# Patient Record
Sex: MALE | Race: WHITE | NOT HISPANIC OR LATINO | Employment: OTHER | URBAN - METROPOLITAN AREA
[De-identification: names, ages, dates, MRNs, and addresses within clinical notes are randomized per-mention and may not be internally consistent; named-entity substitution may affect disease eponyms.]

---

## 2017-01-02 ENCOUNTER — HOME CARE VISIT (OUTPATIENT)
Dept: HOME HEALTH SERVICES | Facility: HOME HEALTHCARE | Age: 67
End: 2017-01-02
Payer: MEDICARE

## 2017-01-03 ENCOUNTER — HOME CARE VISIT (OUTPATIENT)
Dept: HOME HEALTH SERVICES | Facility: HOME HEALTHCARE | Age: 67
End: 2017-01-03
Payer: MEDICARE

## 2017-01-03 ENCOUNTER — ANTICOAGULATION MONITORING (OUTPATIENT)
Dept: VASCULAR LAB | Facility: MEDICAL CENTER | Age: 67
End: 2017-01-03

## 2017-01-03 VITALS — SYSTOLIC BLOOD PRESSURE: 130 MMHG | HEART RATE: 67 BPM | DIASTOLIC BLOOD PRESSURE: 80 MMHG | RESPIRATION RATE: 20 BRPM

## 2017-01-03 DIAGNOSIS — I63.9 CEREBROVASCULAR ACCIDENT (CVA), UNSPECIFIED MECHANISM (HCC): ICD-10-CM

## 2017-01-03 DIAGNOSIS — Z86.73 HISTORY OF CVA (CEREBROVASCULAR ACCIDENT): ICD-10-CM

## 2017-01-03 DIAGNOSIS — Z79.01 ANTICOAGULATED ON WARFARIN: ICD-10-CM

## 2017-01-03 DIAGNOSIS — I48.91 ATRIAL FIBRILLATION, UNSPECIFIED TYPE (HCC): ICD-10-CM

## 2017-01-03 LAB — INR PPP: 1.6 (ref 2–3.5)

## 2017-01-03 PROCEDURE — G0151 HHCP-SERV OF PT,EA 15 MIN: HCPCS

## 2017-01-03 PROCEDURE — G0300 HHS/HOSPICE OF LPN EA 15 MIN: HCPCS

## 2017-01-03 NOTE — PROGRESS NOTES
Anticoagulation Summary as of 1/3/2017     INR goal 2.0-3.0   Selected INR 1.6! (1/3/2017)   Maintenance plan 10 mg (5 mg x 2) every day   Weekly total 70 mg   Plan last modified Jefferson Brian PHARMD (1/3/2017)   Next INR check 1/9/2017   Target end date Indefinite    Indications   CVA (cerebral vascular accident) (HCC) [I63.9]  Atrial fibrillation [427.31] [I48.91]  History of CVA (cerebrovascular accident) [Z86.73]  Anticoagulated on warfarin [Z79.01]         Anticoagulation Episode Summary     [facility]    Preferred lab     Send INR reminders to     Comments       Anticoagulation Care Providers     Provider Role Specialty Phone number    Catrachito Sterling D.O. Referring Internal Medicine [phone]    [facility]ticoagulation [facility] Responsible  [phone]    Liseth Doe A.P.N. Responsible Cardiology [phone]    Gi Cadena A.P.N. Responsible Cardiology [phone]        Anticoagulation Patient Findings    Spoke with patient today regarding subtherapeutic INR of 1.6.  Patient denies any signs/symptoms of bruising or bleeding or any changes in diet and medications.  Instructed patient to call clinic with any questions or concerns.  Patient ran out of current tablet strength and went back to the previous strength he had on hand.  Updated dosing calendar to reflect.  Instructed patient to bolus with 15mg X 1, then resume current warfarin regimen.  Follow up in 6 days.    Jefferson Brian, BIANCA

## 2017-01-04 ENCOUNTER — HOME CARE VISIT (OUTPATIENT)
Dept: HOME HEALTH SERVICES | Facility: HOME HEALTHCARE | Age: 67
End: 2017-01-04
Payer: MEDICARE

## 2017-01-04 VITALS — RESPIRATION RATE: 20 BRPM | TEMPERATURE: 100.1 F | DIASTOLIC BLOOD PRESSURE: 79 MMHG | SYSTOLIC BLOOD PRESSURE: 128 MMHG

## 2017-01-04 VITALS
DIASTOLIC BLOOD PRESSURE: 78 MMHG | TEMPERATURE: 99.5 F | WEIGHT: 267.25 LBS | HEART RATE: 70 BPM | SYSTOLIC BLOOD PRESSURE: 156 MMHG | BODY MASS INDEX: 32.54 KG/M2

## 2017-01-04 VITALS
TEMPERATURE: 97.9 F | RESPIRATION RATE: 20 BRPM | BODY MASS INDEX: 32.7 KG/M2 | DIASTOLIC BLOOD PRESSURE: 80 MMHG | HEART RATE: 65 BPM | SYSTOLIC BLOOD PRESSURE: 130 MMHG | WEIGHT: 268.5 LBS

## 2017-01-04 PROCEDURE — G0156 HHCP-SVS OF AIDE,EA 15 MIN: HCPCS

## 2017-01-04 PROCEDURE — G0160 HHC OCCUP THERAPY EA 15: HCPCS

## 2017-01-04 ASSESSMENT — ENCOUNTER SYMPTOMS: DEBILITATING PAIN: 1

## 2017-01-05 ENCOUNTER — HOME CARE VISIT (OUTPATIENT)
Dept: HOME HEALTH SERVICES | Facility: HOME HEALTHCARE | Age: 67
End: 2017-01-05
Payer: MEDICARE

## 2017-01-05 VITALS
DIASTOLIC BLOOD PRESSURE: 78 MMHG | BODY MASS INDEX: 32.57 KG/M2 | RESPIRATION RATE: 20 BRPM | SYSTOLIC BLOOD PRESSURE: 148 MMHG | TEMPERATURE: 98.8 F | WEIGHT: 267.5 LBS

## 2017-01-05 VITALS
HEART RATE: 86 BPM | SYSTOLIC BLOOD PRESSURE: 174 MMHG | RESPIRATION RATE: 20 BRPM | TEMPERATURE: 98.9 F | DIASTOLIC BLOOD PRESSURE: 104 MMHG

## 2017-01-05 PROCEDURE — G0300 HHS/HOSPICE OF LPN EA 15 MIN: HCPCS

## 2017-01-05 PROCEDURE — G0151 HHCP-SERV OF PT,EA 15 MIN: HCPCS

## 2017-01-05 RX ORDER — HYDRALAZINE HYDROCHLORIDE 100 MG/1
100 TABLET, FILM COATED ORAL EVERY 8 HOURS
Qty: 90 TAB | Refills: 0 | Status: SHIPPED | OUTPATIENT
Start: 2017-01-05 | End: 2017-01-25 | Stop reason: SDUPTHER

## 2017-01-05 RX ORDER — AMLODIPINE BESYLATE 10 MG/1
10 TABLET ORAL DAILY
Qty: 30 TAB | Refills: 0 | Status: SHIPPED | OUTPATIENT
Start: 2017-01-05 | End: 2017-01-19 | Stop reason: SDUPTHER

## 2017-01-05 RX ORDER — WARFARIN SODIUM 6 MG/1
TABLET ORAL
Qty: 30 TAB | Refills: 0 | Status: SHIPPED | OUTPATIENT
Start: 2017-01-05 | End: 2017-01-25

## 2017-01-05 RX ORDER — FLUTICASONE PROPIONATE 50 MCG
2 SPRAY, SUSPENSION (ML) NASAL PRN
Qty: 1 BOTTLE | Refills: 5 | Status: SHIPPED | OUTPATIENT
Start: 2017-01-05 | End: 2018-05-29 | Stop reason: SDUPTHER

## 2017-01-05 RX ORDER — LISINOPRIL 40 MG/1
40 TABLET ORAL DAILY
Qty: 30 TAB | Refills: 0 | Status: SHIPPED | OUTPATIENT
Start: 2017-01-05 | End: 2017-01-25 | Stop reason: SDUPTHER

## 2017-01-05 RX ORDER — GABAPENTIN 300 MG/1
300 CAPSULE ORAL 2 TIMES DAILY
Qty: 60 CAP | Refills: 0 | Status: SHIPPED | OUTPATIENT
Start: 2017-01-05 | End: 2017-01-25 | Stop reason: SDUPTHER

## 2017-01-05 RX ORDER — CLINDAMYCIN HYDROCHLORIDE 300 MG/1
300 CAPSULE ORAL 2 TIMES DAILY
Qty: 40 CAP | Refills: 0 | Status: SHIPPED | OUTPATIENT
Start: 2017-01-05 | End: 2017-01-25 | Stop reason: SDUPTHER

## 2017-01-05 RX ORDER — POLYETHYLENE GLYCOL 3350 17 G/17G
17 POWDER, FOR SOLUTION ORAL
Qty: 30 EACH | Refills: 0 | Status: ON HOLD | OUTPATIENT
Start: 2017-01-05 | End: 2018-06-13

## 2017-01-05 RX ORDER — CLINDAMYCIN HYDROCHLORIDE 300 MG/1
300 CAPSULE ORAL 2 TIMES DAILY
Qty: 40 CAP | Refills: 0 | Status: SHIPPED | OUTPATIENT
Start: 2017-01-05 | End: 2017-01-05 | Stop reason: SDUPTHER

## 2017-01-05 RX ORDER — INSULIN GLARGINE 100 [IU]/ML
30 INJECTION, SOLUTION SUBCUTANEOUS EVERY EVENING
Qty: 10 ML | Refills: 5 | Status: SHIPPED | OUTPATIENT
Start: 2017-01-05 | End: 2017-11-13

## 2017-01-05 ASSESSMENT — ENCOUNTER SYMPTOMS: POOR JUDGMENT: 1

## 2017-01-05 NOTE — TELEPHONE ENCOUNTER
Was the patient seen in the last year in this department? Yes     Does patient have an active prescription for medications requested? No     Received Request Via: patient

## 2017-01-06 ASSESSMENT — ACTIVITIES OF DAILY LIVING (ADL)
ORAL_CARE_ASSISTANCE: 0
GROOMING_ASSISTANCE: 0
DRESSING_LB_ASSISTANCE: 0
TOILETING_ASSISTANCE: 0
BATHING_ASSISTANCE: 5
DRESSING_UB_ASSISTANCE: 0
EATING_ASSISTANCE: 0

## 2017-01-09 ENCOUNTER — HOME CARE VISIT (OUTPATIENT)
Dept: HOME HEALTH SERVICES | Facility: HOME HEALTHCARE | Age: 67
End: 2017-01-09
Payer: MEDICARE

## 2017-01-09 ENCOUNTER — ANTICOAGULATION MONITORING (OUTPATIENT)
Dept: VASCULAR LAB | Facility: MEDICAL CENTER | Age: 67
End: 2017-01-09

## 2017-01-09 VITALS
DIASTOLIC BLOOD PRESSURE: 68 MMHG | HEART RATE: 80 BPM | TEMPERATURE: 99 F | WEIGHT: 263.25 LBS | SYSTOLIC BLOOD PRESSURE: 136 MMHG | BODY MASS INDEX: 32.06 KG/M2

## 2017-01-09 VITALS
TEMPERATURE: 99.4 F | DIASTOLIC BLOOD PRESSURE: 68 MMHG | RESPIRATION RATE: 18 BRPM | HEART RATE: 80 BPM | SYSTOLIC BLOOD PRESSURE: 136 MMHG

## 2017-01-09 DIAGNOSIS — Z86.73 HISTORY OF CVA (CEREBROVASCULAR ACCIDENT): ICD-10-CM

## 2017-01-09 DIAGNOSIS — I48.91 ATRIAL FIBRILLATION, UNSPECIFIED TYPE (HCC): ICD-10-CM

## 2017-01-09 DIAGNOSIS — Z79.01 ANTICOAGULATED ON WARFARIN: ICD-10-CM

## 2017-01-09 DIAGNOSIS — M54.2 NECK PAIN: ICD-10-CM

## 2017-01-09 DIAGNOSIS — I63.9 CEREBROVASCULAR ACCIDENT (CVA), UNSPECIFIED MECHANISM (HCC): ICD-10-CM

## 2017-01-09 LAB — INR PPP: 2.5 (ref 2–3.5)

## 2017-01-09 PROCEDURE — G0152 HHCP-SERV OF OT,EA 15 MIN: HCPCS

## 2017-01-09 PROCEDURE — G0299 HHS/HOSPICE OF RN EA 15 MIN: HCPCS

## 2017-01-09 RX ORDER — OXYCODONE AND ACETAMINOPHEN 10; 325 MG/1; MG/1
1-2 TABLET ORAL EVERY 6 HOURS PRN
Qty: 120 TAB | Refills: 0 | Status: SHIPPED | OUTPATIENT
Start: 2017-01-09 | End: 2017-01-19 | Stop reason: SDUPTHER

## 2017-01-09 ASSESSMENT — ENCOUNTER SYMPTOMS
NAUSEA: DENIES
VOMITING: DENIES

## 2017-01-10 ENCOUNTER — HOME CARE VISIT (OUTPATIENT)
Dept: HOME HEALTH SERVICES | Facility: HOME HEALTHCARE | Age: 67
End: 2017-01-10
Payer: MEDICARE

## 2017-01-10 VITALS — RESPIRATION RATE: 18 BRPM | DIASTOLIC BLOOD PRESSURE: 82 MMHG | TEMPERATURE: 98.9 F | SYSTOLIC BLOOD PRESSURE: 143 MMHG

## 2017-01-10 PROCEDURE — G0156 HHCP-SVS OF AIDE,EA 15 MIN: HCPCS

## 2017-01-10 PROCEDURE — G0151 HHCP-SERV OF PT,EA 15 MIN: HCPCS

## 2017-01-10 ASSESSMENT — ACTIVITIES OF DAILY LIVING (ADL): MEAL_PREP_ASSISTANCE: 5

## 2017-01-10 NOTE — PROGRESS NOTES
OP Anticoagulation Telephone Note    Date: 1/9/2017       Plan:  Spoke with pt on the phone. Pt is therapeutic today. Denies any changes in medications or diet. Denies any s/sx of bleeding or clotting. Will continue warfarin dosing as outlined below and follow-up with pt in 1wk.      Anticoagulation Summary as of 1/9/2017     INR goal 2.0-3.0   Selected INR 2.5 (1/9/2017)   Maintenance plan 10 mg (5 mg x 2) every day   Weekly total 70 mg   Plan last modified Jefferson Brian, PHARMD (1/3/2017)   Next INR check 1/16/2017   Target end date Indefinite    Indications   CVA (cerebral vascular accident) (HCC) [I63.9]  Atrial fibrillation [427.31] [I48.91]  History of CVA (cerebrovascular accident) [Z86.73]  Anticoagulated on warfarin [Z79.01]         Anticoagulation Episode Summary     [facility]    Preferred lab     Send INR reminders to     Comments       Anticoagulation Care Providers     Provider Role Specialty Phone number    Catrachito Sterling D.O. Referring Internal Medicine [phone]    [facility] Anticoagulation [facility] Responsible  [phone]    Liseth Doe A.P.NElian Responsible Cardiology [phone]    Gi Cadena, AElianP.NElian Responsible Cardiology [phone]            GLEN Andrews  [facility]

## 2017-01-11 ENCOUNTER — HOME CARE VISIT (OUTPATIENT)
Dept: HOME HEALTH SERVICES | Facility: HOME HEALTHCARE | Age: 67
End: 2017-01-11
Payer: MEDICARE

## 2017-01-11 VITALS — DIASTOLIC BLOOD PRESSURE: 88 MMHG | HEART RATE: 70 BPM | SYSTOLIC BLOOD PRESSURE: 172 MMHG | TEMPERATURE: 210 F

## 2017-01-11 PROCEDURE — G0152 HHCP-SERV OF OT,EA 15 MIN: HCPCS

## 2017-01-12 VITALS
BODY MASS INDEX: 32.51 KG/M2 | SYSTOLIC BLOOD PRESSURE: 140 MMHG | DIASTOLIC BLOOD PRESSURE: 76 MMHG | TEMPERATURE: 99 F | WEIGHT: 267 LBS | HEART RATE: 69 BPM

## 2017-01-12 ASSESSMENT — ACTIVITIES OF DAILY LIVING (ADL): MEAL_PREP_ASSISTANCE: 5

## 2017-01-13 ENCOUNTER — HOME CARE VISIT (OUTPATIENT)
Dept: HOME HEALTH SERVICES | Facility: HOME HEALTHCARE | Age: 67
End: 2017-01-13
Payer: MEDICARE

## 2017-01-13 PROCEDURE — G0151 HHCP-SERV OF PT,EA 15 MIN: HCPCS

## 2017-01-15 VITALS — SYSTOLIC BLOOD PRESSURE: 196 MMHG | HEART RATE: 66 BPM | DIASTOLIC BLOOD PRESSURE: 116 MMHG | TEMPERATURE: 212 F

## 2017-01-16 ENCOUNTER — HOME CARE VISIT (OUTPATIENT)
Dept: HOME HEALTH SERVICES | Facility: HOME HEALTHCARE | Age: 67
End: 2017-01-16
Payer: MEDICARE

## 2017-01-16 VITALS
DIASTOLIC BLOOD PRESSURE: 94 MMHG | HEIGHT: 76 IN | WEIGHT: 264.8 LBS | TEMPERATURE: 98.2 F | HEART RATE: 68 BPM | SYSTOLIC BLOOD PRESSURE: 169 MMHG | RESPIRATION RATE: 16 BRPM | BODY MASS INDEX: 32.25 KG/M2

## 2017-01-16 LAB — INR PPP: 2.3 (ref 2–3.5)

## 2017-01-16 PROCEDURE — G0152 HHCP-SERV OF OT,EA 15 MIN: HCPCS

## 2017-01-16 PROCEDURE — G0299 HHS/HOSPICE OF RN EA 15 MIN: HCPCS

## 2017-01-16 PROCEDURE — 6650331 HCR  COAGCHECK STRIPS

## 2017-01-16 ASSESSMENT — ENCOUNTER SYMPTOMS
NAUSEA: DENIES
VOMITING: DENIES

## 2017-01-17 ENCOUNTER — TELEPHONE (OUTPATIENT)
Dept: MEDICAL GROUP | Facility: CLINIC | Age: 67
End: 2017-01-17

## 2017-01-17 ENCOUNTER — ANTICOAGULATION MONITORING (OUTPATIENT)
Dept: VASCULAR LAB | Facility: MEDICAL CENTER | Age: 67
End: 2017-01-17

## 2017-01-17 ENCOUNTER — HOME CARE VISIT (OUTPATIENT)
Dept: HOME HEALTH SERVICES | Facility: HOME HEALTHCARE | Age: 67
End: 2017-01-17
Payer: MEDICARE

## 2017-01-17 VITALS
TEMPERATURE: 98.2 F | SYSTOLIC BLOOD PRESSURE: 138 MMHG | WEIGHT: 263.5 LBS | HEART RATE: 67 BPM | BODY MASS INDEX: 32.09 KG/M2 | DIASTOLIC BLOOD PRESSURE: 60 MMHG

## 2017-01-17 DIAGNOSIS — M54.2 NECK PAIN: ICD-10-CM

## 2017-01-17 DIAGNOSIS — I63.9 CEREBROVASCULAR ACCIDENT (CVA), UNSPECIFIED MECHANISM (HCC): ICD-10-CM

## 2017-01-17 DIAGNOSIS — I48.91 ATRIAL FIBRILLATION, UNSPECIFIED TYPE (HCC): ICD-10-CM

## 2017-01-17 DIAGNOSIS — Z79.01 ANTICOAGULATED ON WARFARIN: ICD-10-CM

## 2017-01-17 DIAGNOSIS — Z47.89 OTHER ORTHOPEDIC AFTERCARE: ICD-10-CM

## 2017-01-17 DIAGNOSIS — Z86.73 HISTORY OF CVA (CEREBROVASCULAR ACCIDENT): ICD-10-CM

## 2017-01-17 PROCEDURE — G0151 HHCP-SERV OF PT,EA 15 MIN: HCPCS

## 2017-01-17 ASSESSMENT — ACTIVITIES OF DAILY LIVING (ADL)
ORAL_CARE_ASSISTANCE: 1
GROOMING_ASSISTANCE: 1

## 2017-01-18 ENCOUNTER — HOME CARE VISIT (OUTPATIENT)
Dept: HOME HEALTH SERVICES | Facility: HOME HEALTHCARE | Age: 67
End: 2017-01-18
Payer: MEDICARE

## 2017-01-18 VITALS — RESPIRATION RATE: 20 BRPM | SYSTOLIC BLOOD PRESSURE: 143 MMHG | TEMPERATURE: 99.2 F | DIASTOLIC BLOOD PRESSURE: 82 MMHG

## 2017-01-18 VITALS — TEMPERATURE: 210.4 F | SYSTOLIC BLOOD PRESSURE: 170 MMHG | DIASTOLIC BLOOD PRESSURE: 84 MMHG | HEART RATE: 64 BPM

## 2017-01-18 PROCEDURE — G0156 HHCP-SVS OF AIDE,EA 15 MIN: HCPCS

## 2017-01-18 PROCEDURE — G0152 HHCP-SERV OF OT,EA 15 MIN: HCPCS

## 2017-01-18 NOTE — TELEPHONE ENCOUNTER
1. Caller Name: Joel Ma                                         Call Back Number: [phone] (home)         Patient approves a detailed voicemail message: yes    Montserrat, patient's home health nurse left message asking to please sign order for patient to have more visits, that would include showers.  Montserrat asked to please have did done by tonight she would really appreciate it .

## 2017-01-18 NOTE — PROGRESS NOTES
Anticoagulation Summary as of 1/17/2017     INR goal 2.0-3.0   Selected INR 2.3 (1/16/2017)   Maintenance plan 10 mg (5 mg x 2) every day   Weekly total 70 mg   No change documented Gama Farnsworth Ass't   Plan last modified Jefferson Brian, PHARMD (1/3/2017)   Next INR check 1/23/2017   Target end date Indefinite    Indications   CVA (cerebral vascular accident) (CMS-HCC) [I63.9]  Atrial fibrillation [427.31] [I48.91]  History of CVA (cerebrovascular accident) [Z86.73]  Anticoagulated on warfarin [Z79.01]         Anticoagulation Episode Summary     [facility]    Preferred lab     Send INR reminders to     Comments       Anticoagulation Care Providers     Provider Role Specialty Phone number    Catrachito Sterling D.O. Referring Internal Medicine [phone]    [facility]tico[facility] Responsible  [phone]    Liseth Doe A.P.N. Responsible Cardiology [phone]    BRIT QuirozP.NElian Responsible Cardiology [phone]        Anticoagulation Patient Findings   Negatives Missed Doses, Extra Doses, Medication Changes, Antibiotic Use, Diet Changes, Dental/Other Procedures, Hospitalization, Bleeding Gums, Nose Bleeds, Blood in Urine, Blood in Stool, Any Bruising, Other Complaints        Spoke with patient to report a therapeutic INR.  Pt instructed to continue with current warfarin dosing regimen. Pt denies any s/s of bleeding, bruising, clotting or any changes to diet or medication.  Will follow up in 1 week.    Gama Farnsworth Ass't    Agree with plan of care as stated above.    Liseth CARROLL

## 2017-01-19 ENCOUNTER — HOME CARE VISIT (OUTPATIENT)
Dept: HOME HEALTH SERVICES | Facility: HOME HEALTHCARE | Age: 67
End: 2017-01-19
Payer: MEDICARE

## 2017-01-19 ENCOUNTER — OFFICE VISIT (OUTPATIENT)
Dept: MEDICAL GROUP | Facility: CLINIC | Age: 67
End: 2017-01-19
Payer: MEDICARE

## 2017-01-19 VITALS
SYSTOLIC BLOOD PRESSURE: 174 MMHG | DIASTOLIC BLOOD PRESSURE: 86 MMHG | HEART RATE: 86 BPM | TEMPERATURE: 209.5 F | RESPIRATION RATE: 18 BRPM

## 2017-01-19 VITALS
HEIGHT: 76 IN | SYSTOLIC BLOOD PRESSURE: 160 MMHG | HEART RATE: 66 BPM | RESPIRATION RATE: 18 BRPM | TEMPERATURE: 96.9 F | OXYGEN SATURATION: 95 % | WEIGHT: 270 LBS | BODY MASS INDEX: 32.88 KG/M2 | DIASTOLIC BLOOD PRESSURE: 90 MMHG

## 2017-01-19 VITALS
BODY MASS INDEX: 32.57 KG/M2 | WEIGHT: 267.5 LBS | DIASTOLIC BLOOD PRESSURE: 82 MMHG | HEART RATE: 62 BPM | TEMPERATURE: 99.2 F | SYSTOLIC BLOOD PRESSURE: 143 MMHG

## 2017-01-19 DIAGNOSIS — M54.2 NECK PAIN: ICD-10-CM

## 2017-01-19 DIAGNOSIS — Z23 NEED FOR VACCINATION WITH 13-POLYVALENT PNEUMOCOCCAL CONJUGATE VACCINE: ICD-10-CM

## 2017-01-19 DIAGNOSIS — I10 ESSENTIAL HYPERTENSION: ICD-10-CM

## 2017-01-19 PROCEDURE — G0151 HHCP-SERV OF PT,EA 15 MIN: HCPCS

## 2017-01-19 PROCEDURE — 90670 PCV13 VACCINE IM: CPT | Performed by: INTERNAL MEDICINE

## 2017-01-19 PROCEDURE — 99214 OFFICE O/P EST MOD 30 MIN: CPT | Mod: 25 | Performed by: INTERNAL MEDICINE

## 2017-01-19 PROCEDURE — G0009 ADMIN PNEUMOCOCCAL VACCINE: HCPCS | Performed by: INTERNAL MEDICINE

## 2017-01-19 RX ORDER — OXYCODONE AND ACETAMINOPHEN 10; 325 MG/1; MG/1
1-2 TABLET ORAL EVERY 6 HOURS PRN
Qty: 120 TAB | Refills: 0 | Status: SHIPPED | OUTPATIENT
Start: 2017-01-19 | End: 2017-01-19 | Stop reason: SDUPTHER

## 2017-01-19 RX ORDER — OXYCODONE AND ACETAMINOPHEN 10; 325 MG/1; MG/1
1-2 TABLET ORAL EVERY 6 HOURS PRN
Qty: 120 TAB | Refills: 0 | Status: SHIPPED | OUTPATIENT
Start: 2017-01-19 | End: 2017-03-17 | Stop reason: SDUPTHER

## 2017-01-19 RX ORDER — AMLODIPINE BESYLATE 10 MG/1
10 TABLET ORAL DAILY
Qty: 30 TAB | Refills: 0 | Status: SHIPPED | OUTPATIENT
Start: 2017-01-19 | End: 2017-01-25 | Stop reason: SDUPTHER

## 2017-01-19 ASSESSMENT — ACTIVITIES OF DAILY LIVING (ADL)
GROOMING_ASSISTANCE: 0
ORAL_CARE_ASSISTANCE: 0
MEAL_PREP_ASSISTANCE: 0

## 2017-01-19 NOTE — PROGRESS NOTES
CC: Joel Ma is a 66 y.o. male is suffering from   Chief Complaint   Patient presents with   • Follow-Up         SUBJECTIVE:  1. Neck pain  Joel is here for follow-up is now approximately 2 months out from his neck surgery continues to regain strength will continue to need physical therapy assistance with activities of daily living including bathing.     2. Need for vaccination with 13-polyvalent pneumococcal conjugate vaccine  Patient is in need of pneumococcal vaccination which was given today.     3. Essential hypertension  Patient with a history of essential hypertension, restart amlodipine        Past social, family, history: Single  Social History   Substance Use Topics   • Smoking status: Former Smoker -- 4.00 packs/day for .5 years     Types: Cigarettes     Quit date: 01/01/1970   • Smokeless tobacco: Never Used   • Alcohol Use: No         MEDICATIONS:    Current outpatient prescriptions:   •  oxycodone-acetaminophen (PERCOCET-10)  MG Tab, Take 1-2 Tabs by mouth every 6 hours as needed for Severe Pain., Disp: 120 Tab, Rfl: 0  •  amlodipine (NORVASC) 10 MG Tab, Take 1 Tab by mouth every day., Disp: 30 Tab, Rfl: 0  •  warfarin (COUMADIN) 5 MG Tab, Take 10 mg by mouth every day at 6 PM., Disp: , Rfl:   •  clindamycin (CLEOCIN) 300 MG Cap, Take 1 Cap by mouth 2 Times a Day., Disp: 40 Cap, Rfl: 0  •  fluticasone (FLONASE) 50 MCG/ACT nasal spray, Spray 2 Sprays in nose as needed., Disp: 1 Bottle, Rfl: 5  •  gabapentin (NEURONTIN) 300 MG Cap, Take 1 Cap by mouth 2 Times a Day., Disp: 60 Cap, Rfl: 0  •  lisinopril (PRINIVIL, ZESTRIL) 40 MG tablet, Take 1 Tab by mouth every day., Disp: 30 Tab, Rfl: 0  •  metformin (GLUCOPHAGE) 500 MG Tab, Take 1 Tab by mouth 2 times a day, with meals., Disp: 60 Tab, Rfl: 0  •  nystatin (MYCOSTATIN) Powder, Apply 1 Application to affected area(s) 3 times a day. Apply to reddened area under abdominal fold., Disp: 1 Bottle, Rfl: 0  •  clonidine (CATAPRES) 0.3 MG/24HR  PATCH WEEKLY, Apply 1 Patch to skin as directed every Wednesday., Disp: 4 Patch, Rfl: 0  •  hydrALAZINE (APRESOLINE) 100 MG tablet, Take 1 Tab by mouth every 8 hours., Disp: 90 Tab, Rfl: 0  •  insulin glargine (LANTUS) 100 UNIT/ML Solution, Inject 30 Units as instructed every evening., Disp: 10 mL, Rfl: 5  •  magnesium chloride SR (SLO-MAG) 535 (64 MG) MG Tab CR, Take 1 Tab by mouth 2 Times a Day. (Patient taking differently: Take 1 Tab by mouth 2 times a day as needed.), Disp: 60 Tab, Rfl: 0  •  gabapentin (NEURONTIN) 300 MG Cap, Take 2 Caps by mouth every evening., Disp: 30 Cap, Rfl: 0  •  trazodone (DESYREL) 150 MG Tab, Take 0.5 Tabs by mouth every bedtime., Disp: 30 Tab, Rfl: 0  •  polyethylene glycol/lytes (MIRALAX) Pack, Take 1 Packet by mouth 1 time daily as needed., Disp: 30 Each, Rfl: 0  •  warfarin (COUMADIN) 6 MG Tab, Dosing per coumadin clinic, Disp: 30 Tab, Rfl: 0    PROBLEMS:  Patient Active Problem List    Diagnosis Date Noted   • Left knee pain 08/28/2016     Priority: High   • Diabetic polyneuropathy (CMS-HCC) 05/06/2015     Priority: High   • Toe amputation status (CMS-HCC) 05/06/2015     Priority: High   • HTN (hypertension) 08/16/2010     Priority: Medium   • CVA (cerebral vascular accident) (CMS-HCC) 06/04/2009     Priority: Medium   • Diabetes mellitus with neurological manifestations, controlled (CMS-HCC) 03/05/2012     Priority: Low   • Other orthopedic aftercare 11/07/2016   • Cervical stenosis of spine 09/06/2016   • Dysphagia 09/06/2016   • Chronic atrial fibrillation (CMS-HCC) 09/06/2016   • Hallucinations 09/06/2016   • JANNIE treated with BiPAP 04/18/2016   • History of CVA (cerebrovascular accident) 11/11/2015   • Atrial fibrillation [427.31] 06/24/2015   • Risk for falls 10/27/2014   • BMI 38.0-38.9,adult 10/27/2014   • Myalgia 05/29/2014   • BPH (benign prostatic hyperplasia) 04/28/2014   • Anticoagulated on warfarin 04/28/2014   • Chronic antibiotic suppression 04/28/2014   • MEDICAL  "HOME    • Ventricular ectopy 06/17/2011   • Trigger finger 06/17/2011   • Thyroid mass 07/30/2009   • Dyslipidemia 06/17/2009       REVIEW OF SYSTEMS:  Gen.:  No Nausea, Vomiting, fever, Chills.  Heart: No chest pain.  Lungs:  No shortness of Breath.  Psychological: Eleazar unusual Anxiety depression     PHYSICAL EXAM   Constitutional: Alert, cooperative, not in acute distress.  Cardiovascular:  Rate Rhythm is regular without murmurs rubs clicks.     Thorax & Lungs: Clear to auscultation, no wheezing, rhonchi, or rales  HENT: Normocephalic, Atraumatic.  Eyes: PERRLA, EOMI, Conjunctiva normal.   Neck: Trachia is midline no swelling of the thyroid.   Lymphatic: No lymphadenopathy noted.   Musculoskeletal: Patient with improved ability to stand, is doing this with some difficulty. Loss of lower extremity muscle strength.   Neurologic: Alert & oriented x 3, cranial nerves II through XII are intact, Normal motor function, Normal sensory function, No focal deficits noted.   Psychiatric: Affect normal, Judgment normal, Mood normal.     VITAL SIGNS:/90 mmHg  Pulse 66  Temp(Src) 36.1 °C (96.9 °F)  Resp 18  Ht 1.93 m (6' 3.99\")  Wt 122.471 kg (270 lb)  BMI 32.88 kg/m2  SpO2 95%    Labs: Reviewed    Assessment:                                                     Plan:    1. Neck pain  Continue Percocet  - oxycodone-acetaminophen (PERCOCET-10)  MG Tab; Take 1-2 Tabs by mouth every 6 hours as needed for Severe Pain.  Dispense: 120 Tab; Refill: 0    2. Need for vaccination with 13-polyvalent pneumococcal conjugate vaccine  Vaccination given  - Prevnar 13 PCV-13    3. Essential hypertension  Continue amlodipine  - amlodipine (NORVASC) 10 MG Tab; Take 1 Tab by mouth every day.  Dispense: 30 Tab; Refill: 0          "

## 2017-01-19 NOTE — MR AVS SNAPSHOT
"        Joel Ma   2017 9:20 AM   Office Visit   MRN: [id number]    Department:  [facility]   Dept Phone:  [phone]    Description:  Male : 1950   Provider:  Catrachito Sterling D.O.           Reason for Visit     Follow-Up           Allergies as of 2017     Allergen Noted Reactions    Demerol 2008       Makes me stop breathing.  Doesn't like because it makes him high    Statins [Hmg-Coa-R Inhibitors] 2016   Unspecified    Per pt report. Unknown reaction.      You were diagnosed with     Neck pain   [2013]       Need for vaccination with 13-polyvalent pneumococcal conjugate vaccine   [9281931]       Essential hypertension   [0421157]         Vital Signs     Blood Pressure Pulse Temperature Respirations Height Weight    160/90 mmHg 66 36.1 °C (96.9 °F) 18 1.93 m (6' 3.99\") 122.471 kg (270 lb)    Body Mass Index Oxygen Saturation Smoking Status             32.88 kg/m2 95% Former Smoker         Basic Information     Date Of Birth Sex Race Ethnicity Preferred Language    1950 Male White Non- English      Your appointments     2017 To Be Determined   SN-HH-HOME VIS+LPN SUP+HHA SUP with Anita Reed R.N.   [facility] (--)    [address]  [place] [address]   [phone]            2017 12:00 PM   OT-HH-HOME VISIT with Sasha Keene O.T.   [facility] (--)    [address]  [place] [address]   [phone]            2017 11:00 AM   PT-HH-HOME VISIT with Joan K Ormonde, P.T.   [facility] (--)    [address]  [place] [address]   [phone]            2017 To Be Determined   AIDE-HH-HOME VISIT with Ray Campbell C.N.A.   [facility] (--)    [address]  [place] [address]   [phone]            2017 12:00 PM   OT-HH-HOME VISIT with BILL [facility] (--)    [address]  [facility] [address]   [phone]            2017 " 11:00 AM   PT-HH-HOME VISIT with Joan K Ormonde, P.T.   [facility] (--)    [address]  [place] [address]   [phone]            Jan 30, 2017 To Be Determined   SN-HH-HOME VIS+LPN SUP+HHA SUP with Anita Reed R.N.   [facility] (--)    [address]  [place] [address]   [phone]            Jan 30, 2017 To Be Determined   OT-HH-HOME VISIT with Sasha Keene O.TElian   [facility] (--)    [address]  [place] [address]   [phone]            Jan 31, 2017  8:00 AM   PT-HH-HOME VISIT with Joan K Ormonde, P.T.   [facility] (--)    [address]  [place] [address]   [phone]            Jan 31, 2017 To Be Determined   AIDE-HH-HOME VISIT with Ray Campbell C.N.A.   [facility] (--)    [address]  [place] [address]   [phone]            Feb 01, 2017 To Be Determined   OT-HH-HOME VISIT with Sasha Keene O.TElian   [facility] (--)    [address]  [place] [address]   [phone]            Feb 02, 2017 To Be Determined   KH-TS-NVBWUKLYSD DISCHARGE with Joan K Ormonde, P.T.   [facility] (--)    [address]  [place] [address]   [phone]            Feb 06, 2017 To Be Determined   SN-HH-OASIS RECERT+LPN/HHA SUP with Anita Reed R.N.   [facility] (--)    [address]  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73   11/11/2015 - Present    JANNIE treated with BiPAP G47.33   4/18/2016 - Present    Left knee pain M25.562 High  8/28/2016 - Present    Cervical stenosis of spine M48.02   9/6/2016 - Present    Dysphagia R13.10   9/6/2016 - Present    Chronic atrial fibrillation (CMS-HCC) I48.2   9/6/2016 - Present    Hallucinations R44.3   9/6/2016 - Present    Other orthopedic aftercare Z47.89   11/7/2016 - Present      Health Maintenance        Date Due Completion Dates    IMM ZOSTER VACCINE 8/17/2009 ---    IMM PNEUMOCOCCAL 65+ (ADULT) LOW/MEDIUM RISK SERIES (1 of 2 - PCV13) 8/17/2014 ---    URINE ACR / MICROALBUMIN 5/11/2016 5/11/2015, 2/10/2015, 11/12/2014, 2/5/2014, 10/28/2013, 5/31/2013, 9/6/2012, 4/9/2012, 11/11/2011, 11/15/2010    IMM INFLUENZA (1) 9/1/2016 ---    RETINAL SCREENING 10/28/2016 10/28/2015, 9/16/2015, 8/12/2015, 3/10/2014, 9/24/2012    DIABETES MONOFILAMENT / LE EXAM 11/16/2016 11/16/2015 (Done), 2/11/2014 (Done), 5/31/2013 (Done), 5/18/2011 (N/S)    Override on 11/16/2015: Done (Catrachito Sterling DO)    Override on 2/11/2014: Done    Override on 5/31/2013: Done    Override on 5/18/2011: (N/S) (patient with mild neuropathy distal toes. )    A1C SCREENING 6/16/2017 12/16/2016, 12/7/2016, 8/29/2016, 8/10/2016, 7/7/2016, 4/29/2016, 9/21/2015, 8/17/2015, 5/11/2015, 5/11/2015, 3/30/2015, 2/10/2015, 11/12/2014, 8/13/2014, 5/13/2014, 2/5/2014, 2/5/2014, 2/5/2014, 10/28/2013, 10/28/2013, 8/26/2013, 5/31/2013,  1/10/2013, 10/29/2012, 9/6/2012, 9/6/2012, 4/9/2012, 3/19/2012, 11/11/2011, 2/17/2011, 11/15/2010, 8/13/2010, 7/27/2010, 5/5/2010, 3/24/2010, 2/18/2010, 12/23/2009, 12/7/2009, 7/13/2009, 4/22/2008, 2/11/2008, 8/12/2006, 8/11/2006, 5/4/2006    COLONOSCOPY 6/30/2017 6/30/2014    FASTING LIPID PROFILE 8/10/2017 8/10/2016, 7/7/2016, 4/29/2016, 9/21/2015, 8/17/2015, 5/11/2015, 3/30/2015, 8/13/2014, 2/5/2014, 2/5/2014, 2/5/2014, 10/28/2013, 8/19/2013, 7/16/2013, 5/31/2013, 4/10/2013, 10/30/2012, 10/3/2012, 4/27/2012, 6/15/2011, 8/13/2010, 5/5/2010, 12/23/2009, 12/7/2009, 4/21/2008, 2/12/2008, 5/8/2007, 8/11/2006    SERUM CREATININE 12/16/2017 12/16/2016, 12/7/2016, 12/6/2016, 11/28/2016, 11/25/2016, 11/11/2016, 11/8/2016, 9/9/2016, 9/7/2016, 9/6/2016, 9/1/2016, 8/31/2016, 8/30/2016, 8/29/2016, 8/28/2016, 8/10/2016, 7/22/2016, 7/7/2016, 4/29/2016, 11/18/2015, 9/21/2015, 9/15/2015, 8/17/2015, 6/19/2015, 5/11/2015, 3/30/2015, 2/10/2015, 11/12/2014, 10/13/2014, 8/13/2014, 5/13/2014, 2/5/2014, 2/5/2014, 2/5/2014, 10/28/2013, 6/27/2013, 5/31/2013, 1/10/2013, 1/7/2013, 12/17/2012, 12/16/2012, 12/15/2012, 12/14/2012, 12/13/2012, 12/12/2012, 12/11/2012, 12/11/2012, 12/10/2012, 12/10/2012, 12/9/2012, 12/9/2012, 12/8/2012, 12/8/2012, 12/7/2012, 12/6/2012, 11/16/2012, 11/15/2012, 11/14/2012, 11/13/2012, 11/9/2012, 11/7/2012, 11/6/2012, 11/4/2012, 11/2/2012, 11/1/2012, 10/31/2012, 10/30/2012, 10/29/2012, 10/29/2012, 10/28/2012, 9/6/2012, 4/9/2012, 3/19/2012, 2/28/2012, 1/4/2012, 11/11/2011, 7/21/2011, 6/15/2011, 3/21/2011, 3/21/2011, 3/20/2011, 2/17/2011, 1/10/2009, 7/30/2008, 4/22/2008, 4/21/2008, 4/20/2008, 2/19/2008, 2/18/2008, 2/17/2008, 2/16/2008, 2/15/2008, 2/12/2008, 2/11/2008, 2/11/2008, 5/10/2007, 5/9/2007, 5/8/2007, 5/7/2007, 10/16/2006, 8/11/2006, 8/11/2006, 5/4/2006, 5/3/2006, 6/28/2005    IMM DTaP/Tdap/Td Vaccine (2 - Td) 7/16/2022 7/16/2012, 8/15/2009            Current Immunizations     13-VALENT PCV PREVNAR  Incomplete     Influenza Vaccine 1/1/1980    Pneumococcal Vaccine (UF)Historical Data 1/1/1980    TD Vaccine 8/15/2009  5:00 PM    Tdap Vaccine 7/16/2012      Below and/or attached are the medications your provider expects you to take. Review all of your home medications and newly ordered medications with your provider and/or pharmacist. Follow medication instructions as directed by your provider and/or pharmacist. Please keep your medication list with you and share with your provider. Update the information when medications are discontinued, doses are changed, or new medications (including over-the-counter products) are added; and carry medication information at all times in the event of emergency situations     Allergies:  DEMEROL - (reactions not documented)     STATINS - Unspecified               Medications  Valid as of: January 19, 2017 - 10:18 AM    Generic Name Brand Name Tablet Size Instructions for use    AmLODIPine Besylate (Tab) NORVASC 10 MG Take 1 Tab by mouth every day.        Clindamycin HCl (Cap) CLEOCIN 300 MG Take 1 Cap by mouth 2 Times a Day.        CloNIDine HCl (PATCH WEEKLY) CATAPRES 0.3 MG/24HR Apply 1 Patch to skin as directed every Wednesday.        Fluticasone Propionate (Suspension) FLONASE 50 MCG/ACT Spray 2 Sprays in nose as needed.        Gabapentin (Cap) NEURONTIN 300 MG Take 2 Caps by mouth every evening.        Gabapentin (Cap) NEURONTIN 300 MG Take 1 Cap by mouth 2 Times a Day.        HydrALAZINE HCl (Tab) APRESOLINE 100 MG Take 1 Tab by mouth every 8 hours.        Insulin Glargine (Solution) LANTUS 100 UNIT/ML Inject 30 Units as instructed every evening.        Lisinopril (Tab) PRINIVIL, ZESTRIL 40 MG Take 1 Tab by mouth every day.        Magnesium Chloride (Tab CR) SLO- (64 MG) MG Take 1 Tab by mouth 2 Times a Day.        MetFORMIN HCl (Tab) GLUCOPHAGE 500 MG Take 1 Tab by mouth 2 times a day, with meals.        Nystatin (Powder) MYCOSTATIN  Apply 1 Application to affected area(s) 3  times a day. Apply to reddened area under abdominal fold.        Oxycodone-Acetaminophen (Tab) PERCOCET-10  MG Take 1-2 Tabs by mouth every 6 hours as needed for Severe Pain.        Polyethylene Glycol 3350 (Pack) MIRALAX  Take 1 Packet by mouth 1 time daily as needed.        TraZODone HCl (Tab) DESYREL 150 MG Take 0.5 Tabs by mouth every bedtime.        Warfarin Sodium (Tab) COUMADIN 6 MG Dosing per coumadin clinic        Warfarin Sodium (Tab) COUMADIN 5 MG Take 10 mg by mouth every day at 6 PM.        .                 Medicines prescribed today were sent to:     [id number] - [place], [place] - [address]    [address] [place] [address]    Phone: [phone] Fax: [phone]    Open 24 Hours?: No      Medication refill instructions:       If your prescription bottle indicates you have medication refills left, it is not necessary to call your provider’s office. Please contact your pharmacy and they will refill your medication.    If your prescription bottle indicates you do not have any refills left, you may request refills at any time through one of the following ways: The online [employer] system (except Urgent Care), by calling your provider’s office, or by asking your pharmacy to contact your provider’s office with a refill request. Medication refills are processed only during regular business hours and may not be available until the next business day. Your provider may request additional information or to have a follow-up visit with you prior to refilling your medication.   *Please Note: Medication refills are assigned a new Rx number when refilled electronically. Your pharmacy may indicate that no refills were authorized even though a new prescription for the same medication is available at the pharmacy. Please request the medicine by name with the pharmacy before contacting your provider for a refill.        Other Notes About Your Plan     Patient is enrolled in [facility] with  Dr. Sterling           [employer] Access Code: [email or web address]  Expires: 1/25/2017  8:46 AM    [employer]  A secure, online tool to manage your health information     [employer]’s [employer]® is a secure, online tool that connects you to your personalized health information from the privacy of your home -- day or night - making it very easy for you to manage your healthcare. Once the activation process is completed, you can even access your medical information using the [employer] leonie, which is available for free in the Apple Leonie store or Google Play store.     [employer] provides the following levels of access (as shown below):   My Chart Features   [facility] Primary Care Doctor [facility]  Specialists [facility]  Urgent  Care Non-[facility]  Primary Care  Doctor   Email your healthcare team securely and privately 24/7 X X X    Manage appointments: schedule your next appointment; view details of past/upcoming appointments X      Request prescription refills. X      View recent personal medical records, including lab and immunizations X X X X   View health record, including health history, allergies, medications X X X X   Read reports about your outpatient visits, procedures, consult and ER notes X X X X   See your discharge summary, which is a recap of your hospital and/or ER visit that includes your diagnosis, lab results, and care plan. X X       How to register for [employer]:  1. Go to  [email or web address].  2. Click on the Sign Up Now box, which takes you to the New Member Sign Up page. You will need to provide the following information:  a. Enter your [employer] Access Code exactly as it appears at the top of this page. (You will not need to use this code after you’ve completed the sign-up process. If you do not sign up before the expiration date, you must request a new code.)   b. Enter your date of birth.   c. Enter your home email address.   d. Click Submit, and follow the next screen’s instructions.  3. Create a [employer] ID.  This will be your [employer] login ID and cannot be changed, so think of one that is secure and easy to remember.  4. Create a [employer] password. You can change your password at any time.  5. Enter your Password Reset Question and Answer. This can be used at a later time if you forget your password.   6. Enter your e-mail address. This allows you to receive e-mail notifications when new information is available in [employer].  7. Click Sign Up. You can now view your health information.    For assistance activating your [employer] account, call [phone]

## 2017-01-23 ENCOUNTER — HOME CARE VISIT (OUTPATIENT)
Dept: HOME HEALTH SERVICES | Facility: HOME HEALTHCARE | Age: 67
End: 2017-01-23
Payer: MEDICARE

## 2017-01-23 ENCOUNTER — ANTICOAGULATION MONITORING (OUTPATIENT)
Dept: VASCULAR LAB | Facility: MEDICAL CENTER | Age: 67
End: 2017-01-23

## 2017-01-23 VITALS
DIASTOLIC BLOOD PRESSURE: 80 MMHG | SYSTOLIC BLOOD PRESSURE: 162 MMHG | BODY MASS INDEX: 32.51 KG/M2 | RESPIRATION RATE: 20 BRPM | TEMPERATURE: 99 F | WEIGHT: 267 LBS | HEART RATE: 78 BPM

## 2017-01-23 VITALS
BODY MASS INDEX: 32.44 KG/M2 | DIASTOLIC BLOOD PRESSURE: 80 MMHG | TEMPERATURE: 99 F | WEIGHT: 266.38 LBS | SYSTOLIC BLOOD PRESSURE: 160 MMHG | HEART RATE: 83 BPM

## 2017-01-23 DIAGNOSIS — I63.9 CEREBROVASCULAR ACCIDENT (CVA), UNSPECIFIED MECHANISM (HCC): ICD-10-CM

## 2017-01-23 DIAGNOSIS — I48.91 ATRIAL FIBRILLATION, UNSPECIFIED TYPE (HCC): ICD-10-CM

## 2017-01-23 DIAGNOSIS — Z86.73 HISTORY OF CVA (CEREBROVASCULAR ACCIDENT): ICD-10-CM

## 2017-01-23 DIAGNOSIS — Z79.01 ANTICOAGULATED ON WARFARIN: ICD-10-CM

## 2017-01-23 LAB
INR PPP: 1.8 (ref 2–3.5)
INR PPP: 1.8 (ref 2–3.5)

## 2017-01-23 PROCEDURE — G0495 RN CARE TRAIN/EDU IN HH: HCPCS

## 2017-01-23 PROCEDURE — G0152 HHCP-SERV OF OT,EA 15 MIN: HCPCS

## 2017-01-23 SDOH — ECONOMIC STABILITY: HOUSING INSECURITY: UNSAFE APPLIANCES: 0

## 2017-01-23 SDOH — ECONOMIC STABILITY: HOUSING INSECURITY: UNSAFE COOKING RANGE AREA: 0

## 2017-01-23 ASSESSMENT — ENCOUNTER SYMPTOMS
NAUSEA: DENIES
DEBILITATING PAIN: 1
VOMITING: DENIES

## 2017-01-23 ASSESSMENT — ACTIVITIES OF DAILY LIVING (ADL): TOILETING_ASSISTANCE: 0

## 2017-01-24 ENCOUNTER — HOME CARE VISIT (OUTPATIENT)
Dept: HOME HEALTH SERVICES | Facility: HOME HEALTHCARE | Age: 67
End: 2017-01-24
Payer: MEDICARE

## 2017-01-24 ENCOUNTER — ANTICOAGULATION MONITORING (OUTPATIENT)
Dept: VASCULAR LAB | Facility: MEDICAL CENTER | Age: 67
End: 2017-01-24

## 2017-01-24 VITALS — RESPIRATION RATE: 20 BRPM | TEMPERATURE: 99.2 F | SYSTOLIC BLOOD PRESSURE: 123 MMHG | DIASTOLIC BLOOD PRESSURE: 65 MMHG

## 2017-01-24 DIAGNOSIS — Z79.01 ANTICOAGULATED ON WARFARIN: ICD-10-CM

## 2017-01-24 DIAGNOSIS — Z86.73 HISTORY OF CVA (CEREBROVASCULAR ACCIDENT): ICD-10-CM

## 2017-01-24 DIAGNOSIS — I63.9 CEREBROVASCULAR ACCIDENT (CVA), UNSPECIFIED MECHANISM (HCC): ICD-10-CM

## 2017-01-24 DIAGNOSIS — I48.91 ATRIAL FIBRILLATION, UNSPECIFIED TYPE (HCC): ICD-10-CM

## 2017-01-24 PROCEDURE — G0151 HHCP-SERV OF PT,EA 15 MIN: HCPCS

## 2017-01-24 PROCEDURE — G0156 HHCP-SVS OF AIDE,EA 15 MIN: HCPCS

## 2017-01-25 ENCOUNTER — HOME CARE VISIT (OUTPATIENT)
Dept: HOME HEALTH SERVICES | Facility: HOME HEALTHCARE | Age: 67
End: 2017-01-25
Payer: MEDICARE

## 2017-01-25 ENCOUNTER — TELEPHONE (OUTPATIENT)
Dept: MEDICAL GROUP | Facility: CLINIC | Age: 67
End: 2017-01-25

## 2017-01-25 VITALS — SYSTOLIC BLOOD PRESSURE: 166 MMHG | TEMPERATURE: 97.8 F | HEART RATE: 64 BPM | DIASTOLIC BLOOD PRESSURE: 103 MMHG

## 2017-01-25 VITALS
DIASTOLIC BLOOD PRESSURE: 78 MMHG | HEART RATE: 67 BPM | WEIGHT: 271.38 LBS | TEMPERATURE: 98 F | BODY MASS INDEX: 33.05 KG/M2 | SYSTOLIC BLOOD PRESSURE: 156 MMHG

## 2017-01-25 DIAGNOSIS — L08.9 RIGHT KNEE SKIN INFECTION: ICD-10-CM

## 2017-01-25 DIAGNOSIS — I10 ESSENTIAL HYPERTENSION: ICD-10-CM

## 2017-01-25 PROCEDURE — G0152 HHCP-SERV OF OT,EA 15 MIN: HCPCS

## 2017-01-25 RX ORDER — LISINOPRIL 40 MG/1
40 TABLET ORAL DAILY
Qty: 90 TAB | Refills: 0 | Status: SHIPPED | OUTPATIENT
Start: 2017-01-25 | End: 2017-08-04 | Stop reason: SDUPTHER

## 2017-01-25 RX ORDER — WARFARIN SODIUM 5 MG/1
10 TABLET ORAL DAILY
Qty: 180 TAB | Refills: 3 | Status: SHIPPED | OUTPATIENT
Start: 2017-01-25 | End: 2017-08-07

## 2017-01-25 RX ORDER — TRAZODONE HYDROCHLORIDE 150 MG/1
75 TABLET ORAL
Qty: 45 TAB | Refills: 3 | Status: ON HOLD | OUTPATIENT
Start: 2017-01-25 | End: 2018-06-13

## 2017-01-25 RX ORDER — GABAPENTIN 100 MG/1
CAPSULE ORAL
Qty: 450 CAP | Refills: 3 | Status: SHIPPED | OUTPATIENT
Start: 2017-01-25 | End: 2017-03-22

## 2017-01-25 RX ORDER — CLINDAMYCIN HYDROCHLORIDE 300 MG/1
300 CAPSULE ORAL 2 TIMES DAILY
Qty: 120 CAP | Refills: 0 | OUTPATIENT
Start: 2017-01-25

## 2017-01-25 RX ORDER — CLINDAMYCIN HYDROCHLORIDE 300 MG/1
300 CAPSULE ORAL 2 TIMES DAILY
Qty: 180 CAP | Refills: 1 | Status: SHIPPED | OUTPATIENT
Start: 2017-01-25 | End: 2017-02-17 | Stop reason: SDUPTHER

## 2017-01-25 RX ORDER — AMLODIPINE BESYLATE 10 MG/1
10 TABLET ORAL DAILY
Qty: 90 TAB | Refills: 3 | Status: SHIPPED | OUTPATIENT
Start: 2017-01-25 | End: 2018-03-21 | Stop reason: SDUPTHER

## 2017-01-25 RX ORDER — HYDRALAZINE HYDROCHLORIDE 100 MG/1
100 TABLET, FILM COATED ORAL EVERY 8 HOURS
Qty: 270 TAB | Refills: 3 | Status: SHIPPED | OUTPATIENT
Start: 2017-01-25 | End: 2018-01-31 | Stop reason: SDUPTHER

## 2017-01-25 ASSESSMENT — ACTIVITIES OF DAILY LIVING (ADL): MEAL_PREP_ASSISTANCE: 0

## 2017-01-25 NOTE — TELEPHONE ENCOUNTER
1. Caller Name: Joel Ma                      Call Back Number: [phone]    2. Message: pt called stating he starting with a sore throat and cough with some hoarseness, doesn't feel well. Feels like he is getting sick what can he do? Please advise not fever body aches but because of accident he thinks!    3. Patient approves office to leave a detailed voicemail/MyChart message: yes

## 2017-01-25 NOTE — PROGRESS NOTES
Anticoagulation Summary as of 1/24/2017     INR goal 2.0-3.0   Selected INR    Maintenance plan 10 mg (5 mg x 2) every day   Weekly total 70 mg   Plan last modified Jefferson Brian PHARMD (1/3/2017)   Next INR check 1/30/2017   Target end date Indefinite    Indications   CVA (cerebral vascular accident) (CMS-HCC) [I63.9]  Atrial fibrillation [427.31] [I48.91]  History of CVA (cerebrovascular accident) [Z86.73]  Anticoagulated on warfarin [Z79.01]         Anticoagulation Episode Summary     [facility]    Preferred lab     Send INR reminders to     Comments       Anticoagulation Care Providers     Provider Role Specialty Phone number    Catrachito Sterling D.O. Referring Internal Medicine [phone]    [facility] Anticoagulation Services Responsible  [phone]    Liseth Doe A.P.N. Responsible Cardiology [phone]    Gi Cadena A.P.N. Responsible Cardiology [phone]        Anticoagulation Patient Findings   Negatives Missed Doses, Extra Doses, Medication Changes, Antibiotic Use, Diet Changes, Dental/Other Procedures, Hospitalization, Bleeding Gums, Nose Bleeds, Blood in Urine, Blood in Stool, Any Bruising, Other Complaints        Spoke with patient today regarding subtherapeutic INR of 1.8.  Patient denies any signs/symptoms of bruising or bleeding or any changes in diet and medications.  Instructed patient to call clinic with any questions or concerns.  Instructed patient to bolus with 15mg X 1, then resume current warfarin regimen.  Follow up in 1 weeks.    Jefferson Brian, BIANCA

## 2017-01-26 ENCOUNTER — HOME CARE VISIT (OUTPATIENT)
Dept: HOME HEALTH SERVICES | Facility: HOME HEALTHCARE | Age: 67
End: 2017-01-26
Payer: MEDICARE

## 2017-01-26 VITALS
RESPIRATION RATE: 18 BRPM | DIASTOLIC BLOOD PRESSURE: 86 MMHG | SYSTOLIC BLOOD PRESSURE: 168 MMHG | HEART RATE: 68 BPM | TEMPERATURE: 98.8 F

## 2017-01-26 PROCEDURE — G0151 HHCP-SERV OF PT,EA 15 MIN: HCPCS

## 2017-01-26 NOTE — TELEPHONE ENCOUNTER
Telephone call return to the patient, recommended watchful waiting regarding his current symptoms. Patient is partially treated with clindamycin because long-standing infection associated his right leg this prescription was rewritten. Very hesitant to add additional antibiotics unless obvious symptoms of a bacterial infection.

## 2017-01-30 ENCOUNTER — HOME CARE VISIT (OUTPATIENT)
Dept: HOME HEALTH SERVICES | Facility: HOME HEALTHCARE | Age: 67
End: 2017-01-30
Payer: MEDICARE

## 2017-01-30 ENCOUNTER — ANTICOAGULATION MONITORING (OUTPATIENT)
Dept: VASCULAR LAB | Facility: MEDICAL CENTER | Age: 67
End: 2017-01-30

## 2017-01-30 VITALS
HEART RATE: 67 BPM | BODY MASS INDEX: 32.88 KG/M2 | SYSTOLIC BLOOD PRESSURE: 144 MMHG | WEIGHT: 270 LBS | RESPIRATION RATE: 16 BRPM | DIASTOLIC BLOOD PRESSURE: 90 MMHG | TEMPERATURE: 97.1 F

## 2017-01-30 DIAGNOSIS — I63.9 CEREBROVASCULAR ACCIDENT (CVA), UNSPECIFIED MECHANISM (HCC): ICD-10-CM

## 2017-01-30 DIAGNOSIS — Z86.73 HISTORY OF CVA (CEREBROVASCULAR ACCIDENT): ICD-10-CM

## 2017-01-30 DIAGNOSIS — Z79.01 ANTICOAGULATED ON WARFARIN: ICD-10-CM

## 2017-01-30 DIAGNOSIS — I48.91 ATRIAL FIBRILLATION, UNSPECIFIED TYPE (HCC): ICD-10-CM

## 2017-01-30 LAB — INR PPP: 2.4 (ref 2–3.5)

## 2017-01-30 PROCEDURE — G0152 HHCP-SERV OF OT,EA 15 MIN: HCPCS

## 2017-01-30 PROCEDURE — G0299 HHS/HOSPICE OF RN EA 15 MIN: HCPCS

## 2017-01-30 NOTE — PROGRESS NOTES
OP Anticoagulation Telephone Note    Date: 1/30/2017       Plan:  Spoke with pt on the phone. Pt is therapeutic today. Denies any changes in medications or diet. Denies any s/sx of bleeding or clotting. Will continue warfarin dosing as outlined below and follow-up with pt in 1wk.    Anticoagulation Summary as of 1/30/2017     INR goal 2.0-3.0   Selected INR 2.4 (1/30/2017)   Maintenance plan 10 mg (5 mg x 2) every day   Weekly total 70 mg   Plan last modified Jefferson Brian, PHARMD (1/3/2017)   Next INR check 2/6/2017   Target end date Indefinite    Indications   CVA (cerebral vascular accident) (CMS-HCC) [I63.9]  Atrial fibrillation [427.31] [I48.91]  History of CVA (cerebrovascular accident) [Z86.73]  Anticoagulated on warfarin [Z79.01]         Anticoagulation Episode Summary     [facility]    Preferred lab     Send INR reminders to     Comments       Anticoagulation Care Providers     Provider Role Specialty Phone number    Catrachito Sterling D.O. Referring Internal Medicine [phone]    [facility] Anticoagulation Services Responsible  [phone]    Liseth Doe A.P.NElian Responsible Cardiology [phone]    Gi Cadena AElianP.NElian Responsible Cardiology [phone]            GLEN Andrews  [facility]

## 2017-01-31 ENCOUNTER — HOME CARE VISIT (OUTPATIENT)
Dept: HOME HEALTH SERVICES | Facility: HOME HEALTHCARE | Age: 67
End: 2017-01-31
Payer: MEDICARE

## 2017-01-31 VITALS
RESPIRATION RATE: 20 BRPM | HEART RATE: 63 BPM | SYSTOLIC BLOOD PRESSURE: 125 MMHG | DIASTOLIC BLOOD PRESSURE: 70 MMHG | TEMPERATURE: 99.1 F

## 2017-01-31 VITALS — DIASTOLIC BLOOD PRESSURE: 70 MMHG | RESPIRATION RATE: 20 BRPM | SYSTOLIC BLOOD PRESSURE: 125 MMHG | TEMPERATURE: 99.1 F

## 2017-01-31 VITALS
RESPIRATION RATE: 16 BRPM | HEART RATE: 67 BPM | DIASTOLIC BLOOD PRESSURE: 80 MMHG | BODY MASS INDEX: 32.88 KG/M2 | TEMPERATURE: 206.8 F | WEIGHT: 270 LBS | SYSTOLIC BLOOD PRESSURE: 144 MMHG

## 2017-01-31 PROCEDURE — G0151 HHCP-SERV OF PT,EA 15 MIN: HCPCS

## 2017-01-31 PROCEDURE — G0156 HHCP-SVS OF AIDE,EA 15 MIN: HCPCS

## 2017-01-31 SDOH — ECONOMIC STABILITY: HOUSING INSECURITY: UNSAFE COOKING RANGE AREA: 0

## 2017-01-31 SDOH — ECONOMIC STABILITY: HOUSING INSECURITY: UNSAFE APPLIANCES: 0

## 2017-01-31 ASSESSMENT — ACTIVITIES OF DAILY LIVING (ADL)
MEAL_PREP_ASSISTANCE: 0
ADLS_COMMENTS: EPICE-->

## 2017-01-31 ASSESSMENT — ENCOUNTER SYMPTOMS
NAUSEA: DENIES
VOMITING: DENIES

## 2017-02-01 ENCOUNTER — HOME CARE VISIT (OUTPATIENT)
Dept: HOME HEALTH SERVICES | Facility: HOME HEALTHCARE | Age: 67
End: 2017-02-01
Payer: MEDICARE

## 2017-02-01 VITALS
HEART RATE: 65 BPM | BODY MASS INDEX: 32.77 KG/M2 | DIASTOLIC BLOOD PRESSURE: 72 MMHG | SYSTOLIC BLOOD PRESSURE: 160 MMHG | WEIGHT: 269.13 LBS | TEMPERATURE: 98.7 F

## 2017-02-01 PROCEDURE — G0160 HHC OCCUP THERAPY EA 15: HCPCS

## 2017-02-02 ENCOUNTER — HOME CARE VISIT (OUTPATIENT)
Dept: HOME HEALTH SERVICES | Facility: HOME HEALTHCARE | Age: 67
End: 2017-02-02
Payer: MEDICARE

## 2017-02-02 PROCEDURE — G0151 HHCP-SERV OF PT,EA 15 MIN: HCPCS

## 2017-02-03 VITALS — DIASTOLIC BLOOD PRESSURE: 88 MMHG | TEMPERATURE: 209.5 F | HEART RATE: 64 BPM | SYSTOLIC BLOOD PRESSURE: 158 MMHG

## 2017-02-06 ENCOUNTER — HOME CARE VISIT (OUTPATIENT)
Dept: HOME HEALTH SERVICES | Facility: HOME HEALTHCARE | Age: 67
End: 2017-02-06
Payer: MEDICARE

## 2017-02-06 ENCOUNTER — ANTICOAGULATION MONITORING (OUTPATIENT)
Dept: VASCULAR LAB | Facility: MEDICAL CENTER | Age: 67
End: 2017-02-06

## 2017-02-06 ENCOUNTER — HOME CARE VISIT (OUTPATIENT)
Dept: HOME HEALTH SERVICES | Facility: HOME HEALTHCARE | Age: 67
End: 2017-02-06

## 2017-02-06 VITALS
DIASTOLIC BLOOD PRESSURE: 70 MMHG | SYSTOLIC BLOOD PRESSURE: 158 MMHG | HEART RATE: 68 BPM | RESPIRATION RATE: 20 BRPM | BODY MASS INDEX: 32.47 KG/M2 | WEIGHT: 266.6 LBS | HEIGHT: 76 IN | TEMPERATURE: 98.6 F

## 2017-02-06 DIAGNOSIS — Z79.01 ANTICOAGULATED ON WARFARIN: ICD-10-CM

## 2017-02-06 DIAGNOSIS — Z86.73 HISTORY OF CVA (CEREBROVASCULAR ACCIDENT): ICD-10-CM

## 2017-02-06 DIAGNOSIS — I63.9 CEREBROVASCULAR ACCIDENT (CVA), UNSPECIFIED MECHANISM (HCC): ICD-10-CM

## 2017-02-06 DIAGNOSIS — I48.91 ATRIAL FIBRILLATION, UNSPECIFIED TYPE (HCC): ICD-10-CM

## 2017-02-06 LAB
INR PPP: 3.4 (ref 2–3.5)

## 2017-02-06 SDOH — ECONOMIC STABILITY: HOUSING INSECURITY: UNSAFE APPLIANCES: 0

## 2017-02-06 SDOH — ECONOMIC STABILITY: HOUSING INSECURITY: UNSAFE COOKING RANGE AREA: 0

## 2017-02-06 ASSESSMENT — ENCOUNTER SYMPTOMS
DEPRESSED MOOD: 1
NAUSEA: DENIES
DEBILITATING PAIN: 1
VOMITING: DENIES

## 2017-02-06 ASSESSMENT — ACTIVITIES OF DAILY LIVING (ADL)
OASIS_M1830: 03
TOILETING_ASSISTANCE: 0
DRESSING_LB_ASSISTANCE: 0

## 2017-02-07 ENCOUNTER — ANTICOAGULATION MONITORING (OUTPATIENT)
Dept: VASCULAR LAB | Facility: MEDICAL CENTER | Age: 67
End: 2017-02-07

## 2017-02-07 ENCOUNTER — HOME CARE VISIT (OUTPATIENT)
Dept: HOME HEALTH SERVICES | Facility: HOME HEALTHCARE | Age: 67
End: 2017-02-07
Payer: MEDICARE

## 2017-02-07 VITALS — DIASTOLIC BLOOD PRESSURE: 97 MMHG | SYSTOLIC BLOOD PRESSURE: 136 MMHG | TEMPERATURE: 98.6 F | RESPIRATION RATE: 20 BRPM

## 2017-02-07 DIAGNOSIS — Z86.73 HISTORY OF CVA (CEREBROVASCULAR ACCIDENT): ICD-10-CM

## 2017-02-07 DIAGNOSIS — I63.9 CEREBROVASCULAR ACCIDENT (CVA), UNSPECIFIED MECHANISM (HCC): ICD-10-CM

## 2017-02-07 DIAGNOSIS — Z79.01 ANTICOAGULATED ON WARFARIN: ICD-10-CM

## 2017-02-07 DIAGNOSIS — I48.91 ATRIAL FIBRILLATION, UNSPECIFIED TYPE (HCC): ICD-10-CM

## 2017-02-07 PROCEDURE — 665003 FOLLOW UP-HOME HEALTH

## 2017-02-07 PROCEDURE — G0156 HHCP-SVS OF AIDE,EA 15 MIN: HCPCS

## 2017-02-07 PROCEDURE — G0151 HHCP-SERV OF PT,EA 15 MIN: HCPCS

## 2017-02-07 NOTE — PROGRESS NOTES
Unable to report on supratherapeutic INR of 3.4 as neither of patients phone numbers have VM set up.  Will attempt to reach at later date.  Patient may have been discharged from [facility] as well.  Jefferson Brian, ANTONINAD

## 2017-02-08 ENCOUNTER — ANTICOAGULATION MONITORING (OUTPATIENT)
Dept: VASCULAR LAB | Facility: MEDICAL CENTER | Age: 67
End: 2017-02-08

## 2017-02-08 ENCOUNTER — HOME CARE VISIT (OUTPATIENT)
Dept: HOME HEALTH SERVICES | Facility: HOME HEALTHCARE | Age: 67
End: 2017-02-08
Payer: MEDICARE

## 2017-02-08 VITALS — TEMPERATURE: 98.6 F | SYSTOLIC BLOOD PRESSURE: 156 MMHG | DIASTOLIC BLOOD PRESSURE: 96 MMHG | HEART RATE: 61 BPM

## 2017-02-08 DIAGNOSIS — Z86.73 HISTORY OF CVA (CEREBROVASCULAR ACCIDENT): ICD-10-CM

## 2017-02-08 DIAGNOSIS — I63.9 CEREBROVASCULAR ACCIDENT (CVA), UNSPECIFIED MECHANISM (HCC): ICD-10-CM

## 2017-02-08 DIAGNOSIS — Z79.01 ANTICOAGULATED ON WARFARIN: ICD-10-CM

## 2017-02-08 PROCEDURE — G0152 HHCP-SERV OF OT,EA 15 MIN: HCPCS

## 2017-02-08 NOTE — PROGRESS NOTES
OP Anticoagulation Service Note    Date: 2/8/2017    Anticoagulation Summary as of 2/8/2017     INR goal 2.0-3.0   Selected INR 3.4! (2/6/2017)   Maintenance plan 10 mg (5 mg x 2) every day   Weekly total 70 mg   Plan last modified Jefferson Brian, PHARMD (1/3/2017)   Next INR check 2/13/2017   Target end date Indefinite    Indications   CVA (cerebral vascular accident) (CMS-HCC) [I63.9]  Atrial fibrillation [427.31] [I48.91]  History of CVA (cerebrovascular accident) [Z86.73]  Anticoagulated on warfarin [Z79.01]         Anticoagulation Episode Summary     [facility]    Preferred lab     Send INR reminders to     Comments call pt multiple times, he has a hard time getting to his phone in time and has no VM set up      Anticoagulation Care Providers     Provider Role Specialty Phone number    Catrachito Sterling D.O. Referring Internal Medicine [phone]    [facility]ticoagulation [facility] Responsible  [phone]    BARBRA Araujo.P.N. Responsible Cardiology [phone]    BRIT QuirozPJILLIAN Responsible Cardiology [phone]        Anticoagulation Patient Findings      Plan:  INR is high today. Confirmed dosing regimen. No missed or extra doses taken. Patient denies sign/symptoms of bleeding/clotting. No recent medication changes and patient has been eating a consistent diet. Instructed pt to call clinic with any concerns of bleeding or thrombosis. Instructed pt to take 5 mg tonight ONLY then resume usual regimen. Follow up in 1 week      Korina Diggs, Pharm D

## 2017-02-09 ENCOUNTER — HOME CARE VISIT (OUTPATIENT)
Dept: HOME HEALTH SERVICES | Facility: HOME HEALTHCARE | Age: 67
End: 2017-02-09
Payer: MEDICARE

## 2017-02-09 VITALS — SYSTOLIC BLOOD PRESSURE: 134 MMHG | RESPIRATION RATE: 18 BRPM | DIASTOLIC BLOOD PRESSURE: 80 MMHG | TEMPERATURE: 98.5 F

## 2017-02-09 DIAGNOSIS — I48.91 ATRIAL FIBRILLATION, UNSPECIFIED TYPE (HCC): ICD-10-CM

## 2017-02-09 PROCEDURE — G0495 RN CARE TRAIN/EDU IN HH: HCPCS

## 2017-02-09 PROCEDURE — G0151 HHCP-SERV OF PT,EA 15 MIN: HCPCS

## 2017-02-09 SDOH — ECONOMIC STABILITY: HOUSING INSECURITY: UNSAFE APPLIANCES: 0

## 2017-02-09 SDOH — ECONOMIC STABILITY: HOUSING INSECURITY: UNSAFE COOKING RANGE AREA: 0

## 2017-02-09 ASSESSMENT — ENCOUNTER SYMPTOMS
NAUSEA: DENIED
VOMITING: DENIED

## 2017-02-10 VITALS
DIASTOLIC BLOOD PRESSURE: 113 MMHG | RESPIRATION RATE: 18 BRPM | HEART RATE: 76 BPM | TEMPERATURE: 99.4 F | SYSTOLIC BLOOD PRESSURE: 183 MMHG

## 2017-02-13 ENCOUNTER — HOME CARE VISIT (OUTPATIENT)
Dept: HOME HEALTH SERVICES | Facility: HOME HEALTHCARE | Age: 67
End: 2017-02-13
Payer: MEDICARE

## 2017-02-13 VITALS
HEART RATE: 72 BPM | WEIGHT: 268.13 LBS | BODY MASS INDEX: 31.66 KG/M2 | DIASTOLIC BLOOD PRESSURE: 80 MMHG | SYSTOLIC BLOOD PRESSURE: 148 MMHG | TEMPERATURE: 98.6 F | HEIGHT: 77 IN

## 2017-02-13 VITALS
HEART RATE: 87 BPM | DIASTOLIC BLOOD PRESSURE: 80 MMHG | RESPIRATION RATE: 17 BRPM | SYSTOLIC BLOOD PRESSURE: 148 MMHG | TEMPERATURE: 98.3 F | WEIGHT: 266.38 LBS | BODY MASS INDEX: 32.44 KG/M2

## 2017-02-13 PROCEDURE — G0152 HHCP-SERV OF OT,EA 15 MIN: HCPCS

## 2017-02-13 ASSESSMENT — ACTIVITIES OF DAILY LIVING (ADL)
TOILETING_ASSISTANCE: 0
DRESSING_LB_ASSISTANCE: 0
DRESSING_LB_ASSISTANCE: 0

## 2017-02-14 ENCOUNTER — HOME CARE VISIT (OUTPATIENT)
Dept: HOME HEALTH SERVICES | Facility: HOME HEALTHCARE | Age: 67
End: 2017-02-14
Payer: MEDICARE

## 2017-02-14 ENCOUNTER — ANTICOAGULATION MONITORING (OUTPATIENT)
Dept: VASCULAR LAB | Facility: MEDICAL CENTER | Age: 67
End: 2017-02-14

## 2017-02-14 VITALS — TEMPERATURE: 98.2 F | RESPIRATION RATE: 18 BRPM | SYSTOLIC BLOOD PRESSURE: 140 MMHG | DIASTOLIC BLOOD PRESSURE: 76 MMHG

## 2017-02-14 DIAGNOSIS — Z86.73 HISTORY OF CVA (CEREBROVASCULAR ACCIDENT): ICD-10-CM

## 2017-02-14 DIAGNOSIS — Z79.01 ANTICOAGULATED ON WARFARIN: ICD-10-CM

## 2017-02-14 DIAGNOSIS — I48.91 ATRIAL FIBRILLATION, UNSPECIFIED TYPE (HCC): ICD-10-CM

## 2017-02-14 DIAGNOSIS — I63.9 CEREBROVASCULAR ACCIDENT (CVA), UNSPECIFIED MECHANISM (HCC): ICD-10-CM

## 2017-02-14 PROBLEM — Z79.891 CHRONIC USE OF OPIATE DRUGS THERAPEUTIC PURPOSES: Status: ACTIVE | Noted: 2017-02-14

## 2017-02-14 LAB — INR PPP: 2.7 (ref 2–3.5)

## 2017-02-14 PROCEDURE — G0151 HHCP-SERV OF PT,EA 15 MIN: HCPCS

## 2017-02-14 PROCEDURE — G0299 HHS/HOSPICE OF RN EA 15 MIN: HCPCS

## 2017-02-14 SDOH — ECONOMIC STABILITY: HOUSING INSECURITY: UNSAFE COOKING RANGE AREA: 0

## 2017-02-14 SDOH — ECONOMIC STABILITY: HOUSING INSECURITY: UNSAFE APPLIANCES: 0

## 2017-02-14 ASSESSMENT — ENCOUNTER SYMPTOMS
VOMITING: DENIED
NAUSEA: DENIED

## 2017-02-14 ASSESSMENT — ACTIVITIES OF DAILY LIVING (ADL): TRANSPORTATION COMMENTS: NEEDS ONE ASSIST TO LEAVE HOME SAFELY

## 2017-02-15 ENCOUNTER — HOME CARE VISIT (OUTPATIENT)
Dept: HOME HEALTH SERVICES | Facility: HOME HEALTHCARE | Age: 67
End: 2017-02-15
Payer: MEDICARE

## 2017-02-15 VITALS
SYSTOLIC BLOOD PRESSURE: 140 MMHG | TEMPERATURE: 98.1 F | DIASTOLIC BLOOD PRESSURE: 76 MMHG | HEART RATE: 70 BPM | RESPIRATION RATE: 18 BRPM

## 2017-02-15 VITALS — DIASTOLIC BLOOD PRESSURE: 73 MMHG | RESPIRATION RATE: 20 BRPM | TEMPERATURE: 98.7 F | SYSTOLIC BLOOD PRESSURE: 146 MMHG

## 2017-02-15 PROCEDURE — G0156 HHCP-SVS OF AIDE,EA 15 MIN: HCPCS

## 2017-02-15 PROCEDURE — G0152 HHCP-SERV OF OT,EA 15 MIN: HCPCS

## 2017-02-15 NOTE — PROGRESS NOTES
Anticoagulation Summary as of 2/14/2017     INR goal 2.0-3.0   Selected INR 2.7 (2/14/2017)   Maintenance plan 10 mg (5 mg x 2) every day   Weekly total 70 mg   Plan last modified Jefferson Brian, PHARMD (1/3/2017)   Next INR check 2/21/2017   Target end date Indefinite    Indications   CVA (cerebral vascular accident) (CMS-HCC) [I63.9]  Atrial fibrillation [427.31] [I48.91]  History of CVA (cerebrovascular accident) [Z86.73]  Anticoagulated on warfarin [Z79.01]         Anticoagulation Episode Summary     [facility]    Preferred lab     Send INR reminders to     Comments call pt multiple times, he has a hard time getting to his phone in time and has no VM set up      Anticoagulation Care Providers     Provider Role Specialty Phone number    Catrachito Sterling D.O. Referring Internal Medicine [phone]    [facility] Anticoagulation Services Responsible  [phone]    Liseth Doe A.P.N. Responsible Cardiology [phone]    BRIT QuirozPElianNElian Responsible Cardiology [phone]        Anticoagulation Patient Findings     Spoke with pt on the phone. Pt is therapeutic today. Denies any changes in medications or diet. Patient denies any s/sx of bleeding or clotting. Will continue dosing as outlined in calendar. Will follow-up with pt in 1 week.    iLseth CARROLL

## 2017-02-16 ENCOUNTER — HOME CARE VISIT (OUTPATIENT)
Dept: HOME HEALTH SERVICES | Facility: HOME HEALTHCARE | Age: 67
End: 2017-02-16
Payer: MEDICARE

## 2017-02-16 VITALS
DIASTOLIC BLOOD PRESSURE: 73 MMHG | BODY MASS INDEX: 31.48 KG/M2 | WEIGHT: 265.5 LBS | HEART RATE: 82 BPM | SYSTOLIC BLOOD PRESSURE: 146 MMHG | TEMPERATURE: 98.7 F

## 2017-02-16 PROCEDURE — G0151 HHCP-SERV OF PT,EA 15 MIN: HCPCS

## 2017-02-16 PROCEDURE — G0179 MD RECERTIFICATION HHA PT: HCPCS | Performed by: INTERNAL MEDICINE

## 2017-02-16 ASSESSMENT — ACTIVITIES OF DAILY LIVING (ADL): DRESSING_LB_ASSISTANCE: 0

## 2017-02-17 ENCOUNTER — OFFICE VISIT (OUTPATIENT)
Dept: MEDICAL GROUP | Facility: CLINIC | Age: 67
End: 2017-02-17
Payer: MEDICARE

## 2017-02-17 VITALS
BODY MASS INDEX: 32.52 KG/M2 | WEIGHT: 267.1 LBS | HEART RATE: 82 BPM | TEMPERATURE: 99.1 F | RESPIRATION RATE: 16 BRPM | OXYGEN SATURATION: 94 % | SYSTOLIC BLOOD PRESSURE: 160 MMHG | DIASTOLIC BLOOD PRESSURE: 95 MMHG | HEIGHT: 76 IN

## 2017-02-17 VITALS — SYSTOLIC BLOOD PRESSURE: 150 MMHG | TEMPERATURE: 98.5 F | DIASTOLIC BLOOD PRESSURE: 84 MMHG

## 2017-02-17 DIAGNOSIS — M54.2 NECK PAIN: ICD-10-CM

## 2017-02-17 DIAGNOSIS — L08.9 RIGHT KNEE SKIN INFECTION: ICD-10-CM

## 2017-02-17 DIAGNOSIS — Z79.891 CHRONIC USE OF OPIATE FOR THERAPEUTIC PURPOSE: ICD-10-CM

## 2017-02-17 DIAGNOSIS — I10 ESSENTIAL HYPERTENSION: ICD-10-CM

## 2017-02-17 PROCEDURE — 1036F TOBACCO NON-USER: CPT | Performed by: INTERNAL MEDICINE

## 2017-02-17 PROCEDURE — G8484 FLU IMMUNIZE NO ADMIN: HCPCS | Performed by: INTERNAL MEDICINE

## 2017-02-17 PROCEDURE — 3017F COLORECTAL CA SCREEN DOC REV: CPT | Performed by: INTERNAL MEDICINE

## 2017-02-17 PROCEDURE — G8419 CALC BMI OUT NRM PARAM NOF/U: HCPCS | Performed by: INTERNAL MEDICINE

## 2017-02-17 PROCEDURE — G8598 ASA/ANTIPLAT THER USED: HCPCS | Performed by: INTERNAL MEDICINE

## 2017-02-17 PROCEDURE — 4040F PNEUMOC VAC/ADMIN/RCVD: CPT | Performed by: INTERNAL MEDICINE

## 2017-02-17 PROCEDURE — G8432 DEP SCR NOT DOC, RNG: HCPCS | Performed by: INTERNAL MEDICINE

## 2017-02-17 PROCEDURE — 1101F PT FALLS ASSESS-DOCD LE1/YR: CPT | Performed by: INTERNAL MEDICINE

## 2017-02-17 PROCEDURE — 99214 OFFICE O/P EST MOD 30 MIN: CPT | Performed by: INTERNAL MEDICINE

## 2017-02-17 RX ORDER — CLINDAMYCIN HYDROCHLORIDE 300 MG/1
300 CAPSULE ORAL 2 TIMES DAILY
Qty: 180 CAP | Refills: 1 | Status: SHIPPED | OUTPATIENT
Start: 2017-02-17 | End: 2018-02-28

## 2017-02-17 ASSESSMENT — LIFESTYLE VARIABLES: HISTORY_ALCOHOL_USE: 0

## 2017-02-17 ASSESSMENT — PATIENT HEALTH QUESTIONNAIRE - PHQ9: CLINICAL INTERPRETATION OF PHQ2 SCORE: 0

## 2017-02-17 ASSESSMENT — ENCOUNTER SYMPTOMS: DEPRESSION: 0

## 2017-02-17 NOTE — MR AVS SNAPSHOT
"        Joel Ma   2017 9:20 AM   Office Visit   MRN: [id number]    Department:  [facility]   Dept Phone:  [phone]    Description:  Male : 1950   Provider:  Catrachito Sterling D.O.           Reason for Visit     Follow-Up 1 month      Allergies as of 2017     Allergen Noted Reactions    Demerol 2008       Makes me stop breathing.  Doesn't like because it makes him high    Statins [Hmg-Coa-R Inhibitors] 2016   Unspecified    Per pt report. Unknown reaction.      You were diagnosed with     Right knee skin infection   [523209]       Essential hypertension   [5018115]       Neck pain   [825147]       Chronic use of opiate for therapeutic purpose   [2777746]         Vital Signs     Blood Pressure Pulse Temperature Respirations Height Weight    160/95 mmHg 82 37.3 °C (99.1 °F) 16 1.93 m (6' 3.98\") 121.156 kg (267 lb 1.6 oz)    Body Mass Index Oxygen Saturation Smoking Status             32.53 kg/m2 94% Former Smoker         Basic Information     Date Of Birth Sex Race Ethnicity Preferred Language    1950 Male White Non- English      Your appointments     2017 To Be Determined   OT[facility]HH[facility]HOME [facility]IT [facility] (--)    [address]  [place] [address]   [phone]            2017 To Be Determined   SN-HH-HOME VISIT with Lily Lin R.N.   [facility] (--)    [address]  [place] [address]   [phone]            2017 11:00 AM   PT-HH-HOME VISIT with Joan K Ormonde, P.T.   [facility] (--)    [address]  [place] [address]   [phone]            2017 To Be Determined   OT-HH-HOME VISIT with [facility] (--)    [address]  [place] [address]   [phone]            2017 11:00 AM   CT-AO-FNDZDWHIGY DISCHARGE with Joan K Ormonde, P.T.   [facility] (--)    [address]  [facility] [id number] [phone]  "            Feb 23, 2017 To Be Determined   AIDE-HH-HOME VISIT with Ray Campbell C.N.A.   [facility] (--)    [address]  [place] [address]   [phone]            Feb 28, 2017 To Be Determined   SN-HH-HOME VISIT with Lily Lin R.N.   [facility] (--)    [address]  [place] [address]   [phone]            Mar 02, 2017 To Be Determined   AIDE-HH-HOME VISIT with Ray Campbell C.N.A.   [facility] (--)    [address]  [place] [address]   [phone]            Mar 07, 2017 To Be Determined   SN-HH-HOME VISIT with Lily Lin R.N.   [facility] (--)    [address]  [place] [address]   [phone]            Mar 09, 2017 To Be Determined   AIDE-HH-HOME VISIT with Ray Campbell C.N.A.   [facility] (--)    [address]  [place] [address]   [phone]            Mar 14, 2017 To Be Determined   SN-HH-HOME VISIT with Lily Lin R.N.   [facility] (--)    [address]  [place] [address]   [phone]            Mar 16, 2017 To Be Determined   AIDE-HH-HOME VISIT with Ray Campbell C.N.A.   [facility] (--)    [address]  [place] [address]   [phone]            Mar 21, 2017 To Be Determined   SN-HH-HOME VISIT with Lily Lin R.N.   [facility] (--)    [address]  [place] [address]   [phone]            Mar 23, 2017 To Be Determined   AIDE-HH-HOME VISIT with Ray Campbell C.N.A.   [facility] (--)    [address] [facility].  [facility] [address]   [phone]            Mar 28, 2017 To Be Determined   SN-HH-HOME VISIT with Lily Lin R.N.   [facility] (--)    [address] [place], [place] [address]iva [facility].  [facility] [address]   [phone]            Mar 30, 2017 To Be Determined   AIDE-HH-HOME VISIT with Ray Campbell C.N.A.   [facility] (--)    [address] [place] [address].  [facility] [address]   [phone]            Apr 04, 2017 To Be Determined   SN-HH-HOME VISIT with Lily Lin,  SONJA   [facility] (--)    [address]  [place] [address]   [phone]            Apr 06, 2017 To Be Determined   AIDE-HH-HOME VISIT with Ray Campbell C.N.A.   [facility] (--)    [address]  [place] [address]   [phone]              Problem List              ICD-10-CM Priority Class Noted - Resolved    CVA (cerebral vascular accident) (CMS-HCC) I63.9 Medium  6/4/2009 - Present    Dyslipidemia E78.5   6/17/2009 - Present    Thyroid mass E07.9   7/30/2009 - Present    HTN (hypertension) I10 Medium  8/16/2010 - Present    Ventricular ectopy I49.3   6/17/2011 - Present    Trigger finger M65.30   6/17/2011 - Present    MEDICAL HOME    Unknown - Present    Diabetes mellitus with neurological manifestations, 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 Low  3/5/2012 - Present    BPH (benign prostatic hyperplasia) N40.0   4/28/2014 - Present    Anticoagulated on warfarin Z79.01   4/28/2014 - Present    Chronic antibiotic suppression Z79.2   4/28/2014 - Present    Myalgia M79.1   5/29/2014 - Present    Risk for falls Z91.81   10/27/2014 - Present    BMI 38.0-38.9,adult Z68.38   10/27/2014 - Present    Diabetic polyneuropathy (CMS-HCC) E11.42 High  5/6/2015 - Present    Toe amputation status (CMS-HCC) Z89.429 High  5/6/2015 - Present    Atrial fibrillation [427.31] I48.91   6/24/2015 - Present    History of CVA (cerebrovascular accident) Z86.73   11/11/2015 - Present    JANNIE treated with BiPAP G47.33   4/18/2016 - Present    Left knee pain M25.562 High  8/28/2016 - Present    Cervical stenosis of spine M48.02   9/6/2016 - Present    Dysphagia R13.10   9/6/2016 - Present    Chronic atrial fibrillation (CMS-HCC) I48.2   9/6/2016 - Present    Hallucinations R44.3   9/6/2016 - Present    Other orthopedic aftercare Z47.89   11/7/2016 - Present    Chronic use of opiate drugs therapeutic purposes Z79.899   2/14/2017 - Present      Health Maintenance        Date Due Completion Dates    IMM ZOSTER VACCINE 8/17/2009  ---    URINE ACR / MICROALBUMIN 5/11/2016 5/11/2015, 2/10/2015, 11/12/2014, 2/5/2014, 10/28/2013, 5/31/2013, 9/6/2012, 4/9/2012, 11/11/2011, 11/15/2010    IMM INFLUENZA (1) 9/1/2016 ---    RETINAL SCREENING 10/28/2016 10/28/2015, 9/16/2015, 8/12/2015, 3/10/2014, 9/24/2012    DIABETES MONOFILAMENT / LE EXAM 11/16/2016 11/16/2015 (Done), 2/11/2014 (Done), 5/31/2013 (Done), 5/18/2011 (N/S)    Override on 11/16/2015: Done (Catrachito Sterling DO)    Override on 2/11/2014: Done    Override on 5/31/2013: Done    Override on 5/18/2011: (N/S) (patient with mild neuropathy distal toes. )    A1C SCREENING 6/16/2017 12/16/2016, 12/7/2016, 8/29/2016, 8/10/2016, 7/7/2016, 4/29/2016, 9/21/2015, 8/17/2015, 5/11/2015, 5/11/2015, 3/30/2015, 2/10/2015, 11/12/2014, 8/13/2014, 5/13/2014, 2/5/2014, 2/5/2014, 2/5/2014, 10/28/2013, 10/28/2013, 8/26/2013, 5/31/2013, 1/10/2013, 10/29/2012, 9/6/2012, 9/6/2012, 4/9/2012, 3/19/2012, 11/11/2011, 2/17/2011, 11/15/2010, 8/13/2010, 7/27/2010, 5/5/2010, 3/24/2010, 2/18/2010, 12/23/2009, 12/7/2009, 7/13/2009, 4/22/2008, 2/11/2008, 8/12/2006, 8/11/2006, 5/4/2006    COLONOSCOPY 6/30/2017 6/30/2014    FASTING LIPID PROFILE 8/10/2017 8/10/2016, 7/7/2016, 4/29/2016, 9/21/2015, 8/17/2015, 5/11/2015, 3/30/2015, 8/13/2014, 2/5/2014, 2/5/2014, 2/5/2014, 10/28/2013, 8/19/2013, 7/16/2013, 5/31/2013, 4/10/2013, 10/30/2012, 10/3/2012, 4/27/2012, 6/15/2011, 8/13/2010, 5/5/2010, 12/23/2009, 12/7/2009, 4/21/2008, 2/12/2008, 5/8/2007, 8/11/2006    SERUM CREATININE 12/16/2017 12/16/2016, 12/7/2016, 12/6/2016, 11/28/2016, 11/25/2016, 11/11/2016, 11/8/2016, 9/9/2016, 9/7/2016, 9/6/2016, 9/1/2016, 8/31/2016, 8/30/2016, 8/29/2016, 8/28/2016, 8/10/2016, 7/22/2016, 7/7/2016, 4/29/2016, 11/18/2015, 9/21/2015, 9/15/2015, 8/17/2015, 6/19/2015, 5/11/2015, 3/30/2015, 2/10/2015, 11/12/2014, 10/13/2014, 8/13/2014, 5/13/2014, 2/5/2014, 2/5/2014, 2/5/2014, 10/28/2013, 6/27/2013, 5/31/2013, 1/10/2013, 1/7/2013, 12/17/2012, 12/16/2012,  12/15/2012, 12/14/2012, 12/13/2012, 12/12/2012, 12/11/2012, 12/11/2012, 12/10/2012, 12/10/2012, 12/9/2012, 12/9/2012, 12/8/2012, 12/8/2012, 12/7/2012, 12/6/2012, 11/16/2012, 11/15/2012, 11/14/2012, 11/13/2012, 11/9/2012, 11/7/2012, 11/6/2012, 11/4/2012, 11/2/2012, 11/1/2012, 10/31/2012, 10/30/2012, 10/29/2012, 10/29/2012, 10/28/2012, 9/6/2012, 4/9/2012, 3/19/2012, 2/28/2012, 1/4/2012, 11/11/2011, 7/21/2011, 6/15/2011, 3/21/2011, 3/21/2011, 3/20/2011, 2/17/2011, 1/10/2009, 7/30/2008, 4/22/2008, 4/21/2008, 4/20/2008, 2/19/2008, 2/18/2008, 2/17/2008, 2/16/2008, 2/15/2008, 2/12/2008, 2/11/2008, 2/11/2008, 5/10/2007, 5/9/2007, 5/8/2007, 5/7/2007, 10/16/2006, 8/11/2006, 8/11/2006, 5/4/2006, 5/3/2006, 6/28/2005    IMM PNEUMOCOCCAL 65+ (ADULT) LOW/MEDIUM RISK SERIES (2 of 2 - PPSV23) 1/19/2018 1/19/2017    IMM DTaP/Tdap/Td Vaccine (2 - Td) 7/16/2022 7/16/2012, 8/15/2009            Current Immunizations     13-VALENT PCV PREVNAR 1/19/2017 10:37 AM    Influenza Vaccine 1/1/1980    Pneumococcal Vaccine (UF)Historical Data 1/1/1980    TD Vaccine 8/15/2009  5:00 PM    Tdap Vaccine 7/16/2012      Below and/or attached are the medications your provider expects you to take. Review all of your home medications and newly ordered medications with your provider and/or pharmacist. Follow medication instructions as directed by your provider and/or pharmacist. Please keep your medication list with you and share with your provider. Update the information when medications are discontinued, doses are changed, or new medications (including over-the-counter products) are added; and carry medication information at all times in the event of emergency situations     Allergies:  DEMEROL - (reactions not documented)     STATINS - Unspecified               Medications  Valid as of: February 17, 2017 - 10:08 AM    Generic Name Brand Name Tablet Size Instructions for use    AmLODIPine Besylate (Tab) NORVASC 10 MG Take 1 Tab by mouth every day.         Clindamycin HCl (Cap) CLEOCIN 300 MG Take 1 Cap by mouth 2 Times a Day.        CloNIDine HCl (PATCH WEEKLY) CATAPRES 0.3 MG/24HR Apply 1 Patch to skin as directed every Wednesday.        Fluticasone Propionate (Suspension) FLONASE 50 MCG/ACT Spray 2 Sprays in nose as needed.        Gabapentin (Cap) NEURONTIN 100 MG Take 100 mg in AM/Take 300 mg pm and 200 mg at bed time        HydrALAZINE HCl (Tab) APRESOLINE 100 MG Take 1 Tab by mouth every 8 hours.        Insulin Glargine (Solution) LANTUS 100 UNIT/ML Inject 30 Units as instructed every evening.        Lisinopril (Tab) PRINIVIL, ZESTRIL 40 MG Take 1 Tab by mouth every day.        Magnesium Chloride (Tab CR) SLO- (64 MG) MG Take 1 Tab by mouth 2 Times a Day.        MetFORMIN HCl (Tab) GLUCOPHAGE 500 MG Take 1 Tab by mouth 2 times a day, with meals.        Nystatin (Powder) MYCOSTATIN  Apply 1 Application to affected area(s) 3 times a day. Apply to reddened area under abdominal fold.        Oxycodone-Acetaminophen (Tab) PERCOCET-10  MG Take 1-2 Tabs by mouth every 6 hours as needed for Severe Pain.        Polyethylene Glycol 3350 (Pack) MIRALAX  Take 1 Packet by mouth 1 time daily as needed.        TraZODone HCl (Tab) DESYREL 150 MG Take 0.5 Tabs by mouth every bedtime.        Warfarin Sodium (Tab) COUMADIN 5 MG Take 2 Tabs by mouth every day at 6 PM.        .                 Medicines prescribed today were sent to:     [id number] [place] - [address] [place] [id number]    Phone: [phone] Fax: [phone]    Open 24 Hours?: No      Medication refill instructions:       If your prescription bottle indicates you have medication refills left, it is not necessary to call your provider’s office. Please contact your pharmacy and they will refill your medication.    If your prescription bottle indicates you do not have any refills left, you may request refills at any time through one of the following ways: The  online [employer] system (except Urgent Care), by calling your provider’s office, or by asking your pharmacy to contact your provider’s office with a refill request. Medication refills are processed only during regular business hours and may not be available until the next business day. Your provider may request additional information or to have a follow-up visit with you prior to refilling your medication.   *Please Note: Medication refills are assigned a new Rx number when refilled electronically. Your pharmacy may indicate that no refills were authorized even though a new prescription for the same medication is available at the pharmacy. Please request the medicine by name with the pharmacy before contacting your provider for a refill.        Other Notes About Your Plan     Patient is enrolled in [facility] with Dr. Asher Estevez Access Code: [email or web address]  Expires: 2/25/2017 11:48 AM    [employer]  A secure, online tool to manage your health information     [employer]® is a secure, online tool that connects you to your personalized health information from the privacy of your home -- day or night - making it very easy for you to manage your healthcare. Once the activation process is completed, you can even access your medical information using the [employer] leonie, which is available for free in the Apple Leonie store or Google Play store.     [employer] provides the following levels of access (as shown below):   My Chart Features   [facility] Doctor [facility]  Urgent  Care [facility]  Primary Care  Doctor   Email your healthcare team securely and privately 24/7 X X X    Manage appointments: schedule your next appointment; view details of past/upcoming appointments X      Request prescription refills. X      View recent personal medical records, including lab and immunizations X X X X   View health record, including health history, allergies, medications X X X X    Read reports about your outpatient visits, procedures, consult and ER notes X X X X   See your discharge summary, which is a recap of your hospital and/or ER visit that includes your diagnosis, lab results, and care plan. X X       How to register for [employer]:  1. Go to  [email or web address].  2. Click on the Sign Up Now box, which takes you to the New Member Sign Up page. You will need to provide the following information:  a. Enter your [employer] Access Code exactly as it appears at the top of this page. (You will not need to use this code after you’ve completed the sign-up process. If you do not sign up before the expiration date, you must request a new code.)   b. Enter your date of birth.   c. Enter your home email address.   d. Click Submit, and follow the next screen’s instructions.  3. Create a [employer] ID. This will be your [employer] login ID and cannot be changed, so think of one that is secure and easy to remember.  4. Create a [employer] password. You can change your password at any time.  5. Enter your Password Reset Question and Answer. This can be used at a later time if you forget your password.   6. Enter your e-mail address. This allows you to receive e-mail notifications when new information is available in [employer].  7. Click Sign Up. You can now view your health information.    For assistance activating your [employer] account, call [phone]

## 2017-02-17 NOTE — PROGRESS NOTES
CC: Joel Ma is a 66 y.o. male is suffering from   Chief Complaint   Patient presents with   • Follow-Up     1 month         SUBJECTIVE:  There are no diagnoses linked to this encounter.  Chronic pain recheck:   Last dose of controlled substance: yesterday.   Chronic pain treated with percocet taken 3 times a day    He  reports that he does not drink alcohol.  He  reports that he does not use illicit drugs.    Consequences of Chronic Opiate therapy:  (5 A's)  Analgesia: Compared to no treatment or prior treatment, pain is currently improved  Activity: improved  Adverse Events: He denies constipation, dry mouth, itchy skin, nausea, sedation and none  Aberrant Behaviors: He reports he is taking medication as prescribed and is not veering from agreed treatment regimen. There have been no inappropriate refills or lost/stolen meds reported.   Affect/Mood: Pain is not impacting patient's mood.  Patient denies depression/anxiety.    Nonnarcotic treatments that are being used: Gabapentin.   Treatment goals discussed.    No order of CONTROLLED SUBSTANCE TREATMENT AGREEMENT is found.     UDS Summary                URINE DRUG SCREEN Next Due 7/3/2017      Done 7/8/2016 PAIN MANAGEMENT PANEL, SCRN W/ RFLX TO QNT        Most recent UDS reviewed today and is consistent with prescribed medications.   Opioid Risk Score: No Value exists for the : [id number]    Interpretation of Opioid Risk Score   Score 0-3 = Low risk of abuse. Do UDS at least once per year.  Score 4-7 = Moderate risk of abuse. Do UDS 1-4 times per year.  Score 8+ = High risk of abuse. Refer to specialist.      I have reviewed the medical records, the Prescription Monitoring Program and I have determined that controlled substance treatment is medically indicated.    1. Right knee skin infection  Patient and I have discussed his need refill his clindamycin which was done today    2. Essential hypertension  Patient has a history of essential hypertension  which is still not ideally controlled patient did not replace his clonidine patch    3. Neck pain  Patient with a history of ongoing neck pain status post surgery    4. Chronic use of opiate for therapeutic purpose  Patient is on narcotic analgesics secondary to his neck surgery      Past social, family, history:   Social History   Substance Use Topics   • Smoking status: Former Smoker -- 4.00 packs/day for .5 years     Types: Cigarettes     Quit date: 01/01/1970   • Smokeless tobacco: Never Used   • Alcohol Use: No         MEDICATIONS:    Current outpatient prescriptions:   •  amlodipine (NORVASC) 10 MG Tab, Take 1 Tab by mouth every day., Disp: 90 Tab, Rfl: 3  •  clonidine (CATAPRES) 0.3 MG/24HR PATCH WEEKLY, Apply 1 Patch to skin as directed every Wednesday., Disp: 12 Patch, Rfl: 3  •  gabapentin (NEURONTIN) 100 MG Cap, Take 100 mg in AM/Take 300 mg pm and 200 mg at bed time, Disp: 450 Cap, Rfl: 3  •  hydrALAZINE (APRESOLINE) 100 MG tablet, Take 1 Tab by mouth every 8 hours., Disp: 270 Tab, Rfl: 3  •  lisinopril (PRINIVIL, ZESTRIL) 40 MG tablet, Take 1 Tab by mouth every day., Disp: 90 Tab, Rfl: 0  •  metformin (GLUCOPHAGE) 500 MG Tab, Take 1 Tab by mouth 2 times a day, with meals., Disp: 180 Tab, Rfl: 3  •  warfarin (COUMADIN) 5 MG Tab, Take 2 Tabs by mouth every day at 6 PM., Disp: 180 Tab, Rfl: 3  •  oxycodone-acetaminophen (PERCOCET-10)  MG Tab, Take 1-2 Tabs by mouth every 6 hours as needed for Severe Pain., Disp: 120 Tab, Rfl: 0  •  fluticasone (FLONASE) 50 MCG/ACT nasal spray, Spray 2 Sprays in nose as needed., Disp: 1 Bottle, Rfl: 5  •  nystatin (MYCOSTATIN) Powder, Apply 1 Application to affected area(s) 3 times a day. Apply to reddened area under abdominal fold., Disp: 1 Bottle, Rfl: 0  •  polyethylene glycol/lytes (MIRALAX) Pack, Take 1 Packet by mouth 1 time daily as needed., Disp: 30 Each, Rfl: 0  •  insulin glargine (LANTUS) 100 UNIT/ML Solution, Inject 30 Units as instructed every  evening., Disp: 10 mL, Rfl: 5  •  magnesium chloride SR (SLO-MAG) 535 (64 MG) MG Tab CR, Take 1 Tab by mouth 2 Times a Day. (Patient taking differently: Take 1 Tab by mouth 2 times a day as needed.), Disp: 60 Tab, Rfl: 0  •  trazodone (DESYREL) 150 MG Tab, Take 0.5 Tabs by mouth every bedtime., Disp: 45 Tab, Rfl: 3  •  clindamycin (CLEOCIN) 300 MG Cap, Take 1 Cap by mouth 2 Times a Day., Disp: 180 Cap, Rfl: 1    PROBLEMS:  Patient Active Problem List    Diagnosis Date Noted   • Left knee pain 08/28/2016     Priority: High   • Diabetic polyneuropathy (CMS-HCC) 05/06/2015     Priority: High   • Toe amputation status (CMS-HCC) 05/06/2015     Priority: High   • HTN (hypertension) 08/16/2010     Priority: Medium   • CVA (cerebral vascular accident) (CMS-HCC) 06/04/2009     Priority: Medium   • Diabetes mellitus with neurological manifestations, controlled (CMS-HCC) 03/05/2012     Priority: Low   • Chronic use of opiate drugs therapeutic purposes 02/14/2017   • Other orthopedic aftercare 11/07/2016   • Cervical stenosis of spine 09/06/2016   • Dysphagia 09/06/2016   • Chronic atrial fibrillation (CMS-HCC) 09/06/2016   • Hallucinations 09/06/2016   • JANNIE treated with BiPAP 04/18/2016   • History of CVA (cerebrovascular accident) 11/11/2015   • Atrial fibrillation [427.31] 06/24/2015   • Risk for falls 10/27/2014   • BMI 38.0-38.9,adult 10/27/2014   • Myalgia 05/29/2014   • BPH (benign prostatic hyperplasia) 04/28/2014   • Anticoagulated on warfarin 04/28/2014   • Chronic antibiotic suppression 04/28/2014   • MEDICAL HOME    • Ventricular ectopy 06/17/2011   • Trigger finger 06/17/2011   • Thyroid mass 07/30/2009   • Dyslipidemia 06/17/2009       REVIEW OF SYSTEMS:  Gen.:  No Nausea, Vomiting, fever, Chills.  Heart: No chest pain.  Lungs:  No shortness of Breath.  Psychological: Eleazar unusual Anxiety depression     PHYSICAL EXAM   Constitutional: Alert, cooperative, not in acute distress.  Cardiovascular:  Rate Rhythm is  "regular without murmurs rubs clicks.     Thorax & Lungs: Clear to auscultation, no wheezing, rhonchi, or rales  HENT: Normocephalic, Atraumatic.  Eyes: PERRLA, EOMI, Conjunctiva normal.   Neck: Trachia is midline no swelling of the thyroid.   Lymphatic: No lymphadenopathy noted.   Abdomin: Soft non-tender, no rebound, no guarding.   Skin: Warm, Dry, No erythema, No rash.   Extremities: Atraumatic with symmetric distal pulses, No edema, No tenderness, No cyanosis, No clubbing.   Musculoskeletal: Patient with ongoing loss of range of motion associated with both left and right upper extremities status post cervical spine surgery with good results. Patient has an ongoing infection associated with his right knee which is being treated with chronic clindamycin therapy  Neurologic: Alert & oriented x 3, cranial nerves II through XII are intact, Normal motor function, Normal sensory function, No focal deficits noted.   Psychiatric: Affect normal, Judgment normal, Mood normal.     VITAL SIGNS:/98 mmHg  Pulse 82  Temp(Src) 37.3 °C (99.1 °F)  Resp 16  Ht 1.93 m (6' 3.98\")  Wt 121.156 kg (267 lb 1.6 oz)  BMI 32.53 kg/m2  SpO2 94%    Labs: Reviewed    Assessment:                                                     Plan:  1. Right knee skin infection  Continue clindamycin  - clindamycin (CLEOCIN) 300 MG Cap; Take 1 Cap by mouth 2 Times a Day.  Dispense: 180 Cap; Refill: 1    2. Essential hypertension  Warned patient to continue on his clonidine patch  - clonidine (CATAPRES) 0.3 MG/24HR PATCH WEEKLY; Apply 1 Patch to skin as directed every Wednesday.  Dispense: 12 Patch; Refill: 11    3. Neck pain  Ongoing neck pain    4. Chronic use of opiate for therapeutic purpose  Controlled substance agreement in place  - Controlled Substance Treatment Agreement    "

## 2017-02-20 ENCOUNTER — HOME CARE VISIT (OUTPATIENT)
Dept: HOME HEALTH SERVICES | Facility: HOME HEALTHCARE | Age: 67
End: 2017-02-20
Payer: MEDICARE

## 2017-02-20 VITALS
WEIGHT: 265 LBS | DIASTOLIC BLOOD PRESSURE: 80 MMHG | TEMPERATURE: 98.5 F | BODY MASS INDEX: 32.27 KG/M2 | SYSTOLIC BLOOD PRESSURE: 146 MMHG | RESPIRATION RATE: 19 BRPM | HEART RATE: 68 BPM

## 2017-02-20 PROCEDURE — G0152 HHCP-SERV OF OT,EA 15 MIN: HCPCS

## 2017-02-20 ASSESSMENT — ACTIVITIES OF DAILY LIVING (ADL)
DRESSING_LB_ASSISTANCE: 0
IADLS_COMMENTS: E-->

## 2017-02-21 ENCOUNTER — HOME CARE VISIT (OUTPATIENT)
Dept: HOME HEALTH SERVICES | Facility: HOME HEALTHCARE | Age: 67
End: 2017-02-21
Payer: MEDICARE

## 2017-02-21 ENCOUNTER — ANTICOAGULATION MONITORING (OUTPATIENT)
Dept: VASCULAR LAB | Facility: MEDICAL CENTER | Age: 67
End: 2017-02-21

## 2017-02-21 VITALS
DIASTOLIC BLOOD PRESSURE: 68 MMHG | HEART RATE: 97 BPM | SYSTOLIC BLOOD PRESSURE: 118 MMHG | TEMPERATURE: 207.7 F | RESPIRATION RATE: 18 BRPM

## 2017-02-21 VITALS — RESPIRATION RATE: 18 BRPM | DIASTOLIC BLOOD PRESSURE: 68 MMHG | SYSTOLIC BLOOD PRESSURE: 118 MMHG | TEMPERATURE: 97.6 F

## 2017-02-21 DIAGNOSIS — Z86.73 HISTORY OF CVA (CEREBROVASCULAR ACCIDENT): ICD-10-CM

## 2017-02-21 DIAGNOSIS — Z79.01 ANTICOAGULATED ON WARFARIN: ICD-10-CM

## 2017-02-21 LAB
INR PPP: 2.3 (ref 2–3.5)
INR PPP: 2.3 (ref 2–3.5)

## 2017-02-21 PROCEDURE — G0151 HHCP-SERV OF PT,EA 15 MIN: HCPCS

## 2017-02-21 PROCEDURE — G0495 RN CARE TRAIN/EDU IN HH: HCPCS

## 2017-02-21 SDOH — ECONOMIC STABILITY: HOUSING INSECURITY: UNSAFE COOKING RANGE AREA: 0

## 2017-02-21 SDOH — ECONOMIC STABILITY: HOUSING INSECURITY: UNSAFE APPLIANCES: 0

## 2017-02-22 ENCOUNTER — HOME CARE VISIT (OUTPATIENT)
Dept: HOME HEALTH SERVICES | Facility: HOME HEALTHCARE | Age: 67
End: 2017-02-22
Payer: MEDICARE

## 2017-02-22 ENCOUNTER — ANTICOAGULATION MONITORING (OUTPATIENT)
Dept: VASCULAR LAB | Facility: MEDICAL CENTER | Age: 67
End: 2017-02-22

## 2017-02-22 DIAGNOSIS — I48.91 ATRIAL FIBRILLATION, UNSPECIFIED TYPE (HCC): ICD-10-CM

## 2017-02-22 DIAGNOSIS — Z79.01 ANTICOAGULATED ON WARFARIN: ICD-10-CM

## 2017-02-22 DIAGNOSIS — Z86.73 HISTORY OF CVA (CEREBROVASCULAR ACCIDENT): ICD-10-CM

## 2017-02-22 PROCEDURE — G0152 HHCP-SERV OF OT,EA 15 MIN: HCPCS

## 2017-02-22 NOTE — PROGRESS NOTES
Anticoagulation Summary as of 2/22/2017     INR goal 2.0-3.0   Selected INR    Maintenance plan 10 mg (5 mg x 2) every day   Weekly total 70 mg   Plan last modified Jefferson Brian, BIANCA (1/3/2017)   Next INR check 3/7/2017   Target end date Indefinite    Indications   CVA (cerebral vascular accident) (CMS-HCC) [I63.9]  Atrial fibrillation [427.31] [I48.91]  History of CVA (cerebrovascular accident) [Z86.73]  Anticoagulated on warfarin [Z79.01]         Anticoagulation Episode Summary     [facility]    Preferred lab     Send INR reminders to     Comments call pt multiple times, he has a hard time getting to his phone in time and has no VM set up      Anticoagulation Care Providers     Provider Role Specialty Phone number    Catrachito Sterling D.O. Referring Internal Medicine [phone]    [facility] Anticoagulation Services Responsible  [phone]    Liseth Doe A.P.N. Responsible Cardiology [phone]    Gi Cadena A.P.N. Responsible Cardiology [phone]        Anticoagulation Patient Findings   Negatives Missed Doses, Extra Doses, Medication Changes, Antibiotic Use, Diet Changes, Dental/Other Procedures, Hospitalization, Bleeding Gums, Nose Bleeds, Blood in Urine, Blood in Stool, Any Bruising, Other Complaints        Spoke with patient today regarding therapeutic INR of 2.3.  Patient denies any signs/symptoms of bruising or bleeding or any changes in diet and medications.  Instructed patient to call clinic with any questions or concerns.  Pt is to continue with current warfarin dosing regimen.  Follow up in 2 weeks in clinic as he was discharged from [facility].    Jefferson Brian, PHARMD

## 2017-02-22 NOTE — PROGRESS NOTES
Anticoagulation Summary as of 2/21/2017     INR goal 2.0-3.0   Selected INR 2.3 (2/21/2017)   Maintenance plan 10 mg (5 mg x 2) every day   Weekly total 70 mg   Plan last modified Jefferson Brian, PHARMD (1/3/2017)   Next INR check 3/7/2017   Target end date Indefinite    Indications   CVA (cerebral vascular accident) (CMS-HCC) [I63.9]  Atrial fibrillation [427.31] [I48.91]  History of CVA (cerebrovascular accident) [Z86.73]  Anticoagulated on warfarin [Z79.01]         Anticoagulation Episode Summary     [facility]    Preferred lab     Send INR reminders to     Comments call pt multiple times, he has a hard time getting to his phone in time and has no VM set up      Anticoagulation Care Providers     Provider Role Specialty Phone number    BEL Lee.SIMONE. Referring Internal Medicine [phone]    [facility] Anticoagulation Services Responsible  [phone]    Liseth Doe A.P.N. Responsible Cardiology [phone]    Gi Cadena A.P.N. Responsible Cardiology [phone]        Anticoagulation Patient Findings    VM not set up, will try contacting at a later time.    Brett Coates, PHARMD

## 2017-02-23 ENCOUNTER — HOME CARE VISIT (OUTPATIENT)
Dept: HOME HEALTH SERVICES | Facility: HOME HEALTHCARE | Age: 67
End: 2017-02-23
Payer: MEDICARE

## 2017-02-23 VITALS
RESPIRATION RATE: 18 BRPM | HEART RATE: 62 BPM | WEIGHT: 270.13 LBS | DIASTOLIC BLOOD PRESSURE: 70 MMHG | SYSTOLIC BLOOD PRESSURE: 138 MMHG | BODY MASS INDEX: 32.89 KG/M2 | TEMPERATURE: 209.3 F

## 2017-02-23 PROCEDURE — G0151 HHCP-SERV OF PT,EA 15 MIN: HCPCS

## 2017-02-24 VITALS
RESPIRATION RATE: 18 BRPM | HEART RATE: 6 BPM | DIASTOLIC BLOOD PRESSURE: 68 MMHG | SYSTOLIC BLOOD PRESSURE: 145 MMHG | TEMPERATURE: 98.6 F

## 2017-02-24 SDOH — ECONOMIC STABILITY: HOUSING INSECURITY: HOME SAFETY: 4 LARGE DOGS IN HOME.

## 2017-02-24 ASSESSMENT — ACTIVITIES OF DAILY LIVING (ADL)
OASIS_M1830: 01
HOME_HEALTH_OASIS: 01
HOME_HEALTH_OASIS: 00

## 2017-02-25 ASSESSMENT — ACTIVITIES OF DAILY LIVING (ADL)
MEAL_PREP_ASSISTANCE: 0
DRESSING_LB_ASSISTANCE: 0

## 2017-02-28 ENCOUNTER — TELEPHONE (OUTPATIENT)
Dept: MEDICAL GROUP | Facility: CLINIC | Age: 67
End: 2017-02-28

## 2017-02-28 DIAGNOSIS — Z98.890 H/O NECK SURGERY: ICD-10-CM

## 2017-02-28 NOTE — TELEPHONE ENCOUNTER
1. Caller Name: Joel Ma                                         Call Back Number: [phone] (home)         Patient approves a detailed voicemail message: yes    2. SPECIFIC Action To Be Taken: Orders pending, please sign.    3. Diagnosis/Clinical Reason for Request:    M54.2 (ICD-10-CM) - Neck pain     Z47.89 (ICD-10-CM) - Other orthopedic aftercare          4. Specialty & Provider Name/Lab/Imaging Location: [facility].  Patient was discharge from  and PT.     5. Is appointment scheduled for requested order/referral: no    Patient informed they will receive a return phone call from the office ONLY if there are any questions before processing their request. Advised to call back if they haven't received a call from the referral department in 5 days.

## 2017-03-06 ENCOUNTER — TELEPHONE (OUTPATIENT)
Dept: MEDICAL GROUP | Facility: CLINIC | Age: 67
End: 2017-03-06

## 2017-03-06 DIAGNOSIS — Z74.1 ASSISTANCE NEEDED FOR BATHING: ICD-10-CM

## 2017-03-06 NOTE — TELEPHONE ENCOUNTER
1. Caller Name: Patient                                         Call Back Number: [phone]       Patient approves a detailed voicemail message: yes    Patient has not had someone come out to help him shower in 2 weeks, patient is supposed to be signed up with home care. He states his arms are no good and he needs help bathing. Please advise.

## 2017-03-07 ENCOUNTER — HOME HEALTH ADMISSION (OUTPATIENT)
Dept: HOME HEALTH SERVICES | Facility: HOME HEALTHCARE | Age: 67
End: 2017-03-07
Payer: MEDICARE

## 2017-03-07 ENCOUNTER — ANTICOAGULATION VISIT (OUTPATIENT)
Dept: VASCULAR LAB | Facility: MEDICAL CENTER | Age: 67
End: 2017-03-07
Attending: INTERNAL MEDICINE
Payer: MEDICARE

## 2017-03-07 DIAGNOSIS — Z86.73 HISTORY OF CVA (CEREBROVASCULAR ACCIDENT): ICD-10-CM

## 2017-03-07 DIAGNOSIS — I48.91 ATRIAL FIBRILLATION, UNSPECIFIED TYPE (HCC): ICD-10-CM

## 2017-03-07 DIAGNOSIS — I63.9 CEREBROVASCULAR ACCIDENT (CVA), UNSPECIFIED MECHANISM (HCC): ICD-10-CM

## 2017-03-07 DIAGNOSIS — Z79.01 ANTICOAGULATED ON WARFARIN: ICD-10-CM

## 2017-03-07 LAB
INR BLD: 2.5 (ref 0.9–1.2)
INR PPP: 2.5 (ref 2–3.5)

## 2017-03-07 PROCEDURE — 99211 OFF/OP EST MAY X REQ PHY/QHP: CPT | Performed by: NURSE PRACTITIONER

## 2017-03-07 PROCEDURE — 85610 PROTHROMBIN TIME: CPT

## 2017-03-07 NOTE — TELEPHONE ENCOUNTER
Lanette with home health called pt meet goals with home health and currently schedule for outpatient PT. unfortunately hygiene activities are not part of home health, they cannot tale him in just because he can't shower. Sorry.

## 2017-03-07 NOTE — PROGRESS NOTES
OP Anticoagulation Service Note    Date: 3/7/2017       Plan:  Pt is therapeutic.  Denies any s/s of bleeding or clotting.  Denies any changes in medications or diet. Will continue warfarin dosing as follows.  Follow up appointment in 3 week(s).       Anticoagulation Summary as of 3/7/2017     INR goal 2.0-3.0   Selected INR 2.5 (3/7/2017)   Maintenance plan 10 mg (5 mg x 2) every day   Weekly total 70 mg   Plan last modified Jefferson Brian, PHARMD (1/3/2017)   Next INR check 3/28/2017   Target end date Indefinite    Indications   CVA (cerebral vascular accident) (CMS-HCC) [I63.9]  Atrial fibrillation [427.31] [I48.91]  History of CVA (cerebrovascular accident) [Z86.73]  Anticoagulated on warfarin [Z79.01]         Anticoagulation Episode Summary     [facility]    Preferred lab     Send INR reminders to     Comments call pt multiple times, he has a hard time getting to his phone in time and has no VM set up      Anticoagulation Care Providers     Provider Role Specialty Phone number    Catrachito Sterling D.O. Referring Internal Medicine [phone]    [facility] Anticoagulation Services Responsible  [phone]    BRIT AraujoPElianN. Responsible Cardiology [phone]    BRIT QuirozP.N. Responsible Cardiology [phone]            LGEN Andrews  [facility]

## 2017-03-07 NOTE — TELEPHONE ENCOUNTER
Telephone call returned no answer, please call patient will write out a prescription for assistance with bathing because of previous neck surgery.

## 2017-03-07 NOTE — MR AVS SNAPSHOT
Joel Ma   3/7/2017 10:00 AM   Anticoagulation Visit   MRN: [id number]    Department:  Vascular Medicine   Dept Phone:  [phone]    Description:  Male : 1950   Provider:  [facility] EXAM 4           Allergies as of 3/7/2017     Allergen Noted Reactions    Demerol 2008       Makes me stop breathing.  Doesn't like because it makes him high    Statins [Hmg-Coa-R Inhibitors] 2016   Unspecified    Per pt report. Unknown reaction.      You were diagnosed with     Cerebrovascular accident (CVA), unspecified mechanism (CMS-HCC)   [9061029]       History of CVA (cerebrovascular accident)   [449148]       Atrial fibrillation, unspecified type (CMS-HCC)   [2976918]       Anticoagulated on warfarin   [381663]         Vital Signs     Smoking Status                   Former Smoker           Basic Information     Date Of Birth Sex Race Ethnicity Preferred Language    1950 Male White Non- English      Your appointments     Mar 07, 2017 10:00 AM   Established Patient with [facility] EXAM 4   [facility]  (--)    [address] [address]   [phone]            Mar 09, 2017  8:00 AM   PT New Evaluation 60 Minutes with Zaria Olmstead P.T.   [facility] ([address])    [address] [place] [address]   [phone]           Please bring Photo ID, Insurance Cards, list of all Medication and copies of any legal documents (such as Living Will, Power of ) If speaking a language besides English please bring an adult . Please arrive 30 minutes prior for check in and registration.            Mar 14, 2017 11:30 AM   PT Follow Up 30 Minutes with Zaria Olmstead P.T.   [facility] ([address] [place] [address])    [address] [place] [address] [phone]            Mar 17, 2017  9:20 AM   Established Patient with Catrachito IBARRA  GIGI Sterling.   [facility] ([facility])    [address] [facility] [address]  [place] [address]   [phone]           You will be receiving a confirmation call a few days before your appointment from our automated call confirmation system.            Mar 17, 2017 11:30 AM   PT Follow Up 30 Minutes with Zaria Olmstead P.T.   [facility] [address] [place])    [address]  [address]  [place] [address]   [phone]            Mar 22, 2017  2:00 PM   PT Follow Up 30 Minutes with Zaria Olmstead P.T.   [facility] [address] [place])    [address]  [address]  [place] [address]   [phone]            Mar 24, 2017 12:00 PM   PT Follow Up 30 Minutes with Zaria Olmstead P.T.   [facility] [address] ([address] [place])    [address]  [address]  [place] [address]   [phone]            Mar 28, 2017 10:00 AM   Established Patient with [facility] EXAM 5   [facility]  (--)    [address] [facility]  [place] [address]   [phone]            Mar 29, 2017 12:00 PM   PT Follow Up 30 Minutes with Zaria Olmstead P.T.   [facility] [address]    [address]  [address]  [place] [address]   [phone]            Mar 31, 2017 11:30 AM   PT Follow Up 30 Minutes with Zaria Olmstead P.T.   [facility] [address]    [address]  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 Low  3/5/2012 - Present    BPH (benign prostatic hyperplasia) N40.0   4/28/2014 - Present    Anticoagulated on warfarin Z79.01   4/28/2014 - Present    Chronic antibiotic suppression Z79.2   4/28/2014 - Present    Myalgia M79.1   5/29/2014 - Present    Risk for falls Z91.81   10/27/2014 - Present    BMI 38.0-38.9,adult Z68.38   10/27/2014 - Present    Diabetic polyneuropathy (CMS-HCC) E11.42 High  5/6/2015 - Present    Toe amputation status (CMS-HCC) Z89.429 High  5/6/2015 - Present    Atrial fibrillation [427.31] I48.91   6/24/2015 - Present    History of CVA (cerebrovascular accident) Z86.73   11/11/2015 - Present    JANNIE treated with BiPAP G47.33   4/18/2016 - Present    Left knee pain M25.562 High  8/28/2016 - Present    Cervical stenosis of spine M48.02   9/6/2016 - Present    Dysphagia R13.10   9/6/2016 - Present    Chronic atrial fibrillation (CMS-HCC) I48.2   9/6/2016 - Present    Hallucinations R44.3   9/6/2016 - Present    Other orthopedic aftercare Z47.89   11/7/2016 - Present    Chronic use of opiate drugs therapeutic purposes Z79.899   2/14/2017 - Present      Health Maintenance        Date Due Completion Dates    IMM ZOSTER VACCINE 8/17/2009 ---    URINE ACR / MICROALBUMIN 5/11/2016 5/11/2015, 2/10/2015, 11/12/2014, 2/5/2014, 10/28/2013, 5/31/2013, 9/6/2012, 4/9/2012, 11/11/2011, 11/15/2010    IMM INFLUENZA (1) 9/1/2016 ---    RETINAL SCREENING 10/28/2016 10/28/2015, 9/16/2015, 8/12/2015, 3/10/2014, 9/24/2012    DIABETES MONOFILAMENT / LE EXAM 11/16/2016 11/16/2015 (Done), 2/11/2014 (Done), 5/31/2013 (Done), 5/18/2011 (N/S)    Override on 11/16/2015: Done (Catrachito Sterling DO)    Override on 2/11/2014: Done    Override on 5/31/2013: Done    Override on 5/18/2011: (N/S) (patient with mild neuropathy distal toes. )    A1C SCREENING 6/16/2017 12/16/2016, 12/7/2016, 8/29/2016, 8/10/2016, 7/7/2016, 4/29/2016, 9/21/2015,  8/17/2015, 5/11/2015, 5/11/2015, 3/30/2015, 2/10/2015, 11/12/2014, 8/13/2014, 5/13/2014, 2/5/2014, 2/5/2014, 2/5/2014, 10/28/2013, 10/28/2013, 8/26/2013, 5/31/2013, 1/10/2013, 10/29/2012, 9/6/2012, 9/6/2012, 4/9/2012, 3/19/2012, 11/11/2011, 2/17/2011, 11/15/2010, 8/13/2010, 7/27/2010, 5/5/2010, 3/24/2010, 2/18/2010, 12/23/2009, 12/7/2009, 7/13/2009, 4/22/2008, 2/11/2008, 8/12/2006, 8/11/2006, 5/4/2006    COLONOSCOPY 6/30/2017 6/30/2014    FASTING LIPID PROFILE 8/10/2017 8/10/2016, 7/7/2016, 4/29/2016, 9/21/2015, 8/17/2015, 5/11/2015, 3/30/2015, 8/13/2014, 2/5/2014, 2/5/2014, 2/5/2014, 10/28/2013, 8/19/2013, 7/16/2013, 5/31/2013, 4/10/2013, 10/30/2012, 10/3/2012, 4/27/2012, 6/15/2011, 8/13/2010, 5/5/2010, 12/23/2009, 12/7/2009, 4/21/2008, 2/12/2008, 5/8/2007, 8/11/2006    SERUM CREATININE 12/16/2017 12/16/2016, 12/7/2016, 12/6/2016, 11/28/2016, 11/25/2016, 11/11/2016, 11/8/2016, 9/9/2016, 9/7/2016, 9/6/2016, 9/1/2016, 8/31/2016, 8/30/2016, 8/29/2016, 8/28/2016, 8/10/2016, 7/22/2016, 7/7/2016, 4/29/2016, 11/18/2015, 9/21/2015, 9/15/2015, 8/17/2015, 6/19/2015, 5/11/2015, 3/30/2015, 2/10/2015, 11/12/2014, 10/13/2014, 8/13/2014, 5/13/2014, 2/5/2014, 2/5/2014, 2/5/2014, 10/28/2013, 6/27/2013, 5/31/2013, 1/10/2013, 1/7/2013, 12/17/2012, 12/16/2012, 12/15/2012, 12/14/2012, 12/13/2012, 12/12/2012, 12/11/2012, 12/11/2012, 12/10/2012, 12/10/2012, 12/9/2012, 12/9/2012, 12/8/2012, 12/8/2012, 12/7/2012, 12/6/2012, 11/16/2012, 11/15/2012, 11/14/2012, 11/13/2012, 11/9/2012, 11/7/2012, 11/6/2012, 11/4/2012, 11/2/2012, 11/1/2012, 10/31/2012, 10/30/2012, 10/29/2012, 10/29/2012, 10/28/2012, 9/6/2012, 4/9/2012, 3/19/2012, 2/28/2012, 1/4/2012, 11/11/2011, 7/21/2011, 6/15/2011, 3/21/2011, 3/21/2011, 3/20/2011, 2/17/2011, 1/10/2009, 7/30/2008, 4/22/2008, 4/21/2008, 4/20/2008, 2/19/2008, 2/18/2008, 2/17/2008, 2/16/2008, 2/15/2008, 2/12/2008, 2/11/2008, 2/11/2008, 5/10/2007, 5/9/2007, 5/8/2007, 5/7/2007, 10/16/2006, 8/11/2006, 8/11/2006,  5/4/2006, 5/3/2006, 6/28/2005    IMM PNEUMOCOCCAL 65+ (ADULT) LOW/MEDIUM RISK SERIES (2 of 2 - PPSV23) 1/19/2018 1/19/2017    IMM DTaP/Tdap/Td Vaccine (2 - Td) 7/16/2022 7/16/2012, 8/15/2009            Results     POCT Protime      Component    INR    2.5                        Current Immunizations     13-VALENT PCV PREVNAR 1/19/2017 10:37 AM    Influenza Vaccine 1/1/1980    Pneumococcal Vaccine (UF)Historical Data 1/1/1980    TD Vaccine 8/15/2009  5:00 PM    Tdap Vaccine 7/16/2012      Below and/or attached are the medications your provider expects you to take. Review all of your home medications and newly ordered medications with your provider and/or pharmacist. Follow medication instructions as directed by your provider and/or pharmacist. Please keep your medication list with you and share with your provider. Update the information when medications are discontinued, doses are changed, or new medications (including over-the-counter products) are added; and carry medication information at all times in the event of emergency situations     Allergies:  DEMEROL - (reactions not documented)     STATINS - Unspecified               Medications  Valid as of: March 07, 2017 -  9:40 AM    Generic Name Brand Name Tablet Size Instructions for use    AmLODIPine Besylate (Tab) NORVASC 10 MG Take 1 Tab by mouth every day.        Clindamycin HCl (Cap) CLEOCIN 300 MG Take 1 Cap by mouth 2 Times a Day.        CloNIDine HCl (PATCH WEEKLY) CATAPRES 0.3 MG/24HR Apply 1 Patch to skin as directed every Wednesday.        Fluticasone Propionate (Suspension) FLONASE 50 MCG/ACT Spray 2 Sprays in nose as needed.        Gabapentin (Cap) NEURONTIN 100 MG Take 100 mg in AM/Take 300 mg pm and 200 mg at bed time        HydrALAZINE HCl (Tab) APRESOLINE 100 MG Take 1 Tab by mouth every 8 hours.        Insulin Glargine (Solution) LANTUS 100 UNIT/ML Inject 30 Units as instructed every evening.        Lisinopril (Tab) PRINIVIL, ZESTRIL 40 MG Take 1  Tab by mouth every day.        Magnesium Chloride (Tab CR) SLO- (64 MG) MG Take 1 Tab by mouth 2 Times a Day.        MetFORMIN HCl (Tab) GLUCOPHAGE 500 MG Take 1 Tab by mouth 2 times a day, with meals.        Nystatin (Powder) MYCOSTATIN  Apply 1 Application to affected area(s) 3 times a day. Apply to reddened area under abdominal fold.        Oxycodone-Acetaminophen (Tab) PERCOCET-10  MG Take 1-2 Tabs by mouth every 6 hours as needed for Severe Pain.        Polyethylene Glycol 3350 (Pack) MIRALAX  Take 1 Packet by mouth 1 time daily as needed.        TraZODone HCl (Tab) DESYREL 150 MG Take 0.5 Tabs by mouth every bedtime.        Warfarin Sodium (Tab) COUMADIN 5 MG Take 2 Tabs by mouth every day at 6 PM.        .                 Medicines prescribed today were sent to:     [id number] - [place], [place] - [address] [address]    Phone: [phone] Fax: [phone]    Open 24 Hours?: No      Medication refill instructions:       If your prescription bottle indicates you have medication refills left, it is not necessary to call your provider’s office. Please contact your pharmacy and they will refill your medication.    If your prescription bottle indicates you do not have any refills left, you may request refills at any time through one of the following ways: The online [employer] system (except Urgent Care), by calling your provider’s office, or by asking your pharmacy to contact your provider’s office with a refill request. Medication refills are processed only during regular business hours and may not be available until the next business day. Your provider may request additional information or to have a follow-up visit with you prior to refilling your medication.   *Please Note: Medication refills are assigned a new Rx number when refilled electronically. Your pharmacy may indicate that no refills were authorized even though a new prescription for the same  medication is available at the pharmacy. Please request the medicine by name with the pharmacy before contacting your provider for a refill.        Other Notes About Your Plan     Patient is enrolled in [facility] with Dr. Sterling        Warfarin Dosing Calendar   March 2017 Details    Sun Mon Tue Wed Thu Fri Sat        1               2               3               4                 5               6               7   2.5   10 mg   See details      8      10 mg         9      10 mg         10      10 mg         11      10 mg           12      10 mg         13      10 mg         14      10 mg         15      10 mg         16      10 mg         17      10 mg         18      10 mg           19      10 mg         20      10 mg         21      10 mg         22      10 mg         23      10 mg         24      10 mg         25      10 mg           26      10 mg         27      10 mg         28      10 mg         29               30               31                 Date Details   03/07 This INR check   INR: 2.5       Date of next INR:  3/28/2017         How to take your warfarin dose     To take:  10 mg Take 2 of the 5 mg tablets.              Queralt Access Code: [email or web address]  Expires: 3/28/2017 10:30 AM    Queralt  A secure, online tool to manage your health information     [email or web address]’s Queralt® is a secure, online tool that connects you to your personalized health information from the privacy of your home -- day or night - making it very easy for you to manage your healthcare. Once the activation process is completed, you can even access your medical information using the Queralt leonie, which is available for free in the Apple Leonie store or Google Play store.     Queralt provides the following levels of access (as shown below):   My Chart Features   [facility] Doctor [facility]  Specialists [facility]  Urgent  Care Non-[facility]  Primary Care  Doctor   Email your healthcare team securely and privately 24/7 X  X X    Manage appointments: schedule your next appointment; view details of past/upcoming appointments X      Request prescription refills. X      View recent personal medical records, including lab and immunizations X X X X   View health record, including health history, allergies, medications X X X X   Read reports about your outpatient visits, procedures, consult and ER notes X X X X   See your discharge summary, which is a recap of your hospital and/or ER visit that includes your diagnosis, lab results, and care plan. X X       How to register for [employer]:  1. Go to  [email or web address].  2. Click on the Sign Up Now box, which takes you to the New Member Sign Up page. You will need to provide the following information:  a. Enter your Automsoft Access Code exactly as it appears at the top of this page. (You will not need to use this code after you’ve completed the sign-up process. If you do not sign up before the expiration date, you must request a new code.)   b. Enter your date of birth.   c. Enter your home email address.   d. Click Submit, and follow the next screen’s instructions.  3. Create a Automsoft ID. This will be your Automsoft login ID and cannot be changed, so think of one that is secure and easy to remember.  4. Create a Automsoft password. You can change your password at any time.  5. Enter your Password Reset Question and Answer. This can be used at a later time if you forget your password.   6. Enter your e-mail address. This allows you to receive e-mail notifications when new information is available in Automsoft.  7. Click Sign Up. You can now view your health information.    For assistance activating your Automsoft account, call [phone]

## 2017-03-08 ENCOUNTER — TELEPHONE (OUTPATIENT)
Dept: MEDICAL GROUP | Facility: CLINIC | Age: 67
End: 2017-03-08

## 2017-03-08 NOTE — TELEPHONE ENCOUNTER
Lanette from [facility] called to say that they are not able to send a CNA to help this pt because he has been discharged from [facility]. ROB

## 2017-03-09 ENCOUNTER — PATIENT OUTREACH (OUTPATIENT)
Dept: HEALTH INFORMATION MANAGEMENT | Facility: OTHER | Age: 67
End: 2017-03-09

## 2017-03-09 ENCOUNTER — HOSPITAL ENCOUNTER (OUTPATIENT)
Dept: PHYSICAL THERAPY | Facility: REHABILITATION | Age: 67
End: 2017-03-09
Attending: INTERNAL MEDICINE
Payer: MEDICARE

## 2017-03-09 PROCEDURE — 97163 PT EVAL HIGH COMPLEX 45 MIN: CPT

## 2017-03-09 NOTE — Clinical Note
March 9, 2017        Joel Ma  [address]  [place] [address]        Dear Joel:    Your primary care provider, Dr. Sterling, placed a referral to  indicating you may need additional assistance.     There may be some community resources available to you if you qualify. I have attached some information with this letter. If you have any questions or would like assistance in applying for potential programs, please don't hesitate to call, [phone]        Sincerely,        EMEKA Spear, MSW    Electronically Signed

## 2017-03-09 NOTE — PROGRESS NOTES
Pt referred to  by PCP (Asher) with report that pt is having difficulties with showering. Outreach call to pt to introduce self and discuss pt's identified needs. LSW contacted both numbers on file, pt did not answer. Unable to leave message on either line as both indicated VM had not been set up.     Plan:  LSW will attempt to reach pt at later time/date.  Will mail pt letter and resources at address on-file.

## 2017-03-10 ENCOUNTER — PATIENT OUTREACH (OUTPATIENT)
Dept: HEALTH INFORMATION MANAGEMENT | Facility: OTHER | Age: 67
End: 2017-03-10

## 2017-03-10 NOTE — PROGRESS NOTES
Pt referred to  by PCP (Asher) with report that pt is having difficulties with showering. Outreach call to pt to introduce self and discuss pt's identified needs. Pt agreeable to work with . During conversation pt reported he had to go to the bathroom and needed to let LSW go. Informed pt that LSW would call back in 15-30 mins to complete assessment. Pt agreeable. LSW attempted to call pt 30 mins later but pt did not answer. VM was not set up and unable to leave . Below is information gathered prior to having to disconnect with pt.     He reported he is having a hard time taking a shower. He explained he has a shower bench and can get in and out but can't lift his arms up past his chest and is having a hard time with washing himself. He also explained some difficulties with dressing and toilet explaining he can complete independently but it is a struggle.     Discussed with pt different programs/resources that could potential assist with ADL needs if pt qualifies via level of care and income criteria. Pt reported he gets about $1500 in SSA benefits and does not have over $2000 in assets.     Plan:  · LSW will attempt to reach pt again at later time/date to determine if he would like to move forward or would like potential referral to community programs.

## 2017-03-11 NOTE — PROGRESS NOTES
Received return call from pt to further discuss potential program and resources. Discussed with pt the HCBW program through [employer]. Pt expressed interest in program; however, explained to pt program is for those with declining ability to care for self and will likely need services long-term. Explained it was not for individuals that need temporary assistance and that time to fully get on program can take 2-5 months. Pt explained he plans on being better an able to care for himself discussing he is improving and going to therapy.     Further discussed with pt he would likely need to pay privately for temporary care services. Explained there are numerous [employer] in town and LSW could mail him a resources list. Pt agreeable. Offered to still place referral to HCBW in the event pt does need service long-term. Pt declined. He indicated he would look into private pay options.     Pt denies any other social needs at this time or further questions. Encouraged pt to call LSW back if he has any further needs. Pt agreeable.     Plan:  · Mailed pt information on HCBW in the event he does need in the future.  · Mailed resource list of local personal care agencies. Pt is to follow up directly with agencies on his care needs and establishing services.   · Mailed pt AD workshop flyer and encouraged pt to attend.  · LSW will not actively follow at this time.

## 2017-03-13 ENCOUNTER — TELEPHONE (OUTPATIENT)
Dept: MEDICAL GROUP | Facility: CLINIC | Age: 67
End: 2017-03-13

## 2017-03-13 NOTE — TELEPHONE ENCOUNTER
1. Caller Name: Patient                                         Call Back Number: [phone]      Patient approves a detailed voicemail message: yes    Patient has his application for medical marijuana and he would like to bring it to next apt to be filled out. It does need to be notarized. Please advise.

## 2017-03-14 ENCOUNTER — HOSPITAL ENCOUNTER (OUTPATIENT)
Dept: PHYSICAL THERAPY | Facility: REHABILITATION | Age: 67
End: 2017-03-14
Attending: INTERNAL MEDICINE
Payer: MEDICARE

## 2017-03-14 PROCEDURE — 97116 GAIT TRAINING THERAPY: CPT | Mod: XU

## 2017-03-14 PROCEDURE — 97530 THERAPEUTIC ACTIVITIES: CPT

## 2017-03-14 PROCEDURE — 97110 THERAPEUTIC EXERCISES: CPT

## 2017-03-14 NOTE — TELEPHONE ENCOUNTER
"Okay. No he can not bring in form to appointment? Or just \"No\" he does not need to have it notarized. Thank you!  "

## 2017-03-16 ENCOUNTER — HOSPITAL ENCOUNTER (OUTPATIENT)
Dept: PHYSICAL THERAPY | Facility: REHABILITATION | Age: 67
End: 2017-03-16
Attending: INTERNAL MEDICINE
Payer: MEDICARE

## 2017-03-16 PROCEDURE — 97116 GAIT TRAINING THERAPY: CPT | Mod: XU

## 2017-03-16 PROCEDURE — 97530 THERAPEUTIC ACTIVITIES: CPT

## 2017-03-16 PROCEDURE — 97110 THERAPEUTIC EXERCISES: CPT

## 2017-03-17 ENCOUNTER — APPOINTMENT (OUTPATIENT)
Dept: PHYSICAL THERAPY | Facility: REHABILITATION | Age: 67
End: 2017-03-17
Attending: INTERNAL MEDICINE
Payer: MEDICARE

## 2017-03-17 ENCOUNTER — OFFICE VISIT (OUTPATIENT)
Dept: MEDICAL GROUP | Facility: CLINIC | Age: 67
End: 2017-03-17
Payer: MEDICARE

## 2017-03-17 VITALS
HEART RATE: 60 BPM | TEMPERATURE: 97.7 F | HEIGHT: 76 IN | BODY MASS INDEX: 33.12 KG/M2 | SYSTOLIC BLOOD PRESSURE: 140 MMHG | WEIGHT: 272 LBS | DIASTOLIC BLOOD PRESSURE: 90 MMHG | OXYGEN SATURATION: 96 %

## 2017-03-17 DIAGNOSIS — E11.40 TYPE 2 DIABETES MELLITUS WITH DIABETIC NEUROPATHY, WITHOUT LONG-TERM CURRENT USE OF INSULIN (HCC): ICD-10-CM

## 2017-03-17 DIAGNOSIS — M54.2 NECK PAIN: ICD-10-CM

## 2017-03-17 DIAGNOSIS — G57.93 NEUROPATHY OF BOTH FEET: ICD-10-CM

## 2017-03-17 PROCEDURE — 1036F TOBACCO NON-USER: CPT | Performed by: INTERNAL MEDICINE

## 2017-03-17 PROCEDURE — G8419 CALC BMI OUT NRM PARAM NOF/U: HCPCS | Performed by: INTERNAL MEDICINE

## 2017-03-17 PROCEDURE — G8432 DEP SCR NOT DOC, RNG: HCPCS | Performed by: INTERNAL MEDICINE

## 2017-03-17 PROCEDURE — 3017F COLORECTAL CA SCREEN DOC REV: CPT | Performed by: INTERNAL MEDICINE

## 2017-03-17 PROCEDURE — 92250 FUNDUS PHOTOGRAPHY W/I&R: CPT | Mod: TC | Performed by: INTERNAL MEDICINE

## 2017-03-17 PROCEDURE — 3045F PR MOST RECENT HEMOGLOBIN A1C LEVEL 7.0-9.0%: CPT | Performed by: INTERNAL MEDICINE

## 2017-03-17 PROCEDURE — 4040F PNEUMOC VAC/ADMIN/RCVD: CPT | Performed by: INTERNAL MEDICINE

## 2017-03-17 PROCEDURE — G8484 FLU IMMUNIZE NO ADMIN: HCPCS | Performed by: INTERNAL MEDICINE

## 2017-03-17 PROCEDURE — G8598 ASA/ANTIPLAT THER USED: HCPCS | Performed by: INTERNAL MEDICINE

## 2017-03-17 PROCEDURE — 1101F PT FALLS ASSESS-DOCD LE1/YR: CPT | Performed by: INTERNAL MEDICINE

## 2017-03-17 PROCEDURE — 99214 OFFICE O/P EST MOD 30 MIN: CPT | Performed by: INTERNAL MEDICINE

## 2017-03-17 RX ORDER — OXYCODONE AND ACETAMINOPHEN 10; 325 MG/1; MG/1
1-2 TABLET ORAL EVERY 6 HOURS PRN
Qty: 120 TAB | Refills: 0 | Status: SHIPPED | OUTPATIENT
Start: 2017-03-17 | End: 2017-03-17 | Stop reason: SDUPTHER

## 2017-03-17 RX ORDER — OXYCODONE AND ACETAMINOPHEN 10; 325 MG/1; MG/1
1-2 TABLET ORAL EVERY 6 HOURS PRN
Qty: 120 TAB | Refills: 0 | Status: SHIPPED | OUTPATIENT
Start: 2017-03-17 | End: 2017-06-02 | Stop reason: SDUPTHER

## 2017-03-17 NOTE — PROGRESS NOTES
CC: Joel Ma is a 66 y.o. male is suffering from   Chief Complaint   Patient presents with   • Follow-Up     FV          SUBJECTIVE:  1. Neuropathy of both feet  Ulcer follow-up: Continues to suffer with neuropathy associated both feet. Patient has neuropathy extending upwards to the distal one third of the tibias anteriorly bilaterally.     2. Neck pain  Patient with ongoing neck pain discomfort status post surgery continues to struggle with left and right upper arm weakness.     3. Type 2 diabetes mellitus with diabetic neuropathy, without long-term current use of insulin (CMS-[facility])  Patient with a history of type 2 diabetes clinically stable reasonably controlled        Past social, family, history: Single   Social History   Substance Use Topics   • Smoking status: Former Smoker -- 4.00 packs/day for .5 years     Types: Cigarettes     Quit date: 01/01/1970   • Smokeless tobacco: Never Used   • Alcohol Use: No         MEDICATIONS:    Current outpatient prescriptions:   •  oxycodone-acetaminophen (PERCOCET-10)  MG Tab, Take 1-2 Tabs by mouth every 6 hours as needed for Severe Pain., Disp: 120 Tab, Rfl: 0  •  amlodipine (NORVASC) 10 MG Tab, Take 1 Tab by mouth every day., Disp: 90 Tab, Rfl: 3  •  gabapentin (NEURONTIN) 100 MG Cap, Take 100 mg in AM/Take 300 mg pm and 200 mg at bed time, Disp: 450 Cap, Rfl: 3  •  hydrALAZINE (APRESOLINE) 100 MG tablet, Take 1 Tab by mouth every 8 hours., Disp: 270 Tab, Rfl: 3  •  lisinopril (PRINIVIL, ZESTRIL) 40 MG tablet, Take 1 Tab by mouth every day., Disp: 90 Tab, Rfl: 0  •  metformin (GLUCOPHAGE) 500 MG Tab, Take 1 Tab by mouth 2 times a day, with meals., Disp: 180 Tab, Rfl: 3  •  trazodone (DESYREL) 150 MG Tab, Take 0.5 Tabs by mouth every bedtime., Disp: 45 Tab, Rfl: 3  •  warfarin (COUMADIN) 5 MG Tab, Take 2 Tabs by mouth every day at 6 PM., Disp: 180 Tab, Rfl: 3  •  fluticasone (FLONASE) 50 MCG/ACT nasal spray, Spray 2 Sprays in nose as needed., Disp: 1  Bottle, Rfl: 5  •  insulin glargine (LANTUS) 100 UNIT/ML Solution, Inject 30 Units as instructed every evening., Disp: 10 mL, Rfl: 5  •  clindamycin (CLEOCIN) 300 MG Cap, Take 1 Cap by mouth 2 Times a Day., Disp: 180 Cap, Rfl: 1  •  clonidine (CATAPRES) 0.3 MG/24HR PATCH WEEKLY, Apply 1 Patch to skin as directed every Wednesday., Disp: 12 Patch, Rfl: 11  •  nystatin (MYCOSTATIN) Powder, Apply 1 Application to affected area(s) 3 times a day. Apply to reddened area under abdominal fold., Disp: 1 Bottle, Rfl: 0  •  polyethylene glycol/lytes (MIRALAX) Pack, Take 1 Packet by mouth 1 time daily as needed., Disp: 30 Each, Rfl: 0  •  magnesium chloride SR (SLO-MAG) 535 (64 MG) MG Tab CR, Take 1 Tab by mouth 2 Times a Day. (Patient taking differently: Take 1 Tab by mouth 2 times a day as needed.), Disp: 60 Tab, Rfl: 0    PROBLEMS:  Patient Active Problem List    Diagnosis Date Noted   • Left knee pain 08/28/2016     Priority: High   • Diabetic polyneuropathy (CMS-HCC) 05/06/2015     Priority: High   • Toe amputation status (CMS-HCC) 05/06/2015     Priority: High   • HTN (hypertension) 08/16/2010     Priority: Medium   • CVA (cerebral vascular accident) (CMS-HCC) 06/04/2009     Priority: Medium   • Diabetes mellitus with neurological manifestations, controlled (CMS-HCC) 03/05/2012     Priority: Low   • Chronic use of opiate drugs therapeutic purposes 02/14/2017   • Other orthopedic aftercare 11/07/2016   • Cervical stenosis of spine 09/06/2016   • Dysphagia 09/06/2016   • Chronic atrial fibrillation (CMS-[facility]) 09/06/2016   • Hallucinations 09/06/2016   • JANNIE treated with BiPAP 04/18/2016   • History of CVA (cerebrovascular accident) 11/11/2015   • Atrial fibrillation [427.31] 06/24/2015   • Risk for falls 10/27/2014   • BMI 38.0-38.9,adult 10/27/2014   • Myalgia 05/29/2014   • BPH (benign prostatic hyperplasia) 04/28/2014   • Anticoagulated on warfarin 04/28/2014   • Chronic antibiotic suppression 04/28/2014   • MEDICAL HOME   "  • Ventricular ectopy 06/17/2011   • Trigger finger 06/17/2011   • Thyroid mass 07/30/2009   • Dyslipidemia 06/17/2009       REVIEW OF SYSTEMS:  Gen.:  No Nausea, Vomiting, fever, Chills.  Heart: No chest pain.  Lungs:  No shortness of Breath.  Psychological: Eleazar unusual Anxiety depression     PHYSICAL EXAM   Constitutional: Alert, cooperative, not in acute distress.  Cardiovascular:  Rate Rhythm is regular without murmurs rubs clicks.     Thorax & Lungs: Clear to auscultation, no wheezing, rhonchi, or rales  HENT: Normocephalic, Atraumatic.  Eyes: PERRLA, EOMI, Conjunctiva normal.   Neck: Trachia is midline no swelling of the thyroid.   Lymphatic: No lymphadenopathy noted.   Musculoskeletal: Patient with neuropathy to monofilament wire testing to the distal one third of the tibias bilaterally no evidence of diabetic ulcerations patient on previous amputations right toes associated with previous industrial accident.   Neurologic: Alert & oriented x 3, cranial nerves II through XII are intact, Normal motor function, Normal sensory function, No focal deficits noted.   Psychiatric: Affect normal, Judgment normal, Mood normal.     VITAL SIGNS:/90 mmHg  Pulse 60  Temp(Src) 36.5 °C (97.7 °F)  Ht 1.93 m (6' 3.98\")  Wt 123.378 kg (272 lb)  BMI 33.12 kg/m2  SpO2 96%    Labs: Reviewed    Assessment:                                                     Plan:    1. Neuropathy of both feet  Continue current medical therapy, recheck hemoglobin A1c 3 months labs ordered  - Diabetic Monofilament Lower Extremity Exam  - MICROALBUMIN CREAT RATIO URINE; Future  - POCT Retinal Eye Exam  - COMP METABOLIC PANEL; Future  - CBC WITH DIFFERENTIAL; Future    2. Neck pain  Continue Percocet previous drug agreement signed  - oxycodone-acetaminophen (PERCOCET-10)  MG Tab; Take 1-2 Tabs by mouth every 6 hours as needed for Severe Pain.  Dispense: 120 Tab; Refill: 0    3. Type 2 diabetes mellitus with diabetic neuropathy, " without long-term current use of insulin (CMS-[facility])  Lipid profile pending  - LIPID PROFILE; Future  - HEMOGLOBIN A1C; Future

## 2017-03-17 NOTE — MR AVS SNAPSHOT
"        Joel Wolftaco   3/17/2017 9:20 AM   Office Visit   MRN: [id number]    Department:  [facility]   Dept Phone:  [phone]    Description:  Male : 1950   Provider:  Catrachito Sterling D.O.           Reason for Visit     Follow-Up FV       Allergies as of 3/17/2017     Allergen Noted Reactions    Demerol 2008       Makes me stop breathing.  Doesn't like because it makes him high    Statins [Hmg-Coa-R Inhibitors] 2016   Unspecified    Per pt report. Unknown reaction.      You were diagnosed with     Neuropathy of both feet   [178625]       Neck pain   [507297]       Type 2 diabetes mellitus with diabetic neuropathy, without long-term current use of insulin (CMS-[facility])   [0565946]         Vital Signs     Blood Pressure Pulse Temperature Height Weight Body Mass Index    140/90 mmHg 60 36.5 °C (97.7 °F) 1.93 m (6' 3.98\") 123.378 kg (272 lb) 33.12 kg/m2    Oxygen Saturation Smoking Status                96% Former Smoker          Basic Information     Date Of Birth Sex Race Ethnicity Preferred Language    1950 Male White Non- English      Your appointments     Mar 21, 2017 10:30 AM   PT Follow Up 60 Minutes with [facility] ([address])    [address] [place] [address]   [phone]            Mar 23, 2017  8:00 AM   OT New Evaluation 60 Minutes with Ricco Bruno MS,OTR/L   [facility] ([address])    [address] [place] [address]   [phone]           Please bring Photo ID, Insurance Cards, list of all Medication and copies of any legal documents (such as Living Will, Power of ) If speaking a language besides English please bring an adult . Please arrive 30 minutes prior for check in and registration.            Mar 23, 2017 10:30 AM   PT Follow Up 60 Minutes with [facility] " [address]    [address]  [address]  [place] [address]   [phone]            Mar 28, 2017 10:00 AM   Established Patient with [facility] EXAM 5   [facility]  (--)    [address]  [place] [address]   [phone]            Mar 28, 2017 11:00 AM   PT Follow Up 60 Minutes with Zaria Olmstead P.T.   [facility] [place] [facility] ([address] [place] [address])    [address]  [address]  [place] [address]   [phone]            Mar 30, 2017  9:30 AM   PT Follow Up 60 Minutes with Zaria Olmstead P.T.   [facility] ([address] [place] [address])    [address]  [address]  [place] [address]   [phone]            Apr 04, 2017 10:30 AM   PT Follow Up 60 Minutes with Zaria Olmstead P.T.   [facility] ([address] [place] [address])    [address]  [address]  [place] [address]   [phone]              Problem List              ICD-10-CM Priority Class Noted - Resolved    CVA (cerebral vascular accident) (CMS-[facility]) I63.9 Medium  6/4/2009 - Present    Dyslipidemia E78.5   6/17/2009 - Present    Thyroid mass E07.9   7/30/2009 - Present    HTN (hypertension) I10 Medium  8/16/2010 - Present    Ventricular ectopy I49.3   6/17/2011 - Present    Trigger finger M65.30   6/17/2011 - Present    MEDICAL HOME    Unknown - Present    Diabetes mellitus with neurological manifestations, controlled (CMS-[facility]) E11.49 Low  3/5/2012 - Present    BPH (benign prostatic hyperplasia) N40.0   4/28/2014 - Present    Anticoagulated on warfarin Z79.01   4/28/2014 - Present    Chronic antibiotic suppression Z79.2   4/28/2014 - Present    Myalgia M79.1   5/29/2014 - Present    Risk for falls Z91.81   10/27/2014 - Present    BMI 38.0-38.9,adult Z68.38   10/27/2014 - Present    Diabetic polyneuropathy (CMS-HCC) E11.42 High  5/6/2015 - Present    Toe amputation status (CMS-HCC) Z89.429 High  5/6/2015 - Present    Atrial  fibrillation [427.31] I48.91   6/24/2015 - Present    History of CVA (cerebrovascular accident) Z86.73   11/11/2015 - Present    JANNIE treated with BiPAP G47.33   4/18/2016 - Present    Left knee pain M25.562 High  8/28/2016 - Present    Cervical stenosis of spine M48.02   9/6/2016 - Present    Dysphagia R13.10   9/6/2016 - Present    Chronic atrial fibrillation (CMS-HCC) I48.2   9/6/2016 - Present    Hallucinations R44.3   9/6/2016 - Present    Other orthopedic aftercare Z47.89   11/7/2016 - Present    Chronic use of opiate drugs therapeutic purposes Z79.899   2/14/2017 - Present      Health Maintenance        Date Due Completion Dates    IMM ZOSTER VACCINE 8/17/2009 ---    URINE ACR / MICROALBUMIN 5/11/2016 5/11/2015, 2/10/2015, 11/12/2014, 2/5/2014, 10/28/2013, 5/31/2013, 9/6/2012, 4/9/2012, 11/11/2011, 11/15/2010    IMM INFLUENZA (1) 9/1/2016 ---    RETINAL SCREENING 10/28/2016 10/28/2015, 9/16/2015, 8/12/2015, 3/10/2014, 9/24/2012    DIABETES MONOFILAMENT / LE EXAM 11/16/2016 11/16/2015 (Done), 2/11/2014 (Done), 5/31/2013 (Done), 5/18/2011 (N/S)    Override on 11/16/2015: Done (Catrachito Sterling DO)    Override on 2/11/2014: Done    Override on 5/31/2013: Done    Override on 5/18/2011: (N/S) (patient with mild neuropathy distal toes. )    A1C SCREENING 6/16/2017 12/16/2016, 12/7/2016, 8/29/2016, 8/10/2016, 7/7/2016, 4/29/2016, 9/21/2015, 8/17/2015, 5/11/2015, 5/11/2015, 3/30/2015, 2/10/2015, 11/12/2014, 8/13/2014, 5/13/2014, 2/5/2014, 2/5/2014, 2/5/2014, 10/28/2013, 10/28/2013, 8/26/2013, 5/31/2013, 1/10/2013, 10/29/2012, 9/6/2012, 9/6/2012, 4/9/2012, 3/19/2012, 11/11/2011, 2/17/2011, 11/15/2010, 8/13/2010, 7/27/2010, 5/5/2010, 3/24/2010, 2/18/2010, 12/23/2009, 12/7/2009, 7/13/2009, 4/22/2008, 2/11/2008, 8/12/2006, 8/11/2006, 5/4/2006    COLONOSCOPY 6/30/2017 6/30/2014    FASTING LIPID PROFILE 8/10/2017 8/10/2016, 7/7/2016, 4/29/2016, 9/21/2015, 8/17/2015, 5/11/2015, 3/30/2015, 8/13/2014, 2/5/2014, 2/5/2014,  2/5/2014, 10/28/2013, 8/19/2013, 7/16/2013, 5/31/2013, 4/10/2013, 10/30/2012, 10/3/2012, 4/27/2012, 6/15/2011, 8/13/2010, 5/5/2010, 12/23/2009, 12/7/2009, 4/21/2008, 2/12/2008, 5/8/2007, 8/11/2006    SERUM CREATININE 12/16/2017 12/16/2016, 12/7/2016, 12/6/2016, 11/28/2016, 11/25/2016, 11/11/2016, 11/8/2016, 9/9/2016, 9/7/2016, 9/6/2016, 9/1/2016, 8/31/2016, 8/30/2016, 8/29/2016, 8/28/2016, 8/10/2016, 7/22/2016, 7/7/2016, 4/29/2016, 11/18/2015, 9/21/2015, 9/15/2015, 8/17/2015, 6/19/2015, 5/11/2015, 3/30/2015, 2/10/2015, 11/12/2014, 10/13/2014, 8/13/2014, 5/13/2014, 2/5/2014, 2/5/2014, 2/5/2014, 10/28/2013, 6/27/2013, 5/31/2013, 1/10/2013, 1/7/2013, 12/17/2012, 12/16/2012, 12/15/2012, 12/14/2012, 12/13/2012, 12/12/2012, 12/11/2012, 12/11/2012, 12/10/2012, 12/10/2012, 12/9/2012, 12/9/2012, 12/8/2012, 12/8/2012, 12/7/2012, 12/6/2012, 11/16/2012, 11/15/2012, 11/14/2012, 11/13/2012, 11/9/2012, 11/7/2012, 11/6/2012, 11/4/2012, 11/2/2012, 11/1/2012, 10/31/2012, 10/30/2012, 10/29/2012, 10/29/2012, 10/28/2012, 9/6/2012, 4/9/2012, 3/19/2012, 2/28/2012, 1/4/2012, 11/11/2011, 7/21/2011, 6/15/2011, 3/21/2011, 3/21/2011, 3/20/2011, 2/17/2011, 1/10/2009, 7/30/2008, 4/22/2008, 4/21/2008, 4/20/2008, 2/19/2008, 2/18/2008, 2/17/2008, 2/16/2008, 2/15/2008, 2/12/2008, 2/11/2008, 2/11/2008, 5/10/2007, 5/9/2007, 5/8/2007, 5/7/2007, 10/16/2006, 8/11/2006, 8/11/2006, 5/4/2006, 5/3/2006, 6/28/2005    IMM PNEUMOCOCCAL 65+ (ADULT) LOW/MEDIUM RISK SERIES (2 of 2 - PPSV23) 1/19/2018 1/19/2017    IMM DTaP/Tdap/Td Vaccine (2 - Td) 7/16/2022 7/16/2012, 8/15/2009            Current Immunizations     13-VALENT PCV PREVNAR 1/19/2017 10:37 AM    Influenza Vaccine 1/1/1980    Pneumococcal Vaccine (UF)Historical Data 1/1/1980    TD Vaccine 8/15/2009  5:00 PM    Tdap Vaccine 7/16/2012      Below and/or attached are the medications your provider expects you to take. Review all of your home medications and newly ordered medications with your provider and/or  pharmacist. Follow medication instructions as directed by your provider and/or pharmacist. Please keep your medication list with you and share with your provider. Update the information when medications are discontinued, doses are changed, or new medications (including over-the-counter products) are added; and carry medication information at all times in the event of emergency situations     Allergies:  DEMEROL - (reactions not documented)     STATINS - Unspecified               Medications  Valid as of: March 17, 2017 - 11:18 AM    Generic Name Brand Name Tablet Size Instructions for use    AmLODIPine Besylate (Tab) NORVASC 10 MG Take 1 Tab by mouth every day.        Clindamycin HCl (Cap) CLEOCIN 300 MG Take 1 Cap by mouth 2 Times a Day.        CloNIDine HCl (PATCH WEEKLY) CATAPRES 0.3 MG/24HR Apply 1 Patch to skin as directed every Wednesday.        Fluticasone Propionate (Suspension) FLONASE 50 MCG/ACT Spray 2 Sprays in nose as needed.        Gabapentin (Cap) NEURONTIN 100 MG Take 100 mg in AM/Take 300 mg pm and 200 mg at bed time        HydrALAZINE HCl (Tab) APRESOLINE 100 MG Take 1 Tab by mouth every 8 hours.        Insulin Glargine (Solution) LANTUS 100 UNIT/ML Inject 30 Units as instructed every evening.        Lisinopril (Tab) PRINIVIL, ZESTRIL 40 MG Take 1 Tab by mouth every day.        Magnesium Chloride (Tab CR) SLO- (64 MG) MG Take 1 Tab by mouth 2 Times a Day.        MetFORMIN HCl (Tab) GLUCOPHAGE 500 MG Take 1 Tab by mouth 2 times a day, with meals.        Nystatin (Powder) MYCOSTATIN  Apply 1 Application to affected area(s) 3 times a day. Apply to reddened area under abdominal fold.        Oxycodone-Acetaminophen (Tab) PERCOCET-10  MG Take 1-2 Tabs by mouth every 6 hours as needed for Severe Pain.        Polyethylene Glycol 3350 (Pack) MIRALAX  Take 1 Packet by mouth 1 time daily as needed.        TraZODone HCl (Tab) DESYREL 150 MG Take 0.5 Tabs by mouth every bedtime.        Warfarin  Sodium (Tab) COUMADIN 5 MG Take 2 Tabs by mouth every day at 6 PM.        .                 Medicines prescribed today were sent to:     [id number] - [place], [place] - [address] [place] [address]    Phone: [phone] Fax: [phone]    Open 24 Hours?: No      Medication refill instructions:       If your prescription bottle indicates you have medication refills left, it is not necessary to call your provider’s office. Please contact your pharmacy and they will refill your medication.    If your prescription bottle indicates you do not have any refills left, you may request refills at any time through one of the following ways: The online [employer] system (except Urgent Care), by calling your provider’s office, or by asking your pharmacy to contact your provider’s office with a refill request. Medication refills are processed only during regular business hours and may not be available until the next business day. Your provider may request additional information or to have a follow-up visit with you prior to refilling your medication.   *Please Note: Medication refills are assigned a new Rx number when refilled electronically. Your pharmacy may indicate that no refills were authorized even though a new prescription for the same medication is available at the pharmacy. Please request the medicine by name with the pharmacy before contacting your provider for a refill.        Your To Do List     Future Labs/Procedures Complete By Expires    CBC WITH DIFFERENTIAL  As directed 3/18/2018    COMP METABOLIC PANEL  As directed 3/18/2018    HEMOGLOBIN A1C  As directed 3/17/2018    LIPID PROFILE  As directed 3/18/2018    MICROALBUMIN CREAT RATIO URINE  As directed 3/17/2018      Other Notes About Your Plan     Patient is enrolled in [facility] with Dr. Asher Estevez Access Code: [email or web address]  Expires: 3/28/2017 11:30 AM    Herb  A secure, online tool to manage your  health information     [employer]’s SkyPhrase® is a secure, online tool that connects you to your personalized health information from the privacy of your home -- day or night - making it very easy for you to manage your healthcare. Once the activation process is completed, you can even access your medical information using the SkyPhrase leonie, which is available for free in the Apple Leonie store or Google Play store.     SkyPhrase provides the following levels of access (as shown below):   My Chart Features   [facility] Doctor [facility]  Specialists [facility]  Urgent  Care Non-[facility]  Primary Care  Doctor   Email your healthcare team securely and privately 24/7 X X X    Manage appointments: schedule your next appointment; view details of past/upcoming appointments X      Request prescription refills. X      View recent personal medical records, including lab and immunizations X X X X   View health record, including health history, allergies, medications X X X X   Read reports about your outpatient visits, procedures, consult and ER notes X X X X   See your discharge summary, which is a recap of your hospital and/or ER visit that includes your diagnosis, lab results, and care plan. X X       How to register for SkyPhrase:  1. Go to  [email or web address].  2. Click on the Sign Up Now box, which takes you to the New Member Sign Up page. You will need to provide the following information:  a. Enter your SkyPhrase Access Code exactly as it appears at the top of this page. (You will not need to use this code after you’ve completed the sign-up process. If you do not sign up before the expiration date, you must request a new code.)   b. Enter your date of birth.   c. Enter your home email address.   d. Click Submit, and follow the next screen’s instructions.  3. Create a SkyPhrase ID. This will be your SkyPhrase login ID and cannot be changed, so think of one that is secure and easy to remember.  4. Create a SkyPhrase password.  You can change your password at any time.  5. Enter your Password Reset Question and Answer. This can be used at a later time if you forget your password.   6. Enter your e-mail address. This allows you to receive e-mail notifications when new information is available in "[employer]  7. Click Sign Up. You can now view your health information.    For assistance activating your "[employer]" account, call [phone]

## 2017-03-21 ENCOUNTER — HOSPITAL ENCOUNTER (OUTPATIENT)
Dept: PHYSICAL THERAPY | Facility: REHABILITATION | Age: 67
End: 2017-03-21
Attending: INTERNAL MEDICINE
Payer: MEDICARE

## 2017-03-21 PROCEDURE — 97110 THERAPEUTIC EXERCISES: CPT

## 2017-03-21 PROCEDURE — 97140 MANUAL THERAPY 1/> REGIONS: CPT

## 2017-03-22 ENCOUNTER — APPOINTMENT (OUTPATIENT)
Dept: PHYSICAL THERAPY | Facility: REHABILITATION | Age: 67
End: 2017-03-22
Attending: INTERNAL MEDICINE
Payer: MEDICARE

## 2017-03-22 DIAGNOSIS — M54.6 CHRONIC THORACIC BACK PAIN, UNSPECIFIED BACK PAIN LATERALITY: ICD-10-CM

## 2017-03-22 DIAGNOSIS — G89.29 CHRONIC THORACIC BACK PAIN, UNSPECIFIED BACK PAIN LATERALITY: ICD-10-CM

## 2017-03-22 RX ORDER — GABAPENTIN 300 MG/1
300-600 CAPSULE ORAL 3 TIMES DAILY
Qty: 120 CAP | Refills: 11 | Status: SHIPPED | OUTPATIENT
Start: 2017-03-22 | End: 2018-03-23 | Stop reason: SDUPTHER

## 2017-03-23 ENCOUNTER — HOSPITAL ENCOUNTER (OUTPATIENT)
Dept: OCCUPATIONAL THERAPY | Facility: REHABILITATION | Age: 67
End: 2017-03-23
Attending: INTERNAL MEDICINE
Payer: MEDICARE

## 2017-03-23 ENCOUNTER — HOSPITAL ENCOUNTER (OUTPATIENT)
Dept: PHYSICAL THERAPY | Facility: REHABILITATION | Age: 67
End: 2017-03-23
Attending: INTERNAL MEDICINE
Payer: MEDICARE

## 2017-03-23 PROCEDURE — 97110 THERAPEUTIC EXERCISES: CPT

## 2017-03-23 PROCEDURE — 97116 GAIT TRAINING THERAPY: CPT | Mod: XE

## 2017-03-24 ENCOUNTER — APPOINTMENT (OUTPATIENT)
Dept: PHYSICAL THERAPY | Facility: REHABILITATION | Age: 67
End: 2017-03-24
Attending: INTERNAL MEDICINE
Payer: MEDICARE

## 2017-03-28 ENCOUNTER — HOSPITAL ENCOUNTER (OUTPATIENT)
Dept: PHYSICAL THERAPY | Facility: REHABILITATION | Age: 67
End: 2017-03-28
Attending: INTERNAL MEDICINE
Payer: MEDICARE

## 2017-03-28 PROCEDURE — 97014 ELECTRIC STIMULATION THERAPY: CPT

## 2017-03-28 PROCEDURE — 97116 GAIT TRAINING THERAPY: CPT

## 2017-03-28 PROCEDURE — 97112 NEUROMUSCULAR REEDUCATION: CPT

## 2017-03-28 PROCEDURE — 97110 THERAPEUTIC EXERCISES: CPT

## 2017-03-29 ENCOUNTER — APPOINTMENT (OUTPATIENT)
Dept: PHYSICAL THERAPY | Facility: REHABILITATION | Age: 67
End: 2017-03-29
Attending: INTERNAL MEDICINE
Payer: MEDICARE

## 2017-03-30 ENCOUNTER — HOSPITAL ENCOUNTER (OUTPATIENT)
Dept: PHYSICAL THERAPY | Facility: REHABILITATION | Age: 67
End: 2017-03-30
Attending: INTERNAL MEDICINE
Payer: MEDICARE

## 2017-03-30 ENCOUNTER — HOSPITAL ENCOUNTER (OUTPATIENT)
Dept: OCCUPATIONAL THERAPY | Facility: REHABILITATION | Age: 67
End: 2017-03-30
Attending: INTERNAL MEDICINE
Payer: MEDICARE

## 2017-03-30 PROCEDURE — 97112 NEUROMUSCULAR REEDUCATION: CPT

## 2017-03-31 ENCOUNTER — APPOINTMENT (OUTPATIENT)
Dept: PHYSICAL THERAPY | Facility: REHABILITATION | Age: 67
End: 2017-03-31
Attending: INTERNAL MEDICINE
Payer: MEDICARE

## 2017-04-04 ENCOUNTER — HOSPITAL ENCOUNTER (OUTPATIENT)
Dept: PHYSICAL THERAPY | Facility: REHABILITATION | Age: 67
End: 2017-04-04
Attending: INTERNAL MEDICINE
Payer: MEDICARE

## 2017-04-04 PROCEDURE — 97530 THERAPEUTIC ACTIVITIES: CPT

## 2017-04-04 PROCEDURE — 97110 THERAPEUTIC EXERCISES: CPT

## 2017-04-06 ENCOUNTER — HOSPITAL ENCOUNTER (OUTPATIENT)
Dept: PHYSICAL THERAPY | Facility: REHABILITATION | Age: 67
End: 2017-04-06
Attending: INTERNAL MEDICINE
Payer: MEDICARE

## 2017-04-06 ENCOUNTER — HOSPITAL ENCOUNTER (OUTPATIENT)
Dept: OCCUPATIONAL THERAPY | Facility: REHABILITATION | Age: 67
End: 2017-04-06
Attending: INTERNAL MEDICINE
Payer: MEDICARE

## 2017-04-06 PROCEDURE — 97110 THERAPEUTIC EXERCISES: CPT

## 2017-04-06 PROCEDURE — 97116 GAIT TRAINING THERAPY: CPT | Mod: XE

## 2017-04-11 ENCOUNTER — TELEPHONE (OUTPATIENT)
Dept: VASCULAR LAB | Facility: MEDICAL CENTER | Age: 67
End: 2017-04-11

## 2017-04-11 ENCOUNTER — HOSPITAL ENCOUNTER (OUTPATIENT)
Dept: PHYSICAL THERAPY | Facility: REHABILITATION | Age: 67
End: 2017-04-11
Attending: INTERNAL MEDICINE
Payer: MEDICARE

## 2017-04-11 PROCEDURE — 97110 THERAPEUTIC EXERCISES: CPT

## 2017-04-11 PROCEDURE — 97116 GAIT TRAINING THERAPY: CPT

## 2017-04-13 ENCOUNTER — HOSPITAL ENCOUNTER (OUTPATIENT)
Dept: PHYSICAL THERAPY | Facility: REHABILITATION | Age: 67
End: 2017-04-13
Attending: INTERNAL MEDICINE
Payer: MEDICARE

## 2017-04-13 PROCEDURE — 97110 THERAPEUTIC EXERCISES: CPT

## 2017-04-13 PROCEDURE — 97116 GAIT TRAINING THERAPY: CPT

## 2017-04-14 ENCOUNTER — TELEPHONE (OUTPATIENT)
Dept: MEDICAL GROUP | Facility: CLINIC | Age: 67
End: 2017-04-14

## 2017-04-15 NOTE — TELEPHONE ENCOUNTER
Telephone call return to the patient discussed his left neck injury, apparently a letter from neurology has been dictated stating that should be covered under state Workmen's Compensation. Patient's to follow-up on April 21.

## 2017-04-17 ENCOUNTER — HOSPITAL ENCOUNTER (OUTPATIENT)
Dept: PHYSICAL THERAPY | Facility: REHABILITATION | Age: 67
End: 2017-04-17
Attending: INTERNAL MEDICINE
Payer: MEDICARE

## 2017-04-17 ENCOUNTER — HOSPITAL ENCOUNTER (OUTPATIENT)
Dept: OCCUPATIONAL THERAPY | Facility: REHABILITATION | Age: 67
End: 2017-04-17
Attending: INTERNAL MEDICINE
Payer: MEDICARE

## 2017-04-17 PROCEDURE — 97116 GAIT TRAINING THERAPY: CPT | Mod: XU

## 2017-04-17 PROCEDURE — 97112 NEUROMUSCULAR REEDUCATION: CPT

## 2017-04-17 PROCEDURE — 97530 THERAPEUTIC ACTIVITIES: CPT

## 2017-04-20 ENCOUNTER — HOSPITAL ENCOUNTER (OUTPATIENT)
Dept: OCCUPATIONAL THERAPY | Facility: REHABILITATION | Age: 67
End: 2017-04-20
Attending: INTERNAL MEDICINE
Payer: MEDICARE

## 2017-04-20 ENCOUNTER — HOSPITAL ENCOUNTER (OUTPATIENT)
Dept: PHYSICAL THERAPY | Facility: REHABILITATION | Age: 67
End: 2017-04-20
Attending: INTERNAL MEDICINE
Payer: MEDICARE

## 2017-04-20 PROCEDURE — 97140 MANUAL THERAPY 1/> REGIONS: CPT

## 2017-04-20 PROCEDURE — 97110 THERAPEUTIC EXERCISES: CPT

## 2017-04-20 PROCEDURE — 97116 GAIT TRAINING THERAPY: CPT

## 2017-04-21 ENCOUNTER — OFFICE VISIT (OUTPATIENT)
Dept: MEDICAL GROUP | Facility: CLINIC | Age: 67
End: 2017-04-21
Payer: MEDICARE

## 2017-04-21 VITALS
TEMPERATURE: 99 F | BODY MASS INDEX: 33.16 KG/M2 | HEART RATE: 76 BPM | SYSTOLIC BLOOD PRESSURE: 120 MMHG | WEIGHT: 266.7 LBS | DIASTOLIC BLOOD PRESSURE: 80 MMHG | HEIGHT: 75 IN | OXYGEN SATURATION: 96 %

## 2017-04-21 DIAGNOSIS — R29.898 BILATERAL ARM WEAKNESS: ICD-10-CM

## 2017-04-21 DIAGNOSIS — M54.2 NECK PAIN: ICD-10-CM

## 2017-04-21 PROCEDURE — G8598 ASA/ANTIPLAT THER USED: HCPCS | Performed by: INTERNAL MEDICINE

## 2017-04-21 PROCEDURE — 99213 OFFICE O/P EST LOW 20 MIN: CPT | Performed by: INTERNAL MEDICINE

## 2017-04-21 PROCEDURE — 4040F PNEUMOC VAC/ADMIN/RCVD: CPT | Performed by: INTERNAL MEDICINE

## 2017-04-21 PROCEDURE — G8432 DEP SCR NOT DOC, RNG: HCPCS | Performed by: INTERNAL MEDICINE

## 2017-04-21 PROCEDURE — 1036F TOBACCO NON-USER: CPT | Performed by: INTERNAL MEDICINE

## 2017-04-21 PROCEDURE — 1101F PT FALLS ASSESS-DOCD LE1/YR: CPT | Performed by: INTERNAL MEDICINE

## 2017-04-21 PROCEDURE — G8417 CALC BMI ABV UP PARAM F/U: HCPCS | Performed by: INTERNAL MEDICINE

## 2017-04-21 NOTE — MR AVS SNAPSHOT
"        Joel aM   2017 9:20 AM   Office Visit   MRN: [id number]    Department:  [facility]   Dept Phone:  [phone]    Description:  Male : 1950   Provider:  Catrachito Sterling D.O.           Reason for Visit     Follow-Up labs       Allergies as of 2017     Allergen Noted Reactions    Demerol 2008       Makes me stop breathing.  Doesn't like because it makes him high    Statins [Hmg-Coa-R Inhibitors] 2016   Unspecified    Per pt report. Unknown reaction.      Vital Signs     Blood Pressure Pulse Temperature Height Weight Body Mass Index    120/80 mmHg 76 37.2 °C (99 °F) 1.905 m (6' 3\") 120.974 kg (266 lb 11.2 oz) 33.34 kg/m2    Oxygen Saturation Smoking Status                96% Former Smoker          Basic Information     Date Of Birth Sex Race Ethnicity Preferred Language    1950 Male White Non- English      Your appointments     2017 11:00 AM   OT Follow Up 30 Minutes with Ricco Bruno MS,OTR/L   [facility] ([address])    [address] [place] [address]   [phone]            2017 11:30 AM   PT Follow Up 60 Minutes with [facility] ([address])    [address] [place] [address]   [phone]            2017 10:30 AM   OT Follow Up 60 Minutes with Ricco Bruno MS,OTR/L   [facility] ([address])    [address] [place] [address]   [phone]            2017 11:30 AM   PT Follow Up 60 Minutes with [facility] ([address])    [address] [place] [address]   [phone]            May 03, 2017  8:30 AM   PT Follow Up 60 Minutes with JOSHUA [facility] ([address])    [address] [place] [address]  [facility] " [phone]   [phone]            May 05, 2017 10:00 AM   PT Follow Up 60 Minutes with Ember Golden P.T.   [facility] ([address])    [address] [place] [address]   [phone]            May 09, 2017 10:30 AM   OT Follow Up 60 Minutes with Ricco Bruno MS,OTR/L   [facility] ([address])    [address] [place], [place] [id number]   [phone]            May 09, 2017 11:30 AM   PT Follow Up 60 Minutes with Zaria Olmstead P.T.   [facility] ([address])    [address] [place] [address]   [phone]            May 11, 2017  9:30 AM   OT Follow Up 60 Minutes with Ricco Bruno MS,OTR/L   [facility] ([address])    [address] [place] [address]   [phone]            May 11, 2017 11:00 AM   PT Follow Up 60 Minutes with Zaria Olmstead P.T.   [facility] ([address])    [address] [place] [address]   [phone]            May 16, 2017 10:00 AM   OT Follow Up 60 Minutes with Ricco Bruno MS,OTR/L   [facility] ([address])    [address] [place] [address]   [phone]            May 16, 2017 11:00 AM   PT Follow Up 60 Minutes with Zaria Olmstead P.T.   [facility] ([address])    [address] [place], [place] [id number]   [phone]            May 19, 2017 10:00 AM   OT Follow Up 60 Minutes with Ricco Bruno MS,OTR/L   [facility] ([address])    [address] [place], [place] [id number]   [phone]            May 19, 2017 11:00 AM   PT Follow Up 60 Minutes with Zaria Olmstead P.T.   [facility] ([address] [place] [address])    [address] [place] [address]      [phone]            Jun 02, 2017  9:20 AM   Established Patient with Catrachito Sterling D.O.   [facility] ([facility])    [address] [facility] [address]  [place] [id number] [phone]           You will be receiving a confirmation call a few days before your appointment from our automated call confirmation system.              Problem List              ICD-10-CM Priority Class Noted - Resolved    CVA (cerebral vascular accident) (CMS-HCC) I63.9 Medium  6/4/2009 - Present    Dyslipidemia E78.5   6/17/2009 - Present    Thyroid mass E07.9   7/30/2009 - Present    HTN (hypertension) I10 Medium  8/16/2010 - Present    Ventricular ectopy I49.3   6/17/2011 - Present    Trigger finger M65.30   6/17/2011 - Present    MEDICAL HOME    Unknown - Present    Diabetes mellitus with neurological manifestations, 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 Low  3/5/2012 - Present    BPH (benign prostatic hyperplasia) N40.0   4/28/2014 - Present    Anticoagulated on warfarin Z79.01   4/28/2014 - Present    Chronic antibiotic suppression Z79.2   4/28/2014 - Present    Myalgia M79.1   5/29/2014 - Present    Risk for falls Z91.81   10/27/2014 - Present    BMI 38.0-38.9,adult Z68.38   10/27/2014 - Present    Diabetic polyneuropathy (CMS-HCC) E11.42 High  5/6/2015 - Present    Toe amputation status (CMS-HCC) Z89.429 High  5/6/2015 - Present    Atrial fibrillation [427.31] I48.91   6/24/2015 - Present    History of CVA (cerebrovascular accident) Z86.73   11/11/2015 - Present    JANNIE treated with BiPAP G47.33   4/18/2016 - Present    Left knee pain M25.562 High  8/28/2016 - Present    Cervical stenosis of spine M48.02   9/6/2016 - Present    Dysphagia R13.10   9/6/2016 - Present    Chronic atrial fibrillation (CMS-HCC) I48.2   9/6/2016 - Present    Hallucinations R44.3   9/6/2016 - Present    Other orthopedic aftercare Z47.89   11/7/2016 - Present    Chronic use of opiate drugs therapeutic purposes Z79.899   2/14/2017 - Present      Health  Maintenance        Date Due Completion Dates    IMM ZOSTER VACCINE 8/17/2009 ---    URINE ACR / MICROALBUMIN 5/11/2016 5/11/2015, 2/10/2015, 11/12/2014, 2/5/2014, 10/28/2013, 5/31/2013, 9/6/2012, 4/9/2012, 11/11/2011, 11/15/2010    A1C SCREENING 6/16/2017 12/16/2016, 12/7/2016, 8/29/2016, 8/10/2016, 7/7/2016, 4/29/2016, 9/21/2015, 8/17/2015, 5/11/2015, 5/11/2015, 3/30/2015, 2/10/2015, 11/12/2014, 8/13/2014, 5/13/2014, 2/5/2014, 2/5/2014, 2/5/2014, 10/28/2013, 10/28/2013, 8/26/2013, 5/31/2013, 1/10/2013, 10/29/2012, 9/6/2012, 9/6/2012, 4/9/2012, 3/19/2012, 11/11/2011, 2/17/2011, 11/15/2010, 8/13/2010, 7/27/2010, 5/5/2010, 3/24/2010, 2/18/2010, 12/23/2009, 12/7/2009, 7/13/2009, 4/22/2008, 2/11/2008, 8/12/2006, 8/11/2006, 5/4/2006    COLONOSCOPY 6/30/2017 6/30/2014    FASTING LIPID PROFILE 8/10/2017 8/10/2016, 7/7/2016, 4/29/2016, 9/21/2015, 8/17/2015, 5/11/2015, 3/30/2015, 8/13/2014, 2/5/2014, 2/5/2014, 2/5/2014, 10/28/2013, 8/19/2013, 7/16/2013, 5/31/2013, 4/10/2013, 10/30/2012, 10/3/2012, 4/27/2012, 6/15/2011, 8/13/2010, 5/5/2010, 12/23/2009, 12/7/2009, 4/21/2008, 2/12/2008, 5/8/2007, 8/11/2006    SERUM CREATININE 12/16/2017 12/16/2016, 12/7/2016, 12/6/2016, 11/28/2016, 11/25/2016, 11/11/2016, 11/8/2016, 9/9/2016, 9/7/2016, 9/6/2016, 9/1/2016, 8/31/2016, 8/30/2016, 8/29/2016, 8/28/2016, 8/10/2016, 7/22/2016, 7/7/2016, 4/29/2016, 11/18/2015, 9/21/2015, 9/15/2015, 8/17/2015, 6/19/2015, 5/11/2015, 3/30/2015, 2/10/2015, 11/12/2014, 10/13/2014, 8/13/2014, 5/13/2014, 2/5/2014, 2/5/2014, 2/5/2014, 10/28/2013, 6/27/2013, 5/31/2013, 1/10/2013, 1/7/2013, 12/17/2012, 12/16/2012, 12/15/2012, 12/14/2012, 12/13/2012, 12/12/2012, 12/11/2012, 12/11/2012, 12/10/2012, 12/10/2012, 12/9/2012, 12/9/2012, 12/8/2012, 12/8/2012, 12/7/2012, 12/6/2012, 11/16/2012, 11/15/2012, 11/14/2012, 11/13/2012, 11/9/2012, 11/7/2012, 11/6/2012, 11/4/2012, 11/2/2012, 11/1/2012, 10/31/2012, 10/30/2012, 10/29/2012, 10/29/2012, 10/28/2012, 9/6/2012,  4/9/2012, 3/19/2012, 2/28/2012, 1/4/2012, 11/11/2011, 7/21/2011, 6/15/2011, 3/21/2011, 3/21/2011, 3/20/2011, 2/17/2011, 1/10/2009, 7/30/2008, 4/22/2008, 4/21/2008, 4/20/2008, 2/19/2008, 2/18/2008, 2/17/2008, 2/16/2008, 2/15/2008, 2/12/2008, 2/11/2008, 2/11/2008, 5/10/2007, 5/9/2007, 5/8/2007, 5/7/2007, 10/16/2006, 8/11/2006, 8/11/2006, 5/4/2006, 5/3/2006, 6/28/2005    IMM PNEUMOCOCCAL 65+ (ADULT) LOW/MEDIUM RISK SERIES (2 of 2 - PPSV23) 1/19/2018 1/19/2017    RETINAL SCREENING 3/17/2018 3/17/2017, 10/28/2015, 9/16/2015, 8/12/2015, 3/10/2014, 9/24/2012    DIABETES MONOFILAMENT / LE EXAM 3/17/2018 3/17/2017, 11/16/2015 (Done), 2/11/2014 (Done), 5/31/2013 (Done), 5/18/2011 (N/S)    Override on 11/16/2015: Done (Catrachito Sterling DO)    Override on 2/11/2014: Done    Override on 5/31/2013: Done    Override on 5/18/2011: (N/S) (patient with mild neuropathy distal toes. )    IMM DTaP/Tdap/Td Vaccine (2 - Td) 7/16/2022 7/16/2012, 8/15/2009            Current Immunizations     13-VALENT PCV PREVNAR 1/19/2017 10:37 AM    Influenza Vaccine 1/1/1980    Pneumococcal Vaccine (UF)Historical Data 1/1/1980    TD Vaccine 8/15/2009  5:00 PM    Tdap Vaccine 7/16/2012      Below and/or attached are the medications your provider expects you to take. Review all of your home medications and newly ordered medications with your provider and/or pharmacist. Follow medication instructions as directed by your provider and/or pharmacist. Please keep your medication list with you and share with your provider. Update the information when medications are discontinued, doses are changed, or new medications (including over-the-counter products) are added; and carry medication information at all times in the event of emergency situations     Allergies:  DEMEROL - (reactions not documented)     STATINS - Unspecified               Medications  Valid as of: April 21, 2017 - 12:27 PM    Generic Name Brand Name Tablet Size Instructions for use     AmLODIPine Besylate (Tab) NORVASC 10 MG Take 1 Tab by mouth every day.        Clindamycin HCl (Cap) CLEOCIN 300 MG Take 1 Cap by mouth 2 Times a Day.        CloNIDine HCl (PATCH WEEKLY) CATAPRES 0.3 MG/24HR Apply 1 Patch to skin as directed every Wednesday.        Fluticasone Propionate (Suspension) FLONASE 50 MCG/ACT Spray 2 Sprays in nose as needed.        Gabapentin (Cap) NEURONTIN 300 MG Take 1-2 Caps by mouth 3 times a day.        HydrALAZINE HCl (Tab) APRESOLINE 100 MG Take 1 Tab by mouth every 8 hours.        Insulin Glargine (Solution) LANTUS 100 UNIT/ML Inject 30 Units as instructed every evening.        Lisinopril (Tab) PRINIVIL, ZESTRIL 40 MG Take 1 Tab by mouth every day.        Magnesium Chloride (Tab CR) SLO- (64 MG) MG Take 1 Tab by mouth 2 Times a Day.        MetFORMIN HCl (Tab) GLUCOPHAGE 500 MG Take 1 Tab by mouth 2 times a day, with meals.        Nystatin (Powder) MYCOSTATIN  Apply 1 Application to affected area(s) 3 times a day. Apply to reddened area under abdominal fold.        Oxycodone-Acetaminophen (Tab) PERCOCET-10  MG Take 1-2 Tabs by mouth every 6 hours as needed for Severe Pain.        Polyethylene Glycol 3350 (Pack) MIRALAX  Take 1 Packet by mouth 1 time daily as needed.        TraZODone HCl (Tab) DESYREL 150 MG Take 0.5 Tabs by mouth every bedtime.        Warfarin Sodium (Tab) COUMADIN 5 MG Take 2 Tabs by mouth every day at 6 PM.        .                 Medicines prescribed today were sent to:     [id number] - [place], [place] - [address] [address]    Phone: [phone] Fax: [phone]    Open 24 Hours?: No      Medication refill instructions:       If your prescription bottle indicates you have medication refills left, it is not necessary to call your provider’s office. Please contact your pharmacy and they will refill your medication.    If your prescription bottle indicates you do not have any refills left, you may request  refills at any time through one of the following ways: The online [employer] system (except Urgent Care), by calling your provider’s office, or by asking your pharmacy to contact your provider’s office with a refill request. Medication refills are processed only during regular business hours and may not be available until the next business day. Your provider may request additional information or to have a follow-up visit with you prior to refilling your medication.   *Please Note: Medication refills are assigned a new Rx number when refilled electronically. Your pharmacy may indicate that no refills were authorized even though a new prescription for the same medication is available at the pharmacy. Please request the medicine by name with the pharmacy before contacting your provider for a refill.        Other Notes About Your Plan     Patient is enrolled in [facility] with Dr. Asher Estevez Status: Patient Declined

## 2017-04-21 NOTE — PROGRESS NOTES
CC: Joel aM is a 66 y.o. male is suffering from   Chief Complaint   Patient presents with   • Follow-Up     labs          SUBJECTIVE:  1. Neck pain  Puls here for follow-up, continues to struggle neck pain discomfort states that he's having a lot of popping and crepitus. But overall is improving.     2. Bilateral arm weakness  Patient and I have discussed that he continues to improve in his bilateral arm weakness after undergoing neurosurgical decompression. A letter has been dictated that this likely relates to his injury of 2005 which was Workmen's Compensation. We will try to identify the letter from Dr. Melgar where she recommended that surgery be considered due to a workman compensation-related accident.        Past social, family, history: Single  Social History   Substance Use Topics   • Smoking status: Former Smoker -- 4.00 packs/day for .5 years     Types: Cigarettes     Quit date: 01/01/1970   • Smokeless tobacco: Never Used   • Alcohol Use: No         MEDICATIONS:    Current outpatient prescriptions:   •  gabapentin (NEURONTIN) 300 MG Cap, Take 1-2 Caps by mouth 3 times a day., Disp: 120 Cap, Rfl: 11  •  oxycodone-acetaminophen (PERCOCET-10)  MG Tab, Take 1-2 Tabs by mouth every 6 hours as needed for Severe Pain., Disp: 120 Tab, Rfl: 0  •  clindamycin (CLEOCIN) 300 MG Cap, Take 1 Cap by mouth 2 Times a Day., Disp: 180 Cap, Rfl: 1  •  clonidine (CATAPRES) 0.3 MG/24HR PATCH WEEKLY, Apply 1 Patch to skin as directed every Wednesday., Disp: 12 Patch, Rfl: 11  •  amlodipine (NORVASC) 10 MG Tab, Take 1 Tab by mouth every day., Disp: 90 Tab, Rfl: 3  •  hydrALAZINE (APRESOLINE) 100 MG tablet, Take 1 Tab by mouth every 8 hours., Disp: 270 Tab, Rfl: 3  •  lisinopril (PRINIVIL, ZESTRIL) 40 MG tablet, Take 1 Tab by mouth every day., Disp: 90 Tab, Rfl: 0  •  metformin (GLUCOPHAGE) 500 MG Tab, Take 1 Tab by mouth 2 times a day, with meals., Disp: 180 Tab, Rfl: 3  •  trazodone (DESYREL) 150 MG Tab,  Take 0.5 Tabs by mouth every bedtime., Disp: 45 Tab, Rfl: 3  •  warfarin (COUMADIN) 5 MG Tab, Take 2 Tabs by mouth every day at 6 PM., Disp: 180 Tab, Rfl: 3  •  fluticasone (FLONASE) 50 MCG/ACT nasal spray, Spray 2 Sprays in nose as needed., Disp: 1 Bottle, Rfl: 5  •  nystatin (MYCOSTATIN) Powder, Apply 1 Application to affected area(s) 3 times a day. Apply to reddened area under abdominal fold., Disp: 1 Bottle, Rfl: 0  •  polyethylene glycol/lytes (MIRALAX) Pack, Take 1 Packet by mouth 1 time daily as needed., Disp: 30 Each, Rfl: 0  •  insulin glargine (LANTUS) 100 UNIT/ML Solution, Inject 30 Units as instructed every evening., Disp: 10 mL, Rfl: 5  •  magnesium chloride SR (SLO-MAG) 535 (64 MG) MG Tab CR, Take 1 Tab by mouth 2 Times a Day. (Patient taking differently: Take 1 Tab by mouth 2 times a day as needed.), Disp: 60 Tab, Rfl: 0    PROBLEMS:  Patient Active Problem List    Diagnosis Date Noted   • Left knee pain 08/28/2016     Priority: High   • Diabetic polyneuropathy (CMS-HCC) 05/06/2015     Priority: High   • Toe amputation status (CMS-HCC) 05/06/2015     Priority: High   • HTN (hypertension) 08/16/2010     Priority: Medium   • CVA (cerebral vascular accident) (CMS-HCC) 06/04/2009     Priority: Medium   • Diabetes mellitus with neurological manifestations, controlled (CMS-HCC) 03/05/2012     Priority: Low   • Chronic use of opiate drugs therapeutic purposes 02/14/2017   • Other orthopedic aftercare 11/07/2016   • Cervical stenosis of spine 09/06/2016   • Dysphagia 09/06/2016   • Chronic atrial fibrillation (CMS-HCC) 09/06/2016   • Hallucinations 09/06/2016   • JANNIE treated with BiPAP 04/18/2016   • History of CVA (cerebrovascular accident) 11/11/2015   • Atrial fibrillation [427.31] 06/24/2015   • Risk for falls 10/27/2014   • BMI 38.0-38.9,adult 10/27/2014   • Myalgia 05/29/2014   • BPH (benign prostatic hyperplasia) 04/28/2014   • Anticoagulated on warfarin 04/28/2014   • Chronic antibiotic suppression  "04/28/2014   • MEDICAL HOME    • Ventricular ectopy 06/17/2011   • Trigger finger 06/17/2011   • Thyroid mass 07/30/2009   • Dyslipidemia 06/17/2009       REVIEW OF SYSTEMS:  Gen.:  No Nausea, Vomiting, fever, Chills.  Heart: No chest pain.  Lungs:  No shortness of Breath.  Psychological: Eleazar unusual Anxiety depression     PHYSICAL EXAM   Constitutional: Alert, cooperative, not in acute distress.  Cardiovascular:  Rate Rhythm is regular without murmurs rubs clicks.     Thorax & Lungs: Clear to auscultation, no wheezing, rhonchi, or rales  HENT: Normocephalic, Atraumatic.  Eyes: PERRLA, EOMI, Conjunctiva normal.   Neck: Trachia is midline no swelling of the thyroid.   Lymphatic: No lymphadenopathy noted.   Musculoskeletal: Continued loss of range of motion left right upper extremities  Neurologic: Alert & oriented x 3, cranial nerves II through XII are intact, .   Psychiatric: Affect normal, Judgment normal, Mood normal.     VITAL SIGNS:/80 mmHg  Pulse 76  Temp(Src) 37.2 °C (99 °F)  Ht 1.905 m (6' 3\")  Wt 120.974 kg (266 lb 11.2 oz)  BMI 33.34 kg/m2  SpO2 96%    Labs: Reviewed    Assessment:                                                     Plan:    1. Neck pain  Neck pain ongoing    2. Bilateral arm weakness  Bilateral arm weakness, both issues relate likely to patient's industrial accident of 2000 and letter dictated            "

## 2017-04-25 ENCOUNTER — HOSPITAL ENCOUNTER (OUTPATIENT)
Dept: OCCUPATIONAL THERAPY | Facility: REHABILITATION | Age: 67
End: 2017-04-25
Attending: INTERNAL MEDICINE
Payer: MEDICARE

## 2017-04-25 ENCOUNTER — HOSPITAL ENCOUNTER (OUTPATIENT)
Dept: PHYSICAL THERAPY | Facility: REHABILITATION | Age: 67
End: 2017-04-25
Attending: INTERNAL MEDICINE
Payer: MEDICARE

## 2017-04-25 PROCEDURE — 97116 GAIT TRAINING THERAPY: CPT

## 2017-04-25 PROCEDURE — 97140 MANUAL THERAPY 1/> REGIONS: CPT

## 2017-04-25 PROCEDURE — 97110 THERAPEUTIC EXERCISES: CPT

## 2017-04-27 ENCOUNTER — HOSPITAL ENCOUNTER (OUTPATIENT)
Dept: PHYSICAL THERAPY | Facility: REHABILITATION | Age: 67
End: 2017-04-27
Attending: INTERNAL MEDICINE
Payer: MEDICARE

## 2017-04-27 ENCOUNTER — HOSPITAL ENCOUNTER (OUTPATIENT)
Dept: OCCUPATIONAL THERAPY | Facility: REHABILITATION | Age: 67
End: 2017-04-27
Attending: INTERNAL MEDICINE
Payer: MEDICARE

## 2017-04-27 PROCEDURE — 97116 GAIT TRAINING THERAPY: CPT

## 2017-04-27 PROCEDURE — 97110 THERAPEUTIC EXERCISES: CPT

## 2017-04-27 PROCEDURE — 97140 MANUAL THERAPY 1/> REGIONS: CPT

## 2017-04-28 ENCOUNTER — HOSPITAL ENCOUNTER (OUTPATIENT)
Dept: LAB | Facility: MEDICAL CENTER | Age: 67
End: 2017-04-28
Attending: INTERNAL MEDICINE
Payer: MEDICARE

## 2017-04-28 DIAGNOSIS — G57.93 NEUROPATHY OF BOTH FEET: ICD-10-CM

## 2017-04-28 DIAGNOSIS — E11.40 TYPE 2 DIABETES MELLITUS WITH DIABETIC NEUROPATHY, WITHOUT LONG-TERM CURRENT USE OF INSULIN (HCC): ICD-10-CM

## 2017-04-28 LAB
ALBUMIN SERPL BCP-MCNC: 3.9 G/DL (ref 3.2–4.9)
ALBUMIN/GLOB SERPL: 1.3 G/DL
ALP SERPL-CCNC: 69 U/L (ref 30–99)
ALT SERPL-CCNC: 12 U/L (ref 2–50)
ANION GAP SERPL CALC-SCNC: 7 MMOL/L (ref 0–11.9)
AST SERPL-CCNC: 14 U/L (ref 12–45)
BASOPHILS # BLD AUTO: 0.7 % (ref 0–1.8)
BASOPHILS # BLD: 0.04 K/UL (ref 0–0.12)
BILIRUB SERPL-MCNC: 0.6 MG/DL (ref 0.1–1.5)
BUN SERPL-MCNC: 23 MG/DL (ref 8–22)
CALCIUM SERPL-MCNC: 8.9 MG/DL (ref 8.5–10.5)
CHLORIDE SERPL-SCNC: 105 MMOL/L (ref 96–112)
CHOLEST SERPL-MCNC: 203 MG/DL (ref 100–199)
CO2 SERPL-SCNC: 26 MMOL/L (ref 20–33)
CREAT SERPL-MCNC: 0.87 MG/DL (ref 0.5–1.4)
EOSINOPHIL # BLD AUTO: 0.09 K/UL (ref 0–0.51)
EOSINOPHIL NFR BLD: 1.6 % (ref 0–6.9)
ERYTHROCYTE [DISTWIDTH] IN BLOOD BY AUTOMATED COUNT: 44.4 FL (ref 35.9–50)
EST. AVERAGE GLUCOSE BLD GHB EST-MCNC: 192 MG/DL
GFR SERPL CREATININE-BSD FRML MDRD: >60 ML/MIN/1.73 M 2
GLOBULIN SER CALC-MCNC: 2.9 G/DL (ref 1.9–3.5)
GLUCOSE SERPL-MCNC: 168 MG/DL (ref 65–99)
HBA1C MFR BLD: 8.3 % (ref 0–5.6)
HCT VFR BLD AUTO: 47.3 % (ref 42–52)
HDLC SERPL-MCNC: 35 MG/DL
HGB BLD-MCNC: 15.7 G/DL (ref 14–18)
IMM GRANULOCYTES # BLD AUTO: 0.04 K/UL (ref 0–0.11)
IMM GRANULOCYTES NFR BLD AUTO: 0.7 % (ref 0–0.9)
LDLC SERPL CALC-MCNC: 137 MG/DL
LYMPHOCYTES # BLD AUTO: 1.21 K/UL (ref 1–4.8)
LYMPHOCYTES NFR BLD: 20.9 % (ref 22–41)
MCH RBC QN AUTO: 27.3 PG (ref 27–33)
MCHC RBC AUTO-ENTMCNC: 33.2 G/DL (ref 33.7–35.3)
MCV RBC AUTO: 82.1 FL (ref 81.4–97.8)
MONOCYTES # BLD AUTO: 0.47 K/UL (ref 0–0.85)
MONOCYTES NFR BLD AUTO: 8.1 % (ref 0–13.4)
NEUTROPHILS # BLD AUTO: 3.95 K/UL (ref 1.82–7.42)
NEUTROPHILS NFR BLD: 68 % (ref 44–72)
NRBC # BLD AUTO: 0 K/UL
NRBC BLD AUTO-RTO: 0 /100 WBC
PLATELET # BLD AUTO: 180 K/UL (ref 164–446)
PMV BLD AUTO: 11.3 FL (ref 9–12.9)
POTASSIUM SERPL-SCNC: 3.8 MMOL/L (ref 3.6–5.5)
PROT SERPL-MCNC: 6.8 G/DL (ref 6–8.2)
RBC # BLD AUTO: 5.76 M/UL (ref 4.7–6.1)
SODIUM SERPL-SCNC: 138 MMOL/L (ref 135–145)
TRIGL SERPL-MCNC: 154 MG/DL (ref 0–149)
WBC # BLD AUTO: 5.8 K/UL (ref 4.8–10.8)

## 2017-04-28 PROCEDURE — 80061 LIPID PANEL: CPT

## 2017-04-28 PROCEDURE — 36415 COLL VENOUS BLD VENIPUNCTURE: CPT

## 2017-04-28 PROCEDURE — 80053 COMPREHEN METABOLIC PANEL: CPT

## 2017-04-28 PROCEDURE — 85025 COMPLETE CBC W/AUTO DIFF WBC: CPT

## 2017-04-28 PROCEDURE — 83036 HEMOGLOBIN GLYCOSYLATED A1C: CPT

## 2017-05-01 ENCOUNTER — HOSPITAL ENCOUNTER (OUTPATIENT)
Dept: LAB | Facility: MEDICAL CENTER | Age: 67
End: 2017-05-01
Attending: INTERNAL MEDICINE
Payer: MEDICARE

## 2017-05-01 LAB
CREAT UR-MCNC: 35.7 MG/DL
MICROALBUMIN UR-MCNC: 51.7 MG/DL
MICROALBUMIN/CREAT UR: 1448 MG/G (ref 0–30)

## 2017-05-01 PROCEDURE — 82570 ASSAY OF URINE CREATININE: CPT

## 2017-05-01 PROCEDURE — 82043 UR ALBUMIN QUANTITATIVE: CPT

## 2017-05-03 ENCOUNTER — HOSPITAL ENCOUNTER (OUTPATIENT)
Dept: PHYSICAL THERAPY | Facility: REHABILITATION | Age: 67
End: 2017-05-03
Attending: INTERNAL MEDICINE
Payer: MEDICARE

## 2017-05-03 PROCEDURE — 97112 NEUROMUSCULAR REEDUCATION: CPT

## 2017-05-03 PROCEDURE — 97110 THERAPEUTIC EXERCISES: CPT

## 2017-05-03 PROCEDURE — 97116 GAIT TRAINING THERAPY: CPT

## 2017-05-05 ENCOUNTER — HOSPITAL ENCOUNTER (OUTPATIENT)
Dept: PHYSICAL THERAPY | Facility: REHABILITATION | Age: 67
End: 2017-05-05
Attending: INTERNAL MEDICINE
Payer: MEDICARE

## 2017-05-05 ENCOUNTER — TELEPHONE (OUTPATIENT)
Dept: MEDICAL GROUP | Facility: CLINIC | Age: 67
End: 2017-05-05

## 2017-05-05 DIAGNOSIS — R80.9 PROTEINURIA, UNSPECIFIED TYPE: ICD-10-CM

## 2017-05-05 PROCEDURE — 97116 GAIT TRAINING THERAPY: CPT | Mod: XU

## 2017-05-05 PROCEDURE — 97530 THERAPEUTIC ACTIVITIES: CPT

## 2017-05-05 PROCEDURE — 97112 NEUROMUSCULAR REEDUCATION: CPT

## 2017-05-05 NOTE — TELEPHONE ENCOUNTER
1. Caller Name: Joel Ma                                         Call Back Number: [phone] (home)         Patient approves a detailed voicemail message: no voicemail available    Patient really concern with most recent labs, specifically his    Micro Alb Creat Ratio 1448 (H)     Patient asked to please call him back, patient stated he does not have a Voicemail set up but will try to answer, If there is no answer patient asked to try at least 3 times.  Please advise.

## 2017-05-09 ENCOUNTER — HOSPITAL ENCOUNTER (OUTPATIENT)
Dept: OCCUPATIONAL THERAPY | Facility: REHABILITATION | Age: 67
End: 2017-05-09
Attending: INTERNAL MEDICINE
Payer: MEDICARE

## 2017-05-09 ENCOUNTER — HOSPITAL ENCOUNTER (OUTPATIENT)
Dept: PHYSICAL THERAPY | Facility: REHABILITATION | Age: 67
End: 2017-05-09
Attending: INTERNAL MEDICINE
Payer: MEDICARE

## 2017-05-09 PROCEDURE — 97110 THERAPEUTIC EXERCISES: CPT

## 2017-05-09 PROCEDURE — 97530 THERAPEUTIC ACTIVITIES: CPT

## 2017-05-09 PROCEDURE — 97116 GAIT TRAINING THERAPY: CPT | Mod: XU

## 2017-05-11 ENCOUNTER — HOSPITAL ENCOUNTER (OUTPATIENT)
Dept: PHYSICAL THERAPY | Facility: REHABILITATION | Age: 67
End: 2017-05-11
Attending: INTERNAL MEDICINE
Payer: MEDICARE

## 2017-05-11 ENCOUNTER — HOSPITAL ENCOUNTER (OUTPATIENT)
Dept: OCCUPATIONAL THERAPY | Facility: REHABILITATION | Age: 67
End: 2017-05-11
Attending: INTERNAL MEDICINE
Payer: MEDICARE

## 2017-05-11 PROCEDURE — 97110 THERAPEUTIC EXERCISES: CPT

## 2017-05-11 PROCEDURE — 97112 NEUROMUSCULAR REEDUCATION: CPT

## 2017-05-11 PROCEDURE — 97116 GAIT TRAINING THERAPY: CPT | Mod: XE

## 2017-05-12 ENCOUNTER — OFFICE VISIT (OUTPATIENT)
Dept: NEPHROLOGY | Facility: MEDICAL CENTER | Age: 67
End: 2017-05-12
Payer: MEDICARE

## 2017-05-12 VITALS
RESPIRATION RATE: 14 BRPM | TEMPERATURE: 98 F | WEIGHT: 267 LBS | HEIGHT: 75 IN | SYSTOLIC BLOOD PRESSURE: 142 MMHG | HEART RATE: 74 BPM | BODY MASS INDEX: 33.2 KG/M2 | DIASTOLIC BLOOD PRESSURE: 72 MMHG

## 2017-05-12 DIAGNOSIS — E11.21 DIABETIC NEPHROPATHY ASSOCIATED WITH TYPE 2 DIABETES MELLITUS (HCC): ICD-10-CM

## 2017-05-12 DIAGNOSIS — I10 ESSENTIAL HYPERTENSION: ICD-10-CM

## 2017-05-12 DIAGNOSIS — N18.1 CKD (CHRONIC KIDNEY DISEASE), STAGE I: ICD-10-CM

## 2017-05-12 PROCEDURE — 1036F TOBACCO NON-USER: CPT | Performed by: INTERNAL MEDICINE

## 2017-05-12 PROCEDURE — 99204 OFFICE O/P NEW MOD 45 MIN: CPT | Performed by: INTERNAL MEDICINE

## 2017-05-12 PROCEDURE — 1101F PT FALLS ASSESS-DOCD LE1/YR: CPT | Performed by: INTERNAL MEDICINE

## 2017-05-12 PROCEDURE — 4040F PNEUMOC VAC/ADMIN/RCVD: CPT | Performed by: INTERNAL MEDICINE

## 2017-05-12 PROCEDURE — G8432 DEP SCR NOT DOC, RNG: HCPCS | Performed by: INTERNAL MEDICINE

## 2017-05-12 PROCEDURE — G8598 ASA/ANTIPLAT THER USED: HCPCS | Performed by: INTERNAL MEDICINE

## 2017-05-12 PROCEDURE — G8417 CALC BMI ABV UP PARAM F/U: HCPCS | Performed by: INTERNAL MEDICINE

## 2017-05-12 PROCEDURE — 3045F PR MOST RECENT HEMOGLOBIN A1C LEVEL 7.0-9.0%: CPT | Performed by: INTERNAL MEDICINE

## 2017-05-12 RX ORDER — HYDROCHLOROTHIAZIDE 12.5 MG/1
12.5 TABLET ORAL DAILY
Qty: 90 TAB | Refills: 3 | Status: SHIPPED | OUTPATIENT
Start: 2017-05-12 | End: 2017-07-14 | Stop reason: SDUPTHER

## 2017-05-12 ASSESSMENT — ENCOUNTER SYMPTOMS
CHILLS: 0
FEVER: 0
PALPITATIONS: 0

## 2017-05-12 NOTE — MR AVS SNAPSHOT
"        Joel Ma   2017 10:00 AM   Office Visit   MRN: [id number]    Department:  [facility]   Dept Phone:  [phone]    Description:  Male : 1950   Provider:  Johnathon Bonner M.D.           Reason for Visit     New Patient           Allergies as of 2017     Allergen Noted Reactions    Demerol 2008       Makes me stop breathing.  Doesn't like because it makes him high    Statins [Hmg-Coa-R Inhibitors] 2016   Unspecified    Per pt report. Unknown reaction.      You were diagnosed with     CKD (chronic kidney disease), stage I   [117442]       Diabetic nephropathy associated with type 2 diabetes mellitus (CMS-[facility])   [2071298]       Essential hypertension   [0275692]         Vital Signs     Blood Pressure Pulse Temperature Respirations Height Weight    142/72 mmHg 74 36.7 °C (98 °F) (Temporal) 14 1.905 m (6' 3\") 121.11 kg (267 lb)    Body Mass Index Smoking Status                33.37 kg/m2 Former Smoker          Basic Information     Date Of Birth Sex Race Ethnicity Preferred Language    1950 Male White Non- English      Your appointments     May 16, 2017 10:00 AM   OT Follow Up 60 Minutes with Ricco Bruno MS,OTR/L   [facility] ([address])    [address] [place] [address] [address]  [place] [address]   [phone]            May 16, 2017 11:00 AM   PT Follow Up 60 Minutes with Zaria Olmstead P.T.   [facility] ([address])    [address] [place] [address] [address]  [place] [address]   [phone]            May 19, 2017 10:00 AM   OT Follow Up 60 Minutes with Ricco Bruno MS,OTR/L   [facility] ([address])    [facility] [address]  [place] [address]   [phone]            2017  9:20 AM   Established Patient with Catrachito Sterling D.O.   [facility] [address]  [place] [address] [phone]      "     You will be receiving a confirmation call a few days before your appointment from our automated call confirmation system.            Jul 14, 2017 10:00 AM   Follow Up Visit with Johnathon Bonner M.D.   [facility] ([facility])    [address] [place] [address], [id number]  [place] [address] [phone]           You will be receiving a confirmation call a few days before your appointment from our automated call confirmation system.              Problem List              ICD-10-CM Priority Class Noted - Resolved    CVA (cerebral vascular accident) (CMS-HCC) I63.9 Medium  6/4/2009 - Present    Dyslipidemia E78.5   6/17/2009 - Present    Thyroid mass E07.9   7/30/2009 - Present    HTN (hypertension) I10 Medium  8/16/2010 - Present    Ventricular ectopy I49.3   6/17/2011 - Present    Trigger finger M65.30   6/17/2011 - Present    MEDICAL HOME    Unknown - Present    Diabetes mellitus with neurological manifestations, 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 Low  3/5/2012 - Present    BPH (benign prostatic hyperplasia) N40.0   4/28/2014 - Present    Anticoagulated on warfarin Z79.01   4/28/2014 - Present    Chronic antibiotic suppression Z79.2   4/28/2014 - Present    Myalgia M79.1   5/29/2014 - Present    Risk for falls Z91.81   10/27/2014 - Present    BMI 38.0-38.9,adult Z68.38   10/27/2014 - Present    Diabetic polyneuropathy (CMS-HCC) E11.42 High  5/6/2015 - Present    Toe amputation status (CMS-HCC) Z89.429 High  5/6/2015 - Present    Atrial fibrillation [427.31] I48.91   6/24/2015 - Present    History of CVA (cerebrovascular accident) Z86.73   11/11/2015 - Present    JANNIE treated with BiPAP G47.33   4/18/2016 - Present    Left knee pain M25.562 High  8/28/2016 - Present    Cervical stenosis of spine M48.02   9/6/2016 - Present    Dysphagia R13.10   9/6/2016 - Present    Chronic atrial fibrillation (CMS-HCC) I48.2   9/6/2016 - Present    Hallucinations R44.3   9/6/2016 - Present    Other  orthopedic aftercare Z47.89   11/7/2016 - Present    Chronic use of opiate drugs therapeutic purposes Z79.891   2/14/2017 - Present    CKD (chronic kidney disease), stage I N18.1   5/12/2017 - Present    Diabetic nephropathy associated with type 2 diabetes mellitus (CMS-[facility]) E11.21   5/12/2017 - Present    Essential hypertension I10   5/12/2017 - Present      Health Maintenance        Date Due Completion Dates    IMM ZOSTER VACCINE 8/17/2009 ---    COLONOSCOPY 6/30/2017 6/30/2014    A1C SCREENING 10/28/2017 4/28/2017, 12/16/2016, 12/7/2016, 8/29/2016, 8/10/2016, 7/7/2016, 4/29/2016, 9/21/2015, 8/17/2015, 5/11/2015, 5/11/2015, 3/30/2015, 2/10/2015, 11/12/2014, 8/13/2014, 5/13/2014, 2/5/2014, 2/5/2014, 2/5/2014, 10/28/2013, 10/28/2013, 8/26/2013, 5/31/2013, 1/10/2013, 10/29/2012, 9/6/2012, 9/6/2012, 4/9/2012, 3/19/2012, 11/11/2011, 2/17/2011, 11/15/2010, 8/13/2010, 7/27/2010, 5/5/2010, 3/24/2010, 2/18/2010, 12/23/2009, 12/7/2009, 7/13/2009, 4/22/2008, 2/11/2008, 8/12/2006, 8/11/2006, 5/4/2006    IMM PNEUMOCOCCAL 65+ (ADULT) LOW/MEDIUM RISK SERIES (2 of 2 - PPSV23) 1/19/2018 1/19/2017    RETINAL SCREENING 3/17/2018 3/17/2017, 10/28/2015, 9/16/2015, 8/12/2015, 3/10/2014, 9/24/2012    DIABETES MONOFILAMENT / LE EXAM 3/17/2018 3/17/2017, 11/16/2015 (Done), 2/11/2014 (Done), 5/31/2013 (Done), 5/18/2011 (N/S)    Override on 11/16/2015: Done (Catrachito Sterling DO)    Override on 2/11/2014: Done    Override on 5/31/2013: Done    Override on 5/18/2011: (N/S) (patient with mild neuropathy distal toes. )    FASTING LIPID PROFILE 4/28/2018 4/28/2017, 8/10/2016, 7/7/2016, 4/29/2016, 9/21/2015, 8/17/2015, 5/11/2015, 3/30/2015, 8/13/2014, 2/5/2014, 2/5/2014, 2/5/2014, 10/28/2013, 8/19/2013, 7/16/2013, 5/31/2013, 4/10/2013, 10/30/2012, 10/3/2012, 4/27/2012, 6/15/2011, 8/13/2010, 5/5/2010, 12/23/2009, 12/7/2009, 4/21/2008, 2/12/2008, 5/8/2007, 8/11/2006    SERUM CREATININE 4/28/2018 4/28/2017, 12/16/2016, 12/7/2016, 12/6/2016,  11/28/2016, 11/25/2016, 11/11/2016, 11/8/2016, 9/9/2016, 9/7/2016, 9/6/2016, 9/1/2016, 8/31/2016, 8/30/2016, 8/29/2016, 8/28/2016, 8/10/2016, 7/22/2016, 7/7/2016, 4/29/2016, 11/18/2015, 9/21/2015, 9/15/2015, 8/17/2015, 6/19/2015, 5/11/2015, 3/30/2015, 2/10/2015, 11/12/2014, 10/13/2014, 8/13/2014, 5/13/2014, 2/5/2014, 2/5/2014, 2/5/2014, 10/28/2013, 6/27/2013, 5/31/2013, 1/10/2013, 1/7/2013, 12/17/2012, 12/16/2012, 12/15/2012, 12/14/2012, 12/13/2012, 12/12/2012, 12/11/2012, 12/11/2012, 12/10/2012, 12/10/2012, 12/9/2012, 12/9/2012, 12/8/2012, 12/8/2012, 12/7/2012, 12/6/2012, 11/16/2012, 11/15/2012, 11/14/2012, 11/13/2012, 11/9/2012, 11/7/2012, 11/6/2012, 11/4/2012, 11/2/2012, 11/1/2012, 10/31/2012, 10/30/2012, 10/29/2012, 10/29/2012, 10/28/2012, 9/6/2012, 4/9/2012, 3/19/2012, 2/28/2012, 1/4/2012, 11/11/2011, 7/21/2011, 6/15/2011, 3/21/2011, 3/21/2011, 3/20/2011, 2/17/2011, 1/10/2009, 7/30/2008, 4/22/2008, 4/21/2008, 4/20/2008, 2/19/2008, 2/18/2008, 2/17/2008, 2/16/2008, 2/15/2008, 2/12/2008, 2/11/2008, 2/11/2008, 5/10/2007, 5/9/2007, 5/8/2007, 5/7/2007, 10/16/2006, 8/11/2006, 8/11/2006, 5/4/2006, 5/3/2006, 6/28/2005    URINE ACR / MICROALBUMIN 5/1/2018 5/1/2017, 5/11/2015, 2/10/2015, 11/12/2014, 2/5/2014, 10/28/2013, 5/31/2013, 9/6/2012, 4/9/2012, 11/11/2011, 11/15/2010    IMM DTaP/Tdap/Td Vaccine (2 - Td) 7/16/2022 7/16/2012, 8/15/2009            Current Immunizations     13-VALENT PCV PREVNAR 1/19/2017 10:37 AM    Influenza Vaccine 1/1/1980    Pneumococcal Vaccine (UF)Historical Data 1/1/1980    TD Vaccine 8/15/2009  5:00 PM    Tdap Vaccine 7/16/2012      Below and/or attached are the medications your provider expects you to take. Review all of your home medications and newly ordered medications with your provider and/or pharmacist. Follow medication instructions as directed by your provider and/or pharmacist. Please keep your medication list with you and share with your provider. Update the information when  medications are discontinued, doses are changed, or new medications (including over-the-counter products) are added; and carry medication information at all times in the event of emergency situations     Allergies:  DEMEROL - (reactions not documented)     STATINS - Unspecified               Medications  Valid as of: May 12, 2017 - 10:18 AM    Generic Name Brand Name Tablet Size Instructions for use    AmLODIPine Besylate (Tab) NORVASC 10 MG Take 1 Tab by mouth every day.        Clindamycin HCl (Cap) CLEOCIN 300 MG Take 1 Cap by mouth 2 Times a Day.        CloNIDine HCl (PATCH WEEKLY) CATAPRES 0.3 MG/24HR Apply 1 Patch to skin as directed every Wednesday.        Fluticasone Propionate (Suspension) FLONASE 50 MCG/ACT Spray 2 Sprays in nose as needed.        Gabapentin (Cap) NEURONTIN 300 MG Take 1-2 Caps by mouth 3 times a day.        HydrALAZINE HCl (Tab) APRESOLINE 100 MG Take 1 Tab by mouth every 8 hours.        HydroCHLOROthiazide (Tab) HYDRODIURIL 12.5 MG Take 1 Tab by mouth every day.        Insulin Glargine (Solution) LANTUS 100 UNIT/ML Inject 30 Units as instructed every evening.        Lisinopril (Tab) PRINIVIL, ZESTRIL 40 MG Take 1 Tab by mouth every day.        Magnesium Chloride (Tab CR) SLO- (64 MG) MG Take 1 Tab by mouth 2 Times a Day.        MetFORMIN HCl (Tab) GLUCOPHAGE 500 MG Take 1 Tab by mouth 2 times a day, with meals.        Nystatin (Powder) MYCOSTATIN  Apply 1 Application to affected area(s) 3 times a day. Apply to reddened area under abdominal fold.        Oxycodone-Acetaminophen (Tab) PERCOCET-10  MG Take 1-2 Tabs by mouth every 6 hours as needed for Severe Pain.        Polyethylene Glycol 3350 (Pack) MIRALAX  Take 1 Packet by mouth 1 time daily as needed.        TraZODone HCl (Tab) DESYREL 150 MG Take 0.5 Tabs by mouth every bedtime.        Warfarin Sodium (Tab) COUMADIN 5 MG Take 2 Tabs by mouth every day at 6 PM.        .                 Medicines prescribed today were sent  to:     [id number] - [place], [place] - [address] [address]    Phone: [phone] Fax: [phone]    Open 24 Hours?: No      Medication refill instructions:       If your prescription bottle indicates you have medication refills left, it is not necessary to call your provider’s office. Please contact your pharmacy and they will refill your medication.    If your prescription bottle indicates you do not have any refills left, you may request refills at any time through one of the following ways: The online [employer] system (except Urgent Care), by calling your provider’s office, or by asking your pharmacy to contact your provider’s office with a refill request. Medication refills are processed only during regular business hours and may not be available until the next business day. Your provider may request additional information or to have a follow-up visit with you prior to refilling your medication.   *Please Note: Medication refills are assigned a new Rx number when refilled electronically. Your pharmacy may indicate that no refills were authorized even though a new prescription for the same medication is available at the pharmacy. Please request the medicine by name with the pharmacy before contacting your provider for a refill.        Your To Do List     Future Labs/Procedures Complete By Expires    BASIC METABOLIC PANEL  As directed 11/9/2017    MICROALBUMIN CREAT RATIO URINE  As directed 11/11/2017    URINE CREATININE RANDOM  As directed 11/9/2017    VITAMIN D,25 HYDROXY  As directed 11/12/2017      Other Notes About Your Plan     Patient is enrolled in [facility] with Dr. Asher Estevez Status: Patient Declined

## 2017-05-12 NOTE — PROGRESS NOTES
"Subjective:      Joel Ma is a 66 y.o. male who presents with New Patient            HPI     76 year old with DM2 for more than 10 years, and referred for proteinuria, likely diabetic nephropathy. He states that his blood sugar control has been up and down, currently down to only metformin 500mg BID. Has had hypertension for about 10 years. His health history is significant for CVA when he was 58 around when his medical conditions were identified. Latest A1c 8.3. Does not take NSAIDs. No family history of kidney problems.     Review of Systems   Constitutional: Negative for fever and chills.   Cardiovascular: Negative for chest pain and palpitations.          Objective:     /72 mmHg  Pulse 74  Temp(Src) 36.7 °C (98 °F) (Temporal)  Resp 14  Ht 1.905 m (6' 3\")  Wt 121.11 kg (267 lb)  BMI 33.37 kg/m2     Physical Exam   Constitutional: He is oriented to person, place, and time. He appears well-developed and well-nourished.   Cardiovascular: Normal rate and regular rhythm.    Pulmonary/Chest: Effort normal and breath sounds normal.   Neurological: He is alert and oriented to person, place, and time.   Skin: Skin is warm and dry.   Psychiatric: He has a normal mood and affect. His behavior is normal.               Assessment/Plan:     1. CKD (chronic kidney disease), stage I  Discussed with patient, aim for intensive blood sugar control; Continue ACEi, goal for SBP < 140.    2. Diabetic nephropathy associated with type 2 diabetes mellitus (CMS-HCC)  As above, will monitor closely, at risk for developing progressive CKD.    3. Essential hypertension  Add low dose diuretic to improve BP control. Goal to wean off of hydralazine if possible.    -Return in 1 month    "

## 2017-05-16 ENCOUNTER — HOSPITAL ENCOUNTER (OUTPATIENT)
Dept: PHYSICAL THERAPY | Facility: REHABILITATION | Age: 67
End: 2017-05-16
Attending: INTERNAL MEDICINE
Payer: MEDICARE

## 2017-05-16 ENCOUNTER — HOSPITAL ENCOUNTER (OUTPATIENT)
Dept: OCCUPATIONAL THERAPY | Facility: REHABILITATION | Age: 67
End: 2017-05-16
Attending: INTERNAL MEDICINE
Payer: MEDICARE

## 2017-05-16 PROCEDURE — 97116 GAIT TRAINING THERAPY: CPT | Mod: XU

## 2017-05-16 PROCEDURE — 97530 THERAPEUTIC ACTIVITIES: CPT

## 2017-05-16 PROCEDURE — 97112 NEUROMUSCULAR REEDUCATION: CPT

## 2017-05-16 PROCEDURE — 97110 THERAPEUTIC EXERCISES: CPT

## 2017-05-18 ENCOUNTER — TELEPHONE (OUTPATIENT)
Dept: MEDICAL GROUP | Facility: CLINIC | Age: 67
End: 2017-05-18

## 2017-05-18 DIAGNOSIS — Z98.890 HISTORY OF NECK SURGERY: ICD-10-CM

## 2017-05-18 NOTE — TELEPHONE ENCOUNTER
VOICEMAIL  1. Caller Name: Joel Ma                      Call Back Number: [phone]      2. Message: Patient is requesting additional visits for Physical Therapy. He has improved but is not fully back to normal.     3. Patient approves office to leave a detailed voicemail/MyChart message: N\A

## 2017-05-19 ENCOUNTER — HOSPITAL ENCOUNTER (OUTPATIENT)
Dept: OCCUPATIONAL THERAPY | Facility: REHABILITATION | Age: 67
End: 2017-05-19
Attending: INTERNAL MEDICINE
Payer: MEDICARE

## 2017-05-19 ENCOUNTER — APPOINTMENT (OUTPATIENT)
Dept: PHYSICAL THERAPY | Facility: REHABILITATION | Age: 67
End: 2017-05-19
Attending: INTERNAL MEDICINE
Payer: MEDICARE

## 2017-05-23 ENCOUNTER — TELEPHONE (OUTPATIENT)
Dept: VASCULAR LAB | Facility: MEDICAL CENTER | Age: 67
End: 2017-05-23

## 2017-06-02 ENCOUNTER — OFFICE VISIT (OUTPATIENT)
Dept: MEDICAL GROUP | Facility: CLINIC | Age: 67
End: 2017-06-02
Payer: MEDICARE

## 2017-06-02 VITALS
HEIGHT: 77 IN | OXYGEN SATURATION: 99 % | RESPIRATION RATE: 18 BRPM | DIASTOLIC BLOOD PRESSURE: 90 MMHG | BODY MASS INDEX: 31.33 KG/M2 | HEART RATE: 72 BPM | SYSTOLIC BLOOD PRESSURE: 156 MMHG | WEIGHT: 265.3 LBS | TEMPERATURE: 98.4 F

## 2017-06-02 DIAGNOSIS — M54.2 NECK PAIN: ICD-10-CM

## 2017-06-02 DIAGNOSIS — E11.42 DIABETIC POLYNEUROPATHY ASSOCIATED WITH TYPE 2 DIABETES MELLITUS (HCC): ICD-10-CM

## 2017-06-02 DIAGNOSIS — N18.1 CKD (CHRONIC KIDNEY DISEASE), STAGE I: ICD-10-CM

## 2017-06-02 PROCEDURE — 4040F PNEUMOC VAC/ADMIN/RCVD: CPT | Performed by: INTERNAL MEDICINE

## 2017-06-02 PROCEDURE — 1101F PT FALLS ASSESS-DOCD LE1/YR: CPT | Performed by: INTERNAL MEDICINE

## 2017-06-02 PROCEDURE — 3045F PR MOST RECENT HEMOGLOBIN A1C LEVEL 7.0-9.0%: CPT | Performed by: INTERNAL MEDICINE

## 2017-06-02 PROCEDURE — 99214 OFFICE O/P EST MOD 30 MIN: CPT | Performed by: INTERNAL MEDICINE

## 2017-06-02 PROCEDURE — G8417 CALC BMI ABV UP PARAM F/U: HCPCS | Performed by: INTERNAL MEDICINE

## 2017-06-02 PROCEDURE — G8432 DEP SCR NOT DOC, RNG: HCPCS | Performed by: INTERNAL MEDICINE

## 2017-06-02 PROCEDURE — 1036F TOBACCO NON-USER: CPT | Performed by: INTERNAL MEDICINE

## 2017-06-02 PROCEDURE — G8598 ASA/ANTIPLAT THER USED: HCPCS | Performed by: INTERNAL MEDICINE

## 2017-06-02 RX ORDER — OXYCODONE AND ACETAMINOPHEN 10; 325 MG/1; MG/1
1-2 TABLET ORAL EVERY 6 HOURS PRN
Qty: 120 TAB | Refills: 0 | Status: SHIPPED | OUTPATIENT
Start: 2017-06-02 | End: 2017-06-02 | Stop reason: SDUPTHER

## 2017-06-02 RX ORDER — OXYCODONE AND ACETAMINOPHEN 10; 325 MG/1; MG/1
1-2 TABLET ORAL EVERY 6 HOURS PRN
Qty: 120 TAB | Refills: 0 | Status: SHIPPED | OUTPATIENT
Start: 2017-06-02 | End: 2017-07-18 | Stop reason: SDUPTHER

## 2017-06-02 NOTE — PROGRESS NOTES
CC: Joel Ma is a 66 y.o. male is suffering from   Chief Complaint   Patient presents with   • Follow-Up         SUBJECTIVE:  1. Neck pain  Puls here for follow-up, continues to struggle with neck pain and discomfort, still has problems with weakness in left upper extremity. I'm concerned about possible rotator cuff tear.     2. Diabetic polyneuropathy associated with type 2 diabetes mellitus (CMS-[facility])  Patient additionally has diabetic polyneuropathy secured to poorly controlled type 2 diabetes is to be followed by endocrinology regarding this issue.     3. CKD (chronic kidney disease), stage I  Patient history of chronic kidney disease stage I, evaluated by nephrology        Past social, family, history: Single  Social History   Substance Use Topics   • Smoking status: Former Smoker -- 4.00 packs/day for .5 years     Types: Cigarettes     Quit date: 01/01/1970   • Smokeless tobacco: Never Used   • Alcohol Use: No         MEDICATIONS:    Current outpatient prescriptions:   •  oxycodone-acetaminophen (PERCOCET-10)  MG Tab, Take 1-2 Tabs by mouth every 6 hours as needed for Severe Pain., Disp: 120 Tab, Rfl: 0  •  hydrochlorothiazide (HYDRODIURIL) 12.5 MG tablet, Take 1 Tab by mouth every day., Disp: 90 Tab, Rfl: 3  •  gabapentin (NEURONTIN) 300 MG Cap, Take 1-2 Caps by mouth 3 times a day., Disp: 120 Cap, Rfl: 11  •  clindamycin (CLEOCIN) 300 MG Cap, Take 1 Cap by mouth 2 Times a Day., Disp: 180 Cap, Rfl: 1  •  clonidine (CATAPRES) 0.3 MG/24HR PATCH WEEKLY, Apply 1 Patch to skin as directed every Wednesday., Disp: 12 Patch, Rfl: 11  •  amlodipine (NORVASC) 10 MG Tab, Take 1 Tab by mouth every day., Disp: 90 Tab, Rfl: 3  •  hydrALAZINE (APRESOLINE) 100 MG tablet, Take 1 Tab by mouth every 8 hours., Disp: 270 Tab, Rfl: 3  •  lisinopril (PRINIVIL, ZESTRIL) 40 MG tablet, Take 1 Tab by mouth every day., Disp: 90 Tab, Rfl: 0  •  metformin (GLUCOPHAGE) 500 MG Tab, Take 1 Tab by mouth 2 times a day, with  meals., Disp: 180 Tab, Rfl: 3  •  trazodone (DESYREL) 150 MG Tab, Take 0.5 Tabs by mouth every bedtime., Disp: 45 Tab, Rfl: 3  •  warfarin (COUMADIN) 5 MG Tab, Take 2 Tabs by mouth every day at 6 PM., Disp: 180 Tab, Rfl: 3  •  fluticasone (FLONASE) 50 MCG/ACT nasal spray, Spray 2 Sprays in nose as needed., Disp: 1 Bottle, Rfl: 5  •  insulin glargine (LANTUS) 100 UNIT/ML Solution, Inject 30 Units as instructed every evening., Disp: 10 mL, Rfl: 5  •  magnesium chloride SR (SLO-MAG) 535 (64 MG) MG Tab CR, Take 1 Tab by mouth 2 Times a Day. (Patient taking differently: Take 1 Tab by mouth 2 times a day as needed.), Disp: 60 Tab, Rfl: 0  •  nystatin (MYCOSTATIN) Powder, Apply 1 Application to affected area(s) 3 times a day. Apply to reddened area under abdominal fold., Disp: 1 Bottle, Rfl: 0  •  polyethylene glycol/lytes (MIRALAX) Pack, Take 1 Packet by mouth 1 time daily as needed., Disp: 30 Each, Rfl: 0    PROBLEMS:  Patient Active Problem List    Diagnosis Date Noted   • Left knee pain 08/28/2016     Priority: High   • Diabetic polyneuropathy (CMS-HCC) 05/06/2015     Priority: High   • Toe amputation status (CMS-HCC) 05/06/2015     Priority: High   • HTN (hypertension) 08/16/2010     Priority: Medium   • CVA (cerebral vascular accident) (CMS-HCC) 06/04/2009     Priority: Medium   • Diabetes mellitus with neurological manifestations, controlled (CMS-HCC) 03/05/2012     Priority: Low   • CKD (chronic kidney disease), stage I 05/12/2017   • Diabetic nephropathy associated with type 2 diabetes mellitus (CMS-HCC) 05/12/2017   • Essential hypertension 05/12/2017   • Chronic use of opiate drugs therapeutic purposes 02/14/2017   • Other orthopedic aftercare 11/07/2016   • Cervical stenosis of spine 09/06/2016   • Dysphagia 09/06/2016   • Chronic atrial fibrillation (CMS-HCC) 09/06/2016   • Hallucinations 09/06/2016   • JANNIE treated with BiPAP 04/18/2016   • History of CVA (cerebrovascular accident) 11/11/2015   • Atrial  "fibrillation [427.31] 06/24/2015   • Risk for falls 10/27/2014   • BMI 38.0-38.9,adult 10/27/2014   • Myalgia 05/29/2014   • BPH (benign prostatic hyperplasia) 04/28/2014   • Anticoagulated on warfarin 04/28/2014   • Chronic antibiotic suppression 04/28/2014   • MEDICAL HOME    • Ventricular ectopy 06/17/2011   • Trigger finger 06/17/2011   • Thyroid mass 07/30/2009   • Dyslipidemia 06/17/2009       REVIEW OF SYSTEMS:  Gen.:  No Nausea, Vomiting, fever, Chills.  Heart: No chest pain.  Lungs:  No shortness of Breath.  Psychological: Eleazar unusual Anxiety depression     PHYSICAL EXAM   Constitutional: Alert, cooperative, not in acute distress.  Cardiovascular:  Rate Rhythm is regular without murmurs rubs clicks.     Thorax & Lungs: Clear to auscultation, no wheezing, rhonchi, or rales  HENT: Normocephalic, Atraumatic.  Eyes: PERRLA, EOMI, Conjunctiva normal.   Neck: Trachia is midline no swelling of the thyroid.   Neurologic: Alert & oriented x 3, cranial nerves II through XII are intact, Normal motor function, Normal sensory function, No focal deficits noted.   Psychiatric: Affect normal, Judgment normal, Mood normal.     VITAL SIGNS:/90 mmHg  Pulse 72  Temp(Src) 36.9 °C (98.4 °F)  Resp 18  Ht 1.943 m (6' 4.5\")  Wt 120.339 kg (265 lb 4.8 oz)  BMI 31.88 kg/m2  SpO2 99%    Labs: Reviewed    Assessment:                                                     Plan:    1. Neck pain  Drug screen completed today rewrote pain medication, patient really referred to physical therapy  - [facility] SCREEN; Future  - oxycodone-acetaminophen (PERCOCET-10)  MG Tab; Take 1-2 Tabs by mouth every 6 hours as needed for Severe Pain.  Dispense: 120 Tab; Refill: 0  - REFERRAL TO PHYSICAL THERAPY Reason for Therapy: Eval/Treat/Report    2. Diabetic polyneuropathy associated with type 2 diabetes mellitus (CMS-[facility])  Recheck hemoglobin A1c in 3 months  - HEMOGLOBIN A1C; Future    3. CKD (chronic kidney disease), " stage I  Clinically stable

## 2017-06-02 NOTE — MR AVS SNAPSHOT
"        Joel Ma   2017 9:20 AM   Office Visit   MRN: [id number]    Department:  [facility]   Dept Phone:  [phone]    Description:  Male : 1950   Provider:  Catrachito [place] D.O.           Reason for Visit     Follow-Up           Allergies as of 2017     Allergen Noted Reactions    Demerol 2008       Makes me stop breathing.  Doesn't like because it makes him high    Statins [Hmg-Coa-R Inhibitors] 2016   Unspecified    Per pt report. Unknown reaction.      You were diagnosed with     Neck pain   [131773]       Diabetic polyneuropathy associated with type 2 diabetes mellitus (CMS-HCC)   [8653705]       CKD (chronic kidney disease), stage I   [903683]         Vital Signs     Blood Pressure Pulse Temperature Respirations Height Weight    156/90 mmHg 72 36.9 °C (98.4 °F) 18 1.943 m (6' 4.5\") 120.339 kg (265 lb 4.8 oz)    Body Mass Index Oxygen Saturation Smoking Status             31.88 kg/m2 99% Former Smoker         Basic Information     Date Of Birth Sex Race Ethnicity Preferred Language    1950 Male White Non- English      Your appointments     Gary 15, 2017  8:00 AM   OT Follow Up 60 Minutes with Ricco Bruno MS,OTR/L   [facility] ([address])    [address] [place] [address]   [phone]            2017  9:00 AM   OT Follow Up 60 Minutes with Ricco Bruno MS,OTR/L   [facility] ([address])    [address] [place] [address]   [phone]            2017  8:30 AM   OT Follow Up 60 Minutes with Ricco Bruno MS,OTR/L   [facility] ([address])    [address] [place] [address]   [phone]            2017  1:30 PM   OT Follow Up 60 Minutes with Ricco Bruno MS,OTR/L   [facility] ([address])    [address] [place] [address]" [address]  [place] [address]   [phone]            Jun 28, 2017  1:00 PM   OT Follow Up 60 Minutes with Ricco Bruno, MS,OTR/L   [facility] [facility])    [address]  [address]  [place] [address]   [phone]            Jul 14, 2017 10:00 AM   Follow Up Visit with Johnathon Bonner M.D.   [facility] ([address] [place] [address])    [address], [address]  [place] [address]   [phone]           You will be receiving a confirmation call a few days before your appointment from our automated call confirmation system.            Aug 18, 2017 11:20 AM   Follow Up Visit with Haris Alvarez M.D.   [facility] (--)    [address], [address]  [place] [address]   [phone]           You will be receiving a confirmation call a few days before your appointment from our automated call confirmation system.              Problem List              ICD-10-CM Priority Class Noted - Resolved    CVA (cerebral vascular accident) (CMS-HCC) I63.9 Medium  6/4/2009 - Present    Dyslipidemia E78.5   6/17/2009 - Present    Thyroid mass E07.9   7/30/2009 - Present    HTN (hypertension) I10 Medium  8/16/2010 - Present    Ventricular ectopy I49.3   6/17/2011 - Present    Trigger finger M65.30   6/17/2011 - Present    MEDICAL HOME    Unknown - Present    Diabetes mellitus with neurological manifestations, 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 Low  3/5/2012 - Present    BPH (benign prostatic hyperplasia) N40.0   4/28/2014 - Present    Anticoagulated on warfarin Z79.01   4/28/2014 - Present    Chronic antibiotic suppression Z79.2   4/28/2014 - Present    Myalgia M79.1   5/29/2014 - Present    Risk for falls Z91.81   10/27/2014 - Present    BMI 38.0-38.9,adult Z68.38   10/27/2014 - Present    Diabetic polyneuropathy (CMS-HCC) E11.42 High  5/6/2015 - Present    Toe amputation status (CMS-HCC) Z89.429 High  5/6/2015 - Present    Atrial fibrillation  [427.31] I48.91   6/24/2015 - Present    History of CVA (cerebrovascular accident) Z86.73   11/11/2015 - Present    JANNIE treated with BiPAP G47.33   4/18/2016 - Present    Left knee pain M25.562 High  8/28/2016 - Present    Cervical stenosis of spine M48.02   9/6/2016 - Present    Dysphagia R13.10   9/6/2016 - Present    Chronic atrial fibrillation (CMS-HCC) I48.2   9/6/2016 - Present    Hallucinations R44.3   9/6/2016 - Present    Other orthopedic aftercare Z47.89   11/7/2016 - Present    Chronic use of opiate drugs therapeutic purposes Z79.891   2/14/2017 - Present    CKD (chronic kidney disease), stage I N18.1   5/12/2017 - Present    Diabetic nephropathy associated with type 2 diabetes mellitus (CMS-HCC) E11.21   5/12/2017 - Present    Essential hypertension I10   5/12/2017 - Present      Health Maintenance        Date Due Completion Dates    IMM ZOSTER VACCINE 8/17/2009 ---    COLONOSCOPY 6/30/2017 6/30/2014    A1C SCREENING 10/28/2017 4/28/2017, 12/16/2016, 12/7/2016, 8/29/2016, 8/10/2016, 7/7/2016, 4/29/2016, 9/21/2015, 8/17/2015, 5/11/2015, 5/11/2015, 3/30/2015, 2/10/2015, 11/12/2014, 8/13/2014, 5/13/2014, 2/5/2014, 2/5/2014, 2/5/2014, 10/28/2013, 10/28/2013, 8/26/2013, 5/31/2013, 1/10/2013, 10/29/2012, 9/6/2012, 9/6/2012, 4/9/2012, 3/19/2012, 11/11/2011, 2/17/2011, 11/15/2010, 8/13/2010, 7/27/2010, 5/5/2010, 3/24/2010, 2/18/2010, 12/23/2009, 12/7/2009, 7/13/2009, 4/22/2008, 2/11/2008, 8/12/2006, 8/11/2006, 5/4/2006    IMM PNEUMOCOCCAL 65+ (ADULT) LOW/MEDIUM RISK SERIES (2 of 2 - PPSV23) 1/19/2018 1/19/2017    DIABETES MONOFILAMENT / LE EXAM 3/17/2018 3/17/2017, 11/16/2015 (Done), 2/11/2014 (Done), 5/31/2013 (Done), 5/18/2011 (N/S)    Override on 11/16/2015: Done (Catrachito [place] DO)    Override on 2/11/2014: Done    Override on 5/31/2013: Done    Override on 5/18/2011: (N/S) (patient with mild neuropathy distal toes. )    FASTING LIPID PROFILE 4/28/2018 4/28/2017, 8/10/2016, 7/7/2016, 4/29/2016, 9/21/2015,  8/17/2015, 5/11/2015, 3/30/2015, 8/13/2014, 2/5/2014, 2/5/2014, 2/5/2014, 10/28/2013, 8/19/2013, 7/16/2013, 5/31/2013, 4/10/2013, 10/30/2012, 10/3/2012, 4/27/2012, 6/15/2011, 8/13/2010, 5/5/2010, 12/23/2009, 12/7/2009, 4/21/2008, 2/12/2008, 5/8/2007, 8/11/2006    SERUM CREATININE 4/28/2018 4/28/2017, 12/16/2016, 12/7/2016, 12/6/2016, 11/28/2016, 11/25/2016, 11/11/2016, 11/8/2016, 9/9/2016, 9/7/2016, 9/6/2016, 9/1/2016, 8/31/2016, 8/30/2016, 8/29/2016, 8/28/2016, 8/10/2016, 7/22/2016, 7/7/2016, 4/29/2016, 11/18/2015, 9/21/2015, 9/15/2015, 8/17/2015, 6/19/2015, 5/11/2015, 3/30/2015, 2/10/2015, 11/12/2014, 10/13/2014, 8/13/2014, 5/13/2014, 2/5/2014, 2/5/2014, 2/5/2014, 10/28/2013, 6/27/2013, 5/31/2013, 1/10/2013, 1/7/2013, 12/17/2012, 12/16/2012, 12/15/2012, 12/14/2012, 12/13/2012, 12/12/2012, 12/11/2012, 12/11/2012, 12/10/2012, 12/10/2012, 12/9/2012, 12/9/2012, 12/8/2012, 12/8/2012, 12/7/2012, 12/6/2012, 11/16/2012, 11/15/2012, 11/14/2012, 11/13/2012, 11/9/2012, 11/7/2012, 11/6/2012, 11/4/2012, 11/2/2012, 11/1/2012, 10/31/2012, 10/30/2012, 10/29/2012, 10/29/2012, 10/28/2012, 9/6/2012, 4/9/2012, 3/19/2012, 2/28/2012, 1/4/2012, 11/11/2011, 7/21/2011, 6/15/2011, 3/21/2011, 3/21/2011, 3/20/2011, 2/17/2011, 1/10/2009, 7/30/2008, 4/22/2008, 4/21/2008, 4/20/2008, 2/19/2008, 2/18/2008, 2/17/2008, 2/16/2008, 2/15/2008, 2/12/2008, 2/11/2008, 2/11/2008, 5/10/2007, 5/9/2007, 5/8/2007, 5/7/2007, 10/16/2006, 8/11/2006, 8/11/2006, 5/4/2006, 5/3/2006, 6/28/2005    URINE ACR / MICROALBUMIN 5/1/2018 5/1/2017, 5/11/2015, 2/10/2015, 11/12/2014, 2/5/2014, 10/28/2013, 5/31/2013, 9/6/2012, 4/9/2012, 11/11/2011, 11/15/2010    RETINAL SCREENING 5/17/2018 5/17/2017, 3/17/2017, 10/28/2015, 9/16/2015, 8/12/2015, 3/10/2014, 9/24/2012    IMM DTaP/Tdap/Td Vaccine (2 - Td) 7/16/2022 7/16/2012, 8/15/2009            Current Immunizations     13-VALENT PCV PREVNAR 1/19/2017 10:37 AM    Influenza Vaccine 1/1/1980    Pneumococcal Vaccine (UF)Historical  Data 1/1/1980    TD Vaccine 8/15/2009  5:00 PM    Tdap Vaccine 7/16/2012      Below and/or attached are the medications your provider expects you to take. Review all of your home medications and newly ordered medications with your provider and/or pharmacist. Follow medication instructions as directed by your provider and/or pharmacist. Please keep your medication list with you and share with your provider. Update the information when medications are discontinued, doses are changed, or new medications (including over-the-counter products) are added; and carry medication information at all times in the event of emergency situations     Allergies:  DEMEROL - (reactions not documented)     STATINS - Unspecified               Medications  Valid as of: June 02, 2017 - 12:17 PM    Generic Name Brand Name Tablet Size Instructions for use    AmLODIPine Besylate (Tab) NORVASC 10 MG Take 1 Tab by mouth every day.        Clindamycin HCl (Cap) CLEOCIN 300 MG Take 1 Cap by mouth 2 Times a Day.        CloNIDine HCl (PATCH WEEKLY) CATAPRES 0.3 MG/24HR Apply 1 Patch to skin as directed every Wednesday.        Fluticasone Propionate (Suspension) FLONASE 50 MCG/ACT Spray 2 Sprays in nose as needed.        Gabapentin (Cap) NEURONTIN 300 MG Take 1-2 Caps by mouth 3 times a day.        HydrALAZINE HCl (Tab) APRESOLINE 100 MG Take 1 Tab by mouth every 8 hours.        HydroCHLOROthiazide (Tab) HYDRODIURIL 12.5 MG Take 1 Tab by mouth every day.        Insulin Glargine (Solution) LANTUS 100 UNIT/ML Inject 30 Units as instructed every evening.        Lisinopril (Tab) PRINIVIL, ZESTRIL 40 MG Take 1 Tab by mouth every day.        Magnesium Chloride (Tab CR) SLO- (64 MG) MG Take 1 Tab by mouth 2 Times a Day.        MetFORMIN HCl (Tab) GLUCOPHAGE 500 MG Take 1 Tab by mouth 2 times a day, with meals.        Nystatin (Powder) MYCOSTATIN  Apply 1 Application to affected area(s) 3 times a day. Apply to reddened area under abdominal fold.           Oxycodone-Acetaminophen (Tab) PERCOCET-10  MG Take 1-2 Tabs by mouth every 6 hours as needed for Severe Pain.        Polyethylene Glycol 3350 (Pack) MIRALAX  Take 1 Packet by mouth 1 time daily as needed.        TraZODone HCl (Tab) DESYREL 150 MG Take 0.5 Tabs by mouth every bedtime.        Warfarin Sodium (Tab) COUMADIN 5 MG Take 2 Tabs by mouth every day at 6 PM.        .                 Medicines prescribed today were sent to:     [id number] - [place], [place] - [address] [place] [address]    Phone: [phone] Fax: [phone]    Open 24 Hours?: No      Medication refill instructions:       If your prescription bottle indicates you have medication refills left, it is not necessary to call your provider’s office. Please contact your pharmacy and they will refill your medication.    If your prescription bottle indicates you do not have any refills left, you may request refills at any time through one of the following ways: The online motify system (except Urgent Care), by calling your provider’s office, or by asking your pharmacy to contact your provider’s office with a refill request. Medication refills are processed only during regular business hours and may not be available until the next business day. Your provider may request additional information or to have a follow-up visit with you prior to refilling your medication.   *Please Note: Medication refills are assigned a new Rx number when refilled electronically. Your pharmacy may indicate that no refills were authorized even though a new prescription for the same medication is available at the pharmacy. Please request the medicine by name with the pharmacy before contacting your provider for a refill.        Your To Do List     Future Labs/Procedures Complete By Expires    HEMOGLOBIN A1C  As directed 6/3/2018    [facility] PAIN MANAGEMENT SCREEN  As directed 6/2/2018    Comments:    Current Meds (name, sig, last  dose):   Current outpatient prescriptions:   •  hydrochlorothiazide, 12.5 mg, Oral, DAILY  •  gabapentin, 300-600 mg, Oral, TID  •  oxycodone-acetaminophen, 1-2 Tab, Oral, Q6HRS PRN  •  clindamycin, 300 mg, Oral, BID  •  clonidine, 1 Patch, Transdermal, Q WEDNESDAY  •  amlodipine, 10 mg, Oral, DAILY  •  hydrALAZINE, 100 mg, Oral, Q8HRS  •  lisinopril, 40 mg, Oral, DAILY  •  metformin, 500 mg, Oral, BID WITH MEALS  •  trazodone, 75 mg, Oral, QHS  •  warfarin, 10 mg, Oral, DAILY AT 1800  •  fluticasone, 2 Spray, Nasal, PRN  •  insulin glargine, 30 Units, Subcutaneous, Q EVENING  •  magnesium chloride SR, 64 mg, Oral, BID (Patient taking differently: 64 mg, Oral, 2 TIMES DAILY PRN)  •  nystatin, 1 Application, Topical, TID, 5/12/2017  •  polyethylene glycol/lytes, 17 g, Oral, QDAY PRN, 5/12/2017            Referral     A referral request has been sent to our patient care coordination department. Please allow 3-5 business days for us to process this request and contact you either by phone or mail. If you do not hear from us by the 5th business day, please call us at [phone].        Other Notes About Your Plan     Patient is enrolled in [facility] with Dr. Asher Estevez Status: Patient Declined

## 2017-06-15 ENCOUNTER — HOSPITAL ENCOUNTER (OUTPATIENT)
Dept: PHYSICAL THERAPY | Facility: REHABILITATION | Age: 67
End: 2017-06-15
Attending: INTERNAL MEDICINE
Payer: MEDICARE

## 2017-06-15 ENCOUNTER — HOSPITAL ENCOUNTER (OUTPATIENT)
Dept: OCCUPATIONAL THERAPY | Facility: REHABILITATION | Age: 67
End: 2017-06-15
Attending: INTERNAL MEDICINE
Payer: MEDICARE

## 2017-06-15 PROCEDURE — 97116 GAIT TRAINING THERAPY: CPT | Mod: XE

## 2017-06-15 PROCEDURE — 97110 THERAPEUTIC EXERCISES: CPT

## 2017-06-15 PROCEDURE — 97530 THERAPEUTIC ACTIVITIES: CPT

## 2017-06-15 PROCEDURE — 97116 GAIT TRAINING THERAPY: CPT

## 2017-06-20 ENCOUNTER — HOSPITAL ENCOUNTER (OUTPATIENT)
Dept: OCCUPATIONAL THERAPY | Facility: REHABILITATION | Age: 67
End: 2017-06-20
Attending: INTERNAL MEDICINE
Payer: MEDICARE

## 2017-06-20 PROCEDURE — 97530 THERAPEUTIC ACTIVITIES: CPT

## 2017-06-20 PROCEDURE — 97110 THERAPEUTIC EXERCISES: CPT

## 2017-06-26 ENCOUNTER — HOSPITAL ENCOUNTER (OUTPATIENT)
Dept: OCCUPATIONAL THERAPY | Facility: REHABILITATION | Age: 67
End: 2017-06-26
Attending: INTERNAL MEDICINE
Payer: MEDICARE

## 2017-06-26 PROCEDURE — 97112 NEUROMUSCULAR REEDUCATION: CPT

## 2017-06-26 PROCEDURE — 97110 THERAPEUTIC EXERCISES: CPT

## 2017-06-27 ENCOUNTER — HOSPITAL ENCOUNTER (OUTPATIENT)
Dept: PHYSICAL THERAPY | Facility: REHABILITATION | Age: 67
End: 2017-06-27
Attending: INTERNAL MEDICINE
Payer: MEDICARE

## 2017-06-27 PROCEDURE — 97116 GAIT TRAINING THERAPY: CPT

## 2017-06-27 PROCEDURE — 97112 NEUROMUSCULAR REEDUCATION: CPT

## 2017-06-27 PROCEDURE — 97110 THERAPEUTIC EXERCISES: CPT

## 2017-06-28 ENCOUNTER — HOSPITAL ENCOUNTER (OUTPATIENT)
Dept: OCCUPATIONAL THERAPY | Facility: REHABILITATION | Age: 67
End: 2017-06-28
Attending: INTERNAL MEDICINE
Payer: MEDICARE

## 2017-06-28 PROCEDURE — 97112 NEUROMUSCULAR REEDUCATION: CPT

## 2017-06-28 PROCEDURE — 97110 THERAPEUTIC EXERCISES: CPT

## 2017-06-29 ENCOUNTER — HOSPITAL ENCOUNTER (OUTPATIENT)
Dept: PHYSICAL THERAPY | Facility: REHABILITATION | Age: 67
End: 2017-06-29
Attending: INTERNAL MEDICINE
Payer: MEDICARE

## 2017-06-29 PROCEDURE — 97140 MANUAL THERAPY 1/> REGIONS: CPT

## 2017-06-29 PROCEDURE — 97112 NEUROMUSCULAR REEDUCATION: CPT

## 2017-06-29 PROCEDURE — 97110 THERAPEUTIC EXERCISES: CPT

## 2017-06-30 ENCOUNTER — TELEPHONE (OUTPATIENT)
Dept: MEDICAL GROUP | Facility: CLINIC | Age: 67
End: 2017-06-30

## 2017-06-30 NOTE — TELEPHONE ENCOUNTER
VOICEMAIL  1. Caller Name: Joel Ma                      Call Back Number: [phone] (home)     2. Message: pt called needs CPAP supplies order to be sent to [facility], please called him when [facility] to [facility]. Thank you (Dylon Flower was the old provider)    3. Patient approves office to leave a detailed voicemail/MyChart message: yes

## 2017-07-03 ENCOUNTER — HOSPITAL ENCOUNTER (OUTPATIENT)
Dept: PHYSICAL THERAPY | Facility: REHABILITATION | Age: 67
End: 2017-07-03
Attending: INTERNAL MEDICINE
Payer: MEDICARE

## 2017-07-03 PROCEDURE — 97112 NEUROMUSCULAR REEDUCATION: CPT

## 2017-07-03 PROCEDURE — 97110 THERAPEUTIC EXERCISES: CPT

## 2017-07-03 PROCEDURE — 97530 THERAPEUTIC ACTIVITIES: CPT

## 2017-07-06 ENCOUNTER — HOSPITAL ENCOUNTER (OUTPATIENT)
Dept: PHYSICAL THERAPY | Facility: REHABILITATION | Age: 67
End: 2017-07-06
Attending: INTERNAL MEDICINE
Payer: MEDICARE

## 2017-07-06 PROCEDURE — 97110 THERAPEUTIC EXERCISES: CPT

## 2017-07-06 PROCEDURE — 97112 NEUROMUSCULAR REEDUCATION: CPT

## 2017-07-06 PROCEDURE — 97116 GAIT TRAINING THERAPY: CPT

## 2017-07-08 NOTE — TELEPHONE ENCOUNTER
Telephone call returned, answering machine only, explained that I will have office staff call on Monday.

## 2017-07-10 DIAGNOSIS — G47.33 OSA (OBSTRUCTIVE SLEEP APNEA): ICD-10-CM

## 2017-07-10 NOTE — TELEPHONE ENCOUNTER
Patient needs to make an appointment for re-evaluation for CPAP supplies. Left message for patient to call the office to schedule

## 2017-07-12 ENCOUNTER — HOSPITAL ENCOUNTER (OUTPATIENT)
Dept: LAB | Facility: MEDICAL CENTER | Age: 67
End: 2017-07-12
Attending: INTERNAL MEDICINE
Payer: MEDICARE

## 2017-07-12 ENCOUNTER — SLEEP CENTER VISIT (OUTPATIENT)
Dept: SLEEP MEDICINE | Facility: MEDICAL CENTER | Age: 67
End: 2017-07-12
Payer: MEDICARE

## 2017-07-12 VITALS
OXYGEN SATURATION: 95 % | HEIGHT: 77 IN | DIASTOLIC BLOOD PRESSURE: 83 MMHG | WEIGHT: 269 LBS | BODY MASS INDEX: 31.76 KG/M2 | HEART RATE: 83 BPM | SYSTOLIC BLOOD PRESSURE: 142 MMHG

## 2017-07-12 DIAGNOSIS — N18.1 CKD (CHRONIC KIDNEY DISEASE), STAGE I: ICD-10-CM

## 2017-07-12 DIAGNOSIS — G47.33 OSA TREATED WITH BIPAP: ICD-10-CM

## 2017-07-12 DIAGNOSIS — E11.42 DIABETIC POLYNEUROPATHY ASSOCIATED WITH TYPE 2 DIABETES MELLITUS (HCC): ICD-10-CM

## 2017-07-12 DIAGNOSIS — I10 ESSENTIAL HYPERTENSION: ICD-10-CM

## 2017-07-12 DIAGNOSIS — I48.91 ATRIAL FIBRILLATION, UNSPECIFIED TYPE (HCC): ICD-10-CM

## 2017-07-12 DIAGNOSIS — I63.9 CEREBROVASCULAR ACCIDENT (CVA), UNSPECIFIED MECHANISM (HCC): ICD-10-CM

## 2017-07-12 LAB
25(OH)D3 SERPL-MCNC: 32 NG/ML (ref 30–100)
ANION GAP SERPL CALC-SCNC: 8 MMOL/L (ref 0–11.9)
BUN SERPL-MCNC: 23 MG/DL (ref 8–22)
CALCIUM SERPL-MCNC: 8.9 MG/DL (ref 8.5–10.5)
CHLORIDE SERPL-SCNC: 100 MMOL/L (ref 96–112)
CO2 SERPL-SCNC: 27 MMOL/L (ref 20–33)
CREAT SERPL-MCNC: 0.89 MG/DL (ref 0.5–1.4)
CREAT UR-MCNC: 37.2 MG/DL
CREAT UR-MCNC: 37.3 MG/DL
EST. AVERAGE GLUCOSE BLD GHB EST-MCNC: 194 MG/DL
GFR SERPL CREATININE-BSD FRML MDRD: >60 ML/MIN/1.73 M 2
GLUCOSE SERPL-MCNC: 200 MG/DL (ref 65–99)
HBA1C MFR BLD: 8.4 % (ref 0–5.6)
MICROALBUMIN UR-MCNC: 78.3 MG/DL
MICROALBUMIN/CREAT UR: 2105 MG/G (ref 0–30)
POTASSIUM SERPL-SCNC: 3.6 MMOL/L (ref 3.6–5.5)
SODIUM SERPL-SCNC: 135 MMOL/L (ref 135–145)

## 2017-07-12 PROCEDURE — 99213 OFFICE O/P EST LOW 20 MIN: CPT | Performed by: NURSE PRACTITIONER

## 2017-07-12 PROCEDURE — 82043 UR ALBUMIN QUANTITATIVE: CPT

## 2017-07-12 PROCEDURE — 80048 BASIC METABOLIC PNL TOTAL CA: CPT

## 2017-07-12 PROCEDURE — 82306 VITAMIN D 25 HYDROXY: CPT

## 2017-07-12 PROCEDURE — 82570 ASSAY OF URINE CREATININE: CPT

## 2017-07-12 NOTE — PROGRESS NOTES
Chief Complaint   Patient presents with   • Follow-Up       HPI:  Joel Ma is a 66 y.o. year old male here today for follow-up on JANNIE. His last office visit was 10/22/2015. PSG indicated moderate obstructive sleep apnea with an AHI 20.7 and minimum oxygen saturation 76%. He was previously on CPAP re-titrated and currently using BiPAP 17/13 cm H2O. Compliance card 4/13/2017 through July 11, 2017 indicates 98.9% complaints, average daily use of 4-1/2 hours per night, significant mask leaking, and AHI of 3.1. He notes significant mask issues. He did have trouble tolerating pressure initially after his last surgery. We discussed the need for longer nightly usage. He has a history of A. Fib and CVA. He remains on Coumadin daily. He also has chronic back/neck issues with ACDF performed last fall. He continues to have range of motion issues. He completed OT/PT. He continues ot have pain but not using opioids. He may attempt marijuana use to improve his quality of life. He notes this to be his biggest complaint. He denies any cardiac or respiratory issues.          ROS: As per HPI and otherwise negative if not stated.    Past Medical History   Diagnosis Date   • Hypertension    • Thyroid mass 7/30/2009     benign lump   • Ventricular ectopy 6/17/2011   • arthritis 6/17/2011     thumb, fingers   • Anesthesia      low O2 sat   • Stroke (CMS-[facility])      2/1/2007, 4/1/2007, right sided weakness; balance disturbance   • Pain 05-03-13     shoulders, hip, right knee, 7/10   • Pneumonia 2002   • Sleep apnea      CPAP   • High cholesterol    • JANNIE treated with BiPAP 4/18/2016   • Dental disorder      full dentures   • Diabetes      insulin, Dr Oconnor, his APN   • MRSA (methicillin resistant Staphylococcus aureus)      history of, nothing current 2016, on clindamycin for rest of life per patient       Past Surgical History   Procedure Laterality Date   • Pr knee scope,single menisectomy       right knee   • Toe amputation   7/22/2011     Performed by EVELYN JANE at [facility]   • Lumbar fusion posterior  1979   • Elbow arthrotomy  2008     right   • Foot surgery       partial amputation great toe   • Foot surgery       toe surgery left foot   • Knee arthroplasty total  1/11/2012     Right; Performed by KAMALJIT SHI at [facility]   • Knee revision total  3/13/2012     Performed by KAMALJIT SHI at [facility]   • Irrigation & debridement ortho  3/13/2012     Performed by KAMALJIT SHI at [facility]   • Tendon repair  3/13/2012     Performed by KAMALJIT SHI at [facility]   • Irrigation & debridement ortho  11/2/2012     Performed by Gordon Cota M.D. at SURGERY [facility]   • Knee revision total  11/13/2012     Performed by Gordon Cota M.D. at [facility]   • Irrigation & debridement ortho  11/13/2012     Performed by Gordon Cota M.D. at [facility]   • Cataract phaco with iol  5/8/2013     Performed by Mame Rehman M.D. at SURGERY [facility] DAY [facility]   • Incision and drainage orthopedic Right 10/29/2012     Procedure: Right Total Knee I and D and Liner Exchange, with drain;  Surgeon: Gordon Cota M.D.;  Location: [facility];  Service:    • Irrigation & debridement ortho Left 10/29/2012     Procedure: left shoulder, with drain;  Surgeon: Gordon Cota M.D.;  Location: [facility];  Service:    • Cervical disk and fusion anterior  8/31/2016     Procedure: CERVICAL DISK AND FUSION ANTERIOR C3-7 ;  Surgeon: Alhaji Jaffe M.D.;  Location: [facility];  Service:        Family History   Problem Relation Age of Onset   • Diabetes     • Hypertension     • Cancer     • Diabetes Mother    • Cancer Father      lung cancer   • Heart Disease Father      triple bypass       Social History     Social History   • Marital Status: Single     Spouse Name: N/A   • Number of Children: N/A   •  "Years of Education: N/A     Occupational History   • Not on file.     Social History Main Topics   • Smoking status: Former Smoker -- 4.00 packs/day for .5 years     Types: Cigarettes     Quit date: 01/01/1970   • Smokeless tobacco: Never Used   • Alcohol Use: No   • Drug Use: No   • Sexual Activity: Yes     Other Topics Concern   • Not on file     Social History Narrative    ** Merged History Encounter **            Allergies as of 07/12/2017 - Jarret as Reviewed 07/12/2017   Allergen Reaction Noted   • Demerol  02/14/2008   • Statins [hmg-coa-r inhibitors] Unspecified 11/14/2016        @Vital signs for this encounter:  Filed Vitals:    07/12/17 1038   Height: 1.943 m (6' 4.5\")   Weight: 122.018 kg (269 lb)   Weight % change since last entry.: 0 %   BP: 142/83   Pulse: 83   BMI (Calculated): 32.32   O2 sat % room air: 95 %       Current medications as of today   Current Outpatient Prescriptions   Medication Sig Dispense Refill   • oxycodone-acetaminophen (PERCOCET-10)  MG Tab Take 1-2 Tabs by mouth every 6 hours as needed for Severe Pain. 120 Tab 0   • hydrochlorothiazide (HYDRODIURIL) 12.5 MG tablet Take 1 Tab by mouth every day. 90 Tab 3   • gabapentin (NEURONTIN) 300 MG Cap Take 1-2 Caps by mouth 3 times a day. 120 Cap 11   • clindamycin (CLEOCIN) 300 MG Cap Take 1 Cap by mouth 2 Times a Day. 180 Cap 1   • clonidine (CATAPRES) 0.3 MG/24HR PATCH WEEKLY Apply 1 Patch to skin as directed every Wednesday. 12 Patch 11   • hydrALAZINE (APRESOLINE) 100 MG tablet Take 1 Tab by mouth every 8 hours. 270 Tab 3   • lisinopril (PRINIVIL, ZESTRIL) 40 MG tablet Take 1 Tab by mouth every day. 90 Tab 0   • metformin (GLUCOPHAGE) 500 MG Tab Take 1 Tab by mouth 2 times a day, with meals. (Patient taking differently: Take 1,000 mg by mouth 2 times a day, with meals.) 180 Tab 3   • warfarin (COUMADIN) 5 MG Tab Take 2 Tabs by mouth every day at 6 PM. 180 Tab 3   • fluticasone (FLONASE) 50 MCG/ACT nasal spray Spray 2 Sprays in " nose as needed. 1 Bottle 5   • nystatin (MYCOSTATIN) Powder Apply 1 Application to affected area(s) 3 times a day. Apply to reddened area under abdominal fold. 1 Bottle 0   • polyethylene glycol/lytes (MIRALAX) Pack Take 1 Packet by mouth 1 time daily as needed. 30 Each 0   • insulin glargine (LANTUS) 100 UNIT/ML Solution Inject 30 Units as instructed every evening. 10 mL 5   • magnesium chloride SR (SLO-MAG) 535 (64 MG) MG Tab CR Take 1 Tab by mouth 2 Times a Day. (Patient taking differently: Take 1 Tab by mouth 2 times a day as needed.) 60 Tab 0   • amlodipine (NORVASC) 10 MG Tab Take 1 Tab by mouth every day. (Patient not taking: Reported on 7/12/2017) 90 Tab 3   • trazodone (DESYREL) 150 MG Tab Take 0.5 Tabs by mouth every bedtime. 45 Tab 3     No current facility-administered medications for this visit.         Physical Exam:   Gen:           Alert and oriented, No apparent distress. Mood and affect appropriate, normal interaction with examiner.  Eyes:          PERRL, EOM intact, sclere white, conjunctive moist.  Ears:          Not examined.  Hearing:     Grossly intact.  Nose:          Normal, no lesions or deformities.  Dentition:    Good dentition.  Oropharynx:   Tongue normal, posterior pharynx without erythema or exudate.  Mallampati Classification: 3  Neck:        Supple, trachea midline, no masses.  Respiratory Effort: No intercostal retractions or use of accessory muscles.   Lung Auscultation:      Clear to auscultation bilaterally; no rales, rhonchi or wheezing.  CV:            Regular rate and rhythm. No murmurs, rubs or gallops.  Abd:           Not examined. Overweight.  Lymphadenopathy: Not examined.  Gait and Station: Uses walker for stability.  Digits and Nails: No clubbing, cyanosis, petechiae, or nodes.   Cranial Nerves: II-XII grossly intact.  Skin:        No rashes, lesions or ulcers noted.               Ext:           BLE 2+ pitting edema with discoloration.      Assessment:  1. JANNIE treated  with BiPAP     2. Cerebrovascular accident (CVA), unspecified mechanism (CMS-HCC)     3. BMI 32.0-32.9,adult     4. Essential hypertension     5. Atrial fibrillation, unspecified type (CMS-HCC)         Immunizations:    Flu:not given  Pneumovax 23:not given  Prevnar 13:2017    Plan:  1. Continue BIPAP 17/13cm H20 nightly.  2. Mask fit in office today. DME order mask/supplies with M F20.  3. Discussed sleep hygiene.  4. DME CNOX on pap prior to next OV.  5. Encouraged weight loss and monitoring pedal edema.  6. Follow up in 3 months with compliance card/CNOX, sooner if needed.

## 2017-07-12 NOTE — MR AVS SNAPSHOT
"        Joel Fernández Anil   2017 10:40 AM   Sleep Center Visit   MRN: [id number]    Department:  [facility]   Dept Phone:  [phone]    Description:  Male : 1950   Provider:  NOE Martinez           Reason for Visit     Follow-Up           Allergies as of 2017     Allergen Noted Reactions    Demerol 2008       Makes me stop breathing.  Doesn't like because it makes him high    Statins [Hmg-Coa-R Inhibitors] 2016   Unspecified    Per pt report. Unknown reaction.      You were diagnosed with     JANNIE treated with BiPAP   [9671048]       Cerebrovascular accident (CVA), unspecified mechanism (CMS-[facility])   [3491983]       BMI 32.0-32.9,adult   [342166]       Essential hypertension   [0346072]       Atrial fibrillation, unspecified type (CMS-HCC)   [3824603]         Vital Signs     Blood Pressure Pulse Height Weight Body Mass Index Oxygen Saturation    142/83 mmHg 83 1.943 m (6' 4.5\") 122.018 kg (269 lb) 32.32 kg/m2 95%    Smoking Status                   Former Smoker           Basic Information     Date Of Birth Sex Race Ethnicity Preferred Language    1950 Male White Non- English      Your appointments     2017 10:00 AM   Follow Up Visit with Johnathon Bonner M.D.   [facility] ([address] [place] [address])    [facility] [address]  [place] [address] [phone]           You will be receiving a confirmation call a few days before your appointment from our automated call confirmation system.            2017 11:20 AM   Established Patient with [facility])    [address] [place] [address] [address]  [place] [address] [phone]           You will be receiving a confirmation call a few days before your appointment from our automated call confirmation system.            2017  1:30 PM   PT Follow Up 60 Minutes with [facility] " [facility] ([address] [place] [address])    [address] [facility]  [address]  [place] [address]   [phone]            Jul 21, 2017 10:00 AM   [facility] ([address] [place] [address])    [facility] [address] [facility]  [address]  [place] [address]   [phone]            Jul 24, 2017 10:00 AM   [facility] ([address] [place] [address])    [facility]. [facility]  [address]  [place] [address]   [phone]            Jul 26, 2017  9:00 AM   PT [facility] ([address] [place] [address])    [address] [place] [address]  [address]  [place] [address]   [phone]            Jul 31, 2017 10:30 AM   PT Follow Up 60 Minutes with Ember Golden P.T.   [facility] ([address] [place] [address])    [address] [place] [address]  [address]  [place] [address]   [phone]            Aug 02, 2017 10:00 AM   PT Follow Up 60 Minutes with Ember Golden P.T.   [facility] ([address] [place] [address])    [address] [place] [address]  [address]  [place] [address]   [phone]            Aug 18, 2017 11:20 AM   Follow Up Visit with Haris Alvarez M.D.   [facility] (--)    [address] [facility] [address]  [place] [address]   [phone]           You will be receiving a confirmation call a few days before your appointment from our automated call confirmation system.            Nov 07, 2017 10:20 AM   Follow UP with DEIDRA Ibarra   [facility] (--)    [address] [facility] [address]  [place] [address]   [phone]              Problem List              ICD-10-CM Priority Class Noted - Resolved    CVA (cerebral vascular accident) (CMS-HCC) I63.9 Medium  6/4/2009 - Present    Dyslipidemia E78.5   6/17/2009 - Present    Thyroid mass E07.9   7/30/2009 - Present    HTN (hypertension) I10 Medium  8/16/2010 -  Present    Ventricular ectopy I49.3   6/17/2011 - Present    Trigger finger M65.30   6/17/2011 - Present    MEDICAL HOME    Unknown - Present    Diabetes mellitus with neurological manifestations, 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 Low  3/5/2012 - Present    BPH (benign prostatic hyperplasia) N40.0   4/28/2014 - Present    Anticoagulated on warfarin Z79.01   4/28/2014 - Present    Chronic antibiotic suppression Z79.2   4/28/2014 - Present    Myalgia M79.1   5/29/2014 - Present    Risk for falls Z91.81   10/27/2014 - Present    BMI 38.0-38.9,adult Z68.38   10/27/2014 - Present    Diabetic polyneuropathy (CMS-HCC) E11.42 High  5/6/2015 - Present    Toe amputation status (CMS-HCC) Z89.429 High  5/6/2015 - Present    Atrial fibrillation [427.31] I48.91   6/24/2015 - Present    History of CVA (cerebrovascular accident) Z86.73   11/11/2015 - Present    JANNIE treated with BiPAP G47.33   4/18/2016 - Present    Left knee pain M25.562 High  8/28/2016 - Present    Cervical stenosis of spine M48.02   9/6/2016 - Present    Dysphagia R13.10   9/6/2016 - Present    Chronic atrial fibrillation (CMS-HCC) I48.2   9/6/2016 - Present    Hallucinations R44.3   9/6/2016 - Present    Other orthopedic aftercare Z47.89   11/7/2016 - Present    Chronic use of opiate drugs therapeutic purposes Z79.891   2/14/2017 - Present    CKD (chronic kidney disease), stage I N18.1   5/12/2017 - Present    Diabetic nephropathy associated with type 2 diabetes mellitus (CMS-HCC) E11.21   5/12/2017 - Present    Essential hypertension I10   5/12/2017 - Present      Health Maintenance        Date Due Completion Dates    IMM ZOSTER VACCINE 8/17/2009 ---    COLONOSCOPY 6/30/2017 6/30/2014    IMM INFLUENZA (1) 9/1/2017 ---    A1C SCREENING 10/28/2017 4/28/2017, 12/16/2016, 12/7/2016, 8/29/2016, 8/10/2016, 7/7/2016, 4/29/2016, 9/21/2015, 8/17/2015, 5/11/2015, 5/11/2015, 3/30/2015, 2/10/2015, 11/12/2014, 8/13/2014, 5/13/2014, 2/5/2014, 2/5/2014, 2/5/2014, 10/28/2013,  10/28/2013, 8/26/2013, 5/31/2013, 1/10/2013, 10/29/2012, 9/6/2012, 9/6/2012, 4/9/2012, 3/19/2012, 11/11/2011, 2/17/2011, 11/15/2010, 8/13/2010, 7/27/2010, 5/5/2010, 3/24/2010, 2/18/2010, 12/23/2009, 12/7/2009, 7/13/2009, 4/22/2008, 2/11/2008, 8/12/2006, 8/11/2006, 5/4/2006    IMM PNEUMOCOCCAL 65+ (ADULT) LOW/MEDIUM RISK SERIES (2 of 2 - PPSV23) 1/19/2018 1/19/2017    DIABETES MONOFILAMENT / LE EXAM 3/17/2018 3/17/2017, 11/16/2015 (Done), 2/11/2014 (Done), 5/31/2013 (Done), 5/18/2011 (N/S)    Override on 11/16/2015: Done (Catrachito Sterling DO)    Override on 2/11/2014: Done    Override on 5/31/2013: Done    Override on 5/18/2011: (N/S) (patient with mild neuropathy distal toes. )    FASTING LIPID PROFILE 4/28/2018 4/28/2017, 8/10/2016, 7/7/2016, 4/29/2016, 9/21/2015, 8/17/2015, 5/11/2015, 3/30/2015, 8/13/2014, 2/5/2014, 2/5/2014, 2/5/2014, 10/28/2013, 8/19/2013, 7/16/2013, 5/31/2013, 4/10/2013, 10/30/2012, 10/3/2012, 4/27/2012, 6/15/2011, 8/13/2010, 5/5/2010, 12/23/2009, 12/7/2009, 4/21/2008, 2/12/2008, 5/8/2007, 8/11/2006    SERUM CREATININE 4/28/2018 4/28/2017, 12/16/2016, 12/7/2016, 12/6/2016, 11/28/2016, 11/25/2016, 11/11/2016, 11/8/2016, 9/9/2016, 9/7/2016, 9/6/2016, 9/1/2016, 8/31/2016, 8/30/2016, 8/29/2016, 8/28/2016, 8/10/2016, 7/22/2016, 7/7/2016, 4/29/2016, 11/18/2015, 9/21/2015, 9/15/2015, 8/17/2015, 6/19/2015, 5/11/2015, 3/30/2015, 2/10/2015, 11/12/2014, 10/13/2014, 8/13/2014, 5/13/2014, 2/5/2014, 2/5/2014, 2/5/2014, 10/28/2013, 6/27/2013, 5/31/2013, 1/10/2013, 1/7/2013, 12/17/2012, 12/16/2012, 12/15/2012, 12/14/2012, 12/13/2012, 12/12/2012, 12/11/2012, 12/11/2012, 12/10/2012, 12/10/2012, 12/9/2012, 12/9/2012, 12/8/2012, 12/8/2012, 12/7/2012, 12/6/2012, 11/16/2012, 11/15/2012, 11/14/2012, 11/13/2012, 11/9/2012, 11/7/2012, 11/6/2012, 11/4/2012, 11/2/2012, 11/1/2012, 10/31/2012, 10/30/2012, 10/29/2012, 10/29/2012, 10/28/2012, 9/6/2012, 4/9/2012, 3/19/2012, 2/28/2012, 1/4/2012, 11/11/2011, 7/21/2011,  6/15/2011, 3/21/2011, 3/21/2011, 3/20/2011, 2/17/2011, 1/10/2009, 7/30/2008, 4/22/2008, 4/21/2008, 4/20/2008, 2/19/2008, 2/18/2008, 2/17/2008, 2/16/2008, 2/15/2008, 2/12/2008, 2/11/2008, 2/11/2008, 5/10/2007, 5/9/2007, 5/8/2007, 5/7/2007, 10/16/2006, 8/11/2006, 8/11/2006, 5/4/2006, 5/3/2006, 6/28/2005    URINE ACR / MICROALBUMIN 5/1/2018 5/1/2017, 5/11/2015, 2/10/2015, 11/12/2014, 2/5/2014, 10/28/2013, 5/31/2013, 9/6/2012, 4/9/2012, 11/11/2011, 11/15/2010    RETINAL SCREENING 5/17/2018 5/17/2017, 3/17/2017, 10/28/2015, 9/16/2015, 8/12/2015, 3/10/2014, 9/24/2012    IMM DTaP/Tdap/Td Vaccine (2 - Td) 7/16/2022 7/16/2012, 8/15/2009            Current Immunizations     13-VALENT PCV PREVNAR 1/19/2017 10:37 AM    Influenza Vaccine 1/1/1980    Pneumococcal Vaccine (UF)Historical Data 1/1/1980    TD Vaccine 8/15/2009  5:00 PM    Tdap Vaccine 7/16/2012      Below and/or attached are the medications your provider expects you to take. Review all of your home medications and newly ordered medications with your provider and/or pharmacist. Follow medication instructions as directed by your provider and/or pharmacist. Please keep your medication list with you and share with your provider. Update the information when medications are discontinued, doses are changed, or new medications (including over-the-counter products) are added; and carry medication information at all times in the event of emergency situations     Allergies:  DEMEROL - (reactions not documented)     STATINS - Unspecified               Medications  Valid as of: July 12, 2017 - 11:41 AM    Generic Name Brand Name Tablet Size Instructions for use    AmLODIPine Besylate (Tab) NORVASC 10 MG Take 1 Tab by mouth every day.        Clindamycin HCl (Cap) CLEOCIN 300 MG Take 1 Cap by mouth 2 Times a Day.        CloNIDine HCl (PATCH WEEKLY) CATAPRES 0.3 MG/24HR Apply 1 Patch to skin as directed every Wednesday.        Fluticasone Propionate (Suspension) FLONASE 50 MCG/ACT  Spray 2 Sprays in nose as needed.        Gabapentin (Cap) NEURONTIN 300 MG Take 1-2 Caps by mouth 3 times a day.        HydrALAZINE HCl (Tab) APRESOLINE 100 MG Take 1 Tab by mouth every 8 hours.        HydroCHLOROthiazide (Tab) HYDRODIURIL 12.5 MG Take 1 Tab by mouth every day.        Insulin Glargine (Solution) LANTUS 100 UNIT/ML Inject 30 Units as instructed every evening.        Lisinopril (Tab) PRINIVIL, ZESTRIL 40 MG Take 1 Tab by mouth every day.        Magnesium Chloride (Tab CR) SLO- (64 MG) MG Take 1 Tab by mouth 2 Times a Day.        MetFORMIN HCl (Tab) GLUCOPHAGE 500 MG Take 1 Tab by mouth 2 times a day, with meals.        Nystatin (Powder) MYCOSTATIN  Apply 1 Application to affected area(s) 3 times a day. Apply to reddened area under abdominal fold.        Oxycodone-Acetaminophen (Tab) PERCOCET-10  MG Take 1-2 Tabs by mouth every 6 hours as needed for Severe Pain.        Polyethylene Glycol 3350 (Pack) MIRALAX  Take 1 Packet by mouth 1 time daily as needed.        TraZODone HCl (Tab) DESYREL 150 MG Take 0.5 Tabs by mouth every bedtime.        Warfarin Sodium (Tab) COUMADIN 5 MG Take 2 Tabs by mouth every day at 6 PM.        .                 Medicines prescribed today were sent to:     [id number] - [place], [place] - [address] [address]    Phone: [phone] Fax: [phone]    Open 24 Hours?: No      Medication refill instructions:       If your prescription bottle indicates you have medication refills left, it is not necessary to call your provider’s office. Please contact your pharmacy and they will refill your medication.    If your prescription bottle indicates you do not have any refills left, you may request refills at any time through one of the following ways: The online [employer] system (except Urgent Care), by calling your provider’s office, or by asking your pharmacy to contact your provider’s office with a refill request. Medication  refills are processed only during regular business hours and may not be available until the next business day. Your provider may request additional information or to have a follow-up visit with you prior to refilling your medication.   *Please Note: Medication refills are assigned a new Rx number when refilled electronically. Your pharmacy may indicate that no refills were authorized even though a new prescription for the same medication is available at the pharmacy. Please request the medicine by name with the pharmacy before contacting your provider for a refill.        Your To Do List     Future Labs/Procedures Complete By Expires    MASK FITTING  As directed 7/12/2018      Other Notes About Your Plan     Patient is enrolled in [facility] with Dr. Asher Estevez Status: Patient Declined

## 2017-07-12 NOTE — TELEPHONE ENCOUNTER
Patient notified, scheduled with Dr Sterling on 07/18/17 and directed to scheduling with pulmonology for sleep study.

## 2017-07-14 ENCOUNTER — OFFICE VISIT (OUTPATIENT)
Dept: NEPHROLOGY | Facility: MEDICAL CENTER | Age: 67
End: 2017-07-14
Payer: MEDICARE

## 2017-07-14 VITALS
WEIGHT: 267 LBS | BODY MASS INDEX: 31.53 KG/M2 | RESPIRATION RATE: 16 BRPM | DIASTOLIC BLOOD PRESSURE: 80 MMHG | SYSTOLIC BLOOD PRESSURE: 150 MMHG | HEART RATE: 80 BPM | TEMPERATURE: 98.1 F | HEIGHT: 77 IN

## 2017-07-14 DIAGNOSIS — I10 ESSENTIAL HYPERTENSION: ICD-10-CM

## 2017-07-14 DIAGNOSIS — N18.1 CKD (CHRONIC KIDNEY DISEASE), STAGE I: ICD-10-CM

## 2017-07-14 DIAGNOSIS — E11.21 DIABETIC NEPHROPATHY ASSOCIATED WITH TYPE 2 DIABETES MELLITUS (HCC): ICD-10-CM

## 2017-07-14 PROCEDURE — 99214 OFFICE O/P EST MOD 30 MIN: CPT | Performed by: INTERNAL MEDICINE

## 2017-07-14 RX ORDER — HYDROCHLOROTHIAZIDE 25 MG/1
12.5 TABLET ORAL DAILY
Qty: 90 TAB | Refills: 3 | Status: SHIPPED | OUTPATIENT
Start: 2017-07-14 | End: 2017-10-10 | Stop reason: SDUPTHER

## 2017-07-14 ASSESSMENT — ENCOUNTER SYMPTOMS
FEVER: 0
CHILLS: 0
PALPITATIONS: 0

## 2017-07-14 NOTE — MR AVS SNAPSHOT
"        Joel Wolftaco   2017 10:00 AM   Office Visit   MRN: [id number]    Department:  [facility]   Dept Phone:  [phone]    Description:  Male : 1950   Provider:  Johnathon Bonner M.D.           Reason for Visit     Follow-Up           Allergies as of 2017     Allergen Noted Reactions    Demerol 2008       Makes me stop breathing.  Doesn't like because it makes him high    Statins [Hmg-Coa-R Inhibitors] 2016   Unspecified    Per pt report. Unknown reaction.      You were diagnosed with     CKD (chronic kidney disease), stage I   [719015]       Diabetic nephropathy associated with type 2 diabetes mellitus (CMS-[facility])   [4176985]       Essential hypertension   [8769953]         Vital Signs     Blood Pressure Pulse Temperature Respirations Height Weight    150/80 mmHg 80 36.7 °C (98.1 °F) (Temporal) 16 1.943 m (6' 4.5\") 121.11 kg (267 lb)    Body Mass Index Smoking Status                32.08 kg/m2 Former Smoker          Basic Information     Date Of Birth Sex Race Ethnicity Preferred Language    1950 Male White Non- English      Your appointments     2017 10:00 AM   Follow Up Visit with Johnathon Bonner M.D.   [facility] ([facility])    [address], [address]  [place] [id number] [phone]           You will be receiving a confirmation call a few days before your appointment from our automated call confirmation system.            2017 11:20 AM   Established Patient with Catrachito Sterling D.O.   [facility] ([facility])    [address] [place] [address] [address]  [place] [id number] [phone]           You will be receiving a confirmation call a few days before your appointment from our automated call confirmation system.            2017  1:30 PM   PT Follow Up 60 Minutes with Ember Golden P.T.   [facility] ([address] [facility])    [address]  [facility] [address]   [phone]            Jul 21, 2017 10:00 AM   [facility] ([address])    [address] [place] [address]  [address]  [place] [address]   [phone]            Jul 24, 2017 10:00 AM   PT Follow Up 60 Minutes with JOSHUA Haynes Physical Therapy [facility] ([address])    [address] [place] [address]  [address]  [facility] [address]   [phone]            Jul 26, 2017  9:00 AM   PT Follow Up 60 Minutes with JOSHUA Haynes Physical Therapy [facility] ([address])    [address] [place] [address]  [address]  [facility] [address]   [phone]            Jul 31, 2017 10:30 AM   PT Follow Up 60 Minutes with JOSHUA Haynes Physical Therapy [facility] ([address])    [address] [place] [address] [address]  [facility] [address]   [phone]            Aug 02, 2017 10:00 AM   PT Follow Up 60 [facility] ([address])    [address] [place] [address] [address]  [facility] [address]   [phone]            Aug 18, 2017 11:20 AM   Follow Up Visit with Haris Alvarez M.D.   [facility] (--)    [address]  [facility] [address]   [phone]           You will be receiving a confirmation call a few days before your appointment from our automated call confirmation system.            Aug 25, 2017 10:00 AM   Follow Up Visit with Johnathon Bonner M.D.   [facility] ([address] [place] [address])    [facility] [address]  [facility] [address]   [phone]           You will be receiving a confirmation call a few days before your appointment from our automated call confirmation system.            Nov 07, 2017 10:20 AM   [facility] (--)    [address]  [place] [address] [phone]                 Problem List              ICD-10-CM Priority Class Noted - Resolved    CVA (cerebral vascular accident) (CMS-HCC) I63.9 Medium  6/4/2009 - Present    Dyslipidemia E78.5   6/17/2009 - Present    Thyroid mass E07.9   7/30/2009 - Present    HTN (hypertension) I10 Medium  8/16/2010 - Present    Ventricular ectopy I49.3   6/17/2011 - Present    Trigger finger M65.30   6/17/2011 - Present    MEDICAL HOME    Unknown - Present    Diabetes mellitus with neurological manifestations, 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 Low  3/5/2012 - Present    BPH (benign prostatic hyperplasia) N40.0   4/28/2014 - Present    Anticoagulated on warfarin Z79.01   4/28/2014 - Present    Chronic antibiotic suppression Z79.2   4/28/2014 - Present    Myalgia M79.1   5/29/2014 - Present    Risk for falls Z91.81   10/27/2014 - Present    BMI 38.0-38.9,adult Z68.38   10/27/2014 - Present    Diabetic polyneuropathy (CMS-HCC) E11.42 High  5/6/2015 - Present    Toe amputation status (CMS-HCC) Z89.429 High  5/6/2015 - Present    Atrial fibrillation [427.31] I48.91   6/24/2015 - Present    History of CVA (cerebrovascular accident) Z86.73   11/11/2015 - Present    JANNIE treated with BiPAP G47.33   4/18/2016 - Present    Left knee pain M25.562 High  8/28/2016 - Present    Cervical stenosis of spine M48.02   9/6/2016 - Present    Dysphagia R13.10   9/6/2016 - Present    Chronic atrial fibrillation (CMS-HCC) I48.2   9/6/2016 - Present    Hallucinations R44.3   9/6/2016 - Present    Other orthopedic aftercare Z47.89   11/7/2016 - Present    Chronic use of opiate drugs therapeutic purposes Z79.891   2/14/2017 - Present    CKD (chronic kidney disease), stage I N18.1   5/12/2017 - Present    Diabetic nephropathy associated with type 2 diabetes mellitus (CMS-HCC) E11.21   5/12/2017 - Present    Essential hypertension I10   5/12/2017 - Present      Health Maintenance        Date Due Completion Dates    IMM ZOSTER VACCINE 8/17/2009 ---    COLONOSCOPY 6/30/2017 6/30/2014     IMM INFLUENZA (1) 9/1/2017 ---    A1C SCREENING 1/12/2018 7/12/2017, 4/28/2017, 12/16/2016, 12/7/2016, 8/29/2016, 8/10/2016, 7/7/2016, 4/29/2016, 9/21/2015, 8/17/2015, 5/11/2015, 5/11/2015, 3/30/2015, 2/10/2015, 11/12/2014, 8/13/2014, 5/13/2014, 2/5/2014, 2/5/2014, 2/5/2014, 10/28/2013, 10/28/2013, 8/26/2013, 5/31/2013, 1/10/2013, 10/29/2012, 9/6/2012, 9/6/2012, 4/9/2012, 3/19/2012, 11/11/2011, 2/17/2011, 11/15/2010, 8/13/2010, 7/27/2010, 5/5/2010, 3/24/2010, 2/18/2010, 12/23/2009, 12/7/2009, 7/13/2009, 4/22/2008, 2/11/2008, 8/12/2006, 8/11/2006, 5/4/2006    IMM PNEUMOCOCCAL 65+ (ADULT) LOW/MEDIUM RISK SERIES (2 of 2 - PPSV23) 1/19/2018 1/19/2017    DIABETES MONOFILAMENT / LE EXAM 3/17/2018 3/17/2017, 11/16/2015 (Done), 2/11/2014 (Done), 5/31/2013 (Done), 5/18/2011 (N/S)    Override on 11/16/2015: Done (Catrachito Sterling DO)    Override on 2/11/2014: Done    Override on 5/31/2013: Done    Override on 5/18/2011: (N/S) (patient with mild neuropathy distal toes. )    FASTING LIPID PROFILE 4/28/2018 4/28/2017, 8/10/2016, 7/7/2016, 4/29/2016, 9/21/2015, 8/17/2015, 5/11/2015, 3/30/2015, 8/13/2014, 2/5/2014, 2/5/2014, 2/5/2014, 10/28/2013, 8/19/2013, 7/16/2013, 5/31/2013, 4/10/2013, 10/30/2012, 10/3/2012, 4/27/2012, 6/15/2011, 8/13/2010, 5/5/2010, 12/23/2009, 12/7/2009, 4/21/2008, 2/12/2008, 5/8/2007, 8/11/2006    RETINAL SCREENING 5/17/2018 5/17/2017, 3/17/2017, 10/28/2015, 9/16/2015, 8/12/2015, 3/10/2014, 9/24/2012    URINE ACR / MICROALBUMIN 7/12/2018 7/12/2017, 5/1/2017, 5/11/2015, 2/10/2015, 11/12/2014, 2/5/2014, 10/28/2013, 5/31/2013, 9/6/2012, 4/9/2012, 11/11/2011, 11/15/2010    SERUM CREATININE 7/12/2018 7/12/2017, 4/28/2017, 12/16/2016, 12/7/2016, 12/6/2016, 11/28/2016, 11/25/2016, 11/11/2016, 11/8/2016, 9/9/2016, 9/7/2016, 9/6/2016, 9/1/2016, 8/31/2016, 8/30/2016, 8/29/2016, 8/28/2016, 8/10/2016, 7/22/2016, 7/7/2016, 4/29/2016, 11/18/2015, 9/21/2015, 9/15/2015, 8/17/2015, 6/19/2015, 5/11/2015, 3/30/2015,  2/10/2015, 11/12/2014, 10/13/2014, 8/13/2014, 5/13/2014, 2/5/2014, 2/5/2014, 2/5/2014, 10/28/2013, 6/27/2013, 5/31/2013, 1/10/2013, 1/7/2013, 12/17/2012, 12/16/2012, 12/15/2012, 12/14/2012, 12/13/2012, 12/12/2012, 12/11/2012, 12/11/2012, 12/10/2012, 12/10/2012, 12/9/2012, 12/9/2012, 12/8/2012, 12/8/2012, 12/7/2012, 12/6/2012, 11/16/2012, 11/15/2012, 11/14/2012, 11/13/2012, 11/9/2012, 11/7/2012, 11/6/2012, 11/4/2012, 11/2/2012, 11/1/2012, 10/31/2012, 10/30/2012, 10/29/2012, 10/29/2012, 10/28/2012, 9/6/2012, 4/9/2012, 3/19/2012, 2/28/2012, 1/4/2012, 11/11/2011, 7/21/2011, 6/15/2011, 3/21/2011, 3/21/2011, 3/20/2011, 2/17/2011, 1/10/2009, 7/30/2008, 4/22/2008, 4/21/2008, 4/20/2008, 2/19/2008, 2/18/2008, 2/17/2008, 2/16/2008, 2/15/2008, 2/12/2008, 2/11/2008, 2/11/2008, 5/10/2007, 5/9/2007, 5/8/2007, 5/7/2007, 10/16/2006, 8/11/2006, 8/11/2006, 5/4/2006, 5/3/2006, 6/28/2005    IMM DTaP/Tdap/Td Vaccine (2 - Td) 7/16/2022 7/16/2012, 8/15/2009            Current Immunizations     13-VALENT PCV PREVNAR 1/19/2017 10:37 AM    Influenza Vaccine 1/1/1980    Pneumococcal Vaccine (UF)Historical Data 1/1/1980    TD Vaccine 8/15/2009  5:00 PM    Tdap Vaccine 7/16/2012      Below and/or attached are the medications your provider expects you to take. Review all of your home medications and newly ordered medications with your provider and/or pharmacist. Follow medication instructions as directed by your provider and/or pharmacist. Please keep your medication list with you and share with your provider. Update the information when medications are discontinued, doses are changed, or new medications (including over-the-counter products) are added; and carry medication information at all times in the event of emergency situations     Allergies:  DEMEROL - (reactions not documented)     STATINS - Unspecified               Medications  Valid as of: July 14, 2017 -  9:42 AM    Generic Name Brand Name Tablet Size Instructions for use     AmLODIPine Besylate (Tab) NORVASC 10 MG Take 1 Tab by mouth every day.        Clindamycin HCl (Cap) CLEOCIN 300 MG Take 1 Cap by mouth 2 Times a Day.        CloNIDine HCl (PATCH WEEKLY) CATAPRES 0.3 MG/24HR Apply 1 Patch to skin as directed every Wednesday.        Fluticasone Propionate (Suspension) FLONASE 50 MCG/ACT Spray 2 Sprays in nose as needed.        Gabapentin (Cap) NEURONTIN 300 MG Take 1-2 Caps by mouth 3 times a day.        HydrALAZINE HCl (Tab) APRESOLINE 100 MG Take 1 Tab by mouth every 8 hours.        HydroCHLOROthiazide (Tab) HYDRODIURIL 25 MG Take 0.5 Tabs by mouth every day.        Insulin Glargine (Solution) LANTUS 100 UNIT/ML Inject 30 Units as instructed every evening.        Lisinopril (Tab) PRINIVIL, ZESTRIL 40 MG Take 1 Tab by mouth every day.        Magnesium Chloride (Tab CR) SLO- (64 MG) MG Take 1 Tab by mouth 2 Times a Day.        MetFORMIN HCl (Tab) GLUCOPHAGE 500 MG Take 1 Tab by mouth 2 times a day, with meals.        Nystatin (Powder) MYCOSTATIN  Apply 1 Application to affected area(s) 3 times a day. Apply to reddened area under abdominal fold.        Oxycodone-Acetaminophen (Tab) PERCOCET-10  MG Take 1-2 Tabs by mouth every 6 hours as needed for Severe Pain.        Polyethylene Glycol 3350 (Pack) MIRALAX  Take 1 Packet by mouth 1 time daily as needed.        TraZODone HCl (Tab) DESYREL 150 MG Take 0.5 Tabs by mouth every bedtime.        Warfarin Sodium (Tab) COUMADIN 5 MG Take 2 Tabs by mouth every day at 6 PM.        .                 Medicines prescribed today were sent to:     [id number] - [place], [place] - [address] [place] [address]    Phone: [phone] Fax: [phone]    Open 24 Hours?: No      Medication refill instructions:       If your prescription bottle indicates you have medication refills left, it is not necessary to call your provider’s office. Please contact your pharmacy and they will refill your medication.    If  your prescription bottle indicates you do not have any refills left, you may request refills at any time through one of the following ways: The online [email or web address] system (except Urgent Care), by calling your provider’s office, or by asking your pharmacy to contact your provider’s office with a refill request. Medication refills are processed only during regular business hours and may not be available until the next business day. Your provider may request additional information or to have a follow-up visit with you prior to refilling your medication.   *Please Note: Medication refills are assigned a new Rx number when refilled electronically. Your pharmacy may indicate that no refills were authorized even though a new prescription for the same medication is available at the pharmacy. Please request the medicine by name with the pharmacy before contacting your provider for a refill.        Other Notes About Your Plan     Patient is enrolled in [facility] with Dr. Asher Estevez Status: Patient Declined

## 2017-07-14 NOTE — PROGRESS NOTES
"Subjective:      Joel Ma is a 66 y.o. male who presents with CKD I, proteinuria, DN Follow-Up            HPI  66 year old with DM2 for more than 15 years, and referred for proteinuria, from diabetic nephropathy.    1. CKD I - Has DN, Cr 0.89, proteinuria is out of proportion to the Cr. microalbumin to Cr ratio is 2gm. On lisinopril 40mg daily. BP still elevated.  2. DN - 2gm estimated. Has leg swelling.   3. HTN - last visit, started HCTZ. Urination appears to be appropriate.    Review of Systems   Constitutional: Negative for fever and chills.   Cardiovascular: Negative for chest pain and palpitations.   All other systems reviewed and are negative.         Objective:     /80 mmHg  Pulse 80  Temp(Src) 36.7 °C (98.1 °F) (Temporal)  Resp 16  Ht 1.943 m (6' 4.5\")  Wt 121.11 kg (267 lb)  BMI 32.08 kg/m2     Physical Exam   Constitutional: He is oriented to person, place, and time. He appears well-developed and well-nourished.   Cardiovascular: Normal rate and regular rhythm.    Pulmonary/Chest: Effort normal and breath sounds normal.   Neurological: He is alert and oriented to person, place, and time.   Skin: Skin is warm and dry.   Psychiatric: He has a normal mood and affect. His behavior is normal.               Assessment/Plan:     1. CKD (chronic kidney disease), stage I  Cr stable, still with proteinuria.    2. Diabetic nephropathy associated with type 2 diabetes mellitus (CMS-HCC)  2gm proteinuria.    3. Essential hypertension  BP above goal.      -Will increase HCTZ to 25mg daily, monitor results closely  -Monitor BP at home  -Next step is to increase lisinopril to 40mg BID  -We will start weaning Hydralazine once BP is slightly better control, as little added benefit in addition to norvasc  -Will likely need aldactone added shortly  -Follow up 6-8 weeks          "

## 2017-07-17 ENCOUNTER — TELEPHONE (OUTPATIENT)
Dept: MEDICAL GROUP | Facility: CLINIC | Age: 67
End: 2017-07-17

## 2017-07-17 NOTE — TELEPHONE ENCOUNTER
ESTABLISHED PATIENT PRE-VISIT PLANNING     Note: Patient will not be contacted if there is no indication to call.     1.  Reviewed notes from the last few office visits within the medical group: Yes    2.  If any orders were placed at last visit or intended to be done for this visit (i.e. 6 mos follow-up), do we have Results/Consult Notes?        •  Labs - Labs ordered, completed and results are in chart.       •  Imaging - Imaging was not ordered at last office visit.       •  Referrals - Referral ordered, patient was seen and consult notes are in chart. Care Teams updated  YES.    3. Is this appointment scheduled as a Hospital Follow-Up? No    4.  Immunizations were updated in Epic using WebIZ?: Epic matches WebIZ       •  Web [employer] Recommendations: Hep A, adult Zoster (Shingles) Influenza w/preserv.      5.  Patient is due for the following Health Maintenance Topics:   Health Maintenance Due   Topic Date Due   • IMM ZOSTER VACCINE  08/17/2009   • Annual Wellness Visit  10/28/2015   • COLONOSCOPY  06/30/2017           6.  Patient was NOT informed to arrive 15 min prior to their scheduled appointment and bring in their medication bottles.

## 2017-07-18 ENCOUNTER — OFFICE VISIT (OUTPATIENT)
Dept: MEDICAL GROUP | Facility: CLINIC | Age: 67
End: 2017-07-18
Payer: MEDICARE

## 2017-07-18 ENCOUNTER — HOSPITAL ENCOUNTER (OUTPATIENT)
Dept: PHYSICAL THERAPY | Facility: REHABILITATION | Age: 67
End: 2017-07-18
Attending: INTERNAL MEDICINE
Payer: MEDICARE

## 2017-07-18 VITALS
BODY MASS INDEX: 33.3 KG/M2 | OXYGEN SATURATION: 95 % | HEIGHT: 75 IN | WEIGHT: 267.8 LBS | DIASTOLIC BLOOD PRESSURE: 82 MMHG | RESPIRATION RATE: 18 BRPM | SYSTOLIC BLOOD PRESSURE: 114 MMHG | TEMPERATURE: 98.6 F | HEART RATE: 84 BPM

## 2017-07-18 DIAGNOSIS — G47.33 OSA TREATED WITH BIPAP: ICD-10-CM

## 2017-07-18 DIAGNOSIS — M54.2 NECK PAIN: ICD-10-CM

## 2017-07-18 DIAGNOSIS — S76.311A HAMSTRING MUSCLE STRAIN, RIGHT, INITIAL ENCOUNTER: ICD-10-CM

## 2017-07-18 DIAGNOSIS — R29.898 ARM WEAKNESS: ICD-10-CM

## 2017-07-18 PROCEDURE — 97110 THERAPEUTIC EXERCISES: CPT

## 2017-07-18 PROCEDURE — 99214 OFFICE O/P EST MOD 30 MIN: CPT | Performed by: INTERNAL MEDICINE

## 2017-07-18 PROCEDURE — 97014 ELECTRIC STIMULATION THERAPY: CPT

## 2017-07-18 RX ORDER — OXYCODONE AND ACETAMINOPHEN 10; 325 MG/1; MG/1
1 TABLET ORAL EVERY 6 HOURS PRN
Qty: 120 TAB | Refills: 0 | Status: SHIPPED | OUTPATIENT
Start: 2017-07-18 | End: 2017-07-18 | Stop reason: SDUPTHER

## 2017-07-18 RX ORDER — OXYCODONE AND ACETAMINOPHEN 10; 325 MG/1; MG/1
1 TABLET ORAL EVERY 6 HOURS PRN
Qty: 120 TAB | Refills: 0 | Status: SHIPPED | OUTPATIENT
Start: 2017-07-18 | End: 2017-10-19 | Stop reason: SDUPTHER

## 2017-07-18 NOTE — MR AVS SNAPSHOT
"        Joel Wolftaco   2017 11:20 AM   Office Visit   MRN: [id number]    Department:  [facility]   Dept Phone:  [phone]    Description:  Male : 1950   Provider:  Catrachito Sterling D.O.           Reason for Visit     Follow-Up CPAP evaluation       Allergies as of 2017     Allergen Noted Reactions    Demerol 2008       Makes me stop breathing.  Doesn't like because it makes him high    Statins [Hmg-Coa-R Inhibitors] 2016   Unspecified    Per pt report. Unknown reaction.      You were diagnosed with     Neck pain   [2013]         Vital Signs     Blood Pressure Pulse Temperature Respirations Height Weight    114/82 mmHg 84 37 °C (98.6 °F) 18 1.905 m (6' 3\") 121.473 kg (267 lb 12.8 oz)    Body Mass Index Oxygen Saturation Smoking Status             33.47 kg/m2 95% Former Smoker         Basic Information     Date Of Birth Sex Race Ethnicity Preferred Language    1950 Male White Non- English      Your appointments     2017  1:30 PM   PT Follow Up 60 Minutes with JOSHUA Haynes Physical Therapy [facility] ([address])    [address] [place] [address]   [phone]            2017 10:00 AM   PT Follow Up 60 Minutes with JOSHUA Haynes Physical Therapy [facility] ([address])    [address] [place] [id number]   [phone]            2017 10:00 AM   PT Follow Up 60 Minutes with JOSHUA Haynes Physical Therapy [facility] ([address])    [address] [place], [place] [address] [place] [address]   [phone]            2017  9:00 AM   PT Follow Up 60 Minutes with JOSHUA Haynes Physical Therapy [facility] ([address])    [facility] [address] [place] [address]   [phone]            2017 10:30 AM   PT Follow Up 60 Minutes with JOSHUA Haynes Physical Therapy [facility]" [address] ([address] [place] [address])    [address] [facility]  [address]  [place] [address]   [phone]            Aug 02, 2017 10:00 AM   PT Follow Up 60 Minutes with Ember Golden P.T.   [facility] Physical [facility] ([address] [place] [address])    [address] [facility]  [address]  [place] [address]   [phone]            Aug 18, 2017 11:20 AM   Follow Up Visit with Haris Alvarez M.D.   [facility] (--)    [address] [facility] [address]  [place] [address]   [phone]           You will be receiving a confirmation call a few days before your appointment from our automated call confirmation system.            Aug 25, 2017 10:00 AM   Follow Up Visit with Johnathon Bonner M.D.   [facility] ([address] [place] [address])    [address]  [place] [address]   [phone]           You will be receiving a confirmation call a few days before your appointment from our automated call confirmation system.            Oct 19, 2017 10:00 AM   Established Patient with Catrachito Sterling D.O.   [facility] ([facility])    [address] [facility] [address]  [place] [address]   [phone]           You will be receiving a confirmation call a few days before your appointment from our automated call confirmation system.            Nov 07, 2017 10:20 AM   Follow UP with DEIDRA Ibarra   [facility] (--)    [address] [facility] [address]  [place] [address]   [phone]              Problem List              ICD-10-CM Priority Class Noted - Resolved    CVA (cerebral vascular accident) (CMS-HCC) I63.9 Medium  6/4/2009 - Present    Dyslipidemia E78.5   6/17/2009 - Present    Thyroid mass E07.9   7/30/2009 - Present    HTN (hypertension) I10 Medium  8/16/2010 - Present    Ventricular ectopy I49.3   6/17/2011 - Present    Trigger finger M65.30   6/17/2011 - Present    MEDICAL HOME    Unknown - Present    Diabetes mellitus with neurological manifestations,  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 Low  3/5/2012 - Present    BPH (benign prostatic hyperplasia) N40.0   4/28/2014 - Present    Anticoagulated on warfarin Z79.01   4/28/2014 - Present    Chronic antibiotic suppression Z79.2   4/28/2014 - Present    Myalgia M79.1   5/29/2014 - Present    Risk for falls Z91.81   10/27/2014 - Present    BMI 38.0-38.9,adult Z68.38   10/27/2014 - Present    Diabetic polyneuropathy (CMS-HCC) E11.42 High  5/6/2015 - Present    Toe amputation status (CMS-HCC) Z89.429 High  5/6/2015 - Present    Atrial fibrillation [427.31] I48.91   6/24/2015 - Present    History of CVA (cerebrovascular accident) Z86.73   11/11/2015 - Present    JANNIE treated with BiPAP G47.33   4/18/2016 - Present    Left knee pain M25.562 High  8/28/2016 - Present    Cervical stenosis of spine M48.02   9/6/2016 - Present    Dysphagia R13.10   9/6/2016 - Present    Chronic atrial fibrillation (CMS-HCC) I48.2   9/6/2016 - Present    Hallucinations R44.3   9/6/2016 - Present    Other orthopedic aftercare Z47.89   11/7/2016 - Present    Chronic use of opiate drugs therapeutic purposes Z79.891   2/14/2017 - Present    CKD (chronic kidney disease), stage I N18.1   5/12/2017 - Present    Diabetic nephropathy associated with type 2 diabetes mellitus (CMS-HCC) E11.21   5/12/2017 - Present    Essential hypertension I10   5/12/2017 - Present      Health Maintenance        Date Due Completion Dates    IMM ZOSTER VACCINE 8/17/2009 ---    COLONOSCOPY 6/30/2017 6/30/2014    IMM INFLUENZA (1) 9/1/2017 ---    A1C SCREENING 1/12/2018 7/12/2017, 4/28/2017, 12/16/2016, 12/7/2016, 8/29/2016, 8/10/2016, 7/7/2016, 4/29/2016, 9/21/2015, 8/17/2015, 5/11/2015, 5/11/2015, 3/30/2015, 2/10/2015, 11/12/2014, 8/13/2014, 5/13/2014, 2/5/2014, 2/5/2014, 2/5/2014, 10/28/2013, 10/28/2013, 8/26/2013, 5/31/2013, 1/10/2013, 10/29/2012, 9/6/2012, 9/6/2012, 4/9/2012, 3/19/2012, 11/11/2011, 2/17/2011, 11/15/2010, 8/13/2010, 7/27/2010, 5/5/2010, 3/24/2010, 2/18/2010,  12/23/2009, 12/7/2009, 7/13/2009, 4/22/2008, 2/11/2008, 8/12/2006, 8/11/2006, 5/4/2006    IMM PNEUMOCOCCAL 65+ (ADULT) LOW/MEDIUM RISK SERIES (2 of 2 - PPSV23) 1/19/2018 1/19/2017    DIABETES MONOFILAMENT / LE EXAM 3/17/2018 3/17/2017, 11/16/2015 (Done), 2/11/2014 (Done), 5/31/2013 (Done), 5/18/2011 (N/S)    Override on 11/16/2015: Done (Catrachito Sterling, )    Override on 2/11/2014: Done    Override on 5/31/2013: Done    Override on 5/18/2011: (N/S) (patient with mild neuropathy distal toes. )    FASTING LIPID PROFILE 4/28/2018 4/28/2017, 8/10/2016, 7/7/2016, 4/29/2016, 9/21/2015, 8/17/2015, 5/11/2015, 3/30/2015, 8/13/2014, 2/5/2014, 2/5/2014, 2/5/2014, 10/28/2013, 8/19/2013, 7/16/2013, 5/31/2013, 4/10/2013, 10/30/2012, 10/3/2012, 4/27/2012, 6/15/2011, 8/13/2010, 5/5/2010, 12/23/2009, 12/7/2009, 4/21/2008, 2/12/2008, 5/8/2007, 8/11/2006    RETINAL SCREENING 5/17/2018 5/17/2017, 3/17/2017, 10/28/2015, 9/16/2015, 8/12/2015, 3/10/2014, 9/24/2012    URINE ACR / MICROALBUMIN 7/12/2018 7/12/2017, 5/1/2017, 5/11/2015, 2/10/2015, 11/12/2014, 2/5/2014, 10/28/2013, 5/31/2013, 9/6/2012, 4/9/2012, 11/11/2011, 11/15/2010    SERUM CREATININE 7/12/2018 7/12/2017, 4/28/2017, 12/16/2016, 12/7/2016, 12/6/2016, 11/28/2016, 11/25/2016, 11/11/2016, 11/8/2016, 9/9/2016, 9/7/2016, 9/6/2016, 9/1/2016, 8/31/2016, 8/30/2016, 8/29/2016, 8/28/2016, 8/10/2016, 7/22/2016, 7/7/2016, 4/29/2016, 11/18/2015, 9/21/2015, 9/15/2015, 8/17/2015, 6/19/2015, 5/11/2015, 3/30/2015, 2/10/2015, 11/12/2014, 10/13/2014, 8/13/2014, 5/13/2014, 2/5/2014, 2/5/2014, 2/5/2014, 10/28/2013, 6/27/2013, 5/31/2013, 1/10/2013, 1/7/2013, 12/17/2012, 12/16/2012, 12/15/2012, 12/14/2012, 12/13/2012, 12/12/2012, 12/11/2012, 12/11/2012, 12/10/2012, 12/10/2012, 12/9/2012, 12/9/2012, 12/8/2012, 12/8/2012, 12/7/2012, 12/6/2012, 11/16/2012, 11/15/2012, 11/14/2012, 11/13/2012, 11/9/2012, 11/7/2012, 11/6/2012, 11/4/2012, 11/2/2012, 11/1/2012, 10/31/2012, 10/30/2012, 10/29/2012,  10/29/2012, 10/28/2012, 9/6/2012, 4/9/2012, 3/19/2012, 2/28/2012, 1/4/2012, 11/11/2011, 7/21/2011, 6/15/2011, 3/21/2011, 3/21/2011, 3/20/2011, 2/17/2011, 1/10/2009, 7/30/2008, 4/22/2008, 4/21/2008, 4/20/2008, 2/19/2008, 2/18/2008, 2/17/2008, 2/16/2008, 2/15/2008, 2/12/2008, 2/11/2008, 2/11/2008, 5/10/2007, 5/9/2007, 5/8/2007, 5/7/2007, 10/16/2006, 8/11/2006, 8/11/2006, 5/4/2006, 5/3/2006, 6/28/2005    IMM DTaP/Tdap/Td Vaccine (2 - Td) 7/16/2022 7/16/2012, 8/15/2009            Current Immunizations     13-VALENT PCV PREVNAR 1/19/2017 10:37 AM    Influenza Vaccine 1/1/1980    Pneumococcal Vaccine (UF)Historical Data 1/1/1980    TD Vaccine 8/15/2009  5:00 PM    Tdap Vaccine 7/16/2012      Below and/or attached are the medications your provider expects you to take. Review all of your home medications and newly ordered medications with your provider and/or pharmacist. Follow medication instructions as directed by your provider and/or pharmacist. Please keep your medication list with you and share with your provider. Update the information when medications are discontinued, doses are changed, or new medications (including over-the-counter products) are added; and carry medication information at all times in the event of emergency situations     Allergies:  DEMEROL - (reactions not documented)     STATINS - Unspecified               Medications  Valid as of: July 18, 2017 - 12:23 PM    Generic Name Brand Name Tablet Size Instructions for use    AmLODIPine Besylate (Tab) NORVASC 10 MG Take 1 Tab by mouth every day.        Clindamycin HCl (Cap) CLEOCIN 300 MG Take 1 Cap by mouth 2 Times a Day.        CloNIDine HCl (PATCH WEEKLY) CATAPRES 0.3 MG/24HR Apply 1 Patch to skin as directed every Wednesday.        Fluticasone Propionate (Suspension) FLONASE 50 MCG/ACT Spray 2 Sprays in nose as needed.        Gabapentin (Cap) NEURONTIN 300 MG Take 1-2 Caps by mouth 3 times a day.        HydrALAZINE HCl (Tab) APRESOLINE 100 MG Take 1  Tab by mouth every 8 hours.        HydroCHLOROthiazide (Tab) HYDRODIURIL 25 MG Take 0.5 Tabs by mouth every day.        Insulin Glargine (Solution) LANTUS 100 UNIT/ML Inject 30 Units as instructed every evening.        Lisinopril (Tab) PRINIVIL, ZESTRIL 40 MG Take 1 Tab by mouth every day.        Magnesium Chloride (Tab CR) SLO- (64 MG) MG Take 1 Tab by mouth 2 Times a Day.        MetFORMIN HCl (Tab) GLUCOPHAGE 500 MG Take 1 Tab by mouth 2 times a day, with meals.        Nystatin (Powder) MYCOSTATIN  Apply 1 Application to affected area(s) 3 times a day. Apply to reddened area under abdominal fold.        Oxycodone-Acetaminophen (Tab) PERCOCET-10  MG Take 1 Tab by mouth every 6 hours as needed for Severe Pain.        Polyethylene Glycol 3350 (Pack) MIRALAX  Take 1 Packet by mouth 1 time daily as needed.        TraZODone HCl (Tab) DESYREL 150 MG Take 0.5 Tabs by mouth every bedtime.        Warfarin Sodium (Tab) COUMADIN 5 MG Take 2 Tabs by mouth every day at 6 PM.        .                 Medicines prescribed today were sent to:     [id number] - [place], [place] - [address] [place] [id number]    Phone: [phone] Fax: [phone]    Open 24 Hours?: No      Medication refill instructions:       If your prescription bottle indicates you have medication refills left, it is not necessary to call your provider’s office. Please contact your pharmacy and they will refill your medication.    If your prescription bottle indicates you do not have any refills left, you may request refills at any time through one of the following ways: The online [employer] system (except Urgent Care), by calling your provider’s office, or by asking your pharmacy to contact your provider’s office with a refill request. Medication refills are processed only during regular business hours and may not be available until the next business day. Your provider may request additional information or to have a  follow-up visit with you prior to refilling your medication.   *Please Note: Medication refills are assigned a new Rx number when refilled electronically. Your pharmacy may indicate that no refills were authorized even though a new prescription for the same medication is available at the pharmacy. Please request the medicine by name with the pharmacy before contacting your provider for a refill.        Other Notes About Your Plan     Patient is enrolled in [facility] with Dr. Asher Estevez Status: Patient Declined

## 2017-07-19 NOTE — PROGRESS NOTES
CC: Joel Ma is a 66 y.o. male is suffering from   Chief Complaint   Patient presents with   • Follow-Up     CPAP evaluation          SUBJECTIVE:  1. JANNIE treated with BiPAP  Puls here for follow-up, continues on BiPAP for obstructive sleep apnea, old records were reviewed from 2015, paperwork was completed.     2. Neck pain  Patient continues on Percocet because of neck pain discomfort.     3. Arm weakness  Patient has residual arm weakness after undergoing neck surgery. Apparently, patient's physical therapy is been placed on hold, and encourage patient to continue to exercise        Past social, family, history: Single  Social History   Substance Use Topics   • Smoking status: Former Smoker -- 4.00 packs/day for .5 years     Types: Cigarettes     Quit date: 01/01/1970   • Smokeless tobacco: Never Used   • Alcohol Use: No         MEDICATIONS:    Current outpatient prescriptions:   •  oxycodone-acetaminophen (PERCOCET-10)  MG Tab, Take 1 Tab by mouth every 6 hours as needed for Severe Pain., Disp: 120 Tab, Rfl: 0  •  hydrochlorothiazide (HYDRODIURIL) 25 MG Tab, Take 0.5 Tabs by mouth every day., Disp: 90 Tab, Rfl: 3  •  gabapentin (NEURONTIN) 300 MG Cap, Take 1-2 Caps by mouth 3 times a day., Disp: 120 Cap, Rfl: 11  •  clindamycin (CLEOCIN) 300 MG Cap, Take 1 Cap by mouth 2 Times a Day., Disp: 180 Cap, Rfl: 1  •  clonidine (CATAPRES) 0.3 MG/24HR PATCH WEEKLY, Apply 1 Patch to skin as directed every Wednesday., Disp: 12 Patch, Rfl: 11  •  hydrALAZINE (APRESOLINE) 100 MG tablet, Take 1 Tab by mouth every 8 hours., Disp: 270 Tab, Rfl: 3  •  lisinopril (PRINIVIL, ZESTRIL) 40 MG tablet, Take 1 Tab by mouth every day., Disp: 90 Tab, Rfl: 0  •  metformin (GLUCOPHAGE) 500 MG Tab, Take 1 Tab by mouth 2 times a day, with meals. (Patient taking differently: Take 1,000 mg by mouth 2 times a day, with meals.), Disp: 180 Tab, Rfl: 3  •  trazodone (DESYREL) 150 MG Tab, Take 0.5 Tabs by mouth every bedtime., Disp:  45 Tab, Rfl: 3  •  warfarin (COUMADIN) 5 MG Tab, Take 2 Tabs by mouth every day at 6 PM., Disp: 180 Tab, Rfl: 3  •  fluticasone (FLONASE) 50 MCG/ACT nasal spray, Spray 2 Sprays in nose as needed., Disp: 1 Bottle, Rfl: 5  •  nystatin (MYCOSTATIN) Powder, Apply 1 Application to affected area(s) 3 times a day. Apply to reddened area under abdominal fold., Disp: 1 Bottle, Rfl: 0  •  polyethylene glycol/lytes (MIRALAX) Pack, Take 1 Packet by mouth 1 time daily as needed., Disp: 30 Each, Rfl: 0  •  insulin glargine (LANTUS) 100 UNIT/ML Solution, Inject 30 Units as instructed every evening., Disp: 10 mL, Rfl: 5  •  magnesium chloride SR (SLO-MAG) 535 (64 MG) MG Tab CR, Take 1 Tab by mouth 2 Times a Day. (Patient taking differently: Take 1 Tab by mouth 2 times a day as needed.), Disp: 60 Tab, Rfl: 0  •  amlodipine (NORVASC) 10 MG Tab, Take 1 Tab by mouth every day. (Patient not taking: Reported on 7/12/2017), Disp: 90 Tab, Rfl: 3    PROBLEMS:  Patient Active Problem List    Diagnosis Date Noted   • Left knee pain 08/28/2016     Priority: High   • Diabetic polyneuropathy (CMS-HCC) 05/06/2015     Priority: High   • Toe amputation status (CMS-HCC) 05/06/2015     Priority: High   • HTN (hypertension) 08/16/2010     Priority: Medium   • CVA (cerebral vascular accident) (CMS-HCC) 06/04/2009     Priority: Medium   • Diabetes mellitus with neurological manifestations, controlled (CMS-HCC) 03/05/2012     Priority: Low   • CKD (chronic kidney disease), stage I 05/12/2017   • Diabetic nephropathy associated with type 2 diabetes mellitus (CMS-HCC) 05/12/2017   • Essential hypertension 05/12/2017   • Chronic use of opiate drugs therapeutic purposes 02/14/2017   • Other orthopedic aftercare 11/07/2016   • Cervical stenosis of spine 09/06/2016   • Dysphagia 09/06/2016   • Chronic atrial fibrillation (CMS-HCC) 09/06/2016   • Hallucinations 09/06/2016   • JANNIE treated with BiPAP 04/18/2016   • History of CVA (cerebrovascular accident)  "11/11/2015   • Atrial fibrillation [427.31] 06/24/2015   • Risk for falls 10/27/2014   • BMI 38.0-38.9,adult 10/27/2014   • Myalgia 05/29/2014   • BPH (benign prostatic hyperplasia) 04/28/2014   • Anticoagulated on warfarin 04/28/2014   • Chronic antibiotic suppression 04/28/2014   • MEDICAL HOME    • Ventricular ectopy 06/17/2011   • Trigger finger 06/17/2011   • Thyroid mass 07/30/2009   • Dyslipidemia 06/17/2009       REVIEW OF SYSTEMS:  Gen.:  No Nausea, Vomiting, fever, Chills.  Heart: No chest pain.  Lungs:  No shortness of Breath.  Psychological: Eleazar unusual Anxiety depression     PHYSICAL EXAM   Constitutional: Alert, cooperative, not in acute distress.  Cardiovascular:  Rate Rhythm is regular without murmurs rubs clicks.     Thorax & Lungs: Clear to auscultation, no wheezing, rhonchi, or rales  HENT: Normocephalic, Atraumatic.  Eyes: PERRLA, EOMI, Conjunctiva normal.   Neck: Trachia is midline no swelling of the thyroid.   Lymphatic: No lymphadenopathy noted.   Musculoskeletal: Significant bilateral arm weakness going in flexion. Complaints of pain right hamstring  Neurologic: Alert & oriented x 3, cranial nerves II through XII are intact, Normal motor function, Normal sensory function, No focal deficits noted.   Psychiatric: Affect normal, Judgment normal, Mood normal.     VITAL SIGNS:/82 mmHg  Pulse 84  Temp(Src) 37 °C (98.6 °F)  Resp 18  Ht 1.905 m (6' 3\")  Wt 121.473 kg (267 lb 12.8 oz)  BMI 33.47 kg/m2  SpO2 95%    Labs: Reviewed    Assessment:                                                     Plan:    1. JANNIE treated with BiPAP  Obstructive sleep apnea paperwork completed consultation copied and faxed.     2. Neck pain  Neck pain continue Percocet.   - oxycodone-acetaminophen (PERCOCET-10)  MG Tab; Take 1 Tab by mouth every 6 hours as needed for Severe Pain.  Dispense: 120 Tab; Refill: 0    3. Arm weakness  Arm weakness patient's to continue with physical therapy.     4. " Hamstring muscle strain, right, initial encounter  Patient in right hamstring pain, is to undergo physical therapy for this also. Orders already written.

## 2017-07-20 ENCOUNTER — ANTICOAGULATION VISIT (OUTPATIENT)
Dept: VASCULAR LAB | Facility: MEDICAL CENTER | Age: 67
End: 2017-07-20
Attending: INTERNAL MEDICINE
Payer: MEDICARE

## 2017-07-20 ENCOUNTER — TELEPHONE (OUTPATIENT)
Dept: VASCULAR LAB | Facility: MEDICAL CENTER | Age: 67
End: 2017-07-20

## 2017-07-20 VITALS — DIASTOLIC BLOOD PRESSURE: 77 MMHG | SYSTOLIC BLOOD PRESSURE: 146 MMHG | HEART RATE: 78 BPM

## 2017-07-20 DIAGNOSIS — I63.9 CEREBROVASCULAR ACCIDENT (CVA), UNSPECIFIED MECHANISM (HCC): ICD-10-CM

## 2017-07-20 DIAGNOSIS — Z79.01 ANTICOAGULATED ON WARFARIN: ICD-10-CM

## 2017-07-20 DIAGNOSIS — I48.91 ATRIAL FIBRILLATION, UNSPECIFIED TYPE (HCC): ICD-10-CM

## 2017-07-20 DIAGNOSIS — Z86.73 HISTORY OF CVA (CEREBROVASCULAR ACCIDENT): ICD-10-CM

## 2017-07-20 LAB — INR PPP: 1.6 (ref 2–3.5)

## 2017-07-20 PROCEDURE — 85610 PROTHROMBIN TIME: CPT

## 2017-07-20 PROCEDURE — 99212 OFFICE O/P EST SF 10 MIN: CPT

## 2017-07-20 NOTE — TELEPHONE ENCOUNTER
Pt called to inquire about home monitor.  Offered to make an appoinment today, as we have several openings, however, he prefers to call just before he will comes in.  Beth Angulo, ANTONINAD

## 2017-07-20 NOTE — PROGRESS NOTES
Anticoagulation Summary as of 7/20/2017     INR goal 2.0-3.0   Selected INR 1.6! (7/20/2017)   Maintenance plan 10 mg (5 mg x 2) every day   Weekly total 70 mg   Plan last modified Jefferson Biran PHARMD (1/3/2017)   Next INR check 7/24/2017   Target end date Indefinite    Indications   CVA (cerebral vascular accident) (CMS-HCC) [I63.9]  Atrial fibrillation [427.31] [I48.91]  History of CVA (cerebrovascular accident) [Z86.73]  Anticoagulated on warfarin [Z79.01]         Anticoagulation Episode Summary     INR check location [facility]    Preferred lab     Send INR reminders to     Comments call pt multiple times, he has a hard time getting to his phone in time and has no VM set up      Anticoagulation Care Providers     Provider Role Specialty Phone number    BEL Lee.SIMONE. Referring Internal Medicine [phone]    [facility] Responsible  [phone]    Liseth Doe, A.P.N. Responsible Cardiology [phone]    Gi Cadena A.P.N. Responsible Cardiology [phone]        Anticoagulation Patient Findings   Negatives Missed Doses, Extra Doses, Medication Changes, Antibiotic Use, Diet Changes, Dental/Other Procedures, Hospitalization, Bleeding Gums, Nose Bleeds, Blood in Urine, Blood in Stool, Any Bruising, Other Complaints        Pt has not had INR check in 4 months.  Pt is sub therapeutic today.  Will bolus dose with 15mg po qhs x2 then resume current dosing regimen. Pt denies any unusual s/s of bleeding, bruising, clotting or any changes to diet or medications.  Follow up in 4 days    Beht Angulo, ANTONINAD

## 2017-07-20 NOTE — MR AVS SNAPSHOT
Joel Wolftaco   2017 2:00 PM   Anticoagulation Visit   MRN: [id number]    Department:  Vascular Medicine   Dept Phone:  [phone]    Description:  Male : 1950   Provider:  [facility] EXAM 5           Allergies as of 2017     Allergen Noted Reactions    Demerol 2008       Makes me stop breathing.  Doesn't like because it makes him high    Statins [Hmg-Coa-R Inhibitors] 2016   Unspecified    Per pt report. Unknown reaction.      You were diagnosed with     Cerebrovascular accident (CVA), unspecified mechanism (CMS-HCC)   [2328243]       History of CVA (cerebrovascular accident)   [180973]       Atrial fibrillation, unspecified type (CMS-HCC)   [5301552]       Anticoagulated on warfarin   [962655]         Vital Signs     Blood Pressure Pulse Smoking Status             146/77 mmHg 78 Former Smoker         Basic Information     Date Of Birth Sex Race Ethnicity Preferred Language    1950 Male White Non- English      Your appointments     2017  2:00 PM   Established Patient with [facility] EXAM 5   [facility]  (--)    [address] [facility] [address]   [phone]            2017 10:00 AM   PT Follow Up 60 Minutes with JOSHUA [facility]nes [facility] ([address])    [address] [place] [address]   [phone]            2017 10:00 AM   PT Follow Up 60 Minutes with JOSHUA Haynes Physical Therapy [facility] ([address])    [address] [place] [address]   [phone]            2017 11:45 AM   Established Patient with [facility] EXAM 5   [facility]  (--)    [address] [facility] [address]   [phone]            2017  9:00 AM   PT Follow Up 60 Minutes with JOSHUA [facility] [address]  [place])    [address] [facility]  [address]  [place] [address]   [phone]            Jul 31, 2017 10:30 AM   PT Follow Up 60 Minutes with Ember Golden P.T.   [facility] ([address] [place] [address])    [address] [facility]  [address]  [place] [address]   [phone]            Aug 02, 2017 10:00 AM   PT Follow Up 60 Minutes with Ember Golden P.T.   [facility] ([address])    [address] [facility]  [address]  [place] [address]   [phone]            Aug 18, 2017 11:20 AM   Follow Up Visit with Haris Alvarez M.D.   [facility] (--)    [address] [facility] [address]  [place] [address]   [phone]           You will be receiving a confirmation call a few days before your appointment from our automated call confirmation system.            Aug 25, 2017 10:00 AM   Follow Up Visit with Johnathon Bonner M.D.   [facility] ([address] [place] [address])    [address]  [place] [address]   [phone]           You will be receiving a confirmation call a few days before your appointment from our automated call confirmation system.            Oct 19, 2017 10:00 AM   Established Patient with Catrachito Sterling D.O.   [facility] ([facility])    [address] [facility] [address]  [place] [address] [phone]           You will be receiving a confirmation call a few days before your appointment from our automated call confirmation system.            Nov 07, 2017 10:20 AM   Follow UP with DEIDRA Ibarra   [facility] (--)    [address] [facility] [address]  [place] [address] [phone]              Problem List              ICD-10-CM Priority Class Noted - Resolved    CVA (cerebral vascular accident) (CMS-HCC) I63.9 Medium  6/4/2009 - Present    Dyslipidemia E78.5   6/17/2009 - Present    Thyroid mass E07.9   7/30/2009 - Present    HTN (hypertension) I10 Medium  8/16/2010 -  Present    Ventricular ectopy I49.3   6/17/2011 - Present    Trigger finger M65.30   6/17/2011 - Present    MEDICAL HOME    Unknown - Present    Diabetes mellitus with neurological manifestations, 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 Low  3/5/2012 - Present    BPH (benign prostatic hyperplasia) N40.0   4/28/2014 - Present    Anticoagulated on warfarin Z79.01   4/28/2014 - Present    Chronic antibiotic suppression Z79.2   4/28/2014 - Present    Myalgia M79.1   5/29/2014 - Present    Risk for falls Z91.81   10/27/2014 - Present    BMI 38.0-38.9,adult Z68.38   10/27/2014 - Present    Diabetic polyneuropathy (CMS-HCC) E11.42 High  5/6/2015 - Present    Toe amputation status (CMS-HCC) Z89.429 High  5/6/2015 - Present    Atrial fibrillation [427.31] I48.91   6/24/2015 - Present    History of CVA (cerebrovascular accident) Z86.73   11/11/2015 - Present    JANNIE treated with BiPAP G47.33   4/18/2016 - Present    Left knee pain M25.562 High  8/28/2016 - Present    Cervical stenosis of spine M48.02   9/6/2016 - Present    Dysphagia R13.10   9/6/2016 - Present    Chronic atrial fibrillation (CMS-HCC) I48.2   9/6/2016 - Present    Hallucinations R44.3   9/6/2016 - Present    Other orthopedic aftercare Z47.89   11/7/2016 - Present    Chronic use of opiate drugs therapeutic purposes Z79.891   2/14/2017 - Present    CKD (chronic kidney disease), stage I N18.1   5/12/2017 - Present    Diabetic nephropathy associated with type 2 diabetes mellitus (CMS-HCC) E11.21   5/12/2017 - Present    Essential hypertension I10   5/12/2017 - Present      Health Maintenance        Date Due Completion Dates    IMM ZOSTER VACCINE 8/17/2009 ---    COLONOSCOPY 6/30/2017 6/30/2014    IMM INFLUENZA (1) 9/1/2017 ---    A1C SCREENING 1/12/2018 7/12/2017, 4/28/2017, 12/16/2016, 12/7/2016, 8/29/2016, 8/10/2016, 7/7/2016, 4/29/2016, 9/21/2015, 8/17/2015, 5/11/2015, 5/11/2015, 3/30/2015, 2/10/2015, 11/12/2014, 8/13/2014, 5/13/2014, 2/5/2014, 2/5/2014, 2/5/2014,  10/28/2013, 10/28/2013, 8/26/2013, 5/31/2013, 1/10/2013, 10/29/2012, 9/6/2012, 9/6/2012, 4/9/2012, 3/19/2012, 11/11/2011, 2/17/2011, 11/15/2010, 8/13/2010, 7/27/2010, 5/5/2010, 3/24/2010, 2/18/2010, 12/23/2009, 12/7/2009, 7/13/2009, 4/22/2008, 2/11/2008, 8/12/2006, 8/11/2006, 5/4/2006    IMM PNEUMOCOCCAL 65+ (ADULT) LOW/MEDIUM RISK SERIES (2 of 2 - PPSV23) 1/19/2018 1/19/2017    DIABETES MONOFILAMENT / LE EXAM 3/17/2018 3/17/2017, 11/16/2015 (Done), 2/11/2014 (Done), 5/31/2013 (Done), 5/18/2011 (N/S)    Override on 11/16/2015: Done (Catrachito Sterling DO)    Override on 2/11/2014: Done    Override on 5/31/2013: Done    Override on 5/18/2011: (N/S) (patient with mild neuropathy distal toes. )    FASTING LIPID PROFILE 4/28/2018 4/28/2017, 8/10/2016, 7/7/2016, 4/29/2016, 9/21/2015, 8/17/2015, 5/11/2015, 3/30/2015, 8/13/2014, 2/5/2014, 2/5/2014, 2/5/2014, 10/28/2013, 8/19/2013, 7/16/2013, 5/31/2013, 4/10/2013, 10/30/2012, 10/3/2012, 4/27/2012, 6/15/2011, 8/13/2010, 5/5/2010, 12/23/2009, 12/7/2009, 4/21/2008, 2/12/2008, 5/8/2007, 8/11/2006    URINE ACR / MICROALBUMIN 7/12/2018 7/12/2017, 5/1/2017, 5/11/2015, 2/10/2015, 11/12/2014, 2/5/2014, 10/28/2013, 5/31/2013, 9/6/2012, 4/9/2012, 11/11/2011, 11/15/2010    SERUM CREATININE 7/12/2018 7/12/2017, 4/28/2017, 12/16/2016, 12/7/2016, 12/6/2016, 11/28/2016, 11/25/2016, 11/11/2016, 11/8/2016, 9/9/2016, 9/7/2016, 9/6/2016, 9/1/2016, 8/31/2016, 8/30/2016, 8/29/2016, 8/28/2016, 8/10/2016, 7/22/2016, 7/7/2016, 4/29/2016, 11/18/2015, 9/21/2015, 9/15/2015, 8/17/2015, 6/19/2015, 5/11/2015, 3/30/2015, 2/10/2015, 11/12/2014, 10/13/2014, 8/13/2014, 5/13/2014, 2/5/2014, 2/5/2014, 2/5/2014, 10/28/2013, 6/27/2013, 5/31/2013, 1/10/2013, 1/7/2013, 12/17/2012, 12/16/2012, 12/15/2012, 12/14/2012, 12/13/2012, 12/12/2012, 12/11/2012, 12/11/2012, 12/10/2012, 12/10/2012, 12/9/2012, 12/9/2012, 12/8/2012, 12/8/2012, 12/7/2012, 12/6/2012, 11/16/2012, 11/15/2012, 11/14/2012, 11/13/2012, 11/9/2012,  11/7/2012, 11/6/2012, 11/4/2012, 11/2/2012, 11/1/2012, 10/31/2012, 10/30/2012, 10/29/2012, 10/29/2012, 10/28/2012, 9/6/2012, 4/9/2012, 3/19/2012, 2/28/2012, 1/4/2012, 11/11/2011, 7/21/2011, 6/15/2011, 3/21/2011, 3/21/2011, 3/20/2011, 2/17/2011, 1/10/2009, 7/30/2008, 4/22/2008, 4/21/2008, 4/20/2008, 2/19/2008, 2/18/2008, 2/17/2008, 2/16/2008, 2/15/2008, 2/12/2008, 2/11/2008, 2/11/2008, 5/10/2007, 5/9/2007, 5/8/2007, 5/7/2007, 10/16/2006, 8/11/2006, 8/11/2006, 5/4/2006, 5/3/2006, 6/28/2005    RETINAL SCREENING 7/19/2018 7/19/2017, 5/17/2017, 3/17/2017, 10/28/2015, 9/16/2015, 8/12/2015, 3/10/2014, 9/24/2012    IMM DTaP/Tdap/Td Vaccine (2 - Td) 7/16/2022 7/16/2012, 8/15/2009            Results     POCT Protime      Component    INR    1.6                        Current Immunizations     13-VALENT PCV PREVNAR 1/19/2017 10:37 AM    Influenza Vaccine 1/1/1980    Pneumococcal Vaccine (UF)Historical Data 1/1/1980    TD Vaccine 8/15/2009  5:00 PM    Tdap Vaccine 7/16/2012      Below and/or attached are the medications your provider expects you to take. Review all of your home medications and newly ordered medications with your provider and/or pharmacist. Follow medication instructions as directed by your provider and/or pharmacist. Please keep your medication list with you and share with your provider. Update the information when medications are discontinued, doses are changed, or new medications (including over-the-counter products) are added; and carry medication information at all times in the event of emergency situations     Allergies:  DEMEROL - (reactions not documented)     STATINS - Unspecified               Medications  Valid as of: July 20, 2017 -  1:47 PM    Generic Name Brand Name Tablet Size Instructions for use    AmLODIPine Besylate (Tab) NORVASC 10 MG Take 1 Tab by mouth every day.        Clindamycin HCl (Cap) CLEOCIN 300 MG Take 1 Cap by mouth 2 Times a Day.        CloNIDine HCl (PATCH WEEKLY) CATAPRES 0.3  MG/24HR Apply 1 Patch to skin as directed every Wednesday.        Fluticasone Propionate (Suspension) FLONASE 50 MCG/ACT Spray 2 Sprays in nose as needed.        Gabapentin (Cap) NEURONTIN 300 MG Take 1-2 Caps by mouth 3 times a day.        HydrALAZINE HCl (Tab) APRESOLINE 100 MG Take 1 Tab by mouth every 8 hours.        HydroCHLOROthiazide (Tab) HYDRODIURIL 25 MG Take 0.5 Tabs by mouth every day.        Insulin Glargine (Solution) LANTUS 100 UNIT/ML Inject 30 Units as instructed every evening.        Lisinopril (Tab) PRINIVIL, ZESTRIL 40 MG Take 1 Tab by mouth every day.        Magnesium Chloride (Tab CR) SLO- (64 MG) MG Take 1 Tab by mouth 2 Times a Day.        MetFORMIN HCl (Tab) GLUCOPHAGE 500 MG Take 1 Tab by mouth 2 times a day, with meals.        Nystatin (Powder) MYCOSTATIN  Apply 1 Application to affected area(s) 3 times a day. Apply to reddened area under abdominal fold.        Oxycodone-Acetaminophen (Tab) PERCOCET-10  MG Take 1 Tab by mouth every 6 hours as needed for Severe Pain.        Polyethylene Glycol 3350 (Pack) MIRALAX  Take 1 Packet by mouth 1 time daily as needed.        TraZODone HCl (Tab) DESYREL 150 MG Take 0.5 Tabs by mouth every bedtime.        Warfarin Sodium (Tab) COUMADIN 5 MG Take 2 Tabs by mouth every day at 6 PM.        .                 Medicines prescribed today were sent to:     [id number] - [place], [place] - [address] [place] [address]    Phone: [phone] Fax: [phone]    Open 24 Hours?: No      Medication refill instructions:       If your prescription bottle indicates you have medication refills left, it is not necessary to call your provider’s office. Please contact your pharmacy and they will refill your medication.    If your prescription bottle indicates you do not have any refills left, you may request refills at any time through one of the following ways: The online [employer] system (except Urgent Care), by calling your  provider’s office, or by asking your pharmacy to contact your provider’s office with a refill request. Medication refills are processed only during regular business hours and may not be available until the next business day. Your provider may request additional information or to have a follow-up visit with you prior to refilling your medication.   *Please Note: Medication refills are assigned a new Rx number when refilled electronically. Your pharmacy may indicate that no refills were authorized even though a new prescription for the same medication is available at the pharmacy. Please request the medicine by name with the pharmacy before contacting your provider for a refill.        Other Notes About Your Plan     [facility]        Warfarin Dosing Calendar   July 2017 Details    Sun Mon Tue Wed Thu Fri Sat           1                 2               3               4               5               6               7               8                 9               10               11               12               13               14               15                 16               17               18               19               20   1.6   15 mg   See details      21      15 mg         22      10 mg           23      10 mg         24      10 mg         25               26               27               28               29                 30               31                     Date Details   07/20 This INR check   INR: 1.6       Date of next INR:  7/24/2017         How to take your warfarin dose     To take:  10 mg Take 2 of the 5 mg tablets.    To take:  15 mg Take 3 of the 5 mg tablets.              MyChart Status: Patient Declined

## 2017-07-21 ENCOUNTER — HOSPITAL ENCOUNTER (OUTPATIENT)
Dept: PHYSICAL THERAPY | Facility: REHABILITATION | Age: 67
End: 2017-07-21
Attending: INTERNAL MEDICINE
Payer: MEDICARE

## 2017-07-21 PROCEDURE — 97110 THERAPEUTIC EXERCISES: CPT

## 2017-07-21 PROCEDURE — 97014 ELECTRIC STIMULATION THERAPY: CPT

## 2017-07-24 ENCOUNTER — HOSPITAL ENCOUNTER (OUTPATIENT)
Dept: PHYSICAL THERAPY | Facility: REHABILITATION | Age: 67
End: 2017-07-24
Attending: INTERNAL MEDICINE
Payer: MEDICARE

## 2017-07-24 PROCEDURE — 97110 THERAPEUTIC EXERCISES: CPT

## 2017-07-26 ENCOUNTER — HOSPITAL ENCOUNTER (OUTPATIENT)
Dept: PHYSICAL THERAPY | Facility: REHABILITATION | Age: 67
End: 2017-07-26
Attending: INTERNAL MEDICINE
Payer: MEDICARE

## 2017-07-26 LAB — INR BLD: 1.6 (ref 0.9–1.2)

## 2017-07-26 PROCEDURE — 97110 THERAPEUTIC EXERCISES: CPT

## 2017-07-27 ENCOUNTER — TELEPHONE (OUTPATIENT)
Dept: VASCULAR LAB | Facility: MEDICAL CENTER | Age: 67
End: 2017-07-27

## 2017-07-31 ENCOUNTER — HOSPITAL ENCOUNTER (OUTPATIENT)
Dept: PHYSICAL THERAPY | Facility: REHABILITATION | Age: 67
End: 2017-07-31
Attending: INTERNAL MEDICINE
Payer: MEDICARE

## 2017-07-31 PROCEDURE — 97110 THERAPEUTIC EXERCISES: CPT

## 2017-08-01 ENCOUNTER — PATIENT OUTREACH (OUTPATIENT)
Dept: HEALTH INFORMATION MANAGEMENT | Facility: OTHER | Age: 67
End: 2017-08-01

## 2017-08-02 ENCOUNTER — HOSPITAL ENCOUNTER (OUTPATIENT)
Dept: PHYSICAL THERAPY | Facility: REHABILITATION | Age: 67
End: 2017-08-02
Attending: INTERNAL MEDICINE
Payer: MEDICARE

## 2017-08-02 PROCEDURE — 97112 NEUROMUSCULAR REEDUCATION: CPT

## 2017-08-02 PROCEDURE — 97110 THERAPEUTIC EXERCISES: CPT

## 2017-08-04 RX ORDER — LISINOPRIL 40 MG/1
TABLET ORAL
Qty: 90 TAB | Refills: 3 | Status: SHIPPED | OUTPATIENT
Start: 2017-08-04 | End: 2017-08-25 | Stop reason: SDUPTHER

## 2017-08-07 ENCOUNTER — TELEPHONE (OUTPATIENT)
Dept: NEPHROLOGY | Facility: MEDICAL CENTER | Age: 67
End: 2017-08-07

## 2017-08-07 DIAGNOSIS — I63.9 CEREBROVASCULAR ACCIDENT (CVA), UNSPECIFIED MECHANISM (HCC): ICD-10-CM

## 2017-08-07 RX ORDER — WARFARIN SODIUM 5 MG/1
TABLET ORAL
Qty: 90 TAB | Refills: 0 | Status: SHIPPED | OUTPATIENT
Start: 2017-08-07 | End: 2017-09-19

## 2017-08-07 RX ORDER — WARFARIN SODIUM 5 MG/1
10 TABLET ORAL DAILY
Qty: 180 TAB | Refills: 3 | OUTPATIENT
Start: 2017-08-07

## 2017-08-07 NOTE — TELEPHONE ENCOUNTER
Pt called this morning to state that his current HCTZ Rx has a sig to take 0.5 of his 25mg tab, but that Dr. Bonner had instructed him to take 25mg daily.  Last note by Dr. Bonner from July 14th states to take 25ng of HCTZ daily.    Please advise as to correct dose.  Thank you.

## 2017-08-09 ENCOUNTER — TELEPHONE (OUTPATIENT)
Dept: VASCULAR LAB | Facility: MEDICAL CENTER | Age: 67
End: 2017-08-09

## 2017-08-09 NOTE — TELEPHONE ENCOUNTER
Spoke to patient today to remind him to check INR as soon as possible. Indigo will come into clinic tomorrow 08/10 @ 11am since he has another appointment close to [facility].    Matt Ponce, Sera.D

## 2017-08-10 ENCOUNTER — HOSPITAL ENCOUNTER (OUTPATIENT)
Dept: PHYSICAL THERAPY | Facility: REHABILITATION | Age: 67
End: 2017-08-10
Attending: ORTHOPAEDIC SURGERY
Payer: MEDICARE

## 2017-08-10 ENCOUNTER — ANTICOAGULATION VISIT (OUTPATIENT)
Dept: VASCULAR LAB | Facility: MEDICAL CENTER | Age: 67
End: 2017-08-10
Attending: INTERNAL MEDICINE
Payer: MEDICARE

## 2017-08-10 VITALS — HEART RATE: 82 BPM | SYSTOLIC BLOOD PRESSURE: 126 MMHG | DIASTOLIC BLOOD PRESSURE: 77 MMHG

## 2017-08-10 DIAGNOSIS — Z79.01 ANTICOAGULATED ON WARFARIN: ICD-10-CM

## 2017-08-10 DIAGNOSIS — Z86.73 HISTORY OF CVA (CEREBROVASCULAR ACCIDENT): ICD-10-CM

## 2017-08-10 LAB
INR BLD: 2.9 (ref 0.9–1.2)
INR PPP: 2.9 (ref 2–3.5)

## 2017-08-10 PROCEDURE — 85610 PROTHROMBIN TIME: CPT

## 2017-08-10 PROCEDURE — 99211 OFF/OP EST MAY X REQ PHY/QHP: CPT

## 2017-08-10 PROCEDURE — 97110 THERAPEUTIC EXERCISES: CPT

## 2017-08-10 PROCEDURE — 97163 PT EVAL HIGH COMPLEX 45 MIN: CPT

## 2017-08-10 NOTE — PROGRESS NOTES
Anticoagulation Summary as of 8/10/2017     INR goal 2.0-3.0   Selected INR 2.9 (8/10/2017)   Maintenance plan 10 mg (5 mg x 2) every day   Weekly total 70 mg   Plan last modified Jefferson Brian, PHARMD (1/3/2017)   Next INR check 8/24/2017   Target end date Indefinite    Indications   CVA (cerebral vascular accident) (CMS-HCC) [I63.9]  Atrial fibrillation [427.31] [I48.91]  History of CVA (cerebrovascular accident) [Z86.73]  Anticoagulated on warfarin [Z79.01]         Anticoagulation Episode Summary     INR check location [facility]    Preferred lab     Send INR reminders to     Comments call pt multiple times, he has a hard time getting to his phone in time and has no VM set up      Anticoagulation Care Providers     Provider Role Specialty Phone number    Catrachito Sterling D.O. Referring Internal Medicine [phone]    [facility] Responsible  [phone]    Liseth Doe A.P.N. Responsible Cardiology [phone]    Gi Cadena A.P.N. Responsible Cardiology [phone]        Anticoagulation Patient Findings      Joel Ma seen in clinic today  INR therapeutic.    Denies signs/symptoms of bleeding and/or thrombosis.    Denies changes to diet or medications.   Follow up appointment in 2 week(s).  Confirmed dosing regimen.    Pt is to continue with current warfarin dosing regimen.      Brett Coates, PHARMD

## 2017-08-10 NOTE — MR AVS SNAPSHOT
Joel Wolftaco   8/10/2017 11:15 AM   Anticoagulation Visit   MRN: [id number]    Department:  Vascular Medicine   Dept Phone:  [phone]    Description:  Male : 1950   Provider:  [facility] EXAM 4           Allergies as of 8/10/2017     Allergen Noted Reactions    Demerol 2008       Makes me stop breathing.  Doesn't like because it makes him high    Statins [Hmg-Coa-R Inhibitors] 2016   Unspecified    Per pt report. Unknown reaction.      You were diagnosed with     History of CVA (cerebrovascular accident)   [480567]       Anticoagulated on warfarin   [151134]         Vital Signs     Smoking Status                   Former Smoker           Basic Information     Date Of Birth Sex Race Ethnicity Preferred Language    1950 Male White Non- English      Your appointments     Aug 10, 2017 11:15 AM   Established Patient with [facility] EXAM 4   [facility]  (--)    [address]  [place] [address]   [phone]            Aug 18, 2017 11:20 AM   Follow Up Visit with Haris Alvarez M.D.   [facility] (--)    [address], [address]  [place] [id number] [phone]           You will be receiving a confirmation call a few days before your appointment from our automated call confirmation system.            Aug 24, 2017 10:15 AM   Established Patient with [facility] EXAM 5   [facility]  (--)    [address]  [place] [address]   [phone]            Aug 25, 2017 10:00 AM   Follow Up Visit with Johnathon Bonner M.D.   [facility] ([address])    [address] [place] [address] [address]  [place] [address]   [phone]           You will be receiving a confirmation call a few days before your appointment from our automated call confirmation system.            Oct 19, 2017 10:00 AM   Established Patient with Catrachito IBARRA  GIGI Sterling.   [facility] ([facility])    [address]  [place] [address] [phone]           You will be receiving a confirmation call a few days before your appointment from our automated call confirmation system.            Nov 07, 2017 10:20 AM   Follow UP with JOHNNY Ibarra.   [facility] (--)    [address]  [place] [address] [phone]              Problem List              ICD-10-CM Priority Class Noted - Resolved    CVA (cerebral vascular accident) (CMS-HCC) I63.9 Medium  6/4/2009 - Present    Dyslipidemia E78.5   6/17/2009 - Present    Thyroid mass E07.9   7/30/2009 - Present    HTN (hypertension) I10 Medium  8/16/2010 - Present    Ventricular ectopy I49.3   6/17/2011 - Present    Trigger finger M65.30   6/17/2011 - Present    MEDICAL HOME    Unknown - Present    Diabetes mellitus with neurological manifestations, 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 Low  3/5/2012 - Present    BPH (benign prostatic hyperplasia) N40.0   4/28/2014 - Present    Anticoagulated on warfarin Z79.01   4/28/2014 - Present    Chronic antibiotic suppression Z79.2   4/28/2014 - Present    Myalgia M79.1   5/29/2014 - Present    Risk for falls Z91.81   10/27/2014 - Present    BMI 38.0-38.9,adult Z68.38   10/27/2014 - Present    Diabetic polyneuropathy (CMS-HCC) E11.42 High  5/6/2015 - Present    Toe amputation status (CMS-HCC) Z89.429 High  5/6/2015 - Present    Atrial fibrillation [427.31] I48.91   6/24/2015 - Present    History of CVA (cerebrovascular accident) Z86.73   11/11/2015 - Present    JANNIE treated with BiPAP G47.33   4/18/2016 - Present    Left knee pain M25.562 High  8/28/2016 - Present    Cervical stenosis of spine M48.02   9/6/2016 - Present    Dysphagia R13.10   9/6/2016 - Present    Chronic atrial fibrillation (CMS-HCC) I48.2   9/6/2016 - Present    Hallucinations R44.3   9/6/2016 - Present    Other orthopedic aftercare Z47.89   11/7/2016 - Present     Chronic use of opiate drugs therapeutic purposes Z79.891   2/14/2017 - Present    CKD (chronic kidney disease), stage I N18.1   5/12/2017 - Present    Diabetic nephropathy associated with type 2 diabetes mellitus (CMS-[facility], [facility]) E11.21   5/12/2017 - Present    Essential hypertension I10   5/12/2017 - Present      Health Maintenance        Date Due Completion Dates    IMM ZOSTER VACCINE 8/17/2009 ---    COLONOSCOPY 6/30/2017 6/30/2014    IMM INFLUENZA (1) 9/1/2017 ---    A1C SCREENING 1/12/2018 7/12/2017, 4/28/2017, 12/16/2016, 12/7/2016, 8/29/2016, 8/10/2016, 7/7/2016, 4/29/2016, 9/21/2015, 8/17/2015, 5/11/2015, 5/11/2015, 3/30/2015, 2/10/2015, 11/12/2014, 8/13/2014, 5/13/2014, 2/5/2014, 2/5/2014, 2/5/2014, 10/28/2013, 10/28/2013, 8/26/2013, 5/31/2013, 1/10/2013, 10/29/2012, 9/6/2012, 9/6/2012, 4/9/2012, 3/19/2012, 11/11/2011, 2/17/2011, 11/15/2010, 8/13/2010, 7/27/2010, 5/5/2010, 3/24/2010, 2/18/2010, 12/23/2009, 12/7/2009, 7/13/2009, 4/22/2008, 2/11/2008, 8/12/2006, 8/11/2006, 5/4/2006    IMM PNEUMOCOCCAL 65+ (ADULT) LOW/MEDIUM RISK SERIES (2 of 2 - PPSV23) 1/19/2018 1/19/2017    DIABETES MONOFILAMENT / LE EXAM 3/17/2018 3/17/2017, 11/16/2015 (Done), 2/11/2014 (Done), 5/31/2013 (Done), 5/18/2011 (N/S)    Override on 11/16/2015: Done (Catrachito Sterling DO)    Override on 2/11/2014: Done    Override on 5/31/2013: Done    Override on 5/18/2011: (N/S) (patient with mild neuropathy distal toes. )    FASTING LIPID PROFILE 4/28/2018 4/28/2017, 8/10/2016, 7/7/2016, 4/29/2016, 9/21/2015, 8/17/2015, 5/11/2015, 3/30/2015, 8/13/2014, 2/5/2014, 2/5/2014, 2/5/2014, 10/28/2013, 8/19/2013, 7/16/2013, 5/31/2013, 4/10/2013, 10/30/2012, 10/3/2012, 4/27/2012, 6/15/2011, 8/13/2010, 5/5/2010, 12/23/2009, 12/7/2009, 4/21/2008, 2/12/2008, 5/8/2007, 8/11/2006    URINE ACR / MICROALBUMIN 7/12/2018 7/12/2017, 5/1/2017, 5/11/2015, 2/10/2015, 11/12/2014, 2/5/2014, 10/28/2013, 5/31/2013, 9/6/2012, 4/9/2012, 11/11/2011, 11/15/2010    SERUM  [facility] 7/12/2018 7/12/2017, 4/28/2017, 12/16/2016, 12/7/2016, 12/6/2016, 11/28/2016, 11/25/2016, 11/11/2016, 11/8/2016, 9/9/2016, 9/7/2016, 9/6/2016, 9/1/2016, 8/31/2016, 8/30/2016, 8/29/2016, 8/28/2016, 8/10/2016, 7/22/2016, 7/7/2016, 4/29/2016, 11/18/2015, 9/21/2015, 9/15/2015, 8/17/2015, 6/19/2015, 5/11/2015, 3/30/2015, 2/10/2015, 11/12/2014, 10/13/2014, 8/13/2014, 5/13/2014, 2/5/2014, 2/5/2014, 2/5/2014, 10/28/2013, 6/27/2013, 5/31/2013, 1/10/2013, 1/7/2013, 12/17/2012, 12/16/2012, 12/15/2012, 12/14/2012, 12/13/2012, 12/12/2012, 12/11/2012, 12/11/2012, 12/10/2012, 12/10/2012, 12/9/2012, 12/9/2012, 12/8/2012, 12/8/2012, 12/7/2012, 12/6/2012, 11/16/2012, 11/15/2012, 11/14/2012, 11/13/2012, 11/9/2012, 11/7/2012, 11/6/2012, 11/4/2012, 11/2/2012, 11/1/2012, 10/31/2012, 10/30/2012, 10/29/2012, 10/29/2012, 10/28/2012, 9/6/2012, 4/9/2012, 3/19/2012, 2/28/2012, 1/4/2012, 11/11/2011, 7/21/2011, 6/15/2011, 3/21/2011, 3/21/2011, 3/20/2011, 2/17/2011, 1/10/2009, 7/30/2008, 4/22/2008, 4/21/2008, 4/20/2008, 2/19/2008, 2/18/2008, 2/17/2008, 2/16/2008, 2/15/2008, 2/12/2008, 2/11/2008, 2/11/2008, 5/10/2007, 5/9/2007, 5/8/2007, 5/7/2007, 10/16/2006, 8/11/2006, 8/11/2006, 5/4/2006, 5/3/2006, 6/28/2005    RETINAL SCREENING 7/19/2018 7/19/2017, 5/17/2017, 3/17/2017, 10/28/2015, 9/16/2015, 8/12/2015, 3/10/2014, 9/24/2012    IMM DTaP/Tdap/Td Vaccine (2 - Td) 7/16/2022 7/16/2012, 8/15/2009            Results     POCT Protime      Component    INR    2.9                        Current Immunizations     13-VALENT PCV PREVNAR 1/19/2017 10:37 AM    Influenza Vaccine 1/1/1980    Pneumococcal Vaccine (UF)Historical Data 1/1/1980    TD Vaccine 8/15/2009  5:00 PM    Tdap Vaccine 7/16/2012      Below and/or attached are the medications your provider expects you to take. Review all of your home medications and newly ordered medications with your provider and/or pharmacist. Follow medication instructions as directed by your provider and/or  pharmacist. Please keep your medication list with you and share with your provider. Update the information when medications are discontinued, doses are changed, or new medications (including over-the-counter products) are added; and carry medication information at all times in the event of emergency situations     Allergies:  DEMEROL - (reactions not documented)     STATINS - Unspecified               Medications  Valid as of: August 10, 2017 -  9:57 AM    Generic Name Brand Name Tablet Size Instructions for use    AmLODIPine Besylate (Tab) NORVASC 10 MG Take 1 Tab by mouth every day.        Clindamycin HCl (Cap) CLEOCIN 300 MG Take 1 Cap by mouth 2 Times a Day.        CloNIDine HCl (PATCH WEEKLY) CATAPRES 0.3 MG/24HR Apply 1 Patch to skin as directed every Wednesday.        Fluticasone Propionate (Suspension) FLONASE 50 MCG/ACT Spray 2 Sprays in nose as needed.        Gabapentin (Cap) NEURONTIN 300 MG Take 1-2 Caps by mouth 3 times a day.        HydrALAZINE HCl (Tab) APRESOLINE 100 MG Take 1 Tab by mouth every 8 hours.        HydroCHLOROthiazide (Tab) HYDRODIURIL 25 MG Take 0.5 Tabs by mouth every day.        Insulin Glargine (Solution) LANTUS 100 UNIT/ML Inject 30 Units as instructed every evening.        Lisinopril (Tab) PRINIVIL, ZESTRIL 40 MG TAKE ONE TABLET BY MOUTH ONE TIME DAILY        Magnesium Chloride (Tab CR) SLO- (64 MG) MG Take 1 Tab by mouth 2 Times a Day.        MetFORMIN HCl (Tab) GLUCOPHAGE 500 MG Take 1 Tab by mouth 2 times a day, with meals.        Nystatin (Powder) MYCOSTATIN  Apply 1 Application to affected area(s) 3 times a day. Apply to reddened area under abdominal fold.        Oxycodone-Acetaminophen (Tab) PERCOCET-10  MG Take 1 Tab by mouth every 6 hours as needed for Severe Pain.        Polyethylene Glycol 3350 (Pack) MIRALAX  Take 1 Packet by mouth 1 time daily as needed.        TraZODone HCl (Tab) DESYREL 150 MG Take 0.5 Tabs by mouth every bedtime.        Warfarin Sodium  (Tab) COUMADIN 5 MG Take two tablets by mouth one time daily or as directed by coumadin clinic        .                 Medicines prescribed today were sent to:     [id number] - [place], [place] - [address] [place] [address]    Phone: [phone] Fax: [phone]    Open 24 Hours?: No      Medication refill instructions:       If your prescription bottle indicates you have medication refills left, it is not necessary to call your provider’s office. Please contact your pharmacy and they will refill your medication.    If your prescription bottle indicates you do not have any refills left, you may request refills at any time through one of the following ways: The online [employer] system (except Urgent Care), by calling your provider’s office, or by asking your pharmacy to contact your provider’s office with a refill request. Medication refills are processed only during regular business hours and may not be available until the next business day. Your provider may request additional information or to have a follow-up visit with you prior to refilling your medication.   *Please Note: Medication refills are assigned a new Rx number when refilled electronically. Your pharmacy may indicate that no refills were authorized even though a new prescription for the same medication is available at the pharmacy. Please request the medicine by name with the pharmacy before contacting your provider for a refill.        Other Notes About Your Plan     Patient is enrolled in [facility] with Dr. Sterling        Warfarin Dosing Calendar   August 2017 Details    Sun Mon Tue Wed Thu Fri Sat       1               2               3               4               5                 6               7               8               9               10   2.9   10 mg   See details      11      10 mg         12      10 mg           13      10 mg         14      10 mg         15      10 mg         16      10 mg           17      10 mg         18      10 mg         19      10 mg           20      10 mg         21      10 mg         22      10 mg         23      10 mg         24      10 mg         25               26                 27               28               29               30               31                  Date Details   08/10 This INR check   INR: 2.9       Date of next INR:  8/24/2017         How to take your warfarin dose     To take:  10 mg Take 2 of the 5 mg tablets.              MyChart Status: Patient Declined

## 2017-08-18 ENCOUNTER — OFFICE VISIT (OUTPATIENT)
Dept: NEUROLOGY | Facility: MEDICAL CENTER | Age: 67
End: 2017-08-18
Payer: MEDICARE

## 2017-08-18 VITALS
HEART RATE: 77 BPM | HEIGHT: 76 IN | RESPIRATION RATE: 18 BRPM | DIASTOLIC BLOOD PRESSURE: 70 MMHG | BODY MASS INDEX: 32.63 KG/M2 | OXYGEN SATURATION: 95 % | SYSTOLIC BLOOD PRESSURE: 168 MMHG | WEIGHT: 268 LBS | TEMPERATURE: 98.1 F

## 2017-08-18 DIAGNOSIS — E11.42 DIABETIC POLYNEUROPATHY ASSOCIATED WITH TYPE 2 DIABETES MELLITUS (HCC): ICD-10-CM

## 2017-08-18 DIAGNOSIS — Z86.73 HISTORY OF CVA (CEREBROVASCULAR ACCIDENT): ICD-10-CM

## 2017-08-18 DIAGNOSIS — M48.02 CERVICAL STENOSIS OF SPINE: Primary | ICD-10-CM

## 2017-08-18 PROCEDURE — 99214 OFFICE O/P EST MOD 30 MIN: CPT | Performed by: PSYCHIATRY & NEUROLOGY

## 2017-08-18 RX ORDER — FLURBIPROFEN SODIUM 0.3 MG/ML
SOLUTION/ DROPS OPHTHALMIC
COMMUNITY
Start: 2017-05-25 | End: 2018-06-27

## 2017-08-18 RX ORDER — HYDROCHLOROTHIAZIDE 12.5 MG/1
CAPSULE, GELATIN COATED ORAL
COMMUNITY
Start: 2017-05-25 | End: 2017-10-10

## 2017-08-18 RX ORDER — INSULIN GLARGINE 100 [IU]/ML
INJECTION, SOLUTION SUBCUTANEOUS
COMMUNITY
Start: 2017-08-04 | End: 2017-11-11 | Stop reason: SDUPTHER

## 2017-08-18 ASSESSMENT — ENCOUNTER SYMPTOMS
TINGLING: 1
TREMORS: 0
FOCAL WEAKNESS: 1
SENSORY CHANGE: 1

## 2017-08-18 ASSESSMENT — PAIN SCALES - GENERAL: PAINLEVEL: 5=MODERATE PAIN

## 2017-08-18 NOTE — MR AVS SNAPSHOT
"        Joel Fernández Anil   2017 11:20 AM   Office Visit   MRN: [id number]    Department:  [facility]   Dept Phone:  [phone]    Description:  Male : 1950   Provider:  Haris Alvarez M.D.           Reason for Visit     Follow-Up 1yr [facility], paraparesis of both lower limbs      Allergies as of 2017     Allergen Noted Reactions    Demerol 2008       Makes me stop breathing.  Doesn't like because it makes him high    Statins [Hmg-Coa-R Inhibitors] 2016   Unspecified    Per pt report. Unknown reaction.      Vital Signs     Blood Pressure Pulse Temperature Respirations Height Weight    168/70 mmHg 77 36.7 °C (98.1 °F) 18 1.93 m (6' 4\") 121.564 kg (268 lb)    Body Mass Index Oxygen Saturation Smoking Status             32.64 kg/m2 95% Former Smoker         Basic Information     Date Of Birth Sex Race Ethnicity Preferred Language    1950 Male White Non- English      Your appointments     Aug 24, 2017 10:15 AM   Established Patient with [facility] EXAM 5   [facility]  (--)    [address]  [place] [address]   [phone]            Aug 25, 2017 10:00 AM   Follow Up Visit with Johnathon Bonner M.D.   [facility] ([address] [place] [address])    [address] [place] [address] [address]  [place] [address] [phone]           You will be receiving a confirmation call a few days before your appointment from our automated call confirmation system.            Oct 19, 2017 10:00 AM   Established Patient with Catrachito Sterling D.O.   [facility])    [address] [facility] [address]  [place] [address] [phone]           You will be receiving a confirmation call a few days before your appointment from our automated call confirmation system.            2017 10:20 AM   Follow UP with DEIDRA Ibarra   [facility] (--)    [address] [place] [address] " [place] [address]   [phone]              Problem List              ICD-10-CM Priority Class Noted - Resolved    CVA (cerebral vascular accident) (CMS-HCC) I63.9 Medium  6/4/2009 - Present    Dyslipidemia E78.5   6/17/2009 - Present    Thyroid mass E07.9   7/30/2009 - Present    HTN (hypertension) I10 Medium  8/16/2010 - Present    Ventricular ectopy I49.3   6/17/2011 - Present    Trigger finger M65.30   6/17/2011 - Present    MEDICAL HOME    Unknown - Present    Diabetes mellitus with neurological manifestations, 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 Low  3/5/2012 - Present    BPH (benign prostatic hyperplasia) N40.0   4/28/2014 - Present    Anticoagulated on warfarin Z79.01   4/28/2014 - Present    Chronic antibiotic suppression Z79.2   4/28/2014 - Present    Myalgia M79.1   5/29/2014 - Present    Risk for falls Z91.81   10/27/2014 - Present    BMI 38.0-38.9,adult Z68.38   10/27/2014 - Present    Diabetic polyneuropathy (CMS-HCC) E11.42 High  5/6/2015 - Present    Toe amputation status (CMS-HCC) Z89.429 High  5/6/2015 - Present    Atrial fibrillation [427.31] I48.91   6/24/2015 - Present    History of CVA (cerebrovascular accident) Z86.73   11/11/2015 - Present    JANNIE treated with BiPAP G47.33   4/18/2016 - Present    Left knee pain M25.562 High  8/28/2016 - Present    Cervical stenosis of spine M48.02   9/6/2016 - Present    Dysphagia R13.10   9/6/2016 - Present    Chronic atrial fibrillation (CMS-HCC) I48.2   9/6/2016 - Present    Hallucinations R44.3   9/6/2016 - Present    Other orthopedic aftercare Z47.89   11/7/2016 - Present    Chronic use of opiate drugs therapeutic purposes Z79.891   2/14/2017 - Present    CKD (chronic kidney disease), stage I N18.1   5/12/2017 - Present    Diabetic nephropathy associated with type 2 diabetes mellitus (CMS-HCC) E11.21   5/12/2017 - Present    Essential hypertension I10   5/12/2017 - Present      Health Maintenance        Date Due Completion Dates    IMM ZOSTER VACCINE  8/17/2009 ---    COLONOSCOPY 6/30/2017 6/30/2014    IMM INFLUENZA (1) 9/1/2017 ---    A1C SCREENING 1/12/2018 7/12/2017, 4/28/2017, 12/16/2016, 12/7/2016, 8/29/2016, 8/10/2016, 7/7/2016, 4/29/2016, 9/21/2015, 8/17/2015, 5/11/2015, 5/11/2015, 3/30/2015, 2/10/2015, 11/12/2014, 8/13/2014, 5/13/2014, 2/5/2014, 2/5/2014, 2/5/2014, 10/28/2013, 10/28/2013, 8/26/2013, 5/31/2013, 1/10/2013, 10/29/2012, 9/6/2012, 9/6/2012, 4/9/2012, 3/19/2012, 11/11/2011, 2/17/2011, 11/15/2010, 8/13/2010, 7/27/2010, 5/5/2010, 3/24/2010, 2/18/2010, 12/23/2009, 12/7/2009, 7/13/2009, 4/22/2008, 2/11/2008, 8/12/2006, 8/11/2006, 5/4/2006    IMM PNEUMOCOCCAL 65+ (ADULT) LOW/MEDIUM RISK SERIES (2 of 2 - PPSV23) 1/19/2018 1/19/2017    DIABETES MONOFILAMENT / LE EXAM 3/17/2018 3/17/2017, 11/16/2015 (Done), 2/11/2014 (Done), 5/31/2013 (Done), 5/18/2011 (N/S)    Override on 11/16/2015: Done (Catrachito Sterling DO)    Override on 2/11/2014: Done    Override on 5/31/2013: Done    Override on 5/18/2011: (N/S) (patient with mild neuropathy distal toes. )    FASTING LIPID PROFILE 4/28/2018 4/28/2017, 8/10/2016, 7/7/2016, 4/29/2016, 9/21/2015, 8/17/2015, 5/11/2015, 3/30/2015, 8/13/2014, 2/5/2014, 2/5/2014, 2/5/2014, 10/28/2013, 8/19/2013, 7/16/2013, 5/31/2013, 4/10/2013, 10/30/2012, 10/3/2012, 4/27/2012, 6/15/2011, 8/13/2010, 5/5/2010, 12/23/2009, 12/7/2009, 4/21/2008, 2/12/2008, 5/8/2007, 8/11/2006    URINE ACR / MICROALBUMIN 7/12/2018 7/12/2017, 5/1/2017, 5/11/2015, 2/10/2015, 11/12/2014, 2/5/2014, 10/28/2013, 5/31/2013, 9/6/2012, 4/9/2012, 11/11/2011, 11/15/2010    SERUM CREATININE 7/12/2018 7/12/2017, 4/28/2017, 12/16/2016, 12/7/2016, 12/6/2016, 11/28/2016, 11/25/2016, 11/11/2016, 11/8/2016, 9/9/2016, 9/7/2016, 9/6/2016, 9/1/2016, 8/31/2016, 8/30/2016, 8/29/2016, 8/28/2016, 8/10/2016, 7/22/2016, 7/7/2016, 4/29/2016, 11/18/2015, 9/21/2015, 9/15/2015, 8/17/2015, 6/19/2015, 5/11/2015, 3/30/2015, 2/10/2015, 11/12/2014, 10/13/2014, 8/13/2014, 5/13/2014,  2/5/2014, 2/5/2014, 2/5/2014, 10/28/2013, 6/27/2013, 5/31/2013, 1/10/2013, 1/7/2013, 12/17/2012, 12/16/2012, 12/15/2012, 12/14/2012, 12/13/2012, 12/12/2012, 12/11/2012, 12/11/2012, 12/10/2012, 12/10/2012, 12/9/2012, 12/9/2012, 12/8/2012, 12/8/2012, 12/7/2012, 12/6/2012, 11/16/2012, 11/15/2012, 11/14/2012, 11/13/2012, 11/9/2012, 11/7/2012, 11/6/2012, 11/4/2012, 11/2/2012, 11/1/2012, 10/31/2012, 10/30/2012, 10/29/2012, 10/29/2012, 10/28/2012, 9/6/2012, 4/9/2012, 3/19/2012, 2/28/2012, 1/4/2012, 11/11/2011, 7/21/2011, 6/15/2011, 3/21/2011, 3/21/2011, 3/20/2011, 2/17/2011, 1/10/2009, 7/30/2008, 4/22/2008, 4/21/2008, 4/20/2008, 2/19/2008, 2/18/2008, 2/17/2008, 2/16/2008, 2/15/2008, 2/12/2008, 2/11/2008, 2/11/2008, 5/10/2007, 5/9/2007, 5/8/2007, 5/7/2007, 10/16/2006, 8/11/2006, 8/11/2006, 5/4/2006, 5/3/2006, 6/28/2005    RETINAL SCREENING 7/19/2018 7/19/2017, 5/17/2017, 3/17/2017, 10/28/2015, 9/16/2015, 8/12/2015, 3/10/2014, 9/24/2012    IMM DTaP/Tdap/Td Vaccine (2 - Td) 7/16/2022 7/16/2012, 8/15/2009            Current Immunizations     13-VALENT PCV PREVNAR 1/19/2017 10:37 AM    Influenza Vaccine 1/1/1980    Pneumococcal Vaccine (UF)Historical Data 1/1/1980    TD Vaccine 8/15/2009  5:00 PM    Tdap Vaccine 7/16/2012      Below and/or attached are the medications your provider expects you to take. Review all of your home medications and newly ordered medications with your provider and/or pharmacist. Follow medication instructions as directed by your provider and/or pharmacist. Please keep your medication list with you and share with your provider. Update the information when medications are discontinued, doses are changed, or new medications (including over-the-counter products) are added; and carry medication information at all times in the event of emergency situations     Allergies:  DEMEROL - (reactions not documented)     STATINS - Unspecified               Medications  Valid as of: August 18, 2017 - 11:40 AM     Generic Name Brand Name Tablet Size Instructions for use    AmLODIPine Besylate (Tab) NORVASC 10 MG Take 1 Tab by mouth every day.        Clindamycin HCl (Cap) CLEOCIN 300 MG Take 1 Cap by mouth 2 Times a Day.        CloNIDine HCl (PATCH WEEKLY) CATAPRES 0.3 MG/24HR Apply 1 Patch to skin as directed every Wednesday.        Fluticasone Propionate (Suspension) FLONASE 50 MCG/ACT Spray 2 Sprays in nose as needed.        Gabapentin (Cap) NEURONTIN 300 MG Take 1-2 Caps by mouth 3 times a day.        HydrALAZINE HCl (Tab) APRESOLINE 100 MG Take 1 Tab by mouth every 8 hours.        HydroCHLOROthiazide (Tab) HYDRODIURIL 25 MG Take 0.5 Tabs by mouth every day.        HydroCHLOROthiazide (Cap) MICROZIDE 12.5 MG         Insulin Glargine (Solution) LANTUS 100 UNIT/ML Inject 30 Units as instructed every evening.        Insulin Glargine (Solution Pen-injector) LANTUS SOLOSTAR 100 UNIT/ML         Insulin Pen Needle (Misc) B-D UF III MINI PEN NEEDLES 31G X 5 MM         Lisinopril (Tab) PRINIVIL, ZESTRIL 40 MG TAKE ONE TABLET BY MOUTH ONE TIME DAILY        Magnesium Chloride (Tab CR) SLO- (64 Mg) MG Take 1 Tab by mouth 2 Times a Day.        MetFORMIN HCl (Tab) GLUCOPHAGE 500 MG Take 1 Tab by mouth 2 times a day, with meals.        Nystatin (Powder) MYCOSTATIN  Apply 1 Application to affected area(s) 3 times a day. Apply to reddened area under abdominal fold.        Oxycodone-Acetaminophen (Tab) PERCOCET-10  MG Take 1 Tab by mouth every 6 hours as needed for Severe Pain.        Polyethylene Glycol 3350 (Pack) MIRALAX  Take 1 Packet by mouth 1 time daily as needed.        TraZODone HCl (Tab) DESYREL 150 MG Take 0.5 Tabs by mouth every bedtime.        Warfarin Sodium (Tab) COUMADIN 5 MG Take two tablets by mouth one time daily or as directed by coumadin clinic        .                 Medicines prescribed today were sent to:     [id number] - [place], [place] - [address] [facility]    [address] [place] [address]       Phone: [phone] Fax: [phone]    Open 24 Hours?: No      Medication refill instructions:       If your prescription bottle indicates you have medication refills left, it is not necessary to call your provider’s office. Please contact your pharmacy and they will refill your medication.    If your prescription bottle indicates you do not have any refills left, you may request refills at any time through one of the following ways: The online [employer] system (except Urgent Care), by calling your provider’s office, or by asking your pharmacy to contact your provider’s office with a refill request. Medication refills are processed only during regular business hours and may not be available until the next business day. Your provider may request additional information or to have a follow-up visit with you prior to refilling your medication.   *Please Note: Medication refills are assigned a new Rx number when refilled electronically. Your pharmacy may indicate that no refills were authorized even though a new prescription for the same medication is available at the pharmacy. Please request the medicine by name with the pharmacy before contacting your provider for a refill.        Other Notes About Your Plan     Patient is enrolled in [facility] with Dr. Asher Estevez Status: Patient Declined

## 2017-08-18 NOTE — PROGRESS NOTES
Subjective:      Joel Ma is a 67 y.o. male who presents for follow-up of a history of a cervical myelopathy with quadriparesis, diabetes-related polyneuropathy, symptomatic RLS, and history of cerebrovascular disease.    HPI    Last seen one year ago, he had been having a progressive paraparesis developed, MRI imaging of the cervical spine revealed significant multilevel stenosis of the cervical spine, he developed an acute paraparesis in August of last year, requiring multilevel decompression and fusion. Since that time, he has been left with a worsened paraparesis and also involvement of the right upper extremity. There has been a slow and steady improvement though he is still far from where he had been. Weakness seems to be more profound with the right side, he has problems using the right hand with distal weakness. Both upper extremities are involved proximally. He now has some paresthesias into the right hand involving the first 3 fingers, nothing like this in the left. He still has some tingling and numbness in the lower extremities, this is not ascended.    He also describes restlessness in the legs which has been present for some time, though this was not something reviewed when he was last seen in this office. Symptoms are present throughout the day, always more apparent when he is seated and otherwise immobile. Moving the legs, standing or walking, always helps temporarily. Placed on gabapentin 300 mg twice a day and 600 mg in the evening, this has provided consistent benefit though it is still imperfect.    Medical, surgical and family histories are reviewed, there are no new drug allergies. He is on a long list of medications, my standpoint including Coumadin, Neurontin as above, but also a long list of antihypertensives and hypoglycemic agents. He is still active with physical therapy though dealing with insurance issues has limited his ability to obtain further treatment.    Review of  "Systems   Constitutional: Positive for malaise/fatigue.   Neurological: Positive for tingling, sensory change and focal weakness. Negative for tremors.   All other systems reviewed and are negative.       Objective:     /70 mmHg  Pulse 77  Temp(Src) 36.7 °C (98.1 °F)  Resp 18  Ht 1.93 m (6' 4\")  Wt 121.564 kg (268 lb)  BMI 32.64 kg/m2  SpO2 95%     Physical Exam    He appears in no acute distress. Vital signs reveal a slight elevation of blood pressure, but he is in sinus rhythm and ventricular rate is normal. There was no malar rash. The neck is supple, range of motion constrained because of his fusion. Cardiac evaluation reveals an irregular rhythm. There is bilateral lower extremity edema.    He is fully oriented, there is no aphasia. PERRLA/EOMI, visual fields are grossly full, there is no facial asymmetry or bulbar dysfunction. Sensory exam is intact across the midline of the face to temperature. Shoulder shrug is intact. Quadriparesis demonstrates proximal upper extremity weakness, right greater than left, but in the right hand there is clear atrophy of the FDI and interosseous muscles as well as both thenar and hyperthenar eminences. There is no atrophy or distal weakness with the left hand. In the legs, weakness is mostly distal, right greater than left, there is some weakness with hip stabilizers bilaterally. Reflexes are absent in the lower extremities, slightly asymmetric in the upper extremities. There are no pathologic reflexes. He stands with great difficulty, using a walker though he can elevate the leg slowly. There is incoordination with the lower extremities. Less so with the upper extremities, mostly as a loss of fine motor control with the right hand when compared to the left. There is a stocking loss of vibration to the knee on the right, to the shin on the left, a complete loss of temperature in both distal lower extremities. Vibration and temperature are intact in upper " extremities.     Assessment/Plan:     1. Cervical stenosis of spine  The bigger issue, he had chronic issues with advanced cerebrovascular disease and diabetes-related polyneuropathy, now with superimposed spinal cord insult, this gentleman will have permanent problems with weakness now, especially with both upper extremities. He was told this. Physical therapy is really the only legitimate treatment option left for him. There are no medications are going to help spinal cord injury.    2. Diabetic polyneuropathy associated with type 2 diabetes mellitus (CMS-HCC)  Persistent, though not profoundly worsened, he does not require symptomatic relief at this time. This is contributing to the weakness that he has.    3. History of CVA (cerebrovascular accident)  Also contribute factor to his balance difficulties, it is less of an issue from a strength standpoint but always playing a role. He is already on Coumadin for atrial fibrillation and an ACE inhibitor for blood pressure control. A statin agent can be added, I would recommend checking a lipid panel and starting medication even empirically at a low dose Lipitor 40 mg daily, but dosage can be adjusted depending on lipid profiles.    4. RLS  More commonly seen in patients with neuropathy, such as the case with this gentleman, gabapentin is certainly being used appropriately, the dose can be increased up to 600 mg, 3 times a day. Dopamine agonists also can provide some benefit. The nature of this condition was reviewed in full with the patient.    At this time there is no need for additional neurologic follow-up.  Time: Evaluation of 25 minutes for exam review, discussion, and education  Discussion: As mentioned in the assessment, over 60% of the time spent face-to-face counseling and coordinating care

## 2017-08-21 NOTE — TELEPHONE ENCOUNTER
Pt called again today, 8/21/17 and needs orders for lab work for his appointment this Friday.  Please advise.

## 2017-08-22 ENCOUNTER — TELEPHONE (OUTPATIENT)
Dept: NEPHROLOGY | Facility: MEDICAL CENTER | Age: 67
End: 2017-08-22

## 2017-08-22 ENCOUNTER — APPOINTMENT (OUTPATIENT)
Dept: LAB | Facility: MEDICAL CENTER | Age: 67
End: 2017-08-22
Payer: MEDICARE

## 2017-08-22 DIAGNOSIS — N18.3 CKD (CHRONIC KIDNEY DISEASE), STAGE 3 (MODERATE): ICD-10-CM

## 2017-08-23 ENCOUNTER — HOSPITAL ENCOUNTER (OUTPATIENT)
Dept: LAB | Facility: MEDICAL CENTER | Age: 67
End: 2017-08-23
Attending: INTERNAL MEDICINE
Payer: MEDICARE

## 2017-08-23 DIAGNOSIS — N18.3 CKD (CHRONIC KIDNEY DISEASE), STAGE 3 (MODERATE): ICD-10-CM

## 2017-08-23 LAB
25(OH)D3 SERPL-MCNC: 29 NG/ML (ref 30–100)
ANION GAP SERPL CALC-SCNC: 9 MMOL/L (ref 0–11.9)
BUN SERPL-MCNC: 24 MG/DL (ref 8–22)
CALCIUM SERPL-MCNC: 9.3 MG/DL (ref 8.5–10.5)
CHLORIDE SERPL-SCNC: 101 MMOL/L (ref 96–112)
CO2 SERPL-SCNC: 28 MMOL/L (ref 20–33)
CREAT SERPL-MCNC: 0.88 MG/DL (ref 0.5–1.4)
CREAT UR-MCNC: 28.3 MG/DL
ERYTHROCYTE [DISTWIDTH] IN BLOOD BY AUTOMATED COUNT: 43.3 FL (ref 35.9–50)
GFR SERPL CREATININE-BSD FRML MDRD: >60 ML/MIN/1.73 M 2
GLUCOSE SERPL-MCNC: 171 MG/DL (ref 65–99)
HCT VFR BLD AUTO: 47.2 % (ref 42–52)
HGB BLD-MCNC: 15.6 G/DL (ref 14–18)
MCH RBC QN AUTO: 27.7 PG (ref 27–33)
MCHC RBC AUTO-ENTMCNC: 33.1 G/DL (ref 33.7–35.3)
MCV RBC AUTO: 83.8 FL (ref 81.4–97.8)
MICROALBUMIN UR-MCNC: 69.9 MG/DL
MICROALBUMIN/CREAT UR: 2470 MG/G (ref 0–30)
PLATELET # BLD AUTO: 167 K/UL (ref 164–446)
PMV BLD AUTO: 11.4 FL (ref 9–12.9)
POTASSIUM SERPL-SCNC: 3.9 MMOL/L (ref 3.6–5.5)
PTH-INTACT SERPL-MCNC: 67.9 PG/ML (ref 14–72)
RBC # BLD AUTO: 5.63 M/UL (ref 4.7–6.1)
SODIUM SERPL-SCNC: 138 MMOL/L (ref 135–145)
WBC # BLD AUTO: 5 K/UL (ref 4.8–10.8)

## 2017-08-23 PROCEDURE — 82570 ASSAY OF URINE CREATININE: CPT

## 2017-08-23 PROCEDURE — 80048 BASIC METABOLIC PNL TOTAL CA: CPT

## 2017-08-23 PROCEDURE — 36415 COLL VENOUS BLD VENIPUNCTURE: CPT

## 2017-08-23 PROCEDURE — 85027 COMPLETE CBC AUTOMATED: CPT

## 2017-08-23 PROCEDURE — 83970 ASSAY OF PARATHORMONE: CPT

## 2017-08-23 PROCEDURE — 82306 VITAMIN D 25 HYDROXY: CPT

## 2017-08-23 PROCEDURE — 82043 UR ALBUMIN QUANTITATIVE: CPT

## 2017-08-24 ENCOUNTER — ANTICOAGULATION VISIT (OUTPATIENT)
Dept: VASCULAR LAB | Facility: MEDICAL CENTER | Age: 67
End: 2017-08-24
Attending: INTERNAL MEDICINE
Payer: MEDICARE

## 2017-08-24 DIAGNOSIS — Z79.01 ANTICOAGULATED ON WARFARIN: ICD-10-CM

## 2017-08-24 DIAGNOSIS — Z86.73 HISTORY OF CVA (CEREBROVASCULAR ACCIDENT): ICD-10-CM

## 2017-08-24 LAB
INR BLD: 2.7 (ref 0.9–1.2)
INR PPP: 2.7 (ref 2–3.5)

## 2017-08-24 PROCEDURE — 99211 OFF/OP EST MAY X REQ PHY/QHP: CPT

## 2017-08-24 PROCEDURE — 85610 PROTHROMBIN TIME: CPT

## 2017-08-24 NOTE — MR AVS SNAPSHOT
Joel Fernández Anil   2017 10:15 AM   Anticoagulation Visit   MRN: [id number]    Department:  Vascular Medicine   Dept Phone:  [phone]    Description:  Male : 1950   Provider:  [facility] EXAM 5           Allergies as of 2017     Allergen Noted Reactions    Demerol 2008       Makes me stop breathing.  Doesn't like because it makes him high    Statins [Hmg-Coa-R Inhibitors] 2016   Unspecified    Per pt report. Unknown reaction.      You were diagnosed with     History of CVA (cerebrovascular accident)   [397512]       Anticoagulated on warfarin   [722560]         Vital Signs     Smoking Status                   Former Smoker           Basic Information     Date Of Birth Sex Race Ethnicity Preferred Language    1950 Male White Non- English      Your appointments     Aug 24, 2017 10:15 AM   Established Patient with [facility] EXAM 5   [facility]  (--)    [address]  [place] [address]   [phone]            Aug 25, 2017 10:00 AM   Follow Up Visit with Johnathon Bonner M.D.   [facility] ([facility] [address])    [address]  [place] [address] [phone]           You will be receiving a confirmation call a few days before your appointment from our automated call confirmation system.            Oct 05, 2017 10:00 AM   Established Patient with [facility] EXAM 5   [facility]  (--)    [address]  [place] [address]   [phone]            Oct 19, 2017 10:00 AM   Established Patient with Catrachito Sterling D.O.   [facility] ([facility])    [address] [place] [address] [address] [facility] [address] [facility] [address]  [place] [address] [phone]           You will be receiving a confirmation call a few days before your appointment from our automated call confirmation system.            2017 10:20 AM   Follow UP with JOHNNY Ibarra.      [facility] (--)    [address]  [place] [id number] [phone]              Problem List              ICD-10-CM Priority Class Noted - Resolved    CVA (cerebral vascular accident) (CMS-HCC) I63.9 Medium  6/4/2009 - Present    Dyslipidemia E78.5   6/17/2009 - Present    Thyroid mass E07.9   7/30/2009 - Present    HTN (hypertension) I10 Medium  8/16/2010 - Present    Ventricular ectopy I49.3   6/17/2011 - Present    Trigger finger M65.30   6/17/2011 - Present    MEDICAL HOME    Unknown - Present    Diabetes mellitus with neurological manifestations, 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 Low  3/5/2012 - Present    BPH (benign prostatic hyperplasia) N40.0   4/28/2014 - Present    Anticoagulated on warfarin Z79.01   4/28/2014 - Present    Chronic antibiotic suppression Z79.2   4/28/2014 - Present    Myalgia M79.1   5/29/2014 - Present    Risk for falls Z91.81   10/27/2014 - Present    BMI 38.0-38.9,adult Z68.38   10/27/2014 - Present    Diabetic polyneuropathy (CMS-HCC) E11.42 High  5/6/2015 - Present    Toe amputation status (CMS-HCC) Z89.429 High  5/6/2015 - Present    Atrial fibrillation [427.31] I48.91   6/24/2015 - Present    History of CVA (cerebrovascular accident) Z86.73   11/11/2015 - Present    JANNIE treated with BiPAP G47.33   4/18/2016 - Present    Left knee pain M25.562 High  8/28/2016 - Present    Cervical stenosis of spine M48.02   9/6/2016 - Present    Dysphagia R13.10   9/6/2016 - Present    Chronic atrial fibrillation (CMS-HCC) I48.2   9/6/2016 - Present    Hallucinations R44.3   9/6/2016 - Present    Other orthopedic aftercare Z47.89   11/7/2016 - Present    Chronic use of opiate drugs therapeutic purposes Z79.891   2/14/2017 - Present    CKD (chronic kidney disease), stage I N18.1   5/12/2017 - Present    Diabetic nephropathy associated with type 2 diabetes mellitus (CMS-HCC) E11.21   5/12/2017 - Present    Essential hypertension I10   5/12/2017 - Present       Health Maintenance        Date Due Completion Dates    IMM ZOSTER VACCINE 8/17/2009 ---    COLONOSCOPY 6/30/2017 6/30/2014    IMM INFLUENZA (1) 9/1/2017 ---    A1C SCREENING 1/12/2018 7/12/2017, 4/28/2017, 12/16/2016, 12/7/2016, 8/29/2016, 8/10/2016, 7/7/2016, 4/29/2016, 9/21/2015, 8/17/2015, 5/11/2015, 5/11/2015, 3/30/2015, 2/10/2015, 11/12/2014, 8/13/2014, 5/13/2014, 2/5/2014, 2/5/2014, 2/5/2014, 10/28/2013, 10/28/2013, 8/26/2013, 5/31/2013, 1/10/2013, 10/29/2012, 9/6/2012, 9/6/2012, 4/9/2012, 3/19/2012, 11/11/2011, 2/17/2011, 11/15/2010, 8/13/2010, 7/27/2010, 5/5/2010, 3/24/2010, 2/18/2010, 12/23/2009, 12/7/2009, 7/13/2009, 4/22/2008, 2/11/2008, 8/12/2006, 8/11/2006, 5/4/2006    IMM PNEUMOCOCCAL 65+ (ADULT) LOW/MEDIUM RISK SERIES (2 of 2 - PPSV23) 1/19/2018 1/19/2017    DIABETES MONOFILAMENT / LE EXAM 3/17/2018 3/17/2017, 11/16/2015 (Done), 2/11/2014 (Done), 5/31/2013 (Done), 5/18/2011 (N/S)    Override on 11/16/2015: Done (Catrachito Sterling DO)    Override on 2/11/2014: Done    Override on 5/31/2013: Done    Override on 5/18/2011: (N/S) (patient with mild neuropathy distal toes. )    FASTING LIPID PROFILE 4/28/2018 4/28/2017, 8/10/2016, 7/7/2016, 4/29/2016, 9/21/2015, 8/17/2015, 5/11/2015, 3/30/2015, 8/13/2014, 2/5/2014, 2/5/2014, 2/5/2014, 10/28/2013, 8/19/2013, 7/16/2013, 5/31/2013, 4/10/2013, 10/30/2012, 10/3/2012, 4/27/2012, 6/15/2011, 8/13/2010, 5/5/2010, 12/23/2009, 12/7/2009, 4/21/2008, 2/12/2008, 5/8/2007, 8/11/2006    RETINAL SCREENING 7/19/2018 7/19/2017, 5/17/2017, 3/17/2017, 10/28/2015, 9/16/2015, 8/12/2015, 3/10/2014, 9/24/2012    URINE ACR / MICROALBUMIN 8/23/2018 8/23/2017, 7/12/2017, 5/1/2017, 5/11/2015, 2/10/2015, 11/12/2014, 2/5/2014, 10/28/2013, 5/31/2013, 9/6/2012, 4/9/2012, 11/11/2011, 11/15/2010    SERUM CREATININE 8/23/2018 8/23/2017, 7/12/2017, 4/28/2017, 12/16/2016, 12/7/2016, 12/6/2016, 11/28/2016, 11/25/2016, 11/11/2016, 11/8/2016, 9/9/2016, 9/7/2016, 9/6/2016, 9/1/2016, 8/31/2016,  8/30/2016, 8/29/2016, 8/28/2016, 8/10/2016, 7/22/2016, 7/7/2016, 4/29/2016, 11/18/2015, 9/21/2015, 9/15/2015, 8/17/2015, 6/19/2015, 5/11/2015, 3/30/2015, 2/10/2015, 11/12/2014, 10/13/2014, 8/13/2014, 5/13/2014, 2/5/2014, 2/5/2014, 2/5/2014, 10/28/2013, 6/27/2013, 5/31/2013, 1/10/2013, 1/7/2013, 12/17/2012, 12/16/2012, 12/15/2012, 12/14/2012, 12/13/2012, 12/12/2012, 12/11/2012, 12/11/2012, 12/10/2012, 12/10/2012, 12/9/2012, 12/9/2012, 12/8/2012, 12/8/2012, 12/7/2012, 12/6/2012, 11/16/2012, 11/15/2012, 11/14/2012, 11/13/2012, 11/9/2012, 11/7/2012, 11/6/2012, 11/4/2012, 11/2/2012, 11/1/2012, 10/31/2012, 10/30/2012, 10/29/2012, 10/29/2012, 10/28/2012, 9/6/2012, 4/9/2012, 3/19/2012, 2/28/2012, 1/4/2012, 11/11/2011, 7/21/2011, 6/15/2011, 3/21/2011, 3/21/2011, 3/20/2011, 2/17/2011, 1/10/2009, 7/30/2008, 4/22/2008, 4/21/2008, 4/20/2008, 2/19/2008, 2/18/2008, 2/17/2008, 2/16/2008, 2/15/2008, 2/12/2008, 2/11/2008, 2/11/2008, 5/10/2007, 5/9/2007, 5/8/2007, 5/7/2007, 10/16/2006, 8/11/2006, 8/11/2006, 5/4/2006, 5/3/2006, 6/28/2005    IMM DTaP/Tdap/Td Vaccine (2 - Td) 7/16/2022 7/16/2012, 8/15/2009            Results     POCT Protime      Component    INR    2.7                        Current Immunizations     13-VALENT PCV PREVNAR 1/19/2017 10:37 AM    Influenza Vaccine 1/1/1980    Pneumococcal Vaccine (UF)Historical Data 1/1/1980    TD Vaccine 8/15/2009  5:00 PM    Tdap Vaccine 7/16/2012      Below and/or attached are the medications your provider expects you to take. Review all of your home medications and newly ordered medications with your provider and/or pharmacist. Follow medication instructions as directed by your provider and/or pharmacist. Please keep your medication list with you and share with your provider. Update the information when medications are discontinued, doses are changed, or new medications (including over-the-counter products) are added; and carry medication information at all times in the event of  emergency situations     Allergies:  DEMEROL - (reactions not documented)     STATINS - Unspecified               Medications  Valid as of: August 24, 2017 -  9:51 AM    Generic Name Brand Name Tablet Size Instructions for use    AmLODIPine Besylate (Tab) NORVASC 10 MG Take 1 Tab by mouth every day.        Clindamycin HCl (Cap) CLEOCIN 300 MG Take 1 Cap by mouth 2 Times a Day.        CloNIDine HCl (PATCH WEEKLY) CATAPRES 0.3 MG/24HR Apply 1 Patch to skin as directed every Wednesday.        Fluticasone Propionate (Suspension) FLONASE 50 MCG/ACT Spray 2 Sprays in nose as needed.        Gabapentin (Cap) NEURONTIN 300 MG Take 1-2 Caps by mouth 3 times a day.        HydrALAZINE HCl (Tab) APRESOLINE 100 MG Take 1 Tab by mouth every 8 hours.        HydroCHLOROthiazide (Tab) HYDRODIURIL 25 MG Take 0.5 Tabs by mouth every day.        HydroCHLOROthiazide (Cap) MICROZIDE 12.5 MG         Insulin Glargine (Solution) LANTUS 100 UNIT/ML Inject 30 Units as instructed every evening.        Insulin Glargine (Solution Pen-injector) LANTUS SOLOSTAR 100 UNIT/ML         Insulin Pen Needle (Misc) B-D UF III MINI PEN NEEDLES 31G X 5 MM         Lisinopril (Tab) PRINIVIL, ZESTRIL 40 MG TAKE ONE TABLET BY MOUTH ONE TIME DAILY        Magnesium Chloride (Tab CR) SLO- (64 Mg) MG Take 1 Tab by mouth 2 Times a Day.        MetFORMIN HCl (Tab) GLUCOPHAGE 500 MG Take 1 Tab by mouth 2 times a day, with meals.        Nystatin (Powder) MYCOSTATIN  Apply 1 Application to affected area(s) 3 times a day. Apply to reddened area under abdominal fold.        Oxycodone-Acetaminophen (Tab) PERCOCET-10  MG Take 1 Tab by mouth every 6 hours as needed for Severe Pain.        Polyethylene Glycol 3350 (Pack) MIRALAX  Take 1 Packet by mouth 1 time daily as needed.        TraZODone HCl (Tab) DESYREL 150 MG Take 0.5 Tabs by mouth every bedtime.        Warfarin Sodium (Tab) COUMADIN 5 MG Take two tablets by mouth one time daily or as directed by coumadin  clinic        .                 Medicines prescribed today were sent to:     [id number] - [place], [place] - [address]    [address] [place] [address]    Phone: [phone] Fax: [phone]    Open 24 Hours?: No      Medication refill instructions:       If your prescription bottle indicates you have medication refills left, it is not necessary to call your provider’s office. Please contact your pharmacy and they will refill your medication.    If your prescription bottle indicates you do not have any refills left, you may request refills at any time through one of the following ways: The online [facility] system (except Urgent Care), by calling your provider’s office, or by asking your pharmacy to contact your provider’s office with a refill request. Medication refills are processed only during regular business hours and may not be available until the next business day. Your provider may request additional information or to have a follow-up visit with you prior to refilling your medication.   *Please Note: Medication refills are assigned a new Rx number when refilled electronically. Your pharmacy may indicate that no refills were authorized even though a new prescription for the same medication is available at the pharmacy. Please request the medicine by name with the pharmacy before contacting your provider for a refill.        Other Notes About Your Plan     Patient is enrolled in [facility] with Dr. Sterling        Warfarin Dosing Calendar   August 2017 Details    Sun Mon Tue Wed Thu Fri Sat       1               2               3               4               5                 6               7               8               9               10               11               12                 13               14               15               16               17               18               19                 20               21               22               23               24   2.7    10 mg   See details      25      10 mg         26      10 mg           27      10 mg         28      10 mg         29      10 mg         30      10 mg         31      10 mg            Date Details   08/24 This INR check   INR: 2.7               How to take your warfarin dose     To take:  10 mg Take 2 of the 5 mg tablets.           Warfarin Dosing Calendar   September 2017 Details    Sun Mon Tue Wed Thu Fri Sat          1      10 mg         2      10 mg           3      10 mg         4      10 mg         5      10 mg         6      10 mg         7      10 mg         8      10 mg         9      10 mg           10      10 mg         11      10 mg         12      10 mg         13      10 mg         14      10 mg         15      10 mg         16      10 mg           17      10 mg         18      10 mg         19      10 mg         20      10 mg         21      10 mg         22      10 mg         23      10 mg           24      10 mg         25      10 mg         26      10 mg         27      10 mg         28      10 mg         29      10 mg         30      10 mg          Date Details   No additional details            How to take your warfarin dose     To take:  10 mg Take 2 of the 5 mg tablets.           Warfarin Dosing Calendar   October 2017 Details    Sun Mon Tue Wed Thu Fri Sat     1      10 mg         2      10 mg         3      10 mg         4      10 mg         5      10 mg         6               7                 8               9               10               11               12               13               14                 15               16               17               18               19               20               21                 22               23               24               25               26               27               28                 29               30               31                    Date Details   No additional details    Date of next INR:  10/5/2017         How to  take your warfarin dose     To take:  10 mg Take 2 of the 5 mg tablets.              MyChart Status: Patient Declined

## 2017-08-24 NOTE — PROGRESS NOTES
Anticoagulation Summary as of 8/24/2017     INR goal 2.0-3.0   Selected INR 2.7 (8/24/2017)   Maintenance plan 10 mg (5 mg x 2) every day   Weekly total 70 mg   Plan last modified Jefferson Brian, PHARMD (1/3/2017)   Next INR check 10/5/2017   Target end date Indefinite    Indications   CVA (cerebral vascular accident) (CMS-HCC) [I63.9]  Atrial fibrillation [427.31] [I48.91]  History of CVA (cerebrovascular accident) [Z86.73]  Anticoagulated on warfarin [Z79.01]         Anticoagulation Episode Summary     INR check location [facility]    Preferred lab     Send INR reminders to     Comments call pt multiple times, he has a hard time getting to his phone in time and has no VM set up      Anticoagulation Care Providers     Provider Role Specialty Phone number    BEL Lee.SIMONE. Referring Internal Medicine [phone]    [facility] Responsible  [phone]    Liseth Doe, A.P.N. Responsible Cardiology [phone]    Gi Cadena A.P.N. Responsible Cardiology [phone]        Anticoagulation Patient Findings      Current Outpatient Prescriptions on File Prior to Visit   Medication Sig Dispense Refill   • hydrochlorothiazide (MICROZIDE) 12.5 MG capsule      • LANTUS SOLOSTAR 100 UNIT/ML Solution Pen-injector injection      • B-D UF III MINI PEN NEEDLES 31G X 5 MM Misc      • warfarin (COUMADIN) 5 MG Tab Take two tablets by mouth one time daily or as directed by coumadin clinic 90 Tab 0   • lisinopril (PRINIVIL, ZESTRIL) 40 MG tablet TAKE ONE TABLET BY MOUTH ONE TIME DAILY 90 Tab 3   • oxycodone-acetaminophen (PERCOCET-10)  MG Tab Take 1 Tab by mouth every 6 hours as needed for Severe Pain. 120 Tab 0   • hydrochlorothiazide (HYDRODIURIL) 25 MG Tab Take 0.5 Tabs by mouth every day. 90 Tab 3   • gabapentin (NEURONTIN) 300 MG Cap Take 1-2 Caps by mouth 3 times a day. 120 Cap 11   • clindamycin (CLEOCIN) 300 MG Cap Take 1 Cap by mouth 2 Times a Day. 180 Cap 1   •  clonidine (CATAPRES) 0.3 MG/24HR PATCH WEEKLY Apply 1 Patch to skin as directed every Wednesday. 12 Patch 11   • amlodipine (NORVASC) 10 MG Tab Take 1 Tab by mouth every day. 90 Tab 3   • hydrALAZINE (APRESOLINE) 100 MG tablet Take 1 Tab by mouth every 8 hours. 270 Tab 3   • metformin (GLUCOPHAGE) 500 MG Tab Take 1 Tab by mouth 2 times a day, with meals. (Patient taking differently: Take 1,000 mg by mouth 2 times a day, with meals.) 180 Tab 3   • trazodone (DESYREL) 150 MG Tab Take 0.5 Tabs by mouth every bedtime. 45 Tab 3   • fluticasone (FLONASE) 50 MCG/ACT nasal spray Spray 2 Sprays in nose as needed. 1 Bottle 5   • nystatin (MYCOSTATIN) Powder Apply 1 Application to affected area(s) 3 times a day. Apply to reddened area under abdominal fold. 1 Bottle 0   • polyethylene glycol/lytes (MIRALAX) Pack Take 1 Packet by mouth 1 time daily as needed. 30 Each 0   • insulin glargine (LANTUS) 100 UNIT/ML Solution Inject 30 Units as instructed every evening. 10 mL 5   • magnesium chloride SR (SLO-MAG) 535 (64 MG) MG Tab CR Take 1 Tab by mouth 2 Times a Day. (Patient taking differently: Take 1 Tab by mouth 2 times a day as needed.) 60 Tab 0     No current facility-administered medications on file prior to visit.       Lab Results   Component Value Date/Time    SODIUM 138 08/23/2017 08:28 AM    POTASSIUM 3.9 08/23/2017 08:28 AM    CHLORIDE 101 08/23/2017 08:28 AM    CO2 28 08/23/2017 08:28 AM    GLUCOSE 171* 08/23/2017 08:28 AM    BUN 24* 08/23/2017 08:28 AM    CREATININE 0.88 08/23/2017 08:28 AM    BUN-CREATININE RATIO 16 02/17/2011 07:28 AM    GLOM FILT RATE, EST >59 02/17/2011 07:28 AM          Joel Ma seen in clinic today  INR  therapeutic.    Denies signs/symptoms of bleeding and/or thrombosis.    Denies changes to diet or medications.   Follow up appointment in 6 week(s).    Continue weekly warfarin dose as noted     Torres Sena, PHARMD

## 2017-08-25 ENCOUNTER — OFFICE VISIT (OUTPATIENT)
Dept: NEPHROLOGY | Facility: MEDICAL CENTER | Age: 67
End: 2017-08-25
Payer: MEDICARE

## 2017-08-25 VITALS
SYSTOLIC BLOOD PRESSURE: 150 MMHG | RESPIRATION RATE: 16 BRPM | WEIGHT: 268 LBS | HEART RATE: 69 BPM | HEIGHT: 76 IN | OXYGEN SATURATION: 96 % | TEMPERATURE: 97.9 F | BODY MASS INDEX: 32.63 KG/M2 | DIASTOLIC BLOOD PRESSURE: 82 MMHG

## 2017-08-25 DIAGNOSIS — N18.1 CKD (CHRONIC KIDNEY DISEASE), STAGE I: ICD-10-CM

## 2017-08-25 DIAGNOSIS — I10 ESSENTIAL HYPERTENSION: ICD-10-CM

## 2017-08-25 DIAGNOSIS — E11.21 DIABETIC NEPHROPATHY ASSOCIATED WITH TYPE 2 DIABETES MELLITUS (HCC): ICD-10-CM

## 2017-08-25 PROCEDURE — 99214 OFFICE O/P EST MOD 30 MIN: CPT | Performed by: INTERNAL MEDICINE

## 2017-08-25 RX ORDER — LISINOPRIL 40 MG/1
40 TABLET ORAL 2 TIMES DAILY
Qty: 180 TAB | Refills: 3 | Status: ON HOLD | OUTPATIENT
Start: 2017-08-25 | End: 2018-08-13

## 2017-08-25 ASSESSMENT — ENCOUNTER SYMPTOMS
FEVER: 0
CHILLS: 0
PALPITATIONS: 0

## 2017-08-25 NOTE — PROGRESS NOTES
"Subjective:      Joel Ma is a 67 y.o. male who presents with Chronic Kidney Disease            Chronic Kidney Disease  Pertinent negatives include no chest pain, chills or fever.     67 year old with DM2 for more than 15 years, and referred for proteinuria, from diabetic nephropathy.    1. CKD I - Has DN, Cr 0.88,Proteinuria appears to be increased at 2.4 g. He did increase the hydrochlorothiazide to 25 mg daily. Blood pressure appears to be improved.  2. DN -continued greater than 2 g of proteinuria. Continues to have leg swelling.  3. HTN -blood pressure is better than last visit. Now on hydrocodone is a 25 mg. No side effects at the current time.    Review of Systems   Constitutional: Negative for fever and chills.   Cardiovascular: Negative for chest pain and palpitations.          Objective:     /82 mmHg  Pulse 69  Temp(Src) 36.6 °C (97.9 °F)  Resp 16  Ht 1.93 m (6' 3.98\")  Wt 121.564 kg (268 lb)  BMI 32.64 kg/m2  SpO2 96%     Physical Exam   Constitutional: He is oriented to person, place, and time. He appears well-developed and well-nourished.   HENT:   Head: Normocephalic and atraumatic.   Cardiovascular: Normal rate and regular rhythm.    Pulmonary/Chest: Effort normal and breath sounds normal.   Musculoskeletal: He exhibits edema. He exhibits no tenderness.   Neurological: He is alert and oriented to person, place, and time.   Skin: Skin is warm and dry.               Assessment/Plan:     1. CKD (chronic kidney disease), stage I  Patient was CK D due to diabetic nephropathy. At this point he is chronic kidney disease stage I, he is on an ACE inhibitor which will be titrated. We will monitor him closely. No evidence of progression at the current time. However given his proteinuria I expect relatively rapid progression if we can't get control of his proteinuria.    - BASIC METABOLIC PANEL; Future  - CBC WITHOUT DIFFERENTIAL; Future  - PTH INTACT (PTH ONLY); Future  - VITAMIN D,25 " HYDROXY; Future  - MICROALBUMIN CREAT RATIO URINE; Future    2. Diabetic nephropathy associated with type 2 diabetes mellitus (CMS-[facility])  Plan is to increase lisinopril to 40 mg twice a day. We will follow him up and ensure that his blood pressure is controlled as well.    3. Essential hypertension  Blood pressure appears to be better than last time. Continue to monitor both at home and in office.

## 2017-08-31 ENCOUNTER — HOSPITAL ENCOUNTER (OUTPATIENT)
Dept: PHYSICAL THERAPY | Facility: REHABILITATION | Age: 67
End: 2017-08-31
Attending: INTERNAL MEDICINE
Payer: MEDICARE

## 2017-08-31 PROCEDURE — 97110 THERAPEUTIC EXERCISES: CPT

## 2017-08-31 PROCEDURE — 97116 GAIT TRAINING THERAPY: CPT

## 2017-09-06 ENCOUNTER — APPOINTMENT (OUTPATIENT)
Dept: PHYSICAL THERAPY | Facility: REHABILITATION | Age: 67
End: 2017-09-06
Attending: INTERNAL MEDICINE
Payer: MEDICARE

## 2017-09-08 ENCOUNTER — APPOINTMENT (OUTPATIENT)
Dept: PHYSICAL THERAPY | Facility: REHABILITATION | Age: 67
End: 2017-09-08
Attending: INTERNAL MEDICINE
Payer: MEDICARE

## 2017-09-08 PROCEDURE — 97110 THERAPEUTIC EXERCISES: CPT

## 2017-09-11 ENCOUNTER — HOSPITAL ENCOUNTER (OUTPATIENT)
Dept: PHYSICAL THERAPY | Facility: REHABILITATION | Age: 67
End: 2017-09-11
Attending: INTERNAL MEDICINE
Payer: MEDICARE

## 2017-09-11 PROCEDURE — 97014 ELECTRIC STIMULATION THERAPY: CPT

## 2017-09-11 PROCEDURE — 97110 THERAPEUTIC EXERCISES: CPT

## 2017-09-11 PROCEDURE — 97116 GAIT TRAINING THERAPY: CPT

## 2017-09-13 ENCOUNTER — HOSPITAL ENCOUNTER (OUTPATIENT)
Dept: PHYSICAL THERAPY | Facility: REHABILITATION | Age: 67
End: 2017-09-13
Attending: INTERNAL MEDICINE
Payer: MEDICARE

## 2017-09-13 PROCEDURE — 97110 THERAPEUTIC EXERCISES: CPT

## 2017-09-13 PROCEDURE — 97014 ELECTRIC STIMULATION THERAPY: CPT

## 2017-09-18 ENCOUNTER — HOSPITAL ENCOUNTER (OUTPATIENT)
Dept: PHYSICAL THERAPY | Facility: REHABILITATION | Age: 67
End: 2017-09-18
Attending: INTERNAL MEDICINE
Payer: MEDICARE

## 2017-09-18 PROCEDURE — 97110 THERAPEUTIC EXERCISES: CPT

## 2017-09-18 PROCEDURE — 97140 MANUAL THERAPY 1/> REGIONS: CPT

## 2017-09-18 PROCEDURE — 97014 ELECTRIC STIMULATION THERAPY: CPT

## 2017-09-19 RX ORDER — WARFARIN SODIUM 5 MG/1
TABLET ORAL
Qty: 180 TAB | Refills: 1 | Status: SHIPPED | OUTPATIENT
Start: 2017-09-19 | End: 2017-09-20 | Stop reason: SDUPTHER

## 2017-09-20 ENCOUNTER — TELEPHONE (OUTPATIENT)
Dept: MEDICAL GROUP | Facility: CLINIC | Age: 67
End: 2017-09-20

## 2017-09-20 DIAGNOSIS — Z79.01 CHRONIC ANTICOAGULATION: ICD-10-CM

## 2017-09-20 NOTE — TELEPHONE ENCOUNTER
Future Appointments       Provider Department Center    9/22/2017 10:20 AM Catrachito Sterling D.O.; [facility]    10/5/2017 10:00 AM IHV EXAM 5 [facility] [place] [facility]      10/13/2017 10:30 AM Johnathon Bonner M.D. [facility] [address]    10/19/2017 10:00 AM Catrachito Sterling D.O. [facility]    11/7/2017 10:20 AM DEIDRA Ibarra [facility] Medicine           ANNUAL WELLNESS VISIT PRE-VISIT PLANNING     1.  Reviewed note from last office visit with PCP: YES Last office visit: 7/18/17    2.  If any orders were placed at last visit, do we have Results/Consult Notes?        •  Labs - Labs were not ordered at last office visit.        •  Imaging - Imaging was not ordered at last office visit.        •  Referrals - No referrals were ordered at last office visit.     3.  Immunizations were updated in Epic using WebIZ?: Epic matches WebIZ       •  WebIZ Recommendations:  Zoster (Shingles)   Influenza w/preserv.            •  Is patient due for Tdap? NO       •  Is patient due for Shingles? YES. Patient was notified of copay.     4.  Patient is due for the following Health Maintenance Topics:   Health Maintenance Due   Topic Date Due   • IMM ZOSTER VACCINE  08/17/2009   • Annual Wellness Visit  10/28/2015   • COLONOSCOPY  06/30/2017   • IMM INFLUENZA (1) 09/01/2017           5.  Reviewed/Updated the following with patient:       •   Preferred Pharmacy? YES       •   Preferred Lab? YES       •   Medications? YES. Was Abstract Encounter opened and chart updated? YES       •   Social History? YES. Was Abstract Encounter opened and chart updated? YES       •   Family History? YES. Was Abstract Encounter opened and chart updated? YES    6.  Care Team Updated:       •   DME Company (gait device, O2, CPAP, etc.): YES       •   Other Specialists (eye doctor, derm, GYN, cardiology,  "endo, etc): YES       •   Last eye exam: 9/11/17    7. DPA/Advanced Directive:  Patient does not have an Advanced Directive.  A packet and workshop information was given on Advanced Directives.    8.  Patient has the following Care Path diagnoses on Problem List:  DIABETES    Has patient ever had diabetes education? Yes, and is NOT interested in more at this time.        9.  Specialty Comments was updated with diagnosis information provided by SCP: YES \"Per prescription history patient has been on chronic anticoagulants. In your medical opinion, could this be c/w hemorrhagic disorder d/t extrinsic circulating anticoagulants? If you are in agreement suggested code is D68.32. Per prescription history patient has been on long-term opioids. Therefore, in your medical opinion could this be considered Opioid dependence, uncomplicated, secondary to chronic pain? If you are in agreement, suggested code is F11.20. Toe amputation code has been recaptured    10.  Patient was advised: “This is a free wellness visit. The provider will screen for medical conditions to help you stay healthy. If you have other concerns to address you may be asked to discuss these at a separate visit or there may be an additional fee.”     11.  Patient was informed to arrive 15 min prior to their scheduled appointment and bring in their medication bottles?  YES      "

## 2017-09-21 RX ORDER — WARFARIN SODIUM 5 MG/1
TABLET ORAL
Qty: 180 TAB | Refills: 1 | Status: SHIPPED | OUTPATIENT
Start: 2017-09-21 | End: 2018-03-21 | Stop reason: SDUPTHER

## 2017-09-22 ENCOUNTER — OFFICE VISIT (OUTPATIENT)
Dept: MEDICAL GROUP | Facility: CLINIC | Age: 67
End: 2017-09-22
Payer: MEDICARE

## 2017-09-22 VITALS
BODY MASS INDEX: 35.52 KG/M2 | SYSTOLIC BLOOD PRESSURE: 130 MMHG | OXYGEN SATURATION: 94 % | WEIGHT: 268 LBS | HEIGHT: 73 IN | TEMPERATURE: 98.7 F | RESPIRATION RATE: 16 BRPM | DIASTOLIC BLOOD PRESSURE: 82 MMHG | HEART RATE: 59 BPM

## 2017-09-22 DIAGNOSIS — Z79.4 CONTROLLED TYPE 2 DIABETES MELLITUS WITH DIABETIC POLYNEUROPATHY, WITH LONG-TERM CURRENT USE OF INSULIN (HCC): ICD-10-CM

## 2017-09-22 DIAGNOSIS — E11.42 CONTROLLED TYPE 2 DIABETES MELLITUS WITH DIABETIC POLYNEUROPATHY, WITH LONG-TERM CURRENT USE OF INSULIN (HCC): ICD-10-CM

## 2017-09-22 DIAGNOSIS — E11.42 DIABETIC POLYNEUROPATHY ASSOCIATED WITH TYPE 2 DIABETES MELLITUS (HCC): ICD-10-CM

## 2017-09-22 DIAGNOSIS — I10 ESSENTIAL HYPERTENSION: ICD-10-CM

## 2017-09-22 DIAGNOSIS — N18.1 CKD (CHRONIC KIDNEY DISEASE), STAGE I: ICD-10-CM

## 2017-09-22 DIAGNOSIS — I63.89 CEREBROVASCULAR ACCIDENT (CVA) DUE TO OTHER MECHANISM (HCC): ICD-10-CM

## 2017-09-22 PROCEDURE — G0439 PPPS, SUBSEQ VISIT: HCPCS | Performed by: INTERNAL MEDICINE

## 2017-09-22 ASSESSMENT — PAIN SCALES - GENERAL: PAINLEVEL: 7=MODERATE-SEVERE PAIN

## 2017-09-22 ASSESSMENT — PATIENT HEALTH QUESTIONNAIRE - PHQ9: CLINICAL INTERPRETATION OF PHQ2 SCORE: 0

## 2017-09-22 NOTE — PROGRESS NOTES
Chief Complaint   Patient presents with   • Annual Wellness Visit         HPI:  Joel is a 67 y.o. here for Medicare Annual Wellness Visit         Patient Active Problem List    Diagnosis Date Noted   • Left knee pain 08/28/2016     Priority: High   • Diabetic polyneuropathy (CMS-HCC) 05/06/2015     Priority: High   • Toe amputation status (CMS-HCC) 05/06/2015     Priority: High   • HTN (hypertension) 08/16/2010     Priority: Medium   • CVA (cerebral vascular accident) (CMS-HCC) 06/04/2009     Priority: Medium   • Diabetes mellitus with neurological manifestations, controlled (CMS-HCC) 03/05/2012     Priority: Low   • CKD (chronic kidney disease), stage I 05/12/2017   • Diabetic nephropathy associated with type 2 diabetes mellitus (CMS-HCC) 05/12/2017   • Essential hypertension 05/12/2017   • Chronic use of opiate drugs therapeutic purposes 02/14/2017   • Other orthopedic aftercare 11/07/2016   • Cervical stenosis of spine 09/06/2016   • Dysphagia 09/06/2016   • Chronic atrial fibrillation (CMS-HCC) 09/06/2016   • Hallucinations 09/06/2016   • JANNIE treated with BiPAP 04/18/2016   • History of CVA (cerebrovascular accident) 11/11/2015   • Atrial fibrillation [427.31] 06/24/2015   • Risk for falls 10/27/2014   • BMI 38.0-38.9,adult 10/27/2014   • Myalgia 05/29/2014   • BPH (benign prostatic hyperplasia) 04/28/2014   • Anticoagulated on warfarin 04/28/2014   • Chronic antibiotic suppression 04/28/2014   • MEDICAL HOME    • Ventricular ectopy 06/17/2011   • Trigger finger 06/17/2011   • Thyroid mass 07/30/2009   • Dyslipidemia 06/17/2009       Current Outpatient Prescriptions   Medication Sig Dispense Refill   • warfarin (COUMADIN) 5 MG Tab Take two tablets daily as directed by [facility] 180 Tab 1   • lisinopril (PRINIVIL, ZESTRIL) 40 MG tablet Take 1 Tab by mouth 2 times a day. 180 Tab 3   • hydrochlorothiazide (MICROZIDE) 12.5 MG capsule      • LANTUS SOLOSTAR 100 UNIT/ML Solution Pen-injector  injection      • hydrochlorothiazide (HYDRODIURIL) 25 MG Tab Take 0.5 Tabs by mouth every day. 90 Tab 3   • gabapentin (NEURONTIN) 300 MG Cap Take 1-2 Caps by mouth 3 times a day. 120 Cap 11   • clindamycin (CLEOCIN) 300 MG Cap Take 1 Cap by mouth 2 Times a Day. 180 Cap 1   • clonidine (CATAPRES) 0.3 MG/24HR PATCH WEEKLY Apply 1 Patch to skin as directed every Wednesday. 12 Patch 11   • amlodipine (NORVASC) 10 MG Tab Take 1 Tab by mouth every day. 90 Tab 3   • hydrALAZINE (APRESOLINE) 100 MG tablet Take 1 Tab by mouth every 8 hours. 270 Tab 3   • metformin (GLUCOPHAGE) 500 MG Tab Take 1 Tab by mouth 2 times a day, with meals. (Patient taking differently: Take 1,000 mg by mouth 2 times a day, with meals.) 180 Tab 3   • insulin glargine (LANTUS) 100 UNIT/ML Solution Inject 30 Units as instructed every evening. 10 mL 5   • B-D UF III MINI PEN NEEDLES 31G X 5 MM Misc      • oxycodone-acetaminophen (PERCOCET-10)  MG Tab Take 1 Tab by mouth every 6 hours as needed for Severe Pain. 120 Tab 0   • trazodone (DESYREL) 150 MG Tab Take 0.5 Tabs by mouth every bedtime. 45 Tab 3   • fluticasone (FLONASE) 50 MCG/ACT nasal spray Spray 2 Sprays in nose as needed. 1 Bottle 5   • nystatin (MYCOSTATIN) Powder Apply 1 Application to affected area(s) 3 times a day. Apply to reddened area under abdominal fold. 1 Bottle 0   • polyethylene glycol/lytes (MIRALAX) Pack Take 1 Packet by mouth 1 time daily as needed. 30 Each 0   • magnesium chloride SR (SLO-MAG) 535 (64 MG) MG Tab CR Take 1 Tab by mouth 2 Times a Day. (Patient taking differently: Take 1 Tab by mouth 2 times a day as needed.) 60 Tab 0     No current facility-administered medications for this visit.         Patient is taking medications as noted in medication list.  Current supplements as per medication list.     Allergies: Demerol and Statins [hmg-coa-r inhibitors]    Current social contact/activities: Joel interacts with family, dines out with a daughter in law      Is  patient current with immunizations? No, due for FLU and ZOSTAVAX (Shingles). Patient is interested in receiving NONE today.    He  reports that he quit smoking about 47 years ago. His smoking use included Cigarettes. He has a 2.00 pack-year smoking history. He has never used smokeless tobacco. He reports that he does not drink alcohol or use drugs.  Counseling given: Not Answered        DPA/Advanced directive: Patient does not have an Advanced Directive.  A packet and workshop information was given on Advanced Directives.    ROS:    Gait: Uses a walker    Ostomy: no    Other tubes: no    Amputations: yes    Chronic oxygen use no    Last eye exam 9/8/17    Wears hearing aids: no    : Denies incontinence.         Screening:    DIABETES    Has patient ever had diabetes education? Yes, and patient is interested in more. Referral pended.            Depression Screening    Little interest or pleasure in doing things?  0 - not at all  Feeling down, depressed, or hopeless? 0 - not at all  Patient Health Questionnaire Score: 0    If depressive symptoms identified deferred to follow up visit unless specifically addressed in assessment and plan.    Interpretation of PHQ-9 Total Score   Score Severity   1-4 No Depression   5-9 Mild Depression   10-14 Moderate Depression   15-19 Moderately Severe Depression   20-27 Severe Depression    Screening for Cognitive Impairment    Three Minute Recall (apple, watch, giacomo)  3/3    Draw clock face with all 12 numbers set to the hand to show 10 minutes past 11 o'clock  1 5/5  If cognitive concerns identified, deferred for follow up unless specifically addressed in assessment and plan.    Fall Risk Assessment    Has the patient had two or more falls in the last year or any fall with injury in the last year?  No  If fall risk identified, deferred for follow up unless specifically addressed in assessment and plan.    Safety Assessment    Throw rugs on floor.  No  Handrails on all stairs.   Yes  Good lighting in all hallways.  Yes  Difficulty hearing.  No  Patient counseled about all safety risks that were identified.    Functional Assessment ADLs    Are there any barriers preventing you from cooking for yourself or meeting nutritional needs?  No.    Are there any barriers preventing you from driving safely or obtaining transportation?  No.    Are there any barriers preventing you from using a telephone or calling for help?  No.    Are there any barriers preventing you from shopping?  No.    Are there any barriers preventing you from taking care of your own finances?  No.    Are there any barriers preventing you from managing your medications?  No.    Are you currently engaging any exercise or physical activity?  Yes.  Joel walks to improve his mobility, stretching exercises      Health Maintenance Summary                IMM ZOSTER VACCINE Overdue 8/17/2009     Annual Wellness Visit Overdue 10/28/2015      Done 10/27/2014 Visit Dx: Medicare annual wellness visit, subsequent    COLONOSCOPY Overdue 6/30/2017      Done 6/30/2014 AMB REFERRAL TO GI FOR COLONOSCOPY    IMM INFLUENZA Overdue 9/1/2017     A1C SCREENING Next Due 1/12/2018      Done 7/12/2017 HEMOGLOBIN A1C (A)     Patient has more history with this topic...    IMM PNEUMOCOCCAL 65+ (ADULT) LOW/MEDIUM RISK SERIES Next Due 1/19/2018      Done 1/19/2017 Imm Admin: Pneumococcal Conjugate Vaccine (Prevnar/PCV-13)    DIABETES MONOFILAMENT / LE EXAM Next Due 3/17/2018      Done 3/17/2017 AMB DIABETIC MONOFILAMENT LOWER EXTREMITY EXAM     Patient has more history with this topic...    FASTING LIPID PROFILE Next Due 4/28/2018      Done 4/28/2017 LIPID PROFILE (A)     Patient has more history with this topic...    URINE DRUG SCREEN Next Due 5/28/2018      Done 6/2/2017 [facility] SCREEN     Patient has more history with this topic...    URINE ACR / MICROALBUMIN Next Due 8/23/2018      Done 8/23/2017 MICROALBUMIN CREAT RATIO URINE (A)      Patient has more history with this topic...    SERUM CREATININE Next Due 8/23/2018      Done 8/23/2017 BASIC METABOLIC PANEL (A)     Patient has more history with this topic...    RETINAL SCREENING Next Due 9/8/2018      Done 9/8/2017 REFERRAL FOR RETINAL SCREENING EXAM     Patient has more history with this topic...    IMM DTaP/Tdap/Td Vaccine Next Due 7/16/2022      Done 7/16/2012 Imm Admin: Tdap Vaccine     Patient has more history with this topic...          Patient Care Team:  Catrachito Sterling D.O. as PCP - General  Catrachito Fonseca M.D. as Consulting Physician (Ophthalmology)  Pradeep Gannon O.D. as Consulting Physician (Optometry)  Fernando Slater D.O. as Consulting Physician (Neurology)  Moshe Salazar M.D. as Consulting Physician (Cardiology)  Miguel Angel Morgan M.D. as Consulting Physician (Orthopaedics)  [facility]  [place] as Respiratory  Ember Narvaez MSPT (Physical Therapy)  Johnathon Bonner M.D. as Consulting Physician (Nephrology)    Social History   Substance Use Topics   • Smoking status: Former Smoker     Packs/day: 4.00     Years: 0.50     Types: Cigarettes     Quit date: 1/1/1970   • Smokeless tobacco: Never Used   • Alcohol use No     Family History   Problem Relation Age of Onset   • Diabetes Mother    • Cancer Father      lung cancer   • Heart Disease Father      triple bypass   • Diabetes     • Hypertension     • Cancer       He  has a past medical history of Anesthesia; arthritis (6/17/2011); Dental disorder; Diabetes; High cholesterol; Hypertension; MRSA (methicillin resistant Staphylococcus aureus); JANNIE treated with BiPAP (4/18/2016); Pain (05-03-13); Pneumonia (2002); Sleep apnea; Stroke (CMS-HCC); Thyroid mass (7/30/2009); and Ventricular ectopy (6/17/2011). He also has no past medical history of Backpain; COPD; or Heart murmur.   Past Surgical History:   Procedure Laterality Date   • CERVICAL DISK AND FUSION ANTERIOR  8/31/2016    Procedure: CERVICAL DISK  "AND FUSION ANTERIOR C3-7 ;  Surgeon: Alhaji Jaffe M.D.;  Location: SURGERY [facility];  Service:    • CATARACT PHACO WITH IOL  5/8/2013    Performed by Mame Rehman M.D. at SURGERY SAME DAY [facility]   • KNEE REVISION TOTAL  11/13/2012    Performed by Gordon Cota M.D. at SURGERY [facility]   • IRRIGATION & DEBRIDEMENT ORTHO  11/13/2012    Performed by Gordon Cota M.D. at SURGERY [facility]   • IRRIGATION & DEBRIDEMENT ORTHO  11/2/2012    Performed by Gordon Cota M.D. at SURGERY [facility]   • INCISION AND DRAINAGE ORTHOPEDIC Right 10/29/2012    Procedure: Right Total Knee I and D and Liner Exchange, with drain;  Surgeon: Gordon Cota M.D.;  Location: [facility];  Service:    • IRRIGATION & DEBRIDEMENT ORTHO Left 10/29/2012    Procedure: left shoulder, with drain;  Surgeon: Gordon Cota M.D.;  Location: SURGERY [facility];  Service:    • KNEE REVISION TOTAL  3/13/2012    Performed by KAMALJIT SHI at [facility]   • IRRIGATION & DEBRIDEMENT ORTHO  3/13/2012    Performed by KAMALJIT SHI at [facility]   • TENDON REPAIR  3/13/2012    Performed by KAMALJIT SHI at [facility]   • KNEE ARTHROPLASTY TOTAL  1/11/2012    Right; Performed by KAMALJIT SHI at [facility]   • TOE AMPUTATION  7/22/2011    Performed by EVELYN JANE at [facility]   • ELBOW ARTHROTOMY  2008    right   • LUMBAR FUSION POSTERIOR  1979   • FOOT SURGERY      partial amputation great toe   • FOOT SURGERY      toe surgery left foot   • PB KNEE SCOPE,SINGLE MENISECTOMY      right knee           Exam:     Blood pressure 130/82, pulse (!) 59, temperature 37.1 °C (98.7 °F), resp. rate 16, height 1.854 m (6' 1\"), weight 121.6 kg (268 lb), SpO2 94 %. Body mass index is 35.36 kg/m².    Hearing fair.    Dentition upper and lower dentures  Alert, oriented in no acute distress.  Eye contact is good, speech goal directed, affect " calm       Assessment and Plan. The following treatment and monitoring plan is recommended:     1. Diabetic polyneuropathy associated with type 2 diabetes mellitus (CMS-[facility])  Patient is clinically stable no change in medical therapy    2. 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 Essential hypertension  Controlled    4. Controlled type 2 diabetes mellitus with diabetic polyneuropathy, with long-term current use of insulin (CMS-[facility])  Labs ordered  - COMP METABOLIC PANEL; Future  - CBC WITH DIFFERENTIAL; Future  - LIPID PROFILE; Future  - HEMOGLOBIN A1C; Future    5. CKD (chronic kidney disease), stage I  Recheck comprehensive metabolic panel    6. BMI 35.0-35.9,adult  Discussed weight loss    7. Cerebrovascular accident (CVA) due to other mechanism  Clinically stable      Services suggested: No services needed at this time  Health Care Screening recommendations as per orders if indicated.  Referrals offered: PT/OT/Nutrition counseling/Behavioral Health/Smoking cessation as per orders if indicated.    Discussion today about general wellness and lifestyle habits:    · Prevent falls and reduce trip hazards; Cautioned about securing or removing rugs.  · Have a working fire alarm and carbon monoxide detector;   · Engage in regular physical activity and social activities       Follow-up: No Follow-up on file.

## 2017-09-25 ENCOUNTER — TELEPHONE (OUTPATIENT)
Dept: NEPHROLOGY | Facility: MEDICAL CENTER | Age: 67
End: 2017-09-25

## 2017-09-25 NOTE — TELEPHONE ENCOUNTER
Was the patient seen in the last year in this department? Yes     Does patient have an active prescription for medications requested? Yes     Received Request Via: Patient       Pt called that he got his medication Microzide 25mg but the instruction say he needs to take half a pill how it was 12.5mg he just want to know if that right or does it need to be change   And he needs a refill as well if it different   Thank you

## 2017-09-28 ENCOUNTER — TELEPHONE (OUTPATIENT)
Dept: NEPHROLOGY | Facility: MEDICAL CENTER | Age: 67
End: 2017-09-28

## 2017-09-28 DIAGNOSIS — I10 ESSENTIAL HYPERTENSION: ICD-10-CM

## 2017-09-28 NOTE — TELEPHONE ENCOUNTER
Was the patient seen in the last year in this department? Yes     Does patient have an active prescription for medications requested? Yes     Received Request Via: Patient     Pt taking Lisinopril 40mg 2 times daily but pharmacy gave him to take one daily

## 2017-10-05 ENCOUNTER — ANTICOAGULATION VISIT (OUTPATIENT)
Dept: VASCULAR LAB | Facility: MEDICAL CENTER | Age: 67
End: 2017-10-05
Attending: INTERNAL MEDICINE
Payer: MEDICARE

## 2017-10-05 VITALS — DIASTOLIC BLOOD PRESSURE: 80 MMHG | HEART RATE: 66 BPM | SYSTOLIC BLOOD PRESSURE: 141 MMHG

## 2017-10-05 DIAGNOSIS — I48.91 ATRIAL FIBRILLATION, UNSPECIFIED TYPE (HCC): ICD-10-CM

## 2017-10-05 DIAGNOSIS — I48.0 PAROXYSMAL ATRIAL FIBRILLATION (HCC): ICD-10-CM

## 2017-10-05 DIAGNOSIS — Z86.73 HISTORY OF CVA (CEREBROVASCULAR ACCIDENT): ICD-10-CM

## 2017-10-05 DIAGNOSIS — Z79.01 ANTICOAGULATED ON WARFARIN: ICD-10-CM

## 2017-10-05 DIAGNOSIS — I63.00 CEREBROVASCULAR ACCIDENT (CVA) DUE TO THROMBOSIS OF PRECEREBRAL ARTERY (HCC): ICD-10-CM

## 2017-10-05 LAB
INR BLD: 2.7 (ref 0.9–1.2)
INR PPP: 2.7 (ref 2–3.5)

## 2017-10-05 PROCEDURE — 99211 OFF/OP EST MAY X REQ PHY/QHP: CPT

## 2017-10-05 PROCEDURE — 85610 PROTHROMBIN TIME: CPT

## 2017-10-05 NOTE — PROGRESS NOTES
Anticoagulation Summary  As of 10/5/2017    INR goal:   2.0-3.0   TTR:   56.3 % (2 y)   Today's INR:   2.7   Maintenance plan:   10 mg (5 mg x 2) every day   Weekly total:   70 mg   Plan last modified:   Jefferson Brian, PharmD (1/3/2017)   Next INR check:   11/30/2017   Target end date:   Indefinite    Indications    CVA (cerebral vascular accident) (CMS-HCC) [I63.9]  Atrial fibrillation [427.31] [I48.91]  History of CVA (cerebrovascular accident) [Z86.73]  Anticoagulated on warfarin [Z79.01]             Anticoagulation Episode Summary     INR check location:   [facility]    Preferred lab:       Send INR reminders to:       Comments:   call pt multiple times, he has a hard time getting to his phone in time and has no VM set up      Anticoagulation Care Providers     Provider Role Specialty Phone number    Catrachito Sterling D.O. Referring Internal Medicine [phone]    [facility] Responsible  [phone]    Liseth Doe A.P.N. Responsible Cardiology [phone]    Gi Cadena A.P.NElian Responsible Cardiology [phone]        Anticoagulation Patient Findings  Patient Findings     Positives:   Missed doses    Negatives:   Signs/symptoms of thrombosis, Signs/symptoms of bleeding, Laboratory test error suspected, Change in health, Change in alcohol use, Change in activity, Upcoming invasive procedure, Emergency department visit, Upcoming dental procedure, Extra doses, Change in medications, Change in diet/appetite, Hospital admission, Bruising, Other complaints        History of Present Illness: follow up appointment for chronic anticoagulation for atrial fibrillation/CVA        Pt remains therapeutic today.  Pt is to continue with current warfarin dosing regimen.  Pt denies any unusual s/s of bleeding, bruising, clotting or any changes to diet or medications.  Follow up in 8 weeks.    Beth Angulo, PharmD    Pt will be staying in [place] for a few months with his son,  standing order given

## 2017-10-09 ENCOUNTER — TELEPHONE (OUTPATIENT)
Dept: NEPHROLOGY | Facility: MEDICAL CENTER | Age: 67
End: 2017-10-09

## 2017-10-09 DIAGNOSIS — Z79.01 CHRONIC ANTICOAGULATION: ICD-10-CM

## 2017-10-09 NOTE — TELEPHONE ENCOUNTER
Pt called that his medication needs to be fix pharmacy give him Hydrodiuril 25mg but to take 0.5mg once a day when he was told to take 25mg everyday

## 2017-10-10 RX ORDER — HYDROCHLOROTHIAZIDE 25 MG/1
25 TABLET ORAL DAILY
Qty: 90 TAB | Refills: 3 | Status: ON HOLD | OUTPATIENT
Start: 2017-10-10 | End: 2018-08-13

## 2017-10-12 ENCOUNTER — HOSPITAL ENCOUNTER (OUTPATIENT)
Dept: LAB | Facility: MEDICAL CENTER | Age: 67
End: 2017-10-12
Attending: INTERNAL MEDICINE
Payer: MEDICARE

## 2017-10-12 DIAGNOSIS — N18.1 CKD (CHRONIC KIDNEY DISEASE), STAGE I: ICD-10-CM

## 2017-10-12 LAB
25(OH)D3 SERPL-MCNC: 27 NG/ML (ref 30–100)
ANION GAP SERPL CALC-SCNC: 7 MMOL/L (ref 0–11.9)
BUN SERPL-MCNC: 28 MG/DL (ref 8–22)
CALCIUM SERPL-MCNC: 9.2 MG/DL (ref 8.5–10.5)
CHLORIDE SERPL-SCNC: 102 MMOL/L (ref 96–112)
CO2 SERPL-SCNC: 29 MMOL/L (ref 20–33)
CREAT SERPL-MCNC: 0.99 MG/DL (ref 0.5–1.4)
CREAT UR-MCNC: 69.2 MG/DL
ERYTHROCYTE [DISTWIDTH] IN BLOOD BY AUTOMATED COUNT: 44.1 FL (ref 35.9–50)
GFR SERPL CREATININE-BSD FRML MDRD: >60 ML/MIN/1.73 M 2
GLUCOSE SERPL-MCNC: 148 MG/DL (ref 65–99)
HCT VFR BLD AUTO: 44.5 % (ref 42–52)
HGB BLD-MCNC: 14.7 G/DL (ref 14–18)
MCH RBC QN AUTO: 28.3 PG (ref 27–33)
MCHC RBC AUTO-ENTMCNC: 33 G/DL (ref 33.7–35.3)
MCV RBC AUTO: 85.7 FL (ref 81.4–97.8)
MICROALBUMIN UR-MCNC: 66.9 MG/DL
MICROALBUMIN/CREAT UR: 967 MG/G (ref 0–30)
PLATELET # BLD AUTO: 152 K/UL (ref 164–446)
PMV BLD AUTO: 11.9 FL (ref 9–12.9)
POTASSIUM SERPL-SCNC: 4.4 MMOL/L (ref 3.6–5.5)
PTH-INTACT SERPL-MCNC: 78.7 PG/ML (ref 14–72)
RBC # BLD AUTO: 5.19 M/UL (ref 4.7–6.1)
SODIUM SERPL-SCNC: 138 MMOL/L (ref 135–145)
WBC # BLD AUTO: 4.8 K/UL (ref 4.8–10.8)

## 2017-10-12 PROCEDURE — 85027 COMPLETE CBC AUTOMATED: CPT

## 2017-10-12 PROCEDURE — 82570 ASSAY OF URINE CREATININE: CPT

## 2017-10-12 PROCEDURE — 82306 VITAMIN D 25 HYDROXY: CPT

## 2017-10-12 PROCEDURE — 83970 ASSAY OF PARATHORMONE: CPT

## 2017-10-12 PROCEDURE — 82043 UR ALBUMIN QUANTITATIVE: CPT

## 2017-10-12 PROCEDURE — 80048 BASIC METABOLIC PNL TOTAL CA: CPT

## 2017-10-12 PROCEDURE — 36415 COLL VENOUS BLD VENIPUNCTURE: CPT

## 2017-10-13 ENCOUNTER — OFFICE VISIT (OUTPATIENT)
Dept: NEPHROLOGY | Facility: MEDICAL CENTER | Age: 67
End: 2017-10-13
Payer: MEDICARE

## 2017-10-13 VITALS
HEART RATE: 56 BPM | RESPIRATION RATE: 14 BRPM | BODY MASS INDEX: 33.2 KG/M2 | TEMPERATURE: 97.5 F | SYSTOLIC BLOOD PRESSURE: 152 MMHG | WEIGHT: 267 LBS | DIASTOLIC BLOOD PRESSURE: 76 MMHG | HEIGHT: 75 IN

## 2017-10-13 DIAGNOSIS — N18.1 CKD (CHRONIC KIDNEY DISEASE), STAGE I: ICD-10-CM

## 2017-10-13 DIAGNOSIS — E66.09 CLASS 1 OBESITY DUE TO EXCESS CALORIES WITH SERIOUS COMORBIDITY AND BODY MASS INDEX (BMI) OF 33.0 TO 33.9 IN ADULT: ICD-10-CM

## 2017-10-13 DIAGNOSIS — R80.1 PERSISTENT PROTEINURIA: ICD-10-CM

## 2017-10-13 PROCEDURE — 99214 OFFICE O/P EST MOD 30 MIN: CPT | Performed by: INTERNAL MEDICINE

## 2017-10-13 ASSESSMENT — ENCOUNTER SYMPTOMS
PALPITATIONS: 0
CHILLS: 0
FEVER: 0

## 2017-10-13 NOTE — PROGRESS NOTES
"Subjective:      Joel Ma is a 67 y.o. male who presents with Follow-Up            HPI  67 year old with DM2 for more than 15 years, and referred for proteinuria, from diabetic nephropathy.    1. CKD I - DN, proteinuria improved with increased dose of ACEi  2. DN - better, continue ACE, lifestyle changes.  3. HTN - BP still above goal, does not check BP at home    Review of Systems   Constitutional: Negative for chills and fever.   Cardiovascular: Negative for chest pain and palpitations.          Objective:     /76   Pulse (!) 56   Temp 36.4 °C (97.5 °F) (Temporal)   Resp 14   Ht 1.905 m (6' 3\")   Wt 121.1 kg (267 lb)   BMI 33.37 kg/m²      Physical Exam   Constitutional: He is oriented to person, place, and time. He appears well-developed and well-nourished.   Cardiovascular: Normal rate and regular rhythm.    Pulmonary/Chest: Effort normal and breath sounds normal.   Neurological: He is alert and oriented to person, place, and time.   Skin: Skin is warm and dry.   Psychiatric: He has a normal mood and affect. His behavior is normal.               Assessment/Plan:     1. CKD (chronic kidney disease), stage I  Improving, due to DN, continue ACEi    - CBC WITHOUT DIFFERENTIAL; Future  - BASIC METABOLIC PANEL; Future  - VITAMIN D,25 HYDROXY; Future  - MICROALBUMIN CREAT RATIO URINE; Future    2. Persistent proteinuria  Improving, next step is weight loss to help    3. Class 1 obesity due to excess calories with serious comorbidity and body mass index (BMI) of 33.0 to 33.9 in adult  Refer to BMI    - REFERRAL TO [facility] HEALTH IMPROVEMENT PROGRAMS (HIP) Services Requested: Medical Weight Management Program; Reason for Referral? BMI>25 and 1 or more co-morbidities, including DM2, HTN, steatosis/steatohepatitis/fatty liver, obstructive sleep apne...      "

## 2017-10-19 ENCOUNTER — HOSPITAL ENCOUNTER (OUTPATIENT)
Dept: LAB | Facility: MEDICAL CENTER | Age: 67
End: 2017-10-19
Attending: INTERNAL MEDICINE
Payer: MEDICARE

## 2017-10-19 ENCOUNTER — OFFICE VISIT (OUTPATIENT)
Dept: MEDICAL GROUP | Facility: CLINIC | Age: 67
End: 2017-10-19
Payer: MEDICARE

## 2017-10-19 ENCOUNTER — HOSPITAL ENCOUNTER (OUTPATIENT)
Dept: LAB | Facility: MEDICAL CENTER | Age: 67
End: 2017-10-19
Attending: NURSE PRACTITIONER
Payer: MEDICARE

## 2017-10-19 VITALS
DIASTOLIC BLOOD PRESSURE: 70 MMHG | RESPIRATION RATE: 18 BRPM | OXYGEN SATURATION: 96 % | HEIGHT: 75 IN | WEIGHT: 261.2 LBS | SYSTOLIC BLOOD PRESSURE: 135 MMHG | HEART RATE: 60 BPM | BODY MASS INDEX: 32.48 KG/M2 | TEMPERATURE: 97.6 F

## 2017-10-19 DIAGNOSIS — M54.2 NECK PAIN: ICD-10-CM

## 2017-10-19 DIAGNOSIS — Z79.4 CONTROLLED TYPE 2 DIABETES MELLITUS WITH DIABETIC POLYNEUROPATHY, WITH LONG-TERM CURRENT USE OF INSULIN (HCC): ICD-10-CM

## 2017-10-19 DIAGNOSIS — E11.42 CONTROLLED TYPE 2 DIABETES MELLITUS WITH DIABETIC POLYNEUROPATHY, WITH LONG-TERM CURRENT USE OF INSULIN (HCC): ICD-10-CM

## 2017-10-19 DIAGNOSIS — M48.02 CERVICAL STENOSIS OF SPINE: ICD-10-CM

## 2017-10-19 LAB
ALBUMIN SERPL BCP-MCNC: 3.9 G/DL (ref 3.2–4.9)
ALBUMIN SERPL BCP-MCNC: 4 G/DL (ref 3.2–4.9)
ALBUMIN/GLOB SERPL: 1.5 G/DL
ALBUMIN/GLOB SERPL: 1.5 G/DL
ALP SERPL-CCNC: 55 U/L (ref 30–99)
ALP SERPL-CCNC: 55 U/L (ref 30–99)
ALT SERPL-CCNC: 13 U/L (ref 2–50)
ALT SERPL-CCNC: 13 U/L (ref 2–50)
ANION GAP SERPL CALC-SCNC: 11 MMOL/L (ref 0–11.9)
ANION GAP SERPL CALC-SCNC: 8 MMOL/L (ref 0–11.9)
AST SERPL-CCNC: 14 U/L (ref 12–45)
AST SERPL-CCNC: 14 U/L (ref 12–45)
BASOPHILS # BLD AUTO: 0.6 % (ref 0–1.8)
BASOPHILS # BLD: 0.03 K/UL (ref 0–0.12)
BILIRUB SERPL-MCNC: 0.6 MG/DL (ref 0.1–1.5)
BILIRUB SERPL-MCNC: 0.7 MG/DL (ref 0.1–1.5)
BUN SERPL-MCNC: 28 MG/DL (ref 8–22)
BUN SERPL-MCNC: 28 MG/DL (ref 8–22)
CALCIUM SERPL-MCNC: 9.2 MG/DL (ref 8.5–10.5)
CALCIUM SERPL-MCNC: 9.4 MG/DL (ref 8.5–10.5)
CHLORIDE SERPL-SCNC: 101 MMOL/L (ref 96–112)
CHLORIDE SERPL-SCNC: 99 MMOL/L (ref 96–112)
CHOLEST SERPL-MCNC: 179 MG/DL (ref 100–199)
CHOLEST SERPL-MCNC: 180 MG/DL (ref 100–199)
CO2 SERPL-SCNC: 27 MMOL/L (ref 20–33)
CO2 SERPL-SCNC: 27 MMOL/L (ref 20–33)
CREAT SERPL-MCNC: 1.01 MG/DL (ref 0.5–1.4)
CREAT SERPL-MCNC: 1.02 MG/DL (ref 0.5–1.4)
CREAT UR-MCNC: 90.3 MG/DL
EOSINOPHIL # BLD AUTO: 0.12 K/UL (ref 0–0.51)
EOSINOPHIL NFR BLD: 2.6 % (ref 0–6.9)
ERYTHROCYTE [DISTWIDTH] IN BLOOD BY AUTOMATED COUNT: 42.5 FL (ref 35.9–50)
EST. AVERAGE GLUCOSE BLD GHB EST-MCNC: 235 MG/DL
EST. AVERAGE GLUCOSE BLD GHB EST-MCNC: 237 MG/DL
GFR SERPL CREATININE-BSD FRML MDRD: >60 ML/MIN/1.73 M 2
GFR SERPL CREATININE-BSD FRML MDRD: >60 ML/MIN/1.73 M 2
GLOBULIN SER CALC-MCNC: 2.6 G/DL (ref 1.9–3.5)
GLOBULIN SER CALC-MCNC: 2.6 G/DL (ref 1.9–3.5)
GLUCOSE SERPL-MCNC: 220 MG/DL (ref 65–99)
GLUCOSE SERPL-MCNC: 231 MG/DL (ref 65–99)
HBA1C MFR BLD: 9.8 % (ref 0–5.6)
HBA1C MFR BLD: 9.9 % (ref 0–5.6)
HCT VFR BLD AUTO: 46.6 % (ref 42–52)
HDLC SERPL-MCNC: 36 MG/DL
HDLC SERPL-MCNC: 36 MG/DL
HGB BLD-MCNC: 15.8 G/DL (ref 14–18)
IMM GRANULOCYTES # BLD AUTO: 0.02 K/UL (ref 0–0.11)
IMM GRANULOCYTES NFR BLD AUTO: 0.4 % (ref 0–0.9)
LDLC SERPL CALC-MCNC: 113 MG/DL
LDLC SERPL CALC-MCNC: 114 MG/DL
LYMPHOCYTES # BLD AUTO: 1.11 K/UL (ref 1–4.8)
LYMPHOCYTES NFR BLD: 23.8 % (ref 22–41)
MCH RBC QN AUTO: 28.5 PG (ref 27–33)
MCHC RBC AUTO-ENTMCNC: 33.9 G/DL (ref 33.7–35.3)
MCV RBC AUTO: 84 FL (ref 81.4–97.8)
MICROALBUMIN UR-MCNC: 82.2 MG/DL
MICROALBUMIN/CREAT UR: 910 MG/G (ref 0–30)
MONOCYTES # BLD AUTO: 0.46 K/UL (ref 0–0.85)
MONOCYTES NFR BLD AUTO: 9.9 % (ref 0–13.4)
NEUTROPHILS # BLD AUTO: 2.92 K/UL (ref 1.82–7.42)
NEUTROPHILS NFR BLD: 62.7 % (ref 44–72)
NRBC # BLD AUTO: 0 K/UL
NRBC BLD AUTO-RTO: 0 /100 WBC
PLATELET # BLD AUTO: 164 K/UL (ref 164–446)
PMV BLD AUTO: 11.5 FL (ref 9–12.9)
POTASSIUM SERPL-SCNC: 4 MMOL/L (ref 3.6–5.5)
POTASSIUM SERPL-SCNC: 4 MMOL/L (ref 3.6–5.5)
PROT SERPL-MCNC: 6.5 G/DL (ref 6–8.2)
PROT SERPL-MCNC: 6.6 G/DL (ref 6–8.2)
RBC # BLD AUTO: 5.55 M/UL (ref 4.7–6.1)
SODIUM SERPL-SCNC: 136 MMOL/L (ref 135–145)
SODIUM SERPL-SCNC: 137 MMOL/L (ref 135–145)
T4 FREE SERPL-MCNC: 1.04 NG/DL (ref 0.53–1.43)
TRIGL SERPL-MCNC: 148 MG/DL (ref 0–149)
TRIGL SERPL-MCNC: 148 MG/DL (ref 0–149)
TSH SERPL DL<=0.005 MIU/L-ACNC: 0.74 UIU/ML (ref 0.3–3.7)
WBC # BLD AUTO: 4.7 K/UL (ref 4.8–10.8)

## 2017-10-19 PROCEDURE — 84439 ASSAY OF FREE THYROXINE: CPT

## 2017-10-19 PROCEDURE — 80061 LIPID PANEL: CPT | Mod: 91

## 2017-10-19 PROCEDURE — 83036 HEMOGLOBIN GLYCOSYLATED A1C: CPT | Mod: 91

## 2017-10-19 PROCEDURE — 99213 OFFICE O/P EST LOW 20 MIN: CPT | Performed by: INTERNAL MEDICINE

## 2017-10-19 PROCEDURE — 80053 COMPREHEN METABOLIC PANEL: CPT | Mod: 91

## 2017-10-19 PROCEDURE — 80061 LIPID PANEL: CPT

## 2017-10-19 PROCEDURE — 82043 UR ALBUMIN QUANTITATIVE: CPT

## 2017-10-19 PROCEDURE — 85025 COMPLETE CBC W/AUTO DIFF WBC: CPT

## 2017-10-19 PROCEDURE — 84443 ASSAY THYROID STIM HORMONE: CPT

## 2017-10-19 PROCEDURE — 82570 ASSAY OF URINE CREATININE: CPT

## 2017-10-19 PROCEDURE — 36415 COLL VENOUS BLD VENIPUNCTURE: CPT

## 2017-10-19 PROCEDURE — 80053 COMPREHEN METABOLIC PANEL: CPT

## 2017-10-19 PROCEDURE — 83036 HEMOGLOBIN GLYCOSYLATED A1C: CPT

## 2017-10-19 RX ORDER — OXYCODONE AND ACETAMINOPHEN 10; 325 MG/1; MG/1
1 TABLET ORAL EVERY 6 HOURS PRN
Qty: 120 TAB | Refills: 0 | Status: CANCELLED | OUTPATIENT
Start: 2017-10-19

## 2017-10-19 RX ORDER — OXYCODONE AND ACETAMINOPHEN 10; 325 MG/1; MG/1
1 TABLET ORAL EVERY 6 HOURS PRN
Qty: 120 TAB | Refills: 0 | Status: SHIPPED | OUTPATIENT
Start: 2017-10-19 | End: 2018-03-29 | Stop reason: SDUPTHER

## 2017-10-19 NOTE — PROGRESS NOTES
CC: Joel Ma is a 67 y.o. male is suffering from   Chief Complaint   Patient presents with   • Follow-Up         SUBJECTIVE:  1. Cervical stenosis of spine  Joel is here for follow-up, continues to suffer with the aftereffects of surgery on his cervical spine.    2. Neck pain  Patient with spinal pain, appears to be improving, lower extremity strength appears to be returning    Chronic pain recheck:   Last dose of controlled substance: Today  Chronic pain treated with Percocet taken 3-4 times a day    He  reports that he does not drink alcohol.  He  reports that he does not use drugs.    Consequences of Chronic Opiate therapy:  (5 A's)  Analgesia: Compared to no treatment or prior treatment, pain is currently improved  Activity: improved  Adverse Events: He denies constipation, dry mouth, itchy skin, nausea, sedation and none  Aberrant Behaviors: He reports he is taking medication as prescribed and is not veering from agreed treatment regimen. There have been no inappropriate refills or lost/stolen meds reported.   Affect/Mood: Pain is not impacting patient's mood.  Patient denies depression/anxiety.    Nonnarcotic treatments that are being used: Neurontin.   Treatment goals discussed.    Last order of CONTROLLED SUBSTANCE TREATMENT AGREEMENT was found on 2/17/2017 from Office Visit on 2/17/2017     UDS Summary                URINE DRUG SCREEN Next Due 5/28/2018      [facility]     Patient has more history with this topic...        Most recent UDS reviewed today and is consistent with prescribed medications.   Opioid Risk Score: 0    Interpretation of Opioid Risk Score   Score 0-3 = Low risk of abuse. Do UDS at least once per year.  Score 4-7 = Moderate risk of abuse. Do UDS 1-4 times per year.  Score 8+ = High risk of abuse. Refer to specialist.      I have reviewed the medical records, the Prescription Monitoring Program and I have determined that controlled  substance treatment is medically indicated.      Past social, family, history:   Social History   Substance Use Topics   • Smoking status: Former Smoker     Packs/day: 4.00     Years: 0.50     Types: Cigarettes     Quit date: 1/1/1970   • Smokeless tobacco: Never Used   • Alcohol use No         MEDICATIONS:    Current Outpatient Prescriptions:   •  oxycodone-acetaminophen (PERCOCET-10)  MG Tab, Take 1 Tab by mouth every 6 hours as needed for Severe Pain., Disp: 120 Tab, Rfl: 0  •  hydrochlorothiazide (HYDRODIURIL) 25 MG Tab, Take 1 Tab by mouth every day., Disp: 90 Tab, Rfl: 3  •  warfarin (COUMADIN) 5 MG Tab, Take two tablets daily as directed by [facility], Disp: 180 Tab, Rfl: 1  •  lisinopril (PRINIVIL, ZESTRIL) 40 MG tablet, Take 1 Tab by mouth 2 times a day., Disp: 180 Tab, Rfl: 3  •  gabapentin (NEURONTIN) 300 MG Cap, Take 1-2 Caps by mouth 3 times a day., Disp: 120 Cap, Rfl: 11  •  clindamycin (CLEOCIN) 300 MG Cap, Take 1 Cap by mouth 2 Times a Day., Disp: 180 Cap, Rfl: 1  •  clonidine (CATAPRES) 0.3 MG/24HR PATCH WEEKLY, Apply 1 Patch to skin as directed every Wednesday., Disp: 12 Patch, Rfl: 11  •  amlodipine (NORVASC) 10 MG Tab, Take 1 Tab by mouth every day., Disp: 90 Tab, Rfl: 3  •  hydrALAZINE (APRESOLINE) 100 MG tablet, Take 1 Tab by mouth every 8 hours., Disp: 270 Tab, Rfl: 3  •  metformin (GLUCOPHAGE) 500 MG Tab, Take 1 Tab by mouth 2 times a day, with meals. (Patient taking differently: Take 1,000 mg by mouth 2 times a day, with meals.), Disp: 180 Tab, Rfl: 3  •  insulin glargine (LANTUS) 100 UNIT/ML Solution, Inject 30 Units as instructed every evening., Disp: 10 mL, Rfl: 5  •  LANTUS SOLOSTAR 100 UNIT/ML Solution Pen-injector injection, , Disp: , Rfl:   •  B-D UF III MINI PEN NEEDLES 31G X 5 MM Misc, , Disp: , Rfl:   •  trazodone (DESYREL) 150 MG Tab, Take 0.5 Tabs by mouth every bedtime., Disp: 45 Tab, Rfl: 3  •  fluticasone (FLONASE) 50 MCG/ACT nasal spray, Spray 2  Sprays in nose as needed., Disp: 1 Bottle, Rfl: 5  •  nystatin (MYCOSTATIN) Powder, Apply 1 Application to affected area(s) 3 times a day. Apply to reddened area under abdominal fold., Disp: 1 Bottle, Rfl: 0  •  polyethylene glycol/lytes (MIRALAX) Pack, Take 1 Packet by mouth 1 time daily as needed., Disp: 30 Each, Rfl: 0  •  magnesium chloride SR (SLO-MAG) 535 (64 MG) MG Tab CR, Take 1 Tab by mouth 2 Times a Day. (Patient taking differently: Take 1 Tab by mouth 2 times a day as needed.), Disp: 60 Tab, Rfl: 0    PROBLEMS:  Patient Active Problem List    Diagnosis Date Noted   • Left knee pain 08/28/2016     Priority: High   • Diabetic polyneuropathy (CMS-HCC) 05/06/2015     Priority: High   • Toe amputation status (CMS-HCC) 05/06/2015     Priority: High   • HTN (hypertension) 08/16/2010     Priority: Medium   • CVA (cerebral vascular accident) (CMS-HCC) 06/04/2009     Priority: Medium   • Diabetes mellitus with neurological manifestations, controlled (CMS-HCC) 03/05/2012     Priority: Low   • Persistent proteinuria 10/13/2017   • BMI 35.0-35.9,adult 09/22/2017   • CKD (chronic kidney disease), stage I 05/12/2017   • Diabetic nephropathy associated with type 2 diabetes mellitus (CMS-HCC) 05/12/2017   • Chronic use of opiate drugs therapeutic purposes 02/14/2017   • Other orthopedic aftercare 11/07/2016   • Cervical stenosis of spine 09/06/2016   • Dysphagia 09/06/2016   • Chronic atrial fibrillation (CMS-HCC) 09/06/2016   • Hallucinations 09/06/2016   • JANNIE treated with BiPAP 04/18/2016   • History of CVA (cerebrovascular accident) 11/11/2015   • Atrial fibrillation [427.31] 06/24/2015   • Risk for falls 10/27/2014   • Myalgia 05/29/2014   • BPH (benign prostatic hyperplasia) 04/28/2014   • Anticoagulated on warfarin 04/28/2014   • Chronic antibiotic suppression 04/28/2014   • MEDICAL HOME    • Class 1 obesity in adult 06/17/2011   • Ventricular ectopy 06/17/2011   • Trigger finger 06/17/2011   • Thyroid mass  "07/30/2009   • Dyslipidemia 06/17/2009       REVIEW OF SYSTEMS:  Gen.:  No Nausea, Vomiting, fever, Chills.  Heart: No chest pain.  Lungs:  No shortness of Breath.  Psychological: Eleazar unusual Anxiety depression     PHYSICAL EXAM   Constitutional: Alert, cooperative, not in acute distress.  Cardiovascular:  Rate Rhythm is regular without murmurs rubs clicks.     Thorax & Lungs: Clear to auscultation, no wheezing, rhonchi, or rales  HENT: Normocephalic, Atraumatic.  Eyes: PERRLA, EOMI, Conjunctiva normal.   Neck: Trachia is midline no swelling of the thyroid.   Musculoskeletal: Continued lower extremity weakness and difficulty with ambulation continues to use a walker  Neurologic: Alert & oriented x 3, cranial nerves II through XII are intact, Normal motor function, Normal sensory function, No focal deficits noted.   Psychiatric: Affect normal, Judgment normal, Mood normal.     VITAL SIGNS:/70   Pulse 60   Temp 36.4 °C (97.6 °F)   Resp 18   Ht 1.905 m (6' 3\")   Wt 118.5 kg (261 lb 3.2 oz)   SpO2 96%   BMI 32.65 kg/m²     Labs: Reviewed    Assessment:                                                     Plan:    1. Cervical stenosis of spine  Patient's be referred to physical therapy  - REFERRAL TO PHYSICAL THERAPY Reason for Therapy: Eval/Treat/Report    2. Neck pain  Continue with current pain medication  - oxycodone-acetaminophen (PERCOCET-10)  MG Tab; Take 1 Tab by mouth every 6 hours as needed for Severe Pain.  Dispense: 120 Tab; Refill: 0  - oxycodone-acetaminophen (PERCOCET-10)  MG Tab; Take 1 Tab by mouth every 6 hours as needed for Severe Pain.  Dispense: 120 Tab; Refill: 0        "

## 2017-10-23 RX ORDER — HYDROCHLOROTHIAZIDE 12.5 MG/1
12.5 CAPSULE, GELATIN COATED ORAL DAILY
Qty: 30 CAP | Refills: 6 | Status: ON HOLD | OUTPATIENT
Start: 2017-10-23 | End: 2018-06-13

## 2017-10-25 ENCOUNTER — HOSPITAL ENCOUNTER (OUTPATIENT)
Dept: RADIOLOGY | Facility: MEDICAL CENTER | Age: 67
End: 2017-10-25
Attending: CLINICAL NURSE SPECIALIST
Payer: MEDICARE

## 2017-10-25 DIAGNOSIS — M47.12 CERVICAL SPONDYLOSIS WITH MYELOPATHY: ICD-10-CM

## 2017-10-25 PROCEDURE — 72040 X-RAY EXAM NECK SPINE 2-3 VW: CPT

## 2017-10-26 ENCOUNTER — ANTICOAGULATION VISIT (OUTPATIENT)
Dept: VASCULAR LAB | Facility: MEDICAL CENTER | Age: 67
End: 2017-10-26
Attending: INTERNAL MEDICINE
Payer: MEDICARE

## 2017-10-26 VITALS — HEART RATE: 64 BPM | DIASTOLIC BLOOD PRESSURE: 87 MMHG | SYSTOLIC BLOOD PRESSURE: 154 MMHG

## 2017-10-26 DIAGNOSIS — Z79.01 ANTICOAGULATED ON WARFARIN: ICD-10-CM

## 2017-10-26 DIAGNOSIS — Z86.73 HISTORY OF CVA (CEREBROVASCULAR ACCIDENT): ICD-10-CM

## 2017-10-26 LAB — INR PPP: 2.6 (ref 2–3.5)

## 2017-10-26 PROCEDURE — 85610 PROTHROMBIN TIME: CPT

## 2017-10-26 PROCEDURE — 99212 OFFICE O/P EST SF 10 MIN: CPT

## 2017-10-26 NOTE — PROGRESS NOTES
Anticoagulation Summary  As of 10/26/2017    INR goal:   2.0-3.0   TTR:   57.5 % (2.1 y)   Today's INR:   2.6   Maintenance plan:   10 mg (5 mg x 2) every day   Weekly total:   70 mg   Plan last modified:   Jefferson Brian, PharmD (1/3/2017)   Next INR check:   12/21/2017   Target end date:   Indefinite    Indications    CVA (cerebral vascular accident) (CMS-HCC) [I63.9]  Atrial fibrillation [427.31] [I48.91]  History of CVA (cerebrovascular accident) [Z86.73]  Anticoagulated on warfarin [Z79.01]             Anticoagulation Episode Summary     INR check location:   [facility]    Preferred lab:       Send INR reminders to:       Comments:   call pt multiple times, he has a hard time getting to his phone in time and has no VM set up      Anticoagulation Care Providers     Provider Role Specialty Phone number    BEL [email or web address]. Referring Internal Medicine [phone]    [facility] Responsible  [phone]    Liseth Doe A.P.N. Responsible Cardiology [phone]    BRIT QuirozP.NElian Responsible Cardiology [phone]        Anticoagulation Patient Findings      HPI:  Joel Ma seen in clinic today, on anticoagulation therapy with warfarin for afib.   Changes to current medical/health status since last appt: None  Denies signs/symptoms of bleeding and/or thrombosis since the last appt.    Denies any interval changes to diet  Denies any interval changes to medications since last appt.   Denies any complications or cost restrictions with current therapy.   BP recorded in vitals.  Pt is leaving for 3 months minimum to [place].  SO provided to test in 8 weeks.      A/P   INR  therapeutic.   Pt is to continue with current warfarin dosing regimen.     Follow up appointment in 8 week(s).    Brett Coates, PharmD

## 2017-10-30 LAB — INR BLD: 2.6 (ref 0.9–1.2)

## 2017-11-02 ENCOUNTER — PHYSICAL THERAPY (OUTPATIENT)
Dept: PHYSICAL THERAPY | Facility: REHABILITATION | Age: 67
End: 2017-11-02
Attending: INTERNAL MEDICINE
Payer: MEDICARE

## 2017-11-02 DIAGNOSIS — M48.02 CERVICAL SPINAL STENOSIS: ICD-10-CM

## 2017-11-02 PROCEDURE — 97110 THERAPEUTIC EXERCISES: CPT

## 2017-11-02 PROCEDURE — 97162 PT EVAL MOD COMPLEX 30 MIN: CPT

## 2017-11-02 ASSESSMENT — ENCOUNTER SYMPTOMS
PAIN SCALE AT HIGHEST: 3
PAIN SCALE AT LOWEST: 1
QUALITY: DISCOMFORT
PAIN SCALE: 2
PAIN TIMING: UNABLE TO SPECIFY

## 2017-11-02 NOTE — OP THERAPY EVALUATION
Outpatient Physical Therapy  INITIAL EVALUATION    [facility] [address]  [address]  [place] [address]  Phone:  [phone]  Fax:  [phone]    Date of Evaluation: 2017    Patient: Joel Ma  YOB: 1950  MRN: [id number]     Referring Provider: Catrachito Sterling D.O.  [address]  [address]  [place] [id number]   Referring Diagnosis Cervical stenosis of spine [M48.02]     Time Calculation  Start time: 0900  Stop time: 1005 Time Calculation (min): 65 minutes     Physical Therapy Occurrence Codes    Date physical therapy care plan established or reviewed:  17   Date physical therapy treatment started:  17             Chief Complaint: Neck Pain (primarily concerned with balance )    Visit Diagnoses     ICD-10-CM   1. Cervical spinal stenosis M48.02         Subjective:   History of Present Illness:     Mechanism of injury:  Patient is a 67 year old male underwent C3- C7 anterior cervical diskectomy and fusion on 17 secondary to cervical spondylitic myelopathy and stenosis. Patient has had a long recovery with stay in SNF, Rehab,  and mutliple episodes of PT secondary to hip pain, neck pain, and impaired balance. Patient is returning to PT reporting issues with balance and completing ADL's in standing due to poor balance and currently using a FWW. Patient also has history of CVA with R sided weakness.  Headache comments: every day mild pinpoint HA   Sleep disturbance:  Not disrupted (sleeping on R side )  Pain:     Current pain ratin (neck )    At best pain ratin (neck pain in the last week)    At worst pain rating:  3 (in the last week, neck )    Quality:  Discomfort    Pain timing:  Unable to specify  Social Support:     Lives in:  One-story house (4 steps to enter )    Lives with:  Alone (with 2 dogs)  Treatments:     Previous treatment:  Physical therapy  Activities of Daily Living:     Patient reported ADL status: All ADL's  independent, able to clear 2 inch into shower, reports stil cautious with movement.    Patient does not carry laundry basket, instead kicks it forward.   Patient Goals:     Other patient goals:  LTG to walk without AD, to be able to use least restrictive device within home.       Past Medical History:   Diagnosis Date   • Anesthesia     low O2 sat   • arthritis 6/17/2011    thumb, fingers   • Dental disorder     full dentures   • Diabetes     insulin, Dr Oconnor, his APN   • High cholesterol    • Hypertension    • MRSA (methicillin resistant Staphylococcus aureus)     history of, nothing current 2016, on clindamycin for rest of life per patient   • JANNIE treated with BiPAP 4/18/2016   • Pain 05-03-13    shoulders, hip, right knee, 7/10   • Pneumonia 2002   • Sleep apnea     CPAP   • Stroke (CMS-HCC)     2/1/2007, 4/1/2007, right sided weakness; balance disturbance   • Thyroid mass 7/30/2009    benign lump   • Ventricular ectopy 6/17/2011     Past Surgical History:   Procedure Laterality Date   • CERVICAL DISK AND FUSION ANTERIOR  8/31/2016    Procedure: CERVICAL DISK AND FUSION ANTERIOR C3-7 ;  Surgeon: Alhaji Jaffe M.D.;  Location: [facility];  Service:    • CATARACT PHACO WITH IOL  5/8/2013    Performed by Mame Rehman M.D. at SURGERY SAME DAY [facility]   • KNEE REVISION TOTAL  11/13/2012    Performed by Gordon Cota M.D. at [facility]   • IRRIGATION & DEBRIDEMENT ORTHO  11/13/2012    Performed by Gordon Cota M.D. at [facility]   • IRRIGATION & DEBRIDEMENT ORTHO  11/2/2012    Performed by Gordon Cota M.D. at [facility]   • INCISION AND DRAINAGE ORTHOPEDIC Right 10/29/2012    Procedure: Right Total Knee I and D and Liner Exchange, with drain;  Surgeon: Gordon Cota M.D.;  Location: [facility];  Service:    • IRRIGATION & DEBRIDEMENT ORTHO Left 10/29/2012    Procedure: left shoulder, with drain;  Surgeon: Gordon Cota M.D.;   Location: SURGERY [facility];  Service:    • KNEE REVISION TOTAL  3/13/2012    Performed by KAMALJIT SHI at SURGERY [facility]   • IRRIGATION & DEBRIDEMENT ORTHO  3/13/2012    Performed by KAMALJIT SHI at [facility]   • TENDON REPAIR  3/13/2012    Performed by KAMALJIT SHI at [facility]   • KNEE ARTHROPLASTY TOTAL  1/11/2012    Right; Performed by KAMALJIT SHI at [facility]   • TOE AMPUTATION  7/22/2011    Performed by EVELYN JANE at SURGERY [facility]   • ELBOW ARTHROTOMY  2008    right   • LUMBAR FUSION POSTERIOR  1979   • FOOT SURGERY      partial amputation great toe   • FOOT SURGERY      toe surgery left foot   • PB KNEE SCOPE,SINGLE MENISECTOMY      right knee     Social History   Substance Use Topics   • Smoking status: Former Smoker     Packs/day: 4.00     Years: 0.50     Types: Cigarettes     Quit date: 1/1/1970   • Smokeless tobacco: Never Used   • Alcohol use No     Family and Occupational History     Social History   • Marital status: Single     Spouse name: N/A   • Number of children: N/A   • Years of education: N/A       Objective     Static Posture     Comments  Balance wide RADHA 30 seconds eyes open  Balance feet together multiple trials of LOB with AD to get into position 20 seconds with posterior LOB     SLS, unable to lift without LOB          Neurological Testing     Reflexes   Left   Biceps (C5/C6): trace (1+)    Right   Biceps (C5/C6): trace (1+)    Myotome testing   Cervical (left)   C4 (shoulder shrug): 4+  C5 (deltoid): 3+  C6 (biceps): 4-  C7 (triceps): 4+  T1 (intrinsics): 4-    Cervical (right)   C4 (shoulder shrug): 4+  C5 (deltoid): 3+  C6 (biceps): 4-  C7 (triceps): 4+  T1 (intrinsics): 4+    Additional Neurological Details  Observed thenar eminence wasting R hand greater than L     Active Range of Motion     Cervical Spine   Flexion: within functional limits  Extension: decreased (75% with pain at patients end range  )  Left rotation: decreased (54 degrees )  Right rotation: decreased (64 degrees )  Left Shoulder   Flexion: 80 degrees   Abduction: 70 degrees     Right Shoulder   Flexion: 70 degrees   Abduction: 90 degrees     Additional Active Range of Motion Details  HBB R: L4            L: L3     Passive Range of Motion   Left Shoulder   Flexion: WFL  Abduction: 140 degrees     Right Shoulder   Flexion: WFL  Abduction: WFL    Tests     Additional testing details: NDI = 20/50  Ambulation     Ambulation: Level Surfaces   Ambulation with assistive device: Vinod with FWW     Additional Level Surfaces Ambulation Details  Initially forward flexed posture 150 ft with FWW   Trialed with quad cane 20 ft modA with patient reaching for walls increase R hip pain with movement     General Comments     Spine Comments   With palpation, no tenderness noted on cervical spinous process or R/L lateral.         Therapeutic Exercises:     1. Scapular Retraction with depression in sitting  , x 10 with 5 second hold , HEP     2. Standing at wall with alternating shoulder flexion , x 10 , HEP         Assessment, Response and Plan:   Assessment details:  Patient is a 67 year old male who present with decreased ROM, decreased strength, and impaired balance limiting ability to safely perform ADL's with upper extremities in standing positions. Patient will benefit from skilled PT to improve strenghth, ROM, and ability at home to safely stand and perform ADL's with decreased RADHA.   Goals:   Short Term Goals:   1) Patient able to demonstrate standing UE exercise with no LOB with no UE balance support   2) Patient able to reach into cabinet without UE support and LOB.   Short term goal time span:  4-6 weeks      Long Term Goals:    1) NDI < 15/20   2) Patient able to stand and wash dishes without upper extremity support without LOB   3) Patient able to move laundry from washer to dryer with least restrictive device and no LOB   Long term goal time span:  6-8  weeks    Plan:   Therapy options:  Physical therapy treatment to continue  Planned therapy interventions:  Neuromuscular re-education, patient education, manual therapy, gait training, safety awareness, modalities, home exercise program, therapeutic activities and therapeutic exercise  Frequency: 1-2 x a week.  Duration in visits:  13  Duration in weeks:  12  Discussed with:  Patient  Plan details:  Anticipated sessions to vary 1-2x a week with increasing time between sessions to transition to independent HEP toward the end of approved 13 sessions. Recommend completing CAMPOS Balance next session for assessment of overall balance.         Referring provider co-signature:  I have reviewed this plan of care and my co-signature certifies the need for services.  Certification Dates:   From 11/2/2017       To 02/02/17    Physician Signature: ________________________________ Date: ______________

## 2017-11-07 ENCOUNTER — SLEEP CENTER VISIT (OUTPATIENT)
Dept: SLEEP MEDICINE | Facility: MEDICAL CENTER | Age: 67
End: 2017-11-07
Payer: MEDICARE

## 2017-11-07 VITALS
RESPIRATION RATE: 16 BRPM | OXYGEN SATURATION: 93 % | TEMPERATURE: 98.6 F | BODY MASS INDEX: 32.45 KG/M2 | HEART RATE: 89 BPM | WEIGHT: 261 LBS | DIASTOLIC BLOOD PRESSURE: 80 MMHG | SYSTOLIC BLOOD PRESSURE: 150 MMHG | HEIGHT: 75 IN

## 2017-11-07 DIAGNOSIS — G47.33 OSA TREATED WITH BIPAP: ICD-10-CM

## 2017-11-07 PROCEDURE — 99213 OFFICE O/P EST LOW 20 MIN: CPT | Performed by: NURSE PRACTITIONER

## 2017-11-07 NOTE — PROGRESS NOTES
Chief Complaint   Patient presents with   • Apnea         HPI:  This is a 67 y.o. male with a history of obstructive sleep apnea.  Polysomnogram indicates AHI20.7 and minimum saturation 76%. The patient is compliant with BiPAP 17/13 cm. Compliance dated 10/8-11/6/17 indicates 100% compliance for 5 hours 45 minutes. The patient's AHI has been reduced. He has some leakage according to compliance download. Patient wakes up feeling rested. He is sleeping through the night. He feels his mask is fitting better. He denies early morning headaches. He has significant lower extremity edema. I have recommended follow-up with cardiology/nephrology.    Past Medical History:   Diagnosis Date   • Anesthesia     low O2 sat   • arthritis 6/17/2011    thumb, fingers   • Dental disorder     full dentures   • Diabetes     insulin, Dr Oconnor, his APN   • High cholesterol    • Hypertension    • MRSA (methicillin resistant Staphylococcus aureus)     history of, nothing current 2016, on clindamycin for rest of life per patient   • JANNIE treated with BiPAP 4/18/2016   • Pain 05-03-13    shoulders, hip, right knee, 7/10   • Pneumonia 2002   • Sleep apnea     CPAP   • Stroke (CMS-[facility])     2/1/2007, 4/1/2007, right sided weakness; balance disturbance   • Thyroid mass 7/30/2009    benign lump   • Ventricular ectopy 6/17/2011       Past Surgical History:   Procedure Laterality Date   • CERVICAL DISK AND FUSION ANTERIOR  8/31/2016    Procedure: CERVICAL DISK AND FUSION ANTERIOR C3-7 ;  Surgeon: Alhaji Jaffe M.D.;  Location: SURGERY [facility];  Service:    • CATARACT PHACO WITH IOL  5/8/2013    Performed by Mame Rehman M.D. at SURGERY SAME DAY [facility]   • KNEE REVISION TOTAL  11/13/2012    Performed by Gordon Cota M.D. at SURGERY [facility]   • IRRIGATION & DEBRIDEMENT ORTHO  11/13/2012    Performed by Gordon Cota M.D. at SURGERY [facility]   • IRRIGATION & DEBRIDEMENT ORTHO  11/2/2012    Performed by Gordon REYES  "PRAMOD Cota at [facility]   • INCISION AND DRAINAGE ORTHOPEDIC Right 10/29/2012    Procedure: Right Total Knee I and D and Liner Exchange, with drain;  Surgeon: Gordon Cota M.D.;  Location: SURGERY [facility];  Service:    • IRRIGATION & DEBRIDEMENT ORTHO Left 10/29/2012    Procedure: left shoulder, with drain;  Surgeon: Gordon Cota M.D.;  Location: SURGERY [facility];  Service:    • KNEE REVISION TOTAL  3/13/2012    Performed by KAMALJIT SHI at [facility]   • IRRIGATION & DEBRIDEMENT ORTHO  3/13/2012    Performed by KAMALJIT SHI at [facility]   • TENDON REPAIR  3/13/2012    Performed by KAMALJIT SHI at [facility]   • KNEE ARTHROPLASTY TOTAL  1/11/2012    Right; Performed by KAMALJIT SHI at [facility]   • TOE AMPUTATION  7/22/2011    Performed by EVELYN JANE at [facility]   • ELBOW ARTHROTOMY  2008    right   • LUMBAR FUSION POSTERIOR  1979   • FOOT SURGERY      partial amputation great toe   • FOOT SURGERY      toe surgery left foot   • PB KNEE SCOPE,SINGLE MENISECTOMY      right knee       Social History   Substance Use Topics   • Smoking status: Former Smoker     Packs/day: 4.00     Years: 0.50     Types: Cigarettes     Quit date: 1/1/1970   • Smokeless tobacco: Never Used   • Alcohol use No       ROS:   Constitutional: Denies fevers, chills, sweats, fatigue, and weight loss.  Eyes: Denies glasses.  Ears/nose/mouth/throat: Denies injury.  Cardiovascular: Denies chest pain, tightness.  Respiratory: Denies shortness of breath, cough, sputum, wheezing, hemoptysis.  GI: Denies heartburn, difficulty swallowing, nausea, and vomiting.  Neurological: Denies frequent headaches, dizziness, weakness.  Sleep: See history of present illness.    Vitals:  Vitals:    11/07/17 1005   Height: 1.905 m (6' 3\")   Weight: 118.4 kg (261 lb)   Weight % change since last entry.: 0 %   BP: 150/80   Pulse: 89   BMI (Calculated): " 32.62   Resp: 16   Temp: 37 °C (98.6 °F)   O2 sat % room air: 93 %     Allergies:  Demerol and Statins [hmg-coa-r inhibitors]    Medications.  Current Outpatient Prescriptions   Medication Sig Dispense Refill   • hydrochlorothiazide (MICROZIDE) 12.5 MG capsule Take 1 Cap by mouth every day. 30 Cap 6   • oxycodone-acetaminophen (PERCOCET-10)  MG Tab Take 1 Tab by mouth every 6 hours as needed for Severe Pain. 120 Tab 0   • hydrochlorothiazide (HYDRODIURIL) 25 MG Tab Take 1 Tab by mouth every day. 90 Tab 3   • warfarin (COUMADIN) 5 MG Tab Take two tablets daily as directed by [facility] 180 Tab 1   • lisinopril (PRINIVIL, ZESTRIL) 40 MG tablet Take 1 Tab by mouth 2 times a day. 180 Tab 3   • gabapentin (NEURONTIN) 300 MG Cap Take 1-2 Caps by mouth 3 times a day. 120 Cap 11   • clindamycin (CLEOCIN) 300 MG Cap Take 1 Cap by mouth 2 Times a Day. 180 Cap 1   • clonidine (CATAPRES) 0.3 MG/24HR PATCH WEEKLY Apply 1 Patch to skin as directed every Wednesday. 12 Patch 11   • amlodipine (NORVASC) 10 MG Tab Take 1 Tab by mouth every day. 90 Tab 3   • hydrALAZINE (APRESOLINE) 100 MG tablet Take 1 Tab by mouth every 8 hours. 270 Tab 3   • metformin (GLUCOPHAGE) 500 MG Tab Take 1 Tab by mouth 2 times a day, with meals. (Patient taking differently: Take 1,000 mg by mouth 2 times a day, with meals.) 180 Tab 3   • fluticasone (FLONASE) 50 MCG/ACT nasal spray Spray 2 Sprays in nose as needed. 1 Bottle 5   • insulin glargine (LANTUS) 100 UNIT/ML Solution Inject 30 Units as instructed every evening. 10 mL 5   • LANTUS SOLOSTAR 100 UNIT/ML Solution Pen-injector injection      • B-D UF III MINI PEN NEEDLES 31G X 5 MM Misc      • trazodone (DESYREL) 150 MG Tab Take 0.5 Tabs by mouth every bedtime. 45 Tab 3   • nystatin (MYCOSTATIN) Powder Apply 1 Application to affected area(s) 3 times a day. Apply to reddened area under abdominal fold. 1 Bottle 0   • polyethylene glycol/lytes (MIRALAX) Pack Take 1 Packet by  mouth 1 time daily as needed. 30 Each 0   • magnesium chloride SR (SLO-MAG) 535 (64 MG) MG Tab CR Take 1 Tab by mouth 2 Times a Day. (Patient taking differently: Take 1 Tab by mouth 2 times a day as needed.) 60 Tab 0     No current facility-administered medications for this visit.        PHYSICAL EXAM:  Appearance: Well-developed, well-nourished, no acute distress.  Eyes. PERRL.  Hearing: Grossly intact.  Oropharynx: Tongue normal, posterior pharynx without erythema or exudate.  Respiratory effort: No intercostal retractions or use of accessory muscles.  Lung auscultation: No crackles, wheezing.  Heart auscultation: No murmur, gallop, or rub. Irregular rhythm.  Extremities: No cyanosis. 3+ BLE.  Gait and Station: Normal  Orientation: Oriented to time, place, and person.    Assessment:  1. JANNIE treated with BiPAP           Plan:  1. Continue BiPAP 17/13 cm.  2. Clean equipment weekly and replace supplies as allowed by insurance.  3. Follow-up with Dr. Salzaar regarding lower extremity edema.    Return in about 6 months (around 5/7/2018) for With GLEN Avila.

## 2017-11-07 NOTE — PATIENT INSTRUCTIONS
1. Continue BiPAP 17/13 cm.  2. Clean equipment weekly and replace supplies as allowed by insurance.  3. Follow-up with Dr. Salazar regarding lower extremity edema.

## 2017-11-09 ENCOUNTER — TELEPHONE (OUTPATIENT)
Dept: PHYSICAL THERAPY | Facility: REHABILITATION | Age: 67
End: 2017-11-09

## 2017-11-09 ENCOUNTER — APPOINTMENT (OUTPATIENT)
Dept: PHYSICAL THERAPY | Facility: REHABILITATION | Age: 67
End: 2017-11-09
Attending: INTERNAL MEDICINE
Payer: MEDICARE

## 2017-11-09 NOTE — OP THERAPY DISCHARGE SUMMARY
Outpatient Physical Therapy  DISCHARGE SUMMARY NOTE      [facility] Physical Therapy [address]  [address]  [place] [address]  Phone:  [phone]  Fax:  [phone]        Patient Name:  Joel Ma  :  1950  MR#:  [id number]    HICN:       Visits: 1          Cancel/No-Show: 1     Diagnosis/ICD-10: M48.02     Referring Provider: Dr Asher D.O.      SOC Date:17     Onset Date: chronic       Your patient is being discharged from Physical therapy with the following comments:  Patient is moving for winter, unable to participate with PT       Comments: Patient was seen for evaluation, but is cancelling all appts secondary to moving out of state.         Limitations/Remaining: continued impaired balance, ROM, and functional mobility in standing.         Recommendations: D/C from PT at this time.         Ember Golden, PT, DPT

## 2017-11-13 DIAGNOSIS — E11.42 DIABETIC POLYNEUROPATHY ASSOCIATED WITH TYPE 2 DIABETES MELLITUS (HCC): ICD-10-CM

## 2017-11-13 RX ORDER — INSULIN LISPRO 100 [IU]/ML
INJECTION, SOLUTION INTRAVENOUS; SUBCUTANEOUS
Qty: 15 ML | Refills: 11 | Status: SHIPPED | OUTPATIENT
Start: 2017-11-13 | End: 2017-11-13 | Stop reason: SDUPTHER

## 2017-11-13 RX ORDER — INSULIN GLARGINE 100 [IU]/ML
INJECTION, SOLUTION SUBCUTANEOUS
Qty: 15 ML | Refills: 11 | Status: SHIPPED | OUTPATIENT
Start: 2017-11-13 | End: 2017-11-13 | Stop reason: SDUPTHER

## 2017-11-13 NOTE — TELEPHONE ENCOUNTER
1. Caller Name: Joel Ma                                           Call Back Number: [phone] (home)         Patient approves a detailed voicemail message: N\A    Patient called and stated that he is out of town in [place] and needs a refill sent to [facility] pharmacy. Suggested for patient to have the prescriptions sent to a pharmacy in [place] but patient did not have pharmacy information. Patient insisted to have the prescription sent to his local pharmacy in [place]. Patient stated that he has not had insulin for 2 days.

## 2017-11-14 NOTE — TELEPHONE ENCOUNTER
1. Caller Name: Prabha Patient's Son                                         Call Back Number: [phone]      Patient approves a detailed voicemail message: N\A    Patient's son called to follow up on prescription and stated that the refill was sent to [facility] in [place] instead of [facility] in [place]. Reviewed patient chart and medication was sent to [facility].

## 2017-11-14 NOTE — TELEPHONE ENCOUNTER
Called [facility] in [facility], spoke to Kamar and provided verbal prescriptions for Humalog and Lantus.

## 2017-11-15 ENCOUNTER — APPOINTMENT (OUTPATIENT)
Dept: PHYSICAL THERAPY | Facility: REHABILITATION | Age: 67
End: 2017-11-15
Attending: INTERNAL MEDICINE
Payer: MEDICARE

## 2017-11-20 ENCOUNTER — APPOINTMENT (OUTPATIENT)
Dept: PHYSICAL THERAPY | Facility: REHABILITATION | Age: 67
End: 2017-11-20
Attending: INTERNAL MEDICINE
Payer: MEDICARE

## 2017-11-22 ENCOUNTER — APPOINTMENT (OUTPATIENT)
Dept: PHYSICAL THERAPY | Facility: REHABILITATION | Age: 67
End: 2017-11-22
Attending: INTERNAL MEDICINE
Payer: MEDICARE

## 2017-12-07 ENCOUNTER — TELEPHONE (OUTPATIENT)
Dept: NEPHROLOGY | Facility: MEDICAL CENTER | Age: 67
End: 2017-12-07

## 2017-12-07 NOTE — TELEPHONE ENCOUNTER
1. Caller Name: self                                         Call Back Number: [phone] (home)       Patient approves a detailed voicemail message: N\A    PT Called Stating he still have swelling on his feet, And wants to know if he could increase his diuretic. PT want to get a phone call from the Doctor

## 2017-12-08 NOTE — TELEPHONE ENCOUNTER
Telephone call returned, answering machine only, left message I received his message. Unsure patient's on hydrochlorothiazide 12.5 mg or 25 mg we'll ask office staff to clarify this tomorrow

## 2017-12-28 ENCOUNTER — TELEPHONE (OUTPATIENT)
Dept: VASCULAR LAB | Facility: MEDICAL CENTER | Age: 67
End: 2017-12-28

## 2018-01-02 ENCOUNTER — ANTICOAGULATION MONITORING (OUTPATIENT)
Dept: VASCULAR LAB | Facility: MEDICAL CENTER | Age: 68
End: 2018-01-02

## 2018-01-02 ENCOUNTER — TELEPHONE (OUTPATIENT)
Dept: MEDICAL GROUP | Facility: CLINIC | Age: 68
End: 2018-01-02

## 2018-01-02 DIAGNOSIS — Z86.73 HISTORY OF CVA (CEREBROVASCULAR ACCIDENT): ICD-10-CM

## 2018-01-02 DIAGNOSIS — Z79.01 ANTICOAGULATED ON WARFARIN: ICD-10-CM

## 2018-01-02 DIAGNOSIS — I48.91 ATRIAL FIBRILLATION, UNSPECIFIED TYPE (HCC): ICD-10-CM

## 2018-01-02 DIAGNOSIS — I63.9 CEREBROVASCULAR ACCIDENT (CVA), UNSPECIFIED MECHANISM (HCC): ICD-10-CM

## 2018-01-02 LAB — INR PPP: 1.8 (ref 2–3.5)

## 2018-01-02 NOTE — PROGRESS NOTES
Anticoagulation Summary  As of 1/2/2018    INR goal:   2.0-3.0   TTR:   58.9 % (2.3 y)   Today's INR:   1.8!   Maintenance plan:   10 mg (5 mg x 2) every day   Weekly total:   70 mg   Plan last modified:   Jefferson Brian, PharmD (1/3/2017)   Next INR check:   1/9/2018   Target end date:   Indefinite    Indications    CVA (cerebral vascular accident) (CMS-HCC) [I63.9]  Atrial fibrillation [427.31] [I48.91]  History of CVA (cerebrovascular accident) [Z86.73]  Anticoagulated on warfarin [Z79.01]             Anticoagulation Episode Summary     [facility]   [facility]    Preferred lab:       Send INR reminders to:       Comments:   call pt multiple times, he has a hard time getting to his phone in time and has no  set up      Anticoagulation Care Providers     Provider Role Specialty Phone number    Catrachito Sterling D.O. Referring Internal Medicine [phone]    [facility] Responsible  [phone]    BRIT AraujoP.NElian Responsible Cardiology [phone]    BRIT QuirozPJILLIAN Responsible Cardiology [phone]        Anticoagulation Patient Findings        LM on VM by phone. Patient is sub-therapeutic. Requested call back for any medication or diet changes, missed doses, or symptoms of bleeding or thrombosis.  Follow up in 1 weeks.        Take 12.5 mg tonight then back to 10 mg daily.   The patient is on a high risk medication and is sub- therapeutic. This could lead to clot formation or risk of stroke. Therefore this patient requires close monitoring and follow up.     .        JOHNNY Craig.

## 2018-01-03 NOTE — TELEPHONE ENCOUNTER
T/c Chris:  Reviewed dads Maggy most recent labs also 2015 echo cardiogram.  Informed I was out of the state.

## 2018-01-03 NOTE — TELEPHONE ENCOUNTER
VOICEMAIL  1. Caller Name: Chris Ma                        Call Back Number: [phone]    2. Message: Patient is in [place] and just had a check up done and his liver is now in Stage 3. Chris, patient's son is requesting a call back from Dr Sterling     3. Patient approves office to leave a detailed voicemail/[employer] message: N\A

## 2018-01-03 NOTE — TELEPHONE ENCOUNTER
Returned phone call to Chris and he stated that his father is getting progressively worse every day. He is in a lot of pain with limited mobility and he is scheduling with doctors in [place] but is concerned about the progression of the disease. Chris clarified that his father is now in stage 3 kidney failure not liver.    Let Chris know that Dr Sterling is out of the office but I would do what I could to get his attention to this matter.

## 2018-01-18 ENCOUNTER — TELEPHONE (OUTPATIENT)
Dept: VASCULAR LAB | Facility: MEDICAL CENTER | Age: 68
End: 2018-01-18

## 2018-01-25 ENCOUNTER — TELEPHONE (OUTPATIENT)
Dept: MEDICAL GROUP | Facility: CLINIC | Age: 68
End: 2018-01-25

## 2018-01-25 DIAGNOSIS — J06.9 UPPER RESPIRATORY TRACT INFECTION, UNSPECIFIED TYPE: ICD-10-CM

## 2018-01-25 RX ORDER — AZITHROMYCIN 250 MG/1
TABLET, FILM COATED ORAL
Qty: 6 TAB | Refills: 0 | Status: SHIPPED | OUTPATIENT
Start: 2018-01-25 | End: 2018-02-28

## 2018-01-25 NOTE — TELEPHONE ENCOUNTER
1. Caller Name: chris son                      Call Back Number: [phone]    2. Message: Chris called his dad is visiting and having cough, spitting  yellow phlegm. Chris was diagnosed with bronchitis and pneumonia so he want to ensure his dad is good. Please advise. Thank you  Pharmacy: [facility] [address] [place] [address]    3. Patient approves office to leave a detailed voicemail/MyChart message: yes

## 2018-01-25 NOTE — TELEPHONE ENCOUNTER
Telephone call return, we'll send a prescription for azithromycin to patient's [facility] pharmacy in [facility] message left answering machine only.

## 2018-02-07 DIAGNOSIS — M54.2 NECK PAIN: ICD-10-CM

## 2018-02-07 DIAGNOSIS — Z79.01 CHRONIC ANTICOAGULATION: ICD-10-CM

## 2018-02-07 RX ORDER — OXYCODONE AND ACETAMINOPHEN 10; 325 MG/1; MG/1
1 TABLET ORAL EVERY 6 HOURS PRN
Qty: 120 TAB | Refills: 0 | OUTPATIENT
Start: 2018-02-07

## 2018-02-07 NOTE — TELEPHONE ENCOUNTER
Was the patient seen in the last year in this department? Yes     Does patient have an active prescription for medications requested? No     Received Request Via: Patient: Patient stated he is out of town and he is not coming back to [place] until the weather warms up, because his heating broke at his house.      Patient informed he needs an appointment to refill controlled substances.  Patient requested message to be sent to provider and declined scheduling an appointment.

## 2018-02-08 ENCOUNTER — TELEPHONE (OUTPATIENT)
Dept: VASCULAR LAB | Facility: MEDICAL CENTER | Age: 68
End: 2018-02-08

## 2018-02-09 ENCOUNTER — TELEPHONE (OUTPATIENT)
Dept: MEDICAL GROUP | Facility: CLINIC | Age: 68
End: 2018-02-09

## 2018-02-09 NOTE — TELEPHONE ENCOUNTER
Pt called back just complaining how come his call is not important that his call was not answer. Call d/c by pt. No message.

## 2018-02-09 NOTE — TELEPHONE ENCOUNTER
Called pt back, per pt he solved the medication with pharmacist and he is all good. Pt thank me for my help.

## 2018-02-09 NOTE — TELEPHONE ENCOUNTER
1. Caller Name: Joel Ma                      Call Back Number: [phone] (home)     2. Message: pt called his bottle for hydrALAZINE is stating on his bottle 25 mg take every 8 hrs. And this should be this water pill hydrochlorothiazide because this is what the strenght of the water pill. The blood pressure should be hydralazine 100 mg take every 8 hrs.     3. Patient approves office to leave a detailed voicemail/MyChart message: yes

## 2018-02-09 NOTE — TELEPHONE ENCOUNTER
Called the pharmacy spoke to Yolanda case Pharmacist, Yolanda Case explained that his water pill is due to fill tomorrow. She asked pt to throw away whatever he has on hand and  his new hydrochlorothiazide script tomorrow. Per Yolanda Case pt combine both medications and so now he is confused that is why she asked pt to throw away his med's and  tomorrow.

## 2018-02-09 NOTE — TELEPHONE ENCOUNTER
explain to patient that he is right the hydrochlorothiazide is 25 mg and the blood pressure hydralazine is 100 mg, since we did not dispensed the medication and I can't see the bottles. He needs to take those bottles to pharmacy they will have a copy of what they dispense to him. There is not much I can do for him from the phone. Pt verbalized understanding he will call the pharmacy.

## 2018-02-12 ENCOUNTER — TELEPHONE (OUTPATIENT)
Dept: NEPHROLOGY | Facility: MEDICAL CENTER | Age: 68
End: 2018-02-12

## 2018-02-14 ENCOUNTER — TELEPHONE (OUTPATIENT)
Dept: NEPHROLOGY | Facility: MEDICAL CENTER | Age: 68
End: 2018-02-14

## 2018-02-14 NOTE — TELEPHONE ENCOUNTER
Pt called this morning wondering if you were in the office. He has a couple questions for you. If you can please call him on his cell phone at [phone]    Thank you!

## 2018-02-15 ENCOUNTER — TELEPHONE (OUTPATIENT)
Dept: VASCULAR LAB | Facility: MEDICAL CENTER | Age: 68
End: 2018-02-15

## 2018-02-28 DIAGNOSIS — L03.119 CELLULITIS OF LOWER EXTREMITY, UNSPECIFIED LATERALITY: ICD-10-CM

## 2018-02-28 RX ORDER — CLINDAMYCIN HYDROCHLORIDE 300 MG/1
300 CAPSULE ORAL 2 TIMES DAILY
Qty: 40 CAP | Refills: 0 | Status: SHIPPED | OUTPATIENT
Start: 2018-02-28 | End: 2018-03-05 | Stop reason: SDUPTHER

## 2018-03-01 ENCOUNTER — TELEPHONE (OUTPATIENT)
Dept: VASCULAR LAB | Facility: MEDICAL CENTER | Age: 68
End: 2018-03-01

## 2018-03-05 DIAGNOSIS — L03.119 CELLULITIS OF LOWER EXTREMITY, UNSPECIFIED LATERALITY: ICD-10-CM

## 2018-03-05 RX ORDER — CLINDAMYCIN HYDROCHLORIDE 300 MG/1
300 CAPSULE ORAL 2 TIMES DAILY
Qty: 60 CAP | Refills: 1 | Status: SHIPPED | OUTPATIENT
Start: 2018-03-05 | End: 2018-04-04

## 2018-03-05 NOTE — TELEPHONE ENCOUNTER
1. Caller Name: Joel aM                      Call Back Number: [phone] (home)     2. Message: pt called stating he didn't received enough of the clindamycin will like to get a full 30 day supply with 1 refills since he is out of state. This will facilitate him with his stay at his son's house. Thank you     3. Patient approves office to leave a detailed voicemail/MyChart message: yes

## 2018-03-15 ENCOUNTER — TELEPHONE (OUTPATIENT)
Dept: VASCULAR LAB | Facility: MEDICAL CENTER | Age: 68
End: 2018-03-15

## 2018-03-21 ENCOUNTER — TELEPHONE (OUTPATIENT)
Dept: MEDICAL GROUP | Facility: CLINIC | Age: 68
End: 2018-03-21

## 2018-03-21 DIAGNOSIS — I10 ESSENTIAL HYPERTENSION: ICD-10-CM

## 2018-03-21 DIAGNOSIS — Z79.01 CHRONIC ANTICOAGULATION: ICD-10-CM

## 2018-03-21 DIAGNOSIS — R60.9 EDEMA, UNSPECIFIED TYPE: ICD-10-CM

## 2018-03-21 RX ORDER — WARFARIN SODIUM 5 MG/1
TABLET ORAL
Qty: 180 TAB | Refills: 2 | Status: SHIPPED | OUTPATIENT
Start: 2018-03-21 | End: 2018-03-26 | Stop reason: SDUPTHER

## 2018-03-21 RX ORDER — AMLODIPINE BESYLATE 10 MG/1
10 TABLET ORAL DAILY
Qty: 90 TAB | Refills: 3 | Status: ON HOLD | OUTPATIENT
Start: 2018-03-21 | End: 2018-06-13

## 2018-03-21 NOTE — TELEPHONE ENCOUNTER
VOICEMAIL  1. Caller Name: Joel Ma                        Call Back Number: [phone] (home)       2. Message: patient called to request compression hose to be sent to the location across the street. Please send order for compression hose, thank you.    3. Patient approves office to leave a detailed voicemail/MyChart message: N\A

## 2018-03-22 ENCOUNTER — APPOINTMENT (OUTPATIENT)
Dept: VASCULAR LAB | Facility: MEDICAL CENTER | Age: 68
End: 2018-03-22
Payer: MEDICARE

## 2018-03-23 DIAGNOSIS — E11.42 DIABETIC POLYNEUROPATHY ASSOCIATED WITH TYPE 2 DIABETES MELLITUS (HCC): ICD-10-CM

## 2018-03-23 DIAGNOSIS — M54.6 CHRONIC THORACIC BACK PAIN, UNSPECIFIED BACK PAIN LATERALITY: ICD-10-CM

## 2018-03-23 DIAGNOSIS — G89.29 CHRONIC THORACIC BACK PAIN, UNSPECIFIED BACK PAIN LATERALITY: ICD-10-CM

## 2018-03-23 RX ORDER — GABAPENTIN 300 MG/1
300-600 CAPSULE ORAL 3 TIMES DAILY
Qty: 120 CAP | Refills: 11 | Status: ON HOLD | OUTPATIENT
Start: 2018-03-23 | End: 2018-08-13

## 2018-03-23 NOTE — TELEPHONE ENCOUNTER
1. Caller Name: [employer]berto [employer]                       Call Back Number: [phone]       2. Message: norberto with insurance called to please change Humalog to novolog the Humalog is no longer cover under insurance new script is require to be sent to [facility]. Thank you    3. Patient approves office to leave a detailed voicemail/MyChart message: no

## 2018-03-26 DIAGNOSIS — Z79.01 CHRONIC ANTICOAGULATION: ICD-10-CM

## 2018-03-26 DIAGNOSIS — I48.91 ATRIAL FIBRILLATION, UNSPECIFIED TYPE (HCC): ICD-10-CM

## 2018-03-26 DIAGNOSIS — E11.42 DIABETIC POLYNEUROPATHY ASSOCIATED WITH TYPE 2 DIABETES MELLITUS (HCC): ICD-10-CM

## 2018-03-26 RX ORDER — WARFARIN SODIUM 5 MG/1
TABLET ORAL
Qty: 180 TAB | Refills: 2 | Status: SHIPPED | OUTPATIENT
Start: 2018-03-26 | End: 2018-07-03 | Stop reason: SDUPTHER

## 2018-03-29 ENCOUNTER — ANTICOAGULATION VISIT (OUTPATIENT)
Dept: VASCULAR LAB | Facility: MEDICAL CENTER | Age: 68
End: 2018-03-29
Attending: INTERNAL MEDICINE
Payer: MEDICARE

## 2018-03-29 ENCOUNTER — OFFICE VISIT (OUTPATIENT)
Dept: MEDICAL GROUP | Facility: CLINIC | Age: 68
End: 2018-03-29
Payer: MEDICARE

## 2018-03-29 VITALS
HEART RATE: 90 BPM | OXYGEN SATURATION: 96 % | RESPIRATION RATE: 18 BRPM | TEMPERATURE: 97.3 F | SYSTOLIC BLOOD PRESSURE: 128 MMHG | BODY MASS INDEX: 33.05 KG/M2 | HEIGHT: 75 IN | DIASTOLIC BLOOD PRESSURE: 78 MMHG | WEIGHT: 265.8 LBS

## 2018-03-29 DIAGNOSIS — M54.2 NECK PAIN: ICD-10-CM

## 2018-03-29 DIAGNOSIS — Z79.01 ANTICOAGULATED ON WARFARIN: ICD-10-CM

## 2018-03-29 DIAGNOSIS — E11.21 TYPE 2 DIABETES MELLITUS WITH DIABETIC NEPHROPATHY, WITH LONG-TERM CURRENT USE OF INSULIN (HCC): ICD-10-CM

## 2018-03-29 DIAGNOSIS — R29.898 BILATERAL ARM WEAKNESS: ICD-10-CM

## 2018-03-29 DIAGNOSIS — R29.898 BILATERAL LEG WEAKNESS: ICD-10-CM

## 2018-03-29 DIAGNOSIS — Z86.73 HISTORY OF CVA (CEREBROVASCULAR ACCIDENT): ICD-10-CM

## 2018-03-29 DIAGNOSIS — Z79.4 TYPE 2 DIABETES MELLITUS WITH DIABETIC NEPHROPATHY, WITH LONG-TERM CURRENT USE OF INSULIN (HCC): ICD-10-CM

## 2018-03-29 DIAGNOSIS — M25.551 RIGHT HIP PAIN: ICD-10-CM

## 2018-03-29 LAB
INR BLD: 2.4 (ref 0.9–1.2)
INR PPP: 2.4 (ref 2–3.5)

## 2018-03-29 PROCEDURE — 85610 PROTHROMBIN TIME: CPT

## 2018-03-29 PROCEDURE — 99212 OFFICE O/P EST SF 10 MIN: CPT | Performed by: PHARMACIST

## 2018-03-29 PROCEDURE — 99214 OFFICE O/P EST MOD 30 MIN: CPT | Performed by: INTERNAL MEDICINE

## 2018-03-29 RX ORDER — OXYCODONE AND ACETAMINOPHEN 10; 325 MG/1; MG/1
1 TABLET ORAL EVERY 6 HOURS PRN
Qty: 120 TAB | Refills: 0 | Status: SHIPPED | OUTPATIENT
Start: 2018-03-29 | End: 2018-03-29 | Stop reason: SDUPTHER

## 2018-03-29 RX ORDER — OXYCODONE AND ACETAMINOPHEN 10; 325 MG/1; MG/1
1 TABLET ORAL EVERY 6 HOURS PRN
Qty: 120 TAB | Refills: 0 | Status: SHIPPED | OUTPATIENT
Start: 2018-04-29 | End: 2018-05-23 | Stop reason: SDUPTHER

## 2018-03-29 NOTE — PROGRESS NOTES
Anticoagulation Summary  As of 3/29/2018    INR goal:   2.0-3.0   TTR:   59.7 % (2.5 y)   Today's INR:   2.4   Maintenance plan:   10 mg (5 mg x 2) every day   Weekly total:   70 mg   Plan last modified:   Jefferson Brian, PharmD (1/3/2017)   Next INR check:   4/26/2018   Target end date:   Indefinite    Indications    CVA (cerebral vascular accident) (CMS-HCC) [I63.9]  Atrial fibrillation [427.31] [I48.91]  History of CVA (cerebrovascular accident) [Z86.73]  Anticoagulated on warfarin [Z79.01]             Anticoagulation Episode Summary     [facility]   [facility]    Preferred lab:       Send INR reminders to:       Comments:   call pt multiple times, he has a hard time getting to his phone in time and has no VM set up      Anticoagulation Care Providers     Provider Role Specialty Phone number    Catrachito Sterling D.O. Referring Internal Medicine [phone]    [facility]ticoagulation [facility] Responsible  [phone]    Liseth Doe A.P.N. Responsible Cardiology [phone]    Gi Cadena A.P.NElian Responsible Cardiology [phone]        Anticoagulation Patient Findings      HPI:  Joel Pradeep Anil seen in clinic today, on anticoagulation therapy with warfarin for CVA  Changes to current medical/health status since last appt: Pt missed his dose last night. Pt poor historian though, as he changed the day that he missed the dose a few times during visit.   Denies signs/symptoms of bleeding and/or thrombosis since the last appt.    Denies any interval changes to diet  Denies any interval changes to medications since last appt.   Denies any complications or cost restrictions with current therapy.   BP declined.      A/P   INR  is-therapeutic.   However as he missed his dose last night, will have pt take a boost dose of 15mg x1 today.     Follow up appointment in 4 week(s) per pt preference.    Gely Briseno, PharmD

## 2018-03-29 NOTE — PROGRESS NOTES
CC: Joel Ma is a 67 y.o. male is suffering from   Chief Complaint   Patient presents with   • Medication Management     7 month follow up    • Hip Pain     right hip pain          SUBJECTIVE:  1. Neck pain  Puls here for follow-up, continues to have problems with neck pain, status post surgery, continues to have problems lower extremity weakness.     2. Right hip pain  Patient and I have discussed he is also having problems with right hip pain and discomfort. I have recommended he undergo x-rays    3. Bilateral leg weakness  Patient continues to have weakness associated with his lower extremities. States that while he was in [place] did undergo physical therapy for 5 months.    4. Bilateral arm weakness  Patient continues also to have loss of range of motion associated with bilateral arms    5. Type 2 diabetes mellitus with diabetic nephropathy, with long-term current use of insulin (CMS-HCC)  Patient is a type II diabetic orders written        Past social, family, history: Single  Social History   Substance Use Topics   • Smoking status: Former Smoker     Packs/day: 4.00     Years: 0.50     Types: Cigarettes     Quit date: 1/1/1970   • Smokeless tobacco: Never Used   • Alcohol use No         MEDICATIONS:    Current Outpatient Prescriptions:   •  [START ON 4/29/2018] oxyCODONE-acetaminophen (PERCOCET-10)  MG Tab, Take 1 Tab by mouth every 6 hours as needed for Severe Pain for up to 30 days., Disp: 120 Tab, Rfl: 0  •  warfarin (COUMADIN) 5 MG Tab, Take two tablets daily as directed by [facility], Disp: 180 Tab, Rfl: 2  •  NOVOLOG, insulin aspart, (NOVOLOG FLEXPEN) 100 UNIT/ML Solution Pen-injector injection, Inject 12 Units as instructed 4 Times a Day,Before Meals and at Bedtime., Disp: 15 mL, Rfl: 3  •  gabapentin (NEURONTIN) 300 MG Cap, Take 1-2 Caps by mouth 3 times a day., Disp: 120 Cap, Rfl: 11  •  Misc. Devices Misc, Compression CHELI hose. 20-30 mmhg. knee-high,  Disp: 1 Each, Rfl: 0  •  amLODIPine (NORVASC) 10 MG Tab, Take 1 Tab by mouth every day., Disp: 90 Tab, Rfl: 3  •  clindamycin (CLEOCIN) 300 MG Cap, Take 1 Cap by mouth 2 Times a Day for 30 days., Disp: 60 Cap, Rfl: 1  •  cloNIDine (CATAPRES) 0.3 MG/24HR PATCH WEEKLY, APPLY 1 PATCH TO SKIN AS DIRECTED EVERY WEDNESDAY., Disp: 4 Patch, Rfl: 2  •  hydrALAZINE (APRESOLINE) 100 MG tablet, TAKE ONE TABLET BY MOUTH EVERY EIGHT HOURS, Disp: 90 Tab, Rfl: 2  •  metFORMIN (GLUCOPHAGE) 500 MG Tab, Take 2 Tabs by mouth 2 times a day, with meals., Disp: 360 Tab, Rfl: 3  •  insulin glargine (LANTUS SOLOSTAR) 100 UNIT/ML Solution Pen-injector injection, INJECT 30 UNITS SUBCUTANEOUSLY AS INSTRUCTED EVERY EVENING, Disp: 15 mL, Rfl: 0  •  Insulin Pen Needle (ADVOCATE INSULIN PEN NEEDLES) 31G X 5 MM Misc, Use tid and prn., Disp: 100 Each, Rfl: 3  •  hydrochlorothiazide (MICROZIDE) 12.5 MG capsule, Take 1 Cap by mouth every day., Disp: 30 Cap, Rfl: 6  •  hydrochlorothiazide (HYDRODIURIL) 25 MG Tab, Take 1 Tab by mouth every day., Disp: 90 Tab, Rfl: 3  •  lisinopril (PRINIVIL, ZESTRIL) 40 MG tablet, Take 1 Tab by mouth 2 times a day., Disp: 180 Tab, Rfl: 3  •  B-D UF III MINI PEN NEEDLES 31G X 5 MM Misc, , Disp: , Rfl:   •  trazodone (DESYREL) 150 MG Tab, Take 0.5 Tabs by mouth every bedtime., Disp: 45 Tab, Rfl: 3  •  fluticasone (FLONASE) 50 MCG/ACT nasal spray, Spray 2 Sprays in nose as needed., Disp: 1 Bottle, Rfl: 5  •  nystatin (MYCOSTATIN) Powder, Apply 1 Application to affected area(s) 3 times a day. Apply to reddened area under abdominal fold., Disp: 1 Bottle, Rfl: 0  •  polyethylene glycol/lytes (MIRALAX) Pack, Take 1 Packet by mouth 1 time daily as needed., Disp: 30 Each, Rfl: 0  •  magnesium chloride SR (SLO-MAG) 535 (64 MG) MG Tab CR, Take 1 Tab by mouth 2 Times a Day. (Patient taking differently: Take 1 Tab by mouth 2 times a day as needed.), Disp: 60 Tab, Rfl: 0    PROBLEMS:  Patient Active Problem List    Diagnosis Date  Noted   • Left knee pain 08/28/2016     Priority: High   • Diabetic polyneuropathy (CMS-HCC) 05/06/2015     Priority: High   • Toe amputation status (CMS-HCC) 05/06/2015     Priority: High   • HTN (hypertension) 08/16/2010     Priority: Medium   • CVA (cerebral vascular accident) (CMS-HCC) 06/04/2009     Priority: Medium   • Diabetes mellitus with neurological manifestations, controlled (CMS-HCC) 03/05/2012     Priority: Low   • Persistent proteinuria 10/13/2017   • BMI 35.0-35.9,adult 09/22/2017   • CKD (chronic kidney disease), stage I 05/12/2017   • Diabetic nephropathy associated with type 2 diabetes mellitus (CMS-HCC) 05/12/2017   • Chronic use of opiate drugs therapeutic purposes 02/14/2017   • Other orthopedic aftercare 11/07/2016   • Cervical stenosis of spine 09/06/2016   • Dysphagia 09/06/2016   • Chronic atrial fibrillation (CMS-HCC) 09/06/2016   • Hallucinations 09/06/2016   • JANNIE treated with BiPAP 04/18/2016   • History of CVA (cerebrovascular accident) 11/11/2015   • Atrial fibrillation [427.31] 06/24/2015   • Risk for falls 10/27/2014   • Myalgia 05/29/2014   • BPH (benign prostatic hyperplasia) 04/28/2014   • Anticoagulated on warfarin 04/28/2014   • Chronic antibiotic suppression 04/28/2014   • MEDICAL HOME    • Class 1 obesity in adult 06/17/2011   • Ventricular ectopy 06/17/2011   • Trigger finger 06/17/2011   • Thyroid mass 07/30/2009   • Dyslipidemia 06/17/2009       REVIEW OF SYSTEMS:  Gen.:  No Nausea, Vomiting, fever, Chills.  Heart: No chest pain.  Lungs:  No shortness of Breath.  Psychological: Elezaar unusual Anxiety depression     PHYSICAL EXAM   Constitutional: Alert, cooperative, not in acute distress.  Cardiovascular:  Rate Rhythm is regular without murmurs rubs clicks.     Thorax & Lungs: Clear to auscultation, no wheezing, rhonchi, or rales  HENT: Normocephalic, Atraumatic.  Eyes: PERRLA, EOMI, Conjunctiva normal.   Neck: Trachia is midline no swelling of the thyroid.   Lymphatic: No  "lymphadenopathy noted.   Musculoskeletal: Patient with 4 out of 5 muscle strength in the hip flexors bilaterally 5/5 the extensors and flexors bilaterally  Neurologic: Alert & oriented x 3, cranial nerves II through XII are intact, Normal motor function, Normal sensory function, No focal deficits noted.   Psychiatric: Affect normal, Judgment normal, Mood normal.     VITAL SIGNS:/78   Pulse 90   Temp 36.3 °C (97.3 °F)   Resp 18   Ht 1.905 m (6' 3\")   Wt 120.6 kg (265 lb 12.8 oz)   SpO2 96%   BMI 33.22 kg/m²     Labs: Reviewed    Assessment:                                                     Plan:    1. Neck pain  Neck pain patient's to restart Percocet  - CONSENT FOR OPIATE PRESCRIPTION  - oxyCODONE-acetaminophen (PERCOCET-10)  MG Tab; Take 1 Tab by mouth every 6 hours as needed for Severe Pain for up to 30 days.  Dispense: 120 Tab; Refill: 0    2. Right hip pain  Right hip pain x-ray ordered referral written to [facility]  - REFERRAL TO ORTHOPEDICS    3. Bilateral leg weakness  Patient is referred to physical therapy  - REFERRAL TO PHYSICAL THERAPY Reason for Therapy: Eval/Treat/Report  - DX-HIP-UNILATERAL-WITHOUT PELVIS-1 VIEW RIGHT; Future    4. Bilateral arm weakness  Physical therapy  - REFERRAL TO PHYSICAL THERAPY Reason for Therapy: Eval/Treat/Report  - DX-HIP-UNILATERAL-WITHOUT PELVIS-1 VIEW RIGHT; Future    5. Type 2 diabetes mellitus with diabetic nephropathy, with long-term current use of insulin (CMS-HCC)  Labs ordered  - HBA1C; Future  - COMP METABOLIC PANEL; Future  - CBC WITH DIFFERENTIAL; Future        "

## 2018-04-02 ENCOUNTER — PHYSICAL THERAPY (OUTPATIENT)
Dept: PHYSICAL THERAPY | Facility: REHABILITATION | Age: 68
End: 2018-04-02
Attending: INTERNAL MEDICINE
Payer: MEDICARE

## 2018-04-02 DIAGNOSIS — M48.02 CERVICAL SPINAL STENOSIS: ICD-10-CM

## 2018-04-02 DIAGNOSIS — R29.898 DEFICIENCIES OF LIMBS: ICD-10-CM

## 2018-04-02 PROCEDURE — 97163 PT EVAL HIGH COMPLEX 45 MIN: CPT

## 2018-04-02 ASSESSMENT — ENCOUNTER SYMPTOMS
PAIN SCALE AT HIGHEST: 7
PAIN SCALE AT LOWEST: 7
PAIN SCALE: 7

## 2018-04-02 ASSESSMENT — BALANCE ASSESSMENTS
BALANCE - STANDING STATIC: POOR +
BALANCE - SITTING STATIC: FAIR +
BALANCE - STANDING DYNAMIC: POOR +

## 2018-04-02 ASSESSMENT — ACTIVITIES OF DAILY LIVING (ADL): POOR_BALANCE: 1

## 2018-04-02 NOTE — OP THERAPY EVALUATION
Outpatient Physical Therapy  INITIAL EVALUATION    [facility] Physical Therapy [address]  [address]  [place] [address]  Phone:  [phone]  Fax:  [phone]    Date of Evaluation: 2018    Patient: Joel Ma  YOB: 1950  MRN: [id number]     Referring Provider: Catrachito Sterling D.O.  [address]  [address]  [place] [address]   Referring Diagnosis Bilateral leg weakness [R29.898];Bilateral arm weakness [R29.898]     Time Calculation  Start time: 1300  Stop time: 1345 Time Calculation (min): 45 minutes     Physical Therapy Occurrence Codes    Date physical therapy care plan established or reviewed:  18          Chief Complaint: Difficulty Walking and Neck Problem    Visit Diagnoses     ICD-10-CM   1. Cervical spinal stenosis M48.02   2. Deficiencies of limbs R29.898         Subjective   History of Present Illness:     History of chief complaint:  68 y/o returns to PT this date with ongoing C/O LE/UE weakness and various areas of pain including (R) hip neck lumbar    Pain:     Current pain ratin    At best pain ratin    At worst pain ratin    Activity Tolerance:     Current activity tolerance / Recreational activities:  Had PT past 5 months while in [place]    Work:  Disabled. Sister assists at home    Patient Goals:     Patient goals for therapy:  To walk as noemal as possible      Past Medical History:   Diagnosis Date   • Anesthesia     low O2 sat   • arthritis 2011    thumb, fingers   • Dental disorder     full dentures   • Diabetes     insulin, Dr Oconnor, his APN   • High cholesterol    • Hypertension    • MRSA (methicillin resistant Staphylococcus aureus)     history of, nothing current , on clindamycin for rest of life per patient   • JANNIE treated with BiPAP 2016   • Pain 13    shoulders, hip, right knee, 7/10   • Pneumonia    • Sleep apnea     CPAP   • Stroke (CMS-[facility])     2007, 2007, right sided weakness;  balance disturbance   • Thyroid mass 7/30/2009    benign lump   • Ventricular ectopy 6/17/2011     Past Surgical History:   Procedure Laterality Date   • CERVICAL DISK AND FUSION ANTERIOR  8/31/2016    Procedure: CERVICAL DISK AND FUSION ANTERIOR C3-7 ;  Surgeon: Alhaji Jaffe M.D.;  Location: [facility];  Service:    • CATARACT PHACO WITH IOL  5/8/2013    Performed by Mame Rehman M.D. at SURGERY SAME DAY [facility]   • KNEE REVISION TOTAL  11/13/2012    Performed by Gordon Cota M.D. at [facility]   • IRRIGATION & DEBRIDEMENT ORTHO  11/13/2012    Performed by Gordon Cota M.D. at [facility]   • IRRIGATION & DEBRIDEMENT ORTHO  11/2/2012    Performed by Gordon Cota M.D. at [facility]   • INCISION AND DRAINAGE ORTHOPEDIC Right 10/29/2012    Procedure: Right Total Knee I and D and Liner Exchange, with drain;  Surgeon: Gordon Cota M.D.;  Location: [facility];  Service:    • IRRIGATION & DEBRIDEMENT ORTHO Left 10/29/2012    Procedure: left shoulder, with drain;  Surgeon: Gordon Cota M.D.;  Location: [facility];  Service:    • KNEE REVISION TOTAL  3/13/2012    Performed by KAMALJIT SHI at [facility]   • IRRIGATION & DEBRIDEMENT ORTHO  3/13/2012    Performed by KAMALJIT SHI at [facility]   • TENDON REPAIR  3/13/2012    Performed by KAMALJIT SHI at [facility]   • KNEE ARTHROPLASTY TOTAL  1/11/2012    Right; Performed by KAMALJIT SHI at [facility]   • TOE AMPUTATION  7/22/2011    Performed by EVELYN JANE at [facility]   • ELBOW ARTHROTOMY  2008    right   • LUMBAR FUSION POSTERIOR  1979   • FOOT SURGERY      partial amputation great toe   • FOOT SURGERY      toe surgery left foot   • PB KNEE SCOPE,SINGLE MENISECTOMY      right knee       Precautions:       Objective   Observation and functional movement:  Used W/C to therapy due to (R) hip pain.      Range of motion and strength:    Cervical mild /mod limited ROM. (B) shoulders AROM 40%, PROM 80% LE HS limited ext. Edema (B) ankles .UE strength grossly 4-/5 within range, LE 4-/5 (R) DF2+/5    Sensation and reflexes:     Present but diminished (R) distal LE. PTR absent (L). Difficult to elicit ankle reflexes due to edema    Special tests:      Cerebellar screen (-)    Balance:     Sitting (static): Fair +    Standing (static): Poor +    Standing (dynamic): Poor +    Contact(A) transfer W/C to mat    Gait:      Ambulated 50' walker before tired. Difficulty with (R) LE clearence. Ambulates with forward flexed trunk    Cognition and visual perception:     Reports frequent dizziness            Therapeutic Exercises (CPT 23831):     1. Nu step 5', level 5      Time-based treatments/modalities:          Assessment, Response and Plan:   Impairments: abnormal ADL function, abnormal gait, abnormal or restricted ROM, activity intolerance, impaired functional mobility, impaired balance, impaired physical strength, limited ADL's, limited mobility, pain with function, swelling and weight-bearing intolerance    Assessment details:  Pt states he was doing better past few months then he is doing now. States he feels he has digressed  Prognosis: fair    Goals:   Short Term Goals:   1) Pt will tolerate nu step 10 level 7 2) Pt will ambulate 70 ' with walker before tires 3) transfer W/C to mat (S)  Short term goal time span:  2-4 weeks      Long Term Goals:    1) (I) transfer 2) (I) bed mobility 3) Pt will be able to ambulate 200 ' with walker (S) 4) improved foot clearence 5) decrese fall risk  Long term goal time span:  6-8 weeks    Plan:   Planned therapy interventions:  Functional Training, Self Care (CPT 32649), Gait Training (CPT 28699), Manual Therapy (CPT 95827), Therapeutic Exercise (CPT 93165) and Neuromuscular Re-education (CPT 87114)  Plan details:  2x5-6 weeks      Functional Limitation G-Codes and Severity  Modifiers      Current:  N/A   Goal:  N/A     Referring provider co-signature:  I have reviewed this plan of care and my co-signature certifies the need for services.      Physician Signature: ________________________________ Date: ______________

## 2018-04-05 ENCOUNTER — APPOINTMENT (OUTPATIENT)
Dept: PHYSICAL THERAPY | Facility: REHABILITATION | Age: 68
End: 2018-04-05
Attending: INTERNAL MEDICINE
Payer: MEDICARE

## 2018-04-06 ENCOUNTER — TELEPHONE (OUTPATIENT)
Dept: MEDICAL GROUP | Facility: CLINIC | Age: 68
End: 2018-04-06

## 2018-04-06 DIAGNOSIS — R53.81 DEBILITY: ICD-10-CM

## 2018-04-06 NOTE — TELEPHONE ENCOUNTER
Patient was significant debility secondary to spinal stenosis. Prescription faxed to [facility]. Telephone call to the patient discussed the appropriate type of walker for him.

## 2018-04-06 NOTE — TELEPHONE ENCOUNTER
VOICEMAIL  1. Caller Name: Joel Ma                        Call Back Number: [phone] (home)     2. Message: patient called and stated that his walker broke and he needs a new one. Please send new order to [facility]. Fax: [phone]        3. Patient approves office to leave a detailed voicemail/MyChart message: N\A

## 2018-04-12 ENCOUNTER — PHYSICAL THERAPY (OUTPATIENT)
Dept: PHYSICAL THERAPY | Facility: REHABILITATION | Age: 68
End: 2018-04-12
Attending: INTERNAL MEDICINE
Payer: MEDICARE

## 2018-04-12 DIAGNOSIS — R29.898 DEFICIENCIES OF LIMBS: ICD-10-CM

## 2018-04-12 DIAGNOSIS — M48.02 CERVICAL SPINAL STENOSIS: ICD-10-CM

## 2018-04-12 PROCEDURE — 97112 NEUROMUSCULAR REEDUCATION: CPT

## 2018-04-12 PROCEDURE — 97110 THERAPEUTIC EXERCISES: CPT

## 2018-04-12 NOTE — OP THERAPY DAILY TREATMENT
"  Outpatient Physical Therapy  DAILY TREATMENT     [facility] Physical [address]  [address]  [place] [address]  Phone:  [phone]  Fax:  [phone]    Date: 04/12/2018    Patient: Joel Ma  YOB: 1950  MRN: [id number]     Time Calculation  Start time: 1100  Stop time: 1145 Time Calculation (min): 45 minutes     Chief Complaint: Difficulty Walking and Loss Of Balance    Visit #: 2    SUBJECTIVE:  States legs just seem to \"stop working\" when walks short distance    OBJECTIVE:  Current objective measures: Occasional dizziness. Able to sit to stand close (S)          Therapeutic Exercises (CPT 80999):     1. Nu step 10'  level5    2. Standing marching, 10x2    3. Seated abd t-band 20    4. Seated marching, 10x2    5. Seated t-band rows with sport cord    6. [place] t-band    7. Walking 25' walker close (S)      Time-based treatments/modalities:  Therapeutic exercise minutes (CPT 39354): 30 minutes  Neuromusc re-ed, balance, coor, post minutes (CPT 38232): 15 minutes             ASSESSMENT:   Response to treatment: able to tolerate ther ex. States he feels he is deteriorating. To have MRI 5?9    PLAN/RECOMMENDATIONS:   Plan for treatment: therapy treatment to continue next visit.  Planned interventions for next visit: gait training (CPT 34197), neuromuscular re-education (CPT 77870) and therapeutic exercise (CPT 33020).      "

## 2018-04-16 ENCOUNTER — TELEPHONE (OUTPATIENT)
Dept: MEDICAL GROUP | Facility: CLINIC | Age: 68
End: 2018-04-16

## 2018-04-16 DIAGNOSIS — E11.42 DIABETIC POLYNEUROPATHY ASSOCIATED WITH TYPE 2 DIABETES MELLITUS (HCC): ICD-10-CM

## 2018-04-16 NOTE — TELEPHONE ENCOUNTER
1. Caller Name: Joel                                         Call Back Number: [phone]      Patient approves a detailed voicemail message: yes    Pt is calling for his humalog Rx stating that it was changed from 5 x / day to 3.  He is requesting a new Rx for the Humalog inj 5 x / day on the sliding scale.  He will also need his insulin syringes to match the directions.

## 2018-04-19 ENCOUNTER — PHYSICAL THERAPY (OUTPATIENT)
Dept: PHYSICAL THERAPY | Facility: REHABILITATION | Age: 68
End: 2018-04-19
Attending: INTERNAL MEDICINE
Payer: MEDICARE

## 2018-04-19 DIAGNOSIS — R29.898 DEFICIENCIES OF LIMBS: ICD-10-CM

## 2018-04-19 DIAGNOSIS — M48.02 CERVICAL SPINAL STENOSIS: ICD-10-CM

## 2018-04-19 PROCEDURE — 97116 GAIT TRAINING THERAPY: CPT

## 2018-04-19 PROCEDURE — 97110 THERAPEUTIC EXERCISES: CPT

## 2018-04-19 NOTE — OP THERAPY DAILY TREATMENT
"  Outpatient Physical Therapy  DAILY TREATMENT     [facility] Physical [address]  [address]  [place] [address]  Phone:  [phone]  Fax:  [phone]    Date: 04/19/2018    Patient: Joel Ma  YOB: 1950  MRN: [id number]     Time Calculation  Start time: 1300  Stop time: 1345 Time Calculation (min): 45 minutes     Chief Complaint: Difficulty Walking; Hip Problem; Back Injury; Shoulder Injury; and Loss Of Balance    Visit #: 3    SUBJECTIVE:  States feeling weaker instead of stronger. To have MRIs in a few weeks    OBJECTIVE:  Current objective measures: weakness continues          Therapeutic Exercises (CPT 44083):     1. Standing marching, 15    2. Seated rowing t band, 30    3. Shuttle, 7 cords, 3#UE wand    4. 4\" step up, couldnt do left      Time-based treatments/modalities:  Gait training minutes (CPT 28744): 15 minutes  Therapeutic exercise minutes (CPT 72609): 30 minutes         ASSESSMENT:   Response to treatment: difficultty stepping up 4# step with (L). Could do (R)    PLAN/RECOMMENDATIONS:   Plan for treatment: therapy treatment to continue next visit.  Planned interventions for next visit: gait training (CPT 36516), neuromuscular re-education (CPT 20706) and therapeutic exercise (CPT 87378).      "

## 2018-04-20 ENCOUNTER — TELEPHONE (OUTPATIENT)
Dept: VASCULAR LAB | Facility: MEDICAL CENTER | Age: 68
End: 2018-04-20

## 2018-04-20 NOTE — TELEPHONE ENCOUNTER
Pt called in stating he missed his 10mg of warfarin last night.  He was instructed to take 15mg tonight for a one time only, and plan to repeat INR in 1-2wks.  Rescheduled for 5/3/18, per pt preference.    GLEN Andrews  [facility]

## 2018-04-20 NOTE — TELEPHONE ENCOUNTER
Telephone call to the patient, states that NovoLog is not effective, has used Humalog for years and it is effective. Patient states He is much more NovoLog because it's ineffective. We will start prior auth.

## 2018-04-23 ENCOUNTER — APPOINTMENT (OUTPATIENT)
Dept: VASCULAR LAB | Facility: MEDICAL CENTER | Age: 68
End: 2018-04-23
Payer: MEDICARE

## 2018-04-24 ENCOUNTER — PATIENT OUTREACH (OUTPATIENT)
Dept: HEALTH INFORMATION MANAGEMENT | Facility: OTHER | Age: 68
End: 2018-04-24

## 2018-04-24 NOTE — PROGRESS NOTES
Attempt #: Final  [facility] Verified: yes  Verify PCP: yes    Communication Preference Obtained: no     Review Care Team: no    Annual Wellness Visit Scheduling  1. Scheduling Status:Not Scheduled. Patient states they have too many other visits// Pt stated that he will contact Dr. Sterling first to discuss this wit him.     Care Gap Scheduling (Attempt to Schedule EACH Overdue Care Gap!)// Not able to address care gaps  Health Maintenance Due   Topic Date Due   • Annual Wellness Visit  10/28/2015   • COLONOSCOPY  06/30/2017   • IMM PNEUMOCOCCAL 65+ (ADULT) LOW/MEDIUM RISK SERIES (2 of 2 - PPSV23) 01/19/2018   • DIABETES MONOFILAMENT / LE EXAM  03/17/2018   • A1C SCREENING  04/19/2018   • URINE DRUG SCREEN  05/28/2018     MyChart Activation: declined

## 2018-05-01 ENCOUNTER — PHYSICAL THERAPY (OUTPATIENT)
Dept: PHYSICAL THERAPY | Facility: REHABILITATION | Age: 68
End: 2018-05-01
Attending: INTERNAL MEDICINE
Payer: MEDICARE

## 2018-05-01 DIAGNOSIS — M48.02 CERVICAL SPINAL STENOSIS: ICD-10-CM

## 2018-05-01 DIAGNOSIS — R29.898 DEFICIENCIES OF LIMBS: ICD-10-CM

## 2018-05-01 PROCEDURE — 97112 NEUROMUSCULAR REEDUCATION: CPT

## 2018-05-01 PROCEDURE — 97110 THERAPEUTIC EXERCISES: CPT

## 2018-05-01 NOTE — OP THERAPY DAILY TREATMENT
Outpatient Physical Therapy  DAILY TREATMENT     [facility] Physical [address]  [address]  [place] [address]  Phone:  [phone]  Fax:  [phone]    Date: 05/01/2018    Patient: Joel Ma  YOB: 1950  MRN: [id number]     Time Calculation  Start time: 1000  Stop time: 1050 Time Calculation (min): 50 minutes     Chief Complaint: Difficulty Walking    Visit #: 4    SUBJECTIVE:  States (R) hip is really sore. States LBP and pain radiates down(L) LE. Perseverates on fact he has difficulty lifting (L) LE when walking    OBJECTIVE:  Current objective measures:Ambulating to clinic with walker vs.W/C          Therapeutic Exercises (CPT 59088):     1. Nu step 6, 10'    2. Supine shoulder FF 3# cane    3. Shuttle 7 cords with weighted bar at same time      Time-based treatments/modalities:  Therapeutic exercise minutes (CPT 31683): 30 minutes  Neuromusc re-ed, balance, coor, post minutes (CPT 51562): 20 minutes           ASSESSMENT:   Response to treatment:Tolerated all exercise with appropriate rest    PLAN/RECOMMENDATIONS:   Plan for treatment: therapy treatment to continue next visit.  Planned interventions for next visit: gait training (CPT 41061), neuromuscular re-education (CPT 92928) and therapeutic exercise (CPT 19672).

## 2018-05-03 ENCOUNTER — HOSPITAL ENCOUNTER (OUTPATIENT)
Dept: CARDIOLOGY | Facility: MEDICAL CENTER | Age: 68
End: 2018-05-03
Attending: NURSE PRACTITIONER
Payer: MEDICARE

## 2018-05-03 ENCOUNTER — ANTICOAGULATION VISIT (OUTPATIENT)
Dept: VASCULAR LAB | Facility: MEDICAL CENTER | Age: 68
End: 2018-05-03
Attending: INTERNAL MEDICINE
Payer: MEDICARE

## 2018-05-03 ENCOUNTER — HOSPITAL ENCOUNTER (OUTPATIENT)
Dept: LAB | Facility: MEDICAL CENTER | Age: 68
End: 2018-05-03
Attending: NURSE PRACTITIONER
Payer: MEDICARE

## 2018-05-03 DIAGNOSIS — Z79.01 ANTICOAGULATED ON WARFARIN: ICD-10-CM

## 2018-05-03 DIAGNOSIS — Z86.73 HISTORY OF CVA (CEREBROVASCULAR ACCIDENT): ICD-10-CM

## 2018-05-03 LAB
ANION GAP SERPL CALC-SCNC: 10 MMOL/L (ref 0–11.9)
BASOPHILS # BLD AUTO: 0.5 % (ref 0–1.8)
BASOPHILS # BLD: 0.03 K/UL (ref 0–0.12)
BUN SERPL-MCNC: 23 MG/DL (ref 8–22)
CALCIUM SERPL-MCNC: 8.6 MG/DL (ref 8.5–10.5)
CHLORIDE SERPL-SCNC: 100 MMOL/L (ref 96–112)
CO2 SERPL-SCNC: 26 MMOL/L (ref 20–33)
CREAT SERPL-MCNC: 0.95 MG/DL (ref 0.5–1.4)
EKG IMPRESSION: NORMAL
EOSINOPHIL # BLD AUTO: 0.1 K/UL (ref 0–0.51)
EOSINOPHIL NFR BLD: 1.7 % (ref 0–6.9)
ERYTHROCYTE [DISTWIDTH] IN BLOOD BY AUTOMATED COUNT: 44.5 FL (ref 35.9–50)
GLUCOSE SERPL-MCNC: 225 MG/DL (ref 65–99)
HCT VFR BLD AUTO: 44.8 % (ref 42–52)
HGB BLD-MCNC: 15 G/DL (ref 14–18)
IMM GRANULOCYTES # BLD AUTO: 0.02 K/UL (ref 0–0.11)
IMM GRANULOCYTES NFR BLD AUTO: 0.3 % (ref 0–0.9)
INR BLD: 3.2 (ref 0.9–1.2)
INR PPP: 3.2 (ref 2–3.5)
LYMPHOCYTES # BLD AUTO: 1.2 K/UL (ref 1–4.8)
LYMPHOCYTES NFR BLD: 19.9 % (ref 22–41)
MCH RBC QN AUTO: 28.3 PG (ref 27–33)
MCHC RBC AUTO-ENTMCNC: 33.5 G/DL (ref 33.7–35.3)
MCV RBC AUTO: 84.5 FL (ref 81.4–97.8)
MONOCYTES # BLD AUTO: 0.55 K/UL (ref 0–0.85)
MONOCYTES NFR BLD AUTO: 9.1 % (ref 0–13.4)
NEUTROPHILS # BLD AUTO: 4.13 K/UL (ref 1.82–7.42)
NEUTROPHILS NFR BLD: 68.5 % (ref 44–72)
NRBC # BLD AUTO: 0 K/UL
NRBC BLD-RTO: 0 /100 WBC
PLATELET # BLD AUTO: 161 K/UL (ref 164–446)
PMV BLD AUTO: 11.7 FL (ref 9–12.9)
POTASSIUM SERPL-SCNC: 3.7 MMOL/L (ref 3.6–5.5)
RBC # BLD AUTO: 5.3 M/UL (ref 4.7–6.1)
SODIUM SERPL-SCNC: 136 MMOL/L (ref 135–145)
WBC # BLD AUTO: 6 K/UL (ref 4.8–10.8)

## 2018-05-03 PROCEDURE — 99212 OFFICE O/P EST SF 10 MIN: CPT | Performed by: PHARMACIST

## 2018-05-03 PROCEDURE — 85025 COMPLETE CBC W/AUTO DIFF WBC: CPT

## 2018-05-03 PROCEDURE — 93010 ELECTROCARDIOGRAM REPORT: CPT | Performed by: INTERNAL MEDICINE

## 2018-05-03 PROCEDURE — 85610 PROTHROMBIN TIME: CPT

## 2018-05-03 PROCEDURE — 36415 COLL VENOUS BLD VENIPUNCTURE: CPT

## 2018-05-03 PROCEDURE — 93005 ELECTROCARDIOGRAM TRACING: CPT | Performed by: INTERNAL MEDICINE

## 2018-05-03 PROCEDURE — 80048 BASIC METABOLIC PNL TOTAL CA: CPT

## 2018-05-03 NOTE — PROGRESS NOTES
Anticoagulation Summary  As of 5/3/2018    INR goal:   2.0-3.0   TTR:   60.2 % (2.6 y)   Today's INR:   3.2!   Warfarin maintenance plan:   10 mg (5 mg x 2) every day   Weekly warfarin total:   70 mg   Plan last modified:   Jefferson Brian, PharmD (1/3/2017)   Next INR check:   5/31/2018   Target end date:   Indefinite    Indications    CVA (cerebral vascular accident) (HCC) [I63.9]  Atrial fibrillation [427.31] [I48.91]  History of CVA (cerebrovascular accident) [Z86.73]  Anticoagulated on warfarin [Z79.01]             Anticoagulation Episode Summary     INR check location:   [facility]    Preferred lab:       Send INR reminders to:       Comments:   call pt multiple times, he has a hard time getting to his phone in time and has no VM set up      Anticoagulation Care Providers     Provider Role Specialty Phone number    BEL Lee.O. Referring Internal Medicine [phone]    [facility] Responsible  [phone]    Liseth Doe A.P.N. Responsible Cardiology [phone]    Gi Cadena A.P.N. Responsible Cardiology [phone]        Anticoagulation Patient Findings      HPI:  Joel Ma seen in clinic today, on anticoagulation therapy with warfarin for Hx CVA  Changes to current medical/health status since last appt: maybe less greens  Denies signs/symptoms of bleeding and/or thrombosis since the last appt.    Denies any interval changes to diet  Denies any interval changes to medications since last appt.   Denies any complications or cost restrictions with current therapy.   BP declined      A/P   INR  is supra-therapeutic.   Will have pt take a decreased dose of 5mg x1 today and then resume his normal dosing.     Follow up appointment in 4 week(s).    Gely Briseno, PharmD

## 2018-05-04 NOTE — PROGRESS NOTES
Pt called in this morning.   He took his warfarin dose of 5mg last night, and then believes that he woke up at midnight and took 10mg bc it was still in his pill box, but is uncertain.     Denies any s/s bleeding.  Advised that he take a reduced dose of 5mg x1 tonight, and then to increase his greens for the next couple days.     Advised to continue to monitor himself for any s/s bleeding.     Gely Briseno, PharmD

## 2018-05-07 ENCOUNTER — HOSPITAL ENCOUNTER (OUTPATIENT)
Dept: RADIOLOGY | Facility: MEDICAL CENTER | Age: 68
End: 2018-05-07
Attending: NURSE PRACTITIONER
Payer: MEDICARE

## 2018-05-07 VITALS
RESPIRATION RATE: 18 BRPM | HEIGHT: 76 IN | SYSTOLIC BLOOD PRESSURE: 145 MMHG | TEMPERATURE: 97.3 F | WEIGHT: 260 LBS | DIASTOLIC BLOOD PRESSURE: 76 MMHG | HEART RATE: 70 BPM | OXYGEN SATURATION: 90 % | BODY MASS INDEX: 31.66 KG/M2

## 2018-05-07 DIAGNOSIS — M54.12 RADICULOPATHY, CERVICAL: ICD-10-CM

## 2018-05-07 DIAGNOSIS — R29.898 BILATERAL ARM WEAKNESS: ICD-10-CM

## 2018-05-07 DIAGNOSIS — M54.16 LUMBAR RADICULOPATHY: ICD-10-CM

## 2018-05-07 DIAGNOSIS — R29.898 BILATERAL LEG WEAKNESS: ICD-10-CM

## 2018-05-07 LAB — GLUCOSE BLD-MCNC: 152 MG/DL (ref 65–99)

## 2018-05-07 PROCEDURE — 72156 MRI NECK SPINE W/O & W/DYE: CPT

## 2018-05-07 PROCEDURE — 82962 GLUCOSE BLOOD TEST: CPT

## 2018-05-07 PROCEDURE — 700101 HCHG RX REV CODE 250

## 2018-05-07 PROCEDURE — 700117 HCHG RX CONTRAST REV CODE 255: Performed by: NURSE PRACTITIONER

## 2018-05-07 PROCEDURE — A9579 GAD-BASE MR CONTRAST NOS,1ML: HCPCS | Performed by: NURSE PRACTITIONER

## 2018-05-07 PROCEDURE — 72158 MRI LUMBAR SPINE W/O & W/DYE: CPT

## 2018-05-07 PROCEDURE — 700111 HCHG RX REV CODE 636 W/ 250 OVERRIDE (IP)

## 2018-05-07 RX ADMIN — GADODIAMIDE 20 ML: 287 INJECTION INTRAVENOUS at 14:00

## 2018-05-07 NOTE — DISCHARGE INSTRUCTIONS
MRI ADULT DISCHARGE INSTRUCTIONS    You have been medicated today for your scan. Please follow the instructions below to ensure your safe recovery. If you have any questions or problems, feel free to call us at [phone] or [phone].     1.   Have someone stay with you to assist you as needed.    2.   Do not drive or operate any mechanical devices.    3.   Do not perform any activity that requires concentration. Make no major decisions over the next 24 hours.     4.   Be careful changing positions from laying down to sitting or standing, as you may become dizzy.     5.   Do not drink alcohol for 48 hours.    6.   There are no restrictions for eating your normal meals. Drink fluids.    7.   You may continue your usual medications for pain, tranquilizers, muscle relaxants or sedatives when awake.     8.   Tomorrow, you may continue your normal daily activities.     9.   Pressure dressing on 10 - 15 minutes. If swelling or bleeding occurs when removed, continue placing direct pressure on injection site for another 5 minutes, or until bleeding stops.     I have been informed of and understand the above discharge instructions.

## 2018-05-07 NOTE — PROGRESS NOTES
Pt woke up agitated and restless.  Pt seems confused, but he is A & O x 4.  Pt assisted into wheelchair per his request with assistance from 2 other staff members.

## 2018-05-09 ENCOUNTER — PHYSICAL THERAPY (OUTPATIENT)
Dept: PHYSICAL THERAPY | Facility: REHABILITATION | Age: 68
End: 2018-05-09
Attending: INTERNAL MEDICINE
Payer: MEDICARE

## 2018-05-09 DIAGNOSIS — R29.898 DEFICIENCIES OF LIMBS: ICD-10-CM

## 2018-05-09 DIAGNOSIS — M48.02 CERVICAL SPINAL STENOSIS: ICD-10-CM

## 2018-05-09 PROCEDURE — 97110 THERAPEUTIC EXERCISES: CPT

## 2018-05-09 NOTE — OP THERAPY DAILY TREATMENT
Outpatient Physical Therapy  DAILY TREATMENT     [facility] Physical Therapy [address]  [address]  [place] [address]  Phone:  [phone]  Fax:  [phone]    Date: 05/09/2018    Patient: Joel Ma  YOB: 1950  MRN: [id number]     Time Calculation  Start time: 1300  Stop time: 1345 Time Calculation (min): 45 minutes     Chief Complaint: Difficulty Walking    Visit #: 5    SUBJECTIVE:  States blood sugar a little low. Ate candy bar and felt better    OBJECTIVE:  Current objective measures: Tx somewhat conservative b/c of this despite Pt wanting to do more. He states he is driving. Told him he should not for safety reasons. Tx ended early out of safety concerns to make sure he could drive home safely. States he [place] too far out of town for [employer] which I suggested.           Therapeutic Exercises (CPT 60591):     1. Nu step, 10'      Time-based treatments/modalities:  Therapeutic exercise minutes (CPT 23803): 15 minutes           ASSESSMENT:   Response to treatment: Pt could not tolerate usual tx due to low blood sugar. He was A+O x4 and felt better after candy bar. Assisted him to bathroom and supervised him for safety concerns    PLAN/RECOMMENDATIONS:   Plan for treatment: therapy treatment to continue next visit.  Planned interventions for next visit: therapeutic exercise (CPT 56328).

## 2018-05-10 ENCOUNTER — HOSPITAL ENCOUNTER (OUTPATIENT)
Dept: RADIOLOGY | Facility: MEDICAL CENTER | Age: 68
End: 2018-05-10
Attending: NURSE PRACTITIONER
Payer: MEDICARE

## 2018-05-10 PROCEDURE — 72050 X-RAY EXAM NECK SPINE 4/5VWS: CPT

## 2018-05-10 PROCEDURE — 72110 X-RAY EXAM L-2 SPINE 4/>VWS: CPT

## 2018-05-11 ENCOUNTER — TELEPHONE (OUTPATIENT)
Dept: MEDICAL GROUP | Facility: CLINIC | Age: 68
End: 2018-05-11

## 2018-05-11 NOTE — TELEPHONE ENCOUNTER
.1. Caller Name: Joel                                         Call Back Number: [phone]      Patient approves a detailed voicemail message: yes    Pt had a consult with neurosurgery today.  He is not very happy about what he was told.  He is hoping that he can talk to you about this.

## 2018-05-14 ENCOUNTER — PHYSICAL THERAPY (OUTPATIENT)
Dept: PHYSICAL THERAPY | Facility: REHABILITATION | Age: 68
End: 2018-05-14
Attending: INTERNAL MEDICINE
Payer: MEDICARE

## 2018-05-14 DIAGNOSIS — M48.02 CERVICAL SPINAL STENOSIS: ICD-10-CM

## 2018-05-14 DIAGNOSIS — R29.898 DEFICIENCIES OF LIMBS: ICD-10-CM

## 2018-05-14 PROCEDURE — 97110 THERAPEUTIC EXERCISES: CPT

## 2018-05-14 NOTE — OP THERAPY DAILY TREATMENT
Outpatient Physical Therapy  DAILY TREATMENT     [facility] Physical Therapy [address]  [address]  [place] [address]  Phone:  [phone]  Fax:  [phone]    Date: 05/14/2018    Patient: Joel Ma  YOB: 1950  MRN: [id number]     Time Calculation  Start time: 1030  Stop time: 1115 Time Calculation (min): 45 minutes     Chief Complaint: Difficulty Walking    Visit #: 6    SUBJECTIVE:  States he is to have  Surgery after gets cardiac clearance. Wants this to be last visit until then  OBJECTIVE:  Current objective measures: No change in status. LE's fatigue fairly easily. Used W/C to return from clinic to his car          Therapeutic Exercises (CPT 34970):     1. Nu step (5), 12'    2. Seated abduction with t-band    3. Shuttle, 5 cords      Time-based treatments/modalities:  Therapeutic exercise minutes (CPT 85699): 45 minutes           ASSESSMENT:   Response to treatment: tired after tx. Pt would like to hold off PT until after surgery. No significant change in status     PLAN/RECOMMENDATIONS:   Plan for treatment: D/C.

## 2018-05-14 NOTE — OP THERAPY DISCHARGE SUMMARY
Outpatient Physical Therapy  DISCHARGE SUMMARY NOTE      [facility] Physical Therapy [place] [address]  [address]  [place] [address]  Phone:  [phone]  Fax:  [phone]    Date of Visit: 05/14/2018    Patient: Joel Ma  YOB: 1950  MRN: [id number]     Referring Provider: Catrachito Sterling D.O.  [address] [facility] [address]  [address]  [place] [address]   Referring Diagnosis Other symptoms and signs involving the musculoskeletal system [R29.898]     Physical Therapy Occurrence Codes    Date physical therapy care plan established or reviewed:  5/14/18          Functional Limitation G-Codes and Severity Modifiers      Goal:  N/A   Discharge:  N/A       Your patient is being discharged from Physical Therapy with the following comments:   · Progress plateau    Comments:  See daily note     Limitations Remaining:  See note    Recommendations:  Pt would like to hold off PQT until after surgery    Ori Gonzalez, PT    Date: 5/14/2018

## 2018-05-15 ENCOUNTER — TELEPHONE (OUTPATIENT)
Dept: MEDICAL GROUP | Facility: MEDICAL CENTER | Age: 68
End: 2018-05-15

## 2018-05-15 NOTE — TELEPHONE ENCOUNTER
Spoke with pt on the phone. He reports he is going to be having a procedure on his neck. Will need to stop warfarin. No date yet. He sees cardiology in near future to get clearance. Pt has bridged with lovenox in the past. Instructed pt to contact our clinic when he has a date for the procedure so we can get him set up with instructions for his procedure.     Korina Diggs, Pharm D

## 2018-05-17 ENCOUNTER — APPOINTMENT (OUTPATIENT)
Dept: PHYSICAL THERAPY | Facility: REHABILITATION | Age: 68
End: 2018-05-17
Attending: INTERNAL MEDICINE
Payer: MEDICARE

## 2018-05-17 ENCOUNTER — TELEPHONE (OUTPATIENT)
Dept: MEDICAL GROUP | Facility: CLINIC | Age: 68
End: 2018-05-17

## 2018-05-17 NOTE — TELEPHONE ENCOUNTER
1. Caller Name: Joel and [facility]                                         Call Back Number: [phone]      Patient approves a detailed voicemail message: yes    Joel is awaiting scheduling of spinal surgery at [facility].  Does he need an appt for this??

## 2018-05-18 NOTE — TELEPHONE ENCOUNTER
Telephone call return to the patient, patient's mailboxes full. We'll have office staff call patient tomorrow

## 2018-05-18 NOTE — PROGRESS NOTES
Attempt #:1    WebIZ Checked & Epic Updated: yes  HealthConnect Verified: yes  Verify PCP: yes    Communication Preference Obtained: yes     Review Care Team: no    Annual Wellness Visit Scheduling  1. Scheduling Status:Not Scheduled. Patient states they are not interested       Care Gap Scheduling (Attempt to Schedule EACH Overdue Care Gap!)  Health Maintenance Due   Topic Date Due   • Annual Wellness Visit  10/28/2015   • COLONOSCOPY  06/30/2017   • IMM PNEUMOCOCCAL 65+ (ADULT) LOW/MEDIUM RISK SERIES (2 of 2 - PPSV23) 01/19/2018   • DIABETES MONOFILAMENT / LE EXAM  03/17/2018   • A1C SCREENING  04/19/2018   • URINE DRUG SCREEN  05/28/2018         [employer] Activation: declined  [employer] Leonie: no  Virtual Visits: no  Opt In to Text Messages: no

## 2018-05-19 NOTE — TELEPHONE ENCOUNTER
Paperwork completed, proceed with surgery. Patient will likely need to be bridged with Lovenox prior to surgery.

## 2018-05-23 ENCOUNTER — OFFICE VISIT (OUTPATIENT)
Dept: MEDICAL GROUP | Facility: CLINIC | Age: 68
End: 2018-05-23
Payer: MEDICARE

## 2018-05-23 VITALS
WEIGHT: 250 LBS | SYSTOLIC BLOOD PRESSURE: 128 MMHG | DIASTOLIC BLOOD PRESSURE: 84 MMHG | TEMPERATURE: 98.2 F | HEIGHT: 76 IN | RESPIRATION RATE: 16 BRPM | HEART RATE: 82 BPM | OXYGEN SATURATION: 97 % | BODY MASS INDEX: 30.44 KG/M2

## 2018-05-23 DIAGNOSIS — M54.2 NECK PAIN: ICD-10-CM

## 2018-05-23 DIAGNOSIS — Z79.4 CONTROLLED TYPE 2 DIABETES MELLITUS WITH DIABETIC POLYNEUROPATHY, WITH LONG-TERM CURRENT USE OF INSULIN (HCC): ICD-10-CM

## 2018-05-23 DIAGNOSIS — I63.89 CEREBROVASCULAR ACCIDENT (CVA) DUE TO OTHER MECHANISM (HCC): ICD-10-CM

## 2018-05-23 DIAGNOSIS — E11.42 CONTROLLED TYPE 2 DIABETES MELLITUS WITH DIABETIC POLYNEUROPATHY, WITH LONG-TERM CURRENT USE OF INSULIN (HCC): ICD-10-CM

## 2018-05-23 PROCEDURE — 99214 OFFICE O/P EST MOD 30 MIN: CPT | Performed by: INTERNAL MEDICINE

## 2018-05-23 RX ORDER — OXYCODONE AND ACETAMINOPHEN 10; 325 MG/1; MG/1
1 TABLET ORAL EVERY 6 HOURS PRN
Qty: 120 TAB | Refills: 0 | Status: SHIPPED | OUTPATIENT
Start: 2018-06-01 | End: 2018-05-23 | Stop reason: SDUPTHER

## 2018-05-23 RX ORDER — OXYCODONE AND ACETAMINOPHEN 10; 325 MG/1; MG/1
1 TABLET ORAL EVERY 6 HOURS PRN
Qty: 120 TAB | Refills: 0 | Status: SHIPPED | OUTPATIENT
Start: 2018-07-02 | End: 2018-05-23 | Stop reason: SDUPTHER

## 2018-05-23 RX ORDER — OXYCODONE AND ACETAMINOPHEN 10; 325 MG/1; MG/1
1 TABLET ORAL EVERY 6 HOURS PRN
Qty: 120 TAB | Refills: 0 | Status: ON HOLD | OUTPATIENT
Start: 2018-08-02 | End: 2018-08-13

## 2018-05-23 NOTE — PROGRESS NOTES
CC: Joel Ma is a 67 y.o. male is suffering from   Chief Complaint   Patient presents with   • Pre-op Exam         SUBJECTIVE:  1. Cerebrovascular accident (CVA) due to other mechanism (HCC)  Calls here for follow-up, we have discussed his operative procedure with  due to Randee's. Patient started to go both cervical and lumbar surgery because of myelopathy. Patient and I have discussed that he previously has had a stroke. We've discussed the need to be as careful possible regarding this. Patient is unable to ambulate without the assistance of wheelchair or walker.    2. Controlled type 2 diabetes mellitus with diabetic polyneuropathy, with long-term current use of insulin (HCC)  Patient additionally has a history of type 2 diabetes.    3. Neck pain  Patient on neck pain, is requesting refills of his oxycodone. Urine drug screen completed state drug task force reviewed sign drug contract.        Past social, family, history: Single  Social History   Substance Use Topics   • Smoking status: Former Smoker     Packs/day: 4.00     Years: 0.50     Types: Cigarettes     Quit date: 1/1/1970   • Smokeless tobacco: Never Used   • Alcohol use No         MEDICATIONS:    Current Outpatient Prescriptions:   •  [START ON 8/2/2018] oxyCODONE-acetaminophen (PERCOCET-10)  MG Tab, Take 1 Tab by mouth every 6 hours as needed for Severe Pain for up to 30 days., Disp: 120 Tab, Rfl: 0  •  warfarin (COUMADIN) 5 MG Tab, Take two tablets daily as directed by [facility], Disp: 180 Tab, Rfl: 2  •  NOVOLOG, insulin aspart, (NOVOLOG FLEXPEN) 100 UNIT/ML Solution Pen-injector injection, Inject 12 Units as instructed 4 Times a Day,Before Meals and at Bedtime., Disp: 15 mL, Rfl: 3  •  gabapentin (NEURONTIN) 300 MG Cap, Take 1-2 Caps by mouth 3 times a day., Disp: 120 Cap, Rfl: 11  •  amLODIPine (NORVASC) 10 MG Tab, Take 1 Tab by mouth every day., Disp: 90 Tab, Rfl: 3  •  cloNIDine (CATAPRES) 0.3 MG/24HR  PATCH WEEKLY, APPLY 1 PATCH TO SKIN AS DIRECTED EVERY WEDNESDAY., Disp: 4 Patch, Rfl: 2  •  hydrALAZINE (APRESOLINE) 100 MG tablet, TAKE ONE TABLET BY MOUTH EVERY EIGHT HOURS, Disp: 90 Tab, Rfl: 2  •  metFORMIN (GLUCOPHAGE) 500 MG Tab, Take 2 Tabs by mouth 2 times a day, with meals., Disp: 360 Tab, Rfl: 3  •  insulin glargine (LANTUS SOLOSTAR) 100 UNIT/ML Solution Pen-injector injection, INJECT 30 UNITS SUBCUTANEOUSLY AS INSTRUCTED EVERY EVENING, Disp: 15 mL, Rfl: 0  •  hydrochlorothiazide (HYDRODIURIL) 25 MG Tab, Take 1 Tab by mouth every day., Disp: 90 Tab, Rfl: 3  •  lisinopril (PRINIVIL, ZESTRIL) 40 MG tablet, Take 1 Tab by mouth 2 times a day., Disp: 180 Tab, Rfl: 3  •  fluticasone (FLONASE) 50 MCG/ACT nasal spray, Spray 2 Sprays in nose as needed., Disp: 1 Bottle, Rfl: 5  •  magnesium chloride SR (SLO-MAG) 535 (64 MG) MG Tab CR, Take 1 Tab by mouth 2 Times a Day. (Patient taking differently: Take 1 Tab by mouth 2 times a day as needed.), Disp: 60 Tab, Rfl: 0  •  Misc. Devices Misc, Foldable front wheel walker with seat.  Preferred home care. Fax: [phone], Disp: 1 Each, Rfl: 1  •  Misc. Devices Misc, Compression CHELI hose. 20-30 mmhg. knee-high, Disp: 1 Each, Rfl: 0  •  Insulin Pen Needle (ADVOCATE INSULIN PEN NEEDLES) 31G X 5 MM Misc, Use tid and prn., Disp: 100 Each, Rfl: 3  •  hydrochlorothiazide (MICROZIDE) 12.5 MG capsule, Take 1 Cap by mouth every day., Disp: 30 Cap, Rfl: 6  •  B-D UF III MINI PEN NEEDLES 31G X 5 MM Misc, , Disp: , Rfl:   •  trazodone (DESYREL) 150 MG Tab, Take 0.5 Tabs by mouth every bedtime., Disp: 45 Tab, Rfl: 3  •  nystatin (MYCOSTATIN) Powder, Apply 1 Application to affected area(s) 3 times a day. Apply to reddened area under abdominal fold., Disp: 1 Bottle, Rfl: 0  •  polyethylene glycol/lytes (MIRALAX) Pack, Take 1 Packet by mouth 1 time daily as needed., Disp: 30 Each, Rfl: 0    PROBLEMS:  Patient Active Problem List    Diagnosis Date Noted   • Left knee pain 08/28/2016      Priority: High   • Diabetic polyneuropathy ([facility]) 05/06/2015     Priority: High   • Toe amputation status ([facility]) 05/06/2015     Priority: High   • HTN (hypertension) 08/16/2010     Priority: Medium   • CVA (cerebral vascular accident) ([facility]) 06/04/2009     Priority: Medium   • Diabetes mellitus with neurological manifestations, controlled ([facility]) 03/05/2012     Priority: Low   • Persistent proteinuria 10/13/2017   • BMI 35.0-35.9,adult 09/22/2017   • CKD (chronic kidney disease), stage I 05/12/2017   • Diabetic nephropathy associated with type 2 diabetes mellitus ([facility]) 05/12/2017   • Chronic use of opiate drugs therapeutic purposes 02/14/2017   • Other orthopedic aftercare 11/07/2016   • Cervical stenosis of spine 09/06/2016   • Dysphagia 09/06/2016   • Chronic atrial fibrillation (HCC) 09/06/2016   • Hallucinations 09/06/2016   • JANNIE treated with BiPAP 04/18/2016   • History of CVA (cerebrovascular accident) 11/11/2015   • Atrial fibrillation [427.31] 06/24/2015   • Risk for falls 10/27/2014   • Myalgia 05/29/2014   • BPH (benign prostatic hyperplasia) 04/28/2014   • Anticoagulated on warfarin 04/28/2014   • Chronic antibiotic suppression 04/28/2014   • MEDICAL HOME    • Class 1 obesity in adult 06/17/2011   • Ventricular ectopy 06/17/2011   • Trigger finger 06/17/2011   • Thyroid mass 07/30/2009   • Dyslipidemia 06/17/2009       REVIEW OF SYSTEMS:  Gen.:  No Nausea, Vomiting, fever, Chills.  Heart: No chest pain.  Lungs:  No shortness of Breath.  Psychological: Eleazar unusual Anxiety depression     PHYSICAL EXAM   Constitutional: Alert, cooperative, not in acute distress.  Cardiovascular:  Rate Rhythm is regular without murmurs rubs clicks.     Thorax & Lungs: Clear to auscultation, no wheezing, rhonchi, or rales  HENT: Normocephalic, Atraumatic.  Eyes: PERRLA, EOMI, Conjunctiva normal.   Neck: Trachia is midline no swelling of the thyroid.   Lymphatic: No lymphadenopathy noted.   Neurologic: Alert & oriented x 3  "significant weakness associated both arms both legs significant difficulty with ambulation.   Psychiatric: Affect normal, Judgment normal, Mood normal.     VITAL SIGNS:/84   Pulse 82   Temp 36.8 °C (98.2 °F)   Resp 16   Ht 1.918 m (6' 3.5\")   Wt 113.4 kg (250 lb)   SpO2 97%   BMI 30.84 kg/m²     Labs: Reviewed    Assessment:                                                     Plan:    1. Cerebrovascular accident (CVA) due to other mechanism ([facility])  History of CVA also history of type 2 diabetes patient needs a cardiac stress test patient's to be assessed by cardiology. I have already signed release for surgery  - NM-CARDIAC STRESS TEST; Future    2. Controlled type 2 diabetes mellitus with diabetic polyneuropathy, with long-term current use of insulin ([facility])  Patient will need hemoglobin A1c completed before surgery orders be written  - NM-CARDIAC STRESS TEST; Future    3. Neck pain  Urine drug screen state drug task force reviewed prescriptions rewritten  [facility]  - CONTROLLED SUBSTANCE TREATMENT AGREEMENT  - oxyCODONE-acetaminophen (PERCOCET-10)  MG Tab; Take 1 Tab by mouth every 6 hours as needed for Severe Pain for up to 30 days.  Dispense: 120 Tab; Refill: 0    Patient has an elevated rest for surgery. I've explained to the patient that he does not have much of a choice where he is myelopathic he is likely to be condemned to a wheelchair we don't do surgery. Surgical release signed and sent back to Dr. Hill's.    "

## 2018-05-24 ENCOUNTER — TELEPHONE (OUTPATIENT)
Dept: NEPHROLOGY | Facility: MEDICAL CENTER | Age: 68
End: 2018-05-24

## 2018-05-24 NOTE — LETTER
September 22, 2017        Patient: Joel Ma   YOB: 1950   Date of Visit: 9/22/2017           To Whom It May Concern:    It is my medical opinion that Joel Ma is limited due to a disability to move his garbage can to the street and needs assistance with this.     If you have any questions or concerns, please don't hesitate to call.        Sincerely,          Catrachito Sterling D.O.      [address] [id number] [phone] (Phone)  [phone] (Fax)     Patient

## 2018-05-24 NOTE — TELEPHONE ENCOUNTER
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 He's urinating frequently and sometimes he can't control it and does not hold it in. He is concerned because before he had trouble urinating  And now he cannot control it. Please advise.    Thank you!

## 2018-05-25 ENCOUNTER — TELEPHONE (OUTPATIENT)
Dept: MEDICAL GROUP | Facility: CLINIC | Age: 68
End: 2018-05-25

## 2018-05-25 ENCOUNTER — HOSPITAL ENCOUNTER (OUTPATIENT)
Dept: RADIOLOGY | Facility: MEDICAL CENTER | Age: 68
End: 2018-05-25
Attending: INTERNAL MEDICINE
Payer: MEDICARE

## 2018-05-25 DIAGNOSIS — Z79.4 CONTROLLED TYPE 2 DIABETES MELLITUS WITH DIABETIC POLYNEUROPATHY, WITH LONG-TERM CURRENT USE OF INSULIN (HCC): ICD-10-CM

## 2018-05-25 DIAGNOSIS — I63.89 CEREBROVASCULAR ACCIDENT (CVA) DUE TO OTHER MECHANISM (HCC): ICD-10-CM

## 2018-05-25 DIAGNOSIS — E11.42 CONTROLLED TYPE 2 DIABETES MELLITUS WITH DIABETIC POLYNEUROPATHY, WITH LONG-TERM CURRENT USE OF INSULIN (HCC): ICD-10-CM

## 2018-05-25 PROCEDURE — A9502 TC99M TETROFOSMIN: HCPCS

## 2018-05-25 PROCEDURE — 700111 HCHG RX REV CODE 636 W/ 250 OVERRIDE (IP)

## 2018-05-25 RX ORDER — REGADENOSON 0.08 MG/ML
INJECTION, SOLUTION INTRAVENOUS
Status: COMPLETED
Start: 2018-05-25 | End: 2018-05-25

## 2018-05-25 RX ADMIN — REGADENOSON 0.4 MG: 0.08 INJECTION, SOLUTION INTRAVENOUS at 09:24

## 2018-05-25 NOTE — PROGRESS NOTES
Nursing care plan includes knowledge deficit, potential for discomfort, potential for compromised cardiac output.  POC includes teaching, comfort measures and reassurance, and access to code cart, cardiology stand by and availability of rapid response team.  Pt verbalizes good understanding of benefits and risks of pharmacological cardiac stress test.  Informed consent obtained.  Lexiscan given, pt developed the following signs/symptoms:sob, flushed.    VS stable, symptoms resolved.  To waiting room, fluids and/or snack given, awaiting second scan.  Nursing goals met.

## 2018-05-26 NOTE — TELEPHONE ENCOUNTER
Telephone call to the patient discussed his Persantine thallium study.  Dr. Salazar has ordered an angiogram.  I have encouraged the patient to pursue this.  Before he undergoes surgery.

## 2018-05-29 RX ORDER — FLUTICASONE PROPIONATE 50 MCG
2 SPRAY, SUSPENSION (ML) NASAL PRN
Qty: 1 BOTTLE | Refills: 5 | Status: SHIPPED | OUTPATIENT
Start: 2018-05-29 | End: 2018-06-11 | Stop reason: SDUPTHER

## 2018-05-31 ENCOUNTER — APPOINTMENT (OUTPATIENT)
Dept: VASCULAR LAB | Facility: MEDICAL CENTER | Age: 68
End: 2018-05-31
Attending: INTERNAL MEDICINE
Payer: MEDICARE

## 2018-05-31 ENCOUNTER — TELEPHONE (OUTPATIENT)
Dept: NEPHROLOGY | Facility: MEDICAL CENTER | Age: 68
End: 2018-05-31

## 2018-06-01 ENCOUNTER — ANTICOAGULATION VISIT (OUTPATIENT)
Dept: VASCULAR LAB | Facility: MEDICAL CENTER | Age: 68
End: 2018-06-01
Attending: INTERNAL MEDICINE
Payer: MEDICARE

## 2018-06-01 ENCOUNTER — OFFICE VISIT (OUTPATIENT)
Dept: CARDIOLOGY | Facility: MEDICAL CENTER | Age: 68
End: 2018-06-01
Payer: MEDICARE

## 2018-06-01 VITALS
BODY MASS INDEX: 33.86 KG/M2 | HEART RATE: 72 BPM | SYSTOLIC BLOOD PRESSURE: 162 MMHG | OXYGEN SATURATION: 95 % | HEIGHT: 72 IN | WEIGHT: 250 LBS | DIASTOLIC BLOOD PRESSURE: 98 MMHG

## 2018-06-01 VITALS — SYSTOLIC BLOOD PRESSURE: 138 MMHG | DIASTOLIC BLOOD PRESSURE: 84 MMHG | HEART RATE: 78 BPM

## 2018-06-01 DIAGNOSIS — I48.0 PAROXYSMAL ATRIAL FIBRILLATION (HCC): ICD-10-CM

## 2018-06-01 DIAGNOSIS — Z79.01 ANTICOAGULATED ON WARFARIN: ICD-10-CM

## 2018-06-01 DIAGNOSIS — N18.1 CKD (CHRONIC KIDNEY DISEASE), STAGE I: ICD-10-CM

## 2018-06-01 DIAGNOSIS — E78.5 DYSLIPIDEMIA: ICD-10-CM

## 2018-06-01 DIAGNOSIS — I63.89 CEREBROVASCULAR ACCIDENT (CVA) DUE TO OTHER MECHANISM (HCC): ICD-10-CM

## 2018-06-01 DIAGNOSIS — I71.21 ASCENDING AORTIC ANEURYSM (HCC): ICD-10-CM

## 2018-06-01 DIAGNOSIS — Z86.73 HISTORY OF CVA (CEREBROVASCULAR ACCIDENT): ICD-10-CM

## 2018-06-01 DIAGNOSIS — Z79.4 CONTROLLED TYPE 2 DIABETES MELLITUS WITH DIABETIC POLYNEUROPATHY, WITH LONG-TERM CURRENT USE OF INSULIN (HCC): ICD-10-CM

## 2018-06-01 DIAGNOSIS — R94.39 ABNORMAL NUCLEAR STRESS TEST: ICD-10-CM

## 2018-06-01 DIAGNOSIS — I10 ESSENTIAL HYPERTENSION: ICD-10-CM

## 2018-06-01 DIAGNOSIS — E11.42 CONTROLLED TYPE 2 DIABETES MELLITUS WITH DIABETIC POLYNEUROPATHY, WITH LONG-TERM CURRENT USE OF INSULIN (HCC): ICD-10-CM

## 2018-06-01 LAB
INR BLD: 3 (ref 0.9–1.2)
INR PPP: 3 (ref 2–3.5)

## 2018-06-01 PROCEDURE — 99204 OFFICE O/P NEW MOD 45 MIN: CPT | Performed by: INTERNAL MEDICINE

## 2018-06-01 PROCEDURE — 99211 OFF/OP EST MAY X REQ PHY/QHP: CPT

## 2018-06-01 PROCEDURE — 85610 PROTHROMBIN TIME: CPT

## 2018-06-01 RX ORDER — CLINDAMYCIN HYDROCHLORIDE 300 MG/1
CAPSULE ORAL
COMMUNITY
End: 2018-07-12

## 2018-06-01 NOTE — PROGRESS NOTES
Chief Complaint   Patient presents with   • Hypertension       Subjective:   Joel Ma is a 67 y.o. male who presents today for an assessment of the nuclear perfusion study done as a preop stress test.  The patient has a 15 year history of diabetes with multiple complications.  He has complex peripheral neuropathy, diabetic amputations, paroxysmal atrial fibrillation, chronic Coumadin anticoagulation, prior stroke, hypertension and hyperlipidemia.  Patient has cervical spine disease that has affected his ability to ambulate.  Uses a walker to get around and a wheelchair to assist with mobility.  He has no cardiac symptoms and is unaware of previous myocardial infarction.  There is no angina chest pain or pressure.  He has no symptoms of congestive heart failure including no orthopnea or PND.  He has chronic bilateral edema and lower extremity discoloration due to venous insufficiency.    His recent nuclear perfusion study reveals low normal ejection fraction estimated at 52% with both fixed and reversible defects in the inferior and apical segments.  His EKG from 2016 and 2018 are similar.  Echocardiogram in 2015 revealed an ejection fraction of 60%.  No wall motion abnormality was noted.  There was dilatation of the ascending aorta at 4.7 cm.  He has normal renal function and fairly poor diabetic control.  He is on chronic Coumadin anticoagulation.    Past Medical History:   Diagnosis Date   • Anesthesia     low O2 sat   • arthritis 6/17/2011    thumb, fingers   • Dental disorder     full dentures   • Diabetes     insulin, Dr Oconnor, his APN   • High cholesterol    • Hypertension    • MRSA (methicillin resistant Staphylococcus aureus)     history of, nothing current 2016, on clindamycin for rest of life per patient   • JANNIE treated with BiPAP 4/18/2016   • Pain 05-03-13    shoulders, hip, right knee, 7/10   • Pneumonia 2002   • Sleep apnea     CPAP   • Stroke (HCC)     2/1/2007, 4/1/2007, right sided  weakness; balance disturbance   • Thyroid mass 7/30/2009    benign lump   • Ventricular ectopy 6/17/2011     Past Surgical History:   Procedure Laterality Date   • CERVICAL DISK AND FUSION ANTERIOR  8/31/2016    Procedure: CERVICAL DISK AND FUSION ANTERIOR C3-7 ;  Surgeon: Alhaji Jaffe M.D.;  Location: [facility];  Service:    • CATARACT PHACO WITH IOL  5/8/2013    Performed by Mame Rehman M.D. at SURGERY SAME DAY [facility]   • KNEE REVISION TOTAL  11/13/2012    Performed by Gordon Cota M.D. at [facility]   • IRRIGATION & DEBRIDEMENT ORTHO  11/13/2012    Performed by Gordon Cota M.D. at [facility]   • IRRIGATION & DEBRIDEMENT ORTHO  11/2/2012    Performed by Gordon Cota M.D. at [facility]   • INCISION AND DRAINAGE ORTHOPEDIC Right 10/29/2012    Procedure: Right Total Knee I and D and Liner Exchange, with drain;  Surgeon: Gordon Cota M.D.;  Location: [facility];  Service:    • IRRIGATION & DEBRIDEMENT ORTHO Left 10/29/2012    Procedure: left shoulder, with drain;  Surgeon: Gordon Cota M.D.;  Location: [facility];  Service:    • KNEE REVISION TOTAL  3/13/2012    Performed by KAMALJIT SHI at [facility]   • IRRIGATION & DEBRIDEMENT ORTHO  3/13/2012    Performed by KAMALJIT SHI at [facility]   • TENDON REPAIR  3/13/2012    Performed by KAMALJIT SHI at [facility]   • KNEE ARTHROPLASTY TOTAL  1/11/2012    Right; Performed by KAMALJIT SHI at [facility]   • TOE AMPUTATION  7/22/2011    Performed by EVELYN JANE at [facility]   • ELBOW ARTHROTOMY  2008    right   • LUMBAR FUSION POSTERIOR  1979   • FOOT SURGERY      partial amputation great toe   • FOOT SURGERY      toe surgery left foot   • PB KNEE SCOPE,SINGLE MENISECTOMY      right knee     Family History   Problem Relation Age of Onset   • Diabetes Mother    • Cancer Father      lung cancer    • Heart Disease Father      triple bypass   • Diabetes     • Hypertension     • Cancer       Social History     Social History   • Marital status: Single     Spouse name: N/A   • Number of children: N/A   • Years of education: N/A     Occupational History   • Not on file.     Social History Main Topics   • Smoking status: Former Smoker     Packs/day: 4.00     Years: 0.50     Types: Cigarettes     Quit date: 1/1/1970   • Smokeless tobacco: Never Used   • Alcohol use No   • Drug use: No   • Sexual activity: Yes     Other Topics Concern   • Not on file     Social History Narrative    ** Merged History Encounter **          Allergies   Allergen Reactions   • Demerol      Makes me stop breathing.  Doesn't like because it makes him high   • Statins [Hmg-Coa-R Inhibitors] Unspecified     Per pt report. Unknown reaction.     Outpatient Encounter Prescriptions as of 6/1/2018   Medication Sig Dispense Refill   • clindamycin (CLEOCIN) 300 MG Cap clindamycin HCl 300 mg capsule     • LUIS ENRIQUE CONTOUR NEXT TEST strip      • insulin lispro, Human, (HUMALOG KWIKPEN) 100 UNIT/ML Solution Pen-injector injection Humalog KwikPen (U-100) Insulin 100 unit/mL subcutaneous     • Misc. Devices Misc Foldable front wheel walker with seat.   Preferred home care. Fax: [phone] 1 Each 1   • warfarin (COUMADIN) 5 MG Tab Take two tablets daily as directed by [facility] 180 Tab 2   • Misc. Devices Misc Compression CHELI hose. 20-30 mmhg. knee-high 1 Each 0   • cloNIDine (CATAPRES) 0.3 MG/24HR PATCH WEEKLY APPLY 1 PATCH TO SKIN AS DIRECTED EVERY WEDNESDAY. 4 Patch 2   • metFORMIN (GLUCOPHAGE) 500 MG Tab Take 2 Tabs by mouth 2 times a day, with meals. 360 Tab 3   • insulin glargine (LANTUS SOLOSTAR) 100 UNIT/ML Solution Pen-injector injection INJECT 30 UNITS SUBCUTANEOUSLY AS INSTRUCTED EVERY EVENING 15 mL 0   • Insulin Pen Needle (ADVOCATE INSULIN PEN NEEDLES) 31G X 5 MM Misc Use tid and prn. 100 Each 3   • hydrochlorothiazide  (HYDRODIURIL) 25 MG Tab Take 1 Tab by mouth every day. 90 Tab 3   • lisinopril (PRINIVIL, ZESTRIL) 40 MG tablet Take 1 Tab by mouth 2 times a day. 180 Tab 3   • B-D UF III MINI PEN NEEDLES 31G X 5 MM Misc      • fluticasone (FLONASE) 50 MCG/ACT nasal spray Spray 2 Sprays in nose as needed. 1 Bottle 5   • [START ON 8/2/2018] oxyCODONE-acetaminophen (PERCOCET-10)  MG Tab Take 1 Tab by mouth every 6 hours as needed for Severe Pain for up to 30 days. 120 Tab 0   • NOVOLOG, insulin aspart, (NOVOLOG FLEXPEN) 100 UNIT/ML Solution Pen-injector injection Inject 12 Units as instructed 4 Times a Day,Before Meals and at Bedtime. 15 mL 3   • gabapentin (NEURONTIN) 300 MG Cap Take 1-2 Caps by mouth 3 times a day. 120 Cap 11   • amLODIPine (NORVASC) 10 MG Tab Take 1 Tab by mouth every day. 90 Tab 3   • hydrALAZINE (APRESOLINE) 100 MG tablet TAKE ONE TABLET BY MOUTH EVERY EIGHT HOURS 90 Tab 2   • hydrochlorothiazide (MICROZIDE) 12.5 MG capsule Take 1 Cap by mouth every day. 30 Cap 6   • trazodone (DESYREL) 150 MG Tab Take 0.5 Tabs by mouth every bedtime. 45 Tab 3   • nystatin (MYCOSTATIN) Powder Apply 1 Application to affected area(s) 3 times a day. Apply to reddened area under abdominal fold. 1 Bottle 0   • polyethylene glycol/lytes (MIRALAX) Pack Take 1 Packet by mouth 1 time daily as needed. 30 Each 0   • magnesium chloride SR (SLO-MAG) 535 (64 MG) MG Tab CR Take 1 Tab by mouth 2 Times a Day. (Patient taking differently: Take 1 Tab by mouth 2 times a day as needed.) 60 Tab 0     No facility-administered encounter medications on file as of 6/1/2018.      ROS. See HPI.  The review of systems was evaluated with the patient.  He complains of easy bruisability urinary frequency, poor balance and a tendency to fall.  He lists chest pain as a positive response but it is aggravated  by breathing and pressure.  Does not appear to be anginal in nature.  All other responses are negative     Objective:   BP (!) 162/98   Pulse 72    Ht 1.829 m (6')   Wt 113.4 kg (250 lb)   SpO2 95%   BMI 33.91 kg/m²     Physical Exam General: WD, WN, male in NAD.   Neck: No JVD.  Good carotid upstroke and no bruit  Chest: Clear to A & P  Heart: Regular rhythm,  S1 = S2. No murmur, gallop, rub, click  Ext:3-4+ edema bilaterally with skin changes of chronic venous insufficiency.  I cannot palpate distal pulses  Neuro: Alert, oriented.  Some trouble with recalling names.  Psych: normal mood, affect  Blood pressure was elevated today which patient explained was due to his degree of excitement.  Usually his blood pressure is within the normal range.    Results for TREY THOMAS (MRN [id number]) as of 6/1/2018 10:11   Ref. Range 10/19/2017 10:56 10/19/2017 10:56   Cholesterol,Tot Latest Ref Range: 100 - 199 mg/dL 180 179   Triglycerides Latest Ref Range: 0 - 149 mg/dL 148 148   HDL Latest Ref Range: >=40 mg/dL 36 (A) 36 (A)   LDL Latest Ref Range: <100 mg/dL 114 (H) 113 (H)     The patient's nuclear perfusion study is abnormal with evidence of inferior as well as apical infarct and ischemia.  His ejection fraction is low normal but is probably similar to the echo of 2015.    Assessment:     1. Essential hypertension     2. Cerebrovascular accident (CVA) due to other mechanism (HCC)     3. Dyslipidemia     4. Paroxysmal atrial fibrillation (HCC)         Medical Decision Making:  Today's Assessment / Status / Plan:   This is a difficult situation for Mr. Thomas.  He has considerable debility due to cervical spine disease as well as  lumbar spine disease, both areas requiring surgery for correction.  However, the nuclear perfusion results would indicate a high cardiovascular risk for such a procedure.  I spoke with Dr. Marsh on the phone regarding this predicament and he believes that correction of his cardiovascular issues should be addressed first.  I discussed this with Mr. Thomas and explained that the possible treatment options after angiography might  include coronary bypass surgery or possibly stent placement  Either treatment will postpone his spine surgery for a few months at best. Spine surgery without an accurate assessment of his coronary anatomy carries a high risk. Will schedule cath next week if possible. We discussed the risks, benefits, and options. He agrees. Will get a CT chest to assess the ascending aorta prior to cath

## 2018-06-01 NOTE — PROGRESS NOTES
Anticoagulation Summary  As of 6/1/2018    INR goal:   2.0-3.0   TTR:   58.4 % (2.7 y)   Today's INR:   3   Warfarin maintenance plan:   10 mg (5 mg x 2) every day   Weekly warfarin total:   70 mg   Plan last modified:   Jefferson Brian, PharmD (1/3/2017)   Next INR check:   7/6/2018   Target end date:   Indefinite    Indications    CVA (cerebral vascular accident) (HCC) [I63.9]  Atrial fibrillation [427.31] [I48.91]  History of CVA (cerebrovascular accident) [Z86.73]  Anticoagulated on warfarin [Z79.01]             Anticoagulation Episode Summary     INR check location:   [facility]    Preferred lab:       Send INR reminders to:       Comments:   call pt multiple times, he has a hard time getting to his phone in time and has no VM set up      Anticoagulation Care Providers     Provider Role Specialty Phone number    Catrachito Sterling D.O. Referring Internal Medicine [phone]    [facility] Responsible  [phone]    Liseth Doe A.P.N. Responsible Cardiology [phone]    BRIT QuirozP.NElian Responsible Cardiology [phone]        Anticoagulation Patient Findings      HPI:  Joel Ma seen in clinic today, on anticoagulation therapy with warfarin for CVA.  Changes to current medical/health status since last appt: none  Denies signs/symptoms of bleeding and/or thrombosis since the last appt.    Denies any interval changes to diet  Denies any interval changes to medications since last appt.   Denies any complications or cost restrictions with current therapy.   BP recorded in vitals.  Pt may or may not have an upcoming procedure.  He is awaiting a decision from his physicians.  Requested pt to contact the clinic if he does determine to have the procedure.    A/P   INR  therapeutic.   Pt is to continue with current warfarin dosing regimen.     Follow up appointment in 5 week(s).    Brett Coates, PharmD

## 2018-06-04 ENCOUNTER — TELEPHONE (OUTPATIENT)
Dept: CARDIOLOGY | Facility: MEDICAL CENTER | Age: 68
End: 2018-06-04

## 2018-06-04 NOTE — TELEPHONE ENCOUNTER
Per my conversation with Dr. Wei on Friday. Dr Wei was gonna call patient and discuss the angio with patient on Friday, then I was to call patient today Monday to schedule angio. I just spoke with patient and he hasn't heard from Dr. Wei in regards to the procedure. I told him I would have either Dr. Wei or the nurse call him with details then I would call him back to schedule after that.

## 2018-06-04 NOTE — TELEPHONE ENCOUNTER
----- Message from Campbell Rivera M.D. sent at 2018 11:20 AM PDT -----  Regarding: Order for TREY THOMAS    Patient Name: TREY THOMAS([id number])  Sex: Male  : 1950      PCP: ANASTASIA NUNEZ    Center: [facility]     Level of Service:77419     [facility]    Types of orders made on 2018: Medications, cath lab    Order Date:2018  Ordering User:CAMPBELL RIVERA [[id number]]  Encounter Provider:Campbell Rivera M.D. [[id number]]  Authorizing Provider: Campbell Rivera M.D. [[id number]]  Department:[facility][[id number]]    Order Specific Information  Order: Angiogram: Cath Left [Custom: [id number]]  Order #: [id number]: [id number]    Priority: Routine    Associated Diagnoses      R94.39 Abnormal nuclear stress test      Disposition: Return if symptoms worsen or fail to improve.    Priority: Routine  Class: Normal      Scheduling Instructions:    Angiogram: Cath Left    Associated Diagnoses      R94.39 Abnormal nuclear stress test

## 2018-06-04 NOTE — TELEPHONE ENCOUNTER
"Pt calling back to follow up. Discussed with pt Dr. Wei's OV note. Per Dr. Wei's OV note from 6/1:  \"I spoke with Dr. Marsh on the phone regarding this predicament and he believes that correction of his cardiovascular issues should be addressed first.  I discussed this with Mr. Ma and explained that the possible treatment options after angiography might include coronary bypass surgery or possibly stent placement  Either treatment will postpone his spine surgery for a few months at best. Spine surgery without an accurate assessment of his coronary anatomy carries a high risk. Will schedule cath next week if possible. We discussed the risks, benefits, and options. He agrees. Will get a CT chest to assess the ascending aorta prior to cath\"    Pt states understanding on situation and would like to move forward in scheduling angiogram. He would like to still discuss information with Dr. Wei when he returns to the office tomorrow. Reassured pt that information will be forwarded to him and Brenda. Pt appreciative of assistance.     To Brenda, please call pt at [phone] to schedule heart cath.     To Dr. Wei  "

## 2018-06-05 ENCOUNTER — TELEPHONE (OUTPATIENT)
Dept: CARDIOLOGY | Facility: MEDICAL CENTER | Age: 68
End: 2018-06-05

## 2018-06-05 NOTE — TELEPHONE ENCOUNTER
Per patients request patient is scheduled on  6-13-18 for a [facility] w/poss with Dr. Li. Patient was told to hold coumadin for 5 days prior,cut insulin in half night before and hold am day of procedure and to hold metformin day of procedure and 48hrs after. Mark Leon was notified about coumadin. Patient was told to check in at 8:00 for a 10:00 procedure. H&P was done on 6-1-18 by Dr. Wei. Pre admit to call patient.

## 2018-06-05 NOTE — TELEPHONE ENCOUNTER
----- Message from Campbell Rivera M.D. sent at 2018 11:20 AM PDT -----  Regarding: Order for TREY THOMAS    Patient Name: TREY THOMAS([id number])  Sex: Male  : 1950      PCP: ANASTASIA NUNEZ    Center: [facility]     Level of Service:94866     [facility]    Types of orders made on 2018: Medications, cath lab    Order Date:2018  Ordering User:CAMPBELL RIVERA [[id number]]  Encounter Provider:Campbell Rivera M.D. [[id number]]  Authorizing Provider: Campbell Rivera M.D. [[id number]]  Department:[facility][[id number]]    Order Specific Information  Order: Angiogram: Cath Left [Custom: [id number]]  Order #: [id number]: [id number]    Priority: Routine    Associated Diagnoses      R94.39 Abnormal nuclear stress test      Disposition: Return if symptoms worsen or fail to improve.    Priority: Routine  Class: Normal      Scheduling Instructions:    Angiogram: Cath Left    Associated Diagnoses      R94.39 Abnormal nuclear stress test

## 2018-06-05 NOTE — TELEPHONE ENCOUNTER
Campbell Wei M.D.  Cristal Ying R.N.   Caller: Unspecified (Yesterday, 11:24 AM)             I called him and discussed risks, benefits, and options to cath. We discussed death, MI, stroke, and arterial trauma as well as the options of CABG, stents, and medical therapy. He is willing to proceed      Noted.

## 2018-06-11 ENCOUNTER — TELEPHONE (OUTPATIENT)
Dept: CARDIOLOGY | Facility: MEDICAL CENTER | Age: 68
End: 2018-06-11

## 2018-06-11 RX ORDER — FLUTICASONE PROPIONATE 50 MCG
SPRAY, SUSPENSION (ML) NASAL
Qty: 16 G | Refills: 4 | Status: ON HOLD | OUTPATIENT
Start: 2018-06-11 | End: 2018-08-13

## 2018-06-11 NOTE — TELEPHONE ENCOUNTER
"headaches   Received: Today   Message Contents   Zenaida Lynn R.N.   Phone Number: [phone]             SW     Pt calling to report having headaches.  Please call pt at [phone].        Contacted patient, he c/o having a  pressure or strain headache. Describes it starting at forehead and going to top of head for approx 2 days. 4/10.  Inquired if patient tried taking any medication for it such as Tylenol. Patient states, \"No, I don't take anything I'm sorry\". Patient does have neck and lower back issues requiring future surgery.  Patient denies any start of illness such as sinus infection.      Inquired if patient monitors BP at home.  Patient states no because his BP cuff broke.     Denies any other complaints.      Advised patient to try Tyelnol for headache, monitor BP (he had stated he can borrow his Mom's BP cuff) and call back if BP elevated or headache persists. Patient verbalizes understanding.     Also went over instructions for 6/13 [facility].  See 6/5 note.         "

## 2018-06-13 ENCOUNTER — HOSPITAL ENCOUNTER (OUTPATIENT)
Facility: MEDICAL CENTER | Age: 68
End: 2018-06-13
Attending: INTERNAL MEDICINE | Admitting: INTERNAL MEDICINE
Payer: MEDICARE

## 2018-06-13 ENCOUNTER — APPOINTMENT (OUTPATIENT)
Dept: RADIOLOGY | Facility: MEDICAL CENTER | Age: 68
End: 2018-06-13
Attending: INTERNAL MEDICINE
Payer: MEDICARE

## 2018-06-13 ENCOUNTER — TELEPHONE (OUTPATIENT)
Dept: CARDIOLOGY | Facility: MEDICAL CENTER | Age: 68
End: 2018-06-13

## 2018-06-13 VITALS
TEMPERATURE: 97 F | WEIGHT: 250 LBS | SYSTOLIC BLOOD PRESSURE: 126 MMHG | HEART RATE: 52 BPM | HEIGHT: 75 IN | BODY MASS INDEX: 31.08 KG/M2 | RESPIRATION RATE: 16 BRPM | OXYGEN SATURATION: 94 % | DIASTOLIC BLOOD PRESSURE: 81 MMHG

## 2018-06-13 LAB
ALBUMIN SERPL BCP-MCNC: 3.4 G/DL (ref 3.2–4.9)
ALBUMIN/GLOB SERPL: 1.1 G/DL
ALP SERPL-CCNC: 47 U/L (ref 30–99)
ALT SERPL-CCNC: 13 U/L (ref 2–50)
ANION GAP SERPL CALC-SCNC: 7 MMOL/L (ref 0–11.9)
APTT PPP: 31 SEC (ref 24.7–36)
AST SERPL-CCNC: 13 U/L (ref 12–45)
BASOPHILS # BLD AUTO: 0.4 % (ref 0–1.8)
BASOPHILS # BLD: 0.02 K/UL (ref 0–0.12)
BILIRUB SERPL-MCNC: 0.8 MG/DL (ref 0.1–1.5)
BUN SERPL-MCNC: 31 MG/DL (ref 8–22)
CALCIUM SERPL-MCNC: 8.3 MG/DL (ref 8.5–10.5)
CHLORIDE SERPL-SCNC: 101 MMOL/L (ref 96–112)
CO2 SERPL-SCNC: 28 MMOL/L (ref 20–33)
CREAT SERPL-MCNC: 1.01 MG/DL (ref 0.5–1.4)
EKG IMPRESSION: NORMAL
EOSINOPHIL # BLD AUTO: 0.09 K/UL (ref 0–0.51)
EOSINOPHIL NFR BLD: 1.9 % (ref 0–6.9)
ERYTHROCYTE [DISTWIDTH] IN BLOOD BY AUTOMATED COUNT: 41.9 FL (ref 35.9–50)
GLOBULIN SER CALC-MCNC: 3 G/DL (ref 1.9–3.5)
GLUCOSE SERPL-MCNC: 202 MG/DL (ref 65–99)
HCT VFR BLD AUTO: 43.6 % (ref 42–52)
HGB BLD-MCNC: 14.9 G/DL (ref 14–18)
IMM GRANULOCYTES # BLD AUTO: 0.03 K/UL (ref 0–0.11)
IMM GRANULOCYTES NFR BLD AUTO: 0.6 % (ref 0–0.9)
INR PPP: 1.51 (ref 0.87–1.13)
LYMPHOCYTES # BLD AUTO: 0.86 K/UL (ref 1–4.8)
LYMPHOCYTES NFR BLD: 18.4 % (ref 22–41)
MCH RBC QN AUTO: 28.3 PG (ref 27–33)
MCHC RBC AUTO-ENTMCNC: 34.2 G/DL (ref 33.7–35.3)
MCV RBC AUTO: 82.7 FL (ref 81.4–97.8)
MONOCYTES # BLD AUTO: 0.53 K/UL (ref 0–0.85)
MONOCYTES NFR BLD AUTO: 11.3 % (ref 0–13.4)
NEUTROPHILS # BLD AUTO: 3.14 K/UL (ref 1.82–7.42)
NEUTROPHILS NFR BLD: 67.4 % (ref 44–72)
NRBC # BLD AUTO: 0 K/UL
NRBC BLD-RTO: 0 /100 WBC
PLATELET # BLD AUTO: 135 K/UL (ref 164–446)
PMV BLD AUTO: 11.2 FL (ref 9–12.9)
POTASSIUM SERPL-SCNC: 3.2 MMOL/L (ref 3.6–5.5)
PROT SERPL-MCNC: 6.4 G/DL (ref 6–8.2)
PROTHROMBIN TIME: 17.9 SEC (ref 12–14.6)
RBC # BLD AUTO: 5.27 M/UL (ref 4.7–6.1)
SODIUM SERPL-SCNC: 136 MMOL/L (ref 135–145)
WBC # BLD AUTO: 4.7 K/UL (ref 4.8–10.8)

## 2018-06-13 PROCEDURE — 85730 THROMBOPLASTIN TIME PARTIAL: CPT

## 2018-06-13 PROCEDURE — 85025 COMPLETE CBC W/AUTO DIFF WBC: CPT

## 2018-06-13 PROCEDURE — 80053 COMPREHEN METABOLIC PANEL: CPT

## 2018-06-13 PROCEDURE — 99153 MOD SED SAME PHYS/QHP EA: CPT

## 2018-06-13 PROCEDURE — 93005 ELECTROCARDIOGRAM TRACING: CPT | Performed by: INTERNAL MEDICINE

## 2018-06-13 PROCEDURE — C1769 GUIDE WIRE: HCPCS

## 2018-06-13 PROCEDURE — 700101 HCHG RX REV CODE 250

## 2018-06-13 PROCEDURE — 360979 HCHG DIAGNOSTIC CATH

## 2018-06-13 PROCEDURE — 93458 L HRT ARTERY/VENTRICLE ANGIO: CPT

## 2018-06-13 PROCEDURE — 160002 HCHG RECOVERY MINUTES (STAT)

## 2018-06-13 PROCEDURE — 700111 HCHG RX REV CODE 636 W/ 250 OVERRIDE (IP)

## 2018-06-13 PROCEDURE — 93010 ELECTROCARDIOGRAM REPORT: CPT | Performed by: INTERNAL MEDICINE

## 2018-06-13 PROCEDURE — 71046 X-RAY EXAM CHEST 2 VIEWS: CPT

## 2018-06-13 PROCEDURE — 85610 PROTHROMBIN TIME: CPT

## 2018-06-13 PROCEDURE — C1894 INTRO/SHEATH, NON-LASER: HCPCS

## 2018-06-13 PROCEDURE — 307093 HCHG TR BAND RADIAL

## 2018-06-13 PROCEDURE — 99152 MOD SED SAME PHYS/QHP 5/>YRS: CPT

## 2018-06-13 RX ORDER — ONDANSETRON 2 MG/ML
4 INJECTION INTRAMUSCULAR; INTRAVENOUS EVERY 4 HOURS PRN
Status: DISCONTINUED | OUTPATIENT
Start: 2018-06-13 | End: 2018-06-13 | Stop reason: HOSPADM

## 2018-06-13 RX ORDER — MIDAZOLAM HYDROCHLORIDE 1 MG/ML
INJECTION INTRAMUSCULAR; INTRAVENOUS
Status: COMPLETED
Start: 2018-06-13 | End: 2018-06-13

## 2018-06-13 RX ORDER — LIDOCAINE HYDROCHLORIDE 20 MG/ML
INJECTION, SOLUTION INFILTRATION; PERINEURAL
Status: COMPLETED
Start: 2018-06-13 | End: 2018-06-13

## 2018-06-13 RX ORDER — HEPARIN SODIUM,PORCINE 1000/ML
VIAL (ML) INJECTION
Status: COMPLETED
Start: 2018-06-13 | End: 2018-06-13

## 2018-06-13 RX ORDER — SODIUM CHLORIDE 9 MG/ML
INJECTION, SOLUTION INTRAVENOUS CONTINUOUS
Status: DISCONTINUED | OUTPATIENT
Start: 2018-06-13 | End: 2018-06-13 | Stop reason: HOSPADM

## 2018-06-13 RX ORDER — VERAPAMIL HYDROCHLORIDE 2.5 MG/ML
INJECTION, SOLUTION INTRAVENOUS
Status: COMPLETED
Start: 2018-06-13 | End: 2018-06-13

## 2018-06-13 RX ADMIN — VERAPAMIL HYDROCHLORIDE 2.5 MG: 2.5 INJECTION, SOLUTION INTRAVENOUS at 10:26

## 2018-06-13 RX ADMIN — HEPARIN SODIUM: 1000 INJECTION, SOLUTION INTRAVENOUS; SUBCUTANEOUS at 10:23

## 2018-06-13 RX ADMIN — MIDAZOLAM 1 MG: 1 INJECTION INTRAMUSCULAR; INTRAVENOUS at 11:11

## 2018-06-13 RX ADMIN — MIDAZOLAM 2 MG: 1 INJECTION INTRAMUSCULAR; INTRAVENOUS at 10:33

## 2018-06-13 RX ADMIN — HEPARIN SODIUM 2000 UNITS: 200 INJECTION, SOLUTION INTRAVENOUS at 10:23

## 2018-06-13 RX ADMIN — NITROGLYCERIN 10 ML: 20 INJECTION INTRAVENOUS at 10:24

## 2018-06-13 RX ADMIN — FENTANYL CITRATE 100 MCG: 50 INJECTION, SOLUTION INTRAMUSCULAR; INTRAVENOUS at 10:33

## 2018-06-13 RX ADMIN — LIDOCAINE HYDROCHLORIDE: 20 INJECTION, SOLUTION INFILTRATION; PERINEURAL at 10:23

## 2018-06-13 ASSESSMENT — PAIN SCALES - GENERAL
PAINLEVEL_OUTOF10: 0

## 2018-06-13 NOTE — TELEPHONE ENCOUNTER
Per Dr. Li ok to send clearance to Dr. Marsh with notation that patient will need a post op visit with Cardiology.      Dr. Marsh office: Dr. Boby Marsh [phone]

## 2018-06-13 NOTE — OR NURSING
1125 Pt report given from cath lab RN, pt transferred over on a gurney, pt placed on monitor, B/P every 15 minutes per MD order. Right radial TR band in place, 13ml of air in band per RN report. Site clean, dry and soft. Family at bedside, pt was given water and a snack. Pt instructed to limit right wrist movement.     1230 2 ml of air was removed from TR band, some bleeding was noted, air was replaced in TR band no further S/S of bleeding.      1300 3 ml of air was removed from TR band no S/S of bleeding    1315 3 ml of air was removed from TR band no S/S of bleeding    1330 3 ml of air was removed from TR band no S/S of bleeding    1345 3 ml of air was removed from TR band no S/S of bleeding    1400 3 ml of air was removed from TR band no S/S of bleeding    1415 TR band was removed and dressing placed     1445 pt given D/C instructions, pt verbalized understanding. Pt was given information post angiogram care. Pt going home with family in wheelchair.

## 2018-06-13 NOTE — DISCHARGE INSTRUCTIONS
ACTIVITY: Rest and take it easy for the first 24 hours.  A responsible adult is recommended to remain with you during that time.  It is normal to feel sleepy.  We encourage you to not do anything that requires balance, judgment or coordination.    MILD FLU-LIKE SYMPTOMS ARE NORMAL. YOU MAY EXPERIENCE GENERALIZED MUSCLE ACHES, THROAT IRRITATION, HEADACHE AND/OR SOME NAUSEA.    FOR 24 HOURS DO NOT:  Drive, operate machinery or run household appliances.  Drink beer or alcoholic beverages.   Make important decisions or sign legal documents.    SPECIAL INSTRUCTIONS: keep incision dry for 24 hours    DIET: To avoid nausea, slowly advance diet as tolerated, avoiding spicy or greasy foods for the first day.  Add more substantial food to your diet according to your physician's instructions.  Babies can be fed formula or breast milk as soon as they are hungry.  INCREASE FLUIDS AND FIBER TO AVOID CONSTIPATION.    SURGICAL DRESSING/BATHING: do not shower for 24 hours, may shwoer tomorrow 6/14/2018 after 12pm    FOLLOW-UP APPOINTMENT:  A follow-up appointment should be arranged with your cardiologist at [phone]; call to schedule.    You should CALL YOUR PHYSICIAN if you develop:  Fever greater than 101 degrees F.  Pain not relieved by medication, or persistent nausea or vomiting.  Excessive bleeding (blood soaking through dressing) or unexpected drainage from the wound.  Extreme redness or swelling around the incision site, drainage of pus or foul smelling drainage.  Inability to urinate or empty your bladder within 8 hours.  Problems with breathing or chest pain.    You should call 911 if you develop problems with breathing or chest pain.  If you are unable to contact your doctor or surgical center, you should go to the nearest emergency room or urgent care center.  Physician's telephone #: [phone]    If any questions arise, call your doctor.  If your doctor is not available, please feel free to call the Surgical  Center at [phone].  The Center is open Monday through Friday from 7AM to 7PM.  You can also call the HEALTH HOTLINE open 24 hours/day, 7 days/week and speak to a nurse at [phone], or toll free at [phone].    A registered nurse may call you a few days after your surgery to see how you are doing after your procedure.    MEDICATIONS: Resume taking daily medication.  Take prescribed pain medication with food.  If no medication is prescribed, you may take non-aspirin pain medication if needed.  PAIN MEDICATION CAN BE VERY CONSTIPATING.  Take a stool softener or laxative such as senokot, pericolace, or milk of magnesia if needed.    If your physician has prescribed pain medication that includes Acetaminophen (Tylenol), do not take additional Acetaminophen (Tylenol) while taking the prescribed medication.    Depression / Suicide Risk    As you are discharged from this [facility] facility, it is important to learn how to keep safe from harming yourself.    Recognize the warning signs:  · Abrupt changes in personality, positive or negative- including increase in energy   · Giving away possessions  · Change in eating patterns- significant weight changes-  positive or negative  · Change in sleeping patterns- unable to sleep or sleeping all the time   · Unwillingness or inability to communicate  · Depression  · Unusual sadness, discouragement and loneliness  · Talk of wanting to die  · Neglect of personal appearance   · Rebelliousness- reckless behavior  · Withdrawal from people/activities they love  · Confusion- inability to concentrate     If you or a loved one observes any of these behaviors or has concerns about self-harm, here's what you can do:  · Talk about it- your feelings and reasons for harming yourself  · Remove any means that you might use to hurt yourself (examples: pills, rope, extension cords, firearm)  · Get professional help from the community (Mental Health, Substance Abuse,  psychological counseling)  · Do not be alone:Call your Safe Contact- someone whom you trust who will be there for you.  · Call your local CRISIS HOTLINE 889-8028 or 405-869-3879  · Call your local Children's Mobile Crisis Response Team [place] (190) 318-9476 or www.MusicSiren  · Call the toll free National Suicide Prevention Hotlines   · National Suicide Prevention Lifeline 304-029-MCEW (1818)  · [place] Shift Media Line Network 800-SUICIDE (504-4147)    Radial Catherization Discharge Instructions      · Do not subject hand/arm to any forceful movements for 24 hours    i.e. supporting weight when rising from the chair or bed.   · Do not drive a car for 24 hours  · You may remove the dressing tomorrow  · You may shower on the day following your procedure.  Do not take a tub bath or submerge the puncture site in water for 3 days following the procedure.  · No Lifting more than 3-5 pounds with affected wrist for 5 days  · Follow up cardiology  2-4 weeks.  · Increase fluids for 2 days post procedure.  · Continue all previous medications unless otherwise instructed.    If bleeding should occur following discharge:  · Sit down and apply firm pressure to site with your fingers for 10 minutes  · If the bleeding stops, continue to sit quietly, keeping your wrist straight for 2 hours.  Notify physician as soon as possible ( [phone])  · If bleeding does not stop after 10 minutes, or if there is a large amount of bleeding or spurting, call 911 immediately.  Do not drive yourself to the hospital.

## 2018-06-13 NOTE — LETTER
PROCEDURE/SURGERY CLEARANCE FORM      Encounter Date: 6/13/2018    Patient: Joel Ma  YOB: 1950    CARDIOLOGIST:  Ricco Li M.D.    REFERRING DOCTOR:  Boby Marsh M.D    Patient does not have a PPM or AICD    The above patient is cleared from a cardiology standpoint and is moderate risk to have the following procedure/surgery: Cervical and/or Lumbar spine surgery                                           Additional comments: Patient will need a post-op follow up Cardiology appointment with Dr. Field NERI Signature Ricco Li M.D.

## 2018-06-14 ENCOUNTER — TELEPHONE (OUTPATIENT)
Dept: VASCULAR LAB | Facility: MEDICAL CENTER | Age: 68
End: 2018-06-14

## 2018-06-14 NOTE — TELEPHONE ENCOUNTER
Left msg for pt to call.   What procedure is he planning to have done?  Does he need to be off warfarin?    Gely Briseno, PharmD

## 2018-06-14 NOTE — PROCEDURES
DATE OF SERVICE:  06/13/2018    PROCEDURE:  Cardiac catheterization.  A. Left heart catheterization.  B. Left ventriculography.  C. Selective coronary angiography.  D. Right radial artery approach.  E. Conscious sedation supervision 53 minutes.    PRE-PROCEDURE DIAGNOSES:  1.  Abnormal myocardial perfusion scan.  2.  Diabetes mellitus.  3.  History of cerebrovascular accident.  4.  Anticoagulation.    POSTPROCEDURE DIAGNOSES:  1.  Coronary artery disease, 2-vessel, involving the proximal left anterior   descending artery and distal right coronary artery.  2.  Left ventricular ejection fraction 55%.    PHYSICIAN:  Ricco Li MD    REFERRING PHYSICIAN:  Campbell Wei MD.    COMPLICATIONS:  None.    MEDICATIONS:  1.  Versed 3 mg IV.  2.  Fentanyl 100 mcg IV.  3.  Lidocaine 2% subcutaneous.  4.  Heparin 3000 units IA.  5.  Nitroglycerin 100 mcg IA.  6.  Verapamil 2.5 mg IA.    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 He was seen by Dr. Campbell Wei, cardiologist for preoperative   cardiovascular evaluation for which the patient underwent a myocardial   perfusion stress test on 05/25/2018 which demonstrated a left ventricular   ejection fraction of 52% with moderate inferior wall ischemia and mid anterior   septal and apical ischemia.  The patient is referred for preoperative cardiac   catheterization.    DESCRIPTION OF PROCEDURE:   After informed consent was obtained, the patient   was brought to the cardiac catheterization laboratory where he was prepped,   draped, and anesthetized in the usual manner.    After having established adequate collateral circulation to the right hand   with a pressure and oximetry-guided Domo's test, the volar surface of the   right wrist was prepped, draped, and anesthetized in the usual manner.    Using ultrasound guidance and a modified Seldinger technique, 6-Belizean x 10 cm   introducer sheath was inserted in the right radial artery.  Heparin,   verapamil, and nitroglycerin were given via the side port.    Next, a 4-Belizean JL3.5 left coronary catheter was inserted and this was   exchanged for a 6-Belizean JL4.0 left coronary catheter and left coronary   angiograms were obtained in various projections.    Next, a 6-Belizean JR4.0 right coronary catheter was inserted in the ostium of   the right coronary artery and right coronary angiograms were obtained in   various projections.    Next, a 4-Belizean pigtail catheter was inserted in the left ventricle under   fluoroscopic guidance.  A single plane BRADSHAW left ventricular angiogram was   performed.  Pre- and post angiogram LVEDP, LV, and aortic pressures were   obtained.    At the end of the procedure, catheters were removed.  Hemostasis was achieved   with a wrist band device.  The patient tolerated the procedure well.    FINDINGS:  HEMODYNAMICS:  LEFT HEART PRESSURES:  1.  LVEDP 20 mmHg.  2.  Left ventricular systolic pressure 134 mmHg.  3.  Central aortic pressure, systolic 132, diastolic 67.    LEFT VENTRICULOGRAPHY:  Left ventricular chamber size is normal with normal   wall motion.  Visually estimated ejection fraction 55%.  No mitral   regurgitation present.    CORONARY ARTERIOGRAPHY:  1.  Left main artery:  Left main is a very large caliber vessel,   angiographically widely patent with superficial calcification.  The left main   artery trifurcates into the left  anterior descending artery, ramus intermedius   vessel, and circumflex artery.  2.  Left anterior descending artery:  The left anterior descending artery is a   moderately large caliber vessel, gives rise to a small first diagonal branch,   a first septal branch, a second diagonal branch, and extends around the apex.    The left anterior descending artery has a proximal 70% stenosis and distal   vessel of 60-70% stenosis.  3.  Ramus intermedius:  The ramus is a short small caliber vessel and has an   ostial 60% stenosis.  4.  Circumflex artery:  The circumflex is a moderately large caliber vessel,   gives rise to a small first marginal branch, a large second marginal branch.    The circumflex artery is widely patent with diffuse ectatic atheromatous   disease.  5.  Right coronary artery:  The right coronary is a large caliber vessel,   gives rise to a posterior descending artery and a bifurcating posterolateral   branch.  The right coronary has a proximal ectatic tortuosity and a distal   eccentric focal 90% stenosis.    PLAN:    1.  The patient's case is complex and therefore it and the coronary angiogram   results were independently reviewed with my partners Dr. Mauricio Swift and   Dr Loi Pickett as to the best management approach.  2.  It was decided to proceed with the necessary cervical and lumbar surgery with   close postoperative cardiac monitoring with subsequent revascularization of the   left anterior descending artery and right coronary artery.       ____________________________________     MD VIOLETA CONTI / TARAH    DD:  06/13/2018 19:09:36  DT:  06/13/2018 19:42:37    D#:  [id number]  Job#:  [id number]

## 2018-06-22 ENCOUNTER — HOSPITAL ENCOUNTER (OUTPATIENT)
Dept: RADIOLOGY | Facility: MEDICAL CENTER | Age: 68
End: 2018-06-22
Attending: INTERNAL MEDICINE
Payer: MEDICARE

## 2018-06-22 PROCEDURE — 71275 CT ANGIOGRAPHY CHEST: CPT

## 2018-06-25 DIAGNOSIS — I10 ESSENTIAL HYPERTENSION: ICD-10-CM

## 2018-06-26 ENCOUNTER — TELEPHONE (OUTPATIENT)
Dept: CARDIOLOGY | Facility: MEDICAL CENTER | Age: 68
End: 2018-06-26

## 2018-06-26 NOTE — TELEPHONE ENCOUNTER
Campbell Wei M.D.  Ember Ness, RALEJANDRINA.             His aorta measures 4.4 cm which is similar or less than in the past. Should be OK to schedule for cath      =================================================  Called pt and notified of abdominal CT as per Dr. Wei. Pt verbalized understanding.

## 2018-06-27 DIAGNOSIS — Z01.812 PRE-OPERATIVE LABORATORY EXAMINATION: ICD-10-CM

## 2018-06-27 LAB
ABO GROUP BLD: NORMAL
APPEARANCE UR: CLEAR
BACTERIA #/AREA URNS HPF: ABNORMAL /HPF
BILIRUB UR QL STRIP.AUTO: NEGATIVE
BLD GP AB SCN SERPL QL: NORMAL
COLOR UR: YELLOW
EPI CELLS #/AREA URNS HPF: NEGATIVE /HPF
GLUCOSE UR STRIP.AUTO-MCNC: 100 MG/DL
HYALINE CASTS #/AREA URNS LPF: ABNORMAL /LPF
KETONES UR STRIP.AUTO-MCNC: NEGATIVE MG/DL
LEUKOCYTE ESTERASE UR QL STRIP.AUTO: ABNORMAL
MICRO URNS: ABNORMAL
NITRITE UR QL STRIP.AUTO: NEGATIVE
PH UR STRIP.AUTO: 7 [PH]
PROT UR QL STRIP: 100 MG/DL
RBC # URNS HPF: ABNORMAL /HPF
RBC UR QL AUTO: ABNORMAL
RH BLD: NORMAL
SP GR UR STRIP.AUTO: 1.01
UROBILINOGEN UR STRIP.AUTO-MCNC: 0.2 MG/DL
WBC #/AREA URNS HPF: ABNORMAL /HPF

## 2018-06-27 PROCEDURE — 87186 SC STD MICRODIL/AGAR DIL: CPT

## 2018-06-27 PROCEDURE — 81001 URINALYSIS AUTO W/SCOPE: CPT

## 2018-06-27 PROCEDURE — 36415 COLL VENOUS BLD VENIPUNCTURE: CPT

## 2018-06-27 PROCEDURE — 86901 BLOOD TYPING SEROLOGIC RH(D): CPT

## 2018-06-27 PROCEDURE — 86900 BLOOD TYPING SEROLOGIC ABO: CPT

## 2018-06-27 PROCEDURE — 87086 URINE CULTURE/COLONY COUNT: CPT

## 2018-06-27 PROCEDURE — 87077 CULTURE AEROBIC IDENTIFY: CPT

## 2018-06-27 PROCEDURE — 86850 RBC ANTIBODY SCREEN: CPT

## 2018-06-28 RX ORDER — AMLODIPINE BESYLATE 10 MG/1
TABLET ORAL
Qty: 30 TAB | Refills: 11 | Status: ON HOLD | OUTPATIENT
Start: 2018-06-28 | End: 2018-08-13

## 2018-06-29 LAB
BACTERIA UR CULT: ABNORMAL
BACTERIA UR CULT: ABNORMAL
SIGNIFICANT IND 70042: ABNORMAL
SITE SITE: ABNORMAL
SOURCE SOURCE: ABNORMAL

## 2018-07-02 ENCOUNTER — ANTICOAGULATION VISIT (OUTPATIENT)
Dept: VASCULAR LAB | Facility: MEDICAL CENTER | Age: 68
End: 2018-07-02
Attending: INTERNAL MEDICINE
Payer: MEDICARE

## 2018-07-02 VITALS — DIASTOLIC BLOOD PRESSURE: 73 MMHG | SYSTOLIC BLOOD PRESSURE: 126 MMHG | HEART RATE: 62 BPM

## 2018-07-02 DIAGNOSIS — Z79.01 ANTICOAGULATED ON WARFARIN: ICD-10-CM

## 2018-07-02 DIAGNOSIS — Z86.73 HISTORY OF CVA (CEREBROVASCULAR ACCIDENT): ICD-10-CM

## 2018-07-02 DIAGNOSIS — Z79.01 CHRONIC ANTICOAGULATION: ICD-10-CM

## 2018-07-02 DIAGNOSIS — I48.91 ATRIAL FIBRILLATION, UNSPECIFIED TYPE (HCC): ICD-10-CM

## 2018-07-02 LAB — INR PPP: 3.8 (ref 2–3.5)

## 2018-07-02 PROCEDURE — 99213 OFFICE O/P EST LOW 20 MIN: CPT

## 2018-07-02 PROCEDURE — 85610 PROTHROMBIN TIME: CPT

## 2018-07-02 NOTE — PROGRESS NOTES
Anticoagulation Summary  As of 7/2/2018    INR goal:   2.0-3.0   TTR:   58.3 % (2.7 y)   Today's INR:   3.8!   Warfarin maintenance plan:   10 mg (5 mg x 2) every day   Weekly warfarin total:   70 mg   Plan last modified:   Jefferson Brian, PharmD (1/3/2017)   Next INR check:   7/13/2018   Target end date:   Indefinite    Indications    CVA (cerebral vascular accident) (HCC) [I63.9]  Atrial fibrillation [427.31] [I48.91]  History of CVA (cerebrovascular accident) [Z86.73]  Anticoagulated on warfarin [Z79.01]             Anticoagulation Episode Summary     INR check location:   [facility]    Preferred lab:       Send INR reminders to:       Comments:   call pt multiple times, he has a hard time getting to his phone in time and has no VM set up      Anticoagulation Care Providers     Provider Role Specialty Phone number    BEL Lee.SIMONE. Referring Internal Medicine [phone]    [facility] Responsible  [phone]    Liseth Doe A.P.N. Responsible Cardiology [phone]    Gi Cadena A.P.NElian Responsible Cardiology [phone]        Anticoagulation Patient Findings      HPI:  Joel Ma seen in clinic today, on anticoagulation therapy with warfarin for CVA.  Changes to current medical/health status since last appt: none  Denies signs/symptoms of bleeding and/or thrombosis since the last appt.    Denies any interval changes to diet  Denies any interval changes to medications since last appt.   Denies any complications or cost restrictions with current therapy.   BP recorded in vitals.  Confirmed dosing regimen.     Pt has upcoming laminectomy.  INR is supratherapeutic today so a modified dosing schedule was made.  Please see dosing calendar for details.  Pt provided instructions from operating physician to hold PM dose of enoxaparin on the night before procedure. I agree with the operating physician.  Pt understands directions and was able to repeat them  back to me.  CrCl> 30 mL/min will use enoxaparin 120 mg Q12hr SQ.    Pt's procedure date changed, provided new instructions and had pt repeat them back to me.  Requested pt not to restart anticoagulation (warfarin or enoxaparin) until he is cleared by his physician. Provided tentative instructions as if pt could restart immediately. Likely pt will be in the hospital when decision to restart anticoagulation will be made.     A/P   INR  SUPRA-therapeutic.   Begin bridging instructions.     Follow up appointment in 2 week(s).    Brett Coates, PharmD

## 2018-07-03 LAB — INR BLD: 3.8 (ref 0.9–1.2)

## 2018-07-03 RX ORDER — WARFARIN SODIUM 5 MG/1
TABLET ORAL
Qty: 180 TAB | Refills: 1 | Status: ON HOLD | OUTPATIENT
Start: 2018-07-03 | End: 2018-07-17

## 2018-07-05 ENCOUNTER — OFFICE VISIT (OUTPATIENT)
Dept: NEPHROLOGY | Facility: MEDICAL CENTER | Age: 68
End: 2018-07-05
Payer: MEDICARE

## 2018-07-05 VITALS
HEIGHT: 75 IN | OXYGEN SATURATION: 96 % | BODY MASS INDEX: 31.95 KG/M2 | SYSTOLIC BLOOD PRESSURE: 148 MMHG | TEMPERATURE: 97.8 F | DIASTOLIC BLOOD PRESSURE: 78 MMHG | RESPIRATION RATE: 14 BRPM | HEART RATE: 75 BPM | WEIGHT: 257 LBS

## 2018-07-05 DIAGNOSIS — E11.21 DIABETIC NEPHROPATHY ASSOCIATED WITH TYPE 2 DIABETES MELLITUS (HCC): ICD-10-CM

## 2018-07-05 DIAGNOSIS — N18.1 CKD (CHRONIC KIDNEY DISEASE), STAGE I: ICD-10-CM

## 2018-07-05 DIAGNOSIS — I10 ESSENTIAL HYPERTENSION: ICD-10-CM

## 2018-07-05 PROCEDURE — 99214 OFFICE O/P EST MOD 30 MIN: CPT | Performed by: INTERNAL MEDICINE

## 2018-07-05 ASSESSMENT — ENCOUNTER SYMPTOMS
FEVER: 0
PALPITATIONS: 0
CHILLS: 0

## 2018-07-05 NOTE — PROGRESS NOTES
"Subjective:      Joel Ma is a 67 y.o. male who presents with CKD I from DN Follow-Up            HPI  67 year old with CKD I from DN as well as HTN. The patient is on multiple medications, last seen in Oct 2017. Had a BMP in June where the serum Cr was 1.01. Last microalbumin Cr ratio in 2017 was 910. States he has been taking his medications as prescribed. States that his BP rises when he walks as he has coming to the appointment.    No NSAIDs. Obstructive symptoms only occasionally. No hematuria. No smoking.  Having spinal surgery on the 13th.    Review of Systems   Constitutional: Negative for chills and fever.   Cardiovascular: Negative for chest pain and palpitations.   All other systems reviewed and are negative.         Objective:     /70   Pulse 75   Temp 36.6 °C (97.8 °F)   Resp 14   Ht 1.905 m (6' 3\")   Wt 116.6 kg (257 lb)   SpO2 96%   BMI 32.12 kg/m²      Physical Exam   Constitutional: He is oriented to person, place, and time. He appears well-developed and well-nourished.   HENT:   Head: Normocephalic and atraumatic.   Cardiovascular: Normal rate and regular rhythm.    Pulmonary/Chest: Effort normal and breath sounds normal.   Abdominal: Soft. Bowel sounds are normal.   Neurological: He is alert and oriented to person, place, and time.   Skin: Skin is warm and dry.               Assessment/Plan:     1. CKD (chronic kidney disease), stage I  Cr stable. Continue ACEi, on high dose ACEi. Has had no change in Cr.    2. Diabetic nephropathy associated with type 2 diabetes mellitus (HCC)  Blood sugars are still elevated. Sees endocrinology.    3. Essential hypertension  BP is elevated. D/w patient, repeat still elevated. Has been taking medications. On multiple medications.     PLAN  1. Check labs, given hypokalemia in recent labs, will need to recheck and treat if persistent  2. Recommend he check BP at home, states that usually BP is better at home, though no numbers  3. Cr is " unchanged at this point despite proteinuria and BP  4. Return in 6 months with labs  5. Will be making appointment with his endocrinologist

## 2018-07-12 RX ORDER — CLINDAMYCIN HYDROCHLORIDE 300 MG/1
300 CAPSULE ORAL 2 TIMES DAILY
Status: ON HOLD | COMMUNITY
End: 2018-07-17

## 2018-07-12 NOTE — PROGRESS NOTES
The Medication Reconciliation process has been completed by interviewing the patient via telephone.  Confirmed with pharmacist that patient last dose of enoxaparin was to be this am.  Pt will call back with any questions about meds to take in the morning of surgery.    Allergies have been reviewed  Antibiotic use in 30 days - long term course of clindamycin twice daily for [facility]    Home Pharmacy:  [facility] - [place]

## 2018-07-13 ENCOUNTER — APPOINTMENT (OUTPATIENT)
Dept: RADIOLOGY | Facility: MEDICAL CENTER | Age: 68
DRG: 472 | End: 2018-07-13
Attending: NEUROLOGICAL SURGERY
Payer: MEDICARE

## 2018-07-13 ENCOUNTER — HOSPITAL ENCOUNTER (INPATIENT)
Facility: MEDICAL CENTER | Age: 68
LOS: 5 days | DRG: 472 | End: 2018-07-18
Attending: NEUROLOGICAL SURGERY | Admitting: NEUROLOGICAL SURGERY
Payer: MEDICARE

## 2018-07-13 DIAGNOSIS — M48.062 LUMBAR STENOSIS WITH NEUROGENIC CLAUDICATION: ICD-10-CM

## 2018-07-13 DIAGNOSIS — M54.12 CERVICAL MYELOPATHY WITH CERVICAL RADICULOPATHY (HCC): ICD-10-CM

## 2018-07-13 DIAGNOSIS — G89.18 ACUTE POST-OPERATIVE PAIN: ICD-10-CM

## 2018-07-13 DIAGNOSIS — G95.9 CERVICAL MYELOPATHY WITH CERVICAL RADICULOPATHY (HCC): ICD-10-CM

## 2018-07-13 LAB
APTT PPP: 28.6 SEC (ref 24.7–36)
GLUCOSE BLD-MCNC: 201 MG/DL (ref 65–99)
GLUCOSE BLD-MCNC: 223 MG/DL (ref 65–99)
INR PPP: 1.01 (ref 0.87–1.13)
PROTHROMBIN TIME: 13 SEC (ref 12–14.6)

## 2018-07-13 PROCEDURE — 700102 HCHG RX REV CODE 250 W/ 637 OVERRIDE(OP): Performed by: NURSE PRACTITIONER

## 2018-07-13 PROCEDURE — 500122 HCHG BOVIE, BLADE: Performed by: NEUROLOGICAL SURGERY

## 2018-07-13 PROCEDURE — 700105 HCHG RX REV CODE 258: Performed by: NURSE PRACTITIONER

## 2018-07-13 PROCEDURE — 500367 HCHG DRAIN KIT, HEMOVAC: Performed by: NEUROLOGICAL SURGERY

## 2018-07-13 PROCEDURE — 95939 C MOTOR EVOKED UPR&LWR LIMBS: CPT | Performed by: NEUROLOGICAL SURGERY

## 2018-07-13 PROCEDURE — 700101 HCHG RX REV CODE 250

## 2018-07-13 PROCEDURE — 500885 HCHG PACK, JACKSON TABLE: Performed by: NEUROLOGICAL SURGERY

## 2018-07-13 PROCEDURE — 160035 HCHG PACU - 1ST 60 MINS PHASE I: Performed by: NEUROLOGICAL SURGERY

## 2018-07-13 PROCEDURE — C1776 JOINT DEVICE (IMPLANTABLE): HCPCS | Performed by: NEUROLOGICAL SURGERY

## 2018-07-13 PROCEDURE — C1713 ANCHOR/SCREW BN/BN,TIS/BN: HCPCS | Performed by: NEUROLOGICAL SURGERY

## 2018-07-13 PROCEDURE — 95937 NEUROMUSCULAR JUNCTION TEST: CPT | Performed by: NEUROLOGICAL SURGERY

## 2018-07-13 PROCEDURE — A4314 CATH W/DRAINAGE 2-WAY LATEX: HCPCS | Performed by: NEUROLOGICAL SURGERY

## 2018-07-13 PROCEDURE — 700111 HCHG RX REV CODE 636 W/ 250 OVERRIDE (IP)

## 2018-07-13 PROCEDURE — 160048 HCHG OR STATISTICAL LEVEL 1-5: Performed by: NEUROLOGICAL SURGERY

## 2018-07-13 PROCEDURE — 160002 HCHG RECOVERY MINUTES (STAT): Performed by: NEUROLOGICAL SURGERY

## 2018-07-13 PROCEDURE — 4A11X4G MONITORING OF PERIPHERAL NERVOUS ELECTRICAL ACTIVITY, INTRAOPERATIVE, EXTERNAL APPROACH: ICD-10-PCS | Performed by: NEUROLOGICAL SURGERY

## 2018-07-13 PROCEDURE — 85610 PROTHROMBIN TIME: CPT

## 2018-07-13 PROCEDURE — 72020 X-RAY EXAM OF SPINE 1 VIEW: CPT

## 2018-07-13 PROCEDURE — A9270 NON-COVERED ITEM OR SERVICE: HCPCS

## 2018-07-13 PROCEDURE — 110371 HCHG SHELL REV 272: Performed by: NEUROLOGICAL SURGERY

## 2018-07-13 PROCEDURE — A9270 NON-COVERED ITEM OR SERVICE: HCPCS | Performed by: NURSE PRACTITIONER

## 2018-07-13 PROCEDURE — 700102 HCHG RX REV CODE 250 W/ 637 OVERRIDE(OP)

## 2018-07-13 PROCEDURE — 82962 GLUCOSE BLOOD TEST: CPT

## 2018-07-13 PROCEDURE — 770001 HCHG ROOM/CARE - MED/SURG/GYN PRIV*

## 2018-07-13 PROCEDURE — 160036 HCHG PACU - EA ADDL 30 MINS PHASE I: Performed by: NEUROLOGICAL SURGERY

## 2018-07-13 PROCEDURE — 700111 HCHG RX REV CODE 636 W/ 250 OVERRIDE (IP): Performed by: NURSE PRACTITIONER

## 2018-07-13 PROCEDURE — 501838 HCHG SUTURE GENERAL: Performed by: NEUROLOGICAL SURGERY

## 2018-07-13 PROCEDURE — A6402 STERILE GAUZE <= 16 SQ IN: HCPCS | Performed by: NEUROLOGICAL SURGERY

## 2018-07-13 PROCEDURE — 0RG20J1 FUSION OF 2 OR MORE CERVICAL VERTEBRAL JOINTS WITH SYNTHETIC SUBSTITUTE, POSTERIOR APPROACH, POSTERIOR COLUMN, OPEN APPROACH: ICD-10-PCS | Performed by: NEUROLOGICAL SURGERY

## 2018-07-13 PROCEDURE — 95938 SOMATOSENSORY TESTING: CPT | Performed by: NEUROLOGICAL SURGERY

## 2018-07-13 PROCEDURE — 01NB0ZZ RELEASE LUMBAR NERVE, OPEN APPROACH: ICD-10-PCS | Performed by: NEUROLOGICAL SURGERY

## 2018-07-13 PROCEDURE — 500123 HCHG BOVIE, CONTROL W/BLADE: Performed by: NEUROLOGICAL SURGERY

## 2018-07-13 PROCEDURE — 160009 HCHG ANES TIME/MIN: Performed by: NEUROLOGICAL SURGERY

## 2018-07-13 PROCEDURE — 85730 THROMBOPLASTIN TIME PARTIAL: CPT

## 2018-07-13 PROCEDURE — 110454 HCHG SHELL REV 250: Performed by: NEUROLOGICAL SURGERY

## 2018-07-13 PROCEDURE — 95861 NEEDLE EMG 2 EXTREMITIES: CPT | Performed by: NEUROLOGICAL SURGERY

## 2018-07-13 PROCEDURE — 95940 IONM IN OPERATNG ROOM 15 MIN: CPT | Performed by: NEUROLOGICAL SURGERY

## 2018-07-13 PROCEDURE — 160031 HCHG SURGERY MINUTES - 1ST 30 MINS LEVEL 5: Performed by: NEUROLOGICAL SURGERY

## 2018-07-13 PROCEDURE — 160042 HCHG SURGERY MINUTES - EA ADDL 1 MIN LEVEL 5: Performed by: NEUROLOGICAL SURGERY

## 2018-07-13 PROCEDURE — 30233N0 TRANSFUSION OF AUTOLOGOUS RED BLOOD CELLS INTO PERIPHERAL VEIN, PERCUTANEOUS APPROACH: ICD-10-PCS | Performed by: NEUROLOGICAL SURGERY

## 2018-07-13 PROCEDURE — 501445 HCHG STAPLER, SKIN DISP: Performed by: NEUROLOGICAL SURGERY

## 2018-07-13 DEVICE — SCREW POLYAXIAL CANCELLOUS TITANIUM 3.5X14 MM (1TCONX12=12): Type: IMPLANTABLE DEVICE | Status: FUNCTIONAL

## 2018-07-13 DEVICE — IMPLANTABLE DEVICE: Type: IMPLANTABLE DEVICE | Status: FUNCTIONAL

## 2018-07-13 DEVICE — SCREW TITANIUM LOCKING (1TCONX24=24): Type: IMPLANTABLE DEVICE | Status: FUNCTIONAL

## 2018-07-13 DEVICE — DBM PUTTY PROGENIX 5.0CC: Type: IMPLANTABLE DEVICE | Status: FUNCTIONAL

## 2018-07-13 RX ORDER — HEPARIN SODIUM,PORCINE 1000/ML
VIAL (ML) INJECTION
Status: DISCONTINUED | OUTPATIENT
Start: 2018-07-13 | End: 2018-07-13 | Stop reason: HOSPADM

## 2018-07-13 RX ORDER — METHOCARBAMOL 750 MG/1
750 TABLET, FILM COATED ORAL EVERY 8 HOURS PRN
Status: DISCONTINUED | OUTPATIENT
Start: 2018-07-13 | End: 2018-07-18 | Stop reason: HOSPADM

## 2018-07-13 RX ORDER — CLONIDINE HYDROCHLORIDE 0.1 MG/1
0.1 TABLET ORAL EVERY 4 HOURS PRN
Status: DISCONTINUED | OUTPATIENT
Start: 2018-07-13 | End: 2018-07-18 | Stop reason: HOSPADM

## 2018-07-13 RX ORDER — LISINOPRIL 20 MG/1
40 TABLET ORAL 2 TIMES DAILY
Status: DISCONTINUED | OUTPATIENT
Start: 2018-07-13 | End: 2018-07-18 | Stop reason: HOSPADM

## 2018-07-13 RX ORDER — CYCLOBENZAPRINE HCL 10 MG
10 TABLET ORAL EVERY 8 HOURS PRN
Status: DISCONTINUED | OUTPATIENT
Start: 2018-07-13 | End: 2018-07-18 | Stop reason: HOSPADM

## 2018-07-13 RX ORDER — MORPHINE SULFATE 4 MG/ML
4 INJECTION, SOLUTION INTRAMUSCULAR; INTRAVENOUS
Status: DISCONTINUED | OUTPATIENT
Start: 2018-07-13 | End: 2018-07-14

## 2018-07-13 RX ORDER — ONDANSETRON 2 MG/ML
INJECTION INTRAMUSCULAR; INTRAVENOUS
Status: COMPLETED
Start: 2018-07-13 | End: 2018-07-13

## 2018-07-13 RX ORDER — CEFAZOLIN SODIUM 2 G/100ML
2 INJECTION, SOLUTION INTRAVENOUS EVERY 8 HOURS
Status: DISCONTINUED | OUTPATIENT
Start: 2018-07-13 | End: 2018-07-17

## 2018-07-13 RX ORDER — ONDANSETRON 4 MG/1
4 TABLET, ORALLY DISINTEGRATING ORAL EVERY 4 HOURS PRN
Status: DISCONTINUED | OUTPATIENT
Start: 2018-07-13 | End: 2018-07-18 | Stop reason: HOSPADM

## 2018-07-13 RX ORDER — LABETALOL HYDROCHLORIDE 5 MG/ML
INJECTION, SOLUTION INTRAVENOUS
Status: COMPLETED
Start: 2018-07-13 | End: 2018-07-13

## 2018-07-13 RX ORDER — BISACODYL 10 MG
10 SUPPOSITORY, RECTAL RECTAL
Status: DISCONTINUED | OUTPATIENT
Start: 2018-07-13 | End: 2018-07-18 | Stop reason: HOSPADM

## 2018-07-13 RX ORDER — CLINDAMYCIN HYDROCHLORIDE 150 MG/1
300 CAPSULE ORAL 2 TIMES DAILY
Status: DISCONTINUED | OUTPATIENT
Start: 2018-07-13 | End: 2018-07-18 | Stop reason: HOSPADM

## 2018-07-13 RX ORDER — DIAZEPAM 5 MG/1
5 TABLET ORAL EVERY 4 HOURS PRN
Status: DISCONTINUED | OUTPATIENT
Start: 2018-07-13 | End: 2018-07-18 | Stop reason: HOSPADM

## 2018-07-13 RX ORDER — LABETALOL HYDROCHLORIDE 5 MG/ML
10 INJECTION, SOLUTION INTRAVENOUS
Status: DISCONTINUED | OUTPATIENT
Start: 2018-07-13 | End: 2018-07-18 | Stop reason: HOSPADM

## 2018-07-13 RX ORDER — HYDRALAZINE HYDROCHLORIDE 25 MG/1
100 TABLET, FILM COATED ORAL 3 TIMES DAILY
Status: DISCONTINUED | OUTPATIENT
Start: 2018-07-14 | End: 2018-07-18 | Stop reason: HOSPADM

## 2018-07-13 RX ORDER — AMOXICILLIN 250 MG
1 CAPSULE ORAL NIGHTLY
Status: DISCONTINUED | OUTPATIENT
Start: 2018-07-13 | End: 2018-07-18 | Stop reason: HOSPADM

## 2018-07-13 RX ORDER — POLYETHYLENE GLYCOL 3350 17 G/17G
1 POWDER, FOR SOLUTION ORAL 2 TIMES DAILY PRN
Status: DISCONTINUED | OUTPATIENT
Start: 2018-07-13 | End: 2018-07-18 | Stop reason: HOSPADM

## 2018-07-13 RX ORDER — DIPHENHYDRAMINE HCL 25 MG
25 TABLET ORAL EVERY 6 HOURS PRN
Status: DISCONTINUED | OUTPATIENT
Start: 2018-07-13 | End: 2018-07-18 | Stop reason: HOSPADM

## 2018-07-13 RX ORDER — DIPHENHYDRAMINE HYDROCHLORIDE 50 MG/ML
25 INJECTION INTRAMUSCULAR; INTRAVENOUS EVERY 6 HOURS PRN
Status: DISCONTINUED | OUTPATIENT
Start: 2018-07-13 | End: 2018-07-18 | Stop reason: HOSPADM

## 2018-07-13 RX ORDER — AMLODIPINE BESYLATE 5 MG/1
10 TABLET ORAL
Status: DISCONTINUED | OUTPATIENT
Start: 2018-07-14 | End: 2018-07-18 | Stop reason: HOSPADM

## 2018-07-13 RX ORDER — AMOXICILLIN 250 MG
1 CAPSULE ORAL
Status: DISCONTINUED | OUTPATIENT
Start: 2018-07-13 | End: 2018-07-18 | Stop reason: HOSPADM

## 2018-07-13 RX ORDER — SODIUM CHLORIDE 9 MG/ML
1000 INJECTION, SOLUTION INTRAVENOUS
Status: COMPLETED | OUTPATIENT
Start: 2018-07-13 | End: 2018-07-13

## 2018-07-13 RX ORDER — ONDANSETRON 2 MG/ML
4 INJECTION INTRAMUSCULAR; INTRAVENOUS EVERY 4 HOURS PRN
Status: DISCONTINUED | OUTPATIENT
Start: 2018-07-13 | End: 2018-07-18 | Stop reason: HOSPADM

## 2018-07-13 RX ORDER — DOCUSATE SODIUM 100 MG/1
100 CAPSULE, LIQUID FILLED ORAL 2 TIMES DAILY
Status: DISCONTINUED | OUTPATIENT
Start: 2018-07-13 | End: 2018-07-13

## 2018-07-13 RX ORDER — DOCUSATE SODIUM 50 MG/5ML
100 LIQUID ORAL 2 TIMES DAILY
Status: DISCONTINUED | OUTPATIENT
Start: 2018-07-13 | End: 2018-07-18 | Stop reason: HOSPADM

## 2018-07-13 RX ORDER — HYDROCHLOROTHIAZIDE 25 MG/1
25 TABLET ORAL DAILY
Status: DISCONTINUED | OUTPATIENT
Start: 2018-07-14 | End: 2018-07-18 | Stop reason: HOSPADM

## 2018-07-13 RX ORDER — FLUTICASONE PROPIONATE 50 MCG
1 SPRAY, SUSPENSION (ML) NASAL DAILY
Status: DISCONTINUED | OUTPATIENT
Start: 2018-07-14 | End: 2018-07-18 | Stop reason: HOSPADM

## 2018-07-13 RX ORDER — ENEMA 19; 7 G/133ML; G/133ML
1 ENEMA RECTAL
Status: DISCONTINUED | OUTPATIENT
Start: 2018-07-13 | End: 2018-07-18 | Stop reason: HOSPADM

## 2018-07-13 RX ORDER — BUPIVACAINE HYDROCHLORIDE AND EPINEPHRINE 5; 5 MG/ML; UG/ML
INJECTION, SOLUTION EPIDURAL; INTRACAUDAL; PERINEURAL
Status: DISCONTINUED | OUTPATIENT
Start: 2018-07-13 | End: 2018-07-13 | Stop reason: HOSPADM

## 2018-07-13 RX ORDER — MIDAZOLAM HYDROCHLORIDE 1 MG/ML
INJECTION INTRAMUSCULAR; INTRAVENOUS
Status: COMPLETED
Start: 2018-07-13 | End: 2018-07-13

## 2018-07-13 RX ORDER — INSULIN GLARGINE 100 [IU]/ML
30 INJECTION, SOLUTION SUBCUTANEOUS EVERY EVENING
Status: DISCONTINUED | OUTPATIENT
Start: 2018-07-13 | End: 2018-07-16

## 2018-07-13 RX ORDER — HYDRALAZINE HYDROCHLORIDE 20 MG/ML
10 INJECTION INTRAMUSCULAR; INTRAVENOUS
Status: DISCONTINUED | OUTPATIENT
Start: 2018-07-13 | End: 2018-07-13

## 2018-07-13 RX ORDER — SODIUM CHLORIDE 9 MG/ML
INJECTION, SOLUTION INTRAVENOUS CONTINUOUS
Status: DISCONTINUED | OUTPATIENT
Start: 2018-07-13 | End: 2018-07-18 | Stop reason: HOSPADM

## 2018-07-13 RX ORDER — MORPHINE SULFATE 4 MG/ML
4 INJECTION, SOLUTION INTRAMUSCULAR; INTRAVENOUS ONCE
Status: ACTIVE | OUTPATIENT
Start: 2018-07-13 | End: 2018-07-14

## 2018-07-13 RX ORDER — GABAPENTIN 300 MG/1
300-600 CAPSULE ORAL 3 TIMES DAILY
Status: DISCONTINUED | OUTPATIENT
Start: 2018-07-14 | End: 2018-07-18 | Stop reason: HOSPADM

## 2018-07-13 RX ORDER — CEFAZOLIN SODIUM 1 G/3ML
INJECTION, POWDER, FOR SOLUTION INTRAMUSCULAR; INTRAVENOUS
Status: DISCONTINUED | OUTPATIENT
Start: 2018-07-13 | End: 2018-07-13 | Stop reason: HOSPADM

## 2018-07-13 RX ADMIN — MIDAZOLAM 1 MG: 1 INJECTION INTRAMUSCULAR; INTRAVENOUS at 19:40

## 2018-07-13 RX ADMIN — SODIUM CHLORIDE 1000 ML: 9 INJECTION, SOLUTION INTRAVENOUS at 09:17

## 2018-07-13 RX ADMIN — FENTANYL CITRATE 50 MCG: 50 INJECTION, SOLUTION INTRAMUSCULAR; INTRAVENOUS at 19:45

## 2018-07-13 RX ADMIN — MORPHINE SULFATE: 50 INJECTION, SOLUTION, CONCENTRATE INTRAVENOUS at 21:45

## 2018-07-13 RX ADMIN — CEFAZOLIN SODIUM 2 G: 2 INJECTION, SOLUTION INTRAVENOUS at 23:26

## 2018-07-13 RX ADMIN — LABETALOL HYDROCHLORIDE 5 MG: 5 INJECTION, SOLUTION INTRAVENOUS at 20:30

## 2018-07-13 RX ADMIN — MIDAZOLAM 1 MG: 1 INJECTION INTRAMUSCULAR; INTRAVENOUS at 19:30

## 2018-07-13 RX ADMIN — FENTANYL CITRATE 50 MCG: 50 INJECTION, SOLUTION INTRAMUSCULAR; INTRAVENOUS at 19:55

## 2018-07-13 RX ADMIN — HYDROMORPHONE HYDROCHLORIDE 0.5 MG: 10 INJECTION, SOLUTION INTRAMUSCULAR; INTRAVENOUS; SUBCUTANEOUS at 21:00

## 2018-07-13 RX ADMIN — LABETALOL HYDROCHLORIDE 5 MG: 5 INJECTION, SOLUTION INTRAVENOUS at 22:00

## 2018-07-13 RX ADMIN — HYDROMORPHONE HYDROCHLORIDE 0.5 MG: 10 INJECTION, SOLUTION INTRAMUSCULAR; INTRAVENOUS; SUBCUTANEOUS at 20:50

## 2018-07-13 RX ADMIN — ONDANSETRON HYDROCHLORIDE 1 MG: 2 INJECTION, SOLUTION INTRAMUSCULAR; INTRAVENOUS at 20:30

## 2018-07-13 RX ADMIN — SODIUM CHLORIDE: 9 INJECTION, SOLUTION INTRAVENOUS at 23:25

## 2018-07-13 RX ADMIN — LABETALOL HYDROCHLORIDE 5 MG: 5 INJECTION, SOLUTION INTRAVENOUS at 19:45

## 2018-07-13 RX ADMIN — LABETALOL HYDROCHLORIDE 5 MG: 5 INJECTION, SOLUTION INTRAVENOUS at 19:50

## 2018-07-13 RX ADMIN — HYDROMORPHONE HYDROCHLORIDE 0.5 MG: 10 INJECTION, SOLUTION INTRAMUSCULAR; INTRAVENOUS; SUBCUTANEOUS at 20:35

## 2018-07-13 RX ADMIN — LABETALOL HYDROCHLORIDE 5 MG: 5 INJECTION, SOLUTION INTRAVENOUS at 20:15

## 2018-07-13 RX ADMIN — LABETALOL HYDROCHLORIDE 5 MG: 5 INJECTION, SOLUTION INTRAVENOUS at 20:20

## 2018-07-13 RX ADMIN — LABETALOL HYDROCHLORIDE 5 MG: 5 INJECTION, SOLUTION INTRAVENOUS at 20:10

## 2018-07-13 RX ADMIN — HYDROMORPHONE HYDROCHLORIDE 0.5 MG: 10 INJECTION, SOLUTION INTRAMUSCULAR; INTRAVENOUS; SUBCUTANEOUS at 20:30

## 2018-07-13 ASSESSMENT — COPD QUESTIONNAIRES
DURING THE PAST 4 WEEKS HOW MUCH DID YOU FEEL SHORT OF BREATH: SOME OF THE TIME
COPD SCREENING SCORE: 3
DO YOU EVER COUGH UP ANY MUCUS OR PHLEGM?: NO/ONLY WITH OCCASIONAL COLDS OR INFECTIONS
HAVE YOU SMOKED AT LEAST 100 CIGARETTES IN YOUR ENTIRE LIFE: NO/DON'T KNOW

## 2018-07-13 ASSESSMENT — PAIN SCALES - GENERAL
PAINLEVEL_OUTOF10: 5
PAINLEVEL_OUTOF10: 4
PAINLEVEL_OUTOF10: 5
PAINLEVEL_OUTOF10: 4
PAINLEVEL_OUTOF10: 4
PAINLEVEL_OUTOF10: 0
PAINLEVEL_OUTOF10: 6
PAINLEVEL_OUTOF10: 4
PAINLEVEL_OUTOF10: 5
PAINLEVEL_OUTOF10: 5

## 2018-07-13 ASSESSMENT — LIFESTYLE VARIABLES: EVER_SMOKED: YES

## 2018-07-13 NOTE — PROGRESS NOTES
Pt updated on poc for preop, all needs met, pt belongings locked up, pt requesting to keep cell phone until OR, hourly rounding in place

## 2018-07-13 NOTE — PROGRESS NOTES
Pt calling and reports feeling anxious, and uncomfortable.  RN sat at bedside, talked with pt, provided comfort and helped pt reposition.

## 2018-07-14 LAB
ANION GAP SERPL CALC-SCNC: 14 MMOL/L (ref 0–11.9)
BUN SERPL-MCNC: 29 MG/DL (ref 8–22)
CALCIUM SERPL-MCNC: 8.3 MG/DL (ref 8.5–10.5)
CHLORIDE SERPL-SCNC: 100 MMOL/L (ref 96–112)
CO2 SERPL-SCNC: 21 MMOL/L (ref 20–33)
CREAT SERPL-MCNC: 1.03 MG/DL (ref 0.5–1.4)
ERYTHROCYTE [DISTWIDTH] IN BLOOD BY AUTOMATED COUNT: 44.7 FL (ref 35.9–50)
GLUCOSE BLD-MCNC: 254 MG/DL (ref 65–99)
GLUCOSE BLD-MCNC: 258 MG/DL (ref 65–99)
GLUCOSE BLD-MCNC: 387 MG/DL (ref 65–99)
GLUCOSE SERPL-MCNC: 410 MG/DL (ref 65–99)
HCT VFR BLD AUTO: 41.7 % (ref 42–52)
HGB BLD-MCNC: 13.7 G/DL (ref 14–18)
MCH RBC QN AUTO: 28.5 PG (ref 27–33)
MCHC RBC AUTO-ENTMCNC: 32.9 G/DL (ref 33.7–35.3)
MCV RBC AUTO: 86.9 FL (ref 81.4–97.8)
PLATELET # BLD AUTO: 135 K/UL (ref 164–446)
PMV BLD AUTO: 12.1 FL (ref 9–12.9)
POTASSIUM SERPL-SCNC: 4.8 MMOL/L (ref 3.6–5.5)
RBC # BLD AUTO: 4.8 M/UL (ref 4.7–6.1)
SODIUM SERPL-SCNC: 135 MMOL/L (ref 135–145)
WBC # BLD AUTO: 9.8 K/UL (ref 4.8–10.8)

## 2018-07-14 PROCEDURE — 700112 HCHG RX REV CODE 229: Performed by: NURSE PRACTITIONER

## 2018-07-14 PROCEDURE — 80048 BASIC METABOLIC PNL TOTAL CA: CPT

## 2018-07-14 PROCEDURE — 700111 HCHG RX REV CODE 636 W/ 250 OVERRIDE (IP): Performed by: NURSE PRACTITIONER

## 2018-07-14 PROCEDURE — 700105 HCHG RX REV CODE 258: Performed by: NEUROLOGICAL SURGERY

## 2018-07-14 PROCEDURE — 82962 GLUCOSE BLOOD TEST: CPT | Mod: 91

## 2018-07-14 PROCEDURE — 36415 COLL VENOUS BLD VENIPUNCTURE: CPT

## 2018-07-14 PROCEDURE — A9270 NON-COVERED ITEM OR SERVICE: HCPCS | Performed by: NURSE PRACTITIONER

## 2018-07-14 PROCEDURE — 770001 HCHG ROOM/CARE - MED/SURG/GYN PRIV*

## 2018-07-14 PROCEDURE — 700102 HCHG RX REV CODE 250 W/ 637 OVERRIDE(OP): Performed by: CLINICAL NURSE SPECIALIST

## 2018-07-14 PROCEDURE — 700101 HCHG RX REV CODE 250: Performed by: NURSE PRACTITIONER

## 2018-07-14 PROCEDURE — 700105 HCHG RX REV CODE 258: Performed by: NURSE PRACTITIONER

## 2018-07-14 PROCEDURE — 700102 HCHG RX REV CODE 250 W/ 637 OVERRIDE(OP): Performed by: NURSE PRACTITIONER

## 2018-07-14 PROCEDURE — 85027 COMPLETE CBC AUTOMATED: CPT

## 2018-07-14 PROCEDURE — A9270 NON-COVERED ITEM OR SERVICE: HCPCS | Performed by: CLINICAL NURSE SPECIALIST

## 2018-07-14 PROCEDURE — 700111 HCHG RX REV CODE 636 W/ 250 OVERRIDE (IP): Performed by: NEUROLOGICAL SURGERY

## 2018-07-14 RX ORDER — OXYCODONE HYDROCHLORIDE AND ACETAMINOPHEN 5; 325 MG/1; MG/1
1 TABLET ORAL EVERY 4 HOURS PRN
Status: DISCONTINUED | OUTPATIENT
Start: 2018-07-14 | End: 2018-07-18 | Stop reason: HOSPADM

## 2018-07-14 RX ORDER — HALOPERIDOL 5 MG/ML
10 INJECTION INTRAMUSCULAR EVERY 4 HOURS PRN
Status: DISCONTINUED | OUTPATIENT
Start: 2018-07-14 | End: 2018-07-18 | Stop reason: HOSPADM

## 2018-07-14 RX ORDER — HALOPERIDOL 5 MG/ML
5 INJECTION INTRAMUSCULAR EVERY 4 HOURS PRN
Status: DISCONTINUED | OUTPATIENT
Start: 2018-07-14 | End: 2018-07-14

## 2018-07-14 RX ORDER — HYDRALAZINE HYDROCHLORIDE 20 MG/ML
5-10 INJECTION INTRAMUSCULAR; INTRAVENOUS EVERY 4 HOURS PRN
Status: DISCONTINUED | OUTPATIENT
Start: 2018-07-14 | End: 2018-07-18 | Stop reason: HOSPADM

## 2018-07-14 RX ADMIN — INSULIN LISPRO 5 UNITS: 100 INJECTION, SOLUTION INTRAVENOUS; SUBCUTANEOUS at 11:27

## 2018-07-14 RX ADMIN — HYDRALAZINE HYDROCHLORIDE 100 MG: 50 TABLET ORAL at 12:20

## 2018-07-14 RX ADMIN — OXYCODONE HYDROCHLORIDE AND ACETAMINOPHEN 1 TABLET: 5; 325 TABLET ORAL at 08:59

## 2018-07-14 RX ADMIN — METFORMIN HYDROCHLORIDE 1000 MG: 500 TABLET ORAL at 09:00

## 2018-07-14 RX ADMIN — METFORMIN HYDROCHLORIDE 1000 MG: 500 TABLET ORAL at 17:10

## 2018-07-14 RX ADMIN — INSULIN LISPRO 9 UNITS: 100 INJECTION, SOLUTION INTRAVENOUS; SUBCUTANEOUS at 06:05

## 2018-07-14 RX ADMIN — OXYCODONE HYDROCHLORIDE AND ACETAMINOPHEN 1 TABLET: 5; 325 TABLET ORAL at 20:49

## 2018-07-14 RX ADMIN — SODIUM CHLORIDE: 9 INJECTION, SOLUTION INTRAVENOUS at 11:27

## 2018-07-14 RX ADMIN — METHOCARBAMOL 750 MG: 100 INJECTION INTRAMUSCULAR; INTRAVENOUS at 14:17

## 2018-07-14 RX ADMIN — METHOCARBAMOL 750 MG: 100 INJECTION INTRAMUSCULAR; INTRAVENOUS at 06:01

## 2018-07-14 RX ADMIN — LISINOPRIL 40 MG: 20 TABLET ORAL at 17:09

## 2018-07-14 RX ADMIN — METHOCARBAMOL 750 MG: 750 TABLET, FILM COATED ORAL at 20:49

## 2018-07-14 RX ADMIN — CEFAZOLIN SODIUM 2 G: 2 INJECTION, SOLUTION INTRAVENOUS at 06:30

## 2018-07-14 RX ADMIN — INSULIN GLARGINE 30 UNITS: 100 INJECTION, SOLUTION SUBCUTANEOUS at 17:17

## 2018-07-14 RX ADMIN — HYDRALAZINE HYDROCHLORIDE 100 MG: 50 TABLET ORAL at 17:09

## 2018-07-14 RX ADMIN — LABETALOL HYDROCHLORIDE 10 MG: 5 INJECTION, SOLUTION INTRAVENOUS at 03:52

## 2018-07-14 RX ADMIN — METHOCARBAMOL 750 MG: 750 TABLET, FILM COATED ORAL at 10:23

## 2018-07-14 RX ADMIN — HALOPERIDOL LACTATE 10 MG: 5 INJECTION, SOLUTION INTRAMUSCULAR at 03:46

## 2018-07-14 RX ADMIN — INSULIN LISPRO 5 UNITS: 100 INJECTION, SOLUTION INTRAVENOUS; SUBCUTANEOUS at 17:19

## 2018-07-14 RX ADMIN — METHOCARBAMOL 750 MG: 100 INJECTION INTRAMUSCULAR; INTRAVENOUS at 00:32

## 2018-07-14 RX ADMIN — OXYCODONE HYDROCHLORIDE AND ACETAMINOPHEN 1 TABLET: 5; 325 TABLET ORAL at 15:07

## 2018-07-14 RX ADMIN — CEFAZOLIN SODIUM 2 G: 2 INJECTION, SOLUTION INTRAVENOUS at 22:30

## 2018-07-14 RX ADMIN — CLINDAMYCIN HYDROCHLORIDE 300 MG: 150 CAPSULE ORAL at 17:12

## 2018-07-14 RX ADMIN — GABAPENTIN 600 MG: 300 CAPSULE ORAL at 17:09

## 2018-07-14 RX ADMIN — DOCUSATE SODIUM 100 MG: 50 LIQUID ORAL at 17:15

## 2018-07-14 RX ADMIN — HALOPERIDOL LACTATE 5 MG: 5 INJECTION, SOLUTION INTRAMUSCULAR at 00:32

## 2018-07-14 RX ADMIN — CEFAZOLIN SODIUM 2 G: 2 INJECTION, SOLUTION INTRAVENOUS at 15:07

## 2018-07-14 RX ADMIN — GABAPENTIN 600 MG: 300 CAPSULE ORAL at 12:20

## 2018-07-14 ASSESSMENT — PAIN SCALES - GENERAL
PAINLEVEL_OUTOF10: 3
PAINLEVEL_OUTOF10: 3
PAINLEVEL_OUTOF10: 6
PAINLEVEL_OUTOF10: 6
PAINLEVEL_OUTOF10: 4
PAINLEVEL_OUTOF10: 4
PAINLEVEL_OUTOF10: 7
PAINLEVEL_OUTOF10: 5
PAINLEVEL_OUTOF10: 5

## 2018-07-14 NOTE — OR SURGEON
Immediate Post OP Note    PreOp Diagnosis: cervical myelopathy/ lumbar stenosis     PostOp Diagnosis: cervical myelopathy/ lumbar stenosis     Procedure(s):  CERVICAL FUSION POSTERIOR/ C2-4 - Wound Class: Clean with Drain  CERVICAL LAMINECTOMY POSTERIOR/ C2-3, C5-6 - Wound Class: Clean with Drain  LUMBAR LAMINECTOMY DISKECTOMY/ L2-5 LAMI - Wound Class: Clean with Drain    Surgeon(s):  Boby Marsh M.D.    Anesthesiologist/Type of Anesthesia:  Anesthesiologist: Elvis Todd M.D.; Jarret Lucas M.D./General    Surgical Staff:  Assistant: DEIDRA Niño  Cell Saver : Susana Law  Circulator: Niyah Rivas R.N.  Relief Circulator: Zee Peguero R.N.  Relief Scrub: Nicole Navarro  Scrub Person: Hermelinda Ibarra  Radiology Technologist: Yessenia Nagel; Yolanda Bahena    Specimens removed if any:  * No specimens in log *    Estimated Blood Loss: 500 cc, 235 cc given in cell saver     Findings: good decompression, good hardware placement     Complications: none         7/13/2018 7:09 PM DEIDRA Niño

## 2018-07-14 NOTE — OR NURSING
Pt received from OR @1930 via bed with sr^. Report from staff. Monitors on. Oral airway in use.  Vs wnl.    OA out shortly after arrival. bp^ baseline for pt. bp meds ordered by Dr. Meuller.     Pt states he is very anxious and was medicated with rx'd meds with moderate results.     c/o pain and was medicated with fentanyl and dilaudid as rx'd. Good results noted morphine pca ordered by surgeon and started while in pacu. Pt demonstrated use.    Pt remains appropriately confused at times and is easily reoriented.     Call from pt's sister pt v/u and ok to update family. Sister updated and was able to  Speak with pt. Sister to visit tomorrow.     Pt requires cpap at night and has brought in from home.     Pt meets transfer criteria and [facility]  Pt v/u and agrees to poc. Placed on transport.

## 2018-07-14 NOTE — PROGRESS NOTES
"Pt. Disconnected both hemovacs, at least 100 ml of blood in the bed. Pt. Tried to pull out kingston, penis began \"hurting\" and bleeding so pt. Stopped pulling on it per pt. Statement. Pt. Yelling into hallway \"help\" and \"what do I do\". Pt. Took off gown. Called Dr. Dodd. Waiting for return call.    Yousif returned call, gave orders for bilateral wrist restraints and raised the dosage on the current Haldol order. Also gave iv hydralazine orders since pt. Is NPO and is not tolerating Labetalol.   "

## 2018-07-14 NOTE — PROGRESS NOTES
Pt is A&Ox3 but does not remember he had surgery yesterday. PCA has been discontinued and swallow eval has been done again by me which he passed and was given a diet and started taking his medication. Pt was able to sit on the edge of the bed for about 30 minutes but he was not able to walk to the chair beside the bed to sit there for awhile. IV and kingston intact. Bed alarm on, call light within reach and  POC reviewed.

## 2018-07-14 NOTE — OR NURSING
Pt meets transfer criteria. Report to Kaykay Preston transported via bed with transport person and rn.     Kaykay Preston updated pt oriented. To poc.

## 2018-07-14 NOTE — PROGRESS NOTES
Neurosurgery Progress Note    Subjective:  Psychotic last noc, given haldol, today he is better, failed swallow yest, denies pain, pca- not using, kingston    Exam:  BUE 5/5, Bilat pf 4/5, df 4+, IP/quad 5, incisions c/d w/ hvacs    BP  Min: 147/88  Max: 196/104  Pulse  Av.8  Min: 62  Max: 114  Resp  Av.6  Min: 12  Max: 19  Temp  Av.6 °C (97.8 °F)  Min: 36.3 °C (97.3 °F)  Max: 36.8 °C (98.2 °F)  SpO2  Av.6 %  Min: 93 %  Max: 99 %    No Data Recorded    Recent Labs      18   0202   WBC  9.8   RBC  4.80   HEMOGLOBIN  13.7*   HEMATOCRIT  41.7*   MCV  86.9   MCH  28.5   MCHC  32.9*   RDW  44.7   PLATELETCT  135*   MPV  12.1     Recent Labs      18   0202   SODIUM  135   POTASSIUM  4.8   CHLORIDE  100   CO2  21   GLUCOSE  410*   BUN  29*   CREATININE  1.03   CALCIUM  8.3*     Recent Labs      18   0910   APTT  28.6   INR  1.01           Intake/Output       18 0700 - 18 0659 18 07 - 07/15/18 0659      2977-9167 5969-1979 Total 6133-0134 0853-7958 Total       Intake    P.O.  --  15 15  --  -- --    P.O. -- 15 15 -- -- --    I.V.  --  4500 4500  21  -- 21    Crystalloid Intake -- 2600 2600 -- -- --    PCA End of Shift Total Volume (ml) -- -- -- 21 -- 21    IV Piggyback Volume -- 400 400 -- -- --    IV Volume (LR) -- 300 300 -- -- --    IV Volume -- 1200 1200 -- -- --    Blood  --  235 235  --  -- --    Cell Saver Volume (mL) -- 235 235 -- -- --    Total Intake -- 4750 4750 21 -- 21       Output    Urine  550  5209 5955  --  -- --    Urine Void (mL) (non-catheter)  -- -- --    Output (mL) (Urinary Catheter Indwelling Catheter 16f) -- 1225 1225 -- -- --    Drains  --  590 590  --  -- --    Output (ml) (Surgical Drain Group Neck Hemovac 1 (A)) -- 280 280 -- -- --    Output (ml) (Surgical Drain Group Back Hemovac 1 (A)) -- 310 310 -- -- --    Blood  --  500 500  --  -- --    Est. Blood Loss (mL) -- 500 500 -- -- --    Total Output 550 7265 3365 -- -- --        Net I/O     -550 1835 1285 21 -- 21            Intake/Output Summary (Last 24 hours) at 07/14/18 0843  Last data filed at 07/14/18 0729   Gross per 24 hour   Intake             4771 ml   Output             3465 ml   Net             1306 ml            • Methocarbamol IVPB  750 mg Q8HRS   • hydrALAZINE  5-10 mg Q4HRS PRN   • haloperidol lactate  10 mg Q4HRS PRN   • oxyCODONE-acetaminophen  1 Tab Q4HRS PRN   • HYDROmorphone  1 mg Q2HRS PRN   • amLODIPine  10 mg Q DAY   • clindamycin  300 mg BID   • [START ON 7/18/2018] cloNIDine  1 Patch Q WEDNESDAY   • fluticasone  1 Spray DAILY   • gabapentin  300-600 mg TID   • hydrALAZINE  100 mg TID   • hydroCHLOROthiazide  25 mg DAILY   • insulin glargine  30 Units Q EVENING   • lisinopril  40 mg BID   • metFORMIN  1,000 mg BID WITH MEALS   • Pharmacy Consult Request  1 Each PRN   • MD ALERT...Do not administer NSAIDS or ASPIRIN unless ORDERED By Neurosurgery  1 Each PRN   • senna-docusate  1 Tab Nightly   • senna-docusate  1 Tab Q24HRS PRN   • polyethylene glycol/lytes  1 Packet BID PRN   • magnesium hydroxide  30 mL QDAY PRN   • bisacodyl  10 mg Q24HRS PRN   • fleet  1 Each Once PRN   • NS   Continuous   • morphine injection  4 mg Once   • ceFAZolin  2 g Q8HR   • diphenhydrAMINE  25 mg Q6HRS PRN    Or   • diphenhydrAMINE  25 mg Q6HRS PRN   • ondansetron  4 mg Q4HRS PRN   • ondansetron  4 mg Q4HRS PRN   • methocarbamol  750 mg Q8HRS PRN    Or   • cyclobenzaprine  10 mg Q8HRS PRN    Or   • diazePAM  5 mg Q4HRS PRN   • labetalol  10 mg Q HOUR PRN   • cloNIDine  0.1 mg Q4HRS PRN   • insulin lispro  0-12 Units TID AC   • Respiratory Care per Protocol   Continuous RT   • docusate sodium 100mg/10mL  100 mg BID       Assessment and Plan:  Hospital day #1  POD #1 post C2-3 and C5-6 lami, C2-4 fusion, L2-S1 lami  Prophylactic anticoagulation: no         Start date/time: tbd    Psychotic last noc- better now  htn-- ok this am  hyperglycemia- refused lantus last noc, on ss  Repeat  swallow  Pt/ot  Collar when oob  Mobilize  hvac x 2  D/c kingston once oob    ATTENDING ADDENDUM:  Patient seen independently and agree with above note

## 2018-07-14 NOTE — CARE PLAN
Problem: Pain Management  Goal: Pain level will decrease to patient's comfort goal  Outcome: PROGRESSING SLOWER THAN EXPECTED      Problem: Communication  Goal: The ability to communicate needs accurately and effectively will improve  Outcome: PROGRESSING SLOWER THAN EXPECTED

## 2018-07-14 NOTE — PROGRESS NOTES
Pt. Is A/Ox4, agitated, restless, thrashing back and forth in bed and yelling. Pt. Had to be restrained in PACU. 6L oxymask. Pt. Failed swallow eval, made NPO. Sx sites intact. Drains intact. Gonzalez intact. PCA running and verified. Bed alarm and frame alarm on. Dr. Dodd called regarding updates and orders for pt. agitation and fail to swallow, Yousif gave orders for IV Haldol and IV Robaxin. Poc discussed,  call light within reach.

## 2018-07-14 NOTE — PROGRESS NOTES
"Pt. Making delusioned comments about \"finding a black controller and pressing it and the controller told him he overdosed himself\". Pt. continues to yell into hallway after the dose of 10 mg Haldol given.   "

## 2018-07-15 LAB
ANION GAP SERPL CALC-SCNC: 7 MMOL/L (ref 0–11.9)
APPEARANCE UR: CLEAR
BACTERIA #/AREA URNS HPF: NEGATIVE /HPF
BILIRUB UR QL STRIP.AUTO: NEGATIVE
BUN SERPL-MCNC: 19 MG/DL (ref 8–22)
CALCIUM SERPL-MCNC: 7.9 MG/DL (ref 8.5–10.5)
CHLORIDE SERPL-SCNC: 103 MMOL/L (ref 96–112)
CO2 SERPL-SCNC: 24 MMOL/L (ref 20–33)
COLOR UR: YELLOW
CREAT SERPL-MCNC: 0.96 MG/DL (ref 0.5–1.4)
EPI CELLS #/AREA URNS HPF: NEGATIVE /HPF
ERYTHROCYTE [DISTWIDTH] IN BLOOD BY AUTOMATED COUNT: 45.2 FL (ref 35.9–50)
GLUCOSE BLD-MCNC: 227 MG/DL (ref 65–99)
GLUCOSE BLD-MCNC: 242 MG/DL (ref 65–99)
GLUCOSE BLD-MCNC: 270 MG/DL (ref 65–99)
GLUCOSE SERPL-MCNC: 218 MG/DL (ref 65–99)
GLUCOSE UR STRIP.AUTO-MCNC: >=1000 MG/DL
HCT VFR BLD AUTO: 35.1 % (ref 42–52)
HGB BLD-MCNC: 11.6 G/DL (ref 14–18)
HYALINE CASTS #/AREA URNS LPF: ABNORMAL /LPF
KETONES UR STRIP.AUTO-MCNC: 15 MG/DL
LEUKOCYTE ESTERASE UR QL STRIP.AUTO: NEGATIVE
MCH RBC QN AUTO: 28.6 PG (ref 27–33)
MCHC RBC AUTO-ENTMCNC: 33 G/DL (ref 33.7–35.3)
MCV RBC AUTO: 86.5 FL (ref 81.4–97.8)
MICRO URNS: ABNORMAL
NITRITE UR QL STRIP.AUTO: NEGATIVE
PH UR STRIP.AUTO: 5 [PH]
PLATELET # BLD AUTO: 117 K/UL (ref 164–446)
PMV BLD AUTO: 11.8 FL (ref 9–12.9)
POTASSIUM SERPL-SCNC: 4 MMOL/L (ref 3.6–5.5)
PROT UR QL STRIP: 30 MG/DL
RBC # BLD AUTO: 4.06 M/UL (ref 4.7–6.1)
RBC # URNS HPF: ABNORMAL /HPF
RBC UR QL AUTO: NEGATIVE
SODIUM SERPL-SCNC: 134 MMOL/L (ref 135–145)
SP GR UR STRIP.AUTO: 1.02
UROBILINOGEN UR STRIP.AUTO-MCNC: 0.2 MG/DL
WBC # BLD AUTO: 8.5 K/UL (ref 4.8–10.8)
WBC #/AREA URNS HPF: ABNORMAL /HPF

## 2018-07-15 PROCEDURE — A9270 NON-COVERED ITEM OR SERVICE: HCPCS | Performed by: CLINICAL NURSE SPECIALIST

## 2018-07-15 PROCEDURE — 82962 GLUCOSE BLOOD TEST: CPT | Mod: 91

## 2018-07-15 PROCEDURE — 770001 HCHG ROOM/CARE - MED/SURG/GYN PRIV*

## 2018-07-15 PROCEDURE — 36415 COLL VENOUS BLD VENIPUNCTURE: CPT

## 2018-07-15 PROCEDURE — 97166 OT EVAL MOD COMPLEX 45 MIN: CPT

## 2018-07-15 PROCEDURE — 700102 HCHG RX REV CODE 250 W/ 637 OVERRIDE(OP): Performed by: NURSE PRACTITIONER

## 2018-07-15 PROCEDURE — A9270 NON-COVERED ITEM OR SERVICE: HCPCS | Performed by: NURSE PRACTITIONER

## 2018-07-15 PROCEDURE — 700111 HCHG RX REV CODE 636 W/ 250 OVERRIDE (IP): Performed by: NURSE PRACTITIONER

## 2018-07-15 PROCEDURE — G8978 MOBILITY CURRENT STATUS: HCPCS | Mod: CL

## 2018-07-15 PROCEDURE — 80048 BASIC METABOLIC PNL TOTAL CA: CPT

## 2018-07-15 PROCEDURE — G8988 SELF CARE GOAL STATUS: HCPCS | Mod: CJ

## 2018-07-15 PROCEDURE — 700102 HCHG RX REV CODE 250 W/ 637 OVERRIDE(OP): Performed by: CLINICAL NURSE SPECIALIST

## 2018-07-15 PROCEDURE — 85027 COMPLETE CBC AUTOMATED: CPT

## 2018-07-15 PROCEDURE — 81001 URINALYSIS AUTO W/SCOPE: CPT

## 2018-07-15 PROCEDURE — 97162 PT EVAL MOD COMPLEX 30 MIN: CPT

## 2018-07-15 PROCEDURE — G8979 MOBILITY GOAL STATUS: HCPCS | Mod: CJ

## 2018-07-15 PROCEDURE — 700112 HCHG RX REV CODE 229: Performed by: NURSE PRACTITIONER

## 2018-07-15 PROCEDURE — G8987 SELF CARE CURRENT STATUS: HCPCS | Mod: CL

## 2018-07-15 RX ADMIN — INSULIN LISPRO 3 UNITS: 100 INJECTION, SOLUTION INTRAVENOUS; SUBCUTANEOUS at 17:05

## 2018-07-15 RX ADMIN — CLINDAMYCIN HYDROCHLORIDE 300 MG: 150 CAPSULE ORAL at 17:12

## 2018-07-15 RX ADMIN — HYDRALAZINE HYDROCHLORIDE 100 MG: 50 TABLET ORAL at 17:09

## 2018-07-15 RX ADMIN — GABAPENTIN 600 MG: 300 CAPSULE ORAL at 06:16

## 2018-07-15 RX ADMIN — OXYCODONE HYDROCHLORIDE AND ACETAMINOPHEN 1 TABLET: 5; 325 TABLET ORAL at 20:57

## 2018-07-15 RX ADMIN — HYDRALAZINE HYDROCHLORIDE 100 MG: 50 TABLET ORAL at 06:17

## 2018-07-15 RX ADMIN — DOCUSATE SODIUM 100 MG: 50 LIQUID ORAL at 17:12

## 2018-07-15 RX ADMIN — OXYCODONE HYDROCHLORIDE AND ACETAMINOPHEN 1 TABLET: 5; 325 TABLET ORAL at 06:16

## 2018-07-15 RX ADMIN — CEFAZOLIN SODIUM 2 G: 2 INJECTION, SOLUTION INTRAVENOUS at 15:30

## 2018-07-15 RX ADMIN — INSULIN GLARGINE 30 UNITS: 100 INJECTION, SOLUTION SUBCUTANEOUS at 17:06

## 2018-07-15 RX ADMIN — METFORMIN HYDROCHLORIDE 1000 MG: 500 TABLET ORAL at 17:09

## 2018-07-15 RX ADMIN — GABAPENTIN 600 MG: 300 CAPSULE ORAL at 17:09

## 2018-07-15 RX ADMIN — OXYCODONE HYDROCHLORIDE AND ACETAMINOPHEN 1 TABLET: 5; 325 TABLET ORAL at 11:25

## 2018-07-15 RX ADMIN — OXYCODONE HYDROCHLORIDE AND ACETAMINOPHEN 1 TABLET: 5; 325 TABLET ORAL at 01:31

## 2018-07-15 RX ADMIN — HYDRALAZINE HYDROCHLORIDE 100 MG: 50 TABLET ORAL at 11:32

## 2018-07-15 RX ADMIN — AMLODIPINE BESYLATE 10 MG: 10 TABLET ORAL at 06:16

## 2018-07-15 RX ADMIN — GABAPENTIN 600 MG: 300 CAPSULE ORAL at 11:25

## 2018-07-15 RX ADMIN — LISINOPRIL 40 MG: 20 TABLET ORAL at 06:17

## 2018-07-15 RX ADMIN — CLINDAMYCIN HYDROCHLORIDE 300 MG: 150 CAPSULE ORAL at 06:00

## 2018-07-15 RX ADMIN — CEFAZOLIN SODIUM 2 G: 2 INJECTION, SOLUTION INTRAVENOUS at 06:17

## 2018-07-15 RX ADMIN — CYCLOBENZAPRINE 10 MG: 10 TABLET, FILM COATED ORAL at 01:31

## 2018-07-15 RX ADMIN — HYDROCHLOROTHIAZIDE 25 MG: 25 TABLET ORAL at 06:17

## 2018-07-15 RX ADMIN — INSULIN LISPRO 5 UNITS: 100 INJECTION, SOLUTION INTRAVENOUS; SUBCUTANEOUS at 11:28

## 2018-07-15 RX ADMIN — METFORMIN HYDROCHLORIDE 1000 MG: 500 TABLET ORAL at 08:03

## 2018-07-15 RX ADMIN — CEFAZOLIN SODIUM 2 G: 2 INJECTION, SOLUTION INTRAVENOUS at 20:58

## 2018-07-15 RX ADMIN — LISINOPRIL 40 MG: 20 TABLET ORAL at 17:09

## 2018-07-15 RX ADMIN — STANDARDIZED SENNA CONCENTRATE AND DOCUSATE SODIUM 1 TABLET: 8.6; 5 TABLET, FILM COATED ORAL at 20:58

## 2018-07-15 ASSESSMENT — COGNITIVE AND FUNCTIONAL STATUS - GENERAL
WALKING IN HOSPITAL ROOM: TOTAL
SUGGESTED CMS G CODE MODIFIER MOBILITY: CN
HELP NEEDED FOR BATHING: TOTAL
STANDING UP FROM CHAIR USING ARMS: TOTAL
SUGGESTED CMS G CODE MODIFIER DAILY ACTIVITY: CL
EATING MEALS: A LITTLE
CLIMB 3 TO 5 STEPS WITH RAILING: TOTAL
DRESSING REGULAR UPPER BODY CLOTHING: A LOT
PERSONAL GROOMING: A LITTLE
DAILY ACTIVITIY SCORE: 11
TOILETING: TOTAL
TURNING FROM BACK TO SIDE WHILE IN FLAT BAD: UNABLE
MOVING FROM LYING ON BACK TO SITTING ON SIDE OF FLAT BED: UNABLE
DRESSING REGULAR LOWER BODY CLOTHING: TOTAL
MOVING TO AND FROM BED TO CHAIR: UNABLE
MOBILITY SCORE: 6

## 2018-07-15 ASSESSMENT — PAIN SCALES - GENERAL
PAINLEVEL_OUTOF10: 6
PAINLEVEL_OUTOF10: 3
PAINLEVEL_OUTOF10: 2
PAINLEVEL_OUTOF10: 7
PAINLEVEL_OUTOF10: 4

## 2018-07-15 ASSESSMENT — GAIT ASSESSMENTS: GAIT LEVEL OF ASSIST: UNABLE TO PARTICIPATE

## 2018-07-15 ASSESSMENT — ACTIVITIES OF DAILY LIVING (ADL): TOILETING: INDEPENDENT

## 2018-07-15 NOTE — OP REPORT
DATE OF SERVICE:  07/13/2018    PREOPERATIVE DIAGNOSIS:  Severe lumbar stenosis with motor and sensory   radiculopathy recalcitrant to nonoperative measures.    POSTOPERATIVE DIAGNOSIS:  Severe lumbar stenosis with motor and sensory   radiculopathy recalcitrant to nonoperative measures.    PROCEDURES:  1.  Posterior lumbar 2-3, 3-4, 4-5 laminectomy.  2.  Bilateral neural foraminotomies lumbar 2, 3, 4, and 5 roots.  3.  Use of Cell Saver.    SURGEON:  Boby Marsh MD    ASSISTANT:  MYA Lomas.    ANESTHESIA:  General.    COMPLICATIONS:  None.    ESTIMATED BLOOD LOSS:  500 mL total for the cervical and lumbar procedure.    DESCRIPTION OF PROCEDURE:  Patient was already intubated and flipped into a   prone position for cervical procedure.    Midline lumbar incision was identified in the skin.  Patient was then prepped   and draped in usual sterile fashion.  Local anesthesia was infiltrated in the   subcutaneous tissue.  A 10 blade was used to incise the skin.  Dissection was   carried down in subperiosteal fashion bilaterally.  Retractors were put in   place.  Kocher was placed in what we believed to be lumbar 3.  Film was taken   confirming that we were at the correct level.  This was then marked with   Nish ronjazmín.  We then adjusted the retractors and performed a standard   laminectomy of lumbar 2-3, 3-4, and 4-5 using a combination of Leksell   rongeur, high-speed air drill, Kerrison 2, 3 and 4 punches.  Patient had very   profound stenosis at lumbar 2-3 and 3-4 and moderate to severe at lumbar 4-5.    This was relieved completely using a combination of Kerrison 2, 3, and 4   punches and upgoing curette to remove scar tissue and to confirm excellent   decompression.  We performed bilateral neural foraminotomies of lumbar 2, 3,   4, and 5 roots with a combination of Kerrison 2 and 3 punches.  We used a   double ball probe to confirm we had excellent decompression of the central   canal lateral  recesses and bilateral neural foramina.  Copious amounts of   antibiotic irrigation were used to wash out the wound.  FloSeal with gentle   tamponade was used for hemostasis.  We then left a Hemovac drain in the   epidural space, brought out through a separate stab incision.  We then closed   the incision in layers and topped with staples, Xeroform, Telfa, and Medipore   tape.  All sponge and needle counts were correct x2 at the end of the case.  I   was present and scrubbed for the entire procedure.  Patient awakened and was   transferred to recovery in stable condition.       ____________________________________     CLEMENT RANDLE MD    CPD / NTS    DD:  07/14/2018 19:20:50  DT:  07/14/2018 19:52:11    D#:  [id number]  Job#:  [id number]

## 2018-07-15 NOTE — THERAPY
"Occupational Therapy Evaluation completed.   Functional Status:  Pt is a 65 y/o male who was admitted for elective sx. Pt is s/p C2-4 posterior fx, C2-3 and C5-6 posterior laminectomy, L2-5 laminectomy and discectomy. Pt has an aspen collar donned, no LSO ordered. Pt has neutral spine precautions. Pt has been agitated prior day or two, was less upset during evaluation. Pt lives alone in a H with 4 steps into home. Pt educated about spinal precautions and demonstrated log roll with Mod A required. Pt has difficulty uprighting to sitting EOB position. Pt was Max A sit-to-stand to FWW level. Pt can only sustain position for 30 seconds or so before legs are too weak. Pt Mod A back into bed. Pt does not follow spinal precautions well. Pt will need post acute rehab in order to increase LE strength and continually reinforce spinal precautions prior to home d/c.   Plan of Care: Will benefit from Occupational Therapy 3 times per week  Discharge Recommendations:  Equipment: Will Continue to Assess for Equipment Needs. Post-acute therapy Discharge to a transitional care facility for continued skilled therapy services.    See \"Rehab Therapy-Acute\" Patient Summary Report for complete documentation.    "

## 2018-07-15 NOTE — PROGRESS NOTES
Neurosurgery Progress Note    Subjective:  Possible sundowing, nursing states some better today  States no improvement post op, however strength scoring improved.  Tolerating PO better  Not OOB much, kingston      Exam:  BUE 5/5, Bilat pf 4/5, df 3-4, IP/quad 5, incisions c/d w/ hvacs 40 out of each, bloody    BP  Min: 91/25  Max: 152/83  Pulse  Av.6  Min: 69  Max: 104  Resp  Av.8  Min: 16  Max: 20  Temp  Av.4 °C (99.3 °F)  Min: 36.8 °C (98.3 °F)  Max: 37.8 °C (100.1 °F)  SpO2  Av.4 %  Min: 90 %  Max: 97 %    No Data Recorded    Recent Labs      18   0202  07/15/18   0250   WBC  9.8  8.5   RBC  4.80  4.06*   HEMOGLOBIN  13.7*  11.6*   HEMATOCRIT  41.7*  35.1*   MCV  86.9  86.5   MCH  28.5  28.6   MCHC  32.9*  33.0*   RDW  44.7  45.2   PLATELETCT  135*  117*   MPV  12.1  11.8     Recent Labs      18   0202  07/15/18   0250   SODIUM  135  134*   POTASSIUM  4.8  4.0   CHLORIDE  100  103   CO2  21  24   GLUCOSE  410*  218*   BUN  29*  19   CREATININE  1.03  0.96   CALCIUM  8.3*  7.9*     Recent Labs      18   0910   APTT  28.6   INR  1.01           Intake/Output       18 0700 - 07/15/18 0659 07/15/18 07 - 18 0659      5438-8088 0616-8189 Total 3106-3752 0715-7189 Total       Intake    P.O.  --  175 175  --  -- --    P.O. -- 175 175 -- -- --    I.V.    --   --  -- --    PCA End of Shift Total Volume (ml) 21 -- 21 -- -- --    Total Intake 21 175 196 -- -- --       Output    Urine  1100  250 1350  1000  -- 1000    Output (mL) (Urinary Catheter Indwelling Catheter 16f) 9635 855 6726 1000 -- 1000    Drains  240  200 440  --  -- --    Output (ml) (Surgical Drain Group Neck Hemovac 1 (A)) 170 110 280 -- -- --    Output (ml) (Surgical Drain Group Back Hemovac 1 (A)) 70 90 160 -- -- --    Total Output 3683 674 0218 1000 -- 1000       Net I/O     -1319 -275 -1594 -1000 -- -1000            Intake/Output Summary (Last 24 hours) at 07/15/18 1102  Last data filed at 07/15/18 6732    Gross per 24 hour   Intake              175 ml   Output             2790 ml   Net            -2615 ml            • Methocarbamol IVPB  750 mg Q8HRS   • hydrALAZINE  5-10 mg Q4HRS PRN   • haloperidol lactate  10 mg Q4HRS PRN   • oxyCODONE-acetaminophen  1 Tab Q4HRS PRN   • HYDROmorphone  1 mg Q2HRS PRN   • amLODIPine  10 mg Q DAY   • clindamycin  300 mg BID   • [START ON 7/18/2018] cloNIDine  1 Patch Q WEDNESDAY   • fluticasone  1 Spray DAILY   • gabapentin  300-600 mg TID   • hydrALAZINE  100 mg TID   • hydroCHLOROthiazide  25 mg DAILY   • insulin glargine  30 Units Q EVENING   • lisinopril  40 mg BID   • metFORMIN  1,000 mg BID WITH MEALS   • Pharmacy Consult Request  1 Each PRN   • MD ALERT...Do not administer NSAIDS or ASPIRIN unless ORDERED By Neurosurgery  1 Each PRN   • senna-docusate  1 Tab Nightly   • senna-docusate  1 Tab Q24HRS PRN   • polyethylene glycol/lytes  1 Packet BID PRN   • magnesium hydroxide  30 mL QDAY PRN   • bisacodyl  10 mg Q24HRS PRN   • fleet  1 Each Once PRN   • NS   Continuous   • ceFAZolin  2 g Q8HR   • diphenhydrAMINE  25 mg Q6HRS PRN    Or   • diphenhydrAMINE  25 mg Q6HRS PRN   • ondansetron  4 mg Q4HRS PRN   • ondansetron  4 mg Q4HRS PRN   • methocarbamol  750 mg Q8HRS PRN    Or   • cyclobenzaprine  10 mg Q8HRS PRN    Or   • diazePAM  5 mg Q4HRS PRN   • labetalol  10 mg Q HOUR PRN   • cloNIDine  0.1 mg Q4HRS PRN   • insulin lispro  0-12 Units TID AC   • Respiratory Care per Protocol   Continuous RT   • docusate sodium 100mg/10mL  100 mg BID       Assessment and Plan:  Hospital day #2  POD #2 post C2-3 and C5-6 lami, C2-4 fusion, L2-S1 lami  Prophylactic anticoagulation: no         Start date/time: tbd    COntinue to monitor confusion  FSBS slowly improving  Pt/ot- mobilize  Collar when oob  hvac x 2- monitor outpu  D/c kingston- UA + preop- confusion- redraw UA sterile sample

## 2018-07-15 NOTE — CARE PLAN
Problem: Pain Management  Goal: Pain level will decrease to patient's comfort goal  Outcome: PROGRESSING AS EXPECTED      Problem: Safety  Goal: Will remain free from injury  Outcome: PROGRESSING AS EXPECTED

## 2018-07-15 NOTE — THERAPY
"Physical Therapy Evaluation completed.   Bed Mobility:  Supine to Sit: Maximal Assist (pt able to initiate rolling; required assist for trunk/B LE)  Transfers: Sit to Stand: Maximal Assist (x3 trials with inability to achieve upright)  Gait: Level Of Assist: Unable to Participate with No Equipment Needed       Plan of Care: Will benefit from Physical Therapy 4 times per week  Discharge Recommendations: Equipment: Will Continue to Assess for Equipment Needs. Post-acute therapy Discharge to a transitional care facility for continued skilled therapy services.    Pt presents with decreased functional mobility most attributable to generalized discomfort to back and neck, decreased B LE strength, and possibly cognition. He required mod to max A for bed mobility and max A to initiate sit to stand with FWW. Once his bottom lifted from bed, he sustained standing posture with excessive knee and hip flexion and was unable to extend despite cueing. Pt appeared somewhat delayed throughout eval and also appeared to fall asleep shortly after return to supine. He consistently followed 1-step commands throughout. While here, pt will benefit from ongoing acute PT services to progress his mobility. Anticipate he will require placement upon DC.     See \"Rehab Therapy-Acute\" Patient Summary Report for complete documentation.     "

## 2018-07-15 NOTE — PROGRESS NOTES
Pt heard screaming down hallway and banging noises coming from room. When this RN entered room Pts room, pt was slamming head into bed rail. When pt was asked what he was doing pt said taking pain away from my hip surgery site. Pt reoriented to surgery and what he is here for.

## 2018-07-15 NOTE — OP REPORT
DATE OF SERVICE:  07/13/2018    PREOPERATIVE DIAGNOSIS:  Cervical stenosis with myelopathy.    POSTOPERATIVE DIAGNOSIS:  Cervical stenosis with myelopathy.    PROCEDURES:  1.  Posterior cervical 2, 3, 4, 5, and 6 laminectomy.  2.  Bilateral neural foraminotomies, cervical 3, 4, 5, and 6 roots.  3.  Lateral mass screw instrumentation cervical 2, 3, and 4.  4.  Use of Fluoroscopic guidance for screw placement.  5.  Posterolateral arthrodesis, cervical 2, 3, and 4.  6.  Use of locally harvested morselized autograft.  7.  Use of allograft.  8.  Use of modifier 22 for extensive difficulty with positioning the patient   on the table using the Gordon table for flip.  9.  Use of intraoperative neuro monitoring.    SURGEON:  Boby Marsh MD    ASSISTANT:  MYA Niño    ANESTHESIA:  General.    COMPLICATIONS:  None.    ESTIMATED BLOOD LOSS:  400 mL.    DESCRIPTION OF PROCEDURE:  Patient was brought to the operating room,   identified in the usual fashion.  General endotracheal anesthesia was induced   by the anesthesia team.  Patient was then placed supine on the operating   table.  All pressure points were meticulously padded.  Head was placed in   Gutierrez 3-point pin fixation to approximately 60 PSI.  We got baseline neuro   monitoring, which was quite poor.  The patient was then sandwiched on the   Gordon table and flipped into a prone position on the Gordon table.  Again,   pressure points were meticulously padded and there were no changes to neuro   monitoring on the flip.  We then used surgical clippers to clip the back of   the patient's hair.  A linear incision was marked in the midline.  Patient was   then prepped and draped in the usual sterile fashion.  Local anesthesia was   infiltrated in the subcutaneous tissue.  A 10 blade was used to incise the   skin.  Dissection was carried down through the midline raphae using Bovie   electrocautery.  Retractors were put in place.  We then  performed a   subperiosteal dissection bilaterally.  Kocher was placed in what we believed   to be cervical 2.  Film was taken confirming that we were at the correct   level.  This was then marked with a Leksell rongeur.  We then performed a   standard laminectomy of cervical 2, 3, 4, 5, and 6 using combination of   Leksell rongeur, high-speed air drill, Kerrison 1, 2, and 3 punches.  Neuro   monitoring ____ actually improved during the decompression.  Once we had   adequate decompression, we then performed foraminotomy to cervical 3, 4, 5,   and 6 roots using a combination of Kerrison 1 and 2 punches.  FloSeal with   gentle tamponade was used for hemostasis.  Double ball probe confirmed we had   excellent decompression of the central canal, lateral recesses, and bilateral   neural foramina.  Once this was done, we then used high-speed air drill to   drill  holes at cervical 2, 3, and 4.  Cannulating drill was used to 14   mm.  Angela confirmed we had no breach.  We then placed 3.5x14 mm screws at   cervical 2, 3, and 4.  Film was taken confirming again that we were at the   correct level and the hardware looked to be in excellent position.  The   appropriately sized teodoro was placed bilaterally.  Nut caps were placed and   final tightened.  Copious amounts of antibiotic irrigation were used to wash   out the wound.  FloSeal with gentle tamponade was used for hemostasis.  We   decorticated the lateral masses using high-speed air drill.  We packed locally   harvested morselized autograft and allograft into the gutters bilaterally.    We left a Hemovac drain in the epidural space, brought out through a separate   stab incision.  We left vancomycin powder on top of the hardware.  We then   removed all retractors.  Hemostasis was meticulous.  We then closed the   incision in layers and topped with staples, Xeroform, Telfa, and Medipore   tape.  I was present and scrubbed in for the entire procedure.  Patient was    kept intubated for part 2.       ____________________________________     MD CARLOS RUEDA / TARAH    DD:  07/14/2018 19:17:58  DT:  07/14/2018 19:50:04    D#:  [id number]  Job#:  [id number]

## 2018-07-15 NOTE — PROGRESS NOTES
Pt had agreed to go to CT to check out his right arm and then when transport picked him up he refused to go. This msg was given to Trauma RN and she said she would let Ortho know.   Paged Speech to see if they could come and do a cognitive eval on pt today. He had refused yesterday.

## 2018-07-16 LAB
GLUCOSE BLD-MCNC: 204 MG/DL (ref 65–99)
GLUCOSE BLD-MCNC: 214 MG/DL (ref 65–99)
GLUCOSE BLD-MCNC: 262 MG/DL (ref 65–99)

## 2018-07-16 PROCEDURE — 97530 THERAPEUTIC ACTIVITIES: CPT

## 2018-07-16 PROCEDURE — A9270 NON-COVERED ITEM OR SERVICE: HCPCS | Performed by: CLINICAL NURSE SPECIALIST

## 2018-07-16 PROCEDURE — 700102 HCHG RX REV CODE 250 W/ 637 OVERRIDE(OP): Performed by: NURSE PRACTITIONER

## 2018-07-16 PROCEDURE — 700102 HCHG RX REV CODE 250 W/ 637 OVERRIDE(OP): Performed by: CLINICAL NURSE SPECIALIST

## 2018-07-16 PROCEDURE — 51798 US URINE CAPACITY MEASURE: CPT

## 2018-07-16 PROCEDURE — A9270 NON-COVERED ITEM OR SERVICE: HCPCS | Performed by: NURSE PRACTITIONER

## 2018-07-16 PROCEDURE — 700111 HCHG RX REV CODE 636 W/ 250 OVERRIDE (IP): Performed by: NURSE PRACTITIONER

## 2018-07-16 PROCEDURE — 770001 HCHG ROOM/CARE - MED/SURG/GYN PRIV*

## 2018-07-16 PROCEDURE — 700112 HCHG RX REV CODE 229: Performed by: NURSE PRACTITIONER

## 2018-07-16 PROCEDURE — 82962 GLUCOSE BLOOD TEST: CPT | Mod: 91

## 2018-07-16 RX ORDER — INSULIN GLARGINE 100 [IU]/ML
35 INJECTION, SOLUTION SUBCUTANEOUS EVERY EVENING
Status: DISCONTINUED | OUTPATIENT
Start: 2018-07-16 | End: 2018-07-18 | Stop reason: HOSPADM

## 2018-07-16 RX ADMIN — LISINOPRIL 40 MG: 20 TABLET ORAL at 06:24

## 2018-07-16 RX ADMIN — INSULIN LISPRO 3 UNITS: 100 INJECTION, SOLUTION INTRAVENOUS; SUBCUTANEOUS at 06:22

## 2018-07-16 RX ADMIN — CEFAZOLIN SODIUM 2 G: 2 INJECTION, SOLUTION INTRAVENOUS at 14:20

## 2018-07-16 RX ADMIN — CLINDAMYCIN HYDROCHLORIDE 300 MG: 150 CAPSULE ORAL at 17:08

## 2018-07-16 RX ADMIN — INSULIN LISPRO 3 UNITS: 100 INJECTION, SOLUTION INTRAVENOUS; SUBCUTANEOUS at 11:25

## 2018-07-16 RX ADMIN — METFORMIN HYDROCHLORIDE 1000 MG: 500 TABLET ORAL at 17:08

## 2018-07-16 RX ADMIN — AMLODIPINE BESYLATE 10 MG: 10 TABLET ORAL at 06:24

## 2018-07-16 RX ADMIN — CYCLOBENZAPRINE 10 MG: 10 TABLET, FILM COATED ORAL at 01:36

## 2018-07-16 RX ADMIN — HYDRALAZINE HYDROCHLORIDE 100 MG: 50 TABLET ORAL at 06:24

## 2018-07-16 RX ADMIN — STANDARDIZED SENNA CONCENTRATE AND DOCUSATE SODIUM 1 TABLET: 8.6; 5 TABLET, FILM COATED ORAL at 17:09

## 2018-07-16 RX ADMIN — GABAPENTIN 600 MG: 300 CAPSULE ORAL at 12:14

## 2018-07-16 RX ADMIN — GABAPENTIN 300 MG: 300 CAPSULE ORAL at 17:08

## 2018-07-16 RX ADMIN — OXYCODONE HYDROCHLORIDE AND ACETAMINOPHEN 1 TABLET: 5; 325 TABLET ORAL at 09:44

## 2018-07-16 RX ADMIN — CEFAZOLIN SODIUM 2 G: 2 INJECTION, SOLUTION INTRAVENOUS at 21:00

## 2018-07-16 RX ADMIN — DOCUSATE SODIUM 100 MG: 50 LIQUID ORAL at 17:08

## 2018-07-16 RX ADMIN — DOCUSATE SODIUM 100 MG: 50 LIQUID ORAL at 06:25

## 2018-07-16 RX ADMIN — INSULIN GLARGINE 35 UNITS: 100 INJECTION, SOLUTION SUBCUTANEOUS at 17:12

## 2018-07-16 RX ADMIN — METFORMIN HYDROCHLORIDE 1000 MG: 500 TABLET ORAL at 08:06

## 2018-07-16 RX ADMIN — LISINOPRIL 40 MG: 20 TABLET ORAL at 17:16

## 2018-07-16 RX ADMIN — HYDROCHLOROTHIAZIDE 25 MG: 25 TABLET ORAL at 06:24

## 2018-07-16 RX ADMIN — OXYCODONE HYDROCHLORIDE AND ACETAMINOPHEN 1 TABLET: 5; 325 TABLET ORAL at 20:36

## 2018-07-16 RX ADMIN — HYDRALAZINE HYDROCHLORIDE 100 MG: 50 TABLET ORAL at 17:16

## 2018-07-16 RX ADMIN — INSULIN LISPRO 5 UNITS: 100 INJECTION, SOLUTION INTRAVENOUS; SUBCUTANEOUS at 17:11

## 2018-07-16 RX ADMIN — GABAPENTIN 600 MG: 300 CAPSULE ORAL at 06:24

## 2018-07-16 RX ADMIN — CLINDAMYCIN HYDROCHLORIDE 300 MG: 150 CAPSULE ORAL at 06:25

## 2018-07-16 RX ADMIN — FLUTICASONE PROPIONATE 50 MCG: 50 SPRAY, METERED NASAL at 06:25

## 2018-07-16 RX ADMIN — CEFAZOLIN SODIUM 2 G: 2 INJECTION, SOLUTION INTRAVENOUS at 06:24

## 2018-07-16 ASSESSMENT — PAIN SCALES - GENERAL
PAINLEVEL_OUTOF10: 4
PAINLEVEL_OUTOF10: 3
PAINLEVEL_OUTOF10: 7
PAINLEVEL_OUTOF10: 5
PAINLEVEL_OUTOF10: 2
PAINLEVEL_OUTOF10: 7
PAINLEVEL_OUTOF10: 5
PAINLEVEL_OUTOF10: 7

## 2018-07-16 NOTE — CONSULTS
Physical Medicine and Rehabilitation Consultation    Date of Consultation: 7/16/2018  Consulting provider: MYA Lomas  Reason for consultation: assess for acute inpatient rehab appropriateness  Chief complaint: back pain    HPI: The patient is a 67 y.o. male with a past medical history of atrial fibrillation/strokes on coumadin, diabetes, infected knee hardware with MRSA, cervical myelopathy, and lumbar stenosis, admitted on 7/13/2018  8:21 AM for elective cervical and lumbar surgeries.    Per review of the medical record and confirmation with the patient, patient had progressively worsening function due to severe cervical and lumbar stenosis. Per review of documentation, patient was only able to ambulate minimally around his house prior to these scheduled surgeries. He had a SCDF 3-7 with Dr. Jaffe in 2016, with chronic dexterity issues for which and neck pain and paresthesias in right digits 1-3. He had a history of a lumbar fusion, L4-S1, way back in 1973, and most recently had complaints of severe back and leg pain, with weakness.     He also has a history of 2 strokes that initially caused him to have temporary right sided weakness and speech difficulties, for which he has been on coumadin. Review of last MRI of brain in 7/20156 showed multiple areas of chronic infarction in bilateral cortex/cerebellum. He was cleared by cardiology for these surgeries, considered moderate risk, and on 7/13 he underwent: C2-4 posterior cervical fusion, C2-6 posterior laminectomies, and L2-5 diskectomies/laminectomies with Dr. Marsh on 7/13. Post-operatively required Haldol for psychosis, and worsened left arm weakness thought due to inflammation.     The patient currently reports severe neck, low back, and pain in his arms/hands. He reports severe paresthesias in his right hand, worse since surgery, with chronic paresthesia in his feet. He has used 4 Percocet's yesterday, and is on as needed Robaxin. He denies any  radicular leg pain. He is right hand dominant. Has chronic weakness in his right hand from his first neck surgery 2 years ago. Was just incontinent of urine, he states because he was sleeping. Last BM 7/13. Denies any bowel/bladder issues prior to his surgeries. Patient states he's on chronci antibiotics for a skin issue on his back, but then confirms a history of MRSA in his right total knee replacement hardware. He states he was able to stand up today, which is an improvement, and he understands he'd have to do 3 hours of daily therapy, as he did inpatient rehabilitation back in 2016 after his first neck surgery.    ROS:  A comprehensive review of systems was completed and is negative unless otherwise noted in the above HPI.    Social Hx:  Pre-morbidly, this patient lived in a single level home with 4 steps to enter, alone and able to care for self. Single, 4 grown sons who he says will be able to assist at discharge, though they do work.  Employment: used to work in construction.  Tobacco: denies  Alcohol: denies  Drugs: denies    Prior level of function:   Independent,     Current level of function:  The patient was evaluated by acute care Physical Therapy and Occupational Therapy; currently requiring maximal assistance for mobility and maximal assistance for ADLs.     PMH:  Past Medical History:   Diagnosis Date   • Anesthesia     low O2 sat   • Arrhythmia     a-fib   • arthritis 6/17/2011    thumb, fingers   • Arthritis     hip   • Cataract     left eye surgery   • Dental disorder     full dentures   • Diabetes     insulin, Dr Oconnor, his APN   • High cholesterol    • Hypertension    • MRSA (methicillin resistant Staphylococcus aureus)     history of, nothing current 2016, on clindamycin for rest of life per patient   • JANNIE treated with BiPAP 4/18/2016   • Pain 05-03-13    shoulders, hip, right knee, 7/10   • Pneumonia 2002   • Renal disorder     stage 2   • Sleep apnea     bipap   • Stroke (HCC)      2/1/2007, 4/1/2007, right sided weakness; balance disturbance, memory problems   • Thyroid mass 7/30/2009    benign lump   • Ventricular ectopy 6/17/2011       PSH:  Past Surgical History:   Procedure Laterality Date   • CERVICAL FUSION POSTERIOR  7/13/2018    Procedure: CERVICAL FUSION POSTERIOR/ C2-4;  Surgeon: Boby Marsh M.D.;  Location: [facility];  Service: Neurosurgery   • CERVICAL LAMINECTOMY POSTERIOR  7/13/2018    Procedure: CERVICAL LAMINECTOMY POSTERIOR/ C2-3, C5-6;  Surgeon: Boby Marsh M.D.;  Location: [facility];  Service: Neurosurgery   • LUMBAR LAMINECTOMY DISKECTOMY  7/13/2018    Procedure: LUMBAR LAMINECTOMY DISKECTOMY/ L2-5 LAMI;  Surgeon: Boby Marsh M.D.;  Location: [facility];  Service: Neurosurgery   • CERVICAL DISK AND FUSION ANTERIOR  8/31/2016    Procedure: CERVICAL DISK AND FUSION ANTERIOR C3-7 ;  Surgeon: Alhaji Jaffe M.D.;  Location: [facility];  Service:    • CATARACT PHACO WITH IOL  5/8/2013    Performed by Mame Rehman M.D. at SURGERY SAME DAY [facility]   • IRRIGATION & DEBRIDEMENT ORTHO  11/13/2012    Performed by Gordon Cota M.D. at [facility]   • KNEE REVISION TOTAL  11/13/2012    Performed by Gordon Cota M.D. at [facility]   • IRRIGATION & DEBRIDEMENT ORTHO  11/2/2012    Performed by Gordon Cota M.D. at [facility]   • IRRIGATION & DEBRIDEMENT ORTHO Left 10/29/2012    Procedure: left shoulder, with drain;  Surgeon: Gordon Cota M.D.;  Location: [facility];  Service:    • INCISION AND DRAINAGE ORTHOPEDIC Right 10/29/2012    Procedure: Right Total Knee I and D and Liner Exchange, with drain;  Surgeon: Gordon Cota M.D.;  Location: [facility];  Service:    • IRRIGATION & DEBRIDEMENT ORTHO  3/13/2012    Performed by KAMALJIT SHI at [facility]   • KNEE REVISION TOTAL  3/13/2012    Performed by  KAMALJIT SHI at SURGERY [facility]   • TENDON REPAIR  3/13/2012    Performed by KAMALJIT SHI at SURGERY [facility]   • KNEE ARTHROPLASTY TOTAL  1/11/2012    Right; Performed by KAMALJIT SHI at SURGERY [facility]   • TOE AMPUTATION  7/22/2011    Performed by EVELYN JANE at SURGERY [facility]   • ELBOW ARTHROTOMY  2008    right   • LUMBAR FUSION POSTERIOR  1979   • FOOT SURGERY      partial amputation great toe   • FOOT SURGERY      toe surgery left foot   • OTHER      left eye cataract   • OTHER NEUROLOGICAL SURG      neck fusion   • PB KNEE SCOPE,SINGLE MENISECTOMY      right knee       FHX:  Family History   Problem Relation Age of Onset   • Diabetes Mother    • Cancer Father      lung cancer   • Heart Disease Father      triple bypass   • Diabetes     • Hypertension     • Cancer         Medications:  Current Facility-Administered Medications   Medication Dose   • insulin glargine (LANTUS) injection 35 Units  35 Units   • hydrALAZINE (APRESOLINE) injection 5-10 mg  5-10 mg   • haloperidol lactate (HALDOL) injection 10 mg  10 mg   • oxyCODONE-acetaminophen (PERCOCET) 5-325 MG per tablet 1 Tab  1 Tab   • HYDROmorphone (DILAUDID) injection 1 mg  1 mg   • amLODIPine (NORVASC) tablet 10 mg  10 mg   • clindamycin (CLEOCIN) capsule 300 mg  300 mg   • [START ON 7/18/2018] cloNIDine (CATAPRES) 0.3 MG/24HR 1 Patch  1 Patch   • fluticasone (FLONASE) nasal spray 50 mcg  1 Spray   • gabapentin (NEURONTIN) capsule 300-600 mg  300-600 mg   • hydrALAZINE (APRESOLINE) tablet 100 mg  100 mg   • hydroCHLOROthiazide (HYDRODIURIL) tablet 25 mg  25 mg   • lisinopril (PRINIVIL) tablet 40 mg  40 mg   • metFORMIN (GLUCOPHAGE) tablet 1,000 mg  1,000 mg   • Pharmacy Consult Request ...Pain Management Review 1 Each  1 Each   • MD ALERT...Do not administer NSAIDS or ASPIRIN unless ORDERED By Neurosurgery 1 Each  1 Each   • senna-docusate (PERICOLACE or SENOKOT S) 8.6-50 MG per tablet 1 Tab  1 Tab   •  "senna-docusate (PERICOLACE or SENOKOT S) 8.6-50 MG per tablet 1 Tab  1 Tab   • polyethylene glycol/lytes (MIRALAX) PACKET 1 Packet  1 Packet   • magnesium hydroxide (MILK OF MAGNESIA) suspension 30 mL  30 mL   • bisacodyl (DULCOLAX) suppository 10 mg  10 mg   • fleet enema 133 mL  1 Each   • NS infusion     • ceFAZolin (ANCEF) IVPB 2 g  2 g   • diphenhydrAMINE (BENADRYL) tablet/capsule 25 mg  25 mg    Or   • diphenhydrAMINE (BENADRYL) injection 25 mg  25 mg   • ondansetron (ZOFRAN) syringe/vial injection 4 mg  4 mg   • ondansetron (ZOFRAN ODT) dispertab 4 mg  4 mg   • methocarbamol (ROBAXIN) tablet 750 mg  750 mg    Or   • cyclobenzaprine (FLEXERIL) tablet 10 mg  10 mg    Or   • diazePAM (VALIUM) tablet 5 mg  5 mg   • labetalol (NORMODYNE,TRANDATE) injection 10 mg  10 mg   • cloNIDine (CATAPRES) tablet 0.1 mg  0.1 mg   • insulin lispro (HUMALOG) injection 0-12 Units  0-12 Units   • Respiratory Care per Protocol     • docusate sodium 100mg/10mL (COLACE) solution 100 mg  100 mg       Allergies:  Allergies   Allergen Reactions   • Demerol      Makes me stop breathing.  Doesn't like because it makes him high   • Statins [Hmg-Coa-R Inhibitors] Myalgia     Rxn - ongoing         Physical Exam:  Vitals: Blood pressure (!) 92/58, pulse (!) 107, temperature 37.3 °C (99.2 °F), resp. rate 16, height 1.93 m (6' 4\"), weight 120.9 kg (266 lb 8.6 oz), SpO2 93 %.  Gen: NAD  HEENT: cervical collar in place, dry mucous membranes  Cardio: tachymardic, no murmurs, 2+ peripheral edema  Pulm: CTAB, with normal respiratory effort, on room air  Abd: Soft NTND, active bowel sounds  Ext: No calf tenderness. No clubbing/cyanosis.    Neuro:  Mental status:  A&Ox4 (person, place, date, situation) answers questions appropriately follows commands    Motor:      Shoulder flexors:  Right -  4/5, Left -  3/5  Elbow flexors:  Right -  4/5, Left -  4/5  Elbow extensors:  Right -  4/5, Left -  4/5  Right  weaker than left  Notable atrophy of the " right hand intrinsics  Hip flexors:  Right -  2/5, Left -  2/5 both pain limited (back)  Knee ext:  Right -  3/5, Left -  3/5 both pain limited (back)  Dorsiflexors:  Right -  4/5, Left -  3/5  Plantar flexors:  Right -  4/5, Left -  4/5     Sensory:   intact to light touch through out  Hyper analgesia to touch on the right hand  Absent to vibration at bilateral great toes    DTRs: 2+ in RUE, 1+ in LUE, 0+ in bilateral patellar and achilles tendons  No clonus at bilateral ankles  Negative babinski b/l  Negative Long b/l     Labs:  Recent Labs      07/14/18   0202  07/15/18   0250   RBC  4.80  4.06*   HEMOGLOBIN  13.7*  11.6*   HEMATOCRIT  41.7*  35.1*   PLATELETCT  135*  117*     Recent Labs      07/14/18   0202  07/15/18   0250   SODIUM  135  134*   POTASSIUM  4.8  4.0   CHLORIDE  100  103   CO2  21  24   GLUCOSE  410*  218*   BUN  29*  19   CREATININE  1.03  0.96   CALCIUM  8.3*  7.9*     Recent Results (from the past 24 hour(s))   ACCU-CHEK GLUCOSE    Collection Time: 07/15/18  5:04 PM   Result Value Ref Range    Glucose - Accu-Ck 242 (H) 65 - 99 mg/dL   ACCU-CHEK GLUCOSE    Collection Time: 07/16/18  6:16 AM   Result Value Ref Range    Glucose - Accu-Ck 204 (H) 65 - 99 mg/dL   ACCU-CHEK GLUCOSE    Collection Time: 07/16/18 11:18 AM   Result Value Ref Range    Glucose - Accu-Ck 214 (H) 65 - 99 mg/dL       RADIOLOGY  Images personally reviewed.    5/7/2018 1:02 PM    HISTORY/REASON FOR EXAM:  Chronic neck pain with BILATERAL upper and lower extremity weakness, prior cervical fusion.      TECHNIQUE/EXAM DESCRIPTION: MRI of the cervical spine without and with contrast.    The study was performed on a [employer] 1.5 Martha MRI scanner.  T1 sagittal, T2 fast spin-echo sagittal, T1 postcontrast fat-suppressed sagittal, and gradient-echo axial images were obtained of the cervical spine before contrast. Following the   administration of intravenous contrast sagittal and optional axial T1 fat-suppressed images were  obtained.  20 mL Omniscan contrast was administered intravenously.    COMPARISON:  7/28/2016    FINDINGS:  There has been interval anterior instrumented fusion at C3-C7. The hardware creates magnetic susceptibility artifact.    There is no acute fracture or gross malalignment.  There is mild to moderate loss of intervertebral disc height at C2-C3 and C7-T1.  No abnormal cord signal is present.  There is some encephalomalacia in the BILATERAL cerebellum and LEFT occipital lobe which is not demonstrably changed.  No area of abnormal enhancement is seen.      Findings specific to each level are described below:  C2-3: Posterior disc osteophyte complex indenting the ventral cord surface. Mild to moderate LEFT facet arthropathy. Moderate central canal narrowing. Mild BILATERAL neural foraminal narrowing.  C3-4:  Anterior fusion. Posterior disc osteophyte complex eccentric to the RIGHT more than the LEFT lateral recess and foraminal zone. Moderate central canal narrowing. Severe BILATERAL neural foraminal narrowing.  C4-5:  Anterior fusion. Posterior disc osteophyte complexes and BILATERAL foraminal zones and indenting the ventral cord surface. Severe BILATERAL neural foraminal narrowing. Moderate central canal narrowing.  C5-6: Anterior fusion. Posterior disc osteophyte complex extending to the BILATERAL foraminal zones and indenting the ventral cord surface. Moderate to severe central canal narrowing. Moderate BILATERAL neural foraminal narrowing.  C6-7: Anterior fusion. Posterior disc osteophyte complex indenting the ventral cord surface and extending to the LEFT more than the RIGHT foraminal zone and lateral recess. Moderate central canal narrowing. Moderate BILATERAL neural foraminal narrowing.  C7-T1: Anterior fusion. Mild BILATERAL facet arthropathy.    There is a 26 mm T2 hyperintense and T1 low intensity enhancing LEFT thyroid mass.   Impression       1.  Multilevel multifactorial degenerative changes  2.   Interval anterior fusion at C3-C7  3.  Central canal narrowing worst at C5-C6 but also present at other levels  4.  Other areas of central canal and neural foraminal narrowing as described above  5.  BILATERAL cerebellar and LEFT occipital lobe encephalomalacia likely related to remote ischemic insult  6.  2.6 cm LEFT thyroid mass, similar to prior study. Further assessment could be performed with ultrasound-guided fine needle aspiration if clinically warranted.     5/7/2018 1:02 PM    HISTORY/REASON FOR EXAM:  Chronic back pain. Upper and lower extremity weakness.      TECHNIQUE/EXAM DESCRIPTION:  MRI of the lumbar spine without contrast.    The study was performed on a [employer] 1.5 Martha MRI scanner.  T1 sagittal, T1 postcontrast fat-suppressed sagittal, T2 sagittal, T2 sagittal fat suppressed and T2 axial images were obtained of the lumbar spine.  20 mL Omniscan contrast were   administered intravenously.    COMPARISON: None.    FINDINGS:The lowest formed intervertebral disc will be designated L5-S1 for the purposes of this report and vertebral levels numbered accordingly.      There is moderate chronic height loss in the T12 vertebral body. There are Schmorl nodes at most levels.  There is mild degenerative retrolisthesis of T12 on L1, L1-L2, L2 on L3, L3 on L4 and L4 on L5. There is 6 mm of degenerative anterolisthesis of L5 on S1.    There is loss of intervertebral disc height throughout the lumbar spine, most advanced L5-S1 where it is severe. There is slight enhancement in the posterior L2-L3 intervertebral disc following contrast administration. There are adjacent endplate   irregularities and faint edema in the inferior L2 and superior L3 intervertebral discs.  No abnormal cord signal is present.    Level specific findings as follows:  T10-T11: Incompletely imaged. Diffuse disc bulge. Moderate central canal narrowing.  T11-T12: Diffuse disc bulge. Moderate central canal narrowing.  T12-L1: Diffuse disc  bulge. Mild LEFT and moderate RIGHT facet arthropathy. Mild central canal narrowing. Mild RIGHT neural foraminal narrowing.  L1-2: Diffuse disc bulge. Mild to moderate BILATERAL facet arthropathy. Mild central canal narrowing. Mild LEFT neural foraminal narrowing.  L2-3: Diffuse disc bulge. Mild to moderate BILATERAL facet arthropathy. Moderate central canal narrowing. Moderate RIGHT and moderate to severe LEFT neural foraminal narrowing.  L3-4: Diffuse disc bulge. Moderate BILATERAL facet arthropathy. Severe central canal narrowing. Moderate RIGHT and severe LEFT neural foraminal narrowing.  L4-5: Diffuse disc bulge. Moderate BILATERAL facet arthropathy. Moderate central canal narrowing. Moderate RIGHT and severe LEFT neural foraminal narrowing.  L5-S1: Posterior disc protrusion with associated osteophytes. Moderate to severe BILATERAL facet arthropathy.    There are changes of BILATERAL sacroiliac arthropathy.    Soft tissues: No abdominal aortic aneurysm or soft tissue mass is seen.   Impression       1.  Multilevel multifactorial degenerative changes  2.  Enhancement in the posterior L2-L3 disc and adjacent endplates could be due to the advanced degenerative disc disease at this level although the enhancement of the disc raises the possibility of discitis osteomyelitis  3.  Central canal narrowing worst at L3-L4 but also present at other levels  4.  Other areas of central canal and neural foraminal narrowing as described above  5.  Incompletely imaged thoracic spondylosis with at least moderate central canal narrowing at T10-T11         ASSESSMENT:    Patient is a 67 y.o. male admitted with cervical myelopathy and severe lumbar stenosis with radiculpahthy now s/p C2-4 posterior cervical fusion, C2-6 posterior laminectomies, and L2-5 diskectomies/laminectomies with Dr. Marsh on 7/13.      Patient with multiple co-morbidities(including but not limited to atrial fibrillation, sleep apnea, diabetes, hypertension,  MRSA knee hardware infection on chronic clindamycin, anemia, thrombocytopenia, hypnatremia); with cognitive deficits and functional deficits in mobility/self-care, and Severe de-conditioning.     Pre-morbidly, this patient lived in a single level home with Four steps to enter, alone and able to care for self. The patient was evaluated by acute care Physical Therapy and Occupational Therapy; currently requiring maximal assistance for mobility and maximal assistance for ADLs.     The patient is a(n) Fair candidate for an acute inpatient rehabilitation program with a coordinated program of care at an intensity and frequency not available at a lower level of care.     He has too much medical complexity for a skilled nursing level of rehabilitation, and needs daily physician rounding.     Note: if patient continues to progress while waiting for medical clearance, and no longer requires 2 of of 3 therapy services (PT/OT/SLP), patient will no longer need acute inpatient rehabilitation.    This recommendation is substantiated by the patient's current medical condition with intervention and assessment of medical issues requiring an acute level of care for patient's safety and maximum outcome. A coordinated program of care will be provided by an interdisciplinary team including physical therapy, occupational therapy, hospitalist, physiatry, rehab nursing and rehab psychology. Rehab goals include improved mobility, self-care management, strength and conditioning/endurance, pain management, bowel and bladder management, mood and affect, and safety with independent home management including caregiver training.     Estimated length of stay is approximately 28 days. Rehab potential: Fair. Disposition: to pre-morbid independent living setting with supportive care of patient's family. We will continue to follow with you in anticipation of discharge to acute inpatient rehabilitation when medically stable to do so at the discretion of  his  attending physician. Thank you for allowing us to participate in his care. Please call with any questions regarding this recommendation.    Beronica Foster M.D.  Physical Medicine and Rehabilitation

## 2018-07-16 NOTE — CARE PLAN
Problem: Pain Management  Goal: Pain level will decrease to patient's comfort goal  Outcome: PROGRESSING AS EXPECTED  Discussed pain management POC with pt.  Assessed pain Q2hr.  Administered oxycodone PRN.    Problem: Safety  Goal: Will remain free from falls  Outcome: PROGRESSING AS EXPECTED  Assessed the patient for fall risk factors. Provided education regarding the need to call for assistance. Bed alarm in place, bed in lowest position, call light in reach.

## 2018-07-16 NOTE — CARE PLAN
Problem: Pain Management  Goal: Pain level will decrease to patient's comfort goal  Outcome: PROGRESSING AS EXPECTED    Intervention: Follow pain managment plan developed in collaboration with patient and Interdisciplinary Team  Pt is able to fall asleep and rest after he has his pain pill.      Problem: Communication  Goal: The ability to communicate needs accurately and effectively will improve  Outcome: PROGRESSING AS EXPECTED  Pt is educated on how to use the call light when he needs staff to assist him

## 2018-07-16 NOTE — DISCHARGE PLANNING
Anticipated Discharge Disposition:   IRF following    Action:   Chart review    Barriers to Discharge:   Pending acceptance by IRF  Pending medical clearance  Per Physiatry MD note: pt has too much medical complexity for SNF loc.     Plan:   Follow up with IRF.

## 2018-07-16 NOTE — PROGRESS NOTES
Pt is orientated but still has moments of confusion. Pt asked me to get his walker off of him and his walker was in the bathroom. He insisted that his walker was on his feet and pointed to the SCD's on his legs. Pt was educated on the SCD's and what they do and that they are not a walker. POC reviewed and call light within reach.

## 2018-07-16 NOTE — THERAPY
"Occupational Therapy Treatment completed with focus on ADL transfers and patient education.  Functional Status:  Pt was agreeable to tx. Pt sitting EOB upon entering. Pt reported he wanted to stand up. Pt was Total A x2 to stand upon initial sit to stand. Pt was hard to upright standing, often has a posterior pelvic position. When cued to stand up straight, pt was only able to sustain position for 15-20 seconds before needing to sit. Pt educated on need to sit up for meals, pt agreeable to transfer to chair. Pt was Max Ax2 for safety to stand-pivot transfer to chair for lunch meal, was significantly more assist last 10 seconds of transfer. Pt was same level of assist back into bed following lunch. Pt is not safe for bedside commode chair transfers at this time without assist of PT/OT, will need more LE strengthening and endurance training prior to nursing only transfers. Pt will still require post acute rehab d/t poor mobility and strength.   Plan of Care: Will benefit from Occupational Therapy 3 times per week  Discharge Recommendations:  Equipment Will Continue to Assess for Equipment Needs. Post-acute therapy Discharge to a transitional care facility for continued skilled therapy services.    See \"Rehab Therapy-Acute\" Patient Summary Report for complete documentation.   "

## 2018-07-16 NOTE — DISCHARGE PLANNING
TCC order from Kenyetta ROQUE    Other neuro, s/p CERVICAL FUSION POSTERIOR/ C2-4,CERVICAL LAMINECTOMY POSTERIOR/ C2-3, C5-6, LUMBAR LAMINECTOMY DISKECTOMY/ L2-5 BRAULIO ,  DOS 07/13/2018 secondary to Severe lumbar stenosis with motor and sensory   radiculopathy recalcitrant to nonoperative measures. Ongoing medical management as well as therapy need. Anticipate post acute services to facilitate a successful transition to community home alone with outpatient support. Dr. Foster to consult per protocol.

## 2018-07-16 NOTE — PROGRESS NOTES
Neurosurgery Progress Note    Subjective:  Pt awake, alert  c/o neck pain and hand n/t - improving  Low back pain, no rad symptoms  Gonzalez removed this morning  Concerned he can not raise left arm as well as he did preop      Exam:  BUE 5/5 except left tri delt 4/5, right TA 3/5, let 2/5, GS 4-4+ bilat IP/quad 5, incisions c/d  With staples, drains out    BP  Min: 118/66  Max: 161/108  Pulse  Av.2  Min: 87  Max: 113  Resp  Av.5  Min: 16  Max: 18  Temp  Av.6 °C (99.6 °F)  Min: 37.3 °C (99.2 °F)  Max: 37.8 °C (100 °F)  SpO2  Av.5 %  Min: 93 %  Max: 97 %    No Data Recorded    Recent Labs      18   0202  07/15/18   0250   WBC  9.8  8.5   RBC  4.80  4.06*   HEMOGLOBIN  13.7*  11.6*   HEMATOCRIT  41.7*  35.1*   MCV  86.9  86.5   MCH  28.5  28.6   MCHC  32.9*  33.0*   RDW  44.7  45.2   PLATELETCT  135*  117*   MPV  12.1  11.8     Recent Labs      18   0202  07/15/18   0250   SODIUM  135  134*   POTASSIUM  4.8  4.0   CHLORIDE  100  103   CO2  21  24   GLUCOSE  410*  218*   BUN  29*  19   CREATININE  1.03  0.96   CALCIUM  8.3*  7.9*               Intake/Output       07/15/18 0700 - 18 0659 18 - 18 0659       Total  Total       Intake    Total Intake -- -- -- -- -- --       Output    Urine  2400  850 3250  --  -- --    Output (mL) ([REMOVED] Urinary Catheter Indwelling Catheter 16f) 2400 850 3250 -- -- --    Drains  85  0 85  --  -- --    Output (ml) ([facility] Neck Hemovac 1 (A)) 35 -- 35 -- -- --    Output (ml) ([REMOVED] [facility] Back Hemovac 1 (A)) 50 0 50 -- -- --    Total Output [phone] -- -- --       Net I/O     [phone] -- -- --            Intake/Output Summary (Last 24 hours) at 18 0958  Last data filed at 18 0600   Gross per 24 hour   Intake                0 ml   Output             2335 ml   Net            -2335 ml            • hydrALAZINE  5-10 mg Q4HRS PRN   • haloperidol  lactate  10 mg Q4HRS PRN   • oxyCODONE-acetaminophen  1 Tab Q4HRS PRN   • HYDROmorphone  1 mg Q2HRS PRN   • amLODIPine  10 mg Q DAY   • clindamycin  300 mg BID   • [START ON 7/18/2018] cloNIDine  1 Patch Q WEDNESDAY   • fluticasone  1 Spray DAILY   • gabapentin  300-600 mg TID   • hydrALAZINE  100 mg TID   • hydroCHLOROthiazide  25 mg DAILY   • insulin glargine  30 Units Q EVENING   • lisinopril  40 mg BID   • metFORMIN  1,000 mg BID WITH MEALS   • Pharmacy Consult Request  1 Each PRN   • MD ALERT...Do not administer NSAIDS or ASPIRIN unless ORDERED By Neurosurgery  1 Each PRN   • senna-docusate  1 Tab Nightly   • senna-docusate  1 Tab Q24HRS PRN   • polyethylene glycol/lytes  1 Packet BID PRN   • magnesium hydroxide  30 mL QDAY PRN   • bisacodyl  10 mg Q24HRS PRN   • fleet  1 Each Once PRN   • NS   Continuous   • ceFAZolin  2 g Q8HR   • diphenhydrAMINE  25 mg Q6HRS PRN    Or   • diphenhydrAMINE  25 mg Q6HRS PRN   • ondansetron  4 mg Q4HRS PRN   • ondansetron  4 mg Q4HRS PRN   • methocarbamol  750 mg Q8HRS PRN    Or   • cyclobenzaprine  10 mg Q8HRS PRN    Or   • diazePAM  5 mg Q4HRS PRN   • labetalol  10 mg Q HOUR PRN   • cloNIDine  0.1 mg Q4HRS PRN   • insulin lispro  0-12 Units TID AC   • Respiratory Care per Protocol   Continuous RT   • docusate sodium 100mg/10mL  100 mg BID       Assessment and Plan:  POD #3 post C2-6 lami, C2-4 fusion, L2-5 lami  Prophylactic anticoagulation: no         Start date/time: tbd  Previously on coumadin - prior stroke  Pt/ot- mobilize  Collar when oob  Glucose improving, increased lantus to 35 units QHS  UA neg - confusion improved  Post op abx x1 week   Left delt weakness likely related to surgery inflammation - should improve with time, will monitor. Strength overall improved compared to preop exam  Pt lives alone   Rehab vs SNF - pt expressed he would like [facility] has been there before.

## 2018-07-17 LAB
EKG IMPRESSION: NORMAL
GLUCOSE BLD-MCNC: 179 MG/DL (ref 65–99)
GLUCOSE BLD-MCNC: 264 MG/DL (ref 65–99)

## 2018-07-17 PROCEDURE — 700102 HCHG RX REV CODE 250 W/ 637 OVERRIDE(OP): Performed by: CLINICAL NURSE SPECIALIST

## 2018-07-17 PROCEDURE — A9270 NON-COVERED ITEM OR SERVICE: HCPCS | Performed by: CLINICAL NURSE SPECIALIST

## 2018-07-17 PROCEDURE — 770001 HCHG ROOM/CARE - MED/SURG/GYN PRIV*

## 2018-07-17 PROCEDURE — 700112 HCHG RX REV CODE 229: Performed by: NURSE PRACTITIONER

## 2018-07-17 PROCEDURE — 700111 HCHG RX REV CODE 636 W/ 250 OVERRIDE (IP): Performed by: CLINICAL NURSE SPECIALIST

## 2018-07-17 PROCEDURE — 700111 HCHG RX REV CODE 636 W/ 250 OVERRIDE (IP): Performed by: NURSE PRACTITIONER

## 2018-07-17 PROCEDURE — A9270 NON-COVERED ITEM OR SERVICE: HCPCS | Performed by: NURSE PRACTITIONER

## 2018-07-17 PROCEDURE — 93005 ELECTROCARDIOGRAM TRACING: CPT | Performed by: CLINICAL NURSE SPECIALIST

## 2018-07-17 PROCEDURE — 700102 HCHG RX REV CODE 250 W/ 637 OVERRIDE(OP): Performed by: NURSE PRACTITIONER

## 2018-07-17 PROCEDURE — 97530 THERAPEUTIC ACTIVITIES: CPT

## 2018-07-17 PROCEDURE — 82962 GLUCOSE BLOOD TEST: CPT | Mod: 91

## 2018-07-17 PROCEDURE — 93010 ELECTROCARDIOGRAM REPORT: CPT | Performed by: INTERNAL MEDICINE

## 2018-07-17 RX ORDER — OXYCODONE HYDROCHLORIDE AND ACETAMINOPHEN 5; 325 MG/1; MG/1
TABLET ORAL
Status: ACTIVE
Start: 2018-07-17 | End: 2018-07-17

## 2018-07-17 RX ORDER — DOCUSATE SODIUM 50 MG/5ML
100 LIQUID ORAL 2 TIMES DAILY
Qty: 450 ML | Status: ON HOLD
Start: 2018-07-17 | End: 2018-08-13

## 2018-07-17 RX ORDER — AMLODIPINE BESYLATE 10 MG/1
10 TABLET ORAL DAILY
Qty: 30 TAB | Status: ON HOLD
Start: 2018-07-18 | End: 2018-08-13

## 2018-07-17 RX ORDER — METHOCARBAMOL 500 MG/1
TABLET, FILM COATED ORAL
Status: DISCONTINUED
Start: 2018-07-17 | End: 2018-07-17

## 2018-07-17 RX ORDER — HYDRALAZINE HYDROCHLORIDE 100 MG/1
100 TABLET, FILM COATED ORAL 3 TIMES DAILY
Qty: 90 TAB | Status: ON HOLD
Start: 2018-07-17 | End: 2018-08-13

## 2018-07-17 RX ORDER — HYDROCHLOROTHIAZIDE 25 MG/1
25 TABLET ORAL DAILY
Qty: 30 TAB | Status: ON HOLD
Start: 2018-07-18 | End: 2018-08-13

## 2018-07-17 RX ORDER — GABAPENTIN 300 MG/1
CAPSULE ORAL
Status: ACTIVE
Start: 2018-07-17 | End: 2018-07-17

## 2018-07-17 RX ORDER — CYCLOBENZAPRINE HCL 10 MG
10 TABLET ORAL EVERY 8 HOURS PRN
Qty: 30 TAB | Refills: 0 | Status: ON HOLD
Start: 2018-07-17 | End: 2018-08-13

## 2018-07-17 RX ORDER — DIAZEPAM 5 MG/1
TABLET ORAL
Status: ACTIVE
Start: 2018-07-17 | End: 2018-07-17

## 2018-07-17 RX ORDER — HYDRALAZINE HYDROCHLORIDE 25 MG/1
TABLET, FILM COATED ORAL
Status: ACTIVE
Start: 2018-07-17 | End: 2018-07-17

## 2018-07-17 RX ORDER — GABAPENTIN 300 MG/1
300-600 CAPSULE ORAL 3 TIMES DAILY
Qty: 90 CAP | Status: ON HOLD
Start: 2018-07-17 | End: 2018-08-13

## 2018-07-17 RX ORDER — INSULIN GLARGINE 100 [IU]/ML
35 INJECTION, SOLUTION SUBCUTANEOUS EVERY EVENING
Qty: 10 ML | Status: ON HOLD
Start: 2018-07-17 | End: 2018-08-13

## 2018-07-17 RX ORDER — DOCUSATE SODIUM 100 MG/1
CAPSULE, LIQUID FILLED ORAL
Status: ACTIVE
Start: 2018-07-17 | End: 2018-07-17

## 2018-07-17 RX ORDER — METHOCARBAMOL 750 MG/1
TABLET, FILM COATED ORAL
Status: ACTIVE
Start: 2018-07-17 | End: 2018-07-17

## 2018-07-17 RX ORDER — LISINOPRIL 20 MG/1
TABLET ORAL
Status: ACTIVE
Start: 2018-07-17 | End: 2018-07-17

## 2018-07-17 RX ORDER — CEPHALEXIN 500 MG/1
500 CAPSULE ORAL EVERY 6 HOURS
Status: DISCONTINUED | OUTPATIENT
Start: 2018-07-17 | End: 2018-07-18 | Stop reason: HOSPADM

## 2018-07-17 RX ORDER — ENEMA 19; 7 G/133ML; G/133ML
1 ENEMA RECTAL
Status: ON HOLD
Start: 2018-07-17 | End: 2018-08-13

## 2018-07-17 RX ORDER — BISACODYL 10 MG
10 SUPPOSITORY, RECTAL RECTAL
Refills: 0 | Status: ON HOLD
Start: 2018-07-17 | End: 2018-08-13

## 2018-07-17 RX ORDER — DIPHENHYDRAMINE HCL 25 MG
25 TABLET ORAL EVERY 6 HOURS PRN
Qty: 30 TAB | Refills: 0 | Status: ON HOLD
Start: 2018-07-17 | End: 2018-08-13

## 2018-07-17 RX ORDER — AMLODIPINE BESYLATE 5 MG/1
TABLET ORAL
Status: ACTIVE
Start: 2018-07-17 | End: 2018-07-17

## 2018-07-17 RX ORDER — CEPHALEXIN 500 MG/1
500 CAPSULE ORAL EVERY 6 HOURS
Refills: 0 | Status: ON HOLD
Start: 2018-07-17 | End: 2018-08-13

## 2018-07-17 RX ORDER — AMOXICILLIN 250 MG
1 CAPSULE ORAL DAILY
Qty: 30 TAB | Refills: 0 | Status: ON HOLD
Start: 2018-07-17 | End: 2018-08-13

## 2018-07-17 RX ORDER — HYDROCHLOROTHIAZIDE 25 MG/1
TABLET ORAL
Status: ACTIVE
Start: 2018-07-17 | End: 2018-07-17

## 2018-07-17 RX ORDER — ONDANSETRON 4 MG/1
4 TABLET, ORALLY DISINTEGRATING ORAL EVERY 4 HOURS PRN
Qty: 10 TAB | Refills: 0 | Status: ON HOLD
Start: 2018-07-17 | End: 2018-08-13

## 2018-07-17 RX ORDER — OXYCODONE HYDROCHLORIDE AND ACETAMINOPHEN 5; 325 MG/1; MG/1
1 TABLET ORAL EVERY 4 HOURS PRN
Qty: 15 TAB | Refills: 0 | Status: ON HOLD
Start: 2018-07-17 | End: 2018-08-13

## 2018-07-17 RX ORDER — AMOXICILLIN 250 MG
1 CAPSULE ORAL
Qty: 30 TAB | Refills: 0 | Status: ON HOLD
Start: 2018-07-17 | End: 2018-08-13

## 2018-07-17 RX ORDER — METHOCARBAMOL 750 MG/1
750 TABLET, FILM COATED ORAL EVERY 8 HOURS PRN
Qty: 120 TAB | Status: ON HOLD
Start: 2018-07-17 | End: 2018-08-13

## 2018-07-17 RX ORDER — LISINOPRIL 40 MG/1
40 TABLET ORAL 2 TIMES DAILY
Qty: 30 TAB | Status: ON HOLD
Start: 2018-07-17 | End: 2018-08-13

## 2018-07-17 RX ORDER — FLUTICASONE PROPIONATE 50 MCG
1 SPRAY, SUSPENSION (ML) NASAL DAILY
Qty: 16 G | Status: ON HOLD
Start: 2018-07-18 | End: 2018-08-13

## 2018-07-17 RX ORDER — CEPHALEXIN 500 MG/1
CAPSULE ORAL
Status: ACTIVE
Start: 2018-07-17 | End: 2018-07-17

## 2018-07-17 RX ADMIN — INSULIN LISPRO 3 UNITS: 100 INJECTION, SOLUTION INTRAVENOUS; SUBCUTANEOUS at 18:37

## 2018-07-17 RX ADMIN — OXYCODONE HYDROCHLORIDE AND ACETAMINOPHEN 1 TABLET: 5; 325 TABLET ORAL at 08:18

## 2018-07-17 RX ADMIN — METHOCARBAMOL 750 MG: 750 TABLET, FILM COATED ORAL at 08:26

## 2018-07-17 RX ADMIN — HYDRALAZINE HYDROCHLORIDE 100 MG: 50 TABLET ORAL at 04:46

## 2018-07-17 RX ADMIN — OXYCODONE HYDROCHLORIDE AND ACETAMINOPHEN 1 TABLET: 5; 325 TABLET ORAL at 15:26

## 2018-07-17 RX ADMIN — METHOCARBAMOL 750 MG: 750 TABLET, FILM COATED ORAL at 20:53

## 2018-07-17 RX ADMIN — HYDROCHLOROTHIAZIDE 25 MG: 25 TABLET ORAL at 04:47

## 2018-07-17 RX ADMIN — GABAPENTIN 600 MG: 300 CAPSULE ORAL at 18:00

## 2018-07-17 RX ADMIN — CEPHALEXIN 500 MG: 500 CAPSULE ORAL at 08:06

## 2018-07-17 RX ADMIN — LISINOPRIL 40 MG: 20 TABLET ORAL at 04:47

## 2018-07-17 RX ADMIN — HYDRALAZINE HYDROCHLORIDE 100 MG: 50 TABLET ORAL at 12:00

## 2018-07-17 RX ADMIN — INSULIN GLARGINE 35 UNITS: 100 INJECTION, SOLUTION SUBCUTANEOUS at 18:38

## 2018-07-17 RX ADMIN — CLINDAMYCIN HYDROCHLORIDE 300 MG: 150 CAPSULE ORAL at 04:47

## 2018-07-17 RX ADMIN — HYDROMORPHONE HYDROCHLORIDE 1 MG: 10 INJECTION, SOLUTION INTRAMUSCULAR; INTRAVENOUS; SUBCUTANEOUS at 04:47

## 2018-07-17 RX ADMIN — LISINOPRIL 40 MG: 20 TABLET ORAL at 18:00

## 2018-07-17 RX ADMIN — DOCUSATE SODIUM 100 MG: 50 LIQUID ORAL at 04:46

## 2018-07-17 RX ADMIN — CEPHALEXIN 500 MG: 500 CAPSULE ORAL at 12:00

## 2018-07-17 RX ADMIN — GABAPENTIN 600 MG: 300 CAPSULE ORAL at 04:46

## 2018-07-17 RX ADMIN — OXYCODONE HYDROCHLORIDE AND ACETAMINOPHEN 1 TABLET: 5; 325 TABLET ORAL at 20:52

## 2018-07-17 RX ADMIN — DOCUSATE SODIUM 100 MG: 50 LIQUID ORAL at 18:00

## 2018-07-17 RX ADMIN — CEFAZOLIN SODIUM 2 G: 2 INJECTION, SOLUTION INTRAVENOUS at 04:47

## 2018-07-17 RX ADMIN — ENOXAPARIN SODIUM 40 MG: 100 INJECTION SUBCUTANEOUS at 11:30

## 2018-07-17 RX ADMIN — CEPHALEXIN 500 MG: 500 CAPSULE ORAL at 23:30

## 2018-07-17 RX ADMIN — GABAPENTIN 600 MG: 300 CAPSULE ORAL at 12:00

## 2018-07-17 RX ADMIN — HYDROMORPHONE HYDROCHLORIDE 1 MG: 10 INJECTION, SOLUTION INTRAMUSCULAR; INTRAVENOUS; SUBCUTANEOUS at 00:37

## 2018-07-17 RX ADMIN — STANDARDIZED SENNA CONCENTRATE AND DOCUSATE SODIUM 1 TABLET: 8.6; 5 TABLET, FILM COATED ORAL at 20:52

## 2018-07-17 RX ADMIN — AMLODIPINE BESYLATE 10 MG: 10 TABLET ORAL at 04:47

## 2018-07-17 RX ADMIN — CEPHALEXIN 500 MG: 500 CAPSULE ORAL at 18:00

## 2018-07-17 RX ADMIN — CLINDAMYCIN HYDROCHLORIDE 300 MG: 150 CAPSULE ORAL at 18:00

## 2018-07-17 RX ADMIN — HYDRALAZINE HYDROCHLORIDE 100 MG: 50 TABLET ORAL at 18:00

## 2018-07-17 ASSESSMENT — PAIN SCALES - GENERAL
PAINLEVEL_OUTOF10: 5
PAINLEVEL_OUTOF10: 7
PAINLEVEL_OUTOF10: 4
PAINLEVEL_OUTOF10: 7
PAINLEVEL_OUTOF10: 8

## 2018-07-17 ASSESSMENT — GAIT ASSESSMENTS: GAIT LEVEL OF ASSIST: UNABLE TO PARTICIPATE

## 2018-07-17 ASSESSMENT — PATIENT HEALTH QUESTIONNAIRE - PHQ9
2. FEELING DOWN, DEPRESSED, IRRITABLE, OR HOPELESS: NOT AT ALL
1. LITTLE INTEREST OR PLEASURE IN DOING THINGS: NOT AT ALL
SUM OF ALL RESPONSES TO PHQ9 QUESTIONS 1 AND 2: 0

## 2018-07-17 ASSESSMENT — LIFESTYLE VARIABLES: ALCOHOL_USE: NO

## 2018-07-17 NOTE — DISCHARGE SUMMARY
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    HISTORY OF PRESENT ILLNESS:  Please refer to the previously dictated history   and physical for complete details.  The patient is a 67-year-old patient of   Dr. Marsh who had the above findings.  Dr. Marsh discussed surgery and the   risks and benefits and the patient wished to proceed.    COURSE OF HOSPITALIZATION:  The patient was admitted to [facility] on 07/13/2018   and on that date, he was brought to the operating room where he underwent   posterior C2-C6 laminectomy, lateral mass instrumentation C2-C4, with   posterior lateral arthrodesis C2-C4, and via separate incision posterior   lumbar L2-L5 laminectomies with Dr. Marsh.    Postoperatively, he initially had confusion in the night.  His strength was   stable to improved.  He had elevated blood sugars secondary to initially   refusing his Lantus, but this is improving.  He is starting to mobilize.  His   pain is controlled.  On 07/17/2018, he is awake and alert.  He has neck pain   and low back pain without radicular symptoms.  He is voiding.  We are giving   him bowel protocol.  He is eating.  His strength in his upper extremities is   5/5 with the exception of his left triceps and deltoid, which are 4+/5.  His   right tibialis anterior and gastroc are 3-4-/5, left is 4+.  His bilateral   iliopsoas and quads are 5/5.  His incisions are clean, dry, and flat with   staples intact.  He had some tachycardia in the low 100s.  We checked an EKG   that shows sinus tachycardia with some PACs.  The EKG rate was 86.  He was   mobilizing with therapies and will be discharged to inpatient rehab once   approved.  His hemoglobin is stable at 11.6, hematocrit 35.1.    DISCHARGE INSTRUCTIONS:  1.  Follow up with [facility] 2  weeks postoperatively.  2.  No aspirin or nonsteroidal anti-inflammatory medications.  3.  We will start prophylactic Lovenox today, do not restart therapeutic   dosing of Lovenox or Coumadin until cleared by Dr. Marsh.  4.  No lifting greater than 10 pounds.  5.  Okay to shower, pat incisions dry, leave open to air.    DISCHARGE MEDICATIONS:  1.  Percocet 5/325 one every 4 hours p.r.n. pain.  2.  Keflex 500 mg every 6 hours, last dose on Friday 07/20/2018 at 0100 hours.  3.  Clindamycin 300 mg b.i.d.  4.  Norvasc 10 mg p.o. daily.  5.  Amlodipine besylate 5 mg daily.  6.  Clonidine 0.3 mg per 24-hour patch every Wednesday.  7.  Hydralazine 100 mg p.o. t.i.d.  8.  Hydrochlorothiazide 25 mg daily.  9.  Lisinopril 40 mg b.i.d. p.o.  10.  Flexeril 10 mg every 8 hours p.r.n. spasm.  11.  Lantus 35 units every evening subcutaneous.  12.  Insulin lispro before meals and at bedtime.  For glucose less than 70,   give 1 amp D50 and call.  For glucose , 0 units insulin.  For glucose   201-250, give 3 units subcutaneous.  For glucose 251-300, give 5 units.  For   glucose 301-350, give 7 units.  For glucose 351-400, give 9 units.  For   glucose greater than 400, give 12 units and call.  13.  Metformin 1000 mg p.o. b.i.d.  14.  Dulcolax suppository daily p.r.n. no bowel movement with milk of   magnesia.  15.  Colace 100 mg p.o. b.i.d.  16.  Milk of magnesia 30 mL p.o. daily p.r.n. no bowel movement.  17.  Fleet enema VT daily p.r.n. no bowel movement with suppository.  18.  Zofran 4 mg p.o. q. 6 hours p.r.n. nausea.  19.  Senokot 2 tablets daily.  20.  Lovenox 40 mg subcutaneous daily.  21.  Gabapentin 300-600 mg t.i.d.  22.  Flonase 50 mcg 1 spray nasal daily.    IMPRESSION AND PLAN:  1.  Admitting diagnoses:  Cervical stenosis with myelopathy and severe lumbar   stenosis with motor and sensory radiculopathy recalcitrant to nonoperative   measures.  2.  Operation performed:  Posterior C2-C6 laminectomy, C2-C4 lateral  mass   fixation and posterolateral arthrodesis C2-C4 and via separate incision   posterior lumbar L2-L5 laminectomies with Dr. Marsh on 07/13/2018.  3.  Diabetes, on medications as outlined above.  4.  Postoperative confusion, resolved.  5.  Tachycardia, low 100s.  EKG, sinus rhythm, rate 83, with premature atrial   contractions.  6.  History of stroke.  The patient had been on Coumadin prior to surgery and   bridged with Lovenox.  We will put him on prophylactic dosing of Lovenox now.    Please hold therapeutic dosing of Lovenox or any Coumadin until cleared by   Dr. Marsh likely at 2 weeks postop.  7.  Cervical collar on when out of bed, okay off for eating.  8.  Bowel protocol.  9.  The patient is discharged to inpatient rehabilitation with the   instructions and medications as outlined above.       ____________________________________     MYA SPENCE / TARAH    DD:  07/17/2018 11:17:21  DT:  07/17/2018 11:45:22    D#:  [id number]  Job#:  [id number]

## 2018-07-17 NOTE — PROGRESS NOTES
Neurosurgery Progress Note    Subjective:  Pt awake, alert  c/o neck pain and hand n/t - improving  Low back pain, no rad symptoms  Voiding\  No bm  eating        Exam:  BUE 5/5 except left tri delt 4+/5, right TA 3/5,GS 4-, left 4+ TA/GS,  bilat IP/quad 5, incisions c/d  With staples    BP  Min: 92/58  Max: 161/108  Pulse  Av.8  Min: 82  Max: 109  Resp  Av.6  Min: 16  Max: 18  Temp  Av.3 °C (99.2 °F)  Min: 36.9 °C (98.5 °F)  Max: 38.2 °C (100.8 °F)  SpO2  Av.2 %  Min: 93 %  Max: 98 %    No Data Recorded    Recent Labs      07/15/18   0250   WBC  8.5   RBC  4.06*   HEMOGLOBIN  11.6*   HEMATOCRIT  35.1*   MCV  86.5   MCH  28.6   MCHC  33.0*   RDW  45.2   PLATELETCT  117*   MPV  11.8     Recent Labs      07/15/18   0250   SODIUM  134*   POTASSIUM  4.0   CHLORIDE  103   CO2  24   GLUCOSE  218*   BUN  19   CREATININE  0.96   CALCIUM  7.9*               Intake/Output       18 07 - 18 0659 18 - 18 0659      [id number] Total  Total       Intake    P.O.  --  640 640  --  -- --    P.O. -- 640 640 -- -- --    I.V.  --  200 200  --  -- --    IV Piggyback Volume -- 200 200 -- -- --    Total Intake -- 840 840 -- -- --       Output    Urine  300  200 500  --  -- --    Number of Times Voided 4 x 3 x 7 x -- -- --    Urine Void (mL) (non-catheter) 300 200 500 -- -- --    Total Output 300 200 500 -- -- --       Net I/O     -300 640 340 -- -- --            Intake/Output Summary (Last 24 hours) at 18 0734  Last data filed at 18 0400   Gross per 24 hour   Intake              840 ml   Output              500 ml   Net              340 ml       $ Bladder Scan Results (mL): 247    • insulin glargine  35 Units Q EVENING   • hydrALAZINE  5-10 mg Q4HRS PRN   • haloperidol lactate  10 mg Q4HRS PRN   • oxyCODONE-acetaminophen  1 Tab Q4HRS PRN   • HYDROmorphone  1 mg Q2HRS PRN   • amLODIPine  10 mg Q DAY   • clindamycin  300 mg BID   • [START ON 2018]  cloNIDine  1 Patch Q WEDNESDAY   • fluticasone  1 Spray DAILY   • gabapentin  300-600 mg TID   • hydrALAZINE  100 mg TID   • hydroCHLOROthiazide  25 mg DAILY   • lisinopril  40 mg BID   • metFORMIN  1,000 mg BID WITH MEALS   • Pharmacy Consult Request  1 Each PRN   • MD ALERT...Do not administer NSAIDS or ASPIRIN unless ORDERED By Neurosurgery  1 Each PRN   • senna-docusate  1 Tab Nightly   • senna-docusate  1 Tab Q24HRS PRN   • polyethylene glycol/lytes  1 Packet BID PRN   • magnesium hydroxide  30 mL QDAY PRN   • bisacodyl  10 mg Q24HRS PRN   • fleet  1 Each Once PRN   • NS   Continuous   • ceFAZolin  2 g Q8HR   • diphenhydrAMINE  25 mg Q6HRS PRN    Or   • diphenhydrAMINE  25 mg Q6HRS PRN   • ondansetron  4 mg Q4HRS PRN   • ondansetron  4 mg Q4HRS PRN   • methocarbamol  750 mg Q8HRS PRN    Or   • cyclobenzaprine  10 mg Q8HRS PRN    Or   • diazePAM  5 mg Q4HRS PRN   • labetalol  10 mg Q HOUR PRN   • cloNIDine  0.1 mg Q4HRS PRN   • insulin lispro  0-12 Units TID AC   • Respiratory Care per Protocol   Continuous RT   • docusate sodium 100mg/10mL  100 mg BID       Assessment and Plan:  POD #4 post C2-6 lami, C2-4 fusion, L2-5 lami  Prophylactic anticoagulation: no         Start date/time: tbd  Previously on coumadin - prior stroke  Tachycardia low 100's- will check stat EKG to ensure no afib, cardiologist is Dr Li  Pt/ot- mobilize  Collar when oob  Glucose improving, increased lantus to 35 units QHS yest  UA neg - confusion improved  Post op abx x1 week   Bowel protocol- give mom/supp today  Left delt weakness-improved today likely related to surgery inflammation  Pt lives alone   Rehab if EKG ok

## 2018-07-17 NOTE — DISCHARGE PLANNING
Anticipated Discharge Disposition:  [facility]F    Action:   Talked to Yanci Ferrera at IRF. She will ask Dr. Pagan if ok to accept pt today. However, because pt received IV pain medication today then their concern is whether pt's pain is under control. IRF is unable to give pt's IV pain medication.    IMM letter given. Explained to pt, pt signed, copy to pt and to chart.  Explained to pt that we are waiting for IRF confirmation for acceptance.     Noted a note from Ivan Buckner at IRF that they will follow up in am.  LVM for Helga Velazquez NP and notified CN.    Barriers to Discharge:   Make sure that pt does not receive any more IV pain medication.   IRF pending confirmation    Plan:   Follow up withIRF tomorrow.

## 2018-07-17 NOTE — CARE PLAN
Problem: Communication  Goal: The ability to communicate needs accurately and effectively will improve  Outcome: PROGRESSING AS EXPECTED  Pt able to make needs known.    Problem: Safety  Goal: Will remain free from injury  Pt call light within reach and uses when needing help with any cares.

## 2018-07-17 NOTE — DISCHARGE PLANNING
Physiatry Dr. Foster consult recommending pt appropriate for inpatient rehab. Case submitted to [facility] insurance liaison for consideration of IRF level care. Will follow for auth determination.

## 2018-07-17 NOTE — DISCHARGE PLANNING
Insurance has authorized inpatient rehab. IV Dilaudid is barrier to IRF. Will follow for pain management on oral regimen in anticipation of transition to [facility].

## 2018-07-17 NOTE — PREADMISSION SCREENING NOTE
Pre-Admission Screening Form    Patient Information:   Name: Joel Ma     MRN: [id number]       : 1950      Age: 67 y.o.   Gender: male      Race: White [7]       Marital Status: Single [1]  Family Contact: Chris Ma,Lory Craft        Relationship: Son [15]  Son [15]  Sibling [19]  Home Phone: [phone]             Cell Phone:   [phone]  Advanced Directives: None  Code Status:  FULL  Current Attending Provider: Boby Webb MD  Referring Physician: MYA Lomas      Physiatrist Consult: Dr. Beronica Foster       Referral Date: 18  Primary Payor Source:  [employer]  Secondary Payor Source:      Medical Information:   Date of Admission to Acute Care [place]2018  Room Number: S171/00  Rehabilitation Diagnosis: 08.9 Other Orthopedic  Immunization History   Administered Date(s) Administered   • Influenza Vaccine 1980   • Pneumococcal Conjugate Vaccine (Prevnar/PCV-13) 2017   • Pneumococcal Vaccine (UF)Historical Data 1980   • TD Vaccine 08/15/2009   • Tdap Vaccine 2012     Allergies   Allergen Reactions   • Demerol      Makes me stop breathing.  Doesn't like because it makes him high   • Statins [Hmg-Coa-R Inhibitors] Myalgia     Rxn - ongoing       Past Medical History:   Diagnosis Date   • Anesthesia     low O2 sat   • Arrhythmia     a-fib   • arthritis 2011    thumb, fingers   • Arthritis     hip   • Cataract     left eye surgery   • Dental disorder     full dentures   • Diabetes     insulin, Dr Oconnor, his APN   • High cholesterol    • Hypertension    • MRSA (methicillin resistant Staphylococcus aureus)     history of, nothing current , on clindamycin for rest of life per patient   • JANNIE treated with BiPAP 2016   • Pain 13    shoulders, hip, right knee, 7/10   • Pneumonia    • Renal disorder     stage 2   • Sleep apnea     bipap   • Stroke (HCC)     2007,  4/1/2007, right sided weakness; balance disturbance, memory problems   • Thyroid mass 7/30/2009    benign lump   • Ventricular ectopy 6/17/2011     Past Surgical History:   Procedure Laterality Date   • CERVICAL FUSION POSTERIOR  7/13/2018    Procedure: CERVICAL FUSION POSTERIOR/ C2-4;  Surgeon: Boby Marsh M.D.;  Location: [facility];  Service: Neurosurgery   • CERVICAL LAMINECTOMY POSTERIOR  7/13/2018    Procedure: CERVICAL LAMINECTOMY POSTERIOR/ C2-3, C5-6;  Surgeon: Boby Marsh M.D.;  Location: [facility];  Service: Neurosurgery   • LUMBAR LAMINECTOMY DISKECTOMY  7/13/2018    Procedure: LUMBAR LAMINECTOMY DISKECTOMY/ L2-5 LAMI;  Surgeon: Boby Marsh M.D.;  Location: [facility];  Service: Neurosurgery   • CERVICAL DISK AND FUSION ANTERIOR  8/31/2016    Procedure: CERVICAL DISK AND FUSION ANTERIOR C3-7 ;  Surgeon: Alhaji Jaffe M.D.;  Location: [facility];  Service:    • CATARACT PHACO WITH IOL  5/8/2013    Performed by Mame Rehman M.D. at SURGERY SAME DAY [facility]   • IRRIGATION & DEBRIDEMENT ORTHO  11/13/2012    Performed by Gordon Cota M.D. at [facility]   • KNEE REVISION TOTAL  11/13/2012    Performed by Gordon Cota M.D. at [facility]   • IRRIGATION & DEBRIDEMENT ORTHO  11/2/2012    Performed by Gordon Cota M.D. at [facility]   • IRRIGATION & DEBRIDEMENT ORTHO Left 10/29/2012    Procedure: left shoulder, with drain;  Surgeon: Gordon Cota M.D.;  Location: [facility];  Service:    • INCISION AND DRAINAGE ORTHOPEDIC Right 10/29/2012    Procedure: Right Total Knee I and D and Liner Exchange, with drain;  Surgeon: Gordon Cota M.D.;  Location: [facility];  Service:    • IRRIGATION & DEBRIDEMENT ORTHO  3/13/2012    Performed by KAMALJIT SHI at [facility]   • KNEE REVISION TOTAL  3/13/2012    Performed by KAMALJIT SHI at [facility]   • TENDON REPAIR  3/13/2012    Performed by KAMALJIT SHI at SURGERY [facility]   • KNEE ARTHROPLASTY TOTAL  1/11/2012    Right; Performed by KAMALJIT SIH at SURGERY [facility]   • TOE AMPUTATION  7/22/2011    Performed by EVELYN JANE at SURGERY [facility]   • ELBOW ARTHROTOMY  2008    right   • LUMBAR FUSION POSTERIOR  1979   • FOOT SURGERY      partial amputation great toe   • FOOT SURGERY      toe surgery left foot   • OTHER      left eye cataract   • OTHER NEUROLOGICAL SURG      neck fusion   • PB KNEE SCOPE,SINGLE MENISECTOMY      right knee       History Leading to Admission, Conditions that Caused the Need for Rehab (CMS):     Boby Marsh M.D. Physician Unsigned Transcription Surgery Neurosurgery  OP Report Date of Service: 7/14/2018  7:17 PM         []Hide copied text  []Hover for attribution information  DATE OF SERVICE:  07/13/2018     PREOPERATIVE DIAGNOSIS:  Cervical stenosis with myelopathy.     POSTOPERATIVE DIAGNOSIS:  Cervical stenosis with myelopathy.     PROCEDURES:  1.  Posterior cervical 2, 3, 4, 5, and 6 laminectomy.  2.  Bilateral neural foraminotomies, cervical 3, 4, 5, and 6 roots.  3.  Lateral mass screw instrumentation cervical 2, 3, and 4.  4.  Use of Fluoroscopic guidance for screw placement.  5.  Posterolateral arthrodesis, cervical 2, 3, and 4.  6.  Use of locally harvested morselized autograft.  7.  Use of allograft.  8.  Use of modifier 22 for extensive difficulty with positioning the patient   on the table using the Gordon table for flip.  9.  Use of intraoperative neuro monitoring.     SURGEON:  Boby Marsh MD     ASSISTANT:  MYA Niño     ANESTHESIA:  General.     COMPLICATIONS:  None.     ESTIMATED BLOOD LOSS:  400 mL.        Boby Marsh M.D. Physician Unsigned Transcription Surgery Neurosurgery  OP Report Date of Service: 7/14/2018  7:20 PM         []Hide copied text  []Hover for attribution  information  DATE OF SERVICE:  07/13/2018     PREOPERATIVE DIAGNOSIS:  Severe lumbar stenosis with motor and sensory   radiculopathy recalcitrant to nonoperative measures.     POSTOPERATIVE DIAGNOSIS:  Severe lumbar stenosis with motor and sensory   radiculopathy recalcitrant to nonoperative measures.     PROCEDURES:  1.  Posterior lumbar 2-3, 3-4, 4-5 laminectomy.  2.  Bilateral neural foraminotomies lumbar 2, 3, 4, and 5 roots.  3.  Use of Cell Saver.     SURGEON:  Boby Marsh MD     ASSISTANT:  MYA Lomas.     ANESTHESIA:  General.     COMPLICATIONS:  None.     ESTIMATED BLOOD LOSS:  500 mL total for the cervical and lumbar procedure.        Beronica Foster M.D. Physician Signed Physical Medicine & Rehab  Consults Date of Service: 7/16/2018  1:12 PM   Consult Orders:   IP CONSULT FOR PHYSIATRY [[id number]] ordered by DEIDRA Niño at 07/16/18 1153      Expand All Collapse All    []Hide copied text  Physical Medicine and Rehabilitation Consultation     Date of Consultation: 7/16/2018  Consulting provider: MYA Lomas  Reason for consultation: assess for acute inpatient rehab appropriateness  Chief complaint: back pain     HPI: The patient is a 67 y.o. male with a past medical history of atrial fibrillation/strokes on coumadin, diabetes, infected knee hardware with MRSA, cervical myelopathy, and lumbar stenosis, admitted on 7/13/2018  8:21 AM for elective cervical and lumbar surgeries.     Per review of the medical record and confirmation with the patient, patient had progressively worsening function due to severe cervical and lumbar stenosis. Per review of documentation, patient was only able to ambulate minimally around his house prior to these scheduled surgeries. He had a SCDF 3-7 with Dr. Jaffe in 2016, with chronic dexterity issues for which and neck pain and paresthesias in right digits 1-3. He had a history of a lumbar fusion, L4-S1, way back in 1973, and most  recently had complaints of severe back and leg pain, with weakness.      He also has a history of 2 strokes that initially caused him to have temporary right sided weakness and speech difficulties, for which he has been on coumadin. Review of last MRI of brain in 7/20156 showed multiple areas of chronic infarction in bilateral cortex/cerebellum. He was cleared by cardiology for these surgeries, considered moderate risk, and on 7/13 he underwent: C2-4 posterior cervical fusion, C2-6 posterior laminectomies, and L2-5 diskectomies/laminectomies with Dr. Marsh on 7/13. Post-operatively required Haldol for psychosis, and worsened left arm weakness thought due to inflammation.      The patient currently reports severe neck, low back, and pain in his arms/hands. He reports severe paresthesias in his right hand, worse since surgery, with chronic paresthesia in his feet. He has used 4 Percocet's yesterday, and is on as needed Robaxin. He denies any radicular leg pain. He is right hand dominant. Has chronic weakness in his right hand from his first neck surgery 2 years ago. Was just incontinent of urine, he states because he was sleeping. Last BM 7/13. Denies any bowel/bladder issues prior to his surgeries. Patient states he's on chronci antibiotics for a skin issue on his back, but then confirms a history of MRSA in his right total knee replacement hardware. He states he was able to stand up today, which is an improvement, and he understands he'd have to do 3 hours of daily therapy, as he did inpatient rehabilitation back in 2016 after his first neck surgery.     ROS:  A comprehensive review of systems was completed and is negative unless otherwise noted in the above HPI.     Social Hx:  Pre-morbidly, this patient lived in a single level home with 4 steps to enter, alone and able to care for self. Single, 4 grown sons who he says will be able to assist at discharge, though they do work.  Employment: used to work in  construction.  Tobacco: denies  Alcohol: denies  Drugs: denies     Prior level of function:   Independent,      Current level of function:  The patient was evaluated by acute care Physical Therapy and Occupational Therapy; currently requiring maximal assistance for mobility and maximal assistance for ADLs.            Co-morbidities: See above  Potential Risk - Complications: Contractures, Deep Vein Thrombosis, Dysphagia, Malnutrition, Pain, Perceptual Impairment, Pneumonia, Pressure Ulcer and Infection  Level of Risk: High    Ongoing Medical Management Needed (Medical/Nursing Needs):   Patient Active Problem List    Diagnosis Date Noted   • Left knee pain 08/28/2016     Priority: High   • Diabetic polyneuropathy ([facility]) 05/06/2015     Priority: High   • Toe amputation status ([facility]) 05/06/2015     Priority: High   • HTN (hypertension) 08/16/2010     Priority: Medium   • CVA (cerebral vascular accident) ([facility]) 06/04/2009     Priority: Medium   • Diabetes mellitus with neurological manifestations, controlled ([facility]) 03/05/2012     Priority: Low   • Persistent proteinuria 10/13/2017   • BMI 35.0-35.9,adult 09/22/2017   • CKD (chronic kidney disease), stage I 05/12/2017   • Diabetic nephropathy associated with type 2 diabetes mellitus ([facility]) 05/12/2017   • Chronic use of opiate drugs therapeutic purposes 02/14/2017   • Other orthopedic aftercare 11/07/2016   • Cervical stenosis of spine 09/06/2016   • Dysphagia 09/06/2016   • Chronic atrial fibrillation (HCC) 09/06/2016   • Hallucinations 09/06/2016   • JANNIE treated with BiPAP 04/18/2016   • History of CVA (cerebrovascular accident) 11/11/2015   • Atrial fibrillation [427.31] 06/24/2015   • Risk for falls 10/27/2014   • Myalgia 05/29/2014   • BPH (benign prostatic hyperplasia) 04/28/2014   • Anticoagulated on warfarin 04/28/2014   • Chronic antibiotic suppression 04/28/2014   • MEDICAL HOME    • Class 1 obesity in adult 06/17/2011   • Ventricular ectopy 06/17/2011   •  "Trigger finger 06/17/2011   • Thyroid mass 07/30/2009   • Dyslipidemia 06/17/2009     Claudette Harris R.N. Registered Nurse Signed   Progress Notes Date of Service: 7/16/2018 10:44 PM         []Hide copied text  []Rigobertover for attribution information  Assumed Patient care.   Patient resting in bed.   Plan of care updated. Will continue working on pain management. Patient verbalizes understanding.  C-collar on  Hemovac in place set to self suction.  Q2 hour turns in place.  All needs addressed.  Call light within reach, bed locked and in lowest position, treaded slipper socks on and hourly rounding in place.         Current Vital Signs:   Temperature: 36.4 °C (97.6 °F) Pulse: 85 Respiration: 16 Blood Pressure : 158/74  Weight: 120.9 kg (266 lb 8.6 oz) Height: 193 cm (6' 4\")  Pulse Oximetry: 90 % O2 (LPM): 4      Completed Laboratory Reports:  Recent Labs      07/14/18   1124  07/14/18   1717  07/15/18   0250  07/15/18   0805  07/15/18   1110  07/15/18   1704  07/16/18   0616  07/16/18   1118  07/16/18   1707  07/17/18   0510   WBC   --    --   8.5   --    --    --    --    --    --    --    HEMOGLOBIN   --    --   11.6*   --    --    --    --    --    --    --    HEMATOCRIT   --    --   35.1*   --    --    --    --    --    --    --    PLATELETCT   --    --   117*   --    --    --    --    --    --    --    SODIUM   --    --   134*   --    --    --    --    --    --    --    POTASSIUM   --    --   4.0   --    --    --    --    --    --    --    BUN   --    --   19   --    --    --    --    --    --    --    CREATININE   --    --   0.96   --    --    --    --    --    --    --    GLUCOSE   --    --   218*   --    --    --    --    --    --    --    POCGLUCOSE  254*  258*   --   227*  270*  242*  204*  214*  262*  179*     Additional Labs: Not Applicable    Prior Living Situation:   Housing / Facility: [address]  Steps Into Home: 4  Lives with - Patient's Self Care Capacity: Alone and Able to Care For " Self  Equipment Owned: Raised Toilet Seat Without Arms, Front-Wheel Walker    Prior Level of Function / Living Situation:   Physical Therapy: Prior Services: Home-Independent  Housing / Facility: [address] [facility]  Steps Into Home: 4  Equipment Owned: Raised Toilet Seat Without Arms, Front-Wheel Walker  Lives with - Patient's Self Care Capacity: Alone and Able to Care For Self  Bed Mobility: Independent  Transfer Status: Independent  Ambulation: Independent  Distance Ambulation (Feet):  (community)  Assistive Devices Used: Front-Wheel Walker  Stairs: Independent  Current Level of Function:   Level Of Assist: Unable to Participate  Supine to Sit: Moderate Assist  Sit to Supine: Moderate Assist  Scooting: Maximal Assist  Rolling: Moderate Assist to Lt.  Sit to Stand: Total Assist X 2  Bed, Chair, Wheelchair Transfer: Unable to Participate  Toilet Transfers: Unable to Participate  Tub / Shower Transfers: Unable to Participate  Sitting in Chair: 15+ mins  Sitting Edge of Bed: 10 mins  Standin-3 mins  Occupational Therapy:   Self Feeding: Independent  Grooming / Hygiene: Independent  Bathing: Independent  Dressing: Independent  Toileting: Independent  Prior Services: Home-Independent  Housing / Facility: [address] [place] [address]  Current Level of Function:   Eating: Supervision  Bathing: Total Assist  Upper Body Dressing: Maximal Assist  Lower Body Dressing: Total Assist  Toileting: Total Assist  Speech Language Pathology:      Rehabilitation Prognosis/Potential: Good  Estimated Length of Stay: 28 days    Nursing:   Orientation : Oriented x 4  Continent    Scope/Intensity of Services Recommended:  Physical Therapy: 1 hr / day  5 days / week. Therapeutic Interventions Required: Maximize Endurance, Mobility, Strength and Safety  Occupational Therapy: 1 hr / day 5 days / week. Therapeutic Interventions Required: Maximize Self Care, ADLs, IADLs and Energy Conservation  Speech & Language Pathology: 1 hr / day 5 days / week. Therapeutic  Interventions Required: Maximize Cognition, Swallowing and Safety  Rehabilitation Nursin/7. Therapeutic Interventions Required: Monitor Pain, Skin, Vital Signs, Intake and Output, Labs, Safety, Aspiration Risk, Family Training and C-spine/lumbar surgical incision/drain care; DVT prophylaxis; Bowel & Bladder regimen; ADL's.  Rehabilitation Physician: 3 - 5 days / week. Therapeutic Interventions Required: Medical Management  Respiratory Care: Daily. Therapeutic Interventions Required: Pulmonary Toileting, O2 Weaning, Aspiration Risk and Respiratory care per protocol.   Dietician: Consult.  Therapeutic Interventions Required: Nutritional evaluation with recommendations to promote optimal health/healing.     Rehabilitation Goals and Plan (Expected frequency & duration of treatment in the IRF):   Return to the Community, Modified Independent Level of Care and Outpatient Support  Anticipated Date of Rehabilitation Admission: 18  Patient/Family oriented IRF level of care/facility/plan: Yes  Patient/Family willing to participate in IRF care/facility/plan: Yes  Patient able to tolerate IRF level of care proposed: Yes  Patient has potential to benefit IRF level of care proposed: Yes  Comments: Not Applicable    Special Needs or Precautions - Medical Necessity:  Safety Concerns/Precautions:  Fall Risk / High Risk for Falls, Balance and Cognition  Complex Wound Care: C-Spine/Lumbar surgical incision care  Pain Management  Requires Oxygen  Splints/Braces/Orthotics: C-Collar  C-Spine/Lumbar spine precautions     Diet:   DIET ORDERS (Through next 24h)    Start     Ordered    18 0851  Diet Order Diabetic  ALL MEALS     Question:  Diet:  Answer:  Diabetic    18 0851          Anticipated Discharge Destination / Patient/Family Goal:  Destination: Home Alone Support System: None  Anticipated home health services: OT, PT, SLP and Nursing  Previously used HH service/ provider: Not Applicable  Anticipated DME Needs:  To be determined  Outpatient Services: To be determined  Alternative resources to address additional identified needs:   N/A  Pre-Screen Completed: 7/17/2018 11:03 AM Yanci Ferrera R.N.

## 2018-07-17 NOTE — DISCHARGE PLANNING
Anticipated Discharge Disposition:   IRF pending    Action:   Talked to Yanci Ferrera at IRF, acceptance is pending until Dr. Pagan reviews the case.  She will call me back.     Barriers to Discharge:       Plan: ***

## 2018-07-17 NOTE — PROGRESS NOTES
Assumed Patient care.   Patient resting in bed.   Plan of care updated. Will continue working on pain management. Patient verbalizes understanding.  C-collar on  Hemovac in place set to self suction.  Q2 hour turns in place.  All needs addressed.  Call light within reach, bed locked and in lowest position, treaded slipper socks on and hourly rounding in place.

## 2018-07-17 NOTE — THERAPY
"Physical Therapy Treatment completed.   Bed Mobility:  Supine to Sit: Moderate Assist  Transfers: Sit to Stand: Moderate Assist (from raised EOB->FWW.)       Plan of Care: Will benefit from Physical Therapy 4 times per week  Discharge Recommendations: Equipment: Will Continue to Assess for Equipment Needs. Post-acute therapy Discharge to a transitional care facility for continued skilled therapy service.    Pt is NOT approp for transfer chair when dc'd to rehab. Recommend use of recliner w/c for the transfer.      See \"Rehab Therapy-Acute\" Patient Summary Report for complete documentation.       "

## 2018-07-18 ENCOUNTER — HOSPITAL ENCOUNTER (INPATIENT)
Facility: REHABILITATION | Age: 68
LOS: 27 days | DRG: 560 | End: 2018-08-14
Attending: PHYSICAL MEDICINE & REHABILITATION | Admitting: PHYSICAL MEDICINE & REHABILITATION
Payer: MEDICARE

## 2018-07-18 ENCOUNTER — RESOLUTE PROFESSIONAL BILLING HOSPITAL PROF FEE (OUTPATIENT)
Dept: PHYSICAL MEDICINE AND REHAB | Facility: REHABILITATION | Age: 68
End: 2018-07-18
Payer: MEDICARE

## 2018-07-18 VITALS
HEART RATE: 84 BPM | RESPIRATION RATE: 18 BRPM | SYSTOLIC BLOOD PRESSURE: 113 MMHG | HEIGHT: 76 IN | WEIGHT: 266.54 LBS | BODY MASS INDEX: 32.46 KG/M2 | OXYGEN SATURATION: 91 % | TEMPERATURE: 98.3 F | DIASTOLIC BLOOD PRESSURE: 67 MMHG

## 2018-07-18 PROBLEM — Z78.9 IMPAIRED ACTIVITIES OF DAILY LIVING: Status: ACTIVE | Noted: 2018-07-18

## 2018-07-18 PROBLEM — R52 PAIN: Status: ACTIVE | Noted: 2018-07-18

## 2018-07-18 PROBLEM — G95.9 CERVICAL MYELOPATHY (HCC): Status: ACTIVE | Noted: 2018-07-18

## 2018-07-18 PROBLEM — M48.062 LUMBAR STENOSIS WITH NEUROGENIC CLAUDICATION: Status: ACTIVE | Noted: 2018-07-18

## 2018-07-18 LAB
GLUCOSE BLD-MCNC: 133 MG/DL (ref 65–99)
GLUCOSE BLD-MCNC: 211 MG/DL (ref 65–99)
GLUCOSE BLD-MCNC: 249 MG/DL (ref 65–99)
GLUCOSE BLD-MCNC: 273 MG/DL (ref 65–99)

## 2018-07-18 PROCEDURE — 82962 GLUCOSE BLOOD TEST: CPT | Mod: 91

## 2018-07-18 PROCEDURE — 82962 GLUCOSE BLOOD TEST: CPT

## 2018-07-18 PROCEDURE — A9270 NON-COVERED ITEM OR SERVICE: HCPCS | Performed by: NURSE PRACTITIONER

## 2018-07-18 PROCEDURE — A9270 NON-COVERED ITEM OR SERVICE: HCPCS | Performed by: PHYSICAL MEDICINE & REHABILITATION

## 2018-07-18 PROCEDURE — 700111 HCHG RX REV CODE 636 W/ 250 OVERRIDE (IP): Performed by: CLINICAL NURSE SPECIALIST

## 2018-07-18 PROCEDURE — 700102 HCHG RX REV CODE 250 W/ 637 OVERRIDE(OP): Performed by: NURSE PRACTITIONER

## 2018-07-18 PROCEDURE — 99223 1ST HOSP IP/OBS HIGH 75: CPT | Performed by: PHYSICAL MEDICINE & REHABILITATION

## 2018-07-18 PROCEDURE — 94760 N-INVAS EAR/PLS OXIMETRY 1: CPT

## 2018-07-18 PROCEDURE — 700112 HCHG RX REV CODE 229: Performed by: NURSE PRACTITIONER

## 2018-07-18 PROCEDURE — 770010 HCHG ROOM/CARE - REHAB SEMI PRIVAT*

## 2018-07-18 PROCEDURE — A9270 NON-COVERED ITEM OR SERVICE: HCPCS | Performed by: CLINICAL NURSE SPECIALIST

## 2018-07-18 PROCEDURE — 700102 HCHG RX REV CODE 250 W/ 637 OVERRIDE(OP): Performed by: PHYSICAL MEDICINE & REHABILITATION

## 2018-07-18 PROCEDURE — 700102 HCHG RX REV CODE 250 W/ 637 OVERRIDE(OP): Performed by: CLINICAL NURSE SPECIALIST

## 2018-07-18 PROCEDURE — 700112 HCHG RX REV CODE 229: Performed by: PHYSICAL MEDICINE & REHABILITATION

## 2018-07-18 RX ORDER — HYDRALAZINE HYDROCHLORIDE 25 MG/1
100 TABLET, FILM COATED ORAL 3 TIMES DAILY
Status: CANCELLED | OUTPATIENT
Start: 2018-07-18

## 2018-07-18 RX ORDER — GABAPENTIN 300 MG/1
600 CAPSULE ORAL 3 TIMES DAILY
Status: CANCELLED | OUTPATIENT
Start: 2018-07-18

## 2018-07-18 RX ORDER — ONDANSETRON 2 MG/ML
4 INJECTION INTRAMUSCULAR; INTRAVENOUS 4 TIMES DAILY PRN
Status: DISCONTINUED | OUTPATIENT
Start: 2018-07-18 | End: 2018-08-14 | Stop reason: HOSPADM

## 2018-07-18 RX ORDER — AMOXICILLIN 250 MG
1 CAPSULE ORAL NIGHTLY
Status: DISCONTINUED | OUTPATIENT
Start: 2018-07-18 | End: 2018-08-14 | Stop reason: HOSPADM

## 2018-07-18 RX ORDER — DOCUSATE SODIUM 50 MG/5ML
100 LIQUID ORAL 2 TIMES DAILY
Status: CANCELLED | OUTPATIENT
Start: 2018-07-18

## 2018-07-18 RX ORDER — HYDRALAZINE HYDROCHLORIDE 25 MG/1
25 TABLET, FILM COATED ORAL EVERY 8 HOURS PRN
Status: DISCONTINUED | OUTPATIENT
Start: 2018-07-18 | End: 2018-08-14 | Stop reason: HOSPADM

## 2018-07-18 RX ORDER — ALUMINA, MAGNESIA, AND SIMETHICONE 2400; 2400; 240 MG/30ML; MG/30ML; MG/30ML
20 SUSPENSION ORAL
Status: DISCONTINUED | OUTPATIENT
Start: 2018-07-18 | End: 2018-08-06

## 2018-07-18 RX ORDER — CEPHALEXIN 250 MG/1
500 CAPSULE ORAL EVERY 6 HOURS
Status: COMPLETED | OUTPATIENT
Start: 2018-07-18 | End: 2018-07-20

## 2018-07-18 RX ORDER — TRAZODONE HYDROCHLORIDE 50 MG/1
50 TABLET ORAL
Status: DISCONTINUED | OUTPATIENT
Start: 2018-07-18 | End: 2018-08-14 | Stop reason: HOSPADM

## 2018-07-18 RX ORDER — FLUTICASONE PROPIONATE 50 MCG
1 SPRAY, SUSPENSION (ML) NASAL DAILY
Status: CANCELLED | OUTPATIENT
Start: 2018-07-19

## 2018-07-18 RX ORDER — HYDROCHLOROTHIAZIDE 25 MG/1
25 TABLET ORAL DAILY
Status: CANCELLED | OUTPATIENT
Start: 2018-07-19

## 2018-07-18 RX ORDER — OXYCODONE HYDROCHLORIDE AND ACETAMINOPHEN 5; 325 MG/1; MG/1
1 TABLET ORAL EVERY 4 HOURS PRN
Status: DISCONTINUED | OUTPATIENT
Start: 2018-07-18 | End: 2018-07-24

## 2018-07-18 RX ORDER — HYDRALAZINE HYDROCHLORIDE 25 MG/1
100 TABLET, FILM COATED ORAL 3 TIMES DAILY
Status: DISCONTINUED | OUTPATIENT
Start: 2018-07-18 | End: 2018-08-14 | Stop reason: HOSPADM

## 2018-07-18 RX ORDER — BISACODYL 10 MG
10 SUPPOSITORY, RECTAL RECTAL
Status: DISCONTINUED | OUTPATIENT
Start: 2018-07-18 | End: 2018-08-14 | Stop reason: HOSPADM

## 2018-07-18 RX ORDER — DOCUSATE SODIUM 100 MG/1
100 CAPSULE, LIQUID FILLED ORAL 2 TIMES DAILY
Status: DISCONTINUED | OUTPATIENT
Start: 2018-07-18 | End: 2018-08-14 | Stop reason: HOSPADM

## 2018-07-18 RX ORDER — AMOXICILLIN 250 MG
1 CAPSULE ORAL NIGHTLY
Status: CANCELLED | OUTPATIENT
Start: 2018-07-18

## 2018-07-18 RX ORDER — AMLODIPINE BESYLATE 5 MG/1
10 TABLET ORAL
Status: CANCELLED | OUTPATIENT
Start: 2018-07-19

## 2018-07-18 RX ORDER — CLINDAMYCIN HYDROCHLORIDE 300 MG/1
300 CAPSULE ORAL 2 TIMES DAILY
Status: DISCONTINUED | OUTPATIENT
Start: 2018-07-18 | End: 2018-08-14 | Stop reason: HOSPADM

## 2018-07-18 RX ORDER — GABAPENTIN 300 MG/1
600 CAPSULE ORAL 3 TIMES DAILY
Status: DISCONTINUED | OUTPATIENT
Start: 2018-07-18 | End: 2018-07-21

## 2018-07-18 RX ORDER — ECHINACEA PURPUREA EXTRACT 125 MG
2 TABLET ORAL PRN
Status: DISCONTINUED | OUTPATIENT
Start: 2018-07-18 | End: 2018-08-06

## 2018-07-18 RX ORDER — DOCUSATE SODIUM 50 MG/5ML
100 LIQUID ORAL 2 TIMES DAILY
Status: DISCONTINUED | OUTPATIENT
Start: 2018-07-18 | End: 2018-07-18

## 2018-07-18 RX ORDER — FLUTICASONE PROPIONATE 50 MCG
1 SPRAY, SUSPENSION (ML) NASAL DAILY
Status: DISCONTINUED | OUTPATIENT
Start: 2018-07-19 | End: 2018-08-14 | Stop reason: HOSPADM

## 2018-07-18 RX ORDER — CEPHALEXIN 500 MG/1
500 CAPSULE ORAL EVERY 6 HOURS
Status: CANCELLED | OUTPATIENT
Start: 2018-07-18 | End: 2018-07-20

## 2018-07-18 RX ORDER — AMLODIPINE BESYLATE 5 MG/1
10 TABLET ORAL
Status: DISCONTINUED | OUTPATIENT
Start: 2018-07-19 | End: 2018-08-14 | Stop reason: HOSPADM

## 2018-07-18 RX ORDER — LISINOPRIL 20 MG/1
40 TABLET ORAL 2 TIMES DAILY
Status: CANCELLED | OUTPATIENT
Start: 2018-07-18

## 2018-07-18 RX ORDER — ONDANSETRON 4 MG/1
4 TABLET, ORALLY DISINTEGRATING ORAL 4 TIMES DAILY PRN
Status: DISCONTINUED | OUTPATIENT
Start: 2018-07-18 | End: 2018-08-14 | Stop reason: HOSPADM

## 2018-07-18 RX ORDER — CLINDAMYCIN HYDROCHLORIDE 150 MG/1
300 CAPSULE ORAL 2 TIMES DAILY
Status: CANCELLED | OUTPATIENT
Start: 2018-07-18

## 2018-07-18 RX ORDER — OXYCODONE HYDROCHLORIDE AND ACETAMINOPHEN 5; 325 MG/1; MG/1
1 TABLET ORAL EVERY 4 HOURS PRN
Status: CANCELLED | OUTPATIENT
Start: 2018-07-18

## 2018-07-18 RX ORDER — HYDROCHLOROTHIAZIDE 25 MG/1
25 TABLET ORAL DAILY
Status: DISCONTINUED | OUTPATIENT
Start: 2018-07-19 | End: 2018-07-22

## 2018-07-18 RX ORDER — INSULIN GLARGINE 100 [IU]/ML
35 INJECTION, SOLUTION SUBCUTANEOUS EVERY EVENING
Status: DISCONTINUED | OUTPATIENT
Start: 2018-07-18 | End: 2018-07-25

## 2018-07-18 RX ORDER — ACETAMINOPHEN 325 MG/1
650 TABLET ORAL EVERY 4 HOURS PRN
Status: DISCONTINUED | OUTPATIENT
Start: 2018-07-18 | End: 2018-08-14 | Stop reason: HOSPADM

## 2018-07-18 RX ORDER — INSULIN GLARGINE 100 [IU]/ML
35 INJECTION, SOLUTION SUBCUTANEOUS EVERY EVENING
Status: CANCELLED | OUTPATIENT
Start: 2018-07-18

## 2018-07-18 RX ORDER — LISINOPRIL 20 MG/1
40 TABLET ORAL 2 TIMES DAILY
Status: DISCONTINUED | OUTPATIENT
Start: 2018-07-18 | End: 2018-07-19

## 2018-07-18 RX ORDER — METHOCARBAMOL 750 MG/1
750 TABLET, FILM COATED ORAL 3 TIMES DAILY PRN
Status: DISCONTINUED | OUTPATIENT
Start: 2018-07-18 | End: 2018-07-19

## 2018-07-18 RX ORDER — POLYVINYL ALCOHOL 14 MG/ML
1 SOLUTION/ DROPS OPHTHALMIC PRN
Status: DISCONTINUED | OUTPATIENT
Start: 2018-07-18 | End: 2018-08-06

## 2018-07-18 RX ORDER — BISACODYL 10 MG
10 SUPPOSITORY, RECTAL RECTAL
Status: CANCELLED | OUTPATIENT
Start: 2018-07-18

## 2018-07-18 RX ADMIN — CLINDAMYCIN HYDROCHLORIDE 300 MG: 300 CAPSULE ORAL at 15:51

## 2018-07-18 RX ADMIN — HYDROCHLOROTHIAZIDE 25 MG: 25 TABLET ORAL at 05:01

## 2018-07-18 RX ADMIN — METHOCARBAMOL 750 MG: 750 TABLET, FILM COATED ORAL at 05:00

## 2018-07-18 RX ADMIN — METFORMIN HYDROCHLORIDE 1000 MG: 500 TABLET, FILM COATED ORAL at 18:00

## 2018-07-18 RX ADMIN — INSULIN LISPRO 5 UNITS: 100 INJECTION, SOLUTION INTRAVENOUS; SUBCUTANEOUS at 12:28

## 2018-07-18 RX ADMIN — HYDRALAZINE HYDROCHLORIDE 100 MG: 25 TABLET, FILM COATED ORAL at 20:31

## 2018-07-18 RX ADMIN — ACETAMINOPHEN 650 MG: 325 TABLET, FILM COATED ORAL at 15:51

## 2018-07-18 RX ADMIN — GABAPENTIN 600 MG: 300 CAPSULE ORAL at 20:31

## 2018-07-18 RX ADMIN — CLINDAMYCIN HYDROCHLORIDE 300 MG: 150 CAPSULE ORAL at 05:00

## 2018-07-18 RX ADMIN — GABAPENTIN 600 MG: 300 CAPSULE ORAL at 11:59

## 2018-07-18 RX ADMIN — HYDRALAZINE HYDROCHLORIDE 100 MG: 50 TABLET ORAL at 04:59

## 2018-07-18 RX ADMIN — OXYCODONE HYDROCHLORIDE AND ACETAMINOPHEN 1 TABLET: 5; 325 TABLET ORAL at 11:58

## 2018-07-18 RX ADMIN — CEPHALEXIN 500 MG: 500 CAPSULE ORAL at 05:01

## 2018-07-18 RX ADMIN — CEPHALEXIN 500 MG: 250 CAPSULE ORAL at 23:35

## 2018-07-18 RX ADMIN — INSULIN LISPRO 3 UNITS: 100 INJECTION, SOLUTION INTRAVENOUS; SUBCUTANEOUS at 17:16

## 2018-07-18 RX ADMIN — METFORMIN HYDROCHLORIDE 1000 MG: 500 TABLET ORAL at 07:30

## 2018-07-18 RX ADMIN — ENOXAPARIN SODIUM 40 MG: 100 INJECTION SUBCUTANEOUS at 05:01

## 2018-07-18 RX ADMIN — DOCUSATE SODIUM 100 MG: 100 CAPSULE, LIQUID FILLED ORAL at 20:30

## 2018-07-18 RX ADMIN — CLINDAMYCIN HYDROCHLORIDE 300 MG: 300 CAPSULE ORAL at 20:30

## 2018-07-18 RX ADMIN — MAGNESIUM HYDROXIDE 30 ML: 400 SUSPENSION ORAL at 16:19

## 2018-07-18 RX ADMIN — DOCUSATE SODIUM 100 MG: 50 LIQUID ORAL at 06:00

## 2018-07-18 RX ADMIN — GABAPENTIN 600 MG: 300 CAPSULE ORAL at 15:51

## 2018-07-18 RX ADMIN — OXYCODONE HYDROCHLORIDE AND ACETAMINOPHEN 1 TABLET: 5; 325 TABLET ORAL at 05:01

## 2018-07-18 RX ADMIN — LISINOPRIL 40 MG: 20 TABLET ORAL at 20:31

## 2018-07-18 RX ADMIN — AMLODIPINE BESYLATE 10 MG: 10 TABLET ORAL at 05:01

## 2018-07-18 RX ADMIN — OXYCODONE HYDROCHLORIDE AND ACETAMINOPHEN 1 TABLET: 5; 325 TABLET ORAL at 01:18

## 2018-07-18 RX ADMIN — CLONIDINE 1 PATCH: 0.3 PATCH TRANSDERMAL at 07:57

## 2018-07-18 RX ADMIN — CEPHALEXIN 500 MG: 250 CAPSULE ORAL at 18:00

## 2018-07-18 RX ADMIN — METHOCARBAMOL 750 MG: 750 TABLET ORAL at 15:51

## 2018-07-18 RX ADMIN — FLUTICASONE PROPIONATE 50 MCG: 50 SPRAY, METERED NASAL at 04:59

## 2018-07-18 RX ADMIN — INSULIN GLARGINE 35 UNITS: 100 INJECTION, SOLUTION SUBCUTANEOUS at 20:36

## 2018-07-18 RX ADMIN — Medication 1 TABLET: at 20:30

## 2018-07-18 RX ADMIN — LISINOPRIL 40 MG: 20 TABLET ORAL at 05:01

## 2018-07-18 RX ADMIN — OXYCODONE HYDROCHLORIDE AND ACETAMINOPHEN 1 TABLET: 5; 325 TABLET ORAL at 20:31

## 2018-07-18 RX ADMIN — METHOCARBAMOL 750 MG: 750 TABLET, FILM COATED ORAL at 11:57

## 2018-07-18 RX ADMIN — HYDRALAZINE HYDROCHLORIDE 100 MG: 50 TABLET ORAL at 11:59

## 2018-07-18 RX ADMIN — CEPHALEXIN 500 MG: 500 CAPSULE ORAL at 11:59

## 2018-07-18 RX ADMIN — GABAPENTIN 600 MG: 300 CAPSULE ORAL at 05:01

## 2018-07-18 ASSESSMENT — PAIN SCALES - GENERAL
PAINLEVEL_OUTOF10: 7

## 2018-07-18 ASSESSMENT — PATIENT HEALTH QUESTIONNAIRE - PHQ9
SUM OF ALL RESPONSES TO PHQ9 QUESTIONS 1 AND 2: 0
2. FEELING DOWN, DEPRESSED, IRRITABLE, OR HOPELESS: NOT AT ALL
1. LITTLE INTEREST OR PLEASURE IN DOING THINGS: NOT AT ALL

## 2018-07-18 ASSESSMENT — LIFESTYLE VARIABLES: EVER_SMOKED: YES

## 2018-07-18 NOTE — PROGRESS NOTES
Patient admitted to facility at 1425 via w/c; accompanied by hospital transport. Patient assisted to room and positioned in bed for comfort and safety, call light within reach. Patient assisted with stowing belongings and oriented to room and facility. Admission assessment performed and documented in computer. Admission paperwork started, signed copies placed in chart. Will continue to monitor.

## 2018-07-18 NOTE — PROGRESS NOTES
Report given to Noah MURRAY who will be receiving patfient at [facility] .

## 2018-07-18 NOTE — DISCHARGE PLANNING
Dr. Pagan has accepted Joel to inpatient rehab. Transport is scheduled at 12:30p today. Nursing to call report to [phone]. JUAN roth. Will follow to assist as needed with transition to [facility].

## 2018-07-18 NOTE — H&P
REHABILITATION HISTORY AND PHYSICAL/POST ADMISSION EVALUATION    7/18/2018  3:59 PM  Joel Ma  [id number]  Admission  7/18/2018  2:25 PM  Reason for admission: Cervical myelopathy (HCC)    HPI:  Mr. Ma is a 67 year old right hand dominant male with a history of atrial fibrillation and prior stroke on chronic Coumadin, diabetes, history of infected knee hardware with MRSA, cervical myelopathy, lumbar spinal stenosis, hypertension, and multiple concussions who was admitted to [facility] on July 13, 2018    Prior to surgery, the patient had severe lumbar spinal stenosis with neurogenic claudication limiting his ability to ambulate.  He had a previous cervical spinal fusion in 2016 with chronic coordination deficits and neck pain.  He had chronic right upper limb radicular symptoms.  His history of 2 strokes resulted in temporary right hemiparesis.  He has been on chronic Coumadin.      He reports that over the last several months, he has noticed worsening gait.  His right leg drags behind him.  Following his stroke, he had AFOs, but these were discontinued.  He has used a front-wheeled walker since his stroke.  He reports that he walks several miles with a walker.  He is very active.    He was taken to the operating room on July 13, 2018 by Dr. Marsh for C2-C6 laminectomy, C2-C4 posterior fusion and L2-L5 laminectomies.    6 click scores are 6 for mobility and 11 for activities of daily living.  He was last seen by physical therapy on July 17 and required moderate assist for bed mobility.  He was last seen by Occupational Therapy on July 16 and was max assist for stand pivot transfer.    He was admitted for rehabilitation on July 18, 2018    He denies any fevers or chills.  He reports that he has had constipation.  He was able to have a bowel movement after surgery, but he has not been able to go for the last 4 days.  He denies any coughing, choking, or difficulty swallowing.  He reports chronic difficulty  with memory, but there has been no significant change after surgery.  He reports numbness in his feet and hypersensitivity on the dorsum of his hands, right greater than left.  He reports generalized weakness.  He reports difficulty with gait, balance, and pain.  He would like to minimize the use of pain medications as much as possible.  He denies any chest pain or shortness of breath.  He reports pain at both operative sites depending on activity and position.  There is tenderness to palpation in the lumbar area.    REVIEW OF SYSTEMS:     All other systems were reviewed and are negative      PMH:  Past Medical History:   Diagnosis Date   • Anesthesia     low O2 sat   • Arrhythmia     a-fib   • arthritis 6/17/2011    thumb, fingers   • Arthritis     hip   • Cataract     left eye surgery   • Dental disorder     full dentures   • Diabetes     insulin, Dr Oconnor, his APN   • High cholesterol    • Hypertension    • MRSA (methicillin resistant Staphylococcus aureus)     history of, nothing current 2016, on clindamycin for rest of life per patient   • JANNIE treated with BiPAP 4/18/2016   • Pain 05-03-13    shoulders, hip, right knee, 7/10   • Pneumonia 2002   • Renal disorder     stage 2   • Sleep apnea     bipap   • Stroke (HCC)     2/1/2007, 4/1/2007, right sided weakness; balance disturbance, memory problems   • Thyroid mass 7/30/2009    benign lump   • Ventricular ectopy 6/17/2011       PSH:  Past Surgical History:   Procedure Laterality Date   • CERVICAL FUSION POSTERIOR  7/13/2018    Procedure: CERVICAL FUSION POSTERIOR/ C2-4;  Surgeon: Boby Marsh M.D.;  Location: SURGERY [facility];  Service: Neurosurgery   • CERVICAL LAMINECTOMY POSTERIOR  7/13/2018    Procedure: CERVICAL LAMINECTOMY POSTERIOR/ C2-3, C5-6;  Surgeon: Boby Marsh M.D.;  Location: SURGERY [facility];  Service: Neurosurgery   • LUMBAR LAMINECTOMY DISKECTOMY  7/13/2018    Procedure: LUMBAR LAMINECTOMY DISKECTOMY/ L2-5 LAMI;   Surgeon: Boby Marsh M.D.;  Location: [facility];  Service: Neurosurgery   • CERVICAL DISK AND FUSION ANTERIOR  8/31/2016    Procedure: CERVICAL DISK AND FUSION ANTERIOR C3-7 ;  Surgeon: Alhaji Jaffe M.D.;  Location: [facility];  Service:    • CATARACT PHACO WITH IOL  5/8/2013    Performed by Mame Rehman M.D. at SURGERY SAME DAY [facility]   • IRRIGATION & DEBRIDEMENT ORTHO  11/13/2012    Performed by Gordon Cota M.D. at [facility]   • KNEE REVISION TOTAL  11/13/2012    Performed by Gordon Cota M.D. at [facility]   • IRRIGATION & DEBRIDEMENT ORTHO  11/2/2012    Performed by Gordon Cota M.D. at [facility]   • IRRIGATION & DEBRIDEMENT ORTHO Left 10/29/2012    Procedure: left shoulder, with drain;  Surgeon: Gordon Cota M.D.;  Location: [facility];  Service:    • INCISION AND DRAINAGE ORTHOPEDIC Right 10/29/2012    Procedure: Right Total Knee I and D and Liner Exchange, with drain;  Surgeon: Gordon Cota M.D.;  Location: [facility];  Service:    • IRRIGATION & DEBRIDEMENT ORTHO  3/13/2012    Performed by KAMALJIT SHI at [facility]   • KNEE REVISION TOTAL  3/13/2012    Performed by KAMALJIT SHI at [facility]   • TENDON REPAIR  3/13/2012    Performed by KAMALJIT SHI at [facility]   • KNEE ARTHROPLASTY TOTAL  1/11/2012    Right; Performed by KAMALJIT SHI at [facility]   • TOE AMPUTATION  7/22/2011    Performed by EVELYN JANE at [facility]   • ELBOW ARTHROTOMY  2008    right   • LUMBAR FUSION POSTERIOR  1979   • FOOT SURGERY      partial amputation great toe   • FOOT SURGERY      toe surgery left foot   • OTHER      left eye cataract   • OTHER NEUROLOGICAL SURG      neck fusion   • PB KNEE SCOPE,SINGLE MENISECTOMY      right knee       FAMILY HISTORY:  No neurodegenerative conditions    MEDICATIONS:  Current  Facility-Administered Medications   Medication Dose   • Respiratory Care per Protocol     • Pharmacy Consult Request ...Pain Management Review 1 Each  1 Each   • acetaminophen (TYLENOL) tablet 650 mg  650 mg   • artificial tears 1.4 % ophthalmic solution 1 Drop  1 Drop   • benzocaine-menthol (CEPACOL) lozenge 1 Lozenge  1 Lozenge   • hydrALAZINE (APRESOLINE) tablet 25 mg  25 mg   • mag hydrox-al hydrox-simeth (MAALOX PLUS ES or MYLANTA DS) suspension 20 mL  20 mL   • ondansetron (ZOFRAN ODT) dispertab 4 mg  4 mg    Or   • ondansetron (ZOFRAN) syringe/vial injection 4 mg  4 mg   • traZODone (DESYREL) tablet 50 mg  50 mg   • sodium chloride (OCEAN) 0.65 % nasal spray 2 Spray  2 Spray   • methocarbamol (ROBAXIN) tablet 750 mg  750 mg   • bisacodyl (DULCOLAX) suppository 10 mg  10 mg   • magnesium hydroxide (MILK OF MAGNESIA) suspension 30 mL  30 mL   • senna-docusate (PERICOLACE or SENOKOT S) 8.6-50 MG per tablet 1 Tab  1 Tab   • [START ON 7/19/2018] amLODIPine (NORVASC) tablet 10 mg  10 mg   • cephALEXin (KEFLEX) capsule 500 mg  500 mg   • clindamycin (CLEOCIN) capsule 300 mg  300 mg   • [START ON 7/25/2018] cloNIDine (CATAPRES) 0.3 MG/24HR 1 Patch  1 Patch   • [START ON 7/19/2018] enoxaparin (LOVENOX) inj 40 mg  40 mg   • [START ON 7/19/2018] fluticasone (FLONASE) nasal spray 50 mcg  1 Spray   • gabapentin (NEURONTIN) capsule 600 mg  600 mg   • hydrALAZINE (APRESOLINE) tablet 100 mg  100 mg   • [START ON 7/19/2018] hydroCHLOROthiazide (HYDRODIURIL) tablet 25 mg  25 mg   • insulin glargine (LANTUS) injection 35 Units  35 Units   • insulin lispro (HUMALOG) injection 0-12 Units  0-12 Units   • lisinopril (PRINIVIL) tablet 40 mg  40 mg   • metFORMIN (GLUCOPHAGE) tablet 1,000 mg  1,000 mg   • oxyCODONE-acetaminophen (PERCOCET) 5-325 MG per tablet 1 Tab  1 Tab   • docusate sodium (COLACE) capsule 100 mg  100 mg       ALLERGIES:  Demerol and Statins [hmg-coa-r inhibitors]    PSYCHOSOCIAL HISTORY:  He lives alone in a single  "level home in [place] with 4 stairs to enter.  There are hand rails on the steps.  He has 4 adult sons.  His sons do travel for work, and they are not currently in town.    LEVEL OF FUNCTION PRIOR TO DISABILTY:  Fully independent with the use of a front wheeled walker    LEVEL OF FUNCTION PRIOR TO ADMISSION to [facility]:  6 click scores are 6 for mobility and 11 for activities of daily living.  He was last seen by physical therapy on July 17 and required moderate assist for bed mobility.  He was last seen by Occupational Therapy on July 16 and was max assist for stand pivot transfer.    CURRENT LEVEL OF FUNCTION:   Same as level of function prior to admission to [facility]    PHYSICAL EXAM:     VITAL SIGNS:   height is 1.905 m (6' 3\") and weight is 114.3 kg (252 lb). His temperature is 36.7 °C (98 °F). His blood pressure is 93/61 (abnormal) and his pulse is 83. His respiration is 20 and oxygen saturation is 91%.     GENERAL: No apparent distress  HEENT: Wearing Aspen cervical collar  CARDIAC: Regular rate and rhythm  LUNGS: Clear to auscultation   ABDOMINAL: Somewhat full, bowel sounds are hypoactive  EXTREMITIES: There is muscular wasting of the hand intrinsics right greater than left.  There is also some neuromuscular wasting in the bilateral lower limbs with tibialis anterior and gastroc/soleus    NEURO:    Mental status:  A&Ox4 (person, place, date, situation) answers questions appropriately follows commands.  He has a dry sense of humor but is pleasant and cooperative    Speech/language: fluent, no aphasia or dysarthria    CRANIAL NERVES:  2,3: visual acuity grossly intact, PERRL  3,4,6: EOMI bilaterally, no nystagmus or diplopia  5: intact in all branches  7: no facial asymmetry  8: hearing grossly intact  9,10: symmetric palate elevation  11: SCM/Trapezius strength 5/5 bilaterally--somewhat limited by pain  12: tongue protrudes midline    Motor:  Elbow flexors:  Bilateral " -  4/5  Wrist extensors:  Right -  4/5, Left -  5/5  Elbow extensors:  Right -  4/5, Left -  5/5  Finger flexors:  Right -  4/5, Left -  5/5  Finger abductors:  Right -  4/5, Left -  5/5  Hip flexors:  Right -  4/5, Left -  5/5  Knee ext:  Right -  4/5, Left -  5/5  Dorsiflexors:  Right -  1/5, Left -  2/5  EHL:  Right -  1/5, Left -  2/5  Plantar flexors:  Right -  2/5, Left -  3/5    Sensory: Normal through C6 bilaterally.  Hypersensitivity C7-T1 dermatomes.  Diminished sensation in a stocking distribution in the bilateral ankles and feet    Reflexes: Hyperreflexic in the upper limbs.  Hyporeflexic in the lower limbs.      Coordination: Impaired coordination with rapid alternating movements in the right upper limb.  Impaired right hand fine motor movements.    Skin:  posterior cervical incision is clean, dry, and intact.  Approximated with staples.  lumbar incision is clean, dry, and intact and approximated with staples      RADIOLOGY:  Independently reviewed the images and report of the MRI of the cervical spine from May 7, 2018 showing multilevel degenerative changes throughout the cervical spine, prior C3-C7 anterior fusion, cerebellar encephalomalacia, and incidental left thyroid mass.    I independently reviewed the images and report of the MRI of the lumbar spine from May 7, 2018 showing lumbar stenosis with significant degenerative changes throughout.  Stenosis is worst at L3-4.    PRIMARY REHAB DIAGNOSIS:    This patient is a 67 y.o. male admitted for acute inpatient rehabilitation with Cervical myelopathy (HCC).    IMPAIRMENTS:   ADLs/IADLs  Mobility    RELEVANT CHANGES SINCE PREADMISSION EVALUATION:    Status unchanged    The patient's rehabilitation potential is Good  The patient's medical prognosis is fair    PLAN:   Discussion and Recommendations:   1. The patient requires an acute inpatient rehabilitation program with a coordinated program of care at an intensity and frequency not available at a  lower level of care. This recommendation is substantiated by the patient's medical physicians who recommend that the patient's intervention and assessment of medical issues needs to be done at an acute level of care for patient's safety and maximum outcome.   2. A coordinated program of care will be supplied by an interdisciplinary team of physical therapy, occupational therapy, rehab physician, rehab nursing, and, if needed, speech therapy and rehab psychology. Rehab team presents a patient-specific rehabilitation and education program concentrating on prevention of future problems related to accessibility, mobility, skin, bowel, bladder, sexuality, and psychosocial and medical/surgical problems.   3. Need for Rehabilitation Physician: The rehab physician will be evaluating the patient on a multi-weekly basis to help coordinate the program of care. The rehab physician communicates between medical physicians, therapists, and nurses to maximize the patient's potential outcome. Specific areas in which the rehab physician will be providing daily assessment include the following:   A. Assessing the patient's heart rate and blood pressure response (vitals monitoring) to activity and making adjustments in medications or conservative measures as needed.   B. The rehab physician will be assessing the frequency at which the program can be increased to allow the patient to reach optimal functional outcome.   C. The rehab physician will also provide assessments in daily skin care, especially in light of patient's impairments in mobility.   D. The rehab physician will provide special expertise in understanding how to work with functional impairment and recommend appropriate interventions, compensatory techniques, and education that will facilitate the patient's outcome.   4. Rehab R.N.   The rehab RN will be working with patient to carry over in room mobility and activities of daily living when the patient is not in 3 hours of  skilled therapy. Rehab nursing will be working in conjunction with rehab physician to address all the medical issues above and continue to assess laboratory work and discuss abnormalities with the treating physicians, assess vitals, and response to activity, and discuss and report abnormalities with the rehab physician. Rehab RN will also continue daily skin care, supervise bladder/bowel program, instruct in medication administration, and ensure patient safety.     E. Therapies to treat at intensity and frequency of (may change after completion of evaluation by all therapeutic disciplines):       PT:  Physical therapy to address mobility, transfer, gait training and evaluation for adaptive equipment needs 1.5 hour/day at least 5 days/week for the duration of the ELOS (see below)       OT:  Occupational therapy to address ADLs, self-care, home management training, functional mobility/transfers and assistive device evaluation, and community re-integration 1.5 hour/day at least 5 days/week for the duration of the ELOS (see below).         F. Medical management / Rehabilitation Issues/ Adverse Potential as part of rehabilitation plan       Mr. Ma is a 67-year-old male admitted for rehabilitation on July 18 in the setting of cervical myelopathy and lumbar spinal stenosis    Cervical myelopathy status post C2-C6 laminectomy and C2-C4 fusion by Dr. Marsh on July 13  Lumbar spinal stenosis with neurogenic claudication status post L2-L5 laminectomies by Dr. Marsh on July 13  Initiate comprehensive rehabilitation  Follow-up with Dr. Marsh as scheduled  Aspen collar on at all times  Not cleared to resume therapeutic anticoagulation until follow-up with/clearance by Dr. Marsh  Continue Keflex until July 20    History of strokes  Atrial fibrillation   Not to resume Coumadin or therapeutic Lovenox until cleared by Dr. Marsh    Pain  Tylenol as needed   Gabapentin 600 mg 3 times a day  Robaxin 750 mg every 8 hours as  needed  Oxycodone/APAP 5/325 mg every 4 hours as needed    Hypertension  Amlodipine 10 mg daily  Clonidine patch weekly on Wednesday  Hydralazine 100 mg 3 times a day  Hydrochlorothiazide 25 mg daily  Lisinopril 40 mg twice a day    Continue to monitor closely   consulting hospitalist for assistance      Diabetes mellitus type 2 with a history of diabetic neuropathy  Lantus 35 units at bedtime  Sliding scale insulin  Metformin 1000 mg twice a day  Consulting hospitalist for assistance    Anemia  Hemoglobin 11.6 on July 15  Continue to monitor    History of left total knee replacement with chronic left knee/left shoulder staph infection  Continue clindamycin 300 mg twice daily    Constipation  Colace 100 g twice a day  Pericolace qhs  Milk of magnesia as needed  Dulcolax suppository daily as needed    Seasonal allergies  Flonase daily    Incidental left thyroid mass seen on MRI in May 2018  Will need follow-up with primary care  Check thyroid function on admission     DVT prophylaxis  Lovenox 40 mg daily    Estimated discharge: 21-28 days    Verified CODE STATUS as full on admission       Admission care coordination time today was 70 min, and entire time spent in face-to-face contact was >50% in counseling and coordination of care as detailed in A/P above.          GOALS/EXPECTED LEVEL OF FUNCTION BASED ON CURRENT MEDICAL AND FUNCTIONAL STATUS (may change based on patient's medical status and rate of impairment recovery):  Transfers:   Modified Independent  Mobility/Gait:   Modified Independent  ADL's:   Modified Independent    DISPOSITION: Discharge to pre-morbid independent living setting with the supportive care of patient's community resources.          Higinio Pagan MD  7/18/2018  3:59 PM

## 2018-07-18 NOTE — PROGRESS NOTES
Assumed pt care at 1900.  Received report from day RN.  Assessment completed.  Pt AOx4 /confused at times. Pt comlaints of pain at this time in back and neck 7/10 , continues with pain management as ordered.  No other s/s of discomfort or distress. Pt unable to ambulate due to unsafe condition. Skin intact/ slightly red, surgical incision WILFREDO.  Bed in lowest position, locked, and bed alarm on. Pt voiding appropriately with no issues to note.  Pt educated on safety and POC, states understanding.  Pt calls appropriately.  Treaded socks in place, SCDs on, call light within reach and staff numbers provided.  Pt needs met at this time.

## 2018-07-18 NOTE — DISCHARGE PLANNING
Anticipated Discharge Disposition:   IRF    Action:   Received a call from Yanci Ferrera that IRF will be accepting pt today but no time for  yet.   Talked to Helga Velazquez NP who agreed to dc pt today.     Barriers to Discharge:   Waiting for  time confirmation from IRF.    Plan:   Follow up with IRF.

## 2018-07-18 NOTE — DISCHARGE PLANNING
F/U with client at bedside to discuss IRF referral. Explained specifics of inpatient rehab, answered questions/concerns. Pt agreeable to admission. Provided [facility] contact information for any additional questions. Joel is aware there may be a copay associated with his admission to [facility]. Will follow to assist as needed with transition to [facility].

## 2018-07-18 NOTE — DISCHARGE INSTRUCTIONS
Discharge Instructions    Discharged to home by car with escort. Discharged via wheelchair, hospital escort: Yes.  Special equipment needed: C-Collar    Be sure to schedule a follow-up appointment with your primary care doctor or any specialists as instructed.     Discharge Plan:   Diet Plan: Discussed  Activity Level: Discussed  Confirmed Follow up Appointment: No (Comments) (discharged to [facility])  Confirmed Symptoms Management: Discussed  Medication Reconciliation Updated: Yes  Pneumococcal Vaccine Administered/Refused: Not given - Patient refused pneumococcal vaccine  Influenza Vaccine Indication: Patient Refuses (refused states it causes the flu)    I understand that a diet low in cholesterol, fat, and sodium is recommended for good health. Unless I have been given specific instructions below for another diet, I accept this instruction as my diet prescription.   Other diet: Regular diet no concentrated sweets    Special Instructions: None    · Is patient discharged on Warfarin / Coumadin?   No     Depression / Suicide Risk    As you are discharged from this Critical access hospital facility, it is important to learn how to keep safe from harming yourself.    Recognize the warning signs:  · Abrupt changes in personality, positive or negative- including increase in energy   · Giving away possessions  · Change in eating patterns- significant weight changes-  positive or negative  · Change in sleeping patterns- unable to sleep or sleeping all the time   · Unwillingness or inability to communicate  · Depression  · Unusual sadness, discouragement and loneliness  · Talk of wanting to die  · Neglect of personal appearance   · Rebelliousness- reckless behavior  · Withdrawal from people/activities they love  · Confusion- inability to concentrate     If you or a loved one observes any of these behaviors or has concerns about self-harm, here's what you can do:  · Talk about it- your feelings and reasons for harming  yourself  · Remove any means that you might use to hurt yourself (examples: pills, rope, extension cords, firearm)  · Get professional help from the community (Mental Health, Substance Abuse, psychological counseling)  · Do not be alone:Call your Safe Contact- someone whom you trust who will be there for you.  · Call your local CRISIS HOTLINE 137-4610 or 855-726-0506  · Call your local Children's Mobile Crisis Response Team [place] (447) 795-2541 or www.[employer]  · Call the toll free National Suicide Prevention Hotlines   · National Suicide Prevention Lifeline 478-289-WRUY (7931)  · National Hope Line Network 800-SUICIDE (352-8564)

## 2018-07-18 NOTE — PROGRESS NOTES
Neurosurgery Progress Note    Subjective:  Pt awake, alert  c/o neck pain with increased sensitivity to right hand  Low back pain, no rad symptoms  Voiding   No bm, + flatus   eating        Exam:  BUE 5/5 except left tri/delt 4+/5, right TA 3/5,GS 4-, left 4+ TA/GS,  bilat IP/quad 5, incisions c/d  With staples    BP  Min: 120/73  Max: 153/95  Pulse  Av.5  Min: 74  Max: 101  Resp  Av.5  Min: 16  Max: 18  Temp  Av.7 °C (98 °F)  Min: 36.6 °C (97.9 °F)  Max: 36.7 °C (98.1 °F)  SpO2  Av.5 %  Min: 91 %  Max: 98 %    No Data Recorded                      Intake/Output       18 - 18 0659 18 - 18 0659       Total  Total       Intake    P.O.  --  240 240  --  -- --    P.O. -- 240 240 -- -- --    Total Intake -- 240 240 -- -- --       Output    Urine  --  -- --  --  -- --    Number of Times Voided 1 x -- 1 x -- -- --    Total Output -- -- -- -- -- --       Net I/O     -- 240 240 -- -- --            Intake/Output Summary (Last 24 hours) at 18 0858  Last data filed at 18   Gross per 24 hour   Intake              240 ml   Output                0 ml   Net              240 ml            • cephALEXin  500 mg Q6HRS   • enoxaparin (LOVENOX) injection  40 mg DAILY   • insulin glargine  35 Units Q EVENING   • hydrALAZINE  5-10 mg Q4HRS PRN   • haloperidol lactate  10 mg Q4HRS PRN   • oxyCODONE-acetaminophen  1 Tab Q4HRS PRN   • amLODIPine  10 mg Q DAY   • clindamycin  300 mg BID   • cloNIDine  1 Patch Q WEDNESDAY   • fluticasone  1 Spray DAILY   • gabapentin  300-600 mg TID   • hydrALAZINE  100 mg TID   • hydroCHLOROthiazide  25 mg DAILY   • lisinopril  40 mg BID   • metFORMIN  1,000 mg BID WITH MEALS   • Pharmacy Consult Request  1 Each PRN   • MD ALERT...Do not administer NSAIDS or ASPIRIN unless ORDERED By Neurosurgery  1 Each PRN   • senna-docusate  1 Tab Nightly   • senna-docusate  1 Tab Q24HRS PRN   • polyethylene  glycol/lytes  1 Packet BID PRN   • magnesium hydroxide  30 mL QDAY PRN   • bisacodyl  10 mg Q24HRS PRN   • fleet  1 Each Once PRN   • NS   Continuous   • diphenhydrAMINE  25 mg Q6HRS PRN    Or   • diphenhydrAMINE  25 mg Q6HRS PRN   • ondansetron  4 mg Q4HRS PRN   • ondansetron  4 mg Q4HRS PRN   • methocarbamol  750 mg Q8HRS PRN    Or   • cyclobenzaprine  10 mg Q8HRS PRN    Or   • diazePAM  5 mg Q4HRS PRN   • labetalol  10 mg Q HOUR PRN   • cloNIDine  0.1 mg Q4HRS PRN   • insulin lispro  0-12 Units TID AC   • Respiratory Care per Protocol   Continuous RT   • docusate sodium 100mg/10mL  100 mg BID       Assessment and Plan:  POD #5 post C2-6 lami, C2-4 fusion, L2-5 lami  Prophylactic anticoagulation: no         Start date/time: tbd  Previously on coumadin - prior stroke  EKG - SR, bigeminy - tachycardia improved   Pt/ot- mobilize  Collar when oob  Glucose cont regimen  Post op abx x1 week   Bowel protocol- give dulcolax supp   Rehab today

## 2018-07-19 LAB
25(OH)D3 SERPL-MCNC: 26 NG/ML (ref 30–100)
ALBUMIN SERPL BCP-MCNC: 3.2 G/DL (ref 3.2–4.9)
ALBUMIN/GLOB SERPL: 1.2 G/DL
ALP SERPL-CCNC: 50 U/L (ref 30–99)
ALT SERPL-CCNC: 6 U/L (ref 2–50)
ANION GAP SERPL CALC-SCNC: 8 MMOL/L (ref 0–11.9)
AST SERPL-CCNC: 12 U/L (ref 12–45)
BASOPHILS # BLD AUTO: 0.6 % (ref 0–1.8)
BASOPHILS # BLD: 0.03 K/UL (ref 0–0.12)
BILIRUB SERPL-MCNC: 0.7 MG/DL (ref 0.1–1.5)
BUN SERPL-MCNC: 27 MG/DL (ref 8–22)
CALCIUM SERPL-MCNC: 8.7 MG/DL (ref 8.5–10.5)
CHLORIDE SERPL-SCNC: 98 MMOL/L (ref 96–112)
CHOLEST SERPL-MCNC: 152 MG/DL (ref 100–199)
CO2 SERPL-SCNC: 31 MMOL/L (ref 20–33)
CREAT SERPL-MCNC: 1.05 MG/DL (ref 0.5–1.4)
EOSINOPHIL # BLD AUTO: 0.15 K/UL (ref 0–0.51)
EOSINOPHIL NFR BLD: 3 % (ref 0–6.9)
ERYTHROCYTE [DISTWIDTH] IN BLOOD BY AUTOMATED COUNT: 42.6 FL (ref 35.9–50)
EST. AVERAGE GLUCOSE BLD GHB EST-MCNC: 237 MG/DL
GLOBULIN SER CALC-MCNC: 2.7 G/DL (ref 1.9–3.5)
GLUCOSE BLD-MCNC: 134 MG/DL (ref 65–99)
GLUCOSE BLD-MCNC: 197 MG/DL (ref 65–99)
GLUCOSE BLD-MCNC: 216 MG/DL (ref 65–99)
GLUCOSE BLD-MCNC: 225 MG/DL (ref 65–99)
GLUCOSE SERPL-MCNC: 147 MG/DL (ref 65–99)
HBA1C MFR BLD: 9.9 % (ref 0–5.6)
HCT VFR BLD AUTO: 35.9 % (ref 42–52)
HDLC SERPL-MCNC: 26 MG/DL
HGB BLD-MCNC: 11.9 G/DL (ref 14–18)
IMM GRANULOCYTES # BLD AUTO: 0.03 K/UL (ref 0–0.11)
IMM GRANULOCYTES NFR BLD AUTO: 0.6 % (ref 0–0.9)
LDLC SERPL CALC-MCNC: 96 MG/DL
LYMPHOCYTES # BLD AUTO: 1.09 K/UL (ref 1–4.8)
LYMPHOCYTES NFR BLD: 21.6 % (ref 22–41)
MAGNESIUM SERPL-MCNC: 1.6 MG/DL (ref 1.5–2.5)
MCH RBC QN AUTO: 28 PG (ref 27–33)
MCHC RBC AUTO-ENTMCNC: 33.1 G/DL (ref 33.7–35.3)
MCV RBC AUTO: 84.5 FL (ref 81.4–97.8)
MONOCYTES # BLD AUTO: 0.77 K/UL (ref 0–0.85)
MONOCYTES NFR BLD AUTO: 15.3 % (ref 0–13.4)
NEUTROPHILS # BLD AUTO: 2.97 K/UL (ref 1.82–7.42)
NEUTROPHILS NFR BLD: 58.9 % (ref 44–72)
NRBC # BLD AUTO: 0 K/UL
NRBC BLD-RTO: 0 /100 WBC
PHOSPHATE SERPL-MCNC: 3.1 MG/DL (ref 2.5–4.5)
PLATELET # BLD AUTO: 206 K/UL (ref 164–446)
PMV BLD AUTO: 11 FL (ref 9–12.9)
POTASSIUM SERPL-SCNC: 3.5 MMOL/L (ref 3.6–5.5)
PREALB SERPL-MCNC: 14 MG/DL (ref 18–38)
PROT SERPL-MCNC: 5.9 G/DL (ref 6–8.2)
RBC # BLD AUTO: 4.25 M/UL (ref 4.7–6.1)
SODIUM SERPL-SCNC: 137 MMOL/L (ref 135–145)
TRIGL SERPL-MCNC: 150 MG/DL (ref 0–149)
TSH SERPL DL<=0.005 MIU/L-ACNC: 0.56 UIU/ML (ref 0.38–5.33)
WBC # BLD AUTO: 5 K/UL (ref 4.8–10.8)

## 2018-07-19 PROCEDURE — 97530 THERAPEUTIC ACTIVITIES: CPT

## 2018-07-19 PROCEDURE — 36415 COLL VENOUS BLD VENIPUNCTURE: CPT

## 2018-07-19 PROCEDURE — A9270 NON-COVERED ITEM OR SERVICE: HCPCS | Performed by: PHYSICAL MEDICINE & REHABILITATION

## 2018-07-19 PROCEDURE — 97162 PT EVAL MOD COMPLEX 30 MIN: CPT

## 2018-07-19 PROCEDURE — 84100 ASSAY OF PHOSPHORUS: CPT

## 2018-07-19 PROCEDURE — 94760 N-INVAS EAR/PLS OXIMETRY 1: CPT

## 2018-07-19 PROCEDURE — 83735 ASSAY OF MAGNESIUM: CPT

## 2018-07-19 PROCEDURE — 700102 HCHG RX REV CODE 250 W/ 637 OVERRIDE(OP): Performed by: PHYSICAL MEDICINE & REHABILITATION

## 2018-07-19 PROCEDURE — A9270 NON-COVERED ITEM OR SERVICE: HCPCS | Performed by: HOSPITALIST

## 2018-07-19 PROCEDURE — 700111 HCHG RX REV CODE 636 W/ 250 OVERRIDE (IP): Performed by: PHYSICAL MEDICINE & REHABILITATION

## 2018-07-19 PROCEDURE — 99232 SBSQ HOSP IP/OBS MODERATE 35: CPT | Performed by: PHYSICAL MEDICINE & REHABILITATION

## 2018-07-19 PROCEDURE — 80053 COMPREHEN METABOLIC PANEL: CPT

## 2018-07-19 PROCEDURE — 97535 SELF CARE MNGMENT TRAINING: CPT

## 2018-07-19 PROCEDURE — 82306 VITAMIN D 25 HYDROXY: CPT

## 2018-07-19 PROCEDURE — 700112 HCHG RX REV CODE 229: Performed by: PHYSICAL MEDICINE & REHABILITATION

## 2018-07-19 PROCEDURE — 83036 HEMOGLOBIN GLYCOSYLATED A1C: CPT

## 2018-07-19 PROCEDURE — 770010 HCHG ROOM/CARE - REHAB SEMI PRIVAT*

## 2018-07-19 PROCEDURE — 97166 OT EVAL MOD COMPLEX 45 MIN: CPT

## 2018-07-19 PROCEDURE — 82962 GLUCOSE BLOOD TEST: CPT

## 2018-07-19 PROCEDURE — 700102 HCHG RX REV CODE 250 W/ 637 OVERRIDE(OP): Performed by: HOSPITALIST

## 2018-07-19 PROCEDURE — 84134 ASSAY OF PREALBUMIN: CPT

## 2018-07-19 PROCEDURE — 84443 ASSAY THYROID STIM HORMONE: CPT

## 2018-07-19 PROCEDURE — 99222 1ST HOSP IP/OBS MODERATE 55: CPT | Performed by: HOSPITALIST

## 2018-07-19 PROCEDURE — 85025 COMPLETE CBC W/AUTO DIFF WBC: CPT

## 2018-07-19 PROCEDURE — 80061 LIPID PANEL: CPT

## 2018-07-19 RX ORDER — LISINOPRIL 20 MG/1
40 TABLET ORAL DAILY
Status: DISCONTINUED | OUTPATIENT
Start: 2018-07-20 | End: 2018-08-12

## 2018-07-19 RX ORDER — POTASSIUM CHLORIDE 20 MEQ/1
20 TABLET, EXTENDED RELEASE ORAL ONCE
Status: COMPLETED | OUTPATIENT
Start: 2018-07-19 | End: 2018-07-19

## 2018-07-19 RX ORDER — METHOCARBAMOL 750 MG/1
750 TABLET, FILM COATED ORAL EVERY 6 HOURS PRN
Status: DISCONTINUED | OUTPATIENT
Start: 2018-07-19 | End: 2018-08-06

## 2018-07-19 RX ORDER — POTASSIUM CHLORIDE 20 MEQ/1
10 TABLET, EXTENDED RELEASE ORAL DAILY
Status: DISCONTINUED | OUTPATIENT
Start: 2018-07-20 | End: 2018-07-22

## 2018-07-19 RX ADMIN — LISINOPRIL 40 MG: 20 TABLET ORAL at 08:09

## 2018-07-19 RX ADMIN — MAGNESIUM HYDROXIDE 30 ML: 400 SUSPENSION ORAL at 11:19

## 2018-07-19 RX ADMIN — INSULIN GLARGINE 35 UNITS: 100 INJECTION, SOLUTION SUBCUTANEOUS at 20:59

## 2018-07-19 RX ADMIN — CLINDAMYCIN HYDROCHLORIDE 300 MG: 300 CAPSULE ORAL at 21:10

## 2018-07-19 RX ADMIN — GABAPENTIN 600 MG: 300 CAPSULE ORAL at 21:09

## 2018-07-19 RX ADMIN — CEPHALEXIN 500 MG: 250 CAPSULE ORAL at 23:20

## 2018-07-19 RX ADMIN — CLINDAMYCIN HYDROCHLORIDE 300 MG: 300 CAPSULE ORAL at 08:09

## 2018-07-19 RX ADMIN — GABAPENTIN 600 MG: 300 CAPSULE ORAL at 08:09

## 2018-07-19 RX ADMIN — DOCUSATE SODIUM 100 MG: 100 CAPSULE, LIQUID FILLED ORAL at 21:09

## 2018-07-19 RX ADMIN — FLUTICASONE PROPIONATE 50 MCG: 50 SPRAY, METERED NASAL at 08:10

## 2018-07-19 RX ADMIN — Medication 1 TABLET: at 21:09

## 2018-07-19 RX ADMIN — DOCUSATE SODIUM 100 MG: 100 CAPSULE, LIQUID FILLED ORAL at 08:10

## 2018-07-19 RX ADMIN — INSULIN LISPRO 3 UNITS: 100 INJECTION, SOLUTION INTRAVENOUS; SUBCUTANEOUS at 17:25

## 2018-07-19 RX ADMIN — HYDROCHLOROTHIAZIDE 25 MG: 25 TABLET ORAL at 08:09

## 2018-07-19 RX ADMIN — CEPHALEXIN 500 MG: 250 CAPSULE ORAL at 05:04

## 2018-07-19 RX ADMIN — HYDRALAZINE HYDROCHLORIDE 100 MG: 25 TABLET, FILM COATED ORAL at 08:05

## 2018-07-19 RX ADMIN — POTASSIUM CHLORIDE 20 MEQ: 1500 TABLET, EXTENDED RELEASE ORAL at 17:25

## 2018-07-19 RX ADMIN — METFORMIN HYDROCHLORIDE 1000 MG: 500 TABLET, FILM COATED ORAL at 17:24

## 2018-07-19 RX ADMIN — OXYCODONE HYDROCHLORIDE AND ACETAMINOPHEN 1 TABLET: 5; 325 TABLET ORAL at 12:54

## 2018-07-19 RX ADMIN — CEPHALEXIN 500 MG: 250 CAPSULE ORAL at 12:00

## 2018-07-19 RX ADMIN — CHOLECALCIFEROL TAB 25 MCG (1000 UNIT) 2000 UNITS: 25 TAB at 17:24

## 2018-07-19 RX ADMIN — CEPHALEXIN 500 MG: 250 CAPSULE ORAL at 17:24

## 2018-07-19 RX ADMIN — ENOXAPARIN SODIUM 40 MG: 100 INJECTION SUBCUTANEOUS at 08:05

## 2018-07-19 RX ADMIN — BISACODYL 10 MG: 10 SUPPOSITORY RECTAL at 18:54

## 2018-07-19 RX ADMIN — HYDRALAZINE HYDROCHLORIDE 100 MG: 25 TABLET, FILM COATED ORAL at 14:58

## 2018-07-19 RX ADMIN — OXYCODONE HYDROCHLORIDE AND ACETAMINOPHEN 1 TABLET: 5; 325 TABLET ORAL at 08:27

## 2018-07-19 RX ADMIN — HYDRALAZINE HYDROCHLORIDE 100 MG: 25 TABLET, FILM COATED ORAL at 21:10

## 2018-07-19 RX ADMIN — METFORMIN HYDROCHLORIDE 1000 MG: 500 TABLET, FILM COATED ORAL at 08:09

## 2018-07-19 RX ADMIN — GABAPENTIN 600 MG: 300 CAPSULE ORAL at 14:58

## 2018-07-19 RX ADMIN — AMLODIPINE BESYLATE 10 MG: 5 TABLET ORAL at 05:04

## 2018-07-19 ASSESSMENT — BRIEF INTERVIEW FOR MENTAL STATUS (BIMS)
WHAT DAY OF THE WEEK IS IT: INCORRECT
INITIAL REPETITION OF BED BLUE SOCK - FIRST ATTEMPT: 3
WHAT MONTH IS IT: ACCURATE WITHIN 5 DAYS
ASKED TO RECALL BED: NO, COULD NOT RECALL
ASKED TO RECALL SOCK: NO, COULD NOT RECALL
ASKED TO RECALL BLUE: YES, AFTER CUEING (A COLOR")"
BIMS SUMMARY SCORE: 9
WHAT YEAR IS IT: CORRECT

## 2018-07-19 ASSESSMENT — PAIN SCALES - GENERAL
PAINLEVEL_OUTOF10: 7
PAINLEVEL_OUTOF10: 5
PAINLEVEL_OUTOF10: 7
PAINLEVEL_OUTOF10: 3

## 2018-07-19 ASSESSMENT — ACTIVITIES OF DAILY LIVING (ADL): TOILETING: INDEPENDENT

## 2018-07-19 NOTE — IDT DISCHARGE PLANNING
CASE MANAGEMENT INITIAL ASSESSMENT    Admit Date:  7/18/2018     I met with patient to discuss role of case management / discharge planning / team conference.  Patient is a  67 y.o. male transferred from [facility].  He is alert and able to provide information.  His admitting physician for rehab is Dr. Pagan.      Diagnosis: 04.130 Other Non Traumatic Spinal Cord Dysfunction  Cervical myelopathy ([facility])    Co-morbidities:   Patient Active Problem List    Diagnosis Date Noted   • Left knee pain 08/28/2016     Priority: High   • Diabetic polyneuropathy ([facility]) 05/06/2015     Priority: High   • Toe amputation status ([facility]) 05/06/2015     Priority: High   • HTN (hypertension) 08/16/2010     Priority: Medium   • CVA (cerebral vascular accident) ([facility]) 06/04/2009     Priority: Medium   • Diabetes mellitus with neurological manifestations, controlled ([facility]) 03/05/2012     Priority: Low   • Impaired activities of daily living 07/18/2018   • Cervical myelopathy ([facility]) 07/18/2018   • Lumbar stenosis with neurogenic claudication 07/18/2018   • Pain 07/18/2018   • Persistent proteinuria 10/13/2017   • BMI 35.0-35.9,adult 09/22/2017   • CKD (chronic kidney disease), stage I 05/12/2017   • Diabetic nephropathy associated with type 2 diabetes mellitus ([facility]) 05/12/2017   • Chronic use of opiate drugs therapeutic purposes 02/14/2017   • Other orthopedic aftercare 11/07/2016   • Cervical stenosis of spine 09/06/2016   • Dysphagia 09/06/2016   • Chronic atrial fibrillation (HCC) 09/06/2016   • Hallucinations 09/06/2016   • JANNIE treated with BiPAP 04/18/2016   • History of CVA (cerebrovascular accident) 11/11/2015   • Atrial fibrillation [427.31] 06/24/2015   • Risk for falls 10/27/2014   • Myalgia 05/29/2014   • BPH (benign prostatic hyperplasia) 04/28/2014   • Anticoagulated on warfarin 04/28/2014   • Chronic antibiotic suppression 04/28/2014   • MEDICAL HOME    • Class 1 obesity in adult 06/17/2011   • Ventricular ectopy 06/17/2011   • Trigger  finger 06/17/2011   • Thyroid mass 07/30/2009   • Dyslipidemia 06/17/2009     Prior Living Situation:  Housing / Facility: 1 Story House  Lives with - Patient's Self Care Capacity: Alone and Able to Care For Self    Prior Level of Function:  Medication Management: Independent  Finances: Independent  Home Management: Independent  Shopping: Independent  Prior Level Of Mobility: Independent With Device in Community  Driving / Transportation: Driving Independent    Support Systems:  Primary : son is Chirs Ma at [phone]   Other support systems:  Esteban Ma at [phone]    Previous Services Utilized:   Equipment Owned: Front-Wheel Walker, Tub Transfer Bench  Prior Services: None    Other Information:  Occupation (Pre-Hospital Vocational): Retired Due To Age  Primary Payor Source: [employer]  Primary Care Practitioner : Dr. Sarah Sterling  Other MDs: Dr. Marsh for neurosurgery    Additional Case Management Questions:  Have you ever received case management services for yourself or a family member? yes    Do you feel you have an an understanding of of what services  provide? yes    Do you have any additional questions regarding case management?    No    Patient / Family Goal:  Patient / Family Goal: Patient lives alone but has supportive sons.  He is hoping to be able to do some hiking and climbing again so he can get to his favorite fishing spots.  I will follow to assists.    Plan:  1. Continue to follow patient through hospitalization and provide discharge planning in collaboration with patient, family, physicians and ancillary services.     2. Utilize community resources to ensure a safe discharge.

## 2018-07-19 NOTE — CARE PLAN
"Problem: Safety  Goal: Will remain free from injury  Outcome: PROGRESSING AS EXPECTED  Patient educated on the importance of using call light for assistance, patient verbalized understanding. Patient uses call light appropriately, does not attempt to self transfer. Call light and belongings within reach, bed in lowest and locked position, bed rails up x3 for safety, alarms in place and non skid socks on. Hourly rounding in place.    Problem: Pain Management  Goal: Pain level will decrease to patient's comfort goal  Patient complaining of 7/10 pain to back and neck. Patient given PRN percocet 5mg, pt states \"I take percocet 10 at home this isn't going to do anything for me.\" Patient offered non-pharmacological methods of pain relief, pt declined. Patient now asleep, will continue to assess and monitor pain.     Problem: Respiratory:  Goal: Respiratory status will improve  Patient refused CPAP tonight. Patient prefers to set up machine himself and reports that he will do it during the day. Patient asleep, respirations even and unlabored. Hourly rounding in place.       "

## 2018-07-19 NOTE — CARE PLAN
Problem: Communication  Goal: The ability to communicate needs accurately and effectively will improve    Intervention: [place] patient and significant other/support system to call light to alert staff of needs  Patient oriented to unit routine, call light and bed control use, location of bathroom, visiting policy and bedside rail policy. Patient oriented to patient rights and responsibilities and safety precautions that are in place. Will continue to educate as the need arises.      Problem: Skin Integrity  Goal: Risk for impaired skin integrity will decrease    Intervention: Assess and monitor skin integrity, appearance and/or temperature  2 RN skin check with Kathryn, patient has posterior neck incision with staples, lower back midline incision with staples. Patient has bruising to left flank, bruising bilateral arms, dusky lower legs, right greater toe and 2nd toe partial amputations, left 2nd toe partial amputations.

## 2018-07-19 NOTE — THERAPY
Occupational Therapy Initial Plan of Care Note    1) Assessment:  Patient is 67 y.o. male with a diagnosis of C2-6 lami, C2-4 posterior fusion and L2-5 lami.  .  Additional factors influencing patient status / progress (ie: cognitive factors, co-morbidities, social support, etc): lives alone, states he has two sons, two sisters and a mother in town, 4 steps to enter mobile home, pain, cervical and lumbar spine precautions.  Long term and short term goals have been discussed with patient and they are in agreement.  2) Plan:  Recommend Occupational Therapy  minutes per day 5-7 days per week for 3-4 weeks for the following treatments:  OT E Stim Attended, OT Group Therapy, OT Self Care/ADL, OT Cognitive Skill Dev, OT Community Reintegration, OT Manual Ther Technique, OT Neuro Re-Ed/Balance, OT Sensory Int Techniques, OT Therapeutic Activity, OT Evaluation and OT Therapeutic Exercise.  3) Goals:  Please refer to care plan for goals.

## 2018-07-19 NOTE — PROGRESS NOTES
"Rehab Progress Note     Encounter date: 2018  Today I met with the patient face to face in his room  Chief Complaint:  Cervical myelopathy (HCC) , pain    Interval Events (subjective)  Mr. Thomas is doing well today.  He reports that he is having intermittent fluctuations of his pain and requested potential increase in medications.  He does not like to take opioids, so we discussed increasing the Robaxin to every 6 hours as needed.  He was agreeable to doing this.  He denies any fevers, chills, headache, dizziness, chest pain, or shortness of breath.  He reports that he is moving slowly but he is trying hard.    Objective:  VITAL SIGNS: /68   Pulse 72   Temp 36.6 °C (97.9 °F)   Resp 18   Ht 1.905 m (6' 3\")   Wt 114.3 kg (252 lb)   SpO2 98%   BMI 31.50 kg/m²     Recent Results (from the past 72 hour(s))   ACCU-CHEK GLUCOSE    Collection Time: 18  5:07 PM   Result Value Ref Range    Glucose - Accu-Ck 262 (H) 65 - 99 mg/dL   ACCU-CHEK GLUCOSE    Collection Time: 18  5:10 AM   Result Value Ref Range    Glucose - Accu-Ck 179 (H) 65 - 99 mg/dL   EKG    Collection Time: 18  7:47 AM   Result Value Ref Range    Report       [facility]    Test Date:  2018  Pt Name:    TREY THOMAS                 Department: [facility]  MRN:        [id number]                      Room:       S1  Gender:     Male                         Technician: RENÉ  :        1950                   Requested By:EVIE VILLAVICENCIO  Order #:    [id number]                    Reading MD: Boby Pulliam MD    Measurements  Intervals                                Axis  Rate:       86                           P:          6  VA:         200                          QRS:        -40  QRSD:       108                          T:          130  QT:         392  QTc:        469    Interpretive Statements  SINUS RHYTHM  SUPRAVENTRICULAR BIGEMINY  LVH WITH SECONDARY REPOLARIZATION ABNORMALITY  ANTERIOR Q WAVES, " POSSIBLY DUE TO LVH  Compared to ECG 06/13/2018 08:24:17  Atrial premature complex(es) now present  Q waves now present  Sinus bradycardia no longer present  Left anterior fascicular block no longer present  Myocardial in farct finding no longer present    Electronically Signed On 7- 9:00:29 PDT by Boby Pulliam MD     ACCU-CHEK GLUCOSE    Collection Time: 07/17/18  5:59 PM   Result Value Ref Range    Glucose - Accu-Ck 264 (H) 65 - 99 mg/dL   ACCU-CHEK GLUCOSE    Collection Time: 07/18/18  5:08 AM   Result Value Ref Range    Glucose - Accu-Ck 133 (H) 65 - 99 mg/dL   ACCU-CHEK GLUCOSE    Collection Time: 07/18/18 12:27 PM   Result Value Ref Range    Glucose - Accu-Ck 273 (H) 65 - 99 mg/dL   ACCU-CHEK GLUCOSE    Collection Time: 07/18/18  5:10 PM   Result Value Ref Range    Glucose - Accu-Ck 249 (H) 65 - 99 mg/dL   ACCU-CHEK GLUCOSE    Collection Time: 07/18/18  8:24 PM   Result Value Ref Range    Glucose - Accu-Ck 211 (H) 65 - 99 mg/dL   CBC with Differential    Collection Time: 07/19/18  5:26 AM   Result Value Ref Range    WBC 5.0 4.8 - 10.8 K/uL    RBC 4.25 (L) 4.70 - 6.10 M/uL    Hemoglobin 11.9 (L) 14.0 - 18.0 g/dL    Hematocrit 35.9 (L) 42.0 - 52.0 %    MCV 84.5 81.4 - 97.8 fL    MCH 28.0 27.0 - 33.0 pg    MCHC 33.1 (L) 33.7 - 35.3 g/dL    RDW 42.6 35.9 - 50.0 fL    Platelet Count 206 164 - 446 K/uL    MPV 11.0 9.0 - 12.9 fL    Neutrophils-Polys 58.90 44.00 - 72.00 %    Lymphocytes 21.60 (L) 22.00 - 41.00 %    Monocytes 15.30 (H) 0.00 - 13.40 %    Eosinophils 3.00 0.00 - 6.90 %    Basophils 0.60 0.00 - 1.80 %    Immature Granulocytes 0.60 0.00 - 0.90 %    Nucleated RBC 0.00 /100 WBC    Neutrophils (Absolute) 2.97 1.82 - 7.42 K/uL    Lymphs (Absolute) 1.09 1.00 - 4.80 K/uL    Monos (Absolute) 0.77 0.00 - 0.85 K/uL    Eos (Absolute) 0.15 0.00 - 0.51 K/uL    Baso (Absolute) 0.03 0.00 - 0.12 K/uL    Immature Granulocytes (abs) 0.03 0.00 - 0.11 K/uL    NRBC (Absolute) 0.00 K/uL   Comp Metabolic Panel  (CMP)    Collection Time: 07/19/18  5:26 AM   Result Value Ref Range    Sodium 137 135 - 145 mmol/L    Potassium 3.5 (L) 3.6 - 5.5 mmol/L    Chloride 98 96 - 112 mmol/L    Co2 31 20 - 33 mmol/L    Anion Gap 8.0 0.0 - 11.9    Glucose 147 (H) 65 - 99 mg/dL    Bun 27 (H) 8 - 22 mg/dL    Creatinine 1.05 0.50 - 1.40 mg/dL    Calcium 8.7 8.5 - 10.5 mg/dL    AST(SGOT) 12 12 - 45 U/L    ALT(SGPT) 6 2 - 50 U/L    Alkaline Phosphatase 50 30 - 99 U/L    Total Bilirubin 0.7 0.1 - 1.5 mg/dL    Albumin 3.2 3.2 - 4.9 g/dL    Total Protein 5.9 (L) 6.0 - 8.2 g/dL    Globulin 2.7 1.9 - 3.5 g/dL    A-G Ratio 1.2 g/dL   HEMOGLOBIN A1C    Collection Time: 07/19/18  5:26 AM   Result Value Ref Range    Glycohemoglobin 9.9 (H) 0.0 - 5.6 %    Est Avg Glucose 237 mg/dL   Lipid Profile (Lipid Panel)    Collection Time: 07/19/18  5:26 AM   Result Value Ref Range    Cholesterol,Tot 152 100 - 199 mg/dL    Triglycerides 150 (H) 0 - 149 mg/dL    HDL 26 (A) >=40 mg/dL    LDL 96 <100 mg/dL   Magnesium    Collection Time: 07/19/18  5:26 AM   Result Value Ref Range    Magnesium 1.6 1.5 - 2.5 mg/dL   Phosphorus    Collection Time: 07/19/18  5:26 AM   Result Value Ref Range    Phosphorus 3.1 2.5 - 4.5 mg/dL   Prealbumin    Collection Time: 07/19/18  5:26 AM   Result Value Ref Range    Pre-Albumin 14.0 (L) 18.0 - 38.0 mg/dL   TSH with Reflex to FT4    Collection Time: 07/19/18  5:26 AM   Result Value Ref Range    TSH 0.560 0.380 - 5.330 uIU/mL   Vitamin D, 25-hydroxy (blood)    Collection Time: 07/19/18  5:26 AM   Result Value Ref Range    25-Hydroxy   Vitamin D 25 26 (L) 30 - 100 ng/mL   ESTIMATED GFR    Collection Time: 07/19/18  5:26 AM   Result Value Ref Range    GFR If African American >60 >60 mL/min/1.73 m 2    GFR If Non African American >60 >60 mL/min/1.73 m 2   ACCU-CHEK GLUCOSE    Collection Time: 07/19/18  7:19 AM   Result Value Ref Range    Glucose - Accu-Ck 134 (H) 65 - 99 mg/dL   ACCU-CHEK GLUCOSE    Collection Time: 07/19/18 11:17 AM    Result Value Ref Range    Glucose - Accu-Ck 197 (H) 65 - 99 mg/dL       Current Facility-Administered Medications   Medication Frequency   • methocarbamol (ROBAXIN) tablet 750 mg Q6HRS PRN   • Respiratory Care per Protocol Continuous RT   • Pharmacy Consult Request ...Pain Management Review 1 Each PRN   • acetaminophen (TYLENOL) tablet 650 mg Q4HRS PRN   • artificial tears 1.4 % ophthalmic solution 1 Drop PRN   • benzocaine-menthol (CEPACOL) lozenge 1 Lozenge Q2HRS PRN   • hydrALAZINE (APRESOLINE) tablet 25 mg Q8HRS PRN   • mag hydrox-al hydrox-simeth (MAALOX PLUS ES or MYLANTA DS) suspension 20 mL Q2HRS PRN   • ondansetron (ZOFRAN ODT) dispertab 4 mg 4X/DAY PRN    Or   • ondansetron (ZOFRAN) syringe/vial injection 4 mg 4X/DAY PRN   • traZODone (DESYREL) tablet 50 mg QHS PRN   • sodium chloride (OCEAN) 0.65 % nasal spray 2 Spray PRN   • bisacodyl (DULCOLAX) suppository 10 mg Q24HRS PRN   • magnesium hydroxide (MILK OF MAGNESIA) suspension 30 mL QDAY PRN   • senna-docusate (PERICOLACE or SENOKOT S) 8.6-50 MG per tablet 1 Tab Nightly   • amLODIPine (NORVASC) tablet 10 mg Q DAY   • cephALEXin (KEFLEX) capsule 500 mg Q6HRS   • clindamycin (CLEOCIN) capsule 300 mg BID   • [START ON 7/25/2018] cloNIDine (CATAPRES) 0.3 MG/24HR 1 Patch Q WEDNESDAY   • enoxaparin (LOVENOX) inj 40 mg DAILY   • fluticasone (FLONASE) nasal spray 50 mcg DAILY   • gabapentin (NEURONTIN) capsule 600 mg TID   • hydrALAZINE (APRESOLINE) tablet 100 mg TID   • hydroCHLOROthiazide (HYDRODIURIL) tablet 25 mg DAILY   • insulin glargine (LANTUS) injection 35 Units Q EVENING   • insulin lispro (HUMALOG) injection 0-12 Units TID AC   • lisinopril (PRINIVIL) tablet 40 mg BID   • metFORMIN (GLUCOPHAGE) tablet 1,000 mg BID WITH MEALS   • oxyCODONE-acetaminophen (PERCOCET) 5-325 MG per tablet 1 Tab Q4HRS PRN   • docusate sodium (COLACE) capsule 100 mg BID       Exam Date: 7/19/2018    General:  Awake, alert, oriented, no acute distress  HEENT:  Wearing Aspen  cervical collar  Cardiac: regular rate and rhythm  Lungs: clear to auscultation bilaterally.   Abdomen: Bowel sounds remain somewhat hypoactive  Extremities:  intrinsic wasting  Neuro:   Stable distal lower limb weakness and coordination deficits in the upper limbs.  He does not demonstrate antigravity strength in the bilateral ankle dorsiflexors    Orders Placed This Encounter   Procedures   • Diet Order Diabetic     Standing Status:   Standing     Number of Occurrences:   1     Order Specific Question:   Diet:     Answer:   Diabetic [3]       Assessment:  Active Hospital Problems    Diagnosis   • *Cervical myelopathy (HCC)   • HTN (hypertension)   • CVA (cerebral vascular accident) ([facility])   • Diabetes mellitus with neurological manifestations, controlled (HCC)   • Impaired activities of daily living   • Lumbar stenosis with neurogenic claudication   • Pain   • Cervical stenosis of spine   • Atrial fibrillation [427.31]   • Chronic antibiotic suppression       Medical Decision Making and Plan:  Mr. Ma is a 67-year-old male admitted for rehabilitation on July 18 in the setting of cervical myelopathy and lumbar spinal stenosis     Cervical myelopathy status post C2-C6 laminectomy and C2-C4 fusion by Dr. Marsh on July 13  Lumbar spinal stenosis with neurogenic claudication status post L2-L5 laminectomies by Dr. Marsh on July 13  Initiate comprehensive rehabilitation  Follow-up with Dr. Marsh as scheduled  Aspen collar on at all times  Not cleared to resume therapeutic anticoagulation until follow-up with/clearance by Dr. Marsh  Continue Keflex until July 20     History of strokes  Atrial fibrillation   Not to resume Coumadin or therapeutic Lovenox until cleared by Dr. Marsh     Pain  Tylenol as needed   Gabapentin 600 mg 3 times a day  Robaxin 750 mg increased every 6 hours as needed on July 19  Oxycodone/APAP 5/325 mg every 4 hours as needed     Hypertension  Amlodipine 10 mg daily  Clonidine  patch weekly on Wednesday  Hydralazine 100 mg 3 times a day  Hydrochlorothiazide 25 mg daily  Lisinopril 40 mg twice a day     Appreciate hospitalist assistance  Blood pressure today is 120/68 mmHg     Diabetes mellitus type 2 with a history of diabetic neuropathy--hemoglobin A1c of 9.9 on July 19  Lantus 35 units at bedtime  Sliding scale insulin  Metformin 1000 mg twice a day  Consulting hospitalist for assistance    Glucose range of 134-249 in the last 24 hours     Anemia  Hemoglobin stable at 11.9 and July 19  Continue to monitor     History of left total knee replacement with chronic left knee/left shoulder staph infection  Continue clindamycin 300 mg twice daily     Constipation  Colace 100 g twice a day  Pericolace qhs  Milk of magnesia as needed  Dulcolax suppository daily as needed     Seasonal allergies  Flonase daily     Incidental left thyroid mass seen on MRI in May 2018  Will need follow-up with primary care  TSH of 0.56 on admission    Vitamin D deficiency  Vitamin D was 26 on admission so we began supplementation with 2000 units of cholecalciferol daily      DVT prophylaxis  Lovenox 40 mg daily     Estimated discharge: 21-28 days    Total time:  28 minutes.  I spent greater than 50% of the time for patient care, counseling, and coordination on this date, including unit/floor time, and face-to-face time with the patient as per interval events and assessment and plan above. Topics discussed included admission labs, initial progress, and lower limbs strength.  We also discussed pain.  I discussed the care with Dr. Bush.      Higinio Pagan M.D.  7/19/2018

## 2018-07-19 NOTE — CARE PLAN
Problem: Safety  Goal: Will remain free from injury  Outcome: PROGRESSING AS EXPECTED  Patient uses call light consistently and appropriately this shift.  Waits for assistance when needed and does not attempt self transfer.  Able to verbalize needs.  Will continue to monitor.

## 2018-07-19 NOTE — CARE PLAN
Problem: Pain Management  Goal: Pain level will decrease to patient's comfort goal  Outcome: PROGRESSING AS EXPECTED  Patient able to perform regular activities this shift.  Pain management includes PRN pain meds as well as non-pharmacological measures such as emotional support, rest, and repositioning.  Will continue to monitor.

## 2018-07-19 NOTE — CONSULTS
DATE OF SERVICE:  07/19/2018    REQUESTING PHYSICIAN:  Higinio Pagan MD.    CHIEF COMPLAINT / REASON FOR CONSULTATION:   Hypertension  Diabetes    HISTORY OF PRESENT ILLNESS:  This is a 67 year old male with a PMH significant for hypertension, atrial fibrillation, diabetes, prior stroke on chronic Coumadin, and multiple concussions, who was has severe lumbar spinal stenosis with neurogenic claudication limiting his ability to ambulate.  He had a previous cervical spinal fusion in 2016 with chronic coordination deficits and neck pain.  He had chronic right upper limb radicular symptoms.  His history of 2 strokes resulted in temporary right hemiparesis.  He reports that over the last several months, he has noticed worsening gait.  His right leg drags behind him.  Following his stroke, he had AFOs, but these were discontinued.  He has used a front-wheeled walker since his stroke.  He reports that he walks several miles with a walker.  He is very active.  He was taken to the operating room on July 13, 2018 by Dr. Marsh for C2-C6 laminectomy, C2-C4 posterior fusion and L2-L5 laminectomies.    Because of the patient's weakness and debility, Rehab was consulted, evaluated the patient, and he was deemed a good Rehab candidate.  The patient was transferred over to the Rehab facility on 07/18/2018.      The patient denies fever, chills, nausea, vomiting, headaches, blurry vision, or chest pain.    Because of these issues, Internal Medicine was consulted to help evaluate and manage the patient.    REVIEW OF SYSTEMS: All review of systems are negative pre AMA and CMS criteria   except for that stated in the HPI.    PAST MEDICAL HISTORY:  Past Medical History:   Diagnosis Date   • Anesthesia     low O2 sat   • Arrhythmia     a-fib   • arthritis 6/17/2011    thumb, fingers   • Arthritis     hip   • Cataract     left eye surgery   • Dental disorder     full dentures   • Diabetes     insulin, Dr Oconnor, his APN   • High  cholesterol    • Hypertension    • MRSA (methicillin resistant Staphylococcus aureus)     history of, nothing current 2016, on clindamycin for rest of life per patient   • JANNIE treated with BiPAP 4/18/2016   • Pain 05-03-13    shoulders, hip, right knee, 7/10   • Pneumonia 2002   • Renal disorder     stage 2   • Sleep apnea     bipap   • Stroke (HCC)     2/1/2007, 4/1/2007, right sided weakness; balance disturbance, memory problems   • Thyroid mass 7/30/2009    benign lump   • Ventricular ectopy 6/17/2011       PAST SURGICAL HISTORY:  Past Surgical History:   Procedure Laterality Date   • CERVICAL FUSION POSTERIOR  7/13/2018    Procedure: CERVICAL FUSION POSTERIOR/ C2-4;  Surgeon: Boby Marsh M.D.;  Location: [facility];  Service: Neurosurgery   • CERVICAL LAMINECTOMY POSTERIOR  7/13/2018    Procedure: CERVICAL LAMINECTOMY POSTERIOR/ C2-3, C5-6;  Surgeon: Boby Marsh M.D.;  Location: [facility];  Service: Neurosurgery   • LUMBAR LAMINECTOMY DISKECTOMY  7/13/2018    Procedure: LUMBAR LAMINECTOMY DISKECTOMY/ L2-5 LAMI;  Surgeon: Boby Marsh M.D.;  Location: [facility];  Service: Neurosurgery   • CERVICAL DISK AND FUSION ANTERIOR  8/31/2016    Procedure: CERVICAL DISK AND FUSION ANTERIOR C3-7 ;  Surgeon: Alhaji Jaffe M.D.;  Location: [facility];  Service:    • CATARACT PHACO WITH IOL  5/8/2013    Performed by Mame Rehman M.D. at SURGERY SAME DAY [facility]   • IRRIGATION & DEBRIDEMENT ORTHO  11/13/2012    Performed by Gordon Cota M.D. at SURGERY [facility]   • KNEE REVISION TOTAL  11/13/2012    Performed by Gordon oCta M.D. at [facility]   • IRRIGATION & DEBRIDEMENT ORTHO  11/2/2012    Performed by Gordon Cota M.D. at [facility]   • IRRIGATION & DEBRIDEMENT ORTHO Left 10/29/2012    Procedure: left shoulder, with drain;  Surgeon: Gordon Cota M.D.;  Location: [facility];   Service:    • INCISION AND DRAINAGE ORTHOPEDIC Right 10/29/2012    Procedure: Right Total Knee I and D and Liner Exchange, with drain;  Surgeon: Gordon Cota M.D.;  Location: [facility];  Service:    • IRRIGATION & DEBRIDEMENT ORTHO  3/13/2012    Performed by KAMALJIT SHI at [facility]   • KNEE REVISION TOTAL  3/13/2012    Performed by KAMALJIT SHI at [facility]   • TENDON REPAIR  3/13/2012    Performed by KAMALJIT SHI at [facility]   • KNEE ARTHROPLASTY TOTAL  1/11/2012    Right; Performed by KAMALJIT SHI at [facility]   • TOE AMPUTATION  7/22/2011    Performed by EVELYN JANE at SURGERY [facility]   • ELBOW ARTHROTOMY  2008    right   • LUMBAR FUSION POSTERIOR  1979   • FOOT SURGERY      partial amputation great toe   • FOOT SURGERY      toe surgery left foot   • OTHER      left eye cataract   • OTHER NEUROLOGICAL SURG      neck fusion   • PB KNEE SCOPE,SINGLE MENISECTOMY      right knee       Allergies   Allergen Reactions   • Demerol      Makes me stop breathing.  Doesn't like because it makes him high   • Statins [Hmg-Coa-R Inhibitors] Myalgia     Rxn - ongoing         CURRENT MEDICATIONS:    Current Facility-Administered Medications:   •  methocarbamol  •  vitamin D  •  Respiratory Care per Protocol  •  Pharmacy Consult Request  •  acetaminophen  •  artificial tears  •  benzocaine-menthol  •  hydrALAZINE  •  mag hydrox-al hydrox-simeth  •  ondansetron **OR** ondansetron  •  traZODone  •  sodium chloride  •  bisacodyl  •  magnesium hydroxide  •  senna-docusate  •  amLODIPine  •  cephALEXin  •  clindamycin  •  [START ON 7/25/2018] cloNIDine  •  enoxaparin (LOVENOX) injection  •  fluticasone  •  gabapentin  •  hydrALAZINE  •  hydroCHLOROthiazide  •  insulin glargine  •  insulin lispro  •  lisinopril  •  metFORMIN  •  oxyCODONE-acetaminophen  •  docusate sodium    Social History     Social History   • Marital status: Single      Spouse name: N/A   • Number of children: N/A   • Years of education: N/A     Social History Main Topics   • Smoking status: Former Smoker     Packs/day: 4.00     Years: 0.50     Types: Cigarettes     Quit date: 1/1/1974   • Smokeless tobacco: Never Used   • Alcohol use No   • Drug use: No   • Sexual activity: Yes     Other Topics Concern   • Not on file     Social History Narrative    ** Merged History Encounter **            FAMILY HISTORY:  was reviewed and is not pertinent to this consultation.    PHYSICAL EXAMINATION:  VITAL SIGNS:  Temp is 97.9, blood pressure is 105//60 (but hit 93/61 x 1), heart rate is , respiratory rate is 18.  GENERAL:  Patient was lying in bed in no distress.  HEENT:  Pupils were equal, round and reactive to light and accomodation.  Oral mucosa was pink and moist.  NECK:  Soft.  Supple.  No JVD.  HEART:  Irregular rhythm.  Normal S1 and S2.  No murmurs were appreciated.  LUNGS:  Are clear to auscultation bilaterally.  ABDOMEN:  Soft, non tender, non distended.  Bowels sound were positive in all four quadrants.  EXTREMITIES:  No clubbing, cyanosis.  There was no lower extremity edema.  NEUROLOGIC:  Cranial nerves two through twelve were grossly intact.    LABS:  Lab Results   Component Value Date/Time    SODIUM 137 07/19/2018 05:26 AM    POTASSIUM 3.5 (L) 07/19/2018 05:26 AM    CHLORIDE 98 07/19/2018 05:26 AM    CO2 31 07/19/2018 05:26 AM    GLUCOSE 147 (H) 07/19/2018 05:26 AM    BUN 27 (H) 07/19/2018 05:26 AM    CREATININE 1.05 07/19/2018 05:26 AM    CREATININE 1.21 02/17/2011 07:28 AM    BUNCREATRAT 16 02/17/2011 07:28 AM    GLOMRATE >59 02/17/2011 07:28 AM      Lab Results   Component Value Date/Time    WBC 5.0 07/19/2018 05:26 AM    WBC 5.2 03/25/2011 10:16 AM    RBC 4.25 (L) 07/19/2018 05:26 AM    RBC 3.82 (L) 03/25/2011 10:16 AM    HEMOGLOBIN 11.9 (L) 07/19/2018 05:26 AM    HEMATOCRIT 35.9 (L) 07/19/2018 05:26 AM    MCV 84.5 07/19/2018 05:26 AM    MCV 83 03/25/2011 10:16  AM    MCH 28.0 07/19/2018 05:26 AM    MCH 28.3 03/25/2011 10:16 AM    MCHC 33.1 (L) 07/19/2018 05:26 AM    MPV 11.0 07/19/2018 05:26 AM    NEUTSPOLYS 58.90 07/19/2018 05:26 AM    LYMPHOCYTES 21.60 (L) 07/19/2018 05:26 AM    MONOCYTES 15.30 (H) 07/19/2018 05:26 AM    EOSINOPHILS 3.00 07/19/2018 05:26 AM    BASOPHILS 0.60 07/19/2018 05:26 AM    HYPOCHROMIA 1+ 02/05/2014 06:27 AM      Lab Results   Component Value Date/Time    PROTHROMBTM 13.0 07/13/2018 09:10 AM    INR 1.01 07/13/2018 09:10 AM        ASSESSMENT:  1.  Hypertension  2.  Afib with occ tachycardia  3.  Diabetes  4.  Hypo-K+  5.  Azotemia  6.  For the other assessment, please see the past medical history above.    PLAN:  For the patient's hypertension, the blood pressure has been fairly controlled but is a little labile.  His BP on admission was lower at 93/61 but since then it has been ok.  For now I will continue the current hypertensive medications of Norvasc, Clonidine, Hydralazine, Lisinopril, and HCTZ, but since Lisinopril is over the maximum dose of 40 mg daily and studies have shown that doses over this is not effective, I will change the Lisinopril to 40 mg daily.  Will continue to monitor the blood pressure while in the hospital, and adjust his medications accordingly.     For the patient's afib, his HR has been fairly controlled but did dip down to 58 x 1 and millie up to 113 x 1.  For now, I will monitor his HR for another day or so before attempting to change his medications.    For the patient's diabetes, his last a1c was 9.9 on 7/19.  This was worse than the 7.6 on 12/2016 when I previously saw the patient at the Rehab.  His BS recently have range from 133 to 273.  It does appear that recently his BS are leveling off.  He is on Lantus 35 units qhs and Metformin 1000 mg bid (both of which are higher doses from when I saw him last).  For now, I will continue to monitor the patient's blood pressure/sugars while in the hospital and adjust the  medications accordingly.    For the patient's azotemia, this is mild in nature with a BUN of 27.  I have asked the patient to increase his fluid intake and he will try to do so.  Will continue to monitor.    For the patient's hypo-K+ of 3.5, this is likely 2nd to being on HCTZ.  I will give KCL 20 meq x 1 today and then start KCL 10 meq daily.  Will continue to monitor.     This case has been discussed with the attending Physiatrist.    Thank you for the consultation.  Will follow the patient with you.

## 2018-07-19 NOTE — FLOWSHEET NOTE
07/18/18 1611   Type of Assessment   Assessment Yes   Patient History   Pulmonary Diagnosis JANNIE   Surgical Procedures cervical and lumbar   Home O2 No   Home Treatments/Frequency No   COPD Risk Screening   Do you have a history of COPD? No   Smoking History   Have you ever smoked Yes   Have you smoked in the last 12 months No   Confirm Quit Date 07/18/68   Level Of Consciousness   Level of Consciousness Alert   Respiratory WDL   Respiratory (WDL) X   Chest Exam   Respiration 20   Pulse 88   Breath Sounds   RML Breath Sounds Diminished   RLL Breath Sounds Diminished   LLL Breath Sounds Diminished   Oximetry   #Pulse Oximetry (Single Determination) Yes   Oxygen   Home O2 Use Prior To Admission? No  (CPAP)   Pulse Oximetry 92 %   O2 Daily Delivery Respiratory  Room Air with O2 Available

## 2018-07-20 LAB
GLUCOSE BLD-MCNC: 133 MG/DL (ref 65–99)
GLUCOSE BLD-MCNC: 181 MG/DL (ref 65–99)
GLUCOSE BLD-MCNC: 196 MG/DL (ref 65–99)
GLUCOSE BLD-MCNC: 201 MG/DL (ref 65–99)

## 2018-07-20 PROCEDURE — 770010 HCHG ROOM/CARE - REHAB SEMI PRIVAT*

## 2018-07-20 PROCEDURE — 97110 THERAPEUTIC EXERCISES: CPT

## 2018-07-20 PROCEDURE — 82962 GLUCOSE BLOOD TEST: CPT | Mod: 91

## 2018-07-20 PROCEDURE — 99232 SBSQ HOSP IP/OBS MODERATE 35: CPT | Performed by: HOSPITALIST

## 2018-07-20 PROCEDURE — A9270 NON-COVERED ITEM OR SERVICE: HCPCS | Performed by: HOSPITALIST

## 2018-07-20 PROCEDURE — 700102 HCHG RX REV CODE 250 W/ 637 OVERRIDE(OP): Performed by: HOSPITALIST

## 2018-07-20 PROCEDURE — A9270 NON-COVERED ITEM OR SERVICE: HCPCS | Performed by: PHYSICAL MEDICINE & REHABILITATION

## 2018-07-20 PROCEDURE — 97530 THERAPEUTIC ACTIVITIES: CPT

## 2018-07-20 PROCEDURE — 99232 SBSQ HOSP IP/OBS MODERATE 35: CPT | Performed by: PHYSICAL MEDICINE & REHABILITATION

## 2018-07-20 PROCEDURE — 700102 HCHG RX REV CODE 250 W/ 637 OVERRIDE(OP): Performed by: PHYSICAL MEDICINE & REHABILITATION

## 2018-07-20 PROCEDURE — 97535 SELF CARE MNGMENT TRAINING: CPT

## 2018-07-20 PROCEDURE — 94760 N-INVAS EAR/PLS OXIMETRY 1: CPT

## 2018-07-20 PROCEDURE — 700111 HCHG RX REV CODE 636 W/ 250 OVERRIDE (IP): Performed by: PHYSICAL MEDICINE & REHABILITATION

## 2018-07-20 PROCEDURE — 700112 HCHG RX REV CODE 229: Performed by: PHYSICAL MEDICINE & REHABILITATION

## 2018-07-20 RX ADMIN — CEPHALEXIN 500 MG: 250 CAPSULE ORAL at 05:30

## 2018-07-20 RX ADMIN — Medication 1 TABLET: at 19:50

## 2018-07-20 RX ADMIN — OXYCODONE HYDROCHLORIDE AND ACETAMINOPHEN 1 TABLET: 5; 325 TABLET ORAL at 15:07

## 2018-07-20 RX ADMIN — FLUTICASONE PROPIONATE 50 MCG: 50 SPRAY, METERED NASAL at 08:30

## 2018-07-20 RX ADMIN — GABAPENTIN 600 MG: 300 CAPSULE ORAL at 19:50

## 2018-07-20 RX ADMIN — HYDROCHLOROTHIAZIDE 25 MG: 25 TABLET ORAL at 08:28

## 2018-07-20 RX ADMIN — METHOCARBAMOL 750 MG: 750 TABLET, FILM COATED ORAL at 22:44

## 2018-07-20 RX ADMIN — ENOXAPARIN SODIUM 40 MG: 100 INJECTION SUBCUTANEOUS at 08:30

## 2018-07-20 RX ADMIN — DOCUSATE SODIUM 100 MG: 100 CAPSULE, LIQUID FILLED ORAL at 19:50

## 2018-07-20 RX ADMIN — POTASSIUM CHLORIDE 10 MEQ: 1500 TABLET, EXTENDED RELEASE ORAL at 08:29

## 2018-07-20 RX ADMIN — INSULIN GLARGINE 35 UNITS: 100 INJECTION, SOLUTION SUBCUTANEOUS at 20:00

## 2018-07-20 RX ADMIN — METFORMIN HYDROCHLORIDE 1000 MG: 500 TABLET, FILM COATED ORAL at 17:17

## 2018-07-20 RX ADMIN — GABAPENTIN 600 MG: 300 CAPSULE ORAL at 08:28

## 2018-07-20 RX ADMIN — HYDRALAZINE HYDROCHLORIDE 100 MG: 25 TABLET, FILM COATED ORAL at 08:28

## 2018-07-20 RX ADMIN — OXYCODONE HYDROCHLORIDE AND ACETAMINOPHEN 1 TABLET: 5; 325 TABLET ORAL at 03:26

## 2018-07-20 RX ADMIN — AMLODIPINE BESYLATE 10 MG: 5 TABLET ORAL at 05:30

## 2018-07-20 RX ADMIN — CLINDAMYCIN HYDROCHLORIDE 300 MG: 300 CAPSULE ORAL at 19:49

## 2018-07-20 RX ADMIN — HYDRALAZINE HYDROCHLORIDE 100 MG: 25 TABLET, FILM COATED ORAL at 15:05

## 2018-07-20 RX ADMIN — GABAPENTIN 600 MG: 300 CAPSULE ORAL at 15:05

## 2018-07-20 RX ADMIN — HYDRALAZINE HYDROCHLORIDE 100 MG: 25 TABLET, FILM COATED ORAL at 19:51

## 2018-07-20 RX ADMIN — OXYCODONE HYDROCHLORIDE AND ACETAMINOPHEN 1 TABLET: 5; 325 TABLET ORAL at 19:41

## 2018-07-20 RX ADMIN — LISINOPRIL 40 MG: 20 TABLET ORAL at 08:29

## 2018-07-20 RX ADMIN — CHOLECALCIFEROL TAB 25 MCG (1000 UNIT) 2000 UNITS: 25 TAB at 08:28

## 2018-07-20 RX ADMIN — METFORMIN HYDROCHLORIDE 1000 MG: 500 TABLET, FILM COATED ORAL at 08:28

## 2018-07-20 RX ADMIN — CLINDAMYCIN HYDROCHLORIDE 300 MG: 300 CAPSULE ORAL at 08:28

## 2018-07-20 ASSESSMENT — ENCOUNTER SYMPTOMS
SHORTNESS OF BREATH: 0
DIARRHEA: 0
FEVER: 0
NERVOUS/ANXIOUS: 0
CHILLS: 0
NAUSEA: 0
ABDOMINAL PAIN: 0
VOMITING: 0

## 2018-07-20 ASSESSMENT — PAIN SCALES - GENERAL
PAINLEVEL_OUTOF10: 7
PAINLEVEL_OUTOF10: 1
PAINLEVEL_OUTOF10: 7
PAINLEVEL_OUTOF10: 0

## 2018-07-20 NOTE — CARE PLAN
Problem: Safety  Goal: Will remain free from injury  Pt uses call light consistently and appropriately. Waits for assistance does not attempt self transfer this shift. Able to verbalize needs.    Problem: Venous Thromboembolism (VTW)/Deep Vein Thrombosis (DVT) Prevention:  Goal: Patient will participate in Venous Thrombosis (VTE)/Deep Vein Thrombosis (DVT)Prevention Measures  Pt on lovenox 40 mg daily for DVT prophylaxis

## 2018-07-20 NOTE — THERAPY
Physical Therapy Initial Plan of Care Note    1) Assessment: Patient is 67 y.o. male with a diagnosis of C2-6 lami, C2-4 posterior fusion and L2-5 lami.  .  Additional factors influencing patient status / progress (ie: cognitive factors, co-morbidities, social support, etc): lives alone, states he has two sons, two sisters and a mother in town, 4 steps to enter mobile home, pain, B foot drop, cervical and lumbar spine precautions.  Long term and short term goals have been discussed with patient and they are in agreement. Pt would benefit from skilled PT services to address the aforementioned concerns. .  Long term and short term goals have been discussed with patient and they are in agreement.  2) Plan: Recommend Physical Therapy  minutes per day 5-7 days per week for 3 weeks for the following treatments:  PT Group Therapy, PT Orthotics Training, PT Gait Training, PT Therapeutic Exercises, PT Neuro Re-Ed/Balance, PT Therapeutic Activity, PT Manual Therapy and PT Evaluation.  3) Goals:  Please refer to care plan for goals.

## 2018-07-20 NOTE — PROGRESS NOTES
Hospital Medicine Progress Note, Adult, Complex               Author: Moshe Balderasnancy Date & Time created: 7/20/2018  10:14 AM     Interval History:  No significant events or changes since last visit  Patient denies SOB, cough, or diarrhea  Patient slept ok last night    Chief Complaint:  Hypertension  Diabetes    Review of Systems:  Review of Systems   Constitutional: Negative for chills and fever.   Respiratory: Negative for shortness of breath.    Cardiovascular: Negative for chest pain.   Gastrointestinal: Negative for abdominal pain, diarrhea, nausea and vomiting.   Psychiatric/Behavioral: The patient is not nervous/anxious.        Physical Exam:  Physical Exam   Constitutional: He is oriented to person, place, and time. He appears well-nourished.   HENT:   Head: Atraumatic.   Eyes: Conjunctivae are normal. Pupils are equal, round, and reactive to light.   Neck:   Has C-collar.   Cardiovascular: Normal rate, regular rhythm, S1 normal and S2 normal.    No murmur heard.  Pulmonary/Chest: Effort normal. No stridor. He has no wheezes. He has no rhonchi. He has no rales.   Abdominal: Soft. He exhibits no distension. There is no tenderness. Hernia confirmed negative in the right inguinal area and confirmed negative in the left inguinal area.   Musculoskeletal: He exhibits no edema.   Neurological: He is alert and oriented to person, place, and time. No sensory deficit.   Skin: Skin is warm and dry. No rash noted. No cyanosis.   Psychiatric: He has a normal mood and affect. His behavior is normal.   Nursing note and vitals reviewed.      Labs:        Invalid input(s): [id number]      Recent Labs      07/19/18   0526   SODIUM  137   POTASSIUM  3.5*   CHLORIDE  98   CO2  31   BUN  27*   CREATININE  1.05   MAGNESIUM  1.6   PHOSPHORUS  3.1   CALCIUM  8.7     Recent Labs      07/19/18   0526   ALTSGPT  6   ASTSGOT  12   ALKPHOSPHAT  50   TBILIRUBIN  0.7   PREALBUMIN  14.0*   GLUCOSE  147*     Recent Labs      07/19/18    0526   RBC  4.25*   HEMOGLOBIN  11.9*   HEMATOCRIT  35.9*   PLATELETCT  206     Recent Labs      18   0526   WBC  5.0   NEUTSPOLYS  58.90   LYMPHOCYTES  21.60*   MONOCYTES  15.30*   EOSINOPHILS  3.00   BASOPHILS  0.60   ASTSGOT  12   ALTSGPT  6   ALKPHOSPHAT  50   TBILIRUBIN  0.7           Hemodynamics:  Temp (24hrs), Av.5 °C (97.7 °F), Min:36.4 °C (97.5 °F), Max:36.6 °C (97.9 °F)  Temperature: 36.4 °C (97.6 °F)  Pulse  Av  Min: 58  Max: 113   Blood Pressure : 114/71     Respiratory:    Respiration: 18, Pulse Oximetry: 90 %, O2 Daily Delivery Respiratory : Room Air with O2 Available     Work Of Breathing / Effort: Mild  RUL Breath Sounds: Clear;Diminished, RML Breath Sounds: Clear;Diminished, RLL Breath Sounds: Diminished, AI Breath Sounds: Clear;Diminished, LLL Breath Sounds: Clear;Diminished  Fluids:    Intake/Output Summary (Last 24 hours) at 18 1014  Last data filed at 18 0450   Gross per 24 hour   Intake              720 ml   Output              850 ml   Net             -130 ml        GI/Nutrition:  Orders Placed This Encounter   Procedures   • Diet Order Diabetic     Standing Status:   Standing     Number of Occurrences:   1     Order Specific Question:   Diet:     Answer:   Diabetic [3]     Medical Decision Making, by Problem:  Active Hospital Problems    Diagnosis   • *Cervical myelopathy (HCC) [G95.9]   • HTN (hypertension) [I10]   • CVA (cerebral vascular accident) (HCC) [I63.9]   • Diabetes mellitus with neurological manifestations, controlled (HCC) [E11.49]   • Impaired activities of daily living [R53.81]   • Lumbar stenosis with neurogenic claudication [M48.062]   • Pain [R52]   • Cervical stenosis of spine [M48.02]   • Atrial fibrillation [427.31] [I48.91]   • Chronic antibiotic suppression [Z79.2]     *  S/P Lumbar Surgery  *  S/P Cervical Surgery: had hx of prior cervical fusion    *  Hypertension  BP recently ok  On Norvasc: 10 mg daily  On Clonidine patch 0.3 mg  On  Hydralazine: 100 mg tid  On HCTZ: 25 mg daily  On Lisinopril: 40 mg bid (above maximum daily dose recommended) --> 40 mg daily (7/19)  Cont to monitor    *  Hx Afib  HR recently ok  On Coumadin  Monitor    *  Diabetes  Hba1c: 9.9 (7/19)  BS less labile recently and slightly improved: 133-225  On Lantus: 35 units qhs  On Metformin: 1000 mg bid  Cont to monitor    *  Hypo-K+  K+: 4.0 (7/15) --> 3.5 (7/19)  S/P KCL 20 meq x 1  On KCL: 10 meq daily  Note: on HCTZ  Monitor    *  Azotemia  Bun: 19 (7/15) --> 27 (7/19)  Encouraging fluid (water/juice) intake  Note: on HCTZ  Monitor    *  Hx CVA: x 2; on Coumadin  *  Arthritis  *  Hx Cataracts: s/p extraction  *  Hyperlipidemia  *  Vit D Insufficiency: on supplements  *  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

## 2018-07-20 NOTE — REHAB-CM IDT TEAM NOTE
Case Management    DC Planning  DC destination/dispostion:  Patient lives alone in a single level home with  4 entry stairs.    DC Needs: Patient has a fww and shower bench.  He also has his own C Pap machine and does not use oxygen with this. He is followed at [facility] for his anticoagulation management.  I will follow his progress for home health therapy versus outpatient.    Barriers to discharge:   Lives alone and his sons are working    Strengths: he is motivated to get better.    Section completed by:  Hafsa Ayoub R.N.

## 2018-07-20 NOTE — FLOWSHEET NOTE
07/20/18 1105   Events/Summary/Plan   Events/Summary/Plan Found pt sitting in wheelchair, 02 spot check   Chest Exam   Respiration 18   Pulse 71   Work Of Breathing / Effort Mild   Oximetry   #Pulse Oximetry (Single Determination) Yes   Oxygen   Pulse Oximetry 96 %   O2 (LPM) 0   O2 Daily Delivery Respiratory  Room Air with O2 Available

## 2018-07-20 NOTE — FLOWSHEET NOTE
07/19/18 1622   Events/Summary/Plan   Events/Summary/Plan 02 spot check on RA.  Pt does not directly answer question about CPAP.  Offered to set it up for him and he refused   Respiratory WDL   Respiratory (WDL) X   Chest Exam   Respiration 20   Pulse 90   Oximetry   #Pulse Oximetry (Single Determination) Yes   Oxygen   Pulse Oximetry 94 %   O2 Daily Delivery Respiratory  Room Air with O2 Available

## 2018-07-20 NOTE — PROGRESS NOTES
Received shift report and assumed care of patient. Patient currently in bathroom complaining of constipation, patient given PRN suppository. Minimal dig stim performed as patient states it is too painful. Will continue to monitor.

## 2018-07-20 NOTE — CARE PLAN
Problem: Communication  Goal: The ability to communicate needs accurately and effectively will improve  Patient alert and oriented x3-4 at times but does have episodes of confusion. Patient reports he does not know how he got to rehab and how is stuff is here, patient reoriented to rehab and his room. Patient paranoid about how staff know what meds he takes and what his blood sugar is, all questions answered and explained to patient. Patient agitated with staff easily.     Problem: Bowel/Gastric:  Goal: Normal bowel function is maintained or improved  At start of shift patient was on the toilet complaining of constipation. Patient given suppository and was able to have a large, hard bowel movement. Patient educated on constipating side effect of narcotics and importance of increasing fluid intake, patient verbalized understanding.     Problem: Respiratory:  Goal: Respiratory status will improve  Patient offered assistance with setting up CPAP. Patient refused.     Problem: GLYCEMIA IMBALANCE  Goal: Clinical indication of glycemia balance is achieved    Intervention: MONITOR BLOOD GLUCOSE LEVELS AS ORDERED  Patient's FSBS at HS= 225. Patient given 35 units of scheduled HS lantus, HS snack provided and consumed. No s/s of hyperglycemia noted at this time. Will continue to assess for s/s of hyper/hypoglycemia.

## 2018-07-20 NOTE — PROGRESS NOTES
"Rehab Progress Note     Encounter date: 7/20/2018  Today I met with the patient face to face in PT  Chief Complaint:  Cervical myelopathy (HCC) , collar frustration    Interval Events (subjective)  Mr. Ma reports that he is doing well overall.  He reports that he was able to do some sidestepping with Occupational Therapy.  He feels that his balance and strength are gradually improving.  He reports that his pain is well controlled.  He is frustrated about a cervical collar.  We had a long discussion about the rationale for cervical collar precautions.  I gave him the recommendation that he continue to wear his cervical collar at all times.  He states he is willing to do so.    Objective:  VITAL SIGNS: /71   Pulse 71   Temp 36.4 °C (97.6 °F)   Resp 18   Ht 1.905 m (6' 3\")   Wt 114.3 kg (252 lb)   SpO2 96%   BMI 31.50 kg/m²     Recent Results (from the past 72 hour(s))   ACCU-CHEK GLUCOSE    Collection Time: 07/17/18  5:59 PM   Result Value Ref Range    Glucose - Accu-Ck 264 (H) 65 - 99 mg/dL   ACCU-CHEK GLUCOSE    Collection Time: 07/18/18  5:08 AM   Result Value Ref Range    Glucose - Accu-Ck 133 (H) 65 - 99 mg/dL   ACCU-CHEK GLUCOSE    Collection Time: 07/18/18 12:27 PM   Result Value Ref Range    Glucose - Accu-Ck 273 (H) 65 - 99 mg/dL   ACCU-CHEK GLUCOSE    Collection Time: 07/18/18  5:10 PM   Result Value Ref Range    Glucose - Accu-Ck 249 (H) 65 - 99 mg/dL   ACCU-CHEK GLUCOSE    Collection Time: 07/18/18  8:24 PM   Result Value Ref Range    Glucose - Accu-Ck 211 (H) 65 - 99 mg/dL   CBC with Differential    Collection Time: 07/19/18  5:26 AM   Result Value Ref Range    WBC 5.0 4.8 - 10.8 K/uL    RBC 4.25 (L) 4.70 - 6.10 M/uL    Hemoglobin 11.9 (L) 14.0 - 18.0 g/dL    Hematocrit 35.9 (L) 42.0 - 52.0 %    MCV 84.5 81.4 - 97.8 fL    MCH 28.0 27.0 - 33.0 pg    MCHC 33.1 (L) 33.7 - 35.3 g/dL    RDW 42.6 35.9 - 50.0 fL    Platelet Count 206 164 - 446 K/uL    MPV 11.0 9.0 - 12.9 fL    Neutrophils-Polys " 58.90 44.00 - 72.00 %    Lymphocytes 21.60 (L) 22.00 - 41.00 %    Monocytes 15.30 (H) 0.00 - 13.40 %    Eosinophils 3.00 0.00 - 6.90 %    Basophils 0.60 0.00 - 1.80 %    Immature Granulocytes 0.60 0.00 - 0.90 %    Nucleated RBC 0.00 /100 WBC    Neutrophils (Absolute) 2.97 1.82 - 7.42 K/uL    Lymphs (Absolute) 1.09 1.00 - 4.80 K/uL    Monos (Absolute) 0.77 0.00 - 0.85 K/uL    Eos (Absolute) 0.15 0.00 - 0.51 K/uL    Baso (Absolute) 0.03 0.00 - 0.12 K/uL    Immature Granulocytes (abs) 0.03 0.00 - 0.11 K/uL    NRBC (Absolute) 0.00 K/uL   Comp Metabolic Panel (CMP)    Collection Time: 07/19/18  5:26 AM   Result Value Ref Range    Sodium 137 135 - 145 mmol/L    Potassium 3.5 (L) 3.6 - 5.5 mmol/L    Chloride 98 96 - 112 mmol/L    Co2 31 20 - 33 mmol/L    Anion Gap 8.0 0.0 - 11.9    Glucose 147 (H) 65 - 99 mg/dL    Bun 27 (H) 8 - 22 mg/dL    Creatinine 1.05 0.50 - 1.40 mg/dL    Calcium 8.7 8.5 - 10.5 mg/dL    AST(SGOT) 12 12 - 45 U/L    ALT(SGPT) 6 2 - 50 U/L    Alkaline Phosphatase 50 30 - 99 U/L    Total Bilirubin 0.7 0.1 - 1.5 mg/dL    Albumin 3.2 3.2 - 4.9 g/dL    Total Protein 5.9 (L) 6.0 - 8.2 g/dL    Globulin 2.7 1.9 - 3.5 g/dL    A-G Ratio 1.2 g/dL   HEMOGLOBIN A1C    Collection Time: 07/19/18  5:26 AM   Result Value Ref Range    Glycohemoglobin 9.9 (H) 0.0 - 5.6 %    Est Avg Glucose 237 mg/dL   Lipid Profile (Lipid Panel)    Collection Time: 07/19/18  5:26 AM   Result Value Ref Range    Cholesterol,Tot 152 100 - 199 mg/dL    Triglycerides 150 (H) 0 - 149 mg/dL    HDL 26 (A) >=40 mg/dL    LDL 96 <100 mg/dL   Magnesium    Collection Time: 07/19/18  5:26 AM   Result Value Ref Range    Magnesium 1.6 1.5 - 2.5 mg/dL   Phosphorus    Collection Time: 07/19/18  5:26 AM   Result Value Ref Range    Phosphorus 3.1 2.5 - 4.5 mg/dL   Prealbumin    Collection Time: 07/19/18  5:26 AM   Result Value Ref Range    Pre-Albumin 14.0 (L) 18.0 - 38.0 mg/dL   TSH with Reflex to FT4    Collection Time: 07/19/18  5:26 AM   Result Value Ref  Range    TSH 0.560 0.380 - 5.330 uIU/mL   Vitamin D, 25-hydroxy (blood)    Collection Time: 07/19/18  5:26 AM   Result Value Ref Range    25-Hydroxy   Vitamin D 25 26 (L) 30 - 100 ng/mL   ESTIMATED GFR    Collection Time: 07/19/18  5:26 AM   Result Value Ref Range    GFR If African American >60 >60 mL/min/1.73 m 2    GFR If Non African American >60 >60 mL/min/1.73 m 2   ACCU-CHEK GLUCOSE    Collection Time: 07/19/18  7:19 AM   Result Value Ref Range    Glucose - Accu-Ck 134 (H) 65 - 99 mg/dL   ACCU-CHEK GLUCOSE    Collection Time: 07/19/18 11:17 AM   Result Value Ref Range    Glucose - Accu-Ck 197 (H) 65 - 99 mg/dL   ACCU-CHEK GLUCOSE    Collection Time: 07/19/18  5:23 PM   Result Value Ref Range    Glucose - Accu-Ck 216 (H) 65 - 99 mg/dL   ACCU-CHEK GLUCOSE    Collection Time: 07/19/18  8:58 PM   Result Value Ref Range    Glucose - Accu-Ck 225 (H) 65 - 99 mg/dL   ACCU-CHEK GLUCOSE    Collection Time: 07/20/18  8:06 AM   Result Value Ref Range    Glucose - Accu-Ck 133 (H) 65 - 99 mg/dL   ACCU-CHEK GLUCOSE    Collection Time: 07/20/18 11:09 AM   Result Value Ref Range    Glucose - Accu-Ck 196 (H) 65 - 99 mg/dL       Current Facility-Administered Medications   Medication Frequency   • methocarbamol (ROBAXIN) tablet 750 mg Q6HRS PRN   • vitamin D (cholecalciferol) tablet 2,000 Units DAILY   • potassium chloride SA (Kdur) tablet 10 mEq DAILY   • lisinopril (PRINIVIL) tablet 40 mg DAILY   • Respiratory Care per Protocol Continuous RT   • Pharmacy Consult Request ...Pain Management Review 1 Each PRN   • acetaminophen (TYLENOL) tablet 650 mg Q4HRS PRN   • artificial tears 1.4 % ophthalmic solution 1 Drop PRN   • benzocaine-menthol (CEPACOL) lozenge 1 Lozenge Q2HRS PRN   • hydrALAZINE (APRESOLINE) tablet 25 mg Q8HRS PRN   • mag hydrox-al hydrox-simeth (MAALOX PLUS ES or MYLANTA DS) suspension 20 mL Q2HRS PRN   • ondansetron (ZOFRAN ODT) dispertab 4 mg 4X/DAY PRN    Or   • ondansetron (ZOFRAN) syringe/vial injection 4 mg  4X/DAY PRN   • traZODone (DESYREL) tablet 50 mg QHS PRN   • sodium chloride (OCEAN) 0.65 % nasal spray 2 Spray PRN   • bisacodyl (DULCOLAX) suppository 10 mg Q24HRS PRN   • magnesium hydroxide (MILK OF MAGNESIA) suspension 30 mL QDAY PRN   • senna-docusate (PERICOLACE or SENOKOT S) 8.6-50 MG per tablet 1 Tab Nightly   • amLODIPine (NORVASC) tablet 10 mg Q DAY   • clindamycin (CLEOCIN) capsule 300 mg BID   • [START ON 7/25/2018] cloNIDine (CATAPRES) 0.3 MG/24HR 1 Patch Q WEDNESDAY   • enoxaparin (LOVENOX) inj 40 mg DAILY   • fluticasone (FLONASE) nasal spray 50 mcg DAILY   • gabapentin (NEURONTIN) capsule 600 mg TID   • hydrALAZINE (APRESOLINE) tablet 100 mg TID   • hydroCHLOROthiazide (HYDRODIURIL) tablet 25 mg DAILY   • insulin glargine (LANTUS) injection 35 Units Q EVENING   • insulin lispro (HUMALOG) injection 0-12 Units TID AC   • metFORMIN (GLUCOPHAGE) tablet 1,000 mg BID WITH MEALS   • oxyCODONE-acetaminophen (PERCOCET) 5-325 MG per tablet 1 Tab Q4HRS PRN   • docusate sodium (COLACE) capsule 100 mg BID       Exam Date: 7/20/2018    General:  Awake, alert, oriented, no acute distress  HEENT:  Wearing Aspen cervical collar  Cardiac: regular rate and rhythm  Lungs: clear to auscultation bilaterally.   Abdomen: soft; non tender, non distended, bowel sounds present and normoactive  Extremities: Stable neuromuscular wasting in the intrinsic hand muscles bilaterally  Neuro:   Continued difficulty with coordination and fine motor movements.  Balance is gradually improving.  Ankle dorsiflexors remain 2/5.      Orders Placed This Encounter   Procedures   • Diet Order Diabetic     Standing Status:   Standing     Number of Occurrences:   1     Order Specific Question:   Diet:     Answer:   Diabetic [3]       Assessment:  Active Hospital Problems    Diagnosis   • *Cervical myelopathy (HCC)   • HTN (hypertension)   • CVA (cerebral vascular accident) (HCC)   • Diabetes mellitus with neurological manifestations, controlled  ([facility])   • Impaired activities of daily living   • Lumbar stenosis with neurogenic claudication   • Pain   • Cervical stenosis of spine   • Atrial fibrillation [427.31]   • Chronic antibiotic suppression       Medical Decision Making and Plan:  Mr. Ma is a 67-year-old male admitted for rehabilitation on July 18 in the setting of cervical myelopathy and lumbar spinal stenosis     Cervical myelopathy status post C2-C6 laminectomy and C2-C4 fusion by Dr. Marsh on July 13  Lumbar spinal stenosis with neurogenic claudication status post L2-L5 laminectomies by Dr. Marsh on July 13  Continue comprehensive rehabilitation  Follow-up with Dr. Marsh as scheduled  Aspen collar on at all times  Not cleared to resume therapeutic anticoagulation until follow-up with/clearance by Dr. Marsh  Continue Keflex until July 20     History of strokes  Atrial fibrillation   Not to resume Coumadin or therapeutic Lovenox until cleared by Dr. Marsh     Pain  Tylenol as needed   Gabapentin 600 mg 3 times a day  Robaxin 750 mg increased every 6 hours as needed on July 19  Oxycodone/APAP 5/325 mg every 4 hours as needed    He used 10 mg of oxycodone yesterday     Hypertension  Amlodipine 10 mg daily  Clonidine patch weekly on Wednesday  Hydralazine 100 mg 3 times a day  Hydrochlorothiazide 25 mg daily  Lisinopril 40 mg decreased to adkins dosing      Appreciate hospitalist assistance  Blood pressure today is 114/71 mmHg     Diabetes mellitus type 2 with a history of diabetic neuropathy--hemoglobin A1c of 9.9 on July 19  Lantus 35 units at bedtime  Sliding scale insulin  Metformin 1000 mg twice a day  Appreciate hospitalist consult    Glucose range of 133-225 in the last 24 hours     Anemia  Hemoglobin stable at 11.9 and July 19  Continue to monitor     History of left total knee replacement with chronic left knee/left shoulder staph infection  Continue clindamycin 300 mg twice daily     Constipation  Colace 100 g twice a day  Pericolace  qhs  Milk of magnesia as needed  Dulcolax suppository daily as needed     Seasonal allergies  Flonase daily     Incidental left thyroid mass seen on MRI in May 2018  Will need follow-up with primary care  TSH of 0.56 on admission    Vitamin D deficiency  Vitamin D was 26 on admission so we began supplementation with 2000 units of cholecalciferol daily      DVT prophylaxis  Lovenox 40 mg daily     Estimated discharge: 21-28 days    Total time:  27 minutes.  I spent greater than 50% of the time for patient care, counseling, and coordination on this date, including unit/floor time, and face-to-face time with the patient as per interval events and assessment and plan above. Topics discussed included cervical precautions, pain control, rationale behind cervical collar to ensure maximal fusion healing        Higinio Pagan M.D.  7/20/2018

## 2018-07-21 LAB
GLUCOSE BLD-MCNC: 131 MG/DL (ref 65–99)
GLUCOSE BLD-MCNC: 154 MG/DL (ref 65–99)
GLUCOSE BLD-MCNC: 244 MG/DL (ref 65–99)
GLUCOSE BLD-MCNC: 284 MG/DL (ref 65–99)

## 2018-07-21 PROCEDURE — 770010 HCHG ROOM/CARE - REHAB SEMI PRIVAT*

## 2018-07-21 PROCEDURE — 97110 THERAPEUTIC EXERCISES: CPT

## 2018-07-21 PROCEDURE — 700112 HCHG RX REV CODE 229: Performed by: PHYSICAL MEDICINE & REHABILITATION

## 2018-07-21 PROCEDURE — 700111 HCHG RX REV CODE 636 W/ 250 OVERRIDE (IP): Performed by: PHYSICAL MEDICINE & REHABILITATION

## 2018-07-21 PROCEDURE — A9270 NON-COVERED ITEM OR SERVICE: HCPCS | Performed by: PHYSICAL MEDICINE & REHABILITATION

## 2018-07-21 PROCEDURE — 97530 THERAPEUTIC ACTIVITIES: CPT

## 2018-07-21 PROCEDURE — 700102 HCHG RX REV CODE 250 W/ 637 OVERRIDE(OP): Performed by: PHYSICAL MEDICINE & REHABILITATION

## 2018-07-21 PROCEDURE — 700102 HCHG RX REV CODE 250 W/ 637 OVERRIDE(OP): Performed by: HOSPITALIST

## 2018-07-21 PROCEDURE — 99232 SBSQ HOSP IP/OBS MODERATE 35: CPT | Performed by: HOSPITALIST

## 2018-07-21 PROCEDURE — A9270 NON-COVERED ITEM OR SERVICE: HCPCS | Performed by: HOSPITALIST

## 2018-07-21 PROCEDURE — 82962 GLUCOSE BLOOD TEST: CPT | Mod: 91

## 2018-07-21 RX ORDER — GABAPENTIN 300 MG/1
900 CAPSULE ORAL 3 TIMES DAILY
Status: DISCONTINUED | OUTPATIENT
Start: 2018-07-21 | End: 2018-08-14 | Stop reason: HOSPADM

## 2018-07-21 RX ADMIN — HYDRALAZINE HYDROCHLORIDE 100 MG: 25 TABLET, FILM COATED ORAL at 20:06

## 2018-07-21 RX ADMIN — GABAPENTIN 900 MG: 300 CAPSULE ORAL at 20:04

## 2018-07-21 RX ADMIN — POTASSIUM CHLORIDE 10 MEQ: 1500 TABLET, EXTENDED RELEASE ORAL at 09:40

## 2018-07-21 RX ADMIN — LISINOPRIL 40 MG: 20 TABLET ORAL at 09:43

## 2018-07-21 RX ADMIN — OXYCODONE HYDROCHLORIDE AND ACETAMINOPHEN 1 TABLET: 5; 325 TABLET ORAL at 09:54

## 2018-07-21 RX ADMIN — GABAPENTIN 600 MG: 300 CAPSULE ORAL at 09:40

## 2018-07-21 RX ADMIN — ENOXAPARIN SODIUM 40 MG: 100 INJECTION SUBCUTANEOUS at 09:40

## 2018-07-21 RX ADMIN — INSULIN GLARGINE 35 UNITS: 100 INJECTION, SOLUTION SUBCUTANEOUS at 19:59

## 2018-07-21 RX ADMIN — INSULIN LISPRO 5 UNITS: 100 INJECTION, SOLUTION INTRAVENOUS; SUBCUTANEOUS at 11:00

## 2018-07-21 RX ADMIN — Medication 1 TABLET: at 20:06

## 2018-07-21 RX ADMIN — HYDRALAZINE HYDROCHLORIDE 100 MG: 25 TABLET, FILM COATED ORAL at 16:17

## 2018-07-21 RX ADMIN — GABAPENTIN 600 MG: 300 CAPSULE ORAL at 16:18

## 2018-07-21 RX ADMIN — CHOLECALCIFEROL TAB 25 MCG (1000 UNIT) 2000 UNITS: 25 TAB at 09:40

## 2018-07-21 RX ADMIN — METFORMIN HYDROCHLORIDE 1000 MG: 500 TABLET, FILM COATED ORAL at 17:37

## 2018-07-21 RX ADMIN — CLINDAMYCIN HYDROCHLORIDE 300 MG: 300 CAPSULE ORAL at 20:06

## 2018-07-21 RX ADMIN — DOCUSATE SODIUM 100 MG: 100 CAPSULE, LIQUID FILLED ORAL at 20:04

## 2018-07-21 RX ADMIN — METFORMIN HYDROCHLORIDE 1000 MG: 500 TABLET, FILM COATED ORAL at 09:41

## 2018-07-21 RX ADMIN — OXYCODONE HYDROCHLORIDE AND ACETAMINOPHEN 1 TABLET: 5; 325 TABLET ORAL at 22:20

## 2018-07-21 RX ADMIN — HYDROCHLOROTHIAZIDE 25 MG: 25 TABLET ORAL at 09:41

## 2018-07-21 RX ADMIN — HYDRALAZINE HYDROCHLORIDE 100 MG: 25 TABLET, FILM COATED ORAL at 09:41

## 2018-07-21 RX ADMIN — CLINDAMYCIN HYDROCHLORIDE 300 MG: 300 CAPSULE ORAL at 09:40

## 2018-07-21 RX ADMIN — AMLODIPINE BESYLATE 10 MG: 5 TABLET ORAL at 05:17

## 2018-07-21 ASSESSMENT — PAIN SCALES - GENERAL
PAINLEVEL_OUTOF10: 7
PAINLEVEL_OUTOF10: 0
PAINLEVEL_OUTOF10: 0
PAINLEVEL_OUTOF10: 7

## 2018-07-21 ASSESSMENT — ENCOUNTER SYMPTOMS
PALPITATIONS: 0
NAUSEA: 0
BLURRED VISION: 0
FEVER: 0
DIZZINESS: 0
HALLUCINATIONS: 0
SHORTNESS OF BREATH: 0
VOMITING: 0
HEADACHES: 0

## 2018-07-21 ASSESSMENT — PATIENT HEALTH QUESTIONNAIRE - PHQ9
SUM OF ALL RESPONSES TO PHQ9 QUESTIONS 1 AND 2: 0
1. LITTLE INTEREST OR PLEASURE IN DOING THINGS: NOT AT ALL

## 2018-07-21 NOTE — PROGRESS NOTES
Hospital Medicine Progress Note, Adult, Complex               Author: Moshe Bush Date & Time created: 7/21/2018  9:22 AM     Interval History:  No significant events or changes since last visit  Patient denies SOB, cough, or diarrhea  Patient slept ok last night    Chief Complaint:  Hypertension  Diabetes    Review of Systems:  Review of Systems   Constitutional: Negative for fever.   Eyes: Negative for blurred vision.   Respiratory: Negative for shortness of breath.    Cardiovascular: Negative for palpitations.   Gastrointestinal: Negative for nausea and vomiting.   Neurological: Negative for dizziness and headaches.   Psychiatric/Behavioral: Negative for hallucinations.       Physical Exam:  Physical Exam   Constitutional: He is oriented to person, place, and time.   HENT:   Mouth/Throat: Oropharynx is clear and moist.   Eyes: No scleral icterus.   Neck:   Has C-collar.   Cardiovascular: Normal rate, regular rhythm, S1 normal and S2 normal.    Pulmonary/Chest: Effort normal. No stridor. He has no wheezes. He has no rhonchi. He has no rales.   Abdominal: Soft. Bowel sounds are normal. Hernia confirmed negative in the right inguinal area and confirmed negative in the left inguinal area.   Neurological: He is alert and oriented to person, place, and time. No sensory deficit.   Skin: Skin is warm and dry. He is not diaphoretic. No cyanosis.   Psychiatric: He has a normal mood and affect. His behavior is normal.   Nursing note and vitals reviewed.      Labs:        Invalid input(s): [id number]      Recent Labs      07/19/18   0526   SODIUM  137   POTASSIUM  3.5*   CHLORIDE  98   CO2  31   BUN  27*   CREATININE  1.05   MAGNESIUM  1.6   PHOSPHORUS  3.1   CALCIUM  8.7     Recent Labs      07/19/18   0526   ALTSGPT  6   ASTSGOT  12   ALKPHOSPHAT  50   TBILIRUBIN  0.7   PREALBUMIN  14.0*   GLUCOSE  147*     Recent Labs      07/19/18   0526   RBC  4.25*   HEMOGLOBIN  11.9*   HEMATOCRIT  35.9*   PLATELETCT  206      Recent Labs      18   0526   WBC  5.0   NEUTSPOLYS  58.90   LYMPHOCYTES  21.60*   MONOCYTES  15.30*   EOSINOPHILS  3.00   BASOPHILS  0.60   ASTSGOT  12   ALTSGPT  6   ALKPHOSPHAT  50   TBILIRUBIN  0.7           Hemodynamics:  Temp (24hrs), Av.3 °C (97.4 °F), Min:35.9 °C (96.6 °F), Max:36.7 °C (98.1 °F)  Temperature: 36.7 °C (98.1 °F)  Pulse  Av.2  Min: 58  Max: 113   Blood Pressure : 117/73     Respiratory:    Respiration: 18, Pulse Oximetry: 94 %, O2 Daily Delivery Respiratory : Room Air with O2 Available     Work Of Breathing / Effort: Mild  RUL Breath Sounds: Clear;Diminished, RML Breath Sounds: Clear;Diminished, RLL Breath Sounds: Diminished, AI Breath Sounds: Clear;Diminished, LLL Breath Sounds: Clear;Diminished  Fluids:    Intake/Output Summary (Last 24 hours) at 18 0922  Last data filed at 18 0456   Gross per 24 hour   Intake              600 ml   Output             1800 ml   Net            -1200 ml        GI/Nutrition:  Orders Placed This Encounter   Procedures   • Diet Order Diabetic     Standing Status:   Standing     Number of Occurrences:   1     Order Specific Question:   Diet:     Answer:   Diabetic [3]     Medical Decision Making, by Problem:  Active Hospital Problems    Diagnosis   • *Cervical myelopathy (HCC) [G95.9]   • HTN (hypertension) [I10]   • CVA (cerebral vascular accident) (HCC) [I63.9]   • Diabetes mellitus with neurological manifestations, controlled (HCC) [E11.49]   • Impaired activities of daily living [R53.81]   • Lumbar stenosis with neurogenic claudication [M48.062]   • Pain [R52]   • Cervical stenosis of spine [M48.02]   • Atrial fibrillation [427.31] [I48.91]   • Chronic antibiotic suppression [Z79.2]     *  S/P Lumbar Surgery  *  S/P Cervical Surgery: had hx of prior cervical fusion    *  Hypertension  BP ok  On Norvasc: 10 mg daily  On Clonidine patch 0.3 mg  On Hydralazine: 100 mg tid  On HCTZ: 25 mg daily  On Lisinopril: 40 mg bid (above  recommended max daily dose) --> 40 mg daily (7/19)  Cont to monitor    *  Hx Afib  HR recently ok  On Coumadin  On HCTZ  Monitor    *  Diabetes  Hba1c: 9.9 (7/19)  BS less labile recently and slightly improved: 131-201  On Lantus: 35 units qhs  On Metformin: 1000 mg bid  Cont to monitor    *  Hypo-K+  K+: 4.0 (7/15) --> 3.5 (7/19)  S/P KCL 20 meq x 1  On KCL: 10 meq daily  Note: on HCTZ  Monitor    *  Azotemia  Bun: 19 (7/15) --> 27 (7/19)  Encouraging fluid (water/juice) intake  Note: on HCTZ  Monitor    *  Hx CVA: x 2; on Coumadin  *  Arthritis  *  Hx Cataracts: s/p extraction  *  Hyperlipidemia  *  Vit D Insufficiency: on supplements  *  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

## 2018-07-21 NOTE — CARE PLAN
Problem: Safety  Goal: Will remain free from injury  Call light with in reach. Redirection to use call light for assistance. Non skid socks. Upper siderails up x2 while in bed.    Problem: Pain Management  Goal: Pain level will decrease to patient's comfort goal  Educate patient of non-pharmacological comfort measures: repositioning, relaxation/breathing technique, cold compress and activities. Complains of back of neck pain, right hand pins and needles-like pain. Repositioned and as needed medication given with some relief. No respiratory distress. Refused CPAP on tonight, he said that he wants to set it on in AM by himself to put it on tomorrow at HS. O2 saturation 97% RA. Continues ABT, no adverse reaction. Encouraged increase fluids intake. No signs and symptoms of hypoglycemia or hyperglycemia. Will continue to monitor.

## 2018-07-21 NOTE — FLOWSHEET NOTE
07/21/18 0922   Events/Summary/Plan   Events/Summary/Plan Sitting in wheelchair, 02 spot check done   Chest Exam   Respiration 18   Pulse 87   Oxygen   Pulse Oximetry 96 %   O2 (LPM) 0   O2 Daily Delivery Respiratory  Room Air with O2 Available

## 2018-07-21 NOTE — CARE PLAN
Problem: Communication  Goal: The ability to communicate needs accurately and effectively will improve  Outcome: PROGRESSING SLOWER THAN EXPECTED  Has a very sarcastic demeanor. It is hard to tell if he is joking or serious. Difficult to communicate with.

## 2018-07-21 NOTE — REHAB-PHARMACY IDT TEAM NOTE
Pharmacy   Pharmacy  Antibiotics/Antifungals/Antivirals:  Medication:      Active Orders     Ordered     Ordering Provider       Wed Jul 18, 2018  2:28 PM    07/18/18 1428  clindamycin (CLEOCIN) capsule 300 mg  2 TIMES DAILY      Higinio Pagan M.D.        Route:        po  Stop Date:  none  Reason: chronic left knee/left shoulder staph infection  Antihypertensives/Cardiac:  Medication:    Orders (72h ago through future)    Start     Ordered    07/25/18 0900  cloNIDine (CATAPRES) 0.3 MG/24HR 1 Patch  EVERY WEDNESDAY 07/18/18 1428    07/20/18 0900  lisinopril (PRINIVIL) tablet 40 mg  DAILY      07/19/18 1613    07/19/18 0900  hydroCHLOROthiazide (HYDRODIURIL) tablet 25 mg  DAILY      07/18/18 1428    07/19/18 0600  amLODIPine (NORVASC) tablet 10 mg  Q DAY      07/18/18 1427    07/18/18 1500  hydrALAZINE (APRESOLINE) tablet 100 mg  3 TIMES DAILY      07/18/18 1428    07/18/18 1445  lisinopril (PRINIVIL) tablet 40 mg  2 TIMES DAILY,   Status:  Discontinued      07/18/18 1428    07/18/18 1428  hydrALAZINE (APRESOLINE) tablet 25 mg  EVERY 8 HOURS PRN      07/18/18 1428        Patient Vitals for the past 24 hrs:   BP Pulse   07/20/18 1500 104/64 72   07/20/18 1105 - 71   07/20/18 0700 114/71 76   07/19/18 1900 102/60 72       Anticoagulation:  Medication: Lovenox    Other key medications: A review of the medication list reveals no issues at this time. Patient is currently on antihypertensive(s). Recommend home blood pressure monitoring/recording if antihypertensive(s) regimen(s) continue. Patient is currently on diabetic medication(s) and/or Insulin(s). Recommend home blood glucose monitoring/recording if these regimen(s) continue.    Section completed by: Chris Kelley [facility]

## 2018-07-21 NOTE — PROGRESS NOTES
"Rehab Progress Note     Encounter date: 7/21/2018  Today I met with the patient face to face in PT  Chief Complaint: Patient is complaining of significant issues with dysesthesias at this time  Cervical myelopathy (HCC) , collar frustration    Interval Events (subjective)  I was asked to see the patient because it significant problems with with a burning sensation down both arms especially right arm he said was 7 out of 10.  By history clearly sound like a dysesthesia I discussed treatment options with the patient.  I saw him during physical therapy was able to work through the discomfort  Objective:  VITAL SIGNS: /73   Pulse 87   Temp 36.7 °C (98.1 °F)   Resp 18   Ht 1.905 m (6' 3\")   Wt 114.3 kg (252 lb)   SpO2 96%   BMI 31.50 kg/m²     Recent Results (from the past 72 hour(s))   ACCU-CHEK GLUCOSE    Collection Time: 07/18/18  5:10 PM   Result Value Ref Range    Glucose - Accu-Ck 249 (H) 65 - 99 mg/dL   ACCU-CHEK GLUCOSE    Collection Time: 07/18/18  8:24 PM   Result Value Ref Range    Glucose - Accu-Ck 211 (H) 65 - 99 mg/dL   CBC with Differential    Collection Time: 07/19/18  5:26 AM   Result Value Ref Range    WBC 5.0 4.8 - 10.8 K/uL    RBC 4.25 (L) 4.70 - 6.10 M/uL    Hemoglobin 11.9 (L) 14.0 - 18.0 g/dL    Hematocrit 35.9 (L) 42.0 - 52.0 %    MCV 84.5 81.4 - 97.8 fL    MCH 28.0 27.0 - 33.0 pg    MCHC 33.1 (L) 33.7 - 35.3 g/dL    RDW 42.6 35.9 - 50.0 fL    Platelet Count 206 164 - 446 K/uL    MPV 11.0 9.0 - 12.9 fL    Neutrophils-Polys 58.90 44.00 - 72.00 %    Lymphocytes 21.60 (L) 22.00 - 41.00 %    Monocytes 15.30 (H) 0.00 - 13.40 %    Eosinophils 3.00 0.00 - 6.90 %    Basophils 0.60 0.00 - 1.80 %    Immature Granulocytes 0.60 0.00 - 0.90 %    Nucleated RBC 0.00 /100 WBC    Neutrophils (Absolute) 2.97 1.82 - 7.42 K/uL    Lymphs (Absolute) 1.09 1.00 - 4.80 K/uL    Monos (Absolute) 0.77 0.00 - 0.85 K/uL    Eos (Absolute) 0.15 0.00 - 0.51 K/uL    Baso (Absolute) 0.03 0.00 - 0.12 K/uL    Immature " Granulocytes (abs) 0.03 0.00 - 0.11 K/uL    NRBC (Absolute) 0.00 K/uL   Comp Metabolic Panel (CMP)    Collection Time: 07/19/18  5:26 AM   Result Value Ref Range    Sodium 137 135 - 145 mmol/L    Potassium 3.5 (L) 3.6 - 5.5 mmol/L    Chloride 98 96 - 112 mmol/L    Co2 31 20 - 33 mmol/L    Anion Gap 8.0 0.0 - 11.9    Glucose 147 (H) 65 - 99 mg/dL    Bun 27 (H) 8 - 22 mg/dL    Creatinine 1.05 0.50 - 1.40 mg/dL    Calcium 8.7 8.5 - 10.5 mg/dL    AST(SGOT) 12 12 - 45 U/L    ALT(SGPT) 6 2 - 50 U/L    Alkaline Phosphatase 50 30 - 99 U/L    Total Bilirubin 0.7 0.1 - 1.5 mg/dL    Albumin 3.2 3.2 - 4.9 g/dL    Total Protein 5.9 (L) 6.0 - 8.2 g/dL    Globulin 2.7 1.9 - 3.5 g/dL    A-G Ratio 1.2 g/dL   HEMOGLOBIN A1C    Collection Time: 07/19/18  5:26 AM   Result Value Ref Range    Glycohemoglobin 9.9 (H) 0.0 - 5.6 %    Est Avg Glucose 237 mg/dL   Lipid Profile (Lipid Panel)    Collection Time: 07/19/18  5:26 AM   Result Value Ref Range    Cholesterol,Tot 152 100 - 199 mg/dL    Triglycerides 150 (H) 0 - 149 mg/dL    HDL 26 (A) >=40 mg/dL    LDL 96 <100 mg/dL   Magnesium    Collection Time: 07/19/18  5:26 AM   Result Value Ref Range    Magnesium 1.6 1.5 - 2.5 mg/dL   Phosphorus    Collection Time: 07/19/18  5:26 AM   Result Value Ref Range    Phosphorus 3.1 2.5 - 4.5 mg/dL   Prealbumin    Collection Time: 07/19/18  5:26 AM   Result Value Ref Range    Pre-Albumin 14.0 (L) 18.0 - 38.0 mg/dL   TSH with Reflex to FT4    Collection Time: 07/19/18  5:26 AM   Result Value Ref Range    TSH 0.560 0.380 - 5.330 uIU/mL   Vitamin D, 25-hydroxy (blood)    Collection Time: 07/19/18  5:26 AM   Result Value Ref Range    25-Hydroxy   Vitamin D 25 26 (L) 30 - 100 ng/mL   ESTIMATED GFR    Collection Time: 07/19/18  5:26 AM   Result Value Ref Range    GFR If African American >60 >60 mL/min/1.73 m 2    GFR If Non African American >60 >60 mL/min/1.73 m 2   ACCU-CHEK GLUCOSE    Collection Time: 07/19/18  7:19 AM   Result Value Ref Range    Glucose -  Accu-Ck 134 (H) 65 - 99 mg/dL   ACCU-CHEK GLUCOSE    Collection Time: 07/19/18 11:17 AM   Result Value Ref Range    Glucose - Accu-Ck 197 (H) 65 - 99 mg/dL   ACCU-CHEK GLUCOSE    Collection Time: 07/19/18  5:23 PM   Result Value Ref Range    Glucose - Accu-Ck 216 (H) 65 - 99 mg/dL   ACCU-CHEK GLUCOSE    Collection Time: 07/19/18  8:58 PM   Result Value Ref Range    Glucose - Accu-Ck 225 (H) 65 - 99 mg/dL   ACCU-CHEK GLUCOSE    Collection Time: 07/20/18  8:06 AM   Result Value Ref Range    Glucose - Accu-Ck 133 (H) 65 - 99 mg/dL   ACCU-CHEK GLUCOSE    Collection Time: 07/20/18 11:09 AM   Result Value Ref Range    Glucose - Accu-Ck 196 (H) 65 - 99 mg/dL   ACCU-CHEK GLUCOSE    Collection Time: 07/20/18  5:15 PM   Result Value Ref Range    Glucose - Accu-Ck 181 (H) 65 - 99 mg/dL   ACCU-CHEK GLUCOSE    Collection Time: 07/20/18  7:43 PM   Result Value Ref Range    Glucose - Accu-Ck 201 (H) 65 - 99 mg/dL   ACCU-CHEK GLUCOSE    Collection Time: 07/21/18  7:39 AM   Result Value Ref Range    Glucose - Accu-Ck 131 (H) 65 - 99 mg/dL   ACCU-CHEK GLUCOSE    Collection Time: 07/21/18 11:22 AM   Result Value Ref Range    Glucose - Accu-Ck 284 (H) 65 - 99 mg/dL       Current Facility-Administered Medications   Medication Frequency   • methocarbamol (ROBAXIN) tablet 750 mg Q6HRS PRN   • vitamin D (cholecalciferol) tablet 2,000 Units DAILY   • potassium chloride SA (Kdur) tablet 10 mEq DAILY   • lisinopril (PRINIVIL) tablet 40 mg DAILY   • Respiratory Care per Protocol Continuous RT   • Pharmacy Consult Request ...Pain Management Review 1 Each PRN   • acetaminophen (TYLENOL) tablet 650 mg Q4HRS PRN   • artificial tears 1.4 % ophthalmic solution 1 Drop PRN   • benzocaine-menthol (CEPACOL) lozenge 1 Lozenge Q2HRS PRN   • hydrALAZINE (APRESOLINE) tablet 25 mg Q8HRS PRN   • mag hydrox-al hydrox-simeth (MAALOX PLUS ES or MYLANTA DS) suspension 20 mL Q2HRS PRN   • ondansetron (ZOFRAN ODT) dispertab 4 mg 4X/DAY PRN    Or   • ondansetron  (ZOFRAN) syringe/vial injection 4 mg 4X/DAY PRN   • traZODone (DESYREL) tablet 50 mg QHS PRN   • sodium chloride (OCEAN) 0.65 % nasal spray 2 Spray PRN   • bisacodyl (DULCOLAX) suppository 10 mg Q24HRS PRN   • magnesium hydroxide (MILK OF MAGNESIA) suspension 30 mL QDAY PRN   • senna-docusate (PERICOLACE or SENOKOT S) 8.6-50 MG per tablet 1 Tab Nightly   • amLODIPine (NORVASC) tablet 10 mg Q DAY   • clindamycin (CLEOCIN) capsule 300 mg BID   • [START ON 7/25/2018] cloNIDine (CATAPRES) 0.3 MG/24HR 1 Patch Q WEDNESDAY   • enoxaparin (LOVENOX) inj 40 mg DAILY   • fluticasone (FLONASE) nasal spray 50 mcg DAILY   • gabapentin (NEURONTIN) capsule 600 mg TID   • hydrALAZINE (APRESOLINE) tablet 100 mg TID   • hydroCHLOROthiazide (HYDRODIURIL) tablet 25 mg DAILY   • insulin glargine (LANTUS) injection 35 Units Q EVENING   • insulin lispro (HUMALOG) injection 0-12 Units TID AC   • metFORMIN (GLUCOPHAGE) tablet 1,000 mg BID WITH MEALS   • oxyCODONE-acetaminophen (PERCOCET) 5-325 MG per tablet 1 Tab Q4HRS PRN   • docusate sodium (COLACE) capsule 100 mg BID       Exam Date: 7/21/2018    General:  Awake, alert, oriented, no acute distress  HEENT:  Wearing Aspen cervical collar  Cardiac: regular rate and rhythm  Lungs: clear to auscultation bilaterally.   Abdomen: soft; non tender, non distended, bowel sounds present and normoactive  Extremities: Stable neuromuscular wasting in the intrinsic hand muscles bilaterally  Neuro:   Continued difficulty with coordination and fine motor movements.  Balance is gradually improving.  Ankle dorsiflexors remain 2/5..  No changes in his upper extremity examination        My examination today is extremely similar to that of Dr. Pagan's yesterday of 7/20/2018    Orders Placed This Encounter   Procedures   • Diet Order Diabetic     Standing Status:   Standing     Number of Occurrences:   1     Order Specific Question:   Diet:     Answer:   Diabetic [3]       Assessment:  Active Hospital  Problems    Diagnosis   • *Cervical myelopathy ([facility])   • HTN (hypertension)   • CVA (cerebral vascular accident) ([facility])   • Diabetes mellitus with neurological manifestations, controlled ([facility])   • Impaired activities of daily living   • Lumbar stenosis with neurogenic claudication   • Pain   • Cervical stenosis of spine   • Atrial fibrillation [427.31]   • Chronic antibiotic suppression       Medical Decision Making and Plan:        Peripheral vascular disease    Was monitored as an inpatient will follow up with vascular and her PCP after discharge    Depression    Patient was continued on her Lexapro want inpatient discharge on the medications    Coronary artery disease     Cardiac precautions were employed employed with this patient    Anticoagulation with warfarin    Warfarin patient was given a discharge dose will follow up with her PCP    Chronic back pain/ phantom limb sensation    Patient is receiving narcotics as well as Neurontin. She is able to function despite her discomfort.  She wean down substantially prior to discharge    Hypertension    Patient is on amlodipine 5 mg daily and lisinopril 20 mg daily       Debility    Patient has had Long history of debility and chronic illness will address in therapy but she is improving slowly    Right AKA wound     Did heal slowly but was making significant progress    COPD    Will continue to monitor and treat as needed. Not an issue at this time      Nicotine dependence    Patient i was maintained on Nicorette gum as well as a patch while inpatient  She was counseled about the importance of not smoking      Chronic GI hypermobility    Lomotil when necessary is available      Affect    Patient was depressed/ concerned about disposition early in her stay but this is greatly improved at this time    She feels things are going well and plans to go home if possible    Erythema left knee    This is almost certainly from the wrapping and will hold wrapping for several  days and observe.  It has improved since yesterday

## 2018-07-22 LAB
ANION GAP SERPL CALC-SCNC: 7 MMOL/L (ref 0–11.9)
BUN SERPL-MCNC: 31 MG/DL (ref 8–22)
CALCIUM SERPL-MCNC: 8.8 MG/DL (ref 8.5–10.5)
CHLORIDE SERPL-SCNC: 101 MMOL/L (ref 96–112)
CO2 SERPL-SCNC: 29 MMOL/L (ref 20–33)
CREAT SERPL-MCNC: 1.16 MG/DL (ref 0.5–1.4)
GLUCOSE BLD-MCNC: 110 MG/DL (ref 65–99)
GLUCOSE BLD-MCNC: 151 MG/DL (ref 65–99)
GLUCOSE BLD-MCNC: 189 MG/DL (ref 65–99)
GLUCOSE BLD-MCNC: 207 MG/DL (ref 65–99)
GLUCOSE SERPL-MCNC: 89 MG/DL (ref 65–99)
MAGNESIUM SERPL-MCNC: 1.8 MG/DL (ref 1.5–2.5)
PHOSPHATE SERPL-MCNC: 3.4 MG/DL (ref 2.5–4.5)
POTASSIUM SERPL-SCNC: 3.5 MMOL/L (ref 3.6–5.5)
SODIUM SERPL-SCNC: 137 MMOL/L (ref 135–145)

## 2018-07-22 PROCEDURE — 99232 SBSQ HOSP IP/OBS MODERATE 35: CPT | Performed by: HOSPITALIST

## 2018-07-22 PROCEDURE — 80048 BASIC METABOLIC PNL TOTAL CA: CPT

## 2018-07-22 PROCEDURE — 700102 HCHG RX REV CODE 250 W/ 637 OVERRIDE(OP): Performed by: PHYSICAL MEDICINE & REHABILITATION

## 2018-07-22 PROCEDURE — A9270 NON-COVERED ITEM OR SERVICE: HCPCS | Performed by: PHYSICAL MEDICINE & REHABILITATION

## 2018-07-22 PROCEDURE — 94760 N-INVAS EAR/PLS OXIMETRY 1: CPT

## 2018-07-22 PROCEDURE — 700112 HCHG RX REV CODE 229: Performed by: PHYSICAL MEDICINE & REHABILITATION

## 2018-07-22 PROCEDURE — 84100 ASSAY OF PHOSPHORUS: CPT

## 2018-07-22 PROCEDURE — 82962 GLUCOSE BLOOD TEST: CPT | Mod: 91

## 2018-07-22 PROCEDURE — 770010 HCHG ROOM/CARE - REHAB SEMI PRIVAT*

## 2018-07-22 PROCEDURE — 700111 HCHG RX REV CODE 636 W/ 250 OVERRIDE (IP): Performed by: PHYSICAL MEDICINE & REHABILITATION

## 2018-07-22 PROCEDURE — 83735 ASSAY OF MAGNESIUM: CPT

## 2018-07-22 PROCEDURE — A9270 NON-COVERED ITEM OR SERVICE: HCPCS | Performed by: HOSPITALIST

## 2018-07-22 PROCEDURE — 36415 COLL VENOUS BLD VENIPUNCTURE: CPT

## 2018-07-22 PROCEDURE — 700102 HCHG RX REV CODE 250 W/ 637 OVERRIDE(OP): Performed by: HOSPITALIST

## 2018-07-22 RX ORDER — POTASSIUM CHLORIDE 20 MEQ/1
20 TABLET, EXTENDED RELEASE ORAL DAILY
Status: COMPLETED | OUTPATIENT
Start: 2018-07-23 | End: 2018-07-25

## 2018-07-22 RX ORDER — POTASSIUM CHLORIDE 20 MEQ/1
40 TABLET, EXTENDED RELEASE ORAL ONCE
Status: COMPLETED | OUTPATIENT
Start: 2018-07-22 | End: 2018-07-22

## 2018-07-22 RX ADMIN — GABAPENTIN 900 MG: 300 CAPSULE ORAL at 15:07

## 2018-07-22 RX ADMIN — POTASSIUM CHLORIDE 10 MEQ: 1500 TABLET, EXTENDED RELEASE ORAL at 09:41

## 2018-07-22 RX ADMIN — GABAPENTIN 900 MG: 300 CAPSULE ORAL at 20:04

## 2018-07-22 RX ADMIN — DOCUSATE SODIUM 100 MG: 100 CAPSULE, LIQUID FILLED ORAL at 09:38

## 2018-07-22 RX ADMIN — DOCUSATE SODIUM 100 MG: 100 CAPSULE, LIQUID FILLED ORAL at 20:05

## 2018-07-22 RX ADMIN — METFORMIN HYDROCHLORIDE 1000 MG: 500 TABLET, FILM COATED ORAL at 08:02

## 2018-07-22 RX ADMIN — CLINDAMYCIN HYDROCHLORIDE 300 MG: 300 CAPSULE ORAL at 20:04

## 2018-07-22 RX ADMIN — METHOCARBAMOL 750 MG: 750 TABLET, FILM COATED ORAL at 08:09

## 2018-07-22 RX ADMIN — INSULIN LISPRO 3 UNITS: 100 INJECTION, SOLUTION INTRAVENOUS; SUBCUTANEOUS at 17:17

## 2018-07-22 RX ADMIN — MAGNESIUM HYDROXIDE 30 ML: 400 SUSPENSION ORAL at 07:02

## 2018-07-22 RX ADMIN — HYDRALAZINE HYDROCHLORIDE 100 MG: 25 TABLET, FILM COATED ORAL at 20:04

## 2018-07-22 RX ADMIN — OXYCODONE HYDROCHLORIDE AND ACETAMINOPHEN 1 TABLET: 5; 325 TABLET ORAL at 20:13

## 2018-07-22 RX ADMIN — METFORMIN HYDROCHLORIDE 1000 MG: 500 TABLET, FILM COATED ORAL at 17:21

## 2018-07-22 RX ADMIN — CHOLECALCIFEROL TAB 25 MCG (1000 UNIT) 2000 UNITS: 25 TAB at 09:38

## 2018-07-22 RX ADMIN — INSULIN GLARGINE 35 UNITS: 100 INJECTION, SOLUTION SUBCUTANEOUS at 20:05

## 2018-07-22 RX ADMIN — Medication 1 TABLET: at 20:04

## 2018-07-22 RX ADMIN — HYDRALAZINE HYDROCHLORIDE 100 MG: 25 TABLET, FILM COATED ORAL at 09:39

## 2018-07-22 RX ADMIN — AMLODIPINE BESYLATE 10 MG: 5 TABLET ORAL at 05:10

## 2018-07-22 RX ADMIN — ENOXAPARIN SODIUM 40 MG: 100 INJECTION SUBCUTANEOUS at 09:40

## 2018-07-22 RX ADMIN — HYDRALAZINE HYDROCHLORIDE 100 MG: 25 TABLET, FILM COATED ORAL at 15:07

## 2018-07-22 RX ADMIN — POTASSIUM CHLORIDE 40 MEQ: 1500 TABLET, EXTENDED RELEASE ORAL at 15:07

## 2018-07-22 RX ADMIN — OXYCODONE HYDROCHLORIDE AND ACETAMINOPHEN 1 TABLET: 5; 325 TABLET ORAL at 08:09

## 2018-07-22 RX ADMIN — LISINOPRIL 40 MG: 20 TABLET ORAL at 09:39

## 2018-07-22 RX ADMIN — CLINDAMYCIN HYDROCHLORIDE 300 MG: 300 CAPSULE ORAL at 09:38

## 2018-07-22 RX ADMIN — OXYCODONE HYDROCHLORIDE AND ACETAMINOPHEN 1 TABLET: 5; 325 TABLET ORAL at 15:28

## 2018-07-22 RX ADMIN — GABAPENTIN 900 MG: 300 CAPSULE ORAL at 09:39

## 2018-07-22 ASSESSMENT — ENCOUNTER SYMPTOMS
NERVOUS/ANXIOUS: 0
DIARRHEA: 0
DIZZINESS: 0
COUGH: 0
FEVER: 0
BLURRED VISION: 0

## 2018-07-22 ASSESSMENT — PAIN SCALES - GENERAL
PAINLEVEL_OUTOF10: 3
PAINLEVEL_OUTOF10: 0
PAINLEVEL_OUTOF10: 2
PAINLEVEL_OUTOF10: 5
PAINLEVEL_OUTOF10: 6
PAINLEVEL_OUTOF10: 6
PAINLEVEL_OUTOF10: 0

## 2018-07-22 NOTE — PROGRESS NOTES
Hospital Medicine Progress Note, Adult, Complex               Author: Moshe Balderasnancy Date & Time created: 7/22/2018  9:24 AM     Interval History:  No significant events or changes since last visit  Patient denies SOB, cough, or diarrhea  Patient slept ok last night    Chief Complaint:  Hypertension  Diabetes    Review of Systems:  Review of Systems   Constitutional: Negative for fever.   Eyes: Negative for blurred vision.   Respiratory: Negative for cough.    Cardiovascular: Negative for chest pain.   Gastrointestinal: Negative for diarrhea.   Musculoskeletal: Negative for joint pain.   Neurological: Negative for dizziness.   Psychiatric/Behavioral: The patient is not nervous/anxious.        Physical Exam:  Physical Exam   Constitutional: He is oriented to person, place, and time. No distress.   HENT:   Mouth/Throat: No oropharyngeal exudate.   Eyes: EOM are normal.   Neck:   Has C-collar.   Cardiovascular: Normal rate, regular rhythm, S1 normal and S2 normal.    Pulmonary/Chest: Effort normal. No stridor. He has no wheezes. He has no rhonchi. He has no rales.   Abdominal: Soft. He exhibits no distension. There is no tenderness. Hernia confirmed negative in the right inguinal area and confirmed negative in the left inguinal area.   Neurological: He is alert and oriented to person, place, and time. No sensory deficit.   Skin: Skin is warm and dry. No cyanosis.   Psychiatric: He has a normal mood and affect. His behavior is normal.   Nursing note and vitals reviewed.      Labs:        Invalid input(s): [id number]      Recent Labs      07/22/18   0519   SODIUM  137   POTASSIUM  3.5*   CHLORIDE  101   CO2  29   BUN  31*   CREATININE  1.16   MAGNESIUM  1.8   PHOSPHORUS  3.4   CALCIUM  8.8     Recent Labs      07/22/18   0519   GLUCOSE  89     No results for input(s): RBC, HEMOGLOBIN, HEMATOCRIT, PLATELETCT, PROTHROMBTM, APTT, INR, IRON, FERRITIN, TOTIRONBC in the last 72 hours.            Hemodynamics:  Temp  (24hrs), Av.7 °C (98 °F), Min:36.6 °C (97.8 °F), Max:36.8 °C (98.2 °F)  Temperature: 36.8 °C (98.2 °F)  Pulse  Av.5  Min: 58  Max: 113   Blood Pressure : 119/67     Respiratory:    Respiration: 18, Pulse Oximetry: 92 %     Work Of Breathing / Effort: Mild  RUL Breath Sounds: Clear;Diminished, RML Breath Sounds: Clear;Diminished, RLL Breath Sounds: Diminished, AI Breath Sounds: Clear;Diminished, LLL Breath Sounds: Clear;Diminished  Fluids:    Intake/Output Summary (Last 24 hours) at 18 0924  Last data filed at 18 0513   Gross per 24 hour   Intake              120 ml   Output             1650 ml   Net            -1530 ml        GI/Nutrition:  Orders Placed This Encounter   Procedures   • Diet Order Diabetic     Standing Status:   Standing     Number of Occurrences:   1     Order Specific Question:   Diet:     Answer:   Diabetic [3]     Medical Decision Making, by Problem:  Active Hospital Problems    Diagnosis   • *Cervical myelopathy (HCC) [G95.9]   • HTN (hypertension) [I10]   • CVA (cerebral vascular accident) (HCC) [I63.9]   • Diabetes mellitus with neurological manifestations, controlled (HCC) [E11.49]   • Impaired activities of daily living [R53.81]   • Lumbar stenosis with neurogenic claudication [M48.062]   • Pain [R52]   • Cervical stenosis of spine [M48.02]   • Atrial fibrillation [427.31] [I48.91]   • Chronic antibiotic suppression [Z79.2]     *  S/P Lumbar Surgery  *  S/P Cervical Surgery: had hx of prior cervical fusion    *  Hypertension  BP ok  On Norvasc: 10 mg daily  On Clonidine patch 0.3 mg  On Hydralazine: 100 mg tid  On HCTZ: 25 mg daily --> will d/c 2nd to worsening azotemia ()  On Lisinopril: 40 mg bid (above recommended max daily dose) --> 40 mg daily ()  Cont to monitor    *  Hx Afib  HR recently ok  On Coumadin  On HCTZ --> will d/c 2nd to worsening azotemia ()  Monitor    *  Diabetes  Hba1c: 9.9 ()  BS less labile recently and slightly improved:  131-201  On Lantus: 35 units qhs  On Metformin: 1000 mg bid  Cont to monitor    *  Hypo-K+  K+: 4.0 (7/15) --> 3.5 (7/19) --> 3.5 (7/22)  S/P KCL 20 meq x 1  On KCL: 10 meq daily --> will increase to 20 meq daily x 3 days (starting 7/23)  Will give KCL 40 meq x 1 (7/22)  Note: on HCTZ --> will d/c 2nd to worsening azotemia (7/22)  Monitor    *  Azotemia  Bun: 19 (7/15) --> 27 (7/19) --> 31 (7/22)  Encouraging fluid (water/juice) intake  Note: on HCTZ  --> will d/c 2nd to worsening azotemia (7/22)  Monitor    *  Hx CVA: x 2; on Coumadin  *  Arthritis  *  Hx Cataracts: s/p extraction  *  Hyperlipidemia  *  Vit D Insufficiency: on supplements  *  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

## 2018-07-22 NOTE — CARE PLAN
Problem: Safety  Goal: Will remain free from injury  Call light with in reach. Redirection to use call light for assistance. Non skid socks. Upper siderails up x2 while in bed.    Problem: Infection  Goal: Will remain free from infection  Continues ABT, no adverse reaction. Encouraged increase fluids intake. No signs and symptoms of hypoglycemia or hyperglycemia. HS snacks given. Complains of collar bones and right and left back of head pressure pain from aspen collar. Foam applied to sites for cushion and as needed pain medication given with some relief. CPAP  was done, was on at HS. Will continue to monitor.

## 2018-07-22 NOTE — FLOWSHEET NOTE
07/22/18 0733   Events/Summary/Plan   Events/Summary/Plan 02 spot check done, pt in dining treviño waiting for breakfast   Chest Exam   Respiration 20   Pulse 91   Oximetry   #Pulse Oximetry (Single Determination) Yes   Oxygen   Pulse Oximetry 97 %   O2 (LPM) 0   O2 Daily Delivery Respiratory  Room Air with O2 Available

## 2018-07-22 NOTE — CARE PLAN
Problem: Safety  Goal: Will remain free from injury  Call light with in reach. Redirection to use call light for assistance. Non skid socks. Upper siderails up x2 while in bed.

## 2018-07-23 LAB
GLUCOSE BLD-MCNC: 108 MG/DL (ref 65–99)
GLUCOSE BLD-MCNC: 138 MG/DL (ref 65–99)
GLUCOSE BLD-MCNC: 165 MG/DL (ref 65–99)
GLUCOSE BLD-MCNC: 209 MG/DL (ref 65–99)

## 2018-07-23 PROCEDURE — A9270 NON-COVERED ITEM OR SERVICE: HCPCS | Performed by: PHYSICAL MEDICINE & REHABILITATION

## 2018-07-23 PROCEDURE — 700102 HCHG RX REV CODE 250 W/ 637 OVERRIDE(OP): Performed by: HOSPITALIST

## 2018-07-23 PROCEDURE — A9270 NON-COVERED ITEM OR SERVICE: HCPCS | Performed by: HOSPITALIST

## 2018-07-23 PROCEDURE — 700102 HCHG RX REV CODE 250 W/ 637 OVERRIDE(OP): Performed by: PHYSICAL MEDICINE & REHABILITATION

## 2018-07-23 PROCEDURE — 700111 HCHG RX REV CODE 636 W/ 250 OVERRIDE (IP): Performed by: PHYSICAL MEDICINE & REHABILITATION

## 2018-07-23 PROCEDURE — 99232 SBSQ HOSP IP/OBS MODERATE 35: CPT | Performed by: HOSPITALIST

## 2018-07-23 PROCEDURE — 82962 GLUCOSE BLOOD TEST: CPT | Mod: 91

## 2018-07-23 PROCEDURE — 700112 HCHG RX REV CODE 229: Performed by: PHYSICAL MEDICINE & REHABILITATION

## 2018-07-23 PROCEDURE — 97110 THERAPEUTIC EXERCISES: CPT

## 2018-07-23 PROCEDURE — 97530 THERAPEUTIC ACTIVITIES: CPT

## 2018-07-23 PROCEDURE — 97535 SELF CARE MNGMENT TRAINING: CPT

## 2018-07-23 PROCEDURE — 97112 NEUROMUSCULAR REEDUCATION: CPT

## 2018-07-23 PROCEDURE — 770010 HCHG ROOM/CARE - REHAB SEMI PRIVAT*

## 2018-07-23 PROCEDURE — 97116 GAIT TRAINING THERAPY: CPT

## 2018-07-23 PROCEDURE — 99233 SBSQ HOSP IP/OBS HIGH 50: CPT | Performed by: PHYSICAL MEDICINE & REHABILITATION

## 2018-07-23 RX ADMIN — CLINDAMYCIN HYDROCHLORIDE 300 MG: 300 CAPSULE ORAL at 08:49

## 2018-07-23 RX ADMIN — GABAPENTIN 900 MG: 300 CAPSULE ORAL at 15:51

## 2018-07-23 RX ADMIN — Medication 1 TABLET: at 20:27

## 2018-07-23 RX ADMIN — DOCUSATE SODIUM 100 MG: 100 CAPSULE, LIQUID FILLED ORAL at 08:49

## 2018-07-23 RX ADMIN — GABAPENTIN 900 MG: 300 CAPSULE ORAL at 20:27

## 2018-07-23 RX ADMIN — METFORMIN HYDROCHLORIDE 1000 MG: 500 TABLET, FILM COATED ORAL at 17:34

## 2018-07-23 RX ADMIN — GABAPENTIN 900 MG: 300 CAPSULE ORAL at 08:48

## 2018-07-23 RX ADMIN — OXYCODONE HYDROCHLORIDE AND ACETAMINOPHEN 1 TABLET: 5; 325 TABLET ORAL at 11:05

## 2018-07-23 RX ADMIN — AMLODIPINE BESYLATE 10 MG: 5 TABLET ORAL at 05:03

## 2018-07-23 RX ADMIN — OXYCODONE HYDROCHLORIDE AND ACETAMINOPHEN 1 TABLET: 5; 325 TABLET ORAL at 19:16

## 2018-07-23 RX ADMIN — METFORMIN HYDROCHLORIDE 1000 MG: 500 TABLET, FILM COATED ORAL at 08:49

## 2018-07-23 RX ADMIN — HYDRALAZINE HYDROCHLORIDE 100 MG: 25 TABLET, FILM COATED ORAL at 08:49

## 2018-07-23 RX ADMIN — CHOLECALCIFEROL TAB 25 MCG (1000 UNIT) 2000 UNITS: 25 TAB at 08:48

## 2018-07-23 RX ADMIN — HYDRALAZINE HYDROCHLORIDE 100 MG: 25 TABLET, FILM COATED ORAL at 15:52

## 2018-07-23 RX ADMIN — DOCUSATE SODIUM 100 MG: 100 CAPSULE, LIQUID FILLED ORAL at 20:28

## 2018-07-23 RX ADMIN — FLUTICASONE PROPIONATE 50 MCG: 50 SPRAY, METERED NASAL at 09:03

## 2018-07-23 RX ADMIN — POTASSIUM CHLORIDE 20 MEQ: 1500 TABLET, EXTENDED RELEASE ORAL at 08:49

## 2018-07-23 RX ADMIN — CLINDAMYCIN HYDROCHLORIDE 300 MG: 300 CAPSULE ORAL at 20:28

## 2018-07-23 RX ADMIN — ENOXAPARIN SODIUM 40 MG: 100 INJECTION SUBCUTANEOUS at 08:50

## 2018-07-23 RX ADMIN — HYDRALAZINE HYDROCHLORIDE 100 MG: 25 TABLET, FILM COATED ORAL at 20:27

## 2018-07-23 RX ADMIN — OXYCODONE HYDROCHLORIDE AND ACETAMINOPHEN 1 TABLET: 5; 325 TABLET ORAL at 23:00

## 2018-07-23 RX ADMIN — INSULIN GLARGINE 35 UNITS: 100 INJECTION, SOLUTION SUBCUTANEOUS at 20:23

## 2018-07-23 RX ADMIN — LISINOPRIL 40 MG: 20 TABLET ORAL at 08:49

## 2018-07-23 ASSESSMENT — PAIN SCALES - GENERAL
PAINLEVEL_OUTOF10: 7

## 2018-07-23 ASSESSMENT — ENCOUNTER SYMPTOMS
ABDOMINAL PAIN: 0
SHORTNESS OF BREATH: 0
CHILLS: 0
NAUSEA: 0
NERVOUS/ANXIOUS: 0
FEVER: 0
DIARRHEA: 0
VOMITING: 0

## 2018-07-23 ASSESSMENT — PATIENT HEALTH QUESTIONNAIRE - PHQ9
1. LITTLE INTEREST OR PLEASURE IN DOING THINGS: NOT AT ALL
1. LITTLE INTEREST OR PLEASURE IN DOING THINGS: NOT AT ALL
SUM OF ALL RESPONSES TO PHQ9 QUESTIONS 1 AND 2: 0
SUM OF ALL RESPONSES TO PHQ9 QUESTIONS 1 AND 2: 0

## 2018-07-23 NOTE — CARE PLAN
Problem: Bathing  Goal: STG-Within one week, patient will bathe  1) Individualized Goal:  Body with mod A using AE/AD/techniques as needed.  2) Interventions:  OT E Stim Attended, OT Group Therapy, OT Self Care/ADL, OT Cognitive Skill Dev, OT Community Reintegration, OT Manual Ther Technique, OT Neuro Re-Ed/Balance, OT Sensory Int Techniques, OT Therapeutic Activity, OT Evaluation and OT Therapeutic Exercise     Outcome: MET Date Met: 07/23/18  Min A    Problem: Dressing  Goal: STG-Within one week, patient will dress UB  1) Individualized Goal:  With min A using verbal cues as needed.  2) Interventions:  OT E Stim Attended, OT Group Therapy, OT Self Care/ADL, OT Cognitive Skill Dev, OT Community Reintegration, OT Manual Ther Technique, OT Neuro Re-Ed/Balance, OT Sensory Int Techniques, OT Therapeutic Activity, OT Evaluation and OT Therapeutic Exercise   Outcome: MET Date Met: 07/23/18  Min A  Goal: STG-Within one week, patient will dress LB  1) Individualized Goal:  With max A using AE/AD/techniques as needed.  2) Interventions:  OT E Stim Attended, OT Group Therapy, OT Self Care/ADL, OT Cognitive Skill Dev, OT Community Reintegration, OT Manual Ther Technique, OT Neuro Re-Ed/Balance, OT Sensory Int Techniques, OT Therapeutic Activity, OT Evaluation and OT Therapeutic Exercise   Outcome: MET Date Met: 07/23/18  Min A    Problem: Toileting  Goal: STG-Within one week, patient will complete toileting tasks  1) Individualized Goal:  With max assist using AE/AD/techniques as needed.  2) Interventions:  OT E Stim Attended, OT Group Therapy, OT Self Care/ADL, OT Cognitive Skill Dev, OT Community Reintegration, OT Manual Ther Technique, OT Neuro Re-Ed/Balance, OT Sensory Int Techniques, OT Therapeutic Activity, OT Evaluation and OT Therapeutic Exercise   Outcome: NOT MET  Total assist

## 2018-07-23 NOTE — REHAB-NURSING IDT TEAM NOTE
Nursing   Nursing  Diet/Nutrition:  Diabetic and Thin Liquids  Medication Administration:  Whole with Liquid Wash  % consumed at meals in last 24 hours:  Consumed % of meals per documentation.  Meal/Snack Consumption for the past 24 hrs:   Oral Nutrition   07/22/18 1800 Dinner;Between % Consumed   07/22/18 1200 Lunch;Between % Consumed   07/22/18 0900 Breakfast;Between % Consumed       Snack schedule:  None    Appetite:  Excellent  Fluid Intake/Output in past 24 hours: In: 1270 [P.O.:1270]  Out: 1650   Admit Weight:  Weight: 114.3 kg (252 lb)  Weight Last 7 Days   [114.3 kg (252 lb)-114.6 kg (252 lb 10.4 oz)] 114.6 kg (252 lb 10.4 oz) (07/22 1000)    Pain Issues:    Location:  Head;Neck;Back (07/22 2004)  Posterior;Right;Left;Lower (07/22 2004)         Severity:  neither Moderate nor Severe   Scheduled pain medications:  gabapentin (NEURONTIN)      PRN pain medications used in last 24 hours:  oxycodone/acetaminophen (PERCOCET)    Non Pharmacologic Interventions:  warm blanket, repositioned and rest  Effectiveness of pain management plan:  good=patient states acceptable comfort after interventions    Bowel:    Bowel Assist Initial Score:  1 - Total Assistance  Bowel Assist Current Score:  1 - Total Assistance  Bowl Accidents in last 7 days:     Last bowel movement: 07/22/18  Stool Description: Large, Soft     Usual bowel pattern:  every third day  Scheduled bowel medications:  docusate sodium (COLACE)  PRN bowel medications used in last 24 hours:  magnesium hydroxide (MILK OF MAGNESIA)   Nursing Interventions:  Increased time, PRN medication, Scheduled medication, Supervision, Verbal cueing, Set-up, Brief, Positioning on commode/toilet  Effectiveness of bowel program:   fair=sometimes needs prn bowel meds for constipation  Bladder:    Bladder Assist Initial Score:  1 - Total Assistance  Bladder Assist Current Score:  1 - Total Assistance  Bladder Accidents in last 7 days:  3  PVR range for  past 24-48 hours: -- ()  Intermittent Catheterization:  n/a  Medications affecting bladder:  None    Time void schedule/voiding pattern:  Voiding every 4-6 hours  Interventions:  Increased time, Verbal cueing, Supervision, Emptying of device, Urinal, Brief  Effectiveness of bladder training:  Good=regular, predictable, emptying of bladder, patient initiates time voiding    Gonzalez:    Type:     Patient Lines/Drains/Airways Status    Active Catheter     None              Date placed:          Justification:    Diabetes:  Blood Sugar Frequency:  Before meals and at bedtime    Range of BS for last 48 hours:   Recent Labs      07/21/18   0739  07/21/18   1122  07/21/18   1714  07/21/18   1958  07/22/18   0800  07/22/18   1126  07/22/18   1715  07/22/18 2002   POCGLUCOSE  131*  284*  154*  244*  110*  151*  207*  189*     Scheduled diabetic medications:  metformin (GLUCOPHAGE) , insulin glargine (LANTUS) injection  and insulin lispro (HUMALOG) injection   Sliding scale usage in past 24 hours:  Yes  Total Short acting insulin in the past 24 hours:  Humalog 5  Total Long acting insulin in the past 24 hours:  Lantus 35    Wound:         Patient Lines/Drains/Airways Status    Active Current Wounds     Name: Placement date: Placement time: Site: Days:    Surgical Incision  Incision Cervical Posterior 07/13/18   1555      9    Surgical Incision  Incision Back 07/13/18   1748      9                   Interventions:  Monitor every shift.  Effectiveness of intervention:  wound is unchanged     Wound Vac Location:  Not applicable  Dressing last changed:  Not applicable  Pump Mode Pressure Setting       Description of drainage:  Not applicable    Sleep/wake cycle:   Average hours slept:  Sleeps 4-6 hours without waking  Sleep medication usage:  None    Patient/Family Training/Education:  Diabetes Management, Fall Prevention, General Self Care, Pain Management, Safe Transfers, Safety, Wound Care and Other:  cervical and lumbar  precautions  Discharge Supply Recommendations:  Glucometer and Strips, Blood Pressure Monitor and Wound Care Supplies  Strengths: Alert and oriented and Supportive family   Barriers:   Generalized weakness and Hypertension            Nursing Problems           Problem: Bowel/Gastric:     Goal: Normal bowel function is maintained or improved           Goal: Will not experience complications related to bowel motility             Problem: Communication     Goal: The ability to communicate needs accurately and effectively will improve             Problem: Discharge Barriers/Planning     Goal: Patient's continuum of care needs will be met             Problem: GLYCEMIA IMBALANCE     Goal: Clinical indication of glycemia balance is achieved             Problem: Infection     Goal: Will remain free from infection             Problem: Knowledge Deficit     Goal: Knowledge of disease process/condition, treatment plan, diagnostic tests, and medications will improve           Goal: Knowledge of the prescribed therapeutic regimen will improve             Problem: Pain Management     Goal: Pain level will decrease to patient's comfort goal             Problem: Psychosocial Needs:     Goal: Level of anxiety will decrease             Problem: Respiratory:     Goal: Respiratory status will improve             Problem: Safety     Goal: Will remain free from injury           Goal: Will remain free from falls             Problem: Skin Integrity     Goal: Risk for impaired skin integrity will decrease             Problem: Venous Thromboembolism (VTW)/Deep Vein Thrombosis (DVT) Prevention:     Goal: Patient will participate in Venous Thrombosis (VTE)/Deep Vein Thrombosis (DVT)Prevention Measures                  Long Term Goals:   At discharge patient will be able to function safely at home and in the community with support.    Section completed by:  Lupe Cortés R.N.

## 2018-07-23 NOTE — PROGRESS NOTES
Hospital Medicine Progress Note, Adult, Complex               Author: Moshe SAMMY Balderasnancy Date & Time created: 7/23/2018  9:50 AM     Interval History:  No significant events or changes since last visit  Patient denies SOB, cough, or diarrhea  Patient slept ok last night    Chief Complaint:  Hypertension  Diabetes    Review of Systems:  Review of Systems   Constitutional: Negative for chills and fever.   Respiratory: Negative for shortness of breath.    Cardiovascular: Negative for chest pain.   Gastrointestinal: Negative for abdominal pain, diarrhea, nausea and vomiting.   Psychiatric/Behavioral: The patient is not nervous/anxious.        Physical Exam:  Physical Exam   Constitutional: He is oriented to person, place, and time. He appears well-nourished.   HENT:   Head: Atraumatic.   Eyes: Conjunctivae are normal. Pupils are equal, round, and reactive to light.   Neck:   Has C-collar.   Cardiovascular: Normal rate, regular rhythm, S1 normal and S2 normal.    No murmur heard.  Pulmonary/Chest: Effort normal. No stridor. He has no wheezes. He has no rhonchi. He has no rales.   Abdominal: Soft. He exhibits no distension. There is no tenderness. Hernia confirmed negative in the right inguinal area and confirmed negative in the left inguinal area.   Musculoskeletal: He exhibits no edema.   Neurological: He is alert and oriented to person, place, and time. No sensory deficit.   Skin: Skin is warm and dry. No rash noted. No cyanosis.   Psychiatric: He has a normal mood and affect. His behavior is normal.   Nursing note and vitals reviewed.      Labs:        Invalid input(s): [id number]      Recent Labs      07/22/18   0519   SODIUM  137   POTASSIUM  3.5*   CHLORIDE  101   CO2  29   BUN  31*   CREATININE  1.16   MAGNESIUM  1.8   PHOSPHORUS  3.4   CALCIUM  8.8     Recent Labs      07/22/18   0519   GLUCOSE  89     No results for input(s): RBC, HEMOGLOBIN, HEMATOCRIT, PLATELETCT, PROTHROMBTM, APTT, INR, IRON, FERRITIN,  TOTIRONBC in the last 72 hours.            Hemodynamics:  Temp (24hrs), Av.7 °C (98 °F), Min:36.6 °C (97.9 °F), Max:36.7 °C (98 °F)  Temperature: 36.7 °C (98 °F)  Pulse  Av.9  Min: 58  Max: 113   Blood Pressure : 132/72     Respiratory:    Respiration: 18, Pulse Oximetry: 91 %     Work Of Breathing / Effort: Mild  RUL Breath Sounds: Clear;Diminished, RML Breath Sounds: Clear;Diminished, RLL Breath Sounds: Diminished, AI Breath Sounds: Clear;Diminished, LLL Breath Sounds: Clear;Diminished  Fluids:    Intake/Output Summary (Last 24 hours) at 18 0950  Last data filed at 18 0621   Gross per 24 hour   Intake              820 ml   Output             1300 ml   Net             -480 ml     Weight: 114.6 kg (252 lb 10.4 oz)  GI/Nutrition:  Orders Placed This Encounter   Procedures   • Diet Order Diabetic     Standing Status:   Standing     Number of Occurrences:   1     Order Specific Question:   Diet:     Answer:   Diabetic [3]     Medical Decision Making, by Problem:  Active Hospital Problems    Diagnosis   • *Cervical myelopathy (HCC) [G95.9]   • HTN (hypertension) [I10]   • CVA (cerebral vascular accident) (HCC) [I63.9]   • Diabetes mellitus with neurological manifestations, controlled (HCC) [E11.49]   • Impaired activities of daily living [R53.81]   • Lumbar stenosis with neurogenic claudication [M48.062]   • Pain [R52]   • Cervical stenosis of spine [M48.02]   • Atrial fibrillation [427.31] [I48.91]   • Chronic antibiotic suppression [Z79.2]     *  S/P Lumbar Surgery  *  S/P Cervical Surgery: had hx of prior cervical fusion    *  Hypertension  BP ok  On Norvasc: 10 mg daily  On Clonidine patch 0.3 mg  On Hydralazine: 100 mg tid  On HCTZ: 25 mg daily --> will d/c 2nd to worsening azotemia ()  On Lisinopril: 40 mg bid (above recommended max daily dose) --> 40 mg daily ()  Cont to monitor    *  Hx Afib  HR recently ok  On Coumadin  Off HCTZ -- 2nd to worsening azotemia ()  Monitor    *   Diabetes  Hba1c: 9.9 (7/19)  BS still labile: 108-207  On Lantus: 35 units qhs  On Metformin: 1000 mg bid  Cont to monitor    *  Hypo-K+  K+: 4.0 (7/15) --> 3.5 (7/19) --> 3.5 (7/22)  S/P KCL 20 meq x 1  S/P KCL 40 meq x 1 (7/22)  On KCL: 10 meq daily --> 20 meq daily x 3 days (starting 7/23)  Note: now off HCTZ (7/22)  Monitor    *  Azotemia  Bun: 19 (7/15) --> 27 (7/19) --> 31 (7/22)  Encouraging fluid (water/juice) intake  Note: now off HCTZ (7/22)  Monitor    *  Hx CVA: x 2; on Coumadin  *  Arthritis  *  Hx Cataracts: s/p extraction  *  Hyperlipidemia  *  Vit D Insufficiency: on supplements  *  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

## 2018-07-23 NOTE — REHAB-OT IDT TEAM NOTE
Occupational Therapy   Activities of Daily Living  Eating Initial:  3 - Moderate Assistance  Eating Current:  5 - Standby Prompting/Supervision or Set-up   Eating Description:  Set-up of equipment or meal/tube feeding  Grooming Initial:  5 - Standby Prompting/Supervision or Set-up  Grooming Current:  5 - Standby Prompting/Supervision or Set-up   Grooming Description:  Verbal cueing, Set-up of equipment (setup)  Bathing Initial:  0 - Not tested,unsafe activity  Bathing Current:  4 - Minimal Assistance   Bathing Description:  Grab bar, Tub bench, Hand held shower, Supervision for safety, Verbal cueing, Set-up of equipment, Initial preparation for task (assist w/ 1/10 tasks)  Upper Body Dressing Initial:  0 - Not tested, patient refused  Upper Body Dressing Current:  4 - Minimal Assistance   Upper Body Dressing Description:   (assist w/ 1/8 tasks to don/doff pullover)  Lower Body Dressing Initial:  0 - Not tested, patient refused  Lower Body Dressing Current:  4 - Minimal Assistance   Lower Body Dressing Description:  4 - Minimal Assistance  Toileting Initial:  1 - Total Assistance  Toileting Current:  1 - Total Assistance   Toileting Description:  Grab bar, Increased time, Supervision for safety, Verbal cueing, Requires 2 people to assist  Toilet Transfer Initial:  0 - Not tested, patient refused  Toilet Transfer Current:  1 - Total Assistance   Toilet Transfer Description:  1 - Total Assistance  Tub / Shower Transfer Initial:  0 - Not tested, patient refused  Tub / Shower Transfer Current:  4 - Minimal Assistance   Tub / Shower Transfer Description:  Adaptive equipment, Supervision for safety, Verbal cueing, Set-up of equipment, Initial preparation for task (CGA lateral scoot w/c <> shower bench)  IADL:  Not yet addressed  Family Training/Education:  None  DME/DC Recommendations:  Recommend a ramp, possible wheelchair, tub bench for home (has a shower chair now), grab bars in shower and by toilets, pt states he has a  raised toilet seat without arms    Strengths:  Alert and oriented, Effective communication skills, Pleasant and cooperative and Willingly participates in therapeutic activities  Barriers:  Decreased endurance, Generalized weakness, Limited mobility, Poor activity tolerance, Poor balance, Poor carryover of learning and Other: pain complaints in neck, lower back, right hip and hands; doesn't follow spinal precautions well; impaired standing tolerance/balance, impaired sensation bilateral hands (R worse than L), lives alone with limited social support, 4 steps to enter mobile home    # of short term goals set= 3    # of short term goals met= 2          Occupational Therapy Goals           Problem: Bathing     Dates: Start: 07/19/18       Goal: STG-Within one week, patient will bathe     Dates: Start: 07/23/18       Description: 1) Individualized Goal: Body with CGA using AE/AD/techniques as needed.  2) Interventions: OT E Stim Attended, OT Group Therapy, OT Self Care/ADL, OT Cognitive Skill Dev, OT Community Reintegration, OT Manual Ther Technique, OT Neuro Re-Ed/Balance, OT Sensory Int Techniques, OT Therapeutic Activity, OT Evaluation and OT Therapeutic Exercise               Problem: Dressing     Dates: Start: 07/19/18       Goal: STG-Within one week, patient will dress LB     Dates: Start: 07/23/18       Description: 1) Individualized Goal: With CGA using AE/AD/techniques as needed.  2) Interventions: OT E Stim Attended, OT Group Therapy, OT Self Care/ADL, OT Cognitive Skill Dev, OT Community Reintegration, OT Manual Ther Technique, OT Neuro Re-Ed/Balance, OT Sensory Int Techniques, OT Therapeutic Activity, OT Evaluation and OT Therapeutic Exercise                Problem: Functional Transfers     Dates: Start: 07/23/18       Goal: STG-Within one week, patient will transfer to toilet     Dates: Start: 07/23/18       Description: 1) Individualized Goal: With max A using AE/AD/techniques as needed.  2) Interventions:  OT E Stim Attended, OT Group Therapy, OT Self Care/ADL, OT Cognitive Skill Dev, OT Community Reintegration, OT Manual Ther Technique, OT Neuro Re-Ed/Balance, OT Sensory Int Techniques, OT Therapeutic Activity, OT Evaluation and OT Therapeutic Exercise                Problem: OT Long Term Goals     Dates: Start: 07/19/18       Goal: LTG-By discharge, patient will complete basic self care tasks     Dates: Start: 07/19/18       Description: 1) Individualized Goal:  With mod I using AE/AD/techniques as needed.  2) Interventions:  OT E Stim Attended, OT Group Therapy, OT Self Care/ADL, OT Cognitive Skill Dev, OT Community Reintegration, OT Manual Ther Technique, OT Neuro Re-Ed/Balance, OT Sensory Int Techniques, OT Therapeutic Activity, OT Evaluation and OT Therapeutic Exercise           Goal: LTG-By discharge, patient will perform bathroom transfers     Dates: Start: 07/19/18       Description: 1) Individualized Goal:  With mod I using AE/AD/techniques as needed.  2) Interventions:  OT E Stim Attended, OT Group Therapy, OT Self Care/ADL, OT Cognitive Skill Dev, OT Community Reintegration, OT Manual Ther Technique, OT Neuro Re-Ed/Balance, OT Sensory Int Techniques, OT Therapeutic Activity, OT Evaluation and OT Therapeutic Exercise           Goal: LTG-By discharge, patient will complete basic home management     Dates: Start: 07/19/18       Description: 1) Individualized Goal:  With mod I using AE/AD/techniques as needed.  2) Interventions:  OT E Stim Attended, OT Group Therapy, OT Self Care/ADL, OT Cognitive Skill Dev, OT Community Reintegration, OT Manual Ther Technique, OT Neuro Re-Ed/Balance, OT Sensory Int Techniques, OT Therapeutic Activity, OT Evaluation and OT Therapeutic Exercise             Problem: Toileting     Dates: Start: 07/19/18       Goal: STG-Within one week, patient will complete toileting tasks     Dates: Start: 07/19/18       Description: 1) Individualized Goal:  With max assist using  AE/AD/techniques as needed.  2) Interventions:  OT E Stim Attended, OT Group Therapy, OT Self Care/ADL, OT Cognitive Skill Dev, OT Community Reintegration, OT Manual Ther Technique, OT Neuro Re-Ed/Balance, OT Sensory Int Techniques, OT Therapeutic Activity, OT Evaluation and OT Therapeutic Exercise     Note:     Goal Note filed on 07/23/18 1214 by Chau Ordaz MS,OTR/L    Goal: STG-Within one week, patient will complete toileting tasks  Outcome: NOT MET  Total assist                      Section completed by:  Chau Ordaz MS,OTR/L

## 2018-07-23 NOTE — REHAB-PT IDT TEAM NOTE
Physical Therapy   Mobility  Bed mobility:  Min A   Bed /Chair/Wheelchair Transfer Initial:  1 - Total Assistance  Bed /Chair/Wheelchair Transfer Current:  4 - Minimal Assistance   Bed/Chair/Wheelchair Transfer Description:   (extra time, squat pivot transfer w/c to and form mat table. MIn A for sit to supine )  Walk Initial:  0 - Not tested,unsafe activity  Walk Current:  1 - Total Assistance   Walk Description:  Two hand rails, Requires wheelchair follow (10 ft in // bars mod A of one person for safety security of RLE , w/c follow for safety of second person. Pt has 10/10 pain following)  Wheelchair Initial:  1 - Total Assistance  Wheelchair Current:  2 - Max Assistance   Wheelchair Description:  Extra time, Supervision for safety, Verbal cueing (50 ft using UEs, SBA)  Stairs Initial:  0 - Not tested,unsafe activity  Stairs Current: 0 - Not tested,unsafe activity   Stairs Description:    Patient/Family Training/Education:  To be determined   DME/DC Recommendations:  To be determined   Strengths:  Willingly participates in therapeutic activities  Barriers:   Pain poorly managed  # of short term goals set= 4  # of short term goals met= 1         Physical Therapy Problems           Problem: Mobility     Dates: Start: 07/19/18       Goal: STG-Within one week, patient will ambulate household distance     Dates: Start: 07/19/18       Description: 1) Individualized goal: Pt will AMB 10ft x 1 with LRAD and Manuel.  2) Interventions:  PT Group Therapy, PT Orthotics Training, PT Gait Training, PT Therapeutic Exercises, PT Neuro Re-Ed/Balance, PT Therapeutic Activity, PT Manual Therapy and PT Evaluation       Note:     Goal Note filed on 07/23/18 1353 by Sanam Rome DPT    Goal: STG-Within one week, patient will ambulate household distance  Outcome: PROGRESSING SLOWER THAN EXPECTED  Pt ambulates in // bars 10ft one person mod A for fall prevention and w/c   follow by second person =( total assistance), pt has 10/10 pain  with   ambulation                   Problem: Mobility Transfers     Dates: Start: 07/19/18       Goal: STG-Within one week, patient will perform bed mobility     Dates: Start: 07/19/18       Description: 1) Individualized goal: Pt will perform all bed mobility with SPV  2) Interventions:  PT Group Therapy, PT Orthotics Training, PT Gait Training, PT Therapeutic Exercises, PT Neuro Re-Ed/Balance, PT Therapeutic Activity, PT Manual Therapy and PT Evaluation       Note:     Goal Note filed on 07/23/18 1353 by Sanam Rome DPT    Goal: STG-Within one week, patient will perform bed mobility  Outcome: PROGRESSING SLOWER THAN EXPECTED  Min A for bed mobility due to pain.                Goal: STG-Within one week, patient will sit to stand     Dates: Start: 07/19/18       Description: 1) Individualized goal:  Pt with STS with LRAD and maxA x 1  2) Interventions:  PT Group Therapy, PT Orthotics Training, PT Gait Training, PT Therapeutic Exercises, PT Neuro Re-Ed/Balance, PT Therapeutic Activity, PT Manual Therapy and PT Evaluation       Note:     Goal Note filed on 07/23/18 1353 by Sanam Rome DPT    Goal: STG-Within one week, patient will sit to stand  Outcome: PROGRESSING AS EXPECTED  Sit to  // bars max a of one person. Unable to perform more than 1   rep with max A of one. Requires two person assist for additional trials of   sit to stand.                   Problem: PT-Long Term Goals     Dates: Start: 07/19/18       Goal: LTG-By discharge, patient will ambulate     Dates: Start: 07/19/18       Description: 1) Individualized goal:  Pt will AMB 150ft x1 with LRAD , mod I  2) Interventions:  PT Group Therapy, PT Orthotics Training, PT Gait Training, PT Therapeutic Exercises, PT Neuro Re-Ed/Balance, PT Therapeutic Activity, PT Manual Therapy and PT Evaluation             Goal: LTG-By discharge, patient will perform home exercise program     Dates: Start: 07/19/18       Description: 1) Individualized goal: Pt  will perform HEP for B LE strengthening to maintain the benefits obtained in PT, mod I  2) Interventions:  PT Group Therapy, PT Orthotics Training, PT Gait Training, PT Therapeutic Exercises, PT Neuro Re-Ed/Balance, PT Therapeutic Activity, PT Manual Therapy and PT Evaluation             Goal: LTG-By discharge, patient will ambulate up/down 4-6 stairs     Dates: Start: 07/19/18       Description: 1) Individualized goal: Pt will AMB up/down 4 stairs with unilateral HR to mimic home environment, mod I  2) Interventions:  PT Group Therapy, PT Orthotics Training, PT Gait Training, PT Therapeutic Exercises, PT Neuro Re-Ed/Balance, PT Therapeutic Activity, PT Manual Therapy and PT Evaluation             Goal: LTG-By discharge, patient will transfer in/out of a car     Dates: Start: 07/19/18       Description: 1) Individualized goal:  Pt will transfer in/out of a car with LRAD, mod I  2) Interventions:  PT Group Therapy, PT Orthotics Training, PT Gait Training, PT Therapeutic Exercises, PT Neuro Re-Ed/Balance, PT Therapeutic Activity, PT Manual Therapy and PT Evaluation                     Section completed by:  Sanam Rome DPT

## 2018-07-23 NOTE — REHAB-RESPIRATORY IDT TEAM NOTE
Respiratory Therapy   Respiratory Therapy    Pt is on RA 24/7 with SATS >92%  Section completed by:  Jessica Schmitz, RRT

## 2018-07-23 NOTE — CARE PLAN
Problem: Safety  Goal: Will remain free from injury  Call light with in reach. Redirection to use call light for assistance. Non skid socks. Upper siderails up x2 while in bed.    Problem: Pain Management  Goal: Pain level will decrease to patient's comfort goal  Complains of neck pain, repositioned and as needed medication given with relief. Educate patient of non-pharmacological comfort measures: repositioning, relaxation/breathing technique, cold compress and activities. No respiratory distress. Continues ABT, no adverse reaction. Will continue to monitor.

## 2018-07-23 NOTE — CARE PLAN
Problem: Mobility  Goal: STG-Within one week, patient will ambulate household distance  1) Individualized goal: Pt will AMB 10ft x 1 with LRAD and Manuel.  2) Interventions:  PT Group Therapy, PT Orthotics Training, PT Gait Training, PT Therapeutic Exercises, PT Neuro Re-Ed/Balance, PT Therapeutic Activity, PT Manual Therapy and PT Evaluation     Outcome: PROGRESSING SLOWER THAN EXPECTED  Pt ambulates in // bars 10ft one person mod A for fall prevention and w/c follow by second person =( total assistance), pt has 10/10 pain with ambulation     Problem: Mobility Transfers  Goal: STG-Within one week, patient will perform bed mobility  1) Individualized goal: Pt will perform all bed mobility with SPV  2) Interventions:  PT Group Therapy, PT Orthotics Training, PT Gait Training, PT Therapeutic Exercises, PT Neuro Re-Ed/Balance, PT Therapeutic Activity, PT Manual Therapy and PT Evaluation     Outcome: PROGRESSING SLOWER THAN EXPECTED  Min A for bed mobility due to pain.  Goal: STG-Within one week, patient will sit to stand  1) Individualized goal:  Pt with STS with LRAD and maxA x 1  2) Interventions:  PT Group Therapy, PT Orthotics Training, PT Gait Training, PT Therapeutic Exercises, PT Neuro Re-Ed/Balance, PT Therapeutic Activity, PT Manual Therapy and PT Evaluation     Outcome: PROGRESSING AS EXPECTED  Sit to  // bars max a of one person. Unable to perform more than 1 rep with max A of one. Requires two person assist for additional trials of sit to stand.   Goal: STG-Within one week, patient will transfer bed to chair  1) Individualized goal:  Pt will transfer bed to chair with Manuel x 1  2) Interventions:  PT Group Therapy, PT Orthotics Training, PT Gait Training, PT Therapeutic Exercises, PT Neuro Re-Ed/Balance, PT Therapeutic Activity, PT Manual Therapy and PT Evaluation     Outcome: MET Date Met: 07/23/18  w/c to and form mat table CGA extra time to complete

## 2018-07-23 NOTE — REHAB-COLLABORATIVE ONGOING IDT TEAM NOTE
Weekly Interdisciplinary Team Conference Note    Weekly Interdisciplinary Team Conference # 1  Date:  7/23/2018    Clinicians present and reporting at team conference include the following:   MD: Higinio Pagan MD   RN:  Jenni Frias RN   PT:   Sanam Rome PT, DPT  OT:  Chau Ordaz, MS, OTR/L   ST:  Not Applicable  CM:  Hafsa Ayoub RN, [facility]  REC:  None  RT:  None  RPh:  Chris Kelley [facility]  Other:   None  All reporting clinicians have a working knowledge of this patient's plan of care.    Targeted DC Date:  8/7/18     Medical    Patient Active Problem List    Diagnosis Date Noted   • Left knee pain 08/28/2016     Priority: High   • Diabetic polyneuropathy ([facility]) 05/06/2015     Priority: High   • Toe amputation status ([facility]) 05/06/2015     Priority: High   • HTN (hypertension) 08/16/2010     Priority: Medium   • CVA (cerebral vascular accident) ([facility]) 06/04/2009     Priority: Medium   • Diabetes mellitus with neurological manifestations, controlled ([facility]) 03/05/2012     Priority: Low   • Impaired activities of daily living 07/18/2018   • Cervical myelopathy ([facility]) 07/18/2018   • Lumbar stenosis with neurogenic claudication 07/18/2018   • Pain 07/18/2018   • Persistent proteinuria 10/13/2017   • BMI 35.0-35.9,adult 09/22/2017   • CKD (chronic kidney disease), stage I 05/12/2017   • Diabetic nephropathy associated with type 2 diabetes mellitus ([facility]) 05/12/2017   • Chronic use of opiate drugs therapeutic purposes 02/14/2017   • Other orthopedic aftercare 11/07/2016   • Cervical stenosis of spine 09/06/2016   • Dysphagia 09/06/2016   • Chronic atrial fibrillation (HCC) 09/06/2016   • Hallucinations 09/06/2016   • JANNIE treated with BiPAP 04/18/2016   • History of CVA (cerebrovascular accident) 11/11/2015   • Atrial fibrillation [427.31] 06/24/2015   • Risk for falls 10/27/2014   • Myalgia 05/29/2014   • BPH (benign prostatic hyperplasia) 04/28/2014   • Anticoagulated on warfarin 04/28/2014   • Chronic antibiotic  suppression 04/28/2014   • MEDICAL HOME    • Class 1 obesity in adult 06/17/2011   • Ventricular ectopy 06/17/2011   • Trigger finger 06/17/2011   • Thyroid mass 07/30/2009   • Dyslipidemia 06/17/2009     Results     Procedure Component Value Ref Range Date/Time    ACCU-CHEK GLUCOSE [[id number]]  (Abnormal) Collected:  07/23/18 1112    Order Status:  Completed Lab Status:  Final result Updated:  07/23/18 1143     Glucose - Accu-Ck 138 (H) 65 - 99 mg/dL     ACCU-CHEK GLUCOSE [[id number]]  (Abnormal) Collected:  07/23/18 0757    Order Status:  Completed Lab Status:  Final result Updated:  07/23/18 0810     Glucose - Accu-Ck 108 (H) 65 - 99 mg/dL     ACCU-CHEK GLUCOSE [[id number]]  (Abnormal) Collected:  07/22/18 2002    Order Status:  Completed Lab Status:  Final result Updated:  07/22/18 2022     Glucose - Accu-Ck 189 (H) 65 - 99 mg/dL      Comment: Followed orders  Followed Dr orders         ACCU-CHEK GLUCOSE [[id number]]  (Abnormal) Collected:  07/22/18 1715    Order Status:  Completed Lab Status:  Final result Updated:  07/22/18 1806     Glucose - Accu-Ck 207 (H) 65 - 99 mg/dL      Comment: Followed orders  Coverage given                Nursing  Diet/Nutrition:  Diabetic and Thin Liquids  Medication Administration:  Whole with Liquid Wash  % consumed at meals in last 24 hours:  Consumed % of meals per documentation.  Meal/Snack Consumption for the past 24 hrs:   Oral Nutrition   07/22/18 1800 Dinner;Between % Consumed   07/22/18 1200 Lunch;Between % Consumed   07/22/18 0900 Breakfast;Between % Consumed       Snack schedule:  None    Appetite:  Excellent  Fluid Intake/Output in past 24 hours: In: 1270 [P.O.:1270]  Out: 1650   Admit Weight:  Weight: 114.3 kg (252 lb)  Weight Last 7 Days   [114.3 kg (252 lb)-114.6 kg (252 lb 10.4 oz)] 114.6 kg (252 lb 10.4 oz) (07/22 1000)    Pain Issues:    Location:  Head;Neck;Back (07/22 2004)  Posterior;Right;Left;Lower (07/22 2004)         Severity:  neither  Moderate nor Severe   Scheduled pain medications:  gabapentin (NEURONTIN)      PRN pain medications used in last 24 hours:  oxycodone/acetaminophen (PERCOCET)    Non Pharmacologic Interventions:  warm blanket, repositioned and rest  Effectiveness of pain management plan:  good=patient states acceptable comfort after interventions    Bowel:    Bowel Assist Initial Score:  1 - Total Assistance  Bowel Assist Current Score:  1 - Total Assistance  Bowl Accidents in last 7 days:     Last bowel movement: 07/22/18  Stool Description: Large, Soft     Usual bowel pattern:  every third day  Scheduled bowel medications:  docusate sodium (COLACE)  PRN bowel medications used in last 24 hours:  magnesium hydroxide (MILK OF MAGNESIA)   Nursing Interventions:  Increased time, PRN medication, Scheduled medication, Supervision, Verbal cueing, Set-up, Brief, Positioning on commode/toilet  Effectiveness of bowel program:   fair=sometimes needs prn bowel meds for constipation  Bladder:    Bladder Assist Initial Score:  1 - Total Assistance  Bladder Assist Current Score:  1 - Total Assistance  Bladder Accidents in last 7 days:  3  PVR range for past 24-48 hours: -- ()  Intermittent Catheterization:  n/a  Medications affecting bladder:  None    Time void schedule/voiding pattern:  Voiding every 4-6 hours  Interventions:  Increased time, Verbal cueing, Supervision, Emptying of device, Urinal, Brief  Effectiveness of bladder training:  Good=regular, predictable, emptying of bladder, patient initiates time voiding    Gonzalez:    Type:     Patient Lines/Drains/Airways Status    Active Catheter     None              Date placed:          Justification:    Diabetes:  Blood Sugar Frequency:  Before meals and at bedtime    Range of BS for last 48 hours:   Recent Labs      07/21/18   0739  07/21/18   1122  07/21/18   1714  07/21/18   1958  07/22/18   0800  07/22/18   1126  07/22/18   1715  07/22/18 2002   POCGLUCOSE  131*  284*  154*  244*  110*   151*  207*  189*     Scheduled diabetic medications:  metformin (GLUCOPHAGE) , insulin glargine (LANTUS) injection  and insulin lispro (HUMALOG) injection   Sliding scale usage in past 24 hours:  Yes  Total Short acting insulin in the past 24 hours:  Humalog 5  Total Long acting insulin in the past 24 hours:  Lantus 35    Wound:         Patient Lines/Drains/Airways Status    Active Current Wounds     Name: Placement date: Placement time: Site: Days:    Surgical Incision  Incision Cervical Posterior 07/13/18   1555      9    Surgical Incision  Incision Back 07/13/18   1748      9                   Interventions:  Monitor every shift.  Effectiveness of intervention:  wound is unchanged     Wound Vac Location:  Not applicable  Dressing last changed:  Not applicable  Pump Mode Pressure Setting       Description of drainage:  Not applicable    Sleep/wake cycle:   Average hours slept:  Sleeps 4-6 hours without waking  Sleep medication usage:  None    Patient/Family Training/Education:  Diabetes Management, Fall Prevention, General Self Care, Pain Management, Safe Transfers, Safety, Wound Care and Other:  cervical and lumbar precautions  Discharge Supply Recommendations:  Glucometer and Strips, Blood Pressure Monitor and Wound Care Supplies  Strengths: Alert and oriented and Supportive family   Barriers:   Generalized weakness and Hypertension            Nursing Problems           Problem: Bowel/Gastric:     Goal: Normal bowel function is maintained or improved           Goal: Will not experience complications related to bowel motility             Problem: Communication     Goal: The ability to communicate needs accurately and effectively will improve             Problem: Discharge Barriers/Planning     Goal: Patient's continuum of care needs will be met             Problem: GLYCEMIA IMBALANCE     Goal: Clinical indication of glycemia balance is achieved             Problem: Infection     Goal: Will remain free from  infection             Problem: Knowledge Deficit     Goal: Knowledge of disease process/condition, treatment plan, diagnostic tests, and medications will improve           Goal: Knowledge of the prescribed therapeutic regimen will improve             Problem: Pain Management     Goal: Pain level will decrease to patient's comfort goal             Problem: Psychosocial Needs:     Goal: Level of anxiety will decrease             Problem: Respiratory:     Goal: Respiratory status will improve             Problem: Safety     Goal: Will remain free from injury           Goal: Will remain free from falls             Problem: Skin Integrity     Goal: Risk for impaired skin integrity will decrease             Problem: Venous Thromboembolism (VTW)/Deep Vein Thrombosis (DVT) Prevention:     Goal: Patient will participate in Venous Thrombosis (VTE)/Deep Vein Thrombosis (DVT)Prevention Measures                  Long Term Goals:   At discharge patient will be able to function safely at home and in the community with support.    Section completed by:  Lupe Cortés R.N.           Respiratory Therapy    Pt is on RA 24/7 with SATS >92%  Section completed by:  Jessica Schmitz, RRT       Mobility  Bed mobility:  Min A   Bed /Chair/Wheelchair Transfer Initial:  1 - Total Assistance  Bed /Chair/Wheelchair Transfer Current:  4 - Minimal Assistance   Bed/Chair/Wheelchair Transfer Description:   (extra time, squat pivot transfer w/c to and form mat table. MIn A for sit to supine )  Walk Initial:  0 - Not tested,unsafe activity  Walk Current:  1 - Total Assistance   Walk Description:  Two hand rails, Requires wheelchair follow (10 ft in // bars mod A of one person for safety security of RLE , w/c follow for safety of second person. Pt has 10/10 pain following)  Wheelchair Initial:  1 - Total Assistance  Wheelchair Current:  2 - Max Assistance   Wheelchair Description:  Extra time, Supervision for safety, Verbal cueing (50 ft using  UEs, SBA)  Stairs Initial:  0 - Not tested,unsafe activity  Stairs Current: 0 - Not tested,unsafe activity   Stairs Description:    Patient/Family Training/Education:  To be determined   DME/DC Recommendations:  To be determined   Strengths:  Willingly participates in therapeutic activities  Barriers:   Pain poorly managed  # of short term goals set= 4  # of short term goals met= 1         Physical Therapy Problems           Problem: Mobility     Dates: Start: 07/19/18       Goal: STG-Within one week, patient will ambulate household distance     Dates: Start: 07/19/18       Description: 1) Individualized goal: Pt will AMB 10ft x 1 with LRAD and Manuel.  2) Interventions:  PT Group Therapy, PT Orthotics Training, PT Gait Training, PT Therapeutic Exercises, PT Neuro Re-Ed/Balance, PT Therapeutic Activity, PT Manual Therapy and PT Evaluation       Note:     Goal Note filed on 07/23/18 1353 by Sanam Rome DPT    Goal: STG-Within one week, patient will ambulate household distance  Outcome: PROGRESSING SLOWER THAN EXPECTED  Pt ambulates in // bars 10ft one person mod A for fall prevention and w/c   follow by second person =( total assistance), pt has 10/10 pain with   ambulation                   Problem: Mobility Transfers     Dates: Start: 07/19/18       Goal: STG-Within one week, patient will perform bed mobility     Dates: Start: 07/19/18       Description: 1) Individualized goal: Pt will perform all bed mobility with SPV  2) Interventions:  PT Group Therapy, PT Orthotics Training, PT Gait Training, PT Therapeutic Exercises, PT Neuro Re-Ed/Balance, PT Therapeutic Activity, PT Manual Therapy and PT Evaluation       Note:     Goal Note filed on 07/23/18 1353 by Sanam Rome DPT    Goal: STG-Within one week, patient will perform bed mobility  Outcome: PROGRESSING SLOWER THAN EXPECTED  Min A for bed mobility due to pain.                Goal: STG-Within one week, patient will sit to stand     Dates: Start: 07/19/18        Description: 1) Individualized goal:  Pt with STS with LRAD and maxA x 1  2) Interventions:  PT Group Therapy, PT Orthotics Training, PT Gait Training, PT Therapeutic Exercises, PT Neuro Re-Ed/Balance, PT Therapeutic Activity, PT Manual Therapy and PT Evaluation       Note:     Goal Note filed on 07/23/18 8913 by Sanam Rome DPT    Goal: STG-Within one week, patient will sit to stand  Outcome: PROGRESSING AS EXPECTED  Sit to  // bars max a of one person. Unable to perform more than 1   rep with max A of one. Requires two person assist for additional trials of   sit to stand.                   Problem: PT-Long Term Goals     Dates: Start: 07/19/18       Goal: LTG-By discharge, patient will ambulate     Dates: Start: 07/19/18       Description: 1) Individualized goal:  Pt will AMB 150ft x1 with LRAD , mod I  2) Interventions:  PT Group Therapy, PT Orthotics Training, PT Gait Training, PT Therapeutic Exercises, PT Neuro Re-Ed/Balance, PT Therapeutic Activity, PT Manual Therapy and PT Evaluation             Goal: LTG-By discharge, patient will perform home exercise program     Dates: Start: 07/19/18       Description: 1) Individualized goal: Pt will perform HEP for B LE strengthening to maintain the benefits obtained in PT, mod I  2) Interventions:  PT Group Therapy, PT Orthotics Training, PT Gait Training, PT Therapeutic Exercises, PT Neuro Re-Ed/Balance, PT Therapeutic Activity, PT Manual Therapy and PT Evaluation             Goal: LTG-By discharge, patient will ambulate up/down 4-6 stairs     Dates: Start: 07/19/18       Description: 1) Individualized goal: Pt will AMB up/down 4 stairs with unilateral HR to mimic home environment, mod I  2) Interventions:  PT Group Therapy, PT Orthotics Training, PT Gait Training, PT Therapeutic Exercises, PT Neuro Re-Ed/Balance, PT Therapeutic Activity, PT Manual Therapy and PT Evaluation             Goal: LTG-By discharge, patient will transfer in/out of a car      Dates: Start: 07/19/18       Description: 1) Individualized goal:  Pt will transfer in/out of a car with LRAD, mod I  2) Interventions:  PT Group Therapy, PT Orthotics Training, PT Gait Training, PT Therapeutic Exercises, PT Neuro Re-Ed/Balance, PT Therapeutic Activity, PT Manual Therapy and PT Evaluation                     Section completed by:  Sanam Rome DPT       Activities of Daily Living  Eating Initial:  3 - Moderate Assistance  Eating Current:  5 - Standby Prompting/Supervision or Set-up   Eating Description:  Set-up of equipment or meal/tube feeding  Grooming Initial:  5 - Standby Prompting/Supervision or Set-up  Grooming Current:  5 - Standby Prompting/Supervision or Set-up   Grooming Description:  Verbal cueing, Set-up of equipment (setup)  Bathing Initial:  0 - Not tested,unsafe activity  Bathing Current:  4 - Minimal Assistance   Bathing Description:  Grab bar, Tub bench, Hand held shower, Supervision for safety, Verbal cueing, Set-up of equipment, Initial preparation for task (assist w/ 1/10 tasks)  Upper Body Dressing Initial:  0 - Not tested, patient refused  Upper Body Dressing Current:  4 - Minimal Assistance   Upper Body Dressing Description:   (assist w/ 1/8 tasks to don/doff pullover)  Lower Body Dressing Initial:  0 - Not tested, patient refused  Lower Body Dressing Current:  4 - Minimal Assistance   Lower Body Dressing Description:  4 - Minimal Assistance  Toileting Initial:  1 - Total Assistance  Toileting Current:  1 - Total Assistance   Toileting Description:  Grab bar, Increased time, Supervision for safety, Verbal cueing, Requires 2 people to assist  Toilet Transfer Initial:  0 - Not tested, patient refused  Toilet Transfer Current:  1 - Total Assistance   Toilet Transfer Description:  1 - Total Assistance  Tub / Shower Transfer Initial:  0 - Not tested, patient refused  Tub / Shower Transfer Current:  4 - Minimal Assistance   Tub / Shower Transfer Description:  Adaptive equipment,  Supervision for safety, Verbal cueing, Set-up of equipment, Initial preparation for task (CGA lateral scoot w/c <> shower bench)  IADL:  Not yet addressed  Family Training/Education:  None  DME/DC Recommendations:  Recommend a ramp, possible wheelchair, tub bench for home (has a shower chair now), grab bars in shower and by toilets, pt states he has a raised toilet seat without arms    Strengths:  Alert and oriented, Effective communication skills, Pleasant and cooperative and Willingly participates in therapeutic activities  Barriers:  Decreased endurance, Generalized weakness, Limited mobility, Poor activity tolerance, Poor balance, Poor carryover of learning and Other: pain complaints in neck, lower back, right hip and hands; doesn't follow spinal precautions well; impaired standing tolerance/balance, impaired sensation bilateral hands (R worse than L), lives alone with limited social support, 4 steps to enter mobile home    # of short term goals set= 3    # of short term goals met= 2          Occupational Therapy Goals           Problem: Bathing     Dates: Start: 07/19/18       Goal: STG-Within one week, patient will bathe     Dates: Start: 07/23/18       Description: 1) Individualized Goal: Body with CGA using AE/AD/techniques as needed.  2) Interventions: OT E Stim Attended, OT Group Therapy, OT Self Care/ADL, OT Cognitive Skill Dev, OT Community Reintegration, OT Manual Ther Technique, OT Neuro Re-Ed/Balance, OT Sensory Int Techniques, OT Therapeutic Activity, OT Evaluation and OT Therapeutic Exercise               Problem: Dressing     Dates: Start: 07/19/18       Goal: STG-Within one week, patient will dress LB     Dates: Start: 07/23/18       Description: 1) Individualized Goal: With CGA using AE/AD/techniques as needed.  2) Interventions: OT E Stim Attended, OT Group Therapy, OT Self Care/ADL, OT Cognitive Skill Dev, OT Community Reintegration, OT Manual Ther Technique, OT Neuro Re-Ed/Balance, OT Sensory  Int Techniques, OT Therapeutic Activity, OT Evaluation and OT Therapeutic Exercise                Problem: Functional Transfers     Dates: Start: 07/23/18       Goal: STG-Within one week, patient will transfer to toilet     Dates: Start: 07/23/18       Description: 1) Individualized Goal: With max A using AE/AD/techniques as needed.  2) Interventions: OT E Stim Attended, OT Group Therapy, OT Self Care/ADL, OT Cognitive Skill Dev, OT Community Reintegration, OT Manual Ther Technique, OT Neuro Re-Ed/Balance, OT Sensory Int Techniques, OT Therapeutic Activity, OT Evaluation and OT Therapeutic Exercise                Problem: OT Long Term Goals     Dates: Start: 07/19/18       Goal: LTG-By discharge, patient will complete basic self care tasks     Dates: Start: 07/19/18       Description: 1) Individualized Goal:  With mod I using AE/AD/techniques as needed.  2) Interventions:  OT E Stim Attended, OT Group Therapy, OT Self Care/ADL, OT Cognitive Skill Dev, OT Community Reintegration, OT Manual Ther Technique, OT Neuro Re-Ed/Balance, OT Sensory Int Techniques, OT Therapeutic Activity, OT Evaluation and OT Therapeutic Exercise           Goal: LTG-By discharge, patient will perform bathroom transfers     Dates: Start: 07/19/18       Description: 1) Individualized Goal:  With mod I using AE/AD/techniques as needed.  2) Interventions:  OT E Stim Attended, OT Group Therapy, OT Self Care/ADL, OT Cognitive Skill Dev, OT Community Reintegration, OT Manual Ther Technique, OT Neuro Re-Ed/Balance, OT Sensory Int Techniques, OT Therapeutic Activity, OT Evaluation and OT Therapeutic Exercise           Goal: LTG-By discharge, patient will complete basic home management     Dates: Start: 07/19/18       Description: 1) Individualized Goal:  With mod I using AE/AD/techniques as needed.  2) Interventions:  OT E Stim Attended, OT Group Therapy, OT Self Care/ADL, OT Cognitive Skill Dev, OT Community Reintegration, OT Manual Ther Technique,  OT Neuro Re-Ed/Balance, OT Sensory Int Techniques, OT Therapeutic Activity, OT Evaluation and OT Therapeutic Exercise             Problem: Toileting     Dates: Start: 07/19/18       Goal: STG-Within one week, patient will complete toileting tasks     Dates: Start: 07/19/18       Description: 1) Individualized Goal:  With max assist using AE/AD/techniques as needed.  2) Interventions:  OT E Stim Attended, OT Group Therapy, OT Self Care/ADL, OT Cognitive Skill Dev, OT Community Reintegration, OT Manual Ther Technique, OT Neuro Re-Ed/Balance, OT Sensory Int Techniques, OT Therapeutic Activity, OT Evaluation and OT Therapeutic Exercise     Note:     Goal Note filed on 07/23/18 1214 by Chau Ordaz MS,OTR/L    Goal: STG-Within one week, patient will complete toileting tasks  Outcome: NOT MET  Total assist                      Section completed by:  Chau Ordaz MS,OTR/L             REHAB-Pharmacy IDT Team Note by Chris Kelley RPH at 7/20/2018  5:21 PM  Version 1 of 1    Author:  Chris Kelley RPH Service:  (none) Author Type:  Pharmacist    Filed:  7/20/2018  5:22 PM Date of Service:  7/20/2018  5:21 PM Status:  Signed    :  Chris Kelley RPH (Pharmacist)         Pharmacy   Pharmacy  Antibiotics/Antifungals/Antivirals:  Medication:      Active Orders     Ordered     Ordering Provider       Wed Jul 18, 2018  2:28 PM    07/18/18 1428  clindamycin (CLEOCIN) capsule 300 mg  2 TIMES DAILY      Higinio Pagan M.D.        Route:        po  Stop Date:  none  Reason: chronic left knee/left shoulder staph infection  Antihypertensives/Cardiac:  Medication:    Orders (72h ago through future)    Start     Ordered    07/25/18 0900  cloNIDine (CATAPRES) 0.3 MG/24HR 1 Patch  EVERY WEDNESDAY 07/18/18 1428    07/20/18 0900  lisinopril (PRINIVIL) tablet 40 mg  DAILY      07/19/18 1613    07/19/18 0900  hydroCHLOROthiazide (HYDRODIURIL) tablet 25 mg  DAILY      07/18/18 1428    07/19/18 0600   amLODIPine (NORVASC) tablet 10 mg  Q DAY      07/18/18 1427    07/18/18 1500  hydrALAZINE (APRESOLINE) tablet 100 mg  3 TIMES DAILY      07/18/18 1428    07/18/18 1445  lisinopril (PRINIVIL) tablet 40 mg  2 TIMES DAILY,   Status:  Discontinued      07/18/18 1428    07/18/18 1428  hydrALAZINE (APRESOLINE) tablet 25 mg  EVERY 8 HOURS PRN      07/18/18 1428        Patient Vitals for the past 24 hrs:   BP Pulse   07/20/18 1500 104/64 72   07/20/18 1105 - 71   07/20/18 0700 114/71 76   07/19/18 1900 102/60 72       Anticoagulation:  Medication: Lovenox    Other key medications: A review of the medication list reveals no issues at this time. Patient is currently on antihypertensive(s). Recommend home blood pressure monitoring/recording if antihypertensive(s) regimen(s) continue. Patient is currently on diabetic medication(s) and/or Insulin(s). Recommend home blood glucose monitoring/recording if these regimen(s) continue.    Section completed by: Chris Kelley [facility][AW.1]     Attribution Key     AW.1 - Chris Kelley [facility] on 7/20/2018  5:21 PM                    DC Planning  DC destination/dispostion:  Patient lives alone in a single level home with  4 entry stairs.    DC Needs: Patient has a fww and shower bench.  He also has his own C Pap machine and does not use oxygen with this. He is followed at [facility] for his anticoagulation management.  I will follow his progress for home health therapy versus outpatient.    Barriers to discharge:   Lives alone and his sons are working    Strengths: he is motivated to get better.    Section completed by:  Hafsa Ayoub R.N.      Physician Summary  Higinio Pagan MD participated and led team conference discussion.
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
## 2018-07-24 LAB
GLUCOSE BLD-MCNC: 102 MG/DL (ref 65–99)
GLUCOSE BLD-MCNC: 151 MG/DL (ref 65–99)
GLUCOSE BLD-MCNC: 177 MG/DL (ref 65–99)
GLUCOSE BLD-MCNC: 278 MG/DL (ref 65–99)

## 2018-07-24 PROCEDURE — 700102 HCHG RX REV CODE 250 W/ 637 OVERRIDE(OP): Performed by: PHYSICAL MEDICINE & REHABILITATION

## 2018-07-24 PROCEDURE — 99232 SBSQ HOSP IP/OBS MODERATE 35: CPT | Performed by: HOSPITALIST

## 2018-07-24 PROCEDURE — 97110 THERAPEUTIC EXERCISES: CPT

## 2018-07-24 PROCEDURE — 97530 THERAPEUTIC ACTIVITIES: CPT

## 2018-07-24 PROCEDURE — 700102 HCHG RX REV CODE 250 W/ 637 OVERRIDE(OP): Performed by: HOSPITALIST

## 2018-07-24 PROCEDURE — 82962 GLUCOSE BLOOD TEST: CPT

## 2018-07-24 PROCEDURE — A9270 NON-COVERED ITEM OR SERVICE: HCPCS | Performed by: HOSPITALIST

## 2018-07-24 PROCEDURE — 97112 NEUROMUSCULAR REEDUCATION: CPT

## 2018-07-24 PROCEDURE — 99232 SBSQ HOSP IP/OBS MODERATE 35: CPT | Performed by: PHYSICAL MEDICINE & REHABILITATION

## 2018-07-24 PROCEDURE — A9270 NON-COVERED ITEM OR SERVICE: HCPCS | Performed by: PHYSICAL MEDICINE & REHABILITATION

## 2018-07-24 PROCEDURE — 770010 HCHG ROOM/CARE - REHAB SEMI PRIVAT*

## 2018-07-24 PROCEDURE — 94760 N-INVAS EAR/PLS OXIMETRY 1: CPT

## 2018-07-24 PROCEDURE — 700112 HCHG RX REV CODE 229: Performed by: PHYSICAL MEDICINE & REHABILITATION

## 2018-07-24 PROCEDURE — 700111 HCHG RX REV CODE 636 W/ 250 OVERRIDE (IP): Performed by: PHYSICAL MEDICINE & REHABILITATION

## 2018-07-24 PROCEDURE — 97116 GAIT TRAINING THERAPY: CPT

## 2018-07-24 RX ORDER — L. ACIDOPHILUS/L.BULGARICUS 100MM CELL
1 GRANULES IN PACKET (EA) ORAL
Status: DISCONTINUED | OUTPATIENT
Start: 2018-07-24 | End: 2018-08-10

## 2018-07-24 RX ORDER — OXYCODONE HYDROCHLORIDE AND ACETAMINOPHEN 5; 325 MG/1; MG/1
1 TABLET ORAL EVERY 6 HOURS PRN
Status: DISCONTINUED | OUTPATIENT
Start: 2018-07-24 | End: 2018-08-06

## 2018-07-24 RX ADMIN — FLUTICASONE PROPIONATE 50 MCG: 50 SPRAY, METERED NASAL at 08:09

## 2018-07-24 RX ADMIN — GABAPENTIN 900 MG: 300 CAPSULE ORAL at 20:31

## 2018-07-24 RX ADMIN — DOCUSATE SODIUM 100 MG: 100 CAPSULE, LIQUID FILLED ORAL at 08:09

## 2018-07-24 RX ADMIN — HYDRALAZINE HYDROCHLORIDE 100 MG: 25 TABLET, FILM COATED ORAL at 20:32

## 2018-07-24 RX ADMIN — POTASSIUM CHLORIDE 20 MEQ: 1500 TABLET, EXTENDED RELEASE ORAL at 08:10

## 2018-07-24 RX ADMIN — INSULIN LISPRO 6 UNITS: 100 INJECTION, SOLUTION INTRAVENOUS; SUBCUTANEOUS at 20:27

## 2018-07-24 RX ADMIN — Medication 1 TABLET: at 20:32

## 2018-07-24 RX ADMIN — CHOLECALCIFEROL TAB 25 MCG (1000 UNIT) 2000 UNITS: 25 TAB at 08:10

## 2018-07-24 RX ADMIN — GABAPENTIN 900 MG: 300 CAPSULE ORAL at 14:14

## 2018-07-24 RX ADMIN — HYDRALAZINE HYDROCHLORIDE 100 MG: 25 TABLET, FILM COATED ORAL at 08:09

## 2018-07-24 RX ADMIN — METOPROLOL TARTRATE 25 MG: 25 TABLET ORAL at 16:59

## 2018-07-24 RX ADMIN — ENOXAPARIN SODIUM 40 MG: 100 INJECTION SUBCUTANEOUS at 08:11

## 2018-07-24 RX ADMIN — OXYCODONE HYDROCHLORIDE AND ACETAMINOPHEN 1 TABLET: 5; 325 TABLET ORAL at 15:13

## 2018-07-24 RX ADMIN — CLINDAMYCIN HYDROCHLORIDE 300 MG: 300 CAPSULE ORAL at 08:11

## 2018-07-24 RX ADMIN — METHOCARBAMOL 750 MG: 750 TABLET, FILM COATED ORAL at 14:15

## 2018-07-24 RX ADMIN — AMLODIPINE BESYLATE 10 MG: 5 TABLET ORAL at 05:20

## 2018-07-24 RX ADMIN — INSULIN GLARGINE 35 UNITS: 100 INJECTION, SOLUTION SUBCUTANEOUS at 20:24

## 2018-07-24 RX ADMIN — METFORMIN HYDROCHLORIDE 1000 MG: 500 TABLET, FILM COATED ORAL at 08:10

## 2018-07-24 RX ADMIN — DOCUSATE SODIUM 100 MG: 100 CAPSULE, LIQUID FILLED ORAL at 20:32

## 2018-07-24 RX ADMIN — CLINDAMYCIN HYDROCHLORIDE 300 MG: 300 CAPSULE ORAL at 20:31

## 2018-07-24 RX ADMIN — HYDRALAZINE HYDROCHLORIDE 100 MG: 25 TABLET, FILM COATED ORAL at 14:15

## 2018-07-24 RX ADMIN — GABAPENTIN 900 MG: 300 CAPSULE ORAL at 08:10

## 2018-07-24 RX ADMIN — LISINOPRIL 40 MG: 20 TABLET ORAL at 08:10

## 2018-07-24 RX ADMIN — METFORMIN HYDROCHLORIDE 1000 MG: 500 TABLET, FILM COATED ORAL at 16:59

## 2018-07-24 RX ADMIN — OXYCODONE HYDROCHLORIDE AND ACETAMINOPHEN 1 TABLET: 5; 325 TABLET ORAL at 08:38

## 2018-07-24 ASSESSMENT — PAIN SCALES - GENERAL: PAINLEVEL_OUTOF10: 10

## 2018-07-24 ASSESSMENT — ENCOUNTER SYMPTOMS
ABDOMINAL PAIN: 0
SHORTNESS OF BREATH: 0
BACK PAIN: 1
NECK PAIN: 1
CHILLS: 0
FEVER: 0
PALPITATIONS: 0
COUGH: 0
NAUSEA: 0
VOMITING: 0
EYES NEGATIVE: 1

## 2018-07-24 NOTE — DISCHARGE PLANNING
Case Management;  Met with patient and reviewed team conference discussion.  Patient confirms that he has multiple glucometers and uses insulin pen at home.  I reviewed concerns of the team that he may not be able to be home alone initially.  I asked is his sons could arrange to stay with him.  He states that they have done this before.  I have asked him to let me know if this can be worked out.  I did discuss skilled rehabs as a back up plan if he is not independent enough for home by the d/c target.  Will follow.

## 2018-07-24 NOTE — PROGRESS NOTES
Hospital Medicine Progress Note, Adult, Complex               Author: Amanda Bennett Date & Time created: 2018  4:03 PM     Interval History:  CC - HTN, DM    C/o appropriate post op pain.  No new concerns.    Review of Systems:  Review of Systems   Constitutional: Negative for chills and fever.   HENT: Negative.    Eyes: Negative.    Respiratory: Negative for cough and shortness of breath.    Cardiovascular: Negative for chest pain and palpitations.   Gastrointestinal: Negative for abdominal pain, nausea and vomiting.   Genitourinary: Negative.    Musculoskeletal: Positive for back pain and neck pain.        Wound pain   Skin: Negative.        Physical Exam:  Physical Exam   Constitutional: He is oriented to person, place, and time. No distress.   HENT:   Head: Normocephalic and atraumatic.   Right Ear: External ear normal.   Left Ear: External ear normal.   Eyes: Conjunctivae and EOM are normal. Right eye exhibits no discharge. Left eye exhibits no discharge.   Neck:   C-collar   Cardiovascular:   irreg irreg   Pulmonary/Chest: No stridor. No respiratory distress. He has no wheezes.   Decreased BS   Abdominal: Soft. Bowel sounds are normal. He exhibits no distension. There is no tenderness. There is no rebound and no guarding.   Musculoskeletal: He exhibits edema.   3+ edema RLE, 2+ LLE   Neurological: He is alert and oriented to person, place, and time.   Skin: Skin is warm and dry. He is not diaphoretic.   Vitals reviewed.      Labs:        Invalid input(s): [id number]      Recent Labs      18   0519   SODIUM  137   POTASSIUM  3.5*   CHLORIDE  101   CO2  29   BUN  31*   CREATININE  1.16   MAGNESIUM  1.8   PHOSPHORUS  3.4   CALCIUM  8.8     Recent Labs      18   0519   GLUCOSE  89     No results for input(s): RBC, HEMOGLOBIN, HEMATOCRIT, PLATELETCT, PROTHROMBTM, APTT, INR, IRON, FERRITIN, TOTIRONBC in the last 72 hours.            Hemodynamics:  Temp (24hrs), Av.4 °C (97.5 °F), Min:36.2 °C (97.2  °F), Max:36.7 °C (98 °F)  Temperature: 36.3 °C (97.4 °F)  Pulse  Av.1  Min: 58  Max: 113   Blood Pressure : 136/72     Respiratory:    Respiration: 18, Pulse Oximetry: 93 %     Work Of Breathing / Effort: Mild  RUL Breath Sounds: Clear, RML Breath Sounds: Clear, RLL Breath Sounds: Diminished, AI Breath Sounds: Clear, LLL Breath Sounds: Diminished  Fluids:    Intake/Output Summary (Last 24 hours) at 18 1603  Last data filed at 18 1400   Gross per 24 hour   Intake             1460 ml   Output             2050 ml   Net             -590 ml        GI/Nutrition:  Orders Placed This Encounter   Procedures   • Diet Order Diabetic     Standing Status:   Standing     Number of Occurrences:   1     Order Specific Question:   Diet:     Answer:   Diabetic [3]         Medical Decision Making, by Problem:  S/P CERVICAL/LUMBAR SPINE SURGERIES - cervical collar    H/O CVA    JANNIE - on home BiPAP?    AFIB - rate controlled on B-Bl, currently off anticoagulation d/t recent spine surgery    HTN - will replace Clonidine w/ Metoprolol; cont Norvasc, Lisinopril, and Hydralazine    DM - adjust SSI, cont Lantus and Metformin    CHRONIC MRSA INFXN LEFT TKA - lifelong suppression w/ Clindamycin, will add probiotic to help prevent C Dif infxn    THYROID MASS - needs outpt F/U    BLE EDEMA - consider Venous Duplex     ANEMIA - follow H/H    AZOTEMIA - off HCTZ    VIT D DEF - supplementing    ALLERGY TO STATINS        Quality-Core Measures   Reviewed items::  Medications reviewed and Labs reviewed  DVT prophylaxis pharmacological::  Enoxaparin (Lovenox)  Antibiotics:  Treating active infection/contamination beyond 24 hours perioperative coverage  Assessed for rehabilitation services:  Patient was assess for and/or received rehabilitation services during this hospitalization

## 2018-07-24 NOTE — DISCHARGE SUMMARY
DATE OF ADMISSION:  07/13/2018    DATE OF DISCHARGE:  07/18/2018    ADMITTING DIAGNOSES:  Severe lumbar stenosis with motor and sensory   radiculopathy, recalcitrant to nonoperative measures, and cervical stenosis   with myelopathy.    HISTORY OF PRESENT ILLNESS:  Please refer to the previously dictated history   and physical for complete details.  The patient is a 67-year-old patient of   Dr. Marsh who had the above findings.  Dr. Marsh discussed surgery and the   risks and benefits and the patient wished to proceed.    COURSE OF HOSPITALIZATION:  The patient was admitted to [facility] on 07/13/2018   and on that date, he was brought to the operating room where he underwent   posterior C2-C6 laminectomy, lateral mass instrumentation C2-C4, with   posterior lateral arthrodesis C2-C4, and via separate incision posterior   lumbar L2-L5 laminectomies with Dr. Marsh.    Postoperatively, he initially had confusion in the night.  His strength was   stable to improved.  He had elevated blood sugars secondary to initially   refusing his Lantus, but this is improving.  He is starting to mobilize.  His   pain is controlled.  On 07/17/2018, he is awake and alert.  He has neck pain   and low back pain without radicular symptoms.  He is voiding.  We are giving   him bowel protocol.  He is eating.  His strength in his upper extremities is   5/5 with the exception of his left triceps and deltoid, which are 4+/5.  His   right tibialis anterior and gastroc are 3-4-/5, left is 4+.  His bilateral   iliopsoas and quads are 5/5.  His incisions are clean, dry, and flat with   staples intact.  He had some tachycardia in the low 100s.  We checked an EKG   that shows sinus tachycardia with some PACs.  The EKG rate was 86.  He was   mobilizing with therapies and will be discharged to inpatient rehab once   approved.  His hemoglobin is stable at 11.6, hematocrit 35.1.    DISCHARGE INSTRUCTIONS:  1.  Follow up with [facility] 2  weeks postoperatively.  2.  No aspirin or nonsteroidal anti-inflammatory medications.  3.  We will start prophylactic Lovenox today, do not restart therapeutic   dosing of Lovenox or Coumadin until cleared by Dr. Marsh.  4.  No lifting greater than 10 pounds.  5.  Okay to shower, pat incisions dry, leave open to air.    DISCHARGE MEDICATIONS:  1.  Percocet 5/325 one every 4 hours p.r.n. pain.  2.  Keflex 500 mg every 6 hours, last dose on Friday 07/20/2018 at 0100 hours.  3.  Clindamycin 300 mg b.i.d.  4.  Norvasc 10 mg p.o. daily.  5.  Amlodipine besylate 5 mg daily.  6.  Clonidine 0.3 mg per 24-hour patch every Wednesday.  7.  Hydralazine 100 mg p.o. t.i.d.  8.  Hydrochlorothiazide 25 mg daily.  9.  Lisinopril 40 mg b.i.d. p.o.  10.  Flexeril 10 mg every 8 hours p.r.n. spasm.  11.  Lantus 35 units every evening subcutaneous.  12.  Insulin lispro before meals and at bedtime.  For glucose less than 70,   give 1 amp D50 and call.  For glucose , 0 units insulin.  For glucose   201-250, give 3 units subcutaneous.  For glucose 251-300, give 5 units.  For   glucose 301-350, give 7 units.  For glucose 351-400, give 9 units.  For   glucose greater than 400, give 12 units and call.  13.  Metformin 1000 mg p.o. b.i.d.  14.  Dulcolax suppository daily p.r.n. no bowel movement with milk of   magnesia.  15.  Colace 100 mg p.o. b.i.d.  16.  Milk of magnesia 30 mL p.o. daily p.r.n. no bowel movement.  17.  Fleet enema CA daily p.r.n. no bowel movement with suppository.  18.  Zofran 4 mg p.o. q. 6 hours p.r.n. nausea.  19.  Senokot 2 tablets daily.  20.  Lovenox 40 mg subcutaneous daily.  21.  Gabapentin 300-600 mg t.i.d.  22.  Flonase 50 mcg 1 spray nasal daily.    IMPRESSION AND PLAN:  1.  Admitting diagnoses:  Cervical stenosis with myelopathy and severe lumbar   stenosis with motor and sensory radiculopathy recalcitrant to nonoperative   measures.  2.  Operation performed:  Posterior C2-C6 laminectomy, C2-C4 lateral  mass   fixation and posterolateral arthrodesis C2-C4 and via separate incision   posterior lumbar L2-L5 laminectomies with Dr. Marsh on 07/13/2018.  3.  Diabetes, on medications as outlined above.  4.  Postoperative confusion, resolved.  5.  Tachycardia, low 100s.  EKG, sinus rhythm, rate 83, with premature atrial   contractions.  6.  History of stroke.  The patient had been on Coumadin prior to surgery and   bridged with Lovenox.  We will put him on prophylactic dosing of Lovenox now.    Please hold therapeutic dosing of Lovenox or any Coumadin until cleared by   Dr. Marsh likely at 2 weeks postop.  7.  Cervical collar on when out of bed, okay off for eating.  8.  Bowel protocol.  9.  The patient is discharged to inpatient rehabilitation with the   instructions and medications as outlined above.       ____________________________________     MYA SPENCE / TARAH    DD:  07/17/2018 11:17:21  DT:  07/17/2018 11:45:22    D#:  [id number]  Job#:  [id number]

## 2018-07-24 NOTE — PROGRESS NOTES
"Received shift report and assumed care of patient.  Pt states he is feeling \"weird\" and \"heavy\" he states he feels like his eyes are wandering and not focusing. Checked pts BS and it was 102, pt then states feeling anxious. Pt given crackers and assessed, no change in MSK or Neurological status.  Denies pain or discomfort at this time.  Will continue to monitor.   "

## 2018-07-24 NOTE — PROGRESS NOTES
"Rehab Progress Note     Encounter date: 7/24/2018  Today I met with the patient face to face in his room  Chief Complaint:  Cervical myelopathy (HCC) , neck pain     Interval Events (subjective)  Mr. Ma reports that the neuropathic pain in his hands is improving with contrast baths.  We discussed the availability of the lidocaine jelly.  We also discussed his hip pain.  I explained to him why an injection at this stage would not be in his best interest due to his recently placed cervical hardware and history of chronic joint infection.  We also discussed his neck pain.  I again reiterated the availability of the Robaxin, and he states he is willing to try it today.  Otherwise, he reports that he is doing well.  He reports that he has been able to stand and take some steps with therapy.    Objective:  VITAL SIGNS: /72   Pulse 78   Temp 36.3 °C (97.4 °F)   Resp 18   Ht 1.905 m (6' 3\")   Wt 114.6 kg (252 lb 10.4 oz)   SpO2 93%   BMI 31.58 kg/m²     Recent Results (from the past 72 hour(s))   ACCU-CHEK GLUCOSE    Collection Time: 07/21/18  5:14 PM   Result Value Ref Range    Glucose - Accu-Ck 154 (H) 65 - 99 mg/dL   ACCU-CHEK GLUCOSE    Collection Time: 07/21/18  7:58 PM   Result Value Ref Range    Glucose - Accu-Ck 244 (H) 65 - 99 mg/dL   BASIC METABOLIC PANEL    Collection Time: 07/22/18  5:19 AM   Result Value Ref Range    Sodium 137 135 - 145 mmol/L    Potassium 3.5 (L) 3.6 - 5.5 mmol/L    Chloride 101 96 - 112 mmol/L    Co2 29 20 - 33 mmol/L    Glucose 89 65 - 99 mg/dL    Bun 31 (H) 8 - 22 mg/dL    Creatinine 1.16 0.50 - 1.40 mg/dL    Calcium 8.8 8.5 - 10.5 mg/dL    Anion Gap 7.0 0.0 - 11.9   MAGNESIUM    Collection Time: 07/22/18  5:19 AM   Result Value Ref Range    Magnesium 1.8 1.5 - 2.5 mg/dL   PHOSPHORUS    Collection Time: 07/22/18  5:19 AM   Result Value Ref Range    Phosphorus 3.4 2.5 - 4.5 mg/dL   ESTIMATED GFR    Collection Time: 07/22/18  5:19 AM   Result Value Ref Range    GFR If " African American >60 >60 mL/min/1.73 m 2    GFR If Non African American >60 >60 mL/min/1.73 m 2   ACCU-CHEK GLUCOSE    Collection Time: 07/22/18  8:00 AM   Result Value Ref Range    Glucose - Accu-Ck 110 (H) 65 - 99 mg/dL   ACCU-CHEK GLUCOSE    Collection Time: 07/22/18 11:26 AM   Result Value Ref Range    Glucose - Accu-Ck 151 (H) 65 - 99 mg/dL   ACCU-CHEK GLUCOSE    Collection Time: 07/22/18  5:15 PM   Result Value Ref Range    Glucose - Accu-Ck 207 (H) 65 - 99 mg/dL   ACCU-CHEK GLUCOSE    Collection Time: 07/22/18  8:02 PM   Result Value Ref Range    Glucose - Accu-Ck 189 (H) 65 - 99 mg/dL   ACCU-CHEK GLUCOSE    Collection Time: 07/23/18  7:57 AM   Result Value Ref Range    Glucose - Accu-Ck 108 (H) 65 - 99 mg/dL   ACCU-CHEK GLUCOSE    Collection Time: 07/23/18 11:12 AM   Result Value Ref Range    Glucose - Accu-Ck 138 (H) 65 - 99 mg/dL   ACCU-CHEK GLUCOSE    Collection Time: 07/23/18  5:23 PM   Result Value Ref Range    Glucose - Accu-Ck 165 (H) 65 - 99 mg/dL   ACCU-CHEK GLUCOSE    Collection Time: 07/23/18  8:21 PM   Result Value Ref Range    Glucose - Accu-Ck 209 (H) 65 - 99 mg/dL   ACCU-CHEK GLUCOSE    Collection Time: 07/24/18  6:55 AM   Result Value Ref Range    Glucose - Accu-Ck 102 (H) 65 - 99 mg/dL   ACCU-CHEK GLUCOSE    Collection Time: 07/24/18 11:17 AM   Result Value Ref Range    Glucose - Accu-Ck 151 (H) 65 - 99 mg/dL       Current Facility-Administered Medications   Medication Frequency   • lidocaine (XYLOCAINE) 2 % jelly Q8HRS PRN   • potassium chloride SA (Kdur) tablet 20 mEq DAILY   • gabapentin (NEURONTIN) capsule 900 mg TID   • methocarbamol (ROBAXIN) tablet 750 mg Q6HRS PRN   • vitamin D (cholecalciferol) tablet 2,000 Units DAILY   • lisinopril (PRINIVIL) tablet 40 mg DAILY   • Respiratory Care per Protocol Continuous RT   • Pharmacy Consult Request ...Pain Management Review 1 Each PRN   • acetaminophen (TYLENOL) tablet 650 mg Q4HRS PRN   • artificial tears 1.4 % ophthalmic solution 1 Drop PRN    • benzocaine-menthol (CEPACOL) lozenge 1 Lozenge Q2HRS PRN   • hydrALAZINE (APRESOLINE) tablet 25 mg Q8HRS PRN   • mag hydrox-al hydrox-simeth (MAALOX PLUS ES or MYLANTA DS) suspension 20 mL Q2HRS PRN   • ondansetron (ZOFRAN ODT) dispertab 4 mg 4X/DAY PRN    Or   • ondansetron (ZOFRAN) syringe/vial injection 4 mg 4X/DAY PRN   • traZODone (DESYREL) tablet 50 mg QHS PRN   • sodium chloride (OCEAN) 0.65 % nasal spray 2 Spray PRN   • bisacodyl (DULCOLAX) suppository 10 mg Q24HRS PRN   • magnesium hydroxide (MILK OF MAGNESIA) suspension 30 mL QDAY PRN   • senna-docusate (PERICOLACE or SENOKOT S) 8.6-50 MG per tablet 1 Tab Nightly   • amLODIPine (NORVASC) tablet 10 mg Q DAY   • clindamycin (CLEOCIN) capsule 300 mg BID   • [START ON 7/25/2018] cloNIDine (CATAPRES) 0.3 MG/24HR 1 Patch Q WEDNESDAY   • enoxaparin (LOVENOX) inj 40 mg DAILY   • fluticasone (FLONASE) nasal spray 50 mcg DAILY   • hydrALAZINE (APRESOLINE) tablet 100 mg TID   • insulin glargine (LANTUS) injection 35 Units Q EVENING   • insulin lispro (HUMALOG) injection 0-12 Units TID AC   • metFORMIN (GLUCOPHAGE) tablet 1,000 mg BID WITH MEALS   • oxyCODONE-acetaminophen (PERCOCET) 5-325 MG per tablet 1 Tab Q4HRS PRN   • docusate sodium (COLACE) capsule 100 mg BID       Exam Date: 7/24/2018    General:  Awake, alert, oriented, no acute distress  HEENT:  Wearing Aspen cervical collar  Cardiac: regular rate and rhythm  Lungs: clear to auscultation bilaterally.   Abdomen: soft; non tender, non distended, bowel sounds present and normoactive  Extremities: Stable neuromuscular wasting in the hand intrinsics  Neuro:   Slight improvement in fine motor movements with the hands today.  He is able to manipulate the controls on his television.  Lower limb movements remain clumsy and imprecise.          Orders Placed This Encounter   Procedures   • Diet Order Diabetic     Standing Status:   Standing     Number of Occurrences:   1     Order Specific Question:   Diet:      Answer:   Diabetic [3]       Assessment:  Active Hospital Problems    Diagnosis   • *Cervical myelopathy (HCC)   • HTN (hypertension)   • CVA (cerebral vascular accident) (HCC)   • Diabetes mellitus with neurological manifestations, controlled (HCC)   • Impaired activities of daily living   • Lumbar stenosis with neurogenic claudication   • Pain   • Cervical stenosis of spine   • Atrial fibrillation [427.31]   • Chronic antibiotic suppression       Medical Decision Making and Plan:  Mr. Ma is a 67-year-old male admitted for rehabilitation on July 18 in the setting of cervical myelopathy and lumbar spinal stenosis     Cervical myelopathy status post C2-C6 laminectomy and C2-C4 fusion by Dr. Marsh on July 13  Lumbar spinal stenosis with neurogenic claudication status post L2-L5 laminectomies by Dr. Marsh on July 13  Continue comprehensive rehabilitation  Follow-up with Dr. Marsh as scheduled  Aspen collar on at all times  Not cleared to resume therapeutic anticoagulation until follow-up with/clearance by Dr. Marsh     History of strokes  Atrial fibrillation   Not to resume Coumadin or therapeutic Lovenox until cleared by Dr. Marsh     Pain  Neuropathic pain/allodynia  Tylenol as needed   Gabapentin 900 mg 3 times a day  Robaxin 750 mg increased every 6 hours as needed on July 19--discussed the use of this with the patient today  Oxycodone/APAP 5/325 mg every 6 hours as needed  Lidocaine jelly q 8 hours prn hand pain    He used 20 mg of oxycodone in the last 24 hours     Hypertension  Amlodipine 10 mg daily  Clonidine patch weekly on Wednesday  Hydralazine 100 mg 3 times a day  Hydrochlorothiazide discontinued  Lisinopril 40 mg daily dosing      Appreciate hospitalist assistance  Blood pressure today is 136/72    Urinary incontinence  He denies any incontinence today     Diabetes mellitus type 2 with a history of diabetic neuropathy--hemoglobin A1c of 9.9 on July 19  Lantus 35 units at bedtime  Sliding scale  insulin  Metformin 1000 mg twice a day  Appreciate hospitalist consult    Glucose range of 102-209 in the last 24 hours     Anemia  Hemoglobin stable at 11.9 and July 19  Continue to monitor     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  Additionally, it has only been 2 months since his last injection.  Discussed conservative cares     Constipation  Colace 100 g twice a day  Pericolace qhs  Milk of magnesia as needed  Dulcolax suppository daily as needed     Seasonal allergies  Flonase daily     Incidental left thyroid mass seen on MRI in May 2018  Will need follow-up with primary care  TSH of 0.56 on admission    Vitamin D deficiency  Vitamin D was 26 on admission so we began supplementation with 2000 units of cholecalciferol daily      DVT prophylaxis  Lovenox 40 mg daily     Estimated discharge: August 7    Total time:  27 minutes.  I spent greater than 50% of the time for patient care, counseling, and coordination on this date, including unit/floor time, and face-to-face time with the patient as per interval events and assessment and plan above. Topics discussed included neck pain, hand pain, hip pain, injections, functional progress, Sushma Pagan M.D.  7/24/2018

## 2018-07-24 NOTE — CARE PLAN
Problem: Pain Management  Goal: Pain level will decrease to patient's comfort goal  Pt medicated for neck pain, pt reports improved pain after being medicated    Problem: GLYCEMIA IMBALANCE  Goal: Clinical indication of glycemia balance is achieved  Pt has not needed coverage for breakfast or lunch, pt provided DM education related to foods

## 2018-07-24 NOTE — CARE PLAN
Problem: Safety  Goal: Will remain free from injury  Patient uses call light consistently and appropriately this shift.  Waits for assistance when needed and does not attempt self transfer.  Able to verbalize needs.  Will continue to monitor.    Problem: Infection  Goal: Will remain free from infection  Patient remains free from s/s infection; afebrile.surgical incisions with staples open to air, has redness but no drainage, post op day 10, has order to dc staples on post op day 14,  Will continue to monitor.    Problem: Pain Management  Goal: Pain level will decrease to patient's comfort goal  Medicated per mar with routine and prn meds , with some relief, non pharmacological measures such as relaxation, deep breathing and distraction encouraged, will continue to assess and monitor.

## 2018-07-25 ENCOUNTER — TELEPHONE (OUTPATIENT)
Dept: CARDIOLOGY | Facility: MEDICAL CENTER | Age: 68
End: 2018-07-25

## 2018-07-25 LAB
GLUCOSE BLD-MCNC: 169 MG/DL (ref 65–99)
GLUCOSE BLD-MCNC: 183 MG/DL (ref 65–99)
GLUCOSE BLD-MCNC: 200 MG/DL (ref 65–99)
GLUCOSE BLD-MCNC: 259 MG/DL (ref 65–99)

## 2018-07-25 PROCEDURE — 97530 THERAPEUTIC ACTIVITIES: CPT

## 2018-07-25 PROCEDURE — A9270 NON-COVERED ITEM OR SERVICE: HCPCS | Performed by: HOSPITALIST

## 2018-07-25 PROCEDURE — 700102 HCHG RX REV CODE 250 W/ 637 OVERRIDE(OP): Performed by: HOSPITALIST

## 2018-07-25 PROCEDURE — 99232 SBSQ HOSP IP/OBS MODERATE 35: CPT | Performed by: PHYSICAL MEDICINE & REHABILITATION

## 2018-07-25 PROCEDURE — 700102 HCHG RX REV CODE 250 W/ 637 OVERRIDE(OP): Performed by: PHYSICAL MEDICINE & REHABILITATION

## 2018-07-25 PROCEDURE — A9270 NON-COVERED ITEM OR SERVICE: HCPCS | Performed by: PHYSICAL MEDICINE & REHABILITATION

## 2018-07-25 PROCEDURE — 700112 HCHG RX REV CODE 229: Performed by: PHYSICAL MEDICINE & REHABILITATION

## 2018-07-25 PROCEDURE — 97535 SELF CARE MNGMENT TRAINING: CPT

## 2018-07-25 PROCEDURE — 82962 GLUCOSE BLOOD TEST: CPT

## 2018-07-25 PROCEDURE — 97116 GAIT TRAINING THERAPY: CPT

## 2018-07-25 PROCEDURE — 94760 N-INVAS EAR/PLS OXIMETRY 1: CPT

## 2018-07-25 PROCEDURE — 99232 SBSQ HOSP IP/OBS MODERATE 35: CPT | Performed by: HOSPITALIST

## 2018-07-25 PROCEDURE — 700111 HCHG RX REV CODE 636 W/ 250 OVERRIDE (IP): Performed by: PHYSICAL MEDICINE & REHABILITATION

## 2018-07-25 PROCEDURE — 770010 HCHG ROOM/CARE - REHAB SEMI PRIVAT*

## 2018-07-25 RX ORDER — INSULIN GLARGINE 100 [IU]/ML
38 INJECTION, SOLUTION SUBCUTANEOUS EVERY EVENING
Status: DISCONTINUED | OUTPATIENT
Start: 2018-07-25 | End: 2018-07-27

## 2018-07-25 RX ADMIN — DOCUSATE SODIUM 100 MG: 100 CAPSULE, LIQUID FILLED ORAL at 20:24

## 2018-07-25 RX ADMIN — ENOXAPARIN SODIUM 40 MG: 100 INJECTION SUBCUTANEOUS at 08:06

## 2018-07-25 RX ADMIN — METOPROLOL TARTRATE 25 MG: 25 TABLET ORAL at 05:12

## 2018-07-25 RX ADMIN — INSULIN LISPRO 6 UNITS: 100 INJECTION, SOLUTION INTRAVENOUS; SUBCUTANEOUS at 20:23

## 2018-07-25 RX ADMIN — POTASSIUM CHLORIDE 20 MEQ: 1500 TABLET, EXTENDED RELEASE ORAL at 08:07

## 2018-07-25 RX ADMIN — GABAPENTIN 900 MG: 300 CAPSULE ORAL at 08:07

## 2018-07-25 RX ADMIN — CHOLECALCIFEROL TAB 25 MCG (1000 UNIT) 2000 UNITS: 25 TAB at 08:07

## 2018-07-25 RX ADMIN — OXYCODONE HYDROCHLORIDE AND ACETAMINOPHEN 1 TABLET: 5; 325 TABLET ORAL at 08:01

## 2018-07-25 RX ADMIN — METHOCARBAMOL 750 MG: 750 TABLET, FILM COATED ORAL at 20:31

## 2018-07-25 RX ADMIN — GABAPENTIN 900 MG: 300 CAPSULE ORAL at 14:36

## 2018-07-25 RX ADMIN — CLINDAMYCIN HYDROCHLORIDE 300 MG: 300 CAPSULE ORAL at 08:06

## 2018-07-25 RX ADMIN — METFORMIN HYDROCHLORIDE 1000 MG: 500 TABLET, FILM COATED ORAL at 08:07

## 2018-07-25 RX ADMIN — LACTOBACILLUS ACIDOPHILUS / LACTOBACILLUS BULGARICUS 1 PACKET: 100 MILLION CFU STRENGTH GRANULES at 16:55

## 2018-07-25 RX ADMIN — FLUTICASONE PROPIONATE 50 MCG: 50 SPRAY, METERED NASAL at 08:07

## 2018-07-25 RX ADMIN — Medication 1 TABLET: at 20:24

## 2018-07-25 RX ADMIN — HYDRALAZINE HYDROCHLORIDE 100 MG: 25 TABLET, FILM COATED ORAL at 20:23

## 2018-07-25 RX ADMIN — INSULIN LISPRO 2 UNITS: 100 INJECTION, SOLUTION INTRAVENOUS; SUBCUTANEOUS at 11:17

## 2018-07-25 RX ADMIN — INSULIN GLARGINE 38 UNITS: 100 INJECTION, SOLUTION SUBCUTANEOUS at 20:20

## 2018-07-25 RX ADMIN — INSULIN LISPRO 2 UNITS: 100 INJECTION, SOLUTION INTRAVENOUS; SUBCUTANEOUS at 16:58

## 2018-07-25 RX ADMIN — LISINOPRIL 40 MG: 20 TABLET ORAL at 08:07

## 2018-07-25 RX ADMIN — METOPROLOL TARTRATE 25 MG: 25 TABLET ORAL at 16:55

## 2018-07-25 RX ADMIN — OXYCODONE HYDROCHLORIDE AND ACETAMINOPHEN 1 TABLET: 5; 325 TABLET ORAL at 23:30

## 2018-07-25 RX ADMIN — HYDRALAZINE HYDROCHLORIDE 100 MG: 25 TABLET, FILM COATED ORAL at 08:07

## 2018-07-25 RX ADMIN — DOCUSATE SODIUM 100 MG: 100 CAPSULE, LIQUID FILLED ORAL at 08:06

## 2018-07-25 RX ADMIN — METFORMIN HYDROCHLORIDE 1000 MG: 500 TABLET, FILM COATED ORAL at 16:55

## 2018-07-25 RX ADMIN — GABAPENTIN 900 MG: 300 CAPSULE ORAL at 20:23

## 2018-07-25 RX ADMIN — AMLODIPINE BESYLATE 10 MG: 5 TABLET ORAL at 05:12

## 2018-07-25 RX ADMIN — OXYCODONE HYDROCHLORIDE AND ACETAMINOPHEN 1 TABLET: 5; 325 TABLET ORAL at 17:11

## 2018-07-25 RX ADMIN — MAGNESIUM HYDROXIDE 30 ML: 400 SUSPENSION ORAL at 05:12

## 2018-07-25 RX ADMIN — LACTOBACILLUS ACIDOPHILUS / LACTOBACILLUS BULGARICUS 1 PACKET: 100 MILLION CFU STRENGTH GRANULES at 08:17

## 2018-07-25 RX ADMIN — INSULIN LISPRO 2 UNITS: 100 INJECTION, SOLUTION INTRAVENOUS; SUBCUTANEOUS at 07:06

## 2018-07-25 RX ADMIN — HYDRALAZINE HYDROCHLORIDE 100 MG: 25 TABLET, FILM COATED ORAL at 14:36

## 2018-07-25 RX ADMIN — CLINDAMYCIN HYDROCHLORIDE 300 MG: 300 CAPSULE ORAL at 20:24

## 2018-07-25 RX ADMIN — LACTOBACILLUS ACIDOPHILUS / LACTOBACILLUS BULGARICUS 1 PACKET: 100 MILLION CFU STRENGTH GRANULES at 11:22

## 2018-07-25 ASSESSMENT — PATIENT HEALTH QUESTIONNAIRE - PHQ9
SUM OF ALL RESPONSES TO PHQ9 QUESTIONS 1 AND 2: 0
1. LITTLE INTEREST OR PLEASURE IN DOING THINGS: NOT AT ALL
2. FEELING DOWN, DEPRESSED, IRRITABLE, OR HOPELESS: NOT AT ALL

## 2018-07-25 ASSESSMENT — ENCOUNTER SYMPTOMS
CHILLS: 0
COUGH: 0
EYES NEGATIVE: 1
PALPITATIONS: 0
BACK PAIN: 1
NECK PAIN: 1
ABDOMINAL PAIN: 0
FEVER: 0
NAUSEA: 0
SHORTNESS OF BREATH: 0
VOMITING: 0

## 2018-07-25 ASSESSMENT — PAIN SCALES - GENERAL
PAINLEVEL_OUTOF10: 9
PAINLEVEL_OUTOF10: 7
PAINLEVEL_OUTOF10: 4
PAINLEVEL_OUTOF10: 6
PAINLEVEL_OUTOF10: 6
PAINLEVEL_OUTOF10: 7

## 2018-07-25 NOTE — PROGRESS NOTES
Received shift report and assumed care of patient.  Patient awake, calm and stable, currently positioned in wheel chair; call light within reach.  Denies pain or discomfort at this time.  Will continue to monitor.

## 2018-07-25 NOTE — TELEPHONE ENCOUNTER
follow up   Received: 2 days ago   Message Contents   Ricco Li M.D.  Mallika Lynn R.N.             Please call Dr Boby Marsh office to find out from Dr Marsh when it would be possible to have the patient undergo coronary intervention and be placed on dual antiplatelet therapy with asa and plavix   He had cervical and lumbar spine surgery on 7/13      Contacted Dr. Ferraro's office at [phone]    S/w MTHOR Alston - she states Dr. Ferraro is in surgery today but will be in clinic tomorrow and she will follow up.

## 2018-07-25 NOTE — CARE PLAN
Problem: Safety  Goal: Will remain free from injury  Patient uses call light consistently and appropriately this shift.  Waits for assistance when needed and does not attempt self transfer.  Able to verbalize needs.  Will continue to monitor.    Problem: GLYCEMIA IMBALANCE  Goal: Clinical indication of glycemia balance is achieved    Intervention: MONITOR BLOOD GLUCOSE LEVELS AS ORDERED  fsbs at hs was 278.insulin  Per mar given , hs snack consumed by pt, will  Monitor pt for s&s of hypo/hyperglycemia.

## 2018-07-25 NOTE — PROGRESS NOTES
"Rehab Progress Note     Encounter date: 7/25/2018  Today I met with the patient face to face in his room  Chief Complaint:  Cervical myelopathy (HCC) , pain better today    Interval Events (subjective)  Mr. Ma reports that his pain has improved with the increase of the Robaxin.  We discussed the relationship between his cervical stenosis and upper limb weakness and neuropathic pain.  He reports that he is having no trouble with urinary continence except for when he has difficulty manipulating his clothing.  He states that this is actually related to his hand function more than his bladder function.  We discussed the rehabilitation plan.  We also discussed his home with a barrier of 5 stairs.  At this stage he still plans on returning home.  He denies any fevers or chills today.    Objective:  VITAL SIGNS: /79   Pulse 67   Temp 36.2 °C (97.2 °F)   Resp 18   Ht 1.905 m (6' 3\")   Wt 114.6 kg (252 lb 10.4 oz)   SpO2 94%   BMI 31.58 kg/m²     Recent Results (from the past 72 hour(s))   ACCU-CHEK GLUCOSE    Collection Time: 07/22/18  5:15 PM   Result Value Ref Range    Glucose - Accu-Ck 207 (H) 65 - 99 mg/dL   ACCU-CHEK GLUCOSE    Collection Time: 07/22/18  8:02 PM   Result Value Ref Range    Glucose - Accu-Ck 189 (H) 65 - 99 mg/dL   ACCU-CHEK GLUCOSE    Collection Time: 07/23/18  7:57 AM   Result Value Ref Range    Glucose - Accu-Ck 108 (H) 65 - 99 mg/dL   ACCU-CHEK GLUCOSE    Collection Time: 07/23/18 11:12 AM   Result Value Ref Range    Glucose - Accu-Ck 138 (H) 65 - 99 mg/dL   ACCU-CHEK GLUCOSE    Collection Time: 07/23/18  5:23 PM   Result Value Ref Range    Glucose - Accu-Ck 165 (H) 65 - 99 mg/dL   ACCU-CHEK GLUCOSE    Collection Time: 07/23/18  8:21 PM   Result Value Ref Range    Glucose - Accu-Ck 209 (H) 65 - 99 mg/dL   ACCU-CHEK GLUCOSE    Collection Time: 07/24/18  6:55 AM   Result Value Ref Range    Glucose - Accu-Ck 102 (H) 65 - 99 mg/dL   ACCU-CHEK GLUCOSE    Collection Time: 07/24/18 11:17 " AM   Result Value Ref Range    Glucose - Accu-Ck 151 (H) 65 - 99 mg/dL   ACCU-CHEK GLUCOSE    Collection Time: 07/24/18  4:51 PM   Result Value Ref Range    Glucose - Accu-Ck 177 (H) 65 - 99 mg/dL   ACCU-CHEK GLUCOSE    Collection Time: 07/24/18  8:23 PM   Result Value Ref Range    Glucose - Accu-Ck 278 (H) 65 - 99 mg/dL   ACCU-CHEK GLUCOSE    Collection Time: 07/25/18  7:00 AM   Result Value Ref Range    Glucose - Accu-Ck 183 (H) 65 - 99 mg/dL   ACCU-CHEK GLUCOSE    Collection Time: 07/25/18 11:13 AM   Result Value Ref Range    Glucose - Accu-Ck 200 (H) 65 - 99 mg/dL       Current Facility-Administered Medications   Medication Frequency   • insulin glargine (LANTUS) injection 38 Units Q EVENING   • oxyCODONE-acetaminophen (PERCOCET) 5-325 MG per tablet 1 Tab Q6HRS PRN   • metoprolol (LOPRESSOR) tablet 25 mg TWICE DAILY   • lactobacillus granules (LACTINEX/FLORANEX) packet 1 Packet TID WITH MEALS   • insulin lispro (HUMALOG) injection 0-12 Units 4X/DAY ACHS   • lidocaine (XYLOCAINE) 2 % jelly Q8HRS PRN   • gabapentin (NEURONTIN) capsule 900 mg TID   • methocarbamol (ROBAXIN) tablet 750 mg Q6HRS PRN   • vitamin D (cholecalciferol) tablet 2,000 Units DAILY   • lisinopril (PRINIVIL) tablet 40 mg DAILY   • Respiratory Care per Protocol Continuous RT   • Pharmacy Consult Request ...Pain Management Review 1 Each PRN   • acetaminophen (TYLENOL) tablet 650 mg Q4HRS PRN   • artificial tears 1.4 % ophthalmic solution 1 Drop PRN   • benzocaine-menthol (CEPACOL) lozenge 1 Lozenge Q2HRS PRN   • hydrALAZINE (APRESOLINE) tablet 25 mg Q8HRS PRN   • mag hydrox-al hydrox-simeth (MAALOX PLUS ES or MYLANTA DS) suspension 20 mL Q2HRS PRN   • ondansetron (ZOFRAN ODT) dispertab 4 mg 4X/DAY PRN    Or   • ondansetron (ZOFRAN) syringe/vial injection 4 mg 4X/DAY PRN   • traZODone (DESYREL) tablet 50 mg QHS PRN   • sodium chloride (OCEAN) 0.65 % nasal spray 2 Spray PRN   • bisacodyl (DULCOLAX) suppository 10 mg Q24HRS PRN   • magnesium  hydroxide (MILK OF MAGNESIA) suspension 30 mL QDAY PRN   • senna-docusate (PERICOLACE or SENOKOT S) 8.6-50 MG per tablet 1 Tab Nightly   • amLODIPine (NORVASC) tablet 10 mg Q DAY   • clindamycin (CLEOCIN) capsule 300 mg BID   • enoxaparin (LOVENOX) inj 40 mg DAILY   • fluticasone (FLONASE) nasal spray 50 mcg DAILY   • hydrALAZINE (APRESOLINE) tablet 100 mg TID   • metFORMIN (GLUCOPHAGE) tablet 1,000 mg BID WITH MEALS   • docusate sodium (COLACE) capsule 100 mg BID       Exam Date: 7/25/2018    General:  Awake, alert, oriented, no acute distress  HEENT:  Wearing Aspen cervical collar  Cardiac: regular rate and rhythm  Lungs: clear to auscultation bilaterally.   Abdomen: soft; non tender, non distended, bowel sounds present and normoactive  Musculoskeletal:  intrinsic muscle wasting  Neuro:   Continues to show imprecise upper limb fine motor movements.  Able to foot propel his wheelchair, but significant foot drop causes him to drag his toes.  No activation of the dorsiflexors bilaterally today.        Orders Placed This Encounter   Procedures   • Diet Order Diabetic     Standing Status:   Standing     Number of Occurrences:   1     Order Specific Question:   Diet:     Answer:   Diabetic [3]       Assessment:  Active Hospital Problems    Diagnosis   • *Cervical myelopathy (HCC)   • HTN (hypertension)   • CVA (cerebral vascular accident) (HCC)   • Diabetes mellitus with neurological manifestations, controlled (HCC)   • Impaired activities of daily living   • Lumbar stenosis with neurogenic claudication   • Pain   • Cervical stenosis of spine   • Atrial fibrillation [427.31]   • Chronic antibiotic suppression       Medical Decision Making and Plan:  Mr. Ma is a 67-year-old male admitted for rehabilitation on July 18 in the setting of cervical myelopathy and lumbar spinal stenosis     Cervical myelopathy status post C2-C6 laminectomy and C2-C4 fusion by Dr. Marsh on July 13  Lumbar spinal stenosis with  neurogenic claudication status post L2-L5 laminectomies by Dr. Marsh on July 13  Continue comprehensive rehabilitation  Follow-up with Dr. Marsh as scheduled  Aspen collar on at all times  Not cleared to resume therapeutic anticoagulation until follow-up with/clearance by Dr. Marsh    Due to his significant foot drop, I discussed the potential to obtain AFOs with physical therapy today.  A prescription was provided for custom casting of articulated AFOs.  The patient was agreeable to these.     History of strokes  Atrial fibrillation   Not to resume Coumadin or therapeutic Lovenox until cleared by Dr. Marsh     Pain  Neuropathic pain/allodynia  Tylenol as needed   Gabapentin 900 mg 3 times a day  Robaxin 750 mg   Oxycodone/APAP 5/325 mg every 6 hours as needed  Lidocaine jelly q 8 hours prn hand pain     Hypertension  Amlodipine 10 mg daily  Off clonidine patch  Lopressor 25 mg twice a day  Hydralazine 100 mg 3 times a day  Hydrochlorothiazide discontinued  Lisinopril 40 mg daily dosing      Appreciate hospitalist assistance  Blood pressure today is 136/79 mmHg     Urinary incontinence   Discussed this with the patient today.  He reports that he is not having loss of bladder control, but he is having significant difficulty manipulating his clothing due to his hand function.      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  Additionally, it has only been 2 months  since his last injection.  Discussed conservative cares     Constipation  Colace 100 g twice a day  Pericolace qhs  Milk of magnesia as needed  Dulcolax suppository daily as needed     Seasonal allergies  Flonase daily     Incidental left thyroid mass seen on MRI in May 2018  Will need follow-up with primary care  TSH of 0.56 on admission    Vitamin D deficiency  Vitamin D was 26 on admission so we began supplementation with 2000 units of cholecalciferol daily      DVT prophylaxis  Lovenox 40 mg daily     Estimated discharge: August 7    Total time:  26 minutes.  I spent greater than 50% of the time for patient care, counseling, and coordination on this date, including unit/floor time, and face-to-face time with the patient as per interval events and assessment and plan above. Topics discussed included improvement in pain, AFOs, clothing management, and urinary incontinence.  I discussed his care with the interdisciplinary care team.      Higinio Pagan M.D.  7/25/2018

## 2018-07-25 NOTE — FLOWSHEET NOTE
Respiratory Therapy Update                                                   O2 (LPM): 0 (07/24/18 1550)  O2 Daily Delivery Respiratory : Room Air with O2 Available (07/24/18 1550)    Breath Sounds  RUL Breath Sounds: Clear (07/24/18 1550)  RML Breath Sounds: Clear;Diminished (07/24/18 1550)  RLL Breath Sounds: Diminished (07/24/18 1550)  AI Breath Sounds: Clear (07/24/18 1550)  LLL Breath Sounds: Diminished (07/24/18 1550)        Events/Summary/Plan: SpO2  spot check with pt. in a wheel chair on RA.  No distress at this time. (07/24/18 1550)

## 2018-07-25 NOTE — PROGRESS NOTES
Hospital Medicine Progress Note, Adult, Complex               Author: Amanda Bennett Date & Time created: 7/25/2018  4:30 PM     Interval History:  CC - HTN, DM    Left leg swelling better today but right still significantly larger.  No CP or SOB.    Review of Systems:  Review of Systems   Constitutional: Negative for chills and fever.   HENT: Negative.    Eyes: Negative.    Respiratory: Negative for cough and shortness of breath.    Cardiovascular: Positive for leg swelling. Negative for chest pain and palpitations.   Gastrointestinal: Negative for abdominal pain, nausea and vomiting.   Genitourinary: Negative.    Musculoskeletal: Positive for back pain and neck pain.        Wound pain   Skin: Negative.        Physical Exam:  Physical Exam   Constitutional: He is oriented to person, place, and time. No distress.   HENT:   Head: Normocephalic and atraumatic.   Right Ear: External ear normal.   Left Ear: External ear normal.   Eyes: Conjunctivae and EOM are normal. Right eye exhibits no discharge. Left eye exhibits no discharge.   Neck:   C-collar   Cardiovascular:   irreg irreg   Pulmonary/Chest: No stridor. No respiratory distress. He has no wheezes.   Decreased BS   Abdominal: Soft. Bowel sounds are normal. He exhibits no distension. There is no tenderness. There is no rebound and no guarding.   Musculoskeletal: He exhibits edema.   3+ edema RLE, 1+ LLE   Neurological: He is alert and oriented to person, place, and time.   Skin: Skin is warm and dry. He is not diaphoretic.   Vitals reviewed.      Labs:        Invalid input(s): [id number]      No results for input(s): SODIUM, POTASSIUM, CHLORIDE, CO2, BUN, CREATININE, MAGNESIUM, PHOSPHORUS, CALCIUM in the last 72 hours.  No results for input(s): ALTSGPT, ASTSGOT, ALKPHOSPHAT, TBILIRUBIN, DBILIRUBIN, GAMMAGT, AMYLASE, LIPASE, ALB, PREALBUMIN, GLUCOSE in the last 72 hours.  No results for input(s): RBC, HEMOGLOBIN, HEMATOCRIT, PLATELETCT, PROTHROMBTM, APTT, INR, IRON,  FERRITIN, TOTIRONBC in the last 72 hours.            Hemodynamics:  Temp (24hrs), Av.4 °C (97.6 °F), Min:36.2 °C (97.2 °F), Max:36.6 °C (97.9 °F)  Temperature: 36.2 °C (97.2 °F)  Pulse  Av  Min: 58  Max: 113   Blood Pressure : 133/79     Respiratory:    Respiration: 18, Pulse Oximetry: 94 %     Work Of Breathing / Effort: Mild  RUL Breath Sounds: Clear, RML Breath Sounds: Clear;Diminished, RLL Breath Sounds: Diminished, AI Breath Sounds: Clear, LLL Breath Sounds: Diminished  Fluids:    Intake/Output Summary (Last 24 hours) at 18 1630  Last data filed at 18 1340   Gross per 24 hour   Intake             1420 ml   Output             1050 ml   Net              370 ml        GI/Nutrition:  Orders Placed This Encounter   Procedures   • Diet Order Diabetic     Standing Status:   Standing     Number of Occurrences:   1     Order Specific Question:   Diet:     Answer:   Diabetic [3]         Medical Decision Making, by Problem:  S/P CERVICAL/LUMBAR SPINE SURGERIES - cervical collar    H/O CVA    JANNIE - on home BiPAP?    AFIB - rate controlled on B-Bl, currently off anticoagulation d/t recent spine surgery    HTN - Clonidine replaced w/ Metoprolol; cont Norvasc, Lisinopril, and Hydralazine    DM - increase Lantus, cont Metformin and SSI    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 w/ Clindamycin, probiotic added to help prevent C Dif infxn, request Venous Duplex    THYROID MASS - needs outpt F/U    ANEMIA - follow H/H    AZOTEMIA - off HCTZ, encourage PO fluids    VIT D DEF - supplementing    ALLERGY TO STATINS      Reviewed w/ Dr. Pagan      Quality-Core Measures   Reviewed items::  Medications reviewed and Labs reviewed  DVT prophylaxis pharmacological::  Enoxaparin (Lovenox)  Antibiotics:  Treating active infection/contamination beyond 24 hours perioperative coverage  Assessed for rehabilitation services:  Patient was assess for and/or received rehabilitation services during this  hospitalization

## 2018-07-26 LAB
ANION GAP SERPL CALC-SCNC: 7 MMOL/L (ref 0–11.9)
BUN SERPL-MCNC: 24 MG/DL (ref 8–22)
CALCIUM SERPL-MCNC: 9.3 MG/DL (ref 8.5–10.5)
CHLORIDE SERPL-SCNC: 103 MMOL/L (ref 96–112)
CO2 SERPL-SCNC: 28 MMOL/L (ref 20–33)
CREAT SERPL-MCNC: 0.97 MG/DL (ref 0.5–1.4)
ERYTHROCYTE [DISTWIDTH] IN BLOOD BY AUTOMATED COUNT: 45.6 FL (ref 35.9–50)
GLUCOSE BLD-MCNC: 103 MG/DL (ref 65–99)
GLUCOSE BLD-MCNC: 154 MG/DL (ref 65–99)
GLUCOSE BLD-MCNC: 199 MG/DL (ref 65–99)
GLUCOSE BLD-MCNC: 275 MG/DL (ref 65–99)
GLUCOSE SERPL-MCNC: 103 MG/DL (ref 65–99)
HCT VFR BLD AUTO: 36.5 % (ref 42–52)
HGB BLD-MCNC: 11.7 G/DL (ref 14–18)
MCH RBC QN AUTO: 28.3 PG (ref 27–33)
MCHC RBC AUTO-ENTMCNC: 32.1 G/DL (ref 33.7–35.3)
MCV RBC AUTO: 88.4 FL (ref 81.4–97.8)
PLATELET # BLD AUTO: 271 K/UL (ref 164–446)
PMV BLD AUTO: 10.6 FL (ref 9–12.9)
POTASSIUM SERPL-SCNC: 4.3 MMOL/L (ref 3.6–5.5)
RBC # BLD AUTO: 4.13 M/UL (ref 4.7–6.1)
SODIUM SERPL-SCNC: 138 MMOL/L (ref 135–145)
WBC # BLD AUTO: 5.1 K/UL (ref 4.8–10.8)

## 2018-07-26 PROCEDURE — A9270 NON-COVERED ITEM OR SERVICE: HCPCS | Performed by: PHYSICAL MEDICINE & REHABILITATION

## 2018-07-26 PROCEDURE — 97110 THERAPEUTIC EXERCISES: CPT

## 2018-07-26 PROCEDURE — 97530 THERAPEUTIC ACTIVITIES: CPT

## 2018-07-26 PROCEDURE — 700102 HCHG RX REV CODE 250 W/ 637 OVERRIDE(OP): Performed by: PHYSICAL MEDICINE & REHABILITATION

## 2018-07-26 PROCEDURE — 36415 COLL VENOUS BLD VENIPUNCTURE: CPT

## 2018-07-26 PROCEDURE — 94760 N-INVAS EAR/PLS OXIMETRY 1: CPT

## 2018-07-26 PROCEDURE — 93971 EXTREMITY STUDY: CPT

## 2018-07-26 PROCEDURE — A9270 NON-COVERED ITEM OR SERVICE: HCPCS | Performed by: HOSPITALIST

## 2018-07-26 PROCEDURE — 770010 HCHG ROOM/CARE - REHAB SEMI PRIVAT*

## 2018-07-26 PROCEDURE — 700111 HCHG RX REV CODE 636 W/ 250 OVERRIDE (IP): Performed by: PHYSICAL MEDICINE & REHABILITATION

## 2018-07-26 PROCEDURE — 82962 GLUCOSE BLOOD TEST: CPT | Mod: 91

## 2018-07-26 PROCEDURE — 85027 COMPLETE CBC AUTOMATED: CPT

## 2018-07-26 PROCEDURE — 93971 EXTREMITY STUDY: CPT | Mod: 26 | Performed by: SURGERY

## 2018-07-26 PROCEDURE — 99232 SBSQ HOSP IP/OBS MODERATE 35: CPT | Performed by: PHYSICAL MEDICINE & REHABILITATION

## 2018-07-26 PROCEDURE — 700102 HCHG RX REV CODE 250 W/ 637 OVERRIDE(OP): Performed by: HOSPITALIST

## 2018-07-26 PROCEDURE — 700112 HCHG RX REV CODE 229: Performed by: PHYSICAL MEDICINE & REHABILITATION

## 2018-07-26 PROCEDURE — 97116 GAIT TRAINING THERAPY: CPT

## 2018-07-26 PROCEDURE — 80048 BASIC METABOLIC PNL TOTAL CA: CPT

## 2018-07-26 PROCEDURE — 99232 SBSQ HOSP IP/OBS MODERATE 35: CPT | Performed by: HOSPITALIST

## 2018-07-26 RX ADMIN — METFORMIN HYDROCHLORIDE 1000 MG: 500 TABLET, FILM COATED ORAL at 08:13

## 2018-07-26 RX ADMIN — METFORMIN HYDROCHLORIDE 1000 MG: 500 TABLET, FILM COATED ORAL at 17:20

## 2018-07-26 RX ADMIN — CHOLECALCIFEROL TAB 25 MCG (1000 UNIT) 2000 UNITS: 25 TAB at 08:13

## 2018-07-26 RX ADMIN — METOPROLOL TARTRATE 25 MG: 25 TABLET ORAL at 17:20

## 2018-07-26 RX ADMIN — HYDRALAZINE HYDROCHLORIDE 100 MG: 25 TABLET, FILM COATED ORAL at 08:13

## 2018-07-26 RX ADMIN — INSULIN LISPRO 6 UNITS: 100 INJECTION, SOLUTION INTRAVENOUS; SUBCUTANEOUS at 20:32

## 2018-07-26 RX ADMIN — FLUTICASONE PROPIONATE 50 MCG: 50 SPRAY, METERED NASAL at 08:13

## 2018-07-26 RX ADMIN — AMLODIPINE BESYLATE 10 MG: 5 TABLET ORAL at 05:02

## 2018-07-26 RX ADMIN — LISINOPRIL 40 MG: 20 TABLET ORAL at 08:13

## 2018-07-26 RX ADMIN — INSULIN GLARGINE 38 UNITS: 100 INJECTION, SOLUTION SUBCUTANEOUS at 20:32

## 2018-07-26 RX ADMIN — INSULIN LISPRO 2 UNITS: 100 INJECTION, SOLUTION INTRAVENOUS; SUBCUTANEOUS at 11:09

## 2018-07-26 RX ADMIN — GABAPENTIN 900 MG: 300 CAPSULE ORAL at 20:35

## 2018-07-26 RX ADMIN — DOCUSATE SODIUM 100 MG: 100 CAPSULE, LIQUID FILLED ORAL at 08:13

## 2018-07-26 RX ADMIN — INSULIN LISPRO 2 UNITS: 100 INJECTION, SOLUTION INTRAVENOUS; SUBCUTANEOUS at 17:26

## 2018-07-26 RX ADMIN — CLINDAMYCIN HYDROCHLORIDE 300 MG: 300 CAPSULE ORAL at 08:13

## 2018-07-26 RX ADMIN — HYDRALAZINE HYDROCHLORIDE 100 MG: 25 TABLET, FILM COATED ORAL at 20:35

## 2018-07-26 RX ADMIN — GABAPENTIN 900 MG: 300 CAPSULE ORAL at 08:13

## 2018-07-26 RX ADMIN — LACTOBACILLUS ACIDOPHILUS / LACTOBACILLUS BULGARICUS 1 PACKET: 100 MILLION CFU STRENGTH GRANULES at 17:20

## 2018-07-26 RX ADMIN — Medication 1 TABLET: at 20:35

## 2018-07-26 RX ADMIN — GABAPENTIN 900 MG: 300 CAPSULE ORAL at 14:59

## 2018-07-26 RX ADMIN — ENOXAPARIN SODIUM 40 MG: 100 INJECTION SUBCUTANEOUS at 08:13

## 2018-07-26 RX ADMIN — CLINDAMYCIN HYDROCHLORIDE 300 MG: 300 CAPSULE ORAL at 20:35

## 2018-07-26 RX ADMIN — DOCUSATE SODIUM 100 MG: 100 CAPSULE, LIQUID FILLED ORAL at 20:35

## 2018-07-26 RX ADMIN — LACTOBACILLUS ACIDOPHILUS / LACTOBACILLUS BULGARICUS 1 PACKET: 100 MILLION CFU STRENGTH GRANULES at 08:13

## 2018-07-26 RX ADMIN — METOPROLOL TARTRATE 25 MG: 25 TABLET ORAL at 05:02

## 2018-07-26 RX ADMIN — OXYCODONE HYDROCHLORIDE AND ACETAMINOPHEN 1 TABLET: 5; 325 TABLET ORAL at 17:47

## 2018-07-26 RX ADMIN — OXYCODONE HYDROCHLORIDE AND ACETAMINOPHEN 1 TABLET: 5; 325 TABLET ORAL at 06:26

## 2018-07-26 RX ADMIN — LACTOBACILLUS ACIDOPHILUS / LACTOBACILLUS BULGARICUS 1 PACKET: 100 MILLION CFU STRENGTH GRANULES at 11:08

## 2018-07-26 RX ADMIN — HYDRALAZINE HYDROCHLORIDE 100 MG: 25 TABLET, FILM COATED ORAL at 14:59

## 2018-07-26 ASSESSMENT — PAIN SCALES - GENERAL
PAINLEVEL_OUTOF10: 7
PAINLEVEL_OUTOF10: 8
PAINLEVEL_OUTOF10: 8
PAINLEVEL_OUTOF10: 5

## 2018-07-26 ASSESSMENT — ENCOUNTER SYMPTOMS
NECK PAIN: 1
COUGH: 0
PALPITATIONS: 0
CHILLS: 0
EYES NEGATIVE: 1
BACK PAIN: 1
SHORTNESS OF BREATH: 0
NAUSEA: 0
ABDOMINAL PAIN: 0
VOMITING: 0
FEVER: 0

## 2018-07-26 NOTE — FLOWSHEET NOTE
07/26/18 1045   Events/Summary/Plan   Events/Summary/Plan 02 spot check.  Pt given new sterile water for CPAP which he continues to use   Respiratory WDL   Respiratory (WDL) X   Chest Exam   Respiration 18   Pulse 64   Oximetry   #Pulse Oximetry (Single Determination) Yes   Oxygen   Home O2 Use Prior To Admission? No   Pulse Oximetry 94 %   O2 Daily Delivery Respiratory  Room Air with O2 Available

## 2018-07-26 NOTE — PROGRESS NOTES
Hospital Medicine Progress Note, Adult, Complex               Author: Patel Sabrina Date & Time created: 2018  11:55 AM     Interval History:  CC - HTN, DM    Had isolated high BP today, 166/83; pt asymptomatic.    Review of Systems:  Review of Systems   Constitutional: Negative for chills and fever.   HENT: Negative.    Eyes: Negative.    Respiratory: Negative for cough and shortness of breath.    Cardiovascular: Positive for leg swelling. Negative for chest pain and palpitations.   Gastrointestinal: Negative for abdominal pain, nausea and vomiting.   Genitourinary: Negative.    Musculoskeletal: Positive for back pain and neck pain.        Wound pain   Skin: Negative.        Physical Exam:  Physical Exam   Constitutional: He is oriented to person, place, and time. No distress.   HENT:   Head: Normocephalic and atraumatic.   Right Ear: External ear normal.   Left Ear: External ear normal.   Eyes: Conjunctivae and EOM are normal. Right eye exhibits no discharge. Left eye exhibits no discharge.   Neck:   C-collar   Cardiovascular:   irreg irreg   Pulmonary/Chest: No stridor. No respiratory distress. He has no wheezes.   Decreased BS   Abdominal: Soft. Bowel sounds are normal. He exhibits no distension. There is no tenderness. There is no rebound and no guarding.   Musculoskeletal: He exhibits edema.   2+ edema RLE, 1+ LLE   Neurological: He is alert and oriented to person, place, and time.   Skin: Skin is warm and dry. He is not diaphoretic.   Vitals reviewed.      Labs:        Invalid input(s): [id number]      Recent Labs      18   0540   SODIUM  138   POTASSIUM  4.3   CHLORIDE  103   CO2  28   BUN  24*   CREATININE  0.97   CALCIUM  9.3     Recent Labs      18   0540   GLUCOSE  103*     Recent Labs      18   0540   RBC  4.13*   HEMOGLOBIN  11.7*   HEMATOCRIT  36.5*   PLATELETCT  271     Recent Labs      18   0540   WBC  5.1           Hemodynamics:  Temp (24hrs), Av.4 °C (97.5 °F),  Min:36.2 °C (97.2 °F), Max:36.7 °C (98 °F)  Temperature: 36.7 °C (98 °F)  Pulse  Av.7  Min: 58  Max: 113   Blood Pressure : 130/78     Respiratory:    Respiration: 20, Pulse Oximetry: 93 %     Work Of Breathing / Effort: Mild  RUL Breath Sounds: Clear, RML Breath Sounds: Clear;Diminished, RLL Breath Sounds: Diminished, AI Breath Sounds: Clear, LLL Breath Sounds: Diminished  Fluids:    Intake/Output Summary (Last 24 hours) at 18 1155  Last data filed at 18 0918   Gross per 24 hour   Intake             1940 ml   Output             2400 ml   Net             -460 ml        GI/Nutrition:  Orders Placed This Encounter   Procedures   • Diet Order Diabetic     Standing Status:   Standing     Number of Occurrences:   1     Order Specific Question:   Diet:     Answer:   Diabetic [3]         Medical Decision Making, by Problem:  S/P CERVICAL/LUMBAR SPINE SURGERIES - cervical collar    H/O CVA    JANNIE - on home BiPAP?    AFIB - rate controlled on B-Bl, currently off anticoagulation d/t recent spine surgery    HTN - cont to monitor BP trends on Metoprolol, Norvasc, Lisinopril, and Hydralazine    DM - glucose control improved w/ increased Lantus, cont Metformin and SSI    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 w/ Clindamycin, probiotic added to help prevent C Dif infxn, Venous Duplex pending    THYROID MASS - needs outpt F/U    ANEMIA - follow H/H    AZOTEMIA - off HCTZ, encourage PO fluids    VIT D DEF - supplementing    ALLERGY TO STATINS        Quality-Core Measures   Reviewed items::  Medications reviewed and Labs reviewed  DVT prophylaxis pharmacological::  Enoxaparin (Lovenox)  Antibiotics:  Treating active infection/contamination beyond 24 hours perioperative coverage  Assessed for rehabilitation services:  Patient was assess for and/or received rehabilitation services during this hospitalization

## 2018-07-26 NOTE — TELEPHONE ENCOUNTER
S/w Dr. Marsh.  He thinks it's best to wait until 6-week post (end of August) op follow up before any cardiac intervention is implemented (unless its urgent).      To TAWANNA

## 2018-07-26 NOTE — CARE PLAN
Problem: Pain Management  Goal: Pain level will decrease to patient's comfort goal  Medicated for pain with routine and prn  meds , pt encouraged to use non pharmacological  measures such as relaxation, repositioning  And  Deep breathing, will continue to assess and monitor.    Problem: GLYCEMIA IMBALANCE  Goal: Clinical indication of glycemia balance is achieved  Pt is non complaint with diabetic diet, eats sugary foods such as candy that he gets from vending machine, educated pt on importance of following the diabetic diet and keeping the blood sugars close or within normal ranges, fsbs at  was 259, insulin per orders given will monitor pt for s&s of hypo/hyperglycemia.    Problem: Psychosocial Needs:  Goal: Level of anxiety will decrease  Pt gets easily agitated, reassured him and encouraqed him to voice  His concerns, will continue to monitor.

## 2018-07-26 NOTE — PROGRESS NOTES
"Rehab Progress Note     Encounter date: 7/26/2018  Today I met with the patient face to face in his room    Chief Complaint:  Cervical myelopathy (HCC) , discharge planning     Interval Events (subjective)  Mr. Ma reports that he is doing better today.  He was able to walk 150 feet with a front wheeled walker with therapy per his report.  He denies any fevers or chills.  He reports that the paresthesias in his hands are improving.  We had a long discussion today about his discharge plans and the potential to fit a ramp to his home.  He is very unclear about his reports, but it sounds as though there may be a partially completed ramp at his home.  He reports that he will work with his friends to try to have this completed by the time of his discharge.  We discussed my concerns about his ability to be independent in his own home.    Objective:  VITAL SIGNS: /78   Pulse 64   Temp 36.7 °C (98 °F)   Resp 18   Ht 1.905 m (6' 3\")   Wt 114.6 kg (252 lb 10.4 oz)   SpO2 94%   BMI 31.58 kg/m²     Recent Results (from the past 72 hour(s))   ACCU-CHEK GLUCOSE    Collection Time: 07/23/18  5:23 PM   Result Value Ref Range    Glucose - Accu-Ck 165 (H) 65 - 99 mg/dL   ACCU-CHEK GLUCOSE    Collection Time: 07/23/18  8:21 PM   Result Value Ref Range    Glucose - Accu-Ck 209 (H) 65 - 99 mg/dL   ACCU-CHEK GLUCOSE    Collection Time: 07/24/18  6:55 AM   Result Value Ref Range    Glucose - Accu-Ck 102 (H) 65 - 99 mg/dL   ACCU-CHEK GLUCOSE    Collection Time: 07/24/18 11:17 AM   Result Value Ref Range    Glucose - Accu-Ck 151 (H) 65 - 99 mg/dL   ACCU-CHEK GLUCOSE    Collection Time: 07/24/18  4:51 PM   Result Value Ref Range    Glucose - Accu-Ck 177 (H) 65 - 99 mg/dL   ACCU-CHEK GLUCOSE    Collection Time: 07/24/18  8:23 PM   Result Value Ref Range    Glucose - Accu-Ck 278 (H) 65 - 99 mg/dL   ACCU-CHEK GLUCOSE    Collection Time: 07/25/18  7:00 AM   Result Value Ref Range    Glucose - Accu-Ck 183 (H) 65 - 99 mg/dL "   ACCU-CHEK GLUCOSE    Collection Time: 07/25/18 11:13 AM   Result Value Ref Range    Glucose - Accu-Ck 200 (H) 65 - 99 mg/dL   ACCU-CHEK GLUCOSE    Collection Time: 07/25/18  4:53 PM   Result Value Ref Range    Glucose - Accu-Ck 169 (H) 65 - 99 mg/dL   ACCU-CHEK GLUCOSE    Collection Time: 07/25/18  8:19 PM   Result Value Ref Range    Glucose - Accu-Ck 259 (H) 65 - 99 mg/dL   CBC WITHOUT DIFFERENTIAL    Collection Time: 07/26/18  5:40 AM   Result Value Ref Range    WBC 5.1 4.8 - 10.8 K/uL    RBC 4.13 (L) 4.70 - 6.10 M/uL    Hemoglobin 11.7 (L) 14.0 - 18.0 g/dL    Hematocrit 36.5 (L) 42.0 - 52.0 %    MCV 88.4 81.4 - 97.8 fL    MCH 28.3 27.0 - 33.0 pg    MCHC 32.1 (L) 33.7 - 35.3 g/dL    RDW 45.6 35.9 - 50.0 fL    Platelet Count 271 164 - 446 K/uL    MPV 10.6 9.0 - 12.9 fL   BASIC METABOLIC PANEL    Collection Time: 07/26/18  5:40 AM   Result Value Ref Range    Sodium 138 135 - 145 mmol/L    Potassium 4.3 3.6 - 5.5 mmol/L    Chloride 103 96 - 112 mmol/L    Co2 28 20 - 33 mmol/L    Glucose 103 (H) 65 - 99 mg/dL    Bun 24 (H) 8 - 22 mg/dL    Creatinine 0.97 0.50 - 1.40 mg/dL    Calcium 9.3 8.5 - 10.5 mg/dL    Anion Gap 7.0 0.0 - 11.9   ESTIMATED GFR    Collection Time: 07/26/18  5:40 AM   Result Value Ref Range    GFR If African American >60 >60 mL/min/1.73 m 2    GFR If Non African American >60 >60 mL/min/1.73 m 2   ACCU-CHEK GLUCOSE    Collection Time: 07/26/18  7:27 AM   Result Value Ref Range    Glucose - Accu-Ck 103 (H) 65 - 99 mg/dL   ACCU-CHEK GLUCOSE    Collection Time: 07/26/18 11:07 AM   Result Value Ref Range    Glucose - Accu-Ck 154 (H) 65 - 99 mg/dL       Current Facility-Administered Medications   Medication Frequency   • insulin glargine (LANTUS) injection 38 Units Q EVENING   • oxyCODONE-acetaminophen (PERCOCET) 5-325 MG per tablet 1 Tab Q6HRS PRN   • metoprolol (LOPRESSOR) tablet 25 mg TWICE DAILY   • lactobacillus granules (LACTINEX/FLORANEX) packet 1 Packet TID WITH MEALS   • insulin lispro (HUMALOG)  injection 0-12 Units 4X/DAY ACHS   • lidocaine (XYLOCAINE) 2 % jelly Q8HRS PRN   • gabapentin (NEURONTIN) capsule 900 mg TID   • methocarbamol (ROBAXIN) tablet 750 mg Q6HRS PRN   • vitamin D (cholecalciferol) tablet 2,000 Units DAILY   • lisinopril (PRINIVIL) tablet 40 mg DAILY   • Respiratory Care per Protocol Continuous RT   • Pharmacy Consult Request ...Pain Management Review 1 Each PRN   • acetaminophen (TYLENOL) tablet 650 mg Q4HRS PRN   • artificial tears 1.4 % ophthalmic solution 1 Drop PRN   • benzocaine-menthol (CEPACOL) lozenge 1 Lozenge Q2HRS PRN   • hydrALAZINE (APRESOLINE) tablet 25 mg Q8HRS PRN   • mag hydrox-al hydrox-simeth (MAALOX PLUS ES or MYLANTA DS) suspension 20 mL Q2HRS PRN   • ondansetron (ZOFRAN ODT) dispertab 4 mg 4X/DAY PRN    Or   • ondansetron (ZOFRAN) syringe/vial injection 4 mg 4X/DAY PRN   • traZODone (DESYREL) tablet 50 mg QHS PRN   • sodium chloride (OCEAN) 0.65 % nasal spray 2 Spray PRN   • bisacodyl (DULCOLAX) suppository 10 mg Q24HRS PRN   • magnesium hydroxide (MILK OF MAGNESIA) suspension 30 mL QDAY PRN   • senna-docusate (PERICOLACE or SENOKOT S) 8.6-50 MG per tablet 1 Tab Nightly   • amLODIPine (NORVASC) tablet 10 mg Q DAY   • clindamycin (CLEOCIN) capsule 300 mg BID   • enoxaparin (LOVENOX) inj 40 mg DAILY   • fluticasone (FLONASE) nasal spray 50 mcg DAILY   • hydrALAZINE (APRESOLINE) tablet 100 mg TID   • metFORMIN (GLUCOPHAGE) tablet 1,000 mg BID WITH MEALS   • docusate sodium (COLACE) capsule 100 mg BID       Exam Date: 7/26/2018    General:  Awake, alert, oriented, no acute distress  HEENT:  Wearing [place] cervical collar  Cardiac: regular rate and rhythm  Lungs: clear to auscultation bilaterally.   Abdomen: soft; non tender, non distended, bowel sounds present and normoactive  Extremities: hand intrinsic wasting   Neuro:   Ongoing ataxic upper limb movements.  No active dorsiflexion in the bilateral ankles today.        Orders Placed This Encounter   Procedures   • Diet  Order Diabetic     Standing Status:   Standing     Number of Occurrences:   1     Order Specific Question:   Diet:     Answer:   Diabetic [3]       Assessment:  Active Hospital Problems    Diagnosis   • *Cervical myelopathy (HCC)   • HTN (hypertension)   • CVA (cerebral vascular accident) (HCC)   • Diabetes mellitus with neurological manifestations, controlled (HCC)   • Impaired activities of daily living   • Lumbar stenosis with neurogenic claudication   • Pain   • Cervical stenosis of spine   • Atrial fibrillation [427.31]   • Chronic antibiotic suppression       Medical Decision Making and Plan:  Mr. Ma is a 67-year-old male admitted for rehabilitation on July 18 in the setting of cervical myelopathy and lumbar spinal stenosis     Cervical myelopathy status post C2-C6 laminectomy and C2-C4 fusion by Dr. Marsh on July 13  Lumbar spinal stenosis with neurogenic claudication status post L2-L5 laminectomies by Dr. Marsh on July 13  Continue comprehensive rehabilitation  Follow-up with Dr. Marsh as scheduled on 7/30  [place] collar on at all times  Not cleared to resume therapeutic anticoagulation until follow-up with/clearance by Dr. Marsh    Discussed AFOs with the patient today and he is agreeable to these     History of strokes  Atrial fibrillation   Not to resume Coumadin or therapeutic Lovenox until cleared by Dr. Marsh     Pain  Neuropathic pain/allodynia  Tylenol as needed   Gabapentin 900 mg 3 times a day  Robaxin 750 mg   Oxycodone/APAP 5/325 mg every 6 hours as needed  Lidocaine jelly q 8 hours prn hand pain     Hypertension  Amlodipine 10 mg daily  Lopressor 25 mg twice a day  Hydralazine 100 mg 3 times a day  Hydrochlorothiazide discontinued  Lisinopril 40 mg daily dosing      Appreciate hospitalist assistance  Blood pressure today is 130/78 mmHg      Diabetes mellitus type 2 with a history of diabetic neuropathy--hemoglobin A1c of 9.9 on July 19  Lantus at bedtime  Sliding scale  insulin  Metformin 1000 mg twice a day  Appreciate hospitalist consult    Glucose range of 103-259 in the last 24 hours     Anemia  Hemoglobin stable at 11.9 and July 19  Continue to monitor     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  Additionally, it has only been 2 months since his last injection.  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  I spent greater than 50% of the time for patient care, counseling, and coordination on this date, including unit/floor time, and face-to-face time with the patient as per interval events and assessment and plan above. Topics discussed included functional progress, ambulation, home safety, discharge planning      Higinio Pagan M.D.  7/26/2018

## 2018-07-27 LAB
GLUCOSE BLD-MCNC: 150 MG/DL (ref 65–99)
GLUCOSE BLD-MCNC: 158 MG/DL (ref 65–99)
GLUCOSE BLD-MCNC: 202 MG/DL (ref 65–99)
GLUCOSE BLD-MCNC: 221 MG/DL (ref 65–99)

## 2018-07-27 PROCEDURE — 94760 N-INVAS EAR/PLS OXIMETRY 1: CPT

## 2018-07-27 PROCEDURE — A9270 NON-COVERED ITEM OR SERVICE: HCPCS | Performed by: PHYSICAL MEDICINE & REHABILITATION

## 2018-07-27 PROCEDURE — A9270 NON-COVERED ITEM OR SERVICE: HCPCS | Performed by: HOSPITALIST

## 2018-07-27 PROCEDURE — 97530 THERAPEUTIC ACTIVITIES: CPT

## 2018-07-27 PROCEDURE — 99232 SBSQ HOSP IP/OBS MODERATE 35: CPT | Performed by: HOSPITALIST

## 2018-07-27 PROCEDURE — 99232 SBSQ HOSP IP/OBS MODERATE 35: CPT | Performed by: PHYSICAL MEDICINE & REHABILITATION

## 2018-07-27 PROCEDURE — 770010 HCHG ROOM/CARE - REHAB SEMI PRIVAT*

## 2018-07-27 PROCEDURE — 97110 THERAPEUTIC EXERCISES: CPT

## 2018-07-27 PROCEDURE — 97116 GAIT TRAINING THERAPY: CPT

## 2018-07-27 PROCEDURE — 700102 HCHG RX REV CODE 250 W/ 637 OVERRIDE(OP): Performed by: HOSPITALIST

## 2018-07-27 PROCEDURE — 700101 HCHG RX REV CODE 250: Performed by: PHYSICAL MEDICINE & REHABILITATION

## 2018-07-27 PROCEDURE — 700102 HCHG RX REV CODE 250 W/ 637 OVERRIDE(OP): Performed by: PHYSICAL MEDICINE & REHABILITATION

## 2018-07-27 PROCEDURE — 700112 HCHG RX REV CODE 229: Performed by: PHYSICAL MEDICINE & REHABILITATION

## 2018-07-27 PROCEDURE — 82962 GLUCOSE BLOOD TEST: CPT

## 2018-07-27 PROCEDURE — 700111 HCHG RX REV CODE 636 W/ 250 OVERRIDE (IP): Performed by: PHYSICAL MEDICINE & REHABILITATION

## 2018-07-27 RX ORDER — INSULIN GLARGINE 100 [IU]/ML
40 INJECTION, SOLUTION SUBCUTANEOUS EVERY EVENING
Status: DISCONTINUED | OUTPATIENT
Start: 2018-07-27 | End: 2018-08-14 | Stop reason: HOSPADM

## 2018-07-27 RX ADMIN — METOPROLOL TARTRATE 25 MG: 25 TABLET ORAL at 17:20

## 2018-07-27 RX ADMIN — AMLODIPINE BESYLATE 10 MG: 5 TABLET ORAL at 05:31

## 2018-07-27 RX ADMIN — HYDRALAZINE HYDROCHLORIDE 100 MG: 25 TABLET, FILM COATED ORAL at 20:21

## 2018-07-27 RX ADMIN — CHOLECALCIFEROL TAB 25 MCG (1000 UNIT) 2000 UNITS: 25 TAB at 08:15

## 2018-07-27 RX ADMIN — DOCUSATE SODIUM 100 MG: 100 CAPSULE, LIQUID FILLED ORAL at 20:21

## 2018-07-27 RX ADMIN — ENOXAPARIN SODIUM 40 MG: 100 INJECTION SUBCUTANEOUS at 08:15

## 2018-07-27 RX ADMIN — LIDOCAINE HYDROCHLORIDE 1 APPLICATION: 20 JELLY TOPICAL at 20:32

## 2018-07-27 RX ADMIN — LACTOBACILLUS ACIDOPHILUS / LACTOBACILLUS BULGARICUS 1 PACKET: 100 MILLION CFU STRENGTH GRANULES at 08:14

## 2018-07-27 RX ADMIN — INSULIN LISPRO 4 UNITS: 100 INJECTION, SOLUTION INTRAVENOUS; SUBCUTANEOUS at 20:24

## 2018-07-27 RX ADMIN — HYDRALAZINE HYDROCHLORIDE 100 MG: 25 TABLET, FILM COATED ORAL at 14:49

## 2018-07-27 RX ADMIN — METHOCARBAMOL 750 MG: 750 TABLET, FILM COATED ORAL at 09:14

## 2018-07-27 RX ADMIN — Medication 1 TABLET: at 20:21

## 2018-07-27 RX ADMIN — LACTOBACILLUS ACIDOPHILUS / LACTOBACILLUS BULGARICUS 1 PACKET: 100 MILLION CFU STRENGTH GRANULES at 17:20

## 2018-07-27 RX ADMIN — INSULIN GLARGINE 40 UNITS: 100 INJECTION, SOLUTION SUBCUTANEOUS at 20:25

## 2018-07-27 RX ADMIN — OXYCODONE HYDROCHLORIDE AND ACETAMINOPHEN 1 TABLET: 5; 325 TABLET ORAL at 17:43

## 2018-07-27 RX ADMIN — CLINDAMYCIN HYDROCHLORIDE 300 MG: 300 CAPSULE ORAL at 08:15

## 2018-07-27 RX ADMIN — HYDRALAZINE HYDROCHLORIDE 100 MG: 25 TABLET, FILM COATED ORAL at 08:14

## 2018-07-27 RX ADMIN — INSULIN LISPRO 4 UNITS: 100 INJECTION, SOLUTION INTRAVENOUS; SUBCUTANEOUS at 17:17

## 2018-07-27 RX ADMIN — METFORMIN HYDROCHLORIDE 1000 MG: 500 TABLET, FILM COATED ORAL at 08:15

## 2018-07-27 RX ADMIN — GABAPENTIN 900 MG: 300 CAPSULE ORAL at 14:49

## 2018-07-27 RX ADMIN — DOCUSATE SODIUM 100 MG: 100 CAPSULE, LIQUID FILLED ORAL at 08:15

## 2018-07-27 RX ADMIN — METFORMIN HYDROCHLORIDE 1000 MG: 500 TABLET, FILM COATED ORAL at 17:20

## 2018-07-27 RX ADMIN — LIDOCAINE HYDROCHLORIDE 2 %: 20 JELLY TOPICAL at 14:48

## 2018-07-27 RX ADMIN — CLINDAMYCIN HYDROCHLORIDE 300 MG: 300 CAPSULE ORAL at 20:21

## 2018-07-27 RX ADMIN — LACTOBACILLUS ACIDOPHILUS / LACTOBACILLUS BULGARICUS 1 PACKET: 100 MILLION CFU STRENGTH GRANULES at 11:40

## 2018-07-27 RX ADMIN — METOPROLOL TARTRATE 25 MG: 25 TABLET ORAL at 05:31

## 2018-07-27 RX ADMIN — OXYCODONE HYDROCHLORIDE AND ACETAMINOPHEN 1 TABLET: 5; 325 TABLET ORAL at 08:18

## 2018-07-27 RX ADMIN — GABAPENTIN 900 MG: 300 CAPSULE ORAL at 20:21

## 2018-07-27 RX ADMIN — GABAPENTIN 900 MG: 300 CAPSULE ORAL at 08:14

## 2018-07-27 RX ADMIN — LISINOPRIL 40 MG: 20 TABLET ORAL at 08:15

## 2018-07-27 ASSESSMENT — ENCOUNTER SYMPTOMS
ABDOMINAL PAIN: 0
CHILLS: 0
PALPITATIONS: 0
FEVER: 0
VOMITING: 0
NECK PAIN: 1
COUGH: 0
SHORTNESS OF BREATH: 0
EYES NEGATIVE: 1
BACK PAIN: 1
NAUSEA: 0

## 2018-07-27 ASSESSMENT — PAIN SCALES - GENERAL
PAINLEVEL_OUTOF10: 3
PAINLEVEL_OUTOF10: 7
PAINLEVEL_OUTOF10: 0
PAINLEVEL_OUTOF10: 7
PAINLEVEL_OUTOF10: 4
PAINLEVEL_OUTOF10: 6

## 2018-07-27 NOTE — PROGRESS NOTES
DATE OF SERVICE:  07/25/2018    The patient was seen for a followup visit.  The patient complained of   increased pain.  He said his pain is averaging a 7-8/10.  A number of issues   were talked about with regards to his pain.  He was encouraged to raise   questions with his treating physician regarding pain management strategies.    The patient was also encouraged to pace himself better in the gym and to be   vocal when he feels too much discomfort.       ____________________________________     RAN PERALES, PHD    ANGELES / TARAH    DD:  07/27/2018 09:17:18  DT:  07/27/2018 15:30:49    D#:  [id number]  Job#:  [id number]

## 2018-07-27 NOTE — CARE PLAN
Problem: Infection  Goal: Will remain free from infection  Surgical incision on the lower back and post neck with staples, well approximated, no s/s of infection noted.    Problem: Venous Thromboembolism (VTW)/Deep Vein Thrombosis (DVT) Prevention:  Goal: Patient will participate in Venous Thrombosis (VTE)/Deep Vein Thrombosis (DVT)Prevention Measures  Pt on lovenox 40 mg daily for DVT prophylaxis

## 2018-07-27 NOTE — PROGRESS NOTES
"Rehab Progress Note     Encounter date: 7/27/2018  Today I met with the patient face to face in his room    Chief Complaint:  Cervical myelopathy (HCC) , hand pain     Interval Events (subjective)  Mr. Ma continues to be somewhat perseverative on the paresthesias and dysesthesias in his hands.  He denies any fevers, chills, chest pain, or shortness of breath.  His AFOs were delivered today, but he is not sure if he is walked with them yet.  We discussed various potential interventions for his hand pain.  He is not interested in increasing his gabapentin.  We discussed scheduling the lidocaine jelly, and he would like to try this.    Objective:  VITAL SIGNS: /69   Pulse 65   Temp 36.7 °C (98 °F)   Resp 18   Ht 1.905 m (6' 3\")   Wt 114.6 kg (252 lb 10.4 oz)   SpO2 96%   BMI 31.58 kg/m²     Recent Results (from the past 72 hour(s))   ACCU-CHEK GLUCOSE    Collection Time: 07/24/18  4:51 PM   Result Value Ref Range    Glucose - Accu-Ck 177 (H) 65 - 99 mg/dL   ACCU-CHEK GLUCOSE    Collection Time: 07/24/18  8:23 PM   Result Value Ref Range    Glucose - Accu-Ck 278 (H) 65 - 99 mg/dL   ACCU-CHEK GLUCOSE    Collection Time: 07/25/18  7:00 AM   Result Value Ref Range    Glucose - Accu-Ck 183 (H) 65 - 99 mg/dL   ACCU-CHEK GLUCOSE    Collection Time: 07/25/18 11:13 AM   Result Value Ref Range    Glucose - Accu-Ck 200 (H) 65 - 99 mg/dL   ACCU-CHEK GLUCOSE    Collection Time: 07/25/18  4:53 PM   Result Value Ref Range    Glucose - Accu-Ck 169 (H) 65 - 99 mg/dL   ACCU-CHEK GLUCOSE    Collection Time: 07/25/18  8:19 PM   Result Value Ref Range    Glucose - Accu-Ck 259 (H) 65 - 99 mg/dL   CBC WITHOUT DIFFERENTIAL    Collection Time: 07/26/18  5:40 AM   Result Value Ref Range    WBC 5.1 4.8 - 10.8 K/uL    RBC 4.13 (L) 4.70 - 6.10 M/uL    Hemoglobin 11.7 (L) 14.0 - 18.0 g/dL    Hematocrit 36.5 (L) 42.0 - 52.0 %    MCV 88.4 81.4 - 97.8 fL    MCH 28.3 27.0 - 33.0 pg    MCHC 32.1 (L) 33.7 - 35.3 g/dL    RDW 45.6 35.9 - " 50.0 fL    Platelet Count 271 164 - 446 K/uL    MPV 10.6 9.0 - 12.9 fL   BASIC METABOLIC PANEL    Collection Time: 07/26/18  5:40 AM   Result Value Ref Range    Sodium 138 135 - 145 mmol/L    Potassium 4.3 3.6 - 5.5 mmol/L    Chloride 103 96 - 112 mmol/L    Co2 28 20 - 33 mmol/L    Glucose 103 (H) 65 - 99 mg/dL    Bun 24 (H) 8 - 22 mg/dL    Creatinine 0.97 0.50 - 1.40 mg/dL    Calcium 9.3 8.5 - 10.5 mg/dL    Anion Gap 7.0 0.0 - 11.9   ESTIMATED GFR    Collection Time: 07/26/18  5:40 AM   Result Value Ref Range    GFR If African American >60 >60 mL/min/1.73 m 2    GFR If Non African American >60 >60 mL/min/1.73 m 2   ACCU-CHEK GLUCOSE    Collection Time: 07/26/18  7:27 AM   Result Value Ref Range    Glucose - Accu-Ck 103 (H) 65 - 99 mg/dL   ACCU-CHEK GLUCOSE    Collection Time: 07/26/18 11:07 AM   Result Value Ref Range    Glucose - Accu-Ck 154 (H) 65 - 99 mg/dL   ACCU-CHEK GLUCOSE    Collection Time: 07/26/18  5:15 PM   Result Value Ref Range    Glucose - Accu-Ck 199 (H) 65 - 99 mg/dL   ACCU-CHEK GLUCOSE    Collection Time: 07/26/18  8:28 PM   Result Value Ref Range    Glucose - Accu-Ck 275 (H) 65 - 99 mg/dL   ACCU-CHEK GLUCOSE    Collection Time: 07/27/18  7:17 AM   Result Value Ref Range    Glucose - Accu-Ck 150 (H) 65 - 99 mg/dL   ACCU-CHEK GLUCOSE    Collection Time: 07/27/18 11:39 AM   Result Value Ref Range    Glucose - Accu-Ck 158 (H) 65 - 99 mg/dL       Current Facility-Administered Medications   Medication Frequency   • lidocaine (XYLOCAINE) 2 % jelly Q8HRS   • insulin glargine (LANTUS) injection 40 Units Q EVENING   • oxyCODONE-acetaminophen (PERCOCET) 5-325 MG per tablet 1 Tab Q6HRS PRN   • metoprolol (LOPRESSOR) tablet 25 mg TWICE DAILY   • lactobacillus granules (LACTINEX/FLORANEX) packet 1 Packet TID WITH MEALS   • insulin lispro (HUMALOG) injection 0-12 Units 4X/DAY ACHS   • gabapentin (NEURONTIN) capsule 900 mg TID   • methocarbamol (ROBAXIN) tablet 750 mg Q6HRS PRN   • vitamin D (cholecalciferol)  tablet 2,000 Units DAILY   • lisinopril (PRINIVIL) tablet 40 mg DAILY   • Respiratory Care per Protocol Continuous RT   • Pharmacy Consult Request ...Pain Management Review 1 Each PRN   • acetaminophen (TYLENOL) tablet 650 mg Q4HRS PRN   • artificial tears 1.4 % ophthalmic solution 1 Drop PRN   • benzocaine-menthol (CEPACOL) lozenge 1 Lozenge Q2HRS PRN   • hydrALAZINE (APRESOLINE) tablet 25 mg Q8HRS PRN   • mag hydrox-al hydrox-simeth (MAALOX PLUS ES or MYLANTA DS) suspension 20 mL Q2HRS PRN   • ondansetron (ZOFRAN ODT) dispertab 4 mg 4X/DAY PRN    Or   • ondansetron (ZOFRAN) syringe/vial injection 4 mg 4X/DAY PRN   • traZODone (DESYREL) tablet 50 mg QHS PRN   • sodium chloride (OCEAN) 0.65 % nasal spray 2 Spray PRN   • bisacodyl (DULCOLAX) suppository 10 mg Q24HRS PRN   • magnesium hydroxide (MILK OF MAGNESIA) suspension 30 mL QDAY PRN   • senna-docusate (PERICOLACE or SENOKOT S) 8.6-50 MG per tablet 1 Tab Nightly   • amLODIPine (NORVASC) tablet 10 mg Q DAY   • clindamycin (CLEOCIN) capsule 300 mg BID   • enoxaparin (LOVENOX) inj 40 mg DAILY   • fluticasone (FLONASE) nasal spray 50 mcg DAILY   • hydrALAZINE (APRESOLINE) tablet 100 mg TID   • metFORMIN (GLUCOPHAGE) tablet 1,000 mg BID WITH MEALS   • docusate sodium (COLACE) capsule 100 mg BID       Exam Date: 7/27/2018    General:  Awake, alert, no acute distress  HEENT:  Wearing [place] cervical collar  Cardiac: regular rate and rhythm  Lungs: clear to auscultation bilaterally.   Abdomen: soft; non tender, non distended, bowel sounds present and normoactive  Extremities:  stable intrinsic hand muscle wasting,   Wearing bilateral articulated AFOs today    Neuro:   Ongoing ataxic hand movements, no active dorsiflexion in ankles, patient had significant difficulty reading and making sense of his therapy schedule for the day.  He was unable to recall 3 items after 5 minutes          Orders Placed This Encounter   Procedures   • Diet Order Diabetic     Standing Status:    Standing     Number of Occurrences:   1     Order Specific Question:   Diet:     Answer:   Diabetic [3]       Assessment:  Active Hospital Problems    Diagnosis   • *Cervical myelopathy (HCC)   • HTN (hypertension)   • CVA (cerebral vascular accident) (HCC)   • Diabetes mellitus with neurological manifestations, controlled (HCC)   • Impaired activities of daily living   • Lumbar stenosis with neurogenic claudication   • Pain   • Cervical stenosis of spine   • Atrial fibrillation [427.31]   • Chronic antibiotic suppression       Medical Decision Making and Plan:  Mr. Ma is a 67-year-old male admitted for rehabilitation on July 18 in the setting of cervical myelopathy and lumbar spinal stenosis     Cervical myelopathy status post C2-C6 laminectomy and C2-C4 fusion by Dr. Marsh on July 13  Lumbar spinal stenosis with neurogenic claudication status post L2-L5 laminectomies by Dr. Marsh on July 13  Continue comprehensive rehabilitation  Follow-up with Dr. Marsh as scheduled on 7/30  [place] collar on at all times  Not cleared to resume therapeutic anticoagulation until follow-up with/clearance by Dr. Marsh  Custom bilateral articulated AFOs have been created     History of strokes  Cognitive impairment  Atrial fibrillation   Not to resume Coumadin or therapeutic Lovenox until cleared by Dr. Marsh    Due to ongoing difficulty displayed on examination with memory and reasoning, I have requested a speech and language pathology evaluation for cognition.     Pain  Neuropathic pain/allodynia  Tylenol as needed   Gabapentin 900 mg 3 times a day  Robaxin 750 mg   Oxycodone/APAP 5/325 mg every 6 hours as needed  Lidocaine jelly scheduled every 8 hours for hand pain    Continuing to discuss extensively with patient.  He is not interested in further medication increases.     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  Additionally, it has only been 2 months since his last injection.  Discussed conservative cares     Constipation  Colace 100 g twice a day  Pericolace qhs  Milk of magnesia as needed  Dulcolax suppository daily as needed     Seasonal allergies  Flonase daily     Incidental left thyroid mass seen on MRI in May 2018  Will need follow-up with primary care  TSH of 0.56 on admission    Vitamin D deficiency  Vitamin D was 26 on admission so we began supplementation with 2000 units of cholecalciferol daily      DVT prophylaxis  Lovenox 40 mg daily     Estimated discharge: August 7    Total time:  26 minutes.  I spent greater than 50% of the time for patient care, counseling, and coordination on this date, including unit/floor time, and face-to-face time with the patient as per interval events and assessment and plan above. Topics discussed included discharge planning, AFOs, cognition, lidocaine jelly, hand pain       Higinio Pagan M.D.  7/27/2018

## 2018-07-27 NOTE — PROGRESS NOTES
Hospital Medicine Progress Note, Adult, Complex               Author: Patel Sabrina Date & Time created: 7/27/2018  1:19 PM     Interval History:  CC - HTN, DM    SBP usually in 130s range w/ occasional 140s.  No other issues.  U/S results noted.    Review of Systems:  Review of Systems   Constitutional: Negative for chills and fever.   HENT: Negative.    Eyes: Negative.    Respiratory: Negative for cough and shortness of breath.    Cardiovascular: Positive for leg swelling. Negative for chest pain and palpitations.   Gastrointestinal: Negative for abdominal pain, nausea and vomiting.   Genitourinary: Negative.    Musculoskeletal: Positive for back pain and neck pain.        Wound pain   Skin: Negative.    Endo/Heme/Allergies: Negative.        Physical Exam:  Physical Exam   Constitutional: He is oriented to person, place, and time. No distress.   HENT:   Head: Normocephalic and atraumatic.   Right Ear: External ear normal.   Left Ear: External ear normal.   Eyes: Conjunctivae and EOM are normal. Right eye exhibits no discharge. Left eye exhibits no discharge.   Neck:   C-collar   Cardiovascular:   irreg irreg   Pulmonary/Chest: No stridor. No respiratory distress. He has no wheezes.   Decreased BS   Abdominal: Soft. Bowel sounds are normal. He exhibits no distension. There is no tenderness. There is no rebound and no guarding.   Musculoskeletal: He exhibits edema.   2+ edema RLE, 1+ LLE   Neurological: He is alert and oriented to person, place, and time.   Skin: Skin is warm and dry. He is not diaphoretic.   Vitals reviewed.      Labs:        Invalid input(s): [id number]      Recent Labs      07/26/18   0540   SODIUM  138   POTASSIUM  4.3   CHLORIDE  103   CO2  28   BUN  24*   CREATININE  0.97   CALCIUM  9.3     Recent Labs      07/26/18   0540   GLUCOSE  103*     Recent Labs      07/26/18   0540   RBC  4.13*   HEMOGLOBIN  11.7*   HEMATOCRIT  36.5*   PLATELETCT  271     Recent Labs      07/26/18   0540   WBC  5.1            Hemodynamics:  Temp (24hrs), Av.8 °C (98.2 °F), Min:36.7 °C (98 °F), Max:36.8 °C (98.3 °F)  Temperature: 36.7 °C (98 °F)  Pulse  Av.2  Min: 57  Max: 113   Blood Pressure : 129/69     Respiratory:    Respiration: 18, Pulse Oximetry: 96 %, O2 Daily Delivery Respiratory : Room Air with O2 Available     Work Of Breathing / Effort: Mild  RUL Breath Sounds: Clear, RML Breath Sounds: Clear;Diminished, RLL Breath Sounds: Diminished, AI Breath Sounds: Clear, LLL Breath Sounds: Diminished  Fluids:    Intake/Output Summary (Last 24 hours) at 18 1319  Last data filed at 18 1200   Gross per 24 hour   Intake              720 ml   Output             1400 ml   Net             -680 ml        GI/Nutrition:  Orders Placed This Encounter   Procedures   • Diet Order Diabetic     Standing Status:   Standing     Number of Occurrences:   1     Order Specific Question:   Diet:     Answer:   Diabetic [3]         Medical Decision Making, by Problem:  S/P CERVICAL/LUMBAR SPINE SURGERIES - cervical collar    H/O CVA    JANNIE - on home BiPAP?    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 w/ Clindamycin, probiotic added to help prevent C Dif infxn, Venous Duplex negative DVT    THYROID MASS - needs outpt F/U    ANEMIA - follow H/H    AZOTEMIA - renal fxn improved off HCTZ and w/ PO fluids    VIT D DEF - supplementing    ALLERGY TO STATINS        Quality-Core Measures   Reviewed items::  Medications reviewed and Labs reviewed  DVT prophylaxis pharmacological::  Enoxaparin (Lovenox)  Antibiotics:  Treating active infection/contamination beyond 24 hours perioperative coverage  Assessed for rehabilitation services:  Patient was assess for and/or received  rehabilitation services during this hospitalization

## 2018-07-27 NOTE — REHAB-PHARMACY IDT TEAM NOTE
Pharmacy   Pharmacy  Antibiotics/Antifungals/Antivirals:  Medication:      Active Orders     Ordered     Ordering Provider       Wed Jul 18, 2018  2:28 PM    07/18/18 1428  clindamycin (CLEOCIN) capsule 300 mg  2 TIMES DAILY      Higinio Pagan M.D.        Route:         po  Stop Date:  On going   Reason: chronic left knee staph infection  Antihypertensives/Cardiac:  Medication:    Orders (72h ago through future)    Start     Ordered    07/25/18 0900  cloNIDine (CATAPRES) 0.3 MG/24HR 1 Patch  EVERY WEDNESDAY,   Status:  Discontinued      07/18/18 1428    07/24/18 1630  metoprolol (LOPRESSOR) tablet 25 mg  TWICE DAILY      07/24/18 1601    07/20/18 0900  lisinopril (PRINIVIL) tablet 40 mg  DAILY      07/19/18 1613    07/19/18 0600  amLODIPine (NORVASC) tablet 10 mg  Q DAY      07/18/18 1427    07/18/18 1500  hydrALAZINE (APRESOLINE) tablet 100 mg  3 TIMES DAILY      07/18/18 1428    07/18/18 1428  hydrALAZINE (APRESOLINE) tablet 25 mg  EVERY 8 HOURS PRN      07/18/18 1428        Patient Vitals for the past 24 hrs:   BP Pulse   07/27/18 1457 146/73 78   07/27/18 0850 - 65   07/27/18 0700 129/69 (!) 57   07/27/18 0500 142/72 67   07/26/18 1823 148/77 72       Anticoagulation:  Medication:  Lovenox   INR:      Other key medications: gabapentin, lantus, humalog  A review of the medication list reveals no issues at this time. Patient is currently on an antihypertensive. Recommend home blood pressure monitoring/recording if antihypertensive regimen continues.  Section completed by:  Mallika Lamar [facility]

## 2018-07-27 NOTE — CARE PLAN
Problem: Safety  Goal: Will remain free from injury  Patient uses call light appropriately for assistance as needed/wanted. Bed locked, in lowest position.    Problem: Bowel/Gastric:  Goal: Normal bowel function is maintained or improved  Patient taking scheduled bowel medication; bowel sounds WNL x4 quadrants and abdomin soft and non-tender.

## 2018-07-27 NOTE — FLOWSHEET NOTE
07/27/18 0850   Events/Summary/Plan   Events/Summary/Plan Up in wheelchair, 02 spot check done   Chest Exam   Respiration 18   Pulse 65   Oximetry   #Pulse Oximetry (Single Determination) Yes   Oxygen   Pulse Oximetry 96 %   O2 (LPM) 0   O2 Daily Delivery Respiratory  Room Air with O2 Available

## 2018-07-28 LAB
GLUCOSE BLD-MCNC: 109 MG/DL (ref 65–99)
GLUCOSE BLD-MCNC: 185 MG/DL (ref 65–99)
GLUCOSE BLD-MCNC: 221 MG/DL (ref 65–99)
GLUCOSE BLD-MCNC: 294 MG/DL (ref 65–99)

## 2018-07-28 PROCEDURE — 700102 HCHG RX REV CODE 250 W/ 637 OVERRIDE(OP): Performed by: PHYSICAL MEDICINE & REHABILITATION

## 2018-07-28 PROCEDURE — 700111 HCHG RX REV CODE 636 W/ 250 OVERRIDE (IP): Performed by: PHYSICAL MEDICINE & REHABILITATION

## 2018-07-28 PROCEDURE — A9270 NON-COVERED ITEM OR SERVICE: HCPCS | Performed by: PHYSICAL MEDICINE & REHABILITATION

## 2018-07-28 PROCEDURE — 770010 HCHG ROOM/CARE - REHAB SEMI PRIVAT*

## 2018-07-28 PROCEDURE — A9270 NON-COVERED ITEM OR SERVICE: HCPCS | Performed by: HOSPITALIST

## 2018-07-28 PROCEDURE — 92523 SPEECH SOUND LANG COMPREHEN: CPT

## 2018-07-28 PROCEDURE — 700102 HCHG RX REV CODE 250 W/ 637 OVERRIDE(OP): Performed by: HOSPITALIST

## 2018-07-28 PROCEDURE — 700112 HCHG RX REV CODE 229: Performed by: PHYSICAL MEDICINE & REHABILITATION

## 2018-07-28 PROCEDURE — 99232 SBSQ HOSP IP/OBS MODERATE 35: CPT | Performed by: HOSPITALIST

## 2018-07-28 PROCEDURE — 82962 GLUCOSE BLOOD TEST: CPT | Mod: 91

## 2018-07-28 RX ADMIN — OXYCODONE HYDROCHLORIDE AND ACETAMINOPHEN 1 TABLET: 5; 325 TABLET ORAL at 21:03

## 2018-07-28 RX ADMIN — Medication 1 TABLET: at 21:06

## 2018-07-28 RX ADMIN — HYDRALAZINE HYDROCHLORIDE 100 MG: 25 TABLET, FILM COATED ORAL at 08:48

## 2018-07-28 RX ADMIN — DOCUSATE SODIUM 100 MG: 100 CAPSULE, LIQUID FILLED ORAL at 21:05

## 2018-07-28 RX ADMIN — AMLODIPINE BESYLATE 10 MG: 5 TABLET ORAL at 05:02

## 2018-07-28 RX ADMIN — DOCUSATE SODIUM 100 MG: 100 CAPSULE, LIQUID FILLED ORAL at 08:49

## 2018-07-28 RX ADMIN — INSULIN LISPRO 6 UNITS: 100 INJECTION, SOLUTION INTRAVENOUS; SUBCUTANEOUS at 20:59

## 2018-07-28 RX ADMIN — METOPROLOL TARTRATE 25 MG: 25 TABLET ORAL at 17:26

## 2018-07-28 RX ADMIN — METFORMIN HYDROCHLORIDE 1000 MG: 500 TABLET, FILM COATED ORAL at 17:26

## 2018-07-28 RX ADMIN — HYDRALAZINE HYDROCHLORIDE 100 MG: 25 TABLET, FILM COATED ORAL at 15:28

## 2018-07-28 RX ADMIN — INSULIN LISPRO 2 UNITS: 100 INJECTION, SOLUTION INTRAVENOUS; SUBCUTANEOUS at 11:30

## 2018-07-28 RX ADMIN — ENOXAPARIN SODIUM 40 MG: 100 INJECTION SUBCUTANEOUS at 08:48

## 2018-07-28 RX ADMIN — LACTOBACILLUS ACIDOPHILUS / LACTOBACILLUS BULGARICUS 1 PACKET: 100 MILLION CFU STRENGTH GRANULES at 11:32

## 2018-07-28 RX ADMIN — METFORMIN HYDROCHLORIDE 1000 MG: 500 TABLET, FILM COATED ORAL at 08:48

## 2018-07-28 RX ADMIN — INSULIN LISPRO 4 UNITS: 100 INJECTION, SOLUTION INTRAVENOUS; SUBCUTANEOUS at 17:20

## 2018-07-28 RX ADMIN — LISINOPRIL 40 MG: 20 TABLET ORAL at 08:49

## 2018-07-28 RX ADMIN — CLINDAMYCIN HYDROCHLORIDE 300 MG: 300 CAPSULE ORAL at 21:05

## 2018-07-28 RX ADMIN — HYDRALAZINE HYDROCHLORIDE 100 MG: 25 TABLET, FILM COATED ORAL at 21:05

## 2018-07-28 RX ADMIN — FLUTICASONE PROPIONATE 50 MCG: 50 SPRAY, METERED NASAL at 08:55

## 2018-07-28 RX ADMIN — CLINDAMYCIN HYDROCHLORIDE 300 MG: 300 CAPSULE ORAL at 08:49

## 2018-07-28 RX ADMIN — LACTOBACILLUS ACIDOPHILUS / LACTOBACILLUS BULGARICUS 1 PACKET: 100 MILLION CFU STRENGTH GRANULES at 09:00

## 2018-07-28 RX ADMIN — METOPROLOL TARTRATE 25 MG: 25 TABLET ORAL at 05:03

## 2018-07-28 RX ADMIN — GABAPENTIN 900 MG: 300 CAPSULE ORAL at 08:49

## 2018-07-28 RX ADMIN — GABAPENTIN 900 MG: 300 CAPSULE ORAL at 15:29

## 2018-07-28 RX ADMIN — CHOLECALCIFEROL TAB 25 MCG (1000 UNIT) 2000 UNITS: 25 TAB at 08:49

## 2018-07-28 RX ADMIN — GABAPENTIN 900 MG: 300 CAPSULE ORAL at 21:05

## 2018-07-28 RX ADMIN — INSULIN GLARGINE 40 UNITS: 100 INJECTION, SOLUTION SUBCUTANEOUS at 20:59

## 2018-07-28 RX ADMIN — LACTOBACILLUS ACIDOPHILUS / LACTOBACILLUS BULGARICUS 1 PACKET: 100 MILLION CFU STRENGTH GRANULES at 17:19

## 2018-07-28 ASSESSMENT — ENCOUNTER SYMPTOMS
FEVER: 0
NECK PAIN: 1
COUGH: 0
VOMITING: 0
BACK PAIN: 1
SHORTNESS OF BREATH: 0
EYES NEGATIVE: 1
NAUSEA: 0
PALPITATIONS: 0
CHILLS: 0
ABDOMINAL PAIN: 0

## 2018-07-28 ASSESSMENT — PAIN SCALES - GENERAL: PAINLEVEL_OUTOF10: 6

## 2018-07-28 NOTE — REHAB-CM IDT TEAM NOTE
Case Management    DC Planning  DC destination/dispostion:  Patient lives alone in a single level home with  4 entry stairs.     DC Needs: Patient has a fww and shower bench.  He also has his own C Pap machine and does not use oxygen with this. He is followed at [facility] for his anticoagulation management.  I will follow his progress for home health therapy versus outpatient.     Barriers to discharge:   Lives alone and his sons are working     Strengths: he is motivated to get better.     Section completed by:  Hafsa Ayoub R.N.

## 2018-07-28 NOTE — PROGRESS NOTES
Hospital Medicine Progress Note, Adult, Complex               Author: Patel Sabrina Date & Time created: 7/28/2018  3:21 PM     Interval History:  CC - HTN, DM    Pt seen and examined in dining room, no complaints.    Review of Systems:  Review of Systems   Constitutional: Negative for chills and fever.   HENT: Negative.    Eyes: Negative.    Respiratory: Negative for cough and shortness of breath.    Cardiovascular: Positive for leg swelling. Negative for chest pain and palpitations.   Gastrointestinal: Negative for abdominal pain, nausea and vomiting.   Genitourinary: Negative.    Musculoskeletal: Positive for back pain and neck pain.        Wound pain   Skin: Negative.    Endo/Heme/Allergies: Negative.        Physical Exam:  Physical Exam   Constitutional: He is oriented to person, place, and time. No distress.   HENT:   Head: Normocephalic and atraumatic.   Right Ear: External ear normal.   Left Ear: External ear normal.   Eyes: Conjunctivae and EOM are normal. Right eye exhibits no discharge. Left eye exhibits no discharge.   Neck:   C-collar   Cardiovascular:   irreg irreg   Pulmonary/Chest: No stridor. No respiratory distress. He has no wheezes.   Decreased BS   Abdominal: Soft. Bowel sounds are normal. He exhibits no distension. There is no tenderness. There is no rebound and no guarding.   Musculoskeletal: He exhibits edema.   1+ edema RLE, trace LLE   Neurological: He is alert and oriented to person, place, and time.   Skin: Skin is warm and dry. He is not diaphoretic.   Vitals reviewed.      Labs:        Invalid input(s): [id number]      Recent Labs      07/26/18   0540   SODIUM  138   POTASSIUM  4.3   CHLORIDE  103   CO2  28   BUN  24*   CREATININE  0.97   CALCIUM  9.3     Recent Labs      07/26/18   0540   GLUCOSE  103*     Recent Labs      07/26/18   0540   RBC  4.13*   HEMOGLOBIN  11.7*   HEMATOCRIT  36.5*   PLATELETCT  271     Recent Labs      07/26/18   0540   WBC  5.1           Hemodynamics:  Temp  (24hrs), Av.6 °C (97.8 °F), Min:36.3 °C (97.3 °F), Max:36.9 °C (98.4 °F)  Temperature: 36.3 °C (97.4 °F)  Pulse  Av.8  Min: 57  Max: 113   Blood Pressure : 134/72     Respiratory:    Respiration: 20, Pulse Oximetry: 96 %        RUL Breath Sounds: Clear, RML Breath Sounds: Clear;Diminished, RLL Breath Sounds: Diminished, AI Breath Sounds: Clear, LLL Breath Sounds: Diminished  Fluids:    Intake/Output Summary (Last 24 hours) at 18 1521  Last data filed at 18 1300   Gross per 24 hour   Intake              930 ml   Output             1800 ml   Net             -870 ml        GI/Nutrition:  Orders Placed This Encounter   Procedures   • Diet Order Diabetic     Standing Status:   Standing     Number of Occurrences:   1     Order Specific Question:   Diet:     Answer:   Diabetic [3]         Medical Decision Making, by Problem:  S/P CERVICAL/LUMBAR SPINE SURGERIES - cervical collar    H/O CVA    JANNIE - on home BiPAP?    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 w/ Clindamycin, probiotic added to help prevent C Dif infxn, Venous Duplex negative DVT    THYROID MASS - needs outpt F/U    ANEMIA - follow H/H    AZOTEMIA - renal fxn improved off HCTZ and w/ PO fluids    VIT D DEF - supplementing    ALLERGY TO STATINS        Quality-Core Measures   Reviewed items::  Medications reviewed and Labs reviewed  DVT prophylaxis pharmacological::  Enoxaparin (Lovenox)  Antibiotics:  Treating active infection/contamination beyond 24 hours perioperative coverage  Assessed for rehabilitation services:  Patient was assess for and/or received rehabilitation services during this hospitalization

## 2018-07-28 NOTE — CARE PLAN
Problem: Infection  Goal: Will remain free from infection  Pt is afebrile and VSS. No s/s of infection.     Problem: Skin Integrity  Goal: Risk for impaired skin integrity will decrease  Posterior neck and lower back incision are well approximated with staples CDI, no drainage noted.     Problem: GLYCEMIA IMBALANCE  Goal: Clinical indication of glycemia balance is achieved  ACHS 202, administered scheduled 2 units of humalog per sliding scale and 40 units of lantus. No s/s of hypo/hyperglycemia noted. HS snack given.

## 2018-07-28 NOTE — PROGRESS NOTES
"0935 Observed on the floor in his room, in a seated position, in front of the wc, next to the bed. States that he stood to pull up his pants and went he went to sit down, missed the wc seat and landed on the floor. Denies injury or head strike. 3 assists to stand and place back in be. No pain on standing and skin intact. VS stable, denies vertigo. Dr Chandler notified.I spoke with pt re family notification and he says his son, Chris, will be here soon and he will tell him. Did not want me to call him as he states \"I did not fall.\"  "

## 2018-07-28 NOTE — THERAPY
Speech Therapy Initial Plan of Care Note    1) Assessment:  Patient is 67 y.o. male with a diagnosis of C2-6 lami, C2-4 posterior fusion and L2-5 lami.  .  Additional factors influencing patient status / progress (ie: cognitive factors, co-morbidities, social support, etc): lives alone, states he has two sons, two sisters and a mother in town, 4 steps to enter mobile home, pain, cervical and lumbar spine precautions, has remote history of strokes x 2.  Long term and short term goals have been discussed with patient and they are in agreement.  2) Plan:  Recommend Speech Therapy 30-60 minutes per day 5-6 days per week for 1-2 weeks for the following treatments:  SLP Speech Language Treatment, SLP Self Care / ADL Training , SLP Cognitive Skill Development and SLP Group Treatment.  3) Goals:  Please refer to care plan for goals.

## 2018-07-29 LAB
GLUCOSE BLD-MCNC: 107 MG/DL (ref 65–99)
GLUCOSE BLD-MCNC: 137 MG/DL (ref 65–99)
GLUCOSE BLD-MCNC: 155 MG/DL (ref 65–99)
GLUCOSE BLD-MCNC: 191 MG/DL (ref 65–99)

## 2018-07-29 PROCEDURE — 99232 SBSQ HOSP IP/OBS MODERATE 35: CPT | Performed by: HOSPITALIST

## 2018-07-29 PROCEDURE — A9270 NON-COVERED ITEM OR SERVICE: HCPCS | Performed by: HOSPITALIST

## 2018-07-29 PROCEDURE — 94760 N-INVAS EAR/PLS OXIMETRY 1: CPT

## 2018-07-29 PROCEDURE — 700102 HCHG RX REV CODE 250 W/ 637 OVERRIDE(OP): Performed by: PHYSICAL MEDICINE & REHABILITATION

## 2018-07-29 PROCEDURE — 700112 HCHG RX REV CODE 229: Performed by: PHYSICAL MEDICINE & REHABILITATION

## 2018-07-29 PROCEDURE — 770010 HCHG ROOM/CARE - REHAB SEMI PRIVAT*

## 2018-07-29 PROCEDURE — A9270 NON-COVERED ITEM OR SERVICE: HCPCS | Performed by: PHYSICAL MEDICINE & REHABILITATION

## 2018-07-29 PROCEDURE — 700102 HCHG RX REV CODE 250 W/ 637 OVERRIDE(OP): Performed by: HOSPITALIST

## 2018-07-29 PROCEDURE — 700111 HCHG RX REV CODE 636 W/ 250 OVERRIDE (IP): Performed by: PHYSICAL MEDICINE & REHABILITATION

## 2018-07-29 PROCEDURE — 700101 HCHG RX REV CODE 250: Performed by: PHYSICAL MEDICINE & REHABILITATION

## 2018-07-29 PROCEDURE — 82962 GLUCOSE BLOOD TEST: CPT | Mod: 91

## 2018-07-29 RX ADMIN — Medication 1 TABLET: at 20:28

## 2018-07-29 RX ADMIN — CLINDAMYCIN HYDROCHLORIDE 300 MG: 300 CAPSULE ORAL at 08:39

## 2018-07-29 RX ADMIN — LISINOPRIL 40 MG: 20 TABLET ORAL at 08:40

## 2018-07-29 RX ADMIN — METFORMIN HYDROCHLORIDE 1000 MG: 500 TABLET, FILM COATED ORAL at 17:05

## 2018-07-29 RX ADMIN — METOPROLOL TARTRATE 25 MG: 25 TABLET ORAL at 05:06

## 2018-07-29 RX ADMIN — OXYCODONE HYDROCHLORIDE AND ACETAMINOPHEN 1 TABLET: 5; 325 TABLET ORAL at 17:05

## 2018-07-29 RX ADMIN — DOCUSATE SODIUM 100 MG: 100 CAPSULE, LIQUID FILLED ORAL at 20:28

## 2018-07-29 RX ADMIN — CHOLECALCIFEROL TAB 25 MCG (1000 UNIT) 2000 UNITS: 25 TAB at 08:40

## 2018-07-29 RX ADMIN — GABAPENTIN 900 MG: 300 CAPSULE ORAL at 20:28

## 2018-07-29 RX ADMIN — CLINDAMYCIN HYDROCHLORIDE 300 MG: 300 CAPSULE ORAL at 20:28

## 2018-07-29 RX ADMIN — DOCUSATE SODIUM 100 MG: 100 CAPSULE, LIQUID FILLED ORAL at 08:39

## 2018-07-29 RX ADMIN — AMLODIPINE BESYLATE 10 MG: 5 TABLET ORAL at 05:06

## 2018-07-29 RX ADMIN — GABAPENTIN 900 MG: 300 CAPSULE ORAL at 16:02

## 2018-07-29 RX ADMIN — INSULIN LISPRO 2 UNITS: 100 INJECTION, SOLUTION INTRAVENOUS; SUBCUTANEOUS at 20:29

## 2018-07-29 RX ADMIN — GABAPENTIN 900 MG: 300 CAPSULE ORAL at 08:40

## 2018-07-29 RX ADMIN — INSULIN LISPRO 2 UNITS: 100 INJECTION, SOLUTION INTRAVENOUS; SUBCUTANEOUS at 11:47

## 2018-07-29 RX ADMIN — HYDRALAZINE HYDROCHLORIDE 100 MG: 25 TABLET, FILM COATED ORAL at 20:28

## 2018-07-29 RX ADMIN — FLUTICASONE PROPIONATE 50 MCG: 50 SPRAY, METERED NASAL at 08:39

## 2018-07-29 RX ADMIN — INSULIN GLARGINE 40 UNITS: 100 INJECTION, SOLUTION SUBCUTANEOUS at 20:29

## 2018-07-29 RX ADMIN — HYDRALAZINE HYDROCHLORIDE 100 MG: 25 TABLET, FILM COATED ORAL at 08:40

## 2018-07-29 RX ADMIN — METFORMIN HYDROCHLORIDE 1000 MG: 500 TABLET, FILM COATED ORAL at 08:46

## 2018-07-29 RX ADMIN — LACTOBACILLUS ACIDOPHILUS / LACTOBACILLUS BULGARICUS 1 PACKET: 100 MILLION CFU STRENGTH GRANULES at 11:47

## 2018-07-29 RX ADMIN — LACTOBACILLUS ACIDOPHILUS / LACTOBACILLUS BULGARICUS 1 PACKET: 100 MILLION CFU STRENGTH GRANULES at 17:05

## 2018-07-29 RX ADMIN — HYDRALAZINE HYDROCHLORIDE 100 MG: 25 TABLET, FILM COATED ORAL at 16:02

## 2018-07-29 RX ADMIN — ENOXAPARIN SODIUM 40 MG: 100 INJECTION SUBCUTANEOUS at 08:39

## 2018-07-29 RX ADMIN — METOPROLOL TARTRATE 25 MG: 25 TABLET ORAL at 17:06

## 2018-07-29 RX ADMIN — LACTOBACILLUS ACIDOPHILUS / LACTOBACILLUS BULGARICUS 1 PACKET: 100 MILLION CFU STRENGTH GRANULES at 08:39

## 2018-07-29 ASSESSMENT — PAIN SCALES - GENERAL
PAINLEVEL_OUTOF10: 6
PAINLEVEL_OUTOF10: 5
PAINLEVEL_OUTOF10: 5
PAINLEVEL_OUTOF10: ASSUMED PAIN PRESENT
PAINLEVEL_OUTOF10: 0

## 2018-07-29 ASSESSMENT — ENCOUNTER SYMPTOMS
COUGH: 0
FEVER: 0
NECK PAIN: 1
NAUSEA: 0
ABDOMINAL PAIN: 0
CHILLS: 0
VOMITING: 0
PALPITATIONS: 0
BACK PAIN: 1
EYES NEGATIVE: 1
SHORTNESS OF BREATH: 0

## 2018-07-29 NOTE — FLOWSHEET NOTE
07/29/18 0810   Events/Summary/Plan   Events/Summary/Plan Up in the wheelchair, spot O2 checked   Chest Exam   Respiration 18   Pulse 65   Oximetry   #Pulse Oximetry (Single Determination) Yes   Oxygen   Home O2 Use Prior To Admission? No   Pulse Oximetry 93 %   O2 (LPM) 0   O2 Daily Delivery Respiratory  Room Air with O2 Available

## 2018-07-29 NOTE — PROGRESS NOTES
Hospital Medicine Progress Note, Adult, Complex               Author: Amanda Bennett Date & Time created: 7/29/2018  3:19 PM     Interval History:  CC - HTN, DM    Pt resting comfortably in wheelchair.    Review of Systems:  Review of Systems   Constitutional: Negative for chills and fever.   HENT: Negative.    Eyes: Negative.    Respiratory: Negative for cough and shortness of breath.    Cardiovascular: Positive for leg swelling. Negative for chest pain and palpitations.   Gastrointestinal: Negative for abdominal pain, nausea and vomiting.   Genitourinary: Negative.    Musculoskeletal: Positive for back pain and neck pain.        Wound pain   Skin: Negative.    Endo/Heme/Allergies: Negative.        Physical Exam:  Physical Exam   Constitutional: He is oriented to person, place, and time. No distress.   HENT:   Head: Normocephalic and atraumatic.   Right Ear: External ear normal.   Left Ear: External ear normal.   Eyes: Conjunctivae and EOM are normal. Right eye exhibits no discharge. Left eye exhibits no discharge.   Neck:   C-collar   Cardiovascular:   irreg irreg   Pulmonary/Chest: No stridor. No respiratory distress. He has no wheezes.   Decreased BS   Abdominal: Soft. Bowel sounds are normal. He exhibits no distension. There is no tenderness. There is no rebound and no guarding.   Musculoskeletal: He exhibits edema.   1+ edema RLE, trace LLE   Neurological: He is alert and oriented to person, place, and time.   Skin: Skin is warm and dry. He is not diaphoretic.   Vitals reviewed.      Labs:        Invalid input(s): [id number]      No results for input(s): SODIUM, POTASSIUM, CHLORIDE, CO2, BUN, CREATININE, MAGNESIUM, PHOSPHORUS, CALCIUM in the last 72 hours.  No results for input(s): ALTSGPT, ASTSGOT, ALKPHOSPHAT, TBILIRUBIN, DBILIRUBIN, GAMMAGT, AMYLASE, LIPASE, ALB, PREALBUMIN, GLUCOSE in the last 72 hours.  No results for input(s): RBC, HEMOGLOBIN, HEMATOCRIT, PLATELETCT, PROTHROMBTM, APTT, INR, IRON, FERRITIN,  TOTIRONBC in the last 72 hours.            Hemodynamics:  Temp (24hrs), Av.9 °C (98.5 °F), Min:36.9 °C (98.5 °F), Max:36.9 °C (98.5 °F)  Temperature: 36.9 °C (98.5 °F)  Pulse  Av.2  Min: 57  Max: 113   Blood Pressure : 146/75     Respiratory:    Respiration: 18, Pulse Oximetry: 93 %, O2 Daily Delivery Respiratory : Room Air with O2 Available     Work Of Breathing / Effort: Mild  RUL Breath Sounds: Clear, RML Breath Sounds: Clear;Diminished, RLL Breath Sounds: Diminished, AI Breath Sounds: Clear, LLL Breath Sounds: Diminished  Fluids:    Intake/Output Summary (Last 24 hours) at 18 1519  Last data filed at 18 1300   Gross per 24 hour   Intake             1500 ml   Output             2575 ml   Net            -1075 ml     Weight: 114 kg (251 lb 5.2 oz)  GI/Nutrition:  Orders Placed This Encounter   Procedures   • Diet Order Diabetic     Standing Status:   Standing     Number of Occurrences:   1     Order Specific Question:   Diet:     Answer:   Diabetic [3]         Medical Decision Making, by Problem:  S/P CERVICAL/LUMBAR SPINE SURGERIES - cervical collar    H/O CVA    JANNIE - on home BiPAP?    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 w/ Clindamycin, probiotic added to help prevent C Dif infxn, Venous Duplex negative DVT    THYROID MASS - needs outpt F/U    ANEMIA - follow H/H    AZOTEMIA - renal fxn improved off HCTZ and w/ PO fluids    VIT D DEF - supplementing    ALLERGY TO STATINS        Quality-Core Measures   Reviewed items::  Medications reviewed and Labs reviewed  DVT prophylaxis pharmacological::  Enoxaparin (Lovenox)  Antibiotics:  Treating active infection/contamination beyond 24 hours perioperative  coverage  Assessed for rehabilitation services:  Patient was assess for and/or received rehabilitation services during this hospitalization

## 2018-07-29 NOTE — CARE PLAN
Problem: Bowel/Gastric:  Goal: Normal bowel function is maintained or improved  Patient taking scheduled bowel medication; bowel sounds WNL x4 quadrants and abdomin soft and non-tender.     Problem: Pain Management  Goal: Pain level will decrease to patient's comfort goal  Complaint of pain to lower back described as consistent with post operative discomfort/pain. Percocet requested and given per PRN order.

## 2018-07-30 LAB
GLUCOSE BLD-MCNC: 107 MG/DL (ref 65–99)
GLUCOSE BLD-MCNC: 126 MG/DL (ref 65–99)
GLUCOSE BLD-MCNC: 142 MG/DL (ref 65–99)
GLUCOSE BLD-MCNC: 249 MG/DL (ref 65–99)

## 2018-07-30 PROCEDURE — 99233 SBSQ HOSP IP/OBS HIGH 50: CPT | Performed by: PHYSICAL MEDICINE & REHABILITATION

## 2018-07-30 PROCEDURE — 700102 HCHG RX REV CODE 250 W/ 637 OVERRIDE(OP): Performed by: HOSPITALIST

## 2018-07-30 PROCEDURE — A9270 NON-COVERED ITEM OR SERVICE: HCPCS | Performed by: PHYSICAL MEDICINE & REHABILITATION

## 2018-07-30 PROCEDURE — 99232 SBSQ HOSP IP/OBS MODERATE 35: CPT | Performed by: HOSPITALIST

## 2018-07-30 PROCEDURE — A9270 NON-COVERED ITEM OR SERVICE: HCPCS | Performed by: HOSPITALIST

## 2018-07-30 PROCEDURE — 97116 GAIT TRAINING THERAPY: CPT

## 2018-07-30 PROCEDURE — 97535 SELF CARE MNGMENT TRAINING: CPT

## 2018-07-30 PROCEDURE — 97530 THERAPEUTIC ACTIVITIES: CPT

## 2018-07-30 PROCEDURE — G0515 COGNITIVE SKILLS DEVELOPMENT: HCPCS

## 2018-07-30 PROCEDURE — 700112 HCHG RX REV CODE 229: Performed by: PHYSICAL MEDICINE & REHABILITATION

## 2018-07-30 PROCEDURE — 82962 GLUCOSE BLOOD TEST: CPT

## 2018-07-30 PROCEDURE — 700102 HCHG RX REV CODE 250 W/ 637 OVERRIDE(OP): Performed by: PHYSICAL MEDICINE & REHABILITATION

## 2018-07-30 PROCEDURE — 97110 THERAPEUTIC EXERCISES: CPT

## 2018-07-30 PROCEDURE — 770010 HCHG ROOM/CARE - REHAB SEMI PRIVAT*

## 2018-07-30 PROCEDURE — 94760 N-INVAS EAR/PLS OXIMETRY 1: CPT

## 2018-07-30 PROCEDURE — 700111 HCHG RX REV CODE 636 W/ 250 OVERRIDE (IP): Performed by: PHYSICAL MEDICINE & REHABILITATION

## 2018-07-30 RX ADMIN — Medication 1 TABLET: at 20:51

## 2018-07-30 RX ADMIN — CHOLECALCIFEROL TAB 25 MCG (1000 UNIT) 2000 UNITS: 25 TAB at 08:00

## 2018-07-30 RX ADMIN — LACTOBACILLUS ACIDOPHILUS / LACTOBACILLUS BULGARICUS 1 PACKET: 100 MILLION CFU STRENGTH GRANULES at 17:16

## 2018-07-30 RX ADMIN — ENOXAPARIN SODIUM 40 MG: 100 INJECTION SUBCUTANEOUS at 08:04

## 2018-07-30 RX ADMIN — METFORMIN HYDROCHLORIDE 1000 MG: 500 TABLET, FILM COATED ORAL at 17:16

## 2018-07-30 RX ADMIN — LACTOBACILLUS ACIDOPHILUS / LACTOBACILLUS BULGARICUS 1 PACKET: 100 MILLION CFU STRENGTH GRANULES at 08:00

## 2018-07-30 RX ADMIN — GABAPENTIN 900 MG: 300 CAPSULE ORAL at 20:52

## 2018-07-30 RX ADMIN — METOPROLOL TARTRATE 25 MG: 25 TABLET ORAL at 05:35

## 2018-07-30 RX ADMIN — LISINOPRIL 40 MG: 20 TABLET ORAL at 08:00

## 2018-07-30 RX ADMIN — INSULIN GLARGINE 40 UNITS: 100 INJECTION, SOLUTION SUBCUTANEOUS at 20:54

## 2018-07-30 RX ADMIN — METFORMIN HYDROCHLORIDE 1000 MG: 500 TABLET, FILM COATED ORAL at 08:00

## 2018-07-30 RX ADMIN — DOCUSATE SODIUM 100 MG: 100 CAPSULE, LIQUID FILLED ORAL at 08:00

## 2018-07-30 RX ADMIN — HYDRALAZINE HYDROCHLORIDE 100 MG: 25 TABLET, FILM COATED ORAL at 15:21

## 2018-07-30 RX ADMIN — CLINDAMYCIN HYDROCHLORIDE 300 MG: 300 CAPSULE ORAL at 20:52

## 2018-07-30 RX ADMIN — METOPROLOL TARTRATE 25 MG: 25 TABLET ORAL at 17:16

## 2018-07-30 RX ADMIN — INSULIN LISPRO 4 UNITS: 100 INJECTION, SOLUTION INTRAVENOUS; SUBCUTANEOUS at 20:53

## 2018-07-30 RX ADMIN — CLINDAMYCIN HYDROCHLORIDE 300 MG: 300 CAPSULE ORAL at 08:00

## 2018-07-30 RX ADMIN — LACTOBACILLUS ACIDOPHILUS / LACTOBACILLUS BULGARICUS 1 PACKET: 100 MILLION CFU STRENGTH GRANULES at 11:19

## 2018-07-30 RX ADMIN — DOCUSATE SODIUM 100 MG: 100 CAPSULE, LIQUID FILLED ORAL at 20:52

## 2018-07-30 RX ADMIN — GABAPENTIN 900 MG: 300 CAPSULE ORAL at 15:21

## 2018-07-30 RX ADMIN — GABAPENTIN 900 MG: 300 CAPSULE ORAL at 08:00

## 2018-07-30 RX ADMIN — FLUTICASONE PROPIONATE 50 MCG: 50 SPRAY, METERED NASAL at 08:05

## 2018-07-30 RX ADMIN — HYDRALAZINE HYDROCHLORIDE 100 MG: 25 TABLET, FILM COATED ORAL at 20:52

## 2018-07-30 RX ADMIN — HYDRALAZINE HYDROCHLORIDE 100 MG: 25 TABLET, FILM COATED ORAL at 08:01

## 2018-07-30 RX ADMIN — AMLODIPINE BESYLATE 10 MG: 5 TABLET ORAL at 05:34

## 2018-07-30 ASSESSMENT — ENCOUNTER SYMPTOMS
EYES NEGATIVE: 1
NECK PAIN: 1
VOMITING: 0
NAUSEA: 0
ABDOMINAL PAIN: 0
COUGH: 0
PALPITATIONS: 0
SHORTNESS OF BREATH: 0
CHILLS: 0
FEVER: 0
BACK PAIN: 1

## 2018-07-30 ASSESSMENT — PAIN SCALES - GENERAL
PAINLEVEL_OUTOF10: 0
PAINLEVEL_OUTOF10: 0

## 2018-07-30 NOTE — CARE PLAN
Problem: Bathing  Goal: STG-Within one week, patient will bathe  1) Individualized Goal: Body with CGA using AE/AD/techniques as needed.  2) Interventions: OT E Stim Attended, OT Group Therapy, OT Self Care/ADL, OT Cognitive Skill Dev, OT Community Reintegration, OT Manual Ther Technique, OT Neuro Re-Ed/Balance, OT Sensory Int Techniques, OT Therapeutic Activity, OT Evaluation and OT Therapeutic Exercise     Outcome: NOT MET  Min A    Problem: Dressing  Goal: STG-Within one week, patient will dress LB  1) Individualized Goal: With CGA using AE/AD/techniques as needed.  2) Interventions: OT E Stim Attended, OT Group Therapy, OT Self Care/ADL, OT Cognitive Skill Dev, OT Community Reintegration, OT Manual Ther Technique, OT Neuro Re-Ed/Balance, OT Sensory Int Techniques, OT Therapeutic Activity, OT Evaluation and OT Therapeutic Exercise      Outcome: NOT MET  Min A    Problem: Toileting  Goal: STG-Within one week, patient will complete toileting tasks  1) Individualized Goal:  With max assist using AE/AD/techniques as needed.  2) Interventions:  OT E Stim Attended, OT Group Therapy, OT Self Care/ADL, OT Cognitive Skill Dev, OT Community Reintegration, OT Manual Ther Technique, OT Neuro Re-Ed/Balance, OT Sensory Int Techniques, OT Therapeutic Activity, OT Evaluation and OT Therapeutic Exercise   Outcome: MET Date Met: 07/30/18  Mod A    Problem: Functional Transfers  Goal: STG-Within one week, patient will transfer to toilet  1) Individualized Goal: With max A using AE/AD/techniques as needed.  2) Interventions: OT E Stim Attended, OT Group Therapy, OT Self Care/ADL, OT Cognitive Skill Dev, OT Community Reintegration, OT Manual Ther Technique, OT Neuro Re-Ed/Balance, OT Sensory Int Techniques, OT Therapeutic Activity, OT Evaluation and OT Therapeutic Exercise      Outcome: MET Date Met: 07/30/18  CGA with grab bar

## 2018-07-30 NOTE — CARE PLAN
Problem: Memory STGs  Goal: STG-Within one week, patient will  1) Individualized goal: perform medication and financial management tasks with 80% acc.  2) Interventions:  SLP Electrical Stimulation - Attended, SLP Speech Language Treatment, SLP Self Care / ADL Training , SLP Cognitive Skill Development and SLP Group Treatment   Outcome: NOT MET  Patient needs min to mod A for 80% acc.

## 2018-07-30 NOTE — REHAB-OT IDT TEAM NOTE
Occupational Therapy   Activities of Daily Living  Eating Initial:  3 - Moderate Assistance  Eating Current:  5 - Standby Prompting/Supervision or Set-up   Eating Description:  Set-up of equipment or meal/tube feeding  Grooming Initial:  5 - Standby Prompting/Supervision or Set-up  Grooming Current:  4 - Minimal Assistance   Grooming Description:   (assist with shave 50% of face, able to brush hair and wash/dry hands and face)  Bathing Initial:  0 - Not tested,unsafe activity  Bathing Current:  4 - Minimal Assistance   Bathing Description:  Grab bar, Tub bench, Hand held shower, Supervision for safety, Set-up of equipment  Upper Body Dressing Initial:  0 - Not tested, patient refused  Upper Body Dressing Current:  2 to 4 - Maximal to Minimal Assistance   Upper Body Dressing Description:  Set-up of equipment, Verbal cueing, Initial preparation for task  Lower Body Dressing Initial:  0 - Not tested, patient refused  Lower Body Dressing Current:  4 - Minimal Assistance   Lower Body Dressing Description:  4 - Minimal Assistance  Toileting Initial:  1 - Total Assistance  Toileting Current:  3 - Moderate Assistance   Toileting Description:  Grab bar, Increased time, Supervision for safety, Verbal cueing  Toilet Transfer Initial:  0 - Not tested, patient refused  Toilet Transfer Current:  4 - Minimal Assistance   Toilet Transfer Description:  4 - Minimal Assistance  Tub / Shower Transfer Initial:  0 - Not tested, patient refused  Tub / Shower Transfer Current:  3 - Moderate Assistance   Tub / Shower Transfer Description:  Grab bar, Adaptive equipment, Supervision for safety, Verbal cueing, Set-up of equipment, Initial preparation for task, Requires lift (lifting assist off bench with grab bar due to LE weakness and fatigue)  IADL:  Not addressed   Family Training/Education:  None  DME/DC Recommendations:  Recommend a ramp, possible wheelchair, tub bench for home (has a shower chair now), grab bars in shower and by toilets,  pt states he has a raised toilet seat without arms, assistance and supervision at home     Strengths:  Alert and oriented, Effective communication skills, Pleasant and cooperative and Willingly participates in therapeutic activities  Barriers:  Decreased endurance, Generalized weakness, Limited mobility, Poor activity tolerance, Poor balance, Poor carryover of learning and Other: pain complaints in neck, lower back, right hip and hands; doesn't follow spinal precautions well; impaired standing tolerance/balance, impaired sensation bilateral hands (R worse than L), lives alone with limited social support, 4 steps to enter mobile       # of short term goals set= 4    # of short term goals met= 2          Occupational Therapy Goals           Problem: Bathing     Dates: Start: 07/19/18       Goal: STG-Within one week, patient will bathe     Dates: Start: 07/23/18       Description: 1) Individualized Goal: Body with CGA using AE/AD/techniques as needed.  2) Interventions: OT E Stim Attended, OT Group Therapy, OT Self Care/ADL, OT Cognitive Skill Dev, OT Community Reintegration, OT Manual Ther Technique, OT Neuro Re-Ed/Balance, OT Sensory Int Techniques, OT Therapeutic Activity, OT Evaluation and OT Therapeutic Exercise       Note:     Goal Note filed on 07/30/18 1152 by Chau Ordaz, MS,OTR/L    Goal: STG-Within one week, patient will bathe  Outcome: NOT MET  Min A                  Problem: Dressing     Dates: Start: 07/19/18       Goal: STG-Within one week, patient will dress LB     Dates: Start: 07/23/18       Description: 1) Individualized Goal: With CGA using AE/AD/techniques as needed.  2) Interventions: OT E Stim Attended, OT Group Therapy, OT Self Care/ADL, OT Cognitive Skill Dev, OT Community Reintegration, OT Manual Ther Technique, OT Neuro Re-Ed/Balance, OT Sensory Int Techniques, OT Therapeutic Activity, OT Evaluation and OT Therapeutic Exercise        Note:     Goal Note filed on 07/30/18 1152 by Chau JONES  Orlin MS,OTR/L    Goal: STG-Within one week, patient will dress LB  Outcome: NOT MET  Min A                  Problem: Functional Transfers     Dates: Start: 07/23/18       Goal: STG-Within one week, patient will transfer to toilet     Dates: Start: 07/30/18       Description: 1) Individualized Goal: With SBA using AE/AD/techniques as needed.  2) Interventions: OT E Stim Attended, OT Group Therapy, OT Self Care/ADL, OT Cognitive Skill Dev, OT Community Reintegration, OT Manual Ther Technique, OT Neuro Re-Ed/Balance, OT Sensory Int Techniques, OT Therapeutic Activity, OT Evaluation and OT Therapeutic Exercise                Problem: OT Long Term Goals     Dates: Start: 07/19/18       Goal: LTG-By discharge, patient will complete basic self care tasks     Dates: Start: 07/19/18       Description: 1) Individualized Goal:  With mod I using AE/AD/techniques as needed.  2) Interventions:  OT E Stim Attended, OT Group Therapy, OT Self Care/ADL, OT Cognitive Skill Dev, OT Community Reintegration, OT Manual Ther Technique, OT Neuro Re-Ed/Balance, OT Sensory Int Techniques, OT Therapeutic Activity, OT Evaluation and OT Therapeutic Exercise           Goal: LTG-By discharge, patient will perform bathroom transfers     Dates: Start: 07/19/18       Description: 1) Individualized Goal:  With mod I using AE/AD/techniques as needed.  2) Interventions:  OT E Stim Attended, OT Group Therapy, OT Self Care/ADL, OT Cognitive Skill Dev, OT Community Reintegration, OT Manual Ther Technique, OT Neuro Re-Ed/Balance, OT Sensory Int Techniques, OT Therapeutic Activity, OT Evaluation and OT Therapeutic Exercise           Goal: LTG-By discharge, patient will complete basic home management     Dates: Start: 07/19/18       Description: 1) Individualized Goal:  With mod I using AE/AD/techniques as needed.  2) Interventions:  OT E Stim Attended, OT Group Therapy, OT Self Care/ADL, OT Cognitive Skill Dev, OT Community Reintegration, OT Manual  Ther Technique, OT Neuro Re-Ed/Balance, OT Sensory Int Techniques, OT Therapeutic Activity, OT Evaluation and OT Therapeutic Exercise             Problem: Toileting     Dates: Start: 07/19/18       Goal: STG-Within one week, patient will complete toileting tasks     Dates: Start: 07/30/18       Description: 1) Individualized Goal: With min assist using AE/AD/techniques as needed.  2) Interventions: OT E Stim Attended, OT Group Therapy, OT Self Care/ADL, OT Cognitive Skill Dev, OT Community Reintegration, OT Manual Ther Technique, OT Neuro Re-Ed/Balance, OT Sensory Int Techniques, OT Therapeutic Activity, OT Evaluation and OT Therapeutic Exercise                    Section completed by:  Chau Ordaz MS,OTR/L

## 2018-07-30 NOTE — PROGRESS NOTES
Hospital Medicine Progress Note, Adult, Complex               Author: Amanda Bennett Date & Time created: 7/30/2018  12:51 PM     Interval History:  CC - HTN, DM    Pt seen and examined while working on ADL's w/ therapies, has no new complaints.    Review of Systems:  Review of Systems   Constitutional: Negative for chills and fever.   HENT: Negative.    Eyes: Negative.    Respiratory: Negative for cough and shortness of breath.    Cardiovascular: Positive for leg swelling. Negative for chest pain and palpitations.   Gastrointestinal: Negative for abdominal pain, nausea and vomiting.   Genitourinary: Negative.    Musculoskeletal: Positive for back pain and neck pain.        Wound pain   Skin: Negative.    Endo/Heme/Allergies: Negative.        Physical Exam:  Physical Exam   Constitutional: He is oriented to person, place, and time. No distress.   HENT:   Head: Normocephalic and atraumatic.   Right Ear: External ear normal.   Left Ear: External ear normal.   Eyes: Conjunctivae and EOM are normal. Right eye exhibits no discharge. Left eye exhibits no discharge.   Neck:   C-collar   Cardiovascular:   irreg irreg   Pulmonary/Chest: No stridor. No respiratory distress. He has no wheezes.   Decreased BS   Abdominal: Soft. Bowel sounds are normal. He exhibits no distension. There is no tenderness. There is no rebound and no guarding.   Musculoskeletal: He exhibits edema.   1+ edema RLE, trace LLE   Neurological: He is alert and oriented to person, place, and time.   Skin: Skin is warm and dry. He is not diaphoretic.   Vitals reviewed.      Labs:        Invalid input(s): [id number]      No results for input(s): SODIUM, POTASSIUM, CHLORIDE, CO2, BUN, CREATININE, MAGNESIUM, PHOSPHORUS, CALCIUM in the last 72 hours.  No results for input(s): ALTSGPT, ASTSGOT, ALKPHOSPHAT, TBILIRUBIN, DBILIRUBIN, GAMMAGT, AMYLASE, LIPASE, ALB, PREALBUMIN, GLUCOSE in the last 72 hours.  No results for input(s): RBC, HEMOGLOBIN, HEMATOCRIT,  PLATELETCT, PROTHROMBTM, APTT, INR, IRON, FERRITIN, TOTIRONBC in the last 72 hours.            Hemodynamics:  Temp (24hrs), Av.9 °C (98.5 °F), Min:36.9 °C (98.4 °F), Max:37 °C (98.6 °F)  Temperature: 37 °C (98.6 °F)  Pulse  Av.7  Min: 57  Max: 113   Blood Pressure : 134/82     Respiratory:    Respiration: 18, Pulse Oximetry: 94 %, O2 Daily Delivery Respiratory : Room Air with O2 Available     Work Of Breathing / Effort: Mild  RUL Breath Sounds: Clear, RML Breath Sounds: Clear;Diminished, RLL Breath Sounds: Diminished, AI Breath Sounds: Clear, LLL Breath Sounds: Diminished  Fluids:    Intake/Output Summary (Last 24 hours) at 18 1251  Last data filed at 18 0930   Gross per 24 hour   Intake             1020 ml   Output              525 ml   Net              495 ml        GI/Nutrition:  Orders Placed This Encounter   Procedures   • Diet Order Diabetic     Standing Status:   Standing     Number of Occurrences:   1     Order Specific Question:   Diet:     Answer:   Diabetic [3]         Medical Decision Making, by Problem:  S/P CERVICAL/LUMBAR SPINE SURGERIES - cervical collar    H/O CVA    JANNIE - on home BiPAP?    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 w/ Clindamycin, probiotic added to help prevent C Dif infxn, Venous Duplex negative DVT    THYROID MASS - needs outpt F/U    ANEMIA - follow H/H    AZOTEMIA - renal fxn improved off HCTZ and w/ PO fluids    VIT D DEF - supplementing    ALLERGY TO STATINS        Quality-Core Measures   Reviewed items::  Medications reviewed and Labs reviewed  DVT prophylaxis pharmacological::  Enoxaparin (Lovenox)  Antibiotics:  Treating active infection/contamination beyond 24 hours  perioperative coverage  Assessed for rehabilitation services:  Patient was assess for and/or received rehabilitation services during this hospitalization

## 2018-07-30 NOTE — CARE PLAN
Problem: Safety  Goal: Will remain free from falls    Intervention: Implement fall precautions  Use of call light encouraged to make needs known.Bed in low position, non skid socks/shoes on when out of bed.Call light within reach.Will continue to monitor and assess needs and safety.      Problem: Bowel/Gastric:  Goal: Normal bowel function is maintained or improved    Intervention: Educate patient and significant other/support system about signs and symptoms of constipation and interventions to implement  Pt is continent of bowel per report.Scheduled medication given.LB 7/29.Will continue to monitor and assess for s/sx of constipation.      Problem: GLYCEMIA IMBALANCE  Goal: Clinical indication of glycemia balance is achieved    Intervention: MONITOR BLOOD GLUCOSE LEVELS AS ORDERED  FSBS 191, coverage given with hs snacks.Will continue to monitor and assess for s/sx of hyper/hypoglycemia.

## 2018-07-30 NOTE — CARE PLAN
Problem: Mobility  Goal: STG-Within one week, patient will ambulate household distance  1) Individualized goal: Pt will AMB 10ft x 1 with LRAD and Manuel.  2) Interventions:  PT Group Therapy, PT Orthotics Training, PT Gait Training, PT Therapeutic Exercises, PT Neuro Re-Ed/Balance, PT Therapeutic Activity, PT Manual Therapy and PT Evaluation     Outcome: MET Date Met: 07/30/18      Problem: Mobility Transfers  Goal: STG-Within one week, patient will perform bed mobility  1) Individualized goal: Pt will perform all bed mobility with SPV  2) Interventions:  PT Group Therapy, PT Orthotics Training, PT Gait Training, PT Therapeutic Exercises, PT Neuro Re-Ed/Balance, PT Therapeutic Activity, PT Manual Therapy and PT Evaluation     Outcome: NOT MET  Pt requires CGA for bed mobility to adhere to precautions at this time  Goal: STG-Within one week, patient will sit to stand  1) Individualized goal:  Pt with STS with LRAD and maxA x 1  2) Interventions:  PT Group Therapy, PT Orthotics Training, PT Gait Training, PT Therapeutic Exercises, PT Neuro Re-Ed/Balance, PT Therapeutic Activity, PT Manual Therapy and PT Evaluation     Outcome: MET Date Met: 07/30/18

## 2018-07-30 NOTE — PROGRESS NOTES
"Rehab Progress Note     Encounter date: 7/30/2018  Today I met with the patient face to face in his room    Chief Complaint:  Cervical myelopathy (HCC) , concerns about home situation    Interval Events (subjective)  Mr. Ma is very frustrated today.  He reports that there may be squat or is living in his home.  He denies any fevers, chills, headache, dizziness, chest pain, or shortness of breath.  He reports that ambulation with a front wheeled walker and his bilateral AFOs has improved over the last several days.  We discussed staple removal from his cervical incision and the potential to use lidocaine jelly if needed.  We also discussed his neurosurgical follow-up.  He reports improvement in his hand pain today, but his neck pain remains significant.    Objective:  VITAL SIGNS: /82   Pulse 64   Temp 37 °C (98.6 °F)   Resp 18   Ht 1.905 m (6' 3\")   Wt 114 kg (251 lb 5.2 oz)   SpO2 94%   BMI 31.41 kg/m²     Recent Results (from the past 72 hour(s))   ACCU-CHEK GLUCOSE    Collection Time: 07/27/18  5:16 PM   Result Value Ref Range    Glucose - Accu-Ck 221 (H) 65 - 99 mg/dL   ACCU-CHEK GLUCOSE    Collection Time: 07/27/18  8:20 PM   Result Value Ref Range    Glucose - Accu-Ck 202 (H) 65 - 99 mg/dL   ACCU-CHEK GLUCOSE    Collection Time: 07/28/18  7:38 AM   Result Value Ref Range    Glucose - Accu-Ck 109 (H) 65 - 99 mg/dL   ACCU-CHEK GLUCOSE    Collection Time: 07/28/18 11:26 AM   Result Value Ref Range    Glucose - Accu-Ck 185 (H) 65 - 99 mg/dL   ACCU-CHEK GLUCOSE    Collection Time: 07/28/18  5:18 PM   Result Value Ref Range    Glucose - Accu-Ck 221 (H) 65 - 99 mg/dL   ACCU-CHEK GLUCOSE    Collection Time: 07/28/18  8:58 PM   Result Value Ref Range    Glucose - Accu-Ck 294 (H) 65 - 99 mg/dL   ACCU-CHEK GLUCOSE    Collection Time: 07/29/18  7:40 AM   Result Value Ref Range    Glucose - Accu-Ck 107 (H) 65 - 99 mg/dL   ACCU-CHEK GLUCOSE    Collection Time: 07/29/18 11:36 AM   Result Value Ref Range    " Glucose - Accu-Ck 155 (H) 65 - 99 mg/dL   ACCU-CHEK GLUCOSE    Collection Time: 07/29/18  5:13 PM   Result Value Ref Range    Glucose - Accu-Ck 137 (H) 65 - 99 mg/dL   ACCU-CHEK GLUCOSE    Collection Time: 07/29/18  8:27 PM   Result Value Ref Range    Glucose - Accu-Ck 191 (H) 65 - 99 mg/dL   ACCU-CHEK GLUCOSE    Collection Time: 07/30/18  7:15 AM   Result Value Ref Range    Glucose - Accu-Ck 126 (H) 65 - 99 mg/dL   ACCU-CHEK GLUCOSE    Collection Time: 07/30/18 11:14 AM   Result Value Ref Range    Glucose - Accu-Ck 107 (H) 65 - 99 mg/dL       Current Facility-Administered Medications   Medication Frequency   • lidocaine (XYLOCAINE) 2 % jelly Q8HRS   • insulin glargine (LANTUS) injection 40 Units Q EVENING   • oxyCODONE-acetaminophen (PERCOCET) 5-325 MG per tablet 1 Tab Q6HRS PRN   • metoprolol (LOPRESSOR) tablet 25 mg TWICE DAILY   • lactobacillus granules (LACTINEX/FLORANEX) packet 1 Packet TID WITH MEALS   • insulin lispro (HUMALOG) injection 0-12 Units 4X/DAY ACHS   • gabapentin (NEURONTIN) capsule 900 mg TID   • methocarbamol (ROBAXIN) tablet 750 mg Q6HRS PRN   • vitamin D (cholecalciferol) tablet 2,000 Units DAILY   • lisinopril (PRINIVIL) tablet 40 mg DAILY   • Respiratory Care per Protocol Continuous RT   • Pharmacy Consult Request ...Pain Management Review 1 Each PRN   • acetaminophen (TYLENOL) tablet 650 mg Q4HRS PRN   • artificial tears 1.4 % ophthalmic solution 1 Drop PRN   • benzocaine-menthol (CEPACOL) lozenge 1 Lozenge Q2HRS PRN   • hydrALAZINE (APRESOLINE) tablet 25 mg Q8HRS PRN   • mag hydrox-al hydrox-simeth (MAALOX PLUS ES or MYLANTA DS) suspension 20 mL Q2HRS PRN   • ondansetron (ZOFRAN ODT) dispertab 4 mg 4X/DAY PRN    Or   • ondansetron (ZOFRAN) syringe/vial injection 4 mg 4X/DAY PRN   • traZODone (DESYREL) tablet 50 mg QHS PRN   • sodium chloride (OCEAN) 0.65 % nasal spray 2 Spray PRN   • bisacodyl (DULCOLAX) suppository 10 mg Q24HRS PRN   • magnesium hydroxide (MILK OF MAGNESIA) suspension 30  mL QDAY PRN   • senna-docusate (PERICOLACE or SENOKOT S) 8.6-50 MG per tablet 1 Tab Nightly   • amLODIPine (NORVASC) tablet 10 mg Q DAY   • clindamycin (CLEOCIN) capsule 300 mg BID   • enoxaparin (LOVENOX) inj 40 mg DAILY   • fluticasone (FLONASE) nasal spray 50 mcg DAILY   • hydrALAZINE (APRESOLINE) tablet 100 mg TID   • metFORMIN (GLUCOPHAGE) tablet 1,000 mg BID WITH MEALS   • docusate sodium (COLACE) capsule 100 mg BID       Exam Date: 7/30/2018    General:  Awake, alert, oriented, no acute distress  HEENT:  Wearing Aspen cervical collar  Cardiac: regular rate and rhythm  Lungs: decreased in the bases   Abdomen: soft; non tender, non distended, bowel sounds present and normoactive  Extremities: 1-2+ lower limb edema  Skin: Posterior cervical incision is clean, dry, and intact.  Approximated with staples.  These appear ready for removal  Neuro:   Stable ataxic upper and lower limb movements.  No active dorsiflexion noted in the ankles.  Ongoing short-term memory impairment.    Orders Placed This Encounter   Procedures   • Diet Order Diabetic     Standing Status:   Standing     Number of Occurrences:   1     Order Specific Question:   Diet:     Answer:   Diabetic [3]       Assessment:  Active Hospital Problems    Diagnosis   • *Cervical myelopathy (HCC)   • HTN (hypertension)   • CVA (cerebral vascular accident) (HCC)   • Diabetes mellitus with neurological manifestations, controlled (HCC)   • Impaired activities of daily living   • Lumbar stenosis with neurogenic claudication   • Pain   • Cervical stenosis of spine   • Atrial fibrillation [427.31]   • Chronic antibiotic suppression       Medical Decision Making and Plan:  Mr. Ma is a 67-year-old male admitted for rehabilitation on July 18 in the setting of cervical myelopathy and lumbar spinal stenosis     Cervical myelopathy status post C2-C6 laminectomy and C2-C4 fusion by Dr. Marsh on July 13  Lumbar spinal stenosis with neurogenic claudication status  post L2-L5 laminectomies by Dr. Marsh on July 13  Continue comprehensive rehabilitation  Follow-up with Dr. Marsh as scheduled today  Aspen collar on at all times  Not cleared to resume therapeutic anticoagulation until follow-up with/clearance by Dr. Marsh  Custom bilateral articulated AFOs have been obtained    He will not be cleared for driving    History of strokes  Cognitive impairment  Atrial fibrillation   Not to resume Coumadin or therapeutic Lovenox until cleared by Dr. Marsh  Continue speech therapy     Pain  Neuropathic pain/allodynia  Tylenol as needed   Gabapentin 900 mg 3 times a day  Robaxin 750 mg   Oxycodone/APAP 5/325 mg every 6 hours as needed  Lidocaine jelly scheduled every 8 hours for hand pain    Hypertension  Amlodipine 10 mg daily  Lopressor 25 mg twice a day  Hydralazine 100 mg 3 times a day  Hydrochlorothiazide discontinued  Lisinopril 40 mg daily dosing      Appreciate hospitalist assistance  Blood pressure today is 134/82 mmHg     Diabetes mellitus type 2 with a history of diabetic neuropathy--hemoglobin A1c of 9.9 on July 19  Lantus at bedtime  Sliding scale insulin  Metformin 1000 mg twice a day  Appreciate hospitalist consult    Glucose range of 107-191 in the last 24 hours     Anemia  Hemoglobin stable at 11.7 on July 26  Continue to monitor     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  Additionally, it has only been 2 months since his last injection.  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       RN: His pain is gradually improving.  He will follow up with neurosurgery today for staple removal.  He is continent     PT:  He has stairs into his home.  These will be a large barrier.  He has a history of maladaptive coping strategies for impaired functional movements.  He does not have a completed ramp. He is hesitant to discuss alternative discharge strategies.  His AFO's are helping.  He can walk 20-30 feet but has sudden impulsive movements to sit.       OT:  He is making gradual progress.  Pain complaints are starting to improve.  He has difficulty following spinal precautions.  He has limited support at home.    Speech and language pathology:  He has mild cognitive impairments and is very disorganized in thought processes.  His insight is poor.  Reading is limited by vision and organization.    Total time:  38 minutes.  I spent greater than 50% of the time for patient care, counseling, and coordination on this date, including unit/floor time, and face-to-face time with the patient as per interval events and assessment and plan above. Topics discussed included slow functional progress, significant difficulty with discharge destination and lack of accessible entry, AFOs, staple removal, neck pain, memory impairment.  We also discussed troubleshooting CPAP fit with cervical collar    Higinio Pagan M.D.  7/30/2018

## 2018-07-30 NOTE — REHAB-SLP IDT TEAM NOTE
Speech Therapy   Cognitive Linquistic Functions  Comprehension Initial:  5 - Stand-by Prompting/Supervision or Set-up  Comprehension Current:  5 - Stand-by Prompting/Supervision or Set-up   Comprehension Description:  Verbal cues, Glasses  Expression Initial:  6 - Modified Independent  Expression Current:  6 - Modified Independent   Expression Description:  Verbal cueing  Social Interaction Initial:  6 - Modified Independent  Social Interaction Current:  6 - Modified Independent   Social Interaction Description:  Increased time  Problem Solving Initial:  5 - Standby Prompting/Supervision or Set-up  Problem Solving Current:  6 - Modified Independent   Problem Solving Description:  Verbal cueing, Therapy schedule  Memory Initial:  3 - Moderate Assistance  Memory Current:  4 - Minimal Assistance   Memory Description:  Verbal cueing, Therapy schedule  Executive Functioning / Organization Initial:  Minimal (4)  Executive Functioning / Organization Current:  Minimal (4)   Executive Functioning Desciption:  Verbal cues  Swallowing  Swallowing Status: This patient does not currently receive dysphagia intervention.  Orders Placed This Encounter   Procedures   • Diet Order Diabetic     Standing Status:   Standing     Number of Occurrences:   1     Order Specific Question:   Diet:     Answer:   Diabetic [3]     Behavior Modification Plan  Keep instructions simple/concrete, Give clear feedback, Set clear goals, Redirect to task/topic and Analyze tasks (break down into smaller steps)  Assistive Technology  Low tech: Calendar, planner, schedule, alarms/timers, pill organizer, post-it notes, lists  Family Training/Education:  Patient reports he lives alone and has no family support.  DC Recommendations:   Patient will benefit from additional ST services upon discharge from this facility.  Strengths:  Able to follow instructions, Alert and oriented, Motivated for self care and independence, Pleasant and cooperative and Willingly  participates in therapeutic activities  Barriers:  Impulsive, Poor family support and Other: Impaired insight into deficits, impulsive impaired self monitoring.  Patient does not have his glasses with him and reports that he needs them to read.  # of short term goals set=  1  # of short term goals met=  0       Speech Therapy Problems           Problem: Memory STGs     Dates: Start: 07/28/18       Goal: STG-Within one week, patient will     Dates: Start: 07/28/18       Description: 1) Individualized goal: perform medication and financial management tasks with 80% acc.  2) Interventions:  SLP Electrical Stimulation - Attended, SLP Speech Language Treatment, SLP Self Care / ADL Training , SLP Cognitive Skill Development and SLP Group Treatment     Note:     Goal Note filed on 07/30/18 1205 by Jo-Ann Ron MS,CCC-SLP    Goal: STG-Within one week, patient will  Outcome: NOT MET  Patient needs min to mod A for 80% acc.                  Problem: Speech/Swallowing LTGs     Dates: Start: 07/28/18       Goal: LTG-By discharge, patient will     Dates: Start: 07/28/18       Description: 1) Individualized goal:  Perform medication and financial management tasks with 90% acc.  2) Interventions:  SLP Electrical Stimulation - Attended, SLP Speech Language Treatment, SLP Self Care / ADL Training , SLP Cognitive Skill Development and SLP Group Treatment                    Section completed by:  Jo-Ann Ron MS,CCC-SLP

## 2018-07-30 NOTE — REHAB-NURSING IDT TEAM NOTE
Nursing   Nursing  Diet/Nutrition:  Diabetic  Medication Administration:  Whole with Liquid Wash  % consumed at meals in last 24 hours:  Consumed 75%-100% of meals per documentation.  Meal/Snack Consumption for the past 24 hrs:   Oral Nutrition   07/29/18 1900 Dinner;Between % Consumed   07/29/18 1300 Lunch   07/29/18 0900 Breakfast     Snack schedule:  HS  Appetite:  Good  Fluid Intake/Output in past 24 hours: In: 1500 [P.O.:1500]  Out: 2125   Admit Weight:  Weight: 114.3 kg (252 lb)  Weight Last 7 Days   [114 kg (251 lb 5.2 oz)] 114 kg (251 lb 5.2 oz) (07/29 0500)    Pain Issues:    Location:  Back;Neck (07/29 1400)  Right (07/29 1400)         Severity:  Moderate   Scheduled pain medications:  gabapentin (NEURONTIN)      PRN pain medications used in last 24 hours:  oxycodone immediate release (ROXICODONE)    Non Pharmacologic Interventions:  emotional support, repositioned and rest  Effectiveness of pain management plan:  good=patient states acceptable comfort after interventions    Bowel:    Bowel Assist Initial Score:  1 - Total Assistance  Bowel Assist Current Score:  2 - Max Assistance  Bowl Accidents in last 7 days:     Last bowel movement: 07/29/18  Stool Description: Large, Brown, Formed     Usual bowel pattern:  every other day  Scheduled bowel medications:  docusate sodium (COLACE) and senna-docusate (PERICOLACE or SENOKOT S)   PRN bowel medications used in last 24 hours:  None  Nursing Interventions:  Increased time, Scheduled medication, Supervision, Verbal cueing, Positioning on commode/toilet  Effectiveness of bowel program:   fair=sometimes needs prn bowel meds for constipation  Bladder:    Bladder Assist Initial Score:  1 - Total Assistance  Bladder Assist Current Score:  3 - Moderate Assistance  Bladder Accidents in last 7 days:  3  PVR range for past 24-48 hours: -- ()  Medications affecting bladder:  None    Interventions:  Increased time, Supervision, Verbal cueing, Emptying of  device  Effectiveness of bladder training:  Fair=occasional unpredictable, incontinent emptying of bladder (< 1 time/day)    Diabetes:  Blood Sugar Frequency:  Before meals and at bedtime    Range of BS for last 48 hours:   Recent Labs      07/28/18   0738  07/28/18   1126  07/28/18   1718  07/28/18 2058 07/29/18   0740  07/29/18   1136  07/29/18   1713  07/29/18 2027   POCGLUCOSE  109*  185*  221*  294*  107*  155*  137*  191*     Scheduled diabetic medications:  metformin (GLUCOPHAGE) , insulin glargine (LANTUS) injection  and insulin lispro (HUMALOG) injection   Sliding scale usage in past 24 hours:  Yes  Total Short acting insulin in the past 24 hours:  Humalog 4 units  Total Long acting insulin in the past 24 hours:  Lantus 40 units    Wound:     Patient Lines/Drains/Airways Status    Active Current Wounds     Name: Placement date: Placement time: Site: Days:    Surgical Incision  Incision Cervical Posterior 07/13/18   1555      16    Surgical Incision  Incision Back 07/13/18   1748      16                   Interventions: Monitor and assess  Effectiveness of intervention:  wound is unchanged     Sleep/wake cycle:   Average hours slept:  Sleeps 4-6 hours without waking  Sleep medication usage:  None    Patient/Family Training/Education:  Bladder Management/Training, Bowel Management/Training, Diabetes Management, Diet/Nutrition, Fall Prevention, General Self Care, Medication Management, Pain Management, Respiratory Hygiene, Safety and Wound Care  Discharge Supply Recommendations:  Glucometer and Strips  Strengths: Alert and oriented, Supportive family and Manages pain appropriately   Barriers:   Fatigue, Generalized weakness and Limited mobility            Nursing Problems           Problem: Bowel/Gastric:     Goal: Normal bowel function is maintained or improved           Goal: Will not experience complications related to bowel motility             Problem: Communication     Goal: The ability to  communicate needs accurately and effectively will improve             Problem: Discharge Barriers/Planning     Goal: Patient's continuum of care needs will be met             Problem: GLYCEMIA IMBALANCE     Goal: Clinical indication of glycemia balance is achieved             Problem: Infection     Goal: Will remain free from infection             Problem: Knowledge Deficit     Goal: Knowledge of disease process/condition, treatment plan, diagnostic tests, and medications will improve           Goal: Knowledge of the prescribed therapeutic regimen will improve             Problem: Mobility     Goal: Risk for activity intolerance will decrease             Problem: Pain Management     Goal: Pain level will decrease to patient's comfort goal             Problem: Psychosocial Needs:     Goal: Level of anxiety will decrease             Problem: Respiratory:     Goal: Respiratory status will improve             Problem: Safety     Goal: Will remain free from injury           Goal: Will remain free from falls             Problem: Skin Integrity     Goal: Risk for impaired skin integrity will decrease             Problem: Urinary Elimination:     Goal: Ability to reestablish a normal urinary elimination pattern will improve             Problem: Venous Thromboembolism (VTW)/Deep Vein Thrombosis (DVT) Prevention:     Goal: Patient will participate in Venous Thrombosis (VTE)/Deep Vein Thrombosis (DVT)Prevention Measures                  Long Term Goals:   At discharge patient will be able to function safely at home and in the community with support.    Section completed by:  Marya Alonso R.N.

## 2018-07-30 NOTE — REHAB-PT IDT TEAM NOTE
Physical Therapy   Mobility  Bed mobility:   4  Bed /Chair/Wheelchair Transfer Initial:  1 - Total Assistance  Bed /Chair/Wheelchair Transfer Current:  4 - Minimal Assistance   Bed/Chair/Wheelchair Transfer Description:   (CGA stand pivot with FWW; CGA supine<>sit onto reg bed w/use of rail, cues to maintain log roll. )  Walk Initial:  0 - Not tested,unsafe activity  Walk Current:  1 - Total Assistance   Walk Description:   (15 ft x2 reps w/B AFOs in place, FWW, CGA, w/c follow. Improved stability w/AFOs. )  Wheelchair Initial:  1 - Total Assistance  Wheelchair Current:  2 - Max Assistance   Wheelchair Description:  Extra time, Verbal cueing, Supervision for safety, Requires incidental assist, Safety concerns (90' x 1 SBA, using B UE for initial momentum and primarirly using B LE for propulsion)  Stairs Initial:  0 - Not tested,unsafe activity  Stairs Current: 0 - Not tested,unsafe activity   Stairs Description:    Patient/Family Training/Education: skin integrity/ skin checks, use of AFOs  DME/DC Recommendations:  AFOs (FWW vs w/c...pt owns ramp)  Strengths:  Motivated for self care and independence and Other: receptive to use of AFOs  Barriers:   Decreased endurance, Generalized weakness, Poor balance, Poor carryover of learning and Other: B foot drop  # of short term goals set= 3  # of short term goals met=2       Physical Therapy Problems           Problem: Mobility     Dates: Start: 07/19/18       Goal: STG-Within one week, patient will ambulate household distance     Dates: Start: 07/19/18       Description: 1) Individualized goal: Pt will AMB 10ft x 1 with LRAD and Manuel.  2) Interventions:  PT Group Therapy, PT Orthotics Training, PT Gait Training, PT Therapeutic Exercises, PT Neuro Re-Ed/Balance, PT Therapeutic Activity, PT Manual Therapy and PT Evaluation       Note:     Goal Note filed on 07/23/18 9803 by Sanam Rome DPT    Goal: STG-Within one week, patient will ambulate household distance  Outcome:  PROGRESSING SLOWER THAN EXPECTED  Pt ambulates in // bars 10ft one person mod A for fall prevention and w/c   follow by second person =( total assistance), pt has 10/10 pain with   ambulation                   Problem: Mobility Transfers     Dates: Start: 07/19/18       Goal: STG-Within one week, patient will perform bed mobility     Dates: Start: 07/19/18       Description: 1) Individualized goal: Pt will perform all bed mobility with SPV  2) Interventions:  PT Group Therapy, PT Orthotics Training, PT Gait Training, PT Therapeutic Exercises, PT Neuro Re-Ed/Balance, PT Therapeutic Activity, PT Manual Therapy and PT Evaluation       Note:     Goal Note filed on 07/23/18 1353 by Sanam Rome DPT    Goal: STG-Within one week, patient will perform bed mobility  Outcome: PROGRESSING SLOWER THAN EXPECTED  Min A for bed mobility due to pain.                Goal: STG-Within one week, patient will sit to stand     Dates: Start: 07/19/18       Description: 1) Individualized goal:  Pt with STS with LRAD and maxA x 1  2) Interventions:  PT Group Therapy, PT Orthotics Training, PT Gait Training, PT Therapeutic Exercises, PT Neuro Re-Ed/Balance, PT Therapeutic Activity, PT Manual Therapy and PT Evaluation       Note:     Goal Note filed on 07/23/18 1353 by Sanam Rome DPT    Goal: STG-Within one week, patient will sit to stand  Outcome: PROGRESSING AS EXPECTED  Sit to  // bars max a of one person. Unable to perform more than 1   rep with max A of one. Requires two person assist for additional trials of   sit to stand.                   Problem: PT-Long Term Goals     Dates: Start: 07/19/18       Goal: LTG-By discharge, patient will ambulate     Dates: Start: 07/19/18       Description: 1) Individualized goal:  Pt will AMB 150ft x1 with LRAD , mod I  2) Interventions:  PT Group Therapy, PT Orthotics Training, PT Gait Training, PT Therapeutic Exercises, PT Neuro Re-Ed/Balance, PT Therapeutic Activity, PT Manual Therapy  and PT Evaluation             Goal: LTG-By discharge, patient will perform home exercise program     Dates: Start: 07/19/18       Description: 1) Individualized goal: Pt will perform HEP for B LE strengthening to maintain the benefits obtained in PT, mod I  2) Interventions:  PT Group Therapy, PT Orthotics Training, PT Gait Training, PT Therapeutic Exercises, PT Neuro Re-Ed/Balance, PT Therapeutic Activity, PT Manual Therapy and PT Evaluation             Goal: LTG-By discharge, patient will ambulate up/down 4-6 stairs     Dates: Start: 07/19/18       Description: 1) Individualized goal: Pt will AMB up/down 4 stairs with unilateral HR to mimic home environment, mod I  2) Interventions:  PT Group Therapy, PT Orthotics Training, PT Gait Training, PT Therapeutic Exercises, PT Neuro Re-Ed/Balance, PT Therapeutic Activity, PT Manual Therapy and PT Evaluation             Goal: LTG-By discharge, patient will transfer in/out of a car     Dates: Start: 07/19/18       Description: 1) Individualized goal:  Pt will transfer in/out of a car with LRAD, mod I  2) Interventions:  PT Group Therapy, PT Orthotics Training, PT Gait Training, PT Therapeutic Exercises, PT Neuro Re-Ed/Balance, PT Therapeutic Activity, PT Manual Therapy and PT Evaluation                     Section completed by:  Meena Louis, PT, DPT

## 2018-07-30 NOTE — REHAB-RESPIRATORY IDT TEAM NOTE
Respiratory Therapy   Respiratory Therapy    Pt has not been using his CPAP.  He refused nocturnal oxygen in its place.  He is stable on RA during the day.  Section completed by:  Jessica Schmitz, RRT

## 2018-07-30 NOTE — REHAB-COLLABORATIVE ONGOING IDT TEAM NOTE
Weekly Interdisciplinary Team Conference Note    Weekly Interdisciplinary Team Conference # 2  Date:  7/30/2018    Clinicians present and reporting at team conference include the following:   MD: Higinio Pagan MD   RN:  Venice Richmond RN   PT:   Meena Louis, PT, DPT  OT:  Chau Ordaz, MS, OTR/L   ST:  Jo-Ann Ron, MS, CCC-SLP  CM:  Hafsa Ayoub, RN, CCM  REC:  None  RT:  None  RPh:  [facility]  Other:   None  All reporting clinicians have a working knowledge of this patient's plan of care.    Targeted DC Date:  8/7/18     Recommendation:  Update IPOC to address therapy service delivery:  PT__60/90 alternating___ min/day, OT ___60/90_alternating_ min/day, ST __30___ min/day on 5/7 days per week for at least 15 hours per week.    Medical    Patient Active Problem List    Diagnosis Date Noted   • Left knee pain 08/28/2016     Priority: High   • Diabetic polyneuropathy (Former[facility]) 05/06/2015     Priority: High   • Toe amputation status ([facility]) 05/06/2015     Priority: High   • HTN (hypertension) 08/16/2010     Priority: Medium   • CVA (cerebral vascular accident) ([facility]) 06/04/2009     Priority: Medium   • Diabetes mellitus with neurological manifestations, controlled ([facility]) 03/05/2012     Priority: Low   • Impaired activities of daily living 07/18/2018   • Cervical myelopathy ([facility]) 07/18/2018   • Lumbar stenosis with neurogenic claudication 07/18/2018   • Pain 07/18/2018   • Persistent proteinuria 10/13/2017   • BMI 35.0-35.9,adult 09/22/2017   • CKD (chronic kidney disease), stage I 05/12/2017   • Diabetic nephropathy associated with type 2 diabetes mellitus ([facility]) 05/12/2017   • Chronic use of opiate drugs therapeutic purposes 02/14/2017   • Other orthopedic aftercare 11/07/2016   • Cervical stenosis of spine 09/06/2016   • Dysphagia 09/06/2016   • Chronic atrial fibrillation (HCC) 09/06/2016   • Hallucinations 09/06/2016   • JANNIE treated with BiPAP 04/18/2016   • History of CVA (cerebrovascular accident)  11/11/2015   • Atrial fibrillation [427.31] 06/24/2015   • Risk for falls 10/27/2014   • Myalgia 05/29/2014   • BPH (benign prostatic hyperplasia) 04/28/2014   • Anticoagulated on warfarin 04/28/2014   • Chronic antibiotic suppression 04/28/2014   • MEDICAL HOME    • Class 1 obesity in adult 06/17/2011   • Ventricular ectopy 06/17/2011   • Trigger finger 06/17/2011   • Thyroid mass 07/30/2009   • Dyslipidemia 06/17/2009     Results     Procedure Component Value Ref Range Date/Time    ACCU-CHEK GLUCOSE [[id number]]  (Abnormal) Collected:  07/30/18 1114    Order Status:  Completed Lab Status:  Final result Updated:  07/30/18 1157     Glucose - Accu-Ck 107 (H) 65 - 99 mg/dL     ACCU-CHEK GLUCOSE [[id number]]  (Abnormal) Collected:  07/30/18 0715    Order Status:  Completed Lab Status:  Final result Updated:  07/30/18 0801     Glucose - Accu-Ck 126 (H) 65 - 99 mg/dL     ACCU-CHEK GLUCOSE [[id number]]  (Abnormal) Collected:  07/29/18 2027    Order Status:  Completed Lab Status:  Final result Updated:  07/29/18 2150     Glucose - Accu-Ck 191 (H) 65 - 99 mg/dL     ACCU-CHEK GLUCOSE [[id number]]  (Abnormal) Collected:  07/29/18 1713    Order Status:  Completed Lab Status:  Final result Updated:  07/29/18 1747     Glucose - Accu-Ck 137 (H) 65 - 99 mg/dL      Comment: Followed orders              Nursing  Diet/Nutrition:  Diabetic  Medication Administration:  Whole with Liquid Wash  % consumed at meals in last 24 hours:  Consumed 75%-100% of meals per documentation.  Meal/Snack Consumption for the past 24 hrs:   Oral Nutrition   07/29/18 1900 Dinner;Between % Consumed   07/29/18 1300 Lunch   07/29/18 0900 Breakfast     Snack schedule:  HS  Appetite:  Good  Fluid Intake/Output in past 24 hours: In: 1500 [P.O.:1500]  Out: 2125   Admit Weight:  Weight: 114.3 kg (252 lb)  Weight Last 7 Days   [114 kg (251 lb 5.2 oz)] 114 kg (251 lb 5.2 oz) (07/29 0500)    Pain Issues:    Location:  Back;Neck (07/29 1400)  Right (07/29 1400)          Severity:  Moderate   Scheduled pain medications:  gabapentin (NEURONTIN)      PRN pain medications used in last 24 hours:  oxycodone immediate release (ROXICODONE)    Non Pharmacologic Interventions:  emotional support, repositioned and rest  Effectiveness of pain management plan:  good=patient states acceptable comfort after interventions    Bowel:    Bowel Assist Initial Score:  1 - Total Assistance  Bowel Assist Current Score:  2 - Max Assistance  Bowl Accidents in last 7 days:     Last bowel movement: 07/29/18  Stool Description: Large, Brown, Formed     Usual bowel pattern:  every other day  Scheduled bowel medications:  docusate sodium (COLACE) and senna-docusate (PERICOLACE or SENOKOT S)   PRN bowel medications used in last 24 hours:  None  Nursing Interventions:  Increased time, Scheduled medication, Supervision, Verbal cueing, Positioning on commode/toilet  Effectiveness of bowel program:   fair=sometimes needs prn bowel meds for constipation  Bladder:    Bladder Assist Initial Score:  1 - Total Assistance  Bladder Assist Current Score:  3 - Moderate Assistance  Bladder Accidents in last 7 days:  3  PVR range for past 24-48 hours: -- ()  Medications affecting bladder:  None    Interventions:  Increased time, Supervision, Verbal cueing, Emptying of device  Effectiveness of bladder training:  Fair=occasional unpredictable, incontinent emptying of bladder (< 1 time/day)    Diabetes:  Blood Sugar Frequency:  Before meals and at bedtime    Range of BS for last 48 hours:   Recent Labs      07/28/18   0738  07/28/18   1126  07/28/18   1718  07/28/18   2058  07/29/18   0740  07/29/18   1136  07/29/18   1713  07/29/18 2027   POCGLUCOSE  109*  185*  221*  294*  107*  155*  137*  191*     Scheduled diabetic medications:  metformin (GLUCOPHAGE) , insulin glargine (LANTUS) injection  and insulin lispro (HUMALOG) injection   Sliding scale usage in past 24 hours:  Yes  Total Short acting insulin in the past 24  hours:  Humalog 4 units  Total Long acting insulin in the past 24 hours:  Lantus 40 units    Wound:     Patient Lines/Drains/Airways Status    Active Current Wounds     Name: Placement date: Placement time: Site: Days:    Surgical Incision  Incision Cervical Posterior 07/13/18   1555      16    Surgical Incision  Incision Back 07/13/18   1748      16                   Interventions: Monitor and assess  Effectiveness of intervention:  wound is unchanged     Sleep/wake cycle:   Average hours slept:  Sleeps 4-6 hours without waking  Sleep medication usage:  None    Patient/Family Training/Education:  Bladder Management/Training, Bowel Management/Training, Diabetes Management, Diet/Nutrition, Fall Prevention, General Self Care, Medication Management, Pain Management, Respiratory Hygiene, Safety and Wound Care  Discharge Supply Recommendations:  Glucometer and Strips  Strengths: Alert and oriented, Supportive family and Manages pain appropriately   Barriers:   Fatigue, Generalized weakness and Limited mobility            Nursing Problems           Problem: Bowel/Gastric:     Goal: Normal bowel function is maintained or improved           Goal: Will not experience complications related to bowel motility             Problem: Communication     Goal: The ability to communicate needs accurately and effectively will improve             Problem: Discharge Barriers/Planning     Goal: Patient's continuum of care needs will be met             Problem: GLYCEMIA IMBALANCE     Goal: Clinical indication of glycemia balance is achieved             Problem: Infection     Goal: Will remain free from infection             Problem: Knowledge Deficit     Goal: Knowledge of disease process/condition, treatment plan, diagnostic tests, and medications will improve           Goal: Knowledge of the prescribed therapeutic regimen will improve             Problem: Mobility     Goal: Risk for activity intolerance will decrease             Problem:  Pain Management     Goal: Pain level will decrease to patient's comfort goal             Problem: Psychosocial Needs:     Goal: Level of anxiety will decrease             Problem: Respiratory:     Goal: Respiratory status will improve             Problem: Safety     Goal: Will remain free from injury           Goal: Will remain free from falls             Problem: Skin Integrity     Goal: Risk for impaired skin integrity will decrease             Problem: Urinary Elimination:     Goal: Ability to reestablish a normal urinary elimination pattern will improve             Problem: Venous Thromboembolism (VTW)/Deep Vein Thrombosis (DVT) Prevention:     Goal: Patient will participate in Venous Thrombosis (VTE)/Deep Vein Thrombosis (DVT)Prevention Measures                  Long Term Goals:   At discharge patient will be able to function safely at home and in the community with support.    Section completed by:  Marya Alonso R.N.           Respiratory Therapy    Pt has not been using his CPAP.  He refused nocturnal oxygen in its place.  He is stable on RA during the day.  Section completed by:  Jessica Schmitz, RRT       Mobility  Bed mobility:   4  Bed /Chair/Wheelchair Transfer Initial:  1 - Total Assistance  Bed /Chair/Wheelchair Transfer Current:  4 - Minimal Assistance   Bed/Chair/Wheelchair Transfer Description:   (CGA stand pivot with FWW; CGA supine<>sit onto reg bed w/use of rail, cues to maintain log roll. )  Walk Initial:  0 - Not tested,unsafe activity  Walk Current:  1 - Total Assistance   Walk Description:   (15 ft x2 reps w/B AFOs in place, FWW, CGA, w/c follow. Improved stability w/AFOs. )  Wheelchair Initial:  1 - Total Assistance  Wheelchair Current:  2 - Max Assistance   Wheelchair Description:  Extra time, Verbal cueing, Supervision for safety, Requires incidental assist, Safety concerns (90' x 1 SBA, using B UE for initial momentum and primarirly using B LE for propulsion)  Stairs  Initial:  0 - Not tested,unsafe activity  Stairs Current: 0 - Not tested,unsafe activity   Stairs Description:    Patient/Family Training/Education: skin integrity/ skin checks, use of AFOs  DME/DC Recommendations:  AFOs (FWW vs w/c...pt owns ramp)  Strengths:  Motivated for self care and independence and Other: receptive to use of AFOs  Barriers:   Decreased endurance, Generalized weakness, Poor balance, Poor carryover of learning and Other: B foot drop  # of short term goals set= 3  # of short term goals met=2       Physical Therapy Problems           Problem: Mobility     Dates: Start: 07/19/18       Goal: STG-Within one week, patient will ambulate household distance     Dates: Start: 07/19/18       Description: 1) Individualized goal: Pt will AMB 10ft x 1 with LRAD and Manuel.  2) Interventions:  PT Group Therapy, PT Orthotics Training, PT Gait Training, PT Therapeutic Exercises, PT Neuro Re-Ed/Balance, PT Therapeutic Activity, PT Manual Therapy and PT Evaluation       Note:     Goal Note filed on 07/23/18 1351 by Sanam Rome DPT    Goal: STG-Within one week, patient will ambulate household distance  Outcome: PROGRESSING SLOWER THAN EXPECTED  Pt ambulates in // bars 10ft one person mod A for fall prevention and w/c   follow by second person =( total assistance), pt has 10/10 pain with   ambulation                   Problem: Mobility Transfers     Dates: Start: 07/19/18       Goal: STG-Within one week, patient will perform bed mobility     Dates: Start: 07/19/18       Description: 1) Individualized goal: Pt will perform all bed mobility with SPV  2) Interventions:  PT Group Therapy, PT Orthotics Training, PT Gait Training, PT Therapeutic Exercises, PT Neuro Re-Ed/Balance, PT Therapeutic Activity, PT Manual Therapy and PT Evaluation       Note:     Goal Note filed on 07/23/18 1353 by Sanam Rome DPT    Goal: STG-Within one week, patient will perform bed mobility  Outcome: PROGRESSING SLOWER THAN EXPECTED  Min  A for bed mobility due to pain.                Goal: STG-Within one week, patient will sit to stand     Dates: Start: 07/19/18       Description: 1) Individualized goal:  Pt with STS with LRAD and maxA x 1  2) Interventions:  PT Group Therapy, PT Orthotics Training, PT Gait Training, PT Therapeutic Exercises, PT Neuro Re-Ed/Balance, PT Therapeutic Activity, PT Manual Therapy and PT Evaluation       Note:     Goal Note filed on 07/23/18 2573 by Sanam Rome DPT    Goal: STG-Within one week, patient will sit to stand  Outcome: PROGRESSING AS EXPECTED  Sit to  // bars max a of one person. Unable to perform more than 1   rep with max A of one. Requires two person assist for additional trials of   sit to stand.                   Problem: PT-Long Term Goals     Dates: Start: 07/19/18       Goal: LTG-By discharge, patient will ambulate     Dates: Start: 07/19/18       Description: 1) Individualized goal:  Pt will AMB 150ft x1 with LRAD , mod I  2) Interventions:  PT Group Therapy, PT Orthotics Training, PT Gait Training, PT Therapeutic Exercises, PT Neuro Re-Ed/Balance, PT Therapeutic Activity, PT Manual Therapy and PT Evaluation             Goal: LTG-By discharge, patient will perform home exercise program     Dates: Start: 07/19/18       Description: 1) Individualized goal: Pt will perform HEP for B LE strengthening to maintain the benefits obtained in PT, mod I  2) Interventions:  PT Group Therapy, PT Orthotics Training, PT Gait Training, PT Therapeutic Exercises, PT Neuro Re-Ed/Balance, PT Therapeutic Activity, PT Manual Therapy and PT Evaluation             Goal: LTG-By discharge, patient will ambulate up/down 4-6 stairs     Dates: Start: 07/19/18       Description: 1) Individualized goal: Pt will AMB up/down 4 stairs with unilateral HR to mimic home environment, mod I  2) Interventions:  PT Group Therapy, PT Orthotics Training, PT Gait Training, PT Therapeutic Exercises, PT Neuro Re-Ed/Balance, PT  Therapeutic Activity, PT Manual Therapy and PT Evaluation             Goal: LTG-By discharge, patient will transfer in/out of a car     Dates: Start: 07/19/18       Description: 1) Individualized goal:  Pt will transfer in/out of a car with LRAD, mod I  2) Interventions:  PT Group Therapy, PT Orthotics Training, PT Gait Training, PT Therapeutic Exercises, PT Neuro Re-Ed/Balance, PT Therapeutic Activity, PT Manual Therapy and PT Evaluation                     Section completed by:  Meena Louis, PT, DPT       Activities of Daily Living  Eating Initial:  3 - Moderate Assistance  Eating Current:  5 - Standby Prompting/Supervision or Set-up   Eating Description:  Set-up of equipment or meal/tube feeding  Grooming Initial:  5 - Standby Prompting/Supervision or Set-up  Grooming Current:  4 - Minimal Assistance   Grooming Description:   (assist with shave 50% of face, able to brush hair and wash/dry hands and face)  Bathing Initial:  0 - Not tested,unsafe activity  Bathing Current:  4 - Minimal Assistance   Bathing Description:  Grab bar, Tub bench, Hand held shower, Supervision for safety, Set-up of equipment  Upper Body Dressing Initial:  0 - Not tested, patient refused  Upper Body Dressing Current:  2 to 4 - Maximal to Minimal Assistance   Upper Body Dressing Description:  Set-up of equipment, Verbal cueing, Initial preparation for task  Lower Body Dressing Initial:  0 - Not tested, patient refused  Lower Body Dressing Current:  4 - Minimal Assistance   Lower Body Dressing Description:  4 - Minimal Assistance  Toileting Initial:  1 - Total Assistance  Toileting Current:  3 - Moderate Assistance   Toileting Description:  Grab bar, Increased time, Supervision for safety, Verbal cueing  Toilet Transfer Initial:  0 - Not tested, patient refused  Toilet Transfer Current:  4 - Minimal Assistance   Toilet Transfer Description:  4 - Minimal Assistance  Tub / Shower Transfer Initial:  0 - Not tested, patient refused  Tub /  Shower Transfer Current:  3 - Moderate Assistance   Tub / Shower Transfer Description:  Grab bar, Adaptive equipment, Supervision for safety, Verbal cueing, Set-up of equipment, Initial preparation for task, Requires lift (lifting assist off bench with grab bar due to LE weakness and fatigue)  IADL:  Not addressed   Family Training/Education:  None  DME/DC Recommendations:  Recommend a ramp, possible wheelchair, tub bench for home (has a shower chair now), grab bars in shower and by toilets, pt states he has a raised toilet seat without arms, assistance and supervision at home     Strengths:  Alert and oriented, Effective communication skills, Pleasant and cooperative and Willingly participates in therapeutic activities  Barriers:  Decreased endurance, Generalized weakness, Limited mobility, Poor activity tolerance, Poor balance, Poor carryover of learning and Other: pain complaints in neck, lower back, right hip and hands; doesn't follow spinal precautions well; impaired standing tolerance/balance, impaired sensation bilateral hands (R worse than L), lives alone with limited social support, 4 steps to enter mobile       # of short term goals set= 4    # of short term goals met= 2          Occupational Therapy Goals           Problem: Bathing     Dates: Start: 07/19/18       Goal: STG-Within one week, patient will bathe     Dates: Start: 07/23/18       Description: 1) Individualized Goal: Body with CGA using AE/AD/techniques as needed.  2) Interventions: OT E Stim Attended, OT Group Therapy, OT Self Care/ADL, OT Cognitive Skill Dev, OT Community Reintegration, OT Manual Ther Technique, OT Neuro Re-Ed/Balance, OT Sensory Int Techniques, OT Therapeutic Activity, OT Evaluation and OT Therapeutic Exercise       Note:     Goal Note filed on 07/30/18 1152 by Chau Ordaz MS,OTR/L    Goal: STG-Within one week, patient will bathe  Outcome: NOT MET  Min A                  Problem: Dressing     Dates: Start: 07/19/18        Goal: STG-Within one week, patient will dress LB     Dates: Start: 07/23/18       Description: 1) Individualized Goal: With CGA using AE/AD/techniques as needed.  2) Interventions: OT E Stim Attended, OT Group Therapy, OT Self Care/ADL, OT Cognitive Skill Dev, OT Community Reintegration, OT Manual Ther Technique, OT Neuro Re-Ed/Balance, OT Sensory Int Techniques, OT Therapeutic Activity, OT Evaluation and OT Therapeutic Exercise        Note:     Goal Note filed on 07/30/18 1152 by Chau Ordaz MS,OTR/L    Goal: STG-Within one week, patient will dress LB  Outcome: NOT MET  Min A                  Problem: Functional Transfers     Dates: Start: 07/23/18       Goal: STG-Within one week, patient will transfer to toilet     Dates: Start: 07/30/18       Description: 1) Individualized Goal: With SBA using AE/AD/techniques as needed.  2) Interventions: OT E Stim Attended, OT Group Therapy, OT Self Care/ADL, OT Cognitive Skill Dev, OT Community Reintegration, OT Manual Ther Technique, OT Neuro Re-Ed/Balance, OT Sensory Int Techniques, OT Therapeutic Activity, OT Evaluation and OT Therapeutic Exercise                Problem: OT Long Term Goals     Dates: Start: 07/19/18       Goal: LTG-By discharge, patient will complete basic self care tasks     Dates: Start: 07/19/18       Description: 1) Individualized Goal:  With mod I using AE/AD/techniques as needed.  2) Interventions:  OT E Stim Attended, OT Group Therapy, OT Self Care/ADL, OT Cognitive Skill Dev, OT Community Reintegration, OT Manual Ther Technique, OT Neuro Re-Ed/Balance, OT Sensory Int Techniques, OT Therapeutic Activity, OT Evaluation and OT Therapeutic Exercise           Goal: LTG-By discharge, patient will perform bathroom transfers     Dates: Start: 07/19/18       Description: 1) Individualized Goal:  With mod I using AE/AD/techniques as needed.  2) Interventions:  OT E Stim Attended, OT Group Therapy, OT Self Care/ADL, OT Cognitive Skill Dev, OT  Community Reintegration, OT Manual Ther Technique, OT Neuro Re-Ed/Balance, OT Sensory Int Techniques, OT Therapeutic Activity, OT Evaluation and OT Therapeutic Exercise           Goal: LTG-By discharge, patient will complete basic home management     Dates: Start: 07/19/18       Description: 1) Individualized Goal:  With mod I using AE/AD/techniques as needed.  2) Interventions:  OT E Stim Attended, OT Group Therapy, OT Self Care/ADL, OT Cognitive Skill Dev, OT Community Reintegration, OT Manual Ther Technique, OT Neuro Re-Ed/Balance, OT Sensory Int Techniques, OT Therapeutic Activity, OT Evaluation and OT Therapeutic Exercise             Problem: Toileting     Dates: Start: 07/19/18       Goal: STG-Within one week, patient will complete toileting tasks     Dates: Start: 07/30/18       Description: 1) Individualized Goal: With min assist using AE/AD/techniques as needed.  2) Interventions: OT E Stim Attended, OT Group Therapy, OT Self Care/ADL, OT Cognitive Skill Dev, OT Community Reintegration, OT Manual Ther Technique, OT Neuro Re-Ed/Balance, OT Sensory Int Techniques, OT Therapeutic Activity, OT Evaluation and OT Therapeutic Exercise                    Section completed by:  Chau Ordaz, MS,OTR/L       Cognitive Linquistic Functions  Comprehension Initial:  5 - Stand-by Prompting/Supervision or Set-up  Comprehension Current:  5 - Stand-by Prompting/Supervision or Set-up   Comprehension Description:  Verbal cues, Glasses  Expression Initial:  6 - Modified Independent  Expression Current:  6 - Modified Independent   Expression Description:  Verbal cueing  Social Interaction Initial:  6 - Modified Independent  Social Interaction Current:  6 - Modified Independent   Social Interaction Description:  Increased time  Problem Solving Initial:  5 - Standby Prompting/Supervision or Set-up  Problem Solving Current:  6 - Modified Independent   Problem Solving Description:  Verbal cueing, Therapy schedule  Memory  Initial:  3 - Moderate Assistance  Memory Current:  4 - Minimal Assistance   Memory Description:  Verbal cueing, Therapy schedule  Executive Functioning / Organization Initial:  Minimal (4)  Executive Functioning / Organization Current:  Minimal (4)   Executive Functioning Desciption:  Verbal cues  Swallowing  Swallowing Status: This patient does not currently receive dysphagia intervention.  Orders Placed This Encounter   Procedures   • Diet Order Diabetic     Standing Status:   Standing     Number of Occurrences:   1     Order Specific Question:   Diet:     Answer:   Diabetic [3]     Behavior Modification Plan  Keep instructions simple/concrete, Give clear feedback, Set clear goals, Redirect to task/topic and Analyze tasks (break down into smaller steps)  Assistive Technology  Low tech: Calendar, planner, schedule, alarms/timers, pill organizer, post-it notes, lists  Family Training/Education:  Patient reports he lives alone and has no family support.  DC Recommendations:   Patient will benefit from additional ST services upon discharge from this facility.  Strengths:  Able to follow instructions, Alert and oriented, Motivated for self care and independence, Pleasant and cooperative and Willingly participates in therapeutic activities  Barriers:  Impulsive, Poor family support and Other: Impaired insight into deficits, impulsive impaired self monitoring.  Patient does not have his glasses with him and reports that he needs them to read.  # of short term goals set=  1  # of short term goals met=  0       Speech Therapy Problems           Problem: Memory STGs     Dates: Start: 07/28/18       Goal: STG-Within one week, patient will     Dates: Start: 07/28/18       Description: 1) Individualized goal: perform medication and financial management tasks with 80% acc.  2) Interventions:  SLP Electrical Stimulation - Attended, SLP Speech Language Treatment, SLP Self Care / ADL Training , SLP Cognitive Skill Development and  SLP Group Treatment     Note:     Goal Note filed on 07/30/18 1205 by Jo-Ann Ron MS,CCC-SLP    Goal: STG-Within one week, patient will  Outcome: NOT MET  Patient needs min to mod A for 80% acc.                  Problem: Speech/Swallowing LTGs     Dates: Start: 07/28/18       Goal: LTG-By discharge, patient will     Dates: Start: 07/28/18       Description: 1) Individualized goal:  Perform medication and financial management tasks with 90% acc.  2) Interventions:  SLP Electrical Stimulation - Attended, SLP Speech Language Treatment, SLP Self Care / ADL Training , SLP Cognitive Skill Development and SLP Group Treatment                    Section completed by:  Jo-Ann Ron MS,CCC-SLP          [facility]-Pharmacy IDT Team Note by Mallika Lamar RPH at 7/27/2018  4:38 PM  Version 1 of 1    Author:  Mallika Lamar RPH Service:  (none) Author Type:  Pharmacist    Filed:  7/27/2018  4:43 PM Date of Service:  7/27/2018  4:38 PM Status:  Signed    :  Mallika Lamar RPH (Pharmacist)         Pharmacy[TK.1]   Pharmacy  Antibiotics/Antifungals/Antivirals:  Medication:      Active Orders     Ordered     Ordering Provider       Wed Jul 18, 2018  2:28 PM    07/18/18 1428  clindamycin (CLEOCIN) capsule 300 mg  2 TIMES DAILY      Higinio Pagan M.D.        Route:         po  Stop Date:  On going   Reason: chronic left knee staph infection  Antihypertensives/Cardiac:  Medication:    Orders (72h ago through future)    Start     Ordered    07/25/18 0900  cloNIDine (CATAPRES) 0.3 MG/24HR 1 Patch  EVERY WEDNESDAY,   Status:  Discontinued      07/18/18 1428    07/24/18 1630  metoprolol (LOPRESSOR) tablet 25 mg  TWICE DAILY      07/24/18 1601    07/20/18 0900  lisinopril (PRINIVIL) tablet 40 mg  DAILY      07/19/18 1613    07/19/18 0600  amLODIPine (NORVASC) tablet 10 mg  Q DAY      07/18/18 1427    07/18/18 1500  hydrALAZINE (APRESOLINE) tablet 100 mg  3 TIMES DAILY      07/18/18 1428    07/18/18 1428  hydrALAZINE  (APRESOLINE) tablet 25 mg  EVERY 8 HOURS PRN      07/18/18 1428        Patient Vitals for the past 24 hrs:   BP Pulse   07/27/18 1457 146/73 78   07/27/18 0850 - 65   07/27/18 0700 129/69 (!) 57   07/27/18 0500 142/72 67   07/26/18 1823 148/77 72       Anticoagulation:  Medication:  Lovenox   INR:      Other key medications: gabapentin, lantus, humalog  A review of the medication list reveals no issues at this time. Patient is currently on an antihypertensive. Recommend home blood pressure monitoring/recording if antihypertensive regimen continues.  Section completed by:[TK.2]  Mallika Lamar RPH[TK.1]        Attribution Key     TK.1 - Mallika Lamar RP on 7/27/2018  4:39 PM   TK.2 - Mallika Lamar RP on 7/27/2018  4:38 PM                    DC Planning  DC destination/dispostion:  Patient lives alone in a single level home with  4 entry stairs.     DC Needs: Patient has a fww and shower bench.  He also has his own C Pap machine and does not use oxygen with this. He is followed at [facility] for his anticoagulation management.  I will follow his progress for home health therapy versus outpatient.     Barriers to discharge:   Lives alone and his sons are working     Strengths: he is motivated to get better.     Section completed by:  Hafsa Ayoub R.N.      Physician Summary  Higinio Pagan MD participated and led team conference discussion.

## 2018-07-30 NOTE — FLOWSHEET NOTE
07/30/18 1003   Events/Summary/Plan   Events/Summary/Plan Pt very frustrated this morning with neck brace.  He states it has been adjusted and he is now unable to use CPAP because of this.  He refuses oxygen at night to compensate.  Dr. Pagan in to see pt and aware.   Respiratory WDL   Respiratory (WDL) X   Chest Exam   Respiration 18   Pulse 64   Oximetry   #Pulse Oximetry (Single Determination) Yes   Oxygen   Home O2 Use Prior To Admission? No   Pulse Oximetry 94 %   O2 Daily Delivery Respiratory  Room Air with O2 Available

## 2018-07-31 LAB
ANION GAP SERPL CALC-SCNC: 8 MMOL/L (ref 0–11.9)
BASOPHILS # BLD AUTO: 0.4 % (ref 0–1.8)
BASOPHILS # BLD: 0.02 K/UL (ref 0–0.12)
BUN SERPL-MCNC: 26 MG/DL (ref 8–22)
CALCIUM SERPL-MCNC: 9 MG/DL (ref 8.5–10.5)
CHLORIDE SERPL-SCNC: 103 MMOL/L (ref 96–112)
CO2 SERPL-SCNC: 28 MMOL/L (ref 20–33)
CREAT SERPL-MCNC: 0.87 MG/DL (ref 0.5–1.4)
EOSINOPHIL # BLD AUTO: 0.13 K/UL (ref 0–0.51)
EOSINOPHIL NFR BLD: 2.7 % (ref 0–6.9)
ERYTHROCYTE [DISTWIDTH] IN BLOOD BY AUTOMATED COUNT: 45.1 FL (ref 35.9–50)
GLUCOSE BLD-MCNC: 124 MG/DL (ref 65–99)
GLUCOSE BLD-MCNC: 175 MG/DL (ref 65–99)
GLUCOSE BLD-MCNC: 299 MG/DL (ref 65–99)
GLUCOSE BLD-MCNC: 91 MG/DL (ref 65–99)
GLUCOSE SERPL-MCNC: 97 MG/DL (ref 65–99)
HCT VFR BLD AUTO: 35.4 % (ref 42–52)
HGB BLD-MCNC: 11.5 G/DL (ref 14–18)
IMM GRANULOCYTES # BLD AUTO: 0.04 K/UL (ref 0–0.11)
IMM GRANULOCYTES NFR BLD AUTO: 0.8 % (ref 0–0.9)
LYMPHOCYTES # BLD AUTO: 1.12 K/UL (ref 1–4.8)
LYMPHOCYTES NFR BLD: 23.2 % (ref 22–41)
MCH RBC QN AUTO: 28.4 PG (ref 27–33)
MCHC RBC AUTO-ENTMCNC: 32.5 G/DL (ref 33.7–35.3)
MCV RBC AUTO: 87.4 FL (ref 81.4–97.8)
MONOCYTES # BLD AUTO: 0.54 K/UL (ref 0–0.85)
MONOCYTES NFR BLD AUTO: 11.2 % (ref 0–13.4)
NEUTROPHILS # BLD AUTO: 2.97 K/UL (ref 1.82–7.42)
NEUTROPHILS NFR BLD: 61.7 % (ref 44–72)
NRBC # BLD AUTO: 0 K/UL
NRBC BLD-RTO: 0 /100 WBC
PLATELET # BLD AUTO: 256 K/UL (ref 164–446)
PMV BLD AUTO: 11 FL (ref 9–12.9)
POTASSIUM SERPL-SCNC: 3.9 MMOL/L (ref 3.6–5.5)
RBC # BLD AUTO: 4.05 M/UL (ref 4.7–6.1)
SODIUM SERPL-SCNC: 139 MMOL/L (ref 135–145)
WBC # BLD AUTO: 4.8 K/UL (ref 4.8–10.8)

## 2018-07-31 PROCEDURE — 36415 COLL VENOUS BLD VENIPUNCTURE: CPT

## 2018-07-31 PROCEDURE — 97110 THERAPEUTIC EXERCISES: CPT

## 2018-07-31 PROCEDURE — 80048 BASIC METABOLIC PNL TOTAL CA: CPT

## 2018-07-31 PROCEDURE — A9270 NON-COVERED ITEM OR SERVICE: HCPCS | Performed by: HOSPITALIST

## 2018-07-31 PROCEDURE — 82962 GLUCOSE BLOOD TEST: CPT | Mod: 91

## 2018-07-31 PROCEDURE — G0515 COGNITIVE SKILLS DEVELOPMENT: HCPCS

## 2018-07-31 PROCEDURE — A9270 NON-COVERED ITEM OR SERVICE: HCPCS | Performed by: PHYSICAL MEDICINE & REHABILITATION

## 2018-07-31 PROCEDURE — 700102 HCHG RX REV CODE 250 W/ 637 OVERRIDE(OP): Performed by: PHYSICAL MEDICINE & REHABILITATION

## 2018-07-31 PROCEDURE — 700111 HCHG RX REV CODE 636 W/ 250 OVERRIDE (IP): Performed by: PHYSICAL MEDICINE & REHABILITATION

## 2018-07-31 PROCEDURE — 770010 HCHG ROOM/CARE - REHAB SEMI PRIVAT*

## 2018-07-31 PROCEDURE — 97530 THERAPEUTIC ACTIVITIES: CPT

## 2018-07-31 PROCEDURE — 97116 GAIT TRAINING THERAPY: CPT

## 2018-07-31 PROCEDURE — 700102 HCHG RX REV CODE 250 W/ 637 OVERRIDE(OP): Performed by: HOSPITALIST

## 2018-07-31 PROCEDURE — 99232 SBSQ HOSP IP/OBS MODERATE 35: CPT | Performed by: PHYSICAL MEDICINE & REHABILITATION

## 2018-07-31 PROCEDURE — 97150 GROUP THERAPEUTIC PROCEDURES: CPT

## 2018-07-31 PROCEDURE — 700112 HCHG RX REV CODE 229: Performed by: PHYSICAL MEDICINE & REHABILITATION

## 2018-07-31 PROCEDURE — 99232 SBSQ HOSP IP/OBS MODERATE 35: CPT | Performed by: HOSPITALIST

## 2018-07-31 PROCEDURE — 94760 N-INVAS EAR/PLS OXIMETRY 1: CPT

## 2018-07-31 PROCEDURE — 97535 SELF CARE MNGMENT TRAINING: CPT

## 2018-07-31 PROCEDURE — 85025 COMPLETE CBC W/AUTO DIFF WBC: CPT

## 2018-07-31 RX ADMIN — GABAPENTIN 900 MG: 300 CAPSULE ORAL at 08:01

## 2018-07-31 RX ADMIN — METOPROLOL TARTRATE 25 MG: 25 TABLET ORAL at 05:42

## 2018-07-31 RX ADMIN — CLINDAMYCIN HYDROCHLORIDE 300 MG: 300 CAPSULE ORAL at 20:37

## 2018-07-31 RX ADMIN — GABAPENTIN 900 MG: 300 CAPSULE ORAL at 20:37

## 2018-07-31 RX ADMIN — HYDRALAZINE HYDROCHLORIDE 100 MG: 25 TABLET, FILM COATED ORAL at 14:45

## 2018-07-31 RX ADMIN — HYDRALAZINE HYDROCHLORIDE 100 MG: 25 TABLET, FILM COATED ORAL at 08:01

## 2018-07-31 RX ADMIN — LACTOBACILLUS ACIDOPHILUS / LACTOBACILLUS BULGARICUS 1 PACKET: 100 MILLION CFU STRENGTH GRANULES at 08:02

## 2018-07-31 RX ADMIN — DOCUSATE SODIUM 100 MG: 100 CAPSULE, LIQUID FILLED ORAL at 08:01

## 2018-07-31 RX ADMIN — ENOXAPARIN SODIUM 40 MG: 100 INJECTION SUBCUTANEOUS at 08:01

## 2018-07-31 RX ADMIN — METOPROLOL TARTRATE 25 MG: 25 TABLET ORAL at 17:00

## 2018-07-31 RX ADMIN — METFORMIN HYDROCHLORIDE 1000 MG: 500 TABLET, FILM COATED ORAL at 08:02

## 2018-07-31 RX ADMIN — OXYCODONE HYDROCHLORIDE AND ACETAMINOPHEN 1 TABLET: 5; 325 TABLET ORAL at 08:08

## 2018-07-31 RX ADMIN — METFORMIN HYDROCHLORIDE 1000 MG: 500 TABLET, FILM COATED ORAL at 16:47

## 2018-07-31 RX ADMIN — GABAPENTIN 900 MG: 300 CAPSULE ORAL at 14:45

## 2018-07-31 RX ADMIN — AMLODIPINE BESYLATE 10 MG: 5 TABLET ORAL at 05:42

## 2018-07-31 RX ADMIN — HYDRALAZINE HYDROCHLORIDE 100 MG: 25 TABLET, FILM COATED ORAL at 20:37

## 2018-07-31 RX ADMIN — INSULIN GLARGINE 40 UNITS: 100 INJECTION, SOLUTION SUBCUTANEOUS at 20:34

## 2018-07-31 RX ADMIN — LACTOBACILLUS ACIDOPHILUS / LACTOBACILLUS BULGARICUS 1 PACKET: 100 MILLION CFU STRENGTH GRANULES at 11:25

## 2018-07-31 RX ADMIN — INSULIN LISPRO 6 UNITS: 100 INJECTION, SOLUTION INTRAVENOUS; SUBCUTANEOUS at 20:33

## 2018-07-31 RX ADMIN — LISINOPRIL 40 MG: 20 TABLET ORAL at 08:02

## 2018-07-31 RX ADMIN — INSULIN LISPRO 2 UNITS: 100 INJECTION, SOLUTION INTRAVENOUS; SUBCUTANEOUS at 16:42

## 2018-07-31 RX ADMIN — LACTOBACILLUS ACIDOPHILUS / LACTOBACILLUS BULGARICUS 1 PACKET: 100 MILLION CFU STRENGTH GRANULES at 16:46

## 2018-07-31 RX ADMIN — CLINDAMYCIN HYDROCHLORIDE 300 MG: 300 CAPSULE ORAL at 08:01

## 2018-07-31 RX ADMIN — FLUTICASONE PROPIONATE 50 MCG: 50 SPRAY, METERED NASAL at 08:01

## 2018-07-31 RX ADMIN — CHOLECALCIFEROL TAB 25 MCG (1000 UNIT) 2000 UNITS: 25 TAB at 08:02

## 2018-07-31 ASSESSMENT — ENCOUNTER SYMPTOMS
NAUSEA: 0
VOMITING: 0
NERVOUS/ANXIOUS: 0
CHILLS: 0
FEVER: 0
SHORTNESS OF BREATH: 0
DIARRHEA: 0
ABDOMINAL PAIN: 0

## 2018-07-31 ASSESSMENT — PAIN SCALES - GENERAL
PAINLEVEL_OUTOF10: 10
PAINLEVEL_OUTOF10: 7
PAINLEVEL_OUTOF10: 7
PAINLEVEL_OUTOF10: 10

## 2018-07-31 NOTE — PROGRESS NOTES
Pharmacy Warfarin Consult   7/31/2018     67 y.o.   male     Indication for anticoagulation: Atrial fibrillation     Goal INR = 2-3    Recent Labs      07/31/18   0549   HEMOGLOBIN  11.5*   HEMATOCRIT  35.4*       Pertinent Drug/Drug Interactions:  None  Outpatient Warfarin Regimen:  10mg daily per med rec  Recent Warfarin Dosing:  restart  Bridge Therapy: none        1.  No coumadin dose tonight pending INR lab results on 8/1        Mary Thornton

## 2018-07-31 NOTE — CARE PLAN
Problem: Safety  Goal: Will remain free from falls    Intervention: Implement fall precautions  Appropriately uses call light to make needs known.Bed in low position, non skid socks/shoes on when out of bed.C-collar in place.Call light within reach.Will continue to monitor and assess needs and safety.      Problem: Pain Management  Goal: Pain level will decrease to patient's comfort goal    Intervention: Follow pain managment plan developed in collaboration with patient and Interdisciplinary Team  Pain is manageable with scheduled medication at this time.Repositioned with pillows for comfort.Will continue to monitor and assess pain level and medicate as needed.      Problem: GLYCEMIA IMBALANCE  Goal: Clinical indication of glycemia balance is achieved    Intervention: MONITOR BLOOD GLUCOSE LEVELS AS ORDERED  FSBS 249, coverage given with hs snack.Will continue to monitor and assess.

## 2018-07-31 NOTE — DISCHARGE PLANNING
"Case Management:  Met with patient and reviewed team conference discussion.  I have provided skilled rehab list and patient will review this.  He does not want to consider [facility] but states he has been in [facility] before.  I have asked him to review and we will discuss further.  He describes his mobile home as a one bedroom with several steps to enter.  He has redone the interior and has an apartment over his \"pump house\" where he states his son Yara is staying.  He states Yara works nights and sleeps during the day.  It continues to be difficult to assess patient's support system as his information changes day to day with me.  I have suggested more time in skilled rehab and he is not opposed to this in our conversation today.  "

## 2018-07-31 NOTE — PROGRESS NOTES
Hospital Medicine Progress Note, Adult, Complex               Author: Moshe SAMMY Balderasnancy Date & Time created: 7/31/2018  9:38 AM     Interval History:  No significant events or changes since last visit  Patient denies SOB, cough, or diarrhea  Patient slept ok last night    Chief Complaint:  Hypertension  Diabetes    Review of Systems:  Review of Systems   Constitutional: Negative for chills and fever.   Respiratory: Negative for shortness of breath.    Cardiovascular: Negative for chest pain.   Gastrointestinal: Negative for abdominal pain, diarrhea, nausea and vomiting.   Psychiatric/Behavioral: The patient is not nervous/anxious.        Physical Exam:  Physical Exam   Constitutional: He is oriented to person, place, and time. He appears well-nourished.   HENT:   Head: Atraumatic.   Eyes: Pupils are equal, round, and reactive to light. Conjunctivae are normal.   Neck:   Has C-collar.   Cardiovascular: S1 normal and S2 normal.    No murmur heard.  Irregular heart rate   Pulmonary/Chest: Effort normal. No stridor. He has no wheezes. He has no rhonchi. He has no rales.   Abdominal: Soft. He exhibits no distension. There is no tenderness. Hernia confirmed negative in the right inguinal area and confirmed negative in the left inguinal area.   Musculoskeletal: He exhibits edema.   Neurological: He is alert and oriented to person, place, and time. No sensory deficit.   Skin: Skin is warm and dry. No rash noted. No cyanosis.   Psychiatric: He has a normal mood and affect. His behavior is normal.   Nursing note and vitals reviewed.      Labs:        Invalid input(s): [id number]      Recent Labs      07/31/18   0549   SODIUM  139   POTASSIUM  3.9   CHLORIDE  103   CO2  28   BUN  26*   CREATININE  0.87   CALCIUM  9.0     Recent Labs      07/31/18   0549   GLUCOSE  97     Recent Labs      07/31/18   0549   RBC  4.05*   HEMOGLOBIN  11.5*   HEMATOCRIT  35.4*   PLATELETCT  256     Recent Labs      07/31/18   0549   WBC  4.8    NEUTSPOLYS  61.70   LYMPHOCYTES  23.20   MONOCYTES  11.20   EOSINOPHILS  2.70   BASOPHILS  0.40           Hemodynamics:  Temp (24hrs), Av.8 °C (98.3 °F), Min:36.4 °C (97.5 °F), Max:37.1 °C (98.8 °F)  Temperature: 36.4 °C (97.5 °F)  Pulse  Av.4  Min: 57  Max: 113   Blood Pressure : 138/78     Respiratory:    Respiration: 18, Pulse Oximetry: 95 %, O2 Daily Delivery Respiratory : Room Air with O2 Available     Work Of Breathing / Effort: Mild  RUL Breath Sounds: Clear, RML Breath Sounds: Clear;Diminished, RLL Breath Sounds: Diminished, AI Breath Sounds: Clear, LLL Breath Sounds: Diminished  Fluids:    Intake/Output Summary (Last 24 hours) at 18 0938  Last data filed at 18 0902   Gross per 24 hour   Intake             1200 ml   Output             2500 ml   Net            -1300 ml        GI/Nutrition:  Orders Placed This Encounter   Procedures   • Diet Order Diabetic     Standing Status:   Standing     Number of Occurrences:   1     Order Specific Question:   Diet:     Answer:   Diabetic [3]     Medical Decision Making, by Problem:  Active Hospital Problems    Diagnosis   • *Cervical myelopathy (HCC) [G95.9]   • HTN (hypertension) [I10]   • CVA (cerebral vascular accident) (HCC) [I63.9]   • Diabetes mellitus with neurological manifestations, controlled (HCC) [E11.49]   • Impaired activities of daily living [R53.81]   • Lumbar stenosis with neurogenic claudication [M48.062]   • Pain [R52]   • Cervical stenosis of spine [M48.02]   • Atrial fibrillation [427.31] [I48.91]   • Chronic antibiotic suppression [Z79.2]     *  S/P Lumbar Surgery  *  S/P Cervical Surgery: had hx of prior cervical fusion    *  Hypertension  BP a little labile but ok  On Norvasc: 10 mg daily  On Lopressor: 25 mg bid  On Hydralazine: 100 mg tid  On Lisinopril: 40 mg daily ()  Cont to monitor    *  Hx Afib  HR ok  On Lopressor: 25 mg bid  On Coumadin  Off HCTZ -- 2nd to worsening azotemia ()   Monitor    *   Diabetes  Hba1c: 9.9 (7/19)  BS labile:   BS tend to dip lower in am  On Lantus: 40 units qhs  On Metformin: 1000 mg bid  Cont to monitor    *  Azotemia  Bun: 26 (7/31)  Encouraging fluid (water/juice) intake  Monitor    *  Hx CVA: x 2; on Coumadin  *  Arthritis  *  Hx Cataracts: s/p extraction  *  Hyperlipidemia  *  Vit D Insufficiency: on supplements  *  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

## 2018-07-31 NOTE — FLOWSHEET NOTE
07/31/18 0723   Events/Summary/Plan   Events/Summary/Plan 02 spot check.  Pt denies SOB   Respiratory WDL   Respiratory (WDL) X   Chest Exam   Respiration 18   Pulse 70   Oximetry   #Pulse Oximetry (Single Determination) Yes   Oxygen   Home O2 Use Prior To Admission? No   Pulse Oximetry 95 %   O2 Daily Delivery Respiratory  Room Air with O2 Available

## 2018-07-31 NOTE — PROGRESS NOTES
Zackery from [facility] neurosurgery called and stated patient could start continuing coumadin.  MD notified.

## 2018-07-31 NOTE — CARE PLAN
Problem: Bowel/Gastric:  Goal: Normal bowel function is maintained or improved  Outcome: PROGRESSING AS EXPECTED  Patient had BM within six shifts.    Problem: Pain Management  Goal: Pain level will decrease to patient's comfort goal  Patient reports 7/10 pain in neck and arms.  Patient requested and was administered medication per MAR.  Patient deep breathing and repositioning.

## 2018-07-31 NOTE — CONSULTS
DATE OF SERVICE:  07/24/2018    BEHAVIORAL MEDICINE EVALUATION    BRIEF HISTORY OF PRESENTING COMPLAINTS:  The patient is a 67-year-old single   white male who is referred for a behavioral medicine evaluation by Dr. Pagan.  The patient was transferred to rehab from acute where he underwent   a C2-C6 laminectomy, C2-C4 posterior fusion, and L2-L5 laminectomies related   to cervical myelopathy.  The patient was stabilized acutely.  He was then sent   to rehab to address his general debility.    PAST MEDICAL HISTORY:  Significant for anesthesia, arrhythmia, AFib, arthritis   of the thumb, fingers, and hip, cataracts, dental disorder, diabetes,   hypercholesterolemia, hypertension, MRSA, JANNIE, pain in the hip and right knee,   pneumonia, stage II renal disorder, sleep apnea, stroke on 02/01/2007 and   04/01/2007, thyroid mass, and ____ ectopy.    PSYCHOLOGICAL STATUS:  MENTAL STATUS EXAMINATION:  The patient is a well-nourished, overweight male   of medium stature, who appeared older than his stated age of 67.  At   presentation, the patient was alert.  He was sitting in a wheelchair next to   his bed when approached.  The patient oriented well to my presence.  The   patient was kempt in appearance.  He was dressed in T-shirt and PJ bottoms.    The patient was fitted with his rigid cervical collar.  The patient's manner   of presentation was cooperative and friendly.    The patient was well oriented to time, place and person.  His language was   logical and goal oriented.  His speech was slightly hesitant and somewhat   dysarthric.  The patient's concentration and memory functioning appeared   grossly intact.  He complained of forgetfulness.    The patient's affect was somewhat constricted, stable and mildly intense.  He   related well.  His mood appeared slightly dysphoric, but appropriate to the   context.    There was no evidence of delusional or perceptual disturbance.  Also, the   patient showed no unusual pain  or motor behavior during the interview.    SPECIFIC BEHAVIORAL COMPLAINTS:  The patient denied any clinically significant   symptoms of a negative mood.  He reported no strong feelings of depression,   anxiety, or anger.  The patient also reported that his appetite is good.    Moreover, he said his sleep is sound.  He did complain of low levels of energy   and significant neck pain and arm discomfort.  He rated it as a 7/10 in   intensity.  He said he is asking for medication, but only getting very   short-term relief.    The patient denied any interpersonal discord or discomfort, family disharmony,   or problems managing his day-to-day stressors.    The patient also reported no ETOH or other drug abuse or any tobacco   dependence.    PSYCHIATRIC HISTORY:  The patient denied any history significant for   psychiatric disturbance or treatment including in or outpatient care.  The   only exception was that he said he went to family counseling for a few   sessions in the past.    PSYCHOMETRIC TESTING:  Patient was administered 2 psychometric tests and 2   screenings instruments.  The PS/PC-R revealed no clinically significant   symptoms of a negative mood.  His CDR survey showed no problems with level of   consciousness, attention, thinking, perception, or speech with the exception   of some problems with word finding ability and slight dysarthria.  He also   reported diminishment of his energy level.  He reported some problems with   behavioral activation.  He denied any mood disturbance.  He reported his pain   is severe, averages a 7/10 in intensity.    The patient was screened for any elder abuse or risk of suicide.  There is no   strong evidence of either problem.    SOCIAL HISTORY:  The patient is single.  He is a retired contractor.  He lives   alone in [place], [place].    IMPRESSION:  Adjustment disorder with some dysphoria.    RECOMMENDATIONS:  The patient will be followed for status and supportive care.        ____________________________________     PHD ANGELES DOMINGUEZ    DD:  07/30/2018 18:25:17  DT:  07/30/2018 20:38:26    D#:  [id number]  Job#:  [id number]

## 2018-07-31 NOTE — PROGRESS NOTES
"Rehab Progress Note     Encounter date: 7/31/2018  Today I met with the patient face to face in his room    Chief Complaint:  Cervical myelopathy (HCC) , discharge discussion, med questions     Interval Events (subjective)  Mr. Ma had questions about his medications today.  We reviewed his medication list and discussed the reason for medication dose changes.  We also discussed reinitiation of Coumadin.  We discussed the gradual relaxation of cervical precautions by the neurosurgical team.  Overall he feels like he is making some progress.  He believes the AFOs have significantly helped his gait.  He denies any fevers, chills, headache, dizziness, chest pain, or shortness of breath.    Objective:  VITAL SIGNS: /72   Pulse 64   Temp 36.3 °C (97.3 °F)   Resp 20   Ht 1.905 m (6' 3\")   Wt 114 kg (251 lb 5.2 oz)   SpO2 92%   BMI 31.41 kg/m²     Recent Results (from the past 72 hour(s))   ACCU-CHEK GLUCOSE    Collection Time: 07/28/18  5:18 PM   Result Value Ref Range    Glucose - Accu-Ck 221 (H) 65 - 99 mg/dL   ACCU-CHEK GLUCOSE    Collection Time: 07/28/18  8:58 PM   Result Value Ref Range    Glucose - Accu-Ck 294 (H) 65 - 99 mg/dL   ACCU-CHEK GLUCOSE    Collection Time: 07/29/18  7:40 AM   Result Value Ref Range    Glucose - Accu-Ck 107 (H) 65 - 99 mg/dL   ACCU-CHEK GLUCOSE    Collection Time: 07/29/18 11:36 AM   Result Value Ref Range    Glucose - Accu-Ck 155 (H) 65 - 99 mg/dL   ACCU-CHEK GLUCOSE    Collection Time: 07/29/18  5:13 PM   Result Value Ref Range    Glucose - Accu-Ck 137 (H) 65 - 99 mg/dL   ACCU-CHEK GLUCOSE    Collection Time: 07/29/18  8:27 PM   Result Value Ref Range    Glucose - Accu-Ck 191 (H) 65 - 99 mg/dL   ACCU-CHEK GLUCOSE    Collection Time: 07/30/18  7:15 AM   Result Value Ref Range    Glucose - Accu-Ck 126 (H) 65 - 99 mg/dL   ACCU-CHEK GLUCOSE    Collection Time: 07/30/18 11:14 AM   Result Value Ref Range    Glucose - Accu-Ck 107 (H) 65 - 99 mg/dL   ACCU-CHEK GLUCOSE    " Collection Time: 07/30/18  5:02 PM   Result Value Ref Range    Glucose - Accu-Ck 142 (H) 65 - 99 mg/dL   ACCU-CHEK GLUCOSE    Collection Time: 07/30/18  8:51 PM   Result Value Ref Range    Glucose - Accu-Ck 249 (H) 65 - 99 mg/dL   BASIC METABOLIC PANEL    Collection Time: 07/31/18  5:49 AM   Result Value Ref Range    Sodium 139 135 - 145 mmol/L    Potassium 3.9 3.6 - 5.5 mmol/L    Chloride 103 96 - 112 mmol/L    Co2 28 20 - 33 mmol/L    Glucose 97 65 - 99 mg/dL    Bun 26 (H) 8 - 22 mg/dL    Creatinine 0.87 0.50 - 1.40 mg/dL    Calcium 9.0 8.5 - 10.5 mg/dL    Anion Gap 8.0 0.0 - 11.9   CBC WITH DIFFERENTIAL    Collection Time: 07/31/18  5:49 AM   Result Value Ref Range    WBC 4.8 4.8 - 10.8 K/uL    RBC 4.05 (L) 4.70 - 6.10 M/uL    Hemoglobin 11.5 (L) 14.0 - 18.0 g/dL    Hematocrit 35.4 (L) 42.0 - 52.0 %    MCV 87.4 81.4 - 97.8 fL    MCH 28.4 27.0 - 33.0 pg    MCHC 32.5 (L) 33.7 - 35.3 g/dL    RDW 45.1 35.9 - 50.0 fL    Platelet Count 256 164 - 446 K/uL    MPV 11.0 9.0 - 12.9 fL    Neutrophils-Polys 61.70 44.00 - 72.00 %    Lymphocytes 23.20 22.00 - 41.00 %    Monocytes 11.20 0.00 - 13.40 %    Eosinophils 2.70 0.00 - 6.90 %    Basophils 0.40 0.00 - 1.80 %    Immature Granulocytes 0.80 0.00 - 0.90 %    Nucleated RBC 0.00 /100 WBC    Neutrophils (Absolute) 2.97 1.82 - 7.42 K/uL    Lymphs (Absolute) 1.12 1.00 - 4.80 K/uL    Monos (Absolute) 0.54 0.00 - 0.85 K/uL    Eos (Absolute) 0.13 0.00 - 0.51 K/uL    Baso (Absolute) 0.02 0.00 - 0.12 K/uL    Immature Granulocytes (abs) 0.04 0.00 - 0.11 K/uL    NRBC (Absolute) 0.00 K/uL   ESTIMATED GFR    Collection Time: 07/31/18  5:49 AM   Result Value Ref Range    GFR If African American >60 >60 mL/min/1.73 m 2    GFR If Non African American >60 >60 mL/min/1.73 m 2   ACCU-CHEK GLUCOSE    Collection Time: 07/31/18  7:24 AM   Result Value Ref Range    Glucose - Accu-Ck 91 65 - 99 mg/dL   ACCU-CHEK GLUCOSE    Collection Time: 07/31/18 11:23 AM   Result Value Ref Range    Glucose -  Accu-Ck 124 (H) 65 - 99 mg/dL       Current Facility-Administered Medications   Medication Frequency   • MD ALERT... warfarin (COUMADIN) per pharmacy protocol pharmacy to dose   • lidocaine (XYLOCAINE) 2 % jelly Q8HRS   • insulin glargine (LANTUS) injection 40 Units Q EVENING   • oxyCODONE-acetaminophen (PERCOCET) 5-325 MG per tablet 1 Tab Q6HRS PRN   • metoprolol (LOPRESSOR) tablet 25 mg TWICE DAILY   • lactobacillus granules (LACTINEX/FLORANEX) packet 1 Packet TID WITH MEALS   • insulin lispro (HUMALOG) injection 0-12 Units 4X/DAY ACHS   • gabapentin (NEURONTIN) capsule 900 mg TID   • methocarbamol (ROBAXIN) tablet 750 mg Q6HRS PRN   • vitamin D (cholecalciferol) tablet 2,000 Units DAILY   • lisinopril (PRINIVIL) tablet 40 mg DAILY   • Respiratory Care per Protocol Continuous RT   • Pharmacy Consult Request ...Pain Management Review 1 Each PRN   • acetaminophen (TYLENOL) tablet 650 mg Q4HRS PRN   • artificial tears 1.4 % ophthalmic solution 1 Drop PRN   • benzocaine-menthol (CEPACOL) lozenge 1 Lozenge Q2HRS PRN   • hydrALAZINE (APRESOLINE) tablet 25 mg Q8HRS PRN   • mag hydrox-al hydrox-simeth (MAALOX PLUS ES or MYLANTA DS) suspension 20 mL Q2HRS PRN   • ondansetron (ZOFRAN ODT) dispertab 4 mg 4X/DAY PRN    Or   • ondansetron (ZOFRAN) syringe/vial injection 4 mg 4X/DAY PRN   • traZODone (DESYREL) tablet 50 mg QHS PRN   • sodium chloride (OCEAN) 0.65 % nasal spray 2 Spray PRN   • bisacodyl (DULCOLAX) suppository 10 mg Q24HRS PRN   • magnesium hydroxide (MILK OF MAGNESIA) suspension 30 mL QDAY PRN   • senna-docusate (PERICOLACE or SENOKOT S) 8.6-50 MG per tablet 1 Tab Nightly   • amLODIPine (NORVASC) tablet 10 mg Q DAY   • clindamycin (CLEOCIN) capsule 300 mg BID   • enoxaparin (LOVENOX) inj 40 mg DAILY   • fluticasone (FLONASE) nasal spray 50 mcg DAILY   • hydrALAZINE (APRESOLINE) tablet 100 mg TID   • metFORMIN (GLUCOPHAGE) tablet 1,000 mg BID WITH MEALS   • docusate sodium (COLACE) capsule 100 mg BID       Exam  Date: 7/31/2018    General:  Awake, alert, oriented, no acute distress  HEENT:  Wearing [place] cervical collar  Cardiac: regular rate and rhythm  Lungs: clear to auscultation bilaterally.   Abdomen: soft; non tender, non distended, bowel sounds present and normoactive  Extremities: 1+ lower limb edema   Skin: Lumbar incision with a mild amount of granulation tissue at the superior end  Neuro:   Stable ataxic movements in all 4 limbs.  No active dorsiflexion noted in the ankles.  Significant difficulty with sequencing and short-term memory.    Orders Placed This Encounter   Procedures   • Diet Order Diabetic     Standing Status:   Standing     Number of Occurrences:   1     Order Specific Question:   Diet:     Answer:   Diabetic [3]       Assessment:  Active Hospital Problems    Diagnosis   • *Cervical myelopathy (HCC)   • HTN (hypertension)   • CVA (cerebral vascular accident) (HCC)   • Diabetes mellitus with neurological manifestations, controlled (HCC)   • Impaired activities of daily living   • Lumbar stenosis with neurogenic claudication   • Pain   • Cervical stenosis of spine   • Atrial fibrillation [427.31]   • Chronic antibiotic suppression       Medical Decision Making and Plan:  Mr. Ma is a 67-year-old male admitted for rehabilitation on July 18 in the setting of cervical myelopathy and lumbar spinal stenosis     Cervical myelopathy status post C2-C6 laminectomy and C2-C4 fusion by Dr. Marsh on July 13  Lumbar spinal stenosis with neurogenic claudication status post L2-L5 laminectomies by Dr. Marsh on July 13  Continue comprehensive rehabilitation  Followed-up with Dr. Marsh on 7/30  [place] collar can be removed for meals and showering  Cleared by Dr. Marsh to resume anticoagulation  Custom bilateral articulated AFOs have been obtained    He will not be cleared for driving    History of strokes  Cognitive impairment  Atrial fibrillation   Reinitiating Coumadin therapy  Pharmacy to dose per  protocol  Continue speech for cognitive deficits     Pain  Neuropathic pain/allodynia  Tylenol as needed   Gabapentin 900 mg 3 times a day  Robaxin 750 mg   Oxycodone/APAP 5/325 mg every 6 hours as needed  Lidocaine jelly scheduled every 8 hours for hand pain    He has utilized 5 mg of oxycodone last 24 hours    Hypertension  Amlodipine 10 mg daily  Lopressor 25 mg twice a day  Hydralazine 100 mg 3 times a day  Lisinopril 40 mg daily dosing      Appreciate hospitalist assistance  Blood pressure today is 144/72 mmHg     Diabetes mellitus type 2 with a history of diabetic neuropathy--hemoglobin A1c of 9.9 on July 19  Lantus at bedtime  Sliding scale insulin  Metformin 1000 mg twice a day  Appreciate hospitalist consult    Glucose range of  in the last 24 hours     Anemia  Hemoglobin stable at 11.7 on July 26  Continue to monitor     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  Additionally, it has only been 2 months since his last injection.  Discussed conservative cares     Constipation  Colace 100 g twice a day  Pericolace qhs  Milk of magnesia as needed  Dulcolax suppository daily as needed     Seasonal allergies  Flonase daily     Incidental left thyroid mass seen on MRI in May 2018  Will need follow-up with primary care  TSH of 0.56 on admission    Vitamin D deficiency  Vitamin D was 26 on admission so we began supplementation with 2000 units of cholecalciferol daily      DVT prophylaxis  Lovenox 40 mg daily  Discontinued when INR in range     Estimated discharge: August 7--we are recommending skilled for more support    Total time:  28 minutes.  I spent greater than 50% of the time for patient care, counseling, and coordination on this date, including unit/floor time, and face-to-face  time with the patient as per interval events and assessment and plan above. Topics discussed included medication list reviewed, functional progress, discharge planning, stairs as a barrier      Higinio Pagan M.D.  7/31/2018

## 2018-08-01 LAB
GLUCOSE BLD-MCNC: 108 MG/DL (ref 65–99)
GLUCOSE BLD-MCNC: 127 MG/DL (ref 65–99)
GLUCOSE BLD-MCNC: 219 MG/DL (ref 65–99)
GLUCOSE BLD-MCNC: 293 MG/DL (ref 65–99)
INR PPP: 1.07 (ref 0.87–1.13)
PROTHROMBIN TIME: 13.6 SEC (ref 12–14.6)

## 2018-08-01 PROCEDURE — 99232 SBSQ HOSP IP/OBS MODERATE 35: CPT | Performed by: HOSPITALIST

## 2018-08-01 PROCEDURE — 82962 GLUCOSE BLOOD TEST: CPT | Mod: 91

## 2018-08-01 PROCEDURE — 700102 HCHG RX REV CODE 250 W/ 637 OVERRIDE(OP): Performed by: PHYSICAL MEDICINE & REHABILITATION

## 2018-08-01 PROCEDURE — 97535 SELF CARE MNGMENT TRAINING: CPT

## 2018-08-01 PROCEDURE — 99232 SBSQ HOSP IP/OBS MODERATE 35: CPT | Performed by: PHYSICAL MEDICINE & REHABILITATION

## 2018-08-01 PROCEDURE — 700111 HCHG RX REV CODE 636 W/ 250 OVERRIDE (IP): Performed by: PHYSICAL MEDICINE & REHABILITATION

## 2018-08-01 PROCEDURE — 700112 HCHG RX REV CODE 229: Performed by: PHYSICAL MEDICINE & REHABILITATION

## 2018-08-01 PROCEDURE — A9270 NON-COVERED ITEM OR SERVICE: HCPCS | Performed by: HOSPITALIST

## 2018-08-01 PROCEDURE — 700102 HCHG RX REV CODE 250 W/ 637 OVERRIDE(OP): Performed by: HOSPITALIST

## 2018-08-01 PROCEDURE — 97530 THERAPEUTIC ACTIVITIES: CPT

## 2018-08-01 PROCEDURE — 85610 PROTHROMBIN TIME: CPT

## 2018-08-01 PROCEDURE — 770010 HCHG ROOM/CARE - REHAB SEMI PRIVAT*

## 2018-08-01 PROCEDURE — 36415 COLL VENOUS BLD VENIPUNCTURE: CPT

## 2018-08-01 PROCEDURE — 97116 GAIT TRAINING THERAPY: CPT

## 2018-08-01 PROCEDURE — 94760 N-INVAS EAR/PLS OXIMETRY 1: CPT

## 2018-08-01 PROCEDURE — A9270 NON-COVERED ITEM OR SERVICE: HCPCS | Performed by: PHYSICAL MEDICINE & REHABILITATION

## 2018-08-01 RX ORDER — WARFARIN SODIUM 5 MG/1
5 TABLET ORAL
Status: COMPLETED | OUTPATIENT
Start: 2018-08-01 | End: 2018-08-01

## 2018-08-01 RX ADMIN — METFORMIN HYDROCHLORIDE 1000 MG: 500 TABLET, FILM COATED ORAL at 17:10

## 2018-08-01 RX ADMIN — WARFARIN SODIUM 5 MG: 5 TABLET ORAL at 17:12

## 2018-08-01 RX ADMIN — LISINOPRIL 40 MG: 20 TABLET ORAL at 07:55

## 2018-08-01 RX ADMIN — METFORMIN HYDROCHLORIDE 1000 MG: 500 TABLET, FILM COATED ORAL at 07:55

## 2018-08-01 RX ADMIN — ENOXAPARIN SODIUM 40 MG: 100 INJECTION SUBCUTANEOUS at 07:54

## 2018-08-01 RX ADMIN — HYDRALAZINE HYDROCHLORIDE 100 MG: 25 TABLET, FILM COATED ORAL at 07:55

## 2018-08-01 RX ADMIN — LACTOBACILLUS ACIDOPHILUS / LACTOBACILLUS BULGARICUS 1 PACKET: 100 MILLION CFU STRENGTH GRANULES at 17:10

## 2018-08-01 RX ADMIN — METOPROLOL TARTRATE 25 MG: 25 TABLET ORAL at 17:10

## 2018-08-01 RX ADMIN — INSULIN LISPRO 4 UNITS: 100 INJECTION, SOLUTION INTRAVENOUS; SUBCUTANEOUS at 20:45

## 2018-08-01 RX ADMIN — DOCUSATE SODIUM 100 MG: 100 CAPSULE, LIQUID FILLED ORAL at 20:44

## 2018-08-01 RX ADMIN — LACTOBACILLUS ACIDOPHILUS / LACTOBACILLUS BULGARICUS 1 PACKET: 100 MILLION CFU STRENGTH GRANULES at 07:55

## 2018-08-01 RX ADMIN — CLINDAMYCIN HYDROCHLORIDE 300 MG: 300 CAPSULE ORAL at 20:44

## 2018-08-01 RX ADMIN — INSULIN GLARGINE 40 UNITS: 100 INJECTION, SOLUTION SUBCUTANEOUS at 20:46

## 2018-08-01 RX ADMIN — INSULIN LISPRO 6 UNITS: 100 INJECTION, SOLUTION INTRAVENOUS; SUBCUTANEOUS at 17:22

## 2018-08-01 RX ADMIN — GABAPENTIN 900 MG: 300 CAPSULE ORAL at 07:55

## 2018-08-01 RX ADMIN — LACTOBACILLUS ACIDOPHILUS / LACTOBACILLUS BULGARICUS 1 PACKET: 100 MILLION CFU STRENGTH GRANULES at 10:57

## 2018-08-01 RX ADMIN — METOPROLOL TARTRATE 25 MG: 25 TABLET ORAL at 05:04

## 2018-08-01 RX ADMIN — GABAPENTIN 900 MG: 300 CAPSULE ORAL at 20:44

## 2018-08-01 RX ADMIN — DOCUSATE SODIUM 100 MG: 100 CAPSULE, LIQUID FILLED ORAL at 07:54

## 2018-08-01 RX ADMIN — AMLODIPINE BESYLATE 10 MG: 5 TABLET ORAL at 05:03

## 2018-08-01 RX ADMIN — FLUTICASONE PROPIONATE 50 MCG: 50 SPRAY, METERED NASAL at 07:54

## 2018-08-01 RX ADMIN — Medication 1 TABLET: at 20:44

## 2018-08-01 RX ADMIN — GABAPENTIN 900 MG: 300 CAPSULE ORAL at 14:36

## 2018-08-01 RX ADMIN — CLINDAMYCIN HYDROCHLORIDE 300 MG: 300 CAPSULE ORAL at 07:54

## 2018-08-01 RX ADMIN — HYDRALAZINE HYDROCHLORIDE 100 MG: 25 TABLET, FILM COATED ORAL at 14:36

## 2018-08-01 RX ADMIN — CHOLECALCIFEROL TAB 25 MCG (1000 UNIT) 2000 UNITS: 25 TAB at 07:55

## 2018-08-01 RX ADMIN — HYDRALAZINE HYDROCHLORIDE 100 MG: 25 TABLET, FILM COATED ORAL at 20:44

## 2018-08-01 RX ADMIN — OXYCODONE HYDROCHLORIDE AND ACETAMINOPHEN 1 TABLET: 5; 325 TABLET ORAL at 23:20

## 2018-08-01 ASSESSMENT — ENCOUNTER SYMPTOMS
HEADACHES: 0
VOMITING: 0
DIZZINESS: 0
PALPITATIONS: 0
SHORTNESS OF BREATH: 0
BLURRED VISION: 0
HALLUCINATIONS: 0
FEVER: 0
NAUSEA: 0

## 2018-08-01 ASSESSMENT — PAIN SCALES - GENERAL
PAINLEVEL_OUTOF10: 7
PAINLEVEL_OUTOF10: 0

## 2018-08-01 NOTE — PROGRESS NOTES
Pharmacy Warfarin Consult   8/1/2018     67 y.o.   male     Indication for anticoagulation: Atrial Fibrillation    Goal INR = 2 - 3    Recent Labs      07/31/18   0549  08/01/18   0541   INR   --   1.07   HEMOGLOBIN  11.5*   --    HEMATOCRIT  35.4*   --        Pertinent Drug/Drug Interactions:  Antibiotics, trazodone  Outpatient Warfarin Regimen:  10 mg daily per med rec  Recent Warfarin Dosing:    Dose from last 7 days     Date/Time Dose (mg)    08/01/18 0541  5          Bridge Therapy: None        1.  Warfarin 5 mg tonight for INR = 1.07        [facility]arcel Jurado [facility]

## 2018-08-01 NOTE — CARE PLAN
Problem: Safety  Goal: Will remain free from injury  Pt remains free of accidental injury at this time. Able to verbalize needs and does not attempt self transfer. Bed rails x2 secured for safety. Call light and personal belongings within reach. Hourly rounds done by staff to ensure safety and check if needs are met. Will continue to assess and monitor for safety.     Problem: GLYCEMIA IMBALANCE  Goal: Clinical indication of glycemia balance is achieved  HS FS is 299. 6 units of Humalog given per MAR. No s/sx of hypo/hyperglycemia noted. HS snack given which consumed 100%. Will continue to assess and monitor for hypo/hyperglycemia.

## 2018-08-01 NOTE — PROGRESS NOTES
"Rehab Progress Note     Encounter date: 8/1/2018  Today I met with the patient face to face in therapy    Chief Complaint:  Cervical myelopathy (HCC) , gradual progress      Interval Events (subjective)  Mr. Ma reports that he has had some improvement in ambulation.  We discussed his home and the need for safe entry up to 4 steps.  We discussed the potential for a ramp or a second railing.  He is not sure that these can be installed.  We discussed that the stairs represent a significant barrier for him.  He denies any fevers, chills, headache, dizziness, chest pain, shortness of breath.  He reports that his hand sensitivity has improved.    Objective:  VITAL SIGNS: /72   Pulse 76   Temp 36.4 °C (97.6 °F)   Resp 20   Ht 1.905 m (6' 3\")   Wt 114 kg (251 lb 5.2 oz)   SpO2 93%   BMI 31.41 kg/m²     Recent Results (from the past 72 hour(s))   ACCU-CHEK GLUCOSE    Collection Time: 07/29/18  5:13 PM   Result Value Ref Range    Glucose - Accu-Ck 137 (H) 65 - 99 mg/dL   ACCU-CHEK GLUCOSE    Collection Time: 07/29/18  8:27 PM   Result Value Ref Range    Glucose - Accu-Ck 191 (H) 65 - 99 mg/dL   ACCU-CHEK GLUCOSE    Collection Time: 07/30/18  7:15 AM   Result Value Ref Range    Glucose - Accu-Ck 126 (H) 65 - 99 mg/dL   ACCU-CHEK GLUCOSE    Collection Time: 07/30/18 11:14 AM   Result Value Ref Range    Glucose - Accu-Ck 107 (H) 65 - 99 mg/dL   ACCU-CHEK GLUCOSE    Collection Time: 07/30/18  5:02 PM   Result Value Ref Range    Glucose - Accu-Ck 142 (H) 65 - 99 mg/dL   ACCU-CHEK GLUCOSE    Collection Time: 07/30/18  8:51 PM   Result Value Ref Range    Glucose - Accu-Ck 249 (H) 65 - 99 mg/dL   BASIC METABOLIC PANEL    Collection Time: 07/31/18  5:49 AM   Result Value Ref Range    Sodium 139 135 - 145 mmol/L    Potassium 3.9 3.6 - 5.5 mmol/L    Chloride 103 96 - 112 mmol/L    Co2 28 20 - 33 mmol/L    Glucose 97 65 - 99 mg/dL    Bun 26 (H) 8 - 22 mg/dL    Creatinine 0.87 0.50 - 1.40 mg/dL    Calcium 9.0 8.5 - 10.5 " mg/dL    Anion Gap 8.0 0.0 - 11.9   CBC WITH DIFFERENTIAL    Collection Time: 07/31/18  5:49 AM   Result Value Ref Range    WBC 4.8 4.8 - 10.8 K/uL    RBC 4.05 (L) 4.70 - 6.10 M/uL    Hemoglobin 11.5 (L) 14.0 - 18.0 g/dL    Hematocrit 35.4 (L) 42.0 - 52.0 %    MCV 87.4 81.4 - 97.8 fL    MCH 28.4 27.0 - 33.0 pg    MCHC 32.5 (L) 33.7 - 35.3 g/dL    RDW 45.1 35.9 - 50.0 fL    Platelet Count 256 164 - 446 K/uL    MPV 11.0 9.0 - 12.9 fL    Neutrophils-Polys 61.70 44.00 - 72.00 %    Lymphocytes 23.20 22.00 - 41.00 %    Monocytes 11.20 0.00 - 13.40 %    Eosinophils 2.70 0.00 - 6.90 %    Basophils 0.40 0.00 - 1.80 %    Immature Granulocytes 0.80 0.00 - 0.90 %    Nucleated RBC 0.00 /100 WBC    Neutrophils (Absolute) 2.97 1.82 - 7.42 K/uL    Lymphs (Absolute) 1.12 1.00 - 4.80 K/uL    Monos (Absolute) 0.54 0.00 - 0.85 K/uL    Eos (Absolute) 0.13 0.00 - 0.51 K/uL    Baso (Absolute) 0.02 0.00 - 0.12 K/uL    Immature Granulocytes (abs) 0.04 0.00 - 0.11 K/uL    NRBC (Absolute) 0.00 K/uL   ESTIMATED GFR    Collection Time: 07/31/18  5:49 AM   Result Value Ref Range    GFR If African American >60 >60 mL/min/1.73 m 2    GFR If Non African American >60 >60 mL/min/1.73 m 2   ACCU-CHEK GLUCOSE    Collection Time: 07/31/18  7:24 AM   Result Value Ref Range    Glucose - Accu-Ck 91 65 - 99 mg/dL   ACCU-CHEK GLUCOSE    Collection Time: 07/31/18 11:23 AM   Result Value Ref Range    Glucose - Accu-Ck 124 (H) 65 - 99 mg/dL   ACCU-CHEK GLUCOSE    Collection Time: 07/31/18  4:41 PM   Result Value Ref Range    Glucose - Accu-Ck 175 (H) 65 - 99 mg/dL   ACCU-CHEK GLUCOSE    Collection Time: 07/31/18  8:33 PM   Result Value Ref Range    Glucose - Accu-Ck 299 (H) 65 - 99 mg/dL   PROTHROMBIN TIME    Collection Time: 08/01/18  5:41 AM   Result Value Ref Range    PT 13.6 12.0 - 14.6 sec    INR 1.07 0.87 - 1.13   ACCU-CHEK GLUCOSE    Collection Time: 08/01/18  7:22 AM   Result Value Ref Range    Glucose - Accu-Ck 108 (H) 65 - 99 mg/dL   ACCU-CHEK GLUCOSE     Collection Time: 08/01/18 10:58 AM   Result Value Ref Range    Glucose - Accu-Ck 127 (H) 65 - 99 mg/dL       Current Facility-Administered Medications   Medication Frequency   • warfarin (COUMADIN) tablet 5 mg COUMADIN-ONCE   • MD ALERT... warfarin (COUMADIN) per pharmacy protocol pharmacy to dose   • lidocaine (XYLOCAINE) 2 % jelly Q8HRS   • insulin glargine (LANTUS) injection 40 Units Q EVENING   • oxyCODONE-acetaminophen (PERCOCET) 5-325 MG per tablet 1 Tab Q6HRS PRN   • metoprolol (LOPRESSOR) tablet 25 mg TWICE DAILY   • lactobacillus granules (LACTINEX/FLORANEX) packet 1 Packet TID WITH MEALS   • insulin lispro (HUMALOG) injection 0-12 Units 4X/DAY ACHS   • gabapentin (NEURONTIN) capsule 900 mg TID   • methocarbamol (ROBAXIN) tablet 750 mg Q6HRS PRN   • vitamin D (cholecalciferol) tablet 2,000 Units DAILY   • lisinopril (PRINIVIL) tablet 40 mg DAILY   • Respiratory Care per Protocol Continuous RT   • Pharmacy Consult Request ...Pain Management Review 1 Each PRN   • acetaminophen (TYLENOL) tablet 650 mg Q4HRS PRN   • artificial tears 1.4 % ophthalmic solution 1 Drop PRN   • benzocaine-menthol (CEPACOL) lozenge 1 Lozenge Q2HRS PRN   • hydrALAZINE (APRESOLINE) tablet 25 mg Q8HRS PRN   • mag hydrox-al hydrox-simeth (MAALOX PLUS ES or MYLANTA DS) suspension 20 mL Q2HRS PRN   • ondansetron (ZOFRAN ODT) dispertab 4 mg 4X/DAY PRN    Or   • ondansetron (ZOFRAN) syringe/vial injection 4 mg 4X/DAY PRN   • traZODone (DESYREL) tablet 50 mg QHS PRN   • sodium chloride (OCEAN) 0.65 % nasal spray 2 Spray PRN   • bisacodyl (DULCOLAX) suppository 10 mg Q24HRS PRN   • magnesium hydroxide (MILK OF MAGNESIA) suspension 30 mL QDAY PRN   • senna-docusate (PERICOLACE or SENOKOT S) 8.6-50 MG per tablet 1 Tab Nightly   • amLODIPine (NORVASC) tablet 10 mg Q DAY   • clindamycin (CLEOCIN) capsule 300 mg BID   • enoxaparin (LOVENOX) inj 40 mg DAILY   • fluticasone (FLONASE) nasal spray 50 mcg DAILY   • hydrALAZINE (APRESOLINE) tablet 100  mg TID   • metFORMIN (GLUCOPHAGE) tablet 1,000 mg BID WITH MEALS   • docusate sodium (COLACE) capsule 100 mg BID       Exam Date: 8/1/2018    General:  Awake, alert, oriented, no acute distress  HEENT:  Wearing Aspen cervical collar  Cardiac: regular rate and rhythm  Lungs: clear to auscultation bilaterally.   Abdomen: soft; non tender, non distended, bowel sounds present and normoactive  Extremities: 1+ lower limb edema   Neuro:   Gradual improvement in fine motor movements in the hand.  He is able to oppose finger and thumb bilaterally.  Rapid alternating movements are improving.  No active dorsiflexion in ankles.  Remains tangential.      Orders Placed This Encounter   Procedures   • Diet Order Diabetic     Standing Status:   Standing     Number of Occurrences:   1     Order Specific Question:   Diet:     Answer:   Diabetic [3]       Assessment:  Active Hospital Problems    Diagnosis   • *Cervical myelopathy (HCC)   • HTN (hypertension)   • CVA (cerebral vascular accident) ([facility])   • Diabetes mellitus with neurological manifestations, controlled (HCC)   • Impaired activities of daily living   • Lumbar stenosis with neurogenic claudication   • Pain   • Cervical stenosis of spine   • Atrial fibrillation [427.31]   • Chronic antibiotic suppression       Medical Decision Making and Plan:  Mr. Ma is a 67-year-old male admitted for rehabilitation on July 18 in the setting of cervical myelopathy and lumbar spinal stenosis     Cervical myelopathy status post C2-C6 laminectomy and C2-C4 fusion by Dr. Marsh on July 13  Lumbar spinal stenosis with neurogenic claudication status post L2-L5 laminectomies by Dr. Marsh on July 13  Continue comprehensive rehabilitation  Followed-up with Dr. Marsh on 7/30  [place] collar can be removed for meals and showering  Custom bilateral articulated AFOs have been obtained    He will not be cleared for driving at discharge     History of strokes  Cognitive impairment  Atrial  fibrillation   Coumadin per pharmacy protocol  Continue speech for cognitive deficits    INR 1.07     Pain  Neuropathic pain/allodynia  Tylenol as needed   Gabapentin 900 mg 3 times a day  Robaxin 750 mg   Oxycodone/APAP 5/325 mg every 6 hours as needed  Lidocaine jelly scheduled every 8 hours for hand pain    He has utilized 5 mg of oxycodone last 24 hours    Hypertension  Amlodipine 10 mg daily  Lopressor 25 mg twice a day  Hydralazine 100 mg 3 times a day  Lisinopril 40 mg daily dosing      Appreciate hospitalist assistance  Blood pressure today is stable at 144/72 mmHg     Diabetes mellitus type 2 with a history of diabetic neuropathy--hemoglobin A1c of 9.9 on July 19  Lantus at bedtime  Sliding scale insulin  Metformin 1000 mg twice a day  Appreciate hospitalist consult    Glucose range of 108-299 in the last 24 hours     Anemia  Hemoglobin stable at 11.5 on July 31  Continue to monitor     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  Additionally, it has only been 2 months since his last injection.  Discussed conservative cares     Constipation  Colace 100 g twice a day  Pericolace qhs  Milk of magnesia as needed  Dulcolax suppository daily as needed     Seasonal allergies  Flonase daily     Incidental left thyroid mass seen on MRI in May 2018  Will need follow-up with primary care  TSH of 0.56 on admission    Vitamin D deficiency  Vitamin D was 26 on admission so we began supplementation with 2000 units of cholecalciferol daily      DVT prophylaxis  Lovenox 40 mg daily  Discontinued when INR in range     Estimated discharge: August 7--we are recommending skilled for more support    Total time:  27 minutes.  I spent greater than 50% of the time for patient care, counseling, and  coordination on this date, including unit/floor time, and face-to-face time with the patient as per interval events and assessment and plan above. Topics discussed included discharge planning, gradual functional progress, safety concerns at home.  Discussed with interdisciplinary care team.      Higinio Pagan M.D.  8/1/2018

## 2018-08-01 NOTE — FLOWSHEET NOTE
08/01/18 1220   Events/Summary/Plan   Events/Summary/Plan 02 spot check   Respiratory WDL   Respiratory (WDL) X   Chest Exam   Respiration 18   Pulse 72   Oximetry   #Pulse Oximetry (Single Determination) Yes   Oxygen   Home O2 Use Prior To Admission? No   Pulse Oximetry 91 %   O2 Daily Delivery Respiratory  Room Air with O2 Available

## 2018-08-01 NOTE — PROGRESS NOTES
Hospital Medicine Progress Note, Adult, Complex               Author: Moshe Bush Date & Time created: 8/1/2018  8:28 AM     Interval History:  No significant events or changes since last visit  Patient denies SOB, cough, or diarrhea  Patient slept ok last night    Chief Complaint:  Hypertension  Diabetes    Review of Systems:  Review of Systems   Constitutional: Negative for fever.   Eyes: Negative for blurred vision.   Respiratory: Negative for shortness of breath.    Cardiovascular: Negative for palpitations.   Gastrointestinal: Negative for nausea and vomiting.   Neurological: Negative for dizziness and headaches.   Psychiatric/Behavioral: Negative for hallucinations.       Physical Exam:  Physical Exam   Constitutional: He is oriented to person, place, and time.   HENT:   Mouth/Throat: Oropharynx is clear and moist.   Eyes: No scleral icterus.   Neck: No JVD present. No tracheal deviation present.   Has C-collar.   Cardiovascular: S1 normal and S2 normal.    No murmur heard.  Irregular heart rate   Pulmonary/Chest: Effort normal and breath sounds normal. He has no rhonchi.   Abdominal: Soft. He exhibits no distension. There is no tenderness. Hernia confirmed negative in the right inguinal area and confirmed negative in the left inguinal area.   Musculoskeletal: He exhibits edema.   Neurological: He is alert and oriented to person, place, and time. No sensory deficit.   Skin: Skin is warm and dry. No rash noted. He is not diaphoretic. No cyanosis.   Psychiatric: He has a normal mood and affect. His behavior is normal.   Nursing note and vitals reviewed.      Labs:        Invalid input(s): [id number]      Recent Labs      07/31/18   0549   SODIUM  139   POTASSIUM  3.9   CHLORIDE  103   CO2  28   BUN  26*   CREATININE  0.87   CALCIUM  9.0     Recent Labs      07/31/18   0549   GLUCOSE  97     Recent Labs      07/31/18   0549  08/01/18   0541   RBC  4.05*   --    HEMOGLOBIN  11.5*   --    HEMATOCRIT  35.4*    --    PLATELETCT  256   --    PROTHROMBTM   --   13.6   INR   --   1.07     Recent Labs      18   0549   WBC  4.8   NEUTSPOLYS  61.70   LYMPHOCYTES  23.20   MONOCYTES  11.20   EOSINOPHILS  2.70   BASOPHILS  0.40           Hemodynamics:  Temp (24hrs), Av.5 °C (97.7 °F), Min:36.3 °C (97.3 °F), Max:36.7 °C (98 °F)  Temperature: 36.6 °C (97.8 °F)  Pulse  Av.8  Min: 57  Max: 113   Blood Pressure : 144/72     Respiratory:    Respiration: 20, Pulse Oximetry: 92 %     Work Of Breathing / Effort: Mild  RUL Breath Sounds: Clear, RML Breath Sounds: Clear;Diminished, RLL Breath Sounds: Diminished, AI Breath Sounds: Clear, LLL Breath Sounds: Diminished  Fluids:    Intake/Output Summary (Last 24 hours) at 18 0828  Last data filed at 18 0700   Gross per 24 hour   Intake              800 ml   Output             1900 ml   Net            -1100 ml        GI/Nutrition:  Orders Placed This Encounter   Procedures   • Diet Order Diabetic     Standing Status:   Standing     Number of Occurrences:   1     Order Specific Question:   Diet:     Answer:   Diabetic [3]     Medical Decision Making, by Problem:  Active Hospital Problems    Diagnosis   • *Cervical myelopathy (HCC) [G95.9]   • HTN (hypertension) [I10]   • CVA (cerebral vascular accident) (HCC) [I63.9]   • Diabetes mellitus with neurological manifestations, controlled (HCC) [E11.49]   • Impaired activities of daily living [R53.81]   • Lumbar stenosis with neurogenic claudication [M48.062]   • Pain [R52]   • Cervical stenosis of spine [M48.02]   • Atrial fibrillation [427.31] [I48.91]   • Chronic antibiotic suppression [Z79.2]     *  S/P Lumbar Surgery  *  S/P Cervical Surgery: had hx of prior cervical fusion    *  Hypertension  BP a little labile but ok  On Norvasc: 10 mg daily  On Lopressor: 25 mg bid  On Hydralazine: 100 mg tid  On Lisinopril: 40 mg daily ()  Cont to monitor    *  Hx Afib  HR ok  On Lopressor: 25 mg bid  On Coumadin  Monitor    *   Diabetes  Hba1c: 9.9 (7/19)  BS labile:   BS tend to dip lower in am  On Lantus: 40 units qhs  On Metformin: 1000 mg bid  Cont to monitor    *  Azotemia  Bun: 26 (7/31)  Encouraging fluid (water/juice) intake  Monitor    *  Hx CVA: x 2; on Coumadin  *  Arthritis  *  Hx Cataracts: s/p extraction  *  Hyperlipidemia  *  Vit D Insufficiency: on supplements  *  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

## 2018-08-02 LAB
GLUCOSE BLD-MCNC: 132 MG/DL (ref 65–99)
GLUCOSE BLD-MCNC: 132 MG/DL (ref 65–99)
GLUCOSE BLD-MCNC: 148 MG/DL (ref 65–99)
GLUCOSE BLD-MCNC: 202 MG/DL (ref 65–99)
INR PPP: 1.05 (ref 0.87–1.13)
PROTHROMBIN TIME: 13.4 SEC (ref 12–14.6)

## 2018-08-02 PROCEDURE — 700111 HCHG RX REV CODE 636 W/ 250 OVERRIDE (IP): Performed by: PHYSICAL MEDICINE & REHABILITATION

## 2018-08-02 PROCEDURE — 700102 HCHG RX REV CODE 250 W/ 637 OVERRIDE(OP): Performed by: HOSPITALIST

## 2018-08-02 PROCEDURE — 700112 HCHG RX REV CODE 229: Performed by: PHYSICAL MEDICINE & REHABILITATION

## 2018-08-02 PROCEDURE — A9270 NON-COVERED ITEM OR SERVICE: HCPCS | Performed by: PHYSICAL MEDICINE & REHABILITATION

## 2018-08-02 PROCEDURE — 97116 GAIT TRAINING THERAPY: CPT

## 2018-08-02 PROCEDURE — 85610 PROTHROMBIN TIME: CPT

## 2018-08-02 PROCEDURE — 97110 THERAPEUTIC EXERCISES: CPT

## 2018-08-02 PROCEDURE — 97530 THERAPEUTIC ACTIVITIES: CPT

## 2018-08-02 PROCEDURE — 97535 SELF CARE MNGMENT TRAINING: CPT

## 2018-08-02 PROCEDURE — 770010 HCHG ROOM/CARE - REHAB SEMI PRIVAT*

## 2018-08-02 PROCEDURE — 700102 HCHG RX REV CODE 250 W/ 637 OVERRIDE(OP): Performed by: PHYSICAL MEDICINE & REHABILITATION

## 2018-08-02 PROCEDURE — 82962 GLUCOSE BLOOD TEST: CPT | Mod: 91

## 2018-08-02 PROCEDURE — A9270 NON-COVERED ITEM OR SERVICE: HCPCS | Performed by: HOSPITALIST

## 2018-08-02 PROCEDURE — G0515 COGNITIVE SKILLS DEVELOPMENT: HCPCS

## 2018-08-02 PROCEDURE — 99232 SBSQ HOSP IP/OBS MODERATE 35: CPT | Performed by: PHYSICAL MEDICINE & REHABILITATION

## 2018-08-02 PROCEDURE — 94760 N-INVAS EAR/PLS OXIMETRY 1: CPT

## 2018-08-02 PROCEDURE — 36415 COLL VENOUS BLD VENIPUNCTURE: CPT

## 2018-08-02 PROCEDURE — 99232 SBSQ HOSP IP/OBS MODERATE 35: CPT | Performed by: HOSPITALIST

## 2018-08-02 RX ORDER — GLIPIZIDE 2.5 MG/1
2.5 TABLET, EXTENDED RELEASE ORAL
Status: DISCONTINUED | OUTPATIENT
Start: 2018-08-02 | End: 2018-08-06

## 2018-08-02 RX ORDER — WARFARIN SODIUM 5 MG/1
5 TABLET ORAL
Status: COMPLETED | OUTPATIENT
Start: 2018-08-02 | End: 2018-08-02

## 2018-08-02 RX ADMIN — WARFARIN SODIUM 5 MG: 5 TABLET ORAL at 17:02

## 2018-08-02 RX ADMIN — INSULIN GLARGINE 40 UNITS: 100 INJECTION, SOLUTION SUBCUTANEOUS at 20:44

## 2018-08-02 RX ADMIN — FLUTICASONE PROPIONATE 50 MCG: 50 SPRAY, METERED NASAL at 08:01

## 2018-08-02 RX ADMIN — GABAPENTIN 900 MG: 300 CAPSULE ORAL at 08:01

## 2018-08-02 RX ADMIN — METOPROLOL TARTRATE 25 MG: 25 TABLET ORAL at 05:05

## 2018-08-02 RX ADMIN — CHOLECALCIFEROL TAB 25 MCG (1000 UNIT) 2000 UNITS: 25 TAB at 08:02

## 2018-08-02 RX ADMIN — DOCUSATE SODIUM 100 MG: 100 CAPSULE, LIQUID FILLED ORAL at 08:01

## 2018-08-02 RX ADMIN — HYDRALAZINE HYDROCHLORIDE 100 MG: 25 TABLET, FILM COATED ORAL at 08:01

## 2018-08-02 RX ADMIN — CLINDAMYCIN HYDROCHLORIDE 300 MG: 300 CAPSULE ORAL at 08:01

## 2018-08-02 RX ADMIN — INSULIN LISPRO 4 UNITS: 100 INJECTION, SOLUTION INTRAVENOUS; SUBCUTANEOUS at 17:09

## 2018-08-02 RX ADMIN — CLINDAMYCIN HYDROCHLORIDE 300 MG: 300 CAPSULE ORAL at 20:34

## 2018-08-02 RX ADMIN — LISINOPRIL 40 MG: 20 TABLET ORAL at 08:02

## 2018-08-02 RX ADMIN — LACTOBACILLUS ACIDOPHILUS / LACTOBACILLUS BULGARICUS 1 PACKET: 100 MILLION CFU STRENGTH GRANULES at 11:39

## 2018-08-02 RX ADMIN — GABAPENTIN 900 MG: 300 CAPSULE ORAL at 14:11

## 2018-08-02 RX ADMIN — BACITRACIN, NEOMYCIN, POLYMYXIN B: 400; 3.5; 5 OINTMENT TOPICAL at 20:34

## 2018-08-02 RX ADMIN — AMLODIPINE BESYLATE 10 MG: 5 TABLET ORAL at 05:05

## 2018-08-02 RX ADMIN — LACTOBACILLUS ACIDOPHILUS / LACTOBACILLUS BULGARICUS 1 PACKET: 100 MILLION CFU STRENGTH GRANULES at 08:02

## 2018-08-02 RX ADMIN — METOPROLOL TARTRATE 25 MG: 25 TABLET ORAL at 17:02

## 2018-08-02 RX ADMIN — HYDRALAZINE HYDROCHLORIDE 100 MG: 25 TABLET, FILM COATED ORAL at 20:35

## 2018-08-02 RX ADMIN — LACTOBACILLUS ACIDOPHILUS / LACTOBACILLUS BULGARICUS 1 PACKET: 100 MILLION CFU STRENGTH GRANULES at 17:02

## 2018-08-02 RX ADMIN — METFORMIN HYDROCHLORIDE 1000 MG: 500 TABLET, FILM COATED ORAL at 17:02

## 2018-08-02 RX ADMIN — GLIPIZIDE 2.5 MG: 2.5 TABLET, FILM COATED, EXTENDED RELEASE ORAL at 09:44

## 2018-08-02 RX ADMIN — METFORMIN HYDROCHLORIDE 1000 MG: 500 TABLET, FILM COATED ORAL at 08:02

## 2018-08-02 RX ADMIN — HYDRALAZINE HYDROCHLORIDE 100 MG: 25 TABLET, FILM COATED ORAL at 14:12

## 2018-08-02 RX ADMIN — ENOXAPARIN SODIUM 40 MG: 100 INJECTION SUBCUTANEOUS at 08:01

## 2018-08-02 RX ADMIN — Medication 1 TABLET: at 20:35

## 2018-08-02 RX ADMIN — GABAPENTIN 900 MG: 300 CAPSULE ORAL at 20:34

## 2018-08-02 RX ADMIN — DOCUSATE SODIUM 100 MG: 100 CAPSULE, LIQUID FILLED ORAL at 20:35

## 2018-08-02 ASSESSMENT — PAIN SCALES - GENERAL
PAINLEVEL_OUTOF10: 6
PAINLEVEL_OUTOF10: 0

## 2018-08-02 ASSESSMENT — ENCOUNTER SYMPTOMS
BLURRED VISION: 0
DIZZINESS: 0
FEVER: 0
NERVOUS/ANXIOUS: 0
DIARRHEA: 0
COUGH: 0

## 2018-08-02 NOTE — PROGRESS NOTES
Pharmacy Warfarin Consult   8/2/2018     67 y.o.   male     Indication for anticoagulation: Atrial Fibrillation     Goal INR = 2 - 3    Recent Labs      07/31/18   0549  08/01/18   0541  08/02/18 0533   INR   --   1.07  1.05   HEMOGLOBIN  11.5*   --    --    HEMATOCRIT  35.4*   --    --        Pertinent Drug/Drug Interactions:  Antibiotics, trazodone  Outpatient Warfarin Regimen:  10 mg daily per med rec  Recent Warfarin Dosing:    Dose from last 7 days     Date/Time Dose (mg)    08/02/18 0533  5    08/01/18 0541  5          Bridge Therapy: Lovenox 40 mg daily until INR > 2 x 2 days           1.  Warfarin 5 mg tonight for INR = 1.05           Chris Kelley [facility]

## 2018-08-02 NOTE — PROGRESS NOTES
"Rehab Progress Note     Encounter date: 8/2/2018  Today I met with the patient face to face in the hallway    Chief Complaint:  Cervical myelopathy (HCC) , improved hand sensation     Interval Events (subjective)  Mr. Ma reports that the hypersensitivity in his hands continues to improve.  It now feels more like his hands are spongy.  He denies any fevers, chills, headache, dizziness, chest pain, or shortness of breath.  He reports no active movement in his ankles.  He discussed his discharge plans with several friends, and he is hoping that a friend may be able to come stay with him beginning August 9.    Objective:  VITAL SIGNS: /80   Pulse 60   Temp 36.5 °C (97.7 °F)   Resp 16   Ht 1.905 m (6' 3\")   Wt 114 kg (251 lb 5.2 oz)   SpO2 98%   BMI 31.41 kg/m²     Recent Results (from the past 72 hour(s))   ACCU-CHEK GLUCOSE    Collection Time: 07/30/18  5:02 PM   Result Value Ref Range    Glucose - Accu-Ck 142 (H) 65 - 99 mg/dL   ACCU-CHEK GLUCOSE    Collection Time: 07/30/18  8:51 PM   Result Value Ref Range    Glucose - Accu-Ck 249 (H) 65 - 99 mg/dL   BASIC METABOLIC PANEL    Collection Time: 07/31/18  5:49 AM   Result Value Ref Range    Sodium 139 135 - 145 mmol/L    Potassium 3.9 3.6 - 5.5 mmol/L    Chloride 103 96 - 112 mmol/L    Co2 28 20 - 33 mmol/L    Glucose 97 65 - 99 mg/dL    Bun 26 (H) 8 - 22 mg/dL    Creatinine 0.87 0.50 - 1.40 mg/dL    Calcium 9.0 8.5 - 10.5 mg/dL    Anion Gap 8.0 0.0 - 11.9   CBC WITH DIFFERENTIAL    Collection Time: 07/31/18  5:49 AM   Result Value Ref Range    WBC 4.8 4.8 - 10.8 K/uL    RBC 4.05 (L) 4.70 - 6.10 M/uL    Hemoglobin 11.5 (L) 14.0 - 18.0 g/dL    Hematocrit 35.4 (L) 42.0 - 52.0 %    MCV 87.4 81.4 - 97.8 fL    MCH 28.4 27.0 - 33.0 pg    MCHC 32.5 (L) 33.7 - 35.3 g/dL    RDW 45.1 35.9 - 50.0 fL    Platelet Count 256 164 - 446 K/uL    MPV 11.0 9.0 - 12.9 fL    Neutrophils-Polys 61.70 44.00 - 72.00 %    Lymphocytes 23.20 22.00 - 41.00 %    Monocytes 11.20 0.00 " - 13.40 %    Eosinophils 2.70 0.00 - 6.90 %    Basophils 0.40 0.00 - 1.80 %    Immature Granulocytes 0.80 0.00 - 0.90 %    Nucleated RBC 0.00 /100 WBC    Neutrophils (Absolute) 2.97 1.82 - 7.42 K/uL    Lymphs (Absolute) 1.12 1.00 - 4.80 K/uL    Monos (Absolute) 0.54 0.00 - 0.85 K/uL    Eos (Absolute) 0.13 0.00 - 0.51 K/uL    Baso (Absolute) 0.02 0.00 - 0.12 K/uL    Immature Granulocytes (abs) 0.04 0.00 - 0.11 K/uL    NRBC (Absolute) 0.00 K/uL   ESTIMATED GFR    Collection Time: 07/31/18  5:49 AM   Result Value Ref Range    GFR If African American >60 >60 mL/min/1.73 m 2    GFR If Non African American >60 >60 mL/min/1.73 m 2   ACCU-CHEK GLUCOSE    Collection Time: 07/31/18  7:24 AM   Result Value Ref Range    Glucose - Accu-Ck 91 65 - 99 mg/dL   ACCU-CHEK GLUCOSE    Collection Time: 07/31/18 11:23 AM   Result Value Ref Range    Glucose - Accu-Ck 124 (H) 65 - 99 mg/dL   ACCU-CHEK GLUCOSE    Collection Time: 07/31/18  4:41 PM   Result Value Ref Range    Glucose - Accu-Ck 175 (H) 65 - 99 mg/dL   ACCU-CHEK GLUCOSE    Collection Time: 07/31/18  8:33 PM   Result Value Ref Range    Glucose - Accu-Ck 299 (H) 65 - 99 mg/dL   PROTHROMBIN TIME    Collection Time: 08/01/18  5:41 AM   Result Value Ref Range    PT 13.6 12.0 - 14.6 sec    INR 1.07 0.87 - 1.13   ACCU-CHEK GLUCOSE    Collection Time: 08/01/18  7:22 AM   Result Value Ref Range    Glucose - Accu-Ck 108 (H) 65 - 99 mg/dL   ACCU-CHEK GLUCOSE    Collection Time: 08/01/18 10:58 AM   Result Value Ref Range    Glucose - Accu-Ck 127 (H) 65 - 99 mg/dL   ACCU-CHEK GLUCOSE    Collection Time: 08/01/18  5:06 PM   Result Value Ref Range    Glucose - Accu-Ck 293 (H) 65 - 99 mg/dL   ACCU-CHEK GLUCOSE    Collection Time: 08/01/18  8:43 PM   Result Value Ref Range    Glucose - Accu-Ck 219 (H) 65 - 99 mg/dL   PROTHROMBIN TIME    Collection Time: 08/02/18  5:33 AM   Result Value Ref Range    PT 13.4 12.0 - 14.6 sec    INR 1.05 0.87 - 1.13   ACCU-CHEK GLUCOSE    Collection Time: 08/02/18   7:24 AM   Result Value Ref Range    Glucose - Accu-Ck 132 (H) 65 - 99 mg/dL   ACCU-CHEK GLUCOSE    Collection Time: 08/02/18 11:32 AM   Result Value Ref Range    Glucose - Accu-Ck 132 (H) 65 - 99 mg/dL       Current Facility-Administered Medications   Medication Frequency   • glipiZIDE SR (GLUCOTROL) tablet 2.5 mg QAM AC   • warfarin (COUMADIN) tablet 5 mg COUMADIN-ONCE   • MD ALERT... warfarin (COUMADIN) per pharmacy protocol pharmacy to dose   • lidocaine (XYLOCAINE) 2 % jelly Q8HRS   • insulin glargine (LANTUS) injection 40 Units Q EVENING   • oxyCODONE-acetaminophen (PERCOCET) 5-325 MG per tablet 1 Tab Q6HRS PRN   • metoprolol (LOPRESSOR) tablet 25 mg TWICE DAILY   • lactobacillus granules (LACTINEX/FLORANEX) packet 1 Packet TID WITH MEALS   • insulin lispro (HUMALOG) injection 0-12 Units 4X/DAY ACHS   • gabapentin (NEURONTIN) capsule 900 mg TID   • methocarbamol (ROBAXIN) tablet 750 mg Q6HRS PRN   • vitamin D (cholecalciferol) tablet 2,000 Units DAILY   • lisinopril (PRINIVIL) tablet 40 mg DAILY   • Respiratory Care per Protocol Continuous RT   • Pharmacy Consult Request ...Pain Management Review 1 Each PRN   • acetaminophen (TYLENOL) tablet 650 mg Q4HRS PRN   • artificial tears 1.4 % ophthalmic solution 1 Drop PRN   • benzocaine-menthol (CEPACOL) lozenge 1 Lozenge Q2HRS PRN   • hydrALAZINE (APRESOLINE) tablet 25 mg Q8HRS PRN   • mag hydrox-al hydrox-simeth (MAALOX PLUS ES or MYLANTA DS) suspension 20 mL Q2HRS PRN   • ondansetron (ZOFRAN ODT) dispertab 4 mg 4X/DAY PRN    Or   • ondansetron (ZOFRAN) syringe/vial injection 4 mg 4X/DAY PRN   • traZODone (DESYREL) tablet 50 mg QHS PRN   • sodium chloride (OCEAN) 0.65 % nasal spray 2 Spray PRN   • bisacodyl (DULCOLAX) suppository 10 mg Q24HRS PRN   • magnesium hydroxide (MILK OF MAGNESIA) suspension 30 mL QDAY PRN   • senna-docusate (PERICOLACE or SENOKOT S) 8.6-50 MG per tablet 1 Tab Nightly   • amLODIPine (NORVASC) tablet 10 mg Q DAY   • clindamycin (CLEOCIN)  capsule 300 mg BID   • enoxaparin (LOVENOX) inj 40 mg DAILY   • fluticasone (FLONASE) nasal spray 50 mcg DAILY   • hydrALAZINE (APRESOLINE) tablet 100 mg TID   • metFORMIN (GLUCOPHAGE) tablet 1,000 mg BID WITH MEALS   • docusate sodium (COLACE) capsule 100 mg BID       Exam Date: 8/2/2018    General:  Awake, alert, oriented, no acute distress  HEENT:  Wearing Aspen cervical collar  Cardiac: regular rate and rhythm  Lungs: clear to auscultation bilaterally.   Abdomen: soft; non tender, non distended, bowel sounds present and normoactive  Extremities: 1+ lower limb edema, wearing bilateral AFOs  Neuro:   Stable fine motor movements in the hands.  No active dorsiflexion in the ankles.  Remains easily distractible and tangential.    Orders Placed This Encounter   Procedures   • Diet Order Diabetic     Standing Status:   Standing     Number of Occurrences:   1     Order Specific Question:   Diet:     Answer:   Diabetic [3]       Assessment:  Active Hospital Problems    Diagnosis   • *Cervical myelopathy (HCC)   • HTN (hypertension)   • CVA (cerebral vascular accident) (HCC)   • Diabetes mellitus with neurological manifestations, controlled (HCC)   • Impaired activities of daily living   • Lumbar stenosis with neurogenic claudication   • Pain   • Cervical stenosis of spine   • Atrial fibrillation [427.31]   • Chronic antibiotic suppression       Medical Decision Making and Plan:  Mr. Ma is a 67-year-old male admitted for rehabilitation on July 18 in the setting of cervical myelopathy and lumbar spinal stenosis     Cervical myelopathy status post C2-C6 laminectomy and C2-C4 fusion by Dr. Marsh on July 13  Lumbar spinal stenosis with neurogenic claudication status post L2-L5 laminectomies by Dr. Marsh on July 13  Continue comprehensive rehabilitation  Followed-up with Dr. Marsh on 7/30  [place] collar can be removed for meals and showering  Custom bilateral articulated AFOs have been obtained    He will not be cleared  for driving at discharge     History of strokes  Cognitive impairment  Atrial fibrillation   Coumadin per pharmacy protocol  Continue speech for cognitive deficits    INR is 1.05 today     Pain  Neuropathic pain/allodynia  Tylenol as needed   Gabapentin 900 mg 3 times a day  Robaxin 750 mg   Oxycodone/APAP 5/325 mg every 6 hours as needed  Lidocaine jelly scheduled every 8 hours for hand pain    He has utilized 5 mg of oxycodone last 24 hours    Hypertension  Amlodipine 10 mg daily  Lopressor 25 mg twice a day  Hydralazine 100 mg 3 times a day  Lisinopril 40 mg daily dosing      Appreciate hospitalist assistance  Blood pressure range in the last 24 hours is 126-156 mmHg.     Diabetes mellitus type 2 with a history of diabetic neuropathy--hemoglobin A1c of 9.9 on July 19  Lantus at bedtime  Sliding scale insulin  Metformin 1000 mg twice a day  Glipizide 2.5 mg daily beginning on August 2  Appreciate hospitalist consult    Glucose range of 132-293 in the last 24 hours     Anemia  Hemoglobin stable at 11.5 on July 31  Continue to monitor     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  Additionally, it has only been 2 months since his last injection.  Discussed conservative cares     Constipation  Colace 100 g twice a day  Pericolace qhs  Milk of magnesia as needed  Dulcolax suppository daily as needed     Seasonal allergies  Flonase daily     Incidental left thyroid mass seen on MRI in May 2018  Will need follow-up with primary care  TSH of 0.56 on admission    Vitamin D deficiency  Vitamin D was 26 on admission so we began supplementation with 2000 units of cholecalciferol daily      DVT prophylaxis  Lovenox 40 mg daily  Discontinued when INR in range     Estimated discharge: August 7--we are  recommending skilled for more support    Total time:  26 minutes.  I spent greater than 50% of the time for patient care, counseling, and coordination on this date, including unit/floor time, and face-to-face time with the patient as per interval events and assessment and plan above. Topics discussed included functional progress, discharge planning, improvement in sensitivity, need for safe discharge destination     Higinio Pagan M.D.  8/2/2018

## 2018-08-02 NOTE — DISCHARGE PLANNING
"Case Management;  Called to room to meet with \"family\" of patient.  Confirmed with patient that his friend Albaro Álvarez had just left but left his contact information.  Patient states that Albaro can move in with him and can assist with a ramp also.  I contacted Albaro Álvarez per patient request at [phone].  Albaro confirms that he is very willing to stay with patient 24/7 and can build a ramp at his home.  Albaro has appointments in [place] and can be available on 8/10/18.  I discussed probable need for some family training and he confirms that he would be fine with doing this.  I will discuss with treating team and follow.  "

## 2018-08-02 NOTE — FLOWSHEET NOTE
08/02/18 1351   Events/Summary/Plan   Events/Summary/Plan 02 spot check   Respiratory WDL   Respiratory (WDL) X   Chest Exam   Respiration 18   Pulse 64   Oximetry   #Pulse Oximetry (Single Determination) Yes   Oxygen   Home O2 Use Prior To Admission? No   Pulse Oximetry 94 %   O2 Daily Delivery Respiratory  Room Air with O2 Available

## 2018-08-02 NOTE — CARE PLAN
Problem: Safety  Goal: Will remain free from falls    Intervention: Implement fall precautions  Pt appropriately uses call light to make needs known.Bed in low position, non skid socks/shoes on when out of bed.C-collar in place.Call light within reach.Will continue to monitor and assess needs and safety.      Problem: Bowel/Gastric:  Goal: Normal bowel function is maintained or improved    Intervention: Educate patient and significant other/support system about signs and symptoms of constipation and interventions to implement  Pt is continent of bowel.Scheduled medication given.LBM 8/1.Will continue to monitor and assess for s/sx of constipation.      Problem: Pain Management  Goal: Pain level will decrease to patient's comfort goal    Intervention: Follow pain managment plan developed in collaboration with patient and Interdisciplinary Team  Pain is manageable with scheduled medication.Repositioned with pillows for comfort.Will continue to monitor and assess pain level and medicate as needed.      Problem: GLYCEMIA IMBALANCE  Goal: Clinical indication of glycemia balance is achieved    Intervention: MONITOR BLOOD GLUCOSE LEVELS AS ORDERED  FSBS 219, coverage given with hs snack.Will continue to monitor and assess.

## 2018-08-02 NOTE — PROGRESS NOTES
Hospital Medicine Progress Note, Adult, Complex               Author: Moshe SAMMY Balderasnancy Date & Time created: 8/2/2018  8:36 AM     Interval History:  No significant events or changes since last visit  Patient denies SOB, cough, or diarrhea  Patient slept ok last night    Chief Complaint:  Hypertension  Diabetes    Review of Systems:  Review of Systems   Constitutional: Negative for fever.   Eyes: Negative for blurred vision.   Respiratory: Negative for cough.    Cardiovascular: Negative for chest pain.   Gastrointestinal: Negative for diarrhea.   Musculoskeletal: Negative for joint pain.   Neurological: Negative for dizziness.   Psychiatric/Behavioral: The patient is not nervous/anxious.        Physical Exam:  Physical Exam   Constitutional: He is oriented to person, place, and time. No distress.   HENT:   Mouth/Throat: No oropharyngeal exudate.   Eyes: EOM are normal.   Neck:   Has C-collar.   Cardiovascular: S1 normal and S2 normal.    No murmur heard.  Irregular heart rate   Pulmonary/Chest: Effort normal and breath sounds normal. No stridor. He has no rhonchi.   Abdominal: Soft. Bowel sounds are normal. Hernia confirmed negative in the right inguinal area and confirmed negative in the left inguinal area.   Musculoskeletal: He exhibits edema.   Neurological: He is alert and oriented to person, place, and time. No sensory deficit.   Skin: Skin is warm and dry. No rash noted. No cyanosis.   Psychiatric: He has a normal mood and affect. His behavior is normal.   Nursing note and vitals reviewed.      Labs:        Invalid input(s): [id number]      Recent Labs      07/31/18   0549   SODIUM  139   POTASSIUM  3.9   CHLORIDE  103   CO2  28   BUN  26*   CREATININE  0.87   CALCIUM  9.0     Recent Labs      07/31/18   0549   GLUCOSE  97     Recent Labs      07/31/18   0549  08/01/18   0541  08/02/18   0533   RBC  4.05*   --    --    HEMOGLOBIN  11.5*   --    --    HEMATOCRIT  35.4*   --    --    PLATELETCT  256   --    --     PROTHROMBTM   --   13.6  13.4   INR   --   1.07  1.05     Recent Labs      18   0549   WBC  4.8   NEUTSPOLYS  61.70   LYMPHOCYTES  23.20   MONOCYTES  11.20   EOSINOPHILS  2.70   BASOPHILS  0.40           Hemodynamics:  Temp (24hrs), Av.6 °C (97.8 °F), Min:36.4 °C (97.6 °F), Max:36.7 °C (98 °F)  Temperature: 36.5 °C (97.7 °F)  Pulse  Av.5  Min: 57  Max: 113   Blood Pressure : 126/80     Respiratory:    Respiration: 16, Pulse Oximetry: 98 %, O2 Daily Delivery Respiratory : Room Air with O2 Available     Work Of Breathing / Effort: Mild  RUL Breath Sounds: Clear, RML Breath Sounds: Clear;Diminished, RLL Breath Sounds: Diminished, AI Breath Sounds: Clear, LLL Breath Sounds: Diminished  Fluids:    Intake/Output Summary (Last 24 hours) at 18 0836  Last data filed at 18 0428   Gross per 24 hour   Intake             1200 ml   Output             1100 ml   Net              100 ml        GI/Nutrition:  Orders Placed This Encounter   Procedures   • Diet Order Diabetic     Standing Status:   Standing     Number of Occurrences:   1     Order Specific Question:   Diet:     Answer:   Diabetic [3]     Medical Decision Making, by Problem:  Active Hospital Problems    Diagnosis   • *Cervical myelopathy (HCC) [G95.9]   • HTN (hypertension) [I10]   • CVA (cerebral vascular accident) (HCC) [I63.9]   • Diabetes mellitus with neurological manifestations, controlled (HCC) [E11.49]   • Impaired activities of daily living [R53.81]   • Lumbar stenosis with neurogenic claudication [M48.062]   • Pain [R52]   • Cervical stenosis of spine [M48.02]   • Atrial fibrillation [427.31] [I48.91]   • Chronic antibiotic suppression [Z79.2]     *  S/P Lumbar Surgery  *  S/P Cervical Surgery: had hx of prior cervical fusion    *  Hypertension  BP a little labile with -156  On Norvasc: 10 mg daily  On Lopressor: 25 mg bid  On Hydralazine: 100 mg tid  On Lisinopril: 40 mg daily ()  Consider adding on Clonidine  Cont to  monitor    *  Hx Afib  HR ok  On Lopressor: 25 mg bid  On Coumadin  Monitor    *  Diabetes  Hba1c: 9.9 (7/19)  BS labile:   BS tend to dip lower in am  BS tend to rise up at dinner or night  On Lantus: 40 units qhs  On Metformin: 1000 mg bid  Will start Glucotrol 2.5 mg qam (8/2)  Cont to monitor    *  Azotemia  Bun: 26 (7/31)  Encouraging fluid (water/juice) intake  Monitor    *  Hx CVA: x 2; on Coumadin  *  Arthritis  *  Hx Cataracts: s/p extraction  *  Hyperlipidemia  *  Vit D Insufficiency: on supplements  *  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

## 2018-08-03 LAB
GLUCOSE BLD-MCNC: 109 MG/DL (ref 65–99)
GLUCOSE BLD-MCNC: 319 MG/DL (ref 65–99)
GLUCOSE BLD-MCNC: 89 MG/DL (ref 65–99)
INR PPP: 1.03 (ref 0.87–1.13)
PROTHROMBIN TIME: 13.2 SEC (ref 12–14.6)

## 2018-08-03 PROCEDURE — A9270 NON-COVERED ITEM OR SERVICE: HCPCS | Performed by: PHYSICAL MEDICINE & REHABILITATION

## 2018-08-03 PROCEDURE — 97110 THERAPEUTIC EXERCISES: CPT

## 2018-08-03 PROCEDURE — 36415 COLL VENOUS BLD VENIPUNCTURE: CPT

## 2018-08-03 PROCEDURE — 700102 HCHG RX REV CODE 250 W/ 637 OVERRIDE(OP): Performed by: HOSPITALIST

## 2018-08-03 PROCEDURE — 97530 THERAPEUTIC ACTIVITIES: CPT

## 2018-08-03 PROCEDURE — 82962 GLUCOSE BLOOD TEST: CPT

## 2018-08-03 PROCEDURE — 700102 HCHG RX REV CODE 250 W/ 637 OVERRIDE(OP): Performed by: PHYSICAL MEDICINE & REHABILITATION

## 2018-08-03 PROCEDURE — 700101 HCHG RX REV CODE 250: Performed by: PHYSICAL MEDICINE & REHABILITATION

## 2018-08-03 PROCEDURE — 97116 GAIT TRAINING THERAPY: CPT

## 2018-08-03 PROCEDURE — G0515 COGNITIVE SKILLS DEVELOPMENT: HCPCS

## 2018-08-03 PROCEDURE — 700111 HCHG RX REV CODE 636 W/ 250 OVERRIDE (IP): Performed by: PHYSICAL MEDICINE & REHABILITATION

## 2018-08-03 PROCEDURE — A9270 NON-COVERED ITEM OR SERVICE: HCPCS | Performed by: HOSPITALIST

## 2018-08-03 PROCEDURE — 700112 HCHG RX REV CODE 229: Performed by: PHYSICAL MEDICINE & REHABILITATION

## 2018-08-03 PROCEDURE — 99232 SBSQ HOSP IP/OBS MODERATE 35: CPT | Performed by: PHYSICAL MEDICINE & REHABILITATION

## 2018-08-03 PROCEDURE — 770010 HCHG ROOM/CARE - REHAB SEMI PRIVAT*

## 2018-08-03 PROCEDURE — 85610 PROTHROMBIN TIME: CPT

## 2018-08-03 PROCEDURE — 94760 N-INVAS EAR/PLS OXIMETRY 1: CPT

## 2018-08-03 PROCEDURE — 99232 SBSQ HOSP IP/OBS MODERATE 35: CPT | Performed by: HOSPITALIST

## 2018-08-03 RX ORDER — WARFARIN SODIUM 7.5 MG/1
7.5 TABLET ORAL
Status: COMPLETED | OUTPATIENT
Start: 2018-08-03 | End: 2018-08-03

## 2018-08-03 RX ADMIN — DOCUSATE SODIUM 100 MG: 100 CAPSULE, LIQUID FILLED ORAL at 20:33

## 2018-08-03 RX ADMIN — CLINDAMYCIN HYDROCHLORIDE 300 MG: 300 CAPSULE ORAL at 09:24

## 2018-08-03 RX ADMIN — METFORMIN HYDROCHLORIDE 1000 MG: 500 TABLET, FILM COATED ORAL at 07:15

## 2018-08-03 RX ADMIN — INSULIN GLARGINE 40 UNITS: 100 INJECTION, SOLUTION SUBCUTANEOUS at 20:52

## 2018-08-03 RX ADMIN — CLINDAMYCIN HYDROCHLORIDE 300 MG: 300 CAPSULE ORAL at 20:33

## 2018-08-03 RX ADMIN — GABAPENTIN 900 MG: 300 CAPSULE ORAL at 14:38

## 2018-08-03 RX ADMIN — LISINOPRIL 40 MG: 20 TABLET ORAL at 09:23

## 2018-08-03 RX ADMIN — LACTOBACILLUS ACIDOPHILUS / LACTOBACILLUS BULGARICUS 1 PACKET: 100 MILLION CFU STRENGTH GRANULES at 07:15

## 2018-08-03 RX ADMIN — GABAPENTIN 900 MG: 300 CAPSULE ORAL at 09:24

## 2018-08-03 RX ADMIN — HYDRALAZINE HYDROCHLORIDE 100 MG: 25 TABLET, FILM COATED ORAL at 14:38

## 2018-08-03 RX ADMIN — AMLODIPINE BESYLATE 10 MG: 5 TABLET ORAL at 05:51

## 2018-08-03 RX ADMIN — DOCUSATE SODIUM 100 MG: 100 CAPSULE, LIQUID FILLED ORAL at 09:24

## 2018-08-03 RX ADMIN — CHOLECALCIFEROL TAB 25 MCG (1000 UNIT) 2000 UNITS: 25 TAB at 09:23

## 2018-08-03 RX ADMIN — METOPROLOL TARTRATE 25 MG: 25 TABLET ORAL at 05:51

## 2018-08-03 RX ADMIN — HYDRALAZINE HYDROCHLORIDE 100 MG: 25 TABLET, FILM COATED ORAL at 09:24

## 2018-08-03 RX ADMIN — LACTOBACILLUS ACIDOPHILUS / LACTOBACILLUS BULGARICUS 1 PACKET: 100 MILLION CFU STRENGTH GRANULES at 11:35

## 2018-08-03 RX ADMIN — Medication 1 TABLET: at 20:33

## 2018-08-03 RX ADMIN — BACITRACIN, NEOMYCIN, POLYMYXIN B: 400; 3.5; 5 OINTMENT TOPICAL at 07:15

## 2018-08-03 RX ADMIN — INSULIN LISPRO 8 UNITS: 100 INJECTION, SOLUTION INTRAVENOUS; SUBCUTANEOUS at 20:52

## 2018-08-03 RX ADMIN — LACTOBACILLUS ACIDOPHILUS / LACTOBACILLUS BULGARICUS 1 PACKET: 100 MILLION CFU STRENGTH GRANULES at 17:15

## 2018-08-03 RX ADMIN — METFORMIN HYDROCHLORIDE 1000 MG: 500 TABLET, FILM COATED ORAL at 17:14

## 2018-08-03 RX ADMIN — GLIPIZIDE 2.5 MG: 2.5 TABLET, FILM COATED, EXTENDED RELEASE ORAL at 07:16

## 2018-08-03 RX ADMIN — METOPROLOL TARTRATE 25 MG: 25 TABLET ORAL at 17:15

## 2018-08-03 RX ADMIN — BACITRACIN, NEOMYCIN, POLYMYXIN B: 400; 3.5; 5 OINTMENT TOPICAL at 20:34

## 2018-08-03 RX ADMIN — ENOXAPARIN SODIUM 40 MG: 100 INJECTION SUBCUTANEOUS at 09:23

## 2018-08-03 RX ADMIN — INSULIN LISPRO 2 UNITS: 100 INJECTION, SOLUTION INTRAVENOUS; SUBCUTANEOUS at 17:17

## 2018-08-03 RX ADMIN — WARFARIN SODIUM 7.5 MG: 7.5 TABLET ORAL at 17:44

## 2018-08-03 RX ADMIN — GABAPENTIN 900 MG: 300 CAPSULE ORAL at 20:33

## 2018-08-03 RX ADMIN — HYDRALAZINE HYDROCHLORIDE 100 MG: 25 TABLET, FILM COATED ORAL at 20:34

## 2018-08-03 ASSESSMENT — ENCOUNTER SYMPTOMS
VOMITING: 0
DIARRHEA: 0
ABDOMINAL PAIN: 0
SHORTNESS OF BREATH: 0
NAUSEA: 0
NERVOUS/ANXIOUS: 0
CHILLS: 0
FEVER: 0

## 2018-08-03 ASSESSMENT — PAIN SCALES - GENERAL
PAINLEVEL_OUTOF10: 0

## 2018-08-03 NOTE — REHAB-PHARMACY IDT TEAM NOTE
Pharmacy   Pharmacy  Antibiotics/Antifungals/Antivirals:  Medication:      Active Orders     Ordered     Ordering Provider       Wed Jul 18, 2018  2:28 PM    07/18/18 1428  clindamycin (CLEOCIN) capsule 300 mg  2 TIMES DAILY      Higinio Pagan M.D.        Route:         po  Stop Date:  [place]  Reason: History of MRSA  Antihypertensives/Cardiac:  Medication:    Orders (72h ago through future)    Start     Ordered    07/24/18 1630  metoprolol (LOPRESSOR) tablet 25 mg  TWICE DAILY      07/24/18 1601    07/20/18 0900  lisinopril (PRINIVIL) tablet 40 mg  DAILY      07/19/18 1613    07/19/18 0600  amLODIPine (NORVASC) tablet 10 mg  Q DAY      07/18/18 1427    07/18/18 1500  hydrALAZINE (APRESOLINE) tablet 100 mg  3 TIMES DAILY      07/18/18 1428    07/18/18 1428  hydrALAZINE (APRESOLINE) tablet 25 mg  EVERY 8 HOURS PRN      07/18/18 1428        Patient Vitals for the past 24 hrs:   BP Pulse   08/03/18 0924 149/76 77   08/03/18 0923 149/76 -   08/03/18 0630 149/93 71   08/03/18 0551 136/72 86   08/02/18 2035 142/78 72   08/02/18 1852 141/77 70   08/02/18 1702 156/86 80   08/02/18 1615 150/86 80   08/02/18 1412 110/70 60   08/02/18 1351 - 64       Anticoagulation:  Medication:  Lovenox   INR:    Recent Labs      08/01/18   0541  08/02/18   0533  08/03/18   0531   INR  1.07  1.05  1.03     Other key medications: gabapentin, glipizide, lantus, humalog, metformin  A review of the medication list reveals no issues at this time. Patient is currently on an antihypertensive. Recommend home blood pressure monitoring/recording if antihypertensive regimen continues.   Section completed by:  Mallika Lamar [facility]

## 2018-08-03 NOTE — PROGRESS NOTES
Pharmacy Warfarin Consult   8/3/2018       67 y.o.   male     Indication for anticoagulation: Atrial Fibrillation    Goal INR = 2 to 3     Recent Labs      08/01/18   0541  08/02/18   0533  08/03/18 0531   INR  1.07  1.05  1.03       Pertinent Drug/Drug Interactions:  Antibiotics, trazodone  Outpatient Warfarin Regimen:  10mg daily per med rec  Recent Warfarin Dosing:    Dose from last 7 days     Date/Time Dose (mg)    08/03/18 0531  7.5    08/02/18 0533  5    08/01/18 0541  5          Bridge Therapy: Lovenox 40mg daily until INR > 2 for 2 days.         1.  Coumadin 7.5mg tonight for INR = 1.03        [facility]

## 2018-08-03 NOTE — CARE PLAN
Problem: Bowel/Gastric:  Goal: Normal bowel function is maintained or improved  Outcome: PROGRESSING AS EXPECTED  Pt reports having regular bowel movements. Last BM was 8/2. Pt received all evening bowel medication.    Problem: GLYCEMIA IMBALANCE  Goal: Clinical indication of glycemia balance is achieved  Outcome: PROGRESSING AS EXPECTED  HS blood sugar was 148. No sliding scale coverage needed. Pt received scheduled lantus and evening snack.

## 2018-08-03 NOTE — REHAB-CM IDT TEAM NOTE
Case Management    DC Planning  DC destination/dispostion:  Patient lives in a single level mobile home.  He needs a ramp.    DC Needs:  Patient will need home health if able to return home.  He needs family assistance and has a friend who is willing to live with him and provide care.  He can also build a ramp.  He has a fww, shower bench and elevated toilet seat.  He is current with [facility].  He has a glucometer and cpap.  He may need meals on wheels and a w/c.   If these plans to not work out he may still need a skilled facility.    Barriers to discharge:   Patient is inconsistent with information, lacks support at home.    Strengths:     Section completed by:  Hafsa Ayoub R.N.

## 2018-08-03 NOTE — PROGRESS NOTES
"Rehab Progress Note     Encounter date: 8/3/2018  Today I met with the patient face to face in PT    Chief Complaint:  Cervical myelopathy (HCC) , discharge planning     Interval Events (subjective)  Mr. Ma reports that he is continuing to have muscular skeletal neck pain.  He denies any fevers, chills, headache, dizziness, chest pain, or shortness of breath. he is working on stairs with physical therapy today.  We discussed home safety, and I encouraged him to work on constructing a bilateral handrail at his house.  I advised him not to use his unsafe walker method to enter his home.  This was reinforced by physical therapy.  We reinforced the need for cervical collar precautions.  We also discussed pain medications.    Objective:  VITAL SIGNS: /78   Pulse 85   Temp 37.1 °C (98.7 °F)   Resp 20   Ht 1.905 m (6' 3\")   Wt 114 kg (251 lb 5.2 oz)   SpO2 92%   BMI 31.41 kg/m²     Recent Results (from the past 72 hour(s))   ACCU-CHEK GLUCOSE    Collection Time: 07/31/18  4:41 PM   Result Value Ref Range    Glucose - Accu-Ck 175 (H) 65 - 99 mg/dL   ACCU-CHEK GLUCOSE    Collection Time: 07/31/18  8:33 PM   Result Value Ref Range    Glucose - Accu-Ck 299 (H) 65 - 99 mg/dL   PROTHROMBIN TIME    Collection Time: 08/01/18  5:41 AM   Result Value Ref Range    PT 13.6 12.0 - 14.6 sec    INR 1.07 0.87 - 1.13   ACCU-CHEK GLUCOSE    Collection Time: 08/01/18  7:22 AM   Result Value Ref Range    Glucose - Accu-Ck 108 (H) 65 - 99 mg/dL   ACCU-CHEK GLUCOSE    Collection Time: 08/01/18 10:58 AM   Result Value Ref Range    Glucose - Accu-Ck 127 (H) 65 - 99 mg/dL   ACCU-CHEK GLUCOSE    Collection Time: 08/01/18  5:06 PM   Result Value Ref Range    Glucose - Accu-Ck 293 (H) 65 - 99 mg/dL   ACCU-CHEK GLUCOSE    Collection Time: 08/01/18  8:43 PM   Result Value Ref Range    Glucose - Accu-Ck 219 (H) 65 - 99 mg/dL   PROTHROMBIN TIME    Collection Time: 08/02/18  5:33 AM   Result Value Ref Range    PT 13.4 12.0 - 14.6 sec    " INR 1.05 0.87 - 1.13   ACCU-CHEK GLUCOSE    Collection Time: 08/02/18  7:24 AM   Result Value Ref Range    Glucose - Accu-Ck 132 (H) 65 - 99 mg/dL   ACCU-CHEK GLUCOSE    Collection Time: 08/02/18 11:32 AM   Result Value Ref Range    Glucose - Accu-Ck 132 (H) 65 - 99 mg/dL   ACCU-CHEK GLUCOSE    Collection Time: 08/02/18  4:59 PM   Result Value Ref Range    Glucose - Accu-Ck 202 (H) 65 - 99 mg/dL   ACCU-CHEK GLUCOSE    Collection Time: 08/02/18  8:42 PM   Result Value Ref Range    Glucose - Accu-Ck 148 (H) 65 - 99 mg/dL   PROTHROMBIN TIME    Collection Time: 08/03/18  5:31 AM   Result Value Ref Range    PT 13.2 12.0 - 14.6 sec    INR 1.03 0.87 - 1.13   ACCU-CHEK GLUCOSE    Collection Time: 08/03/18  7:21 AM   Result Value Ref Range    Glucose - Accu-Ck 89 65 - 99 mg/dL   ACCU-CHEK GLUCOSE    Collection Time: 08/03/18 11:23 AM   Result Value Ref Range    Glucose - Accu-Ck 109 (H) 65 - 99 mg/dL       Current Facility-Administered Medications   Medication Frequency   • warfarin (COUMADIN) tablet 7.5 mg COUMADIN-ONCE   • glipiZIDE SR (GLUCOTROL) tablet 2.5 mg QAM AC   • neomycin-bacitracin-polymyxin (NEOSPORIN) 400-5-5000 ointment BID   • MD ALERT... warfarin (COUMADIN) per pharmacy protocol pharmacy to dose   • lidocaine (XYLOCAINE) 2 % jelly Q8HRS   • insulin glargine (LANTUS) injection 40 Units Q EVENING   • oxyCODONE-acetaminophen (PERCOCET) 5-325 MG per tablet 1 Tab Q6HRS PRN   • metoprolol (LOPRESSOR) tablet 25 mg TWICE DAILY   • lactobacillus granules (LACTINEX/FLORANEX) packet 1 Packet TID WITH MEALS   • insulin lispro (HUMALOG) injection 0-12 Units 4X/DAY ACHS   • gabapentin (NEURONTIN) capsule 900 mg TID   • methocarbamol (ROBAXIN) tablet 750 mg Q6HRS PRN   • vitamin D (cholecalciferol) tablet 2,000 Units DAILY   • lisinopril (PRINIVIL) tablet 40 mg DAILY   • Respiratory Care per Protocol Continuous RT   • Pharmacy Consult Request ...Pain Management Review 1 Each PRN   • acetaminophen (TYLENOL) tablet 650 mg  Q4HRS PRN   • artificial tears 1.4 % ophthalmic solution 1 Drop PRN   • benzocaine-menthol (CEPACOL) lozenge 1 Lozenge Q2HRS PRN   • hydrALAZINE (APRESOLINE) tablet 25 mg Q8HRS PRN   • mag hydrox-al hydrox-simeth (MAALOX PLUS ES or MYLANTA DS) suspension 20 mL Q2HRS PRN   • ondansetron (ZOFRAN ODT) dispertab 4 mg 4X/DAY PRN    Or   • ondansetron (ZOFRAN) syringe/vial injection 4 mg 4X/DAY PRN   • traZODone (DESYREL) tablet 50 mg QHS PRN   • sodium chloride (OCEAN) 0.65 % nasal spray 2 Spray PRN   • bisacodyl (DULCOLAX) suppository 10 mg Q24HRS PRN   • magnesium hydroxide (MILK OF MAGNESIA) suspension 30 mL QDAY PRN   • senna-docusate (PERICOLACE or SENOKOT S) 8.6-50 MG per tablet 1 Tab Nightly   • amLODIPine (NORVASC) tablet 10 mg Q DAY   • clindamycin (CLEOCIN) capsule 300 mg BID   • enoxaparin (LOVENOX) inj 40 mg DAILY   • fluticasone (FLONASE) nasal spray 50 mcg DAILY   • hydrALAZINE (APRESOLINE) tablet 100 mg TID   • metFORMIN (GLUCOPHAGE) tablet 1,000 mg BID WITH MEALS   • docusate sodium (COLACE) capsule 100 mg BID       Exam Date: 8/3/2018    General:  Awake, alert, oriented, no acute distress  HEENT:  Wearing [place] cervical collar  Cardiac: regular rate and rhythm  Lungs: clear to auscultation bilaterally.   Abdomen: soft; non tender, non distended, bowel sounds present and normoactive  Extremities: 1+ lower limb edema.  Wearing bilateral articulated AFOs  Skin: Cervical incision continues to heal well.  Neuro:   Remains extremely tangential.  Stable clumsy fine motor movements in the hands.  No active dorsiflexion of the ankles.  Demonstrates poor safety awareness and insight.    Orders Placed This Encounter   Procedures   • Diet Order Diabetic     Standing Status:   Standing     Number of Occurrences:   1     Order Specific Question:   Diet:     Answer:   Diabetic [3]       Assessment:  Active Hospital Problems    Diagnosis   • *Cervical myelopathy (HCC)   • HTN (hypertension)   • CVA (cerebral vascular  accident) ([facility])   • Diabetes mellitus with neurological manifestations, controlled ([facility])   • Impaired activities of daily living   • Lumbar stenosis with neurogenic claudication   • Pain   • Cervical stenosis of spine   • Atrial fibrillation [427.31]   • Chronic antibiotic suppression       Medical Decision Making and Plan:  Mr. Ma is a 67-year-old male admitted for rehabilitation on July 18 in the setting of cervical myelopathy and lumbar spinal stenosis     Cervical myelopathy status post C2-C6 laminectomy and C2-C4 fusion by Dr. Marsh on July 13  Lumbar spinal stenosis with neurogenic claudication status post L2-L5 laminectomies by Dr. Marsh on July 13  Continue comprehensive rehabilitation  Followed-up with Dr. Marsh on 7/30  [place] collar can be removed for meals and showering  Custom bilateral articulated AFOs have been obtained    Reviewed driving restriction.  Discussed home safety.  Discussed gradually weaning from collar rather than going for long periods.  Discussed the importance of maintaining his collar for now.    History of strokes  Cognitive impairment  Atrial fibrillation   Coumadin per pharmacy protocol  Continue speech for cognitive deficits    INR is 1.03 today     Pain  Neuropathic pain/allodynia  Tylenol as needed   Gabapentin 900 mg 3 times a day  Robaxin 750 mg   Oxycodone/APAP 5/325 mg every 6 hours as needed  Lidocaine jelly scheduled every 8 hours for hand pain and neck pain    He has utilized 5 mg of oxycodone last 24 hours    Hypertension  Amlodipine 10 mg daily  Lopressor 25 mg twice a day  Hydralazine 100 mg 3 times a day  Lisinopril 40 mg daily dosing      Appreciate hospitalist assistance  Blood pressure range in the last 24 hours is 126-149 mmHg     Diabetes mellitus type 2 with a history of diabetic neuropathy--hemoglobin A1c of 9.9 on July 19  Lantus at bedtime  Sliding scale insulin  Metformin 1000 mg twice a day  Glipizide 2.5 mg daily beginning on August 2  Appreciate  hospitalist consult    Glucose range of 132-293 in the last 24 hours     Anemia  Hemoglobin stable at 11.5 on July 31  Continue to monitor     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  Additionally, it has only been 2 months since his last injection.  Discussed conservative cares     Constipation  Colace 100 g twice a day  Pericolace qhs  Milk of magnesia as needed  Dulcolax suppository daily as needed     Seasonal allergies  Flonase daily     Incidental left thyroid mass seen on MRI in May 2018  Will need follow-up with primary care  TSH of 0.56 on admission    Vitamin D deficiency  Vitamin D was 26 on admission so we began supplementation with 2000 units of cholecalciferol daily      DVT prophylaxis  Lovenox 40 mg daily  Discontinued when INR in range     Estimated discharge: August 7--patient has friend who may be able to come stay with him beginning August 10    Total time:  28 minutes.  I spent greater than 50% of the time for patient care, counseling, and coordination on this date, including unit/floor time, and face-to-face time with the patient as per interval events and assessment and plan above. Topics discussed included functional progress, home safety, neck pain, driving restriction, collar      Higinio Pagan M.D.  8/3/2018

## 2018-08-03 NOTE — PROGRESS NOTES
Hospital Medicine Progress Note, Adult, Complex               Author: Moshe Balderasnancy Date & Time created: 8/3/2018  8:49 AM     Interval History:  No significant events or changes since last visit  Patient denies SOB, cough, or diarrhea  Patient slept ok last night    Chief Complaint:  Hypertension  Diabetes    Review of Systems:  Review of Systems   Constitutional: Negative for chills and fever.   Respiratory: Negative for shortness of breath.    Cardiovascular: Negative for chest pain.   Gastrointestinal: Negative for abdominal pain, diarrhea, nausea and vomiting.   Psychiatric/Behavioral: The patient is not nervous/anxious.        Physical Exam:  Physical Exam   Constitutional: He is oriented to person, place, and time. He appears well-nourished.   HENT:   Head: Atraumatic.   Eyes: Pupils are equal, round, and reactive to light. Conjunctivae are normal.   Neck:   Has C-collar.   Cardiovascular: S1 normal and S2 normal.    Irregular heart rate   Pulmonary/Chest: Effort normal and breath sounds normal. No stridor. He has no rhonchi.   Abdominal: Soft. Bowel sounds are normal. Hernia confirmed negative in the right inguinal area and confirmed negative in the left inguinal area.   Musculoskeletal: He exhibits edema.   Neurological: He is alert and oriented to person, place, and time. No sensory deficit.   Skin: Skin is warm and dry. No cyanosis.   Psychiatric: He has a normal mood and affect. His behavior is normal.   Nursing note and vitals reviewed.      Labs:        Invalid input(s): [id number]      No results for input(s): SODIUM, POTASSIUM, CHLORIDE, CO2, BUN, CREATININE, MAGNESIUM, PHOSPHORUS, CALCIUM in the last 72 hours.  No results for input(s): ALTSGPT, ASTSGOT, ALKPHOSPHAT, TBILIRUBIN, DBILIRUBIN, GAMMAGT, AMYLASE, LIPASE, ALB, PREALBUMIN, GLUCOSE in the last 72 hours.  Recent Labs      08/01/18   0541  08/02/18   0533  08/03/18   0531   PROTHROMBTM  13.6  13.4  13.2   INR  1.07  1.05  1.03                Hemodynamics:  Temp (24hrs), Av.7 °C (98 °F), Min:36.6 °C (97.8 °F), Max:36.9 °C (98.4 °F)  Temperature: 36.9 °C (98.4 °F)  Pulse  Av.5  Min: 57  Max: 113   Blood Pressure : 149/93     Respiratory:    Respiration: 18, Pulse Oximetry: 96 %, O2 Daily Delivery Respiratory : Room Air with O2 Available     Work Of Breathing / Effort: Mild  RUL Breath Sounds: Clear, RML Breath Sounds: Clear;Diminished, RLL Breath Sounds: Diminished, AI Breath Sounds: Clear, LLL Breath Sounds: Diminished  Fluids:    Intake/Output Summary (Last 24 hours) at 18 0849  Last data filed at 18 0848   Gross per 24 hour   Intake             1180 ml   Output             2625 ml   Net            -1445 ml        GI/Nutrition:  Orders Placed This Encounter   Procedures   • Diet Order Diabetic     Standing Status:   Standing     Number of Occurrences:   1     Order Specific Question:   Diet:     Answer:   Diabetic [3]     Medical Decision Making, by Problem:  Active Hospital Problems    Diagnosis   • *Cervical myelopathy (HCC) [G95.9]   • HTN (hypertension) [I10]   • CVA (cerebral vascular accident) (HCC) [I63.9]   • Diabetes mellitus with neurological manifestations, controlled (HCC) [E11.49]   • Impaired activities of daily living [R53.81]   • Lumbar stenosis with neurogenic claudication [M48.062]   • Pain [R52]   • Cervical stenosis of spine [M48.02]   • Atrial fibrillation [427.31] [I48.91]   • Chronic antibiotic suppression [Z79.2]     *  S/P Lumbar Surgery  *  S/P Cervical Surgery: had hx of prior cervical fusion    *  Hypertension  BP a little labile with -156  On Norvasc: 10 mg daily  On Lopressor: 25 mg bid  On Hydralazine: 100 mg tid  On Lisinopril: 40 mg daily ()  Consider increasing Lopressor or adding on Clonidine  Will monitor another day before adjusting meds    *  Hx Afib  HR ok   On Lopressor: 25 mg bid  On Coumadin  Monitor    *  Diabetes  Hba1c: 9.9 ()  BS better and less labile recently:    BS tend to dip lower in am  BS tend to rise up at dinner or night  On Lantus: 40 units qhs  On Metformin: 1000 mg bid  On Glucotrol 2.5 mg qam (8/2)  Cont to monitor    *  Azotemia  Bun: 26 (7/31)  Encouraging fluid (water/juice) intake  Monitor    *  Hx CVA: x 2; on Coumadin  *  Arthritis  *  Hx Cataracts: s/p extraction  *  Hyperlipidemia  *  Vit D Insufficiency: on supplements  *  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

## 2018-08-03 NOTE — PROGRESS NOTES
Received bedside report; assumed pt care. Pt A/O x 4, calm, and stable. Pt denies pain or discomfort. Pt resting comfortably in chair, call light within reach when in room, safety precautions in place. Will continue to monitor.

## 2018-08-04 LAB
GLUCOSE BLD-MCNC: 126 MG/DL (ref 65–99)
GLUCOSE BLD-MCNC: 132 MG/DL (ref 65–99)
GLUCOSE BLD-MCNC: 138 MG/DL (ref 65–99)
GLUCOSE BLD-MCNC: 187 MG/DL (ref 65–99)
GLUCOSE BLD-MCNC: 253 MG/DL (ref 65–99)
INR PPP: 1.08 (ref 0.87–1.13)
PROTHROMBIN TIME: 13.7 SEC (ref 12–14.6)

## 2018-08-04 PROCEDURE — 94760 N-INVAS EAR/PLS OXIMETRY 1: CPT

## 2018-08-04 PROCEDURE — 700111 HCHG RX REV CODE 636 W/ 250 OVERRIDE (IP): Performed by: PHYSICAL MEDICINE & REHABILITATION

## 2018-08-04 PROCEDURE — A9270 NON-COVERED ITEM OR SERVICE: HCPCS | Performed by: PHYSICAL MEDICINE & REHABILITATION

## 2018-08-04 PROCEDURE — 700102 HCHG RX REV CODE 250 W/ 637 OVERRIDE(OP): Performed by: HOSPITALIST

## 2018-08-04 PROCEDURE — 36415 COLL VENOUS BLD VENIPUNCTURE: CPT

## 2018-08-04 PROCEDURE — 700102 HCHG RX REV CODE 250 W/ 637 OVERRIDE(OP)

## 2018-08-04 PROCEDURE — 770010 HCHG ROOM/CARE - REHAB SEMI PRIVAT*

## 2018-08-04 PROCEDURE — 82962 GLUCOSE BLOOD TEST: CPT

## 2018-08-04 PROCEDURE — 700101 HCHG RX REV CODE 250: Performed by: PHYSICAL MEDICINE & REHABILITATION

## 2018-08-04 PROCEDURE — 85610 PROTHROMBIN TIME: CPT

## 2018-08-04 PROCEDURE — A9270 NON-COVERED ITEM OR SERVICE: HCPCS | Performed by: HOSPITALIST

## 2018-08-04 PROCEDURE — 99232 SBSQ HOSP IP/OBS MODERATE 35: CPT | Performed by: HOSPITALIST

## 2018-08-04 PROCEDURE — 700112 HCHG RX REV CODE 229: Performed by: PHYSICAL MEDICINE & REHABILITATION

## 2018-08-04 PROCEDURE — 700102 HCHG RX REV CODE 250 W/ 637 OVERRIDE(OP): Performed by: PHYSICAL MEDICINE & REHABILITATION

## 2018-08-04 RX ORDER — WARFARIN SODIUM 5 MG/1
10 TABLET ORAL
Status: COMPLETED | OUTPATIENT
Start: 2018-08-04 | End: 2018-08-04

## 2018-08-04 RX ADMIN — HYDRALAZINE HYDROCHLORIDE 100 MG: 25 TABLET, FILM COATED ORAL at 20:53

## 2018-08-04 RX ADMIN — LIDOCAINE HYDROCHLORIDE 1 APPLICATION: 20 JELLY TOPICAL at 21:07

## 2018-08-04 RX ADMIN — METOPROLOL TARTRATE 25 MG: 25 TABLET ORAL at 05:37

## 2018-08-04 RX ADMIN — METFORMIN HYDROCHLORIDE 1000 MG: 500 TABLET, FILM COATED ORAL at 08:00

## 2018-08-04 RX ADMIN — Medication 1 TABLET: at 20:53

## 2018-08-04 RX ADMIN — DOCUSATE SODIUM 100 MG: 100 CAPSULE, LIQUID FILLED ORAL at 20:53

## 2018-08-04 RX ADMIN — LACTOBACILLUS ACIDOPHILUS / LACTOBACILLUS BULGARICUS 1 PACKET: 100 MILLION CFU STRENGTH GRANULES at 11:54

## 2018-08-04 RX ADMIN — INSULIN GLARGINE 40 UNITS: 100 INJECTION, SOLUTION SUBCUTANEOUS at 21:03

## 2018-08-04 RX ADMIN — BACITRACIN, NEOMYCIN, POLYMYXIN B: 400; 3.5; 5 OINTMENT TOPICAL at 20:54

## 2018-08-04 RX ADMIN — CLINDAMYCIN HYDROCHLORIDE 300 MG: 300 CAPSULE ORAL at 20:54

## 2018-08-04 RX ADMIN — LIDOCAINE HYDROCHLORIDE 2 %: 20 JELLY TOPICAL at 15:40

## 2018-08-04 RX ADMIN — AMLODIPINE BESYLATE 10 MG: 5 TABLET ORAL at 05:36

## 2018-08-04 RX ADMIN — HYDRALAZINE HYDROCHLORIDE 100 MG: 25 TABLET, FILM COATED ORAL at 15:40

## 2018-08-04 RX ADMIN — CLINDAMYCIN HYDROCHLORIDE 300 MG: 300 CAPSULE ORAL at 08:01

## 2018-08-04 RX ADMIN — METFORMIN HYDROCHLORIDE 1000 MG: 500 TABLET, FILM COATED ORAL at 17:16

## 2018-08-04 RX ADMIN — BACITRACIN, NEOMYCIN, POLYMYXIN B: 400; 3.5; 5 OINTMENT TOPICAL at 08:01

## 2018-08-04 RX ADMIN — LISINOPRIL 40 MG: 20 TABLET ORAL at 08:00

## 2018-08-04 RX ADMIN — GABAPENTIN 900 MG: 300 CAPSULE ORAL at 20:53

## 2018-08-04 RX ADMIN — INSULIN LISPRO 6 UNITS: 100 INJECTION, SOLUTION INTRAVENOUS; SUBCUTANEOUS at 21:03

## 2018-08-04 RX ADMIN — HYDRALAZINE HYDROCHLORIDE 100 MG: 25 TABLET, FILM COATED ORAL at 08:00

## 2018-08-04 RX ADMIN — DOCUSATE SODIUM 100 MG: 100 CAPSULE, LIQUID FILLED ORAL at 08:00

## 2018-08-04 RX ADMIN — WARFARIN SODIUM 10 MG: 5 TABLET ORAL at 17:16

## 2018-08-04 RX ADMIN — LACTOBACILLUS ACIDOPHILUS / LACTOBACILLUS BULGARICUS 1 PACKET: 100 MILLION CFU STRENGTH GRANULES at 17:15

## 2018-08-04 RX ADMIN — CHOLECALCIFEROL TAB 25 MCG (1000 UNIT) 2000 UNITS: 25 TAB at 08:00

## 2018-08-04 RX ADMIN — GABAPENTIN 900 MG: 300 CAPSULE ORAL at 15:39

## 2018-08-04 RX ADMIN — METOPROLOL TARTRATE 37.5 MG: 25 TABLET ORAL at 17:15

## 2018-08-04 RX ADMIN — GLIPIZIDE 2.5 MG: 2.5 TABLET, FILM COATED, EXTENDED RELEASE ORAL at 08:01

## 2018-08-04 RX ADMIN — LACTOBACILLUS ACIDOPHILUS / LACTOBACILLUS BULGARICUS 1 PACKET: 100 MILLION CFU STRENGTH GRANULES at 08:01

## 2018-08-04 RX ADMIN — ENOXAPARIN SODIUM 40 MG: 100 INJECTION SUBCUTANEOUS at 08:01

## 2018-08-04 RX ADMIN — GABAPENTIN 900 MG: 300 CAPSULE ORAL at 08:00

## 2018-08-04 RX ADMIN — Medication: at 22:32

## 2018-08-04 ASSESSMENT — ENCOUNTER SYMPTOMS
HALLUCINATIONS: 0
PALPITATIONS: 0
SHORTNESS OF BREATH: 0
FEVER: 0
DIZZINESS: 0
HEADACHES: 0
BLURRED VISION: 0
NAUSEA: 0
VOMITING: 0

## 2018-08-04 ASSESSMENT — PAIN SCALES - GENERAL: PAINLEVEL_OUTOF10: 0

## 2018-08-04 NOTE — PROGRESS NOTES
Hospital Medicine Progress Note, Adult, Complex               Author: Moshe Bush Date & Time created: 8/4/2018  8:48 AM     Interval History:  No significant events or changes since last visit  Patient denies SOB, cough, or diarrhea  Patient slept ok last night    Chief Complaint:  Hypertension  Diabetes    Review of Systems:  Review of Systems   Constitutional: Negative for fever.   Eyes: Negative for blurred vision.   Respiratory: Negative for shortness of breath.    Cardiovascular: Negative for palpitations.   Gastrointestinal: Negative for nausea and vomiting.   Neurological: Negative for dizziness and headaches.   Psychiatric/Behavioral: Negative for hallucinations.       Physical Exam:  Physical Exam   Constitutional: He is oriented to person, place, and time.   HENT:   Mouth/Throat: Oropharynx is clear and moist.   Eyes: No scleral icterus.   Neck: No JVD present. No tracheal deviation present.   Has C-collar.   Cardiovascular: S1 normal and S2 normal.    Irregular heart rate   Pulmonary/Chest: Effort normal. He has no wheezes. He has no rhonchi. He has no rales.   Abdominal: Soft. Bowel sounds are normal. Hernia confirmed negative in the right inguinal area and confirmed negative in the left inguinal area.   Musculoskeletal: He exhibits edema.   Neurological: He is alert and oriented to person, place, and time. No sensory deficit.   Skin: Skin is warm and dry. He is not diaphoretic. No cyanosis.   Psychiatric: He has a normal mood and affect. His behavior is normal.   Nursing note and vitals reviewed.      Labs:        Invalid input(s): [id number]      No results for input(s): SODIUM, POTASSIUM, CHLORIDE, CO2, BUN, CREATININE, MAGNESIUM, PHOSPHORUS, CALCIUM in the last 72 hours.  No results for input(s): ALTSGPT, ASTSGOT, ALKPHOSPHAT, TBILIRUBIN, DBILIRUBIN, GAMMAGT, AMYLASE, LIPASE, ALB, PREALBUMIN, GLUCOSE in the last 72 hours.  Recent Labs      08/02/18   0533  08/03/18   0531  08/04/18   0543    PROTHROMBTM  13.4  13.2  13.7   INR  1.05  1.03  1.08               Hemodynamics:  Temp (24hrs), Av.9 °C (98.4 °F), Min:36.7 °C (98 °F), Max:37.1 °C (98.7 °F)  Temperature: 36.9 °C (98.4 °F)  Pulse  Av.1  Min: 57  Max: 113   Blood Pressure : 154/87     Respiratory:    Respiration: 16, Pulse Oximetry: 96 %, O2 Daily Delivery Respiratory : Room Air with O2 Available     Work Of Breathing / Effort: Mild  RUL Breath Sounds: Clear, RML Breath Sounds: Clear;Diminished, RLL Breath Sounds: Diminished, AI Breath Sounds: Clear, LLL Breath Sounds: Diminished  Fluids:    Intake/Output Summary (Last 24 hours) at 18 0848  Last data filed at 18 0230   Gross per 24 hour   Intake              840 ml   Output              800 ml   Net               40 ml        GI/Nutrition:  Orders Placed This Encounter   Procedures   • Diet Order Diabetic     Standing Status:   Standing     Number of Occurrences:   1     Order Specific Question:   Diet:     Answer:   Diabetic [3]     Medical Decision Making, by Problem:  Active Hospital Problems    Diagnosis   • *Cervical myelopathy (HCC) [G95.9]   • HTN (hypertension) [I10]   • CVA (cerebral vascular accident) (HCC) [I63.9]   • Diabetes mellitus with neurological manifestations, controlled (HCC) [E11.49]   • Impaired activities of daily living [R53.81]   • Lumbar stenosis with neurogenic claudication [M48.062]   • Pain [R52]   • Cervical stenosis of spine [M48.02]   • Atrial fibrillation [427.31] [I48.91]   • Chronic antibiotic suppression [Z79.2]     *  S/P Lumbar Surgery  *  S/P Cervical Surgery: had hx of prior cervical fusion    *  Hypertension  BP a little labile with -154  On Norvasc: 10 mg daily  On Lopressor: 25 mg bid --> will increase to 37.5 mg bid (8/4 at evening)  On Hydralazine: 100 mg tid  On Lisinopril: 40 mg daily ()  Cont to monitor    *  Hx Afib  HR ok   On Lopressor: 25 mg bid --> will increase to 37.5 mg bid (8/4 at evening)  On  Coumadin  Monitor    *  Diabetes  Hba1c: 9.9 (7/19)  BS labile:  (8/4)  BS millie up to 319 last night but dropped down to 138 this am (8/4)  BS tend to dip lower in am  On Lantus: 40 units qhs  On Metformin: 1000 mg bid  On Glucotrol 2.5 mg qam (8/2)  Will monitor another day before adjusting meds (8/4)    *  Azotemia  Bun: 26 (7/31)  Encouraging fluid (water/juice) intake  Monitor    *  Hx CVA: x 2; on Coumadin  *  Arthritis  *  Hx Cataracts: s/p extraction  *  Hyperlipidemia  *  Vit D Insufficiency: on supplements  *  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

## 2018-08-04 NOTE — PROGRESS NOTES
Inpatient Anticoagulation Service Note    Date: 8/4/2018  Reason for Anticoagulation: Atrial Fibrillation        Hemoglobin Value: (!) 11.5  Hematocrit Value: (!) 35.4  Lab Platelet Value: 256  Target INR: 2.0 to 3.0    INR from last 7 days     Date/Time INR Value    08/04/18 0543  1.08    08/03/18 0531  1.03    08/02/18 0533  1.05    08/01/18 0541  1.07        Dose from last 7 days     Date/Time Dose (mg)    08/04/18 0543  10    08/03/18 0531  7.5    08/02/18 0533  5    08/01/18 0541  5        Significant Interactions: Antibiotics, Other (Comments)  Bridge Therapy: No (DVT prophylaxis with enoxaparin 40 mg sub q daily)          Plan:  Warfarin 10 mg tonight.       Boby Adler, PharmD BCPS

## 2018-08-04 NOTE — FLOWSHEET NOTE
08/04/18 0739   Events/Summary/Plan   Events/Summary/Plan Moving over from bed to wheelchair, 02 spot check done   Chest Exam   Respiration 16   Pulse 74   Work Of Breathing / Effort Mild   Oximetry   #Pulse Oximetry (Single Determination) Yes   Oxygen   Pulse Oximetry 96 %   O2 (LPM) 0   O2 Daily Delivery Respiratory  Room Air with O2 Available

## 2018-08-04 NOTE — CARE PLAN
Problem: Bowel/Gastric:  Goal: Normal bowel function is maintained or improved  Outcome: PROGRESSING AS EXPECTED  Pt is having regular bowel movements with last BM on 8/3. No s/s of constipation noted. Pt denies abdominal pain.     Problem: GLYCEMIA IMBALANCE  Goal: Clinical indication of glycemia balance is achieved  Outcome: PROGRESSING AS EXPECTED  HS blood sugar was 319. 8 units of insulin given per sliding scale in addition to 40 units of Lantus. Pt given evening snack. No s/s of hypoglycemia/hyperglycemia noted at this time.

## 2018-08-05 LAB
ANION GAP SERPL CALC-SCNC: 8 MMOL/L (ref 0–11.9)
BUN SERPL-MCNC: 27 MG/DL (ref 8–22)
CALCIUM SERPL-MCNC: 8.7 MG/DL (ref 8.5–10.5)
CHLORIDE SERPL-SCNC: 105 MMOL/L (ref 96–112)
CO2 SERPL-SCNC: 27 MMOL/L (ref 20–33)
CREAT SERPL-MCNC: 0.9 MG/DL (ref 0.5–1.4)
GLUCOSE BLD-MCNC: 124 MG/DL (ref 65–99)
GLUCOSE BLD-MCNC: 138 MG/DL (ref 65–99)
GLUCOSE BLD-MCNC: 150 MG/DL (ref 65–99)
GLUCOSE BLD-MCNC: 193 MG/DL (ref 65–99)
GLUCOSE SERPL-MCNC: 128 MG/DL (ref 65–99)
INR PPP: 1.17 (ref 0.87–1.13)
MAGNESIUM SERPL-MCNC: 1.6 MG/DL (ref 1.5–2.5)
PHOSPHATE SERPL-MCNC: 3.5 MG/DL (ref 2.5–4.5)
POTASSIUM SERPL-SCNC: 3.8 MMOL/L (ref 3.6–5.5)
PROTHROMBIN TIME: 14.6 SEC (ref 12–14.6)
SODIUM SERPL-SCNC: 140 MMOL/L (ref 135–145)

## 2018-08-05 PROCEDURE — 97116 GAIT TRAINING THERAPY: CPT

## 2018-08-05 PROCEDURE — 700102 HCHG RX REV CODE 250 W/ 637 OVERRIDE(OP): Performed by: HOSPITALIST

## 2018-08-05 PROCEDURE — 97530 THERAPEUTIC ACTIVITIES: CPT

## 2018-08-05 PROCEDURE — 94760 N-INVAS EAR/PLS OXIMETRY 1: CPT

## 2018-08-05 PROCEDURE — 770010 HCHG ROOM/CARE - REHAB SEMI PRIVAT*

## 2018-08-05 PROCEDURE — A9270 NON-COVERED ITEM OR SERVICE: HCPCS | Performed by: HOSPITALIST

## 2018-08-05 PROCEDURE — 85610 PROTHROMBIN TIME: CPT

## 2018-08-05 PROCEDURE — 36415 COLL VENOUS BLD VENIPUNCTURE: CPT

## 2018-08-05 PROCEDURE — 700112 HCHG RX REV CODE 229: Performed by: PHYSICAL MEDICINE & REHABILITATION

## 2018-08-05 PROCEDURE — 700102 HCHG RX REV CODE 250 W/ 637 OVERRIDE(OP): Performed by: PHYSICAL MEDICINE & REHABILITATION

## 2018-08-05 PROCEDURE — A9270 NON-COVERED ITEM OR SERVICE: HCPCS | Performed by: PHYSICAL MEDICINE & REHABILITATION

## 2018-08-05 PROCEDURE — 80048 BASIC METABOLIC PNL TOTAL CA: CPT

## 2018-08-05 PROCEDURE — G0515 COGNITIVE SKILLS DEVELOPMENT: HCPCS

## 2018-08-05 PROCEDURE — 84100 ASSAY OF PHOSPHORUS: CPT

## 2018-08-05 PROCEDURE — 700111 HCHG RX REV CODE 636 W/ 250 OVERRIDE (IP): Performed by: PHYSICAL MEDICINE & REHABILITATION

## 2018-08-05 PROCEDURE — 83735 ASSAY OF MAGNESIUM: CPT

## 2018-08-05 PROCEDURE — 99232 SBSQ HOSP IP/OBS MODERATE 35: CPT | Performed by: HOSPITALIST

## 2018-08-05 PROCEDURE — 97110 THERAPEUTIC EXERCISES: CPT

## 2018-08-05 PROCEDURE — 82962 GLUCOSE BLOOD TEST: CPT | Mod: 91

## 2018-08-05 RX ORDER — WARFARIN SODIUM 5 MG/1
10 TABLET ORAL
Status: COMPLETED | OUTPATIENT
Start: 2018-08-05 | End: 2018-08-05

## 2018-08-05 RX ORDER — WARFARIN SODIUM 2.5 MG/1
2.5 TABLET ORAL
Status: COMPLETED | OUTPATIENT
Start: 2018-08-05 | End: 2018-08-05

## 2018-08-05 RX ADMIN — METFORMIN HYDROCHLORIDE 1000 MG: 500 TABLET, FILM COATED ORAL at 08:38

## 2018-08-05 RX ADMIN — BACITRACIN, NEOMYCIN, POLYMYXIN B: 400; 3.5; 5 OINTMENT TOPICAL at 21:21

## 2018-08-05 RX ADMIN — CLINDAMYCIN HYDROCHLORIDE 300 MG: 300 CAPSULE ORAL at 21:22

## 2018-08-05 RX ADMIN — METOPROLOL TARTRATE 37.5 MG: 25 TABLET ORAL at 17:32

## 2018-08-05 RX ADMIN — DOCUSATE SODIUM 100 MG: 100 CAPSULE, LIQUID FILLED ORAL at 21:21

## 2018-08-05 RX ADMIN — CHOLECALCIFEROL TAB 25 MCG (1000 UNIT) 2000 UNITS: 25 TAB at 08:38

## 2018-08-05 RX ADMIN — LACTOBACILLUS ACIDOPHILUS / LACTOBACILLUS BULGARICUS 1 PACKET: 100 MILLION CFU STRENGTH GRANULES at 17:46

## 2018-08-05 RX ADMIN — WARFARIN SODIUM 10 MG: 5 TABLET ORAL at 17:34

## 2018-08-05 RX ADMIN — LACTOBACILLUS ACIDOPHILUS / LACTOBACILLUS BULGARICUS 1 PACKET: 100 MILLION CFU STRENGTH GRANULES at 08:39

## 2018-08-05 RX ADMIN — HYDRALAZINE HYDROCHLORIDE 100 MG: 25 TABLET, FILM COATED ORAL at 15:41

## 2018-08-05 RX ADMIN — INSULIN GLARGINE 40 UNITS: 100 INJECTION, SOLUTION SUBCUTANEOUS at 21:26

## 2018-08-05 RX ADMIN — AMLODIPINE BESYLATE 10 MG: 5 TABLET ORAL at 05:31

## 2018-08-05 RX ADMIN — WARFARIN SODIUM 2.5 MG: 2.5 TABLET ORAL at 17:34

## 2018-08-05 RX ADMIN — GLIPIZIDE 2.5 MG: 2.5 TABLET, FILM COATED, EXTENDED RELEASE ORAL at 08:38

## 2018-08-05 RX ADMIN — METOPROLOL TARTRATE 37.5 MG: 25 TABLET ORAL at 05:30

## 2018-08-05 RX ADMIN — HYDRALAZINE HYDROCHLORIDE 100 MG: 25 TABLET, FILM COATED ORAL at 08:38

## 2018-08-05 RX ADMIN — INSULIN LISPRO 2 UNITS: 100 INJECTION, SOLUTION INTRAVENOUS; SUBCUTANEOUS at 21:26

## 2018-08-05 RX ADMIN — METFORMIN HYDROCHLORIDE 1000 MG: 500 TABLET, FILM COATED ORAL at 17:32

## 2018-08-05 RX ADMIN — ENOXAPARIN SODIUM 40 MG: 100 INJECTION SUBCUTANEOUS at 08:42

## 2018-08-05 RX ADMIN — BACITRACIN, NEOMYCIN, POLYMYXIN B: 400; 3.5; 5 OINTMENT TOPICAL at 08:43

## 2018-08-05 RX ADMIN — GABAPENTIN 900 MG: 300 CAPSULE ORAL at 08:38

## 2018-08-05 RX ADMIN — GABAPENTIN 900 MG: 300 CAPSULE ORAL at 21:21

## 2018-08-05 RX ADMIN — GABAPENTIN 900 MG: 300 CAPSULE ORAL at 15:41

## 2018-08-05 RX ADMIN — HYDRALAZINE HYDROCHLORIDE 100 MG: 25 TABLET, FILM COATED ORAL at 21:21

## 2018-08-05 RX ADMIN — CLINDAMYCIN HYDROCHLORIDE 300 MG: 300 CAPSULE ORAL at 08:38

## 2018-08-05 RX ADMIN — LISINOPRIL 40 MG: 20 TABLET ORAL at 08:39

## 2018-08-05 RX ADMIN — LACTOBACILLUS ACIDOPHILUS / LACTOBACILLUS BULGARICUS 1 PACKET: 100 MILLION CFU STRENGTH GRANULES at 11:44

## 2018-08-05 ASSESSMENT — ENCOUNTER SYMPTOMS
DIARRHEA: 0
COUGH: 0
FEVER: 0
BLURRED VISION: 0
DIZZINESS: 0
NERVOUS/ANXIOUS: 0

## 2018-08-05 ASSESSMENT — PAIN SCALES - GENERAL: PAINLEVEL_OUTOF10: 0

## 2018-08-05 NOTE — PROGRESS NOTES
Inpatient Anticoagulation Service Note    Date: 8/5/2018  Reason for Anticoagulation: Atrial Fibrillation        Hemoglobin Value: (!) 11.5  Hematocrit Value: (!) 35.4  Lab Platelet Value: 256  Target INR: 2.0 to 3.0    INR from last 7 days     Date/Time INR Value    08/05/18 0523 (!)  1.17    08/04/18 0543  1.08    08/03/18 0531  1.03    08/02/18 0533  1.05    08/01/18 0541  1.07        Dose from last 7 days     Date/Time Dose (mg)    08/05/18 0523  12.5    08/04/18 0543  10    08/03/18 0531  7.5    08/02/18 0533  5    08/01/18 0541  5        Significant Interactions: Antibiotics, Other (Comments)  Bridge Therapy: No (DVT prophylaxis with enoxaparin 40 mg sub q daily)        Plan:  Warfarin 12.5 mg tonight.     Boby Adler, PharmD BCPS

## 2018-08-05 NOTE — FLOWSHEET NOTE
08/05/18 0755   Events/Summary/Plan   Events/Summary/Plan Found pt in dining room, 02 spot check done   Chest Exam   Respiration 20   Pulse 65   Oximetry   #Pulse Oximetry (Single Determination) Yes   Oxygen   Pulse Oximetry 94 %   O2 (LPM) 0   O2 Daily Delivery Respiratory  Room Air with O2 Available

## 2018-08-05 NOTE — CARE PLAN
Problem: Safety  Goal: Will remain free from injury    Intervention: Provide assistance with mobility  Patient unsteady,assisted with transfers to prevent falls.      Problem: GLYCEMIA IMBALANCE  Goal: Clinical indication of glycemia balance is achieved    Intervention: MONITOR BLOOD GLUCOSE LEVELS AS ORDERED  Patient's FSBS monitored ac meals with normal results, did not require Insulin coverage.

## 2018-08-05 NOTE — PROGRESS NOTES
Hospital Medicine Progress Note, Adult, Complex               Author: Moshe SAMMY Balderasnancy Date & Time created: 8/5/2018  8:46 AM     Interval History:  No significant events or changes since last visit  Patient denies SOB, cough, or diarrhea  Patient slept ok last night    Chief Complaint:  Hypertension  Diabetes    Review of Systems:  Review of Systems   Constitutional: Negative for fever.   Eyes: Negative for blurred vision.   Respiratory: Negative for cough.    Cardiovascular: Negative for chest pain.   Gastrointestinal: Negative for diarrhea.   Musculoskeletal: Negative for joint pain.   Neurological: Negative for dizziness.   Psychiatric/Behavioral: The patient is not nervous/anxious.        Physical Exam:  Physical Exam   Constitutional: He is oriented to person, place, and time. No distress.   HENT:   Mouth/Throat: No oropharyngeal exudate.   Eyes: EOM are normal.   Neck:   Has C-collar.   Cardiovascular: S1 normal and S2 normal.    Irregular heart rate   Pulmonary/Chest: Effort normal. No stridor. He has no wheezes. He has no rhonchi. He has no rales.   Abdominal: Soft. He exhibits no distension. There is no tenderness. Hernia confirmed negative in the right inguinal area and confirmed negative in the left inguinal area.   Musculoskeletal: He exhibits edema.   Neurological: He is alert and oriented to person, place, and time. No sensory deficit.   Skin: Skin is warm and dry. No cyanosis.   Psychiatric: He has a normal mood and affect. His behavior is normal.   Nursing note and vitals reviewed.      Labs:        Invalid input(s): [id number]      Recent Labs      08/05/18   0523   SODIUM  140   POTASSIUM  3.8   CHLORIDE  105   CO2  27   BUN  27*   CREATININE  0.90   MAGNESIUM  1.6   PHOSPHORUS  3.5   CALCIUM  8.7     Recent Labs      08/05/18   0523   GLUCOSE  128*     Recent Labs      08/03/18   0531  08/04/18   0543  08/05/18   0523   PROTHROMBTM  13.2  13.7  14.6   INR  1.03  1.08  1.17*                Hemodynamics:  Temp (24hrs), Av.9 °C (98.5 °F), Min:36.7 °C (98 °F), Max:37.2 °C (98.9 °F)  Temperature: 36.7 °C (98 °F)  Pulse  Av.2  Min: 57  Max: 113   Blood Pressure : 139/79     Respiratory:    Respiration: 20, Pulse Oximetry: 94 %, O2 Daily Delivery Respiratory : Room Air with O2 Available        RUL Breath Sounds: Clear, RML Breath Sounds: Clear;Diminished, RLL Breath Sounds: Diminished, AI Breath Sounds: Clear, LLL Breath Sounds: Diminished  Fluids:    Intake/Output Summary (Last 24 hours) at 18 0846  Last data filed at 18 0401   Gross per 24 hour   Intake             1160 ml   Output              775 ml   Net              385 ml     Weight: 114.5 kg (252 lb 6.8 oz)  GI/Nutrition:  Orders Placed This Encounter   Procedures   • Diet Order Diabetic     Standing Status:   Standing     Number of Occurrences:   1     Order Specific Question:   Diet:     Answer:   Diabetic [3]     Medical Decision Making, by Problem:  Active Hospital Problems    Diagnosis   • *Cervical myelopathy (HCC) [G95.9]   • HTN (hypertension) [I10]   • CVA (cerebral vascular accident) (HCC) [I63.9]   • Diabetes mellitus with neurological manifestations, controlled (HCC) [E11.49]   • Impaired activities of daily living [R53.81]   • Lumbar stenosis with neurogenic claudication [M48.062]   • Pain [R52]   • Cervical stenosis of spine [M48.02]   • Atrial fibrillation [427.31] [I48.91]   • Chronic antibiotic suppression [Z79.2]     *  S/P Lumbar Surgery  *  S/P Cervical Surgery: had hx of prior cervical fusion    *  Hypertension  BP better after med adjusted  On Norvasc: 10 mg daily  On Lopressor: 25 mg bid --> 37.5 mg bid (8/ at evening)  On Hydralazine: 100 mg tid  On Lisinopril: 40 mg daily ()  Cont to monitor    *  Hx Afib  HR ok   On Lopressor: 25 mg bid --> 37.5 mg bid (8/4 at evening)  On Coumadin  Monitor    *  Diabetes  Hba1c: 9.9 ()  BS labile and occ rises up  BS tend to dip lower in am  On  Lantus: 40 units qhs  On Metformin: 1000 mg bid  On Glucotrol 2.5 mg qam (8/2)  Cont to monitor    *  Azotemia  Bun: 26 (7/31) --> 27 (8/5)  Encouraging fluid (water/juice) intake  Monitor    *  Hx CVA: x 2; on Coumadin  *  Arthritis  *  Hx Cataracts: s/p extraction  *  Hyperlipidemia  *  Vit D Insufficiency: on supplements  *  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

## 2018-08-05 NOTE — CARE PLAN
Problem: Pain Management  Goal: Pain level will decrease to patient's comfort goal  Outcome: PROGRESSING AS EXPECTED  Pt denied pain when asked. Pt educated to call for further changes in pain medication or other needs.    Problem: GLYCEMIA IMBALANCE  Goal: Clinical indication of glycemia balance is achieved  Outcome: PROGRESSING AS EXPECTED  HS blood sugar was 253. 6 units of insulin given per sliding scale. No s/s of hypoglycemia noted at this time.

## 2018-08-06 LAB
GLUCOSE BLD-MCNC: 103 MG/DL (ref 65–99)
GLUCOSE BLD-MCNC: 139 MG/DL (ref 65–99)
GLUCOSE BLD-MCNC: 161 MG/DL (ref 65–99)
GLUCOSE BLD-MCNC: 90 MG/DL (ref 65–99)
INR PPP: 1.42 (ref 0.87–1.13)
PROTHROMBIN TIME: 17 SEC (ref 12–14.6)

## 2018-08-06 PROCEDURE — 700111 HCHG RX REV CODE 636 W/ 250 OVERRIDE (IP): Performed by: PHYSICAL MEDICINE & REHABILITATION

## 2018-08-06 PROCEDURE — 36415 COLL VENOUS BLD VENIPUNCTURE: CPT

## 2018-08-06 PROCEDURE — 99232 SBSQ HOSP IP/OBS MODERATE 35: CPT | Performed by: HOSPITALIST

## 2018-08-06 PROCEDURE — 700112 HCHG RX REV CODE 229: Performed by: PHYSICAL MEDICINE & REHABILITATION

## 2018-08-06 PROCEDURE — 97116 GAIT TRAINING THERAPY: CPT

## 2018-08-06 PROCEDURE — A9270 NON-COVERED ITEM OR SERVICE: HCPCS | Performed by: PHYSICAL MEDICINE & REHABILITATION

## 2018-08-06 PROCEDURE — 700102 HCHG RX REV CODE 250 W/ 637 OVERRIDE(OP): Performed by: PHYSICAL MEDICINE & REHABILITATION

## 2018-08-06 PROCEDURE — 770010 HCHG ROOM/CARE - REHAB SEMI PRIVAT*

## 2018-08-06 PROCEDURE — A9270 NON-COVERED ITEM OR SERVICE: HCPCS | Performed by: HOSPITALIST

## 2018-08-06 PROCEDURE — 97530 THERAPEUTIC ACTIVITIES: CPT

## 2018-08-06 PROCEDURE — G0515 COGNITIVE SKILLS DEVELOPMENT: HCPCS

## 2018-08-06 PROCEDURE — 94760 N-INVAS EAR/PLS OXIMETRY 1: CPT

## 2018-08-06 PROCEDURE — 97112 NEUROMUSCULAR REEDUCATION: CPT

## 2018-08-06 PROCEDURE — 82962 GLUCOSE BLOOD TEST: CPT

## 2018-08-06 PROCEDURE — 85610 PROTHROMBIN TIME: CPT

## 2018-08-06 PROCEDURE — 99233 SBSQ HOSP IP/OBS HIGH 50: CPT | Performed by: PHYSICAL MEDICINE & REHABILITATION

## 2018-08-06 PROCEDURE — 97535 SELF CARE MNGMENT TRAINING: CPT

## 2018-08-06 PROCEDURE — 700102 HCHG RX REV CODE 250 W/ 637 OVERRIDE(OP): Performed by: HOSPITALIST

## 2018-08-06 PROCEDURE — 97110 THERAPEUTIC EXERCISES: CPT

## 2018-08-06 RX ORDER — WARFARIN SODIUM 5 MG/1
10 TABLET ORAL
Status: COMPLETED | OUTPATIENT
Start: 2018-08-06 | End: 2018-08-06

## 2018-08-06 RX ADMIN — LACTOBACILLUS ACIDOPHILUS / LACTOBACILLUS BULGARICUS 1 PACKET: 100 MILLION CFU STRENGTH GRANULES at 17:55

## 2018-08-06 RX ADMIN — Medication 1 TABLET: at 20:22

## 2018-08-06 RX ADMIN — LACTOBACILLUS ACIDOPHILUS / LACTOBACILLUS BULGARICUS 1 PACKET: 100 MILLION CFU STRENGTH GRANULES at 08:02

## 2018-08-06 RX ADMIN — GABAPENTIN 900 MG: 300 CAPSULE ORAL at 09:05

## 2018-08-06 RX ADMIN — LACTOBACILLUS ACIDOPHILUS / LACTOBACILLUS BULGARICUS 1 PACKET: 100 MILLION CFU STRENGTH GRANULES at 11:49

## 2018-08-06 RX ADMIN — INSULIN GLARGINE 40 UNITS: 100 INJECTION, SOLUTION SUBCUTANEOUS at 20:27

## 2018-08-06 RX ADMIN — BACITRACIN, NEOMYCIN, POLYMYXIN B: 400; 3.5; 5 OINTMENT TOPICAL at 08:03

## 2018-08-06 RX ADMIN — LISINOPRIL 40 MG: 20 TABLET ORAL at 08:01

## 2018-08-06 RX ADMIN — AMLODIPINE BESYLATE 10 MG: 5 TABLET ORAL at 05:40

## 2018-08-06 RX ADMIN — CHOLECALCIFEROL TAB 25 MCG (1000 UNIT) 2000 UNITS: 25 TAB at 08:01

## 2018-08-06 RX ADMIN — METFORMIN HYDROCHLORIDE 1000 MG: 500 TABLET, FILM COATED ORAL at 08:01

## 2018-08-06 RX ADMIN — GABAPENTIN 900 MG: 300 CAPSULE ORAL at 15:08

## 2018-08-06 RX ADMIN — INSULIN LISPRO 2 UNITS: 100 INJECTION, SOLUTION INTRAVENOUS; SUBCUTANEOUS at 17:01

## 2018-08-06 RX ADMIN — DOCUSATE SODIUM 100 MG: 100 CAPSULE, LIQUID FILLED ORAL at 20:23

## 2018-08-06 RX ADMIN — HYDRALAZINE HYDROCHLORIDE 100 MG: 25 TABLET, FILM COATED ORAL at 15:08

## 2018-08-06 RX ADMIN — HYDRALAZINE HYDROCHLORIDE 100 MG: 25 TABLET, FILM COATED ORAL at 20:21

## 2018-08-06 RX ADMIN — METOPROLOL TARTRATE 37.5 MG: 25 TABLET ORAL at 05:40

## 2018-08-06 RX ADMIN — CLINDAMYCIN HYDROCHLORIDE 300 MG: 300 CAPSULE ORAL at 08:01

## 2018-08-06 RX ADMIN — METFORMIN HYDROCHLORIDE 1000 MG: 500 TABLET, FILM COATED ORAL at 17:44

## 2018-08-06 RX ADMIN — GLIPIZIDE 2.5 MG: 2.5 TABLET, FILM COATED, EXTENDED RELEASE ORAL at 08:01

## 2018-08-06 RX ADMIN — METOPROLOL TARTRATE 50 MG: 25 TABLET ORAL at 17:45

## 2018-08-06 RX ADMIN — CLINDAMYCIN HYDROCHLORIDE 300 MG: 300 CAPSULE ORAL at 20:21

## 2018-08-06 RX ADMIN — HYDRALAZINE HYDROCHLORIDE 100 MG: 25 TABLET, FILM COATED ORAL at 08:00

## 2018-08-06 RX ADMIN — WARFARIN SODIUM 10 MG: 5 TABLET ORAL at 17:46

## 2018-08-06 RX ADMIN — BACITRACIN, NEOMYCIN, POLYMYXIN B: 400; 3.5; 5 OINTMENT TOPICAL at 20:23

## 2018-08-06 RX ADMIN — GABAPENTIN 900 MG: 300 CAPSULE ORAL at 20:21

## 2018-08-06 RX ADMIN — ENOXAPARIN SODIUM 40 MG: 100 INJECTION SUBCUTANEOUS at 07:59

## 2018-08-06 ASSESSMENT — ENCOUNTER SYMPTOMS
VOMITING: 0
SHORTNESS OF BREATH: 0
CHILLS: 0
ABDOMINAL PAIN: 0
FEVER: 0
NERVOUS/ANXIOUS: 0
NAUSEA: 0
DIARRHEA: 0

## 2018-08-06 NOTE — REHAB-SLP IDT TEAM NOTE
Speech Therapy   Cognitive Linquistic Functions  Comprehension Initial:  5 - Stand-by Prompting/Supervision or Set-up  Comprehension Current:  6 - Modified Independent   Comprehension Description:  Verbal cues  Expression Initial:  6 - Modified Independent  Expression Current:  7 - Independent   Expression Description:  Verbal cueing  Social Interaction Initial:  6 - Modified Independent  Social Interaction Current:  6 - Modified Independent   Social Interaction Description:  Increased time  Problem Solving Initial:  5 - Standby Prompting/Supervision or Set-up  Problem Solving Current:  6 - Modified Independent   Problem Solving Description:  Verbal cueing, Therapy schedule  Memory Initial:  3 - Moderate Assistance  Memory Current:  4 - Minimal Assistance   Memory Description:  Verbal cueing, Therapy schedule, Bed/chair alarm  Executive Functioning / Organization Initial:  Minimal (4)  Executive Functioning / Organization Current:  Moderate (3) (unclear discussing DC plan, level of assist, avail help)   Executive Functioning Desciption: verbal cues, external structure.  Swallowing  Swallowing Status:  Patient is not currently receiving dysphagia intervention.  Orders Placed This Encounter   Procedures   • Diet Order Diabetic     Standing Status:   Standing     Number of Occurrences:   1     Order Specific Question:   Diet:     Answer:   Diabetic [3]     Behavior Modification Plan  Keep instructions simple/concrete, Give clear feedback, Set clear goals and Analyze tasks (break down into smaller steps)  Assistive Technology  Low tech: Calendar, planner, schedule, alarms/timers, pill organizer, post-it notes, lists  Family Training/Education:  Patient lives alone. No family support identified.  DC Recommendations:   Anticipate patient will benefit from additional ST services upon discharge from this facility.  Strengths:  Able to follow instructions, Alert and oriented, Motivated for self care and independence and  Other: determined to go home.  Barriers:  Impulsive, Poor insight/denial of deficits and Other: somewhat resistant to intervention, impaired self regulation, impaired attention and slow speed of processing, inflexibility of behavior.  Little family/community support.  # of short term goals set=   1  # of short term goals met=  1        Speech Therapy Problems           Problem: Memory STGs     Dates: Start: 07/28/18       Goal: STG-Within one week, patient will     Dates: Start: 08/06/18       Description: 1) Individualized goal:  Perform medication and financial management tasks with % mod I.  2) Interventions:  SLP Speech Language Treatment, SLP Self Care / ADL Training , SLP Cognitive Skill Development and SLP Group Treatment               Problem: Speech/Swallowing LTGs     Dates: Start: 07/28/18       Goal: LTG-By discharge, patient will     Dates: Start: 07/28/18       Description: 1) Individualized goal:  Perform medication and financial management tasks with 90% acc.  2) Interventions:  SLP Electrical Stimulation - Attended, SLP Speech Language Treatment, SLP Self Care / ADL Training , SLP Cognitive Skill Development and SLP Group Treatment                    Section completed by:  Jo-Ann Ron MS,CCC-SLP

## 2018-08-06 NOTE — REHAB-OT IDT TEAM NOTE
Occupational Therapy   Activities of Daily Living  Eating Initial:  3 - Moderate Assistance  Eating Current:  5 - Standby Prompting/Supervision or Set-up   Eating Description:  Set-up of equipment or meal/tube feeding  Grooming Initial:  5 - Standby Prompting/Supervision or Set-up  Grooming Current:  7 - Independent   Grooming Description:   (independent seated to wash/dry hands post toileting)  Bathing Initial:  0 - Not tested,unsafe activity  Bathing Current:  5 - Standby Prompting/Supervision or Set-up   Bathing Description:  Tub bench, Grab bar, Hand held shower, Supervision for safety, Increased time, Verbal cueing, Set-up of equipment, Initial preparation for task (setup/sba with bench and gb)  Upper Body Dressing Initial:  0 - Not tested, patient refused  Upper Body Dressing Current:  5 - Standby Prompting/Supervision or Set-up   Upper Body Dressing Description:  Set-up of equipment (to don/doff pullover)  Lower Body Dressing Initial:  0 - Not tested, patient refused  Lower Body Dressing Current:  5 - Setup and Supervised   Lower Body Dressing Description:  4 - Minimal Assistance  Toileting Initial:  1 - Total Assistance  Toileting Current:  6 - Modified Independent   Toileting Description:  Grab bar (mod I 3/3 tasks)  Toilet Transfer Initial:  0 - Not tested, patient refused  Toilet Transfer Current:  6 - Modified Independent   Toilet Transfer Description:  5 - Standby Prompting/Supervision or Set-up  Tub / Shower Transfer Initial:  0 - Not tested, patient refused  Tub / Shower Transfer Current:  5 - Standby Prompting/Supervision or Set-up   Tub / Shower Transfer Description:  Grab bar, Adaptive equipment, Supervision for safety (sba with grab bar and bench)  IADL:  Mod I simple meal prep from w/c level (unable to complete from standing position); SBA/supervised laundry  Family Training/Education:  Educated on how to change pads on c collar and how to clean/dry them  DME/DC Recommendations:  Recommend w/c  for use in kitchen as needed; pt has a tub bench and FWW; has grab bars in a box (recommend they are installed in tub/shower and by toilets; ramp    Strengths:  Alert and oriented, Effective communication skills, Making steady progress towards goals, Manages pain appropriately, Motivated for self care and independence, Pleasant and cooperative and Willingly participates in therapeutic activities  Barriers:  Decreased endurance, Generalized weakness, Poor balance and Other: impaired carryover of leanring, neck pain, lives alone though pt states friend Albaro is going to stay with him; needs a ramp and grab bar installation     # of short term goals set= 4    # of short term goals met= 4       Occupational Therapy Goals           Problem: OT Long Term Goals     Dates: Start: 07/19/18       Goal: LTG-By discharge, patient will complete basic self care tasks     Dates: Start: 07/19/18       Description: 1) Individualized Goal:  With mod I using AE/AD/techniques as needed.  2) Interventions:  OT E Stim Attended, OT Group Therapy, OT Self Care/ADL, OT Cognitive Skill Dev, OT Community Reintegration, OT Manual Ther Technique, OT Neuro Re-Ed/Balance, OT Sensory Int Techniques, OT Therapeutic Activity, OT Evaluation and OT Therapeutic Exercise           Goal: LTG-By discharge, patient will perform bathroom transfers     Dates: Start: 07/19/18       Description: 1) Individualized Goal:  With mod I using AE/AD/techniques as needed.  2) Interventions:  OT E Stim Attended, OT Group Therapy, OT Self Care/ADL, OT Cognitive Skill Dev, OT Community Reintegration, OT Manual Ther Technique, OT Neuro Re-Ed/Balance, OT Sensory Int Techniques, OT Therapeutic Activity, OT Evaluation and OT Therapeutic Exercise           Goal: LTG-By discharge, patient will complete basic home management     Dates: Start: 07/19/18       Description: 1) Individualized Goal:  With mod I using AE/AD/techniques as needed.  2) Interventions:  OT E Stim  Attended, OT Group Therapy, OT Self Care/ADL, OT Cognitive Skill Dev, OT Community Reintegration, OT Manual Ther Technique, OT Neuro Re-Ed/Balance, OT Sensory Int Techniques, OT Therapeutic Activity, OT Evaluation and OT Therapeutic Exercise                 Section completed by:  Chau Ordaz MS,OTR/L

## 2018-08-06 NOTE — CARE PLAN
Problem: Mobility  Goal: STG-Within one week, patient will propel wheelchair community  1) Individualized goal:  Pt will propel w/c 150ft x 1, SPV  2) Interventions:  PT Group Therapy, PT Orthotics Training, PT Gait Training, PT Therapeutic Exercises, PT Neuro Re-Ed/Balance, PT Therapeutic Activity, PT Manual Therapy and PT Evaluation     Outcome: MET Date Met: 08/06/18    Goal: STG-Within one week, patient will ambulate household distance  1) Individualized goal:  Pt will AMB 50ft x 1, SBA with FWW and B AFOs  2) Interventions:  PT Group Therapy, PT Orthotics Training, PT Gait Training, PT Therapeutic Exercises, PT Neuro Re-Ed/Balance, PT Therapeutic Activity, PT Manual Therapy and PT Evaluation     Outcome: NOT MET  Pt requires CGA for mobility at this time

## 2018-08-06 NOTE — PROGRESS NOTES
Pt wanted to have island dressing off tonight due to itching to site.  Applied Benadryl cream to sides of incisions site.  Has rash present to sides of incision.  Incision pink in color, well approximated.

## 2018-08-06 NOTE — PROGRESS NOTES
DATE OF SERVICE:  07/31/2018    The patient was seen for followup visit.  Patient said he feels much more   alert.  He said he discontinued his narcotics.  He said he still is has much   pain, but he feels more alert while awake and in a better mood and he said   after all of the narcotics were not help him although much if any.  The   patient talked about his various therapies.  This led to attend to a   discussion of what he anticipates as he nears his discharge ____.       ____________________________________     RAN PERALES, PHD    ANGELES / TARAH    DD:  08/05/2018 16:25:00  DT:  08/05/2018 22:15:39    D#:  [id number]  Job#:  [id number]

## 2018-08-06 NOTE — REHAB-RESPIRATORY IDT TEAM NOTE
Respiratory Therapy   Respiratory Therapy    Pt is stable on RA 24/7 with SATS over 92%.  CPAP use has been intermittant.    Section completed by:  Jessica Schmitz, RRT

## 2018-08-06 NOTE — CARE PLAN
Problem: Bathing  Goal: STG-Within one week, patient will bathe  1) Individualized Goal: Body with CGA using AE/AD/techniques as needed.  2) Interventions: OT E Stim Attended, OT Group Therapy, OT Self Care/ADL, OT Cognitive Skill Dev, OT Community Reintegration, OT Manual Ther Technique, OT Neuro Re-Ed/Balance, OT Sensory Int Techniques, OT Therapeutic Activity, OT Evaluation and OT Therapeutic Exercise     Outcome: MET Date Met: 08/06/18  Setup and supervised    Problem: Dressing  Goal: STG-Within one week, patient will dress LB  1) Individualized Goal: With CGA using AE/AD/techniques as needed.  2) Interventions: OT E Stim Attended, OT Group Therapy, OT Self Care/ADL, OT Cognitive Skill Dev, OT Community Reintegration, OT Manual Ther Technique, OT Neuro Re-Ed/Balance, OT Sensory Int Techniques, OT Therapeutic Activity, OT Evaluation and OT Therapeutic Exercise      Outcome: MET Date Met: 08/06/18  Setup and supervised    Problem: Toileting  Goal: STG-Within one week, patient will complete toileting tasks  1) Individualized Goal: With min assist using AE/AD/techniques as needed.  2) Interventions: OT E Stim Attended, OT Group Therapy, OT Self Care/ADL, OT Cognitive Skill Dev, OT Community Reintegration, OT Manual Ther Technique, OT Neuro Re-Ed/Balance, OT Sensory Int Techniques, OT Therapeutic Activity, OT Evaluation and OT Therapeutic Exercise      Outcome: MET Date Met: 08/06/18  Mod I w/ grab bar    Problem: Functional Transfers  Goal: STG-Within one week, patient will transfer to toilet  1) Individualized Goal: With SBA using AE/AD/techniques as needed.  2) Interventions: OT E Stim Attended, OT Group Therapy, OT Self Care/ADL, OT Cognitive Skill Dev, OT Community Reintegration, OT Manual Ther Technique, OT Neuro Re-Ed/Balance, OT Sensory Int Techniques, OT Therapeutic Activity, OT Evaluation and OT Therapeutic Exercise      Outcome: MET Date Met: 08/06/18  Mod I w/ grab bar

## 2018-08-06 NOTE — PROGRESS NOTES
Pharmacy Warfarin Consult   8/6/2018     67 y.o.   male     Indication for anticoagulation: Atrial Fibrillation     Goal INR = 2 - 3     Recent Labs      08/04/18   0543  08/05/18   0523  08/06/18   0533   INR  1.08  1.17*  1.42*       Pertinent Drug/Drug Interactions:  Antibiotics, trazodone  Outpatient Warfarin Regimen:  10mg daily per med rec  Recent Warfarin Dosing:    Dose from last 7 days     Date/Time Dose (mg)    08/06/18 0533  10    08/05/18 0523  12.5    08/04/18 0543  10    08/03/18 0531  7.5    08/02/18 0533  5    08/01/18 0541  5          Bridge Therapy: Lovenox 40mg daily until INR > 2 for 2 days.            1.  Coumadin 10 mg tonight for INR = 1.42          Chris Kelley [facility]

## 2018-08-06 NOTE — PROGRESS NOTES
DATE OF SERVICE:  08/01/2018    The patient was seen for followup visit.  As reported in his last progress   note, the patient ____.  He has discontinued his narcotics.  His pain has not   changed since the cessation of opioids.  Patient talked about his various   therapies.  He also discussed plans for returning home and what he would focus   on ____.       ____________________________________     RAN PERALES, PHD    ANGELES / TARAH    DD:  08/05/2018 16:45:54  DT:  08/05/2018 22:33:24    D#:  [id number]  Job#:  [id number]

## 2018-08-06 NOTE — REHAB-NURSING IDT TEAM NOTE
Nursing   Nursing  Diet/Nutrition:  Diabetic and Thin Liquids  Medication Administration:  Whole with Liquid Wash  % consumed at meals in last 24 hours:  Consumed % of meals per documentation.  Meal/Snack Consumption for the past 24 hrs:   Oral Nutrition   08/05/18 1900 Dinner;Between % Consumed   08/05/18 1300 Lunch;Between % Consumed   08/05/18 0857 Breakfast;Between % Consumed       Snack schedule:  HS  Supplement:  Other: N/A  Appetite:  Good  Fluid Intake/Output in past 24 hours: In: 1400 [P.O.:1400]  Out: 775   Admit Weight:  Weight: 114.3 kg (252 lb)  Weight Last 7 Days   [114.5 kg (252 lb 6.8 oz)] 114.5 kg (252 lb 6.8 oz) (08/05 0500)    Pain Issues:    Location:  --  --         Severity:  Denies   Scheduled pain medications:  gabapentin (NEURONTIN)      PRN pain medications used in last 24 hours:  None   Non Pharmacologic Interventions:  rest  Effectiveness of pain management plan:  good=patient states acceptable comfort after interventions    Bowel:    Bowel Assist Initial Score:  1 - Total Assistance  Bowel Assist Current Score:  4 - Minimal Assistance  Bowl Accidents in last 7 days:     Last bowel movement: 08/05/18  Stool Description: Large, Formed     Usual bowel pattern:  daily  Scheduled bowel medications:  docusate sodium (COLACE) and senna-docusate (PERICOLACE or SENOKOT S)   PRN bowel medications used in last 24 hours:  None  Nursing Interventions:  Increased time, Scheduled medication, Positioning on commode/toilet  Effectiveness of bowel program:   good=regular, predictable, controlled emptying of bowel  Bladder:    Bladder Assist Initial Score:  1 - Total Assistance  Bladder Assist Current Score:  3 - Moderate Assistance  Bladder Accidents in last 7 days:  0  PVR range for past 24-48 hours: -- ()  Intermittent Catheterization:  N/A  Medications affecting bladder:  None    Time void schedule/voiding pattern:  Voiding every 3-4 hours  Interventions:  Increased time, Time  void patient initiated  Effectiveness of bladder training:  Good=regular, predictable, emptying of bladder, patient initiates time voiding    Diabetes:  Blood Sugar Frequency:  Before meals and at bedtime    Range of BS for last 48 hours:   Recent Labs      08/04/18   0753  08/04/18   1116  08/04/18   1712  08/04/18   2100  08/05/18   0740  08/05/18   1116  08/05/18   1654  08/05/18   2124   POCGLUCOSE  138*  126*  132*  253*  124*  138*  150*  193*     Scheduled diabetic medications:  metformin (GLUCOPHAGE)  and Other Glucotrol.  Sliding scale usage in past 24 hours:  Yes  Total Short acting insulin in the past 24 hours:  Humalog 2 units.  Total Long acting insulin in the past 24 hours:  Lantus 40 units.    Wound:         Patient Lines/Drains/Airways Status    Active Current Wounds     Name: Placement date: Placement time: Site: Days:    Surgical Incision  Incision Cervical Posterior 07/13/18   1555      23    Surgical Incision  Incision Back 07/13/18   1748      23                   Interventions: Incisions to posterior cervical site WILFREDO.  Back incision with Neosporin ointment and covered with island dressing.  Effectiveness of intervention:  Rash present to sides of incision site.  Complains of itching to back.  Benadryl applied to sides of incision.  Pt requested to have island dressing off during the night 8/5/18.    Sleep/wake cycle:   Average hours slept:  Sleeps 3-4 hours without waking  Sleep medication usage:  Declines    Patient/Family Training/Education:  Diabetes Management, Fall Prevention, General Self Care, Medication Management, Safe Transfers, Safety, Skin Care and Wound Care  Discharge Supply Recommendations:  Glucometer and Strips  Strengths: Alert and oriented, Pleasant and cooperative and Effective communication skills   Barriers:   Generalized weakness and Limited mobility            Nursing Problems           Problem: Bowel/Gastric:     Goal: Normal bowel function is maintained or improved            Goal: Will not experience complications related to bowel motility             Problem: Communication     Goal: The ability to communicate needs accurately and effectively will improve             Problem: Discharge Barriers/Planning     Goal: Patient's continuum of care needs will be met             Problem: GLYCEMIA IMBALANCE     Goal: Clinical indication of glycemia balance is achieved             Problem: Infection     Goal: Will remain free from infection             Problem: Knowledge Deficit     Goal: Knowledge of disease process/condition, treatment plan, diagnostic tests, and medications will improve           Goal: Knowledge of the prescribed therapeutic regimen will improve             Problem: Mobility     Goal: Risk for activity intolerance will decrease             Problem: Pain Management     Goal: Pain level will decrease to patient's comfort goal     Flowsheet:     Taken at 07/31/18 1028    Pain Scale 0 - 10  10 by Boby Roberts R.N.                  Problem: Psychosocial Needs:     Goal: Level of anxiety will decrease             Problem: Respiratory:     Goal: Respiratory status will improve             Problem: Safety     Goal: Will remain free from injury           Goal: Will remain free from falls             Problem: Skin Integrity     Goal: Risk for impaired skin integrity will decrease             Problem: Urinary Elimination:     Goal: Ability to reestablish a normal urinary elimination pattern will improve             Problem: Venous Thromboembolism (VTW)/Deep Vein Thrombosis (DVT) Prevention:     Goal: Patient will participate in Venous Thrombosis (VTE)/Deep Vein Thrombosis (DVT)Prevention Measures                  Long Term Goals:   At discharge patient will be able to function safely at home and in the community with support.    Section completed by:  Joy Bazzi L.P.N.2

## 2018-08-06 NOTE — REHAB-PT IDT TEAM NOTE
"Physical Therapy   Mobility  Bed mobility:   5  Bed /Chair/Wheelchair Transfer Initial:  1 - Total Assistance  Bed /Chair/Wheelchair Transfer Current:  5 - Standby Prompting/Supervision or Set-up   Bed/Chair/Wheelchair Transfer Description:   (bed mobility: SPV; transfer: SPT, CGA, FWW)  Walk Initial:  0 - Not tested,unsafe activity  Walk Current:  1 - Total Assistance   Walk Description:   (65ft x 2 with FWW CGA with w/c follow)  Wheelchair Initial:  1 - Total Assistance  Wheelchair Current:  5E - Household Exception   Wheelchair Description:   (100ft using bilateral LE, min assist for set-up, encouragement to complete the task)  Stairs Initial:  0 - Not tested,unsafe activity  Stairs Current: 2 - Max Assistance   Stairs Description:  (4 x 4 6\" steps with B UE support at base of stairs with B UE support, CGA)  Patient/Family Training/Education:  Home accessibility, need for ramp, B HR, skin integrity, skin checks  DME/DC Recommendations:  Manual w/c with cushion, footrests, ramp, bilateral HR  Strengths:  Effective communication skills, Making steady progress towards goals, Supportive family and Willingly participates in therapeutic activities  Barriers:   Generalized weakness, Limited mobility, Pain poorly managed, Poor activity tolerance, Poor balance, Poor family support and Poor insight/denial of deficits  # of short term goals set= 2  # of short term goals met=1       Physical Therapy Problems           Problem: Mobility     Dates: Start: 07/19/18       Goal: STG-Within one week, patient will ambulate household distance     Dates: Start: 07/30/18       Description: 1) Individualized goal:  Pt will AMB 50ft x 1, SBA with FWW and B AFOs  2) Interventions:  PT Group Therapy, PT Orthotics Training, PT Gait Training, PT Therapeutic Exercises, PT Neuro Re-Ed/Balance, PT Therapeutic Activity, PT Manual Therapy and PT Evaluation       Note:     Goal Note filed on 08/06/18 0820 by Meena Louis, PT    Goal: " STG-Within one week, patient will ambulate household distance  Outcome: NOT MET  Pt requires CGA for mobility at this time                  Problem: Mobility Transfers     Dates: Start: 07/19/18       Goal: STG-Within one week, patient will perform bed mobility     Dates: Start: 07/19/18       Description: 1) Individualized goal: Pt will perform all bed mobility with SPV  2) Interventions:  PT Group Therapy, PT Orthotics Training, PT Gait Training, PT Therapeutic Exercises, PT Neuro Re-Ed/Balance, PT Therapeutic Activity, PT Manual Therapy and PT Evaluation       Note:     Goal Note filed on 07/30/18 0814 by Meena Louis, PT    Goal: STG-Within one week, patient will perform bed mobility  Outcome: NOT MET  Pt requires CGA for bed mobility to adhere to precautions at this time                  Problem: PT-Long Term Goals     Dates: Start: 07/19/18       Goal: LTG-By discharge, patient will ambulate     Dates: Start: 07/19/18       Description: 1) Individualized goal:  Pt will AMB 150ft x1 with LRAD , mod I  2) Interventions:  PT Group Therapy, PT Orthotics Training, PT Gait Training, PT Therapeutic Exercises, PT Neuro Re-Ed/Balance, PT Therapeutic Activity, PT Manual Therapy and PT Evaluation             Goal: LTG-By discharge, patient will perform home exercise program     Dates: Start: 07/19/18       Description: 1) Individualized goal: Pt will perform HEP for B LE strengthening to maintain the benefits obtained in PT, mod I  2) Interventions:  PT Group Therapy, PT Orthotics Training, PT Gait Training, PT Therapeutic Exercises, PT Neuro Re-Ed/Balance, PT Therapeutic Activity, PT Manual Therapy and PT Evaluation             Goal: LTG-By discharge, patient will ambulate up/down 4-6 stairs     Dates: Start: 07/19/18       Description: 1) Individualized goal: Pt will AMB up/down 4 stairs with unilateral HR to mimic home environment, mod I  2) Interventions:  PT Group Therapy, PT Orthotics Training, PT Gait Training,  PT Therapeutic Exercises, PT Neuro Re-Ed/Balance, PT Therapeutic Activity, PT Manual Therapy and PT Evaluation             Goal: LTG-By discharge, patient will transfer in/out of a car     Dates: Start: 07/19/18       Description: 1) Individualized goal:  Pt will transfer in/out of a car with LRAD, mod I  2) Interventions:  PT Group Therapy, PT Orthotics Training, PT Gait Training, PT Therapeutic Exercises, PT Neuro Re-Ed/Balance, PT Therapeutic Activity, PT Manual Therapy and PT Evaluation                     Section completed by:  Meena Louis, PT

## 2018-08-06 NOTE — CARE PLAN
Problem: Memory STGs  Goal: STG-Within one week, patient will  1) Individualized goal: perform medication and financial management tasks with 80% acc.  2) Interventions:  SLP Electrical Stimulation - Attended, SLP Speech Language Treatment, SLP Self Care / ADL Training , SLP Cognitive Skill Development and SLP Group Treatment   Outcome: MET Date Met: 08/06/18  80% with min cues needed to improve.

## 2018-08-06 NOTE — CARE PLAN
Problem: Safety  Goal: Will remain free from falls    Intervention: Implement fall precautions  Patient unsteady, assisted with transfers to prevent falls.      Problem: Skin Integrity  Goal: Risk for impaired skin integrity will decrease    Intervention: Assess risk factors for impaired skin integrity and/or pressure ulcers  Lower back incision well well approximated & red, no drainage, left open to air.

## 2018-08-06 NOTE — PROGRESS NOTES
Hospital Medicine Progress Note, Adult, Complex               Author: Moshe SAMMY Balderasnancy Date & Time created: 8/6/2018  9:29 AM     Interval History:  No significant events or changes since last visit  Patient denies SOB, cough, or diarrhea  Patient slept ok last night    Chief Complaint:  Hypertension  Diabetes    Review of Systems:  Review of Systems   Constitutional: Negative for chills and fever.   Respiratory: Negative for shortness of breath.    Cardiovascular: Negative for chest pain.   Gastrointestinal: Negative for abdominal pain, diarrhea, nausea and vomiting.   Psychiatric/Behavioral: The patient is not nervous/anxious.        Physical Exam:  Physical Exam   Constitutional: He is oriented to person, place, and time. He appears well-nourished.   HENT:   Head: Atraumatic.   Eyes: Pupils are equal, round, and reactive to light. Conjunctivae are normal.   Neck: No JVD present. No tracheal deviation present.   Has C-collar.   Cardiovascular: S1 normal and S2 normal.    No murmur heard.  Irregular heart rate   Pulmonary/Chest: Effort normal. He has no wheezes. He has no rhonchi. He has no rales.   Abdominal: Soft. He exhibits no distension. There is no tenderness. Hernia confirmed negative in the right inguinal area and confirmed negative in the left inguinal area.   Musculoskeletal: He exhibits edema.   Neurological: He is alert and oriented to person, place, and time. No sensory deficit.   Skin: Skin is warm and dry. No rash noted. No cyanosis.   Psychiatric: He has a normal mood and affect. His behavior is normal.   Nursing note and vitals reviewed.      Labs:        Invalid input(s): [id number]      Recent Labs      08/05/18   0523   SODIUM  140   POTASSIUM  3.8   CHLORIDE  105   CO2  27   BUN  27*   CREATININE  0.90   MAGNESIUM  1.6   PHOSPHORUS  3.5   CALCIUM  8.7     Recent Labs      08/05/18   0523   GLUCOSE  128*     Recent Labs      08/04/18   0543  08/05/18   0523  08/06/18   0533   PROTHROMBTM  13.7   14.6  17.0*   INR  1.08  1.17*  1.42*               Hemodynamics:  Temp (24hrs), Av.8 °C (98.2 °F), Min:36.7 °C (98 °F), Max:36.8 °C (98.3 °F)  Temperature: 36.7 °C (98 °F)  Pulse  Av.5  Min: 57  Max: 113   Blood Pressure : 158/98     Respiratory:    Respiration: 18, Pulse Oximetry: 96 %     Work Of Breathing / Effort: Mild  RUL Breath Sounds: Clear, RML Breath Sounds: Clear;Diminished, RLL Breath Sounds: Clear;Diminished, AI Breath Sounds: Clear, LLL Breath Sounds: Clear;Diminished  Fluids:    Intake/Output Summary (Last 24 hours) at 18 0929  Last data filed at 18 0906   Gross per 24 hour   Intake             2040 ml   Output             2150 ml   Net             -110 ml        GI/Nutrition:  Orders Placed This Encounter   Procedures   • Diet Order Diabetic     Standing Status:   Standing     Number of Occurrences:   1     Order Specific Question:   Diet:     Answer:   Diabetic [3]     Medical Decision Making, by Problem:  Active Hospital Problems    Diagnosis   • *Cervical myelopathy (HCC) [G95.9]   • HTN (hypertension) [I10]   • CVA (cerebral vascular accident) (HCC) [I63.9]   • Diabetes mellitus with neurological manifestations, controlled (HCC) [E11.49]   • Impaired activities of daily living [R53.81]   • Lumbar stenosis with neurogenic claudication [M48.062]   • Pain [R52]   • Cervical stenosis of spine [M48.02]   • Atrial fibrillation [427.31] [I48.91]   • Chronic antibiotic suppression [Z79.2]     *  S/P Lumbar Surgery  *  S/P Cervical Surgery: had hx of prior cervical fusion    *  Hypertension  BP better but occ rises up a little  On Norvasc: 10 mg daily  On Lopressor: 25 mg bid --> 37.5 mg bid ( at evening) --> will increase to 50 mg bid ()  On Hydralazine: 100 mg tid  On Lisinopril: 40 mg daily ()  Cont to monitor    *  Hx Afib  HR ok   On Lopressor: 25 mg bid --> 37.5 mg bid ( at evening) --> will increase to 50 mg bid ()  On Coumadin  Monitor    *  Diabetes  Hba1c:  9.9 (7/19)  BS trending better recently:  (hit 90 at lunch today)  BS tend to dip lower in am  On Lantus: 40 units qhs  On Metformin: 1000 mg bid  On Glucotrol: 2.5 mg qam (8/2) --> will d/c (8/6)  Cont to monitor    *  Azotemia  Bun: 26 (7/31) --> 27 (8/5)  Encouraging fluid (water/juice) intake  Monitor    *  Hx CVA: x 2; on Coumadin  *  Arthritis  *  Hx Cataracts: s/p extraction  *  Hyperlipidemia  *  Vit D Insufficiency: on supplements  *  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

## 2018-08-06 NOTE — REHAB-COLLABORATIVE ONGOING IDT TEAM NOTE
Weekly Interdisciplinary Team Conference Note    Weekly Interdisciplinary Team Conference # 3  Date:  8/6/2018    Clinicians present and reporting at team conference include the following:   MD: Higinio Pagan MD   RN:  Aiyana Geiger RN   PT:   Meena Louis, PT, DPT  OT:  Chau Ordaz, MS, OTR/L   ST:  Jo-Ann Ron, MS, CCC-SLP  CM:  Hafsa Ayoub RN, CCM  REC:  None  RT:  None  RPh:  None  Other:   None  All reporting clinicians have a working knowledge of this patient's plan of care.    Targeted DC Date:  8/14/18     Medical    Patient Active Problem List    Diagnosis Date Noted   • Left knee pain 08/28/2016     Priority: High   • Diabetic polyneuropathy ([facility]) 05/06/2015     Priority: High   • Toe amputation status ([facility]) 05/06/2015     Priority: High   • HTN (hypertension) 08/16/2010     Priority: Medium   • CVA (cerebral vascular accident) ([facility]) 06/04/2009     Priority: Medium   • Diabetes mellitus with neurological manifestations, controlled ([facility]) 03/05/2012     Priority: Low   • Impaired activities of daily living 07/18/2018   • Cervical myelopathy ([facility]) 07/18/2018   • Lumbar stenosis with neurogenic claudication 07/18/2018   • Pain 07/18/2018   • Persistent proteinuria 10/13/2017   • BMI 35.0-35.9,adult 09/22/2017   • CKD (chronic kidney disease), stage I 05/12/2017   • Diabetic nephropathy associated with type 2 diabetes mellitus ([facility]) 05/12/2017   • Chronic use of opiate drugs therapeutic purposes 02/14/2017   • Other orthopedic aftercare 11/07/2016   • Cervical stenosis of spine 09/06/2016   • Dysphagia 09/06/2016   • Chronic atrial fibrillation ([facility]) 09/06/2016   • Hallucinations 09/06/2016   • JANNIE treated with BiPAP 04/18/2016   • History of CVA (cerebrovascular accident) 11/11/2015   • Atrial fibrillation [427.31] 06/24/2015   • Risk for falls 10/27/2014   • Myalgia 05/29/2014   • BPH (benign prostatic hyperplasia) 04/28/2014   • Anticoagulated on warfarin 04/28/2014   • Chronic antibiotic  suppression 04/28/2014   • MEDICAL HOME    • Class 1 obesity in adult 06/17/2011   • Ventricular ectopy 06/17/2011   • Trigger finger 06/17/2011   • Thyroid mass 07/30/2009   • Dyslipidemia 06/17/2009     Results     Procedure Component Value Ref Range Date/Time    ACCU-CHEK GLUCOSE [[id number]] Collected:  08/06/18 1147    Order Status:  Completed Lab Status:  Final result Updated:  08/06/18 1243     Glucose - Accu-Ck 90 65 - 99 mg/dL     ACCU-CHEK GLUCOSE [[id number]]  (Abnormal) Collected:  08/06/18 0716    Order Status:  Completed Lab Status:  Final result Updated:  08/06/18 0800     Glucose - Accu-Ck 103 (H) 65 - 99 mg/dL     PROTHROMBIN TIME [[id number]]  (Abnormal) Collected:  08/06/18 0533    Order Status:  Completed Lab Status:  Final result Updated:  08/06/18 0638    Specimen:  Blood      PT 17.0 (H) 12.0 - 14.6 sec      INR 1.42 (H) 0.87 - 1.13      Comment: INR - Non-therapeutic Reference Range: 0.87-1.13  INR - Therapeutic Reference Range: 2.0-4.0         Narrative:       Indicate which anticoagulants the patient is on:->COUMADIN    ACCU-CHEK GLUCOSE [[id number]]  (Abnormal) Collected:  08/05/18 2124    Order Status:  Completed Lab Status:  Final result Updated:  08/05/18 2239     Glucose - Accu-Ck 193 (H) 65 - 99 mg/dL      Comment: Followed orders  Notified Physician  Followed Dr orders         ACCU-CHEK GLUCOSE [[id number]]  (Abnormal) Collected:  08/05/18 1654    Order Status:  Completed Lab Status:  Final result Updated:  08/05/18 1731     Glucose - Accu-Ck 150 (H) 65 - 99 mg/dL            Nursing  Diet/Nutrition:  Diabetic and Thin Liquids  Medication Administration:  Whole with Liquid Wash  % consumed at meals in last 24 hours:  Consumed % of meals per documentation.  Meal/Snack Consumption for the past 24 hrs:   Oral Nutrition   08/05/18 1900 Dinner;Between % Consumed   08/05/18 1300 Lunch;Between % Consumed   08/05/18 0857 Breakfast;Between % Consumed       Snack schedule:   HS  Supplement:  Other: N/A  Appetite:  Good  Fluid Intake/Output in past 24 hours: In: 1400 [P.O.:1400]  Out: 775   Admit Weight:  Weight: 114.3 kg (252 lb)  Weight Last 7 Days   [114.5 kg (252 lb 6.8 oz)] 114.5 kg (252 lb 6.8 oz) (08/05 0500)    Pain Issues:    Location:  --  --         Severity:  Denies   Scheduled pain medications:  gabapentin (NEURONTIN)      PRN pain medications used in last 24 hours:  None   Non Pharmacologic Interventions:  rest  Effectiveness of pain management plan:  good=patient states acceptable comfort after interventions    Bowel:    Bowel Assist Initial Score:  1 - Total Assistance  Bowel Assist Current Score:  4 - Minimal Assistance  Bowl Accidents in last 7 days:     Last bowel movement: 08/05/18  Stool Description: Large, Formed     Usual bowel pattern:  daily  Scheduled bowel medications:  docusate sodium (COLACE) and senna-docusate (PERICOLACE or SENOKOT S)   PRN bowel medications used in last 24 hours:  None  Nursing Interventions:  Increased time, Scheduled medication, Positioning on commode/toilet  Effectiveness of bowel program:   good=regular, predictable, controlled emptying of bowel  Bladder:    Bladder Assist Initial Score:  1 - Total Assistance  Bladder Assist Current Score:  3 - Moderate Assistance  Bladder Accidents in last 7 days:  0  PVR range for past 24-48 hours: -- ()  Intermittent Catheterization:  N/A  Medications affecting bladder:  None    Time void schedule/voiding pattern:  Voiding every 3-4 hours  Interventions:  Increased time, Time void patient initiated  Effectiveness of bladder training:  Good=regular, predictable, emptying of bladder, patient initiates time voiding    Diabetes:  Blood Sugar Frequency:  Before meals and at bedtime    Range of BS for last 48 hours:   Recent Labs      08/04/18   0753  08/04/18   1116  08/04/18   1712  08/04/18   2100  08/05/18   0740  08/05/18   1116  08/05/18   1654  08/05/18   2124   POCGLUCOSE  138*  126*  132*  253*   124*  138*  150*  193*     Scheduled diabetic medications:  metformin (GLUCOPHAGE)  and Other Glucotrol.  Sliding scale usage in past 24 hours:  Yes  Total Short acting insulin in the past 24 hours:  Humalog 2 units.  Total Long acting insulin in the past 24 hours:  Lantus 40 units.    Wound:         Patient Lines/Drains/Airways Status    Active Current Wounds     Name: Placement date: Placement time: Site: Days:    Surgical Incision  Incision Cervical Posterior 07/13/18   1555      23    Surgical Incision  Incision Back 07/13/18   1748      23                   Interventions: Incisions to posterior cervical site WILFREDO.  Back incision with Neosporin ointment and covered with island dressing.  Effectiveness of intervention:  Rash present to sides of incision site.  Complains of itching to back.  Benadryl applied to sides of incision.  Pt requested to have island dressing off during the night 8/5/18.    Sleep/wake cycle:   Average hours slept:  Sleeps 3-4 hours without waking  Sleep medication usage:  Declines    Patient/Family Training/Education:  Diabetes Management, Fall Prevention, General Self Care, Medication Management, Safe Transfers, Safety, Skin Care and Wound Care  Discharge Supply Recommendations:  Glucometer and Strips  Strengths: Alert and oriented, Pleasant and cooperative and Effective communication skills   Barriers:   Generalized weakness and Limited mobility            Nursing Problems           Problem: Bowel/Gastric:     Goal: Normal bowel function is maintained or improved           Goal: Will not experience complications related to bowel motility             Problem: Communication     Goal: The ability to communicate needs accurately and effectively will improve             Problem: Discharge Barriers/Planning     Goal: Patient's continuum of care needs will be met             Problem: GLYCEMIA IMBALANCE     Goal: Clinical indication of glycemia balance is achieved             Problem: Infection      Goal: Will remain free from infection             Problem: Knowledge Deficit     Goal: Knowledge of disease process/condition, treatment plan, diagnostic tests, and medications will improve           Goal: Knowledge of the prescribed therapeutic regimen will improve             Problem: Mobility     Goal: Risk for activity intolerance will decrease             Problem: Pain Management     Goal: Pain level will decrease to patient's comfort goal     Flowsheet:     Taken at 07/31/18 1028    Pain Scale 0 - 10  10 by Boby Roberts R.N.                  Problem: Psychosocial Needs:     Goal: Level of anxiety will decrease             Problem: Respiratory:     Goal: Respiratory status will improve             Problem: Safety     Goal: Will remain free from injury           Goal: Will remain free from falls             Problem: Skin Integrity     Goal: Risk for impaired skin integrity will decrease             Problem: Urinary Elimination:     Goal: Ability to reestablish a normal urinary elimination pattern will improve             Problem: Venous Thromboembolism (VTW)/Deep Vein Thrombosis (DVT) Prevention:     Goal: Patient will participate in Venous Thrombosis (VTE)/Deep Vein Thrombosis (DVT)Prevention Measures                  Long Term Goals:   At discharge patient will be able to function safely at home and in the community with support.    Section completed by:  Joy Bazzi L.P.N.2           Respiratory Therapy    Pt is stable on RA 24/7 with SATS over 92%.  CPAP use has been intermittant.    Section completed by:  Jessica Schmitz, RRT       Mobility  Bed mobility:   5  Bed /Chair/Wheelchair Transfer Initial:  1 - Total Assistance  Bed /Chair/Wheelchair Transfer Current:  5 - Standby Prompting/Supervision or Set-up   Bed/Chair/Wheelchair Transfer Description:   (bed mobility: SPV; transfer: SPT, CGA, FWW)  Walk Initial:  0 - Not tested,unsafe activity  Walk Current:  1 - Total Assistance   Walk  "Description:   (65ft x 2 with FWW CGA with w/c follow)  Wheelchair Initial:  1 - Total Assistance  Wheelchair Current:  5E - Household Exception   Wheelchair Description:   (100ft using bilateral LE, min assist for set-up, encouragement to complete the task)  Stairs Initial:  0 - Not tested,unsafe activity  Stairs Current: 2 - Max Assistance   Stairs Description:  (4 x 4 6\" steps with B UE support at base of stairs with B UE support, CGA)  Patient/Family Training/Education:  Home accessibility, need for ramp, B HR, skin integrity, skin checks  DME/DC Recommendations:  Manual w/c with cushion, footrests, ramp, bilateral HR  Strengths:  Effective communication skills, Making steady progress towards goals, Supportive family and Willingly participates in therapeutic activities  Barriers:   Generalized weakness, Limited mobility, Pain poorly managed, Poor activity tolerance, Poor balance, Poor family support and Poor insight/denial of deficits  # of short term goals set= 2  # of short term goals met=1       Physical Therapy Problems           Problem: Mobility     Dates: Start: 07/19/18       Goal: STG-Within one week, patient will ambulate household distance     Dates: Start: 07/30/18       Description: 1) Individualized goal:  Pt will AMB 50ft x 1, SBA with FWW and B AFOs  2) Interventions:  PT Group Therapy, PT Orthotics Training, PT Gait Training, PT Therapeutic Exercises, PT Neuro Re-Ed/Balance, PT Therapeutic Activity, PT Manual Therapy and PT Evaluation       Note:     Goal Note filed on 08/06/18 0820 by Meena Louis, PT    Goal: STG-Within one week, patient will ambulate household distance  Outcome: NOT MET  Pt requires CGA for mobility at this time                  Problem: Mobility Transfers     Dates: Start: 07/19/18       Goal: STG-Within one week, patient will perform bed mobility     Dates: Start: 07/19/18       Description: 1) Individualized goal: Pt will perform all bed mobility with SPV  2) " Interventions:  PT Group Therapy, PT Orthotics Training, PT Gait Training, PT Therapeutic Exercises, PT Neuro Re-Ed/Balance, PT Therapeutic Activity, PT Manual Therapy and PT Evaluation       Note:     Goal Note filed on 07/30/18 0814 by Meena Louis, PT    Goal: STG-Within one week, patient will perform bed mobility  Outcome: NOT MET  Pt requires CGA for bed mobility to adhere to precautions at this time                  Problem: PT-Long Term Goals     Dates: Start: 07/19/18       Goal: LTG-By discharge, patient will ambulate     Dates: Start: 07/19/18       Description: 1) Individualized goal:  Pt will AMB 150ft x1 with LRAD , mod I  2) Interventions:  PT Group Therapy, PT Orthotics Training, PT Gait Training, PT Therapeutic Exercises, PT Neuro Re-Ed/Balance, PT Therapeutic Activity, PT Manual Therapy and PT Evaluation             Goal: LTG-By discharge, patient will perform home exercise program     Dates: Start: 07/19/18       Description: 1) Individualized goal: Pt will perform HEP for B LE strengthening to maintain the benefits obtained in PT, mod I  2) Interventions:  PT Group Therapy, PT Orthotics Training, PT Gait Training, PT Therapeutic Exercises, PT Neuro Re-Ed/Balance, PT Therapeutic Activity, PT Manual Therapy and PT Evaluation             Goal: LTG-By discharge, patient will ambulate up/down 4-6 stairs     Dates: Start: 07/19/18       Description: 1) Individualized goal: Pt will AMB up/down 4 stairs with unilateral HR to mimic home environment, mod I  2) Interventions:  PT Group Therapy, PT Orthotics Training, PT Gait Training, PT Therapeutic Exercises, PT Neuro Re-Ed/Balance, PT Therapeutic Activity, PT Manual Therapy and PT Evaluation             Goal: LTG-By discharge, patient will transfer in/out of a car     Dates: Start: 07/19/18       Description: 1) Individualized goal:  Pt will transfer in/out of a car with LRAD, mod I  2) Interventions:  PT Group Therapy, PT Orthotics Training, PT Gait  Training, PT Therapeutic Exercises, PT Neuro Re-Ed/Balance, PT Therapeutic Activity, PT Manual Therapy and PT Evaluation                     Section completed by:  Meena Louis, PT       Activities of Daily Living  Eating Initial:  3 - Moderate Assistance  Eating Current:  5 - Standby Prompting/Supervision or Set-up   Eating Description:  Set-up of equipment or meal/tube feeding  Grooming Initial:  5 - Standby Prompting/Supervision or Set-up  Grooming Current:  7 - Independent   Grooming Description:   (independent seated to wash/dry hands post toileting)  Bathing Initial:  0 - Not tested,unsafe activity  Bathing Current:  5 - Standby Prompting/Supervision or Set-up   Bathing Description:  Tub bench, Grab bar, Hand held shower, Supervision for safety, Increased time, Verbal cueing, Set-up of equipment, Initial preparation for task (setup/sba with bench and gb)  Upper Body Dressing Initial:  0 - Not tested, patient refused  Upper Body Dressing Current:  5 - Standby Prompting/Supervision or Set-up   Upper Body Dressing Description:  Set-up of equipment (to don/doff pullover)  Lower Body Dressing Initial:  0 - Not tested, patient refused  Lower Body Dressing Current:  5 - Setup and Supervised   Lower Body Dressing Description:  4 - Minimal Assistance  Toileting Initial:  1 - Total Assistance  Toileting Current:  6 - Modified Independent   Toileting Description:  Grab bar (mod I 3/3 tasks)  Toilet Transfer Initial:  0 - Not tested, patient refused  Toilet Transfer Current:  6 - Modified Independent   Toilet Transfer Description:  5 - Standby Prompting/Supervision or Set-up  Tub / Shower Transfer Initial:  0 - Not tested, patient refused  Tub / Shower Transfer Current:  5 - Standby Prompting/Supervision or Set-up   Tub / Shower Transfer Description:  Grab bar, Adaptive equipment, Supervision for safety (sba with grab bar and bench)  IADL:  Mod I simple meal prep from w/c level (unable to complete from standing  position); SBA/supervised laundry  Family Training/Education:  Educated on how to change pads on c collar and how to clean/dry them  DME/DC Recommendations:  Recommend w/c for use in kitchen as needed; pt has a tub bench and FWW; has grab bars in a box (recommend they are installed in tub/shower and by toilets; ramp    Strengths:  Alert and oriented, Effective communication skills, Making steady progress towards goals, Manages pain appropriately, Motivated for self care and independence, Pleasant and cooperative and Willingly participates in therapeutic activities  Barriers:  Decreased endurance, Generalized weakness, Poor balance and Other: impaired carryover of leanring, neck pain, lives alone though pt states friend Albaro is going to stay with him; needs a ramp and grab bar installation     # of short term goals set= 4    # of short term goals met= 4       Occupational Therapy Goals           Problem: OT Long Term Goals     Dates: Start: 07/19/18       Goal: LTG-By discharge, patient will complete basic self care tasks     Dates: Start: 07/19/18       Description: 1) Individualized Goal:  With mod I using AE/AD/techniques as needed.  2) Interventions:  OT E Stim Attended, OT Group Therapy, OT Self Care/ADL, OT Cognitive Skill Dev, OT Community Reintegration, OT Manual Ther Technique, OT Neuro Re-Ed/Balance, OT Sensory Int Techniques, OT Therapeutic Activity, OT Evaluation and OT Therapeutic Exercise           Goal: LTG-By discharge, patient will perform bathroom transfers     Dates: Start: 07/19/18       Description: 1) Individualized Goal:  With mod I using AE/AD/techniques as needed.  2) Interventions:  OT E Stim Attended, OT Group Therapy, OT Self Care/ADL, OT Cognitive Skill Dev, OT Community Reintegration, OT Manual Ther Technique, OT Neuro Re-Ed/Balance, OT Sensory Int Techniques, OT Therapeutic Activity, OT Evaluation and OT Therapeutic Exercise           Goal: LTG-By discharge, patient will complete  basic home management     Dates: Start: 07/19/18       Description: 1) Individualized Goal:  With mod I using AE/AD/techniques as needed.  2) Interventions:  OT E Stim Attended, OT Group Therapy, OT Self Care/ADL, OT Cognitive Skill Dev, OT Community Reintegration, OT Manual Ther Technique, OT Neuro Re-Ed/Balance, OT Sensory Int Techniques, OT Therapeutic Activity, OT Evaluation and OT Therapeutic Exercise                 Section completed by:  Chau Ordaz, MS,OTR/L       Cognitive Linquistic Functions  Comprehension Initial:  5 - Stand-by Prompting/Supervision or Set-up  Comprehension Current:  6 - Modified Independent   Comprehension Description:  Verbal cues  Expression Initial:  6 - Modified Independent  Expression Current:  7 - Independent   Expression Description:  Verbal cueing  Social Interaction Initial:  6 - Modified Independent  Social Interaction Current:  6 - Modified Independent   Social Interaction Description:  Increased time  Problem Solving Initial:  5 - Standby Prompting/Supervision or Set-up  Problem Solving Current:  6 - Modified Independent   Problem Solving Description:  Verbal cueing, Therapy schedule  Memory Initial:  3 - Moderate Assistance  Memory Current:  4 - Minimal Assistance   Memory Description:  Verbal cueing, Therapy schedule, Bed/chair alarm  Executive Functioning / Organization Initial:  Minimal (4)  Executive Functioning / Organization Current:  Moderate (3) (unclear discussing DC plan, level of assist, avail help)   Executive Functioning Desciption: verbal cues, external structure.  Swallowing  Swallowing Status:  Patient is not currently receiving dysphagia intervention.  Orders Placed This Encounter   Procedures   • Diet Order Diabetic     Standing Status:   Standing     Number of Occurrences:   1     Order Specific Question:   Diet:     Answer:   Diabetic [3]     Behavior Modification Plan  Keep instructions simple/concrete, Give clear feedback, Set clear goals and  Analyze tasks (break down into smaller steps)  Assistive Technology  Low tech: Calendar, planner, schedule, alarms/timers, pill organizer, post-it notes, lists  Family Training/Education:  Patient lives alone. No family support identified.  DC Recommendations:   Anticipate patient will benefit from additional ST services upon discharge from this facility.  Strengths:  Able to follow instructions, Alert and oriented, Motivated for self care and independence and Other: determined to go home.  Barriers:  Impulsive, Poor insight/denial of deficits and Other: somewhat resistant to intervention, impaired self regulation, impaired attention and slow speed of processing, inflexibility of behavior.  Little family/community support.  # of short term goals set=   1  # of short term goals met=  1        Speech Therapy Problems           Problem: Memory STGs     Dates: Start: 07/28/18       Goal: STG-Within one week, patient will     Dates: Start: 08/06/18       Description: 1) Individualized goal:  Perform medication and financial management tasks with % mod I.  2) Interventions:  SLP Speech Language Treatment, SLP Self Care / ADL Training , SLP Cognitive Skill Development and SLP Group Treatment               Problem: Speech/Swallowing LTGs     Dates: Start: 07/28/18       Goal: LTG-By discharge, patient will     Dates: Start: 07/28/18       Description: 1) Individualized goal:  Perform medication and financial management tasks with 90% acc.  2) Interventions:  SLP Electrical Stimulation - Attended, SLP Speech Language Treatment, SLP Self Care / ADL Training , SLP Cognitive Skill Development and SLP Group Treatment                    Section completed by:  Jo-Ann Ron MS,CCC-SLP          REHAB-Pharmacy IDT Team Note by Mallika Lamar RPH at 8/3/2018 11:44 AM  Version 1 of 1    Author:  Mallika Lamar RPH Service:  (none) Author Type:  Pharmacist    Filed:  8/3/2018 11:46 AM Date of Service:  8/3/2018 11:44 AM  Status:  Signed    :  Mallika Lamar RPH (Pharmacist)         Pharmacy   Pharmacy  Antibiotics/Antifungals/Antivirals:  Medication:      Active Orders     Ordered     Ordering Provider       Wed Jul 18, 2018  2:28 PM    07/18/18 1428  clindamycin (CLEOCIN) capsule 300 mg  2 TIMES DAILY      Higinio Pagan M.D.        Route:         po  Stop Date:  [place]  Reason: History of MRSA  Antihypertensives/Cardiac:  Medication:    Orders (72h ago through future)    Start     Ordered    07/24/18 1630  metoprolol (LOPRESSOR) tablet 25 mg  TWICE DAILY      07/24/18 1601    07/20/18 0900  lisinopril (PRINIVIL) tablet 40 mg  DAILY      07/19/18 1613    07/19/18 0600  amLODIPine (NORVASC) tablet 10 mg  Q DAY      07/18/18 1427    07/18/18 1500  hydrALAZINE (APRESOLINE) tablet 100 mg  3 TIMES DAILY      07/18/18 1428    07/18/18 1428  hydrALAZINE (APRESOLINE) tablet 25 mg  EVERY 8 HOURS PRN      07/18/18 1428        Patient Vitals for the past 24 hrs:   BP Pulse   08/03/18 0924 149/76 77   08/03/18 0923 149/76 -   08/03/18 0630 149/93 71   08/03/18 0551 136/72 86   08/02/18 2035 142/78 72   08/02/18 1852 141/77 70   08/02/18 1702 156/86 80   08/02/18 1615 150/86 80   08/02/18 1412 110/70 60   08/02/18 1351 - 64       Anticoagulation:  Medication:  Lovenox   INR:    Recent Labs      08/01/18   0541  08/02/18   0533  08/03/18   0531   INR  1.07  1.05  1.03     Other key medications: gabapentin, glipizide, lantus, humalog, metformin  A review of the medication list reveals no issues at this time. Patient is currently on an antihypertensive. Recommend home blood pressure monitoring/recording if antihypertensive regimen continues.   Section completed by:  Mallika Lamar RPH[[email or web address]]        Attribution Key     [email or web address] - Mallika Lamar RPH on 8/3/2018 11:44 AM                    DC Planning  DC destination/dispostion:  Patient lives in a single level mobile home.  He needs a ramp.    DC Needs:  Patient will need home health if  able to return home.  He needs family assistance and has a friend who is willing to live with him and provide care.  He can also build a ramp.  He has a fww, shower bench and elevated toilet seat.  He is current with [facility].  He has a glucometer and cpap.  He may need meals on wheels and a w/c.   If these plans to not work out he may still need a skilled facility.    Barriers to discharge:   Patient is inconsistent with information, lacks support at home.    Strengths:     Section completed by:  Hafsa Ayoub R.N.      Physician Summary  Higinio Pagan MD participated and led team conference discussion.

## 2018-08-06 NOTE — PROGRESS NOTES
"Rehab Progress Note     Encounter date: 8/6/2018  Today I met with the patient face to face in physical therapy    Chief Complaint:  Cervical myelopathy (HCC) , discharge discussion     Interval Events (subjective)  Mr. Wolfg that he is continuing to have neck pain, but he has not tried the lidocaine jelly.  He denies any fevers, chills, headache, dizziness, chest pain, shortness of breath.  He reports that he is improving in terms of ambulation.  He walked 65 feet ×2 yesterday and then 88 feet.  He is using a front wheeled walker.  He feels that the AFOs are helpful.  We discussed discharge plans and training with his friend.    Objective:  VITAL SIGNS: /98   Pulse 80   Temp 36.7 °C (98 °F)   Resp 18   Ht 1.905 m (6' 3\")   Wt 114.5 kg (252 lb 6.8 oz)   SpO2 96%   BMI 31.55 kg/m²     Recent Results (from the past 72 hour(s))   ACCU-CHEK GLUCOSE    Collection Time: 08/03/18  5:14 PM   Result Value Ref Range    Glucose - Accu-Ck 187 (H) 65 - 99 mg/dL   ACCU-CHEK GLUCOSE    Collection Time: 08/03/18  8:50 PM   Result Value Ref Range    Glucose - Accu-Ck 319 (H) 65 - 99 mg/dL   PROTHROMBIN TIME    Collection Time: 08/04/18  5:43 AM   Result Value Ref Range    PT 13.7 12.0 - 14.6 sec    INR 1.08 0.87 - 1.13   ACCU-CHEK GLUCOSE    Collection Time: 08/04/18  7:53 AM   Result Value Ref Range    Glucose - Accu-Ck 138 (H) 65 - 99 mg/dL   ACCU-CHEK GLUCOSE    Collection Time: 08/04/18 11:16 AM   Result Value Ref Range    Glucose - Accu-Ck 126 (H) 65 - 99 mg/dL   ACCU-CHEK GLUCOSE    Collection Time: 08/04/18  5:12 PM   Result Value Ref Range    Glucose - Accu-Ck 132 (H) 65 - 99 mg/dL   ACCU-CHEK GLUCOSE    Collection Time: 08/04/18  9:00 PM   Result Value Ref Range    Glucose - Accu-Ck 253 (H) 65 - 99 mg/dL   PROTHROMBIN TIME    Collection Time: 08/05/18  5:23 AM   Result Value Ref Range    PT 14.6 12.0 - 14.6 sec    INR 1.17 (H) 0.87 - 1.13   BASIC METABOLIC PANEL    Collection Time: 08/05/18  5:23 AM   Result " Value Ref Range    Sodium 140 135 - 145 mmol/L    Potassium 3.8 3.6 - 5.5 mmol/L    Chloride 105 96 - 112 mmol/L    Co2 27 20 - 33 mmol/L    Glucose 128 (H) 65 - 99 mg/dL    Bun 27 (H) 8 - 22 mg/dL    Creatinine 0.90 0.50 - 1.40 mg/dL    Calcium 8.7 8.5 - 10.5 mg/dL    Anion Gap 8.0 0.0 - 11.9   MAGNESIUM    Collection Time: 08/05/18  5:23 AM   Result Value Ref Range    Magnesium 1.6 1.5 - 2.5 mg/dL   PHOSPHORUS    Collection Time: 08/05/18  5:23 AM   Result Value Ref Range    Phosphorus 3.5 2.5 - 4.5 mg/dL   ESTIMATED GFR    Collection Time: 08/05/18  5:23 AM   Result Value Ref Range    GFR If African American >60 >60 mL/min/1.73 m 2    GFR If Non African American >60 >60 mL/min/1.73 m 2   ACCU-CHEK GLUCOSE    Collection Time: 08/05/18  7:40 AM   Result Value Ref Range    Glucose - Accu-Ck 124 (H) 65 - 99 mg/dL   ACCU-CHEK GLUCOSE    Collection Time: 08/05/18 11:16 AM   Result Value Ref Range    Glucose - Accu-Ck 138 (H) 65 - 99 mg/dL   ACCU-CHEK GLUCOSE    Collection Time: 08/05/18  4:54 PM   Result Value Ref Range    Glucose - Accu-Ck 150 (H) 65 - 99 mg/dL   ACCU-CHEK GLUCOSE    Collection Time: 08/05/18  9:24 PM   Result Value Ref Range    Glucose - Accu-Ck 193 (H) 65 - 99 mg/dL   PROTHROMBIN TIME    Collection Time: 08/06/18  5:33 AM   Result Value Ref Range    PT 17.0 (H) 12.0 - 14.6 sec    INR 1.42 (H) 0.87 - 1.13   ACCU-CHEK GLUCOSE    Collection Time: 08/06/18  7:16 AM   Result Value Ref Range    Glucose - Accu-Ck 103 (H) 65 - 99 mg/dL       Current Facility-Administered Medications   Medication Frequency   • metoprolol (LOPRESSOR) tablet 50 mg TWICE DAILY   • warfarin (COUMADIN) tablet 10 mg COUMADIN-ONCE   • diphenhydrAMINE-ZnAcetate (BENADRYL ITCH) 1-0.1 % cream TID PRN   • glipiZIDE SR (GLUCOTROL) tablet 2.5 mg QAM AC   • neomycin-bacitracin-polymyxin (NEOSPORIN) 400-5-5000 ointment BID   • MD ALERT... warfarin (COUMADIN) per pharmacy protocol pharmacy to dose   • lidocaine (XYLOCAINE) 2 % jelly Q8HRS    • insulin glargine (LANTUS) injection 40 Units Q EVENING   • oxyCODONE-acetaminophen (PERCOCET) 5-325 MG per tablet 1 Tab Q6HRS PRN   • lactobacillus granules (LACTINEX/FLORANEX) packet 1 Packet TID WITH MEALS   • insulin lispro (HUMALOG) injection 0-12 Units 4X/DAY ACHS   • gabapentin (NEURONTIN) capsule 900 mg TID   • methocarbamol (ROBAXIN) tablet 750 mg Q6HRS PRN   • vitamin D (cholecalciferol) tablet 2,000 Units DAILY   • lisinopril (PRINIVIL) tablet 40 mg DAILY   • Respiratory Care per Protocol Continuous RT   • Pharmacy Consult Request ...Pain Management Review 1 Each PRN   • acetaminophen (TYLENOL) tablet 650 mg Q4HRS PRN   • artificial tears 1.4 % ophthalmic solution 1 Drop PRN   • benzocaine-menthol (CEPACOL) lozenge 1 Lozenge Q2HRS PRN   • hydrALAZINE (APRESOLINE) tablet 25 mg Q8HRS PRN   • mag hydrox-al hydrox-simeth (MAALOX PLUS ES or MYLANTA DS) suspension 20 mL Q2HRS PRN   • ondansetron (ZOFRAN ODT) dispertab 4 mg 4X/DAY PRN    Or   • ondansetron (ZOFRAN) syringe/vial injection 4 mg 4X/DAY PRN   • traZODone (DESYREL) tablet 50 mg QHS PRN   • sodium chloride (OCEAN) 0.65 % nasal spray 2 Spray PRN   • bisacodyl (DULCOLAX) suppository 10 mg Q24HRS PRN   • magnesium hydroxide (MILK OF MAGNESIA) suspension 30 mL QDAY PRN   • senna-docusate (PERICOLACE or SENOKOT S) 8.6-50 MG per tablet 1 Tab Nightly   • amLODIPine (NORVASC) tablet 10 mg Q DAY   • clindamycin (CLEOCIN) capsule 300 mg BID   • enoxaparin (LOVENOX) inj 40 mg DAILY   • fluticasone (FLONASE) nasal spray 50 mcg DAILY   • hydrALAZINE (APRESOLINE) tablet 100 mg TID   • metFORMIN (GLUCOPHAGE) tablet 1,000 mg BID WITH MEALS   • docusate sodium (COLACE) capsule 100 mg BID       Exam Date: 8/6/2018    General:  Awake, alert, oriented, no acute distress  HEENT:  Wearing [place] cervical collar  Cardiac: regular rate and rhythm  Lungs: clear to auscultation bilaterally.   Abdomen: soft; non tender, non distended, bowel sounds present and  normoactive  Extremities: Stable 1+ lower limb edema.  Wearing bilateral articulated AFOs.  Neuro:   Improved fluidity of range of motion using NuStep.  Improved upright gait with front-wheeled walker.  Continues to have some kyphotic posture and impulsive movements.      Orders Placed This Encounter   Procedures   • Diet Order Diabetic     Standing Status:   Standing     Number of Occurrences:   1     Order Specific Question:   Diet:     Answer:   Diabetic [3]       Assessment:  Active Hospital Problems    Diagnosis   • *Cervical myelopathy (HCC)   • HTN (hypertension)   • CVA (cerebral vascular accident) ([facility])   • Diabetes mellitus with neurological manifestations, controlled ([facility])   • Impaired activities of daily living   • Lumbar stenosis with neurogenic claudication   • Pain   • Cervical stenosis of spine   • Atrial fibrillation [427.31]   • Chronic antibiotic suppression       Medical Decision Making and Plan:  Mr. Ma is a 67-year-old male admitted for rehabilitation on July 18 in the setting of cervical myelopathy and lumbar spinal stenosis     Cervical myelopathy status post C2-C6 laminectomy and C2-C4 fusion by Dr. Marsh on July 13  Lumbar spinal stenosis with neurogenic claudication status post L2-L5 laminectomies by Dr. Marsh on July 13  Continue comprehensive rehabilitation  Followed-up with Dr. Marsh on 7/30  [place] collar can be removed for meals and showering  Custom bilateral articulated AFOs have been obtained    Discussed discharge planning and training with therapy/friends.    History of strokes  Cognitive impairment  Atrial fibrillation   Coumadin per pharmacy protocol  Continue speech for cognitive deficits    INR is 1.42 today     Pain  Neuropathic pain/allodynia  Tylenol as needed   Gabapentin 900 mg 3 times a day  Robaxin discontinued due to disuse in the last 48 hours  Oxycodone/APAP discontinued due to disuse in 48 hours   Lidocaine jelly scheduled every 8 hours for hand pain and  neck pain    Hypertension  Amlodipine 10 mg daily  Lopressor 50 mg twice a day  Hydralazine 100 mg 3 times a day  Lisinopril 40 mg daily dosing      Appreciate hospitalist assistance  Blood pressure 159/98 this morning     Diabetes mellitus type 2 with a history of diabetic neuropathy--hemoglobin A1c of 9.9 on July 19  Lantus at bedtime  Sliding scale insulin  Metformin 1000 mg twice a day  Glipizide 2.5 mg daily beginning on August 2  Appreciate hospitalist consult    Glucose range of 103-193 in the last 24 hours     Anemia  Hemoglobin stable at 11.5 on July 31  Continue to monitor  Recheck on 8/7     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  Additionally, it has only been 2 months since his last injection.  Discussed conservative cares     Constipation  Colace 100 g twice a day  Pericolace qhs  Milk of magnesia as needed  Dulcolax suppository daily as needed     Seasonal allergies  Flonase daily     Incidental left thyroid mass seen on MRI in May 2018  Will need follow-up with primary care  TSH of 0.56 on admission    Vitamin D deficiency  Vitamin D was 26 on admission so we began supplementation with 2000 units of cholecalciferol daily      DVT prophylaxis  Lovenox 40 mg daily  Discontinued when INR in range     Estimated discharge: August 14 due to need for training of his friend    IDT Team Conference 8/6/2018  I was present and led the interdisciplinary team conference on 8/6/2018, in addition to my daily follow up visit with the patient.       RN: His glucose levels are improving.  He is continent of bowel and bladder     PT:  He can do 4 stairs with a bilateral hand rail.  He has quad/glut instability.  He is poorly compliant with cervical precautions      OT:  He is improving  gradually.  He is limited by endurance and weakness.  Standing balance is limiting.  Neck pain continues to bother him but he does not follow precautions well.  He has grab bars at home but will not let friends install them until he is home.  He will need a wheelchair for home/kitchen use.    Speech and language pathology:  He is argumentative and skeptical about cognitive skills.  He is inflexible regarding precautions and has impaired safety awareness.     Total time:  35 minutes.  I spent greater than 50% of the time for patient care, counseling, and coordination on this date, including unit/floor time, and face-to-face time with the patient as per interval events and assessment and plan above. Topics discussed included discharge planning, family training with friends, pain control, functional status    Higinio Pagan M.D.  8/6/2018

## 2018-08-06 NOTE — FLOWSHEET NOTE
08/06/18 1326   Events/Summary/Plan   Events/Summary/Plan 02 spot check after pt walking himself in wheelchair   Respiratory WDL   Respiratory (WDL) X   Chest Exam   Respiration 18   Pulse 84   Oximetry   #Pulse Oximetry (Single Determination) Yes   Oxygen   Home O2 Use Prior To Admission? No   Pulse Oximetry 92 %   O2 Daily Delivery Respiratory  Room Air with O2 Available

## 2018-08-07 LAB
GLUCOSE BLD-MCNC: 102 MG/DL (ref 65–99)
GLUCOSE BLD-MCNC: 165 MG/DL (ref 65–99)
GLUCOSE BLD-MCNC: 220 MG/DL (ref 65–99)
GLUCOSE BLD-MCNC: 98 MG/DL (ref 65–99)
INR PPP: 1.49 (ref 0.87–1.13)
PROTHROMBIN TIME: 17.7 SEC (ref 12–14.6)

## 2018-08-07 PROCEDURE — 94760 N-INVAS EAR/PLS OXIMETRY 1: CPT

## 2018-08-07 PROCEDURE — 97530 THERAPEUTIC ACTIVITIES: CPT

## 2018-08-07 PROCEDURE — 99232 SBSQ HOSP IP/OBS MODERATE 35: CPT | Performed by: PHYSICAL MEDICINE & REHABILITATION

## 2018-08-07 PROCEDURE — A9270 NON-COVERED ITEM OR SERVICE: HCPCS

## 2018-08-07 PROCEDURE — 97116 GAIT TRAINING THERAPY: CPT

## 2018-08-07 PROCEDURE — 82962 GLUCOSE BLOOD TEST: CPT | Mod: 91

## 2018-08-07 PROCEDURE — 700102 HCHG RX REV CODE 250 W/ 637 OVERRIDE(OP): Performed by: HOSPITALIST

## 2018-08-07 PROCEDURE — A9270 NON-COVERED ITEM OR SERVICE: HCPCS | Performed by: HOSPITALIST

## 2018-08-07 PROCEDURE — 700111 HCHG RX REV CODE 636 W/ 250 OVERRIDE (IP)

## 2018-08-07 PROCEDURE — 700112 HCHG RX REV CODE 229

## 2018-08-07 PROCEDURE — A9270 NON-COVERED ITEM OR SERVICE: HCPCS | Performed by: PHYSICAL MEDICINE & REHABILITATION

## 2018-08-07 PROCEDURE — 99232 SBSQ HOSP IP/OBS MODERATE 35: CPT | Performed by: HOSPITALIST

## 2018-08-07 PROCEDURE — 97110 THERAPEUTIC EXERCISES: CPT

## 2018-08-07 PROCEDURE — G0515 COGNITIVE SKILLS DEVELOPMENT: HCPCS

## 2018-08-07 PROCEDURE — 36415 COLL VENOUS BLD VENIPUNCTURE: CPT

## 2018-08-07 PROCEDURE — 700102 HCHG RX REV CODE 250 W/ 637 OVERRIDE(OP): Performed by: PHYSICAL MEDICINE & REHABILITATION

## 2018-08-07 PROCEDURE — 97535 SELF CARE MNGMENT TRAINING: CPT

## 2018-08-07 PROCEDURE — 770010 HCHG ROOM/CARE - REHAB SEMI PRIVAT*

## 2018-08-07 PROCEDURE — 700102 HCHG RX REV CODE 250 W/ 637 OVERRIDE(OP)

## 2018-08-07 PROCEDURE — 85610 PROTHROMBIN TIME: CPT

## 2018-08-07 RX ORDER — CLINDAMYCIN HYDROCHLORIDE 300 MG/1
CAPSULE ORAL
Status: COMPLETED
Start: 2018-08-07 | End: 2018-08-07

## 2018-08-07 RX ORDER — HYDRALAZINE HYDROCHLORIDE 25 MG/1
TABLET, FILM COATED ORAL
Status: COMPLETED
Start: 2018-08-07 | End: 2018-08-07

## 2018-08-07 RX ORDER — L. ACIDOPHILUS/L.BULGARICUS 100MM CELL
GRANULES IN PACKET (EA) ORAL
Status: COMPLETED
Start: 2018-08-07 | End: 2018-08-07

## 2018-08-07 RX ORDER — WARFARIN SODIUM 4 MG/1
12 TABLET ORAL
Status: COMPLETED | OUTPATIENT
Start: 2018-08-07 | End: 2018-08-07

## 2018-08-07 RX ORDER — LISINOPRIL 20 MG/1
TABLET ORAL
Status: COMPLETED
Start: 2018-08-07 | End: 2018-08-07

## 2018-08-07 RX ORDER — GABAPENTIN 300 MG/1
CAPSULE ORAL
Status: COMPLETED
Start: 2018-08-07 | End: 2018-08-07

## 2018-08-07 RX ORDER — WARFARIN SODIUM 4 MG/1
12 TABLET ORAL
Status: DISCONTINUED | OUTPATIENT
Start: 2018-08-07 | End: 2018-08-07

## 2018-08-07 RX ORDER — DOCUSATE SODIUM 100 MG/1
CAPSULE, LIQUID FILLED ORAL
Status: COMPLETED
Start: 2018-08-07 | End: 2018-08-07

## 2018-08-07 RX ADMIN — ENOXAPARIN SODIUM 40 MG: 100 INJECTION SUBCUTANEOUS at 09:57

## 2018-08-07 RX ADMIN — LISINOPRIL 40 MG: 20 TABLET ORAL at 09:55

## 2018-08-07 RX ADMIN — DOCUSATE SODIUM 100 MG: 100 CAPSULE, LIQUID FILLED ORAL at 09:39

## 2018-08-07 RX ADMIN — GABAPENTIN 900 MG: 300 CAPSULE ORAL at 15:53

## 2018-08-07 RX ADMIN — CLINDAMYCIN HYDROCHLORIDE 300 MG: 300 CAPSULE ORAL at 09:39

## 2018-08-07 RX ADMIN — HYDRALAZINE HYDROCHLORIDE 100 MG: 25 TABLET, FILM COATED ORAL at 09:51

## 2018-08-07 RX ADMIN — INSULIN GLARGINE 40 UNITS: 100 INJECTION, SOLUTION SUBCUTANEOUS at 20:41

## 2018-08-07 RX ADMIN — METOPROLOL TARTRATE 50 MG: 25 TABLET ORAL at 17:19

## 2018-08-07 RX ADMIN — CHOLECALCIFEROL TAB 25 MCG (1000 UNIT) 2000 UNITS: 25 TAB at 09:41

## 2018-08-07 RX ADMIN — METFORMIN HYDROCHLORIDE 1000 MG: 500 TABLET, FILM COATED ORAL at 09:40

## 2018-08-07 RX ADMIN — INSULIN LISPRO 2 UNITS: 100 INJECTION, SOLUTION INTRAVENOUS; SUBCUTANEOUS at 20:42

## 2018-08-07 RX ADMIN — LIDOCAINE HYDROCHLORIDE 2 %: 20 JELLY TOPICAL at 07:00

## 2018-08-07 RX ADMIN — AMLODIPINE BESYLATE 10 MG: 5 TABLET ORAL at 05:24

## 2018-08-07 RX ADMIN — HYDRALAZINE HYDROCHLORIDE 100 MG: 25 TABLET, FILM COATED ORAL at 20:37

## 2018-08-07 RX ADMIN — HYDRALAZINE HYDROCHLORIDE 100 MG: 25 TABLET, FILM COATED ORAL at 15:54

## 2018-08-07 RX ADMIN — METOPROLOL TARTRATE 50 MG: 25 TABLET ORAL at 05:24

## 2018-08-07 RX ADMIN — BACITRACIN, NEOMYCIN, POLYMYXIN B: 400; 3.5; 5 OINTMENT TOPICAL at 09:00

## 2018-08-07 RX ADMIN — GABAPENTIN 900 MG: 300 CAPSULE ORAL at 20:37

## 2018-08-07 RX ADMIN — BACITRACIN, NEOMYCIN, POLYMYXIN B: 400; 3.5; 5 OINTMENT TOPICAL at 20:38

## 2018-08-07 RX ADMIN — METFORMIN HYDROCHLORIDE 1000 MG: 500 TABLET, FILM COATED ORAL at 17:18

## 2018-08-07 RX ADMIN — LACTOBACILLUS ACIDOPHILUS / LACTOBACILLUS BULGARICUS 1 PACKET: 100 MILLION CFU STRENGTH GRANULES at 13:16

## 2018-08-07 RX ADMIN — WARFARIN SODIUM 12 MG: 4 TABLET ORAL at 17:19

## 2018-08-07 RX ADMIN — CLINDAMYCIN HYDROCHLORIDE 300 MG: 300 CAPSULE ORAL at 20:38

## 2018-08-07 RX ADMIN — LACTOBACILLUS ACIDOPHILUS / LACTOBACILLUS BULGARICUS 1 PACKET: 100 MILLION CFU STRENGTH GRANULES at 09:47

## 2018-08-07 RX ADMIN — LIDOCAINE HYDROCHLORIDE 2 %: 20 JELLY TOPICAL at 14:00

## 2018-08-07 RX ADMIN — VITAMIN D, TAB 1000IU (100/BT) 2000 UNITS: 25 TAB at 09:41

## 2018-08-07 RX ADMIN — LACTOBACILLUS ACIDOPHILUS / LACTOBACILLUS BULGARICUS 1 PACKET: 100 MILLION CFU STRENGTH GRANULES at 17:19

## 2018-08-07 RX ADMIN — GABAPENTIN 900 MG: 300 CAPSULE ORAL at 09:40

## 2018-08-07 RX ADMIN — INSULIN LISPRO 4 UNITS: 100 INJECTION, SOLUTION INTRAVENOUS; SUBCUTANEOUS at 17:32

## 2018-08-07 ASSESSMENT — PATIENT HEALTH QUESTIONNAIRE - PHQ9
1. LITTLE INTEREST OR PLEASURE IN DOING THINGS: NOT AT ALL
SUM OF ALL RESPONSES TO PHQ9 QUESTIONS 1 AND 2: 0
1. LITTLE INTEREST OR PLEASURE IN DOING THINGS: NOT AT ALL
2. FEELING DOWN, DEPRESSED, IRRITABLE, OR HOPELESS: NOT AT ALL
SUM OF ALL RESPONSES TO PHQ9 QUESTIONS 1 AND 2: 0
2. FEELING DOWN, DEPRESSED, IRRITABLE, OR HOPELESS: NOT AT ALL

## 2018-08-07 ASSESSMENT — ENCOUNTER SYMPTOMS
SHORTNESS OF BREATH: 0
ABDOMINAL PAIN: 0
CHILLS: 0
BACK PAIN: 1
FEVER: 0
NAUSEA: 0
VOMITING: 0
PALPITATIONS: 0
EYES NEGATIVE: 1
COUGH: 0
NECK PAIN: 1

## 2018-08-07 ASSESSMENT — PAIN SCALES - GENERAL: PAINLEVEL_OUTOF10: 0

## 2018-08-07 NOTE — DISCHARGE PLANNING
Case Management;  Called patient's friend Albaro Álvarez to confirm new d/c target.  He is available after 8/10.  His number is [phone] and I have alerted him that therapist will probably be calling to schedule family training.  Will follow.

## 2018-08-07 NOTE — FLOWSHEET NOTE
08/07/18 1047   Events/Summary/Plan   Events/Summary/Plan 02 spot check.  Pt did not wear CPAP last night.   Respiratory WDL   Respiratory (WDL) X   Chest Exam   Respiration 18   Pulse (!) 59   Oximetry   #Pulse Oximetry (Single Determination) Yes   Oxygen   Home O2 Use Prior To Admission? No   Pulse Oximetry 94 %   O2 Daily Delivery Respiratory  Room Air with O2 Available

## 2018-08-07 NOTE — PROGRESS NOTES
0645 Computer downtime.  Received shift report and assumed care of patient. Patient awake, calm and stable, currently positioned in bed for comfort and safety, personal items within reach at bedside,  call light within reach. POC discussed. Denies pain or discomfort at this time.

## 2018-08-07 NOTE — CARE PLAN
Problem: Communication  Goal: The ability to communicate needs accurately and effectively will improve  Patient alert and oriented x 4, he is able to communicate his needs accurately and effectively.      Problem: Pain Management  Goal: Pain level will decrease to patient's comfort goal  Patient denies break though pain.      Problem: Respiratory:  Goal: Respiratory status will improve  Respirations even and unlabored. Lungs sounds clear.

## 2018-08-07 NOTE — CARE PLAN
Problem: Safety  Goal: Will remain free from injury  Pt sometimes doesn't use call light  For assist, encouraged and reminded to use call light for safety and wait for staff to assist him with transfers, pt remains free of accidental injury, will continue to assess and monitor.    Problem: Skin Integrity  Goal: Risk for impaired skin integrity will decrease  Outcome: NOT MET  Lower back incision open to air with redness, no drainage, some rashes around incision, pt complained of itchiness, benadryl cream applied, and neosporin ointment applied per orders, will continue to assess and monitor.    Problem: GLYCEMIA IMBALANCE  Goal: Clinical indication of glycemia balance is achieved  fsbs at hs was 139, no sliding scale needed but routine lantus given, hs snack consumed by pt, will monitor for s&s of hypo/hyperglycemia.

## 2018-08-07 NOTE — PROGRESS NOTES
"Rehab Progress Note     Encounter date: 8/7/2018  Today I met with the patient face to face in his room     Chief Complaint:  Cervical myelopathy (HCC) , discharge discussion     Interval Events (subjective)  Mr. Ma continues to perseverate on medication changes since his admission.  We discussed that he has had a complex course and that medications do change from time to time depending on clinical scenario.  We again reviewed various medications that he is currently taking in the indications for them.  We discussed his discharge plan and family training with his friend.  He is agreeable to this.  I again reviewed the cervical collar restrictions.    He reports that he has been taking his collar off to sleep at night.  I again reviewed with him that his cervical collar precautions are to be worn at all times but may be removed briefly for meals and showering only.  He agrees to these precautions again today.  He denies any fevers or chills.  We discussed discharge plans in depth.    Objective:  VITAL SIGNS: /80   Pulse (!) 59   Temp 36.7 °C (98 °F)   Resp 18   Ht 1.905 m (6' 3\")   Wt 114.5 kg (252 lb 6.8 oz)   SpO2 94%   BMI 31.55 kg/m²     Recent Results (from the past 72 hour(s))   ACCU-CHEK GLUCOSE    Collection Time: 08/04/18  5:12 PM   Result Value Ref Range    Glucose - Accu-Ck 132 (H) 65 - 99 mg/dL   ACCU-CHEK GLUCOSE    Collection Time: 08/04/18  9:00 PM   Result Value Ref Range    Glucose - Accu-Ck 253 (H) 65 - 99 mg/dL   PROTHROMBIN TIME    Collection Time: 08/05/18  5:23 AM   Result Value Ref Range    PT 14.6 12.0 - 14.6 sec    INR 1.17 (H) 0.87 - 1.13   BASIC METABOLIC PANEL    Collection Time: 08/05/18  5:23 AM   Result Value Ref Range    Sodium 140 135 - 145 mmol/L    Potassium 3.8 3.6 - 5.5 mmol/L    Chloride 105 96 - 112 mmol/L    Co2 27 20 - 33 mmol/L    Glucose 128 (H) 65 - 99 mg/dL    Bun 27 (H) 8 - 22 mg/dL    Creatinine 0.90 0.50 - 1.40 mg/dL    Calcium 8.7 8.5 - 10.5 mg/dL    " Anion Gap 8.0 0.0 - 11.9   MAGNESIUM    Collection Time: 08/05/18  5:23 AM   Result Value Ref Range    Magnesium 1.6 1.5 - 2.5 mg/dL   PHOSPHORUS    Collection Time: 08/05/18  5:23 AM   Result Value Ref Range    Phosphorus 3.5 2.5 - 4.5 mg/dL   ESTIMATED GFR    Collection Time: 08/05/18  5:23 AM   Result Value Ref Range    GFR If African American >60 >60 mL/min/1.73 m 2    GFR If Non African American >60 >60 mL/min/1.73 m 2   ACCU-CHEK GLUCOSE    Collection Time: 08/05/18  7:40 AM   Result Value Ref Range    Glucose - Accu-Ck 124 (H) 65 - 99 mg/dL   ACCU-CHEK GLUCOSE    Collection Time: 08/05/18 11:16 AM   Result Value Ref Range    Glucose - Accu-Ck 138 (H) 65 - 99 mg/dL   ACCU-CHEK GLUCOSE    Collection Time: 08/05/18  4:54 PM   Result Value Ref Range    Glucose - Accu-Ck 150 (H) 65 - 99 mg/dL   ACCU-CHEK GLUCOSE    Collection Time: 08/05/18  9:24 PM   Result Value Ref Range    Glucose - Accu-Ck 193 (H) 65 - 99 mg/dL   PROTHROMBIN TIME    Collection Time: 08/06/18  5:33 AM   Result Value Ref Range    PT 17.0 (H) 12.0 - 14.6 sec    INR 1.42 (H) 0.87 - 1.13   ACCU-CHEK GLUCOSE    Collection Time: 08/06/18  7:16 AM   Result Value Ref Range    Glucose - Accu-Ck 103 (H) 65 - 99 mg/dL   ACCU-CHEK GLUCOSE    Collection Time: 08/06/18 11:47 AM   Result Value Ref Range    Glucose - Accu-Ck 90 65 - 99 mg/dL   ACCU-CHEK GLUCOSE    Collection Time: 08/06/18  4:56 PM   Result Value Ref Range    Glucose - Accu-Ck 161 (H) 65 - 99 mg/dL   ACCU-CHEK GLUCOSE    Collection Time: 08/06/18  8:20 PM   Result Value Ref Range    Glucose - Accu-Ck 139 (H) 65 - 99 mg/dL   PROTHROMBIN TIME    Collection Time: 08/07/18  5:38 AM   Result Value Ref Range    PT 17.7 (H) 12.0 - 14.6 sec    INR 1.49 (H) 0.87 - 1.13   ACCU-CHEK GLUCOSE    Collection Time: 08/07/18  7:31 AM   Result Value Ref Range    Glucose - Accu-Ck 102 (H) 65 - 99 mg/dL   ACCU-CHEK GLUCOSE    Collection Time: 08/07/18 11:32 AM   Result Value Ref Range    Glucose - Accu-Ck 98 65  - 99 mg/dL       Current Facility-Administered Medications   Medication Frequency   • warfarin (COUMADIN) tablet 12 mg COUMADIN-ONCE   • metoprolol (LOPRESSOR) tablet 50 mg TWICE DAILY   • diphenhydrAMINE-ZnAcetate (BENADRYL ITCH) 1-0.1 % cream TID PRN   • neomycin-bacitracin-polymyxin (NEOSPORIN) 400-5-5000 ointment BID   • MD ALERT... warfarin (COUMADIN) per pharmacy protocol pharmacy to dose   • lidocaine (XYLOCAINE) 2 % jelly Q8HRS   • insulin glargine (LANTUS) injection 40 Units Q EVENING   • lactobacillus granules (LACTINEX/FLORANEX) packet 1 Packet TID WITH MEALS   • insulin lispro (HUMALOG) injection 0-12 Units 4X/DAY ACHS   • gabapentin (NEURONTIN) capsule 900 mg TID   • vitamin D (cholecalciferol) tablet 2,000 Units DAILY   • lisinopril (PRINIVIL) tablet 40 mg DAILY   • Respiratory Care per Protocol Continuous RT   • Pharmacy Consult Request ...Pain Management Review 1 Each PRN   • acetaminophen (TYLENOL) tablet 650 mg Q4HRS PRN   • hydrALAZINE (APRESOLINE) tablet 25 mg Q8HRS PRN   • ondansetron (ZOFRAN ODT) dispertab 4 mg 4X/DAY PRN    Or   • ondansetron (ZOFRAN) syringe/vial injection 4 mg 4X/DAY PRN   • traZODone (DESYREL) tablet 50 mg QHS PRN   • bisacodyl (DULCOLAX) suppository 10 mg Q24HRS PRN   • magnesium hydroxide (MILK OF MAGNESIA) suspension 30 mL QDAY PRN   • senna-docusate (PERICOLACE or SENOKOT S) 8.6-50 MG per tablet 1 Tab Nightly   • amLODIPine (NORVASC) tablet 10 mg Q DAY   • clindamycin (CLEOCIN) capsule 300 mg BID   • enoxaparin (LOVENOX) inj 40 mg DAILY   • fluticasone (FLONASE) nasal spray 50 mcg DAILY   • hydrALAZINE (APRESOLINE) tablet 100 mg TID   • metFORMIN (GLUCOPHAGE) tablet 1,000 mg BID WITH MEALS   • docusate sodium (COLACE) capsule 100 mg BID       Exam Date: 8/7/2018    General:  Awake, alert, oriented, no acute distress  HEENT:  Wearing [place] cervical collar  Cardiac: regular rate and rhythm  Lungs: clear to auscultation bilaterally.   Abdomen: soft; non tender, non  distended, bowel sounds present and normoactive  Extremities: 1+ lower limb edema  Musculoskeletal: Stable wasting of the hand intrinsics  Skin: Cervical incision is clean, dry, and intact.  This is healing well.  Neuro:   Ongoing difficulty with insight into medical condition and safety strategies.  Kyphotic posture when sitting upright in the therapy gym.  Ambulating with walker.      Orders Placed This Encounter   Procedures   • Diet Order Diabetic     Standing Status:   Standing     Number of Occurrences:   1     Order Specific Question:   Diet:     Answer:   Diabetic [3]       Assessment:  Active Hospital Problems    Diagnosis   • *Cervical myelopathy (HCC)   • HTN (hypertension)   • CVA (cerebral vascular accident) ([facility])   • Diabetes mellitus with neurological manifestations, controlled ([facility]CC)   • Impaired activities of daily living   • Lumbar stenosis with neurogenic claudication   • Pain   • Cervical stenosis of spine   • Atrial fibrillation [427.31]   • Chronic antibiotic suppression       Medical Decision Making and Plan:  Mr. Ma is a 67-year-old male admitted for rehabilitation on July 18 in the setting of cervical myelopathy and lumbar spinal stenosis     Cervical myelopathy status post C2-C6 laminectomy and C2-C4 fusion by Dr. Marsh on July 13  Lumbar spinal stenosis with neurogenic claudication status post L2-L5 laminectomies by Dr. Marsh on July 13  Continue comprehensive rehabilitation  Followed-up with Dr. Marsh on 7/30  [place] collar can be removed for meals and showering ONLY  Custom bilateral articulated AFOs have been obtained    Discussed discharge plans and family training.    Discussed cervical collar restrictions.  Reinforced that patient should not be removing his collar to sleep at night.    History of strokes  Cognitive impairment  Atrial fibrillation   Coumadin per pharmacy protocol  Continue speech for cognitive deficits    INR is 1.49 today    Discussed the ongoing need for  Coumadin at discharge     Pain  Neuropathic pain/allodynia  Tylenol as needed   Gabapentin 900 mg 3 times a day  Lidocaine jelly scheduled every 8 hours for hand pain and neck pain    Hypertension  Amlodipine 10 mg daily  Lopressor 50 mg twice a day  Hydralazine 100 mg 3 times a day  Lisinopril 40 mg daily dosing      Appreciate hospitalist assistance  Blood pressure 148/80 this morning     Diabetes mellitus type 2 with a history of diabetic neuropathy--hemoglobin A1c of 9.9 on July 19  Lantus at bedtime  Sliding scale insulin  Metformin 1000 mg twice a day  Glipizide 2.5 mg daily beginning on August 2  Appreciate hospitalist consult    Glucose range of  in the last 24 hours     Anemia  Hemoglobin stable at 11.5 on July 31  Continue to monitor  Recheck on 8/8     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  Additionally, it has only been 2 months since his last injection.  Discussed conservative cares     Constipation  Colace 100 g twice a day  Pericolace qhs  Milk of magnesia as needed  Dulcolax suppository daily as needed     Seasonal allergies  Flonase daily     Incidental left thyroid mass seen on MRI in May 2018  Will need follow-up with primary care  TSH of 0.56 on admission    Vitamin D deficiency  Vitamin D was 26 on admission so we began supplementation with 2000 units of cholecalciferol daily      DVT prophylaxis  Lovenox 40 mg daily  Discontinued when INR in range     Estimated discharge: August 14 due to need for training of his friend    Total time:  28 minutes.  I spent greater than 50% of the time for patient care, counseling, and coordination on this date, including unit/floor time, and face-to-face time with the patient as per interval events and assessment and plan above.  Topics discussed included discharge planning, family training, cervical collar restrictions, medications      Higinio Pagan M.D.  8/7/2018

## 2018-08-07 NOTE — PROGRESS NOTES
Hospital Medicine Progress Note, Adult, Complex               Author: Amanda Sabrina Date & Time created: 8/7/2018  11:54 AM     Interval History:  CC - HTN, DM    Up in wheelchair, taking meds administered by nursing staff.  No new complaints.    Review of Systems:  Review of Systems   Constitutional: Negative for chills and fever.   HENT: Negative.    Eyes: Negative.    Respiratory: Negative for cough and shortness of breath.    Cardiovascular: Positive for leg swelling. Negative for chest pain and palpitations.   Gastrointestinal: Negative for abdominal pain, nausea and vomiting.   Genitourinary: Negative.    Musculoskeletal: Positive for back pain and neck pain.        Wound pain   Skin: Negative.    Endo/Heme/Allergies: Negative.        Physical Exam:  Physical Exam   Constitutional: He is oriented to person, place, and time. No distress.   HENT:   Head: Normocephalic and atraumatic.   Right Ear: External ear normal.   Left Ear: External ear normal.   Eyes: Conjunctivae and EOM are normal. Right eye exhibits no discharge. Left eye exhibits no discharge.   Neck:   C-collar   Cardiovascular:   irreg irreg   Pulmonary/Chest: No stridor. No respiratory distress. He has no wheezes.   Decreased BS   Abdominal: Soft. Bowel sounds are normal. He exhibits no distension. There is no tenderness. There is no rebound and no guarding.   Musculoskeletal: He exhibits edema.   1+ edema RLE, trace LLE   Neurological: He is alert and oriented to person, place, and time.   Skin: Skin is warm and dry. He is not diaphoretic.   Vitals reviewed.      Labs:        Invalid input(s): [id number]      Recent Labs      08/05/18   0523   SODIUM  140   POTASSIUM  3.8   CHLORIDE  105   CO2  27   BUN  27*   CREATININE  0.90   MAGNESIUM  1.6   PHOSPHORUS  3.5   CALCIUM  8.7     Recent Labs      08/05/18   0523   GLUCOSE  128*     Recent Labs      08/05/18   0523  08/06/18   0533  08/07/18   0538   PROTHROMBTM  14.6  17.0*  17.7*   INR  1.17*  1.42*   1.49*               Hemodynamics:  Temp (24hrs), Av.8 °C (98.2 °F), Min:36.7 °C (98 °F), Max:36.8 °C (98.3 °F)  Temperature: 36.7 °C (98 °F)  Pulse  Av.3  Min: 57  Max: 113   Blood Pressure : 148/80     Respiratory:    Respiration: 18, Pulse Oximetry: 94 %, O2 Daily Delivery Respiratory : Room Air with O2 Available     Work Of Breathing / Effort: Mild  RUL Breath Sounds: Clear, RML Breath Sounds: Clear;Diminished, RLL Breath Sounds: Clear;Diminished, AI Breath Sounds: Clear, LLL Breath Sounds: Clear;Diminished  Fluids:    Intake/Output Summary (Last 24 hours) at 18 1154  Last data filed at 18 0934   Gross per 24 hour   Intake              600 ml   Output              300 ml   Net              300 ml        GI/Nutrition:  Orders Placed This Encounter   Procedures   • Diet Order Diabetic     Standing Status:   Standing     Number of Occurrences:   1     Order Specific Question:   Diet:     Answer:   Diabetic [3]         Medical Decision Making, by Problem:  S/P CERVICAL/LUMBAR SPINE SURGERIES - cervical collar    H/O CVA    JANNIE - on home BiPAP    AFIB - rate controlled on B-Bl, anticoagulation now resumed    HTN - adequate BP control on Metoprolol, Norvasc, Lisinopril, and Hydralazine; cont to monitor BP trends    DM - good control on Metformin, Lantus, and SSI    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 w/ Clindamycin, probiotic added to help prevent C Dif infxn, Venous Duplex negative DVT    THYROID MASS - needs outpt F/U    ANEMIA - follow H/H    AZOTEMIA - renal fxn improved off HCTZ and w/ PO fluids    VIT D DEF - supplementing    ALLERGY TO STATINS        Quality-Core Measures   Reviewed items::  Medications reviewed and Labs reviewed  DVT prophylaxis pharmacological::  Warfarin (Coumadin)  Antibiotics:  Treating active infection/contamination beyond 24 hours perioperative coverage  Assessed for rehabilitation services:  Patient was assess for and/or received rehabilitation  services during this hospitalization

## 2018-08-07 NOTE — PROGRESS NOTES
Pharmacy Warfarin Consult   8/7/2018     67 y.o.   male     Indication for anticoagulation: Atrial Fibrillation    Goal INR = 2 to 3     Recent Labs      08/05/18   0523  08/06/18   0533  08/07/18   0538   INR  1.17*  1.42*  1.49*       Pertinent Drug/Drug Interactions:  Antibiotics, trazodone  Outpatient Warfarin Regimen:  10mg daily per med rec  Recent Warfarin Dosing:    Dose from last 7 days     Date/Time Dose (mg)    08/07/18 0538  12    08/06/18 0533  10    08/05/18 0523  12.5    08/04/18 0543  10    08/03/18 0531  7.5    08/02/18 0533  5    08/01/18 0541  5          Bridge Therapy: Lovenox til INR > 2 for 2 days.         1.  Coumadin 12mg tonight for INR = 1.49        Mary [facility]

## 2018-08-08 ENCOUNTER — HOME HEALTH ADMISSION (OUTPATIENT)
Dept: HOME HEALTH SERVICES | Facility: HOME HEALTHCARE | Age: 68
End: 2018-08-08
Payer: MEDICARE

## 2018-08-08 LAB
ANION GAP SERPL CALC-SCNC: 8 MMOL/L (ref 0–11.9)
BASOPHILS # BLD AUTO: 0.6 % (ref 0–1.8)
BASOPHILS # BLD: 0.03 K/UL (ref 0–0.12)
BUN SERPL-MCNC: 25 MG/DL (ref 8–22)
CALCIUM SERPL-MCNC: 9 MG/DL (ref 8.5–10.5)
CHLORIDE SERPL-SCNC: 104 MMOL/L (ref 96–112)
CO2 SERPL-SCNC: 27 MMOL/L (ref 20–33)
CREAT SERPL-MCNC: 0.88 MG/DL (ref 0.5–1.4)
EOSINOPHIL # BLD AUTO: 0.18 K/UL (ref 0–0.51)
EOSINOPHIL NFR BLD: 3.7 % (ref 0–6.9)
ERYTHROCYTE [DISTWIDTH] IN BLOOD BY AUTOMATED COUNT: 46.5 FL (ref 35.9–50)
GLUCOSE BLD-MCNC: 112 MG/DL (ref 65–99)
GLUCOSE BLD-MCNC: 137 MG/DL (ref 65–99)
GLUCOSE BLD-MCNC: 153 MG/DL (ref 65–99)
GLUCOSE BLD-MCNC: 184 MG/DL (ref 65–99)
GLUCOSE BLD-MCNC: 96 MG/DL (ref 65–99)
GLUCOSE SERPL-MCNC: 91 MG/DL (ref 65–99)
HCT VFR BLD AUTO: 39.9 % (ref 42–52)
HGB BLD-MCNC: 12.8 G/DL (ref 14–18)
IMM GRANULOCYTES # BLD AUTO: 0.02 K/UL (ref 0–0.11)
IMM GRANULOCYTES NFR BLD AUTO: 0.4 % (ref 0–0.9)
INR PPP: 1.62 (ref 0.87–1.13)
LYMPHOCYTES # BLD AUTO: 1.05 K/UL (ref 1–4.8)
LYMPHOCYTES NFR BLD: 21.7 % (ref 22–41)
MCH RBC QN AUTO: 27.9 PG (ref 27–33)
MCHC RBC AUTO-ENTMCNC: 32.1 G/DL (ref 33.7–35.3)
MCV RBC AUTO: 87.1 FL (ref 81.4–97.8)
MONOCYTES # BLD AUTO: 0.57 K/UL (ref 0–0.85)
MONOCYTES NFR BLD AUTO: 11.8 % (ref 0–13.4)
NEUTROPHILS # BLD AUTO: 2.98 K/UL (ref 1.82–7.42)
NEUTROPHILS NFR BLD: 61.8 % (ref 44–72)
NRBC # BLD AUTO: 0 K/UL
NRBC BLD-RTO: 0 /100 WBC
PLATELET # BLD AUTO: 186 K/UL (ref 164–446)
PMV BLD AUTO: 11.5 FL (ref 9–12.9)
POTASSIUM SERPL-SCNC: 3.6 MMOL/L (ref 3.6–5.5)
PROTHROMBIN TIME: 18.9 SEC (ref 12–14.6)
RBC # BLD AUTO: 4.58 M/UL (ref 4.7–6.1)
SODIUM SERPL-SCNC: 139 MMOL/L (ref 135–145)
WBC # BLD AUTO: 4.8 K/UL (ref 4.8–10.8)

## 2018-08-08 PROCEDURE — A9270 NON-COVERED ITEM OR SERVICE: HCPCS | Performed by: HOSPITALIST

## 2018-08-08 PROCEDURE — 85610 PROTHROMBIN TIME: CPT

## 2018-08-08 PROCEDURE — 80048 BASIC METABOLIC PNL TOTAL CA: CPT

## 2018-08-08 PROCEDURE — 99232 SBSQ HOSP IP/OBS MODERATE 35: CPT | Performed by: PHYSICAL MEDICINE & REHABILITATION

## 2018-08-08 PROCEDURE — A9270 NON-COVERED ITEM OR SERVICE: HCPCS | Performed by: PHYSICAL MEDICINE & REHABILITATION

## 2018-08-08 PROCEDURE — 99232 SBSQ HOSP IP/OBS MODERATE 35: CPT | Performed by: HOSPITALIST

## 2018-08-08 PROCEDURE — 36415 COLL VENOUS BLD VENIPUNCTURE: CPT

## 2018-08-08 PROCEDURE — 700102 HCHG RX REV CODE 250 W/ 637 OVERRIDE(OP): Performed by: PHYSICAL MEDICINE & REHABILITATION

## 2018-08-08 PROCEDURE — G0515 COGNITIVE SKILLS DEVELOPMENT: HCPCS

## 2018-08-08 PROCEDURE — 770010 HCHG ROOM/CARE - REHAB SEMI PRIVAT*

## 2018-08-08 PROCEDURE — 85025 COMPLETE CBC W/AUTO DIFF WBC: CPT

## 2018-08-08 PROCEDURE — 700112 HCHG RX REV CODE 229: Performed by: PHYSICAL MEDICINE & REHABILITATION

## 2018-08-08 PROCEDURE — 700102 HCHG RX REV CODE 250 W/ 637 OVERRIDE(OP): Performed by: HOSPITALIST

## 2018-08-08 PROCEDURE — 97530 THERAPEUTIC ACTIVITIES: CPT

## 2018-08-08 PROCEDURE — 97535 SELF CARE MNGMENT TRAINING: CPT

## 2018-08-08 PROCEDURE — 700111 HCHG RX REV CODE 636 W/ 250 OVERRIDE (IP): Performed by: PHYSICAL MEDICINE & REHABILITATION

## 2018-08-08 PROCEDURE — 82962 GLUCOSE BLOOD TEST: CPT | Mod: 91

## 2018-08-08 PROCEDURE — 94760 N-INVAS EAR/PLS OXIMETRY 1: CPT

## 2018-08-08 RX ORDER — WARFARIN SODIUM 7.5 MG/1
7.5 TABLET ORAL
Status: COMPLETED | OUTPATIENT
Start: 2018-08-08 | End: 2018-08-08

## 2018-08-08 RX ORDER — WARFARIN SODIUM 5 MG/1
5 TABLET ORAL
Status: COMPLETED | OUTPATIENT
Start: 2018-08-08 | End: 2018-08-08

## 2018-08-08 RX ADMIN — CLINDAMYCIN HYDROCHLORIDE 300 MG: 300 CAPSULE ORAL at 08:34

## 2018-08-08 RX ADMIN — DOCUSATE SODIUM 100 MG: 100 CAPSULE, LIQUID FILLED ORAL at 08:32

## 2018-08-08 RX ADMIN — INSULIN LISPRO 2 UNITS: 100 INJECTION, SOLUTION INTRAVENOUS; SUBCUTANEOUS at 21:18

## 2018-08-08 RX ADMIN — GABAPENTIN 900 MG: 300 CAPSULE ORAL at 08:31

## 2018-08-08 RX ADMIN — AMLODIPINE BESYLATE 10 MG: 5 TABLET ORAL at 05:16

## 2018-08-08 RX ADMIN — Medication 1 TABLET: at 21:13

## 2018-08-08 RX ADMIN — DOCUSATE SODIUM 100 MG: 100 CAPSULE, LIQUID FILLED ORAL at 21:14

## 2018-08-08 RX ADMIN — ENOXAPARIN SODIUM 40 MG: 100 INJECTION SUBCUTANEOUS at 08:34

## 2018-08-08 RX ADMIN — GABAPENTIN 900 MG: 300 CAPSULE ORAL at 14:26

## 2018-08-08 RX ADMIN — METFORMIN HYDROCHLORIDE 1000 MG: 500 TABLET, FILM COATED ORAL at 08:31

## 2018-08-08 RX ADMIN — METOPROLOL TARTRATE 50 MG: 25 TABLET ORAL at 21:14

## 2018-08-08 RX ADMIN — HYDRALAZINE HYDROCHLORIDE 100 MG: 25 TABLET, FILM COATED ORAL at 08:31

## 2018-08-08 RX ADMIN — GABAPENTIN 900 MG: 300 CAPSULE ORAL at 21:13

## 2018-08-08 RX ADMIN — HYDRALAZINE HYDROCHLORIDE 100 MG: 25 TABLET, FILM COATED ORAL at 14:26

## 2018-08-08 RX ADMIN — WARFARIN SODIUM 7.5 MG: 7.5 TABLET ORAL at 17:25

## 2018-08-08 RX ADMIN — CLINDAMYCIN HYDROCHLORIDE 300 MG: 300 CAPSULE ORAL at 21:14

## 2018-08-08 RX ADMIN — CHOLECALCIFEROL TAB 25 MCG (1000 UNIT) 2000 UNITS: 25 TAB at 08:30

## 2018-08-08 RX ADMIN — BACITRACIN, NEOMYCIN, POLYMYXIN B: 400; 3.5; 5 OINTMENT TOPICAL at 21:15

## 2018-08-08 RX ADMIN — INSULIN GLARGINE 40 UNITS: 100 INJECTION, SOLUTION SUBCUTANEOUS at 21:17

## 2018-08-08 RX ADMIN — METFORMIN HYDROCHLORIDE 1000 MG: 500 TABLET, FILM COATED ORAL at 17:25

## 2018-08-08 RX ADMIN — METOPROLOL TARTRATE 50 MG: 25 TABLET ORAL at 05:16

## 2018-08-08 RX ADMIN — WARFARIN SODIUM 5 MG: 5 TABLET ORAL at 17:25

## 2018-08-08 RX ADMIN — LACTOBACILLUS ACIDOPHILUS / LACTOBACILLUS BULGARICUS 1 PACKET: 100 MILLION CFU STRENGTH GRANULES at 17:25

## 2018-08-08 RX ADMIN — LACTOBACILLUS ACIDOPHILUS / LACTOBACILLUS BULGARICUS 1 PACKET: 100 MILLION CFU STRENGTH GRANULES at 08:32

## 2018-08-08 RX ADMIN — BACITRACIN, NEOMYCIN, POLYMYXIN B: 400; 3.5; 5 OINTMENT TOPICAL at 08:32

## 2018-08-08 RX ADMIN — HYDRALAZINE HYDROCHLORIDE 100 MG: 25 TABLET, FILM COATED ORAL at 21:14

## 2018-08-08 RX ADMIN — LACTOBACILLUS ACIDOPHILUS / LACTOBACILLUS BULGARICUS 1 PACKET: 100 MILLION CFU STRENGTH GRANULES at 11:34

## 2018-08-08 RX ADMIN — INSULIN LISPRO 2 UNITS: 100 INJECTION, SOLUTION INTRAVENOUS; SUBCUTANEOUS at 17:00

## 2018-08-08 RX ADMIN — LISINOPRIL 40 MG: 20 TABLET ORAL at 08:31

## 2018-08-08 ASSESSMENT — ENCOUNTER SYMPTOMS
COUGH: 0
NECK PAIN: 1
BACK PAIN: 1
PALPITATIONS: 0
EYES NEGATIVE: 1
ABDOMINAL PAIN: 0
VOMITING: 0
NAUSEA: 0
SHORTNESS OF BREATH: 0
CHILLS: 0
FEVER: 0

## 2018-08-08 ASSESSMENT — GAIT ASSESSMENTS
ASSISTIVE DEVICE: FRONT WHEEL WALKER
DISTANCE (FEET): 30
GAIT LEVEL OF ASSIST: CONTACT GUARD ASSIST

## 2018-08-08 ASSESSMENT — PATIENT HEALTH QUESTIONNAIRE - PHQ9
1. LITTLE INTEREST OR PLEASURE IN DOING THINGS: NOT AT ALL
2. FEELING DOWN, DEPRESSED, IRRITABLE, OR HOPELESS: NOT AT ALL
SUM OF ALL RESPONSES TO PHQ9 QUESTIONS 1 AND 2: 0

## 2018-08-08 NOTE — FLOWSHEET NOTE
08/08/18 1046   Events/Summary/Plan   Events/Summary/Plan 02 spot check   Respiratory WDL   Respiratory (WDL) X   Chest Exam   Respiration 18   Pulse 64   Oximetry   #Pulse Oximetry (Single Determination) Yes   Oxygen   Home O2 Use Prior To Admission? No   Pulse Oximetry 94 %   O2 Daily Delivery Respiratory  Room Air with O2 Available

## 2018-08-08 NOTE — PROGRESS NOTES
Hospital Medicine Progress Note, Adult, Complex               Author: Amanda Sabrina Date & Time created: 8/8/2018  2:10 PM     Interval History:  CC - HTN, DM    Up in wheelchair, out in corridor, no complaints.    Review of Systems:  Review of Systems   Constitutional: Negative for chills and fever.   HENT: Negative.    Eyes: Negative.    Respiratory: Negative for cough and shortness of breath.    Cardiovascular: Positive for leg swelling. Negative for chest pain and palpitations.   Gastrointestinal: Negative for abdominal pain, nausea and vomiting.   Genitourinary: Negative.    Musculoskeletal: Positive for back pain and neck pain.        Wound pain   Skin: Negative.    Endo/Heme/Allergies: Negative.        Physical Exam:  Physical Exam   Constitutional: He is oriented to person, place, and time. No distress.   HENT:   Head: Normocephalic and atraumatic.   Right Ear: External ear normal.   Left Ear: External ear normal.   Eyes: Conjunctivae and EOM are normal. Right eye exhibits no discharge. Left eye exhibits no discharge.   Neck:   C-collar   Cardiovascular:   irreg irreg   Pulmonary/Chest: No stridor. No respiratory distress. He has no wheezes.   Decreased BS   Abdominal: Soft. Bowel sounds are normal. He exhibits no distension. There is no tenderness. There is no rebound and no guarding.   Musculoskeletal: He exhibits edema.   1+ edema RLE, trace LLE   Neurological: He is alert and oriented to person, place, and time.   Skin: Skin is warm and dry. He is not diaphoretic.   Vitals reviewed.      Labs:        Invalid input(s): [id number]      Recent Labs      08/08/18   0558   SODIUM  139   POTASSIUM  3.6   CHLORIDE  104   CO2  27   BUN  25*   CREATININE  0.88   CALCIUM  9.0     Recent Labs      08/08/18   0558   GLUCOSE  91     Recent Labs      08/06/18   0533  08/07/18   0538  08/08/18   0558   RBC   --    --   4.58*   HEMOGLOBIN   --    --   12.8*   HEMATOCRIT   --    --   39.9*   PLATELETCT   --    --   186    PROTHROMBTM  17.0*  17.7*  18.9*   INR  1.42*  1.49*  1.62*     Recent Labs      18   0558   WBC  4.8   NEUTSPOLYS  61.80   LYMPHOCYTES  21.70*   MONOCYTES  11.80   EOSINOPHILS  3.70   BASOPHILS  0.60           Hemodynamics:  Temp (24hrs), Av.7 °C (98.1 °F), Min:36.4 °C (97.6 °F), Max:37.1 °C (98.8 °F)  Temperature: 36.8 °C (98.3 °F)  Pulse  Av.4  Min: 57  Max: 113   Blood Pressure : 158/82     Respiratory:    Respiration: 20, Pulse Oximetry: 95 %, O2 Daily Delivery Respiratory : Room Air with O2 Available     Work Of Breathing / Effort: Mild  RUL Breath Sounds: Clear, RML Breath Sounds: Clear;Diminished, RLL Breath Sounds: Clear;Diminished, AI Breath Sounds: Clear, LLL Breath Sounds: Clear;Diminished  Fluids:    Intake/Output Summary (Last 24 hours) at 18 1410  Last data filed at 18 1217   Gross per 24 hour   Intake             1200 ml   Output              400 ml   Net              800 ml        GI/Nutrition:  Orders Placed This Encounter   Procedures   • Diet Order Diabetic     Standing Status:   Standing     Number of Occurrences:   1     Order Specific Question:   Diet:     Answer:   Diabetic [3]         Medical Decision Making, by Problem:  S/P CERVICAL/LUMBAR SPINE SURGERIES - cervical collar    H/O CVA    JANNIE - on home BiPAP    AFIB - rate controlled on B-Bl, anticoagulation now resumed    HTN - increase Metoprolol for persistently elevated BP; already maxed out on Norvasc, Lisinopril, and Hydralazine    DM - good control on Metformin, Lantus, and SSI    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 w/ Clindamycin, cont Lactobacillus, Venous Duplex negative DVT    THYROID MASS - needs outpt F/U    ANEMIA - follow H/H    AZOTEMIA - renal fxn improved off HCTZ and w/ PO fluids    VIT D DEF - supplementing    ALLERGY TO STATINS        Quality-Core Measures   Reviewed items::  Medications reviewed and Labs reviewed  DVT prophylaxis pharmacological::  Warfarin  (Coumadin)  Antibiotics:  Treating active infection/contamination beyond 24 hours perioperative coverage  Assessed for rehabilitation services:  Patient was assess for and/or received rehabilitation services during this hospitalization

## 2018-08-08 NOTE — DISCHARGE PLANNING
ATTN: Case Management  RE: Referral for Home Health                We would like to take this opportunity to thank you for submitting a referral for your patient to continue care with [facility]. Our skilled team is dedicated to helping all patients recover and gain independence in the home setting.            As of 8/8/18, we have accepted the above patient into our service. A [facility] clinician will be out to see the patient within 48 hours to conduct our initial visit. If you have any questions or concerns regarding the patient’s transition to Home Health, please do not hesitate to contact us. We are open for referrals 7 days a week from 8AM to 5PM at [phone].      We look forward to collaborating with you,  [facility] Team

## 2018-08-08 NOTE — CARE PLAN
Problem: Infection  Goal: Will remain free from infection  Surgical incisions open to air, lower back with redness but no drainage, neosporin ointment applied, will continue to assess and monitor.    Problem: Bowel/Gastric:  Goal: Normal bowel function is maintained or improved  Pt refused all bowel meds tonight, LBM was 8/5/18, denies feeling constipated, will continue to assess and monitor.    Problem: Pain Management  Goal: Pain level will decrease to patient's comfort goal  Patient able to verbalize needs.  Denies pain or discomfort this shift and no s/s same noted.medicated with routine meds per mar,  Will continue to monitor.

## 2018-08-08 NOTE — PROGRESS NOTES
"Rehab Progress Note     Encounter date: 8/8/2018  Today I met with the patient face to face in his room     Chief Complaint:  Cervical myelopathy (HCC) , fatigue today    Interval Events (subjective)  Mr. Ma is doing well overall, but he feels more fatigued today.  He states that he is continuing to work hard.  We discussed the referral for diabetic footwear, and he reports that he is agreeable to wearing these.  He denies any fevers, chills, chest pain, nausea vomiting, or shortness of breath.    Objective:  VITAL SIGNS: /82   Pulse 70   Temp 36.8 °C (98.3 °F)   Resp 20   Ht 1.905 m (6' 3\")   Wt 114.5 kg (252 lb 6.8 oz)   SpO2 95%   BMI 31.55 kg/m²     Recent Results (from the past 72 hour(s))   ACCU-CHEK GLUCOSE    Collection Time: 08/05/18  4:54 PM   Result Value Ref Range    Glucose - Accu-Ck 150 (H) 65 - 99 mg/dL   ACCU-CHEK GLUCOSE    Collection Time: 08/05/18  9:24 PM   Result Value Ref Range    Glucose - Accu-Ck 193 (H) 65 - 99 mg/dL   PROTHROMBIN TIME    Collection Time: 08/06/18  5:33 AM   Result Value Ref Range    PT 17.0 (H) 12.0 - 14.6 sec    INR 1.42 (H) 0.87 - 1.13   ACCU-CHEK GLUCOSE    Collection Time: 08/06/18  7:16 AM   Result Value Ref Range    Glucose - Accu-Ck 103 (H) 65 - 99 mg/dL   ACCU-CHEK GLUCOSE    Collection Time: 08/06/18 11:47 AM   Result Value Ref Range    Glucose - Accu-Ck 90 65 - 99 mg/dL   ACCU-CHEK GLUCOSE    Collection Time: 08/06/18  4:56 PM   Result Value Ref Range    Glucose - Accu-Ck 161 (H) 65 - 99 mg/dL   ACCU-CHEK GLUCOSE    Collection Time: 08/06/18  8:20 PM   Result Value Ref Range    Glucose - Accu-Ck 139 (H) 65 - 99 mg/dL   PROTHROMBIN TIME    Collection Time: 08/07/18  5:38 AM   Result Value Ref Range    PT 17.7 (H) 12.0 - 14.6 sec    INR 1.49 (H) 0.87 - 1.13   ACCU-CHEK GLUCOSE    Collection Time: 08/07/18  7:31 AM   Result Value Ref Range    Glucose - Accu-Ck 102 (H) 65 - 99 mg/dL   ACCU-CHEK GLUCOSE    Collection Time: 08/07/18 11:32 AM   Result " Value Ref Range    Glucose - Accu-Ck 98 65 - 99 mg/dL   ACCU-CHEK GLUCOSE    Collection Time: 08/07/18  5:17 PM   Result Value Ref Range    Glucose - Accu-Ck 220 (H) 65 - 99 mg/dL   ACCU-CHEK GLUCOSE    Collection Time: 08/07/18  8:36 PM   Result Value Ref Range    Glucose - Accu-Ck 165 (H) 65 - 99 mg/dL   PROTHROMBIN TIME    Collection Time: 08/08/18  5:58 AM   Result Value Ref Range    PT 18.9 (H) 12.0 - 14.6 sec    INR 1.62 (H) 0.87 - 1.13   CBC WITH DIFFERENTIAL    Collection Time: 08/08/18  5:58 AM   Result Value Ref Range    WBC 4.8 4.8 - 10.8 K/uL    RBC 4.58 (L) 4.70 - 6.10 M/uL    Hemoglobin 12.8 (L) 14.0 - 18.0 g/dL    Hematocrit 39.9 (L) 42.0 - 52.0 %    MCV 87.1 81.4 - 97.8 fL    MCH 27.9 27.0 - 33.0 pg    MCHC 32.1 (L) 33.7 - 35.3 g/dL    RDW 46.5 35.9 - 50.0 fL    Platelet Count 186 164 - 446 K/uL    MPV 11.5 9.0 - 12.9 fL    Neutrophils-Polys 61.80 44.00 - 72.00 %    Lymphocytes 21.70 (L) 22.00 - 41.00 %    Monocytes 11.80 0.00 - 13.40 %    Eosinophils 3.70 0.00 - 6.90 %    Basophils 0.60 0.00 - 1.80 %    Immature Granulocytes 0.40 0.00 - 0.90 %    Nucleated RBC 0.00 /100 WBC    Neutrophils (Absolute) 2.98 1.82 - 7.42 K/uL    Lymphs (Absolute) 1.05 1.00 - 4.80 K/uL    Monos (Absolute) 0.57 0.00 - 0.85 K/uL    Eos (Absolute) 0.18 0.00 - 0.51 K/uL    Baso (Absolute) 0.03 0.00 - 0.12 K/uL    Immature Granulocytes (abs) 0.02 0.00 - 0.11 K/uL    NRBC (Absolute) 0.00 K/uL   BASIC METABOLIC PANEL    Collection Time: 08/08/18  5:58 AM   Result Value Ref Range    Sodium 139 135 - 145 mmol/L    Potassium 3.6 3.6 - 5.5 mmol/L    Chloride 104 96 - 112 mmol/L    Co2 27 20 - 33 mmol/L    Glucose 91 65 - 99 mg/dL    Bun 25 (H) 8 - 22 mg/dL    Creatinine 0.88 0.50 - 1.40 mg/dL    Calcium 9.0 8.5 - 10.5 mg/dL    Anion Gap 8.0 0.0 - 11.9   ESTIMATED GFR    Collection Time: 08/08/18  5:58 AM   Result Value Ref Range    GFR If African American >60 >60 mL/min/1.73 m 2    GFR If Non African American >60 >60 mL/min/1.73 m 2    ACCU-CHEK GLUCOSE    Collection Time: 08/08/18  7:19 AM   Result Value Ref Range    Glucose - Accu-Ck 96 65 - 99 mg/dL   ACCU-CHEK GLUCOSE    Collection Time: 08/08/18  8:40 AM   Result Value Ref Range    Glucose - Accu-Ck 137 (H) 65 - 99 mg/dL   ACCU-CHEK GLUCOSE    Collection Time: 08/08/18 11:34 AM   Result Value Ref Range    Glucose - Accu-Ck 112 (H) 65 - 99 mg/dL       Current Facility-Administered Medications   Medication Frequency   • warfarin (COUMADIN) tablet 7.5 mg COUMADIN-ONCE    And   • warfarin (COUMADIN) tablet 5 mg COUMADIN-ONCE   • metoprolol (LOPRESSOR) tablet 50 mg Q8HRS   • diphenhydrAMINE-ZnAcetate (BENADRYL ITCH) 1-0.1 % cream TID PRN   • neomycin-bacitracin-polymyxin (NEOSPORIN) 400-5-5000 ointment BID   • MD ALERT... warfarin (COUMADIN) per pharmacy protocol pharmacy to dose   • lidocaine (XYLOCAINE) 2 % jelly Q8HRS   • insulin glargine (LANTUS) injection 40 Units Q EVENING   • lactobacillus granules (LACTINEX/FLORANEX) packet 1 Packet TID WITH MEALS   • insulin lispro (HUMALOG) injection 0-12 Units 4X/DAY ACHS   • gabapentin (NEURONTIN) capsule 900 mg TID   • vitamin D (cholecalciferol) tablet 2,000 Units DAILY   • lisinopril (PRINIVIL) tablet 40 mg DAILY   • Respiratory Care per Protocol Continuous RT   • Pharmacy Consult Request ...Pain Management Review 1 Each PRN   • acetaminophen (TYLENOL) tablet 650 mg Q4HRS PRN   • hydrALAZINE (APRESOLINE) tablet 25 mg Q8HRS PRN   • ondansetron (ZOFRAN ODT) dispertab 4 mg 4X/DAY PRN    Or   • ondansetron (ZOFRAN) syringe/vial injection 4 mg 4X/DAY PRN   • traZODone (DESYREL) tablet 50 mg QHS PRN   • bisacodyl (DULCOLAX) suppository 10 mg Q24HRS PRN   • magnesium hydroxide (MILK OF MAGNESIA) suspension 30 mL QDAY PRN   • senna-docusate (PERICOLACE or SENOKOT S) 8.6-50 MG per tablet 1 Tab Nightly   • amLODIPine (NORVASC) tablet 10 mg Q DAY   • clindamycin (CLEOCIN) capsule 300 mg BID   • enoxaparin (LOVENOX) inj 40 mg DAILY   • fluticasone (FLONASE)  nasal spray 50 mcg DAILY   • hydrALAZINE (APRESOLINE) tablet 100 mg TID   • metFORMIN (GLUCOPHAGE) tablet 1,000 mg BID WITH MEALS   • docusate sodium (COLACE) capsule 100 mg BID       Exam Date: 8/8/2018    General:  Awake, alert, oriented, no acute distress  HEENT:  Wearing Aspen cervical collar  Cardiac: regular rate and rhythm  Lungs: clear to auscultation bilaterally.   Abdomen: soft; non tender, non distended, bowel sounds present and normoactive  Extremities: 1+ edema in the bilateral lower limbs  Musculoskeletal: wearing AFOs  Neuro:   Ongoing safety and insight limitations.  Kyphotic posture, ongoing significant weakness with ankle dorsiflexion        Orders Placed This Encounter   Procedures   • Diet Order Diabetic     Standing Status:   Standing     Number of Occurrences:   1     Order Specific Question:   Diet:     Answer:   Diabetic [3]       Assessment:  Active Hospital Problems    Diagnosis   • *Cervical myelopathy (HCC)   • HTN (hypertension)   • CVA (cerebral vascular accident) (HCC)   • Diabetes mellitus with neurological manifestations, controlled (HCC)   • Impaired activities of daily living   • Lumbar stenosis with neurogenic claudication   • Pain   • Cervical stenosis of spine   • Atrial fibrillation [427.31]   • Chronic antibiotic suppression       Medical Decision Making and Plan:  Mr. Ma is a 67-year-old male admitted for rehabilitation on July 18 in the setting of cervical myelopathy and lumbar spinal stenosis     Cervical myelopathy status post C2-C6 laminectomy and C2-C4 fusion by Dr. Marsh on July 13  Lumbar spinal stenosis with neurogenic claudication status post L2-L5 laminectomies by Dr. Marsh on July 13  Continue comprehensive rehabilitation  Followed-up with Dr. Marsh on 7/30  [place] collar can be removed for meals and showering ONLY  Custom bilateral articulated AFOs have been obtained    Discussed discharge plans and collar restrictions.    History of strokes  Cognitive  impairment  Atrial fibrillation   Coumadin per pharmacy protocol  Continue speech for cognitive deficits    INR is 1.62 today     Pain  Neuropathic pain/allodynia  Tylenol as needed   Gabapentin 900 mg 3 times a day  Lidocaine jelly scheduled every 8 hours for hand pain and neck pain    Hypertension  Amlodipine 10 mg daily  Lopressor 50 mg increased every 8 hours  Hydralazine 100 mg 3 times a day  Lisinopril 40 mg daily dosing      Appreciate hospitalist assistance  Blood pressure 158/82 this morning     Diabetes mellitus type 2 with a history of diabetic neuropathy--hemoglobin A1c of 9.9 on July 19  Lantus at bedtime  Sliding scale insulin  Metformin 1000 mg twice a day  Glipizide 2.5 mg daily beginning on August 2  Appreciate hospitalist consult    Glucose range of  in the last 24 hours     Anemia  Hemoglobin stable at 12.8 on August 8  Continue to monitor     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  Additionally, it has only been 2 months since his last injection.  Discussed conservative cares     Constipation  Colace 100 g twice a day  Pericolace qhs  Milk of magnesia as needed  Dulcolax suppository daily as needed     Seasonal allergies  Flonase daily     Incidental left thyroid mass seen on MRI in May 2018  Will need follow-up with primary care  TSH of 0.56 on admission    Vitamin D deficiency  Vitamin D was 26 on admission so we began supplementation with 2000 units of cholecalciferol daily      DVT prophylaxis  Lovenox 40 mg daily  Discontinued when INR in range     Estimated discharge: August 14 due to need for training of his friend    Total time:  26 minutes.  I spent greater than 50% of the time for patient care, counseling, and coordination on this date, including  unit/floor time, and face-to-face time with the patient as per interval events and assessment and plan above. Topics discussed included functional progress, discharge planning, family training, diabetic shoes      Higinio Pagan M.D.  8/8/2018

## 2018-08-08 NOTE — DISCHARGE PLANNING
DME referral sent to [facility] per choice form.  Home health referral sent to [facility] per choice form.  Awaiting responses.

## 2018-08-08 NOTE — PROGRESS NOTES
Pharmacy Warfarin Consult   8/8/2018     67 y.o.   male     Indication for anticoagulation: Atrial Fibrilation    Goal INR = 2 to 3     Recent Labs      08/06/18   0533  08/07/18   0538  08/08/18   0558   INR  1.42*  1.49*  1.62*   HEMOGLOBIN   --    --   12.8*   HEMATOCRIT   --    --   39.9*       Pertinent Drug/Drug Interactions:  Antibiotics, trazodone  Outpatient Warfarin Regimen:  10mg daily per med [facility]  Recent Warfarin Dosing:    Dose from last 7 days     Date/Time Dose (mg)    08/08/18 0558  12.5    08/07/18 0538  12    08/06/18 0533  10    08/05/18 0523  12.5    08/04/18 0543  10    08/03/18 0531  7.5    08/02/18 0533  5          Bridge Therapy: Lovenox 40mg til INR > 2 for 2 days        1.  Coumadin 12.5mg tonight for INR = 1.62        Mary [facility]

## 2018-08-09 LAB
GLUCOSE BLD-MCNC: 106 MG/DL (ref 65–99)
GLUCOSE BLD-MCNC: 180 MG/DL (ref 65–99)
GLUCOSE BLD-MCNC: 210 MG/DL (ref 65–99)
GLUCOSE BLD-MCNC: 86 MG/DL (ref 65–99)
INR PPP: 1.78 (ref 0.87–1.13)
PROTHROMBIN TIME: 20.4 SEC (ref 12–14.6)

## 2018-08-09 PROCEDURE — A9270 NON-COVERED ITEM OR SERVICE: HCPCS | Performed by: PHYSICAL MEDICINE & REHABILITATION

## 2018-08-09 PROCEDURE — 36415 COLL VENOUS BLD VENIPUNCTURE: CPT

## 2018-08-09 PROCEDURE — 94760 N-INVAS EAR/PLS OXIMETRY 1: CPT

## 2018-08-09 PROCEDURE — A9270 NON-COVERED ITEM OR SERVICE: HCPCS | Performed by: HOSPITALIST

## 2018-08-09 PROCEDURE — 97530 THERAPEUTIC ACTIVITIES: CPT

## 2018-08-09 PROCEDURE — 99232 SBSQ HOSP IP/OBS MODERATE 35: CPT | Performed by: PHYSICAL MEDICINE & REHABILITATION

## 2018-08-09 PROCEDURE — 700102 HCHG RX REV CODE 250 W/ 637 OVERRIDE(OP): Performed by: PHYSICAL MEDICINE & REHABILITATION

## 2018-08-09 PROCEDURE — 97116 GAIT TRAINING THERAPY: CPT

## 2018-08-09 PROCEDURE — 700102 HCHG RX REV CODE 250 W/ 637 OVERRIDE(OP): Performed by: HOSPITALIST

## 2018-08-09 PROCEDURE — 700111 HCHG RX REV CODE 636 W/ 250 OVERRIDE (IP): Performed by: PHYSICAL MEDICINE & REHABILITATION

## 2018-08-09 PROCEDURE — 97110 THERAPEUTIC EXERCISES: CPT

## 2018-08-09 PROCEDURE — 85610 PROTHROMBIN TIME: CPT

## 2018-08-09 PROCEDURE — 99232 SBSQ HOSP IP/OBS MODERATE 35: CPT | Performed by: HOSPITALIST

## 2018-08-09 PROCEDURE — 82962 GLUCOSE BLOOD TEST: CPT | Mod: 91

## 2018-08-09 PROCEDURE — 770010 HCHG ROOM/CARE - REHAB SEMI PRIVAT*

## 2018-08-09 PROCEDURE — G0515 COGNITIVE SKILLS DEVELOPMENT: HCPCS

## 2018-08-09 RX ORDER — WARFARIN SODIUM 7.5 MG/1
15 TABLET ORAL
Status: COMPLETED | OUTPATIENT
Start: 2018-08-09 | End: 2018-08-09

## 2018-08-09 RX ORDER — BENZOCAINE/MENTHOL 6 MG-10 MG
LOZENGE MUCOUS MEMBRANE 3 TIMES DAILY
Status: DISCONTINUED | OUTPATIENT
Start: 2018-08-09 | End: 2018-08-10

## 2018-08-09 RX ADMIN — BACITRACIN, NEOMYCIN, POLYMYXIN B: 400; 3.5; 5 OINTMENT TOPICAL at 20:16

## 2018-08-09 RX ADMIN — BACITRACIN, NEOMYCIN, POLYMYXIN B: 400; 3.5; 5 OINTMENT TOPICAL at 08:32

## 2018-08-09 RX ADMIN — METOPROLOL TARTRATE 50 MG: 25 TABLET ORAL at 14:29

## 2018-08-09 RX ADMIN — CLINDAMYCIN HYDROCHLORIDE 300 MG: 300 CAPSULE ORAL at 20:18

## 2018-08-09 RX ADMIN — METOPROLOL TARTRATE 50 MG: 25 TABLET ORAL at 05:17

## 2018-08-09 RX ADMIN — LISINOPRIL 40 MG: 20 TABLET ORAL at 08:35

## 2018-08-09 RX ADMIN — METFORMIN HYDROCHLORIDE 1000 MG: 500 TABLET, FILM COATED ORAL at 17:58

## 2018-08-09 RX ADMIN — LACTOBACILLUS ACIDOPHILUS / LACTOBACILLUS BULGARICUS 1 PACKET: 100 MILLION CFU STRENGTH GRANULES at 11:55

## 2018-08-09 RX ADMIN — LACTOBACILLUS ACIDOPHILUS / LACTOBACILLUS BULGARICUS 1 PACKET: 100 MILLION CFU STRENGTH GRANULES at 17:59

## 2018-08-09 RX ADMIN — HYDRALAZINE HYDROCHLORIDE 100 MG: 25 TABLET, FILM COATED ORAL at 08:34

## 2018-08-09 RX ADMIN — HYDROCORTISONE: 1 CREAM TOPICAL at 14:24

## 2018-08-09 RX ADMIN — HYDROCORTISONE: 1 CREAM TOPICAL at 19:26

## 2018-08-09 RX ADMIN — GABAPENTIN 900 MG: 300 CAPSULE ORAL at 08:32

## 2018-08-09 RX ADMIN — WARFARIN SODIUM 15 MG: 7.5 TABLET ORAL at 17:59

## 2018-08-09 RX ADMIN — INSULIN GLARGINE 40 UNITS: 100 INJECTION, SOLUTION SUBCUTANEOUS at 20:27

## 2018-08-09 RX ADMIN — CLINDAMYCIN HYDROCHLORIDE 300 MG: 300 CAPSULE ORAL at 08:33

## 2018-08-09 RX ADMIN — METFORMIN HYDROCHLORIDE 1000 MG: 500 TABLET, FILM COATED ORAL at 08:32

## 2018-08-09 RX ADMIN — INSULIN LISPRO 4 UNITS: 100 INJECTION, SOLUTION INTRAVENOUS; SUBCUTANEOUS at 18:00

## 2018-08-09 RX ADMIN — HYDRALAZINE HYDROCHLORIDE 100 MG: 25 TABLET, FILM COATED ORAL at 14:26

## 2018-08-09 RX ADMIN — INSULIN LISPRO 2 UNITS: 100 INJECTION, SOLUTION INTRAVENOUS; SUBCUTANEOUS at 20:28

## 2018-08-09 RX ADMIN — GABAPENTIN 900 MG: 300 CAPSULE ORAL at 14:25

## 2018-08-09 RX ADMIN — FLUTICASONE PROPIONATE 50 MCG: 50 SPRAY, METERED NASAL at 08:50

## 2018-08-09 RX ADMIN — HYDRALAZINE HYDROCHLORIDE 100 MG: 25 TABLET, FILM COATED ORAL at 20:18

## 2018-08-09 RX ADMIN — GABAPENTIN 900 MG: 300 CAPSULE ORAL at 20:18

## 2018-08-09 RX ADMIN — AMLODIPINE BESYLATE 10 MG: 5 TABLET ORAL at 05:17

## 2018-08-09 RX ADMIN — CHOLECALCIFEROL TAB 25 MCG (1000 UNIT) 2000 UNITS: 25 TAB at 08:33

## 2018-08-09 RX ADMIN — ENOXAPARIN SODIUM 40 MG: 100 INJECTION SUBCUTANEOUS at 08:41

## 2018-08-09 RX ADMIN — LACTOBACILLUS ACIDOPHILUS / LACTOBACILLUS BULGARICUS 1 PACKET: 100 MILLION CFU STRENGTH GRANULES at 08:32

## 2018-08-09 RX ADMIN — METOPROLOL TARTRATE 50 MG: 25 TABLET ORAL at 20:18

## 2018-08-09 ASSESSMENT — ENCOUNTER SYMPTOMS
FEVER: 0
ABDOMINAL PAIN: 0
VOMITING: 0
SHORTNESS OF BREATH: 0
CHILLS: 0
NAUSEA: 0
PALPITATIONS: 0
BACK PAIN: 1
COUGH: 0
EYES NEGATIVE: 1
NECK PAIN: 1

## 2018-08-09 ASSESSMENT — PAIN SCALES - GENERAL: PAINLEVEL_OUTOF10: 0

## 2018-08-09 ASSESSMENT — GAIT ASSESSMENTS
ASSISTIVE DEVICE: FRONT WHEEL WALKER
DISTANCE (FEET): 65
GAIT LEVEL OF ASSIST: CONTACT GUARD ASSIST

## 2018-08-09 NOTE — PROGRESS NOTES
Dr. Bennett notified of reddened incision and patient complaining of pain.  Orders received for hydrocortisone cream tid.  Not to be placed on incision but on rash and redness around incision then a loose dressing to cover.

## 2018-08-09 NOTE — PROGRESS NOTES
Pharmacy Warfarin Consult   8/9/2018     67 y.o.   male     Indication for anticoagulation:        Atrial Fibrilation     Goal INR = 2 - 3     Recent Labs      08/07/18   0538  08/08/18   0558  08/09/18 0523   INR  1.49*  1.62*  1.78*   HEMOGLOBIN   --   12.8*   --    HEMATOCRIT   --   39.9*   --        Pertinent Drug/Drug Interactions:  Antibiotics, trazodone  Outpatient Warfarin Regimen:  10mg daily per med rec  Recent Warfarin Dosing:   Dose from last 7 days     Date/Time Dose (mg)    08/09/18 0523  15    08/08/18 0558  12.5    08/07/18 0538  12    08/06/18 0533  10    08/05/18 0523  12.5    08/04/18 0543  10    08/03/18 0531  7.5           Bridge Therapy: Lovenox 40mg til INR > 2 for 2 days           1.  Coumadin 15mg tonight for INR = 1.78           Chris Kelley [facility]

## 2018-08-09 NOTE — FLOWSHEET NOTE
08/09/18 1007   Events/Summary/Plan   Events/Summary/Plan 02 spot check.  Pt unable to use his CPAP with the neck brace.     Respiratory WDL   Respiratory (WDL) X   Chest Exam   Respiration 18   Pulse 64   Oximetry   #Pulse Oximetry (Single Determination) Yes   Oxygen   Home O2 Use Prior To Admission? No   Pulse Oximetry 94 %   O2 Daily Delivery Respiratory  Room Air with O2 Available   Room Air Challenge Pass

## 2018-08-09 NOTE — PROGRESS NOTES
Received shift report and assumed care of patient. Patient awake, calm and stable, currently positioned in wheelchair for comfort and safety, personal items within reach at bedside,  call light within reach. POC discussed. Complains of feeling of pain in upper area of incision.  Area assessed and noted to be red.  Patient declines pain meds at this time.

## 2018-08-09 NOTE — PROGRESS NOTES
Hospital Medicine Progress Note, Adult, Complex               Author: Patelhilary Bennett Date & Time created: 8/9/2018  11:56 AM     Interval History:  CC - HTN, DM    RN reports redness around back incision.  Pt c/o itching in that area and admits that he has been scratching at it or rubbing it against the back of his wheelchair.    Review of Systems:  Review of Systems   Constitutional: Negative for chills and fever.   HENT: Negative.    Eyes: Negative.    Respiratory: Negative for cough and shortness of breath.    Cardiovascular: Positive for leg swelling. Negative for chest pain and palpitations.   Gastrointestinal: Negative for abdominal pain, nausea and vomiting.   Genitourinary: Negative.    Musculoskeletal: Positive for back pain and neck pain.        Wound pain   Skin: Positive for itching.   Endo/Heme/Allergies: Negative.        Physical Exam:  Physical Exam   Constitutional: He is oriented to person, place, and time. No distress.   HENT:   Head: Normocephalic and atraumatic.   Right Ear: External ear normal.   Left Ear: External ear normal.   Eyes: Conjunctivae and EOM are normal. Right eye exhibits no discharge. Left eye exhibits no discharge.   Neck:   C-collar   Cardiovascular:   irreg irreg   Pulmonary/Chest: No stridor. No respiratory distress. He has no wheezes.   Decreased BS   Abdominal: Soft. Bowel sounds are normal. He exhibits no distension. There is no tenderness. There is no rebound and no guarding.   Musculoskeletal: He exhibits edema.   1+ edema RLE, trace LLE   Neurological: He is alert and oriented to person, place, and time.   Skin: Skin is warm and dry. He is not diaphoretic.   Lumbar surgical incision C/D/I w/ surrounding erythema most notably proximal end of incision, no warmth/tenderness/drainage, no induration   Vitals reviewed.      Labs:        Invalid input(s): [id number]      Recent Labs      08/08/18   0558   SODIUM  139   POTASSIUM  3.6   CHLORIDE  104   CO2  27   BUN  25*    CREATININE  0.88   CALCIUM  9.0     Recent Labs      18   0558   GLUCOSE  91     Recent Labs      18   0538  18   0558  18   0523   RBC   --   4.58*   --    HEMOGLOBIN   --   12.8*   --    HEMATOCRIT   --   39.9*   --    PLATELETCT   --   186   --    PROTHROMBTM  17.7*  18.9*  20.4*   INR  1.49*  1.62*  1.78*     Recent Labs      18   0558   WBC  4.8   NEUTSPOLYS  61.80   LYMPHOCYTES  21.70*   MONOCYTES  11.80   EOSINOPHILS  3.70   BASOPHILS  0.60           Hemodynamics:  Temp (24hrs), Av.9 °C (98.4 °F), Min:36.8 °C (98.3 °F), Max:36.9 °C (98.5 °F)  Temperature: 36.9 °C (98.5 °F)  Pulse  Av.1  Min: 57  Max: 113   Blood Pressure : 140/88     Respiratory:    Respiration: 18, Pulse Oximetry: 94 %, O2 Daily Delivery Respiratory : Room Air with O2 Available     Work Of Breathing / Effort: Mild  RUL Breath Sounds: Clear, RML Breath Sounds: Clear;Diminished, RLL Breath Sounds: Clear;Diminished, AI Breath Sounds: Clear, LLL Breath Sounds: Clear;Diminished  Fluids:    Intake/Output Summary (Last 24 hours) at 18 1156  Last data filed at 18 0930   Gross per 24 hour   Intake             1740 ml   Output              725 ml   Net             1015 ml        GI/Nutrition:  Orders Placed This Encounter   Procedures   • Diet Order Diabetic     Standing Status:   Standing     Number of Occurrences:   1     Order Specific Question:   Diet:     Answer:   Diabetic [3]         Medical Decision Making, by Problem:  S/P CERVICAL/LUMBAR SPINE SURGERIES - cervical collar, will trial Hydrocortisone Cream around lumbar incision to relieve pruritus, monitor for wound infxn    H/O CVA    JANNIE - on home BiPAP    AFIB - rate controlled on B-Bl, anticoagulated on Coumadin    HTN - cont to monitor BP trends on Metoprolol, Norvasc, Lisinopril, and Hydralazine    DM - good control on Metformin, Lantus, and SSI    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 w/ Clindamycin, cont Lactobacillus,  Venous Duplex negative DVT    THYROID MASS - needs outpt F/U    ANEMIA - follow H/H    AZOTEMIA - renal fxn improved off HCTZ and w/ PO fluids    VIT D DEF - supplementing    ALLERGY TO STATINS      Reviewed w/ pt, RN, and Dr. Pagan      Quality-Core Measures   Reviewed items::  Medications reviewed and Labs reviewed  DVT prophylaxis pharmacological::  Warfarin (Coumadin)  Antibiotics:  Treating active infection/contamination beyond 24 hours perioperative coverage  Assessed for rehabilitation services:  Patient was assess for and/or received rehabilitation services during this hospitalization

## 2018-08-09 NOTE — CARE PLAN
Problem: Safety  Goal: Will remain free from injury  Pt gets confused at night sometimes, reoriented, encouraged to use call light which is within reach and wait for staff to assist with transfers, pt remains free of accidental injury, will continue to monitor.    Problem: Pain Management  Goal: Pain level will decrease to patient's comfort goal  Patient able to verbalize needs.  Denies pain or discomfort this shift and no s/s same noted.  Will continue to monitor.    Problem: Urinary Elimination:  Goal: Ability to reestablish a normal urinary elimination pattern will improve  Patient is continent of bladder this shift.using bathroom and urinal,  Will continue to monitor.

## 2018-08-09 NOTE — PROGRESS NOTES
"Rehab Progress Note     Encounter date: 8/9/2018  Today I met with the patient face to face in his room     Chief Complaint:  Cervical myelopathy (HCC) , no concerns     Interval Events (subjective)  Mr. Ma is doing well today.  He denies any fevers, chills, headache, dizziness, chest pain, or shortness of breath.  He reports some muscular pain in his lumbar region.  He also reports some itching on his incision.  He reports that he is voiding well.  He denies any urinary retention.  He reports that he is working on ambulation with physical therapy.    Objective:  VITAL SIGNS: /70   Pulse 70   Temp 36.9 °C (98.5 °F)   Resp 18   Ht 1.905 m (6' 3\")   Wt 114.5 kg (252 lb 6.8 oz)   SpO2 94%   BMI 31.55 kg/m²     Recent Results (from the past 72 hour(s))   ACCU-CHEK GLUCOSE    Collection Time: 08/06/18  4:56 PM   Result Value Ref Range    Glucose - Accu-Ck 161 (H) 65 - 99 mg/dL   ACCU-CHEK GLUCOSE    Collection Time: 08/06/18  8:20 PM   Result Value Ref Range    Glucose - Accu-Ck 139 (H) 65 - 99 mg/dL   PROTHROMBIN TIME    Collection Time: 08/07/18  5:38 AM   Result Value Ref Range    PT 17.7 (H) 12.0 - 14.6 sec    INR 1.49 (H) 0.87 - 1.13   ACCU-CHEK GLUCOSE    Collection Time: 08/07/18  7:31 AM   Result Value Ref Range    Glucose - Accu-Ck 102 (H) 65 - 99 mg/dL   ACCU-CHEK GLUCOSE    Collection Time: 08/07/18 11:32 AM   Result Value Ref Range    Glucose - Accu-Ck 98 65 - 99 mg/dL   ACCU-CHEK GLUCOSE    Collection Time: 08/07/18  5:17 PM   Result Value Ref Range    Glucose - Accu-Ck 220 (H) 65 - 99 mg/dL   ACCU-CHEK GLUCOSE    Collection Time: 08/07/18  8:36 PM   Result Value Ref Range    Glucose - Accu-Ck 165 (H) 65 - 99 mg/dL   PROTHROMBIN TIME    Collection Time: 08/08/18  5:58 AM   Result Value Ref Range    PT 18.9 (H) 12.0 - 14.6 sec    INR 1.62 (H) 0.87 - 1.13   CBC WITH DIFFERENTIAL    Collection Time: 08/08/18  5:58 AM   Result Value Ref Range    WBC 4.8 4.8 - 10.8 K/uL    RBC 4.58 (L) 4.70 - " 6.10 M/uL    Hemoglobin 12.8 (L) 14.0 - 18.0 g/dL    Hematocrit 39.9 (L) 42.0 - 52.0 %    MCV 87.1 81.4 - 97.8 fL    MCH 27.9 27.0 - 33.0 pg    MCHC 32.1 (L) 33.7 - 35.3 g/dL    RDW 46.5 35.9 - 50.0 fL    Platelet Count 186 164 - 446 K/uL    MPV 11.5 9.0 - 12.9 fL    Neutrophils-Polys 61.80 44.00 - 72.00 %    Lymphocytes 21.70 (L) 22.00 - 41.00 %    Monocytes 11.80 0.00 - 13.40 %    Eosinophils 3.70 0.00 - 6.90 %    Basophils 0.60 0.00 - 1.80 %    Immature Granulocytes 0.40 0.00 - 0.90 %    Nucleated RBC 0.00 /100 WBC    Neutrophils (Absolute) 2.98 1.82 - 7.42 K/uL    Lymphs (Absolute) 1.05 1.00 - 4.80 K/uL    Monos (Absolute) 0.57 0.00 - 0.85 K/uL    Eos (Absolute) 0.18 0.00 - 0.51 K/uL    Baso (Absolute) 0.03 0.00 - 0.12 K/uL    Immature Granulocytes (abs) 0.02 0.00 - 0.11 K/uL    NRBC (Absolute) 0.00 K/uL   BASIC METABOLIC PANEL    Collection Time: 08/08/18  5:58 AM   Result Value Ref Range    Sodium 139 135 - 145 mmol/L    Potassium 3.6 3.6 - 5.5 mmol/L    Chloride 104 96 - 112 mmol/L    Co2 27 20 - 33 mmol/L    Glucose 91 65 - 99 mg/dL    Bun 25 (H) 8 - 22 mg/dL    Creatinine 0.88 0.50 - 1.40 mg/dL    Calcium 9.0 8.5 - 10.5 mg/dL    Anion Gap 8.0 0.0 - 11.9   ESTIMATED GFR    Collection Time: 08/08/18  5:58 AM   Result Value Ref Range    GFR If African American >60 >60 mL/min/1.73 m 2    GFR If Non African American >60 >60 mL/min/1.73 m 2   ACCU-CHEK GLUCOSE    Collection Time: 08/08/18  7:19 AM   Result Value Ref Range    Glucose - Accu-Ck 96 65 - 99 mg/dL   ACCU-CHEK GLUCOSE    Collection Time: 08/08/18  8:40 AM   Result Value Ref Range    Glucose - Accu-Ck 137 (H) 65 - 99 mg/dL   ACCU-CHEK GLUCOSE    Collection Time: 08/08/18 11:34 AM   Result Value Ref Range    Glucose - Accu-Ck 112 (H) 65 - 99 mg/dL   ACCU-CHEK GLUCOSE    Collection Time: 08/08/18  5:24 PM   Result Value Ref Range    Glucose - Accu-Ck 184 (H) 65 - 99 mg/dL   ACCU-CHEK GLUCOSE    Collection Time: 08/08/18  9:13 PM   Result Value Ref Range     Glucose - Accu-Ck 153 (H) 65 - 99 mg/dL   PROTHROMBIN TIME    Collection Time: 08/09/18  5:23 AM   Result Value Ref Range    PT 20.4 (H) 12.0 - 14.6 sec    INR 1.78 (H) 0.87 - 1.13   ACCU-CHEK GLUCOSE    Collection Time: 08/09/18  7:51 AM   Result Value Ref Range    Glucose - Accu-Ck 86 65 - 99 mg/dL   ACCU-CHEK GLUCOSE    Collection Time: 08/09/18 11:35 AM   Result Value Ref Range    Glucose - Accu-Ck 106 (H) 65 - 99 mg/dL       Current Facility-Administered Medications   Medication Frequency   • warfarin (COUMADIN) tablet 15 mg COUMADIN-ONCE   • hydrocortisone 1 % cream TID   • metoprolol (LOPRESSOR) tablet 50 mg Q8HRS   • diphenhydrAMINE-ZnAcetate (BENADRYL ITCH) 1-0.1 % cream TID PRN   • neomycin-bacitracin-polymyxin (NEOSPORIN) 400-5-5000 ointment BID   • MD ALERT... warfarin (COUMADIN) per pharmacy protocol pharmacy to dose   • lidocaine (XYLOCAINE) 2 % jelly Q8HRS   • insulin glargine (LANTUS) injection 40 Units Q EVENING   • lactobacillus granules (LACTINEX/FLORANEX) packet 1 Packet TID WITH MEALS   • insulin lispro (HUMALOG) injection 0-12 Units 4X/DAY ACHS   • gabapentin (NEURONTIN) capsule 900 mg TID   • vitamin D (cholecalciferol) tablet 2,000 Units DAILY   • lisinopril (PRINIVIL) tablet 40 mg DAILY   • Respiratory Care per Protocol Continuous RT   • Pharmacy Consult Request ...Pain Management Review 1 Each PRN   • acetaminophen (TYLENOL) tablet 650 mg Q4HRS PRN   • hydrALAZINE (APRESOLINE) tablet 25 mg Q8HRS PRN   • ondansetron (ZOFRAN ODT) dispertab 4 mg 4X/DAY PRN    Or   • ondansetron (ZOFRAN) syringe/vial injection 4 mg 4X/DAY PRN   • traZODone (DESYREL) tablet 50 mg QHS PRN   • bisacodyl (DULCOLAX) suppository 10 mg Q24HRS PRN   • magnesium hydroxide (MILK OF MAGNESIA) suspension 30 mL QDAY PRN   • senna-docusate (PERICOLACE or SENOKOT S) 8.6-50 MG per tablet 1 Tab Nightly   • amLODIPine (NORVASC) tablet 10 mg Q DAY   • clindamycin (CLEOCIN) capsule 300 mg BID   • enoxaparin (LOVENOX) inj 40 mg  DAILY   • fluticasone (FLONASE) nasal spray 50 mcg DAILY   • hydrALAZINE (APRESOLINE) tablet 100 mg TID   • metFORMIN (GLUCOPHAGE) tablet 1,000 mg BID WITH MEALS   • docusate sodium (COLACE) capsule 100 mg BID       Exam Date: 8/9/2018    General:  Awake, alert, oriented, no acute distress  HEENT:  Wearing [place] cervical collar  Cardiac: regular rate and rhythm  Lungs: clear to auscultation bilaterally.   Abdomen: soft; non tender, non distended, bowel sounds present and normoactive  Extremities: Stable 1+ lower limb edema.  Wearing bilateral AFOs.  Skin: Lumbar incision is superficially erythematous and irritated.  This appears consistent with him continuing to itch this on the wheelchair.  No sign of dehiscence or wound breakdown.  Neuro:   No active dorsiflexion in either ankle today.        Orders Placed This Encounter   Procedures   • Diet Order Diabetic     Standing Status:   Standing     Number of Occurrences:   1     Order Specific Question:   Diet:     Answer:   Diabetic [3]       Assessment:  Active Hospital Problems    Diagnosis   • *Cervical myelopathy (HCC)   • HTN (hypertension)   • CVA (cerebral vascular accident) (HCC)   • Diabetes mellitus with neurological manifestations, controlled (HCC)   • Impaired activities of daily living   • Lumbar stenosis with neurogenic claudication   • Pain   • Cervical stenosis of spine   • Atrial fibrillation [427.31]   • Chronic antibiotic suppression       Medical Decision Making and Plan:  Mr. Ma is a 67-year-old male admitted for rehabilitation on July 18 in the setting of cervical myelopathy and lumbar spinal stenosis     Cervical myelopathy status post C2-C6 laminectomy and C2-C4 fusion by Dr. Marsh on July 13  Lumbar spinal stenosis with neurogenic claudication status post L2-L5 laminectomies by Dr. Marsh on July 13  Continue comprehensive rehabilitation  Followed-up with Dr. Marsh on 7/30  [place] collar can be removed for meals and showering ONLY  Custom  bilateral articulated AFOs have been obtained    Continue to discussed discharge plan and need for family training    Discussed avoiding itching his incision on the wheelchair to prevent wound breakdown  Discussed with Dr. Bennett  Discussed with nursing    History of strokes  Cognitive impairment  Atrial fibrillation   Coumadin per pharmacy protocol  Continue speech for cognitive deficits    INR is 1.78 today     Pain  Neuropathic pain/allodynia  Tylenol as needed   Gabapentin 900 mg 3 times a day  Lidocaine jelly scheduled every 8 hours for hand pain and neck pain    Hypertension  Amlodipine 10 mg daily  Lopressor 50 mg increased every 8 hours  Hydralazine 100 mg 3 times a day  Lisinopril 40 mg daily dosing      Appreciate hospitalist assistance  Blood pressure 136/88 this morning     Diabetes mellitus type 2 with a history of diabetic neuropathy--hemoglobin A1c of 9.9 on July 19  Lantus at bedtime  Sliding scale insulin  Metformin 1000 mg twice a day  Glipizide 2.5 mg daily beginning on August 2  Appreciate hospitalist consult    Glucose range of  in the last 24 hours     Anemia  Hemoglobin stable at 12.8 on August 8  Continue to monitor     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  Additionally, it has only been 2 months since his last injection.  Discussed conservative cares     Constipation  Colace 100 g twice a day  Pericolace qhs  Milk of magnesia as needed  Dulcolax suppository daily as needed     Seasonal allergies  Flonase daily     Incidental left thyroid mass seen on MRI in May 2018  Will need follow-up with primary care  TSH of 0.56 on admission    Vitamin D deficiency  Vitamin D was 26 on admission so we began supplementation with 2000 units of cholecalciferol daily      DVT  prophylaxis  Lovenox 40 mg daily  Discontinued when INR in range     Estimated discharge: August 14 due to need for training of his friend    Total time:  25 minutes.  I spent greater than 50% of the time for patient care, counseling, and coordination on this date, including unit/floor time, and face-to-face time with the patient as per interval events and assessment and plan above. Topics discussed included functional progress, ongoing therapy, incision care, avoiding irritation, and family training        Higinio Pagan M.D.  8/9/2018

## 2018-08-10 LAB
GLUCOSE BLD-MCNC: 106 MG/DL (ref 65–99)
GLUCOSE BLD-MCNC: 118 MG/DL (ref 65–99)
GLUCOSE BLD-MCNC: 212 MG/DL (ref 65–99)
GLUCOSE BLD-MCNC: 213 MG/DL (ref 65–99)
INR PPP: 2.07 (ref 0.87–1.13)
PROTHROMBIN TIME: 23 SEC (ref 12–14.6)

## 2018-08-10 PROCEDURE — A9270 NON-COVERED ITEM OR SERVICE: HCPCS | Performed by: HOSPITALIST

## 2018-08-10 PROCEDURE — 97110 THERAPEUTIC EXERCISES: CPT

## 2018-08-10 PROCEDURE — 700102 HCHG RX REV CODE 250 W/ 637 OVERRIDE(OP): Performed by: PHYSICAL MEDICINE & REHABILITATION

## 2018-08-10 PROCEDURE — G0515 COGNITIVE SKILLS DEVELOPMENT: HCPCS

## 2018-08-10 PROCEDURE — 85610 PROTHROMBIN TIME: CPT

## 2018-08-10 PROCEDURE — 97530 THERAPEUTIC ACTIVITIES: CPT

## 2018-08-10 PROCEDURE — 36415 COLL VENOUS BLD VENIPUNCTURE: CPT

## 2018-08-10 PROCEDURE — 700111 HCHG RX REV CODE 636 W/ 250 OVERRIDE (IP): Performed by: PHYSICAL MEDICINE & REHABILITATION

## 2018-08-10 PROCEDURE — 82962 GLUCOSE BLOOD TEST: CPT

## 2018-08-10 PROCEDURE — A9270 NON-COVERED ITEM OR SERVICE: HCPCS | Performed by: PHYSICAL MEDICINE & REHABILITATION

## 2018-08-10 PROCEDURE — 700102 HCHG RX REV CODE 250 W/ 637 OVERRIDE(OP): Performed by: HOSPITALIST

## 2018-08-10 PROCEDURE — 99232 SBSQ HOSP IP/OBS MODERATE 35: CPT | Performed by: PHYSICAL MEDICINE & REHABILITATION

## 2018-08-10 PROCEDURE — 770010 HCHG ROOM/CARE - REHAB SEMI PRIVAT*

## 2018-08-10 PROCEDURE — 99232 SBSQ HOSP IP/OBS MODERATE 35: CPT | Performed by: HOSPITALIST

## 2018-08-10 PROCEDURE — 700112 HCHG RX REV CODE 229: Performed by: PHYSICAL MEDICINE & REHABILITATION

## 2018-08-10 RX ORDER — LACTOBACILLUS RHAMNOSUS GG 10B CELL
1 CAPSULE ORAL
Status: DISCONTINUED | OUTPATIENT
Start: 2018-08-11 | End: 2018-08-14 | Stop reason: HOSPADM

## 2018-08-10 RX ORDER — BENZOCAINE/MENTHOL 6 MG-10 MG
LOZENGE MUCOUS MEMBRANE 4 TIMES DAILY
Status: DISCONTINUED | OUTPATIENT
Start: 2018-08-10 | End: 2018-08-14 | Stop reason: HOSPADM

## 2018-08-10 RX ORDER — WARFARIN SODIUM 7.5 MG/1
15 TABLET ORAL
Status: COMPLETED | OUTPATIENT
Start: 2018-08-10 | End: 2018-08-10

## 2018-08-10 RX ADMIN — METOPROLOL TARTRATE 50 MG: 25 TABLET ORAL at 05:44

## 2018-08-10 RX ADMIN — HYDRALAZINE HYDROCHLORIDE 100 MG: 25 TABLET, FILM COATED ORAL at 20:17

## 2018-08-10 RX ADMIN — METOPROLOL TARTRATE 75 MG: 25 TABLET ORAL at 20:17

## 2018-08-10 RX ADMIN — METFORMIN HYDROCHLORIDE 1000 MG: 500 TABLET, FILM COATED ORAL at 08:04

## 2018-08-10 RX ADMIN — LISINOPRIL 40 MG: 20 TABLET ORAL at 08:06

## 2018-08-10 RX ADMIN — Medication: at 17:45

## 2018-08-10 RX ADMIN — CHOLECALCIFEROL TAB 25 MCG (1000 UNIT) 2000 UNITS: 25 TAB at 08:04

## 2018-08-10 RX ADMIN — FLUTICASONE PROPIONATE 50 MCG: 50 SPRAY, METERED NASAL at 08:13

## 2018-08-10 RX ADMIN — CLINDAMYCIN HYDROCHLORIDE 300 MG: 300 CAPSULE ORAL at 08:05

## 2018-08-10 RX ADMIN — AMLODIPINE BESYLATE 10 MG: 5 TABLET ORAL at 05:45

## 2018-08-10 RX ADMIN — Medication: at 20:18

## 2018-08-10 RX ADMIN — GABAPENTIN 900 MG: 300 CAPSULE ORAL at 20:17

## 2018-08-10 RX ADMIN — Medication: at 13:00

## 2018-08-10 RX ADMIN — LACTOBACILLUS ACIDOPHILUS / LACTOBACILLUS BULGARICUS 1 PACKET: 100 MILLION CFU STRENGTH GRANULES at 08:03

## 2018-08-10 RX ADMIN — INSULIN LISPRO 4 UNITS: 100 INJECTION, SOLUTION INTRAVENOUS; SUBCUTANEOUS at 17:34

## 2018-08-10 RX ADMIN — INSULIN GLARGINE 40 UNITS: 100 INJECTION, SOLUTION SUBCUTANEOUS at 20:24

## 2018-08-10 RX ADMIN — BACITRACIN, NEOMYCIN, POLYMYXIN B: 400; 3.5; 5 OINTMENT TOPICAL at 08:11

## 2018-08-10 RX ADMIN — METFORMIN HYDROCHLORIDE 1000 MG: 500 TABLET, FILM COATED ORAL at 17:40

## 2018-08-10 RX ADMIN — ENOXAPARIN SODIUM 40 MG: 100 INJECTION SUBCUTANEOUS at 08:21

## 2018-08-10 RX ADMIN — CLINDAMYCIN HYDROCHLORIDE 300 MG: 300 CAPSULE ORAL at 20:17

## 2018-08-10 RX ADMIN — WARFARIN SODIUM 15 MG: 7.5 TABLET ORAL at 17:41

## 2018-08-10 RX ADMIN — BACITRACIN, NEOMYCIN, POLYMYXIN B: 400; 3.5; 5 OINTMENT TOPICAL at 20:17

## 2018-08-10 RX ADMIN — DOCUSATE SODIUM 100 MG: 100 CAPSULE, LIQUID FILLED ORAL at 08:05

## 2018-08-10 RX ADMIN — GABAPENTIN 900 MG: 300 CAPSULE ORAL at 08:05

## 2018-08-10 RX ADMIN — LACTOBACILLUS ACIDOPHILUS / LACTOBACILLUS BULGARICUS 1 PACKET: 100 MILLION CFU STRENGTH GRANULES at 11:39

## 2018-08-10 RX ADMIN — HYDRALAZINE HYDROCHLORIDE 100 MG: 25 TABLET, FILM COATED ORAL at 16:06

## 2018-08-10 RX ADMIN — HYDRALAZINE HYDROCHLORIDE 100 MG: 25 TABLET, FILM COATED ORAL at 08:08

## 2018-08-10 RX ADMIN — INSULIN LISPRO 4 UNITS: 100 INJECTION, SOLUTION INTRAVENOUS; SUBCUTANEOUS at 20:24

## 2018-08-10 RX ADMIN — METOPROLOL TARTRATE 75 MG: 25 TABLET ORAL at 16:06

## 2018-08-10 RX ADMIN — GABAPENTIN 900 MG: 300 CAPSULE ORAL at 16:06

## 2018-08-10 RX ADMIN — HYDROCORTISONE: 1 CREAM TOPICAL at 08:13

## 2018-08-10 ASSESSMENT — PAIN SCALES - GENERAL
PAINLEVEL_OUTOF10: 0
PAINLEVEL_OUTOF10: 0

## 2018-08-10 ASSESSMENT — ENCOUNTER SYMPTOMS
EYES NEGATIVE: 1
SHORTNESS OF BREATH: 0
CHILLS: 0
ABDOMINAL PAIN: 0
COUGH: 0
FEVER: 0
PALPITATIONS: 0
BACK PAIN: 1
NAUSEA: 0
NECK PAIN: 1
VOMITING: 0

## 2018-08-10 NOTE — CARE PLAN
Problem: Pain Management  Goal: Pain level will decrease to patient's comfort goal  Outcome: PROGRESSING AS EXPECTED  Pt complained of increased lower back pain. Per pt he says it started to worsen as of yesterday morning 8/9. Pt offered PRN pain medication but refused.

## 2018-08-10 NOTE — CARE PLAN
Problem: Skin Integrity  Goal: Risk for impaired skin integrity will decrease  Lower back incision cleaned with normal saline.  Antibiotic ointment applied to incision, hydrocortisone to surrounding skin and loose dressing applied.

## 2018-08-10 NOTE — DISCHARGE PLANNING
Per Kristin at [facility], referral accepted. They will deliver Monday, 8/13/18.

## 2018-08-10 NOTE — PROGRESS NOTES
Pharmacy Warfarin Consult   8/10/2018     67 y.o.   male     Indication for anticoagulation: Atrial Fibrilation     Goal INR = 2 - 3     Recent Labs      08/08/18   0558  08/09/18   0523  08/10/18   0559   INR  1.62*  1.78*  2.07*   HEMOGLOBIN  12.8*   --    --    HEMATOCRIT  39.9*   --    --        Pertinent Drug/Drug Interactions:  Antibiotics, trazodone  Outpatient Warfarin Regimen:  10mg daily per med rec  Recent Warfarin Dosing:   Dose from last 7 days     Date/Time Dose (mg)    08/10/18 0559  15    08/09/18 0523  15    08/08/18 0558  12.5    08/07/18 0538  12    08/06/18 0533  10    08/05/18 0523  12.5    08/04/18 0543  10          Bridge Therapy: Lovenox 40mg til INR > 2 for 2 days           1.  Coumadin 15 mg tonight for INR = 2.07           Chris Kelley [facility]

## 2018-08-10 NOTE — PROGRESS NOTES
"Rehab Progress Note     Encounter date: 8/10/2018  Today I met with the patient face to face in his room     Chief Complaint:  Cervical myelopathy (HCC) , ongoing progress    Interval Events (subjective)  Mr. Ma states that he is doing well today.  He reports some sacral pain when he sits in his wheelchair for too long.  We reviewed pressure shifting and weight redistribution to prevent skin breakdown.  He reports that this satisfactorily improved his symptoms.  We discussed family training and discharge.  He denies any fevers, chills, headache, dizziness, chest pain, or shortness of breath.    Objective:  VITAL SIGNS: /60   Pulse 66   Temp 36.9 °C (98.4 °F)   Resp 18   Ht 1.905 m (6' 3\")   Wt 114.5 kg (252 lb 6.8 oz)   SpO2 94%   BMI 31.55 kg/m²     Recent Results (from the past 72 hour(s))   ACCU-CHEK GLUCOSE    Collection Time: 08/07/18  5:17 PM   Result Value Ref Range    Glucose - Accu-Ck 220 (H) 65 - 99 mg/dL   ACCU-CHEK GLUCOSE    Collection Time: 08/07/18  8:36 PM   Result Value Ref Range    Glucose - Accu-Ck 165 (H) 65 - 99 mg/dL   PROTHROMBIN TIME    Collection Time: 08/08/18  5:58 AM   Result Value Ref Range    PT 18.9 (H) 12.0 - 14.6 sec    INR 1.62 (H) 0.87 - 1.13   CBC WITH DIFFERENTIAL    Collection Time: 08/08/18  5:58 AM   Result Value Ref Range    WBC 4.8 4.8 - 10.8 K/uL    RBC 4.58 (L) 4.70 - 6.10 M/uL    Hemoglobin 12.8 (L) 14.0 - 18.0 g/dL    Hematocrit 39.9 (L) 42.0 - 52.0 %    MCV 87.1 81.4 - 97.8 fL    MCH 27.9 27.0 - 33.0 pg    MCHC 32.1 (L) 33.7 - 35.3 g/dL    RDW 46.5 35.9 - 50.0 fL    Platelet Count 186 164 - 446 K/uL    MPV 11.5 9.0 - 12.9 fL    Neutrophils-Polys 61.80 44.00 - 72.00 %    Lymphocytes 21.70 (L) 22.00 - 41.00 %    Monocytes 11.80 0.00 - 13.40 %    Eosinophils 3.70 0.00 - 6.90 %    Basophils 0.60 0.00 - 1.80 %    Immature Granulocytes 0.40 0.00 - 0.90 %    Nucleated RBC 0.00 /100 WBC    Neutrophils (Absolute) 2.98 1.82 - 7.42 K/uL    Lymphs (Absolute) 1.05 " 1.00 - 4.80 K/uL    Monos (Absolute) 0.57 0.00 - 0.85 K/uL    Eos (Absolute) 0.18 0.00 - 0.51 K/uL    Baso (Absolute) 0.03 0.00 - 0.12 K/uL    Immature Granulocytes (abs) 0.02 0.00 - 0.11 K/uL    NRBC (Absolute) 0.00 K/uL   BASIC METABOLIC PANEL    Collection Time: 08/08/18  5:58 AM   Result Value Ref Range    Sodium 139 135 - 145 mmol/L    Potassium 3.6 3.6 - 5.5 mmol/L    Chloride 104 96 - 112 mmol/L    Co2 27 20 - 33 mmol/L    Glucose 91 65 - 99 mg/dL    Bun 25 (H) 8 - 22 mg/dL    Creatinine 0.88 0.50 - 1.40 mg/dL    Calcium 9.0 8.5 - 10.5 mg/dL    Anion Gap 8.0 0.0 - 11.9   ESTIMATED GFR    Collection Time: 08/08/18  5:58 AM   Result Value Ref Range    GFR If African American >60 >60 mL/min/1.73 m 2    GFR If Non African American >60 >60 mL/min/1.73 m 2   ACCU-CHEK GLUCOSE    Collection Time: 08/08/18  7:19 AM   Result Value Ref Range    Glucose - Accu-Ck 96 65 - 99 mg/dL   ACCU-CHEK GLUCOSE    Collection Time: 08/08/18  8:40 AM   Result Value Ref Range    Glucose - Accu-Ck 137 (H) 65 - 99 mg/dL   ACCU-CHEK GLUCOSE    Collection Time: 08/08/18 11:34 AM   Result Value Ref Range    Glucose - Accu-Ck 112 (H) 65 - 99 mg/dL   ACCU-CHEK GLUCOSE    Collection Time: 08/08/18  5:24 PM   Result Value Ref Range    Glucose - Accu-Ck 184 (H) 65 - 99 mg/dL   ACCU-CHEK GLUCOSE    Collection Time: 08/08/18  9:13 PM   Result Value Ref Range    Glucose - Accu-Ck 153 (H) 65 - 99 mg/dL   PROTHROMBIN TIME    Collection Time: 08/09/18  5:23 AM   Result Value Ref Range    PT 20.4 (H) 12.0 - 14.6 sec    INR 1.78 (H) 0.87 - 1.13   ACCU-CHEK GLUCOSE    Collection Time: 08/09/18  7:51 AM   Result Value Ref Range    Glucose - Accu-Ck 86 65 - 99 mg/dL   ACCU-CHEK GLUCOSE    Collection Time: 08/09/18 11:35 AM   Result Value Ref Range    Glucose - Accu-Ck 106 (H) 65 - 99 mg/dL   ACCU-CHEK GLUCOSE    Collection Time: 08/09/18  5:37 PM   Result Value Ref Range    Glucose - Accu-Ck 210 (H) 65 - 99 mg/dL   ACCU-CHEK GLUCOSE    Collection Time:  08/09/18  8:26 PM   Result Value Ref Range    Glucose - Accu-Ck 180 (H) 65 - 99 mg/dL   PROTHROMBIN TIME    Collection Time: 08/10/18  5:59 AM   Result Value Ref Range    PT 23.0 (H) 12.0 - 14.6 sec    INR 2.07 (H) 0.87 - 1.13   ACCU-CHEK GLUCOSE    Collection Time: 08/10/18  8:01 AM   Result Value Ref Range    Glucose - Accu-Ck 106 (H) 65 - 99 mg/dL   ACCU-CHEK GLUCOSE    Collection Time: 08/10/18 11:38 AM   Result Value Ref Range    Glucose - Accu-Ck 118 (H) 65 - 99 mg/dL       Current Facility-Administered Medications   Medication Frequency   • warfarin (COUMADIN) tablet 15 mg COUMADIN-ONCE   • hydrocortisone 1 % cream 4X/DAY   • metoprolol (LOPRESSOR) tablet 75 mg Q8HRS   • diphenhydrAMINE-ZnAcetate (BENADRYL ITCH) 1-0.1 % cream TID PRN   • neomycin-bacitracin-polymyxin (NEOSPORIN) 400-5-5000 ointment BID   • MD ALERT... warfarin (COUMADIN) per pharmacy protocol pharmacy to dose   • lidocaine (XYLOCAINE) 2 % jelly Q8HRS   • insulin glargine (LANTUS) injection 40 Units Q EVENING   • lactobacillus granules (LACTINEX/FLORANEX) packet 1 Packet TID WITH MEALS   • insulin lispro (HUMALOG) injection 0-12 Units 4X/DAY ACHS   • gabapentin (NEURONTIN) capsule 900 mg TID   • vitamin D (cholecalciferol) tablet 2,000 Units DAILY   • lisinopril (PRINIVIL) tablet 40 mg DAILY   • Respiratory Care per Protocol Continuous RT   • Pharmacy Consult Request ...Pain Management Review 1 Each PRN   • acetaminophen (TYLENOL) tablet 650 mg Q4HRS PRN   • hydrALAZINE (APRESOLINE) tablet 25 mg Q8HRS PRN   • ondansetron (ZOFRAN ODT) dispertab 4 mg 4X/DAY PRN    Or   • ondansetron (ZOFRAN) syringe/vial injection 4 mg 4X/DAY PRN   • traZODone (DESYREL) tablet 50 mg QHS PRN   • bisacodyl (DULCOLAX) suppository 10 mg Q24HRS PRN   • magnesium hydroxide (MILK OF MAGNESIA) suspension 30 mL QDAY PRN   • senna-docusate (PERICOLACE or SENOKOT S) 8.6-50 MG per tablet 1 Tab Nightly   • amLODIPine (NORVASC) tablet 10 mg Q DAY   • clindamycin (CLEOCIN)  capsule 300 mg BID   • enoxaparin (LOVENOX) inj 40 mg DAILY   • fluticasone (FLONASE) nasal spray 50 mcg DAILY   • hydrALAZINE (APRESOLINE) tablet 100 mg TID   • metFORMIN (GLUCOPHAGE) tablet 1,000 mg BID WITH MEALS   • docusate sodium (COLACE) capsule 100 mg BID       Exam Date: 8/10/2018    General:  Awake, alert, oriented, no acute distress  HEENT:  Wearing Aspen cervical collar  Cardiac: regular rate and rhythm  Lungs: clear to auscultation bilaterally.   Abdomen: soft; non tender, non distended, bowel sounds present and normoactive  Extremities: Stable lower limb edema  Neuro:   Continues to be limited by lack of ankle dorsiflexion.  Continues to have some ataxic hand movements.      Orders Placed This Encounter   Procedures   • Diet Order Diabetic     Standing Status:   Standing     Number of Occurrences:   1     Order Specific Question:   Diet:     Answer:   Diabetic [3]       Assessment:  Active Hospital Problems    Diagnosis   • *Cervical myelopathy (HCC)   • HTN (hypertension)   • CVA (cerebral vascular accident) (HCC)   • Diabetes mellitus with neurological manifestations, controlled (HCC)   • Impaired activities of daily living   • Lumbar stenosis with neurogenic claudication   • Pain   • Cervical stenosis of spine   • Atrial fibrillation [427.31]   • Chronic antibiotic suppression       Medical Decision Making and Plan:  Mr. Ma is a 67-year-old male admitted for rehabilitation on July 18 in the setting of cervical myelopathy and lumbar spinal stenosis     Cervical myelopathy status post C2-C6 laminectomy and C2-C4 fusion by Dr. Marsh on July 13  Lumbar spinal stenosis with neurogenic claudication status post L2-L5 laminectomies by Dr. Marsh on July 13  Continue comprehensive rehabilitation  Followed-up with Dr. Marsh on 7/30  Fillmore collar can be removed for meals and showering ONLY  Custom bilateral articulated AFOs have been obtained    Discussed discharge plans    History of strokes  Cognitive  impairment  Atrial fibrillation   Coumadin per pharmacy protocol  Continue speech for cognitive deficits    INR is 2.07 today     Pain  Neuropathic pain/allodynia  Tylenol as needed   Gabapentin 900 mg 3 times a day  Lidocaine jelly scheduled every 8 hours for hand pain and neck pain    Hypertension  Amlodipine 10 mg daily  Lopressor 75 mg every 8 hours  Hydralazine 100 mg 3 times a day  Lisinopril 40 mg daily dosing      Appreciate ongoing hospitalist assistance with hypertensive management  Blood pressure continues to be elevated at 149/71 mmHg today     Diabetes mellitus type 2 with a history of diabetic neuropathy--hemoglobin A1c of 9.9 on July 19  Lantus at bedtime  Sliding scale insulin  Metformin 1000 mg twice a day  Glipizide 2.5 mg daily beginning on August 2  Appreciate hospitalist consult    Glucose range of 118-210 in the last 24 hours     Anemia  Hemoglobin stable at 12.8 on August 8  Continue to monitor     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  Additionally, it has only been 2 months since his last injection.  Discussed conservative cares     Constipation  Colace 100 g twice a day  Pericolace qhs  Milk of magnesia as needed  Dulcolax suppository daily as needed     Seasonal allergies  Flonase daily     Incidental left thyroid mass seen on MRI in May 2018  Will need follow-up with primary care  TSH of 0.56 on admission    Vitamin D deficiency  Vitamin D was 26 on admission so we began supplementation with 2000 units of cholecalciferol daily      DVT prophylaxis   Coumadin is therapeutic.  Lovenox discontinued     Estimated discharge: August 14 due to need for training of his friend    Total time:  25 minutes.  I spent greater than 50% of the time for patient care, counseling,  and coordination on this date, including unit/floor time, and face-to-face time with the patient as per interval events and assessment and plan above. Topics discussed included discharge planning, family training, weight shift, skin protection, muscular healing    Higinio Pagan M.D.  8/10/2018

## 2018-08-10 NOTE — PROGRESS NOTES
Hospital Medicine Progress Note, Adult, Complex               Author: Patel Sabrina Date & Time created: 8/10/2018  12:34 PM     Interval History:  CC - HTN, DM    Pt reports itching around incision better w/ topical Hydrocortisone.    Review of Systems:  Review of Systems   Constitutional: Negative for chills and fever.   HENT: Negative.    Eyes: Negative.    Respiratory: Negative for cough and shortness of breath.    Cardiovascular: Positive for leg swelling. Negative for chest pain and palpitations.   Gastrointestinal: Negative for abdominal pain, nausea and vomiting.   Genitourinary: Negative.    Musculoskeletal: Positive for back pain and neck pain.        Wound pain   Skin: Positive for itching.   Endo/Heme/Allergies: Negative.        Physical Exam:  Physical Exam   Constitutional: He is oriented to person, place, and time. No distress.   HENT:   Head: Normocephalic and atraumatic.   Right Ear: External ear normal.   Left Ear: External ear normal.   Eyes: Conjunctivae and EOM are normal. Right eye exhibits no discharge. Left eye exhibits no discharge.   Neck:   C-collar   Cardiovascular:   irreg irreg   Pulmonary/Chest: No stridor. No respiratory distress. He has no wheezes.   Decreased BS   Abdominal: Soft. Bowel sounds are normal. He exhibits no distension. There is no tenderness. There is no rebound and no guarding.   Musculoskeletal: He exhibits edema.   1+ edema RLE, trace LLE   Neurological: He is alert and oriented to person, place, and time.   Skin: Skin is warm and dry. He is not diaphoretic.   Lumbar surgical incision C/D/I w/ improving surrounding erythema most notably proximal end of incision, no warmth/tenderness/drainage, no induration   Vitals reviewed.      Labs:        Invalid input(s): [id number]      Recent Labs      08/08/18   0558   SODIUM  139   POTASSIUM  3.6   CHLORIDE  104   CO2  27   BUN  25*   CREATININE  0.88   CALCIUM  9.0     Recent Labs      08/08/18   0558   GLUCOSE  91     Recent  Labs      18   0558  18   0523  08/10/18   0559   RBC  4.58*   --    --    HEMOGLOBIN  12.8*   --    --    HEMATOCRIT  39.9*   --    --    PLATELETCT  186   --    --    PROTHROMBTM  18.9*  20.4*  23.0*   INR  1.62*  1.78*  2.07*     Recent Labs      18   0558   WBC  4.8   NEUTSPOLYS  61.80   LYMPHOCYTES  21.70*   MONOCYTES  11.80   EOSINOPHILS  3.70   BASOPHILS  0.60           Hemodynamics:  Temp (24hrs), Av °C (98.6 °F), Min:36.9 °C (98.4 °F), Max:37.1 °C (98.7 °F)  Temperature: 36.9 °C (98.4 °F)  Pulse  Av  Min: 57  Max: 113   Blood Pressure : 144/60     Respiratory:    Respiration: 18, Pulse Oximetry: 94 %     Work Of Breathing / Effort: Mild  RUL Breath Sounds: Clear, RML Breath Sounds: Clear;Diminished, RLL Breath Sounds: Clear;Diminished, AI Breath Sounds: Clear, LLL Breath Sounds: Clear;Diminished  Fluids:    Intake/Output Summary (Last 24 hours) at 08/10/18 1234  Last data filed at 08/10/18 1214   Gross per 24 hour   Intake             1220 ml   Output              400 ml   Net              820 ml        GI/Nutrition:  Orders Placed This Encounter   Procedures   • Diet Order Diabetic     Standing Status:   Standing     Number of Occurrences:   1     Order Specific Question:   Diet:     Answer:   Diabetic [3]         Medical Decision Making, by Problem:  S/P CERVICAL/LUMBAR SPINE SURGERIES - cervical collar, Hydrocortisone Cream around lumbar incision helping to relieve pruritus and inflammation, monitor for wound infxn    H/O CVA    JANNIE - on home BiPAP    AFIB - rate controlled on B-Bl, anticoagulated on Coumadin    HTN - will cont to increase Metoprolol; already maxed out on Norvasc, Lisinopril, and Hydralazine    DM - good control on Metformin and Lantus, unlikely to need SSI on discharge    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 w/ Clindamycin, cont Lactobacillus, Venous Duplex negative DVT    THYROID MASS - needs outpt F/U    ANEMIA - follow H/H    AZOTEMIA -  renal fxn improved off HCTZ and w/ PO fluids    VIT D DEF - supplementing    ALLERGY TO STATINS      Reviewed w/ pt and RN      Quality-Core Measures   Reviewed items::  Medications reviewed and Labs reviewed  DVT prophylaxis pharmacological::  Warfarin (Coumadin)  Antibiotics:  Treating active infection/contamination beyond 24 hours perioperative coverage  Assessed for rehabilitation services:  Patient was assess for and/or received rehabilitation services during this hospitalization

## 2018-08-10 NOTE — REHAB-CM IDT TEAM NOTE
Case Management    DC Planning  DC destination/dispostion:  Patient lives in a [place] single level mobile home alone.  He needs a ramp.     DC Needs:  Referred to and accepted by [facility]  He needs assistance for discharge and has a friend who is willing to live with him and provide care.  He can also build a ramp.  Patient has a fww, shower bench and elevated toilet seat; manual wheelchair has been ordered from [facility].  He is currently with [facility]. Follow up appointments with PCP, Dr. Sterling and neurosurgeon, Dr. Marsh,  He has a glucometer and cpap.  He may need meals on wheels.  If this plan does not work out he may still need a skilled facility.     Barriers to discharge:   Patient is inconsistent with information, lacks support at home.     Strengths: Participates and is making progress with therapies    Section completed by:  Inés Thompson CM MSW

## 2018-08-10 NOTE — CARE PLAN
Problem: Bowel/Gastric:  Goal: Normal bowel function is maintained or improved  Outcome: PROGRESSING AS EXPECTED  Pt refused evening bowel medication. Pt reports he has been having regular bowel movements every 2 days.    Problem: GLYCEMIA IMBALANCE  Goal: Clinical indication of glycemia balance is achieved  Outcome: PROGRESSING AS EXPECTED  HS blood sugar was 180. 2 units of insulin given per sliding scale. Pt received evening snack. No s/s of hypoglycemia noted so far.

## 2018-08-10 NOTE — CARE PLAN
Problem: Communication  Goal: The ability to communicate needs accurately and effectively will improve  Patient alert and oriented x 4,     Problem: Pain Management  Goal: Pain level will decrease to patient's comfort goal  Patient denies pain or discomfort.     Problem: GLYCEMIA IMBALANCE  Goal: Clinical indication of glycemia balance is achieved  FSBS monitored per orders.

## 2018-08-11 LAB
GLUCOSE BLD-MCNC: 124 MG/DL (ref 65–99)
GLUCOSE BLD-MCNC: 126 MG/DL (ref 65–99)
GLUCOSE BLD-MCNC: 204 MG/DL (ref 65–99)
INR PPP: 2.15 (ref 0.87–1.13)
PROTHROMBIN TIME: 23.7 SEC (ref 12–14.6)

## 2018-08-11 PROCEDURE — G0515 COGNITIVE SKILLS DEVELOPMENT: HCPCS

## 2018-08-11 PROCEDURE — 85610 PROTHROMBIN TIME: CPT

## 2018-08-11 PROCEDURE — 700102 HCHG RX REV CODE 250 W/ 637 OVERRIDE(OP): Performed by: PHYSICAL MEDICINE & REHABILITATION

## 2018-08-11 PROCEDURE — 99232 SBSQ HOSP IP/OBS MODERATE 35: CPT | Performed by: HOSPITALIST

## 2018-08-11 PROCEDURE — A9270 NON-COVERED ITEM OR SERVICE: HCPCS | Performed by: PHYSICAL MEDICINE & REHABILITATION

## 2018-08-11 PROCEDURE — 770010 HCHG ROOM/CARE - REHAB SEMI PRIVAT*

## 2018-08-11 PROCEDURE — 36415 COLL VENOUS BLD VENIPUNCTURE: CPT

## 2018-08-11 PROCEDURE — 700102 HCHG RX REV CODE 250 W/ 637 OVERRIDE(OP): Performed by: HOSPITALIST

## 2018-08-11 PROCEDURE — A9270 NON-COVERED ITEM OR SERVICE: HCPCS | Performed by: HOSPITALIST

## 2018-08-11 PROCEDURE — 97530 THERAPEUTIC ACTIVITIES: CPT

## 2018-08-11 PROCEDURE — 82962 GLUCOSE BLOOD TEST: CPT | Mod: 91

## 2018-08-11 RX ORDER — WARFARIN SODIUM 7.5 MG/1
15 TABLET ORAL
Status: COMPLETED | OUTPATIENT
Start: 2018-08-11 | End: 2018-08-11

## 2018-08-11 RX ADMIN — Medication: at 08:00

## 2018-08-11 RX ADMIN — Medication: at 22:19

## 2018-08-11 RX ADMIN — METFORMIN HYDROCHLORIDE 1000 MG: 500 TABLET, FILM COATED ORAL at 07:54

## 2018-08-11 RX ADMIN — HYDRALAZINE HYDROCHLORIDE 100 MG: 25 TABLET, FILM COATED ORAL at 07:55

## 2018-08-11 RX ADMIN — METOPROLOL TARTRATE 75 MG: 25 TABLET ORAL at 05:34

## 2018-08-11 RX ADMIN — INSULIN GLARGINE 40 UNITS: 100 INJECTION, SOLUTION SUBCUTANEOUS at 22:20

## 2018-08-11 RX ADMIN — AMLODIPINE BESYLATE 10 MG: 5 TABLET ORAL at 05:34

## 2018-08-11 RX ADMIN — METOPROLOL TARTRATE 75 MG: 25 TABLET ORAL at 22:29

## 2018-08-11 RX ADMIN — CLINDAMYCIN HYDROCHLORIDE 300 MG: 300 CAPSULE ORAL at 07:58

## 2018-08-11 RX ADMIN — WARFARIN SODIUM 15 MG: 7.5 TABLET ORAL at 17:42

## 2018-08-11 RX ADMIN — GABAPENTIN 900 MG: 300 CAPSULE ORAL at 22:10

## 2018-08-11 RX ADMIN — Medication: at 13:00

## 2018-08-11 RX ADMIN — BACITRACIN, NEOMYCIN, POLYMYXIN B: 400; 3.5; 5 OINTMENT TOPICAL at 07:50

## 2018-08-11 RX ADMIN — METOPROLOL TARTRATE 75 MG: 25 TABLET ORAL at 15:13

## 2018-08-11 RX ADMIN — Medication 1 CAPSULE: at 07:51

## 2018-08-11 RX ADMIN — GABAPENTIN 900 MG: 300 CAPSULE ORAL at 07:55

## 2018-08-11 RX ADMIN — GABAPENTIN 900 MG: 300 CAPSULE ORAL at 15:13

## 2018-08-11 RX ADMIN — LISINOPRIL 40 MG: 20 TABLET ORAL at 07:57

## 2018-08-11 RX ADMIN — INSULIN LISPRO 4 UNITS: 100 INJECTION, SOLUTION INTRAVENOUS; SUBCUTANEOUS at 17:52

## 2018-08-11 RX ADMIN — HYDRALAZINE HYDROCHLORIDE 100 MG: 25 TABLET, FILM COATED ORAL at 22:10

## 2018-08-11 RX ADMIN — HYDRALAZINE HYDROCHLORIDE 100 MG: 25 TABLET, FILM COATED ORAL at 15:10

## 2018-08-11 RX ADMIN — BACITRACIN, NEOMYCIN, POLYMYXIN B: 400; 3.5; 5 OINTMENT TOPICAL at 22:10

## 2018-08-11 RX ADMIN — INSULIN LISPRO 4 UNITS: 100 INJECTION, SOLUTION INTRAVENOUS; SUBCUTANEOUS at 22:21

## 2018-08-11 RX ADMIN — METFORMIN HYDROCHLORIDE 1000 MG: 500 TABLET, FILM COATED ORAL at 17:42

## 2018-08-11 RX ADMIN — CHOLECALCIFEROL TAB 25 MCG (1000 UNIT) 2000 UNITS: 25 TAB at 07:57

## 2018-08-11 RX ADMIN — Medication: at 17:00

## 2018-08-11 RX ADMIN — CLINDAMYCIN HYDROCHLORIDE 300 MG: 300 CAPSULE ORAL at 22:10

## 2018-08-11 ASSESSMENT — ENCOUNTER SYMPTOMS
VOMITING: 0
FEVER: 0
NAUSEA: 0
CHILLS: 0
PALPITATIONS: 0
EYES NEGATIVE: 1
BACK PAIN: 1
SHORTNESS OF BREATH: 0
COUGH: 0
ABDOMINAL PAIN: 0
NECK PAIN: 1

## 2018-08-11 ASSESSMENT — PAIN SCALES - GENERAL
PAINLEVEL_OUTOF10: 0
PAINLEVEL_OUTOF10: 0

## 2018-08-11 NOTE — PROGRESS NOTES
DATE OF SERVICE:  08/08/2018    The patient continues to communicate that his pain is getting slightly better   and his strength is improving.  The patient said he is meeting all of his   goals in his therapies.    This session focused on the patient's home program and what he hopes to   accomplish short and long-term.  Activities that he used to engage in were   discussed.  The patient was encouraged to set minimal goals in an attempt to   increase his pleasant activities.       ____________________________________     RAN PERALES, PHD    ANGELES / TARAH    DD:  08/11/2018 14:50:44  DT:  08/11/2018 16:38:50    D#:  [id number]  Job#:  [id number]

## 2018-08-11 NOTE — REHAB-PHARMACY IDT TEAM NOTE
Pharmacy   Pharmacy  Antibiotics/Antifungals/Antivirals:  Medication:      Active Orders     Ordered     Ordering Provider       Wed Jul 18, 2018  2:28 PM    07/18/18 1428  clindamycin (CLEOCIN) capsule 300 mg  2 TIMES DAILY      Higinio Pagan M.D.        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  Antihypertensives/Cardiac:  Medication:    Orders (72h ago through future)    Start     Ordered    08/10/18 1400  metoprolol (LOPRESSOR) tablet 75 mg  EVERY 8 HOURS      08/10/18 1237    08/08/18 2200  metoprolol (LOPRESSOR) tablet 50 mg  EVERY 8 HOURS,   Status:  Discontinued      08/08/18 1414    08/06/18 1800  metoprolol (LOPRESSOR) tablet 50 mg  TWICE DAILY,   Status:  Discontinued      08/06/18 0934    07/20/18 0900  lisinopril (PRINIVIL) tablet 40 mg  DAILY      07/19/18 1613    07/19/18 0600  amLODIPine (NORVASC) tablet 10 mg  Q DAY      07/18/18 1427    07/18/18 1500  hydrALAZINE (APRESOLINE) tablet 100 mg  3 TIMES DAILY      07/18/18 1428    07/18/18 1428  hydrALAZINE (APRESOLINE) tablet 25 mg  EVERY 8 HOURS PRN      07/18/18 1428        Patient Vitals for the past 24 hrs:   BP Pulse   08/10/18 1606 156/80 68   08/10/18 0808 144/60 -   08/10/18 0625 149/71 66   08/10/18 0544 140/78 72   08/09/18 2018 144/68 72   08/09/18 1816 144/69 70       Anticoagulation:  Medication: Coumadin per pharmacy  INR:    Recent Labs      08/07/18   0538  08/08/18   0558  08/09/18   0523  08/10/18   0559   INR  1.49*  1.62*  1.78*  2.07*   Indication for anticoagulation: Atrial Fibrilation     Goal INR = 2 - 3   Pertinent Drug/Drug Interactions:  Antibiotics, trazodone  Outpatient Warfarin Regimen:  10mg daily per med rec  Recent Warfarin Dosing:        Dose from last 7 days      Date/Time Dose (mg)     08/10/18 0559  15     08/09/18 0523  15     08/08/18 0558  12.5     08/07/18 0538  12     08/06/18 0533  10     08/05/18 0523  12.5     08/04/18 0543  10        Other key medications: A review of the medication list reveals no issues at this time. Patient is currently on antihypertensive(s). Recommend home blood pressure monitoring/recording if antihypertensive(s) regimen(s) continue. Patient is currently on diabetic medication(s) and/or Insulin(s). Recommend home blood glucose monitoring/recording if these regimen(s) continue.    Section completed by: Chris Kelley [facility]

## 2018-08-11 NOTE — CARE PLAN
Problem: Skin Integrity  Goal: Risk for impaired skin integrity will decrease  Outcome: PROGRESSING AS EXPECTED  Lower back incision continues to have redness around the area. abx ointment applied to incision and dressing applied for protection/pt comfort. Picture uploaded to chart.    Problem: GLYCEMIA IMBALANCE  Goal: Clinical indication of glycemia balance is achieved  Outcome: PROGRESSING AS EXPECTED  HS blood sugar was 212. 4 units of insulin given per sliding scale. Pt is asymptomatic for hypoglycemia so far this shift.

## 2018-08-11 NOTE — PROGRESS NOTES
DATE OF SERVICE:  08/10/2018    The patient continues to do well at followup.  He seems somewhat more tired   today.  The patient's sleep continues to be erratic.  Patient was spoken to   about his therapies and what kind program he would like to set up upon his   return home.       ____________________________________     RAN PERALES, PHD    ANGELES / TARAH    DD:  08/11/2018 15:02:51  DT:  08/11/2018 16:56:34    D#:  [id number]  Job#:  [id number]

## 2018-08-11 NOTE — PROGRESS NOTES
DATE OF SERVICE:  08/07/2018    The patient is doing fairly well on followup visit.  His pain continues to   slowly improve.  The patient said he is gaining in his strength and endurance.    The patient said he is looking forward to returning home.  The patient   talked at some length about what he believes he needs to do to do well after   his discharge.       ____________________________________     RAN PERALES, PHD    ANGELES / TARAH    DD:  08/11/2018 14:03:28  DT:  08/11/2018 16:30:32    D#:  [id number]  Job#:  [id number]

## 2018-08-11 NOTE — CARE PLAN
Problem: Skin Integrity  Goal: Risk for impaired skin integrity will decrease  Back incision red/pink.  No drainage noted.  Antibiotic ointment applied to incision.      Problem: Pain Management  Goal: Pain level will decrease to patient's comfort goal  Patient denies pain.

## 2018-08-11 NOTE — PROGRESS NOTES
Inpatient Anticoagulation Service Note    Date: 8/11/2018  Reason for Anticoagulation: Atrial Fibrillation        Hemoglobin Value: (!) 12.8  Hematocrit Value: (!) 39.9  Lab Platelet Value: 186  Target INR: 2.0 to 3.0    INR from last 7 days     Date/Time INR Value    08/11/18 0522 (!)  2.15    08/10/18 0559 (!)  2.07    08/09/18 0523 (!)  1.78    08/08/18 0558 (!)  1.62    08/07/18 0538 (!)  1.49    08/06/18 0533 (!)  1.42    08/05/18 0523 (!)  1.17        Dose from last 7 days     Date/Time Dose (mg)    08/11/18 0900  15    08/10/18 0559  15    08/09/18 0523  15    08/08/18 0558  12.5    08/07/18 0538  12    08/06/18 0533  10    08/05/18 0523  12.5        Significant Interactions: Antibiotics, Other (Comments)  Bridge Therapy: Yes (DVT prophylaxis with enoxaparin 40 mg sub q daily) (If less than 5 days and overlap therapy discontinued -- document reason (i.e. Bleed Risk))  INR Value Greater than 2 Prior to Discontinuation of Parenteral Anticoagulation: Yes (If still on overlap therapy, if No -- document reason (i.e. Bleed Risk))         Plan:  Give warfarin 15 mg po tonight for INR of 2.15          Tiago Elizalde [facility]

## 2018-08-12 LAB
ANION GAP SERPL CALC-SCNC: 8 MMOL/L (ref 0–11.9)
BUN SERPL-MCNC: 26 MG/DL (ref 8–22)
CALCIUM SERPL-MCNC: 8.7 MG/DL (ref 8.5–10.5)
CHLORIDE SERPL-SCNC: 107 MMOL/L (ref 96–112)
CO2 SERPL-SCNC: 26 MMOL/L (ref 20–33)
CREAT SERPL-MCNC: 0.82 MG/DL (ref 0.5–1.4)
ERYTHROCYTE [DISTWIDTH] IN BLOOD BY AUTOMATED COUNT: 46.7 FL (ref 35.9–50)
GLUCOSE BLD-MCNC: 135 MG/DL (ref 65–99)
GLUCOSE BLD-MCNC: 160 MG/DL (ref 65–99)
GLUCOSE BLD-MCNC: 167 MG/DL (ref 65–99)
GLUCOSE BLD-MCNC: 227 MG/DL (ref 65–99)
GLUCOSE BLD-MCNC: 86 MG/DL (ref 65–99)
GLUCOSE SERPL-MCNC: 114 MG/DL (ref 65–99)
HCT VFR BLD AUTO: 38.8 % (ref 42–52)
HGB BLD-MCNC: 12.4 G/DL (ref 14–18)
INR PPP: 2.98 (ref 0.87–1.13)
MCH RBC QN AUTO: 27.8 PG (ref 27–33)
MCHC RBC AUTO-ENTMCNC: 32 G/DL (ref 33.7–35.3)
MCV RBC AUTO: 87 FL (ref 81.4–97.8)
PLATELET # BLD AUTO: 157 K/UL (ref 164–446)
PMV BLD AUTO: 11.6 FL (ref 9–12.9)
POTASSIUM SERPL-SCNC: 3.8 MMOL/L (ref 3.6–5.5)
PROTHROMBIN TIME: 30.7 SEC (ref 12–14.6)
RBC # BLD AUTO: 4.46 M/UL (ref 4.7–6.1)
SODIUM SERPL-SCNC: 141 MMOL/L (ref 135–145)
WBC # BLD AUTO: 6 K/UL (ref 4.8–10.8)

## 2018-08-12 PROCEDURE — 700102 HCHG RX REV CODE 250 W/ 637 OVERRIDE(OP): Performed by: PHYSICAL MEDICINE & REHABILITATION

## 2018-08-12 PROCEDURE — 700102 HCHG RX REV CODE 250 W/ 637 OVERRIDE(OP): Performed by: HOSPITALIST

## 2018-08-12 PROCEDURE — 770010 HCHG ROOM/CARE - REHAB SEMI PRIVAT*

## 2018-08-12 PROCEDURE — A9270 NON-COVERED ITEM OR SERVICE: HCPCS | Performed by: HOSPITALIST

## 2018-08-12 PROCEDURE — 80048 BASIC METABOLIC PNL TOTAL CA: CPT

## 2018-08-12 PROCEDURE — 36415 COLL VENOUS BLD VENIPUNCTURE: CPT

## 2018-08-12 PROCEDURE — 85027 COMPLETE CBC AUTOMATED: CPT

## 2018-08-12 PROCEDURE — 82962 GLUCOSE BLOOD TEST: CPT | Mod: 91

## 2018-08-12 PROCEDURE — A9270 NON-COVERED ITEM OR SERVICE: HCPCS | Performed by: PHYSICAL MEDICINE & REHABILITATION

## 2018-08-12 PROCEDURE — 99232 SBSQ HOSP IP/OBS MODERATE 35: CPT | Performed by: HOSPITALIST

## 2018-08-12 PROCEDURE — 85610 PROTHROMBIN TIME: CPT

## 2018-08-12 RX ORDER — LISINOPRIL 20 MG/1
40 TABLET ORAL 2 TIMES DAILY
Status: DISCONTINUED | OUTPATIENT
Start: 2018-08-12 | End: 2018-08-14 | Stop reason: HOSPADM

## 2018-08-12 RX ORDER — WARFARIN SODIUM 5 MG/1
10 TABLET ORAL
Status: COMPLETED | OUTPATIENT
Start: 2018-08-12 | End: 2018-08-12

## 2018-08-12 RX ADMIN — CLINDAMYCIN HYDROCHLORIDE 300 MG: 300 CAPSULE ORAL at 22:12

## 2018-08-12 RX ADMIN — HYDRALAZINE HYDROCHLORIDE 100 MG: 25 TABLET, FILM COATED ORAL at 22:06

## 2018-08-12 RX ADMIN — METOPROLOL TARTRATE 75 MG: 25 TABLET ORAL at 22:06

## 2018-08-12 RX ADMIN — LISINOPRIL 40 MG: 20 TABLET ORAL at 08:11

## 2018-08-12 RX ADMIN — HYDRALAZINE HYDROCHLORIDE 100 MG: 25 TABLET, FILM COATED ORAL at 08:12

## 2018-08-12 RX ADMIN — CHOLECALCIFEROL TAB 25 MCG (1000 UNIT) 2000 UNITS: 25 TAB at 08:11

## 2018-08-12 RX ADMIN — BACITRACIN, NEOMYCIN, POLYMYXIN B: 400; 3.5; 5 OINTMENT TOPICAL at 08:12

## 2018-08-12 RX ADMIN — Medication 1 CAPSULE: at 08:11

## 2018-08-12 RX ADMIN — INSULIN LISPRO 2 UNITS: 100 INJECTION, SOLUTION INTRAVENOUS; SUBCUTANEOUS at 22:09

## 2018-08-12 RX ADMIN — LISINOPRIL 40 MG: 20 TABLET ORAL at 22:07

## 2018-08-12 RX ADMIN — Medication: at 13:32

## 2018-08-12 RX ADMIN — Medication: at 22:12

## 2018-08-12 RX ADMIN — AMLODIPINE BESYLATE 10 MG: 5 TABLET ORAL at 06:03

## 2018-08-12 RX ADMIN — GABAPENTIN 900 MG: 300 CAPSULE ORAL at 14:27

## 2018-08-12 RX ADMIN — CLINDAMYCIN HYDROCHLORIDE 300 MG: 300 CAPSULE ORAL at 08:11

## 2018-08-12 RX ADMIN — GABAPENTIN 900 MG: 300 CAPSULE ORAL at 22:06

## 2018-08-12 RX ADMIN — BACITRACIN, NEOMYCIN, POLYMYXIN B: 400; 3.5; 5 OINTMENT TOPICAL at 22:06

## 2018-08-12 RX ADMIN — INSULIN LISPRO 2 UNITS: 100 INJECTION, SOLUTION INTRAVENOUS; SUBCUTANEOUS at 17:09

## 2018-08-12 RX ADMIN — Medication: at 09:55

## 2018-08-12 RX ADMIN — METOPROLOL TARTRATE 75 MG: 25 TABLET ORAL at 06:03

## 2018-08-12 RX ADMIN — Medication: at 17:13

## 2018-08-12 RX ADMIN — HYDRALAZINE HYDROCHLORIDE 100 MG: 25 TABLET, FILM COATED ORAL at 14:28

## 2018-08-12 RX ADMIN — METFORMIN HYDROCHLORIDE 1000 MG: 500 TABLET, FILM COATED ORAL at 08:11

## 2018-08-12 RX ADMIN — WARFARIN SODIUM 10 MG: 5 TABLET ORAL at 17:15

## 2018-08-12 RX ADMIN — METFORMIN HYDROCHLORIDE 1000 MG: 500 TABLET, FILM COATED ORAL at 17:09

## 2018-08-12 RX ADMIN — GABAPENTIN 900 MG: 300 CAPSULE ORAL at 08:11

## 2018-08-12 RX ADMIN — INSULIN GLARGINE 40 UNITS: 100 INJECTION, SOLUTION SUBCUTANEOUS at 22:09

## 2018-08-12 RX ADMIN — METOPROLOL TARTRATE 75 MG: 25 TABLET ORAL at 14:27

## 2018-08-12 ASSESSMENT — ENCOUNTER SYMPTOMS
COUGH: 0
VOMITING: 0
CHILLS: 0
ABDOMINAL PAIN: 0
BACK PAIN: 1
SHORTNESS OF BREATH: 0
NAUSEA: 0
EYES NEGATIVE: 1
NECK PAIN: 1
PALPITATIONS: 0
FEVER: 0

## 2018-08-12 ASSESSMENT — PAIN SCALES - GENERAL
PAINLEVEL_OUTOF10: 0
PAINLEVEL_OUTOF10: 0

## 2018-08-12 NOTE — PROGRESS NOTES
Inpatient Anticoagulation Service Note    Date: 8/12/2018  Reason for Anticoagulation: Atrial Fibrillation        Hemoglobin Value: (!) 12.4  Hematocrit Value: (!) 38.8  Lab Platelet Value: (!) 157  Target INR: 2.0 to 3.0    INR from last 7 days     Date/Time INR Value    08/12/18 0529 (!)  2.98    08/11/18 0522 (!)  2.15    08/10/18 0559 (!)  2.07    08/09/18 0523 (!)  1.78    08/08/18 0558 (!)  1.62    08/07/18 0538 (!)  1.49    08/06/18 0533 (!)  1.42        Dose from last 7 days     Date/Time Dose (mg)    08/12/18 0800  10    08/11/18 0900  15    08/10/18 0559  15    08/09/18 0523  15    08/08/18 0558  12.5    08/07/18 0538  12    08/06/18 0533  10        Significant Interactions: Antibiotics, Other (Comments)  Bridge Therapy: Yes (DVT prophylaxis with enoxaparin 40 mg sub q daily) (If less than 5 days and overlap therapy discontinued -- document reason (i.e. Bleed Risk))  INR Value Greater than 2 Prior to Discontinuation of Parenteral Anticoagulation: Yes (If still on overlap therapy, if No -- document reason (i.e. Bleed Risk))         Plan:  Will give warfarin 10 mg po tonight for INR of 2.98          Taigo Elizalde [facility]

## 2018-08-12 NOTE — PROGRESS NOTES
Hospital Medicine Progress Note, Adult, Complex               Author: Patel Sabrina Date & Time created: 8/12/2018  1:48 PM     Interval History:  CC - HTN, DM    Pt requesting that Lisinopril be increased to 40 mg bid per his outpt dose under the direction of his Nephrologist (Dr. Bonner).    Review of Systems:  Review of Systems   Constitutional: Negative for chills and fever.   HENT: Negative.    Eyes: Negative.    Respiratory: Negative for cough and shortness of breath.    Cardiovascular: Positive for leg swelling. Negative for chest pain and palpitations.   Gastrointestinal: Negative for abdominal pain, nausea and vomiting.   Genitourinary: Negative.    Musculoskeletal: Positive for back pain and neck pain.        Wound pain   Skin: Positive for itching.   Endo/Heme/Allergies: Negative.        Physical Exam:  Physical Exam   Constitutional: He is oriented to person, place, and time. No distress.   HENT:   Head: Normocephalic and atraumatic.   Right Ear: External ear normal.   Left Ear: External ear normal.   Eyes: Conjunctivae and EOM are normal. Right eye exhibits no discharge. Left eye exhibits no discharge.   Neck:   C-collar   Cardiovascular:   irreg irreg   Pulmonary/Chest: No stridor. No respiratory distress. He has no wheezes.   Decreased BS   Abdominal: Soft. Bowel sounds are normal. He exhibits no distension. There is no tenderness. There is no rebound and no guarding.   Musculoskeletal: He exhibits edema.   1+ edema RLE, trace LLE   Neurological: He is alert and oriented to person, place, and time.   Skin: Skin is warm and dry. He is not diaphoretic.   Lumbar surgical incision C/D/I w/ improving surrounding erythema, no warmth/tenderness/drainage, no induration   Vitals reviewed.      Labs:        Invalid input(s): [id number]      Recent Labs      08/12/18   0529   SODIUM  141   POTASSIUM  3.8   CHLORIDE  107   CO2  26   BUN  26*   CREATININE  0.82   CALCIUM  8.7     Recent Labs      08/12/18   0529    GLUCOSE  114*     Recent Labs      08/10/18   0559  18   0522  18   0529   RBC   --    --   4.46*   HEMOGLOBIN   --    --   12.4*   HEMATOCRIT   --    --   38.8*   PLATELETCT   --    --   157*   PROTHROMBTM  23.0*  23.7*  30.7*   INR  2.07*  2.15*  2.98*     Recent Labs      18   0529   WBC  6.0           Hemodynamics:  Temp (24hrs), Av.7 °C (98.1 °F), Min:36.6 °C (97.8 °F), Max:36.9 °C (98.5 °F)  Temperature: 36.7 °C (98.1 °F)  Pulse  Av.3  Min: 55  Max: 113   Blood Pressure : 146/74     Respiratory:    Respiration: 18, Pulse Oximetry: 96 %        RUL Breath Sounds: Clear, RML Breath Sounds: Clear;Diminished, RLL Breath Sounds: Clear;Diminished, AI Breath Sounds: Clear, LLL Breath Sounds: Clear;Diminished  Fluids:    Intake/Output Summary (Last 24 hours) at 18 1348  Last data filed at 18 1200   Gross per 24 hour   Intake             1060 ml   Output              775 ml   Net              285 ml     Weight: 118.6 kg (261 lb 6.4 oz)  GI/Nutrition:  Orders Placed This Encounter   Procedures   • Diet Order Diabetic     Standing Status:   Standing     Number of Occurrences:   1     Order Specific Question:   Diet:     Answer:   Diabetic [3]         Medical Decision Making, by Problem:  S/P CERVICAL/LUMBAR SPINE SURGERIES - cervical collar, Hydrocortisone Cream around lumbar incision helping to relieve pruritus and inflammation, no signs or symptoms of wound infxn    H/O CVA    JANNIE - on home BiPAP    AFIB - rate controlled on B-Bl, anticoagulated on Coumadin    HTN - will increase Lisinopril to 40 mg bid per pt's outpt regimen; cont Norvasc, Metoprolol, and Hydralazine    DM - good control on Metformin and Lantus, unlikely to need SSI on discharge    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 w/ Clindamycin, cont Lactobacillus, Venous Duplex negative DVT    THYROID MASS - needs outpt F/U    ANEMIA - follow H/H    AZOTEMIA - renal fxn improved off HCTZ and w/ PO  fluids    VIT D DEF - supplementing    ALLERGY TO STATINS          Quality-Core Measures   Reviewed items::  Medications reviewed and Labs reviewed  DVT prophylaxis pharmacological::  Warfarin (Coumadin)  Antibiotics:  Treating active infection/contamination beyond 24 hours perioperative coverage  Assessed for rehabilitation services:  Patient was assess for and/or received rehabilitation services during this hospitalization

## 2018-08-12 NOTE — CARE PLAN
Problem: Skin Integrity  Goal: Risk for impaired skin integrity will decrease  Outcome: PROGRESSING AS EXPECTED  Posterior neck incision is healing well. Lower back incision appears slightly less red than previous night. Abx ointment applied and dressing placed over incision for pt comfort.    Problem: GLYCEMIA IMBALANCE  Goal: Clinical indication of glycemia balance is achieved  Outcome: PROGRESSING AS EXPECTED  HS blood sugar was 227. 4 units of insulin given per sliding scale in addition to scheduled lantus. Pt received evening snack. No s/s of hypoglycemia noted at this time.

## 2018-08-12 NOTE — PROGRESS NOTES
Hospital Medicine Progress Note, Adult, Complex               Author: Amanda Bennett Date & Time created: 8/11/2018  7:14 PM     Interval History:  CC - HTN, DM    No new complaints today.    Review of Systems:  Review of Systems   Constitutional: Negative for chills and fever.   HENT: Negative.    Eyes: Negative.    Respiratory: Negative for cough and shortness of breath.    Cardiovascular: Positive for leg swelling. Negative for chest pain and palpitations.   Gastrointestinal: Negative for abdominal pain, nausea and vomiting.   Genitourinary: Negative.    Musculoskeletal: Positive for back pain and neck pain.        Wound pain   Skin: Positive for itching.   Endo/Heme/Allergies: Negative.        Physical Exam:  Physical Exam   Constitutional: He is oriented to person, place, and time. No distress.   HENT:   Head: Normocephalic and atraumatic.   Right Ear: External ear normal.   Left Ear: External ear normal.   Eyes: Conjunctivae and EOM are normal. Right eye exhibits no discharge. Left eye exhibits no discharge.   Neck:   C-collar   Cardiovascular:   irreg irreg   Pulmonary/Chest: No stridor. No respiratory distress. He has no wheezes.   Decreased BS   Abdominal: Soft. Bowel sounds are normal. He exhibits no distension. There is no tenderness. There is no rebound and no guarding.   Musculoskeletal: He exhibits edema.   1+ edema RLE, trace LLE   Neurological: He is alert and oriented to person, place, and time.   Skin: Skin is warm and dry. He is not diaphoretic.   Lumbar surgical incision C/D/I w/ improving surrounding erythema most notably proximal end of incision, no warmth/tenderness/drainage, no induration   Vitals reviewed.      Labs:        Invalid input(s): [id number]      No results for input(s): SODIUM, POTASSIUM, CHLORIDE, CO2, BUN, CREATININE, MAGNESIUM, PHOSPHORUS, CALCIUM in the last 72 hours.  No results for input(s): ALTSGPT, ASTSGOT, ALKPHOSPHAT, TBILIRUBIN, DBILIRUBIN, GAMMAGT, AMYLASE, LIPASE, ALB,  PREALBUMIN, GLUCOSE in the last 72 hours.  Recent Labs      18   0523  08/10/18   0559  18   0522   PROTHROMBTM  20.4*  23.0*  23.7*   INR  1.78*  2.07*  2.15*               Hemodynamics:  Temp (24hrs), Av.7 °C (98.1 °F), Min:36.6 °C (97.8 °F), Max:36.9 °C (98.5 °F)  Temperature: 36.9 °C (98.5 °F)  Pulse  Av.5  Min: 55  Max: 113   Blood Pressure : 138/72     Respiratory:    Respiration: 18, Pulse Oximetry: 93 %     Work Of Breathing / Effort: Mild  RUL Breath Sounds: Clear, RML Breath Sounds: Clear;Diminished, RLL Breath Sounds: Clear;Diminished, AI Breath Sounds: Clear, LLL Breath Sounds: Clear;Diminished  Fluids:    Intake/Output Summary (Last 24 hours) at 18 1914  Last data filed at 18 1231   Gross per 24 hour   Intake              860 ml   Output             1700 ml   Net             -840 ml        GI/Nutrition:  Orders Placed This Encounter   Procedures   • Diet Order Diabetic     Standing Status:   Standing     Number of Occurrences:   1     Order Specific Question:   Diet:     Answer:   Diabetic [3]         Medical Decision Making, by Problem:  S/P CERVICAL/LUMBAR SPINE SURGERIES - cervical collar, Hydrocortisone Cream around lumbar incision helping to relieve pruritus and inflammation, monitor for wound infxn    H/O CVA    JANNIE - on home BiPAP    AFIB - rate controlled on B-Bl, anticoagulated on Coumadin    HTN - better BP control w/ increased Metoprolol; already maxed out on Norvasc, Lisinopril, and Hydralazine    DM - good control on Metformin and Lantus, unlikely to need SSI on discharge    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 w/ Clindamycin, cont Lactobacillus, Venous Duplex negative DVT    THYROID MASS - needs outpt F/U    ANEMIA - follow H/H    AZOTEMIA - renal fxn improved off HCTZ and w/ PO fluids    VIT D DEF - supplementing    ALLERGY TO STATINS          Quality-Core Measures   Reviewed items::  Medications reviewed and Labs reviewed  DVT prophylaxis  pharmacological::  Warfarin (Coumadin)  Antibiotics:  Treating active infection/contamination beyond 24 hours perioperative coverage  Assessed for rehabilitation services:  Patient was assess for and/or received rehabilitation services during this hospitalization

## 2018-08-12 NOTE — CARE PLAN
Problem: Safety  Goal: Will remain free from injury  Outcome: PROGRESSING AS EXPECTED  Patient demonstrates good safety technique this shift.  Asks for assistance when needed and does not attempt self transfer.  Able to verbalize needs.      Problem: Bowel/Gastric:  Goal: Normal bowel function is maintained or improved  Outcome: PROGRESSING AS EXPECTED  Patient having regular bowel movements; last BM 8/12.  Denies s/s constipation, pt refused scheduled stool softener.

## 2018-08-13 LAB
GLUCOSE BLD-MCNC: 133 MG/DL (ref 65–99)
GLUCOSE BLD-MCNC: 148 MG/DL (ref 65–99)
GLUCOSE BLD-MCNC: 161 MG/DL (ref 65–99)
GLUCOSE BLD-MCNC: 168 MG/DL (ref 65–99)
INR PPP: 3.02 (ref 0.87–1.13)
PROTHROMBIN TIME: 31 SEC (ref 12–14.6)

## 2018-08-13 PROCEDURE — A9270 NON-COVERED ITEM OR SERVICE: HCPCS | Performed by: PHYSICAL MEDICINE & REHABILITATION

## 2018-08-13 PROCEDURE — 82962 GLUCOSE BLOOD TEST: CPT | Mod: 91

## 2018-08-13 PROCEDURE — 97110 THERAPEUTIC EXERCISES: CPT

## 2018-08-13 PROCEDURE — 700102 HCHG RX REV CODE 250 W/ 637 OVERRIDE(OP): Performed by: HOSPITALIST

## 2018-08-13 PROCEDURE — 85610 PROTHROMBIN TIME: CPT

## 2018-08-13 PROCEDURE — 36415 COLL VENOUS BLD VENIPUNCTURE: CPT

## 2018-08-13 PROCEDURE — 700102 HCHG RX REV CODE 250 W/ 637 OVERRIDE(OP): Performed by: PHYSICAL MEDICINE & REHABILITATION

## 2018-08-13 PROCEDURE — 99233 SBSQ HOSP IP/OBS HIGH 50: CPT | Performed by: PHYSICAL MEDICINE & REHABILITATION

## 2018-08-13 PROCEDURE — A9270 NON-COVERED ITEM OR SERVICE: HCPCS | Performed by: HOSPITALIST

## 2018-08-13 PROCEDURE — 99232 SBSQ HOSP IP/OBS MODERATE 35: CPT | Performed by: HOSPITALIST

## 2018-08-13 PROCEDURE — G0515 COGNITIVE SKILLS DEVELOPMENT: HCPCS

## 2018-08-13 PROCEDURE — 770010 HCHG ROOM/CARE - REHAB SEMI PRIVAT*

## 2018-08-13 PROCEDURE — 97530 THERAPEUTIC ACTIVITIES: CPT

## 2018-08-13 PROCEDURE — 97535 SELF CARE MNGMENT TRAINING: CPT

## 2018-08-13 PROCEDURE — 97112 NEUROMUSCULAR REEDUCATION: CPT

## 2018-08-13 RX ORDER — INSULIN GLARGINE 100 [IU]/ML
40 INJECTION, SOLUTION SUBCUTANEOUS EVERY EVENING
Qty: 10 ML | Refills: 2 | Status: SHIPPED | OUTPATIENT
Start: 2018-08-13 | End: 2018-08-17

## 2018-08-13 RX ORDER — WARFARIN SODIUM 3 MG/1
9 TABLET ORAL
Status: COMPLETED | OUTPATIENT
Start: 2018-08-13 | End: 2018-08-13

## 2018-08-13 RX ORDER — LISINOPRIL 40 MG/1
40 TABLET ORAL 2 TIMES DAILY
Qty: 60 TAB | Refills: 2 | Status: SHIPPED | OUTPATIENT
Start: 2018-08-13 | End: 2018-08-17

## 2018-08-13 RX ORDER — ACETAMINOPHEN 325 MG/1
650 TABLET ORAL EVERY 4 HOURS PRN
Qty: 30 TAB | Refills: 0 | COMMUNITY
Start: 2018-08-13 | End: 2018-08-17

## 2018-08-13 RX ORDER — METOPROLOL TARTRATE 75 MG/1
75 TABLET, FILM COATED ORAL EVERY 8 HOURS
Qty: 90 TAB | Refills: 2 | Status: SHIPPED | OUTPATIENT
Start: 2018-08-13 | End: 2018-08-17

## 2018-08-13 RX ORDER — CLINDAMYCIN HYDROCHLORIDE 300 MG/1
300 CAPSULE ORAL 2 TIMES DAILY
Qty: 60 CAP | Refills: 2 | Status: SHIPPED | OUTPATIENT
Start: 2018-08-13 | End: 2018-08-17

## 2018-08-13 RX ORDER — HYDRALAZINE HYDROCHLORIDE 100 MG/1
100 TABLET, FILM COATED ORAL 3 TIMES DAILY
Qty: 90 TAB | Refills: 2 | Status: SHIPPED | OUTPATIENT
Start: 2018-08-13 | End: 2018-08-17

## 2018-08-13 RX ORDER — GABAPENTIN 300 MG/1
900 CAPSULE ORAL 3 TIMES DAILY
Qty: 270 CAP | Refills: 2 | Status: SHIPPED | OUTPATIENT
Start: 2018-08-13 | End: 2018-08-17

## 2018-08-13 RX ORDER — AMLODIPINE BESYLATE 10 MG/1
10 TABLET ORAL DAILY
Qty: 30 TAB | Refills: 2 | Status: SHIPPED | OUTPATIENT
Start: 2018-08-14 | End: 2018-08-17

## 2018-08-13 RX ORDER — WARFARIN SODIUM 3 MG/1
9 TABLET ORAL ONCE
Qty: 30 TAB | Refills: 2 | Status: SHIPPED | OUTPATIENT
Start: 2018-08-15 | End: 2018-08-15

## 2018-08-13 RX ADMIN — INSULIN GLARGINE 40 UNITS: 100 INJECTION, SOLUTION SUBCUTANEOUS at 20:45

## 2018-08-13 RX ADMIN — HYDRALAZINE HYDROCHLORIDE 100 MG: 25 TABLET, FILM COATED ORAL at 14:32

## 2018-08-13 RX ADMIN — Medication: at 12:58

## 2018-08-13 RX ADMIN — HYDRALAZINE HYDROCHLORIDE 100 MG: 25 TABLET, FILM COATED ORAL at 20:40

## 2018-08-13 RX ADMIN — BACITRACIN, NEOMYCIN, POLYMYXIN B: 400; 3.5; 5 OINTMENT TOPICAL at 08:09

## 2018-08-13 RX ADMIN — BACITRACIN, NEOMYCIN, POLYMYXIN B: 400; 3.5; 5 OINTMENT TOPICAL at 20:39

## 2018-08-13 RX ADMIN — Medication 1 CAPSULE: at 08:09

## 2018-08-13 RX ADMIN — INSULIN LISPRO 2 UNITS: 100 INJECTION, SOLUTION INTRAVENOUS; SUBCUTANEOUS at 17:03

## 2018-08-13 RX ADMIN — HYDRALAZINE HYDROCHLORIDE 100 MG: 25 TABLET, FILM COATED ORAL at 08:09

## 2018-08-13 RX ADMIN — CHOLECALCIFEROL TAB 25 MCG (1000 UNIT) 2000 UNITS: 25 TAB at 08:09

## 2018-08-13 RX ADMIN — METFORMIN HYDROCHLORIDE 1000 MG: 500 TABLET, FILM COATED ORAL at 08:05

## 2018-08-13 RX ADMIN — Medication: at 17:12

## 2018-08-13 RX ADMIN — CLINDAMYCIN HYDROCHLORIDE 300 MG: 300 CAPSULE ORAL at 20:41

## 2018-08-13 RX ADMIN — GABAPENTIN 900 MG: 300 CAPSULE ORAL at 20:41

## 2018-08-13 RX ADMIN — LISINOPRIL 40 MG: 20 TABLET ORAL at 08:08

## 2018-08-13 RX ADMIN — INSULIN LISPRO 2 UNITS: 100 INJECTION, SOLUTION INTRAVENOUS; SUBCUTANEOUS at 20:46

## 2018-08-13 RX ADMIN — CLINDAMYCIN HYDROCHLORIDE 300 MG: 300 CAPSULE ORAL at 08:09

## 2018-08-13 RX ADMIN — METOPROLOL TARTRATE 75 MG: 25 TABLET ORAL at 14:31

## 2018-08-13 RX ADMIN — LISINOPRIL 40 MG: 20 TABLET ORAL at 20:39

## 2018-08-13 RX ADMIN — Medication: at 21:00

## 2018-08-13 RX ADMIN — GABAPENTIN 900 MG: 300 CAPSULE ORAL at 14:31

## 2018-08-13 RX ADMIN — AMLODIPINE BESYLATE 10 MG: 5 TABLET ORAL at 06:06

## 2018-08-13 RX ADMIN — METFORMIN HYDROCHLORIDE 1000 MG: 500 TABLET, FILM COATED ORAL at 17:11

## 2018-08-13 RX ADMIN — METOPROLOL TARTRATE 75 MG: 25 TABLET ORAL at 20:40

## 2018-08-13 RX ADMIN — Medication: at 08:10

## 2018-08-13 RX ADMIN — METOPROLOL TARTRATE 75 MG: 25 TABLET ORAL at 06:06

## 2018-08-13 RX ADMIN — WARFARIN SODIUM 9 MG: 3 TABLET ORAL at 17:10

## 2018-08-13 RX ADMIN — GABAPENTIN 900 MG: 300 CAPSULE ORAL at 08:09

## 2018-08-13 ASSESSMENT — ENCOUNTER SYMPTOMS
PALPITATIONS: 0
SHORTNESS OF BREATH: 0
COUGH: 0
BACK PAIN: 1
VOMITING: 0
ABDOMINAL PAIN: 0
NECK PAIN: 1
EYES NEGATIVE: 1
CHILLS: 0
NAUSEA: 0
FEVER: 0

## 2018-08-13 ASSESSMENT — ACTIVITIES OF DAILY LIVING (ADL)
SHOWER_TRANSFER_LEVEL_OF_ASSIST: REQUIRES SUPERVISION WITH SHOWER TRANSFER
TOILET_TRANSFER_LEVEL_OF_ASSIST: ABLE TO COMPLETE TOILET TRANSFER WITHOUT ASSIST
TOILETING_LEVEL_OF_ASSIST: ABLE TO COMPLETE TOILETING WITHOUT ASSIST

## 2018-08-13 ASSESSMENT — PATIENT HEALTH QUESTIONNAIRE - PHQ9
SUM OF ALL RESPONSES TO PHQ9 QUESTIONS 1 AND 2: 0
SUM OF ALL RESPONSES TO PHQ9 QUESTIONS 1 AND 2: 0
2. FEELING DOWN, DEPRESSED, IRRITABLE, OR HOPELESS: NOT AT ALL
1. LITTLE INTEREST OR PLEASURE IN DOING THINGS: NOT AT ALL
2. FEELING DOWN, DEPRESSED, IRRITABLE, OR HOPELESS: NOT AT ALL
1. LITTLE INTEREST OR PLEASURE IN DOING THINGS: NOT AT ALL

## 2018-08-13 ASSESSMENT — PAIN SCALES - GENERAL: PAINLEVEL_OUTOF10: 0

## 2018-08-13 NOTE — DISCHARGE SUMMARY
Rehabitation Discharge Summary    Admission Date: 7/18/2018    Discharge Date: 8/14/2018      Attending Provider:  Higinio Pagan MD    Admission Diagnosis:   Active Hospital Problems    Diagnosis   • *Cervical myelopathy (HCC)   • HTN (hypertension)   • CVA (cerebral vascular accident) (HCC)   • Diabetes mellitus with neurological manifestations, controlled (HCC)   • Impaired activities of daily living   • Lumbar stenosis with neurogenic claudication   • Pain   • Cervical stenosis of spine   • Atrial fibrillation [427.31]   • Chronic antibiotic suppression       Discharge Diagnosis:  Active Hospital Problems    Diagnosis   • *Cervical myelopathy (HCC)   • HTN (hypertension)   • CVA (cerebral vascular accident) (HCC)   • Diabetes mellitus with neurological manifestations, controlled (HCC)   • Impaired activities of daily living   • Lumbar stenosis with neurogenic claudication   • Pain   • Cervical stenosis of spine   • Atrial fibrillation [427.31]   • Chronic antibiotic suppression       HPI per H&P by Dr. Pagan:  Mr. Ma is a 67 year old right hand dominant male with a history of atrial fibrillation and prior stroke on chronic Coumadin, diabetes, history of infected knee hardware with MRSA, cervical myelopathy, lumbar spinal stenosis, hypertension, and multiple concussions who was admitted to [facility] on July 13, 2018     Prior to surgery, the patient had severe lumbar spinal stenosis with neurogenic claudication limiting his ability to ambulate.  He had a previous cervical spinal fusion in 2016 with chronic coordination deficits and neck pain.  He had chronic right upper limb radicular symptoms.  His history of 2 strokes resulted in temporary right hemiparesis.  He has been on chronic Coumadin.       He reports that over the last several months, he has noticed worsening gait.  His right leg drags behind him.  Following his stroke, he had AFOs, but these were discontinued.  He has used a front-wheeled walker  since his stroke.  He reports that he walks several miles with a walker.  He is very active.     He was taken to the operating room on July 13, 2018 by Dr. Marsh for C2-C6 laminectomy, C2-C4 posterior fusion and L2-L5 laminectomies.     6 click scores are 6 for mobility and 11 for activities of daily living.  He was last seen by physical therapy on July 17 and required moderate assist for bed mobility.  He was last seen by Occupational Therapy on July 16 and was max assist for stand pivot transfer.     He was admitted for rehabilitation on July 18, 2018      Hospital Course by Problem List:  Cervical myelopathy status post C2-C6 laminectomy and C2-C4 fusion by Dr. Marsh on July 13  Lumbar spinal stenosis with neurogenic claudication status post L2-L5 laminectomies by Dr. Marsh on July 13  He participated in a comprehensive rehabilitation program and will need ongoing outpatient home health therapies.  He should continue wearing his Aspen collar until directed by neurosurgery to discontinue this.  During his hospitalization, we frequently reiterated the need for collar precautions.  Custom bilateral articulated AFOs have been obtained.  Training of his friend was completed before discharge to help with safe discharge plan.    I again reinforced that he is not to resume driving during our discharge discussion on August 14     History of strokes  Cognitive impairment  Atrial fibrillation   He was restarted on Coumadin and his INR is 2.54 on August 14.  He will take 9 mg of coumadin, and his INR should be rechecked by the home health team on August 15 with further dose adjustments as directed by the Coumadin clinic.      Pain  Neuropathic pain/allodynia  He should continue on gabapentin.  He can continue Tylenol as needed.  He was successfully wean from opioids during his hospitalization.     Hypertension  His blood pressure was somewhat variable throughout his hospitalization.  He has been discharged on a  combination of amlodipine, Lopressor, hydralazine, and lisinopril.  He should follow-up closely with his primary care provider for ongoing dose adjustments.  He will also need a recheck of his kidney function and potassium in the setting of his elevated lisinopril dosing.        Diabetes mellitus type 2 with a history of diabetic neuropathy--hemoglobin A1c of 9.9 on   He has been instructed in a diabetic diet and provided with resources to continue this.  He will be discharged on metformin 1000 mg twice a day and Lantus at bedtime.  We discussed that if he resumes his sliding scale insulin, he will need to closely monitor his blood glucose to prevent hypoglycemic episodes which could be dangerous, or even fatal.  He will need ongoing follow-up with his primary care provider.     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     Hip osteoarthritis  We discussed rationale behind avoidance of intra-articular injection at this time due to infection and recent hardware placement.  In the future, his primary care provider can arrange for follow-up injections as needed.     Constipation--resolved     Seasonal allergies  He can continue over-the-counter allergy medications if needed.     Incidental left thyroid mass seen on MRI in May 2018  He will need to follow-up for the thyroid mass with his primary care provider.  His TSH was 0.56 on admission     Vitamin D deficiency  Vitamin D was 26 on admission so we began supplementation with 2000 units of cholecalciferol daily    Functional Status at Discharge  Eatin - Independent  Eating Description:  Set-up of equipment or meal/tube feeding  Groomin - Standby Prompting/Supervision or Set-up  Grooming Description:  Set-up of equipment (setup with hair brush)  Bathin -  "Standby Prompting/Supervision or Set-up  Bathing Description:  Adaptive equipment, Grab bar, Hand held shower, Supervision for safety, Set-up of equipment  Upper Body Dressin - Standby Prompting/Supervision or Set-up  Upper Body Dressing Description:  Supervision for safety, Set-up of equipment  Lower Body Dressin - Standby Prompting/Supervsion or Set-up  Lower Body Dressing Description:  5 - Standby Prompting/Supervsion or Set-up     Walk:  2 - Max Assistance  Distance Walked:  Walks  feet  Walk Description:   (CGA with FWW with B AFOs. 60 ft w/PT CGA, then 55 ft with friend providing CGA. Down concrete ramp outside x50 ft with friend CGA. )  Wheelchair:  4 - Minimal Assistance  Distance Propelled:  Propels a minimum of 150 feet   Wheelchair Description:  Verbal cueing, Supervision for safety, Extra time, Safety concerns, Requires incidental assist (ascended/descended 16' ADA ramp x 2 trials in each direction with SBA to Manuel going up, vc for technique and body mechanics)  Stairs 1 - Total Assistance  Stairs Description (1 reps of 2 steps; posterior descending due to patient report of knee buckling; R railing, L FWW, max cueing, B AFOs, gait belt, Max A due to balance.)     Comprehension Mode:  Auditory  Comprehension:  6 - Modified Independent  Comprehension Description:  Verbal cues  Expression Mode:  Vocal  Expression:  7 - Independent  Expression Description:  Verbal cueing  Social Interaction:  6 - Modified Independent  Social Interaction Description:  Increased time  Problem Solvin - Modified Independent  Problem Solving Description:  Verbal cueing, Therapy schedule, Bed/chair alarm, Increased time  Memory:  5 - Standby Prompting/Supervision or Set-up  Memory Description:  Verbal cueing, Therapy schedule       Vital signs:  VITAL SIGNS: /77   Pulse 60   Temp 36.9 °C (98.4 °F)   Resp 18   Ht 1.905 m (6' 3\")   Wt 118.6 kg (261 lb 6.4 oz)   SpO2 92%   BMI 32.67 kg/m² "     Physical Examination 8/14/2018:  General:  Awake, alert, oriented, no acute distress  HEENT:  Wearing Aspen Collar  Cardiac: regular rate and rhythm  Lungs: clear to auscultation bilaterally.   Abdomen: soft; non tender, non distended, bowel sounds present and normoactive  Extremities: Stable 1 + edema in the bilateral lower limbs  Musculoskeletal:  intrinsic wasting  Skin:  Posterior cervical incision is healing well.  Lumbar incision has mild irritation from the patient scratching, but overall it is healing well.  Neuro:   No active ankle dorsiflexion bilaterally.  Gradual improvement in ataxic hand and foot movements, but these remain imprecise         Discharge Medication:     Medication List      START taking these medications      Instructions   acetaminophen 325 MG Tabs  Commonly known as:  TYLENOL   Take 2 Tabs by mouth every four hours as needed for Mild Pain (headache).  Dose:  650 mg     Cholecalciferol 2000 UNIT Tabs   Doctor's comments:  Please direct all refill requests to the primary care provider.  No refills will be given by this office.  Take 1 Tab by mouth every day.  Dose:  2000 Units     clindamycin 300 MG Caps  Commonly known as:  CLEOCIN   Doctor's comments:  Please direct all refill requests to the primary care provider.  No refills will be given by this office.  Take 1 Cap by mouth 2 Times a Day.  Dose:  300 mg     Metoprolol Tartrate 75 MG Tabs   Doctor's comments:  Please direct all refill requests to the primary care provider.  No refills will be given by this office.  Take 75 mg by mouth every 8 hours.  Dose:  75 mg     warfarin 3 MG Tabs  Commonly known as:  COUMADIN   Doctor's comments:  Please direct all refill requests to the primary care provider.  No refills will be given by this office.  Take 3 Tabs by mouth Once for 1 dose. Adjust dose as directed by coumadin clinic  Dose:  9 mg        CHANGE how you take these medications      Instructions   amLODIPine 10 MG  Tabs  What changed:  · See the new instructions.  · Another medication with the same name was removed. Continue taking this medication, and follow the directions you see here.  Commonly known as:  NORVASC   Doctor's comments:  Please direct all refill requests to the primary care provider.  No refills will be given by this office.  Take 1 Tab by mouth every day.  Dose:  10 mg     gabapentin 300 MG Caps  What changed:  · how much to take  · when to take this  · Another medication with the same name was removed. Continue taking this medication, and follow the directions you see here.  Commonly known as:  NEURONTIN   Doctor's comments:  Please direct all refill requests to the primary care provider.  No refills will be given by this office.  Take 3 Caps by mouth 3 times a day.  Dose:  900 mg     hydrALAZINE 100 MG tablet  What changed:  · See the new instructions.  · Another medication with the same name was removed. Continue taking this medication, and follow the directions you see here.  Commonly known as:  APRESOLINE   Doctor's comments:  Please direct all refill requests to the primary care provider.  No refills will be given by this office.  Take 1 Tab by mouth 3 times a day.  Dose:  100 mg     insulin glargine 100 UNIT/ML Soln  What changed:  how much to take  Commonly known as:  LANTUS   Doctor's comments:  Please direct all refill requests to the primary care provider.  No refills will be given by this office.  Inject 40 Units as instructed every evening.  Dose:  40 Units     insulin lispro 100 UNIT/ML Soln  What changed:  Another medication with the same name was removed. Continue taking this medication, and follow the directions you see here.  Commonly known as:  HUMALOG   Inject 6-12 Units as instructed 3 times a day before meals. 3 units for every 100 on his FSBS scale  Dose:  6-12 Units     lisinopril 40 MG tablet  What changed:  · when to take this  · Another medication with the same name was removed.  Continue taking this medication, and follow the directions you see here.  Commonly known as:  PRINPEDRO ZESTRIL   Doctor's comments:  Please direct all refill requests to the primary care provider.  No refills will be given by this office.  Take 1 Tab by mouth 2 Times a Day.  Dose:  40 mg     metformin 1000 MG tablet  What changed:  · medication strength  · Another medication with the same name was removed. Continue taking this medication, and follow the directions you see here.  Commonly known as:  GLUCOPHAGE   Doctor's comments:  Please direct all refill requests to the primary care provider.  No refills will be given by this office.  Take 1 Tab by mouth 2 times a day, with meals.  Dose:  1000 mg        STOP taking these medications    bisacodyl 10 MG Supp  Commonly known as:  DULCOLAX     cephALEXin 500 MG Caps  Commonly known as:  KEFLEX     cloNIDine 0.3 MG/24HR Ptwk  Commonly known as:  CATAPRES     cyclobenzaprine 10 MG Tabs  Commonly known as:  FLEXERIL     diphenhydrAMINE 25 MG Tabs  Commonly known as:  BENADRYL     docusate sodium 100mg/10mL 150 MG/15ML Liqd  Commonly known as:  COLACE     fleet 7-19 GM/118ML Enem     fluticasone 50 MCG/ACT nasal spray  Commonly known as:  FLONASE     hydroCHLOROthiazide 25 MG Tabs  Commonly known as:  HYDRODIURIL     magnesium hydroxide 400 MG/5ML Susp  Commonly known as:  MILK OF MAGNESIA     methocarbamol 750 MG Tabs  Commonly known as:  ROBAXIN     ondansetron 4 MG Tbdp  Commonly known as:  ZOFRAN ODT     oxyCODONE-acetaminophen  MG Tabs  Commonly known as:  PERCOCET-10     oxyCODONE-acetaminophen 5-325 MG Tabs  Commonly known as:  PERCOCET     senna-docusate 8.6-50 MG Tabs  Commonly known as:  PERICOLACE or SENOKOT S            Discharge Diet:  Diabetic    Discharge Activity:  As tolerated     Disposition:  Patient to discharge home with support from friends and community resources.       Follow-up:  DISCHARGE INSTRUCTIONS:  Follow up with your primary care  provider (PCP) within 7-10 days of discharge to review your medications and take over your care.     If you develop chest pain, fever, chills, change in neurologic function (weakness, sensation changes, vision changes), or other concerning symptoms, seek immediate medical attention or call 911.      Follow up: please see Case Management Discharge Instructions for follow up appointment information, DME information, and other useful discharge planning information.     Do not drive until cleared by your PCP    Condition on Discharge:  Stable     35 minutes was spent on discharging this patient, including face-to-face time, prescription management, and the dictation of this note.    Higinio Pagan M.D.  8/14/2018

## 2018-08-13 NOTE — CARE PLAN
Problem: OT Long Term Goals  Goal: LTG-By discharge, patient will complete basic self care tasks  1) Individualized Goal:  With mod I using AE/AD/techniques as needed.  2) Interventions:  OT E Stim Attended, OT Group Therapy, OT Self Care/ADL, OT Cognitive Skill Dev, OT Community Reintegration, OT Manual Ther Technique, OT Neuro Re-Ed/Balance, OT Sensory Int Techniques, OT Therapeutic Activity, OT Evaluation and OT Therapeutic Exercise   Outcome: NOT MET  Mod I toileting/eating/grooming, supervised/SBA with other ADL

## 2018-08-13 NOTE — CARE PLAN
Problem: OT Long Term Goals  Goal: LTG-By discharge, patient will perform bathroom transfers  1) Individualized Goal:  With mod I using AE/AD/techniques as needed.  2) Interventions:  OT E Stim Attended, OT Group Therapy, OT Self Care/ADL, OT Cognitive Skill Dev, OT Community Reintegration, OT Manual Ther Technique, OT Neuro Re-Ed/Balance, OT Sensory Int Techniques, OT Therapeutic Activity, OT Evaluation and OT Therapeutic Exercise   Outcome: NOT MET  Mod I toilet transfer with grab bar, supervised shower transfer with grab bar and bench  Goal: LTG-By discharge, patient will complete basic home management  1) Individualized Goal:  With mod I using AE/AD/techniques as needed.  2) Interventions:  OT E Stim Attended, OT Group Therapy, OT Self Care/ADL, OT Cognitive Skill Dev, OT Community Reintegration, OT Manual Ther Technique, OT Neuro Re-Ed/Balance, OT Sensory Int Techniques, OT Therapeutic Activity, OT Evaluation and OT Therapeutic Exercise   Outcome: NOT MET  Mod I meal prep, though required assist with laundry

## 2018-08-13 NOTE — DISCHARGE PLANNING
08/10/18  1229   Discharge Instructions - Completed by Case Mgmt   Discharge Location   Home with Home Health     Agency Name / Address / Phone   [facility] - phone [phone]     Home Health   Registered Nurse; Occupational Therapist; Physical Therapist; Speech Therapist     Outpatient Services   Blood Work - Drawn by Home Health (home health will draw Protime/INR at home and report to the [facility])     Medical equipment Provider / Phone   [facility] - phone [phone]      Medical Equipment Ordered   Wheelchair and cushion              Follow-up With  Details  Why  Contact Info   Catrachito Sterling D.O. (Primary Care)  On 8/23/2018  PCP appointment on Thursday at 2:00 pm with check-in at 1:45 pm  [address] [id number]  [place] [id number]  [phone]       Boby Marsh M.D. (Neurosurgery)  On 8/22/2018  Neurosurgery: Wednesday at 11:00 am  [address]  [place] [id number]  [phone]

## 2018-08-13 NOTE — REHAB-COLLABORATIVE ONGOING IDT TEAM NOTE
Weekly Interdisciplinary Team Conference Note    Weekly Interdisciplinary Team Conference # 4  Date:  8/13/2018    Clinicians present and reporting at team conference include the following:   MD: Higinio Pagan MD   RN:  Davina Cm RN   PT:   Meena Louis, PT, DPT  OT:  Chau Ordaz, MS, OTR/L   ST:  Jo-Ann Ron, MS, CCC-SLP  CM:  Hafsa Ayoub RN, CCM  REC:  None  RT:  None  RPh:  Alphonso Guardado, [facility]  Other:   None  All reporting clinicians have a working knowledge of this patient's plan of care.    Targeted DC Date:  8/14/18     Medical    Patient Active Problem List    Diagnosis Date Noted   • Left knee pain 08/28/2016     Priority: High   • Diabetic polyneuropathy (Formerly [facility]) 05/06/2015     Priority: High   • Toe amputation status (Formerly [facility]) 05/06/2015     Priority: High   • HTN (hypertension) 08/16/2010     Priority: Medium   • CVA (cerebral vascular accident) (Formerly [facility]) 06/04/2009     Priority: Medium   • Diabetes mellitus with neurological manifestations, controlled (Formerly [facility]) 03/05/2012     Priority: Low   • Impaired activities of daily living 07/18/2018   • Cervical myelopathy (Formerly [facility]) 07/18/2018   • Lumbar stenosis with neurogenic claudication 07/18/2018   • Pain 07/18/2018   • Persistent proteinuria 10/13/2017   • BMI 35.0-35.9,adult 09/22/2017   • CKD (chronic kidney disease), stage I 05/12/2017   • Diabetic nephropathy associated with type 2 diabetes mellitus ([facility]) 05/12/2017   • Chronic use of opiate drugs therapeutic purposes 02/14/2017   • Other orthopedic aftercare 11/07/2016   • Cervical stenosis of spine 09/06/2016   • Dysphagia 09/06/2016   • Chronic atrial fibrillation (HCC) 09/06/2016   • Hallucinations 09/06/2016   • JANNIE treated with BiPAP 04/18/2016   • History of CVA (cerebrovascular accident) 11/11/2015   • Atrial fibrillation [427.31] 06/24/2015   • Risk for falls 10/27/2014   • Myalgia 05/29/2014   • BPH (benign prostatic hyperplasia) 04/28/2014   • Anticoagulated on warfarin 04/28/2014   • Chronic  antibiotic suppression 04/28/2014   • MEDICAL HOME    • Class 1 obesity in adult 06/17/2011   • Ventricular ectopy 06/17/2011   • Trigger finger 06/17/2011   • Thyroid mass 07/30/2009   • Dyslipidemia 06/17/2009     Results     Procedure Component Value Ref Range Date/Time    ACCU-CHEK GLUCOSE [[id number]]  (Abnormal) Collected:  08/13/18 1133    Order Status:  Completed Lab Status:  Final result Updated:  08/13/18 1148     Glucose - Accu-Ck 133 (H) 65 - 99 mg/dL     ACCU-CHEK GLUCOSE [[id number]]  (Abnormal) Collected:  08/13/18 0738    Order Status:  Completed Lab Status:  Final result Updated:  08/13/18 0931     Glucose - Accu-Ck 148 (H) 65 - 99 mg/dL     PROTHROMBIN TIME [[id number]]  (Abnormal) Collected:  08/13/18 0529    Order Status:  Completed Lab Status:  Final result Updated:  08/13/18 0713    Specimen:  Blood      PT 31.0 (H) 12.0 - 14.6 sec      INR 3.02 (H) 0.87 - 1.13      Comment: INR - Non-therapeutic Reference Range: 0.87-1.13  INR - Therapeutic Reference Range: 2.0-4.0         Narrative:       Indicate which anticoagulants the patient is on:->COUMADIN    ACCU-CHEK GLUCOSE [[id number]]  (Abnormal) Collected:  08/12/18 2201    Order Status:  Completed Lab Status:  Final result Updated:  08/12/18 2309     Glucose - Accu-Ck 160 (H) 65 - 99 mg/dL      Comment: Followed orders  Notified Physician  Followed Dr orders         ACCU-CHEK GLUCOSE [[id number]]  (Abnormal) Collected:  08/12/18 1708    Order Status:  Completed Lab Status:  Final result Updated:  08/12/18 1812     Glucose - Accu-Ck 167 (H) 65 - 99 mg/dL      Comment: Followed Dr orders              Nursing  Diet/Nutrition:  Diabetic and Thin Liquids  Medication Administration:  Whole with Liquid Wash  % consumed at meals in last 24 hours:  Consumed % of meals per documentation.  Meal/Snack Consumption for the past 24 hrs:   Oral Nutrition   08/12/18 1900 Dinner;Between % Consumed   08/12/18 1200 Lunch;Between % Consumed   08/12/18  0900 Breakfast;Between % Consumed       Snack schedule:  HS  Supplement:  Other: N/A  Appetite:  Good  Fluid Intake/Output in past 24 hours: In: 1060 [P.O.:1060]  Out: 775   Admit Weight:  Weight: 114.3 kg (252 lb)  Weight Last 7 Days   [118.6 kg (261 lb 6.4 oz)] 118.6 kg (261 lb 6.4 oz) (08/12 0900)    Pain Issues:    Location:  --  --         Severity:  Denies   Scheduled pain medications:  gabapentin (NEURONTIN)      PRN pain medications used in last 24 hours:  None   Non Pharmacologic Interventions:  other: Denies Pain  Effectiveness of pain management plan:  Denies Pain    Bowel:    Bowel Assist Initial Score:  1 - Total Assistance  Bowel Assist Current Score:  5 - Standby Prompting/Supervision or Set-up  Bowl Accidents in last 7 days:     Last bowel movement: 08/12/18  Stool Description: Large (as per pt)     Usual bowel pattern:  daily  Scheduled bowel medications:  docusate sodium (COLACE) and senna-docusate (PERICOLACE or SENOKOT S)   PRN bowel medications used in last 24 hours:  None  Nursing Interventions:  Increased time, Scheduled medication, Brief  Effectiveness of bowel program:   good=regular, predictable, controlled emptying of bowel  Bladder:    Bladder Assist Initial Score:  1 - Total Assistance  Bladder Assist Current Score:  4 - Minimal Assistance  Bladder Accidents in last 7 days:  0  PVR range for past 24-48 hours: -- ()  Intermittent Catheterization:  N/A  Medications affecting bladder:  None    Time void schedule/voiding pattern:  Voiding every 2-4 hours  Interventions:  Increased time, Time void patient initiated, Emptying of device, Urinal  Effectiveness of bladder training:  Good=regular, predictable, emptying of bladder, patient initiates time voiding    Gonzalez:    Type:     Patient Lines/Drains/Airways Status    Active Catheter     None              Date placed:          Justification:    Diabetes:  Blood Sugar Frequency:  Before meals and at bedtime    Range of BS for last 48  hours:   Recent Labs      08/11/18   0749  08/11/18   1128  08/11/18   1734  08/11/18   2215  08/12/18   0758  08/12/18   1126  08/12/18   1708  08/12/18   2201   POCGLUCOSE  124*  126*  204*  227*  86  135*  167*  160*     Scheduled diabetic medications:  metformin (GLUCOPHAGE)  and insulin glargine (LANTUS) injection   Sliding scale usage in past 24 hours:  Yes  Total Short acting insulin in the past 24 hours:  Humalog 4 units  Total Long acting insulin in the past 24 hours:  Lantus 40 units.    Wound:         Patient Lines/Drains/Airways Status    Active Current Wounds     Name: Placement date: Placement time: Site: Days:    Surgical Incision  Incision Cervical Posterior 07/13/18   1555      30    Surgical Incision  Incision Back 07/13/18   1748      30                   Interventions:  Cervical Posterior Neck WILFREDO, Surgical Incision to Back WILFREDO-red, itching.  Effectiveness of intervention:  wound is improving Surgical Incision to Back WILFREDO-red in color and complains of itching.    Sleep/wake cycle:   Average hours slept:  Sleeps 3-4 hours without waking  Sleep medication usage:  None    Patient/Family Training/Education:  Fall Prevention, General Self Care, Pain Management, Safe Transfers, Safety, Skin Care and Wound Care  Discharge Supply Recommendations:  Glucometer and Strips and Wound Care Supplies  Strengths: Alert and oriented, Supportive family, Pleasant and cooperative and Effective communication skills   Barriers:   Fatigue, Generalized weakness, Limited mobility and Poor sleep pattern            Nursing Problems           Problem: Bowel/Gastric:     Goal: Normal bowel function is maintained or improved           Goal: Will not experience complications related to bowel motility             Problem: Communication     Goal: The ability to communicate needs accurately and effectively will improve             Problem: Discharge Barriers/Planning     Goal: Patient's continuum of care needs will be met              Problem: GLYCEMIA IMBALANCE     Goal: Clinical indication of glycemia balance is achieved             Problem: Infection     Goal: Will remain free from infection             Problem: Knowledge Deficit     Goal: Knowledge of disease process/condition, treatment plan, diagnostic tests, and medications will improve           Goal: Knowledge of the prescribed therapeutic regimen will improve             Problem: Mobility     Goal: Risk for activity intolerance will decrease             Problem: Pain Management     Goal: Pain level will decrease to patient's comfort goal     Flowsheet:     Taken at 07/31/18 1028    Pain Scale 0 - 10  10 by Boby Roberts R.N.                  Problem: Psychosocial Needs:     Goal: Level of anxiety will decrease             Problem: Respiratory:     Goal: Respiratory status will improve             Problem: Safety     Goal: Will remain free from injury           Goal: Will remain free from falls             Problem: Skin Integrity     Goal: Risk for impaired skin integrity will decrease             Problem: Urinary Elimination:     Goal: Ability to reestablish a normal urinary elimination pattern will improve             Problem: Venous Thromboembolism (VTW)/Deep Vein Thrombosis (DVT) Prevention:     Goal: Patient will participate in Venous Thrombosis (VTE)/Deep Vein Thrombosis (DVT)Prevention Measures                  Long Term Goals:   At discharge patient will be able to function safely at home and in the community with support.    Section completed by:  Joy Bazzi L.P.N.2           Respiratory Therapy    Pt is stable on RA 24/7 with SATS over 92%.  CPAP use has been intermittant.    Section completed by:  Jessica Schmitz, RRT       Mobility  Bed mobility:   6  Bed /Chair/Wheelchair Transfer Initial:  1 - Total Assistance  Bed /Chair/Wheelchair Transfer Current:  6 - Modified Independent   Bed/Chair/Wheelchair Transfer Description:  Adaptive equipment  Walk  Initial:  0 - Not tested,unsafe activity  Walk Current:  2 - Max Assistance   Walk Description:   (CGA with FWW with B AFOs. 60 ft w/PT CGA, then 55 ft with friend providing CGA. Down concrete ramp outside x50 ft with friend CGA. )  Wheelchair Initial:  1 - Total Assistance  Wheelchair Current:  4 - Minimal Assistance   Wheelchair Description:  Verbal cueing, Supervision for safety, Extra time, Safety concerns, Requires incidental assist (ascended/descended 16' ADA ramp x 2 trials in each direction with SBA to Manuel going up, vc for technique and body mechanics)  Stairs Initial:  0 - Not tested,unsafe activity  Stairs Current: 1 - Total Assistance   Stairs Description:  (1 reps of 2 steps; posterior descending due to patient report of knee buckling; R railing, L FWW, max cueing, B AFOs, gait belt, Max A due to balance.)  Patient/Family Training/Education:  Car transfer, CLOF, home accessibility   DME/DC Recommendations:  Manual w/c with cushion, home health   Strengths:  Alert and oriented, Effective communication skills, Motivated for self care and independence, Pleasant and cooperative and Supportive family  Barriers:   Generalized weakness, Limited mobility, Poor balance and Other: poor pacing strategies, requires reminders for adherece to precautions  # of short term goals set= 2  # of short term goals met=1       Physical Therapy Problems           Problem: Mobility     Dates: Start: 07/19/18       Goal: STG-Within one week, patient will ambulate household distance     Dates: Start: 07/30/18       Description: 1) Individualized goal:  Pt will AMB 50ft x 1, SBA with FWW and B AFOs  2) Interventions:  PT Group Therapy, PT Orthotics Training, PT Gait Training, PT Therapeutic Exercises, PT Neuro Re-Ed/Balance, PT Therapeutic Activity, PT Manual Therapy and PT Evaluation       Note:     Goal Note filed on 08/06/18 0820 by Meena Louis, PT    Goal: STG-Within one week, patient will ambulate household  distance  Outcome: NOT MET  Pt requires CGA for mobility at this time                  Problem: Mobility Transfers     Dates: Start: 07/19/18       Goal: STG-Within one week, patient will perform bed mobility     Dates: Start: 07/19/18       Description: 1) Individualized goal: Pt will perform all bed mobility with SPV  2) Interventions:  PT Group Therapy, PT Orthotics Training, PT Gait Training, PT Therapeutic Exercises, PT Neuro Re-Ed/Balance, PT Therapeutic Activity, PT Manual Therapy and PT Evaluation       Note:     Goal Note filed on 07/30/18 0814 by Meena Louis, PT    Goal: STG-Within one week, patient will perform bed mobility  Outcome: NOT MET  Pt requires CGA for bed mobility to adhere to precautions at this time                  Problem: PT-Long Term Goals     Dates: Start: 07/19/18       Goal: LTG-By discharge, patient will ambulate     Dates: Start: 07/19/18       Description: 1) Individualized goal:  Pt will AMB 150ft x1 with LRAD , mod I  2) Interventions:  PT Group Therapy, PT Orthotics Training, PT Gait Training, PT Therapeutic Exercises, PT Neuro Re-Ed/Balance, PT Therapeutic Activity, PT Manual Therapy and PT Evaluation             Goal: LTG-By discharge, patient will perform home exercise program     Dates: Start: 07/19/18       Description: 1) Individualized goal: Pt will perform HEP for B LE strengthening to maintain the benefits obtained in PT, mod I  2) Interventions:  PT Group Therapy, PT Orthotics Training, PT Gait Training, PT Therapeutic Exercises, PT Neuro Re-Ed/Balance, PT Therapeutic Activity, PT Manual Therapy and PT Evaluation             Goal: LTG-By discharge, patient will ambulate up/down 4-6 stairs     Dates: Start: 07/19/18       Description: 1) Individualized goal: Pt will AMB up/down 4 stairs with unilateral HR to mimic home environment, mod I  2) Interventions:  PT Group Therapy, PT Orthotics Training, PT Gait Training, PT Therapeutic Exercises, PT Neuro Re-Ed/Balance, PT  Therapeutic Activity, PT Manual Therapy and PT Evaluation             Goal: LTG-By discharge, patient will transfer in/out of a car     Dates: Start: 07/19/18       Description: 1) Individualized goal:  Pt will transfer in/out of a car with LRAD, mod I  2) Interventions:  PT Group Therapy, PT Orthotics Training, PT Gait Training, PT Therapeutic Exercises, PT Neuro Re-Ed/Balance, PT Therapeutic Activity, PT Manual Therapy and PT Evaluation                     Section completed by:  Meena Louis, PT       Activities of Daily Living  Eating Initial:  3 - Moderate Assistance  Eating Current:  7 - Independent   Eating Description:  Set-up of equipment or meal/tube feeding  Grooming Initial:  5 - Standby Prompting/Supervision or Set-up  Grooming Current:  5 - Standby Prompting/Supervision or Set-up   Grooming Description:  Set-up of equipment (setup with hair brush)  Bathing Initial:  0 - Not tested,unsafe activity  Bathing Current:  5 - Standby Prompting/Supervision or Set-up   Bathing Description:  Adaptive equipment, Grab bar, Hand held shower, Supervision for safety, Set-up of equipment  Upper Body Dressing Initial:  0 - Not tested, patient refused  Upper Body Dressing Current:  5 - Standby Prompting/Supervision or Set-up   Upper Body Dressing Description:  Supervision for safety, Set-up of equipment  Lower Body Dressing Initial:  0 - Not tested, patient refused  Lower Body Dressing Current:  5 - Standby Prompting/Supervsion or Set-up   Lower Body Dressing Description:  5 - Standby Prompting/Supervsion or Set-up  Toileting Initial:  1 - Total Assistance  Toileting Current:  6 - Modified Independent   Toileting Description:  Grab bar (mod I w/ grab bar)  Toilet Transfer Initial:  0 - Not tested, patient refused  Toilet Transfer Current:  6 - Modified Independent   Toilet Transfer Description:  6 - Modified Independent  Tub / Shower Transfer Initial:  0 - Not tested, patient refused  Tub / Shower Transfer Current:  5  - Standby Prompting/Supervision or Set-up   Tub / Shower Transfer Description:  Adaptive equipment, Grab bar, Supervision for safety, Verbal cueing, Set-up of equipment, Initial preparation for task (setup/supervised w/ grab bar and bench)  IADL:  Mod I meal prep from w/c level, assist with laundry   Family Training/Education:  Completed with friend Albaro   DME/DC Recommendations:  Recommend grab bar installation by toilets and in tub/shower; w/c with cushion, ramp (has been installed, per pt and friend)    Strengths:  Alert and oriented, Effective communication skills, Making steady progress towards goals, Motivated for self care and independence, Pleasant and cooperative and Willingly participates in therapeutic activities  Barriers:  Decreased endurance, Generalized weakness, Limited mobility, Poor activity tolerance, Poor balance and Other: neck pain, impaired standing tolerance     # of short term goals set= 0    # of short term goals met= 0     # of long term goals set= 3  # of long term goals met= 1         Occupational Therapy Goals           Problem: OT Long Term Goals     Dates: Start: 07/19/18       Goal: LTG-By discharge, patient will complete basic self care tasks     Dates: Start: 07/19/18       Description: 1) Individualized Goal:  With mod I using AE/AD/techniques as needed.  2) Interventions:  OT E Stim Attended, OT Group Therapy, OT Self Care/ADL, OT Cognitive Skill Dev, OT Community Reintegration, OT Manual Ther Technique, OT Neuro Re-Ed/Balance, OT Sensory Int Techniques, OT Therapeutic Activity, OT Evaluation and OT Therapeutic Exercise     Note:     Goal Note filed on 08/13/18 1105 by Chau Ordaz MS,OTR/L    Goal: LTG-By discharge, patient will complete basic self care tasks  Outcome: NOT MET  Mod I toileting/eating/grooming, supervised/SBA with other ADL                Goal: LTG-By discharge, patient will perform bathroom transfers     Dates: Start: 07/19/18       Description: 1)  Individualized Goal:  With mod I using AE/AD/techniques as needed.  2) Interventions:  OT E Stim Attended, OT Group Therapy, OT Self Care/ADL, OT Cognitive Skill Dev, OT Community Reintegration, OT Manual Ther Technique, OT Neuro Re-Ed/Balance, OT Sensory Int Techniques, OT Therapeutic Activity, OT Evaluation and OT Therapeutic Exercise     Note:     Goal Note filed on 08/13/18 1106 by Chau Ordaz MS,OTR/L    Goal: LTG-By discharge, patient will perform bathroom transfers  Outcome: NOT MET  Mod I toilet transfer with grab bar, supervised shower transfer with grab   bar and bench                Goal: LTG-By discharge, patient will complete basic home management     Dates: Start: 07/19/18       Description: 1) Individualized Goal:  With mod I using AE/AD/techniques as needed.  2) Interventions:  OT E Stim Attended, OT Group Therapy, OT Self Care/ADL, OT Cognitive Skill Dev, OT Community Reintegration, OT Manual Ther Technique, OT Neuro Re-Ed/Balance, OT Sensory Int Techniques, OT Therapeutic Activity, OT Evaluation and OT Therapeutic Exercise     Note:     Goal Note filed on 08/13/18 1106 by Chau Ordaz MS,OTR/L    Goal: LTG-By discharge, patient will complete basic home management  Outcome: NOT MET  Mod I meal prep, though required assist with laundry                      Section completed by:  Chau Ordaz MS,OTR/L       Cognitive Linquistic Functions  Comprehension Initial:  5 - Stand-by Prompting/Supervision or Set-up  Comprehension Current:  6 - Modified Independent   Comprehension Description:  Verbal cues  Expression Initial:  6 - Modified Independent  Expression Current:  7 - Independent   Expression Description:  Verbal cueing  Social Interaction Initial:  6 - Modified Independent  Social Interaction Current:  6 - Modified Independent   Social Interaction Description:  Increased time  Problem Solving Initial:  5 - Standby Prompting/Supervision or Set-up  Problem Solving Current:  6 -  Modified Independent   Problem Solving Description:  Verbal cueing, Therapy schedule, Bed/chair alarm, Increased time  Memory Initial:  3 - Moderate Assistance  Memory Current:  5 - Standby Prompting/Supervision or Set-up   Memory Description:  Verbal cueing, Therapy schedule  Executive Functioning / Organization Initial:  Minimal (4)  Executive Functioning / Organization Current:  Minimal (4)   Executive Functioning Desciption: verbal cues.  Swallowing  Swallowing Status:  This patient does not currently receive dysphagia intervention.  Orders Placed This Encounter   Procedures   • Diet Order Diabetic     Standing Status:   Standing     Number of Occurrences:   1     Order Specific Question:   Diet:     Answer:   Diabetic [3]     Behavior Modification Plan  Keep instructions simple/concrete, Give clear feedback, Set clear goals and Analyze tasks (break down into smaller steps)  Assistive Technology  Low tech: Calendar, planner, schedule, alarms/timers, pill organizer, post-it notes, lists  Family Training/Education:  Completed with patient's friend, Kali.  MAURO Recommendations:  Patient will benefit from additional [facility] services upon discharge from this facility.  Strengths:  Able to follow instructions, Alert and oriented, Motivated for self care and independence, Pleasant and cooperative and Willingly participates in therapeutic activities  Barriers:  Poor insight/denial of deficits and Other: decreased flexibility of thought, slow speed of processing, impaired attention and recall.  # of short term goals set=  2  # of short term goals met= 2       Speech Therapy Problems           Problem: Speech/Swallowing LTGs     Dates: Start: 07/28/18       Goal: LTG-By discharge, patient will     Dates: Start: 07/28/18       Description: 1) Individualized goal:  Perform medication and financial management tasks with 90% acc.  2) Interventions:  SLP Electrical Stimulation - Attended, SLP Speech Language Treatment, SLP Self  Care / ADL Training , SLP Cognitive Skill Development and SLP Group Treatment                    Section completed by:  Jo-Ann Ron MS,CCC-SLP          REHAB-Pharmacy IDT Team Note by Chris Kelley RPH at 8/10/2018  5:46 PM  Version 1 of 1    Author:  Chris Kelley RPH Service:  (none) Author Type:  Pharmacist    Filed:  8/10/2018  5:47 PM Date of Service:  8/10/2018  5:46 PM Status:  Signed    :  Chris Kelley RPH (Pharmacist)         Pharmacy   Pharmacy  Antibiotics/Antifungals/Antivirals:  Medication:      Active Orders     Ordered     Ordering Provider       Wed Jul 18, 2018  2:28 PM    07/18/18 1428  clindamycin (CLEOCIN) capsule 300 mg  2 TIMES DAILY      Higinio Pagan M.D.        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  Antihypertensives/Cardiac:  Medication:    Orders (72h ago through future)    Start     Ordered    08/10/18 1400  metoprolol (LOPRESSOR) tablet 75 mg  EVERY 8 HOURS      08/10/18 1237    08/08/18 2200  metoprolol (LOPRESSOR) tablet 50 mg  EVERY 8 HOURS,   Status:  Discontinued      08/08/18 1414    08/06/18 1800  metoprolol (LOPRESSOR) tablet 50 mg  TWICE DAILY,   Status:  Discontinued      08/06/18 0934    07/20/18 0900  lisinopril (PRINIVIL) tablet 40 mg  DAILY      07/19/18 1613    07/19/18 0600  amLODIPine (NORVASC) tablet 10 mg  Q DAY      07/18/18 1427    07/18/18 1500  hydrALAZINE (APRESOLINE) tablet 100 mg  3 TIMES DAILY      07/18/18 1428    07/18/18 1428  hydrALAZINE (APRESOLINE) tablet 25 mg  EVERY 8 HOURS PRN      07/18/18 1428        Patient Vitals for the past 24 hrs:   BP Pulse   08/10/18 1606 156/80 68   08/10/18 0808 144/60 -   08/10/18 0625 149/71 66   08/10/18 0544 140/78 72   08/09/18 2018 144/68 72   08/09/18 1816 144/69 70       Anticoagulation:  Medication: Coumadin per pharmacy  INR:    Recent Labs      08/07/18   0538  08/08/18   0558  08/09/18   0523  08/10/18   0559    INR  1.49*  1.62*  1.78*  2.07*   Indication for anticoagulation: Atrial Fibrilation     Goal INR = 2 - 3   Pertinent Drug/Drug Interactions:  Antibiotics, trazodone  Outpatient Warfarin Regimen:  10mg daily per med rec  Recent Warfarin Dosing:        Dose from last 7 days      Date/Time Dose (mg)     08/10/18 0559  15     08/09/18 0523  15     08/08/18 0558  12.5     08/07/18 0538  12     08/06/18 0533  10     08/05/18 0523  12.5     08/04/18 0543  10       Other key medications: A review of the medication list reveals no issues at this time. Patient is currently on antihypertensive(s). Recommend home blood pressure monitoring/recording if antihypertensive(s) regimen(s) continue. Patient is currently on diabetic medication(s) and/or Insulin(s). Recommend home blood glucose monitoring/recording if these regimen(s) continue.    Section completed by: Chris Kelley [facility][AW.1]     Attribution Key     AW.1 - Chris Kelley [facility] on 8/10/2018  5:46 PM                    DC Planning  DC destination/dispostion:  Patient lives in a [place] single level mobile home alone.  He needs a ramp.     DC Needs:   Referred to and accepted by [facility]  He needs assistance for discharge and has a friend who is willing to live with him and provide care.  He can also build a ramp.   Patient has a fww, shower bench and elevated toilet seat; manual wheelchair has been ordered from [facility].  He is currently with [facility]. Follow up appointments with PCP, Dr. Sterling and neurosurgeon, Dr. Marsh,  He has a glucometer and cpap.  He may need meals on wheels.  If this plan does not work out he may still need a skilled facility.     Barriers to discharge:   Patient is inconsistent with information, lacks support at home.     Strengths: Participates and is making progress with therapies    Section completed by:  Inés Thompson CM MSW      Physician Summary  Higinio Pagan MD participated and led team  conference discussion.

## 2018-08-13 NOTE — REHAB-OT IDT TEAM NOTE
Occupational Therapy   Activities of Daily Living  Eating Initial:  3 - Moderate Assistance  Eating Current:  7 - Independent   Eating Description:  Set-up of equipment or meal/tube feeding  Grooming Initial:  5 - Standby Prompting/Supervision or Set-up  Grooming Current:  5 - Standby Prompting/Supervision or Set-up   Grooming Description:  Set-up of equipment (setup with hair brush)  Bathing Initial:  0 - Not tested,unsafe activity  Bathing Current:  5 - Standby Prompting/Supervision or Set-up   Bathing Description:  Adaptive equipment, Grab bar, Hand held shower, Supervision for safety, Set-up of equipment  Upper Body Dressing Initial:  0 - Not tested, patient refused  Upper Body Dressing Current:  5 - Standby Prompting/Supervision or Set-up   Upper Body Dressing Description:  Supervision for safety, Set-up of equipment  Lower Body Dressing Initial:  0 - Not tested, patient refused  Lower Body Dressing Current:  5 - Standby Prompting/Supervsion or Set-up   Lower Body Dressing Description:  5 - Standby Prompting/Supervsion or Set-up  Toileting Initial:  1 - Total Assistance  Toileting Current:  6 - Modified Independent   Toileting Description:  Grab bar (mod I w/ grab bar)  Toilet Transfer Initial:  0 - Not tested, patient refused  Toilet Transfer Current:  6 - Modified Independent   Toilet Transfer Description:  6 - Modified Independent  Tub / Shower Transfer Initial:  0 - Not tested, patient refused  Tub / Shower Transfer Current:  5 - Standby Prompting/Supervision or Set-up   Tub / Shower Transfer Description:  Adaptive equipment, Grab bar, Supervision for safety, Verbal cueing, Set-up of equipment, Initial preparation for task (setup/supervised w/ grab bar and bench)  IADL:  Mod I meal prep from w/c level, assist with laundry   Family Training/Education:  Completed with friend Albaro   DME/DC Recommendations:  Recommend grab bar installation by toilets and in tub/shower; w/c with cushion, ramp (has been  installed, per pt and friend)    Strengths:  Alert and oriented, Effective communication skills, Making steady progress towards goals, Motivated for self care and independence, Pleasant and cooperative and Willingly participates in therapeutic activities  Barriers:  Decreased endurance, Generalized weakness, Limited mobility, Poor activity tolerance, Poor balance and Other: neck pain, impaired standing tolerance     # of short term goals set= 0    # of short term goals met= 0     # of long term goals set= 3  # of long term goals met= 1         Occupational Therapy Goals           Problem: OT Long Term Goals     Dates: Start: 07/19/18       Goal: LTG-By discharge, patient will complete basic self care tasks     Dates: Start: 07/19/18       Description: 1) Individualized Goal:  With mod I using AE/AD/techniques as needed.  2) Interventions:  OT E Stim Attended, OT Group Therapy, OT Self Care/ADL, OT Cognitive Skill Dev, OT Community Reintegration, OT Manual Ther Technique, OT Neuro Re-Ed/Balance, OT Sensory Int Techniques, OT Therapeutic Activity, OT Evaluation and OT Therapeutic Exercise     Note:     Goal Note filed on 08/13/18 1105 by Chau Ordaz MS,OTR/L    Goal: LTG-By discharge, patient will complete basic self care tasks  Outcome: NOT MET  Mod I toileting/eating/grooming, supervised/SBA with other ADL                Goal: LTG-By discharge, patient will perform bathroom transfers     Dates: Start: 07/19/18       Description: 1) Individualized Goal:  With mod I using AE/AD/techniques as needed.  2) Interventions:  OT E Stim Attended, OT Group Therapy, OT Self Care/ADL, OT Cognitive Skill Dev, OT Community Reintegration, OT Manual Ther Technique, OT Neuro Re-Ed/Balance, OT Sensory Int Techniques, OT Therapeutic Activity, OT Evaluation and OT Therapeutic Exercise     Note:     Goal Note filed on 08/13/18 1106 by Chau Ordaz MS,OTR/L    Goal: LTG-By discharge, patient will perform bathroom  transfers  Outcome: NOT MET  Mod I toilet transfer with grab bar, supervised shower transfer with grab   bar and bench                Goal: LTG-By discharge, patient will complete basic home management     Dates: Start: 07/19/18       Description: 1) Individualized Goal:  With mod I using AE/AD/techniques as needed.  2) Interventions:  OT E Stim Attended, OT Group Therapy, OT Self Care/ADL, OT Cognitive Skill Dev, OT Community Reintegration, OT Manual Ther Technique, OT Neuro Re-Ed/Balance, OT Sensory Int Techniques, OT Therapeutic Activity, OT Evaluation and OT Therapeutic Exercise     Note:     Goal Note filed on 08/13/18 1106 by Chau Ordaz MS,OTR/L    Goal: LTG-By discharge, patient will complete basic home management  Outcome: NOT MET  Mod I meal prep, though required assist with laundry                      Section completed by:  Chau Ordaz MS,OTR/L

## 2018-08-13 NOTE — CARE PLAN
Problem: Mobility  Goal: STG-Within one week, patient will ambulate household distance  1) Individualized goal:  Pt will AMB 50ft x 1, SBA with FWW and B AFOs  2) Interventions:  PT Group Therapy, PT Orthotics Training, PT Gait Training, PT Therapeutic Exercises, PT Neuro Re-Ed/Balance, PT Therapeutic Activity, PT Manual Therapy and PT Evaluation     Outcome: NOT MET  Pt requires CGA for stability    Problem: Mobility Transfers  Goal: STG-Within one week, patient will perform bed mobility  1) Individualized goal: Pt will perform all bed mobility with SPV  2) Interventions:  PT Group Therapy, PT Orthotics Training, PT Gait Training, PT Therapeutic Exercises, PT Neuro Re-Ed/Balance, PT Therapeutic Activity, PT Manual Therapy and PT Evaluation     Outcome: MET Date Met: 08/13/18

## 2018-08-13 NOTE — REHAB-SLP IDT TEAM NOTE
Speech Therapy   Cognitive Linquistic Functions  Comprehension Initial:  5 - Stand-by Prompting/Supervision or Set-up  Comprehension Current:  6 - Modified Independent   Comprehension Description:  Verbal cues  Expression Initial:  6 - Modified Independent  Expression Current:  7 - Independent   Expression Description:  Verbal cueing  Social Interaction Initial:  6 - Modified Independent  Social Interaction Current:  6 - Modified Independent   Social Interaction Description:  Increased time  Problem Solving Initial:  5 - Standby Prompting/Supervision or Set-up  Problem Solving Current:  6 - Modified Independent   Problem Solving Description:  Verbal cueing, Therapy schedule, Bed/chair alarm, Increased time  Memory Initial:  3 - Moderate Assistance  Memory Current:  5 - Standby Prompting/Supervision or Set-up   Memory Description:  Verbal cueing, Therapy schedule  Executive Functioning / Organization Initial:  Minimal (4)  Executive Functioning / Organization Current:  Minimal (4)   Executive Functioning Desciption: verbal cues.  Swallowing  Swallowing Status:  This patient does not currently receive dysphagia intervention.  Orders Placed This Encounter   Procedures   • Diet Order Diabetic     Standing Status:   Standing     Number of Occurrences:   1     Order Specific Question:   Diet:     Answer:   Diabetic [3]     Behavior Modification Plan  Keep instructions simple/concrete, Give clear feedback, Set clear goals and Analyze tasks (break down into smaller steps)  Assistive Technology  Low tech: Calendar, planner, schedule, alarms/timers, pill organizer, post-it notes, lists  Family Training/Education:  Completed with patient's friend, Angela WADE Recommendations:  Patient will benefit from additional  ST services upon discharge from this facility.  Strengths:  Able to follow instructions, Alert and oriented, Motivated for self care and independence, Pleasant and cooperative and Willingly participates in  therapeutic activities  Barriers:  Poor insight/denial of deficits and Other: decreased flexibility of thought, slow speed of processing, impaired attention and recall.  # of short term goals set=  2  # of short term goals met= 2       Speech Therapy Problems           Problem: Speech/Swallowing LTGs     Dates: Start: 07/28/18       Goal: LTG-By discharge, patient will     Dates: Start: 07/28/18       Description: 1) Individualized goal:  Perform medication and financial management tasks with 90% acc.  2) Interventions:  SLP Electrical Stimulation - Attended, SLP Speech Language Treatment, SLP Self Care / ADL Training , SLP Cognitive Skill Development and SLP Group Treatment                    Section completed by:  Jo-Ann Ron MS,CCC-SLP

## 2018-08-13 NOTE — PROGRESS NOTES
"Rehab Progress Note     Encounter date: 8/13/2018  Today I met with the patient face to face in his room     Chief Complaint:  Cervical myelopathy (HCC) , discharge planning     Interval Events (subjective)  Mr. Ma reports that he is doing well today.  He denies any fevers, chills, headache, dizziness, chest pain, or shortness of breath.  He is excited about discharging tomorrow.  He is looking forward to going home.  He reports that his friend will be here today for family training.  We discussed the importance of avoiding hypoglycemia and the dangers this could pose.  The patient has been educated about diabetic diet.  He states that he is previously checked his blood glucose 4-5 times a day.  We discussed ongoing need to maintain cervical precautions and follow-up with surgery as indicated.    Objective:  VITAL SIGNS: /80   Pulse 80   Temp 37 °C (98.6 °F)   Resp 18   Ht 1.905 m (6' 3\")   Wt 118.6 kg (261 lb 6.4 oz)   SpO2 90%   BMI 32.67 kg/m²     Recent Results (from the past 72 hour(s))   ACCU-CHEK GLUCOSE    Collection Time: 08/10/18 11:38 AM   Result Value Ref Range    Glucose - Accu-Ck 118 (H) 65 - 99 mg/dL   ACCU-CHEK GLUCOSE    Collection Time: 08/10/18  5:33 PM   Result Value Ref Range    Glucose - Accu-Ck 213 (H) 65 - 99 mg/dL   ACCU-CHEK GLUCOSE    Collection Time: 08/10/18  8:23 PM   Result Value Ref Range    Glucose - Accu-Ck 212 (H) 65 - 99 mg/dL   PROTHROMBIN TIME    Collection Time: 08/11/18  5:22 AM   Result Value Ref Range    PT 23.7 (H) 12.0 - 14.6 sec    INR 2.15 (H) 0.87 - 1.13   ACCU-CHEK GLUCOSE    Collection Time: 08/11/18  7:49 AM   Result Value Ref Range    Glucose - Accu-Ck 124 (H) 65 - 99 mg/dL   ACCU-CHEK GLUCOSE    Collection Time: 08/11/18 11:28 AM   Result Value Ref Range    Glucose - Accu-Ck 126 (H) 65 - 99 mg/dL   ACCU-CHEK GLUCOSE    Collection Time: 08/11/18  5:34 PM   Result Value Ref Range    Glucose - Accu-Ck 204 (H) 65 - 99 mg/dL   ACCU-CHEK GLUCOSE    " Collection Time: 08/11/18 10:15 PM   Result Value Ref Range    Glucose - Accu-Ck 227 (H) 65 - 99 mg/dL   PROTHROMBIN TIME    Collection Time: 08/12/18  5:29 AM   Result Value Ref Range    PT 30.7 (H) 12.0 - 14.6 sec    INR 2.98 (H) 0.87 - 1.13   CBC WITHOUT DIFFERENTIAL    Collection Time: 08/12/18  5:29 AM   Result Value Ref Range    WBC 6.0 4.8 - 10.8 K/uL    RBC 4.46 (L) 4.70 - 6.10 M/uL    Hemoglobin 12.4 (L) 14.0 - 18.0 g/dL    Hematocrit 38.8 (L) 42.0 - 52.0 %    MCV 87.0 81.4 - 97.8 fL    MCH 27.8 27.0 - 33.0 pg    MCHC 32.0 (L) 33.7 - 35.3 g/dL    RDW 46.7 35.9 - 50.0 fL    Platelet Count 157 (L) 164 - 446 K/uL    MPV 11.6 9.0 - 12.9 fL   BASIC METABOLIC PANEL    Collection Time: 08/12/18  5:29 AM   Result Value Ref Range    Sodium 141 135 - 145 mmol/L    Potassium 3.8 3.6 - 5.5 mmol/L    Chloride 107 96 - 112 mmol/L    Co2 26 20 - 33 mmol/L    Glucose 114 (H) 65 - 99 mg/dL    Bun 26 (H) 8 - 22 mg/dL    Creatinine 0.82 0.50 - 1.40 mg/dL    Calcium 8.7 8.5 - 10.5 mg/dL    Anion Gap 8.0 0.0 - 11.9   ESTIMATED GFR    Collection Time: 08/12/18  5:29 AM   Result Value Ref Range    GFR If African American >60 >60 mL/min/1.73 m 2    GFR If Non African American >60 >60 mL/min/1.73 m 2   ACCU-CHEK GLUCOSE    Collection Time: 08/12/18  7:58 AM   Result Value Ref Range    Glucose - Accu-Ck 86 65 - 99 mg/dL   ACCU-CHEK GLUCOSE    Collection Time: 08/12/18 11:26 AM   Result Value Ref Range    Glucose - Accu-Ck 135 (H) 65 - 99 mg/dL   ACCU-CHEK GLUCOSE    Collection Time: 08/12/18  5:08 PM   Result Value Ref Range    Glucose - Accu-Ck 167 (H) 65 - 99 mg/dL   ACCU-CHEK GLUCOSE    Collection Time: 08/12/18 10:01 PM   Result Value Ref Range    Glucose - Accu-Ck 160 (H) 65 - 99 mg/dL   PROTHROMBIN TIME    Collection Time: 08/13/18  5:29 AM   Result Value Ref Range    PT 31.0 (H) 12.0 - 14.6 sec    INR 3.02 (H) 0.87 - 1.13   ACCU-CHEK GLUCOSE    Collection Time: 08/13/18  7:38 AM   Result Value Ref Range    Glucose - Accu-Ck  148 (H) 65 - 99 mg/dL       Current Facility-Administered Medications   Medication Frequency   • warfarin (COUMADIN) tablet 9 mg COUMADIN-ONCE   • lisinopril (PRINIVIL) tablet 40 mg BID   • hydrocortisone 1 % cream 4X/DAY   • metoprolol (LOPRESSOR) tablet 75 mg Q8HRS   • lactobacillus rhamnosus (CULTURELLE) capsule 1 Cap QDAY with Breakfast   • diphenhydrAMINE-ZnAcetate (BENADRYL ITCH) 1-0.1 % cream TID PRN   • neomycin-bacitracin-polymyxin (NEOSPORIN) 400-5-5000 ointment BID   • MD ALERT... warfarin (COUMADIN) per pharmacy protocol pharmacy to dose   • lidocaine (XYLOCAINE) 2 % jelly Q8HRS   • insulin glargine (LANTUS) injection 40 Units Q EVENING   • insulin lispro (HUMALOG) injection 0-12 Units 4X/DAY ACHS   • gabapentin (NEURONTIN) capsule 900 mg TID   • vitamin D (cholecalciferol) tablet 2,000 Units DAILY   • Respiratory Care per Protocol Continuous RT   • Pharmacy Consult Request ...Pain Management Review 1 Each PRN   • acetaminophen (TYLENOL) tablet 650 mg Q4HRS PRN   • hydrALAZINE (APRESOLINE) tablet 25 mg Q8HRS PRN   • ondansetron (ZOFRAN ODT) dispertab 4 mg 4X/DAY PRN    Or   • ondansetron (ZOFRAN) syringe/vial injection 4 mg 4X/DAY PRN   • traZODone (DESYREL) tablet 50 mg QHS PRN   • bisacodyl (DULCOLAX) suppository 10 mg Q24HRS PRN   • magnesium hydroxide (MILK OF MAGNESIA) suspension 30 mL QDAY PRN   • senna-docusate (PERICOLACE or SENOKOT S) 8.6-50 MG per tablet 1 Tab Nightly   • amLODIPine (NORVASC) tablet 10 mg Q DAY   • clindamycin (CLEOCIN) capsule 300 mg BID   • fluticasone (FLONASE) nasal spray 50 mcg DAILY   • hydrALAZINE (APRESOLINE) tablet 100 mg TID   • metFORMIN (GLUCOPHAGE) tablet 1,000 mg BID WITH MEALS   • docusate sodium (COLACE) capsule 100 mg BID       Exam Date: 8/13/2018    General:  Awake, alert, oriented, no acute distress  HEENT:  Wearing Aspen cervical collar  Cardiac: regular rate and rhythm  Lungs: clear to auscultation bilaterally.   Abdomen: soft; non tender, non distended,  bowel sounds present and normoactive  Extremities: Stable 1+ edema in the bilateral lower limbs  Musculoskeletal: Stable muscle wasting in the hands  Skin: Incisions continue to heal well  Neuro:   Ongoing lack of active ankle dorsiflexion bilaterally.  Gradual improvement in ataxic hand movements        Orders Placed This Encounter   Procedures   • Diet Order Diabetic     Standing Status:   Standing     Number of Occurrences:   1     Order Specific Question:   Diet:     Answer:   Diabetic [3]       Assessment:  Active Hospital Problems    Diagnosis   • *Cervical myelopathy ([facility])   • HTN (hypertension)   • CVA (cerebral vascular accident) ([facility])   • Diabetes mellitus with neurological manifestations, controlled ([facility])   • Impaired activities of daily living   • Lumbar stenosis with neurogenic claudication   • Pain   • Cervical stenosis of spine   • Atrial fibrillation [427.31]   • Chronic antibiotic suppression       Medical Decision Making and Plan:  Mr. Ma is a 67-year-old male admitted for rehabilitation on July 18 in the setting of cervical myelopathy and lumbar spinal stenosis     Cervical myelopathy status post C2-C6 laminectomy and C2-C4 fusion by Dr. Marsh on July 13  Lumbar spinal stenosis with neurogenic claudication status post L2-L5 laminectomies by Dr. Marsh on July 13  Continue comprehensive rehabilitation  Followed-up with Dr. Marsh on 7/30  Hailey collar can be removed for meals and showering ONLY  Custom bilateral articulated AFOs have been obtained    Discussed discharge planning    He is not cleared to resume driving.    History of strokes  Cognitive impairment  Atrial fibrillation   Coumadin per pharmacy protocol  Continue speech for cognitive deficits    INR 3.02 today.  He will need INR follow-up on August 15 after discharge     Pain  Neuropathic pain/allodynia  Tylenol as needed   Gabapentin 900 mg 3 times a day  Lidocaine discontinued due to disuse    Hypertension  Amlodipine 10 mg  daily  Lopressor 75 mg every 8 hours  Hydralazine 100 mg 3 times a day  Lisinopril 40 mg BID--resumed home dosing per patient's request.  He will need to follow-up closely with his primary care provider        Diabetes mellitus type 2 with a history of diabetic neuropathy--hemoglobin A1c of 9.9 on July 19  Lantus at bedtime  Sliding scale insulin  Metformin 1000 mg twice a day  Appreciate hospitalist consult    Glucose range of 135-167 in the last 24 hours  Discussed the risks for hypoglycemia and of resuming sliding scale.  Patient indicates that he would like to do this after discharge.  Discussed close follow-up with his primary care provider.     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  Additionally, it has only been 2 months since his last injection.  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       RN: He is doing well.  His edema is stable.       PT:  He had training with his friend today with a car transfer, walking, and ramp.       OT:  He is doing well and is on track for discharge.  He had family training with his friend over the weekend.  His  ramp is completed.  His endurance continues to be limiting.      Speech and language pathology:  He is making progress.  Family training was done with his friend today.  He will need supervision for med management but is making progress towards independence.  He remains inflexible in terms of new tasks     Total time:  35 minutes.  I spent greater than 50% of the time for patient care, counseling, and coordination on this date, including unit/floor time, and face-to-face time with the patient as per interval events and assessment and plan above. Topics discussed included  discharge planning, medications, risk for hypoglycemia, medications, close follow-up with primary care    Higinio Pagan M.D.  8/13/2018

## 2018-08-13 NOTE — REHAB-RESPIRATORY IDT TEAM NOTE
Respiratory Therapy   Respiratory Therapy    Pt has been stable on RA.  He stopped using his CPAP due to discomfort with neck brace.      Section completed by:  Jessica Schmitz, RRT

## 2018-08-13 NOTE — CARE PLAN
Problem: Safety  Goal: Will remain free from injury  Pt uses call light consistently and appropriately. Waits for assistance does not attempt self transfer this shift. Able to verbalize needs.       Problem: Skin Integrity  Goal: Risk for impaired skin integrity will decrease  Back incision appears red and bulging at the top of incision. Hydrocortisone cream is being applied for itching around the incision. Neosporin cream is being applied on incision as ordered. Pt. denies pain. Will continue to monitor.

## 2018-08-13 NOTE — CARE PLAN
Problem: Memory STGs  Goal: STG-Within one week, patient will  1) Individualized goal:  Perform medication and financial management tasks with % mod I.  2) Interventions:  SLP Speech Language Treatment, SLP Self Care / ADL Training , SLP Cognitive Skill Development and SLP Group Treatment     Outcome: MET Date Met: 08/13/18  90%-100% with set up to Mod I.    Problem: Problem Solving STGs  Goal: STG-Within one week, patient will  1) Individualized goal:  Perform deductive reasoning with 80% acc with min cues to improve.  2) Interventions:  SLP Speech Language Treatment, SLP Cognitive Skill Development and SLP Group Treatment     Outcome: MET Date Met: 08/13/18  80% with min A to improve for deduction puzzles that he has been previously exposed to. Mod cues needed for novel tasks.

## 2018-08-13 NOTE — PROGRESS NOTES
Inpatient Anticoagulation Service Note    Date: 8/13/2018  Reason for Anticoagulation: Atrial Fibrillation        Hemoglobin Value: (!) 12.4  Hematocrit Value: (!) 38.8  Lab Platelet Value: (!) 157  Target INR: 2.0 to 3.0    INR from last 7 days     Date/Time INR Value    08/13/18 0529 (!)  3.02    08/12/18 0529 (!)  2.98    08/11/18 0522 (!)  2.15    08/10/18 0559 (!)  2.07    08/09/18 0523 (!)  1.78    08/08/18 0558 (!)  1.62    08/07/18 0538 (!)  1.49        Dose from last 7 days     Date/Time Dose (mg)    08/13/18 0529  9    08/12/18 0800  10    08/11/18 0900  15    08/10/18 0559  15    08/09/18 0523  15    08/08/18 0558  12.5    08/07/18 0538  12        Significant Interactions: Antibiotics  Bridge Therapy: No (Enoxaparin discontinued on 8/10) (If less than 5 days and overlap therapy discontinued -- document reason (i.e. Bleed Risk))  INR Value Greater than 2 Prior to Discontinuation of Parenteral Anticoagulation: Yes (If still on overlap therapy, if No -- document reason (i.e. Bleed Risk))         Plan:  Coumadin 9 mg tonight for INR = 3.02        Alphonso ZamoraD

## 2018-08-13 NOTE — PROGRESS NOTES
Received bedside shift report from Maryellen HERNANDEZ RN regarding patient and assumed care. Patient awake, calm and stable, currently positioned in bed for comfort and safety; call light within reach. Denies pain or discomfort at this time. Will continue to monitor.

## 2018-08-13 NOTE — PROGRESS NOTES
Hospital Medicine Progress Note, Adult, Complex               Author: Patel Sabrina Date & Time created: 8/13/2018  10:25 AM     Interval History:  CC - HTN, DM    Addressed pt's multiple questions regarding BP and diabetic meds.  BP range 124-154/74-88, glucose range .    Review of Systems:  Review of Systems   Constitutional: Negative for chills and fever.   HENT: Negative.    Eyes: Negative.    Respiratory: Negative for cough and shortness of breath.    Cardiovascular: Positive for leg swelling. Negative for chest pain and palpitations.   Gastrointestinal: Negative for abdominal pain, nausea and vomiting.   Genitourinary: Negative.    Musculoskeletal: Positive for back pain and neck pain.        Wound pain   Skin: Positive for itching.   Endo/Heme/Allergies: Negative.        Physical Exam:  Physical Exam   Constitutional: He is oriented to person, place, and time. No distress.   HENT:   Head: Normocephalic and atraumatic.   Right Ear: External ear normal.   Left Ear: External ear normal.   Eyes: Conjunctivae and EOM are normal. Right eye exhibits no discharge. Left eye exhibits no discharge.   Neck:   C-collar   Cardiovascular:   irreg irreg   Pulmonary/Chest: No stridor. No respiratory distress. He has no wheezes.   Decreased BS   Abdominal: Soft. Bowel sounds are normal. He exhibits no distension. There is no tenderness. There is no rebound and no guarding.   Musculoskeletal: He exhibits edema.   1+ edema BLE   Neurological: He is alert and oriented to person, place, and time.   Skin: Skin is warm and dry. He is not diaphoretic.   Lumbar surgical incision C/D/I w/ improving surrounding erythema, no warmth/tenderness/drainage, no induration   Vitals reviewed.      Labs:        Invalid input(s): [id number]      Recent Labs      08/12/18   0529   SODIUM  141   POTASSIUM  3.8   CHLORIDE  107   CO2  26   BUN  26*   CREATININE  0.82   CALCIUM  8.7     Recent Labs      08/12/18   0529   GLUCOSE  114*     Recent  Labs      18   0522  18   0529  18   0529   RBC   --   4.46*   --    HEMOGLOBIN   --   12.4*   --    HEMATOCRIT   --   38.8*   --    PLATELETCT   --   157*   --    PROTHROMBTM  23.7*  30.7*  31.0*   INR  2.15*  2.98*  3.02*     Recent Labs      18   0529   WBC  6.0           Hemodynamics:  Temp (24hrs), Av.1 °C (98.7 °F), Min:37 °C (98.6 °F), Max:37.1 °C (98.8 °F)  Temperature: 37 °C (98.6 °F)  Pulse  Av.2  Min: 55  Max: 113   Blood Pressure : 144/80     Respiratory:    Respiration: 18, Pulse Oximetry: 90 %        RUL Breath Sounds: Clear, RML Breath Sounds: Diminished, RLL Breath Sounds: Diminished, AI Breath Sounds: Clear, LLL Breath Sounds: Diminished  Fluids:    Intake/Output Summary (Last 24 hours) at 18 1025  Last data filed at 18 0857   Gross per 24 hour   Intake              840 ml   Output              400 ml   Net              440 ml        GI/Nutrition:  Orders Placed This Encounter   Procedures   • Diet Order Diabetic     Standing Status:   Standing     Number of Occurrences:   1     Order Specific Question:   Diet:     Answer:   Diabetic [3]         Medical Decision Making, by Problem:  S/P CERVICAL/LUMBAR SPINE SURGERIES - cervical collar, Hydrocortisone Cream around lumbar incision helping to relieve pruritus and inflammation, no signs or symptoms of wound infxn    H/O CVA    JANNIE - on home BiPAP    AFIB - rate controlled on B-Bl, anticoagulated on Coumadin    HTN - increased Lisinopril to 40 mg bid per pt's outpt regimen, recommend outpt BMP prior to PCP/Nephro F/U; cont Norvasc, Metoprolol, and Hydralazine    DM - good control on Metformin and Lantus, unlikely to need SSI on discharge    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 Pagan      Quality-Core Measures   Reviewed items::  Medications reviewed and Labs reviewed  DVT prophylaxis pharmacological::  Warfarin (Coumadin)  Antibiotics:  Treating active infection/contamination beyond 24 hours perioperative coverage  Assessed for rehabilitation services:  Patient was assess for and/or received rehabilitation services during this hospitalization

## 2018-08-13 NOTE — CARE PLAN
Problem: Speech/Swallowing LTGs  Goal: LTG-By discharge, patient will  1) Individualized goal:  Perform medication and financial management tasks with 90% acc.  2) Interventions:  SLP Electrical Stimulation - Attended, SLP Speech Language Treatment, SLP Self Care / ADL Training , SLP Cognitive Skill Development and SLP Group Treatment     Outcome: MET Date Met: 08/13/18

## 2018-08-13 NOTE — REHAB-PT IDT TEAM NOTE
Physical Therapy   Mobility  Bed mobility:   6  Bed /Chair/Wheelchair Transfer Initial:  1 - Total Assistance  Bed /Chair/Wheelchair Transfer Current:  6 - Modified Independent   Bed/Chair/Wheelchair Transfer Description:  Adaptive equipment  Walk Initial:  0 - Not tested,unsafe activity  Walk Current:  2 - Max Assistance   Walk Description:   (CGA with FWW with B AFOs. 60 ft w/PT CGA, then 55 ft with friend providing CGA. Down concrete ramp outside x50 ft with friend CGA. )  Wheelchair Initial:  1 - Total Assistance  Wheelchair Current:  4 - Minimal Assistance   Wheelchair Description:  Verbal cueing, Supervision for safety, Extra time, Safety concerns, Requires incidental assist (ascended/descended 16' ADA ramp x 2 trials in each direction with SBA to Manuel going up, vc for technique and body mechanics)  Stairs Initial:  0 - Not tested,unsafe activity  Stairs Current: 1 - Total Assistance   Stairs Description:  (1 reps of 2 steps; posterior descending due to patient report of knee buckling; R railing, L FWW, max cueing, B AFOs, gait belt, Max A due to balance.)  Patient/Family Training/Education:  Car transfer, CLOF, home accessibility   DME/DC Recommendations:  Manual w/c with cushion, home health   Strengths:  Alert and oriented, Effective communication skills, Motivated for self care and independence, Pleasant and cooperative and Supportive family  Barriers:   Generalized weakness, Limited mobility, Poor balance and Other: poor pacing strategies, requires reminders for adherece to precautions  # of short term goals set= 2  # of short term goals met=1       Physical Therapy Problems           Problem: Mobility     Dates: Start: 07/19/18       Goal: STG-Within one week, patient will ambulate household distance     Dates: Start: 07/30/18       Description: 1) Individualized goal:  Pt will AMB 50ft x 1, SBA with FWW and B AFOs  2) Interventions:  PT Group Therapy, PT Orthotics Training, PT Gait Training, PT  Therapeutic Exercises, PT Neuro Re-Ed/Balance, PT Therapeutic Activity, PT Manual Therapy and PT Evaluation       Note:     Goal Note filed on 08/06/18 0820 by Meena Louis, PT    Goal: STG-Within one week, patient will ambulate household distance  Outcome: NOT MET  Pt requires CGA for mobility at this time                  Problem: Mobility Transfers     Dates: Start: 07/19/18       Goal: STG-Within one week, patient will perform bed mobility     Dates: Start: 07/19/18       Description: 1) Individualized goal: Pt will perform all bed mobility with SPV  2) Interventions:  PT Group Therapy, PT Orthotics Training, PT Gait Training, PT Therapeutic Exercises, PT Neuro Re-Ed/Balance, PT Therapeutic Activity, PT Manual Therapy and PT Evaluation       Note:     Goal Note filed on 07/30/18 0814 by Meena Louis PT    Goal: STG-Within one week, patient will perform bed mobility  Outcome: NOT MET  Pt requires CGA for bed mobility to adhere to precautions at this time                  Problem: PT-Long Term Goals     Dates: Start: 07/19/18       Goal: LTG-By discharge, patient will ambulate     Dates: Start: 07/19/18       Description: 1) Individualized goal:  Pt will AMB 150ft x1 with LRAD , mod I  2) Interventions:  PT Group Therapy, PT Orthotics Training, PT Gait Training, PT Therapeutic Exercises, PT Neuro Re-Ed/Balance, PT Therapeutic Activity, PT Manual Therapy and PT Evaluation             Goal: LTG-By discharge, patient will perform home exercise program     Dates: Start: 07/19/18       Description: 1) Individualized goal: Pt will perform HEP for B LE strengthening to maintain the benefits obtained in PT, mod I  2) Interventions:  PT Group Therapy, PT Orthotics Training, PT Gait Training, PT Therapeutic Exercises, PT Neuro Re-Ed/Balance, PT Therapeutic Activity, PT Manual Therapy and PT Evaluation             Goal: LTG-By discharge, patient will ambulate up/down 4-6 stairs     Dates: Start: 07/19/18        Description: 1) Individualized goal: Pt will AMB up/down 4 stairs with unilateral HR to mimic home environment, mod I  2) Interventions:  PT Group Therapy, PT Orthotics Training, PT Gait Training, PT Therapeutic Exercises, PT Neuro Re-Ed/Balance, PT Therapeutic Activity, PT Manual Therapy and PT Evaluation             Goal: LTG-By discharge, patient will transfer in/out of a car     Dates: Start: 07/19/18       Description: 1) Individualized goal:  Pt will transfer in/out of a car with LRAD, mod I  2) Interventions:  PT Group Therapy, PT Orthotics Training, PT Gait Training, PT Therapeutic Exercises, PT Neuro Re-Ed/Balance, PT Therapeutic Activity, PT Manual Therapy and PT Evaluation                     Section completed by:  Meena Louis, PT

## 2018-08-13 NOTE — REHAB-NURSING IDT TEAM NOTE
Nursing   Nursing  Diet/Nutrition:  Diabetic and Thin Liquids  Medication Administration:  Whole with Liquid Wash  % consumed at meals in last 24 hours:  Consumed % of meals per documentation.  Meal/Snack Consumption for the past 24 hrs:   Oral Nutrition   08/12/18 1900 Dinner;Between % Consumed   08/12/18 1200 Lunch;Between % Consumed   08/12/18 0900 Breakfast;Between % Consumed       Snack schedule:  HS  Supplement:  Other: N/A  Appetite:  Good  Fluid Intake/Output in past 24 hours: In: 1060 [P.O.:1060]  Out: 775   Admit Weight:  Weight: 114.3 kg (252 lb)  Weight Last 7 Days   [118.6 kg (261 lb 6.4 oz)] 118.6 kg (261 lb 6.4 oz) (08/12 0900)    Pain Issues:    Location:  --  --         Severity:  Denies   Scheduled pain medications:  gabapentin (NEURONTIN)      PRN pain medications used in last 24 hours:  None   Non Pharmacologic Interventions:  other: Denies Pain  Effectiveness of pain management plan:  Denies Pain    Bowel:    Bowel Assist Initial Score:  1 - Total Assistance  Bowel Assist Current Score:  5 - Standby Prompting/Supervision or Set-up  Bowl Accidents in last 7 days:     Last bowel movement: 08/12/18  Stool Description: Large (as per pt)     Usual bowel pattern:  daily  Scheduled bowel medications:  docusate sodium (COLACE) and senna-docusate (PERICOLACE or SENOKOT S)   PRN bowel medications used in last 24 hours:  None  Nursing Interventions:  Increased time, Scheduled medication, Brief  Effectiveness of bowel program:   good=regular, predictable, controlled emptying of bowel  Bladder:    Bladder Assist Initial Score:  1 - Total Assistance  Bladder Assist Current Score:  4 - Minimal Assistance  Bladder Accidents in last 7 days:  0  PVR range for past 24-48 hours: -- ()  Intermittent Catheterization:  N/A  Medications affecting bladder:  None    Time void schedule/voiding pattern:  Voiding every 2-4 hours  Interventions:  Increased time, Time void patient initiated, Emptying  of device, Urinal  Effectiveness of bladder training:  Good=regular, predictable, emptying of bladder, patient initiates time voiding    Gonzalez:    Type:     Patient Lines/Drains/Airways Status    Active Catheter     None              Date placed:          Justification:    Diabetes:  Blood Sugar Frequency:  Before meals and at bedtime    Range of BS for last 48 hours:   Recent Labs      08/11/18   0749  08/11/18   1128  08/11/18   1734  08/11/18   2215  08/12/18   0758  08/12/18   1126  08/12/18   1708  08/12/18   2201   POCGLUCOSE  124*  126*  204*  227*  86  135*  167*  160*     Scheduled diabetic medications:  metformin (GLUCOPHAGE)  and insulin glargine (LANTUS) injection   Sliding scale usage in past 24 hours:  Yes  Total Short acting insulin in the past 24 hours:  Humalog 4 units  Total Long acting insulin in the past 24 hours:  Lantus 40 units.    Wound:         Patient Lines/Drains/Airways Status    Active Current Wounds     Name: Placement date: Placement time: Site: Days:    Surgical Incision  Incision Cervical Posterior 07/13/18   1555      30    Surgical Incision  Incision Back 07/13/18   1748      30                   Interventions:  Cervical Posterior Neck WILFREDO, Surgical Incision to Back WILFREDO-red, itching.  Effectiveness of intervention:  wound is improving Surgical Incision to Back WILFREDO-red in color and complains of itching.    Sleep/wake cycle:   Average hours slept:  Sleeps 3-4 hours without waking  Sleep medication usage:  None    Patient/Family Training/Education:  Fall Prevention, General Self Care, Pain Management, Safe Transfers, Safety, Skin Care and Wound Care  Discharge Supply Recommendations:  Glucometer and Strips and Wound Care Supplies  Strengths: Alert and oriented, Supportive family, Pleasant and cooperative and Effective communication skills   Barriers:   Fatigue, Generalized weakness, Limited mobility and Poor sleep pattern            Nursing Problems           Problem: Bowel/Gastric:      Goal: Normal bowel function is maintained or improved           Goal: Will not experience complications related to bowel motility             Problem: Communication     Goal: The ability to communicate needs accurately and effectively will improve             Problem: Discharge Barriers/Planning     Goal: Patient's continuum of care needs will be met             Problem: GLYCEMIA IMBALANCE     Goal: Clinical indication of glycemia balance is achieved             Problem: Infection     Goal: Will remain free from infection             Problem: Knowledge Deficit     Goal: Knowledge of disease process/condition, treatment plan, diagnostic tests, and medications will improve           Goal: Knowledge of the prescribed therapeutic regimen will improve             Problem: Mobility     Goal: Risk for activity intolerance will decrease             Problem: Pain Management     Goal: Pain level will decrease to patient's comfort goal     Flowsheet:     Taken at 07/31/18 1028    Pain Scale 0 - 10  10 by Boby Roberts R.N.                  Problem: Psychosocial Needs:     Goal: Level of anxiety will decrease             Problem: Respiratory:     Goal: Respiratory status will improve             Problem: Safety     Goal: Will remain free from injury           Goal: Will remain free from falls             Problem: Skin Integrity     Goal: Risk for impaired skin integrity will decrease             Problem: Urinary Elimination:     Goal: Ability to reestablish a normal urinary elimination pattern will improve             Problem: Venous Thromboembolism (VTW)/Deep Vein Thrombosis (DVT) Prevention:     Goal: Patient will participate in Venous Thrombosis (VTE)/Deep Vein Thrombosis (DVT)Prevention Measures                  Long Term Goals:   At discharge patient will be able to function safely at home and in the community with support.    Section completed by:  Joy Bazzi L.P.N.2

## 2018-08-14 VITALS
WEIGHT: 261.4 LBS | TEMPERATURE: 98.4 F | HEART RATE: 60 BPM | HEIGHT: 75 IN | BODY MASS INDEX: 32.5 KG/M2 | RESPIRATION RATE: 18 BRPM | OXYGEN SATURATION: 92 % | SYSTOLIC BLOOD PRESSURE: 142 MMHG | DIASTOLIC BLOOD PRESSURE: 77 MMHG

## 2018-08-14 LAB
GLUCOSE BLD-MCNC: 138 MG/DL (ref 65–99)
GLUCOSE BLD-MCNC: 93 MG/DL (ref 65–99)
INR PPP: 2.54 (ref 0.87–1.13)
PROTHROMBIN TIME: 27 SEC (ref 12–14.6)

## 2018-08-14 PROCEDURE — 700102 HCHG RX REV CODE 250 W/ 637 OVERRIDE(OP): Performed by: PHYSICAL MEDICINE & REHABILITATION

## 2018-08-14 PROCEDURE — A9270 NON-COVERED ITEM OR SERVICE: HCPCS | Performed by: PHYSICAL MEDICINE & REHABILITATION

## 2018-08-14 PROCEDURE — A9270 NON-COVERED ITEM OR SERVICE: HCPCS | Performed by: HOSPITALIST

## 2018-08-14 PROCEDURE — 82962 GLUCOSE BLOOD TEST: CPT

## 2018-08-14 PROCEDURE — 85610 PROTHROMBIN TIME: CPT

## 2018-08-14 PROCEDURE — 700102 HCHG RX REV CODE 250 W/ 637 OVERRIDE(OP): Performed by: HOSPITALIST

## 2018-08-14 PROCEDURE — 99239 HOSP IP/OBS DSCHRG MGMT >30: CPT | Performed by: PHYSICAL MEDICINE & REHABILITATION

## 2018-08-14 PROCEDURE — 99232 SBSQ HOSP IP/OBS MODERATE 35: CPT | Performed by: HOSPITALIST

## 2018-08-14 PROCEDURE — 36415 COLL VENOUS BLD VENIPUNCTURE: CPT

## 2018-08-14 RX ORDER — WARFARIN SODIUM 5 MG/1
5 TABLET ORAL
Status: DISCONTINUED | OUTPATIENT
Start: 2018-08-14 | End: 2018-08-14 | Stop reason: HOSPADM

## 2018-08-14 RX ORDER — WARFARIN SODIUM 4 MG/1
4 TABLET ORAL
Status: DISCONTINUED | OUTPATIENT
Start: 2018-08-14 | End: 2018-08-14 | Stop reason: HOSPADM

## 2018-08-14 RX ORDER — WARFARIN SODIUM 3 MG/1
6 TABLET ORAL
Status: DISCONTINUED | OUTPATIENT
Start: 2018-08-14 | End: 2018-08-14

## 2018-08-14 RX ADMIN — BACITRACIN, NEOMYCIN, POLYMYXIN B: 400; 3.5; 5 OINTMENT TOPICAL at 08:37

## 2018-08-14 RX ADMIN — METOPROLOL TARTRATE 75 MG: 25 TABLET ORAL at 05:21

## 2018-08-14 RX ADMIN — GABAPENTIN 900 MG: 300 CAPSULE ORAL at 08:35

## 2018-08-14 RX ADMIN — CHOLECALCIFEROL TAB 25 MCG (1000 UNIT) 2000 UNITS: 25 TAB at 08:36

## 2018-08-14 RX ADMIN — Medication: at 08:37

## 2018-08-14 RX ADMIN — HYDRALAZINE HYDROCHLORIDE 100 MG: 25 TABLET, FILM COATED ORAL at 08:35

## 2018-08-14 RX ADMIN — LISINOPRIL 40 MG: 20 TABLET ORAL at 08:35

## 2018-08-14 RX ADMIN — CLINDAMYCIN HYDROCHLORIDE 300 MG: 300 CAPSULE ORAL at 08:35

## 2018-08-14 RX ADMIN — METFORMIN HYDROCHLORIDE 1000 MG: 500 TABLET, FILM COATED ORAL at 08:36

## 2018-08-14 RX ADMIN — AMLODIPINE BESYLATE 10 MG: 5 TABLET ORAL at 05:21

## 2018-08-14 RX ADMIN — Medication 1 CAPSULE: at 08:36

## 2018-08-14 ASSESSMENT — ENCOUNTER SYMPTOMS
NERVOUS/ANXIOUS: 0
NAUSEA: 0
DIARRHEA: 0
ABDOMINAL PAIN: 0
VOMITING: 0
CHILLS: 0
SHORTNESS OF BREATH: 0
FEVER: 0

## 2018-08-14 ASSESSMENT — PAIN SCALES - GENERAL: PAINLEVEL_OUTOF10: 0

## 2018-08-14 NOTE — CARE PLAN
Problem: OT Long Term Goals  Goal: LTG-By discharge, patient will complete basic self care tasks  1) Individualized Goal:  With mod I using AE/AD/techniques as needed.  2) Interventions:  OT E Stim Attended, OT Group Therapy, OT Self Care/ADL, OT Cognitive Skill Dev, OT Community Reintegration, OT Manual Ther Technique, OT Neuro Re-Ed/Balance, OT Sensory Int Techniques, OT Therapeutic Activity, OT Evaluation and OT Therapeutic Exercise   Outcome: NOT MET    Goal: LTG-By discharge, patient will perform bathroom transfers  1) Individualized Goal:  With mod I using AE/AD/techniques as needed.  2) Interventions:  OT E Stim Attended, OT Group Therapy, OT Self Care/ADL, OT Cognitive Skill Dev, OT Community Reintegration, OT Manual Ther Technique, OT Neuro Re-Ed/Balance, OT Sensory Int Techniques, OT Therapeutic Activity, OT Evaluation and OT Therapeutic Exercise   Outcome: NOT MET    Goal: LTG-By discharge, patient will complete basic home management  1) Individualized Goal:  With mod I using AE/AD/techniques as needed.  2) Interventions:  OT E Stim Attended, OT Group Therapy, OT Self Care/ADL, OT Cognitive Skill Dev, OT Community Reintegration, OT Manual Ther Technique, OT Neuro Re-Ed/Balance, OT Sensory Int Techniques, OT Therapeutic Activity, OT Evaluation and OT Therapeutic Exercise   Outcome: NOT MET

## 2018-08-14 NOTE — PROGRESS NOTES
Hospital Medicine Progress Note, Adult, Complex               Author: Moshe SAMMY Balderasnancy Date & Time created: 8/14/2018  7:36 AM     Interval History:  No significant events or changes since last visit  Patient denies SOB, cough, or diarrhea  Patient slept ok last night    Chief Complaint:  Hypertension  Diabetes    Review of Systems:  Review of Systems   Constitutional: Negative for chills and fever.   Respiratory: Negative for shortness of breath.    Cardiovascular: Negative for chest pain.   Gastrointestinal: Negative for abdominal pain, diarrhea, nausea and vomiting.   Psychiatric/Behavioral: The patient is not nervous/anxious.        Physical Exam:  Physical Exam   Constitutional: He is oriented to person, place, and time. He appears well-nourished.   HENT:   Head: Atraumatic.   Eyes: Pupils are equal, round, and reactive to light. Conjunctivae are normal.   Neck: No JVD present. No tracheal deviation present.   Has C-collar.   Cardiovascular: S1 normal and S2 normal.    No murmur heard.  Irregular heart rate   Pulmonary/Chest: Effort normal. He has no wheezes. He has no rhonchi. He has no rales.   Abdominal: Soft. He exhibits no distension. There is no tenderness. Hernia confirmed negative in the right inguinal area and confirmed negative in the left inguinal area.   Musculoskeletal: He exhibits edema.   Neurological: He is alert and oriented to person, place, and time. No sensory deficit.   Skin: Skin is warm and dry. No rash noted. No cyanosis.   Psychiatric: He has a normal mood and affect. His behavior is normal.   Nursing note and vitals reviewed.      Labs:        Invalid input(s): [id number]      Recent Labs      08/12/18   0529   SODIUM  141   POTASSIUM  3.8   CHLORIDE  107   CO2  26   BUN  26*   CREATININE  0.82   CALCIUM  8.7     Recent Labs      08/12/18   0529   GLUCOSE  114*     Recent Labs      08/12/18   0529  08/13/18   0529  08/14/18   0549   RBC  4.46*   --    --    HEMOGLOBIN  12.4*   --     --    HEMATOCRIT  38.8*   --    --    PLATELETCT  157*   --    --    PROTHROMBTM  30.7*  31.0*  27.0*   INR  2.98*  3.02*  2.54*     Recent Labs      18   0529   WBC  6.0           Hemodynamics:  Temp (24hrs), Av.1 °C (98.8 °F), Min:36.9 °C (98.4 °F), Max:37.5 °C (99.5 °F)  Temperature: 36.9 °C (98.4 °F)  Pulse  Av.9  Min: 55  Max: 113   Blood Pressure : 142/77     Respiratory:    Respiration: 18, Pulse Oximetry: 92 %     Work Of Breathing / Effort: Mild  RUL Breath Sounds: Clear, RML Breath Sounds: Diminished, RLL Breath Sounds: Diminished, AI Breath Sounds: Clear, LLL Breath Sounds: Diminished  Fluids:    Intake/Output Summary (Last 24 hours) at 18 0736  Last data filed at 18 0500   Gross per 24 hour   Intake             1280 ml   Output              400 ml   Net              880 ml        GI/Nutrition:  Orders Placed This Encounter   Procedures   • Diet Order Diabetic     Standing Status:   Standing     Number of Occurrences:   1     Order Specific Question:   Diet:     Answer:   Diabetic [3]     Medical Decision Making, by Problem:  Active Hospital Problems    Diagnosis   • *Cervical myelopathy (HCC) [G95.9]   • HTN (hypertension) [I10]   • CVA (cerebral vascular accident) (HCC) [I63.9]   • Diabetes mellitus with neurological manifestations, controlled (HCC) [E11.49]   • Impaired activities of daily living [R53.81]   • Lumbar stenosis with neurogenic claudication [M48.062]   • Pain [R52]   • Cervical stenosis of spine [M48.02]   • Atrial fibrillation [427.31] [I48.91]   • Chronic antibiotic suppression [Z79.2]     *  S/P Lumbar Surgery  *  S/P Cervical Surgery: had hx of prior cervical fusion    *  Hypertension  BP ok  On Norvasc: 10 mg daily  On Lopressor: 75 mg bid  On Hydralazine: 100 mg tid  On Lisinopril: 40 mg bid  Cont to monitor    *  Hx Afib  HR ok   On Lopressor: 75 mg bid   On Coumadin  Monitor    *  Diabetes  Hba1c: 9.9 ()  -168  On Lantus: 40 units qhs  On  Metformin: 1000 mg bid  Monitor    *  Azotemia  Bun: 26 (8/12)  Encouraging fluid (water/juice) intake  Monitor    *  Hx CVA: x 2; on Coumadin  *  Arthritis  *  Hx Cataracts: s/p extraction  *  Hyperlipidemia  *  Vit D Insufficiency: on supplements  *  Hx MRSA: on life long Clinda  *  JANNIE: on BiPAP  *  Hx Benign Thyroid Mass       Quality-Core Measures   Reviewed items::  Labs reviewed and Medications reviewed

## 2018-08-14 NOTE — PROGRESS NOTES
Patient discharged to home per order.  Discharge instructions reviewed with patient and friend; they verbalize understanding and signed copies placed in chart.  Patient has all belongings; signed copy of form in chart.  Patient left facility at 1215 via wheelchair accompanied by rehab staff and friend.

## 2018-08-14 NOTE — PROGRESS NOTES
PATIENT DISCHARGE MEDICATION COUNSELING:  This patient received individualized medication counseling regarding the current regimen they are receiving in this facility. Potential adverse effects, monitoring parameters, and proper administration were covered in preparation for their discharge. The patient asked relevant questions regarding the medications for which answers were provided. Our pharmacy hours and phone number were provided for follow up questions should they arise.    Diabetic agents: Blood sugar monitoring was discussed as well as the signs and symptoms of hypoglycemia as the patient is currently on (Diabetic agent).  Antihypertensives: This patient is being treated for hypertension and it is recommended that they monitor and record with a blood pressure device. The patient has been instructed to contact their primary care physician if the HR or blood pressure is abnormal.     Mary keagan

## 2018-08-14 NOTE — DISCHARGE INSTRUCTIONS
[facility] NURSING DISCHARGE INSTRUCTIONS    Blood Pressure : 130/74  Weight: 118.6 kg (261 lb 6.4 oz)  Nursing recommendations for Joel Ma at time of discharge are as follows:  Client verbalized understanding of all discharge instructions and prescriptions.     Review all your home medications and newly ordered medications with your doctor and/or pharmacist. Follow medication instructions as directed by your doctor and/or pharmacist.    Pain Management:   Discharge Pain Medication Instructions:  Comfort Goal: 0  Notify your primary care provider if pain is unrelieved with these measures, if the pain is new, or increased in intensity.    Discharge Skin Characteristics: Dry, Warm  Discharge Skin Exam: Other (Comments) (back incision open to air with slight redness.)  Surgical Incision  Incision Cervical Posterior (Active)   Wound Bed Pale;Pink 8/13/2018  8:50 PM   Drainage  None 8/13/2018  8:50 PM   Periwound Skin Pink 8/13/2018  8:50 PM   Daily - Wound Closure Open to Air 8/13/2018  8:50 PM   Dressing Options Open to Air 8/13/2018  8:50 PM   Dressing Status / Change Not Applicable 8/13/2018  8:50 PM   Weekly Photo (Inpatient Only) 08/10/18 8/12/2018 10:09 PM       Surgical Incision  Incision Back (Active)   Wound Bed Pink;Red;Yellow 8/13/2018  8:50 PM   Drainage  None 8/13/2018  8:50 PM   Periwound Skin Pink;Red 8/13/2018  8:50 PM   Daily - Wound Closure Open to Air 8/13/2018  8:50 PM   Dressing Options Open to Air 8/13/2018  8:50 PM   Dressing Status / Change Not Applicable 8/13/2018  8:50 PM   Daily - Dressing Change Changed 8/11/2018 10:10 PM   Dressing Change Frequency Daily 8/11/2018 10:10 PM   Next Dressing Change  08/06/18 8/5/2018  9:22 PM   Weekly Photo (Inpatient Only) 08/10/18 8/12/2018 10:09 PM     Skin / Wound Care Instructions: Please contact your primary care physician for any change in skin integrity. Such as increased redness, drainage    If You Have Surgical  Incisions / Wounds:  Monitor surgical site(s) for signs of increased swelling, redness or symptoms of drainage from the site or fever as this could indicate signs and symptoms of infection. If these symptoms are noted, notifiy your primary care provider.      Discharge Safety Instructions: Should Not Be Left Alone In The House     Discharge Safety Concerns: Unsteady Gait, Weakness  The interdisciplinary team has made recommendation that you should not be left alone  in the house due to weakness and unsteady gait  Anti-embolic stockings are not required to increase circulation to the lower extremities.    Discharge Diet: diabetic     Discharge Liquids: thin  Discharge Bowel Function: Continent  Please contact your primary care physician for any changes in bowel habits.  Discharge Bowel Program:    Discharge Bladder Function: Continent  Discharge Urinary Devices: Brief      Nursing Discharge Plan:   Pneumococcal Vaccine Administered/Refused: Not given - Patient refused pneumococcal vaccine  Influenza Vaccine Indication: Not indicated: Previously immunized this influenza season and > 8 years of age    Case Management Discharge Instructions:   Discharge Location: Home with Home Health  Agency Name/Address/Phone: [facility]  -  phone [phone]  Home Health: Registered Nurse, Occupational Therapist, Physical Therapist, Speech Therapist  Outpatient Services: Blood Work - Drawn by Home Health  DME Provider/Phone: [facility]  -  phone [phone]   Medical Equipment Ordered: Wheelchair  Prescription Faxed to:        Discharge Medication Instructions:  Below are the medications your physician expects you to take upon discharge:Depression / Suicide Risk    As you are discharged from this [facility] facility, it is important to learn how to keep safe from harming yourself.    Recognize the warning signs:  · Abrupt changes in personality, positive or negative- including increase in energy   · Giving away  possessions  · Change in eating patterns- significant weight changes-  positive or negative  · Change in sleeping patterns- unable to sleep or sleeping all the time   · Unwillingness or inability to communicate  · Depression  · Unusual sadness, discouragement and loneliness  · Talk of wanting to die  · Neglect of personal appearance   · Rebelliousness- reckless behavior  · Withdrawal from people/activities they love  · Confusion- inability to concentrate     If you or a loved one observes any of these behaviors or has concerns about self-harm, here's what you can do:  · Talk about it- your feelings and reasons for harming yourself  · Remove any means that you might use to hurt yourself (examples: pills, rope, extension cords, firearm)  · Get professional help from the community (Mental Health, Substance Abuse, psychological counseling)  · Do not be alone:Call your Safe Contact- someone whom you trust who will be there for you.  · Call your local CRISIS HOTLINE 640-2809 or 113-156-5627  · Call your local Children's Mobile Crisis Response Team [place] (738) 622-5483 or wwwDripDrop  · Call the toll free National Suicide Prevention Hotlines   · National Suicide Prevention Lifeline 592-620-UWQW (2966)  · National Hope Line Network 800-SUICIDE (430-5656)    Fall Prevention in the Home  Falls can cause injuries and can affect people from all age groups. There are many simple things that you can do to make your home safe and to help prevent falls.  What can I do on the outside of my home?  · Regularly repair the edges of walkways and driveways and fix any cracks.  · Remove high doorway thresholds.  · Trim any shrubbery on the main path into your home.  · Use bright outdoor lighting.  · Clear walkways of debris and clutter, including tools and rocks.  · Regularly check that handrails are securely fastened and in good repair. Both sides of any steps should have handrails.  · Install guardrails along the edges  of any raised decks or porches.  · Have leaves, snow, and ice cleared regularly.  · Use sand or salt on walkways during winter months.  · In the garage, clean up any spills right away, including grease or oil spills.  What can I do in the bathroom?  · Use night lights.  · Install grab bars by the toilet and in the tub and shower. Do not use towel bars as grab bars.  · Use non-skid mats or decals on the floor of the tub or shower.  · If you need to sit down while you are in the shower, use a plastic, non-slip stool.  · Keep the floor dry. Immediately clean up any water that spills on the floor.  · Remove soap buildup in the tub or shower on a regular basis.  · Attach bath mats securely with double-sided non-slip rug tape.  · Remove throw rugs and other tripping hazards from the floor.  What can I do in the bedroom?  · Use night lights.  · Make sure that a bedside light is easy to reach.  · Do not use oversized bedding that drapes onto the floor.  · Have a firm chair that has side arms to use for getting dressed.  · Remove throw rugs and other tripping hazards from the floor.  What can I do in the kitchen?  · Clean up any spills right away.  · Avoid walking on wet floors.  · Place frequently used items in easy-to-reach places.  · If you need to reach for something above you, use a sturdy step stool that has a grab bar.  · Keep electrical cables out of the way.  · Do not use floor polish or wax that makes floors slippery. If you have to use wax, make sure that it is non-skid floor wax.  · Remove throw rugs and other tripping hazards from the floor.  What can I do in the stairways?  · Do not leave any items on the stairs.  · Make sure that there are handrails on both sides of the stairs. Fix handrails that are broken or loose. Make sure that handrails are as long as the stairways.  · Check any carpeting to make sure that it is firmly attached to the stairs. Fix any carpet that is loose or worn.  · Avoid having throw  rugs at the top or bottom of stairways, or secure the rugs with carpet tape to prevent them from moving.  · Make sure that you have a light switch at the top of the stairs and the bottom of the stairs. If you do not have them, have them installed.  What are some other fall prevention tips?  · Wear closed-toe shoes that fit well and support your feet. Wear shoes that have rubber soles or low heels.  · When you use a stepladder, make sure that it is completely opened and that the sides are firmly locked. Have someone hold the ladder while you are using it. Do not climb a closed stepladder.  · Add color or contrast paint or tape to grab bars and handrails in your home. Place contrasting color strips on the first and last steps.  · Use mobility aids as needed, such as canes, walkers, scooters, and crutches.  · Turn on lights if it is dark. Replace any light bulbs that burn out.  · Set up furniture so that there are clear paths. Keep the furniture in the same spot.  · Fix any uneven floor surfaces.  · Choose a carpet design that does not hide the edge of steps of a stairway.  · Be aware of any and all pets.  · Review your medicines with your healthcare provider. Some medicines can cause dizziness or changes in blood pressure, which increase your risk of falling.  Talk with your health care provider about other ways that you can decrease your risk of falls. This may include working with a physical therapist or  to improve your strength, balance, and endurance.  This information is not intended to replace advice given to you by your health care provider. Make sure you discuss any questions you have with your health care provider.  Document Released: 12/08/2003 Document Revised: 05/16/2017 Document Reviewed: 01/22/2016  Elsevier Interactive Patient Education © 2017 [employer]  Physical Therapy Discharge Instructions for Joel Ma    8/13/2018    Level of Assist Required for Ambulation: Should Not  Attempt Curbs at This Time, Should Not Attempt Stairs at This Time, Assist for Balance on Flat Surfaces  Distance Patient May Ambulate:  (50ft, as tolerated)  Device Recommended for Ambulation: Front-Wheeled Walker  Level of Assist Required to Propel Wheelchair: Requires No Assist  Level of Assist Required for Transfers: Requires No Assist  Device Recommended for Transfers:  (manual w/c)  Home Exercise Program: Refer to Home Exercise Program Handout for Details  Best of luck at home, Joel! Keep working on strengthening your legs. It was a pleasure working with you.  Sincerely,  Meena Louis PT, DPT      Speech Therapy Discharge Instructions for Joel Ma    8/13/2018    Diet: Regular - all foods allowed, Thin Liquids - any liquid like water, coffee, tea, juices, or clear fluids like Gatorade, etc.    Other Diet Instructions: Diabetic per physician.    Supervision: Recommend supervision for medication and financial management initially.  Fading supervision as patient demonstrates consistent accuracy with managment.  Please see PT/OT recommendations for supervision during activities.    Cognition / Communication: Allow and budget extra time to get tasks done.  When you are about to try a task that you haven't done in a while (or struggled with the last time), think about the steps involved, try to anticipate possible challenges and identify what might give you trouble, come up with a plan to compensate of those challenges,  and  evaluate after the fact - to determine if your plan worked or if it needs adjustment. If you experience an outcome that you didn't expect, think back to what may have contributed to the problem. Break complex problems down into smaller parts.   Sometimes a a complicated task can be overwhelming, but if you break it down into components, you will be able to find a place where you can cope with the information.   Be patient and persistent.  Develop tools and strategies that will help  organize, filter and narrow down information so that you can deal with it effectively.    Use RWAVS memory strategies to reinforce new learning.    R - repeat, repeat, repeat.     W - write it down or get it in writing.     A - associate the new information with something that you already know very well.     V - visualize it, practice in your mind's eye, when you aren't able to perform the action physically.    S - say it back, out loud.  This benefits you, as you repeat the information for practice. You also are seeking clarification from the educator, evaluating to see if all of the information was understood, and prompting feedback if needed.  And it slows down the exchange, buying extra time to process the information.    To help your vision, fill your glasses prescription.     It has been a pleasure working with you.  -- Jo-Ann Ron M.S. CCC-SLP      Occupational Therapy Discharge Instructions for Joel Ma    8/13/2018    Level of Assist Required for Eating: Able to Complete Eating without Assist  Level of Assist Required for Grooming: Able to Complete Grooming without Assist  Level of Assist Required for Dressing: Requires Physical Assist with Dressing (getting shoes on when AFO's are on)  Level of Assist Required for Toileting: Able to Complete Toileting without Assist  Level of Assist Required for Toilet Transfer: Able to Complete Toilet Transfer without Assist  Equipment for Toilet Transfer: Grab Bars by Toilet  Level of Assist Required for Bathing: Able to Complete Bathing without Assist  Equipment for Bathing: Tub Transfer Bench, Grab Bars in Tub / Shower, Hand Held Shower Head, Long Handled Sponge  Level of Assist Required for Shower Transfer: Requires Supervision with Shower Transfer  Equipment for Shower Transfer: Tub Transfer Bench, Grab Bars in Tub / Shower  Level of Assist Required for Home Mgmt: Requires Physical Assist with Home Management  Level of Assist Required for Meal Prep: Able  to Complete Meal Preparation without Assist (if completing while seated)  Driving: May not Drive, Please Contact Physician for Further Information  Home Exercise Program: None Issued  Comments: Good luck back at home, Joel.  Thanks for all the lizard education you provided.  WILLIE Ritchie

## 2018-08-14 NOTE — DISCHARGE PLANNING
Case Management;  Met with patient and his friend Albaro who will be staying with him.  They have completed a ramp for patient at home.  Patient appears very happy with this and had his friend show me pictures.  Reviewed all follow up appointments.  [facility] updated and ready to follow.  We have provided orders for Protime/Inr draw at home.  We are awaiting delivery of w/c from [facility].  Patient has all other needed dme.  Patient's goal was to return home and he is happy about this.  We have completed family training with his friend Albaro.  We have linked patient to community resources for follow up therapy and dme.  Patient verbalizes understanding of next steps and agreement with all plans.  All questions answered.

## 2018-08-14 NOTE — PROGRESS NOTES
Pharmacy Warfarin Consult   8/14/2018     67 y.o.   male     Indication for anticoagulation: Atrial Fibrillation    Goal INR = 2 to 3     Recent Labs      08/12/18   0529  08/13/18   0529  08/14/18   0549   INR  2.98*  3.02*  2.54*   HEMOGLOBIN  12.4*   --    --    HEMATOCRIT  38.8*   --    --        Pertinent Drug/Drug Interactions:   Antibiotics  Outpatient Warfarin Regimen:  10mg daily per med rec  Recent Warfarin Dosing:    Dose from last 7 days     Date/Time Dose (mg)    08/14/18 0549  9    08/13/18 0529  9    08/12/18 0800  10    08/11/18 0900  15    08/10/18 0559  15    08/09/18 0523  15    08/08/18 0558  12.5          Bridge Therapy: Discontinued         1.  Coumadin 9mg tonight for INR = 2.54        [facility]

## 2018-08-14 NOTE — DISCHARGE PLANNING
Case Management;  Met with patient and reviewed plans for d/c tomorrow.  Training has been completed with his friend who will be staying with him.  Will follow in am.

## 2018-08-14 NOTE — CARE PLAN
Problem: Safety  Goal: Will remain free from injury  Patient uses call light consistently and appropriately this shift.  Waits for assistance when needed and does not attempt self transfer.  Able to verbalize needs.  Will continue to monitor.    Problem: Pain Management  Goal: Pain level will decrease to patient's comfort goal  Patient able to verbalize needs.  Denies pain or discomfort this shift and no s/s same noted.  Will continue to monitor.

## 2018-08-16 ENCOUNTER — TELEPHONE (OUTPATIENT)
Dept: MEDICAL GROUP | Facility: LAB | Age: 68
End: 2018-08-16

## 2018-08-16 ENCOUNTER — ANTICOAGULATION MONITORING (OUTPATIENT)
Dept: VASCULAR LAB | Facility: MEDICAL CENTER | Age: 68
End: 2018-08-16

## 2018-08-16 ENCOUNTER — HOME CARE VISIT (OUTPATIENT)
Dept: HOME HEALTH SERVICES | Facility: HOME HEALTHCARE | Age: 68
End: 2018-08-16

## 2018-08-16 DIAGNOSIS — R53.81 DEBILITY: ICD-10-CM

## 2018-08-16 DIAGNOSIS — Z86.73 HISTORY OF CVA (CEREBROVASCULAR ACCIDENT): ICD-10-CM

## 2018-08-16 DIAGNOSIS — Z79.01 ANTICOAGULATED ON WARFARIN: ICD-10-CM

## 2018-08-16 NOTE — TELEPHONE ENCOUNTER
Typically patients are not sent from primary care directly to skilled nursing.  I have no complaints if there is a skilled nursing requirement needed I will write it out.  I have written out for home health to look at the patient.

## 2018-08-16 NOTE — PROGRESS NOTES
Anticoagulation Summary  As of 8/16/2018    INR goal:   2.0-3.0   TTR:   58.3 % (2.7 y)   Today's INR:   2.54 (8/14/2018)   Warfarin maintenance plan:   10 mg (5 mg x 2) every day   Weekly warfarin total:   70 mg   Plan last modified:   Jefferson Brian, PharmD (1/3/2017)   Next INR check:   8/17/2018   Target end date:   Indefinite    Indications    CVA (cerebral vascular accident) (HCC) [I63.9]  Atrial fibrillation [427.31] [I48.91]  History of CVA (cerebrovascular accident) [Z86.73]  Anticoagulated on warfarin [Z79.01]             Anticoagulation Episode Summary     INR check location:   [facility]    Preferred lab:       Send INR reminders to:       Comments:   call pt multiple times, he has a hard time getting to his phone in time and has no VM set up      Anticoagulation Care Providers     Provider Role Specialty Phone number    Catrachito Sterling D.O. Referring Internal Medicine [phone]    [facility] Responsible  [phone]    Liseth Doe, A.P.N. Responsible Cardiology [phone]    Gi Cadena A.P.N. Responsible Cardiology [phone]        Anticoagulation Patient Findings    HPI:  Joel aM, on anticoagulation therapy with warfarin for CVA.  Changes to current medical/health status since last appt: Pt recently hospitalized.    Denies signs/symptoms of bleeding and/or thrombosis since the last appt.    Denies any interval changes to diet  Denies any interval changes to medications since last appt. Pt continues on clindamycin (not a change)   Denies any complications or cost restrictions with current therapy.     A/P   Pt is to continue with current warfarin dosing regimen.     Next INR in 1 day.    Brtet Coates, PharmD

## 2018-08-16 NOTE — TELEPHONE ENCOUNTER
A home health nurse call in stating that patient is requesting to get a referral for skilled nursing do to not been able to move around and not wanting to be alone. The nurse had checked on the patient which had just gotten out of rehab center and they believe he would benefit going to skilled nursing.

## 2018-08-17 ENCOUNTER — HOSPITAL ENCOUNTER (OUTPATIENT)
Facility: MEDICAL CENTER | Age: 68
End: 2018-08-21
Attending: EMERGENCY MEDICINE | Admitting: INTERNAL MEDICINE
Payer: MEDICARE

## 2018-08-17 ENCOUNTER — TELEPHONE (OUTPATIENT)
Dept: MEDICAL GROUP | Facility: LAB | Age: 68
End: 2018-08-17

## 2018-08-17 ENCOUNTER — HOSPITAL ENCOUNTER (OUTPATIENT)
Dept: RADIOLOGY | Facility: MEDICAL CENTER | Age: 68
End: 2018-08-17
Attending: CLINICAL NURSE SPECIALIST
Payer: MEDICARE

## 2018-08-17 DIAGNOSIS — M48.02 SPINAL STENOSIS IN CERVICAL REGION: ICD-10-CM

## 2018-08-17 DIAGNOSIS — Z98.1 S/P LAMINECTOMY WITH SPINAL FUSION: ICD-10-CM

## 2018-08-17 DIAGNOSIS — Z96.659 HISTORY OF TOTAL KNEE REPLACEMENT, UNSPECIFIED LATERALITY: ICD-10-CM

## 2018-08-17 DIAGNOSIS — R53.1 WEAKNESS: ICD-10-CM

## 2018-08-17 DIAGNOSIS — R53.81 PHYSICAL DECONDITIONING: ICD-10-CM

## 2018-08-17 DIAGNOSIS — Z91.81 RISK FOR FALLS: ICD-10-CM

## 2018-08-17 DIAGNOSIS — Z86.73 HISTORY OF CVA (CEREBROVASCULAR ACCIDENT): ICD-10-CM

## 2018-08-17 DIAGNOSIS — Z78.9 IMPAIRED ACTIVITIES OF DAILY LIVING: ICD-10-CM

## 2018-08-17 LAB — GLUCOSE BLD-MCNC: 235 MG/DL (ref 65–99)

## 2018-08-17 PROCEDURE — 36415 COLL VENOUS BLD VENIPUNCTURE: CPT

## 2018-08-17 PROCEDURE — 82962 GLUCOSE BLOOD TEST: CPT

## 2018-08-17 PROCEDURE — G0378 HOSPITAL OBSERVATION PER HR: HCPCS

## 2018-08-17 PROCEDURE — 96372 THER/PROPH/DIAG INJ SC/IM: CPT

## 2018-08-17 PROCEDURE — 700102 HCHG RX REV CODE 250 W/ 637 OVERRIDE(OP): Performed by: STUDENT IN AN ORGANIZED HEALTH CARE EDUCATION/TRAINING PROGRAM

## 2018-08-17 PROCEDURE — 99285 EMERGENCY DEPT VISIT HI MDM: CPT

## 2018-08-17 PROCEDURE — A9270 NON-COVERED ITEM OR SERVICE: HCPCS | Performed by: STUDENT IN AN ORGANIZED HEALTH CARE EDUCATION/TRAINING PROGRAM

## 2018-08-17 PROCEDURE — 72040 X-RAY EXAM NECK SPINE 2-3 VW: CPT

## 2018-08-17 RX ORDER — AMLODIPINE BESYLATE 10 MG/1
10 TABLET ORAL DAILY
COMMUNITY
End: 2018-11-05 | Stop reason: SDUPTHER

## 2018-08-17 RX ORDER — POLYETHYLENE GLYCOL 3350 17 G/17G
1 POWDER, FOR SOLUTION ORAL
Status: DISCONTINUED | OUTPATIENT
Start: 2018-08-17 | End: 2018-08-21 | Stop reason: HOSPADM

## 2018-08-17 RX ORDER — BISACODYL 10 MG
10 SUPPOSITORY, RECTAL RECTAL
Status: DISCONTINUED | OUTPATIENT
Start: 2018-08-17 | End: 2018-08-21 | Stop reason: HOSPADM

## 2018-08-17 RX ORDER — GABAPENTIN 300 MG/1
600 CAPSULE ORAL
Status: DISCONTINUED | OUTPATIENT
Start: 2018-08-17 | End: 2018-08-21 | Stop reason: HOSPADM

## 2018-08-17 RX ORDER — ACETAMINOPHEN 325 MG/1
650 TABLET ORAL EVERY 6 HOURS PRN
Status: DISCONTINUED | OUTPATIENT
Start: 2018-08-17 | End: 2018-08-21 | Stop reason: HOSPADM

## 2018-08-17 RX ORDER — CLINDAMYCIN HYDROCHLORIDE 150 MG/1
300 CAPSULE ORAL 2 TIMES DAILY
Status: DISCONTINUED | OUTPATIENT
Start: 2018-08-17 | End: 2018-08-21 | Stop reason: HOSPADM

## 2018-08-17 RX ORDER — HYDRALAZINE HYDROCHLORIDE 50 MG/1
100 TABLET, FILM COATED ORAL 3 TIMES DAILY
Status: DISCONTINUED | OUTPATIENT
Start: 2018-08-17 | End: 2018-08-21 | Stop reason: HOSPADM

## 2018-08-17 RX ORDER — DEXTROSE MONOHYDRATE 25 G/50ML
25 INJECTION, SOLUTION INTRAVENOUS
Status: DISCONTINUED | OUTPATIENT
Start: 2018-08-17 | End: 2018-08-21 | Stop reason: HOSPADM

## 2018-08-17 RX ORDER — GABAPENTIN 300 MG/1
300 CAPSULE ORAL 2 TIMES DAILY
Status: DISCONTINUED | OUTPATIENT
Start: 2018-08-18 | End: 2018-08-21 | Stop reason: HOSPADM

## 2018-08-17 RX ORDER — WARFARIN SODIUM 10 MG/1
11 TABLET ORAL DAILY
COMMUNITY
End: 2018-11-05 | Stop reason: SDUPTHER

## 2018-08-17 RX ORDER — AMOXICILLIN 250 MG
2 CAPSULE ORAL 2 TIMES DAILY
Status: DISCONTINUED | OUTPATIENT
Start: 2018-08-17 | End: 2018-08-21 | Stop reason: HOSPADM

## 2018-08-17 RX ORDER — CLINDAMYCIN HYDROCHLORIDE 300 MG/1
300 CAPSULE ORAL 2 TIMES DAILY
COMMUNITY
End: 2018-11-05 | Stop reason: SDUPTHER

## 2018-08-17 RX ORDER — HYDRALAZINE HYDROCHLORIDE 100 MG/1
100 TABLET, FILM COATED ORAL 3 TIMES DAILY
COMMUNITY
End: 2018-11-05 | Stop reason: SDUPTHER

## 2018-08-17 RX ORDER — AMLODIPINE BESYLATE 10 MG/1
10 TABLET ORAL DAILY
Status: DISCONTINUED | OUTPATIENT
Start: 2018-08-18 | End: 2018-08-21 | Stop reason: HOSPADM

## 2018-08-17 RX ORDER — GABAPENTIN 300 MG/1
300-600 CAPSULE ORAL 3 TIMES DAILY
Status: DISCONTINUED | OUTPATIENT
Start: 2018-08-17 | End: 2018-08-17

## 2018-08-17 RX ORDER — GABAPENTIN 300 MG/1
300-600 CAPSULE ORAL 3 TIMES DAILY
COMMUNITY
End: 2018-11-05 | Stop reason: SDUPTHER

## 2018-08-17 RX ORDER — INSULIN GLARGINE 100 [IU]/ML
30 INJECTION, SOLUTION SUBCUTANEOUS NIGHTLY
Status: ON HOLD | COMMUNITY
End: 2018-08-21

## 2018-08-17 RX ORDER — LISINOPRIL 20 MG/1
40 TABLET ORAL 2 TIMES DAILY
Status: DISCONTINUED | OUTPATIENT
Start: 2018-08-17 | End: 2018-08-21 | Stop reason: HOSPADM

## 2018-08-17 RX ORDER — INSULIN GLARGINE 100 [IU]/ML
0.2 INJECTION, SOLUTION SUBCUTANEOUS EVERY EVENING
Status: DISCONTINUED | OUTPATIENT
Start: 2018-08-17 | End: 2018-08-21 | Stop reason: HOSPADM

## 2018-08-17 RX ORDER — LISINOPRIL 40 MG/1
40 TABLET ORAL 2 TIMES DAILY
Status: ON HOLD | COMMUNITY
End: 2018-09-07

## 2018-08-17 RX ADMIN — GABAPENTIN 600 MG: 300 CAPSULE ORAL at 20:19

## 2018-08-17 RX ADMIN — INSULIN GLARGINE 24 UNITS: 100 INJECTION, SOLUTION SUBCUTANEOUS at 23:57

## 2018-08-17 RX ADMIN — METFORMIN HYDROCHLORIDE 1000 MG: 500 TABLET, FILM COATED ORAL at 20:19

## 2018-08-17 RX ADMIN — CLINDAMYCIN HYDROCHLORIDE 300 MG: 150 CAPSULE ORAL at 20:19

## 2018-08-17 RX ADMIN — HYDRALAZINE HYDROCHLORIDE 100 MG: 50 TABLET, FILM COATED ORAL at 23:53

## 2018-08-17 RX ADMIN — INSULIN LISPRO 3 UNITS: 100 INJECTION, SOLUTION INTRAVENOUS; SUBCUTANEOUS at 23:55

## 2018-08-17 RX ADMIN — LISINOPRIL 40 MG: 20 TABLET ORAL at 20:18

## 2018-08-17 ASSESSMENT — ENCOUNTER SYMPTOMS
ABDOMINAL PAIN: 0
FALLS: 1
DIARRHEA: 0
EYE REDNESS: 0
SHORTNESS OF BREATH: 0
NUMBNESS: 1
LOSS OF CONSCIOUSNESS: 0
FLANK PAIN: 0
VOMITING: 0
COUGH: 0
BACK PAIN: 1
WEAKNESS: 1
DIZZINESS: 0
PALPITATIONS: 0
NERVOUS/ANXIOUS: 0
FEVER: 0
BLURRED VISION: 0
BRUISES/BLEEDS EASILY: 0
NAUSEA: 0
CHILLS: 0

## 2018-08-17 ASSESSMENT — PATIENT HEALTH QUESTIONNAIRE - PHQ9
SUM OF ALL RESPONSES TO PHQ QUESTIONS 1-9: 3
8. MOVING OR SPEAKING SO SLOWLY THAT OTHER PEOPLE COULD HAVE NOTICED. OR THE OPPOSITE, BEING SO FIGETY OR RESTLESS THAT YOU HAVE BEEN MOVING AROUND A LOT MORE THAN USUAL: NOT AT ALL
9. THOUGHTS THAT YOU WOULD BE BETTER OFF DEAD, OR OF HURTING YOURSELF: NOT AT ALL
SUM OF ALL RESPONSES TO PHQ9 QUESTIONS 1 AND 2: 1
3. TROUBLE FALLING OR STAYING ASLEEP OR SLEEPING TOO MUCH: SEVERAL DAYS
5. POOR APPETITE OR OVEREATING: NOT AT ALL
2. FEELING DOWN, DEPRESSED, IRRITABLE, OR HOPELESS: SEVERAL DAYS
4. FEELING TIRED OR HAVING LITTLE ENERGY: SEVERAL DAYS
7. TROUBLE CONCENTRATING ON THINGS, SUCH AS READING THE NEWSPAPER OR WATCHING TELEVISION: NOT AT ALL
6. FEELING BAD ABOUT YOURSELF - OR THAT YOU ARE A FAILURE OR HAVE LET YOURSELF OR YOUR FAMILY DOWN: NOT AL ALL
1. LITTLE INTEREST OR PLEASURE IN DOING THINGS: NOT AT ALL

## 2018-08-17 ASSESSMENT — COGNITIVE AND FUNCTIONAL STATUS - GENERAL
WALKING IN HOSPITAL ROOM: A LOT
DRESSING REGULAR LOWER BODY CLOTHING: A LITTLE
CLIMB 3 TO 5 STEPS WITH RAILING: TOTAL
TURNING FROM BACK TO SIDE WHILE IN FLAT BAD: A LITTLE
HELP NEEDED FOR BATHING: A LOT
TOILETING: A LITTLE
MOVING FROM LYING ON BACK TO SITTING ON SIDE OF FLAT BED: A LOT
DAILY ACTIVITIY SCORE: 19
SUGGESTED CMS G CODE MODIFIER DAILY ACTIVITY: CK
DRESSING REGULAR UPPER BODY CLOTHING: A LITTLE
MOVING TO AND FROM BED TO CHAIR: A LOT
MOBILITY SCORE: 12
STANDING UP FROM CHAIR USING ARMS: A LOT
SUGGESTED CMS G CODE MODIFIER MOBILITY: CL

## 2018-08-17 ASSESSMENT — PAIN SCALES - GENERAL: PAINLEVEL_OUTOF10: 7

## 2018-08-17 ASSESSMENT — LIFESTYLE VARIABLES: SUBSTANCE_ABUSE: 0

## 2018-08-17 NOTE — ED NOTES
[facility] cervical l[facility], fusion and lumbar laminectomy. He reports having previous balance and right side weakness from stroke in 2005. He states he has been slipping to ground and not able to walk since he has been home. Weakness noted on transfer.

## 2018-08-17 NOTE — ED PROVIDER NOTES
"ED Provider Note    Scribed for Jo-Ann Lincoln M.D. by Yara Campos. 8/17/2018, 4:03 PM.    Primary care provider: Catrachito Sterling D.O.  Means of arrival: wheel chair  History obtained from: Patient  History limited by: none    CHIEF COMPLAINT  Chief Complaint   Patient presents with   • GLF     pt reports to have been let out to early from Rehab. pt reports still unable to ambulate/ pt reports letting self to floor at home, pt reports less are weak and give out on him.    • Unsteady Gait       HPI  Joel Ma is a 68 y.o. male who presents to the Emergency Department for evaluation of bilateral lower extremity weakness secondary to a recent neck and back surgery on 7/13/18. He states that he is having difficulty walking and standing due to the weakness in his legs for the past three days. Patient states that he was in a rehabilitation program for six weeks and has not regained his strength after being discharged three days ago. Additionally, he has had eight falls which he describes as \"sliding\" off of his seat onto the floor. Patient is not able to get up on his own when he has these falls. He denies associated loss of consciousness secondary to the falls. He confirms that he has numbness and tingling in his fingers on both hands. He denies any fever, chills, nausea, vomiting, or diarrhea. Patient has a history of strokes with the last one occurring in 2005. He states that he lives at home alone and does not feel safe at this time. No alleviating or exacerbating factors mentioned.     REVIEW OF SYSTEMS  Review of Systems   Constitutional: Negative for chills and fever.   Gastrointestinal: Negative for diarrhea, nausea and vomiting.   Musculoskeletal: Positive for gait problem.   Neurological: Positive for numbness. Negative for syncope.   All other systems reviewed and are negative.      PAST MEDICAL HISTORY   has a past medical history of Anesthesia; Arrhythmia; arthritis (6/17/2011); " Arthritis; Cataract; Dental disorder; Diabetes; High cholesterol; Hypertension; MRSA (methicillin resistant Staphylococcus aureus); JANNIE treated with BiPAP (4/18/2016); Pain (05-03-13); Pneumonia (2002); Renal disorder; Sleep apnea; Stroke (HCC); Thyroid mass (7/30/2009); and Ventricular ectopy (6/17/2011).    SURGICAL HISTORY   has a past surgical history that includes knee scope,single menisectomy; lumbar fusion posterior (1979); irrigation & debridement ortho (3/13/2012); irrigation & debridement ortho (11/2/2012); irrigation & debridement ortho (11/13/2012); cataract phaco with iol (5/8/2013); irrigation & debridement ortho (Left, 10/29/2012); cervical disk and fusion anterior (8/31/2016); other; toe amputation (7/22/2011); elbow arthrotomy (2008); foot surgery; foot surgery; knee arthroplasty total (1/11/2012); knee revision total (3/13/2012); tendon repair (3/13/2012); knee revision total (11/13/2012); incision and drainage orthopedic (Right, 10/29/2012); other neurological surg; cervical fusion posterior (7/13/2018); cervical laminectomy posterior (7/13/2018); and lumbar laminectomy diskectomy (7/13/2018).    SOCIAL HISTORY  Social History   Substance Use Topics   • Smoking status: Former Smoker     Packs/day: 4.00     Years: 0.50     Types: Cigarettes     Quit date: 1/1/1974   • Smokeless tobacco: Never Used   • Alcohol use No      History   Drug Use No       FAMILY HISTORY  Family History   Problem Relation Age of Onset   • Diabetes Mother    • Cancer Father         lung cancer   • Heart Disease Father         triple bypass   • Diabetes Unknown    • Hypertension Unknown    • Cancer Unknown        CURRENT MEDICATIONS  Reviewed.  See Encounter Summary.     ALLERGIES  Allergies   Allergen Reactions   • Demerol      Makes me stop breathing.  Doesn't like because it makes him high   • Statins [Hmg-Coa-R Inhibitors] Myalgia     Rxn - ongoing         PHYSICAL EXAM  VITAL SIGNS: /71   Pulse 83   Temp 36.6 °C  "(97.9 °F)   Resp 14   Ht 1.905 m (6' 3\")   Wt 119.3 kg (263 lb)   SpO2 96%   BMI 32.87 kg/m²   Constitutional: Alert in no apparent distress. In an aspen c-collar  HENT: No signs of trauma, Bilateral external ears normal, Nose normal.   Eyes: Pupils are equal and reactive, Conjunctiva normal, Non-icteric.   Neck: well healed midline incision posteriorly; No tenderness, Supple, No stridor.   Lymphatic: No lymphadenopathy noted.   Cardiovascular: Regular rate and rhythm, no murmurs.   Thorax & Lungs: Normal breath sounds, No respiratory distress, No wheezing, No chest tenderness.   Abdomen: Soft, No tenderness, No masses, No pulsatile masses. No peritoneal signs.  Skin: Warm, Dry, No rash. Well healed incision on posterior c-spine that is midline. Lower lumbar spine had a surgical incision with a small area of eschar with no drainage or erythema.   Back: No bony tenderness, No CVA tenderness.   Extremities: Intact distal pulses, No edema, No tenderness, No cyanosis  Musculoskeletal: Good range of motion in all major joints. No tenderness to palpation or major deformities noted. 3+ pitting edema to bilateral ankles.   Neurologic: Alert , Normal motor function, No focal deficits noted. Decreased sensation in bilateral feet secondary to neuropathy. Decreased sensation in bilateral ulnar nerve distribution. Strengthis 5/5 bilaterally.   Psychiatric: Affect normal, Judgment normal, Mood normal.     COURSE & MEDICAL DECISION MAKING  Pertinent Labs & Imaging studies reviewed. (See chart for details)    4:03 PM - Patient seen and examined at bedside. Patient states that he does not feel safe living at home alone due to his weakness and inability to walk. I explained that I will discuss with his  to figure out the best plan of care at this time.     6:03 PM Spoke with  who reports that the patient requests to be placed back into a rehab program. He refuses to sign to go to a skilled nursing " facility at this time.  recommends that he be admitted at this time as he will not be able to put into a rehabilitation program tonight.    6:17 PM Reevaluated patient at bedside. Informed that it is unlikely that he will be able to go back to rehab. He states that he would like to be admitted as he does not have a ride home and can not get in contact with anyone to come pick him up.     6:35 PM Paged [facility].    6:39 PM Spoke with Dr. Ely ([facility]) regarding the patient. They have accepted the patient for admission.     Decision Making:  This is a 68 y.o. year old male who presents with physical deconditioning after undergoing cervical and lumbar spinal surgery in July.  Patient has been admitted to rehab facility after his surgeries, but was discharged the day before yesterday.  Per patient's report, he is unable to care for himself at home, and currently has no help.  Patient had no significant falls to necessitate imaging or laboratory studies.  I discussed with him that the most likely diagnosis is physical deconditioning secondary to his recent surgeries.  Spoke with case management and social work regarding the patient in an effort to find him placement, but were unable to do so.  Case management recommended admission in order to place him after admission.  I discussed this with the patient, and he agreed to be admitted in hopes of being placed back in inpatient rehab. Patient will be admitted to the [facility] internal medicine service for further evaluation and observation. Patient was agreeable to the plan of care. Please see the admission, daily progress, and discharge notes for the ultimate disposition of this patient.      DISPOSITION:  Patient will be admitted to Dr. Ely ([facility]) in stable condition.    FINAL IMPRESSION  1. Physical deconditioning    2. S/P laminectomy with spinal fusion    3. Weakness    4. Impaired activities of daily living        I,  Yara Campos (Scribe), am scribing for, and in the presence of, Jo-Ann Lincoln M.D..    Electronically signed by: Yara Campos (Scribe), 8/17/2018    IJo-Ann M.D. personally performed the services described in this documentation, as scribed by Yara Campos in my presence, and it is both accurate and complete.    The note accurately reflects work and decisions made by me.  Jo-Ann Lincoln  8/17/2018  11:20 PM

## 2018-08-17 NOTE — ED NOTES
Traction tech at bedside to check aspen collar and educate patient. Pt thought there was a strap missing. Tech reports collar is now missing any straps and helped pt adjust collar.

## 2018-08-17 NOTE — TELEPHONE ENCOUNTER
"· order paperwork received from [facility] requiring provider signature.     · All appropriate fields completed by Medical Assistant: No    · Paperwork placed in \"MA to Provider\" folder/basket. Awaiting provider completion/signature.    "

## 2018-08-17 NOTE — ED TRIAGE NOTES
Chief Complaint   Patient presents with   • GLF     pt reports to have been let out to early from Rehab. pt reports still unable to ambulate/ pt reports letting self to floor at home, pt reports less are weak and give out on him.    • Unsteady Gait     Explained to pt triage process, made pt aware to tell this RN/staff of any changes/concerns, pt verbalized understanding of process and instructions given. Pt to ER juan luis.

## 2018-08-18 PROBLEM — R53.81 PHYSICAL DEBILITY: Status: ACTIVE | Noted: 2018-08-18

## 2018-08-18 PROBLEM — Z96.659 HISTORY OF TOTAL KNEE REPLACEMENT: Status: ACTIVE | Noted: 2018-08-18

## 2018-08-18 PROBLEM — B36.9 FUNGAL INFECTION OF SKIN OF ABDOMEN: Status: ACTIVE | Noted: 2018-08-18

## 2018-08-18 LAB
GLUCOSE BLD-MCNC: 107 MG/DL (ref 65–99)
GLUCOSE BLD-MCNC: 148 MG/DL (ref 65–99)
GLUCOSE BLD-MCNC: 184 MG/DL (ref 65–99)
GLUCOSE BLD-MCNC: 231 MG/DL (ref 65–99)
GLUCOSE BLD-MCNC: 291 MG/DL (ref 65–99)
GLUCOSE BLD-MCNC: 78 MG/DL (ref 65–99)
INR PPP: 1.41 (ref 0.87–1.13)
PROTHROMBIN TIME: 16.9 SEC (ref 12–14.6)

## 2018-08-18 PROCEDURE — 85610 PROTHROMBIN TIME: CPT

## 2018-08-18 PROCEDURE — 96372 THER/PROPH/DIAG INJ SC/IM: CPT

## 2018-08-18 PROCEDURE — A9270 NON-COVERED ITEM OR SERVICE: HCPCS | Performed by: STUDENT IN AN ORGANIZED HEALTH CARE EDUCATION/TRAINING PROGRAM

## 2018-08-18 PROCEDURE — 99219 PR INITIAL OBSERVATION CARE,LEVL II: CPT | Mod: GC | Performed by: INTERNAL MEDICINE

## 2018-08-18 PROCEDURE — G8979 MOBILITY GOAL STATUS: HCPCS | Mod: CJ

## 2018-08-18 PROCEDURE — 700102 HCHG RX REV CODE 250 W/ 637 OVERRIDE(OP): Performed by: STUDENT IN AN ORGANIZED HEALTH CARE EDUCATION/TRAINING PROGRAM

## 2018-08-18 PROCEDURE — G8978 MOBILITY CURRENT STATUS: HCPCS | Mod: CK

## 2018-08-18 PROCEDURE — 97162 PT EVAL MOD COMPLEX 30 MIN: CPT

## 2018-08-18 PROCEDURE — 700102 HCHG RX REV CODE 250 W/ 637 OVERRIDE(OP): Performed by: INTERNAL MEDICINE

## 2018-08-18 PROCEDURE — 36415 COLL VENOUS BLD VENIPUNCTURE: CPT

## 2018-08-18 PROCEDURE — 97535 SELF CARE MNGMENT TRAINING: CPT

## 2018-08-18 PROCEDURE — 82962 GLUCOSE BLOOD TEST: CPT | Mod: 91

## 2018-08-18 PROCEDURE — 700111 HCHG RX REV CODE 636 W/ 250 OVERRIDE (IP): Performed by: STUDENT IN AN ORGANIZED HEALTH CARE EDUCATION/TRAINING PROGRAM

## 2018-08-18 PROCEDURE — G0378 HOSPITAL OBSERVATION PER HR: HCPCS

## 2018-08-18 PROCEDURE — A9270 NON-COVERED ITEM OR SERVICE: HCPCS | Performed by: INTERNAL MEDICINE

## 2018-08-18 RX ORDER — WARFARIN SODIUM 10 MG/1
10 TABLET ORAL
Status: DISCONTINUED | OUTPATIENT
Start: 2018-08-18 | End: 2018-08-19

## 2018-08-18 RX ORDER — NYSTATIN 100000 [USP'U]/G
POWDER TOPICAL 2 TIMES DAILY
Status: DISCONTINUED | OUTPATIENT
Start: 2018-08-18 | End: 2018-08-21 | Stop reason: HOSPADM

## 2018-08-18 RX ORDER — DIPHENHYDRAMINE HCL 25 MG
25 TABLET ORAL NIGHTLY PRN
Status: DISCONTINUED | OUTPATIENT
Start: 2018-08-18 | End: 2018-08-21 | Stop reason: HOSPADM

## 2018-08-18 RX ADMIN — HYDRALAZINE HYDROCHLORIDE 100 MG: 50 TABLET, FILM COATED ORAL at 06:10

## 2018-08-18 RX ADMIN — HYDRALAZINE HYDROCHLORIDE 100 MG: 50 TABLET, FILM COATED ORAL at 12:38

## 2018-08-18 RX ADMIN — DIPHENHYDRAMINE HCL 25 MG: 25 TABLET ORAL at 01:19

## 2018-08-18 RX ADMIN — METOPROLOL TARTRATE 75 MG: 25 TABLET, FILM COATED ORAL at 21:19

## 2018-08-18 RX ADMIN — VITAMIN D, TAB 1000IU (100/BT) 2000 UNITS: 25 TAB at 06:11

## 2018-08-18 RX ADMIN — METFORMIN HYDROCHLORIDE 1000 MG: 500 TABLET, FILM COATED ORAL at 06:11

## 2018-08-18 RX ADMIN — INSULIN GLARGINE 24 UNITS: 100 INJECTION, SOLUTION SUBCUTANEOUS at 17:57

## 2018-08-18 RX ADMIN — ENOXAPARIN SODIUM 40 MG: 40 INJECTION, SOLUTION INTRAVENOUS; SUBCUTANEOUS at 06:11

## 2018-08-18 RX ADMIN — GABAPENTIN 300 MG: 300 CAPSULE ORAL at 12:42

## 2018-08-18 RX ADMIN — LISINOPRIL 40 MG: 20 TABLET ORAL at 17:53

## 2018-08-18 RX ADMIN — METOPROLOL TARTRATE 75 MG: 25 TABLET, FILM COATED ORAL at 06:11

## 2018-08-18 RX ADMIN — INSULIN LISPRO 8 UNITS: 100 INJECTION, SOLUTION INTRAVENOUS; SUBCUTANEOUS at 12:39

## 2018-08-18 RX ADMIN — DIPHENHYDRAMINE HCL 25 MG: 25 TABLET ORAL at 21:19

## 2018-08-18 RX ADMIN — INSULIN LISPRO 8 UNITS: 100 INJECTION, SOLUTION INTRAVENOUS; SUBCUTANEOUS at 17:56

## 2018-08-18 RX ADMIN — WARFARIN SODIUM 10 MG: 10 TABLET ORAL at 17:52

## 2018-08-18 RX ADMIN — INSULIN LISPRO 1 UNITS: 100 INJECTION, SOLUTION INTRAVENOUS; SUBCUTANEOUS at 08:56

## 2018-08-18 RX ADMIN — GABAPENTIN 600 MG: 300 CAPSULE ORAL at 21:19

## 2018-08-18 RX ADMIN — AMLODIPINE BESYLATE 10 MG: 10 TABLET ORAL at 06:10

## 2018-08-18 RX ADMIN — INSULIN LISPRO 8 UNITS: 100 INJECTION, SOLUTION INTRAVENOUS; SUBCUTANEOUS at 08:57

## 2018-08-18 RX ADMIN — GABAPENTIN 300 MG: 300 CAPSULE ORAL at 06:11

## 2018-08-18 RX ADMIN — DOCUSATE SODIUM -SENNOSIDES 2 TABLET: 50; 8.6 TABLET, COATED ORAL at 06:11

## 2018-08-18 RX ADMIN — LISINOPRIL 40 MG: 20 TABLET ORAL at 06:11

## 2018-08-18 RX ADMIN — CLINDAMYCIN HYDROCHLORIDE 300 MG: 150 CAPSULE ORAL at 17:53

## 2018-08-18 RX ADMIN — METFORMIN HYDROCHLORIDE 1000 MG: 500 TABLET, FILM COATED ORAL at 17:52

## 2018-08-18 RX ADMIN — NYSTATIN: 100000 POWDER TOPICAL at 01:30

## 2018-08-18 RX ADMIN — CLINDAMYCIN HYDROCHLORIDE 300 MG: 150 CAPSULE ORAL at 06:11

## 2018-08-18 RX ADMIN — NYSTATIN: 100000 POWDER TOPICAL at 18:01

## 2018-08-18 RX ADMIN — HYDRALAZINE HYDROCHLORIDE 100 MG: 50 TABLET, FILM COATED ORAL at 17:53

## 2018-08-18 RX ADMIN — METOPROLOL TARTRATE 75 MG: 25 TABLET, FILM COATED ORAL at 12:42

## 2018-08-18 ASSESSMENT — ENCOUNTER SYMPTOMS
FOCAL WEAKNESS: 1
EYE PAIN: 0
INSOMNIA: 0
EYE DISCHARGE: 0
STRIDOR: 0
HALLUCINATIONS: 0
PND: 0
TREMORS: 0
SENSORY CHANGE: 0
MEMORY LOSS: 0
CLAUDICATION: 0
PALPITATIONS: 0
SINUS PAIN: 0
SORE THROAT: 0
BACK PAIN: 0
FALLS: 1
TINGLING: 0
WHEEZING: 0
EYE REDNESS: 0
DEPRESSION: 0
ABDOMINAL PAIN: 0
SEIZURES: 0
BLURRED VISION: 0
SPUTUM PRODUCTION: 0
PHOTOPHOBIA: 0
SHORTNESS OF BREATH: 0
LOSS OF CONSCIOUSNESS: 0
HEADACHES: 0
DOUBLE VISION: 0
CHILLS: 0
VOMITING: 0
DIZZINESS: 0
NAUSEA: 0
WEIGHT LOSS: 0
DIAPHORESIS: 0
COUGH: 0
HEMOPTYSIS: 0
DIARRHEA: 0
NERVOUS/ANXIOUS: 0
BLOOD IN STOOL: 0
SPEECH CHANGE: 0
FLANK PAIN: 0
FEVER: 0
CONSTIPATION: 0
HEARTBURN: 0
ORTHOPNEA: 0
NECK PAIN: 0
WEAKNESS: 1
MYALGIAS: 0

## 2018-08-18 ASSESSMENT — PATIENT HEALTH QUESTIONNAIRE - PHQ9
2. FEELING DOWN, DEPRESSED, IRRITABLE, OR HOPELESS: SEVERAL DAYS
SUM OF ALL RESPONSES TO PHQ QUESTIONS 1-9: 3
5. POOR APPETITE OR OVEREATING: NOT AT ALL
1. LITTLE INTEREST OR PLEASURE IN DOING THINGS: NOT AT ALL
9. THOUGHTS THAT YOU WOULD BE BETTER OFF DEAD, OR OF HURTING YOURSELF: NOT AT ALL
3. TROUBLE FALLING OR STAYING ASLEEP OR SLEEPING TOO MUCH: SEVERAL DAYS
7. TROUBLE CONCENTRATING ON THINGS, SUCH AS READING THE NEWSPAPER OR WATCHING TELEVISION: NOT AT ALL
8. MOVING OR SPEAKING SO SLOWLY THAT OTHER PEOPLE COULD HAVE NOTICED. OR THE OPPOSITE, BEING SO FIGETY OR RESTLESS THAT YOU HAVE BEEN MOVING AROUND A LOT MORE THAN USUAL: NOT AT ALL
SUM OF ALL RESPONSES TO PHQ9 QUESTIONS 1 AND 2: 1
4. FEELING TIRED OR HAVING LITTLE ENERGY: SEVERAL DAYS
6. FEELING BAD ABOUT YOURSELF - OR THAT YOU ARE A FAILURE OR HAVE LET YOURSELF OR YOUR FAMILY DOWN: NOT AL ALL

## 2018-08-18 ASSESSMENT — COGNITIVE AND FUNCTIONAL STATUS - GENERAL
CLIMB 3 TO 5 STEPS WITH RAILING: A LOT
MOBILITY SCORE: 18
MOVING FROM LYING ON BACK TO SITTING ON SIDE OF FLAT BED: A LITTLE
SUGGESTED CMS G CODE MODIFIER MOBILITY: CK
WALKING IN HOSPITAL ROOM: A LITTLE
STANDING UP FROM CHAIR USING ARMS: A LITTLE
MOVING TO AND FROM BED TO CHAIR: A LITTLE

## 2018-08-18 ASSESSMENT — GAIT ASSESSMENTS
GAIT LEVEL OF ASSIST: CONTACT GUARD ASSIST
DEVIATION: DECREASED HEEL STRIKE;DECREASED TOE OFF;SHUFFLED GAIT;BRADYKINETIC
DISTANCE (FEET): 50
ASSISTIVE DEVICE: FRONT WHEEL WALKER

## 2018-08-18 ASSESSMENT — LIFESTYLE VARIABLES: SUBSTANCE_ABUSE: 0

## 2018-08-18 ASSESSMENT — PAIN SCALES - GENERAL: PAINLEVEL_OUTOF10: 8

## 2018-08-18 NOTE — PROGRESS NOTES
Internal Medicine Interval Note  Note Author: Alex Magaña M.D.     Name Joel Ma     1950   Age/Sex 68 y.o. male   MRN [id number]   Code Status FULL     After 5PM or if no immediate response to page, please call for cross-coverage  Attending/Team: Ness/Navin See Patient List for primary contact information  Call [phone] to page    1st Call - Day Intern (R1):   Dr. Alex Magaña 2nd Call - Day Sr. Resident (R2/R3):   Dr. Cachorro Onofre         Reason for interval visit  (Principal Problem)   Personal concerns for falls      Interval Problem Daily Status Update  (24 hours, problem oriented, brief subjective history, new lab/imaging data pertinent to that problem)     Mr. Ma is a 67 year old right hand dominant male with a history of atrial fibrillation and prior stroke on chronic Coumadin, diabetes, history of infected knee hardware with MRSA, cervical myelopathy, lumbar spinal stenosis, hypertension, and multiple concussions who was admitted to [facility] on 2018 for surgical laminectomy (C2-C6, L2-L5) and posterior cervical fusion (C2-C4). Patient had no overnight issues. No medical complaints during rounds today. Patient has some concerns about returning to SNF due to inadequacy of rehab therapy during prior SNF stays. He would prefer inpatient rehab. We explained to him that he would not likely qualify for inpatient rehab due to his recent discharge from inpatient rehab at [facility] on 18. We will currently work with PT/OT to try to place him at SNF vs home with home health if patient is indeed not appropriate for inpatient rehab.    Review of Systems   Constitutional: Negative for chills, diaphoresis, fever, malaise/fatigue and weight loss.   HENT: Negative for congestion, ear discharge, ear pain, hearing loss, nosebleeds, sinus pain, sore throat and tinnitus.    Eyes: Negative for blurred vision, double vision, photophobia, pain, discharge and  redness.   Respiratory: Negative for cough, hemoptysis, sputum production, shortness of breath, wheezing and stridor.    Cardiovascular: Negative for chest pain, palpitations, orthopnea, claudication, leg swelling and PND.   Gastrointestinal: Negative for abdominal pain, blood in stool, constipation, diarrhea, heartburn, melena, nausea and vomiting.   Genitourinary: Negative for dysuria, flank pain, frequency, hematuria and urgency.   Musculoskeletal: Positive for falls. Negative for back pain, joint pain, myalgias and neck pain.   Skin: Negative for itching and rash.   Neurological: Positive for focal weakness and weakness. Negative for dizziness, tingling, tremors, sensory change, speech change, seizures, loss of consciousness and headaches.   Psychiatric/Behavioral: Negative for depression, hallucinations, memory loss, substance abuse and suicidal ideas. The patient is not nervous/anxious and does not have insomnia.        Disposition/Barriers to discharge:   None    Consultants/Specialty  None    PCP: Catrachito Sterling D.O.      Quality Measures  Quality-Core Measures   Reviewed items::  Labs reviewed, Medications reviewed and Radiology images reviewed  DVT prophylaxis pharmacological::  Enoxaparin (Lovenox)          Physical Exam       Vitals:    08/18/18 0350 08/18/18 0740 08/18/18 1238 08/18/18 1242   BP: 133/79 135/78 125/76    Pulse: 75 60  68   Resp: 17 18     Temp: 36.5 °C (97.7 °F) 36.5 °C (97.7 °F)     SpO2: 91% 97%     Weight:       Height:         Body mass index is 32.87 kg/m². Weight: 119.3 kg (263 lb)  Oxygen Therapy:  Pulse Oximetry: 97 %, O2 (LPM): 0, O2 Delivery: None (Room Air)    Physical Exam   Constitutional: He is oriented to person, place, and time and well-developed, well-nourished, and in no distress. No distress.   HENT:   Head: Normocephalic and atraumatic.   Eyes: Pupils are equal, round, and reactive to light. Conjunctivae and EOM are normal. Right eye exhibits no discharge. Left  eye exhibits no discharge. No scleral icterus.   Neck: No JVD present. No tracheal deviation present. No thyromegaly present.   Decreased ROM of cervical spine due to c-spine collar for recent cervical fusion   Cardiovascular: Normal rate and regular rhythm.  Exam reveals no gallop and no friction rub.    No murmur heard.  Pulmonary/Chest: Effort normal and breath sounds normal. No stridor. No respiratory distress. He has no wheezes. He has no rales. He exhibits no tenderness.   Abdominal: Soft. Bowel sounds are normal. He exhibits no distension and no mass. There is no tenderness. There is no rebound and no guarding.   Musculoskeletal: Normal range of motion. He exhibits no edema, tenderness or deformity.   Lymphadenopathy:     He has no cervical adenopathy.   Neurological: He is alert and oriented to person, place, and time. He displays weakness. No cranial nerve deficit. He exhibits normal muscle tone. Coordination normal.   Reflex Scores:       Tricep reflexes are 1+ on the right side and 1+ on the left side.       Bicep reflexes are 1+ on the right side and 1+ on the left side.       Brachioradialis reflexes are 1+ on the right side and 1+ on the left side.       Patellar reflexes are 1+ on the right side and 1+ on the left side.       Achilles reflexes are 1+ on the right side and 1+ on the left side.  4/5 strength in upper extremities bilaterally  5/5 strength in lower extremities  Requires walker for ambulation     Skin: He is not diaphoretic.       Assessment/Plan     * Physical debility   Assessment & Plan    Patient has a history of DM II, HTN, prior CVA with residual right sided weakness, as well as cervical stenosis with associated myelopathy s/p C2-C6 laminectomy & C2-C4 posterior cervical fusion and L2-L5 laminectomy  Patient was recently hospitalized at [facility] from 7/18/18-8/14/18 following following neurosurgical procedure on 7/13/18  Patient was concerned for falls at home once he returned  from the rehab hospital and reported to the ED for falls  Patient denies that he had any outright falls but feels unsteady at home with his 2 sons  PT/OT order placed  Will follow recommendations  Patient will likely need SNF vs Home Health therapy          Risk for falls- (present on admission)   Assessment & Plan    PT/OT ordered  Will follow recommendations        Diabetes mellitus with neurological manifestations, controlled (HCC)- (present on admission)   Assessment & Plan    Patient has a history of DM II  Blood glucose current 231  -home meds of metformin, gabapentin, lantus 40 qHs, and lispro sliding scale  -poorly controlled - initiated insulin therapy: 24 lantus, 8 lispro with meals, sliding scale  -diabetic diet  -continue metformin, gabapentin for neuropathic pain        HTN (hypertension)- (present on admission)   Assessment & Plan    Continue home amlodipine  Continue home hydralazine  Continue home lisinopril  Continue home metoprolol        Atrial fibrillation [427.31]- (present on admission)   Assessment & Plan    Continue warfarin per pharmacy.  Continue home metoprolol  Follow INR        History of total knee replacement   Assessment & Plan    Continue clindamycin 300 mg BID for history of MRSA infection with knee replacement        Diabetic polyneuropathy (HCC)- (present on admission)   Assessment & Plan    Continue gabapentin (300 mg BID + 600 mg nightly) for diabetic neuropathy

## 2018-08-18 NOTE — H&P
Internal Medicine Admitting History and Physical    Note Author: Chapo Hart M.D.       Name Joel Ma     1950   Age/Sex 68 y.o. male   MRN [id number]   Code Status full     After 5PM or if no immediate response to page, please call for cross-coverage  Attending/Team: abdoulaye Arzola See Patient List for primary contact information  Call [phone] to page    1st Call - Day Intern (R1):   [place] 2nd Call - Day Sr. Resident (R2/R3):   [place]       Chief Complaint:   Ground level Fall    HPI:  Joel Ma is a 68 year old male with PMHx of HTN, CVA, DM, lumbar and cervical stenosis s/p laminectomies in 2018, who presents to ED with multiple ground level falls.  Since then he was in rehabilitation for 6 weeks, and discharged from rehab 3 days ago.  At home, he has suffered about 8 ground level falls.  He has generalized weakness.  He has difficulty walking around the house without losing balance.  He also describes sliding off of the cough from a sitting position.  He denies LOC, focal neurologic findings, fevers, chills, chest pain, N/V, sob, diarrhea, melena, joint pain, increased back/neck pain.    He lives alone at home, occasionally his friend Stephane will visit.  Currently he feels unsafe staying alone at home given history of falls.     Review of Systems   Constitutional: Negative for chills and fever.   HENT: Negative for hearing loss.    Eyes: Negative for blurred vision and redness.   Respiratory: Negative for cough and shortness of breath.    Cardiovascular: Negative for chest pain and palpitations.   Gastrointestinal: Negative for abdominal pain, nausea and vomiting.   Genitourinary: Negative for dysuria and flank pain.   Musculoskeletal: Positive for back pain and falls.   Skin: Negative for itching and rash.   Neurological: Positive for weakness. Negative for dizziness and loss of consciousness.   Endo/Heme/Allergies: Negative for environmental allergies. Does not  bruise/bleed easily.   Psychiatric/Behavioral: Negative for substance abuse. The patient is not nervous/anxious.              Past Medical History (Chronic medical problem, known complications and current treatment)    H/o Left TKA - treated with clindamycin  H/o stroke in 2005 and afib - on warfarin  DM - on metformin, lantus, short-acting insulin        Past Surgical History:  Past Surgical History:   Procedure Laterality Date   • CERVICAL FUSION POSTERIOR  7/13/2018    Procedure: CERVICAL FUSION POSTERIOR/ C2-4;  Surgeon: Boby Marsh M.D.;  Location: [facility];  Service: Neurosurgery   • CERVICAL LAMINECTOMY POSTERIOR  7/13/2018    Procedure: CERVICAL LAMINECTOMY POSTERIOR/ C2-3, C5-6;  Surgeon: Boby Marsh M.D.;  Location: [facility];  Service: Neurosurgery   • LUMBAR LAMINECTOMY DISKECTOMY  7/13/2018    Procedure: LUMBAR LAMINECTOMY DISKECTOMY/ L2-5 LAMI;  Surgeon: Boby Marsh M.D.;  Location: [facility];  Service: Neurosurgery   • CERVICAL DISK AND FUSION ANTERIOR  8/31/2016    Procedure: CERVICAL DISK AND FUSION ANTERIOR C3-7 ;  Surgeon: Alhaji Jaffe M.D.;  Location: [facility];  Service:    • CATARACT PHACO WITH IOL  5/8/2013    Performed by Mame Rehman M.D. at SURGERY SAME DAY [facility]   • IRRIGATION & DEBRIDEMENT ORTHO  11/13/2012    Performed by Gordon Cota M.D. at [facility]   • KNEE REVISION TOTAL  11/13/2012    Performed by Gordon Cota M.D. at [facility]   • IRRIGATION & DEBRIDEMENT ORTHO  11/2/2012    Performed by Gordon Cota M.D. at [facility]   • IRRIGATION & DEBRIDEMENT ORTHO Left 10/29/2012    Procedure: left shoulder, with drain;  Surgeon: Gordon Cota M.D.;  Location: [facility];  Service:    • INCISION AND DRAINAGE ORTHOPEDIC Right 10/29/2012    Procedure: Right Total Knee I and D and Liner Exchange, with drain;  Surgeon: Gordon REYES  PRAMOD Cota;  Location: SURGERY [facility];  Service:    • IRRIGATION & DEBRIDEMENT ORTHO  3/13/2012    Performed by KAMALJIT SHI at SURGERY [facility]   • KNEE REVISION TOTAL  3/13/2012    Performed by KAMALJIT SHI at SURGERY [facility]   • TENDON REPAIR  3/13/2012    Performed by KAMALJIT SHI at SURGERY [facility]   • KNEE ARTHROPLASTY TOTAL  1/11/2012    Right; Performed by KAMALJIT SHI at [facility]   • TOE AMPUTATION  7/22/2011    Performed by EVELYN JANE at SURGERY [facility]   • ELBOW ARTHROTOMY  2008    right   • LUMBAR FUSION POSTERIOR  1979   • FOOT SURGERY      partial amputation great toe   • FOOT SURGERY      toe surgery left foot   • OTHER      left eye cataract   • OTHER NEUROLOGICAL SURG      neck fusion   • PB KNEE SCOPE,SINGLE MENISECTOMY      right knee       Current Outpatient Medications:  Home Medications     Reviewed by Higinio Boland R.N. (Registered Nurse) on 08/17/18 at 2151  Med List Status: Complete   Medication Last Dose Status   amLODIPine (NORVASC) 10 MG Tab 8/16/2018 Active   clindamycin (CLEOCIN) 300 MG Cap 8/16/2018 Active   gabapentin (NEURONTIN) 300 MG Cap 8/16/2018 Active   hydrALAZINE (APRESOLINE) 100 MG tablet 8/16/2018 Active   insulin glargine (LANTUS) 100 UNIT/ML Solution 8/16/2018 Active   insulin lispro (HUMALOG) 100 UNIT/ML Solution 8/16/2018 Active   lisinopril (PRINIVIL, ZESTRIL) 40 MG tablet 8/16/2018 Active   metformin (GLUCOPHAGE) 1000 MG tablet 8/16/2018 Active   vitamin D (CHOLECALCIFEROL) 1000 UNIT Tab 8/16/2018 Active   warfarin (COUMADIN) 10 MG Tab 8/16/2018 Active                Medication Allergy/Sensitivities:  Allergies   Allergen Reactions   • Demerol      Makes me stop breathing.  Doesn't like because it makes him high   • Statins [Hmg-Coa-R Inhibitors] Myalgia     Rxn - ongoing           Family History (mandatory)   Family History   Problem Relation Age of Onset   • Diabetes Mother    • Cancer Father          "lung cancer   • Heart Disease Father         triple bypass   • Diabetes Unknown    • Hypertension Unknown    • Cancer Unknown        Social History (mandatory)   Social History     Social History   • Marital status: Single     Spouse name: N/A   • Number of children: N/A   • Years of education: N/A     Occupational History   • Not on file.     Social History Main Topics   • Smoking status: Former Smoker     Packs/day: 4.00     Years: 0.50     Types: Cigarettes     Quit date: 1/1/1974   • Smokeless tobacco: Never Used   • Alcohol use No   • Drug use: No   • Sexual activity: Yes     Other Topics Concern   • Not on file     Social History Narrative    ** Merged History Encounter **          Living situation: alone  PCP : Catrachito Sterling D.O.    Physical Exam     Vitals:    08/17/18 1700 08/17/18 1829 08/17/18 2030 08/17/18 2110   BP:   150/101 149/89   Pulse: 70 68 80 79   Resp:   16 18   Temp:    36.5 °C (97.7 °F)   SpO2: 95% 91%  96%   Weight:       Height:         Body mass index is 32.87 kg/m².  /89   Pulse 79   Temp 36.5 °C (97.7 °F)   Resp 18   Ht 1.905 m (6' 3\")   Wt 119.3 kg (263 lb)   SpO2 96%   BMI 32.87 kg/m²   O2 therapy: Pulse Oximetry: 96 %, O2 (LPM): 0, O2 Delivery: None (Room Air)    Physical Exam   Constitutional: He is oriented to person, place, and time and well-developed, well-nourished, and in no distress.   HENT:   Head: Normocephalic and atraumatic.   Nose: Nose normal.   Eyes: EOM are normal. No scleral icterus.   Neck: Normal range of motion. Neck supple. No tracheal deviation present.   Cardiovascular: Normal rate, regular rhythm and normal heart sounds.  Exam reveals no gallop and no friction rub.    No murmur heard.  Pulmonary/Chest: Breath sounds normal. No respiratory distress. He has no wheezes.   Abdominal: Bowel sounds are normal. He exhibits no distension. There is no tenderness.   Musculoskeletal: Normal range of motion. He exhibits no edema.   Multiple amputated " toes  Healing, non-infected surgical wounds of posterior and lower back   Lymphadenopathy:     He has no cervical adenopathy.   Neurological: He is alert and oriented to person, place, and time.   Skin: Skin is dry. No rash noted. No pallor.   Psychiatric: Mood and affect normal.         Data Review       Old Records Request:   Deferred  Current Records review/summary: Completed    Lab Data Review:  Recent Results (from the past 24 hour(s))   ACCU-CHEK GLUCOSE    Collection Time: 08/17/18  8:08 PM   Result Value Ref Range    Glucose - Accu-Ck 235 (H) 65 - 99 mg/dL   ACCU-CHEK GLUCOSE    Collection Time: 08/17/18 11:52 PM   Result Value Ref Range    Glucose - Accu-Ck 291 (H) 65 - 99 mg/dL       Imaging/Procedures Review:    Independant Imaging Review: Completed  No orders to display        Records reviewed and summarized in current documentation :  Yes  [employer] teaching service handout given to patient:  No         Assessment/Plan     * Physical debility   Assessment & Plan    -s/p lumbar and cervical laminectomies 7/13.  She was discharged from rehab 3 days ago, and has suffered multiple ground level falls and generalized weakness since then.  He lives alone, is unable to care for self independently, and is fearful to live alone.  Plan  -contact social work in morning for suitable permanent placement  -DVT ppx lovenox 40        Fungal infection of skin of abdomen   Assessment & Plan    -abdominal skin folds  -start nystatin powder        History of total knee replacement   Assessment & Plan    -continue Clindamycin for infection ppx        HTN (hypertension)- (present on admission)   Assessment & Plan    -continue home amlodipine 10 daily, metoprolol 75 tid, hydralazine 100 tid                Anticipated Hospital stay: Observation admit        Quality Measures  Quality-Core Measures   Reviewed items::  EKG reviewed, Labs reviewed and Medications reviewed  Gonzalez catheter::  No Gonzalez  DVT prophylaxis pharmacological::   Enoxaparin (Lovenox)    PCP: Catrachito Sterling D.O.

## 2018-08-18 NOTE — THERAPY
"Physical Therapy Evaluation completed.   Bed Mobility:  Supine to Sit:  (Pt sitting up on EOB upon entering room)  Transfers: Sit to Stand: Minimal Assist  Gait: Level Of Assist: Contact Guard Assist with Front-Wheel Walker       Plan of Care: Will benefit from Physical Therapy 3 times per week  Discharge Recommendations: Equipment: Will Continue to Assess for Equipment Needs. Post-acute therapy Discharge to a transitional care facility for continued skilled therapy services. SNF - Pt is not safe to return home and has poor support     See \"Rehab Therapy-Acute\" Patient Summary Report for complete documentation.       Pt is a 67 y/o male presenting from home after a recent DC from [facility] on 8/14/18. Pt with a hx including CVA and most recently multiple level spinal fusion and laminectomy. Pt was discharged from rehab after meeting his therapy goals with the expectation that he would be going home with 24/7 supervision/assistance from a friend and family as well as that he had a ramp to allow for easier access into the home. During evaluation, Pt confided to this therapist that his friend never stayed with him beyond a few hours the first day home and that the ramp that was installed was incomplete. Pt also reported that his family \"used his home as a storage unit\" while he was in the hospital and that he had to \"carve out a pat to fit my walker\" in order to mobilize within the residence. During mobilization, Pt with significant foot drop bilaterally, right worse than left. He does own AFO's, but states that he does not use them as he does not like them. Pt reported that he has fallen multiple times at home, and that \"slid off the couch\" during each fall. When asked about his goals for mobility and his desire for where he'd like to DC, Pt stated e would like to remain in the acute setting and receive all his rehab here and wants to get \"off the walker.\" Pt currently refusing any form of SNF, but he is clearly " unsafe to return home and does not appear to have the familial support that was originally thought to exist. PT to continue working with Pt in acute setting to improve mobility but at this time recommend SNF placement for safety and continued therapy prior to Pt returning home.

## 2018-08-18 NOTE — ASSESSMENT & PLAN NOTE
Continue home amlodipine  Continue home hydralazine  Continue home lisinopril  Continue home metoprolol

## 2018-08-18 NOTE — PROGRESS NOTES
Inpatient Anticoagulation Service Note    Date: 8/18/2018  Reason for Anticoagulation: Atrial Fibrillation        Target INR: 2.0 to 3.0    INR from last 7 days     Date/Time INR Value    08/18/18 1148 (!)  1.41        Dose from last 7 days     Date/Time Dose (mg)    08/18/18 1300  10        Average Dose (mg):  (10 mg daily per Med Rec.)  Significant Interactions: Antibiotics  Bridge Therapy: No     Comments: High fall risk.  Debilitated.  Fell at home.  Discharged from Rehab hospital on 9 mg daily.  Medication reconcilliation reported 10 mg daily.  Subtherapeutic: will start with 10 mg daily and adjust as necessary.  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 INR daily until therapeutic.    Plan:  10 mg daily.  INR daily.     Pharmacist suggested discharge dosing: 10 mg daily.  INR within 48 hours of discharge.     Alhaji Hou, PharmD, BCPS

## 2018-08-18 NOTE — ASSESSMENT & PLAN NOTE
Patient has a history of DM II  Blood glucose current 231  -home meds of metformin, gabapentin, lantus 40 qHs, and lispro sliding scale  -poorly controlled - initiated insulin therapy: 24 lantus, 8 lispro with meals, sliding scale  -diabetic diet  -continue metformin, gabapentin for neuropathic pain

## 2018-08-18 NOTE — ASSESSMENT & PLAN NOTE
Patient has a history of DM II, HTN, prior CVA with residual right sided weakness, as well as cervical stenosis with associated myelopathy s/p C2-C6 laminectomy & C2-C4 posterior cervical fusion and L2-L5 laminectomy  Patient was recently hospitalized at [facility] from 7/18/18-8/14/18 following following neurosurgical procedure on 7/13/18  Patient was concerned for falls at home once he returned from the rehab hospital and reported to the ED for falls  Patient denies that he had any outright falls but feels unsteady at home with his 2 sons  PT/OT order placed  PT recommends SNF  SNF referral ordered  Lumbar spine x-ray ordered to follow up recent surgery

## 2018-08-18 NOTE — PROGRESS NOTES
Skin check: patient has a healed scar to neck, healed scar to lower back, red and pink irritation to right abdominal fold, healed scar to right knee, and ends of toes amputated- 2nd digit on the left foot and 1st and 2nd on the right foot.

## 2018-08-18 NOTE — DISCHARGE PLANNING
Choice form for skilled nursing facilities offered to patient.  Patient not signing at this time as he would like to discuss with his son.  Patient also hesitant to commit himself to a skilled nursing facility as he has had bad experiences in the past with them.

## 2018-08-18 NOTE — ASSESSMENT & PLAN NOTE
-s/p lumbar and cervical laminectomies 7/13.  She was discharged from rehab 3 days ago, and has suffered multiple ground level falls and generalized weakness since then.  He lives alone, is unable to care for self independently, and is fearful to live alone.  Plan  -contact social work in morning for suitable permanent placement  -DVT ppx lovenox 40

## 2018-08-18 NOTE — ASSESSMENT & PLAN NOTE
Continue warfarin per pharmacy.  Lovenox for DVT prophylaxis also ordered in the setting of sub-therapeutic INR this morning (1.55)  Continue home metoprolol  Continue to Follow INR

## 2018-08-18 NOTE — DISCHARGE PLANNING
"Called by RN to please come and see patient.  Patient here because family brought him in because \"Rehab kicked him out to soon and he wasn't ready to come home\"  Spoke with JANELLE Carter,  from [facility] and she says patient was discharged because goals had been met, solid discharge plan was in place and family assured that patient would be looked after and worked with.  Rehab also arranged for ramp to be built at patient's home.  Patient has three sons at home and a friend Albaro who were going to be looking after patient.  Patient states that he has fallen eight times since his discharge from rehab on the 14th.  No one was there to help him recover from his falls.  Patient and family feel that he was discharged from rehab too soon.  Son Chris ([phone]) blames [facility] for discharging his dad too soon and feels that [facility] needs to care for him until his dad can walk and function independently.  Refusing to take dad home because it is [facility]'s  issue to resolve now.  Patient does not qualify any longer for rehab at [facility] according to Emma.  Other option is to send patient to a skilled nursing facility and he is very hesitant because of bad experiences at them in the past.    "

## 2018-08-19 ENCOUNTER — APPOINTMENT (OUTPATIENT)
Dept: RADIOLOGY | Facility: MEDICAL CENTER | Age: 68
End: 2018-08-19
Attending: GENERAL PRACTICE
Payer: MEDICARE

## 2018-08-19 LAB
GLUCOSE BLD-MCNC: 109 MG/DL (ref 65–99)
GLUCOSE BLD-MCNC: 151 MG/DL (ref 65–99)
GLUCOSE BLD-MCNC: 170 MG/DL (ref 65–99)
GLUCOSE BLD-MCNC: 174 MG/DL (ref 65–99)
GLUCOSE BLD-MCNC: 97 MG/DL (ref 65–99)
GLUCOSE SERPL-MCNC: 131 MG/DL (ref 65–99)
INR PPP: 1.46 (ref 0.87–1.13)
PROTHROMBIN TIME: 17.4 SEC (ref 12–14.6)

## 2018-08-19 PROCEDURE — 700111 HCHG RX REV CODE 636 W/ 250 OVERRIDE (IP): Performed by: GENERAL PRACTICE

## 2018-08-19 PROCEDURE — 700102 HCHG RX REV CODE 250 W/ 637 OVERRIDE(OP): Performed by: INTERNAL MEDICINE

## 2018-08-19 PROCEDURE — 99225 PR SUBSEQUENT OBSERVATION CARE,LEVEL II: CPT | Mod: GC | Performed by: INTERNAL MEDICINE

## 2018-08-19 PROCEDURE — A9270 NON-COVERED ITEM OR SERVICE: HCPCS | Performed by: STUDENT IN AN ORGANIZED HEALTH CARE EDUCATION/TRAINING PROGRAM

## 2018-08-19 PROCEDURE — 72100 X-RAY EXAM L-S SPINE 2/3 VWS: CPT

## 2018-08-19 PROCEDURE — 85610 PROTHROMBIN TIME: CPT

## 2018-08-19 PROCEDURE — G0378 HOSPITAL OBSERVATION PER HR: HCPCS

## 2018-08-19 PROCEDURE — 700102 HCHG RX REV CODE 250 W/ 637 OVERRIDE(OP): Performed by: STUDENT IN AN ORGANIZED HEALTH CARE EDUCATION/TRAINING PROGRAM

## 2018-08-19 PROCEDURE — 82962 GLUCOSE BLOOD TEST: CPT | Mod: 91

## 2018-08-19 PROCEDURE — 82947 ASSAY GLUCOSE BLOOD QUANT: CPT

## 2018-08-19 PROCEDURE — 36415 COLL VENOUS BLD VENIPUNCTURE: CPT

## 2018-08-19 PROCEDURE — A9270 NON-COVERED ITEM OR SERVICE: HCPCS | Performed by: INTERNAL MEDICINE

## 2018-08-19 PROCEDURE — 96372 THER/PROPH/DIAG INJ SC/IM: CPT

## 2018-08-19 RX ORDER — WARFARIN SODIUM 7.5 MG/1
15 TABLET ORAL
Status: COMPLETED | OUTPATIENT
Start: 2018-08-19 | End: 2018-08-19

## 2018-08-19 RX ORDER — WARFARIN SODIUM 10 MG/1
10 TABLET ORAL
Status: DISCONTINUED | OUTPATIENT
Start: 2018-08-20 | End: 2018-08-21 | Stop reason: HOSPADM

## 2018-08-19 RX ORDER — HEPARIN SODIUM 5000 [USP'U]/ML
5000 INJECTION, SOLUTION INTRAVENOUS; SUBCUTANEOUS EVERY 12 HOURS
Status: DISCONTINUED | OUTPATIENT
Start: 2018-08-19 | End: 2018-08-20

## 2018-08-19 RX ADMIN — LISINOPRIL 40 MG: 20 TABLET ORAL at 17:54

## 2018-08-19 RX ADMIN — GABAPENTIN 300 MG: 300 CAPSULE ORAL at 05:05

## 2018-08-19 RX ADMIN — AMLODIPINE BESYLATE 10 MG: 10 TABLET ORAL at 05:11

## 2018-08-19 RX ADMIN — HYDRALAZINE HYDROCHLORIDE 100 MG: 50 TABLET, FILM COATED ORAL at 17:54

## 2018-08-19 RX ADMIN — INSULIN LISPRO 8 UNITS: 100 INJECTION, SOLUTION INTRAVENOUS; SUBCUTANEOUS at 08:17

## 2018-08-19 RX ADMIN — INSULIN GLARGINE 24 UNITS: 100 INJECTION, SOLUTION SUBCUTANEOUS at 17:57

## 2018-08-19 RX ADMIN — CLINDAMYCIN HYDROCHLORIDE 300 MG: 150 CAPSULE ORAL at 17:54

## 2018-08-19 RX ADMIN — DOCUSATE SODIUM -SENNOSIDES 2 TABLET: 50; 8.6 TABLET, COATED ORAL at 17:54

## 2018-08-19 RX ADMIN — HEPARIN SODIUM 5000 UNITS: 5000 INJECTION, SOLUTION INTRAVENOUS; SUBCUTANEOUS at 20:50

## 2018-08-19 RX ADMIN — NYSTATIN: 100000 POWDER TOPICAL at 17:54

## 2018-08-19 RX ADMIN — VITAMIN D, TAB 1000IU (100/BT) 2000 UNITS: 25 TAB at 05:06

## 2018-08-19 RX ADMIN — GABAPENTIN 300 MG: 300 CAPSULE ORAL at 12:11

## 2018-08-19 RX ADMIN — INSULIN LISPRO 1 UNITS: 100 INJECTION, SOLUTION INTRAVENOUS; SUBCUTANEOUS at 08:15

## 2018-08-19 RX ADMIN — METFORMIN HYDROCHLORIDE 1000 MG: 500 TABLET, FILM COATED ORAL at 08:09

## 2018-08-19 RX ADMIN — DOCUSATE SODIUM -SENNOSIDES 2 TABLET: 50; 8.6 TABLET, COATED ORAL at 05:05

## 2018-08-19 RX ADMIN — GABAPENTIN 600 MG: 300 CAPSULE ORAL at 20:50

## 2018-08-19 RX ADMIN — INSULIN LISPRO 8 UNITS: 100 INJECTION, SOLUTION INTRAVENOUS; SUBCUTANEOUS at 12:13

## 2018-08-19 RX ADMIN — HEPARIN SODIUM 5000 UNITS: 5000 INJECTION, SOLUTION INTRAVENOUS; SUBCUTANEOUS at 12:11

## 2018-08-19 RX ADMIN — NYSTATIN: 100000 POWDER TOPICAL at 05:06

## 2018-08-19 RX ADMIN — CLINDAMYCIN HYDROCHLORIDE 300 MG: 150 CAPSULE ORAL at 05:05

## 2018-08-19 RX ADMIN — WARFARIN SODIUM 15 MG: 7.5 TABLET ORAL at 17:55

## 2018-08-19 RX ADMIN — HYDRALAZINE HYDROCHLORIDE 100 MG: 50 TABLET, FILM COATED ORAL at 05:10

## 2018-08-19 RX ADMIN — INSULIN LISPRO 8 UNITS: 100 INJECTION, SOLUTION INTRAVENOUS; SUBCUTANEOUS at 17:56

## 2018-08-19 RX ADMIN — METOPROLOL TARTRATE 75 MG: 25 TABLET, FILM COATED ORAL at 05:06

## 2018-08-19 RX ADMIN — INSULIN LISPRO 1 UNITS: 100 INJECTION, SOLUTION INTRAVENOUS; SUBCUTANEOUS at 12:12

## 2018-08-19 RX ADMIN — HYDRALAZINE HYDROCHLORIDE 100 MG: 50 TABLET, FILM COATED ORAL at 12:11

## 2018-08-19 RX ADMIN — METOPROLOL TARTRATE 75 MG: 25 TABLET, FILM COATED ORAL at 13:54

## 2018-08-19 RX ADMIN — LISINOPRIL 40 MG: 20 TABLET ORAL at 05:06

## 2018-08-19 RX ADMIN — METFORMIN HYDROCHLORIDE 1000 MG: 500 TABLET, FILM COATED ORAL at 17:54

## 2018-08-19 ASSESSMENT — ENCOUNTER SYMPTOMS
FOCAL WEAKNESS: 1
DIARRHEA: 0
EYE DISCHARGE: 0
NECK PAIN: 0
STRIDOR: 0
SEIZURES: 0
BACK PAIN: 0
CONSTIPATION: 0
EYE REDNESS: 0
FALLS: 1
COUGH: 0
SPEECH CHANGE: 0
HALLUCINATIONS: 0
SHORTNESS OF BREATH: 0
PALPITATIONS: 0
NERVOUS/ANXIOUS: 0
HEMOPTYSIS: 0
MYALGIAS: 0
FLANK PAIN: 0
DOUBLE VISION: 0
WHEEZING: 0
HEARTBURN: 0
TREMORS: 0
DIAPHORESIS: 0
VOMITING: 0
INSOMNIA: 0
SINUS PAIN: 0
BLURRED VISION: 0
ORTHOPNEA: 0
PND: 0
TINGLING: 0
WEAKNESS: 1
PHOTOPHOBIA: 0
HEADACHES: 0
SENSORY CHANGE: 0
NAUSEA: 0
BLOOD IN STOOL: 0
DIZZINESS: 0
SPUTUM PRODUCTION: 0
FEVER: 0
MEMORY LOSS: 0
ABDOMINAL PAIN: 0
SORE THROAT: 0
CLAUDICATION: 0
CHILLS: 0
LOSS OF CONSCIOUSNESS: 0
EYE PAIN: 0
DEPRESSION: 0
WEIGHT LOSS: 0

## 2018-08-19 ASSESSMENT — CHA2DS2 SCORE
SEX: MALE
CHF OR LEFT VENTRICULAR DYSFUNCTION: NO
CHA2DS2 VASC SCORE: 5
VASCULAR DISEASE: NO
DIABETES: YES
HYPERTENSION: YES
AGE 65 TO 74: YES
AGE 75 OR GREATER: NO
PRIOR STROKE OR TIA OR THROMBOEMBOLISM: YES

## 2018-08-19 ASSESSMENT — LIFESTYLE VARIABLES: SUBSTANCE_ABUSE: 0

## 2018-08-19 ASSESSMENT — PAIN SCALES - GENERAL
PAINLEVEL_OUTOF10: 8
PAINLEVEL_OUTOF10: 6

## 2018-08-19 NOTE — PROGRESS NOTES
RN received report from day shift RN. Pt is sitting on bed. No s/s of distress. VSS. Safety checks assessed, call bell within reach, bed alarm in use.     Pt's B, RN offered Pt snack & gingerale. WCTM    RN assumes POC.

## 2018-08-19 NOTE — DISCHARGE PLANNING
Late Entry:    TEDDY called Ivan at [facility] to see if can return. Ivan to speak with his supervisor and call TEDDY and RAYMOND MARTINEZ back on decision.

## 2018-08-19 NOTE — PROGRESS NOTES
Internal Medicine Interval Note  Note Author: Alex Magaña M.D.     Name Joel Ma 1950   Age/Sex 68 y.o. male   MRN [id number]   Code Status FULL     After 5PM or if no immediate response to page, please call for cross-coverage  Attending/Team: Ness/Navin See Patient List for primary contact information  Call [phone] to page    1st Call - Day Intern (R1):   Dr. Alex Magaña 2nd Call - Day Sr. Resident (R2/R3):   Dr. Cachroro Onofre         Reason for interval visit  (Principal Problem)   Personal concerns for falls      Interval Problem Daily Status Update  (24 hours, problem oriented, brief subjective history, new lab/imaging data pertinent to that problem)     Patient had no overnight issues. No medical complaints during rounds today.  INR sub-therapeutic this morning (1.46), heparin started for DVT prophylaxis in addition to warfarin per pharmacy dosing.  We will currently work with PT/OT to try to place him at SNF vs home with home health if patient is indeed not appropriate for inpatient rehab.  Physical therapy agrees that SNF is appropriate option for placement upon discharge  SNF referral placed      Review of Systems   Constitutional: Negative for chills, diaphoresis, fever, malaise/fatigue and weight loss.   HENT: Negative for congestion, ear discharge, ear pain, hearing loss, nosebleeds, sinus pain, sore throat and tinnitus.    Eyes: Negative for blurred vision, double vision, photophobia, pain, discharge and redness.   Respiratory: Negative for cough, hemoptysis, sputum production, shortness of breath, wheezing and stridor.    Cardiovascular: Negative for chest pain, palpitations, orthopnea, claudication, leg swelling and PND.   Gastrointestinal: Negative for abdominal pain, blood in stool, constipation, diarrhea, heartburn, melena, nausea and vomiting.   Genitourinary: Negative for dysuria, flank pain, frequency, hematuria and urgency.   Musculoskeletal:  Positive for falls. Negative for back pain, joint pain, myalgias and neck pain.   Skin: Negative for itching and rash.   Neurological: Positive for focal weakness and weakness. Negative for dizziness, tingling, tremors, sensory change, speech change, seizures, loss of consciousness and headaches.   Psychiatric/Behavioral: Negative for depression, hallucinations, memory loss, substance abuse and suicidal ideas. The patient is not nervous/anxious and does not have insomnia.        Disposition/Barriers to discharge:   None    Consultants/Specialty  None    PCP: Catrachito Sterling D.O.      Quality Measures  Quality-Core Measures   Reviewed items::  Labs reviewed, Medications reviewed and Radiology images reviewed  DVT prophylaxis pharmacological::  Enoxaparin (Lovenox)          Physical Exam       Vitals:    08/18/18 1930 08/19/18 0415 08/19/18 0510 08/19/18 0624   BP: 114/70 126/74 136/80 136/94   Pulse: (!) 55 (!) 54  84   Resp: 17 17  18   Temp: 36.6 °C (97.9 °F) 36.4 °C (97.6 °F)  36.3 °C (97.4 °F)   SpO2: 97% 95%  95%   Weight:       Height:         Body mass index is 32.87 kg/m².    Oxygen Therapy:  Pulse Oximetry: 95 %, O2 (LPM): 0, O2 Delivery: None (Room Air)    Physical Exam   Constitutional: He is oriented to person, place, and time and well-developed, well-nourished, and in no distress. No distress.   HENT:   Head: Normocephalic and atraumatic.   Eyes: Pupils are equal, round, and reactive to light. Conjunctivae and EOM are normal. Right eye exhibits no discharge. Left eye exhibits no discharge. No scleral icterus.   Neck: No JVD present. No tracheal deviation present. No thyromegaly present.   Decreased ROM of cervical spine due to c-spine collar for recent cervical fusion   Cardiovascular: Normal rate and regular rhythm.  Exam reveals no gallop and no friction rub.    No murmur heard.  Pulmonary/Chest: Effort normal and breath sounds normal. No stridor. No respiratory distress. He has no wheezes. He has  no rales. He exhibits no tenderness.   Abdominal: Soft. Bowel sounds are normal. He exhibits no distension and no mass. There is no tenderness. There is no rebound and no guarding.   Musculoskeletal: Normal range of motion. He exhibits no edema, tenderness or deformity.   Lymphadenopathy:     He has no cervical adenopathy.   Neurological: He is alert and oriented to person, place, and time. He displays weakness. No cranial nerve deficit. He exhibits normal muscle tone. Coordination normal.   Reflex Scores:       Tricep reflexes are 1+ on the right side and 1+ on the left side.       Bicep reflexes are 1+ on the right side and 1+ on the left side.       Brachioradialis reflexes are 1+ on the right side and 1+ on the left side.       Patellar reflexes are 1+ on the right side and 1+ on the left side.       Achilles reflexes are 1+ on the right side and 1+ on the left side.  4/5 strength in upper extremities bilaterally  5/5 strength in lower extremities  Requires walker for ambulation     Skin: He is not diaphoretic.       Assessment/Plan     * Physical debility   Assessment & Plan    Patient has a history of DM II, HTN, prior CVA with residual right sided weakness, as well as cervical stenosis with associated myelopathy s/p C2-C6 laminectomy & C2-C4 posterior cervical fusion and L2-L5 laminectomy  Patient was recently hospitalized at [facility] from 7/18/18-8/14/18 following following neurosurgical procedure on 7/13/18  Patient was concerned for falls at home once he returned from the rehab hospital and reported to the ED for falls  Patient denies that he had any outright falls but feels unsteady at home with his 2 sons  PT/OT order placed  PT recommends SNF  SNF referral ordered  Lumbar spine x-ray ordered to follow up recent surgery            Risk for falls- (present on admission)   Assessment & Plan    PT/OT ordered  Will follow recommendations  PT recommends SNF  SNF referral ordered        Diabetes mellitus  with neurological manifestations, controlled (HCC)- (present on admission)   Assessment & Plan    Patient has a history of DM II  Blood glucose current 231  -home meds of metformin, gabapentin, lantus 40 qHs, and lispro sliding scale  -poorly controlled - initiated insulin therapy: 24 lantus, 8 lispro with meals, sliding scale  -diabetic diet  -continue metformin, gabapentin for neuropathic pain        HTN (hypertension)- (present on admission)   Assessment & Plan    Continue home amlodipine  Continue home hydralazine  Continue home lisinopril  Continue home metoprolol        Atrial fibrillation [427.31]- (present on admission)   Assessment & Plan    Continue warfarin per pharmacy.  Heparin for DVT prophylaxis also ordered in the setting of sub-therapeutic INR this morning (1.46)  Continue home metoprolol  Continue to Follow INR        History of total knee replacement   Assessment & Plan    Continue clindamycin 300 mg BID for history of MRSA infection with knee replacement        Diabetic polyneuropathy (HCC)- (present on admission)   Assessment & Plan    Continue gabapentin (300 mg BID + 600 mg nightly) for diabetic neuropathy

## 2018-08-19 NOTE — PROGRESS NOTES
Inpatient Anticoagulation Service Note    Date: 8/19/2018  Reason for Anticoagulation: Atrial Fibrillation, Stroke   [id number] VASc Score: 5    Target INR: 2.0 to 3.0    INR from last 7 days     Date/Time INR Value    08/19/18 0004 (!)  1.46    08/18/18 1148 (!)  1.41        Dose from last 7 days     Date/Time Dose (mg)    08/19/18 0004  15    08/18/18 1300  10        Average Dose (mg):  (10 mg daily per Med Rec.)  Significant Interactions: Not Applicable  Bridge Therapy: No (Heparin 5000 units SubQ BID for DVT PPx)     Reversal Agent Administered: Not Applicable  Comments: H/H is stable and there is no notation of bleeding. INR is subtherapeutic. It appears that the patient did not receive warfarin on 8/17. He recently has required some 15 mg warfarin doses to achieve a therapeutic INR. I have ordered a 15 mg warfarin dose for tonight.    Plan:  INR with morning labs.  Education Material Provided?: No (Chronic anticoagulation followed by [facility])  Pharmacist suggested discharge dosing: 10 mg alternating with 15 mg     Zaria Keene

## 2018-08-19 NOTE — CARE PLAN
Problem: Safety  Goal: Will remain free from injury  Outcome: PROGRESSING AS EXPECTED  Bed locked and in lowest position, non skid socks in place, bed alarm on, call light in reach    Problem: Knowledge Deficit  Goal: Knowledge of disease process/condition, treatment plan, diagnostic tests, and medications will improve  Outcome: PROGRESSING AS EXPECTED  Updated with plan of care, xray today, fall prec

## 2018-08-19 NOTE — CARE PLAN
Problem: Communication  Goal: The ability to communicate needs accurately and effectively will improve  Outcome: PROGRESSING AS EXPECTED  RN educated Pt on use of call bell to express needs      Problem: Safety  Goal: Will remain free from falls  Outcome: PROGRESSING AS EXPECTED  Pt educated on fall precautions, indicated understanding. Bed locked, lowest position, side rails 2/4 up, non skid footwear on, and call light in reach. Hourly rounding performed, will continue to monitor and reassess.

## 2018-08-20 LAB
GLUCOSE BLD-MCNC: 110 MG/DL (ref 65–99)
GLUCOSE BLD-MCNC: 198 MG/DL (ref 65–99)
GLUCOSE BLD-MCNC: 88 MG/DL (ref 65–99)
GLUCOSE BLD-MCNC: 92 MG/DL (ref 65–99)
GLUCOSE SERPL-MCNC: 112 MG/DL (ref 65–99)
INR PPP: 1.55 (ref 0.87–1.13)
PROTHROMBIN TIME: 18.3 SEC (ref 12–14.6)

## 2018-08-20 PROCEDURE — 700111 HCHG RX REV CODE 636 W/ 250 OVERRIDE (IP): Performed by: GENERAL PRACTICE

## 2018-08-20 PROCEDURE — 700102 HCHG RX REV CODE 250 W/ 637 OVERRIDE(OP): Performed by: INTERNAL MEDICINE

## 2018-08-20 PROCEDURE — 36415 COLL VENOUS BLD VENIPUNCTURE: CPT

## 2018-08-20 PROCEDURE — A9270 NON-COVERED ITEM OR SERVICE: HCPCS | Performed by: STUDENT IN AN ORGANIZED HEALTH CARE EDUCATION/TRAINING PROGRAM

## 2018-08-20 PROCEDURE — 85610 PROTHROMBIN TIME: CPT

## 2018-08-20 PROCEDURE — 99224 PR SUBSEQUENT OBSERVATION CARE,LEVEL I: CPT | Mod: GC | Performed by: INTERNAL MEDICINE

## 2018-08-20 PROCEDURE — A9270 NON-COVERED ITEM OR SERVICE: HCPCS | Performed by: INTERNAL MEDICINE

## 2018-08-20 PROCEDURE — G0378 HOSPITAL OBSERVATION PER HR: HCPCS

## 2018-08-20 PROCEDURE — 82962 GLUCOSE BLOOD TEST: CPT

## 2018-08-20 PROCEDURE — 82947 ASSAY GLUCOSE BLOOD QUANT: CPT

## 2018-08-20 PROCEDURE — 96372 THER/PROPH/DIAG INJ SC/IM: CPT

## 2018-08-20 PROCEDURE — 700102 HCHG RX REV CODE 250 W/ 637 OVERRIDE(OP): Performed by: STUDENT IN AN ORGANIZED HEALTH CARE EDUCATION/TRAINING PROGRAM

## 2018-08-20 RX ADMIN — NYSTATIN: 100000 POWDER TOPICAL at 17:24

## 2018-08-20 RX ADMIN — LISINOPRIL 40 MG: 20 TABLET ORAL at 17:23

## 2018-08-20 RX ADMIN — INSULIN LISPRO 8 UNITS: 100 INJECTION, SOLUTION INTRAVENOUS; SUBCUTANEOUS at 17:12

## 2018-08-20 RX ADMIN — DOCUSATE SODIUM -SENNOSIDES 2 TABLET: 50; 8.6 TABLET, COATED ORAL at 17:23

## 2018-08-20 RX ADMIN — CLINDAMYCIN HYDROCHLORIDE 300 MG: 150 CAPSULE ORAL at 17:23

## 2018-08-20 RX ADMIN — METOPROLOL TARTRATE 75 MG: 25 TABLET, FILM COATED ORAL at 17:26

## 2018-08-20 RX ADMIN — WARFARIN SODIUM 10 MG: 10 TABLET ORAL at 17:23

## 2018-08-20 RX ADMIN — METOPROLOL TARTRATE 75 MG: 25 TABLET, FILM COATED ORAL at 06:41

## 2018-08-20 RX ADMIN — INSULIN LISPRO 8 UNITS: 100 INJECTION, SOLUTION INTRAVENOUS; SUBCUTANEOUS at 08:34

## 2018-08-20 RX ADMIN — HEPARIN SODIUM 5000 UNITS: 5000 INJECTION, SOLUTION INTRAVENOUS; SUBCUTANEOUS at 08:27

## 2018-08-20 RX ADMIN — HYDRALAZINE HYDROCHLORIDE 100 MG: 50 TABLET, FILM COATED ORAL at 06:41

## 2018-08-20 RX ADMIN — ENOXAPARIN SODIUM 40 MG: 40 INJECTION, SOLUTION INTRAVENOUS; SUBCUTANEOUS at 11:49

## 2018-08-20 RX ADMIN — CLINDAMYCIN HYDROCHLORIDE 300 MG: 150 CAPSULE ORAL at 06:41

## 2018-08-20 RX ADMIN — VITAMIN D, TAB 1000IU (100/BT) 2000 UNITS: 25 TAB at 06:41

## 2018-08-20 RX ADMIN — AMLODIPINE BESYLATE 10 MG: 10 TABLET ORAL at 06:41

## 2018-08-20 RX ADMIN — GABAPENTIN 300 MG: 300 CAPSULE ORAL at 11:48

## 2018-08-20 RX ADMIN — NYSTATIN: 100000 POWDER TOPICAL at 06:42

## 2018-08-20 RX ADMIN — GABAPENTIN 300 MG: 300 CAPSULE ORAL at 06:41

## 2018-08-20 RX ADMIN — INSULIN LISPRO 1 UNITS: 100 INJECTION, SOLUTION INTRAVENOUS; SUBCUTANEOUS at 17:10

## 2018-08-20 RX ADMIN — HYDRALAZINE HYDROCHLORIDE 100 MG: 50 TABLET, FILM COATED ORAL at 11:49

## 2018-08-20 RX ADMIN — HYDRALAZINE HYDROCHLORIDE 100 MG: 50 TABLET, FILM COATED ORAL at 17:22

## 2018-08-20 RX ADMIN — INSULIN GLARGINE 24 UNITS: 100 INJECTION, SOLUTION SUBCUTANEOUS at 17:11

## 2018-08-20 RX ADMIN — LISINOPRIL 40 MG: 20 TABLET ORAL at 06:41

## 2018-08-20 RX ADMIN — METFORMIN HYDROCHLORIDE 1000 MG: 500 TABLET, FILM COATED ORAL at 17:23

## 2018-08-20 RX ADMIN — METFORMIN HYDROCHLORIDE 1000 MG: 500 TABLET, FILM COATED ORAL at 08:27

## 2018-08-20 ASSESSMENT — ENCOUNTER SYMPTOMS
DEPRESSION: 0
SEIZURES: 0
EYE REDNESS: 0
DIARRHEA: 0
SINUS PAIN: 0
PALPITATIONS: 0
ORTHOPNEA: 0
EYE DISCHARGE: 0
DOUBLE VISION: 0
HEARTBURN: 0
DIAPHORESIS: 0
SENSORY CHANGE: 0
CONSTIPATION: 0
MYALGIAS: 0
SORE THROAT: 0
PND: 0
WEIGHT LOSS: 0
FOCAL WEAKNESS: 1
DIZZINESS: 0
WEAKNESS: 1
NERVOUS/ANXIOUS: 0
SPEECH CHANGE: 0
VOMITING: 0
FALLS: 1
LOSS OF CONSCIOUSNESS: 0
CHILLS: 0
COUGH: 0
BLURRED VISION: 0
SPUTUM PRODUCTION: 0
BACK PAIN: 0
CLAUDICATION: 0
NAUSEA: 0
NECK PAIN: 0
BLOOD IN STOOL: 0
HALLUCINATIONS: 0
TREMORS: 0
FLANK PAIN: 0
PHOTOPHOBIA: 0
ABDOMINAL PAIN: 0
FEVER: 0
HEADACHES: 0
HEMOPTYSIS: 0
TINGLING: 0
SHORTNESS OF BREATH: 0
STRIDOR: 0
INSOMNIA: 0
MEMORY LOSS: 0
EYE PAIN: 0
WHEEZING: 0

## 2018-08-20 ASSESSMENT — LIFESTYLE VARIABLES: SUBSTANCE_ABUSE: 0

## 2018-08-20 ASSESSMENT — PAIN SCALES - GENERAL
PAINLEVEL_OUTOF10: 5
PAINLEVEL_OUTOF10: 6
PAINLEVEL_OUTOF10: 6
PAINLEVEL_OUTOF10: 5

## 2018-08-20 NOTE — PROGRESS NOTES
Internal Medicine Interval Note  Note Author: Alex Magaña M.D.     Name Joel Ma     1950   Age/Sex 68 y.o. male   MRN [id number]   Code Status FULL     After 5PM or if no immediate response to page, please call for cross-coverage  Attending/Team: Ness/Navin See Patient List for primary contact information  Call [phone] to page    1st Call - Day Intern (R1):   Dr. Alex Magaña 2nd Call - Day Sr. Resident (R2/R3):   Dr. Joel Barragan         Reason for interval visit  (Principal Problem)   Personal concerns for falls      Interval Problem Daily Status Update  (24 hours, problem oriented, brief subjective history, new lab/imaging data pertinent to that problem)     Patient had no overnight issues. No medical complaints during rounds today.  INR sub-therapeutic this morning (1.55), lovenox started for DVT prophylaxis in addition to warfarin per pharmacy dosing.  We will currently work with PT/OT to try to place him at SNF vs home with home health if patient is indeed not appropriate for inpatient rehab.  Physical therapy agrees that SNF is appropriate option for placement upon discharge  SNF referral placed      Review of Systems   Constitutional: Negative for chills, diaphoresis, fever, malaise/fatigue and weight loss.   HENT: Negative for congestion, ear discharge, ear pain, hearing loss, nosebleeds, sinus pain, sore throat and tinnitus.    Eyes: Negative for blurred vision, double vision, photophobia, pain, discharge and redness.   Respiratory: Negative for cough, hemoptysis, sputum production, shortness of breath, wheezing and stridor.    Cardiovascular: Negative for chest pain, palpitations, orthopnea, claudication, leg swelling and PND.   Gastrointestinal: Negative for abdominal pain, blood in stool, constipation, diarrhea, heartburn, melena, nausea and vomiting.   Genitourinary: Negative for dysuria, flank pain, frequency, hematuria and urgency.   Musculoskeletal:  Positive for falls. Negative for back pain, joint pain, myalgias and neck pain.   Skin: Negative for itching and rash.   Neurological: Positive for focal weakness and weakness. Negative for dizziness, tingling, tremors, sensory change, speech change, seizures, loss of consciousness and headaches.   Psychiatric/Behavioral: Negative for depression, hallucinations, memory loss, substance abuse and suicidal ideas. The patient is not nervous/anxious and does not have insomnia.        Disposition/Barriers to discharge:   None    Consultants/Specialty  None    PCP: Catrachito Sterling D.O.      Quality Measures  Quality-Core Measures   Reviewed items::  Labs reviewed, Medications reviewed and Radiology images reviewed  DVT prophylaxis pharmacological::  Enoxaparin (Lovenox)          Physical Exam       Vitals:    08/19/18 2030 08/20/18 0354 08/20/18 0718 08/20/18 1613   BP: 133/82 128/79 120/81 144/77   Pulse: (!) 56 63 65 67   Resp: 18 18 17 18   Temp: 36.9 °C (98.5 °F) 36.8 °C (98.3 °F) 36.7 °C (98.1 °F) 36.6 °C (97.8 °F)   SpO2: 96% 92% 92% 94%   Weight:       Height:         Body mass index is 32.87 kg/m².    Oxygen Therapy:  Pulse Oximetry: 94 %, O2 (LPM): 0, O2 Delivery: None (Room Air)    Physical Exam   Constitutional: He is oriented to person, place, and time and well-developed, well-nourished, and in no distress. No distress.   HENT:   Head: Normocephalic and atraumatic.   Eyes: Pupils are equal, round, and reactive to light. Conjunctivae and EOM are normal. Right eye exhibits no discharge. Left eye exhibits no discharge. No scleral icterus.   Neck: No JVD present. No tracheal deviation present. No thyromegaly present.   Decreased ROM of cervical spine due to c-spine collar for recent cervical fusion   Cardiovascular: Normal rate and regular rhythm.  Exam reveals no gallop and no friction rub.    No murmur heard.  Pulmonary/Chest: Effort normal and breath sounds normal. No stridor. No respiratory distress. He has  no wheezes. He has no rales. He exhibits no tenderness.   Abdominal: Soft. Bowel sounds are normal. He exhibits no distension and no mass. There is no tenderness. There is no rebound and no guarding.   Musculoskeletal: Normal range of motion. He exhibits no edema, tenderness or deformity.   Lymphadenopathy:     He has no cervical adenopathy.   Neurological: He is alert and oriented to person, place, and time. He displays weakness. No cranial nerve deficit. He exhibits normal muscle tone. Coordination normal.   Reflex Scores:       Tricep reflexes are 1+ on the right side and 1+ on the left side.       Bicep reflexes are 1+ on the right side and 1+ on the left side.       Brachioradialis reflexes are 1+ on the right side and 1+ on the left side.       Patellar reflexes are 1+ on the right side and 1+ on the left side.       Achilles reflexes are 1+ on the right side and 1+ on the left side.  4/5 strength in upper extremities bilaterally  5/5 strength in lower extremities  Requires walker for ambulation     Skin: He is not diaphoretic.       Assessment/Plan     * Physical debility   Assessment & Plan    Patient has a history of DM II, HTN, prior CVA with residual right sided weakness, as well as cervical stenosis with associated myelopathy s/p C2-C6 laminectomy & C2-C4 posterior cervical fusion and L2-L5 laminectomy  Patient was recently hospitalized at [facility] from 7/18/18-8/14/18 following following neurosurgical procedure on 7/13/18  Patient was concerned for falls at home once he returned from the rehab hospital and reported to the ED for falls  Patient denies that he had any outright falls but feels unsteady at home with his 2 sons  PT/OT order placed  PT recommends SNF  SNF referral ordered  Lumbar spine x-ray ordered to follow up recent surgery            Risk for falls- (present on admission)   Assessment & Plan    PT/OT ordered  Will follow recommendations  PT recommends SNF  SNF referral ordered         Diabetes mellitus with neurological manifestations, controlled (HCC)- (present on admission)   Assessment & Plan    Patient has a history of DM II  Blood glucose current 231  -home meds of metformin, gabapentin, lantus 40 qHs, and lispro sliding scale  -poorly controlled - initiated insulin therapy: 24 lantus, 8 lispro with meals, sliding scale  -diabetic diet  -continue metformin, gabapentin for neuropathic pain        HTN (hypertension)- (present on admission)   Assessment & Plan    Continue home amlodipine  Continue home hydralazine  Continue home lisinopril  Continue home metoprolol        Atrial fibrillation [427.31]- (present on admission)   Assessment & Plan    Continue warfarin per pharmacy.  Lovenox for DVT prophylaxis also ordered in the setting of sub-therapeutic INR this morning (1.55)  Continue home metoprolol  Continue to Follow INR        History of total knee replacement   Assessment & Plan    Continue clindamycin 300 mg BID for history of MRSA infection with knee replacement        Diabetic polyneuropathy (HCC)- (present on admission)   Assessment & Plan    Continue gabapentin (300 mg BID + 600 mg nightly) for diabetic neuropathy

## 2018-08-20 NOTE — PROGRESS NOTES
Pt became very confused and tearful this morning, asking where he was, who was in room with him, thinking about past memories, pt reoriented, emotional support provided, pt feeling better after reorientation, pt medicated, will cont to monitor.

## 2018-08-20 NOTE — PROGRESS NOTES
Inpatient Anticoagulation Service Note    Date: 8/20/2018  Reason for Anticoagulation: Atrial Fibrillation, Stroke   [id number] VASc Score: 5    Target INR: 2.0 to 3.0    INR from last 7 days     Date/Time INR Value    08/20/18 0251 (!)  1.55    08/19/18 0004 (!)  1.46    08/18/18 1148 (!)  1.41        Dose from last 7 days     Date/Time Dose (mg)    08/20/18 1500  10    08/19/18 0004  15    08/18/18 1300  10        Average Dose (mg):  (10 mg daily per Med Rec.)  Significant Interactions: Not Applicable  Bridge Therapy: No (Heparin 5000 units SubQ BID for DVT PPx)     Reversal Agent Administered: Not Applicable    Comments: INR continues to trend up towards goal.  Received a 15 mg dose last night. Will give 10mg po daily and will increase dosing if INR stalls    Plan:  Warfarin 10 mg po today, Trend INR  Education Material Provided?: No (Chronic anticoagulation followed by [facility])  Pharmacist suggested discharge dosing: 10mg warfarin po alternating with 15mg doses     Alecia Michelle, PharmD

## 2018-08-20 NOTE — PROGRESS NOTES
Assumed care of pt after report, pt resting in bed AAOx4, pt refusing interventions for 7/10 lower back pain, pt continues to get on EOB and standing position w/o calling for assistance after edu, pt medicated for pain per MAR. Fall measures in place. Bed alarm is on, call light within reach, personal belongings close-by, bed in lowest position, IV pole on same side of bathroom, upper bed rails up, hourly rounding in place. Will continue to monitor pt for safety.

## 2018-08-20 NOTE — CARE PLAN
Problem: Safety  Goal: Will remain free from falls    Intervention: Assess risk factors for falls  Fall measures in place. Call light within reach, personal belongings close-by, bed in lowest position, IV pole on same side of bathroom, upper bed rails up, hourly rounding in place. Will continue to monitor pt for safety.       Problem: Pain  Goal: Alleviation of Pain or a reduction in pain to the patient's comfort goal    Intervention: Pain Management--Medications  Pt refusing pain interventions at the moment, hourly rounding in place.

## 2018-08-20 NOTE — CARE PLAN
Problem: Communication  Goal: The ability to communicate needs accurately and effectively will improve  Outcome: PROGRESSING AS EXPECTED  Pt verbalizes understanding to POC. Therapy is recommending SNF, pt remains resistant at this time.     Problem: Infection  Goal: Will remain free from infection  Outcome: PROGRESSING AS EXPECTED  Labs and vitals stable.

## 2018-08-21 VITALS
HEIGHT: 75 IN | DIASTOLIC BLOOD PRESSURE: 72 MMHG | BODY MASS INDEX: 32.7 KG/M2 | HEART RATE: 59 BPM | SYSTOLIC BLOOD PRESSURE: 138 MMHG | WEIGHT: 263 LBS | OXYGEN SATURATION: 95 % | TEMPERATURE: 97.2 F | RESPIRATION RATE: 18 BRPM

## 2018-08-21 LAB
ALBUMIN SERPL BCP-MCNC: 3.7 G/DL (ref 3.2–4.9)
ALBUMIN/GLOB SERPL: 1.3 G/DL
ALP SERPL-CCNC: 55 U/L (ref 30–99)
ALT SERPL-CCNC: 14 U/L (ref 2–50)
ANION GAP SERPL CALC-SCNC: 8 MMOL/L (ref 0–11.9)
AST SERPL-CCNC: 15 U/L (ref 12–45)
BASOPHILS # BLD AUTO: 0.5 % (ref 0–1.8)
BASOPHILS # BLD: 0.02 K/UL (ref 0–0.12)
BILIRUB SERPL-MCNC: 0.5 MG/DL (ref 0.1–1.5)
BUN SERPL-MCNC: 22 MG/DL (ref 8–22)
CALCIUM SERPL-MCNC: 8.9 MG/DL (ref 8.5–10.5)
CHLORIDE SERPL-SCNC: 104 MMOL/L (ref 96–112)
CO2 SERPL-SCNC: 27 MMOL/L (ref 20–33)
CREAT SERPL-MCNC: 0.91 MG/DL (ref 0.5–1.4)
EOSINOPHIL # BLD AUTO: 0.08 K/UL (ref 0–0.51)
EOSINOPHIL NFR BLD: 1.9 % (ref 0–6.9)
ERYTHROCYTE [DISTWIDTH] IN BLOOD BY AUTOMATED COUNT: 46.5 FL (ref 35.9–50)
GLOBULIN SER CALC-MCNC: 2.9 G/DL (ref 1.9–3.5)
GLUCOSE BLD-MCNC: 139 MG/DL (ref 65–99)
GLUCOSE BLD-MCNC: 98 MG/DL (ref 65–99)
GLUCOSE SERPL-MCNC: 100 MG/DL (ref 65–99)
GLUCOSE SERPL-MCNC: 79 MG/DL (ref 65–99)
HCT VFR BLD AUTO: 40.3 % (ref 42–52)
HGB BLD-MCNC: 13 G/DL (ref 14–18)
IMM GRANULOCYTES # BLD AUTO: 0.01 K/UL (ref 0–0.11)
IMM GRANULOCYTES NFR BLD AUTO: 0.2 % (ref 0–0.9)
INR PPP: 2.05 (ref 0.87–1.13)
LYMPHOCYTES # BLD AUTO: 0.92 K/UL (ref 1–4.8)
LYMPHOCYTES NFR BLD: 21.6 % (ref 22–41)
MCH RBC QN AUTO: 28 PG (ref 27–33)
MCHC RBC AUTO-ENTMCNC: 32.3 G/DL (ref 33.7–35.3)
MCV RBC AUTO: 86.7 FL (ref 81.4–97.8)
MONOCYTES # BLD AUTO: 0.4 K/UL (ref 0–0.85)
MONOCYTES NFR BLD AUTO: 9.4 % (ref 0–13.4)
NEUTROPHILS # BLD AUTO: 2.83 K/UL (ref 1.82–7.42)
NEUTROPHILS NFR BLD: 66.4 % (ref 44–72)
NRBC # BLD AUTO: 0 K/UL
NRBC BLD-RTO: 0 /100 WBC
PLATELET # BLD AUTO: 181 K/UL (ref 164–446)
PMV BLD AUTO: 11.1 FL (ref 9–12.9)
POTASSIUM SERPL-SCNC: 3.6 MMOL/L (ref 3.6–5.5)
PROT SERPL-MCNC: 6.6 G/DL (ref 6–8.2)
PROTHROMBIN TIME: 22.8 SEC (ref 12–14.6)
RBC # BLD AUTO: 4.65 M/UL (ref 4.7–6.1)
SODIUM SERPL-SCNC: 139 MMOL/L (ref 135–145)
WBC # BLD AUTO: 4.3 K/UL (ref 4.8–10.8)

## 2018-08-21 PROCEDURE — 82962 GLUCOSE BLOOD TEST: CPT

## 2018-08-21 PROCEDURE — G8988 SELF CARE GOAL STATUS: HCPCS | Mod: CI

## 2018-08-21 PROCEDURE — 700102 HCHG RX REV CODE 250 W/ 637 OVERRIDE(OP): Performed by: STUDENT IN AN ORGANIZED HEALTH CARE EDUCATION/TRAINING PROGRAM

## 2018-08-21 PROCEDURE — G8987 SELF CARE CURRENT STATUS: HCPCS | Mod: CK

## 2018-08-21 PROCEDURE — 99217 PR OBSERVATION CARE DISCHARGE: CPT | Mod: GC | Performed by: INTERNAL MEDICINE

## 2018-08-21 PROCEDURE — 82947 ASSAY GLUCOSE BLOOD QUANT: CPT | Mod: XU

## 2018-08-21 PROCEDURE — A9270 NON-COVERED ITEM OR SERVICE: HCPCS | Performed by: STUDENT IN AN ORGANIZED HEALTH CARE EDUCATION/TRAINING PROGRAM

## 2018-08-21 PROCEDURE — 97166 OT EVAL MOD COMPLEX 45 MIN: CPT

## 2018-08-21 PROCEDURE — 85610 PROTHROMBIN TIME: CPT

## 2018-08-21 PROCEDURE — 96372 THER/PROPH/DIAG INJ SC/IM: CPT

## 2018-08-21 PROCEDURE — 36415 COLL VENOUS BLD VENIPUNCTURE: CPT

## 2018-08-21 PROCEDURE — 85025 COMPLETE CBC W/AUTO DIFF WBC: CPT

## 2018-08-21 PROCEDURE — 80053 COMPREHEN METABOLIC PANEL: CPT

## 2018-08-21 PROCEDURE — G0378 HOSPITAL OBSERVATION PER HR: HCPCS

## 2018-08-21 PROCEDURE — 700111 HCHG RX REV CODE 636 W/ 250 OVERRIDE (IP): Performed by: GENERAL PRACTICE

## 2018-08-21 RX ORDER — INSULIN GLARGINE 100 [IU]/ML
24 INJECTION, SOLUTION SUBCUTANEOUS EVERY EVENING
Qty: 7.2 ML | Refills: 0 | Status: ON HOLD | OUTPATIENT
Start: 2018-08-21 | End: 2018-09-06

## 2018-08-21 RX ORDER — METOPROLOL TARTRATE 75 MG/1
75 TABLET, FILM COATED ORAL EVERY 8 HOURS
Qty: 90 TAB | Refills: 0 | Status: SHIPPED | OUTPATIENT
Start: 2018-08-21 | End: 2018-11-05 | Stop reason: SDUPTHER

## 2018-08-21 RX ADMIN — AMLODIPINE BESYLATE 10 MG: 10 TABLET ORAL at 06:45

## 2018-08-21 RX ADMIN — METFORMIN HYDROCHLORIDE 1000 MG: 500 TABLET, FILM COATED ORAL at 08:34

## 2018-08-21 RX ADMIN — CLINDAMYCIN HYDROCHLORIDE 300 MG: 150 CAPSULE ORAL at 06:43

## 2018-08-21 RX ADMIN — VITAMIN D, TAB 1000IU (100/BT) 2000 UNITS: 25 TAB at 06:44

## 2018-08-21 RX ADMIN — METOPROLOL TARTRATE 75 MG: 25 TABLET, FILM COATED ORAL at 11:52

## 2018-08-21 RX ADMIN — HYDRALAZINE HYDROCHLORIDE 100 MG: 50 TABLET, FILM COATED ORAL at 06:44

## 2018-08-21 RX ADMIN — LISINOPRIL 40 MG: 20 TABLET ORAL at 06:43

## 2018-08-21 RX ADMIN — GABAPENTIN 300 MG: 300 CAPSULE ORAL at 11:51

## 2018-08-21 RX ADMIN — ENOXAPARIN SODIUM 40 MG: 40 INJECTION, SOLUTION INTRAVENOUS; SUBCUTANEOUS at 06:43

## 2018-08-21 RX ADMIN — DOCUSATE SODIUM -SENNOSIDES 2 TABLET: 50; 8.6 TABLET, COATED ORAL at 06:43

## 2018-08-21 RX ADMIN — METOPROLOL TARTRATE 75 MG: 25 TABLET, FILM COATED ORAL at 06:43

## 2018-08-21 RX ADMIN — HYDRALAZINE HYDROCHLORIDE 100 MG: 50 TABLET, FILM COATED ORAL at 11:51

## 2018-08-21 ASSESSMENT — ENCOUNTER SYMPTOMS
COUGH: 0
NERVOUS/ANXIOUS: 0
SPEECH CHANGE: 0
BACK PAIN: 0
DIARRHEA: 0
FEVER: 0
PHOTOPHOBIA: 0
EYE PAIN: 0
FOCAL WEAKNESS: 1
VOMITING: 0
LOSS OF CONSCIOUSNESS: 0
FLANK PAIN: 0
BLURRED VISION: 0
EYE REDNESS: 0
DIZZINESS: 0
FALLS: 1
SORE THROAT: 0
ABDOMINAL PAIN: 0
SHORTNESS OF BREATH: 0
PALPITATIONS: 0
WHEEZING: 0
CONSTIPATION: 0
TREMORS: 0
ORTHOPNEA: 0
SEIZURES: 0
HEADACHES: 0
PND: 0
WEIGHT LOSS: 0
SPUTUM PRODUCTION: 0
SINUS PAIN: 0
EYE DISCHARGE: 0
HEMOPTYSIS: 0
STRIDOR: 0
DOUBLE VISION: 0
CHILLS: 0
DEPRESSION: 0
TINGLING: 0
NECK PAIN: 0
BLOOD IN STOOL: 0
CLAUDICATION: 0
INSOMNIA: 0
DIAPHORESIS: 0
HALLUCINATIONS: 0
SENSORY CHANGE: 0
MYALGIAS: 0
WEAKNESS: 1
MEMORY LOSS: 0
NAUSEA: 0
HEARTBURN: 0

## 2018-08-21 ASSESSMENT — COGNITIVE AND FUNCTIONAL STATUS - GENERAL
DRESSING REGULAR LOWER BODY CLOTHING: A LITTLE
HELP NEEDED FOR BATHING: A LOT
DAILY ACTIVITIY SCORE: 17
TOILETING: A LITTLE
SUGGESTED CMS G CODE MODIFIER DAILY ACTIVITY: CK
DRESSING REGULAR UPPER BODY CLOTHING: A LOT
PERSONAL GROOMING: A LITTLE

## 2018-08-21 ASSESSMENT — ACTIVITIES OF DAILY LIVING (ADL): TOILETING: REQUIRES ASSIST

## 2018-08-21 ASSESSMENT — LIFESTYLE VARIABLES: SUBSTANCE_ABUSE: 0

## 2018-08-21 ASSESSMENT — PAIN SCALES - GENERAL: PAINLEVEL_OUTOF10: 4

## 2018-08-21 NOTE — DISCHARGE PLANNING
Agency/Facility Name: [facility]  Spoke To: Rogelio  Outcome: Patient accepted. Rogelio running insurance authorization and will contact [facility] once approved.   DOMINGUEZ Tony informed of patients acceptance.

## 2018-08-21 NOTE — DISCHARGE PLANNING
Received Choice form at 931 from DOMINGUEZ Tony  Agency/Facility Name: [facility], [place], [facility]  Referral sent per Choice form @ 831.

## 2018-08-21 NOTE — DISCHARGE PLANNING
Agency/Facility Name: [facility]  Spoke To: Rogelio  Outcome: Patient accepted. Transport scheduled via [facility] at 1600.   DOMINGUEZ Tony notified of scheduled transport.

## 2018-08-21 NOTE — DISCHARGE PLANNING
Anticipated Discharge Disposition: SNF.    Action: RN CM met with pt and explained SNF CHOICE. Choice obtained and faxed to [facility] at ext. [phone]. Questions answered, support given.    Barriers to Discharge: Medical clearance, accepting snf.    Plan: RN CM to update team.

## 2018-08-21 NOTE — DISCHARGE PLANNING
Anticipated Discharge Disposition: SNF for therapies then home.    Action: RN CM notified pt and Dr. Magaña that [facility] has accepted pt, however, pt's first choice is [facility]. Staff is following up with [facility] to see if they have reviewed referral yet before accepting [facility].    Barriers to Discharge: Medical clearance.    Plan: RN CM to update team and pt. when [facility] responds.

## 2018-08-21 NOTE — CARE PLAN
Problem: Communication  Goal: The ability to communicate needs accurately and effectively will improve  Outcome: PROGRESSING AS EXPECTED  Pt verbalizes understanding to POC. Pt verbally agrees to consider SNF. Pt previously refusing SNF, wanting to go home with HH.     Problem: Safety  Goal: Will remain free from injury  Outcome: PROGRESSING SLOWER THAN EXPECTED  Pt does not utilize call light appropriately. Pt educated on fall precautions, teaching needs reinforcements.

## 2018-08-21 NOTE — PROGRESS NOTES
Assumed care of pt after receiving report from dayshift RN. Bedside rounding completed w/ dayshift RN. Pt resting in bed A&OX4, pt is restless and in pain, pt refusing all interventions dfor pain relieve, refusing medications to help facilitate sleep after edu. Fall measures in place, bed alarm is on, call light within reach, personal belongings nearby, bed in lowest position, and hourly rounding in place. Will continue to monitor pt.

## 2018-08-21 NOTE — PROGRESS NOTES
Internal Medicine Interval Note  Note Author: Alex Magaña M.D.     Name Joel Ma     1950   Age/Sex 68 y.o. male   MRN [id number]   Code Status FULL     After 5PM or if no immediate response to page, please call for cross-coverage  Attending/Team: Ness/Navin See Patient List for primary contact information  Call [phone] to page    1st Call - Day Intern (R1):   Dr. Alex Magaña 2nd Call - Day Sr. Resident (R2/R3):   Dr. Joel Barragan         Reason for interval visit  (Principal Problem)   Personal concerns for falls      Interval Problem Daily Status Update  (24 hours, problem oriented, brief subjective history, new lab/imaging data pertinent to that problem)     Patient had no overnight issues. No medical complaints during rounds today.  INR sub-therapeutic this morning (2.05), lovenox continueed for DVT prophylaxis in addition to warfarin per pharmacy dosing.  We will currently work with PT/OT to try to place him at SNF vs home with home health if patient is indeed not appropriate for inpatient rehab.  Physical therapy agrees that SNF is appropriate option for placement upon discharge  SNF referral placed and accepted at a facility  Plan to discharge to SNF today      Review of Systems   Constitutional: Negative for chills, diaphoresis, fever, malaise/fatigue and weight loss.   HENT: Negative for congestion, ear discharge, ear pain, hearing loss, nosebleeds, sinus pain, sore throat and tinnitus.    Eyes: Negative for blurred vision, double vision, photophobia, pain, discharge and redness.   Respiratory: Negative for cough, hemoptysis, sputum production, shortness of breath, wheezing and stridor.    Cardiovascular: Negative for chest pain, palpitations, orthopnea, claudication, leg swelling and PND.   Gastrointestinal: Negative for abdominal pain, blood in stool, constipation, diarrhea, heartburn, melena, nausea and vomiting.   Genitourinary: Negative for dysuria, flank  pain, frequency, hematuria and urgency.   Musculoskeletal: Positive for falls. Negative for back pain, joint pain, myalgias and neck pain.   Skin: Negative for itching and rash.   Neurological: Positive for focal weakness and weakness. Negative for dizziness, tingling, tremors, sensory change, speech change, seizures, loss of consciousness and headaches.   Psychiatric/Behavioral: Negative for depression, hallucinations, memory loss, substance abuse and suicidal ideas. The patient is not nervous/anxious and does not have insomnia.        Disposition/Barriers to discharge:   None    Consultants/Specialty  None    PCP: Catrachito Sterling D.O.      Quality Measures  Quality-Core Measures   Reviewed items::  Labs reviewed, Medications reviewed and Radiology images reviewed  DVT prophylaxis pharmacological::  Enoxaparin (Lovenox)          Physical Exam       Vitals:    08/20/18 1613 08/20/18 1930 08/21/18 0400 08/21/18 0944   BP: 144/77 116/63 137/81 138/72   Pulse: 67 60 (!) 55 (!) 59   Resp: 18 20 18 18   Temp: 36.6 °C (97.8 °F) 36.4 °C (97.6 °F) 36.2 °C (97.2 °F) 36.2 °C (97.2 °F)   SpO2: 94% 96% 95% 95%   Weight:       Height:         Body mass index is 32.87 kg/m².    Oxygen Therapy:  Pulse Oximetry: 95 %, O2 (LPM): 0, O2 Delivery: None (Room Air)    Physical Exam   Constitutional: He is oriented to person, place, and time and well-developed, well-nourished, and in no distress. No distress.   HENT:   Head: Normocephalic and atraumatic.   Eyes: Pupils are equal, round, and reactive to light. Conjunctivae and EOM are normal. Right eye exhibits no discharge. Left eye exhibits no discharge. No scleral icterus.   Neck: No JVD present. No tracheal deviation present. No thyromegaly present.   Decreased ROM of cervical spine due to c-spine collar for recent cervical fusion   Cardiovascular: Normal rate and regular rhythm.  Exam reveals no gallop and no friction rub.    No murmur heard.  Pulmonary/Chest: Effort normal and  breath sounds normal. No stridor. No respiratory distress. He has no wheezes. He has no rales. He exhibits no tenderness.   Abdominal: Soft. Bowel sounds are normal. He exhibits no distension and no mass. There is no tenderness. There is no rebound and no guarding.   Musculoskeletal: Normal range of motion. He exhibits no edema, tenderness or deformity.   Lymphadenopathy:     He has no cervical adenopathy.   Neurological: He is alert and oriented to person, place, and time. He displays weakness. No cranial nerve deficit. He exhibits normal muscle tone. Coordination normal.   Reflex Scores:       Tricep reflexes are 1+ on the right side and 1+ on the left side.       Bicep reflexes are 1+ on the right side and 1+ on the left side.       Brachioradialis reflexes are 1+ on the right side and 1+ on the left side.       Patellar reflexes are 1+ on the right side and 1+ on the left side.       Achilles reflexes are 1+ on the right side and 1+ on the left side.  Patient orientedx3 but was somewhat confused about if his medications were actually for him  4/5 strength in upper extremities bilaterally  5/5 strength in lower extremities  Requires walker for ambulation       Skin: He is not diaphoretic.       Assessment/Plan     * Physical debility   Assessment & Plan    Patient has a history of DM II, HTN, prior CVA with residual right sided weakness, as well as cervical stenosis with associated myelopathy s/p C2-C6 laminectomy & C2-C4 posterior cervical fusion and L2-L5 laminectomy  Patient was recently hospitalized at [facility] from 7/18/18-8/14/18 following following neurosurgical procedure on 7/13/18  Patient was concerned for falls at home once he returned from the rehab hospital and reported to the ED for falls  Patient denies that he had any outright falls but feels unsteady at home with his 2 sons  PT/OT order placed  PT recommends SNF  SNF referral ordered  Lumbar spine x-ray ordered to follow up recent  surgery            Risk for falls- (present on admission)   Assessment & Plan    PT/OT ordered  Will follow recommendations  PT recommends SNF  SNF referral ordered        Diabetes mellitus with neurological manifestations, controlled (HCC)- (present on admission)   Assessment & Plan    Patient has a history of DM II  Blood glucose current 231  -home meds of metformin, gabapentin, lantus 40 qHs, and lispro sliding scale  -poorly controlled - initiated insulin therapy: 24 lantus, 8 lispro with meals, sliding scale  -diabetic diet  -continue metformin, gabapentin for neuropathic pain        HTN (hypertension)- (present on admission)   Assessment & Plan    Continue home amlodipine  Continue home hydralazine  Continue home lisinopril  Continue home metoprolol        Atrial fibrillation [427.31]- (present on admission)   Assessment & Plan    Continue warfarin per pharmacy.  Lovenox for DVT prophylaxis also ordered in the setting of sub-therapeutic INR this morning (1.55)  Continue home metoprolol  Continue to Follow INR        History of total knee replacement   Assessment & Plan    Continue clindamycin 300 mg BID for history of MRSA infection with knee replacement        Diabetic polyneuropathy (HCC)- (present on admission)   Assessment & Plan    Continue gabapentin (300 mg BID + 600 mg nightly) for diabetic neuropathy

## 2018-08-21 NOTE — THERAPY
"Occupational Therapy Evaluation completed.   Functional Status:  Pt presents to skilled OT services following multiple falls at home, d/c home from acute rehab ~3days ago and pt was unable to care for self, recent h/o of cervical laminectomies, currently has aspen collar on at all times. Pt donned/doffed socks seated eob with sba, standing sink side oral care with min a cues for c-spine precautions, attempts to rely heavily on BUE's support through sink vs maintaining upright posture, with ~5mins of standing c/o LE's giving up, hard time stepping back to bed, min a required to sit back to bed, not safe to attempt toilet t/f today in-pt's room bathroom due to limited space. Pt was able to tolerate short hallway ambulation with FWW and cga provided. Pt would benefit from acute skilled OT services and post acute recommended given pt's inability to care for self at home following d/c from acute, limited to no support, and to address above mentioned deficits.   Plan of Care: Will benefit from Occupational Therapy 3 times per week  Discharge Recommendations:  Equipment: Will Continue to Assess for Equipment Needs. Post-acute therapy Discharge to a transitional care facility for continued skilled therapy services.    See \"Rehab Therapy-Acute\" Patient Summary Report for complete documentation.    "

## 2018-08-21 NOTE — DISCHARGE INSTRUCTIONS
Discharge Instructions    Discharged to other by medical transportation with escort. Discharged via ambulance, hospital escort: Yes.  Special equipment needed: Not Applicable    Be sure to schedule a follow-up appointment with your primary care doctor or any specialists as instructed.     Discharge Plan:   Diet Plan: Discussed  Activity Level: Discussed  Confirmed Follow up Appointment: Patient to Call and Schedule Appointment  Confirmed Symptoms Management: Discussed  Medication Reconciliation Updated: Yes  Pneumococcal Vaccine Administered/Refused: Not given - Patient refused pneumococcal vaccine  Influenza Vaccine Indication: Patient Refuses    I understand that a diet low in cholesterol, fat, and sodium is recommended for good health. Unless I have been given specific instructions below for another diet, I accept this instruction as my diet prescription.   Other diet: diabetic    Special Instructions: None    · Is patient discharged on Warfarin / Coumadin?   Yes    You are receiving the drug warfarin. Please understand the importance of monitoring warfarin with scheduled PT/INR blood draws.  Follow-up with a call to your personal Doctor's office in 3 days to schedule a PT/INR. .    IMPORTANT: HOW TO USE THIS INFORMATION:  This is a summary and does NOT have all possible information about this product. This information does not assure that this product is safe, effective, or appropriate for you. This information is not individual medical advice and does not substitute for the advice of your health care professional. Always ask your health care professional for complete information about this product and your specific health needs.      WARFARIN - ORAL (WARF-uh-rin)      COMMON BRAND NAME(S): Coumadin      WARNING:  Warfarin can cause very serious (possibly fatal) bleeding. This is more likely to occur when you first start taking this medication or if you take too much warfarin. To decrease your risk for bleeding,  "your doctor or other health care provider will monitor you closely and check your lab results (INR test) to make sure you are not taking too much warfarin. Keep all medical and laboratory appointments. Tell your doctor right away if you notice any signs of serious bleeding. See also Side Effects section.      USES:  This medication is used to treat blood clots (such as in deep vein thrombosis-DVT or pulmonary embolus-PE) and/or to prevent new clots from forming in your body. Preventing harmful blood clots helps to reduce the risk of a stroke or heart attack. Conditions that increase your risk of developing blood clots include a certain type of irregular heart rhythm (atrial fibrillation), heart valve replacement, recent heart attack, and certain surgeries (such as hip/knee replacement). Warfarin is commonly called a \"blood thinner,\" but the more correct term is \"anticoagulant.\" It helps to keep blood flowing smoothly in your body by decreasing the amount of certain substances (clotting proteins) in your blood.      HOW TO USE:  Read the Medication Guide provided by your pharmacist before you start taking warfarin and each time you get a refill. If you have any questions, ask your doctor or pharmacist. Take this medication by mouth with or without food as directed by your doctor or other health care professional, usually once a day. It is very important to take it exactly as directed. Do not increase the dose, take it more frequently, or stop using it unless directed by your doctor. Dosage is based on your medical condition, laboratory tests (such as INR), and response to treatment. Your doctor or other health care provider will monitor you closely while you are taking this medication to determine the right dose for you. Use this medication regularly to get the most benefit from it. To help you remember, take it at the same time each day. It is important to eat a balanced, consistent diet while taking warfarin. Some " foods can affect how warfarin works in your body and may affect your treatment and dose. Avoid sudden large increases or decreases in your intake of foods high in vitamin K (such as broccoli, cauliflower, cabbage, brussels sprouts, kale, spinach, and other green leafy vegetables, liver, green tea, certain vitamin supplements). If you are trying to lose weight, check with your doctor before you try to go on a diet. Cranberry products may also affect how your warfarin works. Limit the amount of cranberry juice (16 ounces/480 milliliters a day) or other cranberry products you may drink or eat.      SIDE EFFECTS:  Nausea, loss of appetite, or stomach/abdominal pain may occur. If any of these effects persist or worsen, tell your doctor or pharmacist promptly. Remember that your doctor has prescribed this medication because he or she has judged that the benefit to you is greater than the risk of side effects. Many people using this medication do not have serious side effects. This medication can cause serious bleeding if it affects your blood clotting proteins too much (shown by unusually high INR lab results). Even if your doctor stops your medication, this risk of bleeding can continue for up to a week. Tell your doctor right away if you have any signs of serious bleeding, including: unusual pain/swelling/discomfort, unusual/easy bruising, prolonged bleeding from cuts or gums, persistent/frequent nosebleeds, unusually heavy/prolonged menstrual flow, pink/dark urine, coughing up blood, vomit that is bloody or looks like coffee grounds, severe headache, dizziness/fainting, unusual or persistent tiredness/weakness, bloody/black/tarry stools, chest pain, shortness of breath, difficulty swallowing. Tell your doctor right away if any of these unlikely but serious side effects occur: persistent nausea/vomiting, severe stomach/abdominal pain, yellowing eyes/skin. This drug rarely has caused very serious (possibly fatal)  problems if its effects lead to small blood clots (usually at the beginning of treatment). This can lead to severe skin/tissue damage that may require surgery or amputation if left untreated. Patients with certain blood conditions (protein C or S deficiency) may be at greater risk. Get medical help right away if any of these rare but serious side effects occur: painful/red/purplish patches on the skin (such as on the toe, breast, abdomen), change in the amount of urine, vision changes, confusion, slurred speech, weakness on one side of the body. A very serious allergic reaction to this drug is rare. However, get medical help right away if you notice any symptoms of a serious allergic reaction, including: rash, itching/swelling (especially of the face/tongue/throat), severe dizziness, trouble breathing. This is not a complete list of possible side effects. If you notice other effects not listed above, contact your doctor or pharmacist. In the [place] - Call your doctor for medical advice about side effects. You may report side effects to FDA at 3-688-ASM-2942. In [place] - Call your doctor for medical advice about side effects. You may report side effects to [facility] [place] at 1-998.360.4734.      PRECAUTIONS:  Before taking warfarin, tell your doctor or pharmacist if you are allergic to it; or if you have any other allergies. This product may contain inactive ingredients, which can cause allergic reactions or other problems. Talk to your pharmacist for more details. Before using this medication, tell your doctor or pharmacist your medical history, especially of: blood disorders (such as anemia, hemophilia), bleeding problems (such as bleeding of the stomach/intestines, bleeding in the brain), blood vessel disorders (such as aneurysms), recent major injury/surgery, liver disease, alcohol use, mental/mood disorders (including memory problems), frequent falls/injuries. It is important that all your doctors and dentists know  that you take warfarin. Before having surgery or any medical/dental procedures, tell your doctor or dentist that you are taking this medication and about all the products you use (including prescription drugs, nonprescription drugs, and herbal products). Avoid getting injections into the muscles. If you must have an injection into a muscle (for example, a flu shot), it should be given in the arm. This way, it will be easier to check for bleeding and/or apply pressure bandages. This medication may cause stomach bleeding. Daily use of alcohol while using this medicine will increase your risk for stomach bleeding and may also affect how this medication works. Limit or avoid alcoholic beverages. If you have not been eating well, if you have an illness or infection that causes fever, vomiting, or diarrhea for more than 2 days, or if you start using any antibiotic medications, contact your doctor or pharmacist immediately because these conditions can affect how warfarin works. This medication can cause heavy bleeding. To lower the chance of getting cut, bruised, or injured, use great caution with sharp objects like safety razors and nail cutters. Use an electric razor when shaving and a soft toothbrush when brushing your teeth. Avoid activities such as contact sports. If you fall or injure yourself, especially if you hit your head, call your doctor immediately. Your doctor may need to check you. The Food & Drug Administration has stated that generic warfarin products are interchangeable. However, consult your doctor or pharmacist before switching warfarin products. Be careful not to take more than one medication that contains warfarin unless specifically directed by the doctor or health care provider who is monitoring your warfarin treatment. Older adults may be at greater risk for bleeding while using this drug. This medication is not recommended for use during pregnancy because of serious (possibly fatal) harm to an  "unborn baby. Discuss the use of reliable forms of birth control with your doctor. If you become pregnant or think you may be pregnant, tell your doctor immediately. If you are planning pregnancy, discuss a plan for managing your condition with your doctor before you become pregnant. Your doctor may switch the type of medication you use during pregnancy. Very small amounts of this medication may pass into breast milk but is unlikely to harm a nursing infant. Consult your doctor before breast-feeding.      DRUG INTERACTIONS:  Drug interactions may change how your medications work or increase your risk for serious side effects. This document does not contain all possible drug interactions. Keep a list of all the products you use (including prescription/nonprescription drugs and herbal products) and share it with your doctor and pharmacist. Do not start, stop, or change the dosage of any medicines without your doctor's approval. Warfarin interacts with many prescription, nonprescription, vitamin, and herbal products. This includes medications that are applied to the skin or inside the vagina or rectum. The interactions with warfarin usually result in an increase or decrease in the \"blood-thinning\" (anticoagulant) effect. Your doctor or other health care professional should closely monitor you to prevent serious bleeding or clotting problems. While taking warfarin, it is very important to tell your doctor or pharmacist of any changes in medications, vitamins, or herbal products that you are taking. Some products that may interact with this drug include: capecitabine, imatinib, mifepristone. Aspirin, aspirin-like drugs (salicylates), and nonsteroidal anti-inflammatory drugs (NSAIDs such as ibuprofen, naproxen, celecoxib) may have effects similar to warfarin. These drugs may increase the risk of bleeding problems if taken during treatment with warfarin. Carefully check all prescription/nonprescription product labels " (including drugs applied to the skin such as pain-relieving creams) since the products may contain NSAIDs or salicylates. Talk to your doctor about using a different medication (such as acetaminophen) to treat pain/fever. Low-dose aspirin and related drugs (such as clopidogrel, ticlopidine) should be continued if prescribed by your doctor for specific medical reasons such as heart attack or stroke prevention. Consult your doctor or pharmacist for more details. Many herbal products interact with warfarin. Tell your doctor before taking any herbal products, especially bromelains, coenzyme Q10, cranberry, danshen, dong quai, fenugreek, garlic, ginkgo biloba, ginseng, and Vin's wort, among others. This medication may interfere with a certain laboratory test to measure theophylline levels, possibly causing false test results. Make sure laboratory personnel and all your doctors know you use this drug.      OVERDOSE:  If overdose is suspected, contact a poison control center or emergency room immediately.  residents can call the [place] Poison Hotline at 1-166.753.7949. [place] residents can call a provincial poison control center. Symptoms of overdose may include: bloody/black/tarry stools, pink/dark urine, unusual/prolonged bleeding.      NOTES:  Do not share this medication with others. Laboratory and/or medical tests (such as INR, complete blood count) must be performed periodically to monitor your progress or check for side effects. Consult your doctor for more details.      MISSED DOSE:  For the best possible benefit, do not miss any doses. If you do miss a dose and remember on the same day, take it as soon as you remember. If you remember on the next day, skip the missed dose and resume your usual dosing schedule. Do not double the dose to catch up because this could increase your risk for bleeding. Keep a record of missed doses to give to your doctor or pharmacist. Contact your doctor or pharmacist if you  miss 2 or more doses in a row.      STORAGE:  Store at room temperature away from light and moisture. Do not store in the bathroom. Keep all medications away from children and pets. Do not flush medications down the toilet or pour them into a drain unless instructed to do so. Properly discard this product when it is  or no longer needed. Consult your pharmacist or local waste disposal company for more details about how to safely discard your product.      MEDICAL ALERT:  Your condition and medication can cause complications in a medical emergency. For information about enrolling in MedicAlert, call 1-702.624.9898 ([place]) or 1-921.460.3772 ([place]).      Information last revised 2010 Copyright(c)  [employer]             Depression / Suicide Risk    As you are discharged from this [facility] facility, it is important to learn how to keep safe from harming yourself.    Recognize the warning signs:  · Abrupt changes in personality, positive or negative- including increase in energy   · Giving away possessions  · Change in eating patterns- significant weight changes-  positive or negative  · Change in sleeping patterns- unable to sleep or sleeping all the time   · Unwillingness or inability to communicate  · Depression  · Unusual sadness, discouragement and loneliness  · Talk of wanting to die  · Neglect of personal appearance   · Rebelliousness- reckless behavior  · Withdrawal from people/activities they love  · Confusion- inability to concentrate     If you or a loved one observes any of these behaviors or has concerns about self-harm, here's what you can do:  · Talk about it- your feelings and reasons for harming yourself  · Remove any means that you might use to hurt yourself (examples: pills, rope, extension cords, firearm)  · Get professional help from the community (Mental Health, Substance Abuse, psychological counseling)  · Do not be alone:Call your Safe Contact- someone whom you  trust who will be there for you.  · Call your local CRISIS HOTLINE 296-0310 or 666-137-7173  · Call your local Children's Mobile Crisis Response Team [place] (767) 008-5111 or www.[employer]  · Call the toll free National Suicide Prevention Hotlines   · National Suicide Prevention Lifeline 800-782-QRMT (8359)  · National [employer] Network 800-SUICIDE (170-3299)

## 2018-08-21 NOTE — DISCHARGE SUMMARY
Internal Medicine Discharge Summary  Note Author: Joel Barragan M.D.       Name Joel Ma     1950   Age/Sex 68 y.o. male   MRN [id number]         Admit Date:  2018       Discharge Date:  2018    Service:   [facility] Internal Medicine Purple Team  Attending Physician(s):   Chauncey Servin MD    Senior Resident(s):   Cachorro Onofre MD, Joel Barragan MD  Carl Resident(s):   Alex Magaña MD  PCP: Catrachito Sterling D.O.      Primary Diagnosis:   Ground-level fall secondary to physical debility    Secondary Diagnoses:                Principal Problem:    Physical debility POA: Yes  Active Problems:    HTN (hypertension) POA: Yes    Diabetes mellitus with neurological manifestations, controlled (HCC) POA: Yes    Risk for falls POA: Yes    Diabetic polyneuropathy (HCC) POA: Yes    History of total knee replacement POA: Yes    Fungal infection of skin of abdomen POA: Yes    Atrial fibrillation [427.31] POA: Yes  Resolved Problems:    * No resolved hospital problems. *      Hospital Summary (Brief Narrative):       Mr. Ma is a pleasant 68-year-old gentleman with a history of hypertension, stroke, diabetes mellitus type 2, obstructive sleep apnea, paroxysmal atrial fibrillation, total knee replacement with MRSA infection (for which he was on clindamycin), and recent lumbar and cervical laminectomies on 2018, presented to the emergency room following multiple ground-level falls.  He was previously in acute rehabilitation facility for 6 weeks prior to being discharged 3 days before his presentation.  Following his discharge, patient was living at home by himself and was unable to care for self.  He became fearful of living alone.  Patient was admitted to the medical floor for further care and monitoring.  Physical and Occupational Therapy was consulted.  Recommendation was made for patient to be discharged to a skilled nursing facility due to continuing ground-level falls  secondary to physical debility.   Patient was maintained on warfarin for his history of atrial fibrillation and stroke.  Patient continued to remain medically stable and was discharged to [facility] skilled nursing facility.      Patient /Hospital Summary (Details -- Problem Oriented) :            * Physical debility- (present on admission)   Assessment & Plan    Patient was recently hospitalized at [facility] from 7/18/18-8/14/18 following following neurosurgical procedure on 7/13/18 by Dr. Boby Marsh.  He underwent C2-C6 laminectomy and C2-C4 posterior cervical fusion and L2-L5 laminectomy for cervical stenosis associated myelopathy.  Patient also had a history of stroke with residual right-sided weakness.  He presented with multiple falls at home following his discharge from the rehabilitation facility 3 days prior to presentation.  Physical and Occupational Therapy was consulted to assist with rehabilitation efforts.  Patient ambulated with a walker during his observation stay.      Risk for falls- (present on admission)   Assessment & Plan    As per above.  Discharged to skilled nursing facility.      Diabetes mellitus with neurological manifestations, controlled (HCC)- (present on admission)   Assessment & Plan    Patient's home diabetic regimen included metformin, gabapentin, glargine insulin 40 units at bedtime, and lispro insulin sliding scale.  Last hemoglobin A1c was 9.9 on July 19, 2018.  Patient was maintained on metformin 1000 mg by mouth twice daily and glargine insulin 24 units at night in addition to lispro insulin sliding scale.  To improve blood glucose control, patient was given lispro insulin 8 units prior to each meal.  Blood glucose remained stable around .  Patient's previous insulin regimen included large and insulin 30 units at nighttime with lispro insulin sliding scale.  He was continued on gabapentin for neuropathic pain.      HTN (hypertension)- (present on admission)    Assessment & Plan    Patient was maintained on his home dosing of amlodipine, hydralazine, lisinopril, and metoprolol.  Blood pressure remained stable stable in the rhythm control 110s-140s/60s-90s.      Atrial fibrillation [427.31]- (present on admission)   Assessment & Plan    [id number] VASc Score: 5  Patient was maintained on his home warfarin for his history of atrial fibrillation, which was likely paroxysmal, as well as history of stroke.  Notably, patient had an EKGs performed on May, June, and July 2018 showed sinus rhythm.  INR was therapeutic at 2.05 on day of discharge.        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  He was continued on clindamycin 300 mg by mouth twice daily.        Diabetic polyneuropathy (HCC)- (present on admission)   Assessment & Plan    Patient was maintained on gabapentin 300 mg by mouth twice daily and 600 mg by mouth at night for diabetic neuropathy symptoms.          Consultants:     Physical Therapy  Occupational Therapy    Procedures:        None    Imaging/ Testing:      DX-LUMBAR SPINE-2 OR 3 VIEWS   Final Result      1.  Multilevel degenerative subluxations, endplate spurring, and facet arthropathy      2.  Laminectomy at L3 and L4      3.  osteoarthritis of both sacroiliac joint of both hip joint               Discharge Medications:         Medication Reconciliation: Completed     Joel Ma   Home Medication Instructions KRYSTINA:[id number]    Printed on:08/21/18 1504   Medication Information                      amLODIPine (NORVASC) 10 MG Tab  Take 10 mg by mouth every day.             clindamycin (CLEOCIN) 300 MG Cap  Take 300 mg by mouth 2 Times a Day.             gabapentin (NEURONTIN) 300 MG Cap  Take 300-600 mg by mouth 3 times a day. 300mg in AM and noon   600mg in PM             hydrALAZINE (APRESOLINE) 100  MG tablet  Take 100 mg by mouth 3 times a day.             insulin glargine (LANTUS) 100 UNIT/ML Solution  Inject 24 Units as instructed every evening for 30 days.             insulin lispro (HUMALOG) 100 UNIT/ML Solution  Inject 8 Units as instructed 3 times a day before meals for 60 days.             insulin lispro (HUMALOG) 100 UNIT/ML Solution  Inject 1-6 Units as instructed 4 Times a Day,Before Meals and at Bedtime for 60 days.             lisinopril (PRINIVIL, ZESTRIL) 40 MG tablet  Take 40 mg by mouth 2 times a day.             metformin (GLUCOPHAGE) 1000 MG tablet  Take 1,000 mg by mouth 2 times a day, with meals.             metoprolol 75 MG Tab  Take 75 mg by mouth every 8 hours.             vitamin D (CHOLECALCIFEROL) 1000 UNIT Tab  Take 2,000 Units by mouth every day.             warfarin (COUMADIN) 10 MG Tab  Take 10 mg by mouth every day.                   Disposition:  To [facility].    Diet:   Diabetic diet.    Activity:   As tolerated.    Instructions:      The patient was instructed to return to the ER in the event of worsening symptoms. I have counseled the patient on the importance of compliance and the patient has agreed to proceed with all medical recommendations and follow up plan indicated above.   The patient understands that all medications come with benefits and risks. Risks may include permanent injury or death and these risks can be minimized with close reassessment and monitoring.        Primary Care Provider:    Catrachito Sterling D.O.  Discharge summary faxed to primary care provider:  Completed  Copy of discharge summary given to the patient: Deferred      Follow up appointment details :      Sep 24, 2018 11:20 AM PDT  Established Patient with Catrachito Sterling D.O.  [facility] (--) [address] [address]  [phone]         Pending Studies:        None    Time spent on discharge day patient visit,  preparing discharge paperwork and arranging for patient follow up.    Summary of follow up issues:   1) continue physical and occupational therapy for rehabilitation.  2) Continue monitoring of INR and adjustment of warfarin.      Discharge Time (Minutes) :    67  Hospital Course Type: Observation Stay      Condition on Discharge    ______________________________________________________________________    Interval history/exam for day of discharge:    Patient continued ambulating with a walker and showed signs of physical debility.  INR was 2.05 on day of discharge.    Vitals:    08/20/18 1613 08/20/18 1930 08/21/18 0400 08/21/18 0944   BP: 144/77 116/63 137/81 138/72   Pulse: 67 60 (!) 55 (!) 59   Resp: 18 20 18 18   Temp: 36.6 °C (97.8 °F) 36.4 °C (97.6 °F) 36.2 °C (97.2 °F) 36.2 °C (97.2 °F)   SpO2: 94% 96% 95% 95%   Weight:       Height:         Physical Exam   Constitutional: He is oriented to person, place, and time and well-developed, well-nourished, and in no distress. No distress.   HENT:   Head: Normocephalic and atraumatic.   Eyes: Pupils are equal, round, and reactive to light. Conjunctivae and EOM are normal. Right eye exhibits no discharge. Left eye exhibits no discharge. No scleral icterus.   Neck: No JVD present. No tracheal deviation present. No thyromegaly present.   Decreased ROM of cervical spine due to c-spine collar for recent cervical fusion   Cardiovascular: Normal rate and regular rhythm.  Exam reveals no gallop and no friction rub.    No murmur heard.  Pulmonary/Chest: Effort normal and breath sounds normal. No stridor. No respiratory distress. He has no wheezes. He has no rales. He exhibits no tenderness.   Abdominal: Soft. Bowel sounds are normal. He exhibits no distension and no mass. There is no tenderness. There is no rebound and no guarding.   Musculoskeletal: Normal range of motion. He exhibits no edema, tenderness or deformity.   Lymphadenopathy:     He has no cervical adenopathy.    Neurological: He is alert and oriented to person, place, and time. He displays weakness. No cranial nerve deficit. He exhibits normal muscle tone. Coordination normal.   Reflex Scores:       Tricep reflexes are 1+ on the right side and 1+ on the left side.       Bicep reflexes are 1+ on the right side and 1+ on the left side.       Brachioradialis reflexes are 1+ on the right side and 1+ on the left side.       Patellar reflexes are 1+ on the right side and 1+ on the left side.       Achilles reflexes are 1+ on the right side and 1+ on the left side.  Patient orientedx3 but was somewhat confused about if his medications were actually for him  4/5 strength in upper extremities bilaterally  5/5 strength in lower extremities  Requires walker for ambulation        Most Recent Labs:    Lab Results   Component Value Date/Time    WBC 4.3 (L) 08/21/2018 07:52 AM    WBC 5.2 03/25/2011 10:16 AM    RBC 4.65 (L) 08/21/2018 07:52 AM    RBC 3.82 (L) 03/25/2011 10:16 AM    HEMOGLOBIN 13.0 (L) 08/21/2018 07:52 AM    HEMATOCRIT 40.3 (L) 08/21/2018 07:52 AM    MCV 86.7 08/21/2018 07:52 AM    MCV 83 03/25/2011 10:16 AM    MCH 28.0 08/21/2018 07:52 AM    MCH 28.3 03/25/2011 10:16 AM    MCHC 32.3 (L) 08/21/2018 07:52 AM    MPV 11.1 08/21/2018 07:52 AM    NEUTSPOLYS 66.40 08/21/2018 07:52 AM    LYMPHOCYTES 21.60 (L) 08/21/2018 07:52 AM    MONOCYTES 9.40 08/21/2018 07:52 AM    EOSINOPHILS 1.90 08/21/2018 07:52 AM    BASOPHILS 0.50 08/21/2018 07:52 AM    HYPOCHROMIA 1+ 02/05/2014 06:27 AM      Lab Results   Component Value Date/Time    SODIUM 139 08/21/2018 07:52 AM    POTASSIUM 3.6 08/21/2018 07:52 AM    CHLORIDE 104 08/21/2018 07:52 AM    CO2 27 08/21/2018 07:52 AM    GLUCOSE 100 (H) 08/21/2018 07:52 AM    BUN 22 08/21/2018 07:52 AM    CREATININE 0.91 08/21/2018 07:52 AM    CREATININE 1.21 02/17/2011 07:28 AM    BUNCREATRAT 16 02/17/2011 07:28 AM    GLOMRATE >59 02/17/2011 07:28 AM      Lab Results   Component Value Date/Time     ALTSGPT 14 08/21/2018 07:52 AM    ASTSGOT 15 08/21/2018 07:52 AM    ALKPHOSPHAT 55 08/21/2018 07:52 AM    TBILIRUBIN 0.5 08/21/2018 07:52 AM    DBILIRUBIN 0.1 10/28/2013 06:07 AM    LIPASE 14 12/06/2012 12:57 PM    ALBUMIN 3.7 08/21/2018 07:52 AM    GLOBULIN 2.9 08/21/2018 07:52 AM    PREALBUMIN 14.0 (L) 07/19/2018 05:26 AM    INR 2.05 (H) 08/21/2018 02:34 AM     Lab Results   Component Value Date/Time    PROTHROMBTM 22.8 (H) 08/21/2018 02:34 AM    INR 2.05 (H) 08/21/2018 02:34 AM

## 2018-08-21 NOTE — DISCHARGE PLANNING
Agency/Facility Name: [facility]  Spoke To: Vivien  Outcome: Referral declined. Non contracted insurance provider.  RN JUAN Tony notified.

## 2018-08-24 ENCOUNTER — TELEPHONE (OUTPATIENT)
Dept: SLEEP MEDICINE | Facility: MEDICAL CENTER | Age: 68
End: 2018-08-24

## 2018-08-24 DIAGNOSIS — G47.33 OSA (OBSTRUCTIVE SLEEP APNEA): Primary | ICD-10-CM

## 2018-08-24 NOTE — TELEPHONE ENCOUNTER
Pt called [facility] to see if it was possible for him to get a new machine. Per [facility] pt got his machine back in 2015.       LOV: 11/7/17 - [place]   NOV: NONE pt does not have an apt schedule.      Please advise.

## 2018-08-24 NOTE — TELEPHONE ENCOUNTER
Pt called asking if he can get an order for a new machine.  Pt machine is broken an is in need of a new one as soon as possible. Pt called [facility]

## 2018-08-27 NOTE — TELEPHONE ENCOUNTER
Called pt to let him know that the order for his new Machine has been signed and faxed to [facility].

## 2018-08-28 ENCOUNTER — PATIENT OUTREACH (OUTPATIENT)
Dept: HEALTH INFORMATION MANAGEMENT | Facility: OTHER | Age: 68
End: 2018-08-28

## 2018-08-28 NOTE — PROGRESS NOTES
Outcome: spoke to pt and he in in rehab at this time would like to discuss and schedule appointment once he is out of the hospital. Requested call back. We did go over his providers    Please transfer to Patient Outreach Team at [phone] when patient returns call.    WebIZ Checked & Epic Updated:  yes  [id number]   Influenza w/preserv.   Zoster Recombinant     [employer] Verified: yes  Effective Date: 8/1/2018   Attempt # 1

## 2018-08-29 ENCOUNTER — TELEPHONE (OUTPATIENT)
Dept: CARDIOLOGY | Facility: MEDICAL CENTER | Age: 68
End: 2018-08-29

## 2018-08-29 NOTE — TELEPHONE ENCOUNTER
Patient currently at [facility] - patient requires cath.  TAWANNA spoke with MD at [facility], who in turn informed Bernadine GAINES ([phone]).  Bernadine Vazquez to assist with coordination of scheduling cath.      TAWANNA is requesting cath on Sept 6th.       Discussed with Brenda - information provided

## 2018-08-29 NOTE — TELEPHONE ENCOUNTER
2nd attempt to contact [facility] where patient currently resides for Dr. Templeton to contact [facility] to give SW cell # out.

## 2018-08-30 ENCOUNTER — TELEPHONE (OUTPATIENT)
Dept: CARDIOLOGY | Facility: MEDICAL CENTER | Age: 68
End: 2018-08-30

## 2018-08-30 NOTE — TELEPHONE ENCOUNTER
Per Dr. Li's request and ok'd 5th case for 9-6-18. Patient is scheduled on 9-6-18 for a C w/PCI with Dr. Li. Patient was told to hold coumadin for 5 days prior and Mark Leon notified on 8-30-18, pt was also told to cut insulin in half night before and hold am day of procedure and to also hold metformin day of procedure and 48hrs after. Patient was told to check in at 11:30 for a 1:30 procedure. Spoke to Bernadine Vazquez at [facility] where patient is residing at [phone] to give patient instructions and faxed instruction sheet to her. Pre admit to call patient at [facility]. Dr. Li will do updated H&P on admit per Mallika taylor Nurse.

## 2018-09-04 NOTE — PROGRESS NOTES
Outcome: Left Message    Please transfer to Patient Outreach Team at [phone] when patient returns call.    Attempt # 2

## 2018-09-05 NOTE — ADDENDUM NOTE
Encounter addended by: Leydi Davenport R.N. on: 9/5/2018 10:33 AM<BR>    Actions taken: Utilization Review saved, Flowsheet accepted

## 2018-09-06 ENCOUNTER — APPOINTMENT (OUTPATIENT)
Dept: RADIOLOGY | Facility: MEDICAL CENTER | Age: 68
End: 2018-09-06
Attending: INTERNAL MEDICINE
Payer: MEDICARE

## 2018-09-06 ENCOUNTER — HOSPITAL ENCOUNTER (OUTPATIENT)
Facility: MEDICAL CENTER | Age: 68
End: 2018-09-07
Attending: INTERNAL MEDICINE | Admitting: INTERNAL MEDICINE
Payer: MEDICARE

## 2018-09-06 LAB
ALBUMIN SERPL BCP-MCNC: 3.8 G/DL (ref 3.2–4.9)
ALBUMIN/GLOB SERPL: 1.3 G/DL
ALP SERPL-CCNC: 57 U/L (ref 30–99)
ALT SERPL-CCNC: 11 U/L (ref 2–50)
ANION GAP SERPL CALC-SCNC: 8 MMOL/L (ref 0–11.9)
APTT PPP: 30 SEC (ref 24.7–36)
AST SERPL-CCNC: 15 U/L (ref 12–45)
BILIRUB SERPL-MCNC: 0.7 MG/DL (ref 0.1–1.5)
BUN SERPL-MCNC: 20 MG/DL (ref 8–22)
CALCIUM SERPL-MCNC: 9.1 MG/DL (ref 8.5–10.5)
CHLORIDE SERPL-SCNC: 104 MMOL/L (ref 96–112)
CO2 SERPL-SCNC: 25 MMOL/L (ref 20–33)
CREAT SERPL-MCNC: 0.81 MG/DL (ref 0.5–1.4)
EKG IMPRESSION: NORMAL
EKG IMPRESSION: NORMAL
ERYTHROCYTE [DISTWIDTH] IN BLOOD BY AUTOMATED COUNT: 44.8 FL (ref 35.9–50)
GLOBULIN SER CALC-MCNC: 3 G/DL (ref 1.9–3.5)
GLUCOSE BLD-MCNC: 130 MG/DL (ref 65–99)
GLUCOSE BLD-MCNC: 199 MG/DL (ref 65–99)
GLUCOSE BLD-MCNC: 210 MG/DL (ref 65–99)
GLUCOSE SERPL-MCNC: 126 MG/DL (ref 65–99)
HCT VFR BLD AUTO: 42.9 % (ref 42–52)
HGB BLD-MCNC: 13.8 G/DL (ref 14–18)
INR PPP: 1.03 (ref 0.87–1.13)
MCH RBC QN AUTO: 27.5 PG (ref 27–33)
MCHC RBC AUTO-ENTMCNC: 32.2 G/DL (ref 33.7–35.3)
MCV RBC AUTO: 85.5 FL (ref 81.4–97.8)
PLATELET # BLD AUTO: 175 K/UL (ref 164–446)
PMV BLD AUTO: 11.2 FL (ref 9–12.9)
POTASSIUM SERPL-SCNC: 3.9 MMOL/L (ref 3.6–5.5)
PROT SERPL-MCNC: 6.8 G/DL (ref 6–8.2)
PROTHROMBIN TIME: 13.2 SEC (ref 12–14.6)
RBC # BLD AUTO: 5.02 M/UL (ref 4.7–6.1)
SODIUM SERPL-SCNC: 137 MMOL/L (ref 135–145)
WBC # BLD AUTO: 4.9 K/UL (ref 4.8–10.8)

## 2018-09-06 PROCEDURE — A9270 NON-COVERED ITEM OR SERVICE: HCPCS | Performed by: NURSE PRACTITIONER

## 2018-09-06 PROCEDURE — G0378 HOSPITAL OBSERVATION PER HR: HCPCS

## 2018-09-06 PROCEDURE — 71046 X-RAY EXAM CHEST 2 VIEWS: CPT

## 2018-09-06 PROCEDURE — C1887 CATHETER, GUIDING: HCPCS

## 2018-09-06 PROCEDURE — C1769 GUIDE WIRE: HCPCS

## 2018-09-06 PROCEDURE — 85730 THROMBOPLASTIN TIME PARTIAL: CPT

## 2018-09-06 PROCEDURE — 700102 HCHG RX REV CODE 250 W/ 637 OVERRIDE(OP): Performed by: NURSE PRACTITIONER

## 2018-09-06 PROCEDURE — C1894 INTRO/SHEATH, NON-LASER: HCPCS

## 2018-09-06 PROCEDURE — 700101 HCHG RX REV CODE 250

## 2018-09-06 PROCEDURE — 80053 COMPREHEN METABOLIC PANEL: CPT

## 2018-09-06 PROCEDURE — 307093 HCHG TR BAND RADIAL

## 2018-09-06 PROCEDURE — C9600 PERC DRUG-EL COR STENT SING: HCPCS | Mod: RC

## 2018-09-06 PROCEDURE — 160002 HCHG RECOVERY MINUTES (STAT)

## 2018-09-06 PROCEDURE — 700117 HCHG RX CONTRAST REV CODE 255: Performed by: INTERNAL MEDICINE

## 2018-09-06 PROCEDURE — 99152 MOD SED SAME PHYS/QHP 5/>YRS: CPT

## 2018-09-06 PROCEDURE — 700111 HCHG RX REV CODE 636 W/ 250 OVERRIDE (IP)

## 2018-09-06 PROCEDURE — 93010 ELECTROCARDIOGRAM REPORT: CPT | Mod: 76 | Performed by: INTERNAL MEDICINE

## 2018-09-06 PROCEDURE — 700102 HCHG RX REV CODE 250 W/ 637 OVERRIDE(OP)

## 2018-09-06 PROCEDURE — 85610 PROTHROMBIN TIME: CPT

## 2018-09-06 PROCEDURE — C9600 PERC DRUG-EL COR STENT SING: HCPCS | Mod: LD

## 2018-09-06 PROCEDURE — 82962 GLUCOSE BLOOD TEST: CPT | Mod: 91

## 2018-09-06 PROCEDURE — 93005 ELECTROCARDIOGRAM TRACING: CPT | Performed by: INTERNAL MEDICINE

## 2018-09-06 PROCEDURE — 304952 HCHG R 2 PADS

## 2018-09-06 PROCEDURE — 700105 HCHG RX REV CODE 258: Performed by: INTERNAL MEDICINE

## 2018-09-06 PROCEDURE — 93454 CORONARY ARTERY ANGIO S&I: CPT | Mod: XU

## 2018-09-06 PROCEDURE — C1725 CATH, TRANSLUMIN NON-LASER: HCPCS

## 2018-09-06 PROCEDURE — C1874 STENT, COATED/COV W/DEL SYS: HCPCS

## 2018-09-06 PROCEDURE — 99153 MOD SED SAME PHYS/QHP EA: CPT

## 2018-09-06 PROCEDURE — A9270 NON-COVERED ITEM OR SERVICE: HCPCS

## 2018-09-06 PROCEDURE — 85027 COMPLETE CBC AUTOMATED: CPT

## 2018-09-06 PROCEDURE — 360979 HCHG DIAGNOSTIC CATH

## 2018-09-06 PROCEDURE — 96372 THER/PROPH/DIAG INJ SC/IM: CPT | Mod: XU

## 2018-09-06 RX ORDER — SODIUM CHLORIDE 9 MG/ML
INJECTION, SOLUTION INTRAVENOUS CONTINUOUS
Status: DISCONTINUED | OUTPATIENT
Start: 2018-09-06 | End: 2018-09-06

## 2018-09-06 RX ORDER — GABAPENTIN 300 MG/1
300 CAPSULE ORAL 2 TIMES DAILY
Status: DISCONTINUED | OUTPATIENT
Start: 2018-09-06 | End: 2018-09-06

## 2018-09-06 RX ORDER — MIDAZOLAM HYDROCHLORIDE 1 MG/ML
INJECTION INTRAMUSCULAR; INTRAVENOUS
Status: COMPLETED
Start: 2018-09-06 | End: 2018-09-06

## 2018-09-06 RX ORDER — HYDRALAZINE HYDROCHLORIDE 50 MG/1
100 TABLET, FILM COATED ORAL 3 TIMES DAILY
Status: DISCONTINUED | OUTPATIENT
Start: 2018-09-06 | End: 2018-09-07 | Stop reason: HOSPADM

## 2018-09-06 RX ORDER — BIVALIRUDIN 250 MG/5ML
INJECTION, POWDER, LYOPHILIZED, FOR SOLUTION INTRAVENOUS
Status: COMPLETED
Start: 2018-09-06 | End: 2018-09-06

## 2018-09-06 RX ORDER — DIPHENHYDRAMINE HYDROCHLORIDE 50 MG/ML
INJECTION INTRAMUSCULAR; INTRAVENOUS
Status: COMPLETED
Start: 2018-09-06 | End: 2018-09-06

## 2018-09-06 RX ORDER — ASPIRIN 325 MG
325 TABLET ORAL DAILY
Status: DISCONTINUED | OUTPATIENT
Start: 2018-09-07 | End: 2018-09-07 | Stop reason: HOSPADM

## 2018-09-06 RX ORDER — VERAPAMIL HYDROCHLORIDE 2.5 MG/ML
INJECTION, SOLUTION INTRAVENOUS
Status: COMPLETED
Start: 2018-09-06 | End: 2018-09-06

## 2018-09-06 RX ORDER — FUROSEMIDE 40 MG/1
40 TABLET ORAL DAILY
COMMUNITY
End: 2018-10-09

## 2018-09-06 RX ORDER — CEPHALEXIN 500 MG/1
500 CAPSULE ORAL 2 TIMES DAILY
COMMUNITY
Start: 2018-08-31 | End: 2018-10-23 | Stop reason: ALTCHOICE

## 2018-09-06 RX ORDER — LIDOCAINE HYDROCHLORIDE 10 MG/ML
INJECTION, SOLUTION INFILTRATION; PERINEURAL
Status: COMPLETED
Start: 2018-09-06 | End: 2018-09-06

## 2018-09-06 RX ORDER — SODIUM CHLORIDE 9 MG/ML
INJECTION, SOLUTION INTRAVENOUS
Status: DISCONTINUED | OUTPATIENT
Start: 2018-09-06 | End: 2018-09-06

## 2018-09-06 RX ORDER — ASPIRIN 81 MG/1
TABLET, CHEWABLE ORAL
Status: COMPLETED
Start: 2018-09-06 | End: 2018-09-06

## 2018-09-06 RX ORDER — LISINOPRIL 20 MG/1
40 TABLET ORAL DAILY
Status: DISCONTINUED | OUTPATIENT
Start: 2018-09-07 | End: 2018-09-07 | Stop reason: HOSPADM

## 2018-09-06 RX ORDER — LISINOPRIL 40 MG/1
40 TABLET ORAL 2 TIMES DAILY
Status: DISCONTINUED | OUTPATIENT
Start: 2018-09-06 | End: 2018-09-06

## 2018-09-06 RX ORDER — WARFARIN SODIUM 10 MG/1
10 TABLET ORAL
Status: DISCONTINUED | OUTPATIENT
Start: 2018-09-06 | End: 2018-09-07 | Stop reason: HOSPADM

## 2018-09-06 RX ORDER — CLOPIDOGREL 300 MG/1
600 TABLET, FILM COATED ORAL ONCE
Status: DISCONTINUED | OUTPATIENT
Start: 2018-09-06 | End: 2018-09-06

## 2018-09-06 RX ORDER — INSULIN GLARGINE 100 [IU]/ML
30 INJECTION, SOLUTION SUBCUTANEOUS NIGHTLY
COMMUNITY
End: 2019-04-16 | Stop reason: SDUPTHER

## 2018-09-06 RX ORDER — CLOPIDOGREL 300 MG/1
TABLET, FILM COATED ORAL
Status: COMPLETED
Start: 2018-09-06 | End: 2018-09-06

## 2018-09-06 RX ORDER — GABAPENTIN 300 MG/1
300 CAPSULE ORAL 2 TIMES DAILY
Status: DISCONTINUED | OUTPATIENT
Start: 2018-09-07 | End: 2018-09-07 | Stop reason: HOSPADM

## 2018-09-06 RX ORDER — HEPARIN SODIUM,PORCINE 1000/ML
VIAL (ML) INJECTION
Status: COMPLETED
Start: 2018-09-06 | End: 2018-09-06

## 2018-09-06 RX ORDER — INSULIN GLARGINE 100 [IU]/ML
30 INJECTION, SOLUTION SUBCUTANEOUS EVERY EVENING
Status: DISCONTINUED | OUTPATIENT
Start: 2018-09-06 | End: 2018-09-07 | Stop reason: HOSPADM

## 2018-09-06 RX ORDER — GABAPENTIN 300 MG/1
600 CAPSULE ORAL EVERY EVENING
Status: DISCONTINUED | OUTPATIENT
Start: 2018-09-06 | End: 2018-09-07 | Stop reason: HOSPADM

## 2018-09-06 RX ORDER — POTASSIUM CHLORIDE 1.5 G/1.58G
20 POWDER, FOR SOLUTION ORAL DAILY
COMMUNITY
End: 2018-11-05 | Stop reason: SDUPTHER

## 2018-09-06 RX ORDER — AMLODIPINE BESYLATE 10 MG/1
10 TABLET ORAL DAILY
Status: DISCONTINUED | OUTPATIENT
Start: 2018-09-07 | End: 2018-09-07 | Stop reason: HOSPADM

## 2018-09-06 RX ORDER — CEPHALEXIN 500 MG/1
500 CAPSULE ORAL 2 TIMES DAILY
Status: DISCONTINUED | OUTPATIENT
Start: 2018-09-07 | End: 2018-09-07 | Stop reason: HOSPADM

## 2018-09-06 RX ORDER — CLINDAMYCIN HYDROCHLORIDE 150 MG/1
300 CAPSULE ORAL 2 TIMES DAILY
Status: DISCONTINUED | OUTPATIENT
Start: 2018-09-06 | End: 2018-09-07 | Stop reason: HOSPADM

## 2018-09-06 RX ORDER — DEXTROSE MONOHYDRATE 25 G/50ML
25 INJECTION, SOLUTION INTRAVENOUS
Status: DISCONTINUED | OUTPATIENT
Start: 2018-09-06 | End: 2018-09-07 | Stop reason: HOSPADM

## 2018-09-06 RX ORDER — CLOPIDOGREL BISULFATE 75 MG/1
75 TABLET ORAL DAILY
Status: DISCONTINUED | OUTPATIENT
Start: 2018-09-07 | End: 2018-09-07 | Stop reason: HOSPADM

## 2018-09-06 RX ORDER — SODIUM CHLORIDE 9 MG/ML
INJECTION, SOLUTION INTRAVENOUS CONTINUOUS
Status: DISPENSED | OUTPATIENT
Start: 2018-09-06 | End: 2018-09-07

## 2018-09-06 RX ADMIN — DIPHENHYDRAMINE HYDROCHLORIDE 50 MG: 50 INJECTION, SOLUTION INTRAMUSCULAR; INTRAVENOUS at 14:52

## 2018-09-06 RX ADMIN — BIVALIRUDIN 250 MG: 250 INJECTION, POWDER, LYOPHILIZED, FOR SOLUTION INTRAVENOUS at 15:11

## 2018-09-06 RX ADMIN — HYDRALAZINE HYDROCHLORIDE 100 MG: 50 TABLET, FILM COATED ORAL at 18:28

## 2018-09-06 RX ADMIN — SODIUM CHLORIDE: 9 INJECTION, SOLUTION INTRAVENOUS at 13:00

## 2018-09-06 RX ADMIN — SODIUM CHLORIDE: 9 INJECTION, SOLUTION INTRAVENOUS at 18:37

## 2018-09-06 RX ADMIN — WARFARIN SODIUM 10 MG: 10 TABLET ORAL at 20:50

## 2018-09-06 RX ADMIN — METOPROLOL TARTRATE 75 MG: 25 TABLET, FILM COATED ORAL at 18:29

## 2018-09-06 RX ADMIN — INSULIN GLARGINE 30 UNITS: 100 INJECTION, SOLUTION SUBCUTANEOUS at 20:55

## 2018-09-06 RX ADMIN — NITROGLYCERIN 10 ML: 20 INJECTION INTRAVENOUS at 14:16

## 2018-09-06 RX ADMIN — GABAPENTIN 600 MG: 300 CAPSULE ORAL at 18:28

## 2018-09-06 RX ADMIN — ASPIRIN 324 MG: 81 TABLET, CHEWABLE ORAL at 15:15

## 2018-09-06 RX ADMIN — MIDAZOLAM HYDROCHLORIDE 2 MG: 1 INJECTION, SOLUTION INTRAMUSCULAR; INTRAVENOUS at 14:52

## 2018-09-06 RX ADMIN — INSULIN HUMAN 3 UNITS: 100 INJECTION, SOLUTION PARENTERAL at 20:54

## 2018-09-06 RX ADMIN — HEPARIN SODIUM: 200 INJECTION, SOLUTION INTRAVENOUS at 13:45

## 2018-09-06 RX ADMIN — FENTANYL CITRATE 100 MCG: 50 INJECTION, SOLUTION INTRAMUSCULAR; INTRAVENOUS at 15:15

## 2018-09-06 RX ADMIN — VERAPAMIL HYDROCHLORIDE 5 MG: 2.5 INJECTION, SOLUTION INTRAVENOUS at 14:17

## 2018-09-06 RX ADMIN — HEPARIN SODIUM: 1000 INJECTION, SOLUTION INTRAVENOUS; SUBCUTANEOUS at 14:16

## 2018-09-06 RX ADMIN — BIVALIRUDIN 250 MG: 250 INJECTION, POWDER, LYOPHILIZED, FOR SOLUTION INTRAVENOUS at 14:16

## 2018-09-06 RX ADMIN — IOHEXOL 247 ML: 350 INJECTION, SOLUTION INTRAVENOUS at 14:45

## 2018-09-06 RX ADMIN — MIDAZOLAM 2 MG: 1 INJECTION INTRAMUSCULAR; INTRAVENOUS at 14:51

## 2018-09-06 RX ADMIN — CLINDAMYCIN HYDROCHLORIDE 300 MG: 150 CAPSULE ORAL at 20:49

## 2018-09-06 RX ADMIN — CLOPIDOGREL BISULFATE 600 MG: 300 TABLET, FILM COATED ORAL at 15:14

## 2018-09-06 RX ADMIN — FENTANYL CITRATE 100 MCG: 50 INJECTION, SOLUTION INTRAMUSCULAR; INTRAVENOUS at 14:52

## 2018-09-06 RX ADMIN — LIDOCAINE HYDROCHLORIDE: 10 INJECTION, SOLUTION INFILTRATION; PERINEURAL at 14:16

## 2018-09-06 ASSESSMENT — COGNITIVE AND FUNCTIONAL STATUS - GENERAL
DRESSING REGULAR UPPER BODY CLOTHING: A LITTLE
DRESSING REGULAR LOWER BODY CLOTHING: A LITTLE
SUGGESTED CMS G CODE MODIFIER MOBILITY: CM
WALKING IN HOSPITAL ROOM: TOTAL
DAILY ACTIVITIY SCORE: 18
MOBILITY SCORE: 9
TURNING FROM BACK TO SIDE WHILE IN FLAT BAD: A LOT
STANDING UP FROM CHAIR USING ARMS: TOTAL
EATING MEALS: A LITTLE
TOILETING: A LITTLE
PERSONAL GROOMING: A LITTLE
MOVING FROM LYING ON BACK TO SITTING ON SIDE OF FLAT BED: A LOT
HELP NEEDED FOR BATHING: A LITTLE
MOVING TO AND FROM BED TO CHAIR: A LOT
CLIMB 3 TO 5 STEPS WITH RAILING: TOTAL
SUGGESTED CMS G CODE MODIFIER DAILY ACTIVITY: CK

## 2018-09-06 ASSESSMENT — PAIN SCALES - GENERAL
PAINLEVEL_OUTOF10: 0

## 2018-09-06 ASSESSMENT — LIFESTYLE VARIABLES: EVER_SMOKED: YES

## 2018-09-06 NOTE — PROCEDURES
DATE OF SERVICE:  09/06/2018    PROCEDURE:  Cardiac catheterization and percutaneous coronary intervention.  A.  Selective coronary angiography.  B.  Coronary stent implantation, proximal left anterior descending artery with   a 4.0x28 mm drug-eluting Synergy stent.  C.  Coronary stent implantation of the distal right coronary artery with a   4.0x12 mm drug-eluting Synergy stent.  D.  Right radial artery approach.    PREPROCEDURE DIAGNOSES:    1.  Coronary artery disease, severe 2-vessel, involving   the proximal left anterior descending artery and distal right coronary artery.  2.  Left ventricular ejection fraction 55%.    PHYSICIAN:  Ricco Li MD    COMPLICATIONS:  None.    MEDICATIONS:  1.  Versed 4 mg IV.  2.  Fentanyl 200 mcg IV.  3.  Lidocaine 2% subcutaneous.  4.  Heparin 3000 units IA.  5.  Nitroglycerin 100 mcg IA.  6.  Verapamil 2.5 mg IA.  7.  Angiomax IV bolus and infusion.  8.  Plavix 600 mg p.o.    CONSCIOUS SEDATION SUPERVISION:  I monitored and supervised the administration   of conscious sedation from 2:14 p.m. to 3:10 p.m.    INDICATIONS:  The patient is a 68-year-old male with coronary artery disease,   history of diabetes mellitus, hyperlipidemia, hypertension, obstructive sleep   apnea on CPAP, paroxysmal atrial fibrillation, chronic anticoagulation and previous   cerebrovascular accident in 2007, who had been seen in the [facility]   by Dr. Campbell Wei on 06/01/2018 for an abnormal preoperative myocardial perfusion   scan prior to undergoing a cervical and lumbar surgery for rapidly progressive   lower extremity myelopathy.  A cardiac catheterization on 06/13/2018 showed   significant 2-vessel coronary artery disease involving the proximal left   anterior descending artery with 70% stenosis and distal right coronary artery   with a 90% stenosis with a left ventricular ejection fraction 55%.  Because of   the rapidly progressing neurological impairment, the patient  was treated   medically for his CAD and successfully underwent a cervical and lumbar laminectomy   with decompression on 07/13/2018 and was subsequently transferred to physical  rehabilitation where he currently resides.  The patient was sufficiently past   his surgical recovery in order to allow him to be placed on antiplatelet therapy without   increased risk of risk related to his surgical wound and therefore the patient was   electively being admitted to undergo coronary intervention of the left anterior   descending artery and distal right coronary.    DESCRIPTION OF PROCEDURE:  After an informed consent was obtained, the patient   was brought to the cardiac catheterization laboratory where he was prepped,   draped, and anesthetized in usual manner.    After having established adequate collateral circulation to the right hand with a pressure   and oximetry-guided Domo's test, the volar surface of the right wrist was   prepped, draped, and anesthetized in the usual manner.    Using ultrasound guidance and a modified Seldinger technique, a 6-German x 10 cm   introducer sheath was inserted in the right radial artery.  Heparin,   verapamil, and nitroglycerin were given via the side port.  Angiomax was   started.    Next, a 6-German right Ikari 2.0 guiding catheter was advanced to the ostium   of the right coronary artery.  Next, a 0.014 Whisper wire was advanced to the distal right coronary artery.  Next, a 4.0x12 mm balloon was placed across the distal stenosis and inflated   up to 11 atmospheres.  The balloon was removed.  Next, a 4.0x12 mm Synergy drug-eluting stent was advanced; however, due to   the significant proximal and mid vessel tortuosity and ectasia, the stent was   surprisingly extremely difficult to advance requiring repeated manipulation of the   guidewire, the guiding catheter and stent catheter device.  Ultimately, a 0.014   balance middleweight guidewire was advanced alongside the Whisper wire  and   stent device allowing with further efforts, the stent to be advanced across   the distal stenosis.  The paula wire was retracted.  The stent was deployed at   16 atmospheres with 2 post-deployment inflations at 20 atmospheres were   effective vessel diameter 4.5 mm.  The balloon wire removed.  However, the   guide dislodged and final angiogram could not be obtained.    Next, using an exchange length J-tipped wire, the Ikari catheter was exchanged   for 6-Mauritanian extra backup 3.5 guiding catheter which was advanced to the ostium   of the left coronary artery.    Next, a 0.014 balanced middleweight guidewire was advanced into the distal left   anterior descending artery.  Next, a 3.0x15 mm balloon catheter was inserted across the proximal stenosis   and inflated up to 13 atmospheres.  Next, 3.5x20 mm noncompliant balloon catheter   was inserted across the proximal stenosis and inflated up to 16 atmospheres.    Next, 4.0x28 mm Synergy drug-eluting stent was placed across the proximal residual   stenosis and deployed at 11 atmospheres with a post-deployment inflation of   12 atmospheres.  The balloon was removed.  Next, a 4.5x12 mm noncompliant balloon catheter was inserted and 2 inflations   of 13 atmospheres were performed, predominantly in the proximal and mid   portion of the stent.  The balloon wire removed and final angiograms were   performed.    Next, a 6-Mauritanian JR 4.0 diagnostic catheter was inserted in the ostium of the   right coronary artery and a final right coronary angiogram was obtained.    At the end the procedure, catheters were removed.  Hemostasis was achieved   with a wrist band device.  The patient tolerated the procedure well.    FINDINGS:  HEMODYNAMICS:  Central aortic pressure systolic 152, diastolic 73, mean of 11   mmHg.    CORONARY ARTERIOGRAPHY:  1.  Right coronary artery:  The right coronary artery is a large caliber   vessel with significant proximal tortuosity and mid vessel ectasia  and gives rise to a   large posterior descending artery and large posterolateral branch.  The distal   right coronary artery has a 95% eccentric stenosis, which is with MARYANNE-3   antegrade flow.  2.  Left main artery:  The left main artery is a very large caliber vessel,   angiographically patent with a 10% distal tapering and bifurcates into a large left  Anterior descending artery and circumflex artery.  3.  Left anterior descending artery:  The left anterior descending artery   gives rise to 2 major diagonal branches.  The proximal left anterior descending   artery has an estimated 70% stenosis.  4.  Circumflex artery:  The circumflex had limited view and is a large   caliber vessel, angiographically patent.    POST-STENT IMPLANTATION of the distal right coronary artery with a 4.0x12 mm   Synergy drug-eluting stent demonstrates good stent expansion, 0% residual   stenosis, no evidence of dissection or thrombus, and MARYANNE 3 antegrade flow.    The stent was postdilated to 4.5 mm.    POST-STENT IMPLANTATION of the proximal left anterior descending artery with a   4.0x28 mm drug-eluting Synergy stent  demonstrates good stent expansion, 0%   residual stenosis, no evidence of dissection or thrombus, and MARYANNE 3 antegrade   flow.    PLAN:  Maximize optimal medical therapy for the patient's coronary artery   disease post coronary intervention in addition to anticoagulation for   paroxysmal atrial fibrillation with known cerebrovascular accident.       ____________________________________     MD VIOLETA CONTI / TARAH    DD:  09/06/2018 15:45:57  DT:  09/06/2018 16:34:05    D#:  [id number]  Job#:  [id number]

## 2018-09-06 NOTE — H&P
Joel Ma is a 68 y.o. male who is being admitted to [facility] transferred from rehabilitation Center to undergo elective two-vessel coronary intervention of the left anterior descending artery and right coronary artery.    The patient was originally seen on 6/1/2018 in [facility] cardiology clinic by Dr. Campbell Wei for an abnormal preoperative myocardial perfusion scan prior to cervical and lumbar surgery for rapidly progressive myelopathy.  Cardiac catheterization on 6/13/2018 showed two-vessel coronary disease involving the proximal left anterior descending artery 70% stenosis and distal right coronary artery 90% stenosis.  The patient underwent successful cervical and and lumbar laminectomies and has been recovering in the rehabilitation program.  He is now sufficiently beyond his surgery to be able to take anti-platelet therapy.  The patient has fatigue but no angina pectoris.    Past medical history  The patient has a 15 year history of diabetes with multiple complications.  He has complex peripheral neuropathy, diabetic amputations, paroxysmal atrial fibrillation, chronic Coumadin anticoagulation, prior stroke, hypertension and hyperlipidemia.  Patient has cervical spine disease that has affected his ability to ambulate.  Uses a walker to get around and a wheelchair to assist with mobility.  He has no cardiac symptoms and is unaware of previous myocardial infarction.  There is no angina chest pain or pressure.  He has no symptoms of congestive heart failure including no orthopnea or PND.  He has chronic bilateral edema and lower extremity discoloration due to venous insufficiency.     His recent nuclear perfusion study reveals low normal ejection fraction estimated at 52% with both fixed and reversible defects in the inferior and apical segments.  His EKG from 2016 and 2018 are similar.  Echocardiogram in 2015 revealed an ejection fraction of 60%.  No wall motion abnormality was noted.   There was dilatation of the ascending aorta at 4.7 cm.  He has normal renal function and fairly poor diabetic control.  He is on chronic Coumadin anticoagulation.     Past Medical History        Past Medical History:   Diagnosis Date   • Anesthesia       low O2 sat   • arthritis 6/17/2011     thumb, fingers   • Dental disorder       full dentures   • Diabetes       insulin, Dr Oconnor, his APN   • High cholesterol     • Hypertension     • MRSA (methicillin resistant Staphylococcus aureus)       history of, nothing current 2016, on clindamycin for rest of life per patient   • JANNIE treated with BiPAP 4/18/2016   • Pain 05-03-13     shoulders, hip, right knee, 7/10   • Pneumonia 2002   • Sleep apnea       CPAP   • Stroke (HCC)       2/1/2007, 4/1/2007, right sided weakness; balance disturbance   • Thyroid mass 7/30/2009     benign lump   • Ventricular ectopy 6/17/2011         Past Surgical History         Past Surgical History:   Procedure Laterality Date   • CERVICAL DISK AND FUSION ANTERIOR   8/31/2016     Procedure: CERVICAL DISK AND FUSION ANTERIOR C3-7 ;  Surgeon: Alhaji Jaffe M.D.;  Location: [facility];  Service:    • CATARACT PHACO WITH IOL   5/8/2013     Performed by Mame Rehman M.D. at SURGERY SAME DAY [facility]   • KNEE REVISION TOTAL   11/13/2012     Performed by Gordon Cota M.D. at [facility]   • IRRIGATION & DEBRIDEMENT ORTHO   11/13/2012     Performed by Gordon Cota M.D. at [facility]   • IRRIGATION & DEBRIDEMENT ORTHO   11/2/2012     Performed by Gordon Cota M.D. at [facility]   • INCISION AND DRAINAGE ORTHOPEDIC Right 10/29/2012     Procedure: Right Total Knee I and D and Liner Exchange, with drain;  Surgeon: Gordon Cota M.D.;  Location: [facility];  Service:    • IRRIGATION & DEBRIDEMENT ORTHO Left 10/29/2012     Procedure: left shoulder, with drain;  Surgeon: Gordon Cota M.D.;  Location[facility];  Service:    • KNEE REVISION TOTAL   3/13/2012     Performed by KAMALJIT SHI at SURGERY [facility] ORS   • IRRIGATION & DEBRIDEMENT ORTHO   3/13/2012     Performed by KAMALJIT SHI at SURGERY [facility] ORS   • TENDON REPAIR   3/13/2012     Performed by KAMALJIT SHI at SURGERY [facility] ORS   • KNEE ARTHROPLASTY TOTAL   1/11/2012     Right; Performed by KAMALJIT SHI at SURGERY [facility] ORS   • TOE AMPUTATION   7/22/2011     Performed by EVELYN JANE at SURGERY [facility] ORS   • ELBOW ARTHROTOMY   2008     right   • LUMBAR FUSION POSTERIOR   1979   • FOOT SURGERY         partial amputation great toe   • FOOT SURGERY         toe surgery left foot   • PB KNEE SCOPE,SINGLE MENISECTOMY         right knee         Family History          Family History   Problem Relation Age of Onset   • Diabetes Mother     • Cancer Father         lung cancer   • Heart Disease Father         triple bypass   • Diabetes       • Hypertension       • Cancer             Social History   Social History            Social History   • Marital status: Single       Spouse name: N/A   • Number of children: N/A   • Years of education: N/A          Occupational History   • Not on file.            Social History Main Topics   • Smoking status: Former Smoker       Packs/day: 4.00       Years: 0.50       Types: Cigarettes       Quit date: 1/1/1970   • Smokeless tobacco: Never Used   • Alcohol use No   • Drug use: No   • Sexual activity: Yes           Other Topics Concern   • Not on file          Social History Narrative     ** Merged History Encounter **                     Allergies   Allergen Reactions   • Demerol         Makes me stop breathing.  Doesn't like because it makes him high   • Statins [Hmg-Coa-R Inhibitors] Unspecified       Per pt report. Unknown reaction.      Encounter Medications          Outpatient Encounter Prescriptions as of 6/1/2018   Medication Sig Dispense Refill   • clindamycin (CLEOCIN) 300 MG Cap  clindamycin HCl 300 mg capsule       • LUIS ENRIQUE CONTOUR NEXT TEST strip         • insulin lispro, Human, (HUMALOG KWIKPEN) 100 UNIT/ML Solution Pen-injector injection Humalog KwikPen (U-100) Insulin 100 unit/mL subcutaneous       • Misc. Devices Misc Foldable front wheel walker with seat.   Preferred home care. Fax: [phone] 1 Each 1   • warfarin (COUMADIN) 5 MG Tab Take two tablets daily as directed by [facility] 180 Tab 2   • Misc. Devices Misc Compression CHELI hose. 20-30 mmhg. knee-high 1 Each 0   • cloNIDine (CATAPRES) 0.3 MG/24HR PATCH WEEKLY APPLY 1 PATCH TO SKIN AS DIRECTED EVERY WEDNESDAY. 4 Patch 2   • metFORMIN (GLUCOPHAGE) 500 MG Tab Take 2 Tabs by mouth 2 times a day, with meals. 360 Tab 3   • insulin glargine (LANTUS SOLOSTAR) 100 UNIT/ML Solution Pen-injector injection INJECT 30 UNITS SUBCUTANEOUSLY AS INSTRUCTED EVERY EVENING 15 mL 0   • Insulin Pen Needle (ADVOCATE INSULIN PEN NEEDLES) 31G X 5 MM Misc Use tid and prn. 100 Each 3   • hydrochlorothiazide (HYDRODIURIL) 25 MG Tab Take 1 Tab by mouth every day. 90 Tab 3   • lisinopril (PRINIVIL, ZESTRIL) 40 MG tablet Take 1 Tab by mouth 2 times a day. 180 Tab 3   • B-D UF III MINI PEN NEEDLES 31G X 5 MM Misc         • fluticasone (FLONASE) 50 MCG/ACT nasal spray Spray 2 Sprays in nose as needed. 1 Bottle 5   • [START ON 8/2/2018] oxyCODONE-acetaminophen (PERCOCET-10)  MG Tab Take 1 Tab by mouth every 6 hours as needed for Severe Pain for up to 30 days. 120 Tab 0   • NOVOLOG, insulin aspart, (NOVOLOG FLEXPEN) 100 UNIT/ML Solution Pen-injector injection Inject 12 Units as instructed 4 Times a Day,Before Meals and at Bedtime. 15 mL 3   • gabapentin (NEURONTIN) 300 MG Cap Take 1-2 Caps by mouth 3 times a day. 120 Cap 11   • amLODIPine (NORVASC) 10 MG Tab Take 1 Tab by mouth every day. 90 Tab 3   • hydrALAZINE (APRESOLINE) 100 MG tablet TAKE ONE TABLET BY MOUTH EVERY EIGHT HOURS 90 Tab 2   • hydrochlorothiazide (MICROZIDE) 12.5 MG  capsule Take 1 Cap by mouth every day. 30 Cap 6   • trazodone (DESYREL) 150 MG Tab Take 0.5 Tabs by mouth every bedtime. 45 Tab 3   • nystatin (MYCOSTATIN) Powder Apply 1 Application to affected area(s) 3 times a day. Apply to reddened area under abdominal fold. 1 Bottle 0   • polyethylene glycol/lytes (MIRALAX) Pack Take 1 Packet by mouth 1 time daily as needed. 30 Each 0   • magnesium chloride SR (SLO-MAG) 535 (64 MG) MG Tab CR Take 1 Tab by mouth 2 Times a Day. (Patient taking differently: Take 1 Tab by mouth 2 times a day as needed.) 60 Tab 0      No facility-administered encounter medications on file as of 6/1/2018.          ROS. See HPI.  The review of systems was evaluated with the patient.  He complains of easy bruisability urinary frequency, poor balance and a tendency to fall.  He lists chest pain as a positive response but it is aggravated  by breathing and pressure.  Does not appear to be anginal in nature.  All other responses are negative      Objective:   BP (!) 162/98   Pulse 72   Ht 1.829 m (6')   Wt 113.4 kg (250 lb)   SpO2 95%   BMI 33.91 kg/m²      Physical Exam General: WD, WN, male in NAD.   Neck: No JVD.  Good carotid upstroke and no bruit  Chest: Clear to A & P  Heart: Regular rhythm,  S1 = S2. No murmur, gallop, rub, click  Ext:3-4+ edema bilaterally with skin changes of chronic venous insufficiency.  I cannot palpate distal pulses  Neuro: Alert, oriented.  Some trouble with recalling names.  Psych: normal mood, affect  Blood pressure was elevated today which patient explained was due to his degree of excitement.  Usually his blood pressure is within the normal range.    The patient's nuclear perfusion study is abnormal with evidence of inferior as well as apical infarct and ischemia.  His ejection fraction is low normal but is probably similar to the echo of 2015.     Assessment:      1.   Coronary artery disease.  Two-vessel.  Left anterior descending artery and right coronary artery      2.   Cervical and lumbar myelopathy status post cervical and lumbar laminectomy 7/13/2018     3.   History of CVA     4. Paroxysmal atrial fibrillation (HCC)            Medical Decision Making:  Today's Assessment / Status / Plan:   The patient is admitted to undergo elective 2 vessel coronary artery intervention of the left anterior descending artery and right coronary artery.  I have discussed the risks and benefits of cardiac catheterization as well as the procedure itself, rationale and appropriateness in detail with the patient today. Complications including but not limited to death, stroke, MI, urgent bypass surgery, contrast nephropathy, vascular complications, bleeding and infection were explained to the patient. The potential outcomes associated with the procedure (possible PCI, possible CABG, possible medical Rx only) were also discussed at length. The patient agrees to proceed.

## 2018-09-06 NOTE — OR NURSING
1544 report received from rj MURRAY, no s/s distress or discomfort vss, right radial approach Tr band clean, plan of care discussed with patient.  1600 patient given water tolerating well.  1645 criteria met to discharge patient out of ppu  1647 report given to  T727 bed 1 all questions answered.  1720 patient transported to room T727 bed 1 tele monitor on, checked TR band with TERRI guerra no bleeding no hematoma. Patient with all his personal belongings.

## 2018-09-07 VITALS
HEIGHT: 75 IN | OXYGEN SATURATION: 93 % | WEIGHT: 243 LBS | HEART RATE: 61 BPM | TEMPERATURE: 97.5 F | RESPIRATION RATE: 18 BRPM | DIASTOLIC BLOOD PRESSURE: 62 MMHG | BODY MASS INDEX: 30.21 KG/M2 | SYSTOLIC BLOOD PRESSURE: 111 MMHG

## 2018-09-07 LAB
ANION GAP SERPL CALC-SCNC: 9 MMOL/L (ref 0–11.9)
BUN SERPL-MCNC: 22 MG/DL (ref 8–22)
CALCIUM SERPL-MCNC: 9 MG/DL (ref 8.5–10.5)
CHLORIDE SERPL-SCNC: 106 MMOL/L (ref 96–112)
CO2 SERPL-SCNC: 24 MMOL/L (ref 20–33)
CREAT SERPL-MCNC: 0.97 MG/DL (ref 0.5–1.4)
EKG IMPRESSION: NORMAL
ERYTHROCYTE [DISTWIDTH] IN BLOOD BY AUTOMATED COUNT: 46 FL (ref 35.9–50)
GLUCOSE BLD-MCNC: 214 MG/DL (ref 65–99)
GLUCOSE SERPL-MCNC: 154 MG/DL (ref 65–99)
HCT VFR BLD AUTO: 39.6 % (ref 42–52)
HGB BLD-MCNC: 12.9 G/DL (ref 14–18)
INR PPP: 1.18 (ref 0.87–1.13)
MCH RBC QN AUTO: 28.1 PG (ref 27–33)
MCHC RBC AUTO-ENTMCNC: 32.6 G/DL (ref 33.7–35.3)
MCV RBC AUTO: 86.3 FL (ref 81.4–97.8)
PLATELET # BLD AUTO: 157 K/UL (ref 164–446)
PMV BLD AUTO: 11.1 FL (ref 9–12.9)
POTASSIUM SERPL-SCNC: 3.5 MMOL/L (ref 3.6–5.5)
PROTHROMBIN TIME: 14.7 SEC (ref 12–14.6)
RBC # BLD AUTO: 4.59 M/UL (ref 4.7–6.1)
SODIUM SERPL-SCNC: 139 MMOL/L (ref 135–145)
WBC # BLD AUTO: 5.2 K/UL (ref 4.8–10.8)

## 2018-09-07 PROCEDURE — 82962 GLUCOSE BLOOD TEST: CPT

## 2018-09-07 PROCEDURE — 93005 ELECTROCARDIOGRAM TRACING: CPT | Performed by: INTERNAL MEDICINE

## 2018-09-07 PROCEDURE — 96372 THER/PROPH/DIAG INJ SC/IM: CPT

## 2018-09-07 PROCEDURE — 36415 COLL VENOUS BLD VENIPUNCTURE: CPT

## 2018-09-07 PROCEDURE — A9270 NON-COVERED ITEM OR SERVICE: HCPCS | Performed by: INTERNAL MEDICINE

## 2018-09-07 PROCEDURE — G8979 MOBILITY GOAL STATUS: HCPCS | Mod: CK

## 2018-09-07 PROCEDURE — A9270 NON-COVERED ITEM OR SERVICE: HCPCS | Performed by: NURSE PRACTITIONER

## 2018-09-07 PROCEDURE — 700102 HCHG RX REV CODE 250 W/ 637 OVERRIDE(OP): Performed by: NURSE PRACTITIONER

## 2018-09-07 PROCEDURE — 80048 BASIC METABOLIC PNL TOTAL CA: CPT

## 2018-09-07 PROCEDURE — 97162 PT EVAL MOD COMPLEX 30 MIN: CPT

## 2018-09-07 PROCEDURE — G0378 HOSPITAL OBSERVATION PER HR: HCPCS

## 2018-09-07 PROCEDURE — G8980 MOBILITY D/C STATUS: HCPCS | Mod: CK

## 2018-09-07 PROCEDURE — 700102 HCHG RX REV CODE 250 W/ 637 OVERRIDE(OP): Performed by: INTERNAL MEDICINE

## 2018-09-07 PROCEDURE — 93010 ELECTROCARDIOGRAM REPORT: CPT | Performed by: INTERNAL MEDICINE

## 2018-09-07 PROCEDURE — 85610 PROTHROMBIN TIME: CPT

## 2018-09-07 PROCEDURE — 85027 COMPLETE CBC AUTOMATED: CPT

## 2018-09-07 PROCEDURE — G8978 MOBILITY CURRENT STATUS: HCPCS | Mod: CK

## 2018-09-07 RX ORDER — CLOPIDOGREL BISULFATE 75 MG/1
75 TABLET ORAL DAILY
Qty: 30 TAB | Refills: 11 | Status: SHIPPED | OUTPATIENT
Start: 2018-09-08 | End: 2018-11-05 | Stop reason: SDUPTHER

## 2018-09-07 RX ORDER — POTASSIUM CHLORIDE 20 MEQ/1
40 TABLET, EXTENDED RELEASE ORAL ONCE
Status: COMPLETED | OUTPATIENT
Start: 2018-09-07 | End: 2018-09-07

## 2018-09-07 RX ORDER — LISINOPRIL 40 MG/1
40 TABLET ORAL DAILY
Qty: 30 TAB | Refills: 1 | Status: SHIPPED | OUTPATIENT
Start: 2018-09-07 | End: 2018-11-05 | Stop reason: SDUPTHER

## 2018-09-07 RX ORDER — ASPIRIN 81 MG/1
81 TABLET, CHEWABLE ORAL DAILY
Qty: 100 TAB | Refills: 3 | Status: SHIPPED | OUTPATIENT
Start: 2018-09-07 | End: 2018-10-09

## 2018-09-07 RX ADMIN — METOPROLOL TARTRATE 75 MG: 25 TABLET, FILM COATED ORAL at 05:22

## 2018-09-07 RX ADMIN — HYDRALAZINE HYDROCHLORIDE 100 MG: 50 TABLET, FILM COATED ORAL at 12:12

## 2018-09-07 RX ADMIN — HYDRALAZINE HYDROCHLORIDE 100 MG: 50 TABLET, FILM COATED ORAL at 05:22

## 2018-09-07 RX ADMIN — GABAPENTIN 300 MG: 300 CAPSULE ORAL at 12:12

## 2018-09-07 RX ADMIN — POTASSIUM CHLORIDE 40 MEQ: 1500 TABLET, EXTENDED RELEASE ORAL at 08:48

## 2018-09-07 RX ADMIN — LISINOPRIL 40 MG: 20 TABLET ORAL at 05:22

## 2018-09-07 RX ADMIN — INSULIN HUMAN 3 UNITS: 100 INJECTION, SOLUTION PARENTERAL at 12:12

## 2018-09-07 RX ADMIN — GABAPENTIN 300 MG: 300 CAPSULE ORAL at 05:22

## 2018-09-07 RX ADMIN — ASPIRIN 325 MG: 325 TABLET, COATED ORAL at 06:00

## 2018-09-07 RX ADMIN — AMLODIPINE BESYLATE 10 MG: 10 TABLET ORAL at 05:22

## 2018-09-07 RX ADMIN — CLOPIDOGREL 75 MG: 75 TABLET, FILM COATED ORAL at 06:00

## 2018-09-07 RX ADMIN — CEPHALEXIN 500 MG: 500 CAPSULE ORAL at 05:22

## 2018-09-07 RX ADMIN — CLINDAMYCIN HYDROCHLORIDE 300 MG: 150 CAPSULE ORAL at 05:22

## 2018-09-07 ASSESSMENT — ENCOUNTER SYMPTOMS
PALPITATIONS: 0
COUGH: 0
ORTHOPNEA: 0
SHORTNESS OF BREATH: 0
CLAUDICATION: 0
PND: 0

## 2018-09-07 ASSESSMENT — GAIT ASSESSMENTS
GAIT LEVEL OF ASSIST: MINIMAL ASSIST
DEVIATION: STEP TO;DECREASED BASE OF SUPPORT;BRADYKINETIC;SHUFFLED GAIT;DECREASED HEEL STRIKE;DECREASED TOE OFF
ASSISTIVE DEVICE: FRONT WHEEL WALKER
DISTANCE (FEET): 100

## 2018-09-07 ASSESSMENT — COGNITIVE AND FUNCTIONAL STATUS - GENERAL
MOBILITY SCORE: 18
CLIMB 3 TO 5 STEPS WITH RAILING: TOTAL
WALKING IN HOSPITAL ROOM: A LITTLE
MOVING FROM LYING ON BACK TO SITTING ON SIDE OF FLAT BED: A LITTLE
SUGGESTED CMS G CODE MODIFIER MOBILITY: CK
STANDING UP FROM CHAIR USING ARMS: A LITTLE

## 2018-09-07 ASSESSMENT — PAIN SCALES - GENERAL: PAINLEVEL_OUTOF10: 0

## 2018-09-07 NOTE — PROGRESS NOTES
Pt's SaO2 dropping to low 70s to high 60s when asleep, pt reports that he wears a CPAP at night, RN called RT for CPAP however there are no available CPAPs in hospital at this time. RN updated pt and after education pt agrees to wear oxymask.

## 2018-09-07 NOTE — THERAPY
"Physical Therapy Cardiac Evaluation completed.   Bed Mobility:  Supine to Sit: Stand by Assist  Transfers: Sit to Stand: Contact Guard Assist  Gait: Level Of Assist: Minimal Assist with Front-Wheel Walker       Plan of Care: Patient with no further skilled PT needs in the acute care setting at this time (anticiapte pt to go back to acute care rehab for functional mobility)  Discharge Recommendations: Equipment: No Equipment Needed.     See \"Rehab Therapy-Acute\" Patient Summary Report for complete documentation.     Pt was recently admiitted for elective s/p cardica cathterization and stenting of LAD and RCA. Pt has a PMHx of CAD, diabeties, HLD, HTN, sleep apnea, Afib, CVA, recent cervical and lumbar surgery for meylopathy. Pt is currenlty receiving acute care rehab for spinal surgeries and plan is to go back to rehab. Pt was seen for phase 1 cardiac rehab education/evaluation. Pt demonstrated with CGA to Min A for all functional mobility w/FWW. Pt is currenlty limited in mobility secondary to recent spinal surgeries and myelopathy. Pt was educated on pacing, rest breaks, exercise progression, self montioring, HR goals, RPE scale, cardiac medication side effects, cardiac failure s/s, and current physical activity limitations. Pt was very receptive to PT education, however, was worrisome of limitations due to ongoing rehab. Pt was assured these limitations would not slow his progress down, rather he would need to be more aware of his cardiac s/s and improve in self monitoring. Pt was able to demonstrate BP of 133/71 with HR of 64 in a sitting resting position, and BP of 142/75 with HR of 70 after ambulation. Pt was able to demonstrate a moderate pace of activity and safe parameters for BP and HR. Will recommend outpatient cardiac rehab for further counseling/education, exercise progression/training and monitoring, and nutritional guidelines.   "

## 2018-09-07 NOTE — PROGRESS NOTES
Monitor summary:  Sinus joselito to sinus rhythm   1st degree HB, BBB  25 beats of accelerated idio, rate 75  59-88, down to 46 non-sustaining  .26/.12/46

## 2018-09-07 NOTE — DISCHARGE PLANNING
Anticipated Discharge Disposition: SNF     Action: LSW advised Pt has been cleared to return [facility] (SNF) and confirmed Pt is able to return to SNF.  LSW completed request for transportation and faxed to [facility].     Barriers to Discharge: Transportation to SNF    Plan:Transportation to SNF

## 2018-09-07 NOTE — PROGRESS NOTES
Inpatient Anticoagulation Service Note    Date: 9/7/2018  Reason for Anticoagulation: Atrial Fibrillation, Stroke        Hemoglobin Value: (!) 12.9  Hematocrit Value: (!) 39.6  Lab Platelet Value: (!) 157  Target INR: 2.0 to 3.0    INR from last 7 days     Date/Time INR Value    09/07/18 0336 (!)  1.18    09/06/18 1240  1.03        Dose from last 7 days     Date/Time Dose (mg)    09/07/18 1400  10    09/06/18 1900  10        Average Dose (mg):  (Home dose: 10mg daily per RCC)  Significant Interactions: Antibiotics, Aspirin, Clopidogrel  Bridge Therapy: No     Comments: Pt restarted on home dose of warfarin 10 mg daily yesterday. INR up a little. Will continue current dosing and trend the INR.     Education Material Provided?: No (Chronic tx)  Pharmacist suggested discharge dosing: Warfarin 10mg PO daily with close f/u within 3 days  of discharge     Patricia Olson

## 2018-09-07 NOTE — PROGRESS NOTES
Bedside report received from day shift RN, pt is resting in bed, no signs of distress, no needs at this time, updated on POC and answered all questions, educated on fall risk, two RN skin check completed, call light and personal belongings in reach, fall interventions and hourly rounding in place.

## 2018-09-07 NOTE — CARE PLAN
Problem: Nutritional:  Goal: Patient to verbalize or demonstrate understanding of diet  Outcome: NOT MET

## 2018-09-07 NOTE — DIETARY
"Nutrition services: Day 0 of admit.  Joel aM is a 68 y.o. male with admitting DX of atherosclerotic heart disease of native coronary artery without angina pectoris.  Consult received for cardiac diet instruction.  Pt also noted with wt loss on nutrition admit screen.  RD attempted to visit pt at bedside regarding education and wt hx however pt was busy with other disciplines x 3.  RD to follow-up as appropriate.    Assessment:  Height: 190.5 cm (6' 3\")  Weight: 110.2 kg (243 lb)  Body mass index is 30.37 kg/m². - Obese Class I.  Diet/Intake: Cardiac.  Pt consumed % x 1 meal documented.    Evaluation:   1. Pt has a hx of DM, high cholesterol, HTN, thyroid mass, and stroke.  2. Per chart review pt weighed 267 lbs on 7/13/18 admit and wt upon current admit is 243 lbs via other Healthcare provider.  This is a 9% wt loss over the past 2 months, which is severe, however unsure of accuracy given how wts were obtained.  3. Labs: Potassium: 3.5, Glucose: 154.  Meds reviewed.  4. Last BM: 9/6.    Recommendations/Plan:  1. RD to follow-up regarding cardiac diet education and wt hx.  2. Monitor weight.    RD following.            "

## 2018-09-07 NOTE — DISCHARGE PLANNING
Agency/Facility Name: [facility]  Spoke To: Rogelio  Outcome: As requested, procedure notes sent to Rogelio via fax.

## 2018-09-07 NOTE — PROGRESS NOTES
Bedside report received from TERRI Garcia. Initial patient assessment performed, chart and eMAR reviewed. See relevant flowsheet for details. Patient updated on plan of care for the day, with all questions answered. Call light and personal belongings are within reach, and bed is in the lowest position.

## 2018-09-07 NOTE — PROGRESS NOTES
Inpatient Anticoagulation Service Note    Date: 9/6/2018  Reason for Anticoagulation: Atrial Fibrillation, Stroke        Hemoglobin Value: (!) 13.8  Hematocrit Value: 42.9  Lab Platelet Value: 175  Target INR: 2.0 to 3.0    INR from last 7 days     Date/Time INR Value    09/06/18 1240  1.03        Dose from last 7 days     Date/Time Dose (mg)    09/06/18 1900  10        Average Dose (mg):  (Home dose: 10mg daily per RCC)  Significant Interactions: Antibiotics, Aspirin, Clopidogrel  Bridge Therapy: No     Reversal Agent Administered: Not Applicable  Comments: [facility] elective two-vessel coronary intervention of the left anterior descending artery and right coronary artery. Bivalirudin, ASA 325mg and Plavix 600mg given 9/6. Restart home warfarin for A.fib and hx of CVA. Dose per RCC is 10mg daily. INR subtherapeutic. Given all the antiplatelet medication start home dose without a bolus. H/H appears stable with no indication of bleeding. DDI identified above. Pharmacy to [facility].     Plan:  Warfarin 10mg with INR check tomorrow  Education Material Provided?: No (Chronic tx)  Pharmacist suggested discharge dosing: Warfarin 10mg PO daily with close f/u within 3 days       Samantha Joseph, PharmD., BCPS

## 2018-09-07 NOTE — DISCHARGE PLANNING
Received Transport Form @ 1564  Spoke to Rogelio @ [facility]    Transport is scheduled for 9/7 @1800 via [facility] notified.

## 2018-09-07 NOTE — PROGRESS NOTES
2 RN skin check w/ TERRI Garcia    Pt has edematous bilat LE, reddened and dry.  Abrasion on L lower leg, L 2nd toe missing.  Wound on R lower leg, pictures taken.  R 1st and 2nd toes missing as well.  Dry skin.  Clean surgical wound on lower back.  Skin otherwise intact.

## 2018-09-07 NOTE — PROGRESS NOTES
Cardiology Progress Note               Author: Michelle Helm Date & Time created: 2018  5:09 PM     Interval History:  18 transient AIVR( 13 seconds )  18 , LHC, PCI to proximal LAD and PCI to distal RCA      Chief Complaint:  Abnormal MPI  2018, this was done prior to cervical and lumbar surgery for rapidly progressive LE myelopathy, cardiac cath on 18 showed two-vessel disease in the proximal LAD and distal RCA, patient underwent successful cervical and lumbar laminectomy and has been recovering at the rehabilitation center, now is able to take antiplatelet therapy, admitted for elective two-vessel coronary intervention of the LAD and RCA        /77  HR 60's, transient AIVR    Labs reviewed   K 3.5  CR stable      Review of Systems   Respiratory: Negative for cough and shortness of breath.    Cardiovascular: Negative for chest pain, palpitations, orthopnea, claudication, leg swelling and PND.       Physical Exam   Constitutional: He is oriented to person, place, and time.   HENT:   Head: Normocephalic.   Eyes: Conjunctivae are normal.   Neck: No thyromegaly present.   Cardiovascular: Normal rate and regular rhythm.    Pulses:       Carotid pulses are 2+ on the right side, and 2+ on the left side.       Radial pulses are 2+ on the right side, and 2+ on the left side.   Pulmonary/Chest: He has no wheezes.   Abdominal: Soft.   Musculoskeletal: He exhibits no edema.   Neurological: He is alert and oriented to person, place, and time.   Skin: Skin is warm and dry.       Hemodynamics:  Temp (24hrs), Av.6 °C (97.8 °F), Min:36.4 °C (97.6 °F), Max:36.6 °C (97.9 °F)  Temperature: 36.4 °C (97.6 °F)  Pulse  Av.8  Min: 67  Max: 88Heart Rate (Monitored): 82  Blood Pressure : 145/82, NIBP: (!) 164/89     Respiratory:    Respiration: 19, Pulse Oximetry: 93 %           Fluids:     Weight: 110.2 kg (243 lb)  GI/Nutrition:  Orders Placed This Encounter   Procedures   • Diet Order Cardiac      Standing Status:   Standing     Number of Occurrences:   1     Order Specific Question:   Diet:     Answer:   Cardiac [6]     Lab Results:  Recent Labs      09/06/18   1240   WBC  4.9   RBC  5.02   HEMOGLOBIN  13.8*   HEMATOCRIT  42.9   MCV  85.5   MCH  27.5   MCHC  32.2*   RDW  44.8   PLATELETCT  175   MPV  11.2     Recent Labs      09/06/18   1240   SODIUM  137   POTASSIUM  3.9   CHLORIDE  104   CO2  25   GLUCOSE  126*   BUN  20   CREATININE  0.81   CALCIUM  9.1     Recent Labs      09/06/18   1240   APTT  30.0   INR  1.03                     Medical Decision Making, by Problem:  Known coronary artery disease by [facility] on 6/13/18 ( pLAD & dRCA )  Abnormal MPI 5/2018  Diabetes mellitus type 2  Hyperlipidemia  Hypertension  Obstructive sleep apnea on CPAP  Paroxysmal atrial fibrillation  CVA 2007        Plan:  Plan for optimal medical therapy  Continue with dual platelet antiplatelet therapy (aspirin 81 mg  and Plavix 75 mg ) continue with ASA 81 mg for 6 weeks then DC, then continue with Plavix and coumadin  Continue with warfarin for paroxysmal A. 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

## 2018-09-07 NOTE — CARE PLAN
Problem: Safety  Goal: Will remain free from falls  Outcome: PROGRESSING AS EXPECTED  RN educated pt on fall risk and fall prevention, bed locked and in lowest position, call light and personal belongings in reach,miesha gilbert fall risk assessment completed, fall interventions, bed alarm and hourly rounding in place, mobility assessed and proper communication signs placed.       Problem: Infection  Goal: Will remain free from infection  Outcome: PROGRESSING AS EXPECTED  RN educated pt on infection prevention, RN used thorough hand hygiene before and after interactions with pt, encouraged pt and family to use proper hand hygiene.

## 2018-09-07 NOTE — PROGRESS NOTES
"Received pt from [facility].  VSS.  Pt aox4, with periods of forgetfulness.  Pt was slightly axious, saying \"I want to leave,\" \"I thought I'd be home by now,\" pt reoriented and pt able to calm down.  Orders verified.  No c/o of new numbness or tingling in R radial site.  VS taken per protocol.  All needs met at this time.  Pt able to void, denies any pain, HA, n/v. Fall precautions in place, oriented to unit, call light w/in reach, will monitor closely.  "

## 2018-09-08 NOTE — DISCHARGE INSTRUCTIONS
Discharge Instructions    Discharged to other by medical transportation with escort. Discharged via wheelchair, hospital escort: Yes.  Special equipment needed: Wheelchair    Be sure to schedule a follow-up appointment with your primary care doctor or any specialists as instructed.     Discharge Plan:   Diet Plan: Discussed  Activity Level: Discussed  Confirmed Follow up Appointment: Appointment Scheduled  Confirmed Symptoms Management: Discussed  Medication Reconciliation Updated: Yes  Pneumococcal Vaccine Administered/Refused: Not given - Patient refused pneumococcal vaccine  Influenza Vaccine Indication: Patient Refuses    I understand that a diet low in cholesterol, fat, and sodium is recommended for good health. Unless I have been given specific instructions below for another diet, I accept this instruction as my diet prescription.   Other diet: Diabetic Cardiac    Special Instructions: Diagnosis:  Acute Coronary Syndrome (ACS) is a diagnosis that encompasses cardiac-related chest pain and heart attack. ACS occurs when the blood flow to the heart muscle is severely reduced or cut off completely due to a slow process called atherosclerosis.  Atherosclerosis is a disease in which the coronary arteries become narrow from a buildup of fat, cholesterol, and other substances that combine to form plaque. If the plaque breaks, a blood clot will form and block the blood flow to the heart muscle. This lack of blood flow can cause damage or death to the heart muscle which is called a heart attack or Myocardial Infarction (MI). There are two different types of MIs:  ST Elevation Myocardial Infarction or STEMI (the most severe type of heart attack) and Non-ST Elevation Myocardial Infarction or NSTEMI.    Treatment Plan:  · Cardiac Diet  - Low fat, low salt, low cholesterol   · Cardiac Rehab  - Your doctor has ordered you a referral to [facility].  Call [phone] to schedule an appointment.  · Attend my follow-up  appointment with my Cardiologist.  · Take my medications as prescribed by my doctor  · Exercise daily  · Lower my bad cholesterol and raise my good cholesterol, lower my blood pressure, Reduce stress and Control my diabetes    Medications:  Certain medications are used to treat ACS.  Remember to always take medications as prescribed and never stop talking medications unless told by your doctor.    You have been prescribed the following medicatons:    Aspirin - Aspirin is used as a blood thinning medication and you will require this medication indefinitely.  Anti-platelet/blood thinner - Your Anti-platelet/Blood thinning medication is used in combination with aspirin to prevent clots from forming in your heart and/or around your stent.  Your doctor will determine how long you need to be on this medicine.  Beta-Blocker - Beta-Blocker is used to lower blood pressure and heart rate, and/or helps your heart heal after a heart attack.  Statin - Statin is used to lower cholesterol.    · Is patient discharged on Warfarin / Coumadin?   Yes    You are receiving the drug warfarin. Please understand the importance of monitoring warfarin with scheduled PT/INR blood draws.  Follow-up with a call to your personal Doctor's office in 3 days to schedule a PT/INR. .    IMPORTANT: HOW TO USE THIS INFORMATION:  This is a summary and does NOT have all possible information about this product. This information does not assure that this product is safe, effective, or appropriate for you. This information is not individual medical advice and does not substitute for the advice of your health care professional. Always ask your health care professional for complete information about this product and your specific health needs.      WARFARIN - ORAL (WARF-uh-rin)      COMMON BRAND NAME(S): Coumadin      WARNING:  Warfarin can cause very serious (possibly fatal) bleeding. This is more likely to occur when you first start taking this medication or  "if you take too much warfarin. To decrease your risk for bleeding, your doctor or other health care provider will monitor you closely and check your lab results (INR test) to make sure you are not taking too much warfarin. Keep all medical and laboratory appointments. Tell your doctor right away if you notice any signs of serious bleeding. See also Side Effects section.      USES:  This medication is used to treat blood clots (such as in deep vein thrombosis-DVT or pulmonary embolus-PE) and/or to prevent new clots from forming in your body. Preventing harmful blood clots helps to reduce the risk of a stroke or heart attack. Conditions that increase your risk of developing blood clots include a certain type of irregular heart rhythm (atrial fibrillation), heart valve replacement, recent heart attack, and certain surgeries (such as hip/knee replacement). Warfarin is commonly called a \"blood thinner,\" but the more correct term is \"anticoagulant.\" It helps to keep blood flowing smoothly in your body by decreasing the amount of certain substances (clotting proteins) in your blood.      HOW TO USE:  Read the Medication Guide provided by your pharmacist before you start taking warfarin and each time you get a refill. If you have any questions, ask your doctor or pharmacist. Take this medication by mouth with or without food as directed by your doctor or other health care professional, usually once a day. It is very important to take it exactly as directed. Do not increase the dose, take it more frequently, or stop using it unless directed by your doctor. Dosage is based on your medical condition, laboratory tests (such as INR), and response to treatment. Your doctor or other health care provider will monitor you closely while you are taking this medication to determine the right dose for you. Use this medication regularly to get the most benefit from it. To help you remember, take it at the same time each day. It is " important to eat a balanced, consistent diet while taking warfarin. Some foods can affect how warfarin works in your body and may affect your treatment and dose. Avoid sudden large increases or decreases in your intake of foods high in vitamin K (such as broccoli, cauliflower, cabbage, brussels sprouts, kale, spinach, and other green leafy vegetables, liver, green tea, certain vitamin supplements). If you are trying to lose weight, check with your doctor before you try to go on a diet. Cranberry products may also affect how your warfarin works. Limit the amount of cranberry juice (16 ounces/480 milliliters a day) or other cranberry products you may drink or eat.      SIDE EFFECTS:  Nausea, loss of appetite, or stomach/abdominal pain may occur. If any of these effects persist or worsen, tell your doctor or pharmacist promptly. Remember that your doctor has prescribed this medication because he or she has judged that the benefit to you is greater than the risk of side effects. Many people using this medication do not have serious side effects. This medication can cause serious bleeding if it affects your blood clotting proteins too much (shown by unusually high INR lab results). Even if your doctor stops your medication, this risk of bleeding can continue for up to a week. Tell your doctor right away if you have any signs of serious bleeding, including: unusual pain/swelling/discomfort, unusual/easy bruising, prolonged bleeding from cuts or gums, persistent/frequent nosebleeds, unusually heavy/prolonged menstrual flow, pink/dark urine, coughing up blood, vomit that is bloody or looks like coffee grounds, severe headache, dizziness/fainting, unusual or persistent tiredness/weakness, bloody/black/tarry stools, chest pain, shortness of breath, difficulty swallowing. Tell your doctor right away if any of these unlikely but serious side effects occur: persistent nausea/vomiting, severe stomach/abdominal pain, yellowing  eyes/skin. This drug rarely has caused very serious (possibly fatal) problems if its effects lead to small blood clots (usually at the beginning of treatment). This can lead to severe skin/tissue damage that may require surgery or amputation if left untreated. Patients with certain blood conditions (protein C or S deficiency) may be at greater risk. Get medical help right away if any of these rare but serious side effects occur: painful/red/purplish patches on the skin (such as on the toe, breast, abdomen), change in the amount of urine, vision changes, confusion, slurred speech, weakness on one side of the body. A very serious allergic reaction to this drug is rare. However, get medical help right away if you notice any symptoms of a serious allergic reaction, including: rash, itching/swelling (especially of the face/tongue/throat), severe dizziness, trouble breathing. This is not a complete list of possible side effects. If you notice other effects not listed above, contact your doctor or pharmacist. In the [place] - Call your doctor for medical advice about side effects. You may report side effects to FDA at 9-625-DAK-3611. In [place] - Call your doctor for medical advice about side effects. You may report side effects to [facility] [place] at 1-817.186.9109.      PRECAUTIONS:  Before taking warfarin, tell your doctor or pharmacist if you are allergic to it; or if you have any other allergies. This product may contain inactive ingredients, which can cause allergic reactions or other problems. Talk to your pharmacist for more details. Before using this medication, tell your doctor or pharmacist your medical history, especially of: blood disorders (such as anemia, hemophilia), bleeding problems (such as bleeding of the stomach/intestines, bleeding in the brain), blood vessel disorders (such as aneurysms), recent major injury/surgery, liver disease, alcohol use, mental/mood disorders (including memory problems), frequent  falls/injuries. It is important that all your doctors and dentists know that you take warfarin. Before having surgery or any medical/dental procedures, tell your doctor or dentist that you are taking this medication and about all the products you use (including prescription drugs, nonprescription drugs, and herbal products). Avoid getting injections into the muscles. If you must have an injection into a muscle (for example, a flu shot), it should be given in the arm. This way, it will be easier to check for bleeding and/or apply pressure bandages. This medication may cause stomach bleeding. Daily use of alcohol while using this medicine will increase your risk for stomach bleeding and may also affect how this medication works. Limit or avoid alcoholic beverages. If you have not been eating well, if you have an illness or infection that causes fever, vomiting, or diarrhea for more than 2 days, or if you start using any antibiotic medications, contact your doctor or pharmacist immediately because these conditions can affect how warfarin works. This medication can cause heavy bleeding. To lower the chance of getting cut, bruised, or injured, use great caution with sharp objects like safety razors and nail cutters. Use an electric razor when shaving and a soft toothbrush when brushing your teeth. Avoid activities such as contact sports. If you fall or injure yourself, especially if you hit your head, call your doctor immediately. Your doctor may need to check you. The Food & Drug Administration has stated that generic warfarin products are interchangeable. However, consult your doctor or pharmacist before switching warfarin products. Be careful not to take more than one medication that contains warfarin unless specifically directed by the doctor or health care provider who is monitoring your warfarin treatment. Older adults may be at greater risk for bleeding while using this drug. This medication is not recommended for  "use during pregnancy because of serious (possibly fatal) harm to an unborn baby. Discuss the use of reliable forms of birth control with your doctor. If you become pregnant or think you may be pregnant, tell your doctor immediately. If you are planning pregnancy, discuss a plan for managing your condition with your doctor before you become pregnant. Your doctor may switch the type of medication you use during pregnancy. Very small amounts of this medication may pass into breast milk but is unlikely to harm a nursing infant. Consult your doctor before breast-feeding.      DRUG INTERACTIONS:  Drug interactions may change how your medications work or increase your risk for serious side effects. This document does not contain all possible drug interactions. Keep a list of all the products you use (including prescription/nonprescription drugs and herbal products) and share it with your doctor and pharmacist. Do not start, stop, or change the dosage of any medicines without your doctor's approval. Warfarin interacts with many prescription, nonprescription, vitamin, and herbal products. This includes medications that are applied to the skin or inside the vagina or rectum. The interactions with warfarin usually result in an increase or decrease in the \"blood-thinning\" (anticoagulant) effect. Your doctor or other health care professional should closely monitor you to prevent serious bleeding or clotting problems. While taking warfarin, it is very important to tell your doctor or pharmacist of any changes in medications, vitamins, or herbal products that you are taking. Some products that may interact with this drug include: capecitabine, imatinib, mifepristone. Aspirin, aspirin-like drugs (salicylates), and nonsteroidal anti-inflammatory drugs (NSAIDs such as ibuprofen, naproxen, celecoxib) may have effects similar to warfarin. These drugs may increase the risk of bleeding problems if taken during treatment with warfarin. " Carefully check all prescription/nonprescription product labels (including drugs applied to the skin such as pain-relieving creams) since the products may contain NSAIDs or salicylates. Talk to your doctor about using a different medication (such as acetaminophen) to treat pain/fever. Low-dose aspirin and related drugs (such as clopidogrel, ticlopidine) should be continued if prescribed by your doctor for specific medical reasons such as heart attack or stroke prevention. Consult your doctor or pharmacist for more details. Many herbal products interact with warfarin. Tell your doctor before taking any herbal products, especially bromelains, coenzyme Q10, cranberry, danshen, dong quai, fenugreek, garlic, ginkgo biloba, ginseng, and Vin's wort, among others. This medication may interfere with a certain laboratory test to measure theophylline levels, possibly causing false test results. Make sure laboratory personnel and all your doctors know you use this drug.      OVERDOSE:  If overdose is suspected, contact a poison control center or emergency room immediately. [place] residents can call the [place] National Poison Hotline at 1-886.369.8165. [place] residents can call a provincial poison control center. Symptoms of overdose may include: bloody/black/tarry stools, pink/dark urine, unusual/prolonged bleeding.      NOTES:  Do not share this medication with others. Laboratory and/or medical tests (such as INR, complete blood count) must be performed periodically to monitor your progress or check for side effects. Consult your doctor for more details.      MISSED DOSE:  For the best possible benefit, do not miss any doses. If you do miss a dose and remember on the same day, take it as soon as you remember. If you remember on the next day, skip the missed dose and resume your usual dosing schedule. Do not double the dose to catch up because this could increase your risk for bleeding. Keep a record of missed doses to give to  your doctor or pharmacist. Contact your doctor or pharmacist if you miss 2 or more doses in a row.      STORAGE:  Store at room temperature away from light and moisture. Do not store in the bathroom. Keep all medications away from children and pets. Do not flush medications down the toilet or pour them into a drain unless instructed to do so. Properly discard this product when it is  or no longer needed. Consult your pharmacist or local waste disposal company for more details about how to safely discard your product.      MEDICAL ALERT:  Your condition and medication can cause complications in a medical emergency. For information about enrolling in MedicAlert, call 1-731.435.7585 ([place]) or 1-389.621.4418 ([place]).      Information last revised 2010 Copyright(c) 2010 [employer]             Depression / Suicide Risk    As you are discharged from this [facility] facility, it is important to learn how to keep safe from harming yourself.    Recognize the warning signs:  · Abrupt changes in personality, positive or negative- including increase in energy   · Giving away possessions  · Change in eating patterns- significant weight changes-  positive or negative  · Change in sleeping patterns- unable to sleep or sleeping all the time   · Unwillingness or inability to communicate  · Depression  · Unusual sadness, discouragement and loneliness  · Talk of wanting to die  · Neglect of personal appearance   · Rebelliousness- reckless behavior  · Withdrawal from people/activities they love  · Confusion- inability to concentrate     If you or a loved one observes any of these behaviors or has concerns about self-harm, here's what you can do:  · Talk about it- your feelings and reasons for harming yourself  · Remove any means that you might use to hurt yourself (examples: pills, rope, extension cords, firearm)  · Get professional help from the community (Mental Health, Substance Abuse, psychological  counseling)  · Do not be alone:Call your Safe Contact- someone whom you trust who will be there for you.  · Call your local CRISIS HOTLINE 226-6287 or 534-950-2698  · Call your local Children's Mobile Crisis Response Team [place] (531) 897-6237 or www.[employer]  · Call the toll free National Suicide Prevention Hotlines   · National Suicide Prevention Lifeline 389-509-QGFB (7956)  · National Hope Line Network 800-SUICIDE (078-8096)

## 2018-09-17 NOTE — PROGRESS NOTES
Outcome:patient stated he will call back once he is out of the hospital     Please transfer to Patient Outreach Team at [phone] when patient returns call.    Attempt # 3

## 2018-09-20 NOTE — PROGRESS NOTES
Outcome: patient stated they wanted a call back    Please transfer to Patient Outreach Team at [phone] when patient returns call.    Attempt # 4

## 2018-09-27 NOTE — PROGRESS NOTES
Outcome: Patient does not qualify. He is in the hospital and stated maybe he gets out in October, he will call to schedule appointment when he gets out.    Please transfer to Patient Outreach Team at [phone] when patient returns call.    Attempt # 5

## 2018-10-09 ENCOUNTER — OFFICE VISIT (OUTPATIENT)
Dept: CARDIOLOGY | Facility: MEDICAL CENTER | Age: 68
End: 2018-10-09
Payer: MEDICARE

## 2018-10-09 VITALS
SYSTOLIC BLOOD PRESSURE: 120 MMHG | OXYGEN SATURATION: 95 % | HEIGHT: 75 IN | BODY MASS INDEX: 33.2 KG/M2 | HEART RATE: 60 BPM | WEIGHT: 267 LBS | DIASTOLIC BLOOD PRESSURE: 80 MMHG

## 2018-10-09 DIAGNOSIS — E11.42 CONTROLLED TYPE 2 DIABETES MELLITUS WITH DIABETIC POLYNEUROPATHY, WITH LONG-TERM CURRENT USE OF INSULIN (HCC): ICD-10-CM

## 2018-10-09 DIAGNOSIS — G47.33 OSA TREATED WITH BIPAP: ICD-10-CM

## 2018-10-09 DIAGNOSIS — E78.5 DYSLIPIDEMIA: ICD-10-CM

## 2018-10-09 DIAGNOSIS — I48.21 PERMANENT ATRIAL FIBRILLATION (HCC): ICD-10-CM

## 2018-10-09 DIAGNOSIS — I51.89 DIASTOLIC DYSFUNCTION: ICD-10-CM

## 2018-10-09 DIAGNOSIS — Z79.4 CONTROLLED TYPE 2 DIABETES MELLITUS WITH DIABETIC POLYNEUROPATHY, WITH LONG-TERM CURRENT USE OF INSULIN (HCC): ICD-10-CM

## 2018-10-09 DIAGNOSIS — I63.9 CEREBROVASCULAR ACCIDENT (CVA), UNSPECIFIED MECHANISM (HCC): ICD-10-CM

## 2018-10-09 DIAGNOSIS — Z79.01 ANTICOAGULATED ON WARFARIN: ICD-10-CM

## 2018-10-09 DIAGNOSIS — I10 ESSENTIAL HYPERTENSION: ICD-10-CM

## 2018-10-09 PROCEDURE — 99214 OFFICE O/P EST MOD 30 MIN: CPT | Performed by: NURSE PRACTITIONER

## 2018-10-09 ASSESSMENT — ENCOUNTER SYMPTOMS
CLAUDICATION: 0
SHORTNESS OF BREATH: 0
FEVER: 0
ORTHOPNEA: 0
ABDOMINAL PAIN: 0
PND: 0
PALPITATIONS: 0
MYALGIAS: 0
COUGH: 0

## 2018-10-09 NOTE — PROGRESS NOTES
Chief Complaint   Patient presents with   • Coronary Artery Disease     follow up     Subjective:   Joel Ma is a 68 y.o. male who presents today for hospital follow up S/P PCI with MIRELLA to RCA and LAD.    He is a patient of Dr. Wei in our office.    Hx of prior CVA with debility, PAF on coumadin, HLD (un-treated), HTN, and now CAD with PCI, obesity, and HFpEF of 60%.    He was sent for elective angiogram for abnormal MPI.    He is in a wheelchair due to debility from recent back surgeries and remains at [facility] for therapies. He may be discharged soon, but this depends on his recovery.    He has 3+ pitting lower extremity edema with a 20 lb weight gain since admission to [facility].    His vitals have been stable and he feels well other than debility and LE edema.    The patient thinks he is not sick, he states he is healthy. We discussed this in great detail about his history.    He has had no episodes of chest pain, palpitations, dizziness/lightheadedness, shortness of breath, or orthopnea.    Past Medical History:   Diagnosis Date   • Anesthesia     low O2 sat   • Arrhythmia     a-fib   • arthritis 6/17/2011    thumb, fingers   • Arthritis     hip   • Backpain    • CAD (coronary artery disease)     MIRELLA to    • Cataract     left eye surgery   • Dental disorder     full dentures   • Diabetes     insulin, Dr Oconnor, his APN   • High cholesterol    • Hypertension    • MRSA (methicillin resistant Staphylococcus aureus)     history of, nothing current 2016, on clindamycin for rest of life per patient   • Pain 05-03-13    shoulders, hip, right knee, 7/10   • Pneumonia 2002   • Renal disorder     stage 2   • Sleep apnea     bipap   • Stroke (HCC)     2/1/2007, 4/1/2007, right sided weakness; balance disturbance, memory problems   • Thyroid mass 7/30/2009    benign lump   • Ventricular ectopy 6/17/2011     Past Surgical History:   Procedure Laterality Date   • CERVICAL FUSION POSTERIOR  7/13/2018     Procedure: CERVICAL FUSION POSTERIOR/ C2-4;  Surgeon: Boby Marsh M.D.;  Location: [facility];  Service: Neurosurgery   • CERVICAL LAMINECTOMY POSTERIOR  7/13/2018    Procedure: CERVICAL LAMINECTOMY POSTERIOR/ C2-3, C5-6;  Surgeon: Boby Marsh M.D.;  Location: [facility];  Service: Neurosurgery   • LUMBAR LAMINECTOMY DISKECTOMY  7/13/2018    Procedure: LUMBAR LAMINECTOMY DISKECTOMY/ L2-5 LAMI;  Surgeon: Boby Marsh M.D.;  Location: [facility];  Service: Neurosurgery   • CERVICAL DISK AND FUSION ANTERIOR  8/31/2016    Procedure: CERVICAL DISK AND FUSION ANTERIOR C3-7 ;  Surgeon: Alhaji Jaffe M.D.;  Location: [facility];  Service:    • CATARACT PHACO WITH IOL  5/8/2013    Performed by Mame Rehman M.D. at SURGERY SAME DAY [facility]   • IRRIGATION & DEBRIDEMENT ORTHO  11/13/2012    Performed by Gordon Cota M.D. at [facility]   • KNEE REVISION TOTAL  11/13/2012    Performed by Gordon Cota M.D. at [facility]   • IRRIGATION & DEBRIDEMENT ORTHO  11/2/2012    Performed by Gordon Cota M.D. at [facility]   • IRRIGATION & DEBRIDEMENT ORTHO Left 10/29/2012    Procedure: left shoulder, with drain;  Surgeon: Gordon Cota M.D.;  Location: [facility];  Service:    • INCISION AND DRAINAGE ORTHOPEDIC Right 10/29/2012    Procedure: Right Total Knee I and D and Liner Exchange, with drain;  Surgeon: Gordon Cota M.D.;  Location: [facility];  Service:    • IRRIGATION & DEBRIDEMENT ORTHO  3/13/2012    Performed by KAMALJIT SHI at [facility]   • KNEE REVISION TOTAL  3/13/2012    Performed by KAMALJIT SHI at [facility]   • TENDON REPAIR  3/13/2012    Performed by KAMALJIT SHI at [facility]   • KNEE ARTHROPLASTY TOTAL  1/11/2012    Right; Performed by KAMALJIT SHI at [facility]   • TOE AMPUTATION  7/22/2011     Performed by EVELYN JANE at SURGERY [facility]   • ELBOW ARTHROTOMY  2008    right   • LUMBAR FUSION POSTERIOR  1979   • FOOT SURGERY      partial amputation great toe   • FOOT SURGERY      toe surgery left foot   • OTHER      left eye cataract   • OTHER NEUROLOGICAL SURG      neck fusion   • PB KNEE SCOPE,SINGLE MENISECTOMY      right knee     Family History   Problem Relation Age of Onset   • Diabetes Mother    • Cancer Father         lung cancer   • Heart Disease Father         triple bypass   • Diabetes Unknown    • Hypertension Unknown    • Cancer Unknown      Social History     Social History   • Marital status: Single     Spouse name: N/A   • Number of children: N/A   • Years of education: N/A     Occupational History   • Not on file.     Social History Main Topics   • Smoking status: Former Smoker     Packs/day: 4.00     Years: 0.50     Types: Cigarettes     Quit date: 1/1/1974   • Smokeless tobacco: Never Used   • Alcohol use No   • Drug use: No   • Sexual activity: Yes     Other Topics Concern   • Not on file     Social History Narrative    ** Merged History Encounter **          Allergies   Allergen Reactions   • Demerol      Makes me stop breathing.  Doesn't like because it makes him high   • Statins [Hmg-Coa-R Inhibitors] Myalgia     Rxn - ongoing       Outpatient Encounter Prescriptions as of 10/9/2018   Medication Sig Dispense Refill   • lisinopril (PRINIVIL, ZESTRIL) 40 MG tablet Take 1 Tab by mouth every day. 30 Tab 1   • clopidogrel (PLAVIX) 75 MG Tab Take 1 Tab by mouth every day. 30 Tab 11   • metformin (GLUCOPHAGE) 1000 MG tablet Take 1 Tab by mouth 2 times a day, with meals. 60 Tab 11   • insulin glargine (LANTUS) 100 UNIT/ML Solution Inject 30 Units as instructed every evening.     • insulin lispro (HUMALOG) 100 UNIT/ML Solution Inject 2-10 Units as instructed 4 Times a Day,Before Meals and at Bedtime. For -200 Give 2 units  For -250 Give 4 units  For -300 Give 6  "units  For -350 Give 8 units  For -400 Give 10 units  <60 or >400 Notify MD     • cephALEXin (KEFLEX) 500 MG Cap Take 500 mg by mouth 2 times a day.     • potassium chloride (KLOR-CON) 20 MEQ Pack Take 20 mEq by mouth every day.     • metoprolol 75 MG Tab Take 75 mg by mouth every 8 hours. 90 Tab 0   • amLODIPine (NORVASC) 10 MG Tab Take 10 mg by mouth every day.     • clindamycin (CLEOCIN) 300 MG Cap Take 300 mg by mouth 2 Times a Day.     • gabapentin (NEURONTIN) 300 MG Cap Take 300-600 mg by mouth 3 times a day. 300mg in AM and noon   600mg in PM     • hydrALAZINE (APRESOLINE) 100 MG tablet Take 100 mg by mouth 3 times a day.     • vitamin D (CHOLECALCIFEROL) 1000 UNIT Tab Take 2,000 Units by mouth every day.     • warfarin (COUMADIN) 10 MG Tab Take 10 mg by mouth every day.     • [DISCONTINUED] aspirin (ASA) 81 MG Chew Tab chewable tablet Take 1 Tab by mouth every day. 100 Tab 3   • [DISCONTINUED] furosemide (LASIX) 40 MG Tab Take 40 mg by mouth every day.       No facility-administered encounter medications on file as of 10/9/2018.      Review of Systems   Constitutional: Negative for fever and malaise/fatigue.   Respiratory: Negative for cough and shortness of breath.    Cardiovascular: Negative for chest pain, palpitations, orthopnea, claudication, leg swelling and PND.   Gastrointestinal: Negative for abdominal pain.   Musculoskeletal: Negative for myalgias.        Objective:   /80 (BP Location: Left arm, Patient Position: Sitting, BP Cuff Size: Adult)   Pulse 60   Ht 1.905 m (6' 3\")   Wt 121.1 kg (267 lb)   SpO2 95%   BMI 33.37 kg/m²     Physical Exam   Constitutional: He appears well-developed and well-nourished.   HENT:   Head: Normocephalic and atraumatic.   Eyes: EOM are normal.   Neck: No JVD present.   Cardiovascular: Normal rate, regular rhythm, normal heart sounds and intact distal pulses.    Pulmonary/Chest: Effort normal and breath sounds normal.   Musculoskeletal: Normal " range of motion. He exhibits no edema.   Skin: Skin is warm and dry.   Psychiatric: He has a normal mood and affect.   Nursing note and vitals reviewed.      Assessment:     1. Cerebrovascular accident (CVA), unspecified mechanism (HCC)     2. Controlled type 2 diabetes mellitus with diabetic polyneuropathy, with long-term current use of insulin (HCC)     3. Essential hypertension     4. JANNIE treated with BiPAP     5. Dyslipidemia     6. Anticoagulated on warfarin     7. Permanent atrial fibrillation (HCC)     8. BMI 35.0-35.9,adult     9. Diastolic dysfunction  COMP METABOLIC PANEL    BTYPE NATRIURETIC PEPTIDE    REFERRAL TO CARDIOLOGY     Medical Decision Making:  Today's Assessment / Status / Plan:     1. CAD with recent PCI  -no angina or HEART  -cont asa (until 10/15/18 then dc), plavix (1 year post stent placement), and bb therapy. Intolerant to statins, unable to afford PCSK9 inhibitors    2. DM  -uncontrolled with neuropathy  -PCP to manage    3. HTN  -good control at [facility]  -cont hydralazine, decrease metoprolol to 75 mg BID, switch from furosemide to torsemide 10 mg QD with K 20mEq QD, cont lisinopril 40 mg QD  -follow at [facility]    4. HFpEF of 60%  -consider repeat echo  -change diuretic therapy to torsemide  -repeat cmp and bnp in 1-2 weeks for eval  -cont to follow weight daily and decrease Na in diet with compression stockings and LE elevation  -refer to HF clinic for closer monitoring and education    5. JANNIE with BiPAP  -tolerating and compliant    6. HLD  -intolerant to statins, not able to afford PCSK9 inhibitors  -check yearly cmp and lipid    7. PAF  -asymptomatic, rate controlled on metoprolol but low HR's per patient and they have been holding his metoprolol frequently  -okay to drop dose to metoprolol 75 mg BID  -cont coumadin for OAC    8. Obesity  -recommend lifestyle changes    FU in clinic in 1-2 weeks with HF clinic with cmp and bnp     Patient verbalizes understanding and agrees  with the plan of care.     Collaborating MD: Mauricio Swift MD

## 2018-10-11 ENCOUNTER — TELEPHONE (OUTPATIENT)
Dept: VASCULAR LAB | Facility: MEDICAL CENTER | Age: 68
End: 2018-10-11

## 2018-10-11 NOTE — TELEPHONE ENCOUNTER
Spoke with the pt to remind that the INR is overdue .    He is still in rehab but will be out on Monday and will call to make appt in clinic.His INR has been fluctuating form 1.5 --2.5 and he does not know why. It is still on the same dose of 10 mg daily and has been run by the rehab facility. JOHNNY Craig.

## 2018-10-15 ENCOUNTER — HOME HEALTH ADMISSION (OUTPATIENT)
Dept: HOME HEALTH SERVICES | Facility: HOME HEALTHCARE | Age: 68
End: 2018-10-15
Payer: MEDICARE

## 2018-10-17 ENCOUNTER — TELEPHONE (OUTPATIENT)
Dept: MEDICAL GROUP | Facility: LAB | Age: 68
End: 2018-10-17

## 2018-10-17 ENCOUNTER — HOME CARE VISIT (OUTPATIENT)
Dept: HOME HEALTH SERVICES | Facility: HOME HEALTHCARE | Age: 68
End: 2018-10-17
Payer: MEDICARE

## 2018-10-17 PROCEDURE — 665001 SOC-HOME HEALTH

## 2018-10-17 PROCEDURE — G0493 RN CARE EA 15 MIN HH/HOSPICE: HCPCS

## 2018-10-17 SDOH — ECONOMIC STABILITY: HOUSING INSECURITY: UNSAFE APPLIANCES: 0

## 2018-10-17 SDOH — ECONOMIC STABILITY: HOUSING INSECURITY: UNSAFE COOKING RANGE AREA: 0

## 2018-10-17 ASSESSMENT — PATIENT HEALTH QUESTIONNAIRE - PHQ9
1. LITTLE INTEREST OR PLEASURE IN DOING THINGS: 00
2. FEELING DOWN, DEPRESSED, IRRITABLE, OR HOPELESS: 00

## 2018-10-17 ASSESSMENT — ENCOUNTER SYMPTOMS
NAUSEA: DENIES
VOMITING: DENIES

## 2018-10-17 ASSESSMENT — ACTIVITIES OF DAILY LIVING (ADL)
OASIS_M1830: 03
TRANSPORTATION COMMENTS: REQUIRES ASSISTANCE TO LEAVE HOME SAFELY

## 2018-10-17 NOTE — TELEPHONE ENCOUNTER
1. Caller Name: Joel                                         Call Back Number: [phone]      Patient approves a detailed voicemail message: yes    Joel is needing an order for a 4 wheeled walker with seat sent to [facility].  He is wanting to get this done today please.           [address]  Is a temporary address, he is staying with his son for now.  He is not sure if this is Fidel or Mei.

## 2018-10-18 ENCOUNTER — HOME CARE VISIT (OUTPATIENT)
Dept: HOME HEALTH SERVICES | Facility: HOME HEALTHCARE | Age: 68
End: 2018-10-18
Payer: MEDICARE

## 2018-10-18 VITALS
BODY MASS INDEX: 32.83 KG/M2 | RESPIRATION RATE: 18 BRPM | SYSTOLIC BLOOD PRESSURE: 144 MMHG | HEIGHT: 75 IN | WEIGHT: 264 LBS | TEMPERATURE: 94.5 F | DIASTOLIC BLOOD PRESSURE: 78 MMHG | OXYGEN SATURATION: 94 % | HEART RATE: 68 BPM

## 2018-10-18 ASSESSMENT — ACTIVITIES OF DAILY LIVING (ADL)
HOME_HEALTH_OASIS: 03
HOME_HEALTH_OASIS: 01

## 2018-10-19 ENCOUNTER — TELEPHONE (OUTPATIENT)
Dept: HEALTH INFORMATION MANAGEMENT | Facility: OTHER | Age: 68
End: 2018-10-19

## 2018-10-19 DIAGNOSIS — Z79.01 CHRONIC ANTICOAGULATION: ICD-10-CM

## 2018-10-19 NOTE — PROGRESS NOTES
[facility]    Received notification pt is now home with HH.  Earliest HH can test INR is 10/23/18.  INR has been ordered.     Brett Coates, PharmD

## 2018-10-19 NOTE — TELEPHONE ENCOUNTER
Received referral from [facility]. Medications reviewed. Per cardiology note on 10/9/18, patient was instructed to decrease metoprolol 75 mg to BID. However medication list reflects TID dosing. Also, per cardiology note, patient was switched from furosemide to torsemide 10 mg daily. Torsemide is not on medication list. Outbound call to Joel to clarify. Left  with my contact information and request for a return call. Notified [facility]  Lory and requested follow-up. Interaction noted between warfarin and plavix for increased risk of bleeding. Patient is being followed by [facility] anticoagulation services. Left  with coumadin clinic requesting order so [facility] can draw INR.      Danihsa Burgess, PharmD

## 2018-10-20 ENCOUNTER — HOME CARE VISIT (OUTPATIENT)
Dept: HOME HEALTH SERVICES | Facility: HOME HEALTHCARE | Age: 68
End: 2018-10-20
Payer: MEDICARE

## 2018-10-20 VITALS
DIASTOLIC BLOOD PRESSURE: 72 MMHG | RESPIRATION RATE: 18 BRPM | OXYGEN SATURATION: 98 % | SYSTOLIC BLOOD PRESSURE: 124 MMHG | TEMPERATURE: 98.4 F | HEART RATE: 54 BPM

## 2018-10-20 PROCEDURE — G0151 HHCP-SERV OF PT,EA 15 MIN: HCPCS

## 2018-10-20 ASSESSMENT — ENCOUNTER SYMPTOMS: DEBILITATING PAIN: 1

## 2018-10-20 ASSESSMENT — ACTIVITIES OF DAILY LIVING (ADL)
IADLS_COMMENTS: <!--EPICS-->SEE OT EVAL<!--EPICE-->
ADLS_COMMENTS: <!--EPICS-->SEE OT EVAL<!--EPICE-->

## 2018-10-21 ENCOUNTER — HOME CARE VISIT (OUTPATIENT)
Dept: HOME HEALTH SERVICES | Facility: HOME HEALTHCARE | Age: 68
End: 2018-10-21
Payer: MEDICARE

## 2018-10-22 ENCOUNTER — TELEPHONE (OUTPATIENT)
Dept: MEDICAL GROUP | Facility: LAB | Age: 68
End: 2018-10-22

## 2018-10-22 NOTE — LETTER
October 22, 2018           Patient: Joel Ma   YOB: 1950   Date of Visit: 10/22/2018               To Whom It May Concern:     It is my medical opinion that Joel Ma will need his two dogs as a  animals.      If you have any questions or concerns, please don't hesitate to call.           Sincerely,              Catrachito Sterling D.O.  Board Certified General Internal Medicine.    [facility] - [address] [address] [phone] (Phone)  [phone] (Fax)

## 2018-10-22 NOTE — LETTER
October 22, 2018        Patient: Joel Ma   YOB: 1950   Date of Visit: 10/22/2018           To Whom It May Concern:    It is my medical opinion that Joel Ma will need his dog as a  animal.    If you have any questions or concerns, please don't hesitate to call.        Sincerely,          Catrachito Sterling D.O.  Board Certified General Internal Medicine.      [facility] - [address] [address] [phone] (Phone)  [phone] (Fax)

## 2018-10-22 NOTE — TELEPHONE ENCOUNTER
1. Caller Name: Joel                                          Call Back Number: [phone]      Patient approves a detailed voicemail message: yes    Joel is requesting a letter stating that his dogs are  animals for his land lord.  He said that you've done this for him before, he is just needing an updated one.

## 2018-10-23 ENCOUNTER — TELEPHONE (OUTPATIENT)
Dept: HEALTH INFORMATION MANAGEMENT | Facility: OTHER | Age: 68
End: 2018-10-23

## 2018-10-23 ENCOUNTER — HOME CARE VISIT (OUTPATIENT)
Dept: HOME HEALTH SERVICES | Facility: HOME HEALTHCARE | Age: 68
End: 2018-10-23
Payer: MEDICARE

## 2018-10-23 ENCOUNTER — TELEPHONE (OUTPATIENT)
Dept: MEDICAL GROUP | Facility: LAB | Age: 68
End: 2018-10-23

## 2018-10-23 ENCOUNTER — ANTICOAGULATION MONITORING (OUTPATIENT)
Dept: VASCULAR LAB | Facility: MEDICAL CENTER | Age: 68
End: 2018-10-23

## 2018-10-23 VITALS
TEMPERATURE: 98.4 F | SYSTOLIC BLOOD PRESSURE: 141 MMHG | RESPIRATION RATE: 17 BRPM | DIASTOLIC BLOOD PRESSURE: 81 MMHG | HEART RATE: 59 BPM | OXYGEN SATURATION: 94 %

## 2018-10-23 VITALS
HEART RATE: 59 BPM | RESPIRATION RATE: 17 BRPM | SYSTOLIC BLOOD PRESSURE: 141 MMHG | OXYGEN SATURATION: 94 % | DIASTOLIC BLOOD PRESSURE: 81 MMHG | TEMPERATURE: 98.4 F

## 2018-10-23 VITALS
DIASTOLIC BLOOD PRESSURE: 81 MMHG | HEART RATE: 60 BPM | OXYGEN SATURATION: 98 % | TEMPERATURE: 98.7 F | SYSTOLIC BLOOD PRESSURE: 141 MMHG | RESPIRATION RATE: 17 BRPM

## 2018-10-23 DIAGNOSIS — Z79.01 ANTICOAGULATED ON WARFARIN: ICD-10-CM

## 2018-10-23 DIAGNOSIS — I63.9 CEREBROVASCULAR ACCIDENT (CVA), UNSPECIFIED MECHANISM (HCC): ICD-10-CM

## 2018-10-23 LAB — INR PPP: 1.3 (ref 2–3.5)

## 2018-10-23 PROCEDURE — G0157 HHC PT ASSISTANT EA 15: HCPCS

## 2018-10-23 PROCEDURE — G0299 HHS/HOSPICE OF RN EA 15 MIN: HCPCS

## 2018-10-23 PROCEDURE — G0152 HHCP-SERV OF OT,EA 15 MIN: HCPCS

## 2018-10-23 SDOH — ECONOMIC STABILITY: HOUSING INSECURITY
HOME SAFETY: PT LIVES W HIS SON (WHO WORKS DURING THE DAY) IN A FIRST FLOOR APT WITH 2 UNRAILED STEPS TO ENTRY; ONE LARGE DOG PRESENT;

## 2018-10-23 ASSESSMENT — ACTIVITIES OF DAILY LIVING (ADL)
BATHING_ASSISTIVE_EQUIPMENT_USED: TUB BENCH
MEAL_PREP_ASSISTANCE: 5
EATING_ASSISTANCE: 0
TOILETING_ASSISTANCE: 0
ORAL_CARE_ASSISTANCE: 0
TELEPHONE_ASSISTANCE: 0
TRANSPORTATION_ASSISTANCE: 6
LAUNDRY_ASSISTANCE: 6
HOUSEKEEPING_EQUIPMENT_USED: FWW
DRESSING_UB_ASSISTANCE: 0
GROOMING_ASSISTANCE: 0
HOUSEKEEPING_ASSISTANCE: 6
BATHING_ASSISTANCE: 1
SHOPPING_ASSISTANCE: 6
DRESSING_LB_ASSISTANCE: 1

## 2018-10-23 ASSESSMENT — ENCOUNTER SYMPTOMS
VOMITING: DENIES
POOR JUDGMENT: 1
NAUSEA: DENIES

## 2018-10-23 NOTE — TELEPHONE ENCOUNTER
ESTABLISHED PATIENT PRE-VISIT PLANNING     Note: Patient will not be contacted if there is no indication to call.     1.  Reviewed notes from the last few office visits within the medical group: Yes    2.  If any orders were placed at last visit or intended to be done for this visit (i.e. 6 mos follow-up), do we have Results/Consult Notes?        •  Labs - Labs were not ordered at last office visit.       •  Imaging - Imaging was not ordered at last office visit.       •  Referrals - No referrals were ordered at last office visit.    3. Is this appointment scheduled as a Hospital Follow-Up? No    4.  Immunizations were updated in Epic using WebIZ?: Epic matches WebIZ       •  Web Iz Recommendations: FLU, PNEUMOVAX (PPSV23) and ZOSTAVAX (Shingles)    5.  Patient is due for the following Health Maintenance Topics:   Health Maintenance Due   Topic Date Due   • IMM HEP B VACCINE (1 of 3 - Risk 3-dose series) 08/17/1968   • IMM ZOSTER VACCINES (1 of 2) 08/17/1999   • Annual Wellness Visit  10/28/2015   • COLONOSCOPY  06/30/2017   • IMM PNEUMOCOCCAL 65+ (ADULT) LOW/MEDIUM RISK SERIES (2 of 2 - PPSV23) 01/19/2018   • DIABETES MONOFILAMENT / LE EXAM  03/17/2018   • IMM INFLUENZA (1) 09/01/2018   • URINE ACR / MICROALBUMIN  10/19/2018       - Patient has completed PREVNAR (PCV13)  and TDAP Immunization(s) per WebIZ. Chart has been updated.    6.  MDX printed for Provider? YES    7.  Patient was NOT informed to arrive 15 min prior to their scheduled appointment and bring in their medication bottles.

## 2018-10-23 NOTE — PROGRESS NOTES
Anticoagulation Summary  As of 10/23/2018    INR goal:   2.0-3.0   TTR:   54.8 % (2.9 y)   Today's INR:   1.3!   Warfarin maintenance plan:   10 mg (5 mg x 2) every day   Weekly warfarin total:   70 mg   Plan last modified:   Jefferson Brian, PharmD (1/3/2017)   Next INR check:   10/25/2018   Target end date:   Indefinite    Indications    CVA (cerebral vascular accident) (HCC) [I63.9]  Atrial fibrillation [427.31] [I48.91]  History of CVA (cerebrovascular accident) (Resolved) [Z86.73]  Anticoagulated on warfarin [Z79.01]             Anticoagulation Episode Summary     INR check location:   [facility]    Preferred lab:       Send INR reminders to:       Comments:   call pt multiple times, he has a hard time getting to his phone in time and has no VM set up      Anticoagulation Care Providers     Provider Role Specialty Phone number    BEL Lee.SIMONE. Referring Internal Medicine [phone]    [facility] Anticoagulation Services Responsible  [phone]    Liseth Doe, A.P.N. Responsible Cardiology [phone]    Gi Cadena A.P.N. Responsible Cardiology [phone]        Anticoagulation Patient Findings      INR  sub-therapeutic. He might benefit from Lovenox   Left a voice message for the patient, asked patient to please call the anticoagulation clinic if they have any signs/symptoms of bleeding and/or thrombosis or any changes to diet or medications.    Follow up appointment in 2 days     15mg x two then INR      Torres Sena, SeraD

## 2018-10-23 NOTE — TELEPHONE ENCOUNTER
Dear Dr. Sterling,    Med review completed for home health. Home health RN noted that patient is taking lisinopril 40mg BID. This looks like it was ordered when patient was at [facility]. Most recent [facility] notes state once daily dosing. Should patient start lisinopril 40mg once daily?     Thanks for the clarification,  Erlin Tellez, PharmD [phone]

## 2018-10-24 ENCOUNTER — APPOINTMENT (OUTPATIENT)
Dept: MEDICAL GROUP | Facility: LAB | Age: 68
End: 2018-10-24
Payer: MEDICARE

## 2018-10-24 ENCOUNTER — HOME CARE VISIT (OUTPATIENT)
Dept: HOME HEALTH SERVICES | Facility: HOME HEALTHCARE | Age: 68
End: 2018-10-24
Payer: MEDICARE

## 2018-10-24 ENCOUNTER — TELEPHONE (OUTPATIENT)
Dept: MEDICAL GROUP | Facility: LAB | Age: 68
End: 2018-10-24

## 2018-10-24 VITALS
OXYGEN SATURATION: 96 % | DIASTOLIC BLOOD PRESSURE: 85 MMHG | HEART RATE: 59 BPM | SYSTOLIC BLOOD PRESSURE: 135 MMHG | RESPIRATION RATE: 18 BRPM | TEMPERATURE: 99 F

## 2018-10-24 PROCEDURE — G0152 HHCP-SERV OF OT,EA 15 MIN: HCPCS

## 2018-10-24 ASSESSMENT — ENCOUNTER SYMPTOMS
DIFFICULTY THINKING: 1
POOR JUDGMENT: 1
DEBILITATING PAIN: 1

## 2018-10-24 NOTE — TELEPHONE ENCOUNTER
1. Caller Name: Joel Ma                                    Call Back Number: [phone]       Patient approves a detailed voicemail message: N\A    Pt had to cancel appt does not have a ride. He state dhe has been in the hospital and would like to speak to you regarding his labs and health.

## 2018-10-25 ENCOUNTER — HOME CARE VISIT (OUTPATIENT)
Dept: HOME HEALTH SERVICES | Facility: HOME HEALTHCARE | Age: 68
End: 2018-10-25
Payer: MEDICARE

## 2018-10-25 ENCOUNTER — ANTICOAGULATION MONITORING (OUTPATIENT)
Dept: VASCULAR LAB | Facility: MEDICAL CENTER | Age: 68
End: 2018-10-25

## 2018-10-25 VITALS
DIASTOLIC BLOOD PRESSURE: 70 MMHG | SYSTOLIC BLOOD PRESSURE: 140 MMHG | RESPIRATION RATE: 18 BRPM | HEART RATE: 62 BPM | TEMPERATURE: 98.2 F | OXYGEN SATURATION: 95 %

## 2018-10-25 VITALS
DIASTOLIC BLOOD PRESSURE: 70 MMHG | HEART RATE: 62 BPM | OXYGEN SATURATION: 95 % | SYSTOLIC BLOOD PRESSURE: 140 MMHG | TEMPERATURE: 98.2 F | RESPIRATION RATE: 18 BRPM

## 2018-10-25 DIAGNOSIS — I48.91 ATRIAL FIBRILLATION, UNSPECIFIED TYPE (HCC): ICD-10-CM

## 2018-10-25 DIAGNOSIS — Z79.01 ANTICOAGULATED ON WARFARIN: ICD-10-CM

## 2018-10-25 LAB — INR PPP: 1.5 (ref 2–3.5)

## 2018-10-25 PROCEDURE — G0299 HHS/HOSPICE OF RN EA 15 MIN: HCPCS

## 2018-10-25 PROCEDURE — G0157 HHC PT ASSISTANT EA 15: HCPCS

## 2018-10-25 ASSESSMENT — ENCOUNTER SYMPTOMS
NAUSEA: DENIES
VOMITING: DENIES

## 2018-10-25 NOTE — PROGRESS NOTES
Anticoagulation Summary  As of 10/25/2018    INR goal:   2.0-3.0   TTR:   54.7 % (2.9 y)   Today's INR:   1.5!   Warfarin maintenance plan:   10 mg (5 mg x 2) every day   Weekly warfarin total:   70 mg   Plan last modified:   Jefferson Brian, PharmD (1/3/2017)   Next INR check:   10/29/2018   Target end date:   Indefinite    Indications    CVA (cerebral vascular accident) (HCC) [I63.9]  Atrial fibrillation [427.31] [I48.91]  History of CVA (cerebrovascular accident) (Resolved) [Z86.73]  Anticoagulated on warfarin [Z79.01]             Anticoagulation Episode Summary     INR check location:   [facility]    Preferred lab:       Send INR reminders to:       Comments:   call pt multiple times, he has a hard time getting to his phone in time and has no VM set up      Anticoagulation Care Providers     Provider Role Specialty Phone number    BEL Lee.SIMONE. Referring Internal Medicine [phone]    [facility] Anticoagulation Services Responsible  [phone]    Liseth Doe A.P.N. Responsible Cardiology [phone]    Gi Cadena A.P.N. Responsible Cardiology [phone]        Anticoagulation Patient Findings    Left a message on the patient's voicemail today, informing the patient of a SUB-therapeutic INR of 1.5.  Instructed patient to call the clinic with any changes to diet or medications, with any signs/sx of bleeding or clotting or with any questions or concerns.     Patient instructed to bolus again with 15mg TONIGHT, then to resume his current dosing regimen of 10mg QD. Patient will follow up again in 4 days on Monday when [facility] returns. Orders sent to [facility].     Lauren Causey PharmD

## 2018-10-26 ENCOUNTER — HOME CARE VISIT (OUTPATIENT)
Dept: HOME HEALTH SERVICES | Facility: HOME HEALTHCARE | Age: 68
End: 2018-10-26
Payer: MEDICARE

## 2018-10-26 VITALS
RESPIRATION RATE: 18 BRPM | OXYGEN SATURATION: 97 % | HEART RATE: 90 BPM | SYSTOLIC BLOOD PRESSURE: 140 MMHG | DIASTOLIC BLOOD PRESSURE: 80 MMHG | TEMPERATURE: 97.9 F

## 2018-10-26 PROCEDURE — G0157 HHC PT ASSISTANT EA 15: HCPCS

## 2018-10-29 ENCOUNTER — HOME CARE VISIT (OUTPATIENT)
Dept: HOME HEALTH SERVICES | Facility: HOME HEALTHCARE | Age: 68
End: 2018-10-29
Payer: MEDICARE

## 2018-10-29 ENCOUNTER — ANTICOAGULATION MONITORING (OUTPATIENT)
Dept: VASCULAR LAB | Facility: MEDICAL CENTER | Age: 68
End: 2018-10-29

## 2018-10-29 VITALS
SYSTOLIC BLOOD PRESSURE: 142 MMHG | RESPIRATION RATE: 18 BRPM | DIASTOLIC BLOOD PRESSURE: 83 MMHG | OXYGEN SATURATION: 96 % | TEMPERATURE: 100 F | HEART RATE: 69 BPM

## 2018-10-29 VITALS
HEART RATE: 69 BPM | RESPIRATION RATE: 18 BRPM | TEMPERATURE: 100 F | SYSTOLIC BLOOD PRESSURE: 142 MMHG | DIASTOLIC BLOOD PRESSURE: 83 MMHG | OXYGEN SATURATION: 95 %

## 2018-10-29 DIAGNOSIS — Z79.01 ANTICOAGULATED ON WARFARIN: ICD-10-CM

## 2018-10-29 DIAGNOSIS — I48.91 ATRIAL FIBRILLATION, UNSPECIFIED TYPE (HCC): ICD-10-CM

## 2018-10-29 LAB — INR PPP: 1.1 (ref 2–3.5)

## 2018-10-29 PROCEDURE — G0299 HHS/HOSPICE OF RN EA 15 MIN: HCPCS

## 2018-10-29 PROCEDURE — G0157 HHC PT ASSISTANT EA 15: HCPCS

## 2018-10-29 PROCEDURE — G0152 HHCP-SERV OF OT,EA 15 MIN: HCPCS

## 2018-10-29 ASSESSMENT — ENCOUNTER SYMPTOMS
POOR JUDGMENT: 1
DEBILITATING PAIN: 1
DIFFICULTY THINKING: 1

## 2018-10-29 NOTE — PROGRESS NOTES
Anticoagulation Summary  As of 10/29/2018    INR goal:   2.0-3.0   TTR:   54.5 % (2.9 y)   Today's INR:   1.1!   Warfarin maintenance plan:   10 mg (5 mg x 2) every day   Weekly warfarin total:   70 mg   Plan last modified:   Jefferson Brian, PharmD (1/3/2017)   Next INR check:   10/31/2018   Target end date:   Indefinite    Indications    CVA (cerebral vascular accident) (HCC) [I63.9]  Atrial fibrillation [427.31] [I48.91]  History of CVA (cerebrovascular accident) (Resolved) [Z86.73]  Anticoagulated on warfarin [Z79.01]             Anticoagulation Episode Summary     INR check location:   [facility]    Preferred lab:       Send INR reminders to:       Comments:   call pt multiple times, he has a hard time getting to his phone in time and has no VM set up      Anticoagulation Care Providers     Provider Role Specialty Phone number    BEL Lee.SIMONE. Referring Internal Medicine [phone]    [facility] Anticoagulation Services Responsible  [phone]    Liseth Doe, A.P.N. Responsible Cardiology [phone]    Gi Cadena A.P.N. Responsible Cardiology [phone]        Anticoagulation Patient Findings      INR  sub-therapeutic.   Left a voice message for the patient, asked patient to please call the anticoagulation clinic if they have any signs/symptoms of bleeding and/or thrombosis or any changes to diet or medications.    Follow up appointment in 2 days     15mg x 2 then INR      Torres Sena, PharmD

## 2018-10-30 ENCOUNTER — HOME CARE VISIT (OUTPATIENT)
Dept: HOME HEALTH SERVICES | Facility: HOME HEALTHCARE | Age: 68
End: 2018-10-30
Payer: MEDICARE

## 2018-10-30 PROCEDURE — G0180 MD CERTIFICATION HHA PATIENT: HCPCS | Performed by: INTERNAL MEDICINE

## 2018-10-31 ENCOUNTER — HOME CARE VISIT (OUTPATIENT)
Dept: HOME HEALTH SERVICES | Facility: HOME HEALTHCARE | Age: 68
End: 2018-10-31
Payer: MEDICARE

## 2018-10-31 VITALS
HEART RATE: 96 BPM | TEMPERATURE: 97.9 F | SYSTOLIC BLOOD PRESSURE: 138 MMHG | OXYGEN SATURATION: 99 % | DIASTOLIC BLOOD PRESSURE: 80 MMHG | RESPIRATION RATE: 18 BRPM

## 2018-10-31 VITALS
HEART RATE: 69 BPM | TEMPERATURE: 100 F | DIASTOLIC BLOOD PRESSURE: 83 MMHG | RESPIRATION RATE: 18 BRPM | SYSTOLIC BLOOD PRESSURE: 142 MMHG | OXYGEN SATURATION: 96 %

## 2018-10-31 PROCEDURE — G0157 HHC PT ASSISTANT EA 15: HCPCS

## 2018-10-31 SDOH — ECONOMIC STABILITY: HOUSING INSECURITY: UNSAFE COOKING RANGE AREA: 0

## 2018-10-31 SDOH — ECONOMIC STABILITY: HOUSING INSECURITY: UNSAFE APPLIANCES: 0

## 2018-11-01 ENCOUNTER — HOME CARE VISIT (OUTPATIENT)
Dept: HOME HEALTH SERVICES | Facility: HOME HEALTHCARE | Age: 68
End: 2018-11-01
Payer: MEDICARE

## 2018-11-01 VITALS
TEMPERATURE: 99.6 F | DIASTOLIC BLOOD PRESSURE: 80 MMHG | RESPIRATION RATE: 18 BRPM | OXYGEN SATURATION: 99 % | HEART RATE: 63 BPM | SYSTOLIC BLOOD PRESSURE: 140 MMHG

## 2018-11-01 PROCEDURE — G0152 HHCP-SERV OF OT,EA 15 MIN: HCPCS

## 2018-11-01 PROCEDURE — G0155 HHCP-SVS OF CSW,EA 15 MIN: HCPCS

## 2018-11-01 ASSESSMENT — SOCIAL DETERMINANTS OF HEALTH (SDOH)
HAS ADEQUATE INCOME: Y
IS CONCERNED ABOUT INCOME: N

## 2018-11-01 ASSESSMENT — ENCOUNTER SYMPTOMS
POOR JUDGMENT: 1
DEBILITATING PAIN: 1

## 2018-11-02 ENCOUNTER — HOME CARE VISIT (OUTPATIENT)
Dept: HOME HEALTH SERVICES | Facility: HOME HEALTHCARE | Age: 68
End: 2018-11-02
Payer: MEDICARE

## 2018-11-02 ENCOUNTER — ANTICOAGULATION MONITORING (OUTPATIENT)
Dept: VASCULAR LAB | Facility: MEDICAL CENTER | Age: 68
End: 2018-11-02

## 2018-11-02 VITALS
DIASTOLIC BLOOD PRESSURE: 62 MMHG | RESPIRATION RATE: 18 BRPM | OXYGEN SATURATION: 95 % | HEART RATE: 55 BPM | SYSTOLIC BLOOD PRESSURE: 124 MMHG | TEMPERATURE: 99 F

## 2018-11-02 DIAGNOSIS — I48.91 ATRIAL FIBRILLATION, UNSPECIFIED TYPE (HCC): ICD-10-CM

## 2018-11-02 DIAGNOSIS — Z79.01 ANTICOAGULATED ON WARFARIN: ICD-10-CM

## 2018-11-02 LAB — INR PPP: 1 (ref 2–3.5)

## 2018-11-02 PROCEDURE — G0300 HHS/HOSPICE OF LPN EA 15 MIN: HCPCS

## 2018-11-02 PROCEDURE — G0157 HHC PT ASSISTANT EA 15: HCPCS

## 2018-11-02 PROCEDURE — 85610 PROTHROMBIN TIME: CPT

## 2018-11-02 ASSESSMENT — ENCOUNTER SYMPTOMS
VOMITING: DENIES
NAUSEA: DENIES

## 2018-11-02 NOTE — PROGRESS NOTES
Anticoagulation Summary  As of 11/2/2018    INR goal:   2.0-3.0   TTR:   54.3 % (3 y)   Today's INR:   1.0!   Warfarin maintenance plan:   10 mg (5 mg x 2) every day   Weekly warfarin total:   70 mg   Plan last modified:   Jefferson Brian, PharmD (1/3/2017)   Next INR check:   11/5/2018   Target end date:   Indefinite    Indications    CVA (cerebral vascular accident) (HCC) [I63.9]  Atrial fibrillation [427.31] [I48.91]  History of CVA (cerebrovascular accident) (Resolved) [Z86.73]  Anticoagulated on warfarin [Z79.01]             Anticoagulation Episode Summary     INR check location:   [facility]    Preferred lab:       Send INR reminders to:       Comments:   call pt multiple times, he has a hard time getting to his phone in time and has no VM set up      Anticoagulation Care Providers     Provider Role Specialty Phone number    Catrachito Sterling D.O. Referring Internal Medicine [phone]    [facility] Anticoagulation Services Responsible  [phone]    Liseth Doe A.P.N. Responsible Cardiology [phone]    Gi Cadena A.P.NElian Responsible Cardiology [phone]        Anticoagulation Patient Findings  Patient Findings     Negatives:   Signs/symptoms of thrombosis, Signs/symptoms of bleeding, Laboratory test error suspected, Change in health, Change in alcohol use, Change in activity, Upcoming invasive procedure, Emergency department visit, Upcoming dental procedure, Missed doses, Extra doses, Change in medications, Change in diet/appetite, Hospital admission, Bruising, Other complaints        Spoke with the patient's  nurse on the phone today, reporting a SUB-therapeutic INR of 1.0.  Confirmed the current warfarin dosing regimen and patient compliance.  Patient has only been taking 10mg QD, unsure whether patient has missed any doses. Patient did NOT receive message left for him on house phone.   Patient will bolus with 15mg of warfarin x 3 days,  and will retest again on Monday. Orders  sent to [facility].     Lauren Causey PharmD

## 2018-11-03 VITALS
HEART RATE: 62 BPM | OXYGEN SATURATION: 92 % | SYSTOLIC BLOOD PRESSURE: 138 MMHG | DIASTOLIC BLOOD PRESSURE: 80 MMHG | TEMPERATURE: 98.3 F

## 2018-11-05 ENCOUNTER — HOME CARE VISIT (OUTPATIENT)
Dept: HOME HEALTH SERVICES | Facility: HOME HEALTHCARE | Age: 68
End: 2018-11-05
Payer: MEDICARE

## 2018-11-05 ENCOUNTER — ANTICOAGULATION MONITORING (OUTPATIENT)
Dept: VASCULAR LAB | Facility: MEDICAL CENTER | Age: 68
End: 2018-11-05

## 2018-11-05 VITALS
TEMPERATURE: 98.9 F | OXYGEN SATURATION: 98 % | HEART RATE: 61 BPM | DIASTOLIC BLOOD PRESSURE: 87 MMHG | SYSTOLIC BLOOD PRESSURE: 135 MMHG | RESPIRATION RATE: 17 BRPM

## 2018-11-05 DIAGNOSIS — Z79.01 ANTICOAGULATED ON WARFARIN: ICD-10-CM

## 2018-11-05 DIAGNOSIS — I48.91 ATRIAL FIBRILLATION, UNSPECIFIED TYPE (HCC): ICD-10-CM

## 2018-11-05 DIAGNOSIS — E11.42 DIABETIC POLYNEUROPATHY ASSOCIATED WITH TYPE 2 DIABETES MELLITUS (HCC): ICD-10-CM

## 2018-11-05 LAB — INR PPP: 1.4 (ref 2–3.5)

## 2018-11-05 PROCEDURE — G0151 HHCP-SERV OF PT,EA 15 MIN: HCPCS

## 2018-11-05 PROCEDURE — G0299 HHS/HOSPICE OF RN EA 15 MIN: HCPCS

## 2018-11-05 RX ORDER — HYDRALAZINE HYDROCHLORIDE 100 MG/1
TABLET, FILM COATED ORAL
Qty: 90 TAB | Refills: 1 | Status: SHIPPED | OUTPATIENT
Start: 2018-11-05 | End: 2019-01-01 | Stop reason: SDUPTHER

## 2018-11-05 RX ORDER — WARFARIN SODIUM 10 MG/1
11 TABLET ORAL DAILY
Qty: 30 TAB | Refills: 3 | Status: SHIPPED | OUTPATIENT
Start: 2018-11-05 | End: 2018-11-26

## 2018-11-05 RX ORDER — POTASSIUM CHLORIDE 1.5 G/1.58G
20 POWDER, FOR SOLUTION ORAL DAILY
Qty: 30 PACKET | Refills: 1 | Status: SHIPPED | OUTPATIENT
Start: 2018-11-05 | End: 2018-11-28 | Stop reason: CLARIF

## 2018-11-05 RX ORDER — METOPROLOL TARTRATE 75 MG/1
75 TABLET, FILM COATED ORAL EVERY 8 HOURS
Qty: 90 TAB | Refills: 0 | Status: SHIPPED | OUTPATIENT
Start: 2018-11-05 | End: 2018-11-21

## 2018-11-05 RX ORDER — AMLODIPINE BESYLATE 10 MG/1
10 TABLET ORAL DAILY
Qty: 30 TAB | Refills: 3 | Status: SHIPPED | OUTPATIENT
Start: 2018-11-05 | End: 2019-04-29 | Stop reason: SDUPTHER

## 2018-11-05 RX ORDER — TORSEMIDE 10 MG/1
10 TABLET ORAL DAILY
Qty: 30 TAB | Refills: 3 | Status: SHIPPED | OUTPATIENT
Start: 2018-11-05 | End: 2018-11-21 | Stop reason: SDUPTHER

## 2018-11-05 RX ORDER — CLOPIDOGREL BISULFATE 75 MG/1
75 TABLET ORAL DAILY
Qty: 30 TAB | Refills: 11 | Status: SHIPPED | OUTPATIENT
Start: 2018-11-05 | End: 2018-11-09 | Stop reason: SDUPTHER

## 2018-11-05 RX ORDER — GABAPENTIN 300 MG/1
300-600 CAPSULE ORAL 3 TIMES DAILY
Qty: 90 CAP | Refills: 3 | Status: SHIPPED | OUTPATIENT
Start: 2018-11-05 | End: 2019-01-01 | Stop reason: SDUPTHER

## 2018-11-05 ASSESSMENT — ENCOUNTER SYMPTOMS
VOMITING: DENIES
NAUSEA: DENIES

## 2018-11-06 ENCOUNTER — HOME CARE VISIT (OUTPATIENT)
Dept: HOME HEALTH SERVICES | Facility: HOME HEALTHCARE | Age: 68
End: 2018-11-06
Payer: MEDICARE

## 2018-11-06 VITALS
TEMPERATURE: 98.6 F | HEART RATE: 88 BPM | DIASTOLIC BLOOD PRESSURE: 78 MMHG | RESPIRATION RATE: 18 BRPM | OXYGEN SATURATION: 98 % | SYSTOLIC BLOOD PRESSURE: 122 MMHG

## 2018-11-06 VITALS
DIASTOLIC BLOOD PRESSURE: 87 MMHG | HEART RATE: 61 BPM | TEMPERATURE: 98.9 F | OXYGEN SATURATION: 98 % | RESPIRATION RATE: 17 BRPM | SYSTOLIC BLOOD PRESSURE: 135 MMHG

## 2018-11-06 PROCEDURE — G0152 HHCP-SERV OF OT,EA 15 MIN: HCPCS

## 2018-11-06 ASSESSMENT — ENCOUNTER SYMPTOMS
DEBILITATING PAIN: 1
POOR JUDGMENT: 1

## 2018-11-06 NOTE — PROGRESS NOTES
OP Telephone Anticoagulation Service Note    Date: 11/5/2018      Anticoagulation Summary  As of 11/5/2018    INR goal:   2.0-3.0   TTR:   54.1 % (3 y)   Today's INR:   1.4!   Warfarin maintenance plan:   10 mg (5 mg x 2) every day   Weekly warfarin total:   70 mg   Plan last modified:   Jefferson Brian, PharmD (1/3/2017)   Next INR check:   11/8/2018   Target end date:   Indefinite    Indications    CVA (cerebral vascular accident) (HCC) [I63.9]  Atrial fibrillation [427.31] [I48.91]  History of CVA (cerebrovascular accident) (Resolved) [Z86.73]  Anticoagulated on warfarin [Z79.01]             Anticoagulation Episode Summary     INR check location:   [facility]    Preferred lab:       Send INR reminders to:       Comments:   call pt multiple times, he has a hard time getting to his phone in time and has no VM set up      Anticoagulation Care Providers     Provider Role Specialty Phone number    Catrachito Sterling D.O. Referring Internal Medicine [phone]    [facility] Anticoagulation Services Responsible  [phone]    BARBRA Araujo.P.N. Responsible Cardiology [phone]    BRIT QuirozP.N. Responsible Cardiology [phone]        Anticoagulation Patient Findings         Plan: Spoke with patient on the phone. Patient is sub therapeutic today. Confirmed dosing. No missed tablets in the last week. Patient denies any changes in medications or diet. Patient denies any signs or symptoms of bleeding or clotting. Instructed patient to call clinic if any unusual bleeding or bruising occurs. Will have pt continue 15 mg daily. Will follow-up with patient in 3 day(s).        Korina Diggs, SeraD

## 2018-11-07 ENCOUNTER — HOME CARE VISIT (OUTPATIENT)
Dept: HOME HEALTH SERVICES | Facility: HOME HEALTHCARE | Age: 68
End: 2018-11-07
Payer: MEDICARE

## 2018-11-07 VITALS
RESPIRATION RATE: 18 BRPM | OXYGEN SATURATION: 98 % | SYSTOLIC BLOOD PRESSURE: 132 MMHG | HEART RATE: 59 BPM | DIASTOLIC BLOOD PRESSURE: 76 MMHG | TEMPERATURE: 98 F

## 2018-11-07 VITALS
OXYGEN SATURATION: 98 % | RESPIRATION RATE: 18 BRPM | HEART RATE: 59 BPM | DIASTOLIC BLOOD PRESSURE: 76 MMHG | SYSTOLIC BLOOD PRESSURE: 132 MMHG | TEMPERATURE: 98 F

## 2018-11-07 PROCEDURE — G0153 HHCP-SVS OF S/L PATH,EA 15MN: HCPCS

## 2018-11-07 PROCEDURE — G0151 HHCP-SERV OF PT,EA 15 MIN: HCPCS

## 2018-11-07 ASSESSMENT — ENCOUNTER SYMPTOMS: DIFFICULTY THINKING: 1

## 2018-11-08 ENCOUNTER — HOME CARE VISIT (OUTPATIENT)
Dept: HOME HEALTH SERVICES | Facility: HOME HEALTHCARE | Age: 68
End: 2018-11-08
Payer: MEDICARE

## 2018-11-08 ENCOUNTER — ANTICOAGULATION MONITORING (OUTPATIENT)
Dept: VASCULAR LAB | Facility: MEDICAL CENTER | Age: 68
End: 2018-11-08

## 2018-11-08 VITALS
DIASTOLIC BLOOD PRESSURE: 82 MMHG | HEART RATE: 66 BPM | TEMPERATURE: 97.7 F | RESPIRATION RATE: 17 BRPM | SYSTOLIC BLOOD PRESSURE: 132 MMHG | OXYGEN SATURATION: 94 %

## 2018-11-08 VITALS
DIASTOLIC BLOOD PRESSURE: 82 MMHG | HEART RATE: 66 BPM | OXYGEN SATURATION: 94 % | SYSTOLIC BLOOD PRESSURE: 132 MMHG | RESPIRATION RATE: 17 BRPM | TEMPERATURE: 97.7 F

## 2018-11-08 DIAGNOSIS — Z79.01 ANTICOAGULATED ON WARFARIN: ICD-10-CM

## 2018-11-08 DIAGNOSIS — I48.91 ATRIAL FIBRILLATION, UNSPECIFIED TYPE (HCC): ICD-10-CM

## 2018-11-08 LAB — INR PPP: 1.9 (ref 2–3.5)

## 2018-11-08 PROCEDURE — G0299 HHS/HOSPICE OF RN EA 15 MIN: HCPCS

## 2018-11-08 PROCEDURE — G0152 HHCP-SERV OF OT,EA 15 MIN: HCPCS

## 2018-11-08 ASSESSMENT — ENCOUNTER SYMPTOMS
POOR JUDGMENT: 1
NAUSEA: DENIES
DEBILITATING PAIN: 1
VOMITING: DENIES

## 2018-11-08 NOTE — PROGRESS NOTES
OP Telephone Anticoagulation Service Note    Date: 11/8/2018      Anticoagulation Summary  As of 11/8/2018    INR goal:   2.0-3.0   TTR:   54.0 % (3 y)   Today's INR:   1.9!   Warfarin maintenance plan:   15 mg (5 mg x 3) on Sun, Thu; 10 mg (5 mg x 2) all other days   Weekly warfarin total:   80 mg   Plan last modified:   Korina Diggs PharmD (11/8/2018)   Next INR check:   11/12/2018   Target end date:   Indefinite    Indications    CVA (cerebral vascular accident) (HCC) [I63.9]  Atrial fibrillation [427.31] [I48.91]  History of CVA (cerebrovascular accident) (Resolved) [Z86.73]  Anticoagulated on warfarin [Z79.01]             Anticoagulation Episode Summary     INR check location:   [facility]    Preferred lab:       Send INR reminders to:       Comments:   call pt multiple times, he has a hard time getting to his phone in time and has no VM set up      Anticoagulation Care Providers     Provider Role Specialty Phone number    Catrachito Sterling D.O. Referring Internal Medicine [phone]    [facility] Responsible  [phone]    Liseth Doe A.P.N. Responsible Cardiology [phone]    Gi Cadena A.P.N. Responsible Cardiology [phone]        Anticoagulation Patient Findings        Plan: Spoke with patient on the phone. Patient is sub therapeutic today. Confirmed dosing. Pt missed his dose of warfarin last night. Patient denies any changes in medications or diet. Patient denies any signs or symptoms of bleeding or clotting. Instructed patient to call clinic if any unusual bleeding or bruising occurs. Will continue dosing as outlined. Will follow-up with patient in 4 day(s).        Korina Diggs, [place]

## 2018-11-09 RX ORDER — CLOPIDOGREL BISULFATE 75 MG/1
75 TABLET ORAL DAILY
Qty: 30 TAB | Refills: 11 | Status: SHIPPED | OUTPATIENT
Start: 2018-11-09 | End: 2019-01-01 | Stop reason: SDUPTHER

## 2018-11-12 ENCOUNTER — ANTICOAGULATION MONITORING (OUTPATIENT)
Dept: VASCULAR LAB | Facility: MEDICAL CENTER | Age: 68
End: 2018-11-12

## 2018-11-12 ENCOUNTER — HOME CARE VISIT (OUTPATIENT)
Dept: HOME HEALTH SERVICES | Facility: HOME HEALTHCARE | Age: 68
End: 2018-11-12
Payer: MEDICARE

## 2018-11-12 VITALS
HEART RATE: 60 BPM | SYSTOLIC BLOOD PRESSURE: 118 MMHG | RESPIRATION RATE: 18 BRPM | DIASTOLIC BLOOD PRESSURE: 64 MMHG | OXYGEN SATURATION: 98 % | TEMPERATURE: 97.4 F

## 2018-11-12 DIAGNOSIS — Z79.01 ANTICOAGULATED ON WARFARIN: ICD-10-CM

## 2018-11-12 LAB — INR PPP: 3.7 (ref 2–3.5)

## 2018-11-12 PROCEDURE — G0299 HHS/HOSPICE OF RN EA 15 MIN: HCPCS

## 2018-11-12 ASSESSMENT — ENCOUNTER SYMPTOMS
VOMITING: DENIES
NAUSEA: DENIES

## 2018-11-12 NOTE — PROGRESS NOTES
Anticoagulation Summary  As of 11/12/2018    INR goal:   2.0-3.0   TTR:   54.0 % (3 y)   Today's INR:   3.7!   Warfarin maintenance plan:   15 mg (5 mg x 3) on Sun, Thu; 10 mg (5 mg x 2) all other days   Weekly warfarin total:   80 mg   Plan last modified:   Korina Diggs, PharmD (11/8/2018)   Next INR check:   11/15/2018   Target end date:   Indefinite    Indications    CVA (cerebral vascular accident) (HCC) [I63.9]  Atrial fibrillation [427.31] [I48.91]  History of CVA (cerebrovascular accident) (Resolved) [Z86.73]  Anticoagulated on warfarin [Z79.01]             Anticoagulation Episode Summary     INR check location:   [facility]    Preferred lab:       Send INR reminders to:       Comments:   call pt multiple times, he has a hard time getting to his phone in time and has no VM set up      Anticoagulation Care Providers     Provider Role Specialty Phone number    Catrachito Sterling D.O. Referring Internal Medicine [phone]    [facility] Responsible  [phone]    BARBRA Araujo.P.N. Responsible Cardiology [phone]    BRIT QuirozP.N. Responsible Cardiology [phone]        Anticoagulation Patient Findings      Spoke with patient.  INR is SUPRA therapeutic.   Pt denies any unusual s/s of bleeding, bruising, clotting or any changes to diet or medications. Denies any etoh, cranberries, supplements, or illness.   Pt verifies warfarin weekly dosing. Pt has been taking 15mg daily.    Will have pt take 5mg x1 today and then resume back to normal dose.    Repeat INR in 3 days with [facility].     Gely Briseno, PharmD

## 2018-11-13 ENCOUNTER — HOME CARE VISIT (OUTPATIENT)
Dept: HOME HEALTH SERVICES | Facility: HOME HEALTHCARE | Age: 68
End: 2018-11-13
Payer: MEDICARE

## 2018-11-13 ENCOUNTER — TELEPHONE (OUTPATIENT)
Dept: MEDICAL GROUP | Facility: LAB | Age: 68
End: 2018-11-13

## 2018-11-13 VITALS
SYSTOLIC BLOOD PRESSURE: 140 MMHG | OXYGEN SATURATION: 98 % | HEART RATE: 59 BPM | DIASTOLIC BLOOD PRESSURE: 82 MMHG | TEMPERATURE: 98.7 F | RESPIRATION RATE: 18 BRPM

## 2018-11-13 PROCEDURE — G0152 HHCP-SERV OF OT,EA 15 MIN: HCPCS

## 2018-11-13 PROCEDURE — G0151 HHCP-SERV OF PT,EA 15 MIN: HCPCS

## 2018-11-13 ASSESSMENT — ENCOUNTER SYMPTOMS
POOR JUDGMENT: 1
DEBILITATING PAIN: 1

## 2018-11-14 ENCOUNTER — HOME CARE VISIT (OUTPATIENT)
Dept: HOME HEALTH SERVICES | Facility: HOME HEALTHCARE | Age: 68
End: 2018-11-14
Payer: MEDICARE

## 2018-11-14 VITALS
SYSTOLIC BLOOD PRESSURE: 128 MMHG | RESPIRATION RATE: 18 BRPM | DIASTOLIC BLOOD PRESSURE: 62 MMHG | HEART RATE: 54 BPM | OXYGEN SATURATION: 98 % | TEMPERATURE: 98.4 F

## 2018-11-14 PROCEDURE — G0151 HHCP-SERV OF PT,EA 15 MIN: HCPCS

## 2018-11-14 SDOH — ECONOMIC STABILITY: HOUSING INSECURITY
HOME SAFETY: STAIRS TO EXIT AND ENTER APARTMENT WITH NO RAILING; PATIENT UNABLE TO NEGOTIATE STAIRS AT THIS TIME.  BEDROOM FLOOR IS SLIGHTLY CLUTTERED POSING A TRIPPING HAZARD  PATIENT HAS LARGE DOG AND PATIENT HAS DIFFICULTY GIVING DOG FOOD AND WATER AND LETTING

## 2018-11-14 SDOH — ECONOMIC STABILITY: HOUSING INSECURITY: HOME SAFETY: DOG OUT

## 2018-11-14 SDOH — ECONOMIC STABILITY: HOUSING INSECURITY: UNSAFE APPLIANCES: 0

## 2018-11-14 SDOH — ECONOMIC STABILITY: HOUSING INSECURITY: UNSAFE COOKING RANGE AREA: 0

## 2018-11-14 ASSESSMENT — ACTIVITIES OF DAILY LIVING (ADL): IADLS_COMMENTS: <!--EPICS-->REQUIRES ASSIST WITH IADLS<!--EPICE-->

## 2018-11-15 ENCOUNTER — HOME CARE VISIT (OUTPATIENT)
Dept: HOME HEALTH SERVICES | Facility: HOME HEALTHCARE | Age: 68
End: 2018-11-15
Payer: MEDICARE

## 2018-11-15 ENCOUNTER — ANTICOAGULATION MONITORING (OUTPATIENT)
Dept: VASCULAR LAB | Facility: MEDICAL CENTER | Age: 68
End: 2018-11-15

## 2018-11-15 VITALS
OXYGEN SATURATION: 94 % | DIASTOLIC BLOOD PRESSURE: 71 MMHG | RESPIRATION RATE: 17 BRPM | HEART RATE: 65 BPM | SYSTOLIC BLOOD PRESSURE: 131 MMHG | TEMPERATURE: 98.7 F

## 2018-11-15 VITALS
OXYGEN SATURATION: 99 % | SYSTOLIC BLOOD PRESSURE: 131 MMHG | HEART RATE: 65 BPM | RESPIRATION RATE: 17 BRPM | DIASTOLIC BLOOD PRESSURE: 71 MMHG | TEMPERATURE: 98.7 F

## 2018-11-15 DIAGNOSIS — Z79.01 ANTICOAGULATED ON WARFARIN: ICD-10-CM

## 2018-11-15 DIAGNOSIS — I48.91 ATRIAL FIBRILLATION, UNSPECIFIED TYPE (HCC): ICD-10-CM

## 2018-11-15 LAB — INR PPP: 2.9 (ref 2–3.5)

## 2018-11-15 PROCEDURE — G0152 HHCP-SERV OF OT,EA 15 MIN: HCPCS

## 2018-11-15 PROCEDURE — G0299 HHS/HOSPICE OF RN EA 15 MIN: HCPCS

## 2018-11-15 ASSESSMENT — ENCOUNTER SYMPTOMS
DEBILITATING PAIN: 1
POOR JUDGMENT: 1
VOMITING: DENIES
NAUSEA: DENIES

## 2018-11-15 NOTE — PROGRESS NOTES
Anticoagulation Summary  As of 11/15/2018    INR goal:   2.0-3.0   TTR:   53.9 % (3 y)   Today's INR:   2.9   Warfarin maintenance plan:   15 mg (5 mg x 3) on Sun, Thu; 10 mg (5 mg x 2) all other days   Weekly warfarin total:   80 mg   Plan last modified:   Korina Diggs PharmD (11/8/2018)   Next INR check:   11/20/2018   Target end date:   Indefinite    Indications    CVA (cerebral vascular accident) (HCC) [I63.9]  Atrial fibrillation [427.31] [I48.91]  History of CVA (cerebrovascular accident) (Resolved) [Z86.73]  Anticoagulated on warfarin [Z79.01]             Anticoagulation Episode Summary     INR check location:   [facility]    Preferred lab:       Send INR reminders to:       Comments:   call pt multiple times, he has a hard time getting to his phone in time and has no VM set up      Anticoagulation Care Providers     Provider Role Specialty Phone number    Catrachito Sterling D.O. Referring Internal Medicine [phone]    [facility] Responsible  [phone]    BARBRA Araujo.P.N. Responsible Cardiology [phone]    BRIT QuirozP.NElian Responsible Cardiology [phone]        Anticoagulation Patient Findings    Spoke to patient on the phone.   INR  therapeutic.   Denies signs/symptoms of bleeding and/or thrombosis.   Denies changes to diet or medications.   Follow up appointment in 1 week(s).    Continue weekly warfarin dose as noted      Torres Sena, PharmD

## 2018-11-16 VITALS
SYSTOLIC BLOOD PRESSURE: 140 MMHG | TEMPERATURE: 98.7 F | RESPIRATION RATE: 18 BRPM | OXYGEN SATURATION: 98 % | HEART RATE: 59 BPM | DIASTOLIC BLOOD PRESSURE: 82 MMHG

## 2018-11-19 ENCOUNTER — HOME CARE VISIT (OUTPATIENT)
Dept: HOME HEALTH SERVICES | Facility: HOME HEALTHCARE | Age: 68
End: 2018-11-19
Payer: MEDICARE

## 2018-11-19 VITALS
TEMPERATURE: 98.9 F | OXYGEN SATURATION: 98 % | RESPIRATION RATE: 18 BRPM | DIASTOLIC BLOOD PRESSURE: 81 MMHG | SYSTOLIC BLOOD PRESSURE: 120 MMHG | HEART RATE: 58 BPM

## 2018-11-19 PROCEDURE — G0152 HHCP-SERV OF OT,EA 15 MIN: HCPCS

## 2018-11-19 SDOH — ECONOMIC STABILITY: HOUSING INSECURITY: HOME SAFETY: LIKELY STILL REQUIRE SOME LEVEL OF SUPERVISION FOR SAFETY AND CONSISTENT MEDICATION MANAGEMENT

## 2018-11-19 SDOH — ECONOMIC STABILITY: HOUSING INSECURITY
HOME SAFETY: TO APPTS WITHOUT PHYSICAL ASSIST OF ANOTHER PERSON AND HIS SON IS NOT AVAILABLE DURING THE DAY; PT CONTINUES TO TALK ABOUT PLANS TO RETURN TO HIS HOME NORTH OF RENO NEXT MONTH, AND SAYS HIS SON WILL BE WORKING TO FIX THE HEATING IN THE HOME; PT WILL

## 2018-11-19 SDOH — ECONOMIC STABILITY: HOUSING INSECURITY
HOME SAFETY: PT CONTINUES TO BE LIMITED BY THIS APT ENVIRONMENT WITH SON NOT YET ASSISTING TO FOLLOW THROUGH WITH GETTING APPROVAL BY APT MANAGER FOR ADDING SAFETY GRAB BAR IN BATHROOM OR OUTDOOR RAIL AT STEPS;  PT IS NOT ABLE TO GET OUT TO THE CURB FOR TRANSPORT

## 2018-11-19 ASSESSMENT — ENCOUNTER SYMPTOMS
DEBILITATING PAIN: 1
POOR JUDGMENT: 1

## 2018-11-19 ASSESSMENT — ACTIVITIES OF DAILY LIVING (ADL)
TOILETING_ASSISTANCE: 0
BATHING_ASSISTIVE_EQUIPMENT_USED: TUB BENCH, HHSH
TELEPHONE_ASSISTANCE: 0
ORAL_CARE_ASSISTANCE: 0
HOUSEKEEPING_ASSISTANCE: 6
DRESSING_LB_ASSISTANCE: 2
MEAL_PREP_ASSISTANCE: 2
EATING_ASSISTANCE: 0
DRESSING_UB_ASSISTANCE: 0
GROOMING_ASSISTANCE: 0
HOUSEKEEPING_EQUIPMENT_USED: FWW
LAUNDRY_ASSISTANCE: 6
TOILETING_EQUIPMENT_NEEDED: GRAB BAR
TRANSPORTATION_ASSISTANCE: 6
SHOPPING_ASSISTANCE: 6

## 2018-11-20 ENCOUNTER — ANTICOAGULATION MONITORING (OUTPATIENT)
Dept: VASCULAR LAB | Facility: MEDICAL CENTER | Age: 68
End: 2018-11-20

## 2018-11-20 ENCOUNTER — HOME CARE VISIT (OUTPATIENT)
Dept: HOME HEALTH SERVICES | Facility: HOME HEALTHCARE | Age: 68
End: 2018-11-20
Payer: MEDICARE

## 2018-11-20 VITALS
RESPIRATION RATE: 16 BRPM | HEART RATE: 51 BPM | SYSTOLIC BLOOD PRESSURE: 132 MMHG | OXYGEN SATURATION: 95 % | DIASTOLIC BLOOD PRESSURE: 74 MMHG | TEMPERATURE: 97.2 F

## 2018-11-20 VITALS
HEART RATE: 51 BPM | RESPIRATION RATE: 16 BRPM | OXYGEN SATURATION: 95 % | TEMPERATURE: 97.2 F | SYSTOLIC BLOOD PRESSURE: 132 MMHG | DIASTOLIC BLOOD PRESSURE: 74 MMHG

## 2018-11-20 DIAGNOSIS — I82.90 DEEP VEIN THROMBOSIS (DVT) OF NON-EXTREMITY VEIN, UNSPECIFIED CHRONICITY: ICD-10-CM

## 2018-11-20 DIAGNOSIS — Z79.01 ANTICOAGULATED ON WARFARIN: ICD-10-CM

## 2018-11-20 LAB — INR PPP: 3.3 (ref 2–3.5)

## 2018-11-20 PROCEDURE — 6650331 HCR  COAGCHECK STRIPS

## 2018-11-20 PROCEDURE — G0151 HHCP-SERV OF PT,EA 15 MIN: HCPCS

## 2018-11-20 PROCEDURE — G0299 HHS/HOSPICE OF RN EA 15 MIN: HCPCS

## 2018-11-20 ASSESSMENT — ENCOUNTER SYMPTOMS
DIFFICULTY THINKING: 1
VOMITING: DENIES
NAUSEA: DENIES

## 2018-11-20 NOTE — PROGRESS NOTES
Anticoagulation Summary  As of 11/20/2018    INR goal:   2.0-3.0   TTR:   53.7 % (3 y)   Today's INR:   3.3!   Warfarin maintenance plan:   15 mg (5 mg x 3) on Sun, Thu; 10 mg (5 mg x 2) all other days   Weekly warfarin total:   80 mg   Plan last modified:   Korina Diggs PharmD (11/8/2018)   Next INR check:   11/26/2018   Target end date:   Indefinite    Indications    CVA (cerebral vascular accident) (HCC) [I63.9]  Atrial fibrillation [427.31] [I48.91]  History of CVA (cerebrovascular accident) (Resolved) [Z86.73]  Anticoagulated on warfarin [Z79.01]             Anticoagulation Episode Summary     INR check location:   [facility]    Preferred lab:       Send INR reminders to:       Comments:   call pt multiple times, he has a hard time getting to his phone in time and has no VM set up      Anticoagulation Care Providers     Provider Role Specialty Phone number    Catrachito Sterling D.O. Referring Internal Medicine [phone]    [facility] Responsible  [phone]    Liseth Doe A.P.N. Responsible Cardiology [phone]    Gi Cadena A.P.N. Responsible Cardiology [phone]        Anticoagulation Patient Findings      Spoke to patient on the phone.   INR  supra-therapeutic.   Denies signs/symptoms of bleeding and/or thrombosis.   Denies changes to diet or medications.   Follow up appointment in 1 week(s).    5mg today then continue weekly warfarin dose as noted      Torres Snea, PharmD

## 2018-11-21 ENCOUNTER — OFFICE VISIT (OUTPATIENT)
Dept: CARDIOLOGY | Facility: MEDICAL CENTER | Age: 68
End: 2018-11-21
Payer: MEDICARE

## 2018-11-21 VITALS
OXYGEN SATURATION: 93 % | WEIGHT: 252 LBS | DIASTOLIC BLOOD PRESSURE: 80 MMHG | BODY MASS INDEX: 31.33 KG/M2 | SYSTOLIC BLOOD PRESSURE: 160 MMHG | HEART RATE: 52 BPM | HEIGHT: 75 IN

## 2018-11-21 DIAGNOSIS — E78.5 DYSLIPIDEMIA: ICD-10-CM

## 2018-11-21 DIAGNOSIS — G47.33 OSA TREATED WITH BIPAP: ICD-10-CM

## 2018-11-21 DIAGNOSIS — E11.42 DIABETIC POLYNEUROPATHY ASSOCIATED WITH TYPE 2 DIABETES MELLITUS (HCC): ICD-10-CM

## 2018-11-21 DIAGNOSIS — I10 ESSENTIAL HYPERTENSION: ICD-10-CM

## 2018-11-21 DIAGNOSIS — E11.21 DIABETIC NEPHROPATHY ASSOCIATED WITH TYPE 2 DIABETES MELLITUS (HCC): ICD-10-CM

## 2018-11-21 DIAGNOSIS — M48.02 CERVICAL STENOSIS OF SPINE: ICD-10-CM

## 2018-11-21 DIAGNOSIS — N18.1 CKD (CHRONIC KIDNEY DISEASE), STAGE I: ICD-10-CM

## 2018-11-21 DIAGNOSIS — Z79.2 CHRONIC ANTIBIOTIC SUPPRESSION: Chronic | ICD-10-CM

## 2018-11-21 DIAGNOSIS — Z79.899 HIGH RISK MEDICATION USE: ICD-10-CM

## 2018-11-21 DIAGNOSIS — Z79.01 ANTICOAGULATED ON WARFARIN: ICD-10-CM

## 2018-11-21 DIAGNOSIS — I50.20 ACC/AHA STAGE C SYSTOLIC CONGESTIVE HEART FAILURE (HCC): ICD-10-CM

## 2018-11-21 DIAGNOSIS — Z79.4 CONTROLLED TYPE 2 DIABETES MELLITUS WITH DIABETIC POLYNEUROPATHY, WITH LONG-TERM CURRENT USE OF INSULIN (HCC): ICD-10-CM

## 2018-11-21 DIAGNOSIS — E11.42 CONTROLLED TYPE 2 DIABETES MELLITUS WITH DIABETIC POLYNEUROPATHY, WITH LONG-TERM CURRENT USE OF INSULIN (HCC): ICD-10-CM

## 2018-11-21 PROCEDURE — 99205 OFFICE O/P NEW HI 60 MIN: CPT | Performed by: INTERNAL MEDICINE

## 2018-11-21 RX ORDER — METOPROLOL SUCCINATE 200 MG/1
200 TABLET, EXTENDED RELEASE ORAL DAILY
Qty: 30 TAB | Refills: 11 | Status: SHIPPED | OUTPATIENT
Start: 2018-11-21 | End: 2020-01-01 | Stop reason: SDUPTHER

## 2018-11-21 RX ORDER — ROSUVASTATIN CALCIUM 10 MG/1
10 TABLET, COATED ORAL EVERY EVENING
Qty: 30 TAB | Refills: 11 | Status: SHIPPED | OUTPATIENT
Start: 2018-11-21 | End: 2019-01-01

## 2018-11-21 RX ORDER — TORSEMIDE 10 MG/1
10 TABLET ORAL 2 TIMES DAILY
Qty: 60 TAB | Refills: 11 | Status: SHIPPED
Start: 2018-11-21 | End: 2020-01-01

## 2018-11-21 ASSESSMENT — ENCOUNTER SYMPTOMS
EYES NEGATIVE: 1
MUSCULOSKELETAL NEGATIVE: 1
STRIDOR: 0
GASTROINTESTINAL NEGATIVE: 1
CARDIOVASCULAR NEGATIVE: 1
LOSS OF CONSCIOUSNESS: 0
NEUROLOGICAL NEGATIVE: 1
PALPITATIONS: 0
ORTHOPNEA: 0
SORE THROAT: 0
WHEEZING: 0
PND: 0
FEVER: 0
HEMOPTYSIS: 0
CONSTITUTIONAL NEGATIVE: 1
RESPIRATORY NEGATIVE: 1
WEAKNESS: 0
CHILLS: 0
DIZZINESS: 0
CLAUDICATION: 0
COUGH: 0
SPUTUM PRODUCTION: 0
SHORTNESS OF BREATH: 0
BRUISES/BLEEDS EASILY: 0

## 2018-11-21 NOTE — PROGRESS NOTES
"  Education Narrative  Reviewed anatomy and physiology of heart failure with patient and Son. Went over heart failure worksheet and patient's individual HF diagnosis, EF, risk factors, general medication classes and indications, as well as personal goals.  Goals: Patient's primary goal is: Family was very dismissive during education, patient frequently eats meals on wheels and salty foods. Family stated they cannot help him weigh every day, physical therapy works with patient 2-4 times per week but patient is still quite debilitated. No goals were verbalized during this visit.      Discussed daily weights, sodium restriction, worsening signs and symptoms to report to physician, heart medications, and importance of adherence to medication regimen. Emphasized recommendation from AHA/AAHFN to keep daily sodium intake between 1500mg-2000mg.      Reviewed dietary handouts, advanced care planning classes, and advance directive planning handout. Discussed dietary considerations and reviewed Seven Day Heart Healthy Meal Plan by [employer].     Invited patient and family members/friends to HF support group and encouraged patient to call Heart Failure clinic during normal business hours with any questions.  Heart Failure program card with number given to patient.           Patient states full understanding of all information given.     OP Heart Failure  Vitals     Weight: 114.3 kg (252 lb)        Height: 190.5 cm (6' 3\")  BMI (Calculated): 31.5  Blood Pressure : 160/80  Pulse: (!) 52    System Assessment     ACC/AHA HF Stage: C    Smoking Hx  Have you Ever Smoked: Yes  Have you Smoked in the Last 12 Mos: No     Confirm Quit Date: 01/01/74       Alcohol Hx  Do you Drink?: No          Illicit Drug Hx  Illicit Drug History: No    Social Hx  Social History: Lives with Children  Level of Support: Good  Advance Directives:  (Family stated he has them, requested them to be brought in)    Education  Symptoms: Needs " Reinforcement  Weighing: Needs Reinforcement  Weight Gain Response: Needs Reinforcement   Recording Data: Needs Reinforcement  Teach Back Failures: Symptoms Occurred and Patient did not Respond as Instructed  Compliance: Changes in Diet       Medications  Medication Reconciliation : Complete  Medication Counseling Provided: Verbalizes Understanding  Able to Accurately Identify Medication Indications: Some  Medication Discrepancies: None  Is Patient on an Evidence Based Beta Blocker: Yes  Is Patient on ACE-1 or ARB: Yes    Dietary Assessment  Food Labels: Needs Reinforcement  Foods High in Sodium: Needs Reinforcement  Daily Sodium Intake: Needs Reinforcement  Diet: Needs Reinforcement  Food Preparation: Needs Reinforcement  Eating Out Plan: Needs Reinforcement  Healthier Options: Needs Reinforcement  Fluid Restriction: Not Applicable    MN Living with Heart Failure  Swelling in Ankles or Legs:  (son refused to fill out)       6 Minute Walk Test      In wheelchair

## 2018-11-21 NOTE — LETTER
[facility]   [address] [facility] [address]  [place] [address]  Phone: [phone]  Fax: [phone]              Joel Ma  1950    Encounter Date: 11/21/2018    Boby Pulliam M.D.          PROGRESS NOTE:  Chief Complaint   Patient presents with   • Congestive Heart Failure     NP       Subjective:   Joel Ma is a 68 y.o. male who presents today as a new consultation for heart failure with reduced ejection fraction. 68-year-old male with a long-standing history of diabetes was recently in the hospital for stents to his RCA and LAD.  He had an echocardiogram showing an EF of 35-40%.  He has stage II chronic kidney disease.  Is been taking Lasix once per day.  He has an allergy to statins because of myalgias.  He is mostly limited by peripheral weakness but not by other cardiac symptoms.  Is not have any chest pain or shortness of breath.  In terms of his statin intolerance he is never tried once or twice weekly dosing.  He is currently not taking Zetia.    Past Medical History:   Diagnosis Date   • Anesthesia     low O2 sat   • Arrhythmia     a-fib   • arthritis 6/17/2011    thumb, fingers   • Arthritis     hip   • Backpain    • CAD (coronary artery disease)     MIRELLA to distal RCA and prox LAD   • Cataract     left eye surgery   • Dental disorder     full dentures   • Diabetes     insulin, Dr Oconnor, his APN   • High cholesterol    • Hypertension    • MRSA (methicillin resistant Staphylococcus aureus)     history of, nothing current 2016, on clindamycin for rest of life per patient   • Pain 05-03-13    shoulders, hip, right knee, 7/10   • Pneumonia 2002   • Renal disorder     stage 2   • Sleep apnea     bipap   • Stroke (HCC)     2/1/2007, 4/1/2007, right sided weakness; balance disturbance, memory problems   • Thyroid mass 7/30/2009    benign lump   • Ventricular ectopy 6/17/2011     Past Surgical History:   Procedure Laterality Date   •  CERVICAL FUSION POSTERIOR  7/13/2018    Procedure: CERVICAL FUSION POSTERIOR/ C2-4;  Surgeon: Boby Marsh M.D.;  Location: SURGERY [facility];  Service: Neurosurgery   • CERVICAL LAMINECTOMY POSTERIOR  7/13/2018    Procedure: CERVICAL LAMINECTOMY POSTERIOR/ C2-3, C5-6;  Surgeon: Boby Marsh M.D.;  Location: SURGERY [facility];  Service: Neurosurgery   • LUMBAR LAMINECTOMY DISKECTOMY  7/13/2018    Procedure: LUMBAR LAMINECTOMY DISKECTOMY/ L2-5 LAMI;  Surgeon: Boby Marsh M.D.;  Location: [facility];  Service: Neurosurgery   • CERVICAL DISK AND FUSION ANTERIOR  8/31/2016    Procedure: CERVICAL DISK AND FUSION ANTERIOR C3-7 ;  Surgeon: Alhaji Jaffe M.D.;  Location: [facility];  Service:    • CATARACT PHACO WITH IOL  5/8/2013    Performed by Mame Rehman M.D. at SURGERY SAME DAY [facility]   • IRRIGATION & DEBRIDEMENT ORTHO  11/13/2012    Performed by Gordon Cota M.D. at [facility]   • KNEE REVISION TOTAL  11/13/2012    Performed by Gordon Cota M.D. at [facility]   • IRRIGATION & DEBRIDEMENT ORTHO  11/2/2012    Performed by Gordon Cota M.D. at [facility]   • IRRIGATION & DEBRIDEMENT ORTHO Left 10/29/2012    Procedure: left shoulder, with drain;  Surgeon: Gordon Cota M.D.;  Location: [facility];  Service:    • INCISION AND DRAINAGE ORTHOPEDIC Right 10/29/2012    Procedure: Right Total Knee I and D and Liner Exchange, with drain;  Surgeon: Gordon Cota M.D.;  Location: [facility];  Service:    • IRRIGATION & DEBRIDEMENT ORTHO  3/13/2012    Performed by KAMALJIT SHI at [facility]   • KNEE REVISION TOTAL  3/13/2012    Performed by KAMALJIT SHI at [facility]   • TENDON REPAIR  3/13/2012    Performed by KAMALJIT SHI at [facility]   • KNEE ARTHROPLASTY TOTAL  1/11/2012    Right; Performed by KAMALJIT SHI at [facility]  ORS   • TOE AMPUTATION  7/22/2011    Performed by EVELYN JANE at SURGERY [facility] ORS   • ELBOW ARTHROTOMY  2008    right   • LUMBAR FUSION POSTERIOR  1979   • CARDIAC CATH     • FOOT SURGERY      partial amputation great toe   • FOOT SURGERY      toe surgery left foot   • OTHER      left eye cataract   • OTHER NEUROLOGICAL SURG      neck fusion   • PB KNEE SCOPE,SINGLE MENISECTOMY      right knee     Family History   Problem Relation Age of Onset   • Diabetes Mother    • Cancer Father         lung cancer   • Heart Disease Father         triple bypass   • Diabetes Unknown    • Hypertension Unknown    • Cancer Unknown      Social History     Social History   • Marital status: Single     Spouse name: N/A   • Number of children: N/A   • Years of education: N/A     Occupational History   • Not on file.     Social History Main Topics   • Smoking status: Former Smoker     Packs/day: 4.00     Years: 0.50     Types: Cigarettes     Quit date: 1/1/1974   • Smokeless tobacco: Never Used   • Alcohol use No   • Drug use: No   • Sexual activity: Yes     Other Topics Concern   • Not on file     Social History Narrative    ** Merged History Encounter **          Allergies   Allergen Reactions   • Demerol      Makes me stop breathing.  Doesn't like because it makes him high   • Statins [Hmg-Coa-R Inhibitors] Myalgia     Rxn - ongoing       Outpatient Encounter Prescriptions as of 11/21/2018   Medication Sig Dispense Refill   • metoprolol (TOPROL-XL) 200 MG XL tablet Take 1 Tab by mouth every day. 30 Tab 11   • torsemide (DEMADEX) 10 MG tablet Take 1 Tab by mouth 2 times a day. 60 Tab 11   • rosuvastatin (CRESTOR) 10 MG Tab Take 1 Tab by mouth every evening. Take Monday and Thursdays 30 Tab 11   • clopidogrel (PLAVIX) 75 MG Tab Take 1 Tab by mouth every day. 30 Tab 11   • clindamycin (CLEOCIN) 300 MG Cap TAKE 1 CAPSULE BY MOUTH TWICE DAILY 180 Cap 0   • hydrALAZINE (APRESOLINE) 100 MG tablet TAKE ONE TABLET BY MOUTH EVERY  EIGHT HOURS 90 Tab 1   • metformin (GLUCOPHAGE) 1000 MG tablet Take 1 Tab by mouth 2 times a day, with meals. 60 Tab 11   • warfarin (COUMADIN) 10 MG Tab Take 1 Tab by mouth every day. 30 Tab 3   • potassium chloride (KLOR-CON) 20 MEQ Pack Take 1 Packet by mouth every day. 30 Packet 1   • amLODIPine (NORVASC) 10 MG Tab Take 1 Tab by mouth every day. 30 Tab 3   • gabapentin (NEURONTIN) 300 MG Cap Take 1-2 Caps by mouth 3 times a day. 300mg in AM and noon 600mg in PM 90 Cap 3   • Insulin Pen Needle (ADVOCATE INSULIN PEN NEEDLES) 31G X 5 MM Misc Use tid and prn. 100 Each 3   • vitamin D (CHOLECALCIFEROL) 1000 UNIT Tab Take 2 Tabs by mouth every day. 60 Tab 3   • NON SPECIFIED Four wheeled walker with seat - [facility]. 1 Each 0   • insulin glargine (LANTUS) 100 UNIT/ML Solution Inject 30 Units as instructed every evening.     • insulin lispro (HUMALOG) 100 UNIT/ML Solution Inject 2-10 Units as instructed 4 Times a Day,Before Meals and at Bedtime. For -200 Give 2 units  For -250 Give 4 units  For -300 Give 6 units  For -350 Give 8 units  For -400 Give 10 units  <60 or >400 Notify MD     • [DISCONTINUED] lisinopril (PRINIVIL, ZESTRIL) 40 MG tablet TAKE ONE TABLET BY MOUTH ONE TIME DAILY (Patient taking differently: TAKE ONE TABLET BY MOUTh TWICE A DAILY) 90 Tab 2   • [DISCONTINUED] torsemide (DEMADEX) 10 MG tablet Take 1 Tab by mouth every day. 30 Tab 3   • [DISCONTINUED] Metoprolol Tartrate 75 MG Tab Take 75 mg by mouth every 8 hours. 90 Tab 0   • [DISCONTINUED] metFORMIN (GLUCOPHAGE) 500 MG Tab Take 2 mg by mouth 2 times a day.     • [DISCONTINUED] lisinopril (PRINIVIL) 10 MG Tab Take 40 mg by mouth every day.       No facility-administered encounter medications on file as of 11/21/2018.      Review of Systems   Constitutional: Negative.  Negative for chills, fever and malaise/fatigue.   HENT: Negative.  Negative for sore throat.    Eyes: Negative.    Respiratory: Negative.   "Negative for cough, hemoptysis, sputum production, shortness of breath, wheezing and stridor.    Cardiovascular: Negative.  Negative for chest pain, palpitations, orthopnea, claudication, leg swelling and PND.   Gastrointestinal: Negative.    Genitourinary: Negative.    Musculoskeletal: Negative.    Skin: Negative.    Neurological: Negative.  Negative for dizziness, loss of consciousness and weakness.   Endo/Heme/Allergies: Negative.  Does not bruise/bleed easily.   All other systems reviewed and are negative.       Objective:   /80 (BP Location: Left arm, Patient Position: Sitting, BP Cuff Size: Adult)   Pulse (!) 52   Ht 1.905 m (6' 3\")   Wt 114.3 kg (252 lb)   SpO2 93%   BMI 31.50 kg/m²      Physical Exam   Constitutional: He appears well-developed and well-nourished. No distress.   HENT:   Head: Normocephalic and atraumatic.   Right Ear: External ear normal.   Left Ear: External ear normal.   Nose: Nose normal.   Mouth/Throat: No oropharyngeal exudate.   Eyes: Pupils are equal, round, and reactive to light. Conjunctivae and EOM are normal. Right eye exhibits no discharge. Left eye exhibits no discharge. No scleral icterus.   Neck: Neck supple. No JVD present.   Cardiovascular: Normal rate, regular rhythm and intact distal pulses.  Exam reveals no gallop and no friction rub.    No murmur heard.  1+ combined LE edema and lympheddema   Pulmonary/Chest: Effort normal. No stridor. No respiratory distress. He has no wheezes. He has no rales. He exhibits no tenderness.   Abdominal: Soft. He exhibits no distension. There is no guarding.   Musculoskeletal: Normal range of motion. He exhibits no edema, tenderness or deformity.   Neurological: He is alert. He has normal reflexes. He displays normal reflexes. No cranial nerve deficit. He exhibits normal muscle tone. Coordination normal.   Skin: Skin is warm and dry. No rash noted. He is not diaphoretic. No erythema. No pallor.   Psychiatric: He has a normal mood " and affect. His behavior is normal. Judgment and thought content normal.   Nursing note and vitals reviewed.    Cath: 9/6/2018  CORONARY ARTERIOGRAPHY:  1.  Right coronary artery:  The right coronary artery is a large caliber   vessel with significant proximal tortuosity and mid vessel ectasia and gives rise to a   large posterior descending artery and large posterolateral branch.  The distal   right coronary artery has a 95% eccentric stenosis, which is with MARYANNE-3   antegrade flow.  2.  Left main artery:  The left main artery is a very large caliber vessel,   angiographically patent with a 10% distal tapering and bifurcates into a large left  Anterior descending artery and circumflex artery.  3.  Left anterior descending artery:  The left anterior descending artery   gives rise to 2 major diagonal branches.  The proximal left anterior descending   artery has an estimated 70% stenosis.  4.  Circumflex artery:  The circumflex had limited view and is a large   caliber vessel, angiographically patent.     NM: 5/25/2018  NUCLEAR IMAGING INTERPRETATION   Small sized defect of mildly reduced uptake in the apical septal and mid    anteroseptal wall which is more prominent in stress images suggestive of scar      with ischemia.   Moderate sized defect of moderately reduced uptake in the entire inferior    wall which is more prominent in stress images suggestive of mainly scar with    some ischemia.     Normal left ventricular wall motion.  LV ejection fraction = 52%.   Compared to the report of the study dated 8/19/2016, there is now defects in    perfusion and the ejection fraction is low normal, previously 44%.   These findings were communicated to the ordering provider via EPIC at the    time of the study.    Lab Results   Component Value Date/Time    CHOLSTRLTOT 152 07/19/2018 05:26 AM    LDL 96 07/19/2018 05:26 AM    HDL 26 (A) 07/19/2018 05:26 AM    TRIGLYCERIDE 150 (H) 07/19/2018 05:26 AM       Lab Results      Component Value Date/Time    SODIUM 139 09/07/2018 03:36 AM    POTASSIUM 3.5 (L) 09/07/2018 03:36 AM    CHLORIDE 106 09/07/2018 03:36 AM    CO2 24 09/07/2018 03:36 AM    GLUCOSE 154 (H) 09/07/2018 03:36 AM    BUN 22 09/07/2018 03:36 AM    CREATININE 0.97 09/07/2018 03:36 AM    CREATININE 1.21 02/17/2011 07:28 AM    BUNCREATRAT 16 02/17/2011 07:28 AM    GLOMRATE >59 02/17/2011 07:28 AM     Lab Results   Component Value Date/Time    ALKPHOSPHAT 57 09/06/2018 12:40 PM    ASTSGOT 15 09/06/2018 12:40 PM    ALTSGPT 11 09/06/2018 12:40 PM    TBILIRUBIN 0.7 09/06/2018 12:40 PM        Assessment:     1. Anticoagulated on warfarin     2. Cervical stenosis of spine     3. Chronic antibiotic suppression     4. CKD (chronic kidney disease), stage I  metoprolol (TOPROL-XL) 200 MG XL tablet    torsemide (DEMADEX) 10 MG tablet    rosuvastatin (CRESTOR) 10 MG Tab   5. Controlled type 2 diabetes mellitus with diabetic polyneuropathy, with long-term current use of insulin (HCC)  rosuvastatin (CRESTOR) 10 MG Tab   6. Diabetic nephropathy associated with type 2 diabetes mellitus (HCC)  rosuvastatin (CRESTOR) 10 MG Tab   7. Diabetic polyneuropathy associated with type 2 diabetes mellitus (HCC)     8. Dyslipidemia     9. Essential hypertension     10. JANNIE treated with BiPAP     11. ACC/AHA stage C systolic congestive heart failure (HCC)  metoprolol (TOPROL-XL) 200 MG XL tablet    torsemide (DEMADEX) 10 MG tablet    rosuvastatin (CRESTOR) 10 MG Tab   12. High risk medication use         Medical Decision Making:  Today's Assessment / Status / Plan:     68-year-old male with heart failure with reduced ejection fraction from probably a combination of diabetic microvascular disease as well as obstructive epicardial coronary disease.  We will check an echocardiogram.  In the meantime I would like to switch his metoprolol to /day.  I will also make his torsemide 10 mg twice daily.  I have talked to him extensively about the benefits of  statins in the setting of multivessel coronary disease.  We will try Crestor 2 days/week.  He will stop this if he develops any side effects.  I would like to see him back in 4 weeks.  He should be on an ACE inhibitor or an ARB.  It is not clear why he is not taking one right now.    1. CAD s/p stent to LAD, RCA    - cont asa    - cont clpidgrel    - start rosuva 10 twice weekly    2. CHFrEF    - stop metop tartrate    - start metop succinate 200 daily    - cont hydral 100 TID    - increase torsemide to 10 BID    3. CKD    - clinica followup    4. A-fib    - cont warfarin    - stop asa at next visit    5. LE Weakness/pseudostenosis    - defer    Thank for you allowing me to take part in your patient's care, please call should you have any questions or would like to discuss this patient.      Catrachito Sterling, D.O.  [address] [address]  VIA In Basket

## 2018-11-21 NOTE — PROGRESS NOTES
Chief Complaint   Patient presents with   • Congestive Heart Failure     NP       Subjective:   Joel Ma is a 68 y.o. male who presents today as a new consultation for heart failure with reduced ejection fraction. 68-year-old male with a long-standing history of diabetes was recently in the hospital for stents to his RCA and LAD.  He had an echocardiogram showing an EF of 35-40%.  He has stage II chronic kidney disease.  Is been taking Lasix once per day.  He has an allergy to statins because of myalgias.  He is mostly limited by peripheral weakness but not by other cardiac symptoms.  Is not have any chest pain or shortness of breath.  In terms of his statin intolerance he is never tried once or twice weekly dosing.  He is currently not taking Zetia.    Past Medical History:   Diagnosis Date   • Anesthesia     low O2 sat   • Arrhythmia     a-fib   • arthritis 6/17/2011    thumb, fingers   • Arthritis     hip   • Backpain    • CAD (coronary artery disease)     MIRELLA to distal RCA and prox LAD   • Cataract     left eye surgery   • Dental disorder     full dentures   • Diabetes     insulin, Dr Oconnor, his APN   • High cholesterol    • Hypertension    • MRSA (methicillin resistant Staphylococcus aureus)     history of, nothing current 2016, on clindamycin for rest of life per patient   • Pain 05-03-13    shoulders, hip, right knee, 7/10   • Pneumonia 2002   • Renal disorder     stage 2   • Sleep apnea     bipap   • Stroke (HCC)     2/1/2007, 4/1/2007, right sided weakness; balance disturbance, memory problems   • Thyroid mass 7/30/2009    benign lump   • Ventricular ectopy 6/17/2011     Past Surgical History:   Procedure Laterality Date   • CERVICAL FUSION POSTERIOR  7/13/2018    Procedure: CERVICAL FUSION POSTERIOR/ C2-4;  Surgeon: Boby Marsh M.D.;  Location: SURGERY [facility];  Service: Neurosurgery   • CERVICAL LAMINECTOMY POSTERIOR  7/13/2018    Procedure: CERVICAL LAMINECTOMY POSTERIOR/  C2-3, C5-6;  Surgeon: Boby Marsh M.D.;  Location: SURGERY [facility];  Service: Neurosurgery   • LUMBAR LAMINECTOMY DISKECTOMY  7/13/2018    Procedure: LUMBAR LAMINECTOMY DISKECTOMY/ L2-5 LAMI;  Surgeon: Boby Marsh M.D.;  Location: [facility];  Service: Neurosurgery   • CERVICAL DISK AND FUSION ANTERIOR  8/31/2016    Procedure: CERVICAL DISK AND FUSION ANTERIOR C3-7 ;  Surgeon: Alhaji Jaffe M.D.;  Location: [facility];  Service:    • CATARACT PHACO WITH IOL  5/8/2013    Performed by Mame Rehman M.D. at SURGERY SAME DAY [facility]   • IRRIGATION & DEBRIDEMENT ORTHO  11/13/2012    Performed by Gordon Cota M.D. at [facility]   • KNEE REVISION TOTAL  11/13/2012    Performed by Gordon Cota M.D. at [facility]   • IRRIGATION & DEBRIDEMENT ORTHO  11/2/2012    Performed by Gordon Cota M.D. at [facility]   • IRRIGATION & DEBRIDEMENT ORTHO Left 10/29/2012    Procedure: left shoulder, with drain;  Surgeon: Gordon Cota M.D.;  Location: [facility];  Service:    • INCISION AND DRAINAGE ORTHOPEDIC Right 10/29/2012    Procedure: Right Total Knee I and D and Liner Exchange, with drain;  Surgeon: Gordon Cota M.D.;  Location: [facility];  Service:    • IRRIGATION & DEBRIDEMENT ORTHO  3/13/2012    Performed by KAMALJIT SHI at [facility]   • KNEE REVISION TOTAL  3/13/2012    Performed by KAMALJIT SHI at [facility]   • TENDON REPAIR  3/13/2012    Performed by KAMALJIT SHI at [facility]   • KNEE ARTHROPLASTY TOTAL  1/11/2012    Right; Performed by KAMALJIT SHI at [facility]   • TOE AMPUTATION  7/22/2011    Performed by EVELYN JANE at [facility]   • ELBOW ARTHROTOMY  2008    right   • LUMBAR FUSION POSTERIOR  1979   • CARDIAC CATH     • FOOT SURGERY      partial amputation great toe   • FOOT SURGERY      toe surgery  left foot   • OTHER      left eye cataract   • OTHER NEUROLOGICAL SURG      neck fusion   • PB KNEE SCOPE,SINGLE MENISECTOMY      right knee     Family History   Problem Relation Age of Onset   • Diabetes Mother    • Cancer Father         lung cancer   • Heart Disease Father         triple bypass   • Diabetes Unknown    • Hypertension Unknown    • Cancer Unknown      Social History     Social History   • Marital status: Single     Spouse name: N/A   • Number of children: N/A   • Years of education: N/A     Occupational History   • Not on file.     Social History Main Topics   • Smoking status: Former Smoker     Packs/day: 4.00     Years: 0.50     Types: Cigarettes     Quit date: 1/1/1974   • Smokeless tobacco: Never Used   • Alcohol use No   • Drug use: No   • Sexual activity: Yes     Other Topics Concern   • Not on file     Social History Narrative    ** Merged History Encounter **          Allergies   Allergen Reactions   • Demerol      Makes me stop breathing.  Doesn't like because it makes him high   • Statins [Hmg-Coa-R Inhibitors] Myalgia     Rxn - ongoing       Outpatient Encounter Prescriptions as of 11/21/2018   Medication Sig Dispense Refill   • metoprolol (TOPROL-XL) 200 MG XL tablet Take 1 Tab by mouth every day. 30 Tab 11   • torsemide (DEMADEX) 10 MG tablet Take 1 Tab by mouth 2 times a day. 60 Tab 11   • rosuvastatin (CRESTOR) 10 MG Tab Take 1 Tab by mouth every evening. Take Monday and Thursdays 30 Tab 11   • clopidogrel (PLAVIX) 75 MG Tab Take 1 Tab by mouth every day. 30 Tab 11   • clindamycin (CLEOCIN) 300 MG Cap TAKE 1 CAPSULE BY MOUTH TWICE DAILY 180 Cap 0   • hydrALAZINE (APRESOLINE) 100 MG tablet TAKE ONE TABLET BY MOUTH EVERY EIGHT HOURS 90 Tab 1   • metformin (GLUCOPHAGE) 1000 MG tablet Take 1 Tab by mouth 2 times a day, with meals. 60 Tab 11   • warfarin (COUMADIN) 10 MG Tab Take 1 Tab by mouth every day. 30 Tab 3   • potassium chloride (KLOR-CON) 20 MEQ Pack Take 1 Packet by mouth every  day. 30 Packet 1   • amLODIPine (NORVASC) 10 MG Tab Take 1 Tab by mouth every day. 30 Tab 3   • gabapentin (NEURONTIN) 300 MG Cap Take 1-2 Caps by mouth 3 times a day. 300mg in AM and noon 600mg in PM 90 Cap 3   • Insulin Pen Needle (ADVOCATE INSULIN PEN NEEDLES) 31G X 5 MM Misc Use tid and prn. 100 Each 3   • vitamin D (CHOLECALCIFEROL) 1000 UNIT Tab Take 2 Tabs by mouth every day. 60 Tab 3   • NON SPECIFIED Four wheeled walker with seat - [facility]. 1 Each 0   • insulin glargine (LANTUS) 100 UNIT/ML Solution Inject 30 Units as instructed every evening.     • insulin lispro (HUMALOG) 100 UNIT/ML Solution Inject 2-10 Units as instructed 4 Times a Day,Before Meals and at Bedtime. For -200 Give 2 units  For -250 Give 4 units  For -300 Give 6 units  For -350 Give 8 units  For -400 Give 10 units  <60 or >400 Notify MD     • [DISCONTINUED] lisinopril (PRINIVIL, ZESTRIL) 40 MG tablet TAKE ONE TABLET BY MOUTH ONE TIME DAILY (Patient taking differently: TAKE ONE TABLET BY MOUTh TWICE A DAILY) 90 Tab 2   • [DISCONTINUED] torsemide (DEMADEX) 10 MG tablet Take 1 Tab by mouth every day. 30 Tab 3   • [DISCONTINUED] Metoprolol Tartrate 75 MG Tab Take 75 mg by mouth every 8 hours. 90 Tab 0   • [DISCONTINUED] metFORMIN (GLUCOPHAGE) 500 MG Tab Take 2 mg by mouth 2 times a day.     • [DISCONTINUED] lisinopril (PRINIVIL) 10 MG Tab Take 40 mg by mouth every day.       No facility-administered encounter medications on file as of 11/21/2018.      Review of Systems   Constitutional: Negative.  Negative for chills, fever and malaise/fatigue.   HENT: Negative.  Negative for sore throat.    Eyes: Negative.    Respiratory: Negative.  Negative for cough, hemoptysis, sputum production, shortness of breath, wheezing and stridor.    Cardiovascular: Negative.  Negative for chest pain, palpitations, orthopnea, claudication, leg swelling and PND.   Gastrointestinal: Negative.    Genitourinary: Negative.   "  Musculoskeletal: Negative.    Skin: Negative.    Neurological: Negative.  Negative for dizziness, loss of consciousness and weakness.   Endo/Heme/Allergies: Negative.  Does not bruise/bleed easily.   All other systems reviewed and are negative.       Objective:   /80 (BP Location: Left arm, Patient Position: Sitting, BP Cuff Size: Adult)   Pulse (!) 52   Ht 1.905 m (6' 3\")   Wt 114.3 kg (252 lb)   SpO2 93%   BMI 31.50 kg/m²     Physical Exam   Constitutional: He appears well-developed and well-nourished. No distress.   HENT:   Head: Normocephalic and atraumatic.   Right Ear: External ear normal.   Left Ear: External ear normal.   Nose: Nose normal.   Mouth/Throat: No oropharyngeal exudate.   Eyes: Pupils are equal, round, and reactive to light. Conjunctivae and EOM are normal. Right eye exhibits no discharge. Left eye exhibits no discharge. No scleral icterus.   Neck: Neck supple. No JVD present.   Cardiovascular: Normal rate, regular rhythm and intact distal pulses.  Exam reveals no gallop and no friction rub.    No murmur heard.  1+ combined LE edema and lympheddema   Pulmonary/Chest: Effort normal. No stridor. No respiratory distress. He has no wheezes. He has no rales. He exhibits no tenderness.   Abdominal: Soft. He exhibits no distension. There is no guarding.   Musculoskeletal: Normal range of motion. He exhibits no edema, tenderness or deformity.   Neurological: He is alert. He has normal reflexes. He displays normal reflexes. No cranial nerve deficit. He exhibits normal muscle tone. Coordination normal.   Skin: Skin is warm and dry. No rash noted. He is not diaphoretic. No erythema. No pallor.   Psychiatric: He has a normal mood and affect. His behavior is normal. Judgment and thought content normal.   Nursing note and vitals reviewed.    Cath: 9/6/2018  CORONARY ARTERIOGRAPHY:  1.  Right coronary artery:  The right coronary artery is a large caliber   vessel with significant proximal " tortuosity and mid vessel ectasia and gives rise to a   large posterior descending artery and large posterolateral branch.  The distal   right coronary artery has a 95% eccentric stenosis, which is with MARYANNE-3   antegrade flow.  2.  Left main artery:  The left main artery is a very large caliber vessel,   angiographically patent with a 10% distal tapering and bifurcates into a large left  Anterior descending artery and circumflex artery.  3.  Left anterior descending artery:  The left anterior descending artery   gives rise to 2 major diagonal branches.  The proximal left anterior descending   artery has an estimated 70% stenosis.  4.  Circumflex artery:  The circumflex had limited view and is a large   caliber vessel, angiographically patent.     NM: 5/25/2018  NUCLEAR IMAGING INTERPRETATION   Small sized defect of mildly reduced uptake in the apical septal and mid    anteroseptal wall which is more prominent in stress images suggestive of scar      with ischemia.   Moderate sized defect of moderately reduced uptake in the entire inferior    wall which is more prominent in stress images suggestive of mainly scar with    some ischemia.     Normal left ventricular wall motion.  LV ejection fraction = 52%.   Compared to the report of the study dated 8/19/2016, there is now defects in    perfusion and the ejection fraction is low normal, previously 44%.   These findings were communicated to the ordering provider via EPIC at the    time of the study.    Lab Results   Component Value Date/Time    CHOLSTRLTOT 152 07/19/2018 05:26 AM    LDL 96 07/19/2018 05:26 AM    HDL 26 (A) 07/19/2018 05:26 AM    TRIGLYCERIDE 150 (H) 07/19/2018 05:26 AM       Lab Results   Component Value Date/Time    SODIUM 139 09/07/2018 03:36 AM    POTASSIUM 3.5 (L) 09/07/2018 03:36 AM    CHLORIDE 106 09/07/2018 03:36 AM    CO2 24 09/07/2018 03:36 AM    GLUCOSE 154 (H) 09/07/2018 03:36 AM    BUN 22 09/07/2018 03:36 AM    CREATININE 0.97 09/07/2018  03:36 AM    CREATININE 1.21 02/17/2011 07:28 AM    BUNCREATRAT 16 02/17/2011 07:28 AM    GLOMRATE >59 02/17/2011 07:28 AM     Lab Results   Component Value Date/Time    ALKPHOSPHAT 57 09/06/2018 12:40 PM    ASTSGOT 15 09/06/2018 12:40 PM    ALTSGPT 11 09/06/2018 12:40 PM    TBILIRUBIN 0.7 09/06/2018 12:40 PM        Assessment:     1. Anticoagulated on warfarin     2. Cervical stenosis of spine     3. Chronic antibiotic suppression     4. CKD (chronic kidney disease), stage I  metoprolol (TOPROL-XL) 200 MG XL tablet    torsemide (DEMADEX) 10 MG tablet    rosuvastatin (CRESTOR) 10 MG Tab   5. Controlled type 2 diabetes mellitus with diabetic polyneuropathy, with long-term current use of insulin (HCC)  rosuvastatin (CRESTOR) 10 MG Tab   6. Diabetic nephropathy associated with type 2 diabetes mellitus (HCC)  rosuvastatin (CRESTOR) 10 MG Tab   7. Diabetic polyneuropathy associated with type 2 diabetes mellitus (HCC)     8. Dyslipidemia     9. Essential hypertension     10. JANNIE treated with BiPAP     11. ACC/AHA stage C systolic congestive heart failure (HCC)  metoprolol (TOPROL-XL) 200 MG XL tablet    torsemide (DEMADEX) 10 MG tablet    rosuvastatin (CRESTOR) 10 MG Tab   12. High risk medication use         Medical Decision Making:  Today's Assessment / Status / Plan:     68-year-old male with heart failure with reduced ejection fraction from probably a combination of diabetic microvascular disease as well as obstructive epicardial coronary disease.  We will check an echocardiogram.  In the meantime I would like to switch his metoprolol to /day.  I will also make his torsemide 10 mg twice daily.  I have talked to him extensively about the benefits of statins in the setting of multivessel coronary disease.  We will try Crestor 2 days/week.  He will stop this if he develops any side effects.  I would like to see him back in 4 weeks.  He should be on an ACE inhibitor or an ARB.  It is not clear why he is not taking  one right now.    1. CAD s/p stent to LAD, RCA    - cont asa    - cont clpidgrel    - start rosuva 10 twice weekly    2. CHFrEF    - stop metop tartrate    - start metop succinate 200 daily    - cont hydral 100 TID    - increase torsemide to 10 BID    3. CKD    - clinica followup    4. A-fib    - cont warfarin    - stop asa at next visit    5. LE Weakness/pseudostenosis    - defer    Thank for you allowing me to take part in your patient's care, please call should you have any questions or would like to discuss this patient.

## 2018-11-23 ENCOUNTER — HOME CARE VISIT (OUTPATIENT)
Dept: HOME HEALTH SERVICES | Facility: HOME HEALTHCARE | Age: 68
End: 2018-11-23
Payer: MEDICARE

## 2018-11-23 VITALS
TEMPERATURE: 98.6 F | OXYGEN SATURATION: 94 % | HEART RATE: 62 BPM | RESPIRATION RATE: 16 BRPM | DIASTOLIC BLOOD PRESSURE: 82 MMHG | SYSTOLIC BLOOD PRESSURE: 132 MMHG

## 2018-11-23 VITALS
RESPIRATION RATE: 16 BRPM | DIASTOLIC BLOOD PRESSURE: 82 MMHG | SYSTOLIC BLOOD PRESSURE: 132 MMHG | TEMPERATURE: 98.4 F | HEART RATE: 62 BPM | OXYGEN SATURATION: 94 %

## 2018-11-23 PROCEDURE — G0495 RN CARE TRAIN/EDU IN HH: HCPCS

## 2018-11-23 PROCEDURE — G0151 HHCP-SERV OF PT,EA 15 MIN: HCPCS

## 2018-11-23 ASSESSMENT — ENCOUNTER SYMPTOMS
NAUSEA: DENIES
VOMITING: DENIES

## 2018-11-26 ENCOUNTER — APPOINTMENT (OUTPATIENT)
Dept: MEDICAL GROUP | Facility: LAB | Age: 68
End: 2018-11-26
Payer: MEDICARE

## 2018-11-26 ENCOUNTER — HOME CARE VISIT (OUTPATIENT)
Dept: HOME HEALTH SERVICES | Facility: HOME HEALTHCARE | Age: 68
End: 2018-11-26
Payer: MEDICARE

## 2018-11-26 ENCOUNTER — TELEPHONE (OUTPATIENT)
Dept: MEDICAL GROUP | Facility: LAB | Age: 68
End: 2018-11-26

## 2018-11-26 ENCOUNTER — ANTICOAGULATION MONITORING (OUTPATIENT)
Dept: VASCULAR LAB | Facility: MEDICAL CENTER | Age: 68
End: 2018-11-26

## 2018-11-26 VITALS
RESPIRATION RATE: 16 BRPM | OXYGEN SATURATION: 98 % | SYSTOLIC BLOOD PRESSURE: 132 MMHG | HEART RATE: 80 BPM | TEMPERATURE: 97.5 F | DIASTOLIC BLOOD PRESSURE: 74 MMHG

## 2018-11-26 DIAGNOSIS — I48.91 ATRIAL FIBRILLATION, UNSPECIFIED TYPE (HCC): ICD-10-CM

## 2018-11-26 DIAGNOSIS — J06.9 ACUTE UPPER RESPIRATORY INFECTION, UNSPECIFIED: ICD-10-CM

## 2018-11-26 DIAGNOSIS — Z79.01 ANTICOAGULATED ON WARFARIN: ICD-10-CM

## 2018-11-26 LAB — INR PPP: 3.3 (ref 2–3.5)

## 2018-11-26 PROCEDURE — G0299 HHS/HOSPICE OF RN EA 15 MIN: HCPCS

## 2018-11-26 PROCEDURE — 6650331 HCR  COAGCHECK STRIPS

## 2018-11-26 RX ORDER — AZITHROMYCIN 250 MG/1
TABLET, FILM COATED ORAL
Qty: 6 TAB | Refills: 0 | Status: SHIPPED | OUTPATIENT
Start: 2018-11-26 | End: 2018-12-14

## 2018-11-26 ASSESSMENT — ENCOUNTER SYMPTOMS
VOMITING: DENIES
NAUSEA: DENIES

## 2018-11-26 NOTE — TELEPHONE ENCOUNTER
1. Caller Name: [phone] (home)                                           Call Back Number: [phone] (home)        Patient approves a detailed voicemail message: yes    Pt is complaining of chest congestion and a lot of phlegm in his throat.  He is requesting a Zpack Rx, this you've given him in the past and has worked for him.  The symptoms started today.

## 2018-11-26 NOTE — TELEPHONE ENCOUNTER
Telephone call returned to the patient, asked Joel to hold off on antibiotics for another 3 days, if he is getting better I do not want him to take antibiotics.  Prescription was sent to the pharmacy

## 2018-11-26 NOTE — PROGRESS NOTES
Anticoagulation Summary  As of 11/26/2018    INR goal:   2.0-3.0   TTR:   53.4 % (3 y)   Today's INR:   3.3!   Warfarin maintenance plan:   10 mg (5 mg x 2) every day   Weekly warfarin total:   70 mg   Plan last modified:   Torres Sena, PharmD (11/26/2018)   Next INR check:   12/3/2018   Target end date:   Indefinite    Indications    CVA (cerebral vascular accident) (HCC) [I63.9]  Atrial fibrillation [427.31] [I48.91]  History of CVA (cerebrovascular accident) (Resolved) [Z86.73]  Anticoagulated on warfarin [Z79.01]             Anticoagulation Episode Summary     [facility] check location[facility]   [facility]    Preferred lab:       Send INR reminders to:       Comments:   call pt multiple times, he has a hard time getting to his phone in time and has no VM set up      Anticoagulation Care Providers     Provider Role Specialty Phone number    Catrachito Sterling D.O. Referring Internal Medicine [phone]    [facility] Anticoagulation Services Responsible  [phone]    Liseth Doe A.P.N. Responsible Cardiology [phone]    Gi Cadena A.P.NElian Responsible Cardiology [phone]        Anticoagulation Patient Findings      Spoke to patient on the phone.   INR  therapeutic.   Denies signs/symptoms of bleeding and/or thrombosis.   Denies changes to diet or medications.   Follow up appointment in 2 week(s).    Decrease weekly warfarin dose as noted      Torres Sena, PharmD

## 2018-11-27 ENCOUNTER — HOME CARE VISIT (OUTPATIENT)
Dept: HOME HEALTH SERVICES | Facility: HOME HEALTHCARE | Age: 68
End: 2018-11-27
Payer: MEDICARE

## 2018-11-27 VITALS
HEART RATE: 63 BPM | OXYGEN SATURATION: 98 % | SYSTOLIC BLOOD PRESSURE: 148 MMHG | RESPIRATION RATE: 16 BRPM | TEMPERATURE: 97.9 F | DIASTOLIC BLOOD PRESSURE: 76 MMHG

## 2018-11-27 PROCEDURE — G0151 HHCP-SERV OF PT,EA 15 MIN: HCPCS

## 2018-11-28 ENCOUNTER — TELEPHONE (OUTPATIENT)
Dept: CARDIOLOGY | Facility: MEDICAL CENTER | Age: 68
End: 2018-11-28

## 2018-11-28 ENCOUNTER — HOME CARE VISIT (OUTPATIENT)
Dept: HOME HEALTH SERVICES | Facility: HOME HEALTHCARE | Age: 68
End: 2018-11-28
Payer: MEDICARE

## 2018-11-28 VITALS
RESPIRATION RATE: 18 BRPM | HEART RATE: 55 BPM | DIASTOLIC BLOOD PRESSURE: 78 MMHG | TEMPERATURE: 98.7 F | SYSTOLIC BLOOD PRESSURE: 125 MMHG | OXYGEN SATURATION: 99 %

## 2018-11-28 PROCEDURE — G0152 HHCP-SERV OF OT,EA 15 MIN: HCPCS

## 2018-11-28 PROCEDURE — G0155 HHCP-SVS OF CSW,EA 15 MIN: HCPCS

## 2018-11-28 RX ORDER — POTASSIUM CHLORIDE 20 MEQ/1
20 TABLET, EXTENDED RELEASE ORAL DAILY
Qty: 30 TAB | Refills: 11 | Status: SHIPPED
Start: 2018-11-28 | End: 2020-01-01

## 2018-11-28 ASSESSMENT — ENCOUNTER SYMPTOMS
DEBILITATING PAIN: 1
POOR JUDGMENT: 1

## 2018-11-28 NOTE — TELEPHONE ENCOUNTER
----- Message from Mari Stallworth, Med Ass't sent at 11/28/2018  8:34 AM PST -----  Regarding: new RX request  New RX request for:  potassium chloride  The one listed on the med list is for packets and the one that is being requested is for 20meq tablets    Pharmacy is [facility] [address]  Please send new RX to pharmacy for the correct form  Thank you

## 2018-11-29 ENCOUNTER — HOME CARE VISIT (OUTPATIENT)
Dept: HOME HEALTH SERVICES | Facility: HOME HEALTHCARE | Age: 68
End: 2018-11-29
Payer: MEDICARE

## 2018-11-29 PROCEDURE — G0495 RN CARE TRAIN/EDU IN HH: HCPCS

## 2018-11-30 ENCOUNTER — HOME CARE VISIT (OUTPATIENT)
Dept: HOME HEALTH SERVICES | Facility: HOME HEALTHCARE | Age: 68
End: 2018-11-30
Payer: MEDICARE

## 2018-11-30 VITALS
TEMPERATURE: 97.4 F | RESPIRATION RATE: 18 BRPM | SYSTOLIC BLOOD PRESSURE: 122 MMHG | DIASTOLIC BLOOD PRESSURE: 70 MMHG | OXYGEN SATURATION: 99 % | HEART RATE: 62 BPM

## 2018-11-30 SDOH — ECONOMIC STABILITY: HOUSING INSECURITY: UNSAFE APPLIANCES: 0

## 2018-11-30 SDOH — ECONOMIC STABILITY: HOUSING INSECURITY: UNSAFE COOKING RANGE AREA: 0

## 2018-12-03 ENCOUNTER — HOME CARE VISIT (OUTPATIENT)
Dept: HOME HEALTH SERVICES | Facility: HOME HEALTHCARE | Age: 68
End: 2018-12-03
Payer: MEDICARE

## 2018-12-03 DIAGNOSIS — Z79.01 CHRONIC ANTICOAGULATION: ICD-10-CM

## 2018-12-04 ENCOUNTER — HOME CARE VISIT (OUTPATIENT)
Dept: HOME HEALTH SERVICES | Facility: HOME HEALTHCARE | Age: 68
End: 2018-12-04
Payer: MEDICARE

## 2018-12-04 VITALS
SYSTOLIC BLOOD PRESSURE: 112 MMHG | DIASTOLIC BLOOD PRESSURE: 60 MMHG | HEART RATE: 70 BPM | TEMPERATURE: 97.7 F | RESPIRATION RATE: 18 BRPM | OXYGEN SATURATION: 97 %

## 2018-12-04 PROCEDURE — G0299 HHS/HOSPICE OF RN EA 15 MIN: HCPCS

## 2018-12-04 RX ORDER — LISINOPRIL 40 MG/1
TABLET ORAL
COMMUNITY
End: 2018-12-04 | Stop reason: SDUPTHER

## 2018-12-04 RX ORDER — LISINOPRIL 40 MG/1
TABLET ORAL
Qty: 180 TAB | Refills: 3 | Status: SHIPPED | OUTPATIENT
Start: 2018-12-04 | End: 2019-04-16 | Stop reason: SDUPTHER

## 2018-12-04 ASSESSMENT — ACTIVITIES OF DAILY LIVING (ADL): TRANSPORTATION COMMENTS: NEEDS ONE ASSIST TO LEAVE HOME SAFELY

## 2018-12-04 ASSESSMENT — ENCOUNTER SYMPTOMS
NAUSEA: DENIED
VOMITING: DENIED

## 2018-12-05 ENCOUNTER — HOME CARE VISIT (OUTPATIENT)
Dept: HOME HEALTH SERVICES | Facility: HOME HEALTHCARE | Age: 68
End: 2018-12-05
Payer: MEDICARE

## 2018-12-05 ENCOUNTER — ANTICOAGULATION MONITORING (OUTPATIENT)
Dept: VASCULAR LAB | Facility: MEDICAL CENTER | Age: 68
End: 2018-12-05

## 2018-12-05 VITALS
DIASTOLIC BLOOD PRESSURE: 55 MMHG | RESPIRATION RATE: 18 BRPM | SYSTOLIC BLOOD PRESSURE: 142 MMHG | OXYGEN SATURATION: 98 % | HEART RATE: 74 BPM | TEMPERATURE: 96.7 F

## 2018-12-05 DIAGNOSIS — I63.9 CEREBROVASCULAR ACCIDENT (CVA), UNSPECIFIED MECHANISM (HCC): ICD-10-CM

## 2018-12-05 DIAGNOSIS — I48.91 ATRIAL FIBRILLATION, UNSPECIFIED TYPE (HCC): ICD-10-CM

## 2018-12-05 DIAGNOSIS — Z79.01 ANTICOAGULATED ON WARFARIN: ICD-10-CM

## 2018-12-05 LAB — INR PPP: 4.6 (ref 2–3.5)

## 2018-12-05 PROCEDURE — G0299 HHS/HOSPICE OF RN EA 15 MIN: HCPCS

## 2018-12-05 SDOH — ECONOMIC STABILITY: HOUSING INSECURITY: UNSAFE APPLIANCES: 0

## 2018-12-05 SDOH — ECONOMIC STABILITY: HOUSING INSECURITY: UNSAFE COOKING RANGE AREA: 0

## 2018-12-05 ASSESSMENT — ENCOUNTER SYMPTOMS
VOMITING: DENIES
NAUSEA: DENIES

## 2018-12-06 ENCOUNTER — TELEPHONE (OUTPATIENT)
Dept: CARDIOLOGY | Facility: MEDICAL CENTER | Age: 68
End: 2018-12-06

## 2018-12-06 DIAGNOSIS — I48.91 ATRIAL FIBRILLATION, UNSPECIFIED TYPE (HCC): ICD-10-CM

## 2018-12-06 DIAGNOSIS — I50.20 ACC/AHA STAGE C SYSTOLIC CONGESTIVE HEART FAILURE (HCC): ICD-10-CM

## 2018-12-06 DIAGNOSIS — I10 ESSENTIAL HYPERTENSION: ICD-10-CM

## 2018-12-06 NOTE — PROGRESS NOTES
Spoke with pt this morning (12/06/18), pt forgot to hold his warfarin last night and he took his normal 10mg dose. So will have pt HOLD his dose tonight, and then take a reduced dose of 5mg tomorrow, and then take 10mg warfarin daily until INR check on 12/11/18.    Gely Briseno, SeraD

## 2018-12-06 NOTE — PROGRESS NOTES
Anticoagulation Summary  As of 12/5/2018    INR goal:   2.0-3.0   TTR:   53.0 % (3 y)   Today's INR:   4.6!   Warfarin maintenance plan:   10 mg (5 mg x 2) every day   Weekly warfarin total:   70 mg   Plan last modified:   Torres Sena, PharmD (11/26/2018)   Next INR check:   12/11/2018   Target end date:   Indefinite    Indications    CVA (cerebral vascular accident) (HCC) [I63.9]  Atrial fibrillation [427.31] [I48.91]  History of CVA (cerebrovascular accident) (Resolved) [Z86.73]  Anticoagulated on warfarin [Z79.01]             Anticoagulation Episode Summary     [facility] location[facility]   [facility]    Preferred lab:       Send INR reminders to:       Comments:   call pt multiple times, he has a hard time getting to his phone in time and has no VM set up      Anticoagulation Care Providers     Provider Role Specialty Phone number    Catrachito Sterling D.O. Referring Internal Medicine [phone]    [facility] Anticoagulation Services Responsible  [phone]    Liseth Doe A.P.N. Responsible Cardiology [phone]    Gi Cadena A.P.NElian Responsible Cardiology [phone]        Anticoagulation Patient Findings  Patient Findings     Negatives:   Signs/symptoms of thrombosis, Signs/symptoms of bleeding, Laboratory test error suspected, Change in health, Change in alcohol use, Change in activity, Upcoming invasive procedure, Emergency department visit, Upcoming dental procedure, Missed doses, Extra doses, Change in medications, Change in diet/appetite, Hospital admission, Bruising, Other complaints        Spoke with patient today regarding supratherapeutic INR of 4.6.  Patient denies any signs/symptoms of bruising or bleeding or any changes in diet and medications.  Instructed patient to call clinic with any questions or concerns.  Instructed patient to HOLD X 1, decrease tomorrow's dose to 5mg, then resume current warfarin regimen.  Follow up in 5 days, to reduce risk of adverse events related to  this high risk medication,  Warfarin.    Jefferson Brian, PharmD

## 2018-12-07 ENCOUNTER — HOME CARE VISIT (OUTPATIENT)
Dept: HOME HEALTH SERVICES | Facility: HOME HEALTHCARE | Age: 68
End: 2018-12-07
Payer: MEDICARE

## 2018-12-09 ENCOUNTER — HOME CARE VISIT (OUTPATIENT)
Dept: HOME HEALTH SERVICES | Facility: HOME HEALTHCARE | Age: 68
End: 2018-12-09
Payer: MEDICARE

## 2018-12-10 ENCOUNTER — HOME CARE VISIT (OUTPATIENT)
Dept: HOME HEALTH SERVICES | Facility: HOME HEALTHCARE | Age: 68
End: 2018-12-10
Payer: MEDICARE

## 2018-12-10 VITALS
DIASTOLIC BLOOD PRESSURE: 80 MMHG | SYSTOLIC BLOOD PRESSURE: 124 MMHG | OXYGEN SATURATION: 94 % | HEART RATE: 88 BPM | TEMPERATURE: 98.5 F | RESPIRATION RATE: 16 BRPM

## 2018-12-10 PROCEDURE — G0152 HHCP-SERV OF OT,EA 15 MIN: HCPCS

## 2018-12-10 PROCEDURE — G0151 HHCP-SERV OF PT,EA 15 MIN: HCPCS

## 2018-12-11 ENCOUNTER — TELEPHONE (OUTPATIENT)
Dept: MEDICAL GROUP | Facility: LAB | Age: 68
End: 2018-12-11

## 2018-12-11 ENCOUNTER — HOME CARE VISIT (OUTPATIENT)
Dept: HOME HEALTH SERVICES | Facility: HOME HEALTHCARE | Age: 68
End: 2018-12-11
Payer: MEDICARE

## 2018-12-11 ENCOUNTER — ANTICOAGULATION MONITORING (OUTPATIENT)
Dept: VASCULAR LAB | Facility: MEDICAL CENTER | Age: 68
End: 2018-12-11

## 2018-12-11 VITALS
RESPIRATION RATE: 18 BRPM | TEMPERATURE: 98.5 F | OXYGEN SATURATION: 94 % | DIASTOLIC BLOOD PRESSURE: 81 MMHG | HEART RATE: 88 BPM | SYSTOLIC BLOOD PRESSURE: 124 MMHG

## 2018-12-11 VITALS
TEMPERATURE: 97.3 F | DIASTOLIC BLOOD PRESSURE: 76 MMHG | HEART RATE: 73 BPM | SYSTOLIC BLOOD PRESSURE: 128 MMHG | OXYGEN SATURATION: 97 % | RESPIRATION RATE: 16 BRPM

## 2018-12-11 DIAGNOSIS — I48.91 ATRIAL FIBRILLATION, UNSPECIFIED TYPE (HCC): ICD-10-CM

## 2018-12-11 DIAGNOSIS — Z79.01 ANTICOAGULATED ON WARFARIN: ICD-10-CM

## 2018-12-11 LAB — INR PPP: 2 (ref 2–3.5)

## 2018-12-11 PROCEDURE — G0299 HHS/HOSPICE OF RN EA 15 MIN: HCPCS

## 2018-12-11 SDOH — ECONOMIC STABILITY: HOUSING INSECURITY: UNSAFE APPLIANCES: 0

## 2018-12-11 SDOH — ECONOMIC STABILITY: HOUSING INSECURITY: UNSAFE COOKING RANGE AREA: 0

## 2018-12-11 ASSESSMENT — ACTIVITIES OF DAILY LIVING (ADL)
SHOPPING_ASSISTANCE: 6
DRESSING_LB_ASSISTANCE: 0
BATHING_ASSISTANCE: 0
GROOMING_ASSISTANCE: 0
EATING_ASSISTANCE: 0
ORAL_CARE_ASSISTANCE: 0
LAUNDRY_ASSISTANCE: 6
DRESSING_UB_ASSISTANCE: 0
HOUSEKEEPING_ASSISTANCE: 6
TOILETING_ASSISTANCE: 0
MEAL_PREP_ASSISTANCE: 6
TRANSPORTATION_ASSISTANCE: 6
TELEPHONE_ASSISTANCE: 0

## 2018-12-11 ASSESSMENT — ENCOUNTER SYMPTOMS
VOMITING: DENIES
NAUSEA: DENIES
POOR JUDGMENT: 1
DIFFICULTY THINKING: 1

## 2018-12-11 NOTE — PROGRESS NOTES
Anticoagulation Summary  As of 12/11/2018    INR goal:   2.0-3.0   TTR:   52.9 % (3.1 y)   Today's INR:   2.0   Warfarin maintenance plan:   10 mg (5 mg x 2) every day   Weekly warfarin total:   70 mg   Plan last modified:   Torres Sena, PharmD (11/26/2018)   Next INR check:   12/18/2018   Target end date:   Indefinite    Indications    CVA (cerebral vascular accident) (HCC) [I63.9]  Atrial fibrillation [427.31] [I48.91]  History of CVA (cerebrovascular accident) (Resolved) [Z86.73]  Anticoagulated on warfarin [Z79.01]             Anticoagulation Episode Summary     [facility]R check location[facility]   [facility]    Preferred lab:       Send INR reminders to:       Comments:   call pt multiple times, he has a hard time getting to his phone in time and has no VM set up      Anticoagulation Care Providers     Provider Role Specialty Phone number    Catrachito Sterling D.O. Referring Internal Medicine [phone]    [facility] Anticoagulation Services Responsible  [phone]    Liseth Doe A.P.N. Responsible Cardiology [phone]    Gi Cadena A.P.N. Responsible Cardiology [phone]        Anticoagulation Patient Findings    Spoke with Joel to report a therapeutic INR of 2.0. Continue current dosing regimen.  Follow up in 1 weeks, to reduce the risk of adverse events related to this high risk medication, warfarin.    Beth Angulo Clinical Pharmacist  aqpointment has been scheduled pt will be discharged from  12-

## 2018-12-11 NOTE — TELEPHONE ENCOUNTER
ESTABLISHED PATIENT PRE-VISIT PLANNING     Patient was NOT contacted to complete PVP.     Note: Patient will not be contacted if there is no indication to call.     1.  Reviewed notes from the last few office visits within the medical group: Yes    2.  If any orders were placed at last visit or intended to be done for this visit (i.e. 6 mos follow-up), do we have Results/Consult Notes?        •  Labs - Labs ordered, completed on 6/2/17 and results are in chart.       •  Imaging - Imaging was not ordered at last office visit.       •  Referrals - No referrals were ordered at last office visit.    3. Is this appointment scheduled as a Hospital Follow-Up? No    4.  Immunizations were updated in Epic using WebIZ?: Epic matches WebIZ       •  Web Iz Recommendations: FLU, PNEUMOVAX (PPSV23) and ZOSTAVAX (Shingles)    5.  Patient is due for the following Health Maintenance Topics:   Health Maintenance Due   Topic Date Due   • IMM HEP B VACCINE (1 of 3 - Risk 3-dose series) 08/17/1968   • IMM ZOSTER VACCINES (1 of 2) 08/17/1999   • Annual Wellness Visit  10/28/2015   • COLONOSCOPY  06/30/2017   • IMM PNEUMOCOCCAL 65+ (ADULT) LOW/MEDIUM RISK SERIES (2 of 2 - PPSV23) 01/19/2018   • DIABETES MONOFILAMENT / LE EXAM  03/17/2018   • IMM INFLUENZA (1) 09/01/2018   • URINE ACR / MICROALBUMIN  10/19/2018       - Patient has completed PNEUMOVAX (PPSV23), PREVNAR (PCV13)  and TDAP Immunization(s) per WebIZ. Chart has been updated.    6.  MDX printed for Provider? YES    7.  Patient was NOT informed to arrive 15 min prior to their scheduled appointment and bring in their medication bottles.

## 2018-12-12 ENCOUNTER — OFFICE VISIT (OUTPATIENT)
Dept: MEDICAL GROUP | Facility: LAB | Age: 68
End: 2018-12-12
Payer: MEDICARE

## 2018-12-12 ENCOUNTER — HOME CARE VISIT (OUTPATIENT)
Dept: HOME HEALTH SERVICES | Facility: HOME HEALTHCARE | Age: 68
End: 2018-12-12
Payer: MEDICARE

## 2018-12-12 ENCOUNTER — HOSPITAL ENCOUNTER (OUTPATIENT)
Dept: CARDIOLOGY | Facility: MEDICAL CENTER | Age: 68
End: 2018-12-12
Attending: INTERNAL MEDICINE
Payer: MEDICARE

## 2018-12-12 VITALS
HEIGHT: 76 IN | OXYGEN SATURATION: 95 % | HEART RATE: 80 BPM | TEMPERATURE: 98.7 F | WEIGHT: 258 LBS | SYSTOLIC BLOOD PRESSURE: 120 MMHG | DIASTOLIC BLOOD PRESSURE: 68 MMHG | BODY MASS INDEX: 31.42 KG/M2 | RESPIRATION RATE: 14 BRPM

## 2018-12-12 VITALS
DIASTOLIC BLOOD PRESSURE: 80 MMHG | HEART RATE: 78 BPM | TEMPERATURE: 98.4 F | SYSTOLIC BLOOD PRESSURE: 132 MMHG | OXYGEN SATURATION: 93 % | RESPIRATION RATE: 16 BRPM

## 2018-12-12 DIAGNOSIS — S98.131A AMPUTATED TOE OF RIGHT FOOT (HCC): ICD-10-CM

## 2018-12-12 DIAGNOSIS — E11.42 CONTROLLED TYPE 2 DIABETES MELLITUS WITH DIABETIC POLYNEUROPATHY, WITH LONG-TERM CURRENT USE OF INSULIN (HCC): ICD-10-CM

## 2018-12-12 DIAGNOSIS — I50.20 ACC/AHA STAGE C SYSTOLIC CONGESTIVE HEART FAILURE (HCC): ICD-10-CM

## 2018-12-12 DIAGNOSIS — Z79.4 CONTROLLED TYPE 2 DIABETES MELLITUS WITH DIABETIC POLYNEUROPATHY, WITH LONG-TERM CURRENT USE OF INSULIN (HCC): ICD-10-CM

## 2018-12-12 DIAGNOSIS — I48.91 ATRIAL FIBRILLATION, UNSPECIFIED TYPE (HCC): ICD-10-CM

## 2018-12-12 DIAGNOSIS — E11.42 DIABETIC POLYNEUROPATHY ASSOCIATED WITH TYPE 2 DIABETES MELLITUS (HCC): ICD-10-CM

## 2018-12-12 DIAGNOSIS — Z79.01 ANTICOAGULATED ON WARFARIN: ICD-10-CM

## 2018-12-12 DIAGNOSIS — I63.9 CEREBROVASCULAR ACCIDENT (CVA), UNSPECIFIED MECHANISM (HCC): ICD-10-CM

## 2018-12-12 DIAGNOSIS — I10 ESSENTIAL HYPERTENSION: ICD-10-CM

## 2018-12-12 LAB
LV EJECT FRACT  99904: 65
LV EJECT FRACT MOD 2C 99903: 55.27
LV EJECT FRACT MOD 4C 99902: 56.9
LV EJECT FRACT MOD BP 99901: 58.31

## 2018-12-12 PROCEDURE — 93306 TTE W/DOPPLER COMPLETE: CPT | Mod: 26 | Performed by: INTERNAL MEDICINE

## 2018-12-12 PROCEDURE — 99214 OFFICE O/P EST MOD 30 MIN: CPT | Performed by: INTERNAL MEDICINE

## 2018-12-12 PROCEDURE — 93306 TTE W/DOPPLER COMPLETE: CPT

## 2018-12-12 PROCEDURE — G0152 HHCP-SERV OF OT,EA 15 MIN: HCPCS

## 2018-12-12 PROCEDURE — G0151 HHCP-SERV OF PT,EA 15 MIN: HCPCS

## 2018-12-12 NOTE — LETTER
December 12, 2018        Patient: Joel Ma   YOB: 1950   Date of Visit: 12/12/2018           To Whom It May Concern:    It is my medical opinion that Joel Ma is safe to resume driving.    If you have any questions or concerns, please don't hesitate to call.        Sincerely,          Catrachito Sterling D.O.  Board Certified General Internal Medicine.    [facility] - [address] [address] [phone] (Phone)  [phone] (Fax)

## 2018-12-13 ENCOUNTER — HOME CARE VISIT (OUTPATIENT)
Dept: HOME HEALTH SERVICES | Facility: HOME HEALTHCARE | Age: 68
End: 2018-12-13
Payer: MEDICARE

## 2018-12-13 VITALS
TEMPERATURE: 98.4 F | HEART RATE: 78 BPM | SYSTOLIC BLOOD PRESSURE: 130 MMHG | RESPIRATION RATE: 16 BRPM | OXYGEN SATURATION: 93 % | DIASTOLIC BLOOD PRESSURE: 84 MMHG

## 2018-12-13 VITALS
OXYGEN SATURATION: 93 % | TEMPERATURE: 97 F | SYSTOLIC BLOOD PRESSURE: 105 MMHG | DIASTOLIC BLOOD PRESSURE: 63 MMHG | HEART RATE: 83 BPM | RESPIRATION RATE: 16 BRPM

## 2018-12-13 PROCEDURE — G0493 RN CARE EA 15 MIN HH/HOSPICE: HCPCS

## 2018-12-13 SDOH — ECONOMIC STABILITY: HOUSING INSECURITY: UNSAFE COOKING RANGE AREA: 0

## 2018-12-13 SDOH — ECONOMIC STABILITY: HOUSING INSECURITY: UNSAFE APPLIANCES: 0

## 2018-12-13 ASSESSMENT — ENCOUNTER SYMPTOMS
POOR JUDGMENT: 1
DIFFICULTY THINKING: 1

## 2018-12-13 ASSESSMENT — PATIENT HEALTH QUESTIONNAIRE - PHQ9: CLINICAL INTERPRETATION OF PHQ2 SCORE: 0

## 2018-12-13 ASSESSMENT — ACTIVITIES OF DAILY LIVING (ADL)
HOME_HEALTH_OASIS: 00
HOME_HEALTH_OASIS: 01
OASIS_M1830: 00

## 2018-12-13 NOTE — PROGRESS NOTES
CC: Joel Ma is a 68 y.o. male is suffering from   Chief Complaint   Patient presents with   • Hospital Follow-up         SUBJECTIVE:  1. Cerebrovascular accident (CVA), unspecified mechanism (HCC)  Joel is here for follow-up continues to struggle with recovery after undergoing spinal and lumbar spinal surgery in July 2018.  Continues to have lower extremity weakness.    2. Controlled type 2 diabetes mellitus with diabetic polyneuropathy, with long-term current use of insulin (HCC)  Patient with a history of diabetic neuropathy    3. Anticoagulated on warfarin  Patient is currently on warfarin because of atrial fibrillation    4. Atrial fibrillation, unspecified type (HCC)  Stable    5. Diabetic polyneuropathy associated with type 2 diabetes mellitus (HCC)  No change in medical therapy    6. Amputated toe of right foot (HCC)  Secondary to an industrial accident a manhole cover fell on his foot.        Past social, family, history: Single, son accompanies him today  Social History   Substance Use Topics   • Smoking status: Former Smoker     Packs/day: 4.00     Years: 0.50     Types: Cigarettes     Quit date: 1/1/1974   • Smokeless tobacco: Never Used   • Alcohol use No         MEDICATIONS:    Current Outpatient Prescriptions:   •  furosemide (LASIX) 20 MG Tab, Take 10 mg by mouth 2 times a day. Take 1/2 tablet 2x per day, Disp: , Rfl:   •  warfarin (COUMADIN) 5 MG Tab, Take 10 mg by mouth every day., Disp: , Rfl:   •  lisinopril (PRINIVIL, ZESTRIL) 40 MG tablet, lisinopril 40 mg tablet, 1 tab twice daily, Disp: 180 Tab, Rfl: 3  •  potassium chloride SA (KDUR) 20 MEQ Tab CR, Take 1 Tab by mouth every day., Disp: 30 Tab, Rfl: 11  •  metoprolol (TOPROL-XL) 200 MG XL tablet, Take 1 Tab by mouth every day., Disp: 30 Tab, Rfl: 11  •  torsemide (DEMADEX) 10 MG tablet, Take 1 Tab by mouth 2 times a day., Disp: 60 Tab, Rfl: 11  •  rosuvastatin (CRESTOR) 10 MG Tab, Take 1 Tab by mouth every evening. Take Monday and  Thursdays, Disp: 30 Tab, Rfl: 11  •  clopidogrel (PLAVIX) 75 MG Tab, Take 1 Tab by mouth every day., Disp: 30 Tab, Rfl: 11  •  hydrALAZINE (APRESOLINE) 100 MG tablet, TAKE ONE TABLET BY MOUTH EVERY EIGHT HOURS, Disp: 90 Tab, Rfl: 1  •  metformin (GLUCOPHAGE) 1000 MG tablet, Take 1 Tab by mouth 2 times a day, with meals., Disp: 60 Tab, Rfl: 11  •  amLODIPine (NORVASC) 10 MG Tab, Take 1 Tab by mouth every day., Disp: 30 Tab, Rfl: 3  •  gabapentin (NEURONTIN) 300 MG Cap, Take 1-2 Caps by mouth 3 times a day. 300mg in AM and noon 600mg in PM, Disp: 90 Cap, Rfl: 3  •  vitamin D (CHOLECALCIFEROL) 1000 UNIT Tab, Take 2 Tabs by mouth every day., Disp: 60 Tab, Rfl: 3  •  insulin glargine (LANTUS) 100 UNIT/ML Solution, Inject 30 Units as instructed every evening., Disp: , Rfl:   •  insulin lispro (HUMALOG) 100 UNIT/ML Solution, Inject 2-10 Units as instructed 4 Times a Day,Before Meals and at Bedtime. For -200 Give 2 units For -250 Give 4 units For -300 Give 6 units For -350 Give 8 units For -400 Give 10 units <60 or >400 Notify MD, Disp: , Rfl:   •  azithromycin (ZITHROMAX) 250 MG Tab, Two day one then qd x 4., Disp: 6 Tab, Rfl: 0  •  clindamycin (CLEOCIN) 300 MG Cap, TAKE 1 CAPSULE BY MOUTH TWICE DAILY, Disp: 180 Cap, Rfl: 0  •  Insulin Pen Needle (ADVOCATE INSULIN PEN NEEDLES) 31G X 5 MM Misc, Use tid and prn., Disp: 100 Each, Rfl: 3  •  NON SPECIFIED, Four wheeled walker with seat - [facility]., Disp: 1 Each, Rfl: 0    PROBLEMS:  Patient Active Problem List    Diagnosis Date Noted   • BMI 35.0-35.9,adult 09/22/2017     Priority: Medium   • JANNIE treated with BiPAP 04/18/2016     Priority: Medium   • Atrial fibrillation [427.31] 06/24/2015     Priority: Medium   • Anticoagulated on warfarin 04/28/2014     Priority: Medium   • Diabetes mellitus with neurological manifestations, controlled (HCC) 03/05/2012     Priority: Medium   • HTN (hypertension) 08/16/2010     Priority: Medium   •  Dyslipidemia 06/17/2009     Priority: Medium   • CVA (cerebral vascular accident) ([facility]) 06/04/2009     Priority: Medium   • Physical debility 08/18/2018     Priority: Low   • History of total knee replacement 08/18/2018     Priority: Low   • Fungal infection of skin of abdomen 08/18/2018     Priority: Low   • Impaired activities of daily living 07/18/2018     Priority: Low   • Cervical myelopathy ([facility]) 07/18/2018     Priority: Low   • Lumbar stenosis with neurogenic claudication 07/18/2018     Priority: Low   • Pain 07/18/2018     Priority: Low   • Persistent proteinuria 10/13/2017     Priority: Low   • CKD (chronic kidney disease), stage I 05/12/2017     Priority: Low   • Diabetic nephropathy associated with type 2 diabetes mellitus ([facility]) 05/12/2017     Priority: Low   • Chronic use of opiate drugs therapeutic purposes 02/14/2017     Priority: Low   • Other orthopedic aftercare 11/07/2016     Priority: Low   • Cervical stenosis of spine 09/06/2016     Priority: Low   • Dysphagia 09/06/2016     Priority: Low   • Hallucinations 09/06/2016     Priority: Low   • Left knee pain 08/28/2016     Priority: Low   • Diabetic polyneuropathy ([facility]) 05/06/2015     Priority: Low   • Toe amputation status ([facility]) 05/06/2015     Priority: Low   • Risk for falls 10/27/2014     Priority: Low   • Myalgia 05/29/2014     Priority: Low   • BPH (benign prostatic hyperplasia) 04/28/2014     Priority: Low   • Chronic antibiotic suppression 04/28/2014     Priority: Low   • MEDICAL HOME      Priority: Low   • Trigger finger 06/17/2011     Priority: Low   • Thyroid mass 07/30/2009     Priority: Low   • ACC/AHA stage C systolic congestive heart failure ([facility]) 11/21/2018   • High risk medication use 11/21/2018       REVIEW OF SYSTEMS:  Gen.:  No Nausea, Vomiting, fever, Chills.  Heart: No chest pain.  Lungs:  No shortness of Breath.  Psychological: Eleazar unusual Anxiety depression     PHYSICAL EXAM   Constitutional: Alert, cooperative, not in acute  "distress.  Cardiovascular:  Rate Rhythm is irregularly irregular without murmurs rubs clicks.     Thorax & Lungs: Clear to auscultation, no wheezing, rhonchi, or rales  HENT: Normocephalic, Atraumatic.  Eyes: PERRLA, EOMI, Conjunctiva normal.   Neck: Trachia is midline no swelling of the thyroid.   Lymphatic: No lymphadenopathy noted.   Musculoskeletal: Well-healed incision neck lumbar spine  Neurologic: Alert & oriented x 3, cranial nerves II through XII are intact, diminished lower motor function, Normal sensory function, continued lower extremity weakness.   Psychiatric: Affect normal, Judgment normal, Mood normal.     VITAL SIGNS:/68   Pulse 80   Temp 37.1 °C (98.7 °F) (Temporal)   Resp 14   Ht 1.918 m (6' 3.5\")   Wt 117 kg (258 lb)   SpO2 95%   BMI 31.82 kg/m²     Labs: Reviewed    Assessment:                                                     Plan:    1. Cerebrovascular accident (CVA), unspecified mechanism ([facility])  Stable    2. Controlled type 2 diabetes mellitus with diabetic polyneuropathy, with long-term current use of insulin ([facility])  No change in medical therapy    3. Anticoagulated on warfarin  Continue warfarin    4. Atrial fibrillation, unspecified type ([facility])  Atrial fibrillation    5. Diabetic polyneuropathy associated with type 2 diabetes mellitus ([facility])  Continued good diabetic control    6. Amputated toe of right foot ([facility])  Stable        "

## 2018-12-14 ENCOUNTER — OFFICE VISIT (OUTPATIENT)
Dept: CARDIOLOGY | Facility: MEDICAL CENTER | Age: 68
End: 2018-12-14
Payer: MEDICARE

## 2018-12-14 VITALS
HEIGHT: 75 IN | WEIGHT: 260 LBS | OXYGEN SATURATION: 91 % | SYSTOLIC BLOOD PRESSURE: 112 MMHG | DIASTOLIC BLOOD PRESSURE: 64 MMHG | HEART RATE: 60 BPM | BODY MASS INDEX: 32.33 KG/M2

## 2018-12-14 DIAGNOSIS — Z79.01 CHRONIC ANTICOAGULATION: ICD-10-CM

## 2018-12-14 DIAGNOSIS — I50.20 ACC/AHA STAGE C SYSTOLIC CONGESTIVE HEART FAILURE (HCC): ICD-10-CM

## 2018-12-14 DIAGNOSIS — I48.0 PAROXYSMAL ATRIAL FIBRILLATION (HCC): ICD-10-CM

## 2018-12-14 DIAGNOSIS — Z95.5 S/P CORONARY ARTERY STENT PLACEMENT: ICD-10-CM

## 2018-12-14 DIAGNOSIS — I10 ESSENTIAL HYPERTENSION: ICD-10-CM

## 2018-12-14 DIAGNOSIS — Z79.01 ANTICOAGULATED ON WARFARIN: ICD-10-CM

## 2018-12-14 DIAGNOSIS — E78.5 DYSLIPIDEMIA: ICD-10-CM

## 2018-12-14 DIAGNOSIS — I63.9 CEREBROVASCULAR ACCIDENT (CVA), UNSPECIFIED MECHANISM (HCC): ICD-10-CM

## 2018-12-14 DIAGNOSIS — I25.10 CORONARY ARTERY DISEASE INVOLVING NATIVE CORONARY ARTERY OF NATIVE HEART WITHOUT ANGINA PECTORIS: ICD-10-CM

## 2018-12-14 DIAGNOSIS — Z78.9 STATIN INTOLERANCE: ICD-10-CM

## 2018-12-14 DIAGNOSIS — I50.9 HEART FAILURE, NYHA CLASS 2 (HCC): ICD-10-CM

## 2018-12-14 PROCEDURE — 99214 OFFICE O/P EST MOD 30 MIN: CPT | Performed by: NURSE PRACTITIONER

## 2018-12-14 ASSESSMENT — ENCOUNTER SYMPTOMS
PALPITATIONS: 0
ORTHOPNEA: 0
PND: 0
CLAUDICATION: 0
COUGH: 0
ABDOMINAL PAIN: 0
FEVER: 0
MYALGIAS: 1
SHORTNESS OF BREATH: 0
DIZZINESS: 1

## 2018-12-14 NOTE — PROGRESS NOTES
Chief Complaint   Patient presents with   • Hypertension     F/V: 4 WEEK       Subjective:   Joel Ma is a 68 y.o. male who presents today for follow-up on his heart failure, CAD with his son, Chris.    Patient of Dr. Wei.  Patient was seen on 11/21/2018 with Dr. Pulliam in the heart failure clinic.    During his last visit, his medications were adjusted.  His metoprolol was stopped and he was changed over to Toprol-XL at 200 mg daily, torsemide was started at 10 mg twice a day and patient was recommended to take rosuvastatin 10 mg twice a week.    Patient reports he is taking the Toprol-XL, but states he is taking furosemide not torsemide 10 mg twice a day and he is taking rosuvastatin 10 mg twice a week but continues to do have myalgias.    For his symptoms, patient reports his lower extremity edema has improved.  He reports chronic dizziness.  He denies chest pain, palpitations, orthopnea, PND or any shortness of breath.  Patient is not exerting himself much.  He uses a wheelchair mostly to get around and does have a walker.    Patient reports he is not weighing himself at home.  Patient states his scale is out of batteries.    Patient reports statin intolerance due to myalgias.  Patient reports he has tried several statins, including lovastatin, simvastatin, atorvastatin.    Additonally, patient has the following medical problems:    -CAD, MIRELLA x2 to the distal RCA and to the proximal LAD on 9/6/2018    -History of prior stroke    -CKD    -Sleep apnea: Uses CPAP regularly    -Diabetes: Using insulin    -Hypertension: Taking atorvastatin, hydralazine, lisinopril, Toprol-XL    -HLD    -PAF    -Cervical and lumbar spine disease    -Venous insufficiency    Past Medical History:   Diagnosis Date   • Anesthesia     low O2 sat   • Arrhythmia     a-fib   • arthritis 6/17/2011    thumb, fingers   • Arthritis     hip   • Backpain    • CAD (coronary artery disease)     MIRELLA to distal RCA and prox LAD   •  Cataract     left eye surgery   • Dental disorder     full dentures   • Diabetes     insulin, Dr Oconnor, his APN   • High cholesterol    • Hypertension    • MRSA (methicillin resistant Staphylococcus aureus)     history of, nothing current 2016, on clindamycin for rest of life per patient   • Pain 05-03-13    shoulders, hip, right knee, 7/10   • Pneumonia 2002   • Renal disorder     stage 2   • Sleep apnea     bipap   • Stroke (HCC)     2/1/2007, 4/1/2007, right sided weakness; balance disturbance, memory problems   • Thyroid mass 7/30/2009    benign lump   • Ventricular ectopy 6/17/2011     Past Surgical History:   Procedure Laterality Date   • CERVICAL FUSION POSTERIOR  7/13/2018    Procedure: CERVICAL FUSION POSTERIOR/ C2-4;  Surgeon: Boby Marsh M.D.;  Location: SURGERY [facility];  Service: Neurosurgery   • CERVICAL LAMINECTOMY POSTERIOR  7/13/2018    Procedure: CERVICAL LAMINECTOMY POSTERIOR/ C2-3, C5-6;  Surgeon: Boby Marsh M.D.;  Location: SURGERY [facility];  Service: Neurosurgery   • LUMBAR LAMINECTOMY DISKECTOMY  7/13/2018    Procedure: LUMBAR LAMINECTOMY DISKECTOMY/ L2-5 LAMI;  Surgeon: Boby Marsh M.D.;  Location: SURGERY [facility];  Service: Neurosurgery   • CERVICAL DISK AND FUSION ANTERIOR  8/31/2016    Procedure: CERVICAL DISK AND FUSION ANTERIOR C3-7 ;  Surgeon: Alhaji Jaffe M.D.;  Location: SURGERY [facility];  Service:    • CATARACT PHACO WITH IOL  5/8/2013    Performed by Mame Rehman M.D. at SURGERY SAME DAY [facility]   • IRRIGATION & DEBRIDEMENT ORTHO  11/13/2012    Performed by Gordon Cota M.D. at SURGERY [facility]   • KNEE REVISION TOTAL  11/13/2012    Performed by Gordon Cota M.D. at SURGERY [facility]   • IRRIGATION & DEBRIDEMENT ORTHO  11/2/2012    Performed by Gordon Cota M.D. at SURGERY [facility]   • IRRIGATION & DEBRIDEMENT ORTHO Left 10/29/2012    Procedure: left shoulder, with drain;  Surgeon:  Gordon Cota M.D.;  Location: SURGERY [facility];  Service:    • INCISION AND DRAINAGE ORTHOPEDIC Right 10/29/2012    Procedure: Right Total Knee I and D and Liner Exchange, with drain;  Surgeon: Gordon Cota M.D.;  Location: SURGERY [facility];  Service:    • IRRIGATION & DEBRIDEMENT ORTHO  3/13/2012    Performed by KAMALJIT SHI at [facility]   • KNEE REVISION TOTAL  3/13/2012    Performed by KAMALJIT SHI at [facility]   • TENDON REPAIR  3/13/2012    Performed by KAMALJIT SHI at [facility]   • KNEE ARTHROPLASTY TOTAL  1/11/2012    Right; Performed by KAMALJIT SHI at [facility]   • TOE AMPUTATION  7/22/2011    Performed by EVELYN JANE at [facility]   • ELBOW ARTHROTOMY  2008    right   • LUMBAR FUSION POSTERIOR  1979   • CARDIAC CATH     • FOOT SURGERY      partial amputation great toe   • FOOT SURGERY      toe surgery left foot   • OTHER      left eye cataract   • OTHER NEUROLOGICAL SURG      neck fusion   • PB KNEE SCOPE,SINGLE MENISECTOMY      right knee     Family History   Problem Relation Age of Onset   • Diabetes Mother    • Cancer Father         lung cancer   • Heart Disease Father         triple bypass   • Diabetes Unknown    • Hypertension Unknown    • Cancer Unknown      Social History     Social History   • Marital status: Single     Spouse name: N/A   • Number of children: N/A   • Years of education: N/A     Occupational History   • Not on file.     Social History Main Topics   • Smoking status: Former Smoker     Packs/day: 4.00     Years: 0.50     Types: Cigarettes     Quit date: 1/1/1974   • Smokeless tobacco: Never Used   • Alcohol use No   • Drug use: No   • Sexual activity: Yes     Other Topics Concern   • Not on file     Social History Narrative    ** Merged History Encounter **          Allergies   Allergen Reactions   • Demerol      Makes me stop breathing.  Doesn't like because it makes him high   •  Statins [Hmg-Coa-R Inhibitors] Myalgia     Rxn - ongoing       Outpatient Encounter Prescriptions as of 12/14/2018   Medication Sig Dispense Refill   • Evolocumab, REPATHA, 140 MG/ML Solution Auto-injector Inject 1 Each as instructed Once for 1 dose. 1 Each 0   • warfarin (COUMADIN) 5 MG Tab Take 10 mg by mouth every day.     • lisinopril (PRINIVIL, ZESTRIL) 40 MG tablet lisinopril 40 mg tablet, 1 tab twice daily 180 Tab 3   • potassium chloride SA (KDUR) 20 MEQ Tab CR Take 1 Tab by mouth every day. 30 Tab 11   • metoprolol (TOPROL-XL) 200 MG XL tablet Take 1 Tab by mouth every day. 30 Tab 11   • torsemide (DEMADEX) 10 MG tablet Take 1 Tab by mouth 2 times a day. 60 Tab 11   • rosuvastatin (CRESTOR) 10 MG Tab Take 1 Tab by mouth every evening. Take Monday and Thursdays (Patient taking differently: Take 10 mg by mouth. Take Monday and Thursdays) 30 Tab 11   • clopidogrel (PLAVIX) 75 MG Tab Take 1 Tab by mouth every day. 30 Tab 11   • clindamycin (CLEOCIN) 300 MG Cap TAKE 1 CAPSULE BY MOUTH TWICE DAILY 180 Cap 0   • hydrALAZINE (APRESOLINE) 100 MG tablet TAKE ONE TABLET BY MOUTH EVERY EIGHT HOURS 90 Tab 1   • metformin (GLUCOPHAGE) 1000 MG tablet Take 1 Tab by mouth 2 times a day, with meals. 60 Tab 11   • amLODIPine (NORVASC) 10 MG Tab Take 1 Tab by mouth every day. 30 Tab 3   • gabapentin (NEURONTIN) 300 MG Cap Take 1-2 Caps by mouth 3 times a day. 300mg in AM and noon 600mg in PM (Patient taking differently: Take 300-600 mg by mouth 4 times a day. 300mg in AM and noon 600mg in PM ) 90 Cap 3   • Insulin Pen Needle (ADVOCATE INSULIN PEN NEEDLES) 31G X 5 MM Misc Use tid and prn. 100 Each 3   • vitamin D (CHOLECALCIFEROL) 1000 UNIT Tab Take 2 Tabs by mouth every day. (Patient taking differently: Take 1,000 Units by mouth every day.) 60 Tab 3   • NON SPECIFIED Four wheeled walker with seat - [facility]. 1 Each 0   • insulin glargine (LANTUS) 100 UNIT/ML Solution Inject 30 Units as instructed every evening.    "  • insulin lispro (HUMALOG) 100 UNIT/ML Solution Inject 2-10 Units as instructed 4 Times a Day,Before Meals and at Bedtime. For -200 Give 2 units  For -250 Give 4 units  For -300 Give 6 units  For -350 Give 8 units  For -400 Give 10 units  <60 or >400 Notify MD     • [DISCONTINUED] furosemide (LASIX) 20 MG Tab Take 10 mg by mouth 2 times a day. Take 1/2 tablet 2x per day     • [DISCONTINUED] azithromycin (ZITHROMAX) 250 MG Tab Two day one then qd x 4. (Patient not taking: Reported on 12/14/2018) 6 Tab 0     No facility-administered encounter medications on file as of 12/14/2018.      Review of Systems   Constitutional: Negative for fever and malaise/fatigue.   Respiratory: Negative for cough and shortness of breath.    Cardiovascular: Positive for leg swelling. Negative for chest pain, palpitations, orthopnea, claudication and PND.   Gastrointestinal: Negative for abdominal pain.   Musculoskeletal: Positive for myalgias.   Neurological: Positive for dizziness (chronic).   All other systems reviewed and are negative.       Objective:   /64 (BP Location: Left arm, Patient Position: Sitting, BP Cuff Size: Adult)   Pulse 60   Ht 1.905 m (6' 3\")   Wt 117.9 kg (260 lb)   SpO2 91%   BMI 32.50 kg/m²     Physical Exam   Constitutional: He is oriented to person, place, and time. He appears well-developed and well-nourished.   Using a wheelchair and walker   HENT:   Head: Normocephalic and atraumatic.   Eyes: Pupils are equal, round, and reactive to light. EOM are normal.   Neck: Normal range of motion. Neck supple. No JVD present.   Cardiovascular: Normal rate, regular rhythm and normal heart sounds.    Pulmonary/Chest: Effort normal and breath sounds normal. No respiratory distress. He has no wheezes. He has no rales.   Abdominal: Soft. Bowel sounds are normal.   Musculoskeletal: He exhibits edema (Bilateral 2-3+ LE edema).   Neurological: He is alert and oriented to person, place, " and time.   Skin: Skin is warm and dry.   Psychiatric: He has a normal mood and affect. His behavior is normal.   Vitals reviewed.    Lab Results   Component Value Date/Time    CHOLSTRLTOT 152 07/19/2018 05:26 AM    LDL 96 07/19/2018 05:26 AM    HDL 26 (A) 07/19/2018 05:26 AM    TRIGLYCERIDE 150 (H) 07/19/2018 05:26 AM       Lab Results   Component Value Date/Time    SODIUM 139 09/07/2018 03:36 AM    POTASSIUM 3.5 (L) 09/07/2018 03:36 AM    CHLORIDE 106 09/07/2018 03:36 AM    CO2 24 09/07/2018 03:36 AM    GLUCOSE 154 (H) 09/07/2018 03:36 AM    BUN 22 09/07/2018 03:36 AM    CREATININE 0.97 09/07/2018 03:36 AM    CREATININE 1.21 02/17/2011 07:28 AM    BUNCREATRAT 16 02/17/2011 07:28 AM    GLOMRATE >59 02/17/2011 07:28 AM     Lab Results   Component Value Date/Time    ALKPHOSPHAT 57 09/06/2018 12:40 PM    ASTSGOT 15 09/06/2018 12:40 PM    ALTSGPT 11 09/06/2018 12:40 PM    TBILIRUBIN 0.7 09/06/2018 12:40 PM        Echocardiogram 10/30/2012  CONCLUSIONS  Normal left ventricular chamber size. Normal left ventricular systolic   function. Grade I diastolic dysfunction is present. Mild concentric   left ventricular hypertrophy. Left ventricular ejection fraction is 50% to 55%.  Enlarged right ventricular size. Normal right ventricular systolic function.  Mildly dilated right atrium.  Moderately dilated left atrium.  Aortic valve opens normally. Aortic annular calcification. No stenosis or regurgitation seen.  Tricuspid valve opens normally. Mild tricuspid regurgitation. Right ventricular systolic pressure is estimated to be 44 mmHg. Consistent   with moderate pulmonary hypertension.     Transthoracic Echo Report 11/3/2015  Prior study is available for comparison, 10/30/2012; dilated ascending aorta is noted.  No obvious intra-cardiac thrombus noted.  No evidence of right to left shunt per agitated saline study.  Normal left ventricular systolic function.  Left ventricular ejection fraction is visually estimated to be  60%.  Grade I diastolic dysfunction.  No significant valve abnormalities.   Dilated ascending aorta diameter is 4.7 cm.    Myocardial Perfusion Report 8/19/2016   NUCLEAR IMAGING INTERPRETATION   No evidence of significant jeopardized viable myocardium or prior myocardial    infarction.   The ejection fraction post stress is 44 %.     Myocardial Perfusion Report 5/25/2018   NUCLEAR IMAGING INTERPRETATION   Small sized defect of mildly reduced uptake in the apical septal and mid anteroseptal wall which is more prominent in stress images suggestive of scar with ischemia.   Moderate sized defect of moderately reduced uptake in the entire inferior wall which is more prominent in stress images suggestive of mainly scar with some ischemia.     Normal left ventricular wall motion.  LV ejection fraction = 52%.   Compared to the report of the study dated 8/19/2016, there is now defects in perfusion and the ejection fraction is low normal, previously 44%.   These findings were communicated to the ordering provider via EPIC at the time of the study.   ECG INTERPRETATION   Negative stress ECG for ischemia.    Heart Cath 6/13/2018  FINDINGS:  HEMODYNAMICS:  LEFT HEART PRESSURES:  1.  LVEDP 20 mmHg.  2.  Left ventricular systolic pressure 134 mmHg.  3.  Central aortic pressure, systolic 132, diastolic 67.     LEFT VENTRICULOGRAPHY:  Left ventricular chamber size is normal with normal   wall motion.  Visually estimated ejection fraction 55%.  No mitral   regurgitation present.     CORONARY ARTERIOGRAPHY:  1.  Left main artery:  Left main is a very large caliber vessel,   angiographically widely patent with superficial calcification.  The left main   artery trifurcates into the left anterior descending artery, ramus intermedius   vessel, and circumflex artery.  2.  Left anterior descending artery:  The left anterior descending artery is a   moderately large caliber vessel, gives rise to a small first diagonal branch,   a first  septal branch, a second diagonal branch, and extends around the apex.    The left anterior descending artery has a proximal 70% stenosis and distal   vessel of 60-70% stenosis.  3.  Ramus intermedius:  The ramus is a short small caliber vessel and has an   ostial 60% stenosis.  4.  Circumflex artery:  The circumflex is a moderately large caliber vessel,   gives rise to a small first marginal branch, a large second marginal branch.    The circumflex artery is widely patent with diffuse ectatic atheromatous   disease.  5.  Right coronary artery:  The right coronary is a large caliber vessel,   gives rise to a posterior descending artery and a bifurcating posterolateral   branch.  The right coronary has a proximal ectatic tortuosity and a distal   eccentric focal 90% stenosis.     PLAN:    1.  The patient's case is complex and therefore it and the coronary angiogram   results were independently reviewed with my partners Dr. Mauricio Swift and   Dr Loi Pickett as to the best management approach.  2.  It was decided to proceed with the necessary cervical and lumbar surgery with   close postoperative cardiac monitoring with subsequent revascularization of the   left anterior descending artery and right coronary artery.    Heart cath 9/6/2018  DATE OF SERVICE:  09/06/2018     PROCEDURE:  Cardiac catheterization and percutaneous coronary intervention.  A.  Selective coronary angiography.  B.  Coronary stent implantation, proximal left anterior descending artery with a 4.0x28 mm drug-eluting Synergy stent.  C.  Coronary stent implantation of the distal right coronary artery with a 4.0x12 mm drug-eluting Synergy stent.  D.  Right radial artery approach.     PREPROCEDURE DIAGNOSES:    1.  Coronary artery disease, severe 2-vessel, involving   the proximal left anterior descending artery and distal right coronary artery.  2.  Left ventricular ejection fraction 55%.      Transthoracic Echo Report 12/12/2018-results reviewed with  patient  Prior echo 11/3/15, no changes are noted.  Normal transthoracic echocardiogram.     Assessment:     1. ACC/AHA stage C systolic congestive heart failure (HCC)  Evolocumab, REPATHA, 140 MG/ML Solution Auto-injector    BASIC METABOLIC PANEL   2. Heart failure, NYHA class 2 (HCC)  Evolocumab, REPATHA, 140 MG/ML Solution Auto-injector    BASIC METABOLIC PANEL   3. Cerebrovascular accident (CVA), unspecified mechanism (HCC)  Evolocumab, REPATHA, 140 MG/ML Solution Auto-injector   4. Dyslipidemia  Evolocumab, REPATHA, 140 MG/ML Solution Auto-injector   5. Coronary artery disease involving native coronary artery of native heart without angina pectoris  Evolocumab, REPATHA, 140 MG/ML Solution Auto-injector   6. Statin intolerance  Evolocumab, REPATHA, 140 MG/ML Solution Auto-injector   7. Essential hypertension     8. Paroxysmal atrial fibrillation (HCC)     9. Anticoagulated on warfarin     10. S/P coronary artery stent placement  Evolocumab, REPATHA, 140 MG/ML Solution Auto-injector       Medical Decision Making:  Today's Assessment / Status / Plan:   1. HFrEF, Stage C, Class 2, LVEF 65%: pt continues to have LE edema. Could be related to venous insufficiency or immobility  -Stop furosemide  -Start Torsemide 10 mg BID  -Continue lisinopril 40 mg daily  -Continue Toprol- mg daily  -Continue potassium 20 mEq daily  -Continue hydralazine 100 mg 3 times a day  -Obtain a BMP in 1-2 weeks  -Reinforced s/sx of worsening heart failure with patient and weight monitoring. Pt verbalizes understanding. Pt to call office or RTC if present.     2. CAD, MIRELLA x 2 on 9/6/18/DLD: Last LDL 96 on 7/19/2018  -Discussed the role of statin therapy and his CAD.  Patient does not feel he can tolerate the statins anymore.  Discussed with patient PCSK-9 inhibitor as an alternative option.  Patient is willing to try.  -Patient to continue rosuvastatin 10 mg twice a week until PC SK 9 inhibitor received.  -Start Repatha 140 mg  subcutaneous every 2 weeks  (Patient may need prior authorization for this)  Patient instructed to come back into the clinic for education if unable to inject self.  -Patient intolerant to statin therapy due to significant myalgias.  Patient has tried lovastatin, atorvastatin, simvastatin and rosuvastatin  -Continue clopidogrel 75 mg daily    3.  Hypertension: Stable  -Continue amlodipine 10 mg daily  -Continue hydralazine 100 mg 3 times a day  -Continue lisinopril 40 mg daily  -Continue Toprol- mg daily    4.  Paroxysmal A. Fib:  -Continue warfarin, followed by the anticoagulation clinic    5.  CKD: stable  -continue to follow    FU in clinic in 4 weeks with labs is 1-2 weeks. Sooner if needed.    Patient verbalizes understanding and agrees with the plan of care.     Collaborating MD: Frantz Rosado MD

## 2018-12-17 ENCOUNTER — TELEPHONE (OUTPATIENT)
Dept: VASCULAR LAB | Facility: MEDICAL CENTER | Age: 68
End: 2018-12-17

## 2018-12-17 ENCOUNTER — ANTICOAGULATION VISIT (OUTPATIENT)
Dept: VASCULAR LAB | Facility: MEDICAL CENTER | Age: 68
End: 2018-12-17
Attending: INTERNAL MEDICINE
Payer: MEDICARE

## 2018-12-17 ENCOUNTER — PATIENT OUTREACH (OUTPATIENT)
Dept: HEALTH INFORMATION MANAGEMENT | Facility: OTHER | Age: 68
End: 2018-12-17

## 2018-12-17 ENCOUNTER — TELEPHONE (OUTPATIENT)
Dept: CARDIOLOGY | Facility: MEDICAL CENTER | Age: 68
End: 2018-12-17

## 2018-12-17 VITALS — DIASTOLIC BLOOD PRESSURE: 83 MMHG | HEART RATE: 57 BPM | SYSTOLIC BLOOD PRESSURE: 140 MMHG

## 2018-12-17 DIAGNOSIS — Z79.01 ANTICOAGULATED ON WARFARIN: ICD-10-CM

## 2018-12-17 LAB — INR PPP: 2.5 (ref 2–3.5)

## 2018-12-17 PROCEDURE — 99211 OFF/OP EST MAY X REQ PHY/QHP: CPT

## 2018-12-17 PROCEDURE — 85610 PROTHROMBIN TIME: CPT

## 2018-12-17 NOTE — TELEPHONE ENCOUNTER
PAR sent to plan:  Joel Ma (Key: [id number]) - [id number]       [facility]   [facility]   Next Steps   The plan will fax you a determination, typically within 1 to 5 business days.  How do I follow up?   DrugRepatha SureClick 140MG/ML auto-injectors   [facility] Medicare Prescription Drug Coverage FormPrior Authorization From for Medicare Prescription Drug Coverage [phone]   Original Claim Info75 For help: [id number]

## 2018-12-17 NOTE — TELEPHONE ENCOUNTER
Patient has been discharged from [facility]- patient is scheduled to come in to clinic today. If he does not come in please l.m to kam.

## 2018-12-17 NOTE — PROGRESS NOTES
Anticoagulation Summary  As of 12/17/2018    INR goal:   2.0-3.0   TTR:   53.2 % (3.1 y)   Today's INR:   2.5   Warfarin maintenance plan:   10 mg (5 mg x 2) every day   Weekly warfarin total:   70 mg   Plan last modified:   Torres Sena, PharmD (11/26/2018)   Next INR check:   1/2/2019   Target end date:   Indefinite    Indications    CVA (cerebral vascular accident) (HCC) [I63.9]  Atrial fibrillation [427.31] [I48.91]  History of CVA (cerebrovascular accident) (Resolved) [Z86.73]  Anticoagulated on warfarin [Z79.01]             Anticoagulation Episode Summary     INR check location:   [facility]    Preferred lab:       Send INR reminders to:       Comments:   call pt multiple times, he has a hard time getting to his phone in time and has no VM set up      Anticoagulation Care Providers     Provider Role Specialty Phone number    Catrachito Sterling D.O. Referring Internal Medicine [phone]    [facility] Anticoagulation Services Responsible  [phone]    Liseth Doe A.P.N. Responsible Cardiology [phone]    Gi Cadena A.P.N. Responsible Cardiology [phone]        Anticoagulation Patient Findings      HPI:  Joel Ma seen in clinic today, on anticoagulation therapy with warfarin for Afib w/Hx of CVA  Changes to current medical/health status since last appt: none  Denies signs/symptoms of bleeding and/or thrombosis since the last appt.    Denies any interval changes to diet  Denies any interval changes to medications since last appt.   Denies any complications or cost restrictions with current therapy.   BP recorded in vitals.       A/P   INR  therapeutic.   Instructed pt to continue current dose    Follow up appointment in 2 week(s).    Evie Glover, PharmD

## 2018-12-17 NOTE — PROGRESS NOTES
Scheduled Appointment - Patient will discuss care gaps with his provider on his next upcoming appointment.    Please transfer to Patient Outreach Team at [phone] when patient returns call.    WebIZ Checked & Epic Updated:  yes  [id number]   Influenza w/preserv.   Zoster Seth (Shingrix)     [facility] Verified: yes    Attempt # 1

## 2018-12-20 LAB — INR BLD: 2.5 (ref 0.9–1.2)

## 2018-12-21 ENCOUNTER — TELEPHONE (OUTPATIENT)
Dept: MEDICAL GROUP | Facility: LAB | Age: 68
End: 2018-12-21

## 2018-12-21 NOTE — LETTER
December 21, 2018        Patient: Joel Ma   YOB: 1950   Date of Visit: 12/21/2018           To Whom It May Concern:    It is my opinion that Joel Ma suffering from significant medical issues from July 13, 2018 to December 12, 2018 and was unable to drive during this period of time.    If you have any questions or concerns, please don't hesitate to call.        Sincerely,          Catrachito Sterling D.O.  Board Certified General Internal Medicine.      [address] [id number] [phone] (Phone)  [phone] (Fax)

## 2018-12-21 NOTE — TELEPHONE ENCOUNTER
1. Caller Name: Joel                                          Call Back Number: [phone] (home)        Patient approves a detailed voicemail message: yes    Joel is requesting a letter stating that between July 13, 2018 to 12/12/2018 he was under your care and unable to drive.  He will have this picked up.

## 2018-12-27 ENCOUNTER — TELEPHONE (OUTPATIENT)
Dept: MEDICAL GROUP | Facility: LAB | Age: 68
End: 2018-12-27

## 2018-12-27 NOTE — TELEPHONE ENCOUNTER
Returning pt's voicemail regarding a copy of his Letter he would like faxed since he miss placed last one. I need to know where this is going. Please provide fax number.

## 2018-12-31 ENCOUNTER — TELEPHONE (OUTPATIENT)
Dept: MEDICAL GROUP | Facility: LAB | Age: 68
End: 2018-12-31

## 2019-01-01 ENCOUNTER — TELEPHONE (OUTPATIENT)
Dept: MEDICAL GROUP | Facility: LAB | Age: 69
End: 2019-01-01

## 2019-01-01 ENCOUNTER — ANTICOAGULATION MONITORING (OUTPATIENT)
Dept: VASCULAR LAB | Facility: MEDICAL CENTER | Age: 69
End: 2019-01-01

## 2019-01-01 ENCOUNTER — OCCUPATIONAL THERAPY (OUTPATIENT)
Dept: OCCUPATIONAL THERAPY | Facility: REHABILITATION | Age: 69
End: 2019-01-01
Attending: PHYSICAL MEDICINE & REHABILITATION
Payer: MEDICARE

## 2019-01-01 ENCOUNTER — PHYSICAL THERAPY (OUTPATIENT)
Dept: PHYSICAL THERAPY | Facility: REHABILITATION | Age: 69
End: 2019-01-01
Attending: INTERNAL MEDICINE
Payer: MEDICARE

## 2019-01-01 ENCOUNTER — APPOINTMENT (OUTPATIENT)
Dept: VASCULAR LAB | Facility: MEDICAL CENTER | Age: 69
End: 2019-01-01
Attending: INTERNAL MEDICINE
Payer: MEDICARE

## 2019-01-01 ENCOUNTER — TELEPHONE (OUTPATIENT)
Dept: PHYSICAL THERAPY | Facility: REHABILITATION | Age: 69
End: 2019-01-01

## 2019-01-01 ENCOUNTER — TELEPHONE (OUTPATIENT)
Dept: VASCULAR LAB | Facility: MEDICAL CENTER | Age: 69
End: 2019-01-01

## 2019-01-01 ENCOUNTER — TELEPHONE (OUTPATIENT)
Dept: PHYSICAL MEDICINE AND REHAB | Facility: REHABILITATION | Age: 69
End: 2019-01-01

## 2019-01-01 ENCOUNTER — OFFICE VISIT (OUTPATIENT)
Dept: CARDIOLOGY | Facility: MEDICAL CENTER | Age: 69
End: 2019-01-01
Payer: MEDICARE

## 2019-01-01 ENCOUNTER — OFFICE VISIT (OUTPATIENT)
Dept: MEDICAL GROUP | Facility: LAB | Age: 69
End: 2019-01-01
Payer: MEDICARE

## 2019-01-01 ENCOUNTER — APPOINTMENT (OUTPATIENT)
Dept: VASCULAR LAB | Facility: MEDICAL CENTER | Age: 69
End: 2019-01-01
Payer: MEDICARE

## 2019-01-01 ENCOUNTER — OFFICE VISIT (OUTPATIENT)
Dept: PHYSICAL MEDICINE AND REHAB | Facility: REHABILITATION | Age: 69
End: 2019-01-01
Payer: MEDICARE

## 2019-01-01 ENCOUNTER — TELEPHONE (OUTPATIENT)
Dept: CARDIOLOGY | Facility: MEDICAL CENTER | Age: 69
End: 2019-01-01

## 2019-01-01 ENCOUNTER — HOSPITAL ENCOUNTER (OUTPATIENT)
Facility: MEDICAL CENTER | Age: 69
End: 2019-10-03
Attending: INTERNAL MEDICINE
Payer: MEDICARE

## 2019-01-01 ENCOUNTER — OFFICE VISIT (OUTPATIENT)
Dept: NEPHROLOGY | Facility: MEDICAL CENTER | Age: 69
End: 2019-01-01
Payer: MEDICARE

## 2019-01-01 ENCOUNTER — HOSPITAL ENCOUNTER (EMERGENCY)
Facility: MEDICAL CENTER | Age: 69
End: 2019-11-12
Payer: MEDICARE

## 2019-01-01 ENCOUNTER — HOSPITAL ENCOUNTER (OUTPATIENT)
Dept: LAB | Facility: MEDICAL CENTER | Age: 69
End: 2019-10-17
Attending: UROLOGY
Payer: MEDICARE

## 2019-01-01 ENCOUNTER — TELEPHONE (OUTPATIENT)
Dept: MEDICAL GROUP | Age: 69
End: 2019-01-01

## 2019-01-01 ENCOUNTER — APPOINTMENT (OUTPATIENT)
Dept: MEDICAL GROUP | Facility: LAB | Age: 69
End: 2019-01-01
Payer: MEDICARE

## 2019-01-01 ENCOUNTER — APPOINTMENT (OUTPATIENT)
Dept: NEUROLOGY | Facility: MEDICAL CENTER | Age: 69
End: 2019-01-01
Payer: MEDICARE

## 2019-01-01 ENCOUNTER — HOSPITAL ENCOUNTER (EMERGENCY)
Facility: MEDICAL CENTER | Age: 69
End: 2019-10-25
Attending: EMERGENCY MEDICINE
Payer: MEDICARE

## 2019-01-01 ENCOUNTER — HOSPITAL ENCOUNTER (OUTPATIENT)
Dept: RADIOLOGY | Facility: MEDICAL CENTER | Age: 69
End: 2019-10-04
Attending: INTERNAL MEDICINE
Payer: MEDICARE

## 2019-01-01 ENCOUNTER — HOME HEALTH ADMISSION (OUTPATIENT)
Dept: HOME HEALTH SERVICES | Facility: HOME HEALTHCARE | Age: 69
End: 2019-01-01
Payer: MEDICARE

## 2019-01-01 ENCOUNTER — HOSPITAL ENCOUNTER (EMERGENCY)
Facility: MEDICAL CENTER | Age: 69
End: 2019-10-07
Attending: EMERGENCY MEDICINE
Payer: MEDICARE

## 2019-01-01 ENCOUNTER — HOSPITAL ENCOUNTER (EMERGENCY)
Facility: MEDICAL CENTER | Age: 69
End: 2019-11-12
Attending: EMERGENCY MEDICINE
Payer: MEDICARE

## 2019-01-01 VITALS
BODY MASS INDEX: 28.98 KG/M2 | HEART RATE: 60 BPM | SYSTOLIC BLOOD PRESSURE: 112 MMHG | OXYGEN SATURATION: 95 % | RESPIRATION RATE: 14 BRPM | TEMPERATURE: 98.6 F | HEIGHT: 76 IN | WEIGHT: 238 LBS | DIASTOLIC BLOOD PRESSURE: 60 MMHG

## 2019-01-01 VITALS
HEIGHT: 76 IN | SYSTOLIC BLOOD PRESSURE: 110 MMHG | WEIGHT: 234 LBS | HEART RATE: 84 BPM | DIASTOLIC BLOOD PRESSURE: 60 MMHG | OXYGEN SATURATION: 93 % | BODY MASS INDEX: 28.49 KG/M2

## 2019-01-01 VITALS
DIASTOLIC BLOOD PRESSURE: 78 MMHG | SYSTOLIC BLOOD PRESSURE: 108 MMHG | TEMPERATURE: 98.8 F | HEIGHT: 76 IN | OXYGEN SATURATION: 93 % | BODY MASS INDEX: 28.74 KG/M2 | WEIGHT: 236 LBS | HEART RATE: 65 BPM

## 2019-01-01 VITALS
RESPIRATION RATE: 16 BRPM | HEART RATE: 62 BPM | DIASTOLIC BLOOD PRESSURE: 80 MMHG | OXYGEN SATURATION: 93 % | TEMPERATURE: 97.9 F | SYSTOLIC BLOOD PRESSURE: 140 MMHG

## 2019-01-01 VITALS
WEIGHT: 236 LBS | SYSTOLIC BLOOD PRESSURE: 112 MMHG | HEART RATE: 61 BPM | BODY MASS INDEX: 29.34 KG/M2 | DIASTOLIC BLOOD PRESSURE: 74 MMHG | OXYGEN SATURATION: 90 % | HEIGHT: 75 IN | TEMPERATURE: 97.7 F

## 2019-01-01 VITALS
OXYGEN SATURATION: 94 % | SYSTOLIC BLOOD PRESSURE: 112 MMHG | TEMPERATURE: 98.1 F | HEIGHT: 76 IN | BODY MASS INDEX: 28.98 KG/M2 | RESPIRATION RATE: 14 BRPM | DIASTOLIC BLOOD PRESSURE: 68 MMHG | WEIGHT: 238 LBS | HEART RATE: 57 BPM

## 2019-01-01 VITALS
SYSTOLIC BLOOD PRESSURE: 117 MMHG | DIASTOLIC BLOOD PRESSURE: 67 MMHG | HEIGHT: 76 IN | BODY MASS INDEX: 28.74 KG/M2 | TEMPERATURE: 98.1 F | WEIGHT: 236 LBS | RESPIRATION RATE: 20 BRPM | HEART RATE: 93 BPM

## 2019-01-01 VITALS
HEART RATE: 67 BPM | DIASTOLIC BLOOD PRESSURE: 80 MMHG | RESPIRATION RATE: 18 BRPM | TEMPERATURE: 98.1 F | SYSTOLIC BLOOD PRESSURE: 110 MMHG | BODY MASS INDEX: 29.34 KG/M2 | HEIGHT: 75 IN | WEIGHT: 236 LBS

## 2019-01-01 VITALS
HEIGHT: 76 IN | DIASTOLIC BLOOD PRESSURE: 53 MMHG | SYSTOLIC BLOOD PRESSURE: 90 MMHG | TEMPERATURE: 97 F | RESPIRATION RATE: 18 BRPM | BODY MASS INDEX: 29.1 KG/M2 | OXYGEN SATURATION: 96 % | HEART RATE: 70 BPM

## 2019-01-01 VITALS
BODY MASS INDEX: 33.02 KG/M2 | OXYGEN SATURATION: 96 % | TEMPERATURE: 98.5 F | SYSTOLIC BLOOD PRESSURE: 123 MMHG | WEIGHT: 235.89 LBS | RESPIRATION RATE: 16 BRPM | HEART RATE: 76 BPM | HEIGHT: 71 IN | DIASTOLIC BLOOD PRESSURE: 70 MMHG

## 2019-01-01 VITALS — HEART RATE: 69 BPM | SYSTOLIC BLOOD PRESSURE: 103 MMHG | DIASTOLIC BLOOD PRESSURE: 73 MMHG

## 2019-01-01 VITALS — HEART RATE: 76 BPM | SYSTOLIC BLOOD PRESSURE: 156 MMHG | DIASTOLIC BLOOD PRESSURE: 94 MMHG

## 2019-01-01 DIAGNOSIS — R33.9 URINARY RETENTION: ICD-10-CM

## 2019-01-01 DIAGNOSIS — Z79.01 ANTICOAGULATED ON WARFARIN: ICD-10-CM

## 2019-01-01 DIAGNOSIS — I48.91 ATRIAL FIBRILLATION, UNSPECIFIED TYPE (HCC): ICD-10-CM

## 2019-01-01 DIAGNOSIS — G95.9 CERVICAL MYELOPATHY (HCC): ICD-10-CM

## 2019-01-01 DIAGNOSIS — M48.062 LUMBAR STENOSIS WITH NEUROGENIC CLAUDICATION: ICD-10-CM

## 2019-01-01 DIAGNOSIS — R29.898 WEAKNESS OF BOTH UPPER EXTREMITIES: ICD-10-CM

## 2019-01-01 DIAGNOSIS — N30.00 ACUTE CYSTITIS WITHOUT HEMATURIA: ICD-10-CM

## 2019-01-01 DIAGNOSIS — I63.9 CEREBROVASCULAR ACCIDENT (CVA), UNSPECIFIED MECHANISM (HCC): ICD-10-CM

## 2019-01-01 DIAGNOSIS — I50.20 ACC/AHA STAGE C SYSTOLIC CONGESTIVE HEART FAILURE (HCC): ICD-10-CM

## 2019-01-01 DIAGNOSIS — R44.3 HALLUCINATIONS: ICD-10-CM

## 2019-01-01 DIAGNOSIS — E11.21 DIABETIC NEPHROPATHY ASSOCIATED WITH TYPE 2 DIABETES MELLITUS (HCC): ICD-10-CM

## 2019-01-01 DIAGNOSIS — M48.02 CERVICAL SPINAL STENOSIS: ICD-10-CM

## 2019-01-01 DIAGNOSIS — I10 ESSENTIAL HYPERTENSION: ICD-10-CM

## 2019-01-01 DIAGNOSIS — E11.42 CONTROLLED TYPE 2 DIABETES MELLITUS WITH DIABETIC POLYNEUROPATHY, WITH LONG-TERM CURRENT USE OF INSULIN (HCC): ICD-10-CM

## 2019-01-01 DIAGNOSIS — Z79.4 CONTROLLED TYPE 2 DIABETES MELLITUS WITH DIABETIC POLYNEUROPATHY, WITH LONG-TERM CURRENT USE OF INSULIN (HCC): ICD-10-CM

## 2019-01-01 DIAGNOSIS — Z79.01 CHRONIC ANTICOAGULATION: ICD-10-CM

## 2019-01-01 DIAGNOSIS — E11.42 DIABETIC POLYNEUROPATHY ASSOCIATED WITH TYPE 2 DIABETES MELLITUS (HCC): ICD-10-CM

## 2019-01-01 DIAGNOSIS — R29.898 DEFICIENCIES OF LIMBS: ICD-10-CM

## 2019-01-01 DIAGNOSIS — M48.02 CERVICAL STENOSIS OF SPINE: ICD-10-CM

## 2019-01-01 DIAGNOSIS — G95.9 MYELOPATHY (HCC): ICD-10-CM

## 2019-01-01 DIAGNOSIS — R29.898 WEAKNESS OF BOTH LOWER EXTREMITIES: ICD-10-CM

## 2019-01-01 DIAGNOSIS — I63.9 CEREBROVASCULAR ACCIDENT (CVA), UNSPECIFIED MECHANISM (HCC): Primary | ICD-10-CM

## 2019-01-01 DIAGNOSIS — E78.5 DYSLIPIDEMIA: ICD-10-CM

## 2019-01-01 DIAGNOSIS — L03.119 CELLULITIS OF LOWER EXTREMITY, UNSPECIFIED LATERALITY: ICD-10-CM

## 2019-01-01 DIAGNOSIS — S98.131A AMPUTATED TOE OF RIGHT FOOT (HCC): ICD-10-CM

## 2019-01-01 DIAGNOSIS — M54.2 NECK PAIN: ICD-10-CM

## 2019-01-01 DIAGNOSIS — R73.9 HYPERGLYCEMIA: ICD-10-CM

## 2019-01-01 DIAGNOSIS — Z78.9 IMPAIRED MOBILITY AND ADLS: ICD-10-CM

## 2019-01-01 DIAGNOSIS — G47.33 OSA TREATED WITH BIPAP: ICD-10-CM

## 2019-01-01 DIAGNOSIS — Z74.09 IMPAIRED MOBILITY AND ADLS: ICD-10-CM

## 2019-01-01 DIAGNOSIS — N18.30 STAGE 3 CHRONIC KIDNEY DISEASE (HCC): ICD-10-CM

## 2019-01-01 DIAGNOSIS — N40.1 BENIGN PROSTATIC HYPERPLASIA WITH INCOMPLETE BLADDER EMPTYING: ICD-10-CM

## 2019-01-01 DIAGNOSIS — Z79.899 HIGH RISK MEDICATION USE: ICD-10-CM

## 2019-01-01 DIAGNOSIS — B37.2 CANDIDIASIS, INTERTRIGO: ICD-10-CM

## 2019-01-01 DIAGNOSIS — R39.14 BENIGN PROSTATIC HYPERPLASIA WITH INCOMPLETE BLADDER EMPTYING: ICD-10-CM

## 2019-01-01 DIAGNOSIS — R62.7 FAILURE TO THRIVE IN ADULT: ICD-10-CM

## 2019-01-01 DIAGNOSIS — N39.0 ACUTE UTI: ICD-10-CM

## 2019-01-01 DIAGNOSIS — T83.9XXA PROBLEM WITH FOLEY CATHETER, INITIAL ENCOUNTER (HCC): ICD-10-CM

## 2019-01-01 LAB
ALBUMIN SERPL BCP-MCNC: 4.2 G/DL (ref 3.2–4.9)
ALBUMIN SERPL BCP-MCNC: 4.3 G/DL (ref 3.2–4.9)
ALBUMIN/GLOB SERPL: 1.4 G/DL
ALBUMIN/GLOB SERPL: 1.7 G/DL
ALP SERPL-CCNC: 81 U/L (ref 30–99)
ALP SERPL-CCNC: 85 U/L (ref 30–99)
ALT SERPL-CCNC: 21 U/L (ref 2–50)
ALT SERPL-CCNC: 21 U/L (ref 2–50)
ANION GAP SERPL CALC-SCNC: 12 MMOL/L (ref 0–11.9)
ANION GAP SERPL CALC-SCNC: 13 MMOL/L (ref 0–11.9)
ANION GAP SERPL CALC-SCNC: 9 MMOL/L (ref 0–11.9)
APPEARANCE UR: ABNORMAL
APPEARANCE UR: ABNORMAL
APPEARANCE UR: CLEAR
APTT PPP: 27.8 SEC (ref 24.7–36)
AST SERPL-CCNC: 20 U/L (ref 12–45)
AST SERPL-CCNC: 22 U/L (ref 12–45)
BACTERIA #/AREA URNS HPF: ABNORMAL /HPF
BACTERIA UR CULT: ABNORMAL
BACTERIA UR CULT: NORMAL
BACTERIA UR CULT: NORMAL
BASOPHILS # BLD AUTO: 0.3 % (ref 0–1.8)
BASOPHILS # BLD AUTO: 0.3 % (ref 0–1.8)
BASOPHILS # BLD: 0.02 K/UL (ref 0–0.12)
BASOPHILS # BLD: 0.02 K/UL (ref 0–0.12)
BILIRUB SERPL-MCNC: 0.6 MG/DL (ref 0.1–1.5)
BILIRUB SERPL-MCNC: 0.7 MG/DL (ref 0.1–1.5)
BILIRUB UR QL STRIP.AUTO: NEGATIVE
BILIRUB UR STRIP-MCNC: ABNORMAL MG/DL
BUN SERPL-MCNC: 30 MG/DL (ref 8–22)
BUN SERPL-MCNC: 30 MG/DL (ref 8–22)
BUN SERPL-MCNC: 41 MG/DL (ref 8–22)
CALCIUM SERPL-MCNC: 8.4 MG/DL (ref 8.5–10.5)
CALCIUM SERPL-MCNC: 9.1 MG/DL (ref 8.5–10.5)
CALCIUM SERPL-MCNC: 9.5 MG/DL (ref 8.5–10.5)
CHLORIDE SERPL-SCNC: 93 MMOL/L (ref 96–112)
CHLORIDE SERPL-SCNC: 94 MMOL/L (ref 96–112)
CHLORIDE SERPL-SCNC: 97 MMOL/L (ref 96–112)
CO2 SERPL-SCNC: 23 MMOL/L (ref 20–33)
CO2 SERPL-SCNC: 24 MMOL/L (ref 20–33)
CO2 SERPL-SCNC: 24 MMOL/L (ref 20–33)
COLOR UR AUTO: YELLOW
COLOR UR: YELLOW
CREAT SERPL-MCNC: 1 MG/DL (ref 0.5–1.4)
CREAT SERPL-MCNC: 1.12 MG/DL (ref 0.5–1.4)
CREAT SERPL-MCNC: 1.37 MG/DL (ref 0.5–1.4)
EOSINOPHIL # BLD AUTO: 0.05 K/UL (ref 0–0.51)
EOSINOPHIL # BLD AUTO: 0.06 K/UL (ref 0–0.51)
EOSINOPHIL NFR BLD: 0.9 % (ref 0–6.9)
EOSINOPHIL NFR BLD: 0.9 % (ref 0–6.9)
EPI CELLS #/AREA URNS HPF: ABNORMAL /HPF
EPI CELLS #/AREA URNS HPF: ABNORMAL /HPF
EPI CELLS #/AREA URNS HPF: NEGATIVE /HPF
ERYTHROCYTE [DISTWIDTH] IN BLOOD BY AUTOMATED COUNT: 41.1 FL (ref 35.9–50)
ERYTHROCYTE [DISTWIDTH] IN BLOOD BY AUTOMATED COUNT: 42.5 FL (ref 35.9–50)
GLOBULIN SER CALC-MCNC: 2.6 G/DL (ref 1.9–3.5)
GLOBULIN SER CALC-MCNC: 3.1 G/DL (ref 1.9–3.5)
GLUCOSE BLD-MCNC: 307 MG/DL (ref 65–99)
GLUCOSE BLD-MCNC: 344 MG/DL (ref 65–99)
GLUCOSE BLD-MCNC: 447 MG/DL (ref 65–99)
GLUCOSE BLD-MCNC: 491 MG/DL (ref 65–99)
GLUCOSE SERPL-MCNC: 441 MG/DL (ref 65–99)
GLUCOSE SERPL-MCNC: 495 MG/DL (ref 65–99)
GLUCOSE SERPL-MCNC: 518 MG/DL (ref 65–99)
GLUCOSE UR STRIP.AUTO-MCNC: >=1000 MG/DL
GLUCOSE UR STRIP.AUTO-MCNC: NEGATIVE MG/DL
GLUCOSE UR STRIP.AUTO-MCNC: POSITIVE MG/DL
HBA1C MFR BLD: 11 % (ref 0–5.6)
HCT VFR BLD AUTO: 47.9 % (ref 42–52)
HCT VFR BLD AUTO: 53.4 % (ref 42–52)
HGB BLD-MCNC: 15.8 G/DL (ref 14–18)
HGB BLD-MCNC: 18.2 G/DL (ref 14–18)
HYALINE CASTS #/AREA URNS LPF: ABNORMAL /LPF
IMM GRANULOCYTES # BLD AUTO: 0.03 K/UL (ref 0–0.11)
IMM GRANULOCYTES # BLD AUTO: 0.04 K/UL (ref 0–0.11)
IMM GRANULOCYTES NFR BLD AUTO: 0.5 % (ref 0–0.9)
IMM GRANULOCYTES NFR BLD AUTO: 0.6 % (ref 0–0.9)
INR BLD: 1.7 (ref 0.9–1.2)
INR BLD: 2 (ref 0.9–1.2)
INR BLD: 2.5 (ref 0.9–1.2)
INR BLD: 2.6 (ref 0.9–1.2)
INR PPP: 1.31 (ref 0.87–1.13)
INR PPP: 1.7 (ref 2–3.5)
INR PPP: 2 (ref 2–3.5)
INR PPP: 2.5 (ref 2–3.5)
INR PPP: 2.6 (ref 2–3.5)
INT CON NEG: ABNORMAL
INT CON POS: ABNORMAL
KETONES UR STRIP.AUTO-MCNC: 15 MG/DL
KETONES UR STRIP.AUTO-MCNC: ABNORMAL MG/DL
KETONES UR STRIP.AUTO-MCNC: NEGATIVE MG/DL
LEUKOCYTE ESTERASE UR QL STRIP.AUTO: ABNORMAL
LEUKOCYTE ESTERASE UR QL STRIP.AUTO: NEGATIVE
LYMPHOCYTES # BLD AUTO: 0.74 K/UL (ref 1–4.8)
LYMPHOCYTES # BLD AUTO: 0.8 K/UL (ref 1–4.8)
LYMPHOCYTES NFR BLD: 10.9 % (ref 22–41)
LYMPHOCYTES NFR BLD: 13.7 % (ref 22–41)
MCH RBC QN AUTO: 28.6 PG (ref 27–33)
MCH RBC QN AUTO: 29.2 PG (ref 27–33)
MCHC RBC AUTO-ENTMCNC: 33 G/DL (ref 33.7–35.3)
MCHC RBC AUTO-ENTMCNC: 34.1 G/DL (ref 33.7–35.3)
MCV RBC AUTO: 85.6 FL (ref 81.4–97.8)
MCV RBC AUTO: 86.8 FL (ref 81.4–97.8)
MICRO URNS: ABNORMAL
MONOCYTES # BLD AUTO: 0.53 K/UL (ref 0–0.85)
MONOCYTES # BLD AUTO: 0.85 K/UL (ref 0–0.85)
MONOCYTES NFR BLD AUTO: 12.5 % (ref 0–13.4)
MONOCYTES NFR BLD AUTO: 9.1 % (ref 0–13.4)
MUCOUS THREADS #/AREA URNS HPF: ABNORMAL /HPF
NEUTROPHILS # BLD AUTO: 4.4 K/UL (ref 1.82–7.42)
NEUTROPHILS # BLD AUTO: 5.11 K/UL (ref 1.82–7.42)
NEUTROPHILS NFR BLD: 74.8 % (ref 44–72)
NEUTROPHILS NFR BLD: 75.5 % (ref 44–72)
NITRITE UR QL STRIP.AUTO: ABNORMAL
NITRITE UR QL STRIP.AUTO: NEGATIVE
NITRITE UR QL STRIP.AUTO: POSITIVE
NRBC # BLD AUTO: 0 K/UL
NRBC # BLD AUTO: 0 K/UL
NRBC BLD-RTO: 0 /100 WBC
NRBC BLD-RTO: 0 /100 WBC
PH UR STRIP.AUTO: 5.5 [PH] (ref 5–8)
PH UR STRIP.AUTO: 6 [PH] (ref 5–8)
PH UR STRIP.AUTO: 6.5 [PH] (ref 5–8)
PH UR STRIP.AUTO: 7 [PH] (ref 5–8)
PLATELET # BLD AUTO: 135 K/UL (ref 164–446)
PLATELET # BLD AUTO: 208 K/UL (ref 164–446)
PMV BLD AUTO: 11.3 FL (ref 9–12.9)
PMV BLD AUTO: 11.4 FL (ref 9–12.9)
POTASSIUM SERPL-SCNC: 4.1 MMOL/L (ref 3.6–5.5)
POTASSIUM SERPL-SCNC: 4.8 MMOL/L (ref 3.6–5.5)
POTASSIUM SERPL-SCNC: 4.8 MMOL/L (ref 3.6–5.5)
PROT SERPL-MCNC: 6.9 G/DL (ref 6–8.2)
PROT SERPL-MCNC: 7.3 G/DL (ref 6–8.2)
PROT UR QL STRIP: 100 MG/DL
PROT UR QL STRIP: 30 MG/DL
PROT UR QL STRIP: 30 MG/DL
PROT UR QL STRIP: NEGATIVE MG/DL
PROT UR QL STRIP: NEGATIVE MG/DL
PROT UR QL STRIP: POSITIVE MG/DL
PROTHROMBIN TIME: 16.6 SEC (ref 12–14.6)
RBC # BLD AUTO: 5.52 M/UL (ref 4.7–6.1)
RBC # BLD AUTO: 6.24 M/UL (ref 4.7–6.1)
RBC # URNS HPF: ABNORMAL /HPF
RBC UR QL AUTO: ABNORMAL
RBC UR QL AUTO: NEGATIVE
RBC UR QL AUTO: NEGATIVE
SIGNIFICANT IND 70042: ABNORMAL
SIGNIFICANT IND 70042: ABNORMAL
SIGNIFICANT IND 70042: NORMAL
SIGNIFICANT IND 70042: NORMAL
SITE SITE: ABNORMAL
SITE SITE: ABNORMAL
SITE SITE: NORMAL
SITE SITE: NORMAL
SODIUM SERPL-SCNC: 129 MMOL/L (ref 135–145)
SODIUM SERPL-SCNC: 130 MMOL/L (ref 135–145)
SODIUM SERPL-SCNC: 130 MMOL/L (ref 135–145)
SOURCE SOURCE: ABNORMAL
SOURCE SOURCE: ABNORMAL
SOURCE SOURCE: NORMAL
SOURCE SOURCE: NORMAL
SP GR UR STRIP.AUTO: 1.01
SP GR UR STRIP.AUTO: 1.02
SP GR UR STRIP.AUTO: 1.03
SP GR UR STRIP.AUTO: 1.03
UROBILINOGEN UR STRIP-MCNC: 0.2 MG/DL
UROBILINOGEN UR STRIP.AUTO-MCNC: 0.2 MG/DL
UROBILINOGEN UR STRIP.AUTO-MCNC: 1 MG/DL
WBC # BLD AUTO: 5.8 K/UL (ref 4.8–10.8)
WBC # BLD AUTO: 6.8 K/UL (ref 4.8–10.8)
WBC #/AREA URNS HPF: ABNORMAL /HPF
YEAST BUDDING URNS QL: PRESENT /HPF
YEAST HYPHAE #/AREA URNS HPF: PRESENT /HPF

## 2019-01-01 PROCEDURE — 99211 OFF/OP EST MAY X REQ PHY/QHP: CPT

## 2019-01-01 PROCEDURE — 51798 US URINE CAPACITY MEASURE: CPT

## 2019-01-01 PROCEDURE — 76857 US EXAM PELVIC LIMITED: CPT

## 2019-01-01 PROCEDURE — 96374 THER/PROPH/DIAG INJ IV PUSH: CPT

## 2019-01-01 PROCEDURE — 81001 URINALYSIS AUTO W/SCOPE: CPT

## 2019-01-01 PROCEDURE — 51702 INSERT TEMP BLADDER CATH: CPT

## 2019-01-01 PROCEDURE — 99285 EMERGENCY DEPT VISIT HI MDM: CPT

## 2019-01-01 PROCEDURE — 99212 OFFICE O/P EST SF 10 MIN: CPT | Performed by: NURSE PRACTITIONER

## 2019-01-01 PROCEDURE — 87186 SC STD MICRODIL/AGAR DIL: CPT

## 2019-01-01 PROCEDURE — 82962 GLUCOSE BLOOD TEST: CPT

## 2019-01-01 PROCEDURE — 80053 COMPREHEN METABOLIC PANEL: CPT

## 2019-01-01 PROCEDURE — 99214 OFFICE O/P EST MOD 30 MIN: CPT | Performed by: INTERNAL MEDICINE

## 2019-01-01 PROCEDURE — 97530 THERAPEUTIC ACTIVITIES: CPT

## 2019-01-01 PROCEDURE — 97166 OT EVAL MOD COMPLEX 45 MIN: CPT

## 2019-01-01 PROCEDURE — 99284 EMERGENCY DEPT VISIT MOD MDM: CPT

## 2019-01-01 PROCEDURE — 97110 THERAPEUTIC EXERCISES: CPT

## 2019-01-01 PROCEDURE — 85610 PROTHROMBIN TIME: CPT

## 2019-01-01 PROCEDURE — 96375 TX/PRO/DX INJ NEW DRUG ADDON: CPT

## 2019-01-01 PROCEDURE — 87077 CULTURE AEROBIC IDENTIFY: CPT

## 2019-01-01 PROCEDURE — 99215 OFFICE O/P EST HI 40 MIN: CPT | Mod: 25 | Performed by: PHYSICAL MEDICINE & REHABILITATION

## 2019-01-01 PROCEDURE — 81002 URINALYSIS NONAUTO W/O SCOPE: CPT | Performed by: INTERNAL MEDICINE

## 2019-01-01 PROCEDURE — 700111 HCHG RX REV CODE 636 W/ 250 OVERRIDE (IP): Performed by: EMERGENCY MEDICINE

## 2019-01-01 PROCEDURE — 87086 URINE CULTURE/COLONY COUNT: CPT

## 2019-01-01 PROCEDURE — 82962 GLUCOSE BLOOD TEST: CPT | Mod: 91

## 2019-01-01 PROCEDURE — A9270 NON-COVERED ITEM OR SERVICE: HCPCS | Performed by: EMERGENCY MEDICINE

## 2019-01-01 PROCEDURE — 99213 OFFICE O/P EST LOW 20 MIN: CPT | Performed by: INTERNAL MEDICINE

## 2019-01-01 PROCEDURE — 700102 HCHG RX REV CODE 250 W/ 637 OVERRIDE(OP): Performed by: EMERGENCY MEDICINE

## 2019-01-01 PROCEDURE — 99215 OFFICE O/P EST HI 40 MIN: CPT | Performed by: PHYSICAL MEDICINE & REHABILITATION

## 2019-01-01 PROCEDURE — 303105 HCHG CATHETER EXTRA

## 2019-01-01 PROCEDURE — 36415 COLL VENOUS BLD VENIPUNCTURE: CPT

## 2019-01-01 PROCEDURE — 85025 COMPLETE CBC W/AUTO DIFF WBC: CPT

## 2019-01-01 PROCEDURE — 700105 HCHG RX REV CODE 258: Performed by: EMERGENCY MEDICINE

## 2019-01-01 PROCEDURE — 302449 STATCHG TRIAGE ONLY (STATISTIC)

## 2019-01-01 PROCEDURE — 80048 BASIC METABOLIC PNL TOTAL CA: CPT

## 2019-01-01 PROCEDURE — 999999 HB NO CHARGE

## 2019-01-01 PROCEDURE — 96376 TX/PRO/DX INJ SAME DRUG ADON: CPT

## 2019-01-01 PROCEDURE — 97161 PT EVAL LOW COMPLEX 20 MIN: CPT

## 2019-01-01 PROCEDURE — 85730 THROMBOPLASTIN TIME PARTIAL: CPT

## 2019-01-01 PROCEDURE — 83036 HEMOGLOBIN GLYCOSYLATED A1C: CPT | Performed by: INTERNAL MEDICINE

## 2019-01-01 RX ORDER — WARFARIN SODIUM 5 MG/1
10-15 TABLET ORAL DAILY
Qty: 180 TAB | Refills: 1 | Status: SHIPPED | OUTPATIENT
Start: 2019-01-01 | End: 2019-01-01

## 2019-01-01 RX ORDER — CIPROFLOXACIN 500 MG/1
500 TABLET, FILM COATED ORAL 2 TIMES DAILY
Qty: 20 TAB | Refills: 0 | Status: SHIPPED | OUTPATIENT
Start: 2019-01-01 | End: 2019-01-01

## 2019-01-01 RX ORDER — MORPHINE SULFATE 4 MG/ML
4 INJECTION, SOLUTION INTRAMUSCULAR; INTRAVENOUS ONCE
Status: COMPLETED | OUTPATIENT
Start: 2019-01-01 | End: 2019-01-01

## 2019-01-01 RX ORDER — EMPAGLIFLOZIN 10 MG/1
TABLET, FILM COATED ORAL
Qty: 30 TAB | Refills: 3 | Status: SHIPPED
Start: 2019-01-01 | End: 2020-01-01

## 2019-01-01 RX ORDER — HYDRALAZINE HYDROCHLORIDE 100 MG/1
TABLET, FILM COATED ORAL
Qty: 90 TAB | Refills: 0 | Status: SHIPPED | OUTPATIENT
Start: 2019-01-01 | End: 2020-01-01 | Stop reason: SDUPTHER

## 2019-01-01 RX ORDER — HYDRALAZINE HYDROCHLORIDE 100 MG/1
TABLET, FILM COATED ORAL
Qty: 90 TAB | Refills: 1 | Status: SHIPPED | OUTPATIENT
Start: 2019-01-01 | End: 2019-01-01 | Stop reason: SDUPTHER

## 2019-01-01 RX ORDER — SODIUM CHLORIDE 9 MG/ML
1000 INJECTION, SOLUTION INTRAVENOUS ONCE
Status: COMPLETED | OUTPATIENT
Start: 2019-01-01 | End: 2019-01-01

## 2019-01-01 RX ORDER — CLINDAMYCIN HYDROCHLORIDE 300 MG/1
CAPSULE ORAL
Qty: 180 CAP | Refills: 0 | Status: SHIPPED | OUTPATIENT
Start: 2019-01-01 | End: 2019-01-01 | Stop reason: SDUPTHER

## 2019-01-01 RX ORDER — HYDRALAZINE HYDROCHLORIDE 100 MG/1
TABLET, FILM COATED ORAL
Qty: 90 TAB | Refills: 0 | Status: SHIPPED | OUTPATIENT
Start: 2019-01-01 | End: 2019-01-01 | Stop reason: SDUPTHER

## 2019-01-01 RX ORDER — LISINOPRIL 40 MG/1
TABLET ORAL
Qty: 90 TAB | Refills: 3 | Status: SHIPPED
Start: 2019-01-01 | End: 2020-01-01

## 2019-01-01 RX ORDER — SULFAMETHOXAZOLE AND TRIMETHOPRIM 800; 160 MG/1; MG/1
TABLET ORAL
Status: ON HOLD | COMMUNITY
End: 2020-01-01

## 2019-01-01 RX ORDER — KETOCONAZOLE 20 MG/G
0.25 CREAM TOPICAL DAILY
Qty: 1 TUBE | Refills: 0 | Status: SHIPPED
Start: 2019-01-01 | End: 2020-01-01

## 2019-01-01 RX ORDER — INSULIN GLARGINE 100 [IU]/ML
40 INJECTION, SOLUTION SUBCUTANEOUS DAILY
Qty: 10 ML | Refills: 0
Start: 2019-01-01 | End: 2020-01-01

## 2019-01-01 RX ORDER — EZETIMIBE 10 MG/1
10 TABLET ORAL DAILY
Qty: 30 TAB | Refills: 11 | Status: SHIPPED | OUTPATIENT
Start: 2019-01-01

## 2019-01-01 RX ORDER — CLINDAMYCIN HYDROCHLORIDE 300 MG/1
CAPSULE ORAL
Qty: 180 CAP | Refills: 0 | Status: ON HOLD
Start: 2019-01-01 | End: 2020-01-01

## 2019-01-01 RX ORDER — NITROFURANTOIN 25; 75 MG/1; MG/1
100 CAPSULE ORAL 2 TIMES DAILY
Qty: 10 CAP | Refills: 0 | Status: ON HOLD
Start: 2019-01-01 | End: 2020-01-01

## 2019-01-01 RX ORDER — CIPROFLOXACIN 500 MG/1
500 TABLET, FILM COATED ORAL ONCE
Status: COMPLETED | OUTPATIENT
Start: 2019-01-01 | End: 2019-01-01

## 2019-01-01 RX ORDER — ONDANSETRON 2 MG/ML
4 INJECTION INTRAMUSCULAR; INTRAVENOUS ONCE
Status: COMPLETED | OUTPATIENT
Start: 2019-01-01 | End: 2019-01-01

## 2019-01-01 RX ORDER — GABAPENTIN 300 MG/1
300-600 CAPSULE ORAL 3 TIMES DAILY
Qty: 270 CAP | Refills: 3 | Status: SHIPPED
Start: 2019-01-01 | End: 2020-01-01

## 2019-01-01 RX ORDER — CLOPIDOGREL BISULFATE 75 MG/1
TABLET ORAL
Qty: 30 TAB | Refills: 5 | Status: SHIPPED
Start: 2019-01-01 | End: 2020-01-01

## 2019-01-01 RX ADMIN — SODIUM CHLORIDE 1000 ML: 9 INJECTION, SOLUTION INTRAVENOUS at 01:28

## 2019-01-01 RX ADMIN — MORPHINE SULFATE 4 MG: 4 INJECTION INTRAVENOUS at 13:31

## 2019-01-01 RX ADMIN — ONDANSETRON 4 MG: 2 INJECTION INTRAMUSCULAR; INTRAVENOUS at 13:23

## 2019-01-01 RX ADMIN — CIPROFLOXACIN HYDROCHLORIDE 500 MG: 500 TABLET, FILM COATED ORAL at 10:53

## 2019-01-01 RX ADMIN — MORPHINE SULFATE 4 MG: 4 INJECTION INTRAVENOUS at 13:23

## 2019-01-01 RX ADMIN — INSULIN HUMAN 10 UNITS: 100 INJECTION, SOLUTION PARENTERAL at 01:37

## 2019-01-01 SDOH — ECONOMIC STABILITY: GENERAL: QUALITY OF LIFE: POOR

## 2019-01-01 ASSESSMENT — MINNESOTA LIVING WITH HEART FAILURE QUESTIONNAIRE (MLHF)
DIFFICULTY WITH RECREATIONAL PASTIMES, SPORTS, HOBBIES: 5
MAKING YOU STAY IN A HOSPITAL: 0
SWELLING IN ANKLES OR LEGS: 0
DIFFICULTY GOING AWAY FROM HOME: 0
COSTING YOU MONEY FOR MEDICAL CARE: 5
DIFFICULTY WITH SEXUAL ACTIVITIES: 0
DIFFICULTY TO CONCENTRATE OR REMEMBERING THINGS: 4
TIRED, FATIGUED OR LOW ON ENERGY: 0
EATING LESS FOODS YOU LIKE: 0
HAVING TO SIT OR LIE DOWN DURING THE DAY: 0
WALKING ABOUT OR CLIMBING STAIRS DIFFICULT: 5
GIVING YOU SIDE EFFECTS FROM TREATMENTS: 0
LOSS OF SELF CONTROL IN YOUR LIFE: 0
DIFFICULTY SLEEPING WELL AT NIGHT: 0
FEELING LIKE A BURDEN TO FAMILY AND FRIENDS: 5
DIFFICULTY SOCIALIZING WITH FAMILY OR FRIENDS: 0
MAKING YOU FEEL DEPRESSED: 5
TOTAL_SCORE: 39
WORKING AROUND THE HOUSE OR YARD DIFFICULT: 5
DIFFICULTY WORKING TO EARN A LIVING: 0
MAKING YOU WORRY: 5
MAKING YOU SHORT OF BREATH: 0

## 2019-01-01 ASSESSMENT — ENCOUNTER SYMPTOMS
CHILLS: 0
BRUISES/BLEEDS EASILY: 0
PAIN SCALE AT LOWEST: 2
MEMORY LOSS: 0
WEAKNESS: 0
STRIDOR: 0
FALLS: 1
PAIN SCALE: 4
MEMORY LOSS: 0
HEMOPTYSIS: 0
MUSCULOSKELETAL NEGATIVE: 1
SPEECH CHANGE: 0
FOCAL WEAKNESS: 0
EYES NEGATIVE: 1
PALPITATIONS: 0
SPEECH CHANGE: 0
PAIN SCALE AT HIGHEST: 7
PND: 0
COUGH: 0
DOUBLE VISION: 0
BLURRED VISION: 0
SPUTUM PRODUCTION: 0
GASTROINTESTINAL NEGATIVE: 1
CARDIOVASCULAR NEGATIVE: 1
SHORTNESS OF BREATH: 0
FEVER: 0
SHORTNESS OF BREATH: 0
BLURRED VISION: 0
WEAKNESS: 1
CHILLS: 0
LOSS OF CONSCIOUSNESS: 0
DIZZINESS: 0
COUGH: 0
ORTHOPNEA: 0
CONSTIPATION: 0
CLAUDICATION: 0
DOUBLE VISION: 0
SHORTNESS OF BREATH: 0
WHEEZING: 0
RESPIRATORY NEGATIVE: 1
FALLS: 0
COUGH: 0
CHILLS: 0
CONSTITUTIONAL NEGATIVE: 1
SENSORY CHANGE: 0
NEUROLOGICAL NEGATIVE: 1
FEVER: 0
PAIN SCALE: 0
FEVER: 0
SORE THROAT: 0
SENSORY CHANGE: 0
SHORTNESS OF BREATH: 0

## 2019-01-01 ASSESSMENT — LIFESTYLE VARIABLES
DO YOU DRINK ALCOHOL: NO
DOES PATIENT WANT TO STOP DRINKING: NO
DO YOU DRINK ALCOHOL: NO

## 2019-01-01 ASSESSMENT — BALANCE ASSESSMENTS
BALANCE - SITTING DYNAMIC: GOOD
BALANCE - STANDING STATIC: FAIR
BALANCE - STANDING DYNAMIC: FAIR
BALANCE - SITTING STATIC: GOOD

## 2019-01-01 ASSESSMENT — PATIENT HEALTH QUESTIONNAIRE - PHQ9
5. POOR APPETITE OR OVEREATING: 0 - NOT AT ALL
SUM OF ALL RESPONSES TO PHQ QUESTIONS 1-9: 4
CLINICAL INTERPRETATION OF PHQ2 SCORE: 2

## 2019-01-01 ASSESSMENT — ACTIVITIES OF DAILY LIVING (ADL)
AMBULATION_WITH_ASSISTIVE_DEVICE: INDEPENDENT
POOR_BALANCE: 1
POOR_BALANCE: 1

## 2019-01-01 ASSESSMENT — PAIN SCALES - GENERAL: PAINLEVEL: 7=MODERATE-SEVERE PAIN

## 2019-01-02 ENCOUNTER — OFFICE VISIT (OUTPATIENT)
Dept: MEDICAL GROUP | Facility: LAB | Age: 69
End: 2019-01-02
Payer: MEDICARE

## 2019-01-02 ENCOUNTER — ANTICOAGULATION VISIT (OUTPATIENT)
Dept: VASCULAR LAB | Facility: MEDICAL CENTER | Age: 69
End: 2019-01-02
Attending: INTERNAL MEDICINE
Payer: MEDICARE

## 2019-01-02 VITALS — SYSTOLIC BLOOD PRESSURE: 129 MMHG | HEART RATE: 55 BPM | DIASTOLIC BLOOD PRESSURE: 83 MMHG

## 2019-01-02 VITALS
BODY MASS INDEX: 30.81 KG/M2 | RESPIRATION RATE: 14 BRPM | TEMPERATURE: 97.9 F | SYSTOLIC BLOOD PRESSURE: 134 MMHG | HEART RATE: 60 BPM | HEIGHT: 75 IN | OXYGEN SATURATION: 93 % | DIASTOLIC BLOOD PRESSURE: 76 MMHG | WEIGHT: 247.8 LBS

## 2019-01-02 DIAGNOSIS — J10.1 INFLUENZA A: ICD-10-CM

## 2019-01-02 DIAGNOSIS — R68.89 FLU-LIKE SYMPTOMS: Primary | ICD-10-CM

## 2019-01-02 DIAGNOSIS — Z79.01 ANTICOAGULATED ON WARFARIN: ICD-10-CM

## 2019-01-02 LAB
FLUAV+FLUBV AG SPEC QL IA: NORMAL
INR BLD: 2.1 (ref 0.9–1.2)
INR PPP: 2.1 (ref 2–3.5)
INT CON NEG: NEGATIVE
INT CON POS: POSITIVE

## 2019-01-02 PROCEDURE — 87804 INFLUENZA ASSAY W/OPTIC: CPT | Performed by: INTERNAL MEDICINE

## 2019-01-02 PROCEDURE — 99211 OFF/OP EST MAY X REQ PHY/QHP: CPT

## 2019-01-02 PROCEDURE — 99214 OFFICE O/P EST MOD 30 MIN: CPT | Performed by: INTERNAL MEDICINE

## 2019-01-02 PROCEDURE — 85610 PROTHROMBIN TIME: CPT

## 2019-01-02 RX ORDER — OSELTAMIVIR PHOSPHATE 75 MG/1
75 CAPSULE ORAL 2 TIMES DAILY
Qty: 10 CAP | Refills: 0 | Status: SHIPPED | OUTPATIENT
Start: 2019-01-02 | End: 2019-01-14

## 2019-01-02 RX ORDER — PROMETHAZINE HYDROCHLORIDE AND CODEINE PHOSPHATE 6.25; 1 MG/5ML; MG/5ML
5 SYRUP ORAL EVERY 8 HOURS PRN
Qty: 120 ML | Refills: 0 | Status: SHIPPED | OUTPATIENT
Start: 2019-01-02 | End: 2019-01-12

## 2019-01-02 ASSESSMENT — PATIENT HEALTH QUESTIONNAIRE - PHQ9: CLINICAL INTERPRETATION OF PHQ2 SCORE: 0

## 2019-01-02 NOTE — ASSESSMENT & PLAN NOTE
New to discuss, uncontrolled problem.  He reported 3 days history of sore throat, sinus congestion, myalgia and on and off chills.  Has not measured his temperature at home for fever.  Reported sick contact with his son who was recently diagnosed with influenza.  He stated that he also developed a cough that is productive of yellowish sputum but not associated with shortness of breath or wheeze.  Denies chest pain, orthopnea or paroxysmal nocturnal dyspnea.  Has chronic bilateral lower extremity edema that has not recently changed and continue to take his Lasix.  Denies ear pain/discharge, vision changes.  Reported neck ache and swollen lymph glands.  Has not had his flu shot for this season but has been immunized against pneumonia.  Does have history of chronic hypertension, diabetes coronary artery disease and atrial fibrillation on intake regulation with Coumadin.

## 2019-01-02 NOTE — PROGRESS NOTES
Chief Complaint   Patient presents with   • Influenza       Subjective:     HPI:   Joel presents today with the following.    Influenza A  New to discuss, uncontrolled problem.  He reported 3 days history of sore throat, sinus congestion, myalgia and on and off chills.  Has not measured his temperature at home for fever.  Reported sick contact with his son who was recently diagnosed with influenza.  He stated that he also developed a cough that is productive of yellowish sputum but not associated with shortness of breath or wheeze.  Denies chest pain, orthopnea or paroxysmal nocturnal dyspnea.  Has chronic bilateral lower extremity edema that has not recently changed and continue to take his Lasix.  Denies ear pain/discharge, vision changes.  Reported neck ache and swollen lymph glands.  Has not had his flu shot for this season but has been immunized against pneumonia.  Does have history of chronic hypertension, diabetes coronary artery disease and atrial fibrillation on intake regulation with Coumadin.            Patient Active Problem List    Diagnosis Date Noted   • BMI 35.0-35.9,adult 09/22/2017     Priority: Medium   • JANNIE treated with BiPAP 04/18/2016     Priority: Medium   • Atrial fibrillation [427.31] 06/24/2015     Priority: Medium   • Anticoagulated on warfarin 04/28/2014     Priority: Medium   • Diabetes mellitus with neurological manifestations, controlled ([facility]) 03/05/2012     Priority: Medium   • HTN (hypertension) 08/16/2010     Priority: Medium   • Dyslipidemia 06/17/2009     Priority: Medium   • CVA (cerebral vascular accident) ([facility]) 06/04/2009     Priority: Medium   • Physical debility 08/18/2018     Priority: Low   • History of total knee replacement 08/18/2018     Priority: Low   • Fungal infection of skin of abdomen 08/18/2018     Priority: Low   • Impaired activities of daily living 07/18/2018     Priority: Low   • Cervical myelopathy (HCC) 07/18/2018     Priority: Low   • Lumbar stenosis with  neurogenic claudication 07/18/2018     Priority: Low   • Pain 07/18/2018     Priority: Low   • Persistent proteinuria 10/13/2017     Priority: Low   • CKD (chronic kidney disease), stage I 05/12/2017     Priority: Low   • Diabetic nephropathy associated with type 2 diabetes mellitus (HCC) 05/12/2017     Priority: Low   • Chronic use of opiate drugs therapeutic purposes 02/14/2017     Priority: Low   • Other orthopedic aftercare 11/07/2016     Priority: Low   • Cervical stenosis of spine 09/06/2016     Priority: Low   • Dysphagia 09/06/2016     Priority: Low   • Hallucinations 09/06/2016     Priority: Low   • Left knee pain 08/28/2016     Priority: Low   • Diabetic polyneuropathy (HCC) 05/06/2015     Priority: Low   • Toe amputation status ([facility]) 05/06/2015     Priority: Low   • Risk for falls 10/27/2014     Priority: Low   • Myalgia 05/29/2014     Priority: Low   • BPH (benign prostatic hyperplasia) 04/28/2014     Priority: Low   • Chronic antibiotic suppression 04/28/2014     Priority: Low   • MEDICAL HOME      Priority: Low   • Trigger finger 06/17/2011     Priority: Low   • Thyroid mass 07/30/2009     Priority: Low   • Influenza A 01/02/2019   • ACC/AHA stage C systolic congestive heart failure (HCC) 11/21/2018   • High risk medication use 11/21/2018       Current Outpatient Prescriptions   Medication Sig Dispense Refill   • oseltamivir (TAMIFLU) 75 MG Cap Take 1 Cap by mouth 2 times a day. 10 Cap 0   • promethazine-codeine (PHENERGAN-CODEINE) 6.25-10 MG/5ML Syrup Take 5 mL by mouth every 8 hours as needed for Cough for up to 10 days. 120 mL 0   • warfarin (COUMADIN) 5 MG Tab Take 10 mg by mouth every day.     • lisinopril (PRINIVIL, ZESTRIL) 40 MG tablet lisinopril 40 mg tablet, 1 tab twice daily 180 Tab 3   • metoprolol (TOPROL-XL) 200 MG XL tablet Take 1 Tab by mouth every day. 30 Tab 11   • rosuvastatin (CRESTOR) 10 MG Tab Take 1 Tab by mouth every evening. Take Monday and Thursdays (Patient taking  differently: Take 10 mg by mouth. Take Monday and Thursdays) 30 Tab 11   • clopidogrel (PLAVIX) 75 MG Tab Take 1 Tab by mouth every day. 30 Tab 11   • hydrALAZINE (APRESOLINE) 100 MG tablet TAKE ONE TABLET BY MOUTH EVERY EIGHT HOURS 90 Tab 1   • amLODIPine (NORVASC) 10 MG Tab Take 1 Tab by mouth every day. 30 Tab 3   • insulin glargine (LANTUS) 100 UNIT/ML Solution Inject 30 Units as instructed every evening.     • insulin lispro (HUMALOG) 100 UNIT/ML Solution Inject 2-10 Units as instructed 4 Times a Day,Before Meals and at Bedtime. For -200 Give 2 units  For -250 Give 4 units  For -300 Give 6 units  For -350 Give 8 units  For -400 Give 10 units  <60 or >400 Notify MD     • potassium chloride SA (KDUR) 20 MEQ Tab CR Take 1 Tab by mouth every day. 30 Tab 11   • torsemide (DEMADEX) 10 MG tablet Take 1 Tab by mouth 2 times a day. 60 Tab 11   • clindamycin (CLEOCIN) 300 MG Cap TAKE 1 CAPSULE BY MOUTH TWICE DAILY 180 Cap 0   • metformin (GLUCOPHAGE) 1000 MG tablet Take 1 Tab by mouth 2 times a day, with meals. 60 Tab 11   • gabapentin (NEURONTIN) 300 MG Cap Take 1-2 Caps by mouth 3 times a day. 300mg in AM and noon 600mg in PM (Patient taking differently: Take 300-600 mg by mouth 4 times a day. 300mg in AM and noon 600mg in PM ) 90 Cap 3   • Insulin Pen Needle (ADVOCATE INSULIN PEN NEEDLES) 31G X 5 MM Misc Use tid and prn. 100 Each 3   • vitamin D (CHOLECALCIFEROL) 1000 UNIT Tab Take 2 Tabs by mouth every day. (Patient taking differently: Take 1,000 Units by mouth every day.) 60 Tab 3   • NON SPECIFIED Four wheeled walker with seat - [facility]. 1 Each 0     No current facility-administered medications for this visit.        Allergies as of 01/02/2019 - Reviewed 01/02/2019   Allergen Reaction Noted   • Demerol  02/14/2008   • Statins [hmg-coa-r inhibitors] Myalgia 11/14/2016        Past Medical History:   Diagnosis Date   • Anesthesia     low O2 sat   • Arrhythmia     a-fib   •  arthritis 6/17/2011    thumb, fingers   • Arthritis     hip   • Backpain    • CAD (coronary artery disease)     MIRELLA to distal RCA and prox LAD   • Cataract     left eye surgery   • Dental disorder     full dentures   • Diabetes     insulin, Dr Oconnor, his APN   • High cholesterol    • Hypertension    • MRSA (methicillin resistant Staphylococcus aureus)     history of, nothing current 2016, on clindamycin for rest of life per patient   • Pain 05-03-13    shoulders, hip, right knee, 7/10   • Pneumonia 2002   • Renal disorder     stage 2   • Sleep apnea     bipap   • Stroke (HCC)     2/1/2007, 4/1/2007, right sided weakness; balance disturbance, memory problems   • Thyroid mass 7/30/2009    benign lump   • Ventricular ectopy 6/17/2011       Past Surgical History:   Procedure Laterality Date   • CERVICAL FUSION POSTERIOR  7/13/2018    Procedure: CERVICAL FUSION POSTERIOR/ C2-4;  Surgeon: Boby Marsh M.D.;  Location: [facility];  Service: Neurosurgery   • CERVICAL LAMINECTOMY POSTERIOR  7/13/2018    Procedure: CERVICAL LAMINECTOMY POSTERIOR/ C2-3, C5-6;  Surgeon: Boby Marsh M.D.;  Location: [facility];  Service: Neurosurgery   • LUMBAR LAMINECTOMY DISKECTOMY  7/13/2018    Procedure: LUMBAR LAMINECTOMY DISKECTOMY/ L2-5 LAMI;  Surgeon: Boby Marsh M.D.;  Location: [facility];  Service: Neurosurgery   • CERVICAL DISK AND FUSION ANTERIOR  8/31/2016    Procedure: CERVICAL DISK AND FUSION ANTERIOR C3-7 ;  Surgeon: Alhaji Jaffe M.D.;  Location: [facility];  Service:    • CATARACT PHACO WITH IOL  5/8/2013    Performed by Mame Rehman M.D. at SURGERY SAME DAY [facility]   • IRRIGATION & DEBRIDEMENT ORTHO  11/13/2012    Performed by Gordon Cota M.D. at SURGERY [facility]   • KNEE REVISION TOTAL  11/13/2012    Performed by Gordon Cota M.D. at [facility]   • IRRIGATION & DEBRIDEMENT ORTHO  11/2/2012    Performed by  Gordon Cota M.D. at SURGERY [facility]   • IRRIGATION & DEBRIDEMENT ORTHO Left 10/29/2012    Procedure: left shoulder, with drain;  Surgeon: Gordon Cota M.D.;  Location: SURGERY [facility];  Service:    • INCISION AND DRAINAGE ORTHOPEDIC Right 10/29/2012    Procedure: Right Total Knee I and D and Liner Exchange, with drain;  Surgeon: Gordon Cota M.D.;  Location: SURGERY [facility];  Service:    • IRRIGATION & DEBRIDEMENT ORTHO  3/13/2012    Performed by KAMALJIT SHI at [facility]   • KNEE REVISION TOTAL  3/13/2012    Performed by KAMALJIT SIH at [facility]   • TENDON REPAIR  3/13/2012    Performed by KAMALJIT SHI at [facility]   • KNEE ARTHROPLASTY TOTAL  1/11/2012    Right; Performed by KAMALJIT SHI at [facility]   • TOE AMPUTATION  7/22/2011    Performed by EVELYN JANE at [facility]   • ELBOW ARTHROTOMY  2008    right   • LUMBAR FUSION POSTERIOR  1979   • CARDIAC CATH     • FOOT SURGERY      partial amputation great toe   • FOOT SURGERY      toe surgery left foot   • OTHER      left eye cataract   • OTHER NEUROLOGICAL SURG      neck fusion   • PB KNEE SCOPE,SINGLE MENISECTOMY      right knee       Social History   Substance Use Topics   • Smoking status: Former Smoker     Packs/day: 4.00     Years: 0.50     Types: Cigarettes     Quit date: 1/1/1974   • Smokeless tobacco: Never Used   • Alcohol use No       Family History   Problem Relation Age of Onset   • Diabetes Mother    • Cancer Father         lung cancer   • Heart Disease Father         triple bypass   • Diabetes Unknown    • Hypertension Unknown    • Cancer Unknown        ROS:     - Constitutional:  Per HPI.    - HEENT:Per HPI.    - Respiratory:  Per HPI.    - Cardiovascular: Negative for chest pain or palpitations.      - Gastrointestinal: Negative for heartburn, nausea, vomiting, abdominal pain, diarrhea or constipation.     - Genitourinary: Negative  "for dysuria, polyuria or urinary urgency.    - Musculoskeletal: Negative for myalgias, back pain, and joint pain. + Chronic SOLITARIO.    - Skin: Negative for rash, itching, cyanotic skin color change.     - Psychiatric/Behavioral: Negative for depression or suicidal/homicidal ideation.       Physical Exam:     Blood pressure 134/76, pulse 60, temperature 36.6 °C (97.9 °F), temperature source Temporal, resp. rate 14, height 1.905 m (6' 3\"), weight 112.4 kg (247 lb 12.8 oz), SpO2 93 %. Body mass index is 30.97 kg/m².   Gen:         Alert and oriented, No apparent distress.  HEENT: Eyes conjunctiva is clear, lids without ptosis, pupils equal round and reactive to light and accommodation.  Ears normal shape and contour, canals are clear bilaterally, TMs with good light reflex and appear normal.  Nasal mucosa pale and edematous with clear rhinorrhea.  Oropharynx diffuse erythema, no edema or exudates..  Sinuses (frontal and maxillary) nontender to palpation.  Neck:        No Lymphadenopathy or Bruits.  Lungs:     Clear to auscultation bilaterally  CV:          Regular rate and rhythm. No murmurs, rubs or gallops.               Ext:          No clubbing, cyanosis, +1 SOLITARIO and chronic venous dermatitis changes bilaterally.      Assessment and Plan:     68 y.o. male with the following issues.    1. Flu-like symptoms   Influenza A  New, uncontrolled problem.  Classic influenza symptoms in the setting of positive sick contact.  Rapid flu test positive for influenza A.  Will proceed with Tamiflu for 5 days course given his comorbid conditions.  Also prescribed promethazine-codeine as needed for cough per patient request.  Narcotic very was checked, consistent refills of oxycodone noted.     - POCT Influenza A/B  - promethazine-codeine (PHENERGAN-CODEINE) 6.25-10 MG/5ML Syrup; Take 5 mL by mouth every 8 hours as needed for Cough for up to 10 days.  Dispense: 120 mL; Refill: 0  - oseltamivir (TAMIFLU) 75 MG Cap; Take 1 Cap by mouth 2 " times a day.  Dispense: 10 Cap; Refill: 0      Follow Up:      Return for Keep your upcoming visit with your PCP.      Please note that this dictation was created using voice recognition software. I have made every reasonable attempt to correct obvious errors, but I expect that there are errors of grammar and possibly content that I did not discover before finalizing the note.    Signed by: Deborah Dover M.D.

## 2019-01-02 NOTE — PROGRESS NOTES
Anticoagulation Summary  As of 1/2/2019    INR goal:   2.0-3.0   TTR:   53.8 % (3.1 y)   Today's INR:   2.1   Warfarin maintenance plan:   10 mg (5 mg x 2) every day   Weekly warfarin total:   70 mg   Plan last modified:   Torres Sena, PharmD (11/26/2018)   Next INR check:   1/16/2019   Target end date:   Indefinite    Indications    CVA (cerebral vascular accident) (HCC) [I63.9]  Atrial fibrillation [427.31] [I48.91]  History of CVA (cerebrovascular accident) (Resolved) [Z86.73]  Anticoagulated on warfarin [Z79.01]             Anticoagulation Episode Summary     INR check location:   [facility]    Preferred lab:       Send INR reminders to:       Comments:   call pt multiple times, he has a hard time getting to his phone in time and has no VM set up      Anticoagulation Care Providers     Provider Role Specialty Phone number    Catrachito Sterling D.O. Referring Internal Medicine [phone]    [facility] Anticoagulation Services Responsible  [phone]    Liseth Doe A.P.N. Responsible Cardiology [phone]    Gi Cadena A.P.N. Responsible Cardiology [phone]        Anticoagulation Patient Findings      HPI:  Joel Pradeep Anil seen in clinic today, on anticoagulation therapy with warfarin for CVA.  Changes to current medical/health status since last appt: none  Denies signs/symptoms of bleeding and/or thrombosis since the last appt.    Denies any interval changes to diet  Denies any interval changes to medications since last appt.   Denies any complications or cost restrictions with current therapy.   BP recorded in vitals.  Confirmed dosing regimen.     A/P   INR  therapeutic.   Pt is to continue with current warfarin dosing regimen.     Follow up appointment in 2 week(s).    Brett Coates, PharmD

## 2019-01-03 ENCOUNTER — TELEPHONE (OUTPATIENT)
Dept: NEPHROLOGY | Facility: MEDICAL CENTER | Age: 69
End: 2019-01-03

## 2019-01-10 ENCOUNTER — TELEPHONE (OUTPATIENT)
Dept: CARDIOLOGY | Facility: MEDICAL CENTER | Age: 69
End: 2019-01-10

## 2019-01-10 NOTE — TELEPHONE ENCOUNTER
S/w patient about non-fasting labs, he stated that he will try to complete it but at the moment he has no transportation and he is unable to complete his labs.     Patient is aware of date and time of appt with Cheryl on 01/14/2019.

## 2019-01-11 ENCOUNTER — TELEPHONE (OUTPATIENT)
Dept: CARDIOLOGY | Facility: MEDICAL CENTER | Age: 69
End: 2019-01-11

## 2019-01-11 NOTE — TELEPHONE ENCOUNTER
PAR approved:  Joel Ma (Key: [id number]) - [id number]       Archivedtoday  Outcome   Approvedtoday   [employer]rug[employer] SureClick 140MG/ML auto-injectors   [facility] Medicare Prescription Drug Coverage FormPrior Authorization From for Medicare Prescription Drug Coverage [phone]   Original Claim Info75 For help: [id number]

## 2019-01-14 ENCOUNTER — OFFICE VISIT (OUTPATIENT)
Dept: CARDIOLOGY | Facility: MEDICAL CENTER | Age: 69
End: 2019-01-14
Payer: MEDICARE

## 2019-01-14 ENCOUNTER — TELEPHONE (OUTPATIENT)
Dept: MEDICAL GROUP | Facility: LAB | Age: 69
End: 2019-01-14

## 2019-01-14 VITALS
HEART RATE: 68 BPM | SYSTOLIC BLOOD PRESSURE: 138 MMHG | DIASTOLIC BLOOD PRESSURE: 78 MMHG | WEIGHT: 240.2 LBS | OXYGEN SATURATION: 96 % | BODY MASS INDEX: 29.86 KG/M2 | HEIGHT: 75 IN

## 2019-01-14 DIAGNOSIS — I10 ESSENTIAL HYPERTENSION: ICD-10-CM

## 2019-01-14 DIAGNOSIS — I25.10 CORONARY ARTERY DISEASE INVOLVING NATIVE CORONARY ARTERY OF NATIVE HEART WITHOUT ANGINA PECTORIS: ICD-10-CM

## 2019-01-14 DIAGNOSIS — N18.1 CKD (CHRONIC KIDNEY DISEASE), STAGE I: ICD-10-CM

## 2019-01-14 DIAGNOSIS — I50.20 ACC/AHA STAGE C SYSTOLIC CONGESTIVE HEART FAILURE (HCC): ICD-10-CM

## 2019-01-14 DIAGNOSIS — I48.0 PAROXYSMAL ATRIAL FIBRILLATION (HCC): ICD-10-CM

## 2019-01-14 DIAGNOSIS — Z79.01 ANTICOAGULATED ON WARFARIN: ICD-10-CM

## 2019-01-14 DIAGNOSIS — E78.5 DYSLIPIDEMIA: ICD-10-CM

## 2019-01-14 DIAGNOSIS — I50.9 HEART FAILURE, NYHA CLASS 2 (HCC): ICD-10-CM

## 2019-01-14 DIAGNOSIS — Z95.5 S/P CORONARY ARTERY STENT PLACEMENT: ICD-10-CM

## 2019-01-14 DIAGNOSIS — Z78.9 STATIN INTOLERANCE: ICD-10-CM

## 2019-01-14 DIAGNOSIS — Z79.4 CONTROLLED TYPE 2 DIABETES MELLITUS WITH DIABETIC POLYNEUROPATHY, WITH LONG-TERM CURRENT USE OF INSULIN (HCC): ICD-10-CM

## 2019-01-14 DIAGNOSIS — E11.42 CONTROLLED TYPE 2 DIABETES MELLITUS WITH DIABETIC POLYNEUROPATHY, WITH LONG-TERM CURRENT USE OF INSULIN (HCC): ICD-10-CM

## 2019-01-14 PROCEDURE — 99214 OFFICE O/P EST MOD 30 MIN: CPT | Performed by: NURSE PRACTITIONER

## 2019-01-14 ASSESSMENT — ENCOUNTER SYMPTOMS
DIZZINESS: 1
ABDOMINAL PAIN: 0
COUGH: 0
WEAKNESS: 1
FEVER: 0
SHORTNESS OF BREATH: 0
PALPITATIONS: 0
MYALGIAS: 1
PND: 0
ORTHOPNEA: 0
CLAUDICATION: 0

## 2019-01-14 ASSESSMENT — 6 MINUTE WALK TEST (6MWT): TOTAL DISTANCE WALKED (METERS): 0

## 2019-01-14 NOTE — PROGRESS NOTES
Chief Complaint   Patient presents with   • Congestive Heart Failure     HF est.       Subjective:   Joel Ma is a 68 y.o. male who presents today for follow-up on his heart failure, CAD with his son, Chris.    Patient of Dr. Wei and of the heart failure clinic.  Patient was seen on 12/14/2018.  During his last visit, patient was switched over to torsemide 10 mg twice a day.  Patient reports no problems with the medication change.  Patient was also started on Repatha.  Patient reports he has not started that medication.  But his prior authorization was approved.    For his symptoms, he does report some mild lower extremity edema, leg weakness, chronic dizziness.  He denies chest pain, palpitation, orthopnea, PND and denies any current shortness of breath with his current activity level.  Patient reports he is not exerting or ambulating far because of his weakness in his legs.  Patient is hoping to participate in physical therapy, but needs preauthorization for his therapy.  Patient also states having a hard time getting to his physical therapy appointments.  He is unsure if he will be able to unless they come to his home.    Patient reports he did start weighing himself, his home weights have been between 240-245 pounds.    Patient reports statin intolerance due to myalgias.  Patient reports he has tried several statins, including lovastatin, simvastatin, atorvastatin.    Additonally, patient has the following medical problems:    -CAD, MIRELLA x2 to the distal RCA and to the proximal LAD on 9/6/2018    -History of prior stroke    -CKD    -Sleep apnea: Uses CPAP regularly    -Diabetes: Using insulin    -Hypertension: Taking atorvastatin, hydralazine, lisinopril, Toprol-XL    -HLD    -PAF    -Cervical and lumbar spine disease    -Venous insufficiency    Past Medical History:   Diagnosis Date   • Anesthesia     low O2 sat   • Arrhythmia     a-fib   • arthritis 6/17/2011    thumb, fingers   • Arthritis      hip   • Backpain    • CAD (coronary artery disease)     MIRELLA to distal RCA and prox LAD   • Cataract     left eye surgery   • Dental disorder     full dentures   • Diabetes     insulin, Dr Oconnor, his APN   • High cholesterol    • Hypertension    • MRSA (methicillin resistant Staphylococcus aureus)     history of, nothing current 2016, on clindamycin for rest of life per patient   • Pain 05-03-13    shoulders, hip, right knee, 7/10   • Pneumonia 2002   • Renal disorder     stage 2   • Sleep apnea     bipap   • Stroke (HCC)     2/1/2007, 4/1/2007, right sided weakness; balance disturbance, memory problems   • Thyroid mass 7/30/2009    benign lump   • Ventricular ectopy 6/17/2011     Past Surgical History:   Procedure Laterality Date   • CERVICAL FUSION POSTERIOR  7/13/2018    Procedure: CERVICAL FUSION POSTERIOR/ C2-4;  Surgeon: Boby Marsh M.D.;  Location: [facility];  Service: Neurosurgery   • CERVICAL LAMINECTOMY POSTERIOR  7/13/2018    Procedure: CERVICAL LAMINECTOMY POSTERIOR/ C2-3, C5-6;  Surgeon: Boby Marsh M.D.;  Location: [facility];  Service: Neurosurgery   • LUMBAR LAMINECTOMY DISKECTOMY  7/13/2018    Procedure: LUMBAR LAMINECTOMY DISKECTOMY/ L2-5 LAMI;  Surgeon: Boby Marsh M.D.;  Location: [facility];  Service: Neurosurgery   • CERVICAL DISK AND FUSION ANTERIOR  8/31/2016    Procedure: CERVICAL DISK AND FUSION ANTERIOR C3-7 ;  Surgeon: Alhaji Jaffe M.D.;  Location: [facility];  Service:    • CATARACT PHACO WITH IOL  5/8/2013    Performed by Mame Rehman M.D. at SURGERY SAME DAY [facility]   • IRRIGATION & DEBRIDEMENT ORTHO  11/13/2012    Performed by Gordon Cota M.D. at SURGERY [facility]   • KNEE REVISION TOTAL  11/13/2012    Performed by Gordon Cota M.D. at SURGERY [facility]   • IRRIGATION & DEBRIDEMENT ORTHO  11/2/2012    Performed by Gordon Cota M.D. at SURGERY [facility]   •  IRRIGATION & DEBRIDEMENT ORTHO Left 10/29/2012    Procedure: left shoulder, with drain;  Surgeon: Gordon Cota M.D.;  Location: SURGERY [facility];  Service:    • INCISION AND DRAINAGE ORTHOPEDIC Right 10/29/2012    Procedure: Right Total Knee I and D and Liner Exchange, with drain;  Surgeon: Gordon Cota M.D.;  Location: SURGERY [facility];  Service:    • IRRIGATION & DEBRIDEMENT ORTHO  3/13/2012    Performed by KAMALJIT SHI at [facility]   • KNEE REVISION TOTAL  3/13/2012    Performed by KAMALJIT SHI at [facility]   • TENDON REPAIR  3/13/2012    Performed by KAMALJIT SHI at [facility]   • KNEE ARTHROPLASTY TOTAL  1/11/2012    Right; Performed by KAMALJIT SHI at [facility]   • TOE AMPUTATION  7/22/2011    Performed by EVELYN JANE at [facility]   • ELBOW ARTHROTOMY  2008    right   • LUMBAR FUSION POSTERIOR  1979   • CARDIAC CATH     • FOOT SURGERY      partial amputation great toe   • FOOT SURGERY      toe surgery left foot   • OTHER      left eye cataract   • OTHER NEUROLOGICAL SURG      neck fusion   • PB KNEE SCOPE,SINGLE MENISECTOMY      right knee     Family History   Problem Relation Age of Onset   • Diabetes Mother    • Cancer Father         lung cancer   • Heart Disease Father         triple bypass   • Diabetes Unknown    • Hypertension Unknown    • Cancer Unknown      Social History     Social History   • Marital status: Single     Spouse name: N/A   • Number of children: N/A   • Years of education: N/A     Occupational History   • Not on file.     Social History Main Topics   • Smoking status: Former Smoker     Packs/day: 4.00     Years: 0.50     Types: Cigarettes     Quit date: 1/1/1974   • Smokeless tobacco: Never Used   • Alcohol use No   • Drug use: No   • Sexual activity: Yes     Other Topics Concern   • Not on file     Social History Narrative    ** Merged History Encounter **          Allergies   Allergen  Reactions   • Demerol      Makes me stop breathing.  Doesn't like because it makes him high   • Statins [Hmg-Coa-R Inhibitors] Myalgia     Rxn - ongoing       Outpatient Encounter Prescriptions as of 1/14/2019   Medication Sig Dispense Refill   • warfarin (COUMADIN) 5 MG Tab Take 10 mg by mouth every day.     • lisinopril (PRINIVIL, ZESTRIL) 40 MG tablet lisinopril 40 mg tablet, 1 tab twice daily (Patient taking differently: 40 mg every day. lisinopril 40 mg tablet, 1 tab twice daily) 180 Tab 3   • potassium chloride SA (KDUR) 20 MEQ Tab CR Take 1 Tab by mouth every day. 30 Tab 11   • metoprolol (TOPROL-XL) 200 MG XL tablet Take 1 Tab by mouth every day. 30 Tab 11   • torsemide (DEMADEX) 10 MG tablet Take 1 Tab by mouth 2 times a day. 60 Tab 11   • rosuvastatin (CRESTOR) 10 MG Tab Take 1 Tab by mouth every evening. Take Monday and Thursdays (Patient taking differently: Take 10 mg by mouth. Take Monday and Thursdays) 30 Tab 11   • clopidogrel (PLAVIX) 75 MG Tab Take 1 Tab by mouth every day. 30 Tab 11   • clindamycin (CLEOCIN) 300 MG Cap TAKE 1 CAPSULE BY MOUTH TWICE DAILY 180 Cap 0   • hydrALAZINE (APRESOLINE) 100 MG tablet TAKE ONE TABLET BY MOUTH EVERY EIGHT HOURS 90 Tab 1   • metformin (GLUCOPHAGE) 1000 MG tablet Take 1 Tab by mouth 2 times a day, with meals. 60 Tab 11   • amLODIPine (NORVASC) 10 MG Tab Take 1 Tab by mouth every day. 30 Tab 3   • gabapentin (NEURONTIN) 300 MG Cap Take 1-2 Caps by mouth 3 times a day. 300mg in AM and noon 600mg in PM (Patient taking differently: Take 300-600 mg by mouth 4 times a day. 300mg in AM and noon 600mg in PM ) 90 Cap 3   • vitamin D (CHOLECALCIFEROL) 1000 UNIT Tab Take 2 Tabs by mouth every day. (Patient taking differently: Take 1,000 Units by mouth every day.) 60 Tab 3   • insulin glargine (LANTUS) 100 UNIT/ML Solution Inject 30 Units as instructed every evening.     • insulin lispro (HUMALOG) 100 UNIT/ML Solution Inject 2-10 Units as instructed 4 Times a Day,Before  "Meals and at Bedtime. For -200 Give 2 units  For -250 Give 4 units  For -300 Give 6 units  For -350 Give 8 units  For -400 Give 10 units  <60 or >400 Notify MD     • oseltamivir (TAMIFLU) 75 MG Cap Take 1 Cap by mouth 2 times a day. 10 Cap 0   • Insulin Pen Needle (ADVOCATE INSULIN PEN NEEDLES) 31G X 5 MM Misc Use tid and prn. 100 Each 3   • NON SPECIFIED Four wheeled walker with seat - [facility]. 1 Each 0     No facility-administered encounter medications on file as of 1/14/2019.      Review of Systems   Constitutional: Negative for fever and malaise/fatigue.   Respiratory: Negative for cough and shortness of breath.    Cardiovascular: Positive for leg swelling. Negative for chest pain, palpitations, orthopnea, claudication and PND.   Gastrointestinal: Negative for abdominal pain.   Musculoskeletal: Positive for myalgias.   Neurological: Positive for dizziness (Chronic) and weakness.   All other systems reviewed and are negative.       Objective:   /78 (BP Location: Right arm, Patient Position: Sitting, BP Cuff Size: Adult)   Pulse 68   Ht 1.905 m (6' 3\")   Wt 109 kg (240 lb 3.2 oz)   SpO2 96%   BMI 30.02 kg/m²     Physical Exam   Constitutional: He is oriented to person, place, and time. He appears well-developed and well-nourished.   HENT:   Head: Normocephalic and atraumatic.   Eyes: Pupils are equal, round, and reactive to light. EOM are normal.   Neck: Normal range of motion. Neck supple. No JVD present.   Cardiovascular: Normal rate, regular rhythm and normal heart sounds.    Pulmonary/Chest: Effort normal and breath sounds normal. No respiratory distress. He has no wheezes. He has no rales.   Abdominal: Soft. Bowel sounds are normal.   Musculoskeletal: He exhibits edema (Bilateral 1-2+ lower extremity edema).   Neurological: He is alert and oriented to person, place, and time.   Skin: Skin is warm and dry.   Psychiatric: He has a normal mood and affect. His " behavior is normal.   Vitals reviewed.    Lab Results   Component Value Date/Time    CHOLSTRLTOT 152 07/19/2018 05:26 AM    LDL 96 07/19/2018 05:26 AM    HDL 26 (A) 07/19/2018 05:26 AM    TRIGLYCERIDE 150 (H) 07/19/2018 05:26 AM       Lab Results   Component Value Date/Time    SODIUM 139 09/07/2018 03:36 AM    POTASSIUM 3.5 (L) 09/07/2018 03:36 AM    CHLORIDE 106 09/07/2018 03:36 AM    CO2 24 09/07/2018 03:36 AM    GLUCOSE 154 (H) 09/07/2018 03:36 AM    BUN 22 09/07/2018 03:36 AM    CREATININE 0.97 09/07/2018 03:36 AM    CREATININE 1.21 02/17/2011 07:28 AM    BUNCREATRAT 16 02/17/2011 07:28 AM    GLOMRATE >59 02/17/2011 07:28 AM     Lab Results   Component Value Date/Time    ALKPHOSPHAT 57 09/06/2018 12:40 PM    ASTSGOT 15 09/06/2018 12:40 PM    ALTSGPT 11 09/06/2018 12:40 PM    TBILIRUBIN 0.7 09/06/2018 12:40 PM        Echocardiogram 10/30/2012  CONCLUSIONS  Normal left ventricular chamber size. Normal left ventricular systolic   function. Grade I diastolic dysfunction is present. Mild concentric   left ventricular hypertrophy. Left ventricular ejection fraction is 50% to 55%.  Enlarged right ventricular size. Normal right ventricular systolic function.  Mildly dilated right atrium.  Moderately dilated left atrium.  Aortic valve opens normally. Aortic annular calcification. No stenosis or regurgitation seen.  Tricuspid valve opens normally. Mild tricuspid regurgitation. Right ventricular systolic pressure is estimated to be 44 mmHg. Consistent   with moderate pulmonary hypertension.     Transthoracic Echo Report 11/3/2015  Prior study is available for comparison, 10/30/2012; dilated ascending aorta is noted.  No obvious intra-cardiac thrombus noted.  No evidence of right to left shunt per agitated saline study.  Normal left ventricular systolic function.  Left ventricular ejection fraction is visually estimated to be 60%.  Grade I diastolic dysfunction.  No significant valve abnormalities.   Dilated ascending aorta  diameter is 4.7 cm.    Myocardial Perfusion Report 8/19/2016   NUCLEAR IMAGING INTERPRETATION   No evidence of significant jeopardized viable myocardium or prior myocardial    infarction.   The ejection fraction post stress is 44 %.     Myocardial Perfusion Report 5/25/2018   NUCLEAR IMAGING INTERPRETATION   Small sized defect of mildly reduced uptake in the apical septal and mid anteroseptal wall which is more prominent in stress images suggestive of scar with ischemia.   Moderate sized defect of moderately reduced uptake in the entire inferior wall which is more prominent in stress images suggestive of mainly scar with some ischemia.     Normal left ventricular wall motion.  LV ejection fraction = 52%.   Compared to the report of the study dated 8/19/2016, there is now defects in perfusion and the ejection fraction is low normal, previously 44%.   These findings were communicated to the ordering provider via EPIC at the time of the study.   ECG INTERPRETATION   Negative stress ECG for ischemia.    Heart Cath 6/13/2018  FINDINGS:  HEMODYNAMICS:  LEFT HEART PRESSURES:  1.  LVEDP 20 mmHg.  2.  Left ventricular systolic pressure 134 mmHg.  3.  Central aortic pressure, systolic 132, diastolic 67.     LEFT VENTRICULOGRAPHY:  Left ventricular chamber size is normal with normal   wall motion.  Visually estimated ejection fraction 55%.  No mitral   regurgitation present.     CORONARY ARTERIOGRAPHY:  1.  Left main artery:  Left main is a very large caliber vessel,   angiographically widely patent with superficial calcification.  The left main   artery trifurcates into the left anterior descending artery, ramus intermedius   vessel, and circumflex artery.  2.  Left anterior descending artery:  The left anterior descending artery is a   moderately large caliber vessel, gives rise to a small first diagonal branch,   a first septal branch, a second diagonal branch, and extends around the apex.    The left anterior descending  artery has a proximal 70% stenosis and distal   vessel of 60-70% stenosis.  3.  Ramus intermedius:  The ramus is a short small caliber vessel and has an   ostial 60% stenosis.  4.  Circumflex artery:  The circumflex is a moderately large caliber vessel,   gives rise to a small first marginal branch, a large second marginal branch.    The circumflex artery is widely patent with diffuse ectatic atheromatous   disease.  5.  Right coronary artery:  The right coronary is a large caliber vessel,   gives rise to a posterior descending artery and a bifurcating posterolateral   branch.  The right coronary has a proximal ectatic tortuosity and a distal   eccentric focal 90% stenosis.     PLAN:    1.  The patient's case is complex and therefore it and the coronary angiogram   results were independently reviewed with my partners Dr. Mauricio Swift and   Dr Loi Pickett as to the best management approach.  2.  It was decided to proceed with the necessary cervical and lumbar surgery with   close postoperative cardiac monitoring with subsequent revascularization of the   left anterior descending artery and right coronary artery.    Heart cath 9/6/2018  DATE OF SERVICE:  09/06/2018     PROCEDURE:  Cardiac catheterization and percutaneous coronary intervention.  A.  Selective coronary angiography.  B.  Coronary stent implantation, proximal left anterior descending artery with a 4.0x28 mm drug-eluting Synergy stent.  C.  Coronary stent implantation of the distal right coronary artery with a 4.0x12 mm drug-eluting Synergy stent.  D.  Right radial artery approach.     PREPROCEDURE DIAGNOSES:    1.  Coronary artery disease, severe 2-vessel, involving   the proximal left anterior descending artery and distal right coronary artery.  2.  Left ventricular ejection fraction 55%.      Transthoracic Echo Report 12/12/2018  Prior echo 11/3/15, no changes are noted.  Normal transthoracic echocardiogram.     Assessment:     1. ACC/AHA stage C  systolic congestive heart failure (HCC)     2. Heart failure, NYHA class 2 (HCC)     3. Coronary artery disease involving native coronary artery of native heart without angina pectoris     4. Dyslipidemia     5. Statin intolerance     6. Essential hypertension     7. Paroxysmal atrial fibrillation (HCC)     8. Anticoagulated on warfarin     9. S/P coronary artery stent placement     10. CKD (chronic kidney disease), stage I         Medical Decision Making:  Today's Assessment / Status / Plan:   1. HFrEF, Stage C, Class 2, LVEF 65%: Pt continues to have LE edema. Could be related to venous insufficiency or immobility.  Patient's heart failure appears stable at this time.  -Continue torsemide 10 mg BID  -Continue lisinopril 40 mg daily  -Continue Toprol- mg daily  -Continue potassium 20 mEq daily  -Continue hydralazine 100 mg 3 times a day  -Discussed with patient and son the importance of getting his lab work done today or tomorrow  -Reinforced s/sx of worsening heart failure with patient and weight monitoring. Pt verbalizes understanding. Pt to call office or RTC if present.     2. CAD, MIRELLA x 2 on 9/6/18/DLD: Last LDL 96 on 7/19/2018  -Reinforced the role of statin therapy/lipid control and his CAD.    -Discussed with patient he can stop his rosuvastatin once he starts his PCSK 9 inhibitor  -Start Repatha 140 mg subcutaneous every 2 weeks  (Prior authorization received for this medication and new prescription sent to his pharmacy)  -Injection education given by RN  -Patient intolerant to statin therapy due to significant myalgias.  Patient has tried lovastatin, atorvastatin, simvastatin and rosuvastatin  -Continue clopidogrel 75 mg daily    3.  Hypertension: Stable  -Continue amlodipine 10 mg daily  -Continue hydralazine 100 mg 3 times a day  -Continue lisinopril 40 mg daily  -Continue Toprol- mg daily    4.  Paroxysmal A. Fib:  -Continue warfarin, followed by the anticoagulation clinic    5.  CKD:  stable  -continue to follow    FU in clinic in 2-3 months with Dr. Wei. Sooner if needed.  Patient encouraged to follow-up with the heart failure clinic if worsening symptoms develop.    Patient verbalizes understanding and agrees with the plan of care.     Collaborating MD: Moshe Damon MD

## 2019-01-14 NOTE — PATIENT INSTRUCTIONS
Ok to stop Rosuvastatin once Repatha Started  Start Repatha 140 mg per injection every 2 weeks  Get BMP done today or tomorrow

## 2019-01-14 NOTE — TELEPHONE ENCOUNTER
ESTABLISHED PATIENT PRE-VISIT PLANNING     Patient was NOT contacted to complete PVP.     Note: Patient will not be contacted if there is no indication to call.     1.  Reviewed notes from the last few office visits within the medical group: Yes    2.  If any orders were placed at last visit or intended to be done for this visit (i.e. 6 mos follow-up), do we have Results/Consult Notes?        •  Labs - Labs were not ordered at last office visit.       •  Imaging - Imaging was not ordered at last office visit.       •  Referrals - No referrals were ordered at last office visit.    3. Is this appointment scheduled as a Hospital Follow-Up? No    4.  Immunizations were updated in Epic using WebIZ?: Epic matches WebIZ       •  Web Iz Recommendations: FLU, PNEUMOVAX (PPSV23) and SHINGRIX (Shingles)    5.  Patient is due for the following Health Maintenance Topics:   Health Maintenance Due   Topic Date Due   • IMM HEP B VACCINE (1 of 3 - Risk 3-dose series) 08/17/1969   • IMM ZOSTER VACCINES (1 of 2) 08/17/1999   • Annual Wellness Visit  10/28/2015   • COLONOSCOPY  06/30/2017   • IMM PNEUMOCOCCAL 65+ (ADULT) LOW/MEDIUM RISK SERIES (2 of 2 - PPSV23) 01/19/2018   • DIABETES MONOFILAMENT / LE EXAM  03/17/2018   • URINE ACR / MICROALBUMIN  10/19/2018       - Patient has completed PNEUMOVAX (PPSV23), PREVNAR (PCV13)  and TDAP Immunization(s) per WebIZ. Chart has been updated.    6. Orders for overdue Health Maintenance topics pended in Pre-Charting? YES    7.  AHA (MDX) form printed for Provider? YES    8.  Patient was NOT informed to arrive 15 min prior to their scheduled appointment and bring in their medication bottles.

## 2019-01-15 ENCOUNTER — HOSPITAL ENCOUNTER (OUTPATIENT)
Dept: LAB | Facility: MEDICAL CENTER | Age: 69
End: 2019-01-15
Attending: INTERNAL MEDICINE
Payer: MEDICARE

## 2019-01-15 ENCOUNTER — OFFICE VISIT (OUTPATIENT)
Dept: MEDICAL GROUP | Facility: LAB | Age: 69
End: 2019-01-15
Payer: MEDICARE

## 2019-01-15 ENCOUNTER — ANTICOAGULATION MONITORING (OUTPATIENT)
Dept: VASCULAR LAB | Facility: MEDICAL CENTER | Age: 69
End: 2019-01-15

## 2019-01-15 ENCOUNTER — HOME HEALTH ADMISSION (OUTPATIENT)
Dept: HOME HEALTH SERVICES | Facility: HOME HEALTHCARE | Age: 69
End: 2019-01-15
Payer: MEDICARE

## 2019-01-15 ENCOUNTER — HOSPITAL ENCOUNTER (OUTPATIENT)
Dept: LAB | Facility: MEDICAL CENTER | Age: 69
End: 2019-01-15
Attending: NURSE PRACTITIONER
Payer: MEDICARE

## 2019-01-15 VITALS
HEART RATE: 61 BPM | BODY MASS INDEX: 28.98 KG/M2 | RESPIRATION RATE: 12 BRPM | HEIGHT: 76 IN | WEIGHT: 238 LBS | TEMPERATURE: 97.9 F | SYSTOLIC BLOOD PRESSURE: 122 MMHG | DIASTOLIC BLOOD PRESSURE: 76 MMHG | OXYGEN SATURATION: 97 %

## 2019-01-15 DIAGNOSIS — N18.30 CHRONIC KIDNEY DISEASE, STAGE III (MODERATE) (HCC): ICD-10-CM

## 2019-01-15 DIAGNOSIS — Z79.01 ANTICOAGULATED ON WARFARIN: ICD-10-CM

## 2019-01-15 DIAGNOSIS — R53.1 WEAKNESS: ICD-10-CM

## 2019-01-15 DIAGNOSIS — M48.8X2 OSSIFICATION OF POSTERIOR LONGITUDINAL LIGAMENT IN CERVICAL REGION (HCC): ICD-10-CM

## 2019-01-15 DIAGNOSIS — N18.1 CKD (CHRONIC KIDNEY DISEASE), STAGE I: ICD-10-CM

## 2019-01-15 DIAGNOSIS — E11.42 CONTROLLED TYPE 2 DIABETES MELLITUS WITH DIABETIC POLYNEUROPATHY, WITH LONG-TERM CURRENT USE OF INSULIN (HCC): ICD-10-CM

## 2019-01-15 DIAGNOSIS — E11.42 DIABETIC POLYNEUROPATHY ASSOCIATED WITH TYPE 2 DIABETES MELLITUS (HCC): ICD-10-CM

## 2019-01-15 DIAGNOSIS — Z23 ENCOUNTER FOR IMMUNIZATION: ICD-10-CM

## 2019-01-15 DIAGNOSIS — I48.0 PAROXYSMAL ATRIAL FIBRILLATION (HCC): ICD-10-CM

## 2019-01-15 DIAGNOSIS — Z89.421 ABSENCE OF TOE, RIGHT (HCC): ICD-10-CM

## 2019-01-15 DIAGNOSIS — Z12.11 SCREENING FOR COLON CANCER: ICD-10-CM

## 2019-01-15 DIAGNOSIS — Z79.4 CONTROLLED TYPE 2 DIABETES MELLITUS WITH DIABETIC POLYNEUROPATHY, WITH LONG-TERM CURRENT USE OF INSULIN (HCC): ICD-10-CM

## 2019-01-15 DIAGNOSIS — G95.89 CERVICAL CORD MYELOMALACIA (HCC): ICD-10-CM

## 2019-01-15 DIAGNOSIS — I77.810 THORACIC AORTIC ECTASIA (HCC): ICD-10-CM

## 2019-01-15 LAB
25(OH)D3 SERPL-MCNC: 32 NG/ML (ref 30–100)
ANION GAP SERPL CALC-SCNC: 6 MMOL/L (ref 0–11.9)
BUN SERPL-MCNC: 54 MG/DL (ref 8–22)
CALCIUM SERPL-MCNC: 9.3 MG/DL (ref 8.5–10.5)
CHLORIDE SERPL-SCNC: 96 MMOL/L (ref 96–112)
CO2 SERPL-SCNC: 25 MMOL/L (ref 20–33)
CREAT SERPL-MCNC: 1.23 MG/DL (ref 0.5–1.4)
CREAT UR-MCNC: 42 MG/DL
EST. AVERAGE GLUCOSE BLD GHB EST-MCNC: 303 MG/DL
GLUCOSE SERPL-MCNC: 494 MG/DL (ref 65–99)
HBA1C MFR BLD: 12.2 % (ref 0–5.6)
INR PPP: 3.19 (ref 0.87–1.13)
MICROALBUMIN UR-MCNC: 23.9 MG/DL
MICROALBUMIN/CREAT UR: 569 MG/G (ref 0–30)
POTASSIUM SERPL-SCNC: 4.9 MMOL/L (ref 3.6–5.5)
PROTHROMBIN TIME: 32.7 SEC (ref 12–14.6)
SODIUM SERPL-SCNC: 127 MMOL/L (ref 135–145)

## 2019-01-15 PROCEDURE — 99214 OFFICE O/P EST MOD 30 MIN: CPT | Mod: 25 | Performed by: INTERNAL MEDICINE

## 2019-01-15 PROCEDURE — G0010 ADMIN HEPATITIS B VACCINE: HCPCS | Performed by: INTERNAL MEDICINE

## 2019-01-15 PROCEDURE — 85610 PROTHROMBIN TIME: CPT

## 2019-01-15 PROCEDURE — 36415 COLL VENOUS BLD VENIPUNCTURE: CPT

## 2019-01-15 PROCEDURE — 82043 UR ALBUMIN QUANTITATIVE: CPT

## 2019-01-15 PROCEDURE — 82306 VITAMIN D 25 HYDROXY: CPT

## 2019-01-15 PROCEDURE — 90746 HEPB VACCINE 3 DOSE ADULT IM: CPT | Performed by: INTERNAL MEDICINE

## 2019-01-15 PROCEDURE — 83036 HEMOGLOBIN GLYCOSYLATED A1C: CPT

## 2019-01-15 PROCEDURE — 82570 ASSAY OF URINE CREATININE: CPT

## 2019-01-15 PROCEDURE — 80048 BASIC METABOLIC PNL TOTAL CA: CPT

## 2019-01-15 NOTE — PROGRESS NOTES
Subjective:     Joel Ma is a 68 y.o. male here today for lower extremity weakness and Annual Health Assessment.    Patient is accompanied by his son today we have discussed that Joel will likely move to [place].  He is not particularly happy about that.  But is having difficulty living in his own home without having assistance.  Patient is requested and a prescription has been written for home physical therapy.    Health Maintenance Summary                IMM HEP B VACCINE Overdue 8/17/1968     IMM ZOSTER VACCINES Overdue 8/17/1999     Annual Wellness Visit Overdue 10/28/2015      Done 10/27/2014 Visit Dx: Medicare annual wellness visit, subsequent    COLONOSCOPY Overdue 6/30/2017      Done 6/30/2014 AMB REFERRAL TO GI FOR COLONOSCOPY    IMM PNEUMOCOCCAL 65+ (ADULT) LOW/MEDIUM RISK SERIES Overdue 1/19/2018      Done 1/19/2017 Imm Admin: Pneumococcal Conjugate Vaccine (Prevnar/PCV-13)    DIABETES MONOFILAMENT / LE EXAM Overdue 3/17/2018      Done 3/17/2017 AMB DIABETIC MONOFILAMENT LOWER EXTREMITY EXAM     Patient has more history with this topic...    URINE ACR / MICROALBUMIN Overdue 10/19/2018      Done 10/19/2017 MICROALBUMIN CREAT RATIO URINE      Patient has more history with this topic...    IMM INFLUENZA Postponed 6/17/2019 Originally 9/1/2018. Patient Refused    A1C SCREENING Next Due 1/19/2019      Done 7/19/2018 HEMOGLOBIN A1C      Patient has more history with this topic...    URINE DRUG SCREEN Next Due 5/18/2019      Done 5/23/2018 [facility] PAIN MANAGEMENT SCREEN     Patient has more history with this topic...    RETINAL SCREENING Next Due 5/23/2019      Done 5/23/2018 REFERRAL FOR RETINAL SCREENING EXAM     Patient has more history with this topic...    FASTING LIPID PROFILE Next Due 7/19/2019      Done 7/19/2018 LIPID PROFILE      Patient has more history with this topic...    SERUM CREATININE Next Due 9/7/2019      Done 9/7/2018 BASIC METABOLIC PANEL      Patient has more history with this  topic...    IMM DTaP/Tdap/Td Vaccine Next Due 7/16/2022      Done 7/16/2012 Imm Admin: Tdap Vaccine     Patient has more history with this topic...            Annual Health Assessment Questions:      1.  Are you currently engaging in any exercise or physical activity? Yes    2.  How would you describe your mood or emotional well-being today? good    3.  Have you had any falls in the last year? Yes    4.  Have you noticed any problems with your balance or had difficulty walking? Yes    5.  In the last six months have you experienced any leakage of urine? No    6. DPA/Advanced Directive: Patient does not have an Advanced Directive.  A packet and workshop information was given on Advanced Directives.    Current medicines (including changes today)  Current Outpatient Prescriptions   Medication Sig Dispense Refill   • Evolocumab, REPATHA, (REPATHA SURECLICK) 140 MG/ML Solution Auto-injector Inject 1 Each as instructed every 14 days. 2 PEN 11   • warfarin (COUMADIN) 5 MG Tab Take 10 mg by mouth every day.     • lisinopril (PRINIVIL, ZESTRIL) 40 MG tablet lisinopril 40 mg tablet, 1 tab twice daily (Patient taking differently: 40 mg every day. lisinopril 40 mg tablet, 1 tab twice daily) 180 Tab 3   • potassium chloride SA (KDUR) 20 MEQ Tab CR Take 1 Tab by mouth every day. 30 Tab 11   • metoprolol (TOPROL-XL) 200 MG XL tablet Take 1 Tab by mouth every day. 30 Tab 11   • torsemide (DEMADEX) 10 MG tablet Take 1 Tab by mouth 2 times a day. 60 Tab 11   • rosuvastatin (CRESTOR) 10 MG Tab Take 1 Tab by mouth every evening. Take Monday and Thursdays (Patient taking differently: Take 10 mg by mouth. Take Monday and Thursdays) 30 Tab 11   • clopidogrel (PLAVIX) 75 MG Tab Take 1 Tab by mouth every day. 30 Tab 11   • hydrALAZINE (APRESOLINE) 100 MG tablet TAKE ONE TABLET BY MOUTH EVERY EIGHT HOURS 90 Tab 1   • metformin (GLUCOPHAGE) 1000 MG tablet Take 1 Tab by mouth 2 times a day, with meals. 60 Tab 11   • amLODIPine (NORVASC) 10 MG  Tab Take 1 Tab by mouth every day. 30 Tab 3   • gabapentin (NEURONTIN) 300 MG Cap Take 1-2 Caps by mouth 3 times a day. 300mg in AM and noon 600mg in PM (Patient taking differently: Take 300-600 mg by mouth 4 times a day. 300mg in AM and noon 600mg in PM ) 90 Cap 3   • vitamin D (CHOLECALCIFEROL) 1000 UNIT Tab Take 2 Tabs by mouth every day. (Patient taking differently: Take 1,000 Units by mouth every day.) 60 Tab 3   • insulin glargine (LANTUS) 100 UNIT/ML Solution Inject 30 Units as instructed every evening.     • insulin lispro (HUMALOG) 100 UNIT/ML Solution Inject 2-10 Units as instructed 4 Times a Day,Before Meals and at Bedtime. For -200 Give 2 units  For -250 Give 4 units  For -300 Give 6 units  For -350 Give 8 units  For -400 Give 10 units  <60 or >400 Notify MD     • clindamycin (CLEOCIN) 300 MG Cap TAKE 1 CAPSULE BY MOUTH TWICE DAILY 180 Cap 0   • Insulin Pen Needle (ADVOCATE INSULIN PEN NEEDLES) 31G X 5 MM Misc Use tid and prn. 100 Each 3   • NON SPECIFIED Four wheeled walker with seat - [facility]. 1 Each 0     No current facility-administered medications for this visit.        He  has a past medical history of Anesthesia; Arrhythmia; arthritis (6/17/2011); Arthritis; Backpain; CAD (coronary artery disease); Cataract; Dental disorder; Diabetes; High cholesterol; Hypertension; MRSA (methicillin resistant Staphylococcus aureus); Pain (05-03-13); Pneumonia (2002); Renal disorder; Sleep apnea; Stroke ([facility]); Thyroid mass (7/30/2009); and Ventricular ectopy (6/17/2011). He also has no past medical history of COPD.    Demerol and Statins [hmg-coa-r inhibitors]    He  reports that he quit smoking about 45 years ago. His smoking use included Cigarettes. He has a 2.00 pack-year smoking history. He has never used smokeless tobacco. He reports that he does not drink alcohol or use drugs.  Counseling given: Not Answered      ROS    No chest pain, no shortness of breath, no  "abdominal pain.  Patient is experiencing lower extremity weakness     Objective:     Physical Exam:  Blood pressure 122/76, pulse 61, temperature 36.6 °C (97.9 °F), temperature source Temporal, resp. rate 12, height 1.918 m (6' 3.5\"), weight 108 kg (238 lb), SpO2 97 %. Body mass index is 29.36 kg/m².    Constitutional: Alert, no distress.  Skin: Warm, dry, good turgor, no rashes in visible areas.  Eye: Equal, round and reactive, conjunctiva clear, lids normal.  ENMT: Lips without lesions, good dentition, oropharynx clear.  Neck: Trachea midline, no masses, no thyromegaly. No cervical or supraclavicular lymphadenopathy.  Respiratory: Unlabored respiratory effort, lungs clear to auscultation, no wheezes, no rhonchi.  Cardiovascular: Normal S1, S2, no murmur, no edema.  Abdomen: Soft, non-tender, no masses, no hepatosplenomegaly.  Psych: Alert and oriented x3, normal affect and mood.  Musculoskeletal: Patient ambulates with the assistance of a walker    Assessment and Plan:     1. Screening for colon cancer  Referral written for screening colonoscopy  - REFERRAL TO GI FOR COLONOSCOPY    2. Diabetic polyneuropathy associated with type 2 diabetes mellitus (HCC)  Patient with diabetic neuropathy  - Hepatitis B Vaccine Adult IM  - MICROALBUMIN CREAT RATIO URINE; Future  - Diabetic Monofilament LE Exam    3. Weakness  Lower extremity weakness referral written for physical therapy  - REFERRAL TO HOME HEALTH    4. Controlled type 2 diabetes mellitus with diabetic polyneuropathy, with long-term current use of insulin (HCC)  Controlled type 2 diabetes no change in medical therapy hepatitis B vaccine not given    5. Encounter for immunization   Patient given hepatitis B vaccine    6. Cervical cord myelomalacia (HCC)  Stable status post surgery with Dr. Nunez     7. Paroxysmal atrial fibrillation (HCC)  Stable on warfarin    8. Thoracic aortic ectasia (HCC)  As per patient's echocardiogram    9. 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 Chronic kidney disease, stage III (moderate) (HCC)  No change in medical therapy    11. 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  · Prevent falls and reduce trip hazards; using ambulatory aides, hearing and vision testing if appropriate.  · Steps to improve urinary incontinence.  · Advanced care planning.    Follow-Up: No Follow-up on file.         PLEASE NOTE: This dictation was created using voice recognition software. I have made every reasonable attempt to correct obvious errors, but I expect that there are errors of grammar and possibly content that I did not discover before finalizing the note.      I was informed by Lesly:  Son was disrespectful to the front office staff also to lab staff.  Son needs to be reminded that they are here to help him not to make their life difficult!  Regards, Catrachito Sterling, DO

## 2019-01-16 NOTE — PROGRESS NOTES
Anticoagulation Summary  As of 1/15/2019    INR goal:   2.0-3.0   TTR:   54.2 % (3.2 y)   Today's INR:   3.19!   Warfarin maintenance plan:   10 mg (5 mg x 2) every day   Weekly warfarin total:   70 mg   Plan last modified:   Torres Sena, PharmD (11/26/2018)   Next INR check:   1/29/2019   Target end date:   Indefinite    Indications    CVA (cerebral vascular accident) (HCC) [I63.9]  Atrial fibrillation [427.31] [I48.91]  History of CVA (cerebrovascular accident) (Resolved) [Z86.73]  Anticoagulated on warfarin [Z79.01]             Anticoagulation Episode Summary     [facility]   [facility]    Preferred lab:       Send INR reminders to:       Comments:   call pt multiple times, he has a hard time getting to his phone in time and has no VM set up      Anticoagulation Care Providers     Provider Role Specialty Phone number    Catrachito Sterling D.O. Referring Internal Medicine [phone]    [facility] Anticoagulation Services Responsible  [phone]    Liseth Doe, A.P.N. Responsible Cardiology [phone]    Gi Cadena A.P.N. Responsible Cardiology [phone]        Anticoagulation Patient Findings    Unable to reach pt, please call again at a later time.  Pt requests to not have voice messages.    Brett Coates, PharmD     HPI:  oJel Ma, on anticoagulation therapy with warfarin for AF.   Changes to current medical/health status since last appt: none  Denies signs/symptoms of bleeding and/or thrombosis since the last appt.    Denies any interval changes to diet  Denies any interval changes to medications since last appt.   Denies any complications or cost restrictions with current therapy.     A/P   INR  SUPRA-therapeutic.   Reduce today then Pt is to continue with current warfarin dosing regimen.     Next INR in 2 week(s).    Brett Coates, PharmD

## 2019-02-06 DIAGNOSIS — I48.91 ATRIAL FIBRILLATION, UNSPECIFIED TYPE (HCC): ICD-10-CM

## 2019-02-07 ENCOUNTER — TELEPHONE (OUTPATIENT)
Dept: VASCULAR LAB | Facility: MEDICAL CENTER | Age: 69
End: 2019-02-07

## 2019-02-08 ENCOUNTER — OFFICE VISIT (OUTPATIENT)
Dept: NEPHROLOGY | Facility: MEDICAL CENTER | Age: 69
End: 2019-02-08
Payer: MEDICARE

## 2019-02-08 VITALS
BODY MASS INDEX: 30.09 KG/M2 | RESPIRATION RATE: 14 BRPM | OXYGEN SATURATION: 94 % | SYSTOLIC BLOOD PRESSURE: 132 MMHG | WEIGHT: 242 LBS | DIASTOLIC BLOOD PRESSURE: 60 MMHG | HEART RATE: 71 BPM | HEIGHT: 75 IN | TEMPERATURE: 98.7 F

## 2019-02-08 DIAGNOSIS — E87.1 HYPONATREMIA: ICD-10-CM

## 2019-02-08 DIAGNOSIS — I10 ESSENTIAL HYPERTENSION: ICD-10-CM

## 2019-02-08 DIAGNOSIS — N18.30 STAGE 3 CHRONIC KIDNEY DISEASE (HCC): ICD-10-CM

## 2019-02-08 PROBLEM — N18.1 CKD (CHRONIC KIDNEY DISEASE), STAGE I: Status: RESOLVED | Noted: 2017-05-12 | Resolved: 2019-02-08

## 2019-02-08 PROCEDURE — 99214 OFFICE O/P EST MOD 30 MIN: CPT | Performed by: INTERNAL MEDICINE

## 2019-02-08 ASSESSMENT — ENCOUNTER SYMPTOMS
CHILLS: 0
FEVER: 0
PALPITATIONS: 0

## 2019-02-08 NOTE — PROGRESS NOTES
"Subjective:      Joel Ma is a 68 y.o. male who presents with CKD3 Follow-Up            HPI  68 year old with CKD III from DN as well as HTN.    Recent spinal surgery and subsequently in skilled facility. In Jan labs were checked and Cr 1.23 with hyponatremia as well as proteinuria. Currently on lasix and KCl which he has been taking. Has not been able to get labs lately. Feeling good overall, though some stiffness in shoulders and neck.    Less pain in the back.    Review of Systems   Constitutional: Negative for chills and fever.   Cardiovascular: Negative for chest pain and palpitations.   All other systems reviewed and are negative.         Objective:     /60 (BP Location: Right arm, Patient Position: Sitting, BP Cuff Size: Adult)   Pulse 71   Temp 37.1 °C (98.7 °F) (Temporal)   Resp 14   Ht 1.905 m (6' 3\")   Wt 109.8 kg (242 lb)   SpO2 94%   BMI 30.25 kg/m²      Physical Exam   Constitutional: He is oriented to person, place, and time. He appears well-developed and well-nourished.   HENT:   Head: Normocephalic and atraumatic.   Cardiovascular: Normal rate and regular rhythm.    Pulmonary/Chest: Effort normal and breath sounds normal.   Abdominal: Soft. Bowel sounds are normal.   Musculoskeletal: He exhibits no edema or deformity.   Neurological: He is alert and oriented to person, place, and time.   Skin: Skin is warm and dry.   Psychiatric: He has a normal mood and affect. His behavior is normal.               Assessment/Plan:     1. Stage 3 chronic kidney disease (HCC)  Patient serum creatinine when checked in January was up to 1.23.  He had been in the 0.9 range.  He does have overt proteinuria and has an elevated hemoglobin A1c.  He does have diabetic kidney disease and he was seeing an endocrinologist but has not seen her in a while.    2. Essential hypertension  BP controlled. He was on an ACEi.    3. Hyponatremia  Unclear the cause, will recheck BMP.    -Discussed results with " patient, plan to draw BMP soon to review current status  -Will follow

## 2019-02-11 NOTE — TELEPHONE ENCOUNTER
Was the patient seen in the last year in this department? Yes  LOV-1/15/19  Does patient have an active prescription for medications requested? No     Received Request Via: Pharmacy

## 2019-02-14 ENCOUNTER — TELEPHONE (OUTPATIENT)
Dept: CARDIOLOGY | Facility: MEDICAL CENTER | Age: 69
End: 2019-02-14

## 2019-02-15 NOTE — TELEPHONE ENCOUNTER
Patient called and stated that he hasn't started his PCSK9 therapy and did not know the status of his tier exception.  Called the patient back and explained that the tier exception was denied and he has the option on applying for the patient assistance program.    Mailing the patient a copy of the denial letter from the plan about the tier exception and also mailing him the patient assistance application

## 2019-02-19 NOTE — DISCHARGE PLANNING
Daughter in law was here this pm . She was upset because she want not notified about the discharge. I apologized for not calling her about the discharge. She accepted my apology. She confirmed tho that they were aware of the plan for discharge. We have provided her with the address and contact number of [facility].   Dr Goodson

## 2019-02-20 ENCOUNTER — TELEPHONE (OUTPATIENT)
Dept: VASCULAR LAB | Facility: MEDICAL CENTER | Age: 69
End: 2019-02-20

## 2019-02-21 NOTE — TELEPHONE ENCOUNTER
Spoke with the pt regarding his  Overdue INR. His car is broken and he has not been able to get in to test. He will do it on 2/28.   JOHNNY Craig.

## 2019-02-25 RX ORDER — WARFARIN SODIUM 5 MG/1
10 TABLET ORAL DAILY
Qty: 60 TAB | Refills: 0 | Status: SHIPPED | OUTPATIENT
Start: 2019-02-25 | End: 2019-02-28 | Stop reason: SDUPTHER

## 2019-02-28 ENCOUNTER — TELEPHONE (OUTPATIENT)
Dept: VASCULAR LAB | Facility: MEDICAL CENTER | Age: 69
End: 2019-02-28

## 2019-02-28 ENCOUNTER — ANTICOAGULATION VISIT (OUTPATIENT)
Dept: VASCULAR LAB | Facility: MEDICAL CENTER | Age: 69
End: 2019-02-28
Attending: INTERNAL MEDICINE
Payer: MEDICARE

## 2019-02-28 VITALS — HEART RATE: 98 BPM | DIASTOLIC BLOOD PRESSURE: 81 MMHG | SYSTOLIC BLOOD PRESSURE: 118 MMHG

## 2019-02-28 DIAGNOSIS — Z79.01 ANTICOAGULATED ON WARFARIN: ICD-10-CM

## 2019-02-28 LAB — INR PPP: 1.5 (ref 2–3.5)

## 2019-02-28 PROCEDURE — 99212 OFFICE O/P EST SF 10 MIN: CPT | Performed by: PHARMACIST

## 2019-02-28 PROCEDURE — 85610 PROTHROMBIN TIME: CPT

## 2019-02-28 RX ORDER — WARFARIN SODIUM 5 MG/1
10 TABLET ORAL DAILY
Qty: 180 TAB | Refills: 1 | Status: SHIPPED | OUTPATIENT
Start: 2019-02-28 | End: 2019-01-01 | Stop reason: SDUPTHER

## 2019-02-28 RX ORDER — WARFARIN SODIUM 5 MG/1
10 TABLET ORAL DAILY
Qty: 180 TAB | Refills: 1 | Status: SHIPPED | OUTPATIENT
Start: 2019-02-28 | End: 2019-02-28 | Stop reason: SDUPTHER

## 2019-02-28 NOTE — PROGRESS NOTES
Anticoagulation Summary  As of 2019    INR goal:   2.0-3.0   TTR:   54.4 % (3.3 y)   INR used for dosin.5! (2019)   Warfarin maintenance plan:   10 mg (5 mg x 2) every day   Weekly warfarin total:   70 mg   Plan last modified:   Torres Sena, PharmD (2018)   Next INR check:   3/7/2019   Target end date:   Indefinite    Indications    CVA (cerebral vascular accident) (HCC) [I63.9]  Atrial fibrillation [427.31] [I48.91]  History of CVA (cerebrovascular accident) (Resolved) [Z86.73]  Anticoagulated on warfarin [Z79.01]             Anticoagulation Episode Summary     [facility] check [facility]   [facility]    Preferred lab:       Send INR reminders to:       Comments:   call pt multiple times, he has a hard time getting to his phone in time and has no VM set up      Anticoagulation Care Providers     Provider Role Specialty Phone number    Catrachito Sterling D.O. Referring Internal Medicine [phone]    [facility] Anticoagulation Services Responsible  [phone]    Liseth Doe A.P.N. Responsible Cardiology [phone]    Gi Cadena A.P.N. Responsible Cardiology [phone]        Anticoagulation Patient Findings    HPI:  Joel Fernández Anil seen in clinic today, on anticoagulation therapy with warfarin for CVA (cerebral vascular accident) (HCC) [I63.9] Atrial fibrillation [427.31] [I48.91] History of CVA (cerebrovascular accident) (Resolved) [Z86.73]  Anticoagulated on warfarin [Z79.01]Changes to current medical/health status since last appt: None  Denies signs/symptoms of bleeding and/or thrombosis since the last appt.    Denies any interval changes to diet; patient mentioned that he usually eats about 4-5 times a day. No other changes at this time  Denies any interval changes to medications since last appt.   Denies any complications or cost restrictions with current therapy.   BP recorded in vitals.    A/P   INR SUBtherapeutic; will have patient BOLUS with 15 mg of  warfarin tonight and then continue with current dosing regimen of 10 mg    Sent refill request for #180 tablets with one refill to patient's pharmacy  Follow up appointment in 1 week(s). Thursday, March 7th  Nickolas Mooney, Pharmacy Intern    Gely Briseno, PharmD

## 2019-02-28 NOTE — TELEPHONE ENCOUNTER
Spoke with pt on the phone, he reports [facility] is closed d/t an emergency. Offered pt to send rx to another pharmacy as he is out of warfarin. Pt will find out if he can get a ride to pharmacy near him to . Advised pt not to miss any further warfarin.     Korina Diggs, Pharm D

## 2019-03-04 ENCOUNTER — TELEPHONE (OUTPATIENT)
Dept: MEDICAL GROUP | Facility: LAB | Age: 69
End: 2019-03-04

## 2019-03-04 DIAGNOSIS — Z79.4 CONTROLLED TYPE 2 DIABETES MELLITUS WITH DIABETIC POLYNEUROPATHY, WITH LONG-TERM CURRENT USE OF INSULIN (HCC): ICD-10-CM

## 2019-03-04 DIAGNOSIS — E11.42 CONTROLLED TYPE 2 DIABETES MELLITUS WITH DIABETIC POLYNEUROPATHY, WITH LONG-TERM CURRENT USE OF INSULIN (HCC): ICD-10-CM

## 2019-03-04 LAB — INR BLD: 1.5 (ref 0.9–1.2)

## 2019-03-04 NOTE — TELEPHONE ENCOUNTER
1. Caller Name: Chelo                                     Call Back Number: [phone]      Patient approves a detailed voicemail message: N\A    New Prescription Request     Chelo from [facility] left a voicemail requesting a new prescription for patient's test strips and meter since original prescription would not be covered by insurance and would cost the patient $90 for a 90 day supply. She stated they would pay for the [employer] generic test strips and [facility] and [facility] agreed. Please send new prescription to [facility] in [place].

## 2019-03-07 ENCOUNTER — ANTICOAGULATION VISIT (OUTPATIENT)
Dept: VASCULAR LAB | Facility: MEDICAL CENTER | Age: 69
End: 2019-03-07
Attending: INTERNAL MEDICINE
Payer: MEDICARE

## 2019-03-07 DIAGNOSIS — Z79.01 ANTICOAGULATED ON WARFARIN: ICD-10-CM

## 2019-03-07 DIAGNOSIS — I63.9 CEREBROVASCULAR ACCIDENT (CVA), UNSPECIFIED MECHANISM (HCC): ICD-10-CM

## 2019-03-07 DIAGNOSIS — I48.91 ATRIAL FIBRILLATION, UNSPECIFIED TYPE (HCC): ICD-10-CM

## 2019-03-07 LAB
INR BLD: 1.7 (ref 0.9–1.2)
INR PPP: 1.7 (ref 2–3.5)

## 2019-03-07 PROCEDURE — 99212 OFFICE O/P EST SF 10 MIN: CPT | Performed by: NURSE PRACTITIONER

## 2019-03-07 PROCEDURE — 85610 PROTHROMBIN TIME: CPT

## 2019-03-07 NOTE — PROGRESS NOTES
Anticoagulation Summary  As of 3/7/2019    INR goal:   2.0-3.0   TTR:   54.0 % (3.3 y)   INR used for dosin.7! (3/7/2019)   Warfarin maintenance plan:   10 mg (5 mg x 2) every day   Weekly warfarin total:   70 mg   Plan last modified:   Torres Sena, PharmD (2018)   Next INR check:   3/15/2019   Target end date:   Indefinite    Indications    CVA (cerebral vascular accident) (HCC) [I63.9]  Atrial fibrillation [427.31] [I48.91]  History of CVA (cerebrovascular accident) (Resolved) [Z86.73]  Anticoagulated on warfarin [Z79.01]             Anticoagulation Episode Summary     [facility]   [facility]    Preferred lab:       Send INR reminders to:       Comments:   call pt multiple times, he has a hard time getting to his phone in time and has no VM set up      Anticoagulation Care Providers     Provider Role Specialty Phone number    Catrachito Sterling D.O. Referring Internal Medicine [phone]    [facility] Anticoagulation Services Responsible  [phone]    BARBRA Araujo.P.N. Responsible Cardiology [phone]    Gi Cadena A.P.NElian Responsible Cardiology [phone]        Anticoagulation Patient Findings      HPI:  Joel Pradeep Anil seen in clinic today for follow up on anticoagulation therapy in the presence of CVA, AF. Denies any changes to current medical/health status since last appointment. Denies any medication or diet changes. No current symptoms of bleeding or thrombosis reported.    A/P:   INR subtherapeutic. CHADS 2 score = 5. No recent CVAs. We discuss Lovenox as an option, he declines. Will increase tonight then continue current regimen.     Follow up appointment in 1 week(s).    Next Appointment: Friday , March 15, 2019 at 9:45 am.     Brenda CARROLL

## 2019-03-15 ENCOUNTER — TELEPHONE (OUTPATIENT)
Dept: MEDICAL GROUP | Facility: LAB | Age: 69
End: 2019-03-15

## 2019-03-15 ENCOUNTER — ANTICOAGULATION VISIT (OUTPATIENT)
Dept: VASCULAR LAB | Facility: MEDICAL CENTER | Age: 69
End: 2019-03-15
Attending: INTERNAL MEDICINE
Payer: MEDICARE

## 2019-03-15 DIAGNOSIS — Z79.01 ANTICOAGULATED ON WARFARIN: ICD-10-CM

## 2019-03-15 DIAGNOSIS — E11.42 CONTROLLED TYPE 2 DIABETES MELLITUS WITH DIABETIC POLYNEUROPATHY, WITH LONG-TERM CURRENT USE OF INSULIN (HCC): ICD-10-CM

## 2019-03-15 DIAGNOSIS — Z79.4 CONTROLLED TYPE 2 DIABETES MELLITUS WITH DIABETIC POLYNEUROPATHY, WITH LONG-TERM CURRENT USE OF INSULIN (HCC): ICD-10-CM

## 2019-03-15 LAB — INR PPP: 2 (ref 2–3.5)

## 2019-03-15 PROCEDURE — 99211 OFF/OP EST MAY X REQ PHY/QHP: CPT

## 2019-03-15 PROCEDURE — 85610 PROTHROMBIN TIME: CPT

## 2019-03-15 NOTE — PROGRESS NOTES
Anticoagulation Summary  As of 3/15/2019    INR goal:   2.0-3.0   TTR:   53.7 % (3.3 y)   INR used for dosin (3/15/2019)   Warfarin maintenance plan:   10 mg (5 mg x 2) every day   Weekly warfarin total:   70 mg   Plan last modified:   Torres Sena, PharmD (2018)   Next INR check:   3/29/2019   Target end date:   Indefinite    Indications    CVA (cerebral vascular accident) (HCC) [I63.9]  Atrial fibrillation [427.31] [I48.91]  History of CVA (cerebrovascular accident) (Resolved) [Z86.73]  Anticoagulated on warfarin [Z79.01]             Anticoagulation Episode Summary     [facility] check [facility]   [facility]    Preferred lab:       Send INR reminders to:       Comments:   call pt multiple times, he has a hard time getting to his phone in time and has no VM set up      Anticoagulation Care Providers     Provider Role Specialty Phone number    BEL Lee.JESSICA Referring Internal Medicine [phone]    [facility] Anticoagulation Services Responsible  [phone]    Liseth Doe A.P.N. Responsible Cardiology [phone]    Gi Cadena A.P.N. Responsible Cardiology [phone]        Anticoagulation Patient Findings      HPI:  Joel Fernández Denaya seen in clinic today, on anticoagulation therapy with warfarin for CVA.   Changes to current medical/health status since last appt: none  Denies signs/symptoms of bleeding and/or thrombosis since the last appt.    Denies any interval changes to diet  Denies any interval changes to medications since last appt.   Denies any complications or cost restrictions with current therapy.   Declines vitals.   Confirmed dosing regimen.     A/P   INR  therapeutic.   Pt is to continue with current warfarin dosing regimen.     Follow up appointment in 2 week(s).    Brett Coates, PharmD

## 2019-03-15 NOTE — TELEPHONE ENCOUNTER
1. Caller Name: Joel                                          Call Back Number: [phone] (home)        Patient approves a detailed voicemail message: yes    Joel is having troubles with his blood sugars.  He is asking if you could give him a call.

## 2019-03-18 ENCOUNTER — TELEPHONE (OUTPATIENT)
Dept: VASCULAR LAB | Facility: MEDICAL CENTER | Age: 69
End: 2019-03-18

## 2019-03-18 NOTE — TELEPHONE ENCOUNTER
Pt called asking if his elevated BS was because of his Warfarin. Reviewed his labs with him and discussed possibilities of elevated BS(infection, steroids, any injections given all of which were negative) His last A1c was 12.2 advised to see his endo as soon as possible. Called him back and suggested that he see one of our pharmacists in the diabetic clinic. Asked him to call back to let me know if he would like to do this . JOHNNY Craig.

## 2019-03-20 ENCOUNTER — TELEPHONE (OUTPATIENT)
Dept: MEDICAL GROUP | Facility: LAB | Age: 69
End: 2019-03-20

## 2019-03-20 DIAGNOSIS — E11.42 CONTROLLED TYPE 2 DIABETES MELLITUS WITH DIABETIC POLYNEUROPATHY, WITH LONG-TERM CURRENT USE OF INSULIN (HCC): ICD-10-CM

## 2019-03-20 DIAGNOSIS — Z79.4 CONTROLLED TYPE 2 DIABETES MELLITUS WITH DIABETIC POLYNEUROPATHY, WITH LONG-TERM CURRENT USE OF INSULIN (HCC): ICD-10-CM

## 2019-03-20 NOTE — TELEPHONE ENCOUNTER
Pt is needing an Rx for BD Pen Needles [id number] # 100.  Please write specific directions and have dx code on there.

## 2019-03-21 ENCOUNTER — OFFICE VISIT (OUTPATIENT)
Dept: CARDIOLOGY | Facility: MEDICAL CENTER | Age: 69
End: 2019-03-21
Payer: MEDICARE

## 2019-03-21 VITALS
DIASTOLIC BLOOD PRESSURE: 80 MMHG | WEIGHT: 245 LBS | HEART RATE: 60 BPM | OXYGEN SATURATION: 94 % | BODY MASS INDEX: 30.46 KG/M2 | HEIGHT: 75 IN | SYSTOLIC BLOOD PRESSURE: 132 MMHG

## 2019-03-21 DIAGNOSIS — I25.10 CORONARY ARTERY DISEASE INVOLVING NATIVE CORONARY ARTERY OF NATIVE HEART WITHOUT ANGINA PECTORIS: ICD-10-CM

## 2019-03-21 DIAGNOSIS — I48.0 PAROXYSMAL ATRIAL FIBRILLATION (HCC): ICD-10-CM

## 2019-03-21 DIAGNOSIS — I50.20 ACC/AHA STAGE C SYSTOLIC CONGESTIVE HEART FAILURE (HCC): ICD-10-CM

## 2019-03-21 PROCEDURE — 99213 OFFICE O/P EST LOW 20 MIN: CPT | Performed by: INTERNAL MEDICINE

## 2019-03-21 NOTE — PROGRESS NOTES
Chief Complaint   Patient presents with   • Coronary Artery Disease       Subjective:   Joel Ma is a 68 y.o. male who presents today for follow-up of congestive heart failure and coronary artery disease in July 2018 the patient had stents to the LAD and the distal right coronary artery.  Currently he has no symptoms of congestive heart failure and is able to lay flat without dyspnea.  Does not have chest pain, pressure, tightness but his activity level is quite low.  He has had multiple surgical procedures on his neck and complains about chronic imbalance occasionally leading to falls.    Past Medical History:   Diagnosis Date   • Anesthesia     low O2 sat   • Arrhythmia     a-fib   • arthritis 6/17/2011    thumb, fingers   • Arthritis     hip   • Backpain    • CAD (coronary artery disease)     MIRELLA to distal RCA and prox LAD   • Cataract     left eye surgery   • Dental disorder     full dentures   • Diabetes     insulin, Dr Oconnor, his APN   • High cholesterol    • Hypertension    • MRSA (methicillin resistant Staphylococcus aureus)     history of, nothing current 2016, on clindamycin for rest of life per patient   • Pain 05-03-13    shoulders, hip, right knee, 7/10   • Pneumonia 2002   • Renal disorder     stage 2   • Sleep apnea     bipap   • Stroke (HCC)     2/1/2007, 4/1/2007, right sided weakness; balance disturbance, memory problems   • Thyroid mass 7/30/2009    benign lump   • Ventricular ectopy 6/17/2011     Past Surgical History:   Procedure Laterality Date   • CERVICAL FUSION POSTERIOR  7/13/2018    Procedure: CERVICAL FUSION POSTERIOR/ C2-4;  Surgeon: Boby Marsh M.D.;  Location: SURGERY [facility];  Service: Neurosurgery   • CERVICAL LAMINECTOMY POSTERIOR  7/13/2018    Procedure: CERVICAL LAMINECTOMY POSTERIOR/ C2-3, C5-6;  Surgeon: Boby Marsh M.D.;  Location: SURGERY [facility];  Service: Neurosurgery   • LUMBAR LAMINECTOMY DISKECTOMY  7/13/2018    Procedure:  LUMBAR LAMINECTOMY DISKECTOMY/ L2-5 LAMI;  Surgeon: Boby Marsh M.D.;  Location: [facility];  Service: Neurosurgery   • CERVICAL DISK AND FUSION ANTERIOR  8/31/2016    Procedure: CERVICAL DISK AND FUSION ANTERIOR C3-7 ;  Surgeon: Alhaji Jaffe M.D.;  Location: [facility];  Service:    • CATARACT PHACO WITH IOL  5/8/2013    Performed by Mame Rehman M.D. at SURGERY SAME DAY [facility]   • IRRIGATION & DEBRIDEMENT ORTHO  11/13/2012    Performed by Gordon Cota M.D. at SURGERY [facility]   • KNEE REVISION TOTAL  11/13/2012    Performed by Gordon Cota M.D. at [facility]   • IRRIGATION & DEBRIDEMENT ORTHO  11/2/2012    Performed by Gordon Cota M.D. at [facility]   • IRRIGATION & DEBRIDEMENT ORTHO Left 10/29/2012    Procedure: left shoulder, with drain;  Surgeon: Gordon Cota M.D.;  Location: [facility];  Service:    • INCISION AND DRAINAGE ORTHOPEDIC Right 10/29/2012    Procedure: Right Total Knee I and D and Liner Exchange, with drain;  Surgeon: Gordno Cota M.D.;  Location: [facility];  Service:    • IRRIGATION & DEBRIDEMENT ORTHO  3/13/2012    Performed by KAMALJIT SHI at [facility]   • KNEE REVISION TOTAL  3/13/2012    Performed by KAMALJIT SHI at [facility]   • TENDON REPAIR  3/13/2012    Performed by KAMALJIT SHI at [facility]   • KNEE ARTHROPLASTY TOTAL  1/11/2012    Right; Performed by KAMALJIT SHI at [facility]   • TOE AMPUTATION  7/22/2011    Performed by EVELYN JANE at [facility]   • ELBOW ARTHROTOMY  2008    right   • LUMBAR FUSION POSTERIOR  1979   • FOOT SURGERY      partial amputation great toe   • FOOT SURGERY      toe surgery left foot   • OTHER      left eye cataract   • OTHER NEUROLOGICAL SURG      neck fusion   • PB KNEE SCOPE,SINGLE MENISECTOMY      right knee   • ZZZ CARDIAC CATH       Family History    Problem Relation Age of Onset   • Diabetes Mother    • Cancer Father         lung cancer   • Heart Disease Father         triple bypass   • Diabetes Unknown    • Hypertension Unknown    • Cancer Unknown      Social History     Social History   • Marital status: Single     Spouse name: N/A   • Number of children: N/A   • Years of education: N/A     Occupational History   • Not on file.     Social History Main Topics   • Smoking status: Former Smoker     Packs/day: 4.00     Years: 0.50     Types: Cigarettes     Quit date: 1/1/1974   • Smokeless tobacco: Never Used   • Alcohol use No   • Drug use: No   • Sexual activity: Yes     Other Topics Concern   • Not on file     Social History Narrative    ** Merged History Encounter **          Allergies   Allergen Reactions   • Demerol      Makes me stop breathing.  Doesn't like because it makes him high   • Statins [Hmg-Coa-R Inhibitors] Myalgia     Rxn - ongoing       Outpatient Encounter Prescriptions as of 3/21/2019   Medication Sig Dispense Refill   • Misc. Devices Misc Use tid and prn 85EL1or # 100 100 Each 11   • Empagliflozin 10 MG Tab Take 1 Each by mouth every day. 30 Tab 3   • Misc. Devices Misc Relion glucometer 1 Each 0   • Blood Glucose Test Strips Test strips order: Test strips for Relion meter. Sig: use 3 to 4 x a day and prn ssx high or low sugar #450 RF x 3 450 Each 3   • warfarin (COUMADIN) 5 MG Tab Take 2 Tabs by mouth every day. 180 Tab 1   • glucose blood (CONTOUR NEXT TEST) strip 1 Strip by Other route as needed. USE TO TEST BLOOD SUGAR 4 To 5 TIMES DAILY 450 Strip 3   • lisinopril (PRINIVIL, ZESTRIL) 40 MG tablet lisinopril 40 mg tablet, 1 tab twice daily (Patient taking differently: 40 mg every day. lisinopril 40 mg tablet, 1 tab twice daily) 180 Tab 3   • potassium chloride SA (KDUR) 20 MEQ Tab CR Take 1 Tab by mouth every day. 30 Tab 11   • metoprolol (TOPROL-XL) 200 MG XL tablet Take 1 Tab by mouth every day. 30 Tab 11   • torsemide (DEMADEX) 10  MG tablet Take 1 Tab by mouth 2 times a day. 60 Tab 11   • rosuvastatin (CRESTOR) 10 MG Tab Take 1 Tab by mouth every evening. Take Monday and Thursdays (Patient taking differently: Take 10 mg by mouth. Take Monday and Thursdays) 30 Tab 11   • clopidogrel (PLAVIX) 75 MG Tab Take 1 Tab by mouth every day. 30 Tab 11   • hydrALAZINE (APRESOLINE) 100 MG tablet TAKE ONE TABLET BY MOUTH EVERY EIGHT HOURS 90 Tab 1   • metformin (GLUCOPHAGE) 1000 MG tablet Take 1 Tab by mouth 2 times a day, with meals. 60 Tab 11   • amLODIPine (NORVASC) 10 MG Tab Take 1 Tab by mouth every day. 30 Tab 3   • gabapentin (NEURONTIN) 300 MG Cap Take 1-2 Caps by mouth 3 times a day. 300mg in AM and noon 600mg in PM (Patient taking differently: Take 300-600 mg by mouth 4 times a day. 300mg in AM and noon 600mg in PM ) 90 Cap 3   • Insulin Pen Needle (ADVOCATE INSULIN PEN NEEDLES) 31G X 5 MM Misc Use tid and prn. 100 Each 3   • vitamin D (CHOLECALCIFEROL) 1000 UNIT Tab Take 2 Tabs by mouth every day. (Patient taking differently: Take 1,000 Units by mouth every day.) 60 Tab 3   • NON SPECIFIED Four wheeled walker with seat - [facility]. 1 Each 0   • insulin glargine (LANTUS) 100 UNIT/ML Solution Inject 30 Units as instructed every evening.     • insulin lispro (HUMALOG) 100 UNIT/ML Solution Inject 2-10 Units as instructed 4 Times a Day,Before Meals and at Bedtime. For -200 Give 2 units  For -250 Give 4 units  For -300 Give 6 units  For -350 Give 8 units  For -400 Give 10 units  <60 or >400 Notify MD     • Evolocumab, REPATHA, (REPATHA SURECLICK) 140 MG/ML Solution Auto-injector Inject 1 Each as instructed every 14 days. (Patient not taking: Reported on 2/8/2019) 2 PEN 11   • clindamycin (CLEOCIN) 300 MG Cap TAKE 1 CAPSULE BY MOUTH TWICE DAILY 180 Cap 0     No facility-administered encounter medications on file as of 3/21/2019.      ROS see HPI     Objective:   /80 (BP Location: Left arm, Patient Position:  "Sitting, BP Cuff Size: Adult)   Pulse 60   Ht 1.905 m (6' 3\")   Wt 111.1 kg (245 lb)   SpO2 94%   BMI 30.62 kg/m²     Physical Exam General: WD, WN, male in NAD.  Wheelchair-bound  Neck: No JVD.  Good carotid upstroke and no bruit  Chest: Clear to A & P  Heart: Regular rhythm, Normal S1 and S2. No murmur, gallop, rub, click  Ext: No edema  Neuro: Alert, oriented  Psych: normal mood, affect    PROCEDURE:  Cardiac catheterization and percutaneous coronary intervention.  A.  Selective coronary angiography.  B.  Coronary stent implantation, proximal left anterior descending artery with   a 4.0x28 mm drug-eluting Synergy stent.  C.  Coronary stent implantation of the distal right coronary artery with a   4.0x12 mm drug-eluting Synergy stent.  D.  Right radial artery approach.     PREPROCEDURE DIAGNOSES:    1.  Coronary artery disease, severe 2-vessel, involving   the proximal left anterior descending artery and distal right coronary artery.  2.  Left ventricular ejection fraction 55%.     PHYSICIAN:  Ricco Li MD    Assessment:     1. Paroxysmal atrial fibrillation (HCC)     2. Coronary artery disease involving native coronary artery of native heart without angina pectoris     3. ACC/AHA stage C systolic congestive heart failure (HCC)         Medical Decision Making:  Today's Assessment / Status / Plan:   Patient is in normal sinus rhythm by exam.  He has no cardiac symptoms suggesting ischemic heart disease.  This is a little difficult situation because the patient really had no symptoms for his nuclear perfusion study last year followed by 2 stents being placed as noted above.  The patient's ejection fraction was normal at that time.  He has no symptoms of congestive heart failure and can lay flat without dyspnea.  I think his medical regimen is adequate.  He will return in 6 months to CHF clinic.  No changes in his medication are made at this time.  "

## 2019-03-22 LAB — INR BLD: 2 (ref 0.9–1.2)

## 2019-03-29 ENCOUNTER — ANTICOAGULATION VISIT (OUTPATIENT)
Dept: VASCULAR LAB | Facility: MEDICAL CENTER | Age: 69
End: 2019-03-29
Attending: INTERNAL MEDICINE
Payer: MEDICARE

## 2019-03-29 DIAGNOSIS — I48.0 PAROXYSMAL ATRIAL FIBRILLATION (HCC): ICD-10-CM

## 2019-03-29 DIAGNOSIS — Z79.01 ANTICOAGULATED ON WARFARIN: ICD-10-CM

## 2019-03-29 LAB — INR PPP: 1.8 (ref 2–3.5)

## 2019-03-29 PROCEDURE — 85610 PROTHROMBIN TIME: CPT

## 2019-03-29 PROCEDURE — 99212 OFFICE O/P EST SF 10 MIN: CPT

## 2019-03-29 NOTE — PROGRESS NOTES
Anticoagulation Summary  As of 3/29/2019    INR goal:   2.0-3.0   TTR:   53.1 % (3.4 y)   INR used for dosin.8! (3/29/2019)   Warfarin maintenance plan:   10 mg (5 mg x 2) every day   Weekly warfarin total:   70 mg   Plan last modified:   Torres Sena, PharmD (2018)   Next INR check:   2019   Target end date:   Indefinite    Indications    CVA (cerebral vascular accident) (HCC) [I63.9]  Atrial fibrillation [427.31] [I48.91]  History of CVA (cerebrovascular accident) (Resolved) [Z86.73]  Anticoagulated on warfarin [Z79.01]             Anticoagulation Episode Summary     [facility] check location:   [facility]    Preferred lab:       Send INR reminders to:       Comments:   call pt multiple times, he has a hard time getting to his phone in time and has no VM set up      Anticoagulation Care Providers     Provider Role Specialty Phone number    BEL Lee.SIMONE. Referring Internal Medicine [phone]    [facility] Responsible  [phone]    Liseth Doe A.P.N. Responsible Cardiology [phone]    Gi Cadena A.P.N. Responsible Cardiology [phone]        Anticoagulation Patient Findings      HPI:  Jeol Fernández Maryanntaco seen in clinic today for follow up on anticoagulation therapy in the presence of Afib and CVA. Denies any changes to current medical/health status since last appointment. Denies any medication or diet changes. No current symptoms of bleeding or thrombosis reported.    A/P:   INR is subtherapeutic at 1.8. Pt with hx of CVA, pt to bolus tonight with 15 mg then continue current regimen.     Follow up appointment in 6 days    Next Appointment: Thursday, 2019     Matt Ponce Pharm.D

## 2019-04-03 LAB — INR BLD: 1.8 (ref 0.9–1.2)

## 2019-04-04 ENCOUNTER — ANTICOAGULATION VISIT (OUTPATIENT)
Dept: VASCULAR LAB | Facility: MEDICAL CENTER | Age: 69
End: 2019-04-04
Attending: INTERNAL MEDICINE
Payer: MEDICARE

## 2019-04-04 DIAGNOSIS — Z79.01 ANTICOAGULATED ON WARFARIN: ICD-10-CM

## 2019-04-04 LAB — INR PPP: 2.4 (ref 2–3.5)

## 2019-04-04 PROCEDURE — 99211 OFF/OP EST MAY X REQ PHY/QHP: CPT

## 2019-04-04 PROCEDURE — 85610 PROTHROMBIN TIME: CPT

## 2019-04-04 NOTE — PROGRESS NOTES
Anticoagulation Summary  As of 2019    INR goal:   2.0-3.0   TTR:   53.1 % (3.4 y)   INR used for dosin.4 (2019)   Warfarin maintenance plan:   10 mg (5 mg x 2) every day   Weekly warfarin total:   70 mg   Plan last modified:   Torres Sena, PharmD (2018)   Next INR check:   2019   Target end date:   Indefinite    Indications    CVA (cerebral vascular accident) (HCC) [I63.9]  Atrial fibrillation [427.31] [I48.91]  History of CVA (cerebrovascular accident) (Resolved) [Z86.73]  Anticoagulated on warfarin [Z79.01]             Anticoagulation Episode Summary     [facility] check [facility]   [facility]    Preferred lab:       Send INR reminders to:       Comments:   call pt multiple times, he has a hard time getting to his phone in time and has no VM set up      Anticoagulation Care Providers     Provider Role Specialty Phone number    Catrachito Sterling D.O. Referring Internal Medicine [phone]    [facility] Responsible  [phone]    Liseth Doe A.P.N. Responsible Cardiology [phone]    Gi Cadena A.P.N. Responsible Cardiology [phone]        Anticoagulation Patient Findings    History of Present Illness: follow up appointment for chronic anticoagulation with the high risk medication, warfarin for CVA    Last INR was out of range, dosage adjusted: pt is now at goal. Continue current dosing regimen.  Follow up in 2 weeks, to reduce the risk of adverse events related to this high risk medication, warfarin.    Beth Angulo Clinical Pharmacist    Pt declines vitals today

## 2019-04-05 LAB — INR BLD: 2.4 (ref 0.9–1.2)

## 2019-04-08 DIAGNOSIS — Z79.4 CONTROLLED TYPE 2 DIABETES MELLITUS WITH DIABETIC POLYNEUROPATHY, WITH LONG-TERM CURRENT USE OF INSULIN (HCC): ICD-10-CM

## 2019-04-08 DIAGNOSIS — E11.42 CONTROLLED TYPE 2 DIABETES MELLITUS WITH DIABETIC POLYNEUROPATHY, WITH LONG-TERM CURRENT USE OF INSULIN (HCC): ICD-10-CM

## 2019-04-08 NOTE — TELEPHONE ENCOUNTER
Was the patient seen in the last year in this department? Yes  1/15/19  Does patient have an active prescription for medications requested? No     Received Request Via: Patient stated he uses the injector pen

## 2019-04-11 ENCOUNTER — TELEPHONE (OUTPATIENT)
Dept: NEPHROLOGY | Facility: MEDICAL CENTER | Age: 69
End: 2019-04-11

## 2019-04-11 NOTE — TELEPHONE ENCOUNTER
Patient called and has some questions on his Lisinopril. He states that when he first got his Lisinopril 40mg he was to take 2 tabs daily, then he was told to only take 1 tab daily. When he went to the pharmacy to get his refill, the bottle says to take 2 tabs daily again. Patient wants clarification on the directions for this medication. Please advise.   Joel: [phone]    Thank you

## 2019-04-16 ENCOUNTER — OFFICE VISIT (OUTPATIENT)
Dept: MEDICAL GROUP | Facility: LAB | Age: 69
End: 2019-04-16
Payer: MEDICARE

## 2019-04-16 VITALS
HEIGHT: 76 IN | WEIGHT: 245 LBS | RESPIRATION RATE: 16 BRPM | DIASTOLIC BLOOD PRESSURE: 70 MMHG | HEART RATE: 63 BPM | OXYGEN SATURATION: 95 % | BODY MASS INDEX: 29.83 KG/M2 | TEMPERATURE: 98.4 F | SYSTOLIC BLOOD PRESSURE: 130 MMHG

## 2019-04-16 DIAGNOSIS — E11.42 CONTROLLED TYPE 2 DIABETES MELLITUS WITH DIABETIC POLYNEUROPATHY, WITH LONG-TERM CURRENT USE OF INSULIN (HCC): ICD-10-CM

## 2019-04-16 DIAGNOSIS — R29.898 LEFT LEG WEAKNESS: ICD-10-CM

## 2019-04-16 DIAGNOSIS — Z79.4 CONTROLLED TYPE 2 DIABETES MELLITUS WITH DIABETIC POLYNEUROPATHY, WITH LONG-TERM CURRENT USE OF INSULIN (HCC): ICD-10-CM

## 2019-04-16 LAB
HBA1C MFR BLD: 10.6 % (ref 0–5.6)
INT CON NEG: ABNORMAL
INT CON POS: ABNORMAL

## 2019-04-16 PROCEDURE — 83036 HEMOGLOBIN GLYCOSYLATED A1C: CPT | Performed by: INTERNAL MEDICINE

## 2019-04-16 PROCEDURE — 99213 OFFICE O/P EST LOW 20 MIN: CPT | Performed by: INTERNAL MEDICINE

## 2019-04-16 RX ORDER — LISINOPRIL 40 MG/1
40 TABLET ORAL 2 TIMES DAILY
Qty: 200 TAB | Refills: 3 | Status: SHIPPED | OUTPATIENT
Start: 2019-04-16 | End: 2019-01-01 | Stop reason: SDUPTHER

## 2019-04-16 RX ORDER — INSULIN GLARGINE 100 [IU]/ML
35-40 INJECTION, SOLUTION SUBCUTANEOUS DAILY
Qty: 10 ML | Refills: 0
Start: 2019-04-16 | End: 2019-01-01 | Stop reason: SDUPTHER

## 2019-04-16 NOTE — TELEPHONE ENCOUNTER
Was the patient seen in the last year in this department? Yes1/15/2019    Does patient have an active prescription for medications requested? No     Received Request Via: Pharmacy

## 2019-04-17 NOTE — PROGRESS NOTES
CC: Joel Ma is a 68 y.o. male is suffering from   Chief Complaint   Patient presents with   • Follow-Up     3 months         SUBJECTIVE:  1. Left leg weakness  Joel is here for follow-up, continues to experience leg weakness.  We have discussed that he is now approximately 8 months past surgery.  He is able to walk at home without the use of a walker    2. Controlled type 2 diabetes mellitus with diabetic polyneuropathy, with long-term current use of insulin (HCC)  Patient is suffering from type 2 diabetes with poor control we will increase his Lantus insulin up to 35 units        Past social, family, history: Single  Social History   Substance Use Topics   • Smoking status: Former Smoker     Packs/day: 4.00     Years: 0.50     Types: Cigarettes     Quit date: 1/1/1974   • Smokeless tobacco: Never Used   • Alcohol use No         MEDICATIONS:    Current Outpatient Prescriptions:   •  lisinopril (PRINIVIL, ZESTRIL) 40 MG tablet, Take 1 Tab by mouth 2 times a day., Disp: 200 Tab, Rfl: 3  •  insulin glargine (LANTUS) 100 UNIT/ML Solution, Inject 35-40 Units as instructed every day., Disp: 10 mL, Rfl: 0  •  insulin lispro, Human, (HUMALOG KWIKPEN) 100 UNIT/ML Solution Pen-injector injection, Inject 2-10 Units as instructed 4 Times a Day,Before Meals and at Bedtime., Disp: 10 PEN, Rfl: 3  •  Empagliflozin 10 MG Tab, Take 1 Each by mouth every day., Disp: 30 Tab, Rfl: 3  •  warfarin (COUMADIN) 5 MG Tab, Take 2 Tabs by mouth every day., Disp: 180 Tab, Rfl: 1  •  metoprolol (TOPROL-XL) 200 MG XL tablet, Take 1 Tab by mouth every day., Disp: 30 Tab, Rfl: 11  •  torsemide (DEMADEX) 10 MG tablet, Take 1 Tab by mouth 2 times a day., Disp: 60 Tab, Rfl: 11  •  rosuvastatin (CRESTOR) 10 MG Tab, Take 1 Tab by mouth every evening. Take Monday and Thursdays (Patient taking differently: Take 10 mg by mouth. Take Monday and Thursdays), Disp: 30 Tab, Rfl: 11  •  clopidogrel (PLAVIX) 75 MG Tab, Take 1 Tab by mouth every day.,  Disp: 30 Tab, Rfl: 11  •  hydrALAZINE (APRESOLINE) 100 MG tablet, TAKE ONE TABLET BY MOUTH EVERY EIGHT HOURS, Disp: 90 Tab, Rfl: 1  •  metformin (GLUCOPHAGE) 1000 MG tablet, Take 1 Tab by mouth 2 times a day, with meals., Disp: 60 Tab, Rfl: 11  •  amLODIPine (NORVASC) 10 MG Tab, Take 1 Tab by mouth every day., Disp: 30 Tab, Rfl: 3  •  gabapentin (NEURONTIN) 300 MG Cap, Take 1-2 Caps by mouth 3 times a day. 300mg in AM and noon 600mg in PM (Patient taking differently: Take 300-600 mg by mouth 4 times a day. 300mg in AM and noon 600mg in PM ), Disp: 90 Cap, Rfl: 3  •  vitamin D (CHOLECALCIFEROL) 1000 UNIT Tab, Take 2 Tabs by mouth every day. (Patient taking differently: Take 1,000 Units by mouth every day.), Disp: 60 Tab, Rfl: 3  •  Misc. Devices Misc, Use tid and prn 48PF2zj # 100, Disp: 100 Each, Rfl: 11  •  Misc. Devices Misc, Relion glucometer, Disp: 1 Each, Rfl: 0  •  Blood Glucose Test Strips, Test strips order: Test strips for Relion meter. Sig: use 3 to 4 x a day and prn ssx high or low sugar #450 RF x 3, Disp: 450 Each, Rfl: 3  •  glucose blood (CONTOUR NEXT TEST) strip, 1 Strip by Other route as needed. USE TO TEST BLOOD SUGAR 4 To 5 TIMES DAILY, Disp: 450 Strip, Rfl: 3  •  Evolocumab, REPATHA, (REPATHA SURECLICK) 140 MG/ML Solution Auto-injector, Inject 1 Each as instructed every 14 days. (Patient not taking: Reported on 2/8/2019), Disp: 2 PEN, Rfl: 11  •  potassium chloride SA (KDUR) 20 MEQ Tab CR, Take 1 Tab by mouth every day., Disp: 30 Tab, Rfl: 11  •  clindamycin (CLEOCIN) 300 MG Cap, TAKE 1 CAPSULE BY MOUTH TWICE DAILY, Disp: 180 Cap, Rfl: 0  •  Insulin Pen Needle (ADVOCATE INSULIN PEN NEEDLES) 31G X 5 MM Misc, Use tid and prn., Disp: 100 Each, Rfl: 3  •  NON SPECIFIED, Four wheeled walker with seat - [facility]., Disp: 1 Each, Rfl: 0  •  insulin lispro (HUMALOG) 100 UNIT/ML Solution, Inject 2-10 Units as instructed 4 Times a Day,Before Meals and at Bedtime. For -200 Give 2 units For BS  201-250 Give 4 units For -300 Give 6 units For -350 Give 8 units For -400 Give 10 units <60 or >400 Notify MD, Disp: , Rfl:     PROBLEMS:  Patient Active Problem List    Diagnosis Date Noted   • BMI 35.0-35.9,adult 09/22/2017     Priority: Medium   • JANNIE treated with BiPAP 04/18/2016     Priority: Medium   • Atrial fibrillation [427.31] 06/24/2015     Priority: Medium   • Anticoagulated on warfarin 04/28/2014     Priority: Medium   • Diabetes mellitus with neurological manifestations, controlled ([facility]) 03/05/2012     Priority: Medium   • HTN (hypertension) 08/16/2010     Priority: Medium   • Dyslipidemia 06/17/2009     Priority: Medium   • CVA (cerebral vascular accident) ([facility]) 06/04/2009     Priority: Medium   • Physical debility 08/18/2018     Priority: Low   • History of total knee replacement 08/18/2018     Priority: Low   • Fungal infection of skin of abdomen 08/18/2018     Priority: Low   • Impaired activities of daily living 07/18/2018     Priority: Low   • Cervical myelopathy (HCC) 07/18/2018     Priority: Low   • Lumbar stenosis with neurogenic claudication 07/18/2018     Priority: Low   • Pain 07/18/2018     Priority: Low   • Persistent proteinuria 10/13/2017     Priority: Low   • Diabetic nephropathy associated with type 2 diabetes mellitus ([facility]) 05/12/2017     Priority: Low   • Chronic use of opiate drugs therapeutic purposes 02/14/2017     Priority: Low   • Other orthopedic aftercare 11/07/2016     Priority: Low   • Cervical stenosis of spine 09/06/2016     Priority: Low   • Dysphagia 09/06/2016     Priority: Low   • Hallucinations 09/06/2016     Priority: Low   • Left knee pain 08/28/2016     Priority: Low   • Diabetic polyneuropathy (HCC) 05/06/2015     Priority: Low   • Toe amputation status ([facility]) 05/06/2015     Priority: Low   • Risk for falls 10/27/2014     Priority: Low   • Myalgia 05/29/2014     Priority: Low   • BPH (benign prostatic hyperplasia) 04/28/2014     Priority: Low   •  "Chronic antibiotic suppression 04/28/2014     Priority: Low   • MEDICAL HOME      Priority: Low   • Trigger finger 06/17/2011     Priority: Low   • Thyroid mass 07/30/2009     Priority: Low   • Stage 3 chronic kidney disease (HCC) 02/08/2019   • Hyponatremia 02/08/2019   • Influenza A 01/02/2019   • ACC/AHA stage C systolic congestive heart failure ([facility]) 11/21/2018   • High risk medication use 11/21/2018       REVIEW OF SYSTEMS:  Gen.:  No Nausea, Vomiting, fever, Chills.  Heart: No chest pain.  Lungs:  No shortness of Breath.  Psychological: Eleazar unusual Anxiety depression     PHYSICAL EXAM   Constitutional: Alert, cooperative, not in acute distress.  Cardiovascular:  Rate Rhythm is regular without murmurs rubs clicks.     Thorax & Lungs: Clear to auscultation, no wheezing, rhonchi, or rales  HENT: Normocephalic, Atraumatic.  Eyes: PERRLA, EOMI, Conjunctiva normal.   Neck: Trachia is midline no swelling of the thyroid.   Lymphatic: No lymphadenopathy noted.   Neurologic: Alert & oriented x 3, cranial nerves II through XII are intact, Normal motor function, Normal sensory function, No focal deficits noted.   Psychiatric: Affect normal, Judgment normal, Mood normal.     VITAL SIGNS:/70   Pulse 63   Temp 36.9 °C (98.4 °F) (Temporal)   Resp 16   Ht 1.918 m (6' 3.5\")   Wt 111.1 kg (245 lb)   SpO2 95%   BMI 30.22 kg/m²     Labs: Reviewed    Assessment:                                                     Plan:    1. Left leg weakness  Patient is to work on strengthening conditioning    2. Controlled type 2 diabetes mellitus with diabetic polyneuropathy, with long-term current use of insulin ([facility])  Increase Lantus insulin up to 35 units if this does not bring his blood sugars down below 200 he is to increase up to 40 units each day recheck hemoglobin A1c in 3 months  - POCT Hemoglobin  - insulin glargine (LANTUS) 100 UNIT/ML Solution; Inject 35-40 Units as instructed every day.  Dispense: 10 mL; Refill: 0  - " HEMOGLOBIN A1C; Future  - POCT Hemoglobin A1C

## 2019-04-18 ENCOUNTER — ANTICOAGULATION VISIT (OUTPATIENT)
Dept: VASCULAR LAB | Facility: MEDICAL CENTER | Age: 69
End: 2019-04-18
Attending: INTERNAL MEDICINE
Payer: MEDICARE

## 2019-04-18 DIAGNOSIS — Z79.01 ANTICOAGULATED ON WARFARIN: ICD-10-CM

## 2019-04-18 LAB — INR PPP: 1.8 (ref 2–3.5)

## 2019-04-18 PROCEDURE — 99212 OFFICE O/P EST SF 10 MIN: CPT

## 2019-04-18 PROCEDURE — 85610 PROTHROMBIN TIME: CPT

## 2019-04-18 NOTE — PROGRESS NOTES
Anticoagulation Summary  As of 2019    INR goal:   2.0-3.0   TTR:   53.3 % (3.4 y)   INR used for dosin.8! (2019)   Warfarin maintenance plan:   10 mg (5 mg x 2) every day   Weekly warfarin total:   70 mg   Plan last modified:   Torres Sena, PharmD (2018)   Next INR check:   2019   Target end date:   Indefinite    Indications    CVA (cerebral vascular accident) (HCC) [I63.9]  Atrial fibrillation [427.31] [I48.91]  History of CVA (cerebrovascular accident) (Resolved) [Z86.73]  Anticoagulated on warfarin [Z79.01]             Anticoagulation Episode Summary     [facility]   [facility]    Preferred lab:       Send INR reminders to:       Comments:   call pt multiple times, he has a hard time getting to his phone in time and has no VM set up      Anticoagulation Care Providers     Provider Role Specialty Phone number    BEL Lee.SIMONE. Referring Internal Medicine [phone]    [facility] Responsible  [phone]    Liseth Doe A.P.N. Responsible Cardiology [phone]    Gi Cadena A.P.N. Responsible Cardiology [phone]        Anticoagulation Patient Findings      HPI:  Joel Pradeep Anil seen in clinic today, on anticoagulation therapy with warfarin for CVA  Changes to current medical/health status since last appt: none  Denies signs/symptoms of bleeding and/or thrombosis since the last appt.    Denies any interval changes to diet  Denies any interval changes to medications since last appt.   Denies any complications or cost restrictions with current therapy.   Refused vitals.     A/P   INR  SUB-therapeutic.   Pt missed a dose recently, explains low INR.   Pt is to continue with current warfarin dosing regimen.     Follow up appointment in 2 weeks per pt availability.     Brett Coates, PharmD

## 2019-04-19 LAB — INR BLD: 1.8 (ref 0.9–1.2)

## 2019-04-22 NOTE — TELEPHONE ENCOUNTER
Was the patient seen in the last year in this department? Yes 4-    Does patient have an active prescription for medications requested? No     Received Request Via: Pharmacy

## 2019-04-29 RX ORDER — AMLODIPINE BESYLATE 10 MG/1
10 TABLET ORAL DAILY
Qty: 90 TAB | Refills: 3 | Status: SHIPPED | OUTPATIENT
Start: 2019-04-29

## 2019-04-29 NOTE — TELEPHONE ENCOUNTER
Was the patient seen in the last year in this department? Yes4/16/2019    Does patient have an active prescription for medications requested? No     Received Request Via: Pharmacy

## 2019-05-02 ENCOUNTER — ANTICOAGULATION VISIT (OUTPATIENT)
Dept: VASCULAR LAB | Facility: MEDICAL CENTER | Age: 69
End: 2019-05-02
Attending: INTERNAL MEDICINE
Payer: MEDICARE

## 2019-05-02 VITALS — DIASTOLIC BLOOD PRESSURE: 83 MMHG | HEART RATE: 55 BPM | SYSTOLIC BLOOD PRESSURE: 122 MMHG

## 2019-05-02 DIAGNOSIS — Z79.01 ANTICOAGULATED ON WARFARIN: ICD-10-CM

## 2019-05-02 LAB — INR PPP: 1.7 (ref 2–3.5)

## 2019-05-02 PROCEDURE — 99212 OFFICE O/P EST SF 10 MIN: CPT

## 2019-05-02 PROCEDURE — 85610 PROTHROMBIN TIME: CPT

## 2019-05-02 NOTE — PROGRESS NOTES
Anticoagulation Summary  As of 2019    INR goal:   2.0-3.0   TTR:   52.7 % (3.4 y)   INR used for dosin.70! (2019)   Warfarin maintenance plan:   15 mg (5 mg x 3) every Mon, Fri; 10 mg (5 mg x 2) all other days   Weekly warfarin total:   80 mg   Plan last modified:   Brett Coates, PharmD (2019)   Next INR check:   2019   Target end date:   Indefinite    Indications    CVA (cerebral vascular accident) (HCC) [I63.9]  Atrial fibrillation [427.31] [I48.91]  History of CVA (cerebrovascular accident) (Resolved) [Z86.73]  Anticoagulated on warfarin [Z79.01]             Anticoagulation Episode Summary     INR check location:   [facility]    Preferred lab:       Send INR reminders to:       Comments:   call pt multiple times, he has a hard time getting to his phone in time and has no VM set up      Anticoagulation Care Providers     Provider Role Specialty Phone number    Catrachito Sterling D.O. Referring Internal Medicine [phone]    [facility] Anticoagulation Services Responsible  [phone]    Liseth Doe A.P.N. Responsible Cardiology [phone]    Gi Cadena A.P.N. Responsible Cardiology [phone]        Anticoagulation Patient Findings      HPI:  Joel Ma seen in clinic today, on anticoagulation therapy with warfarin for CVA.   Changes to current medical/health status since last appt: none  Denies signs/symptoms of bleeding and/or thrombosis since the last appt.    Denies any interval changes to diet  Denies any interval changes to medications since last appt.   Denies any complications or cost restrictions with current therapy.   BP recorded in vitals.  Confirmed dosing regimen.     A/P   INR  SUB-therapeutic.   Begin increased regimen.     Follow up appointment in 2 weeks per pt request.     Brett Coates, PharmD

## 2019-05-07 ENCOUNTER — TELEPHONE (OUTPATIENT)
Dept: MEDICAL GROUP | Facility: LAB | Age: 69
End: 2019-05-07

## 2019-05-07 DIAGNOSIS — R53.1 WEAKNESS: ICD-10-CM

## 2019-05-07 LAB — INR BLD: 1.7 (ref 0.9–1.2)

## 2019-05-07 NOTE — TELEPHONE ENCOUNTER
1. Caller Name: Joel                                          Call Back Number: [phone] (home)        Patient approves a detailed voicemail message: yes    Pt is very concerned about his bilateral leg weakness and difficulty walking.  He also has a lot of dizziness.  He's done PT for both issues before and that did not help any.

## 2019-05-08 NOTE — TELEPHONE ENCOUNTER
Jenni:  Please call Joel, let them know I written out a referral to Dr. Morrow physical medicine rehabilitation.  I want her to take a look at him!  Regards, Catrachito Sterling, DO

## 2019-05-16 NOTE — PROGRESS NOTES
Anticoagulation Summary  As of 2019    INR goal:   2.0-3.0   TTR:   52.9 % (3.5 y)   INR used for dosin.60 (2019)   Warfarin maintenance plan:   15 mg (5 mg x 3) every Mon, Fri; 10 mg (5 mg x 2) all other days   Weekly warfarin total:   80 mg   Plan last modified:   Brett Coates, PharmD (2019)   Next INR check:   2019   Target end date:   Indefinite    Indications    CVA (cerebral vascular accident) (HCC) [I63.9]  Atrial fibrillation [427.31] [I48.91]  History of CVA (cerebrovascular accident) (Resolved) [Z86.73]  Anticoagulated on warfarin [Z79.01]             Anticoagulation Episode Summary     INR check location:   [facility]    Preferred lab:       Send INR reminders to:       Comments:   call pt multiple times, he has a hard time getting to his phone in time and has no VM set up      Anticoagulation Care Providers     Provider Role Specialty Phone number    Catrachito Sterling D.O. Referring Internal Medicine [phone]    [facility] Anticoagulation Services Responsible  [phone]    Liseth Doe A.P.N. Responsible Cardiology [phone]    Gi Cadena A.P.N. Responsible Cardiology [phone]        Anticoagulation Patient Findings      HPI:  Joel Ma seen in clinic today, on anticoagulation therapy with warfarin for CVA.   Changes to current medical/health status since last appt: none  Denies signs/symptoms of bleeding and/or thrombosis since the last appt.    Denies any interval changes to diet  Denies any interval changes to medications since last appt.   Denies any complications or cost restrictions with current therapy.   Declines vitals.  Confirmed dosing regimen.     A/P   INR  therapeutic.   Pt is to continue with current warfarin dosing regimen.     Follow up appointment in 3 weeks per pt.     Brett Coates, PharmD

## 2019-05-22 NOTE — TELEPHONE ENCOUNTER
1. Caller Name: Joel                                          Call Back Number: [phone] (home)        Patient approves a detailed voicemail message: yes    You had referred Joel to Dr Morrow, Dr Ferraro's referred him to Dr Zollinger.  Joel was seen already for EMG with Dr Zollinger.  Do you still want him to see Dr Morrow?  I'm not able to get a copy of his testing without a signed release because you're not the referring doc.  Let me know what you'd like Joel to do.

## 2019-05-23 NOTE — TELEPHONE ENCOUNTER
Jenni:  Because he has started already with Dr. Zollinger, I would like him to go ahead and continue to follow-up with Drs. Zollinger.   Regards, Catrachito Sterling, DO

## 2019-06-13 NOTE — TELEPHONE ENCOUNTER
[facility]    Pt forgot his 10bmg warfarin dose last night.  Instructed him to take 5 mg this morning then resume his usual regimen tonight.    Brett Coates, PharmD

## 2019-06-14 NOTE — PROGRESS NOTES
Anticoagulation Summary  As of 2019    INR goal:   2.0-3.0   TTR:   53.9 % (3.6 y)   INR used for dosin.00 (2019)   Warfarin maintenance plan:   10 mg (5 mg x 2) every day   Weekly warfarin total:   70 mg   Plan last modified:   Matt Ponce, PharmD (2019)   Next INR check:   2019   Target end date:   Indefinite    Indications    CVA (cerebral vascular accident) (HCC) [I63.9]  Atrial fibrillation [427.31] [I48.91]  History of CVA (cerebrovascular accident) (Resolved) [Z86.73]  Anticoagulated on warfarin [Z79.01]             Anticoagulation Episode Summary     INR check location:   [facility]    Preferred lab:       Send INR reminders to:       Comments:   call pt multiple times, he has a hard time getting to his phone in time and has no VM set up      Anticoagulation Care Providers     Provider Role Specialty Phone number    Catrachito Sterling D.O. Referring Internal Medicine [phone]    [facility] Anticoagulation Services Responsible  [phone]    BARBRA Araujo.P.N. Responsible Cardiology [phone]    BRIT QuirozP.NElian Responsible Cardiology [phone]        Anticoagulation Patient Findings  Patient Findings     Positives:   Missed doses          HPI:  Joel Ma seen in clinic today for follow up on anticoagulation therapy in the presence of CVA and Afib. Denies any changes to current medical/health status since last appointment. Denies any medication or diet changes. No current symptoms of bleeding or thrombosis reported.    A/P:   INR is therapeutic at 2.0, pt missed dose last night, hence took a 5 mg dose this morning and is it to still take 10 mg tonight. Pt has been taking 10 mg daily which was different from calender dosing, he has been taking this dose for about 6 weeks now. Calender has been changed to reflect current dosing.      Continue current regimen. BP recorded in vitals.    Follow up appointment in 4 week(s).    Next  Appointment: Friday , 07/12/2019     Matt Ponce, Pharm.D

## 2019-07-10 NOTE — TELEPHONE ENCOUNTER
Was the patient seen in the last year in this department? Yes [phone]    Does patient have an active prescription for medications requested? No     Received Request Via: Pharmacy

## 2019-07-11 NOTE — TELEPHONE ENCOUNTER
ESTABLISHED PATIENT PRE-VISIT PLANNING     Patient was NOT contacted to complete PVP.     Note: Patient will not be contacted if there is no indication to call.     1.  Reviewed notes from the last few office visits within the medical group: Yes    2.  If any orders were placed at last visit or intended to be done for this visit (i.e. 6 mos follow-up), do we have Results/Consult Notes?        •  Labs - Labs ordered, NOT completed. Patient advised to complete prior to next appointment.   Note: If patient appointment is for lab review and patient did not complete labs, check with provider if OK to reschedule patient until labs completed.       •  Imaging - Imaging was not ordered at last office visit.       •  Referrals - No referrals were ordered at last office visit.    3. Is this appointment scheduled as a Hospital Follow-Up? No    4.  Immunizations were updated in Epic using WebIZ?: Epic matches WebIZ       •  Web Iz Recommendations: HEPATITIS A , PNEUMOVAX (PPSV23) and SHINGRIX (Shingles)    5.  Patient is due for the following Health Maintenance Topics:   Health Maintenance Due   Topic Date Due   • IMM ZOSTER VACCINES (1 of 2) 08/17/1999   • Annual Wellness Visit  10/28/2015   • COLONOSCOPY  06/30/2017   • IMM PNEUMOCOCCAL 65+ (ADULT) LOW/MEDIUM RISK SERIES (2 of 2 - PPSV23) 01/19/2018   • IMM HEP B VACCINE (2 of 3 - Risk 3-dose series) 02/12/2019   • URINE DRUG SCREEN  05/18/2019       - Patient has completed HEPATITIS B, PNEUMOVAX (PPSV23), PREVNAR (PCV13)  and TDAP Immunization(s) per WebIZ. Chart has been updated.    6. Orders for overdue Health Maintenance topics pended in Pre-Charting? N\A    7.  AHA (MDX) form printed for Provider? No, already completed    8.  Patient was NOT informed to arrive 15 min prior to their scheduled appointment and bring in their medication bottles.

## 2019-07-13 NOTE — TELEPHONE ENCOUNTER
Was the patient seen in the last year in this department? Yes lov 4/16/19    Does patient have an active prescription for medications requested? No     Received Request Via: Pharmacy

## 2019-07-16 NOTE — TELEPHONE ENCOUNTER
Was the patient seen in the last year in this department? Yes  4/16/19  Does patient have an active prescription for medications requested? No     Received Request Via: Pharmacy

## 2019-07-22 NOTE — TELEPHONE ENCOUNTER
ESTABLISHED PATIENT PRE-VISIT PLANNING     Patient was NOT contacted to complete PVP.     Note: Patient will not be contacted if there is no indication to call.     1.  Reviewed notes from the last few office visits within the medical group: Yes    2.  If any orders were placed at last visit or intended to be done for this visit (i.e. 6 mos follow-up), do we have Results/Consult Notes?        •  Labs - Labs were not ordered at last office visit.   Note: If patient appointment is for lab review and patient did not complete labs, check with provider if OK to reschedule patient until labs completed.       •  Imaging - Imaging was not ordered at last office visit.       •  Referrals - No referrals were ordered at last office visit.    3. Is this appointment scheduled as a Hospital Follow-Up? No    4.  Immunizations were updated in Epic using WebIZ?: Epic matches WebIZ       •  Web Iz Recommendations: HEPATITIS A , HEPATITIS B, PNEUMOVAX (PPSV23) and SHINGRIX (Shingles)    5.  Patient is due for the following Health Maintenance Topics:   Health Maintenance Due   Topic Date Due   • IMM ZOSTER VACCINES (1 of 2) 08/17/1999   • Annual Wellness Visit  10/28/2015   • COLONOSCOPY  06/30/2017   • IMM PNEUMOCOCCAL 65+ (ADULT) LOW/MEDIUM RISK SERIES (2 of 2 - PPSV23) 01/19/2018   • IMM HEP B VACCINE (2 of 3 - Risk 3-dose series) 02/12/2019   • URINE DRUG SCREEN  05/18/2019   • FASTING LIPID PROFILE  07/19/2019       - Patient has completed HEPATITIS B, PNEUMOVAX (PPSV23), PREVNAR (PCV13)  and TDAP Immunization(s) per WebIZ. Chart has been updated.    6. Orders for overdue Health Maintenance topics pended in Pre-Charting? N\A    7.  AHA (MDX) form printed for Provider? No, already completed    8.  Patient was NOT informed to arrive 15 min prior to their scheduled appointment and bring in their medication bottles.

## 2019-07-30 NOTE — TELEPHONE ENCOUNTER
1. Caller Name: TREY                                       Call Back Number: [phone]       Patient approves a detailed voicemail message: N\A    Pt has been cancelling his APPTS due to transportation issues. He would like if you could speak to him regarding if he needs any of these appts. He does not have anyone to bring him in.   He has his medications.

## 2019-08-01 NOTE — TELEPHONE ENCOUNTER
ESTABLISHED PATIENT PRE-VISIT PLANNING     Patient was NOT contacted to complete PVP.     Note: Patient will not be contacted if there is no indication to call.     1.  Reviewed notes from the last few office visits within the medical group: Yes    2.  If any orders were placed at last visit or intended to be done for this visit (i.e. 6 mos follow-up), do we have Results/Consult Notes?        •  Labs - Labs ordered, completed on 4/16/19 and results are in chart.   Note: If patient appointment is for lab review and patient did not complete labs, check with provider if OK to reschedule patient until labs completed.       •  Imaging - Imaging was not ordered at last office visit.       •  Referrals - No referrals were ordered at last office visit.    3. Is this appointment scheduled as a Hospital Follow-Up? No    4.  Immunizations were updated in Epic using WebIZ?: Epic matches WebIZ       •  Web Iz Recommendations: HEPATITIS A , HEPATITIS B, PNEUMOVAX (PPSV23) and SHINGRIX (Shingles)    5.  Patient is due for the following Health Maintenance Topics:   Health Maintenance Due   Topic Date Due   • Annual Wellness Visit  1950   • IMM ZOSTER VACCINES (1 of 2) 08/17/1999   • COLONOSCOPY  06/30/2017   • IMM PNEUMOCOCCAL VACCINE: 65+ Years (2 of 2 - PPSV23) 01/19/2018   • IMM HEP B VACCINE (2 of 3 - Risk 3-dose series) 02/12/2019   • URINE DRUG SCREEN  05/18/2019   • FASTING LIPID PROFILE  07/19/2019   • RETINAL SCREENING  08/03/2019       - Patient has completed PNEUMOVAX (PPSV23), PREVNAR (PCV13)  and TDAP Immunization(s) per WebIZ. Chart has been updated.    6. Orders for overdue Health Maintenance topics pended in Pre-Charting? N\A    7.  AHA (MDX) form printed for Provider? No, already completed    8.  Patient was NOT informed to arrive 15 min prior to their scheduled appointment and bring in their medication bottles.

## 2019-08-01 NOTE — TELEPHONE ENCOUNTER
Telephone call returned we will see the patient on August 2, 2019.  Will use [employer]

## 2019-08-09 NOTE — PROGRESS NOTES
CC: Joel Ma is a 68 y.o. male is suffering from   Chief Complaint   Patient presents with   • Diabetes     4 months follo wu         SUBJECTIVE:  1. Controlled type 2 diabetes mellitus with diabetic polyneuropathy, with long-term current use of insulin (HCC)  Patient is here for follow-up has a history of diabetic neuropathy, is currently on insulin, poorly controlled.    2. Lumbar stenosis with neurogenic claudication  Patient is having significant problems with lumbar stenosis with neurogenic claudication.  Is ambulating with the assistance of a walker with extreme difficulty    3. Cervical stenosis of spine  Cervical stenosis with myelopathy associate with the upper extremity.        Past social, family, history: Single  Social History     Tobacco Use   • Smoking status: Former Smoker     Packs/day: 4.00     Years: 0.50     Pack years: 2.00     Types: Cigarettes     Last attempt to quit: 1974     Years since quittin.6   • Smokeless tobacco: Never Used   Substance Use Topics   • Alcohol use: No   • Drug use: No         MEDICATIONS:    Current Outpatient Medications:   •  insulin glargine (LANTUS) 100 UNIT/ML Solution, Inject 40 Units as instructed every day., Disp: 10 mL, Rfl: 0  •  JARDIANCE 10 MG Tab, TAKE ONE TABLET BY MOUTH ONE TIME DAILY, Disp: 30 Tab, Rfl: 3  •  hydrALAZINE (APRESOLINE) 100 MG tablet, TAKE ONE TABLET BY MOUTH EVERY EIGHT HOURS, Disp: 90 Tab, Rfl: 0  •  clindamycin (CLEOCIN) 300 MG Cap, TAKE 1 CAPSULE BY MOUTH TWICE DAILY, Disp: 180 Cap, Rfl: 0  •  amLODIPine (NORVASC) 10 MG Tab, Take 1 Tab by mouth every day., Disp: 90 Tab, Rfl: 3  •  lisinopril (PRINIVIL, ZESTRIL) 40 MG tablet, Take 1 Tab by mouth 2 times a day., Disp: 200 Tab, Rfl: 3  •  insulin lispro, Human, (HUMALOG KWIKPEN) 100 UNIT/ML Solution Pen-injector injection, Inject 2-10 Units as instructed 4 Times a Day,Before Meals and at Bedtime., Disp: 10 PEN, Rfl: 3  •  warfarin (COUMADIN) 5 MG Tab, Take 2 Tabs by  mouth every day., Disp: 180 Tab, Rfl: 1  •  potassium chloride SA (KDUR) 20 MEQ Tab CR, Take 1 Tab by mouth every day., Disp: 30 Tab, Rfl: 11  •  metoprolol (TOPROL-XL) 200 MG XL tablet, Take 1 Tab by mouth every day., Disp: 30 Tab, Rfl: 11  •  torsemide (DEMADEX) 10 MG tablet, Take 1 Tab by mouth 2 times a day., Disp: 60 Tab, Rfl: 11  •  rosuvastatin (CRESTOR) 10 MG Tab, Take 1 Tab by mouth every evening. Take Monday and Thursdays (Patient taking differently: Take 10 mg by mouth. Take Monday and Thursdays), Disp: 30 Tab, Rfl: 11  •  clopidogrel (PLAVIX) 75 MG Tab, Take 1 Tab by mouth every day., Disp: 30 Tab, Rfl: 11  •  metformin (GLUCOPHAGE) 1000 MG tablet, Take 1 Tab by mouth 2 times a day, with meals., Disp: 60 Tab, Rfl: 11  •  gabapentin (NEURONTIN) 300 MG Cap, Take 1-2 Caps by mouth 3 times a day. 300mg in AM and noon 600mg in PM (Patient taking differently: Take 300-600 mg by mouth 4 times a day. 300mg in AM and noon 600mg in PM ), Disp: 90 Cap, Rfl: 3  •  vitamin D (CHOLECALCIFEROL) 1000 UNIT Tab, Take 2 Tabs by mouth every day. (Patient taking differently: Take 1,000 Units by mouth every day.), Disp: 60 Tab, Rfl: 3  •  insulin lispro (HUMALOG) 100 UNIT/ML Solution, Inject 2-10 Units as instructed 4 Times a Day,Before Meals and at Bedtime. For -200 Give 2 units For -250 Give 4 units For -300 Give 6 units For -350 Give 8 units For -400 Give 10 units <60 or >400 Notify MD, Disp: , Rfl:   •  metFORMIN (GLUCOPHAGE) 500 MG Tab, TAKE TWO TABLETS BY MOUTH TWICE DAILY WITH MEALS, Disp: 360 Tab, Rfl: 2  •  Misc. Devices Misc, Use tid and prn [id number] # 100, Disp: 100 Each, Rfl: 11  •  Misc. Devices Misc, Relion glucometer, Disp: 1 Each, Rfl: 0  •  Blood Glucose Test Strips, Test strips order: Test strips for Relion meter. Sig: use 3 to 4 x a day and prn ssx high or low sugar #450 RF x 3, Disp: 450 Each, Rfl: 3  •  glucose blood (CONTOUR NEXT TEST) strip, 1 Strip by Other route as needed.  USE TO TEST BLOOD SUGAR 4 To 5 TIMES DAILY, Disp: 450 Strip, Rfl: 3  •  Evolocumab, REPATHA, (REPATHA SURECLICK) 140 MG/ML Solution Auto-injector, Inject 1 Each as instructed every 14 days. (Patient not taking: Reported on 2/8/2019), Disp: 2 PEN, Rfl: 11  •  Insulin Pen Needle (ADVOCATE INSULIN PEN NEEDLES) 31G X 5 MM Misc, Use tid and prn., Disp: 100 Each, Rfl: 3  •  NON SPECIFIED, Four wheeled walker with seat - [facility] Disp: 1 Each, Rfl: 0    PROBLEMS:  Patient Active Problem List    Diagnosis Date Noted   • BMI 35.0-35.9,adult 09/22/2017     Priority: Medium   • JANNIE treated with BiPAP 04/18/2016     Priority: Medium   • Atrial fibrillation [427.31] 06/24/2015     Priority: Medium   • Anticoagulated on warfarin 04/28/2014     Priority: Medium   • Diabetes mellitus with neurological manifestations, controlled ([facility]) 03/05/2012     Priority: Medium   • HTN (hypertension) 08/16/2010     Priority: Medium   • Dyslipidemia 06/17/2009     Priority: Medium   • CVA (cerebral vascular accident) ([facility]) 06/04/2009     Priority: Medium   • Physical debility 08/18/2018     Priority: Low   • History of total knee replacement 08/18/2018     Priority: Low   • Fungal infection of skin of abdomen 08/18/2018     Priority: Low   • Impaired activities of daily living 07/18/2018     Priority: Low   • Cervical myelopathy (HCC) 07/18/2018     Priority: Low   • Lumbar stenosis with neurogenic claudication 07/18/2018     Priority: Low   • Pain 07/18/2018     Priority: Low   • Persistent proteinuria 10/13/2017     Priority: Low   • Diabetic nephropathy associated with type 2 diabetes mellitus ([facility]) 05/12/2017     Priority: Low   • Chronic use of opiate drugs therapeutic purposes 02/14/2017     Priority: Low   • Other orthopedic aftercare 11/07/2016     Priority: Low   • Cervical stenosis of spine 09/06/2016     Priority: Low   • Dysphagia 09/06/2016     Priority: Low   • Hallucinations 09/06/2016     Priority: Low   • Left knee  "pain 08/28/2016     Priority: Low   • Diabetic polyneuropathy (HCC) 05/06/2015     Priority: Low   • Toe amputation status ([facility]) 05/06/2015     Priority: Low   • Risk for falls 10/27/2014     Priority: Low   • Myalgia 05/29/2014     Priority: Low   • BPH (benign prostatic hyperplasia) 04/28/2014     Priority: Low   • Chronic antibiotic suppression 04/28/2014     Priority: Low   • MEDICAL HOME      Priority: Low   • Trigger finger 06/17/2011     Priority: Low   • Thyroid mass 07/30/2009     Priority: Low   • Stage 3 chronic kidney disease (HCC) 02/08/2019   • Hyponatremia 02/08/2019   • Influenza A 01/02/2019   • ACC/AHA stage C systolic congestive heart failure ([facility]) 11/21/2018   • High risk medication use 11/21/2018       REVIEW OF SYSTEMS:  Gen.:  No Nausea, Vomiting, fever, Chills.  Heart: No chest pain.  Lungs:  No shortness of Breath.  Psychological: Eleazar unusual Anxiety depression     PHYSICAL EXAM   Constitutional: Alert, cooperative, not in acute distress.  Cardiovascular:  Rate Rhythm is regular without murmurs rubs clicks.     Thorax & Lungs: Clear to auscultation, no wheezing, rhonchi, or rales  HENT: Normocephalic, Atraumatic.  Eyes: PERRLA, EOMI, Conjunctiva normal.   Neck: Trachia is midline no swelling of the thyroid.   Musculoskeletal: Moderate to severe upper and lower extremity weakness  Neurologic: Alert & oriented x 3, cranial nerves II through XII are intact, abnormal motor function, abnormal sensory function.   Psychiatric: Affect normal, Judgment normal, Mood normal.     VITAL SIGNS:/68   Pulse (!) 57   Temp 36.7 °C (98.1 °F) (Temporal)   Resp 14   Ht 1.93 m (6' 4\")   Wt 108 kg (238 lb)   SpO2 94%   BMI 28.97 kg/m²     Labs: Reviewed    Assessment:                                                     Plan:    1. Controlled type 2 diabetes mellitus with diabetic polyneuropathy, with long-term current use of insulin ([facility])  Globin A1c at greater than 11, encourage patient to increase " his insulin 40 units from 30 units  - POCT A1C  - insulin glargine (LANTUS) 100 UNIT/ML Solution; Inject 40 Units as instructed every day.  Dispense: 10 mL; Refill: 0  - HEMOGLOBIN A1C; Future    2. Lumbar stenosis with neurogenic claudication  Ongoing issues with ambulation is ambulating with assistance of a walker, has built a ramp for his trailer    3. Cervical stenosis of spine  Significant stenosis of the cervical spine clinically stable.  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

## 2019-08-09 NOTE — PROGRESS NOTES
[facility]    Left a voicemail to remind pt to obtain next INR.    Brett Coates, PharmD

## 2019-08-16 NOTE — PROGRESS NOTES
"Subjective:      Joel Ma is a 68 y.o. male who presents with Follow-Up            HPI  68 year old with CKD III from DN as well as HTN.    No recent labs. Car not functioning so unable to go to appointments. States that his sister is moving in with him to help. Still able to walk with a walker but tires fast.     Taking medications as prescribed. BP stable. No syncope.    Review of Systems   Constitutional: Positive for malaise/fatigue.   Respiratory: Negative for shortness of breath.    Cardiovascular: Negative for chest pain.   All other systems reviewed and are negative.         Objective:     /60 (BP Location: Right arm, Patient Position: Sitting, BP Cuff Size: Adult)   Pulse 60   Temp 37 °C (98.6 °F) (Temporal)   Resp 14   Ht 1.93 m (6' 4\")   Wt 108 kg (238 lb) Comment: Pt stated wt.  SpO2 95%   BMI 28.97 kg/m²      Physical Exam   Constitutional: He is oriented to person, place, and time. He appears well-developed. He appears ill.   HENT:   Head: Normocephalic and atraumatic.   Cardiovascular: Normal rate and regular rhythm.   Pulmonary/Chest: Effort normal and breath sounds normal.   Abdominal: Soft. Bowel sounds are normal.   Musculoskeletal: He exhibits no edema or deformity.   Neurological: He is alert and oriented to person, place, and time.   However forgetful   Skin: Skin is warm and dry.   Psychiatric: He has a normal mood and affect. His behavior is normal.               Assessment/Plan:     1. Stage 3 chronic kidney disease (HCC)  No recent labs, d/w patient, labs ordered to review current state. Clinically does not seem uremic.    - Basic Metabolic Panel; Future  - PTH INTACT (PTH ONLY); Future  - MICROALBUMIN CREAT RATIO URINE; Future  - CBC WITHOUT DIFFERENTIAL; Future    2. Diabetic nephropathy associated with type 2 diabetes mellitus (HCC)  Uncontrolled, difficult social situation. He is making changes with regards to help at home with sister moving in. Also, will have " his car fixed next week. D/w patient. Told to follow up with his PCP.    3. Essential hypertension  BP controlled.

## 2019-08-28 NOTE — TELEPHONE ENCOUNTER
Was the patient seen in the last year in this department? Yes lov 8/9/19    Does patient have an active prescription for medications requested? No     Received Request Via: Pharmacy

## 2019-09-11 NOTE — PROGRESS NOTES
Chief Complaint   Patient presents with   • CHF (Systolic)     F/V: 6 MO       Subjective:   Joel Ma is a 69 y.o. male who presents today as a follow-up for his CAD as well as his heart failure with reduced ejection fraction hypertension hyperlipidemia.  Since he was last seen he is completed 1 year of dual antiplatelet therapy but is on warfarin.  We will keep him on Plavix.  His EF normalized with EF of 65.  He said no chest pain palpitations or PND.  He has some lower extremity edema but he is basically wheelchair-bound from weakness and clumsiness after his stroke.  He had to stop his rosuvastatin a few weeks ago because he had recurrence of his myalgias.  He did try an injectable however this was too expensive for him.    Past Medical History:   Diagnosis Date   • Anesthesia     low O2 sat   • Arrhythmia     a-fib   • arthritis 6/17/2011    thumb, fingers   • Arthritis     hip   • Backpain    • CAD (coronary artery disease)     MIRELLA to distal RCA and prox LAD   • Cataract     left eye surgery   • Dental disorder     full dentures   • Diabetes     insulin, Dr Oconnor, his APN   • High cholesterol    • Hypertension    • MRSA (methicillin resistant Staphylococcus aureus)     history of, nothing current 2016, on clindamycin for rest of life per patient   • Pain 05-03-13    shoulders, hip, right knee, 7/10   • Pneumonia 2002   • Renal disorder     stage 2   • Sleep apnea     bipap   • Stroke (HCC)     2/1/2007, 4/1/2007, right sided weakness; balance disturbance, memory problems   • Thyroid mass 7/30/2009    benign lump   • Ventricular ectopy 6/17/2011     Past Surgical History:   Procedure Laterality Date   • CERVICAL FUSION POSTERIOR  7/13/2018    Procedure: CERVICAL FUSION POSTERIOR/ C2-4;  Surgeon: Boby Marsh M.D.;  Location: SURGERY [facility];  Service: Neurosurgery   • CERVICAL LAMINECTOMY POSTERIOR  7/13/2018    Procedure: CERVICAL LAMINECTOMY POSTERIOR/ C2-3, C5-6;  Surgeon:  Boby Marsh M.D.;  Location: [facility];  Service: Neurosurgery   • LUMBAR LAMINECTOMY DISKECTOMY  7/13/2018    Procedure: LUMBAR LAMINECTOMY DISKECTOMY/ L2-5 LAMI;  Surgeon: Boby Marsh M.D.;  Location: [facility];  Service: Neurosurgery   • CERVICAL DISK AND FUSION ANTERIOR  8/31/2016    Procedure: CERVICAL DISK AND FUSION ANTERIOR C3-7 ;  Surgeon: Alhaji Jaffe M.D.;  Location: [facility];  Service:    • CATARACT PHACO WITH IOL  5/8/2013    Performed by Mame Rehman M.D. at SURGERY SAME DAY [facility]   • IRRIGATION & DEBRIDEMENT ORTHO  11/13/2012    Performed by Gordon Cota M.D. at [facility]   • KNEE REVISION TOTAL  11/13/2012    Performed by Gordon Cota M.D. at [facility]   • IRRIGATION & DEBRIDEMENT ORTHO  11/2/2012    Performed by Gordon Cota M.D. at [facility]   • IRRIGATION & DEBRIDEMENT ORTHO Left 10/29/2012    Procedure: left shoulder, with drain;  Surgeon: Gordon Cota M.D.;  Location: [facility];  Service:    • INCISION AND DRAINAGE ORTHOPEDIC Right 10/29/2012    Procedure: Right Total Knee I and D and Liner Exchange, with drain;  Surgeon: Gordon Cota M.D.;  Location: [facility];  Service:    • IRRIGATION & DEBRIDEMENT ORTHO  3/13/2012    Performed by KAMALJIT SHI at [facility]   • KNEE REVISION TOTAL  3/13/2012    Performed by KAMALJIT SHI at [facility]   • TENDON REPAIR  3/13/2012    Performed by KAMALJIT SHI at [facility]   • KNEE ARTHROPLASTY TOTAL  1/11/2012    Right; Performed by KAMALJIT SHI at [facility]   • TOE AMPUTATION  7/22/2011    Performed by EVELYN JANE at [facility]   • ELBOW ARTHROTOMY  2008    right   • LUMBAR FUSION POSTERIOR  1979   • FOOT SURGERY      partial amputation great toe   • FOOT SURGERY      toe surgery left foot   • OTHER      left eye cataract    • OTHER NEUROLOGICAL SURG      neck fusion   • PB KNEE SCOPE,SINGLE MENISECTOMY      right knee   • ZZZ CARDIAC CATH       Family History   Problem Relation Age of Onset   • Diabetes Mother    • Cancer Father         lung cancer   • Heart Disease Father         triple bypass   • Diabetes Other    • Hypertension Other    • Cancer Other      Social History     Socioeconomic History   • Marital status: Single     Spouse name: Not on file   • Number of children: Not on file   • Years of education: Not on file   • Highest education level: Not on file   Occupational History   • Not on file   Social Needs   • Financial resource strain: Not on file   • Food insecurity:     Worry: Not on file     Inability: Not on file   • Transportation needs:     Medical: Not on file     Non-medical: Not on file   Tobacco Use   • Smoking status: Former Smoker     Packs/day: 4.00     Years: 0.50     Pack years: 2.00     Types: Cigarettes     Last attempt to quit: 1974     Years since quittin.7   • Smokeless tobacco: Never Used   Substance and Sexual Activity   • Alcohol use: No   • Drug use: No   • Sexual activity: Yes   Lifestyle   • Physical activity:     Days per week: Not on file     Minutes per session: Not on file   • Stress: Not on file   Relationships   • Social connections:     Talks on phone: Not on file     Gets together: Not on file     Attends Mosque service: Not on file     Active member of club or organization: Not on file     Attends meetings of clubs or organizations: Not on file     Relationship status: Not on file   • Intimate partner violence:     Fear of current or ex partner: Not on file     Emotionally abused: Not on file     Physically abused: Not on file     Forced sexual activity: Not on file   Other Topics Concern   • Not on file   Social History Narrative    ** Merged History Encounter **          Allergies   Allergen Reactions   • Demerol      Makes me stop breathing.  Doesn't like because it makes  him high   • Statins [Hmg-Coa-R Inhibitors] Myalgia     Rxn - ongoing       Outpatient Encounter Medications as of 9/11/2019   Medication Sig Dispense Refill   • ezetimibe (ZETIA) 10 MG Tab Take 1 Tab by mouth every day. 30 Tab 11   • hydrALAZINE (APRESOLINE) 100 MG tablet TAKE ONE TABLET BY MOUTH EVERY EIGHT HOURS 90 Tab 1   • insulin glargine (LANTUS) 100 UNIT/ML Solution Inject 40 Units as instructed every day. 10 mL 0   • clindamycin (CLEOCIN) 300 MG Cap TAKE 1 CAPSULE BY MOUTH TWICE DAILY 180 Cap 0   • amLODIPine (NORVASC) 10 MG Tab Take 1 Tab by mouth every day. 90 Tab 3   • metFORMIN (GLUCOPHAGE) 500 MG Tab TAKE TWO TABLETS BY MOUTH TWICE DAILY WITH MEALS 360 Tab 2   • lisinopril (PRINIVIL, ZESTRIL) 40 MG tablet Take 1 Tab by mouth 2 times a day. 200 Tab 3   • insulin lispro, Human, (HUMALOG KWIKPEN) 100 UNIT/ML Solution Pen-injector injection Inject 2-10 Units as instructed 4 Times a Day,Before Meals and at Bedtime. 10 PEN 3   • Misc. Devices Misc Use tid and prn 75GC6ve # 100 100 Each 11   • Misc. Devices Misc Relion glucometer 1 Each 0   • Blood Glucose Test Strips Test strips order: Test strips for Relion meter. Sig: use 3 to 4 x a day and prn ssx high or low sugar #450 RF x 3 450 Each 3   • warfarin (COUMADIN) 5 MG Tab Take 2 Tabs by mouth every day. 180 Tab 1   • glucose blood (CONTOUR NEXT TEST) strip 1 Strip by Other route as needed. USE TO TEST BLOOD SUGAR 4 To 5 TIMES DAILY 450 Strip 3   • potassium chloride SA (KDUR) 20 MEQ Tab CR Take 1 Tab by mouth every day. 30 Tab 11   • metoprolol (TOPROL-XL) 200 MG XL tablet Take 1 Tab by mouth every day. 30 Tab 11   • torsemide (DEMADEX) 10 MG tablet Take 1 Tab by mouth 2 times a day. 60 Tab 11   • clopidogrel (PLAVIX) 75 MG Tab Take 1 Tab by mouth every day. 30 Tab 11   • gabapentin (NEURONTIN) 300 MG Cap Take 1-2 Caps by mouth 3 times a day. 300mg in AM and noon 600mg in PM (Patient taking differently: Take 300-600 mg by mouth 4 times a day. 300mg in AM  and noon 600mg in PM ) 90 Cap 3   • Insulin Pen Needle (ADVOCATE INSULIN PEN NEEDLES) 31G X 5 MM Misc Use tid and prn. 100 Each 3   • NON SPECIFIED Four wheeled walker with seat - [facility]. 1 Each 0   • insulin lispro (HUMALOG) 100 UNIT/ML Solution Inject 2-10 Units as instructed 4 Times a Day,Before Meals and at Bedtime. For -200 Give 2 units  For -250 Give 4 units  For -300 Give 6 units  For -350 Give 8 units  For -400 Give 10 units  <60 or >400 Notify MD     • JARDIANCE 10 MG Tab TAKE ONE TABLET BY MOUTH ONE TIME DAILY (Patient not taking: Reported on 9/11/2019) 30 Tab 3   • [DISCONTINUED] Evolocumab, REPATHA, (REPATHA SURECLICK) 140 MG/ML Solution Auto-injector Inject 1 Each as instructed every 14 days. (Patient not taking: Reported on 2/8/2019) 2 PEN 11   • [DISCONTINUED] rosuvastatin (CRESTOR) 10 MG Tab Take 1 Tab by mouth every evening. Take Monday and Thursdays (Patient not taking: Reported on 8/16/2019) 30 Tab 11   • [DISCONTINUED] metformin (GLUCOPHAGE) 1000 MG tablet Take 1 Tab by mouth 2 times a day, with meals. (Patient not taking: Reported on 8/16/2019) 60 Tab 11   • [DISCONTINUED] vitamin D (CHOLECALCIFEROL) 1000 UNIT Tab Take 2 Tabs by mouth every day. (Patient not taking: Reported on 8/16/2019) 60 Tab 3     No facility-administered encounter medications on file as of 9/11/2019.      Review of Systems   Constitutional: Negative.  Negative for chills, fever and malaise/fatigue.   HENT: Negative.  Negative for sore throat.    Eyes: Negative.    Respiratory: Negative.  Negative for cough, hemoptysis, sputum production, shortness of breath, wheezing and stridor.    Cardiovascular: Negative.  Negative for chest pain, palpitations, orthopnea, claudication, leg swelling and PND.   Gastrointestinal: Negative.    Genitourinary: Negative.    Musculoskeletal: Negative.    Skin: Negative.    Neurological: Negative.  Negative for dizziness, loss of consciousness and  "weakness.   Endo/Heme/Allergies: Negative.  Does not bruise/bleed easily.   All other systems reviewed and are negative.       Objective:   /60 (BP Location: Left arm, Patient Position: Sitting, BP Cuff Size: Adult)   Pulse 84   Ht 1.93 m (6' 4\")   Wt 106.1 kg (234 lb)   SpO2 93%   BMI 28.48 kg/m²     Physical Exam   Constitutional: He appears well-developed and well-nourished. No distress.   HENT:   Head: Normocephalic and atraumatic.   Right Ear: External ear normal.   Left Ear: External ear normal.   Nose: Nose normal.   Mouth/Throat: No oropharyngeal exudate.   Eyes: Pupils are equal, round, and reactive to light. Conjunctivae and EOM are normal. Right eye exhibits no discharge. Left eye exhibits no discharge. No scleral icterus.   Neck: Neck supple. No JVD present.   Cardiovascular: Normal rate, regular rhythm and intact distal pulses. Exam reveals no gallop and no friction rub.   No murmur heard.  Pulmonary/Chest: Effort normal. No stridor. No respiratory distress. He has no wheezes. He has no rales. He exhibits no tenderness.   Abdominal: Soft. He exhibits no distension. There is no guarding.   Musculoskeletal: Normal range of motion. He exhibits no edema, tenderness or deformity.   Neurological: He is alert. He has normal reflexes. He displays normal reflexes. No cranial nerve deficit. He exhibits normal muscle tone. Coordination normal.   Skin: Skin is warm and dry. No rash noted. He is not diaphoretic. No erythema. No pallor.   Psychiatric: He has a normal mood and affect. His behavior is normal. Judgment and thought content normal.   Nursing note and vitals reviewed.      Assessment:     1. ACC/AHA stage C systolic congestive heart failure (HCC)  ezetimibe (ZETIA) 10 MG Tab    REFERRAL TO PHYSICAL THERAPY Reason for Therapy: Eval/Treat/Report   2. Anticoagulated on warfarin  REFERRAL TO PHYSICAL THERAPY Reason for Therapy: Eval/Treat/Report   3. Atrial fibrillation, unspecified type (HCC)  " ezetimibe (ZETIA) 10 MG Tab    REFERRAL TO PHYSICAL THERAPY Reason for Therapy: Eval/Treat/Report   4. Cerebrovascular accident (CVA), unspecified mechanism (HCC)  ezetimibe (ZETIA) 10 MG Tab   5. Controlled type 2 diabetes mellitus with diabetic polyneuropathy, with long-term current use of insulin (HCC)     6. Diabetic nephropathy associated with type 2 diabetes mellitus (HCC)     7. Diabetic polyneuropathy associated with type 2 diabetes mellitus (HCC)     8. Dyslipidemia  ezetimibe (ZETIA) 10 MG Tab    LIPID PANEL   9. Hallucinations     10. High risk medication use     11. Essential hypertension     12. JANNIE treated with BiPAP     13. Stage 3 chronic kidney disease (HCC)     14. Amputated toe of right foot (HCC)         Medical Decision Making:  Today's Assessment / Status / Plan:     6-year-old male with high risk medication usage diabetes hyperlipidemia hypertension and CAD.  We will keep him on Plavix and warfarin.  He will hold off on the rosuvastatin.  I did offer him a referral to the lipid clinic to see if we could get his cholesterol better managed.  The meantime I will start him on Zetia 10 mg/day.  We will otherwise see him back in 6 months.  I did place a referral for physical therapy to help him with his balance and lower extremity weakness.

## 2019-09-11 NOTE — PROGRESS NOTES
Anticoagulation Summary  As of 2019    INR goal:   2.0-3.0   TTR:   50.5 % (3.8 y)   INR used for dosin.70! (2019)   Warfarin maintenance plan:   15 mg (5 mg x 3) every Wed; 10 mg (5 mg x 2) all other days   Weekly warfarin total:   75 mg   Plan last modified:   Brenda Millard A.P.NElian (2019)   Next INR check:   2019   Target end date:   Indefinite    Indications    CVA (cerebral vascular accident) (HCC) [I63.9]  Atrial fibrillation [427.31] [I48.91]  History of CVA (cerebrovascular accident) (Resolved) [Z86.73]  Anticoagulated on warfarin [Z79.01]             Anticoagulation Episode Summary     INR check location:   Anticoagulation Clinic    Preferred lab:       Send INR reminders to:       Comments:   call pt multiple times, he has a hard time getting to his phone in time and has no VM set up      Anticoagulation Care Providers     Provider Role Specialty Phone number    Catrachito Sterling D.O. Referring Internal Medicine [phone]    [facility] Responsible  [phone]    BRIT AraujoPJILLIAN Responsible Cardiology [phone]    BRIT QuirozPElianNElian Responsible Cardiology [phone]        Anticoagulation Patient Findings      HPI:  Joel Ma seen in clinic today for follow up on anticoagulation therapy in the presence of AF, CVA hx.   Denies any changes to current medical/health status since last appointment.   Denies any medication or diet changes.   No current symptoms of bleeding or thrombosis reported.  Denies any missed doses. Confirmed his dose.    A/P:   INR subtherapeutic.   CHADS 2 score = 5. CVA in . Will increase regimen. He knows to seek immediate medical attention for any s/sx CVA.  BP recorded in vitals. BP elevated. States he hasn't had his AM BP meds. Sees Dr. Pulliam after this appt.    Follow up appointment in 2 week(s).    Next Appointment: Wednesday, 2019 at 9:15 am.    Brenda Millard  APRN

## 2019-09-12 NOTE — TELEPHONE ENCOUNTER
JUAN JOSÉ/cheryl    Pt calling to ask what the purpose of zetia is, wants to know why it was prescribed and what the med will do for him.    Please call Joel on cell #[phone]

## 2019-09-12 NOTE — PROGRESS NOTES
Pt called, requests the warfarin refill be sent to [facility] on [address].     Gely Briseno, PharmD

## 2019-09-13 NOTE — TELEPHONE ENCOUNTER
1. Caller Name: Joel                                          Call Back Number: [phone] (home)        Patient approves a detailed voicemail message: yes    Joel is in a gap with his prescription coverage right now and is unable to afford Jardiance.  Are there any options for him?

## 2019-09-13 NOTE — TELEPHONE ENCOUNTER
Jenni:  Please call Joel asked him to increase his Lantus insulin up by 10 units.  Ask him what his total number of units he is giving himself each day currently is.

## 2019-09-16 NOTE — PROGRESS NOTES
Anticoagulation Summary  As of 2019    INR goal:   2.0-3.0   TTR:   50.5 % (3.8 y)   INR used for dosin.50 (2019)   Warfarin maintenance plan:   15 mg (5 mg x 3) every Wed; 10 mg (5 mg x 2) all other days   Weekly warfarin total:   75 mg   Plan last modified:   Brenda Millard A.P.NElian (2019)   Next INR check:   2019   Target end date:   Indefinite    Indications    CVA (cerebral vascular accident) (HCC) [I63.9]  Atrial fibrillation [427.31] [I48.91]  History of CVA (cerebrovascular accident) (Resolved) [Z86.73]  Anticoagulated on warfarin [Z79.01]             Anticoagulation Episode Summary     INR check location:   Anticoagulation Clinic    Preferred lab:       Send INR reminders to:       Comments:   call pt multiple times, he has a hard time getting to his phone in time and has no VM set up      Anticoagulation Care Providers     Provider Role Specialty Phone number    Catrachito Sterling D.O. Referring Internal Medicine [phone]    [facility] Responsible  [phone]    BARBRA Araujo.P.N. Responsible Cardiology [phone]    BARBRA Quiroz.P.N. Responsible Cardiology [phone]        Anticoagulation Patient Findings    HPI:  Joel Fernández Denaya, on anticoagulation therapy with warfarin for Afib and hx of CVA.  Changes to current medical/health status since last appt: none  Denies signs/symptoms of bleeding and/or thrombosis since the last appt.    Denies any interval changes to diet  Denies any interval changes to medications since last appt.   Denies any complications or cost restrictions with current therapy.     A/P   INR is therapeutic at 2.5   Pt to continue current warfarin therapy regimen.   Pt to contact clinic for any s/s of unusual bleeding, bruising, clotting, or any changes to diet or medication.     Next INR in 2 week(s).    Shani Saavedra, PharmD

## 2019-09-18 NOTE — OP THERAPY EVALUATION
Outpatient Physical Therapy  INITIAL EVALUATION    [facility] Physical Therapy [facility]  [address]  [address]  [place] [address]  Phone:  [phone]  Fax:  [phone]    Date of Evaluation: 2019    Patient: Joel Ma  YOB: 1950  MRN: [id number]     Referring Provider: Boby Pulliam M.D.  [address]  [address]  [place] [address]   Referring Diagnosis ACC/AHA stage C systolic congestive heart failure (HCC) [I50.20];Anticoagulated on warfarin [Z79.01];Atrial fibrillation, unspecified type (HCC) [I48.91]     Time Calculation  Start time: 856  Stop time: 955 Time Calculation (min): 59 minutes       Physical Therapy Occurrence Codes    Date physical therapy care plan established or reviewed:  19   Date physical therapy treatment started:  19          Chief Complaint: Weakness    Visit Diagnoses     ICD-10-CM   1. ACC/AHA stage C systolic congestive heart failure (HCC) I50.20   2. Anticoagulated on warfarin Z79.01   3. Atrial fibrillation, unspecified type (HCC) I48.91         Subjective:   History of Present Illness:     Mechanism of injury:  Pt presents to PT with complaint of debility and difficulty walking.  He is known to the clinic and last seen 2018.  States since being discharged, he was noted to have severe cervical dysfunction.  2018, underwent C/S fusion C2-4 (previously C3-7, ), C2-3 and C5-6 lami and L2-5 lami.  Post-operatively: the pain throughout his body has improved at least 50%.  Still feeling very weak the in the LEs.  Hoping to be able to strengthen them with PT.  Home health PT discharged in 2018.  Has continued doing some of the bed exercises and walks the ramp.  No falls in the last year.   Prior level of function:  Retired.  Sedentary due to chronic debility.  Ambulates with a FWW.  Drives.  Has a 4WW, has a scooter which is not in operation right now, shower chair and grab bars  Pain:     Current pain ratin (I  "don't pay attention to it anymore)    At best pain ratin    At worst pain ratin    Progression:  Stable  Social Support:     Patient lives at: ramp to enter, 2 dogs.    Lives with:  Alone  Treatments:     Previous treatment:  Physical therapy  Patient Goals:     Patient goals for therapy:  Increased motion, improved balance, increased strength, return to sport/leisure activities and independence with ADLs/IADLs    Other patient goals:  \"I want to walk\" ideally with a SPC      Past Medical History:   Diagnosis Date   • Anesthesia     low O2 sat   • Arrhythmia     a-fib   • arthritis 2011    thumb, fingers   • Arthritis     hip   • Backpain    • CAD (coronary artery disease)     MIRELLA to distal RCA and prox LAD   • Cataract     left eye surgery   • Dental disorder     full dentures   • Diabetes     insulin, Dr Oconnor, his APN   • High cholesterol    • Hypertension    • MRSA (methicillin resistant Staphylococcus aureus)     history of, nothing current , on clindamycin for rest of life per patient   • Pain 13    shoulders, hip, right knee, 7/10   • Pneumonia    • Renal disorder     stage 2   • Sleep apnea     bipap   • Stroke (HCC)     2007, 2007, right sided weakness; balance disturbance, memory problems   • Thyroid mass 2009    benign lump   • Ventricular ectopy 2011     Past Surgical History:   Procedure Laterality Date   • CERVICAL FUSION POSTERIOR  2018    Procedure: CERVICAL FUSION POSTERIOR/ C2-4;  Surgeon: Boby Marsh M.D.;  Location: [facility];  Service: Neurosurgery   • CERVICAL LAMINECTOMY POSTERIOR  2018    Procedure: CERVICAL LAMINECTOMY POSTERIOR/ C2-3, C5-6;  Surgeon: Boby Marsh M.D.;  Location: [facility];  Service: Neurosurgery   • LUMBAR LAMINECTOMY DISKECTOMY  2018    Procedure: LUMBAR LAMINECTOMY DISKECTOMY/ L2-5 LAMI;  Surgeon: Boby Marsh M.D.;  Location: [facility];  " Service: Neurosurgery   • CERVICAL DISK AND FUSION ANTERIOR  8/31/2016    Procedure: CERVICAL DISK AND FUSION ANTERIOR C3-7 ;  Surgeon: Alhaji Jaffe M.D.;  Location: [facility];  Service:    • CATARACT PHACO WITH IOL  5/8/2013    Performed by Mame Rehman M.D. at SURGERY SAME DAY [facility]   • IRRIGATION & DEBRIDEMENT ORTHO  11/13/2012    Performed by Gordon Cota M.D. at SURGERY [facility]   • KNEE REVISION TOTAL  11/13/2012    Performed by Gordon Cota M.D. at [facility]   • IRRIGATION & DEBRIDEMENT ORTHO  11/2/2012    Performed by Gordon Cota M.D. at [facility]   • IRRIGATION & DEBRIDEMENT ORTHO Left 10/29/2012    Procedure: left shoulder, with drain;  Surgeon: Gordon Cota M.D.;  Location: [facility];  Service:    • INCISION AND DRAINAGE ORTHOPEDIC Right 10/29/2012    Procedure: Right Total Knee I and D and Liner Exchange, with drain;  Surgeon: Gordon Cota M.D.;  Location: [facility];  Service:    • IRRIGATION & DEBRIDEMENT ORTHO  3/13/2012    Performed by KAMALJIT SHI at [facility]   • KNEE REVISION TOTAL  3/13/2012    Performed by KAMALJIT SHI at [facility]   • TENDON REPAIR  3/13/2012    Performed by KAMALJIT SHI at [facility]   • KNEE ARTHROPLASTY TOTAL  1/11/2012    Right; Performed by KAMALJIT SHI at [facility]   • TOE AMPUTATION  7/22/2011    Performed by EVELYN JANE at [facility]   • ELBOW ARTHROTOMY  2008    right   • LUMBAR FUSION POSTERIOR  1979   • FOOT SURGERY      partial amputation great toe   • FOOT SURGERY      toe surgery left foot   • OTHER      left eye cataract   • OTHER NEUROLOGICAL SURG      neck fusion   • PB KNEE SCOPE,SINGLE MENISECTOMY      right knee   • ZZZ CARDIAC CATH       Social History     Tobacco Use   • Smoking status: Former Smoker     Packs/day: 4.00     Years: 0.50     Pack years: 2.00     Types:  Cigarettes     Last attempt to quit: 1974     Years since quittin.7   • Smokeless tobacco: Never Used   Substance Use Topics   • Alcohol use: No     Family and Occupational History     Socioeconomic History   • Marital status: Single     Spouse name: Not on file   • Number of children: Not on file   • Years of education: Not on file   • Highest education level: Not on file   Occupational History   • Not on file       Objective     Observations   Central spine     Positive for forward head/neck and rounded shoulders.    Additional Observation Details  Atrophy/wasting R>L thenar arthur and AP.     Postural Observations  Seated posture: poor  Standing posture: poor        Shoulder Screen    Shoulder range of motion within functional limits with the following exceptions: Elevation to 90 deg with early scap rise bilat  Shoulder strength within functional limits with the following exceptions: B grossly 4-/5    Neurological Testing     Myotome testing   Cervical (left)   T1 (intrinsics): 3+    Cervical (right)   T1 (intrinsics): 2-    Dermatome testing   Cervical (left)   C6 (thumb): decreased    Cervical (right)   C6 (thumb): decreased    Additional Neurological Details  Absent LT B feet in sock distribution    Active Range of Motion     Lumbar   Extension: decreased    Strength:      Abdominals   Lower abdominals: Able to initiate but not maintain neutral    Left Hip   Planes of Motion   Flexion: 4-  Extension: 3+  Abduction: 4-  Adduction: 4-    Right Hip   Planes of Motion   Flexion: 4-  Extension: 3+  Abduction: 4-  Adduction: 4-    Left Knee   Flexion: 3+  Extension: 4-    Right Knee   Flexion: 3+  Extension: 3+    Left Ankle/Foot   Dorsiflexion: 2    Right Ankle/Foot   Dorsiflexion: 2  Ambulation     Ambulation: Stairs   Ascend stairs: unable  Descend stairs: unable    Quality of Movement During Gait   Trunk  Forward lean.     Pelvis  Posterior pelvic tilt.     Ankle    Ankle (Left): Positive foot drop.  "  Ankle (Right): Positive foot drop.     Additional Quality of Movement During Gait Details  Shuffled due to limited foot clearance.  Gait speed is slow    Comments   30\" STS: using B UEs and FWW= 4 reps  TUG with FWW: avg of 3 trials= 44 sec.         Therapeutic Exercises (CPT 82757):     1. Bridges, x 10    2. Sit to stand , x 5    3. Standing heel raises with support, x 15    4. Standing marching, x 10 ea    5. Standing hip abd, x 10 ea      Therapeutic Exercise Summary: Above program given for HEP.  Discussed importance of regular movement.       Time-based treatments/modalities:  Therapeutic exercise minutes (CPT 73975): 15 minutes       Assessment, Response and Plan:   Impairments: abnormal gait, abnormal muscle tone, abnormal or restricted ROM, activity intolerance, difficulty performing job, impaired functional mobility, impaired balance, impaired physical strength, lacks appropriate home exercise program, limited ADL's, limited mobility and pain with function    Assessment details:  Mr. Ma is a 69 y.o male who presents to PT with complaint of debility related to progressive neurologic decline from spinal stenosis and degeneration.  He underwent both cervical and lumbar fusion/laminectomies as outlined in July 2018.  Post-operatively, pt continues to have decreased strength broadly throughout the body with wasting in the hands and bilateral drop foot.  He is reliant on a FWW for ambulation with gait speed being below functional standards.  Pt is at increased risk for falls. Skilled PT services are indicated to address the mentioned functional limitations and enhance QOL.      Prognosis: fair    Goals:   Short Term Goals:   - Improve 30 sec STS to 6 reps  - Able to bridge to full height x 15 reps  - Able to step up a 4 inch step with light UE assist  Short term goal time span:  2-4 weeks      Long Term Goals:    - Improve TUG with FWW to less than 30 sec  - Able to walk clinic surface x 100 feet with 4WW " and good safety   - Able to complete CAMPOS with score of at least 48/56 and 4WW  - Indep with HEP  Long term goal time span:  2-4 months    Plan:   Therapy options:  Physical therapy treatment to continue  Planned therapy interventions:  E Stim Unattended (CPT 43315), Functional Training, Self Care (CPT 54545), Manual Therapy (CPT 42866), Neuromuscular Re-education (CPT 63272), Therapeutic Exercise (CPT 78247), Therapeutic Activities (CPT 37239) and Hot or Cold Pack Therapy (CPT 96750)  Frequency:  2x week  Duration in weeks:  12  Discussed with:  Patient      Functional Assessment Used        Referring provider co-signature:  I have reviewed this plan of care and my co-signature certifies the need for services.  Certification Dates:   From 09/18/19   To 12/11/19    Physician Signature: ________________________________ Date: ______________

## 2019-09-19 NOTE — PROGRESS NOTES
[facility]    Progress Note    9/19/2019    10:42 AM    Name:   Yeni Saleem  MRN:     [id number]     Joel:      [de-identified]   Room:   [address] [place] [address] Day:  2  Admit Date:  9/17/2019  2:58 PM    PCP:   Leandro Carter MD  Code Status:  Full Code    Subjective:     C/C:   Chief Complaint   Patient presents with    Anemia     sent today by PCP for low HGB     Interval History Status: improved. Patient feels well after his EGD. He denies any abdominal pain, or N/V. He was slightly lightheaded this morning but that has resolved. EGD: Acute esophagitis/duodenitis, stomach polyps and large hiatal hernia, and no active bleeding.   Evidence of a small ulcer at the GE junction with nonobstructing Schatzki's ring    Brief History:     Per my partner:  \"Patient was sent to emergency room by his PCP for abnormal labs.  Patient has been having severe fatigue, increased sleep and activity for last 1 month.  He also had difficulty breathing on exertion.  Outpatient labs were ordered and was found to have a very low hemoglobin 7.8.  Patient denies any abdominal pain, nausea, vomiting, melena, hematochezia.  He is having alternating constipation, diarrhea.  He also had weight loss of about 15 pounds in last 3 months. Zena Griggs has underlying history of hypertension and chronic kidney disease stage IV.  He had recent TAVR for known rheumatic aortic valve stenosis about 2 months ago.  Patient is not on any long-term anticoagulation.  He takes aspirin and denies any use of NSAIDs.  He has post surgical hypothyroidism and is on supplements.  Patient denies alcohol use\"    Review of Systems:     Constitutional:  negative for chills, fevers, sweats  Respiratory:  negative for cough, dyspnea on exertion, shortness of breath, wheezing  Cardiovascular:  negative for chest pain, chest pressure/discomfort, lower extremity edema, palpitations  Gastrointestinal:  negative for Received report from night shift RN and assumed care. Pt continues to have some confusion at times. Pt did not remember thinking he had hip surgery reported that he had back and neck surgery. Pt did let me take his blood sugar prior to giving him his metformin. Pt did work with PT this morning. IV intact and [place] intact. POC reviewed, call light within reach and hourly rounding in place.      --  2.44*  --   --    HGB 7.6*   < > 7.5* 8.8* 9.2*   HCT 25.1*  --  24.1*  --   --    .0  --  98.8  --   --    MCH 30.9  --  30.7  --   --    MCHC 30.3  --  31.1  --   --    RDW 13.3  --  13.2  --   --      --  140  --   --    MPV 10.1  --  10.3  --   --    INR 1.0  --  1.0  --   --     < > = values in this interval not displayed. Chemistry:  Recent Labs     09/17/19  1530 09/18/19  0507    141   K 4.2 4.4   * 111*   CO2 22 22   GLUCOSE 115* 96   BUN 34* 30*   CREATININE 1.86* 1.78*   ANIONGAP 11 8*   LABGLOM 34* 36*   GFRAA 42* 44*   CALCIUM 8.7 8.7     Recent Labs     09/18/19  0507   TSH 1.86     ABG:No results found for: POCPH, PHART, PH, POCPCO2, OIJ0XYE, PCO2, POCPO2, PO2ART, PO2, POCHCO3, HSR7DJP, HCO3, NBEA, PBEA, BEART, BE, THGBART, THB, ZLN7NYW, XJSI7PUG, I3EIFLKR, O2SAT, FIO2  No results found for: SPECIAL  No results found for: CULTURE    Radiology:  Xr Abdomen (kub) (single Ap View)    Result Date: 9/18/2019  Nonspecific bowel gas pattern without evidence for obstruction.        Physical Examination:        General appearance:  alert, cooperative and no distress  Mental Status:  oriented to person, place and time and normal affect  Lungs:  clear to auscultation bilaterally, normal effort  Heart:  regular rate and rhythm, + murmur  Abdomen:  soft, nontender, nondistended, normal bowel sounds, no masses, hepatomegaly, splenomegaly  Extremities:  no edema, redness, tenderness in the calves  Skin:  no gross lesions, rashes, induration    Assessment:        Hospital Problems           Last Modified POA    * (Principal) Severe anemia 9/17/2019 Yes    Essential hypertension 9/17/2019 Yes    Hypothyroid 9/17/2019 Yes    CKD stage 4 secondary to hypertension ([address] 9/17/2019 Yes    S/P TAVR (transcatheter aortic valve replacement) 9/17/2019 Yes    Anemia 9/18/2019 Yes    Weight loss 9/18/2019 Yes    Other fatigue 9/18/2019 Yes    Weakness 9/18/2019 Yes          Plan:

## 2019-09-19 NOTE — OP THERAPY DAILY TREATMENT
Outpatient Physical Therapy  DAILY TREATMENT     [facility] Physical [address]  [address]  [place] [address]  Phone:  [phone]  Fax:  [phone]    Date: 09/19/2019    Patient: Joel Ma  YOB: 1950  MRN: [id number]     Time Calculation  Start time: 1456  Stop time: 1552 Time Calculation (min): 56 minutes       Chief Complaint: Difficulty Walking    Visit #: 2    SUBJECTIVE:  Sore on the top of the R thigh- thinks he over did it walking on his ramp at home.     OBJECTIVE:      Therapeutic Exercises (CPT 64442):     1. NuStep, L6 x 17 min    2. Shuttle, 7C bilat squat x 25 reps with ball to squeeze to control R LE from fallingout    3. Seated: reaching to the top of an upright dowel, GHJ flex str, x 2 min    4. Dowel B GHJ flex seated, x 10    5. Seated row, L2 x 20     6. Seated anti rotation with PT OP, L2 x 10 ea    7. Seated bicep curls, 3# x 12 ea side      Time-based treatments/modalities:  Therapeutic exercise minutes (CPT 45899): 56 minutes       ASSESSMENT:   Response to treatment: Extensive breaks required BW tasks due to muscular fatigue, but overall motivated and highly participatory during tx.  Advance therex and functional training as able.     PLAN/RECOMMENDATIONS:   Plan for treatment: therapy treatment to continue next visit.  Planned interventions for next visit: continue with current treatment.

## 2019-09-25 NOTE — OP THERAPY DAILY TREATMENT
"  Outpatient Physical Therapy  DAILY TREATMENT     [facility] Physical Therapy [address]  [address]  [place] [address]  Phone:  [phone]  Fax:  [phone]    Date: 09/25/2019    Patient: Joel Ma  YOB: 1950  MRN: [id number]     Time Calculation  Start time: 0825  Stop time: 0923 Time Calculation (min): 58 minutes       Chief Complaint: Weakness    Visit #: 3    SUBJECTIVE:  Cont to report 'pulling' in the front of the R thigh, he feels is related to walking his ramp at home.  Discussed staying on level surfaces for exercise for now.      OBJECTIVE:      Therapeutic Exercises (CPT 06772):     1. NuStep, L6 x 17 min    2. Supine AB press with ball, 5\" x 20 reps    3. Ball rolls, x 2 min    4. Ball bridge, 5\" hold x 8    5. GHJ flexion reach up to top of dowel, seated x 10    6. Digiflex, x 2 min ea side    7. STS, x 10 reps    8. Seated row, L2 x 20    9. Standing balance, alt UE lift off FWW x 1 min total      Time-based treatments/modalities:  Therapeutic exercise minutes (CPT 55803): 58 minutes       ASSESSMENT:   Response to treatment: Fair tolerance to therex so far.  Pt participatory, but less enthusiastic/optomistic today compared to previous session.  Unable to stay standing without UE support and extreme effort required to stabilize self with just 1 hand.  Limits functional abilities with being able to perform ADLs requiring UEs in standing.    PLAN/RECOMMENDATIONS:   Plan for treatment: therapy treatment to continue next visit.  Planned interventions for next visit: continue with current treatment.       "

## 2019-09-30 NOTE — TELEPHONE ENCOUNTER
1. Caller Name: Joel                                          Call Back Number: [phone] (home)        Patient approves a detailed voicemail message: yes    Joel has burning during urination.  He is asking for you to order a urine test for him to do at the [facility] lab.

## 2019-10-02 NOTE — TELEPHONE ENCOUNTER
ESTABLISHED PATIENT PRE-VISIT PLANNING     Patient was NOT contacted to complete PVP.     Note: Patient will not be contacted if there is no indication to call.     1.  Reviewed notes from the last few office visits within the medical group: Yes    2.  If any orders were placed at last visit or intended to be done for this visit (i.e. 6 mos follow-up), do we have Results/Consult Notes?        •  Labs - Labs were not ordered at last office visit.   Note: If patient appointment is for lab review and patient did not complete labs, check with provider if OK to reschedule patient until labs completed.       •  Imaging - Imaging was not ordered at last office visit.       •  Referrals - No referrals were ordered at last office visit.    3. Is this appointment scheduled as a Hospital Follow-Up? No    4.  Immunizations were updated in Epic using WebIZ?: Epic matches WebIZ       •  Web Iz Recommendations: HEPATITIS A , HEPATITIS B, PNEUMOVAX (PPSV23) and SHINGRIX (Shingles)    5.  Patient is due for the following Health Maintenance Topics:   Health Maintenance Due   Topic Date Due   • IMM ZOSTER VACCINES (1 of 2) 08/17/1999   • Annual Wellness Visit  10/28/2015   • COLONOSCOPY  06/30/2017   • IMM PNEUMOCOCCAL VACCINE: 65+ Years (2 of 2 - PPSV23) 01/19/2018   • IMM HEP B VACCINE (2 of 3 - Risk 3-dose series) 02/12/2019   • URINE DRUG SCREEN  05/18/2019   • FASTING LIPID PROFILE  07/19/2019   • RETINAL SCREENING  08/03/2019   • IMM INFLUENZA (1) 09/01/2019       - Patient has completed PREVNAR (PCV13)  and TDAP Immunization(s) per WebIZ. Chart has been updated.    6. Orders for overdue Health Maintenance topics pended in Pre-Charting? N\A    7.  AHA (MDX) form printed for Provider? No, already completed    8.  Patient was NOT informed to arrive 15 min prior to their scheduled appointment and bring in their medication bottles.

## 2019-10-04 NOTE — PROGRESS NOTES
CC: Joel Ma is a 69 y.o. male is suffering from   Chief Complaint   Patient presents with   • Other     possible prostate problem         SUBJECTIVE:  1. Urinary retention  Joel is here for follow-up, has a history of urinary retention, asked the patient to complete antibiotics and repeat a urinalysis    2. Acute cystitis without hematuria  Patient is partially treated, recommend to complete medical therapy before initiating any significant changes        Past social, family, history: Single  Social History     Tobacco Use   • Smoking status: Former Smoker     Packs/day: 4.00     Years: 0.50     Pack years: 2.00     Types: Cigarettes     Last attempt to quit: 1974     Years since quittin.7   • Smokeless tobacco: Never Used   Substance Use Topics   • Alcohol use: No   • Drug use: No         MEDICATIONS:    Current Outpatient Medications:   •  nitrofurantoin monohyd macro (MACROBID) 100 MG Cap, Take 1 Cap by mouth 2 times a day., Disp: 10 Cap, Rfl: 0  •  warfarin (COUMADIN) 5 MG Tab, Take 2-3 Tabs by mouth every day., Disp: 180 Tab, Rfl: 1  •  ezetimibe (ZETIA) 10 MG Tab, Take 1 Tab by mouth every day., Disp: 30 Tab, Rfl: 11  •  hydrALAZINE (APRESOLINE) 100 MG tablet, TAKE ONE TABLET BY MOUTH EVERY EIGHT HOURS, Disp: 90 Tab, Rfl: 1  •  insulin glargine (LANTUS) 100 UNIT/ML Solution, Inject 40 Units as instructed every day., Disp: 10 mL, Rfl: 0  •  amLODIPine (NORVASC) 10 MG Tab, Take 1 Tab by mouth every day., Disp: 90 Tab, Rfl: 3  •  metFORMIN (GLUCOPHAGE) 500 MG Tab, TAKE TWO TABLETS BY MOUTH TWICE DAILY WITH MEALS, Disp: 360 Tab, Rfl: 2  •  lisinopril (PRINIVIL, ZESTRIL) 40 MG tablet, Take 1 Tab by mouth 2 times a day., Disp: 200 Tab, Rfl: 3  •  potassium chloride SA (KDUR) 20 MEQ Tab CR, Take 1 Tab by mouth every day., Disp: 30 Tab, Rfl: 11  •  metoprolol (TOPROL-XL) 200 MG XL tablet, Take 1 Tab by mouth every day., Disp: 30 Tab, Rfl: 11  •  torsemide (DEMADEX) 10 MG tablet, Take 1 Tab by  mouth 2 times a day., Disp: 60 Tab, Rfl: 11  •  clopidogrel (PLAVIX) 75 MG Tab, Take 1 Tab by mouth every day., Disp: 30 Tab, Rfl: 11  •  gabapentin (NEURONTIN) 300 MG Cap, Take 1-2 Caps by mouth 3 times a day. 300mg in AM and noon 600mg in PM (Patient taking differently: Take 300-600 mg by mouth 4 times a day. 300mg in AM and noon 600mg in PM ), Disp: 90 Cap, Rfl: 3  •  insulin lispro (HUMALOG) 100 UNIT/ML Solution, Inject 2-10 Units as instructed 4 Times a Day,Before Meals and at Bedtime. For -200 Give 2 units For -250 Give 4 units For -300 Give 6 units For -350 Give 8 units For -400 Give 10 units <60 or >400 Notify MD, Disp: , Rfl:   •  JARDIANCE 10 MG Tab, TAKE ONE TABLET BY MOUTH ONE TIME DAILY (Patient not taking: Reported on 9/11/2019), Disp: 30 Tab, Rfl: 3  •  clindamycin (CLEOCIN) 300 MG Cap, TAKE 1 CAPSULE BY MOUTH TWICE DAILY, Disp: 180 Cap, Rfl: 0  •  insulin lispro, Human, (HUMALOG KWIKPEN) 100 UNIT/ML Solution Pen-injector injection, Inject 2-10 Units as instructed 4 Times a Day,Before Meals and at Bedtime., Disp: 10 PEN, Rfl: 3  •  Misc. Devices Misc, Use tid and prn [id number] # 100, Disp: 100 Each, Rfl: 11  •  Misc. Devices Misc, Relion glucometer, Disp: 1 Each, Rfl: 0  •  Blood Glucose Test Strips, Test strips order: Test strips for Relion meter. Sig: use 3 to 4 x a day and prn ssx high or low sugar #450 RF x 3, Disp: 450 Each, Rfl: 3  •  glucose blood (CONTOUR NEXT TEST) strip, 1 Strip by Other route as needed. USE TO TEST BLOOD SUGAR 4 To 5 TIMES DAILY, Disp: 450 Strip, Rfl: 3  •  Insulin Pen Needle (ADVOCATE INSULIN PEN NEEDLES) 31G X 5 MM Misc, Use tid and prn., Disp: 100 Each, Rfl: 3  •  NON SPECIFIED, Four wheeled walker with seat - [facility]., Disp: 1 Each, Rfl: 0    PROBLEMS:  Patient Active Problem List    Diagnosis Date Noted   • BMI 35.0-35.9,adult 09/22/2017     Priority: Medium   • JANNIE treated with BiPAP 04/18/2016     Priority: Medium   • Atrial  fibrillation [427.31] 06/24/2015     Priority: Medium   • Anticoagulated on warfarin 04/28/2014     Priority: Medium   • Diabetes mellitus with neurological manifestations, controlled ([facility]) 03/05/2012     Priority: Medium   • HTN (hypertension) 08/16/2010     Priority: Medium   • Dyslipidemia 06/17/2009     Priority: Medium   • CVA (cerebral vascular accident) ([facility]) 06/04/2009     Priority: Medium   • Physical debility 08/18/2018     Priority: Low   • History of total knee replacement 08/18/2018     Priority: Low   • Fungal infection of skin of abdomen 08/18/2018     Priority: Low   • Impaired activities of daily living 07/18/2018     Priority: Low   • Cervical myelopathy ([facility]) 07/18/2018     Priority: Low   • Lumbar stenosis with neurogenic claudication 07/18/2018     Priority: Low   • Pain 07/18/2018     Priority: Low   • Persistent proteinuria 10/13/2017     Priority: Low   • Diabetic nephropathy associated with type 2 diabetes mellitus ([facility]) 05/12/2017     Priority: Low   • Chronic use of opiate drugs therapeutic purposes 02/14/2017     Priority: Low   • Other orthopedic aftercare 11/07/2016     Priority: Low   • Cervical stenosis of spine 09/06/2016     Priority: Low   • Dysphagia 09/06/2016     Priority: Low   • Hallucinations 09/06/2016     Priority: Low   • Left knee pain 08/28/2016     Priority: Low   • Diabetic polyneuropathy ([facility]) 05/06/2015     Priority: Low   • Toe amputation status ([facility]) 05/06/2015     Priority: Low   • Risk for falls 10/27/2014     Priority: Low   • Myalgia 05/29/2014     Priority: Low   • BPH (benign prostatic hyperplasia) 04/28/2014     Priority: Low   • Chronic antibiotic suppression 04/28/2014     Priority: Low   • MEDICAL HOME      Priority: Low   • Trigger finger 06/17/2011     Priority: Low   • Thyroid mass 07/30/2009     Priority: Low   • Stage 3 chronic kidney disease ([facility]) 02/08/2019   • Hyponatremia 02/08/2019   • Influenza A 01/02/2019   • ACC/AHA stage C systolic  congestive heart failure (HCC) 11/21/2018   • High risk medication use 11/21/2018       REVIEW OF SYSTEMS:  Gen.:  No Nausea, Vomiting, fever, Chills.  Heart: No chest pain.  Lungs:  No shortness of Breath.  Psychological: Eleazar unusual Anxiety depression     PHYSICAL EXAM   Constitutional: Alert, cooperative, not in acute distress.  Cardiovascular:  Rate Rhythm is regular without murmurs rubs clicks.     Thorax & Lungs: Clear to auscultation, no wheezing, rhonchi, or rales  HENT: Normocephalic, Atraumatic.  Eyes: PERRLA, EOMI, Conjunctiva normal.   Neck: Trachia is midline no swelling of the thyroid.   Lymphatic: No lymphadenopathy noted.   Neurologic: Alert & oriented x 3, cranial nerves II through XII are intact, Normal motor function, Normal sensory function, No focal deficits noted.   Psychiatric: Affect normal, Judgment normal, Mood normal.     VITAL SIGNS:/80   Pulse 62   Temp 36.6 °C (97.9 °F) (Temporal)   Resp 16   SpO2 93%     Labs: Reviewed    Assessment:                                                     Plan:    1. Urinary retention  Referral to urology ultrasound ordered repeat UA after completing antibiotics  - REFERRAL TO UROLOGY  - US-BLADDER; Future  - POCT Urinalysis    2. Acute cystitis without hematuria  Ongoing  - URINALYSIS,CULTURE IF INDICATED; Future

## 2019-10-06 NOTE — TELEPHONE ENCOUNTER
Patient stated that he was seen by Dr. Blank, PCP on 10/3/19 and was treated with nitrofurantoin for bladder infection. He stated that pain on his bladder is not as bad as before. However, he still has incomplete sense of emptying and dripping after urination. He wanted to know the result of bladder ultrasound done yesterday. I reviewed the bladder ultrasound report with patient and it showed that he has large post-voidal residual urine, thickening of posterior bladder wall and debris. I discussed the possible causes of  urinary retention with patient and advised him to follow with Urologist. His PCP already referred him to Urologist. He stated that he is still able to urinate and he wants to postpone taking Tamsulosin or any additional medication at this point time. Patient stated that he already has an appointment with Urology on 10/15/19. I advised patient to go to ER to get a urinary catheter if he has any worsening symptoms of urinary retention and symptoms of bladder distention to prevent consequence of hydronephrosis or kidney injury. Patient verbally stated understanding and he will continue to monitor his symptoms.   He appreciated the call and discussion.     Peg Faye M.D.

## 2019-10-07 NOTE — ED PROVIDER NOTES
"ED Provider Note    Scribed for Narciso Mendez M.D. by Shwetha Dewey. 10/7/2019  8:27 AM    Primary care provider: Catrachito Sterling D.O.  Means of arrival: Ambulance   History obtained from: Patient  History limited by: None    CHIEF COMPLAINT  • Urinary retention    HPI  Joel Ma is a 69 y.o. male who presents to the Emergency Department for evaluation of urinary retention onset a week ago. The patient was recently treated with Macrobid prescribed on 09/30/19 for acute cystitis. He continues to experience dysuria and urinary retention described as difficulty urinating and difficulty starting a stream. Additionally, he complains of \"cramping\" pain between his scrotum and anus. Negative for fever, chills, nausea or diarrhea.        REVIEW OF SYSTEMS  Pertinent positives include urinary retention, pain near anus and scrotum, difficulty voiding, difficulty starting stream and dysuria.   Pertinent negatives include no fever, chills, nausea or diarrhea.    All other systems reviewed and negative. See HPI for further details.       PAST MEDICAL HISTORY  Patient has a past medical history of Anesthesia, Arrhythmia, arthritis (6/17/2011), Arthritis, Backpain, CAD (coronary artery disease), Cataract, Dental disorder, Diabetes, High cholesterol, Hypertension, MRSA (methicillin resistant Staphylococcus aureus), Pain (05-03-13), Pneumonia (2002), Renal disorder, Sleep apnea, Stroke (HCC), Thyroid mass (7/30/2009), and Ventricular ectopy (6/17/2011).      SURGICAL HISTORY  Patient has a past surgical history that includes knee scope,single menisectomy; lumbar fusion posterior (1979); irrigation & debridement ortho (3/13/2012); irrigation & debridement ortho (11/2/2012); irrigation & debridement ortho (11/13/2012); cataract phaco with iol (5/8/2013); irrigation & debridement ortho (Left, 10/29/2012); cervical disk and fusion anterior (8/31/2016); other; toe amputation (7/22/2011); elbow arthrotomy (2008); foot " surgery; foot surgery; knee arthroplasty total (2012); knee revision total (3/13/2012); tendon repair (3/13/2012); knee revision total (2012); incision and drainage orthopedic (Right, 10/29/2012); other neurological surg; cervical fusion posterior (2018); cervical laminectomy posterior (2018); lumbar laminectomy diskectomy (2018); and [facility] cardiac cath.      SOCIAL HISTORY  Social History     Tobacco Use   • Smoking status: Former Smoker     Packs/day: 4.00     Years: 0.50     Pack years: 2.00     Types: Cigarettes     Last attempt to quit: 1974     Years since quittin.7   • Smokeless tobacco: Never Used   Substance Use Topics   • Alcohol use: No   • Drug use: No      Social History     Substance and Sexual Activity   Drug Use No       FAMILY HISTORY  Family History   Problem Relation Age of Onset   • Diabetes Mother    • Cancer Father         lung cancer   • Heart Disease Father         triple bypass   • Diabetes Other    • Hypertension Other    • Cancer Other        CURRENT MEDICATIONS  •  ciprofloxacin (CIPRO) 500 MG Tab, Take 1 Tab by mouth 2 times a day for 10 days., Disp: 20 Tab, Rfl: 0  •  nitrofurantoin monohyd macro (MACROBID) 100 MG Cap, Take 1 Cap by mouth 2 times a day., Disp: 10 Cap, Rfl: 0  •  warfarin (COUMADIN) 5 MG Tab, Take 2-3 Tabs by mouth every day., Disp: 180 Tab, Rfl: 1  •  ezetimibe (ZETIA) 10 MG Tab, Take 1 Tab by mouth every day., Disp: 30 Tab, Rfl: 11  •  hydrALAZINE (APRESOLINE) 100 MG tablet, TAKE ONE TABLET BY MOUTH EVERY EIGHT HOURS, Disp: 90 Tab, Rfl: 1  •  insulin glargine (LANTUS) 100 UNIT/ML Solution, Inject 40 Units as instructed every day., Disp: 10 mL, Rfl: 0  •  JARDIANCE 10 MG Tab, TAKE ONE TABLET BY MOUTH ONE TIME DAILY (Patient not taking: Reported on 2019), Disp: 30 Tab, Rfl: 3  •  clindamycin (CLEOCIN) 300 MG Cap, TAKE 1 CAPSULE BY MOUTH TWICE DAILY, Disp: 180 Cap, Rfl: 0  •  amLODIPine (NORVASC) 10 MG Tab, Take 1 Tab by mouth every  day., Disp: 90 Tab, Rfl: 3  •  metFORMIN (GLUCOPHAGE) 500 MG Tab, TAKE TWO TABLETS BY MOUTH TWICE DAILY WITH MEALS, Disp: 360 Tab, Rfl: 2  •  lisinopril (PRINIVIL, ZESTRIL) 40 MG tablet, Take 1 Tab by mouth 2 times a day., Disp: 200 Tab, Rfl: 3  •  insulin lispro, Human, (HUMALOG KWIKPEN) 100 UNIT/ML Solution Pen-injector injection, Inject 2-10 Units as instructed 4 Times a Day,Before Meals and at Bedtime., Disp: 10 PEN, Rfl: 3  •  Misc. Devices Misc, Use tid and prn [id number] # 100, Disp: 100 Each, Rfl: 11  •  Misc. Devices Misc, Relion glucometer, Disp: 1 Each, Rfl: 0  •  Blood Glucose Test Strips, Test strips order: Test strips for [employer] meter. Sig: use 3 to 4 x a day and prn ssx high or low sugar #450 RF x 3, Disp: 450 Each, Rfl: 3  •  glucose blood (CONTOUR NEXT TEST) strip, 1 Strip by Other route as needed. USE TO TEST BLOOD SUGAR 4 To 5 TIMES DAILY, Disp: 450 Strip, Rfl: 3  •  potassium chloride SA (KDUR) 20 MEQ Tab CR, Take 1 Tab by mouth every day., Disp: 30 Tab, Rfl: 11  •  metoprolol (TOPROL-XL) 200 MG XL tablet, Take 1 Tab by mouth every day., Disp: 30 Tab, Rfl: 11  •  torsemide (DEMADEX) 10 MG tablet, Take 1 Tab by mouth 2 times a day., Disp: 60 Tab, Rfl: 11  •  clopidogrel (PLAVIX) 75 MG Tab, Take 1 Tab by mouth every day., Disp: 30 Tab, Rfl: 11  •  gabapentin (NEURONTIN) 300 MG Cap, Take 1-2 Caps by mouth 3 times a day. 300mg in AM and noon 600mg in PM (Patient taking differently: Take 300-600 mg by mouth 4 times a day. 300mg in AM and noon 600mg in PM ), Disp: 90 Cap, Rfl: 3  •  Insulin Pen Needle (ADVOCATE INSULIN PEN NEEDLES) 31G X 5 MM Misc, Use tid and prn., Disp: 100 Each, Rfl: 3  •  NON SPECIFIED, Four wheeled walker with seat - [facility]., Disp: 1 Each, Rfl: 0  •  insulin lispro (HUMALOG) 100 UNIT/ML Solution, Inject 2-10 Units as instructed 4 Times a Day,Before Meals and at Bedtime. For -200 Give 2 units For -250 Give 4 units For -300 Give 6 units For -350  "Give 8 units For -400 Give 10 units <60 or >400 Notify MD, Disp: , Rfl:        ALLERGIES  Allergies   Allergen Reactions   • Demerol      Makes me stop breathing.  Doesn't like because it makes him high   • Statins [Hmg-Coa-R Inhibitors] Myalgia     Rxn - ongoing         PHYSICAL EXAM  VITAL SIGNS: /80   Pulse 67   Temp 36.7 °C (98.1 °F) (Temporal)   Resp 18   Ht 1.905 m (6' 3\")   Wt 107 kg (236 lb)   BMI 29.50 kg/m²     Nursing note and vitals reviewed.    Constitutional: Well-developed and well-nourished.  Mild distress. Uncomfortable appearing male.  HENT: Head is normocephalic and atraumatic. Oropharynx is clear and moist without exudate or erythema.   Eyes: Pupils are equal, round, and reactive to light. Conjunctiva are normal.   Cardiovascular: Normal rate and regular rhythm. No murmur heard. Normal radial pulses.  Pulmonary/Chest: Breath sounds normal. No wheezes or rales.   Abdominal: Mild suprapubic tenderness, but unable to palpate bladder fundus. Soft. No distention    Musculoskeletal: Extremities exhibit normal range of motion without edema or tenderness.   Neurological: Awake, alert and oriented to person, place, and time. No focal deficits noted.  Skin: Skin is warm and dry. No rash.   Psychiatric: Normal mood and affect. Appropriate for clinical situation.      DIAGNOSTIC STUDIES / PROCEDURES    LABS  Results for orders placed or performed during the hospital encounter of 10/07/19   URINALYSIS,CULTURE IF INDICATED   Result Value Ref Range    Color Yellow     Character Cloudy (A)     Specific Gravity 1.022 <1.035    Ph 6.0 5.0 - 8.0    Glucose Negative Negative mg/dL    Ketones 15 (A) Negative mg/dL    Protein Negative Negative mg/dL    Bilirubin Negative Negative    Urobilinogen, Urine 1.0 Negative    Nitrite Positive (A) Negative    Leukocyte Esterase Moderate (A) Negative    Occult Blood Trace (A) Negative    Micro Urine Req Microscopic    CBC WITH DIFFERENTIAL   Result Value Ref " Range    WBC 6.8 4.8 - 10.8 K/uL    RBC 5.52 4.70 - 6.10 M/uL    Hemoglobin 15.8 14.0 - 18.0 g/dL    Hematocrit 47.9 42.0 - 52.0 %    MCV 86.8 81.4 - 97.8 fL    MCH 28.6 27.0 - 33.0 pg    MCHC 33.0 (L) 33.7 - 35.3 g/dL    RDW 42.5 35.9 - 50.0 fL    Platelet Count 135 (L) 164 - 446 K/uL    MPV 11.4 9.0 - 12.9 fL    Neutrophils-Polys 74.80 (H) 44.00 - 72.00 %    Lymphocytes 10.90 (L) 22.00 - 41.00 %    Monocytes 12.50 0.00 - 13.40 %    Eosinophils 0.90 0.00 - 6.90 %    Basophils 0.30 0.00 - 1.80 %    Immature Granulocytes 0.60 0.00 - 0.90 %    Nucleated RBC 0.00 /100 WBC    Neutrophils (Absolute) 5.11 1.82 - 7.42 K/uL    Lymphs (Absolute) 0.74 (L) 1.00 - 4.80 K/uL    Monos (Absolute) 0.85 0.00 - 0.85 K/uL    Eos (Absolute) 0.06 0.00 - 0.51 K/uL    Baso (Absolute) 0.02 0.00 - 0.12 K/uL    Immature Granulocytes (abs) 0.04 0.00 - 0.11 K/uL    NRBC (Absolute) 0.00 K/uL   BASIC METABOLIC PANEL   Result Value Ref Range    Sodium 130 (L) 135 - 145 mmol/L    Potassium 4.1 3.6 - 5.5 mmol/L    Chloride 97 96 - 112 mmol/L    Co2 24 20 - 33 mmol/L    Glucose 441 (H) 65 - 99 mg/dL    Bun 30 (H) 8 - 22 mg/dL    Creatinine 1.00 0.50 - 1.40 mg/dL    Calcium 8.4 (L) 8.5 - 10.5 mg/dL    Anion Gap 9.0 0.0 - 11.9   URINE MICROSCOPIC (W/UA)   Result Value Ref Range    WBC  (A) /hpf    RBC 5-10 (A) /hpf    Bacteria Many (A) None /hpf    Epithelial Cells Few /hpf    Mucous Threads Moderate /hpf    Hyaline Cast 0-2 /lpf   ESTIMATED GFR   Result Value Ref Range    GFR If African American >60 >60 mL/min/1.73 m 2    GFR If Non African American >60 >60 mL/min/1.73 m 2   URINALYSIS,CULTURE IF INDICATED   Result Value Ref Range    Color Yellow     Character Cloudy (A)     Specific Gravity 1.023 <1.035    Ph 5.5 5.0 - 8.0    Glucose >=1000 (A) Negative mg/dL    Ketones Negative Negative mg/dL    Protein 100 (A) Negative mg/dL    Bilirubin Negative Negative    Urobilinogen, Urine 0.2 Negative    Nitrite Negative Negative    Leukocyte Esterase  "Moderate (A) Negative    Occult Blood Trace (A) Negative    Micro Urine Req Microscopic    URINE MICROSCOPIC (W/UA)   Result Value Ref Range    -150 (A) /hpf    RBC 5-10 (A) /hpf    Bacteria Few (A) None /hpf    Epithelial Cells Negative /hpf    Hyaline Cast 3-5 (A) /lpf     *Note: Due to a large number of results and/or encounters for the requested time period, some results have not been displayed. A complete set of results can be found in Results Review.      All labs reviewed by me.      COURSE & MEDICAL DECISION MAKING  Pertinent Labs & Imaging studies reviewed. (See chart for details)    Differential Diagnoses include but are not limited to: UTI, prostatitis or urinary retention.     8: 27 AM Obtained and reviewed patient's electronic medical records which indicate an office visit with his PCP on 10/03/19 or urinary retention and acute cystitis. Currently on Macrobid.     8:30 AM Patient seen and examined at bedside for urinary retention. Initial orders in the Emergency Department included bladder scan and laboratory testing.     8:47 AM Bladder scan reveals 810 mL of urine. Nursing staff will insert a kingston catheter.    10:33 AM Patient will be treated with Cipro 500 mg PO for treatment of UTI.     10:36 AM Patient will be treated with Cipro. It is noted that he is on Warfarin. I have spoken with pharmacy who will arrange for close INR follow up.    Discharge plan was discussed with the patient and includes following up with his PCP and anticoagulation clinic.  Patient will be discharged with a prescription for Cipro.       Return to the ED for new or persisting symptoms including urinary retention, abdominal pain or any additional concerns.  The patient verbalizes understanding and will comply.  Patient is stable at the time of discharge.  Vital signs were reviewed: /80   Pulse 67   Temp 36.7 °C (98.1 °F) (Temporal)   Resp 18   Ht 1.905 m (6' 3\")   Wt 107 kg (236 lb)   BMI 29.50 kg/m²  "       HTN/IDDM FOLLOW UP:  The patient is referred to a primary physician for blood pressure management, diabetic screening, and for all other preventive health concerns      Decision Making:  This is a 69 y.o. male that presents with urinary retention.  He has some pain between the scrotum and anus.  May have mild prostatitis.  Gonzalez catheter was placed.  Urinary retention was relieved.  Urinalysis is consistent with urinary tract infection being nitrite positive.  Given the patient's urinalysis findings and symptom complex I feel that treatment with ciprofloxacin is most appropriate.  He does take warfarin.  I have arranged for the patient to have close INR follow-up.    DISPOSITION  Patient will be discharged home in stable condition.      FOLLOW UP  Catrachito Sterling D.O.  [address]  [facility] [address] [phone]    Schedule an appointment as soon as possible for a visit       [facility], Emergency Dept  [address] [address] [phone]    If symptoms worsen    Anticoagulation Clinic    Schedule an appointment as soon as possible for a visit   Call today. Tell them you are on cipro and need your INR closely monitored.        OUTPATIENT MEDICATIONS  New Prescriptions    CIPROFLOXACIN (CIPRO) 500 MG TAB    Take 1 Tab by mouth 2 times a day for 10 days.       DIAGNOSIS  1. Urinary retention    2. Acute UTI           Shwetha WALTER (Nayeli), am scribing for, and in the presence of, Narciso Mendez M.D.    Electronically signed by: Shwetha Castillo), 10/7/2019    Narciso WALTER M.D. personally performed the services described in this documentation, as scribed by Shwetha Dewey in my presence, and it is both accurate and complete. C.     The note accurately reflects work and decisions made by me.  Narciso Mendez  10/7/2019  11:23 AM

## 2019-10-07 NOTE — TELEPHONE ENCOUNTER
LVM for pt to call back to reschedule missed appt today with [facility] anticoagulation clinic.     Pt was at the ER today for UTI and was prescribed a 10 day course of ciprofloxacin.    Iliana Crooks, SeraD

## 2019-10-07 NOTE — ED TRIAGE NOTES
Pt has been having issues with urinary retention and painful urination for one week.  He was given abx for uti.  He has appointment with urology on 10/15.  Called ems and was able to urinate after the call.

## 2019-10-07 NOTE — ED NOTES
Pt given leg bag.  Pt given large volume kingston cath to take home with him.  Was given discharge instructions.  Was explained, in depth and multiple times how to change and empty cath bags.  He was also explained the importance of leaving the cath in until he sees doctor.,  Pt helped out to waiting room.  IV removed.

## 2019-10-08 NOTE — TELEPHONE ENCOUNTER
Telephone call returned to the patient, warned him regarding side effects associated with the use of ciprofloxacin including tendon rupture repeatedly warned him about this issue.

## 2019-10-08 NOTE — TELEPHONE ENCOUNTER
Pt would like yo to know he was seen on ED. He was given antibiotics. He was wanting to talk to you regarding his visit.

## 2019-10-09 NOTE — OP THERAPY DAILY TREATMENT
Outpatient Physical Therapy  DAILY TREATMENT     [facility] Physical Therapy [address].  [address]  [place] [address]  Phone:  [phone]  Fax:  [phone]    Date: 10/09/2019    Patient: Joel Ma  YOB: 1950  MRN: [id number]     Time Calculation  Start time: 0928  Stop time: 1022 Time Calculation (min): 54 minutes       Chief Complaint: Loss Of Balance and Weakness    Visit #: 4    SUBJECTIVE:  Pt presents s/p ED admission for kidney infection and urinary retention.  He had a kingston placed, which is still present- attached to R thigh.  New meds given.  Reporting no pain, but was 'laid up' for 4 days and reporting his LEs to be feeling significantly weaker.     OBJECTIVE:      Therapeutic Exercises (CPT 22950):     1. NuStep, L5 x 20 min    Therapeutic Treatments and Modalities:     1. Therapeutic Activities (CPT 63078)    Therapeutic Treatment and Modalities Summary:   - Kingston full when pt approached in waiting room.  Requesting to use the restroom to empty.  MI required to stand from waiting room chair.  SBA to walk with FWW 40 feet.  Indep emptied cath, but having to lean against wall due to inability to stand without UE support.  Subsequently LEs quite fatigued.  Pt helped to sitting, but unable to stand indep.  Pt adamant that we continue the session.  Was able to return to standing with FWW, maxA x 2.  SBA gait with FWW x 50 feet (to the NuStep) and close wheelchair follow.      - Upon completion of therex, requiring ModA to stand, gait x 100 feet with FWW and SBA to the ramp outside the clinic.  Pt then sitting in manual chair and MI self propelling the remaining 30 feet to his car.  ModA car transfer.  Pt insisted he was ok to drive after some rest.  Suggested using a medical [employer].        Time-based treatments/modalities:  Therapeutic exercise minutes (CPT 63940): 20 minutes  Therapeutic activity minutes (CPT 03534): 34 minutes       ASSESSMENT:   Response to  treatment: Pt with substantial decline in functional strength due to recent illness.  Safety concerns regarding his abiltiy to get to and from the clinic as well as concerns regarding his ability to participate in treatment. He may be more appropriate for rehab or home health services. Will reach out to Dr. Sterling.     PLAN/RECOMMENDATIONS:   Plan for treatment: refer patient back to MD.  Planned interventions for next visit: pending MD recommendations. .

## 2019-10-09 NOTE — OP THERAPY PROGRESS SUMMARY
Good afternoon Dr. Sterling,     Mr. Ma was seen in outpatient physical therapy today.  Unfortunately, the functional strength in his lower extremities has declined substantially since his recent kidney infection (ED visit 10/7/19).  I am very concerned about his safety with getting to/from the clinic and living independently. He was not enthusiastic when I asked him to consider another bout of home health. He'd prefer to continue with OP, but his ability to participate is quite limited by the energy expense in getting here.  I believe he would do well in acute rehab if he would be willing to go.  [facility] would cover this service for him without a hospital admission.  Looking to you for suggestions on how we should progress.      Thanks for your time,     Yessenia Gamez PT, DPT

## 2019-10-11 NOTE — OP THERAPY DAILY TREATMENT
"  Outpatient Physical Therapy  DAILY TREATMENT     [facility] Physical [address].  [address]  [place] [address]  Phone:  [phone]  Fax:  [phone]    Date: 10/11/2019    Patient: Joel Ma  YOB: 1950  MRN: [id number]     Time Calculation  Start time: 1330  Stop time: 1430 Time Calculation (min): 60 minutes       Chief Complaint: Loss Of Balance and Weakness    Visit #: 5    SUBJECTIVE:  Pt presents indep.  Complains of his cath being uncomfortable. States he had some difficulty getting up the ramp when he got home, but was able to make it.      OBJECTIVE:      Therapeutic Exercises (CPT 84785):     1. NuStep, L5 x 20 min    2. Standard bridge, x 30    3. LTR with focus on core recruitment, x 10 ea side    4. Heel slides, x 10 ea side    5. Hip add ball squeeze, 5\" hold x 20    6. AB brace with ball, 5\" hold x 20    7. Alt bicep curl seated, 2# x 20 ea    8. Alt punch seated, 2# x 15 ea    9. Seated row, L2 x 20    10. Seated trunk antirotation, L2 x 10 ea side    11. Gait, FWW with SBA, 2x 100 feet, 1x 50 feet    Therapeutic Treatments and Modalities:     1. Therapeutic Activities (CPT 97126), Bed mobility: R roll to prone, prone to quad, 2 min hold in quad before rolling back to supine.     Time-based treatments/modalities:  Therapeutic exercise minutes (CPT 66676): 45 minutes  Therapeutic activity minutes (CPT 92521): 15 minutes       ASSESSMENT:   Response to treatment: Pt with improved ability to participate today compared to last visit; however, still some concerns with travel to/from the clinic especially once fatigued after PT.  Would benefit from a more intensive stay in acute rehab.  Pt states he will consider his options over the weekend.     PLAN/RECOMMENDATIONS:   Plan for treatment: therapy treatment to continue next visit.  Planned interventions for next visit: continue with current treatment.       "

## 2019-10-16 NOTE — OP THERAPY DAILY TREATMENT
"  Outpatient Physical Therapy  DAILY TREATMENT     [facility] Physical [address].  [address]  [place] [address]  Phone:  [phone]  Fax:  [phone]    Date: 10/16/2019    Patient: Joel Ma  YOB: 1950  MRN: [id number]     Time Calculation               Chief Complaint: No chief complaint on file.    Visit #: 6    SUBJECTIVE:  ***    OBJECTIVE:      Therapeutic Exercises (CPT 04989):     1. NuStep, L5 x 20 min    2. Standard bridge, x 30    3. LTR with focus on core recruitment, x 10 ea side    4. Heel slides, x 10 ea side    5. Hip add ball squeeze, 5\" hold x 20    6. AB brace with ball, 5\" hold x 20    7. Alt bicep curl seated, 2# x 20 ea    8. Alt punch seated, 2# x 15 ea    9. Seated row, L2 x 20    10. Seated trunk antirotation, L2 x 10 ea side    11. Gait, FWW with SBA, 2x 100 feet, 1x 50 feet    Therapeutic Treatments and Modalities:     1. Therapeutic Activities (CPT 16143), Bed mobility: R roll to prone, prone to quad, 2 min hold in quad before rolling back to supine.     Time-based treatments/modalities:          ASSESSMENT:   Response to treatment: ***    PLAN/RECOMMENDATIONS:   Plan for treatment: {AMB OP THERAPY - THERAPY PLAN:[id number]::\"therapy treatment to continue next visit\"}.  Planned interventions for next visit: {PT PLANNED THERAPY INTERVENTIONS:[id number]}.       "

## 2019-10-16 NOTE — OP THERAPY DAILY TREATMENT
Mr. Ma presented for his PT session today.  He voiced concern regarding the pain and pulling he was having in regard to the catheter.  He is having difficulty managing this on his own.  He was assisted with Gonzalez management and re-securing  it to his R LE.  Ultimately, felt that completing the session would be of little benefit and opted to hold from OP PT for now. Encouraged to follow up with Dr. Sterling for further advisement. Also reminded that the ED is an option if he needs more urgent care for the catheter.

## 2019-10-16 NOTE — TELEPHONE ENCOUNTER
Was the patient seen in the last year in this department? Yes  10/3/19  Does patient have an active prescription for medications requested? No     Received Request Via: Pharmacy

## 2019-10-17 NOTE — ADDENDUM NOTE
Addended by: ANASTASIA NUNEZ on: 10/17/2019 03:14 PM     Modules accepted: Orders     no murmur/regular rate and rhythm/S 1  S 2/no rub

## 2019-10-17 NOTE — TELEPHONE ENCOUNTER
Sonam:  Please call Joel, I have routed this to physical therapy to ask for the input of Yessenia Gamez.   Regards, Catrachito Sterling, DO

## 2019-10-17 NOTE — TELEPHONE ENCOUNTER
1. Caller Name: Joel Ma    Call Back Number: [phone] (home)         Patient approves a detailed voicemail message: N\A    Patient called and LVM asking about a referral or order to rehab facility.   Please advise, not sure what patient was specially asking.

## 2019-10-18 NOTE — TELEPHONE ENCOUNTER
1. Caller Name: Joel                                         Call Back Number: [phone] (home)        Patient approves a detailed voicemail message: yes    Joel is requesting a cream Rx for an infection under his skin flap on his belly.

## 2019-10-18 NOTE — TELEPHONE ENCOUNTER
Jenni:  Please call Joel ketoconazole cream sent to the pharmacy.  Regards, Catrachito Sterling, DO

## 2019-10-19 NOTE — TELEPHONE ENCOUNTER
Telephone call to the patient, explained that there is some complications associated with patient being admitted.  Sounds as though I may need to write out direct admission orders to a rehab facility.  Not sure how this will be done through our system.  Patient was instructed to be evaluated in the emergency room if he cannot care for himself or if he falls repeatedly.

## 2019-10-22 NOTE — TELEPHONE ENCOUNTER
Orders faxed.  He is going down to the hospital tomorrow at 1 to be admitted to rehab. He is really nervous about a test that Dr Justice wants to do.

## 2019-10-22 NOTE — TELEPHONE ENCOUNTER
Tamara:  Please call Joel, I have faxed the orders for him to be admitted to acute rehabilitation today.  I am not sure when they are planning on admitting him.  Regards, Catrachito Sterling, DO

## 2019-10-22 NOTE — TELEPHONE ENCOUNTER
Pt wanted to inform you he has been going through a lot of things with his bladder. He is on bactrim, making him feel nausea.

## 2019-10-23 NOTE — PROGRESS NOTES
[facility]  [facility]  [address] [place] [address]  Ph: [phone]    NEW PATIENT EVALUATION    Patient Name: Joel Ma   Patient : 1950  Patient Age: 69 y.o.   PCP: Catrachito Sterling D.O.    Referring Physician: Dr. Sterling   Reason for Referral: Eval Inpatient ARU  Examining Physician: Dr. Gi Cota DO  Date of Service: 10/23/2019      SUBJECTIVE:   Patient Identification: Joel Ma is a 69 y.o. RHD male with PMH significant for hypertension, hyperlipidemia, diabetes (c/b diabetic macular edema), cataracts s/p resection, JANNIE (currently not using CPAP), chronic kidney disease stage III (seen last 2019 - Dr. Bonner), CAD (status post stent), paroxysmal atrial fibrillation, h/o embolic strokes (on Coumadin) and rehabilitation history significant for history of left temporal lobe ischemic CVA (2008), bilateral occipital and cerebellar ischemic stroke (2008), cervical myelopathy secondary cervical stenosis (status post C3-C7 ACDF 2016, posterior C2-6 laminectomy and foraminotomies 2018), lumbar radiculopathy (status post L2-5 laminectomy with bilateral foraminotomies 2018) and is presenting to PM&R clinic for a NEW OUTPATIENT evaluation with the following chief complaint/s:    Chief Complaint: Urinary retention, persistent bilateral lower extremity weakness    Background Information:  Pertinent Procedure History: Per ED summary 10/7/19  Patient has a past surgical history that includes knee scope,single menisectomy; lumbar fusion posterior (); irrigation & debridement ortho (3/13/2012); irrigation & debridement ortho (2012); irrigation & debridement ortho (2012); cataract phaco with iol (2013); irrigation & debridement ortho (Left, 10/29/2012); cervical disk and fusion anterior (2016); other; toe amputation (2011); elbow arthrotomy (); foot surgery; foot surgery; knee arthroplasty  "total (1/11/2012); knee revision total (3/13/2012); tendon repair (3/13/2012); knee revision total (11/13/2012); incision and drainage orthopedic (Right, 10/29/2012); other neurological surg; cervical fusion posterior (7/13/2018); cervical laminectomy posterior (7/13/2018); lumbar laminectomy diskectomy (7/13/2018); and zzz cardiac cath.  Dates of Admission/Discharge to ARU:   -7/18/2018 to 8/14/2018: Under the care of Dr. Higinio Pagan after his cervical operation 7/13/18    Accompanied by Today: Self  History of Present Illness: All medical records extensively reviewed   Prior to 2008 - patient was totally independent, \"racing motorcycles, chasing girls\". Patient states there was nothing he couldn't do. Then in 2008 he had his stroke. States he did have continued right hemiparesis, not sure when that resolved, but then was able to get back to ambulating independently. States he started getting, what sounds like, neurogenic claudication, which progressively got worse ultimately leading to his cervical surgery.  This helped and he was able to stop taking Percocet.     Patient states he has had issues with balance and weakness since his surgery on his neck. He states he has always had balance issues on due to his old cerebellar stroke. Last time patient ambulated independently was since before his strokes in 2008. Used a cane after strokes, but only needed for stability. Now only uses walker. Has had to call Fire Dept to help him after falling. Last fell a couple weeks ago.      Rehab: Patient reports that he cannot walk independently, but does have FWW which he normally uses. He only uses w/c for long distances. Recently seen by [facility] physical therapy who recommended inpatient rehab for persistent weakness.  Per their report ambulating with four-wheel walker standby assist 100 feet x 2. Patient states has passed several driving evals, most recent when he got his license 4/23/19.  Not currently in " occupational therapy, last seen in hospital at end of 2018.   Neuro: Patient complaints of LE weakness ongoing since 2007. One day he was walking to truck and suddenly couldn't walk at all due to significant weakness. However, resolved shortly after to some degree, but then subsequently had surgical intervention.    - 2/11/2008: Admitted to Dr. Bush's service with righthemiparesis.  CT scan revealed left temporal lobe, acute subacuteischemia area.  He had expressive aphasia as well. Placed on ASA + Plavix (gets GI upset from ASA).  - 4/20/20018 Came in to the hospital again concerning onset of vertigo and ataxia, and blurred vision.  The patient was admitted  for evaluation and management. The patient was already on aspirin and Plavix. The patient had an MRI of the brain, which showed evidence of bilateral occipital strokes as well as cerebellar infarction. Then placed on Coumadin.   - Distant h/o L4-S1 fusion surgery 1973  -C3-C7 ACDF 8/31/2016 by Dr. Alhaji Jaffe for cervical spondylitic myelopathy and severe cervical stenosis with gait ataxia.  - Most recently patient underwent posterior L2-5 laminectomy with bilateral foraminotomies and posterior cervical 2 through 6 laminectomy with bilateral neural foraminotomies with Dr. Marsh 7/13/2018.  This was due to lumbar stenosis with motor and sensory radiculopathy which did not respond to conservative measures and cervical stenosis with myelopathy.   - Per Cardiology H&P 9/6/18: The patient was originally seen on 6/1/2018 in [facility] cardiology clinic by Dr. Campbell Wei for an abnormal preoperative myocardial perfusion scan prior to cervical and lumbar surgery for rapidly progressive myelopathy.  Cardiac catheterization on 6/13/2018 showed two-vessel coronary disease involving the proximal left anterior descending artery 70% stenosis and distal right coronary artery 90% stenosis. S/P MIRELLA placement to distal right coronary artery and proximal left anterior  descending artery.  Ortho:  - 7/22/2011: Second toe right foot osteomyelitis status post partial amputation; also has multiple other distal toe amputations, both feet done at another time.  - 1/11/2012 (R [facility]); 3/13/12 (revision); 10/28/2012 (septi knee debridement); 11/2/12 (right knee hematoma development): status post right total knee arthroplasty with subsequent development of septic joint status post revision: Dr. Morgan, Dr. Cota ([facility])  Bladder: Presented to emergency department 10/7/2019 with complaint of urinary retention onset 1 week prior - states sudden onset, had pain in bladder and went to use restroom, but nothing would come out, occasional streams. Has been referred to urology, currently has indwelling catheter placed in the emergency department.  Will be going back to urology October 31.  Recently treated for UTI as well end of September with Macrobid.  Has not had a history of urinary retention until September 2019. Patient reports normal bladder sensation, can tell when he knows it is full. Catheter is very uncomfortable to him.   Bowel: Patient having normal bowel movements, normal for him is every other day. Not taking any medications.   Neuropathic Pain: Has chronic neuropathy secondary to diabetes. No other change.   Skin: No skin breakdown.   Mood: Situational depression related to deficits in function. Denies SI.     Review of Systems:  Review of Systems   Constitutional: Negative for chills and fever.   HENT: Negative for hearing loss and tinnitus.    Eyes: Negative for blurred vision and double vision.   Respiratory: Negative for cough and shortness of breath.    Cardiovascular: Negative for chest pain and leg swelling.   Gastrointestinal: Negative for constipation.   Genitourinary: Negative for dysuria, frequency and urgency.        Urinary retention   Musculoskeletal: Positive for falls. Negative for joint pain.   Skin: Negative for itching and rash.   Neurological: Positive for weakness.  Negative for sensory change and speech change.        Impaired proprioception   Psychiatric/Behavioral: Negative for memory loss.      All other pertinent positive review of systems are noted above in HPI.   All other systems reviewed and are negative.    Past Medical History:  Past Medical History:   Diagnosis Date   • Pain 05-03-13    shoulders, hip, right knee, 7/10   • Ventricular ectopy 6/17/2011   • arthritis 6/17/2011    thumb, fingers   • Thyroid mass 7/30/2009    benign lump   • Pneumonia 2002   • Anesthesia     low O2 sat   • Arrhythmia     a-fib   • Arthritis     hip   • Backpain    • CAD (coronary artery disease)     MIRELLA to distal RCA and prox LAD   • Cataract     left eye surgery   • Dental disorder     full dentures   • Diabetes     insulin, Dr Oconnor, his APN   • High cholesterol    • Hypertension    • MRSA (methicillin resistant Staphylococcus aureus)     history of, nothing current 2016, on clindamycin for rest of life per patient   • Renal disorder     stage 2   • Sleep apnea     bipap   • Stroke (HCC)     2/1/2007, 4/1/2007, right sided weakness; balance disturbance, memory problems      Past Surgical History:   Procedure Laterality Date   • CERVICAL FUSION POSTERIOR  7/13/2018    Procedure: CERVICAL FUSION POSTERIOR/ C2-4;  Surgeon: Boby Marsh M.D.;  Location: [facility];  Service: Neurosurgery   • CERVICAL LAMINECTOMY POSTERIOR  7/13/2018    Procedure: CERVICAL LAMINECTOMY POSTERIOR/ C2-3, C5-6;  Surgeon: Boby Marsh M.D.;  Location: [facility];  Service: Neurosurgery   • LUMBAR LAMINECTOMY DISKECTOMY  7/13/2018    Procedure: LUMBAR LAMINECTOMY DISKECTOMY/ L2-5 LAMI;  Surgeon: Boby Marsh M.D.;  Location: [facility];  Service: Neurosurgery   • CERVICAL DISK AND FUSION ANTERIOR  8/31/2016    Procedure: CERVICAL DISK AND FUSION ANTERIOR C3-7 ;  Surgeon: Alhaji Jaffe M.D.;  Location: [facility];  Service:    •  CATARACT PHACO WITH IOL  5/8/2013    Performed by Mame Rehman M.D. at SURGERY SAME DAY [facility]   • IRRIGATION & DEBRIDEMENT ORTHO  11/13/2012    Performed by Gordon Cota M.D. at [facility]   • KNEE REVISION TOTAL  11/13/2012    Performed by Gordon Cota M.D. at [facility]   • IRRIGATION & DEBRIDEMENT ORTHO  11/2/2012    Performed by Gordon Cota M.D. at [facility]   • IRRIGATION & DEBRIDEMENT ORTHO Left 10/29/2012    Procedure: left shoulder, with drain;  Surgeon: Gordon Cota M.D.;  Location: [facility];  Service:    • INCISION AND DRAINAGE ORTHOPEDIC Right 10/29/2012    Procedure: Right Total Knee I and D and Liner Exchange, with drain;  Surgeon: Gordon Cota M.D.;  Location: [facility];  Service:    • IRRIGATION & DEBRIDEMENT ORTHO  3/13/2012    Performed by KAMALJIT SHI at [facility]   • KNEE REVISION TOTAL  3/13/2012    Performed by KAMALJIT SHI at [facility]   • TENDON REPAIR  3/13/2012    Performed by KAMALJIT SHI at [facility]   • KNEE ARTHROPLASTY TOTAL  1/11/2012    Right; Performed by KAMALJIT SHI at [facility]   • TOE AMPUTATION  7/22/2011    Performed by EVELYN JANE at [facility]   • ELBOW ARTHROTOMY  2008    right   • LUMBAR FUSION POSTERIOR  1979   • FOOT SURGERY      partial amputation great toe   • FOOT SURGERY      toe surgery left foot   • OTHER      left eye cataract   • OTHER NEUROLOGICAL SURG      neck fusion   • PB KNEE SCOPE,SINGLE MENISECTOMY      right knee   • ZZZ CARDIAC CATH          Current Outpatient Medications:   •  sulfamethoxazole-trimethoprim (BACTRIM DS) 800-160 MG tablet, Bactrim  mg-160 mg tablet  Take 1 tablet every 12 hours by oral route for 5 days., Disp: , Rfl:   •  ketoconazole (NIZORAL) 2 % Cream, Apply 0.25 g to affected area(s) every day., Disp: 1 Tube, Rfl: 0  •  hydrALAZINE (APRESOLINE) 100 MG  tablet, TAKE ONE TABLET BY MOUTH EVERY EIGHT HOURS, Disp: 90 Tab, Rfl: 0  •  metFORMIN (GLUCOPHAGE) 500 MG Tab, TAKE TWO TABLETS BY MOUTH TWICE DAILY WITH MEALS, Disp: 360 Tab, Rfl: 1  •  warfarin (COUMADIN) 5 MG Tab, Take 2-3 Tabs by mouth every day., Disp: 180 Tab, Rfl: 1  •  ezetimibe (ZETIA) 10 MG Tab, Take 1 Tab by mouth every day., Disp: 30 Tab, Rfl: 11  •  insulin glargine (LANTUS) 100 UNIT/ML Solution, Inject 40 Units as instructed every day., Disp: 10 mL, Rfl: 0  •  JARDIANCE 10 MG Tab, TAKE ONE TABLET BY MOUTH ONE TIME DAILY, Disp: 30 Tab, Rfl: 3  •  clindamycin (CLEOCIN) 300 MG Cap, TAKE 1 CAPSULE BY MOUTH TWICE DAILY, Disp: 180 Cap, Rfl: 0  •  amLODIPine (NORVASC) 10 MG Tab, Take 1 Tab by mouth every day., Disp: 90 Tab, Rfl: 3  •  lisinopril (PRINIVIL, ZESTRIL) 40 MG tablet, Take 1 Tab by mouth 2 times a day., Disp: 200 Tab, Rfl: 3  •  insulin lispro, Human, (HUMALOG KWIKPEN) 100 UNIT/ML Solution Pen-injector injection, Inject 2-10 Units as instructed 4 Times a Day,Before Meals and at Bedtime., Disp: 10 PEN, Rfl: 3  •  Blood Glucose Test Strips, Test strips order: Test strips for Relion meter. Sig: use 3 to 4 x a day and prn ssx high or low sugar #450 RF x 3, Disp: 450 Each, Rfl: 3  •  glucose blood (CONTOUR NEXT TEST) strip, 1 Strip by Other route as needed. USE TO TEST BLOOD SUGAR 4 To 5 TIMES DAILY, Disp: 450 Strip, Rfl: 3  •  potassium chloride SA (KDUR) 20 MEQ Tab CR, Take 1 Tab by mouth every day., Disp: 30 Tab, Rfl: 11  •  metoprolol (TOPROL-XL) 200 MG XL tablet, Take 1 Tab by mouth every day., Disp: 30 Tab, Rfl: 11  •  torsemide (DEMADEX) 10 MG tablet, Take 1 Tab by mouth 2 times a day., Disp: 60 Tab, Rfl: 11  •  clopidogrel (PLAVIX) 75 MG Tab, Take 1 Tab by mouth every day., Disp: 30 Tab, Rfl: 11  •  gabapentin (NEURONTIN) 300 MG Cap, Take 1-2 Caps by mouth 3 times a day. 300mg in AM and noon 600mg in PM (Patient taking differently: Take 300-600 mg by mouth 4 times a day. 300mg in AM and noon  600mg in PM ), Disp: 90 Cap, Rfl: 3  •  Insulin Pen Needle (ADVOCATE INSULIN PEN NEEDLES) 31G X 5 MM Misc, Use tid and prn., Disp: 100 Each, Rfl: 3  •  insulin lispro (HUMALOG) 100 UNIT/ML Solution, Inject 2-10 Units as instructed 4 Times a Day,Before Meals and at Bedtime. For -200 Give 2 units For -250 Give 4 units For -300 Give 6 units For -350 Give 8 units For -400 Give 10 units <60 or >400 Notify MD, Disp: , Rfl:   •  Misc. Devices Misc, Please admit Joel Ma to Acute Rehab due to need for Strengthening and Conditioning.  [phone], Disp: 1 Each, Rfl: 0  •  nitrofurantoin monohyd macro (MACROBID) 100 MG Cap, Take 1 Cap by mouth 2 times a day., Disp: 10 Cap, Rfl: 0  •  Misc. Devices Misc, Use tid and prn [id number] # 100, Disp: 100 Each, Rfl: 11  •  Misc. Devices Misc, Relion glucometer, Disp: 1 Each, Rfl: 0  •  NON SPECIFIED, Four wheeled walker with seat - [facility]., Disp: 1 Each, Rfl: 0  Allergies   Allergen Reactions   • Demerol      Makes me stop breathing.  Doesn't like because it makes him high   • Statins [Hmg-Coa-R Inhibitors] Myalgia     Rxn - ongoing          Past Social History:  Social History     Socioeconomic History   • Marital status: Single     Spouse name: Not on file   • Number of children: Not on file   • Years of education: Not on file   • Highest education level: Not on file   Occupational History   • Not on file   Social Needs   • Financial resource strain: Not on file   • Food insecurity:     Worry: Not on file     Inability: Not on file   • Transportation needs:     Medical: Not on file     Non-medical: Not on file   Tobacco Use   • Smoking status: Former Smoker     Packs/day: 4.00     Years: 0.50     Pack years: 2.00     Types: Cigarettes     Last attempt to quit: 1974     Years since quittin.8   • Smokeless tobacco: Never Used   Substance and Sexual Activity   • Alcohol use: No   • Drug use: No   • Sexual activity: Yes    Lifestyle   • Physical activity:     Days per week: Not on file     Minutes per session: Not on file   • Stress: Not on file   Relationships   • Social connections:     Talks on phone: Not on file     Gets together: Not on file     Attends Denominational service: Not on file     Active member of club or organization: Not on file     Attends meetings of clubs or organizations: Not on file     Relationship status: Not on file   • Intimate partner violence:     Fear of current or ex partner: Not on file     Emotionally abused: Not on file     Physically abused: Not on file     Forced sexual activity: Not on file   Other Topics Concern   •  Service No   • Blood Transfusions Yes   • Caffeine Concern Yes   • Occupational Exposure No   • Hobby Hazards No   • Sleep Concern No   • Stress Concern No   • Weight Concern Yes   • Special Diet No   • Back Care No   • Exercise Yes   • Bike Helmet No   • Seat Belt Yes   • Self-Exams Yes   Social History Narrative    ** Merged History Encounter **             Family History:  Family History   Problem Relation Age of Onset   • Diabetes Mother    • Cancer Father         lung cancer   • Heart Disease Father         triple bypass   • Diabetes Other    • Hypertension Other    • Cancer Other        Depression and Opioid Screening  PHQ-9:  Depression Screen (PHQ-2/PHQ-9) 8/18/2018 12/13/2018 1/2/2019   PHQ-2 Total Score 1 - -   PHQ-2 Total Score - - -   PHQ-2 Total Score - 0 0   PHQ-9 Total Score 3 - -     Interpretation of PHQ-9 Total Score   Score Severity   1-4 No Depression   5-9 Mild Depression   10-14 Moderate Depression   15-19 Moderately Severe Depression   20-27 Severe Depression     Opioid Risk Score: 0  Interpretation of Opioid Risk Score   Score 0-3 = Low risk of abuse. Do UDS at least once per year.  Score 4-7 = Moderate risk of abuse. Do UDS 1-4 times per year.  Score 8+ = High risk of abuse. Refer to specialist.      OBJECTIVE:   Vital Signs:  Vitals:    10/23/19 1302    BP: 108/78   Pulse: 65   Temp: 37.1 °C (98.8 °F)   SpO2: 93%        Pertinent Labs:  Lab Results   Component Value Date/Time    SODIUM 130 (L) 10/07/2019 08:52 AM    POTASSIUM 4.1 10/07/2019 08:52 AM    CHLORIDE 97 10/07/2019 08:52 AM    CO2 24 10/07/2019 08:52 AM    GLUCOSE 441 (H) 10/07/2019 08:52 AM    BUN 30 (H) 10/07/2019 08:52 AM    CREATININE 1.00 10/07/2019 08:52 AM    CREATININE 1.21 02/17/2011 07:28 AM    BUNCREATRAT 16 02/17/2011 07:28 AM    GLOMRATE >59 02/17/2011 07:28 AM       Lab Results   Component Value Date/Time    HBA1C 11.0 (A) 08/09/2019 10:47 AM       Lab Results   Component Value Date/Time    WBC 6.8 10/07/2019 08:52 AM    WBC 5.2 03/25/2011 10:16 AM    RBC 5.52 10/07/2019 08:52 AM    RBC 3.82 (L) 03/25/2011 10:16 AM    HEMOGLOBIN 15.8 10/07/2019 08:52 AM    HEMATOCRIT 47.9 10/07/2019 08:52 AM    MCV 86.8 10/07/2019 08:52 AM    MCV 83 03/25/2011 10:16 AM    MCH 28.6 10/07/2019 08:52 AM    MCH 28.3 03/25/2011 10:16 AM    MCHC 33.0 (L) 10/07/2019 08:52 AM    MPV 11.4 10/07/2019 08:52 AM    NEUTSPOLYS 74.80 (H) 10/07/2019 08:52 AM    LYMPHOCYTES 10.90 (L) 10/07/2019 08:52 AM    MONOCYTES 12.50 10/07/2019 08:52 AM    EOSINOPHILS 0.90 10/07/2019 08:52 AM    BASOPHILS 0.30 10/07/2019 08:52 AM    HYPOCHROMIA 1+ 02/05/2014 06:27 AM       Lab Results   Component Value Date/Time    ASTSGOT 15 09/06/2018 12:40 PM    ALTSGPT 11 09/06/2018 12:40 PM        Physical Exam:   Physical Exam   Constitutional: He is oriented to person, place, and time and well-developed, well-nourished, and in no distress.   HENT:   Head: Normocephalic and atraumatic.   Eyes: Conjunctivae are normal. Right eye exhibits no discharge. Left eye exhibits no discharge.   Cardiovascular: Regular rhythm.   Ext WWP   Pulmonary/Chest: No respiratory distress.   Abdominal: Soft. He exhibits no distension.   Musculoskeletal: Normal range of motion. He exhibits no edema.   Neurological: He is alert and oriented to person, place, and time.    Skin:   Chronic venous stasis changes of BLE, scabbed wounds anterior shines bilaterally.    Psychiatric: Affect normal.   Nursing note and vitals reviewed.    Motor Exam Upper Extremities   ? Myotome R L   Shoulder flexion C5 5 4-   Elbow flexion C5 5 4   Wrist extension C6 5 5   Elbow extension C7 5 5   Finger flexion C8 5 5   Finger abduction T1 4- 5     Motor Exam Lower Extremities  ? Myotome R L   Hip flexion L2 4- 4-   Knee extension L3 5 5   Ankle dorsiflexion L4 1 1   Toe extension L5 1 1   Ankle plantarflexion S1 1 1     Unable to toe walk or heel walk  Poor standing endurance, can stand ~ 45 seconds prior to needing to sit.  Long's neg BL.   No clonus BL.   MAS negative BLE.   FDI/thenar muscle atrophy on the right   Sensation intact to LT grossly in UE and LE, with exception of impaired BL distal ext due to underlying neuropathy.     Imaging:   Bladder US 10/7/2019  The prostate gland measures 4.4 x 4.8 x 4.2 cm.      Impression       1.  Thickened bladder wall which is nonspecific and could represent chronic outlet obstruction and/or cystitis.  2.  Debris within the urinary bladder which is suspicious for infection.  3.  Significant postvoid residual bladder volume.  4.  Prostatomegaly.     MRI C Spine 5/7/2018  Impression       1.  Multilevel multifactorial degenerative changes  2.  Interval anterior fusion at C3-C7  3.  Central canal narrowing worst at C5-C6 but also present at other levels  4.  Other areas of central canal and neural foraminal narrowing as described above  5.  BILATERAL cerebellar and LEFT occipital lobe encephalomalacia likely related to remote ischemic insult  6.  2.6 cm LEFT thyroid mass, similar to prior study. Further assessment could be performed with ultrasound-guided fine needle aspiration if clinically warranted.     MRI L Spine 5/7/18  Impression       1.  Multilevel multifactorial degenerative changes  2.  Enhancement in the posterior L2-L3 disc and adjacent endplates  could be due to the advanced degenerative disc disease at this level although the enhancement of the disc raises the possibility of discitis osteomyelitis  3.  Central canal narrowing worst at L3-L4 but also present at other levels  4.  Other areas of central canal and neural foraminal narrowing as described above  5.  Incompletely imaged thoracic spondylosis with at least moderate central canal narrowing at T10-T11         ASSESSMENT/PLAN: Joel Ma is a 69 y.o. RHD male with PMH significant for hypertension, hyperlipidemia, diabetes (c/b diabetic macular edema), cataracts s/p resection, JANNIE (currently not using CPAP), chronic kidney disease stage III (seen last 8/16/2019 - Dr. Bonner), CAD (status post stent), paroxysmal atrial fibrillation, h/o embolic strokes (on Coumadin) and rehabilitation history significant for history of left temporal lobe ischemic CVA (2/11/2008), bilateral occipital and cerebellar ischemic stroke (4/20/2008), cervical myelopathy secondary cervical stenosis (status post C3-C7 ACDF 8/31/2016, posterior C2-6 laminectomy and foraminotomies 7/13/2018), lumbar radiculopathy (status post L2-5 laminectomy with bilateral foraminotomies 7/13/2018), here 10/23/2019 for outpatient evaluation for need for acute inpatient rehabilitation admission. The following plan was discussed with the patient who is in agreement.      Visit Diagnoses     ICD-10-CM   1. Impaired mobility and ADLs Z74.09   2. Weakness of both lower extremities R29.898   3. Weakness of both upper extremities R29.898   4. Controlled type 2 diabetes mellitus with diabetic polyneuropathy, with long-term current use of insulin ([facility]) E11.42    Z79.4   5. Cerebrovascular accident (CVA), unspecified mechanism ([facility]) I63.9   6. Stage 3 chronic kidney disease ([facility]) N18.3   7. Diabetic polyneuropathy associated with type 2 diabetes mellitus (HCC) E11.42   8. Lumbar stenosis with neurogenic claudication M48.062   9. Cervical  myelopathy (HCC) G95.9        Social: Lives in a small trailer in [place]. 7-8 ft wide and 67ft long. Lives with your two dogs. Mom lives close by, but is 93yo and has health issues. He built ramp into his trailer.  Has shower stool, from rehab.     Rehab/Neuro: Established problem.   1. H/o left temporal lobe ischemic CVA (2/11/2008), bilateral occipital and cerebellar ischemic stroke (4/20/2008): Pt reports he did have residual right hemiparesis and ataxia, ultimately requiring DME for ambulation, but eventually able to be independent. Prior to these strokes, patient was totally independent, running up to 2-3 miles daily with his dogs. Reviewed all pertinent previous medical records leading up to today's clinic visit.    2. Cervical myelopathy secondary cervical stenosis (status post C3-C7 ACDF 8/31/2016 - [facility], posterior C2-6 laminectomy and foraminotomies 7/13/2018 - [facility]), lumbar radiculopathy (status post L2-5 laminectomy with bilateral foraminotomies 7/13/2018 - [facility]). Reviewed all pertinent previous medical records leading up to today's clinic visit.    - Difficult to assess patient's level of disability in between time of strokes and time of C/L spine surgeries given patient unclear about these details - sounds like he was having neurogenic claudication leading up to the first surgery which did improve and he was able to get off of Percocet. He continued to have decrease in function leading up to his most recent surgery to C and L spine 7/2018. He stayed in ARU after this surgery 7/18-8/14/2018 under the care of Dr. Higinio Pagan. Pt notes he did improve to some degree, but has completely plateaued.   - No acute neuro change appreciated based on HPI and exam today.   - Per latest [address] PT notes 10/2019: Therapist recommending inpatient rehab for persistent weakness. Per their report patient ambulating with four-wheel walker standby assist 100 feet x 2. Patient states has passed several driving  angie, most recently when he got his license 4/23/19.  Not currently in occupational therapy, last seen in hospital at end of 2018.   - Will discuss patient with his physical therapist.   - REFERRAL to outpatient Occ Therapy to eval extent of patient needs  - On Plavix (CAD s/p stenting) and Coumadin (pAfib + h/o CVA on plavix and ASA)  - Currently, no identifiable Speech needs for cognition or swallow, patient able to recall distant details, names of all consultant doctors, dates of major surgeries/medical issues etc. Lives independently at home.   - Patient does not have any spasticity, neuropathic pain, neurogenic bowel issues, mood/sleep issues, skin breakdown issues, no dysphagia, intact memory/cognition for age. Previously had no neurogenic bladder issues - currently with retention, but unclear etiology at this time (see further details below).     :  1. Urinary Retention: Recently seen in ED for acute onset urinary retention end of 9/2019. Has Urology f/u 10/31/19 and currently with indwelling catheter. No other neuro changes to suggest this is neurogenic bladder, however, will await Urology evaluation. Bladder US with signs of potential infection (thickened bladder wall) and enlarged prostate. S/P Macrobid for UTI end Sept.   - Follow up [facility] evaluation  - Will request records.     Co-Morbidities:  1. Paroxysmal Atrial Fibrillation with h/o ischemic embolic strokes 2008: Initially on ASA + Plavix, but re-stroked 2 months later and placed on Coumadin. Followed closely by Cardiology last visit 9/11/2019.   2. 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 Followed closely by PCP - last visit 10/3/19.   3. CHF/CAD/HLD/HTN: Last seen by Cardiology clinic 9/11/2019 and has follow up q6 months. Managing HLD with Zetia, will keep on Plavix and Coumadin, managing HTN with amlodipine/Lisinopril.   4. 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   5. JANNIE - not  recently followed by [facility], has CPAP.     Summary of Assessment: Mr. Ma is a very pleasant 69 male with several co-morbidities including paroxysmal atrial fibrillation, JANNIE, HLD, HTN, CAD s/p stenting, CHF, CKD Stage III, DM Type II. Fortunately, patient is being closely followed by all of the appropriate clinicians and is well managed. For as many medical issues as he has, they are seemingly well controlled at this time. Regarding the patient's rehab diagnosis - he had 2 strokes in 2008 and none since then and has been on Coumadin. It appears he did make a good recovery back to total independence after those strokes. More recently he has suffered from cervical myelopathy and lumbar stenosis causing radiculopathy with very appreciable LE weakness, ultimately requiring surgical intervention in 2016 and again in 2018. He went to ARU in 2018 and has not had neurologic decline since then, but has not completely improved to the extent he would like. He continues to have significant weakness in distal LE>proximal and has LUE weakness >RUE, but is still independent at home with FWW and has ramped trailer.     At this time, he does not meet requirements necessary to be admitted to acute inpatient rehabilitation given that he does not require daily physician monitoring for his co-morbidities given these are closely followed outpatient and stable, his most recent PT note from this month indicates he is SBA and ambulating [id number] with FWW, and he currently has no speech needs.     I have sent patient to outpatient OT for evaluation of extent of needs, though per patient report, he was told by OT he has no needs. It may be the case that the best option for this patient is physical therapy with Home Health, and potentially occupational therapy pending eval, given his difficulty commuting from [place].     The only medical condition that may change this situation is the PENDING eval of his urinary rentention. If he is  found to have new neurologic etiology of neurogenic bladder requiring further conservative or surgical intervention this would change the extent of patient's needs and potential for ARU. However, on my exam and HPI, patient was not having new neurologic complaints other than the sudden onset or urinary retention, which very likely may be an independent new issue for the patient unrelated to neuro diagnosis given bladder US with very enlarged prostate and patient recently treated for UTI.     Follow up: 2-4 weeks.      Total time spent face to face with patient was 46 minutes. Greater then 50% of my visit was spent on counseling and coordination of care regarding the secondary medical complications of SCI including pathophysiology of, medical management of, complications of, and medication side effects of neurogenic bladder neuropathic pain, weakness and potential for ARU admission as aforementioned in the assessment and plan. Extensive discussion involved the patient.    Prolonged face to face time was spent from 13:55PM to 14:43 PM for a total of 48 minutes IN ADDITION to the regularly scheduled visit to review all medical records and to discuss potential for admission into acute rehab unit.     Please note that this dictation was created using voice recognition software. I have made every reasonable attempt to correct obvious errors but there may be errors of grammar and content that I may have overlooked prior to finalization of this note.    Dr. Gi Cota DO, MS  Department of Physical Medicine & Rehabilitation  [facility]  [facility]  10/23/2019 1:09 PM

## 2019-10-23 NOTE — Clinical Note
Can you add the prolonged face to face service code to this, I forgot what it is and working remotely. Thanks!!

## 2019-10-23 NOTE — Clinical Note
Mame, Can you schedule him to come back and see me in a few weeks and also we'll need to get [facility] records when he sees them 10/31. Thanks!!

## 2019-10-24 NOTE — TELEPHONE ENCOUNTER
----- Message from Gi Cota D.O. sent at 10/24/2019 11:05 AM PDT -----  Mame,     Can you schedule him to come back and see me in a few weeks and also we'll need to get [facility] records when he sees them 10/31. Thanks!!

## 2019-10-24 NOTE — TELEPHONE ENCOUNTER
Joel said he was going to [facility] Thursday to see Dr. Britt. I put him in 11/13/19 at 10:40. I will try to get records from [facility] after his visit for Dr. Cota to review at that visit at her request.

## 2019-10-25 NOTE — ED TRIAGE NOTES
".  Chief Complaint   Patient presents with   • Urinary Catheter Problem     Pain   Pt BIB EMS from home.  Pt had a kingston catheter placed on Monday(urinary retention).  Pt c/o pain at urethral opening \" from the catheter, at the bottom \" since this morning.  Pain intermittent.  Decreased urine output.  Pt reports recently changing antibiotics - now taking Bactrim.  No flank pain or fever/chills.   "

## 2019-10-25 NOTE — ED NOTES
Existing kingston catheter dc'd by RN.  Pt tolerated well.  Pt able to use a urinal on [facility].  UOP clear/yellow 100ml.  Urine sample collected and sent to lab.

## 2019-10-25 NOTE — DISCHARGE INSTRUCTIONS
Hyperglycemia  Hyperglycemia occurs when the level of sugar (glucose) in the blood is too high. Glucose is a type of sugar that provides the body's main source of energy. Certain hormones (insulin and glucagon) control the level of glucose in the blood. Insulin lowers blood glucose, and glucagon increases blood glucose. Hyperglycemia can result from having too little insulin in the bloodstream, or from the body not responding normally to insulin.  Hyperglycemia occurs most often in people who have diabetes (diabetes mellitus), but it can happen in people who do not have diabetes. It can develop quickly, and it can be life-threatening if it causes you to become severely dehydrated (diabetic ketoacidosis or hyperglycemic hyperosmolar state). Severe hyperglycemia is a medical emergency.  What are the causes?  If you have diabetes, hyperglycemia may be caused by:  · Diabetes medicine.  · Medicines that increase blood glucose or affect your diabetes control.  · Not eating enough, or not eating often enough.  · Changes in physical activity level.  · Being sick or having an infection.  If you have prediabetes or undiagnosed diabetes:  · Hyperglycemia may be caused by those conditions.  If you do not have diabetes, hyperglycemia may be caused by:  · Certain medicines, including steroid medicines, beta-blockers, epinephrine, and thiazide diuretics.  · Stress.  · Serious illness.  · Surgery.  · Diseases of the pancreas.  · Infection.  What increases the risk?  Hyperglycemia is more likely to develop in people who have risk factors for diabetes, such as:  · Having a family member with diabetes.  · Having a gene for type 1 diabetes that is passed from parent to child (inherited).  · Living in an area with cold weather conditions.  · Exposure to certain viruses.  · Certain conditions in which the body's disease-fighting (immune) system attacks itself (autoimmune disorders).  · Being overweight or obese.  · Having an inactive  (sedentary) lifestyle.  · Having been diagnosed with insulin resistance.  · Having a history of prediabetes, gestational diabetes, or polycystic ovarian syndrome (PCOS).  · Being of American-, -American, /, or / descent.  What are the signs or symptoms?  Hyperglycemia may not cause any symptoms. If you do have symptoms, they may include early warning signs, such as:  · Increased thirst.  · Hunger.  · Feeling very tired.  · Needing to urinate more often than usual.  · Blurry vision.  Other symptoms may develop if hyperglycemia gets worse, such as:  · Dry mouth.  · Loss of appetite.  · Fruity-smelling breath.  · Weakness.  · Unexpected or rapid weight gain or weight loss.  · Tingling or numbness in the hands or feet.  · Headache.  · Skin that does not quickly return to normal after being lightly pinched and released (poor skin turgor).  · Abdominal pain.  · Cuts or bruises that are slow to heal.  How is this diagnosed?  Hyperglycemia is diagnosed with a blood test to measure your blood glucose level. This blood test is usually done while you are having symptoms. Your health care provider may also do a physical exam and review your medical history.  You may have more tests to determine the cause of your hyperglycemia, such as:  · A fasting blood glucose (FBG) test. You will not be allowed to eat (you will fast) for at least 8 hours before a blood sample is taken.  · An A1c (hemoglobin A1c) blood test. This provides information about blood glucose control over the previous 2-3 months.  · An oral glucose tolerance test (OGTT). This measures your blood glucose at two times:  ¨ After fasting. This is your baseline blood glucose level.  ¨ Two hours after drinking a beverage that contains glucose.  How is this treated?  Treatment depends on the cause of your hyperglycemia. Treatment may include:  · Taking medicine to regulate your blood glucose levels. If you take insulin or  other diabetes medicines, your medicine or dosage may be adjusted.  · Lifestyle changes, such as exercising more, eating healthier foods, or losing weight.  · Treating an illness or infection, if this caused your hyperglycemia.  · Checking your blood glucose more often.  · Stopping or reducing steroid medicines, if these caused your hyperglycemia.  If your hyperglycemia becomes severe and it results in hyperglycemic hyperosmolar state, you must be hospitalized and given IV fluids.  Follow these instructions at home:  General instructions  · Take over-the-counter and prescription medicines only as told by your health care provider.  · Do not use any products that contain nicotine or tobacco, such as cigarettes and e-cigarettes. If you need help quitting, ask your health care provider.  · Limit alcohol intake to no more than 1 drink per day for nonpregnant women and 2 drinks per day for men. One drink equals 12 oz of beer, 5 oz of wine, or 1½ oz of hard liquor.  · Learn to manage stress. If you need help with this, ask your health care provider.  · Keep all follow-up visits as told by your health care provider. This is important.  Eating and drinking  · Maintain a healthy weight.  · Exercise regularly, as directed by your health care provider.  · Stay hydrated, especially when you exercise, get sick, or spend time in hot temperatures.  · Eat healthy foods, such as:  ¨ Lean proteins.  ¨ Complex carbohydrates.  ¨ Fresh fruits and vegetables.  ¨ Low-fat dairy products.  ¨ Healthy fats.  · Drink enough fluid to keep your urine clear or pale yellow.  If you have diabetes:   · Make sure you know the symptoms of hyperglycemia.  · Follow your diabetes management plan, as told by your health care provider. Make sure you:  ¨ Take your insulin and medicines as directed.  ¨ Follow your exercise plan.  ¨ Follow your meal plan. Eat on time, and do not skip meals.  ¨ Check your blood glucose as often as directed. Make sure to check  your blood glucose before and after exercise. If you exercise longer or in a different way than usual, check your blood glucose more often.  ¨ Follow your sick day plan whenever you cannot eat or drink normally. Make this plan in advance with your health care provider.  · Share your diabetes management plan with people in your workplace, school, and household.  · Check your urine for ketones when you are ill and as told by your health care provider.  · Carry a medical alert card or wear medical alert jewelry.  Contact a health care provider if:  · Your blood glucose is at or above 240 mg/dL (13.3 mmol/L) for 2 days in a row.  · You have problems keeping your blood glucose in your target range.  · You have frequent episodes of hyperglycemia.  Get help right away if:  · You have difficulty breathing.  · You have a change in how you think, feel, or act (mental status).  · You have nausea or vomiting that does not go away.  These symptoms may represent a serious problem that is an emergency. Do not wait to see if the symptoms will go away. Get medical help right away. Call your local emergency services (911 in the [place] Do not drive yourself to the hospital.   Summary  · Hyperglycemia occurs when the level of sugar (glucose) in the blood is too high.  · Hyperglycemia is diagnosed with a blood test to measure your blood glucose level. This blood test is usually done while you are having symptoms. Your health care provider may also do a physical exam and review your medical history.  · If you have diabetes, follow your diabetes management plan as told by your health care provider.  · Contact your health care provider if you have problems keeping your blood glucose in your target range.  This information is not intended to replace advice given to you by your health care provider. Make sure you discuss any questions you have with your health care provider.  Document Released: 06/13/2002 Document Revised: 09/04/2017  Document Reviewed: 09/04/2017  [employer] Patient Education © 2017 [employer]      Acute Urinary Retention, Male  Acute urinary retention is the temporary inability to urinate.  This is a common problem in older men. As men age their prostates become larger and block the flow of urine from the bladder. This is usually a problem that has come on gradually.  Follow these instructions at home:  If you are sent home with a Gonzalez catheter and a drainage system, you will need to discuss the best course of action with your health care provider. While the catheter is in, maintain a good intake of fluids. Keep the drainage bag emptied and lower than your catheter. This is so that contaminated urine will not flow back into your bladder, which could lead to a urinary tract infection.  There are two main types of drainage bags. One is a large bag that usually is used at night. It has a good capacity that will allow you to sleep through the night without having to empty it. The second type is called a leg bag. It has a smaller capacity, so it needs to be emptied more frequently. However, the main advantage is that it can be attached by a leg strap and can go underneath your clothing, allowing you the freedom to move about or leave your home.  Only take over-the-counter or prescription medicines for pain, discomfort, or fever as directed by your health care provider.  Contact a health care provider if:  · You develop a low-grade fever.  · You experience spasms or leakage of urine with the spasms.  Get help right away if:  · You develop chills or fever.  · Your catheter stops draining urine.  · Your catheter falls out.  · You start to develop increased bleeding that does not respond to rest and increased fluid intake.  This information is not intended to replace advice given to you by your health care provider. Make sure you discuss any questions you have with your health care provider.  Document Released: 03/26/2002  Document Revised: 05/31/2017 Document Reviewed: 05/29/2014  [employer] Interactive Patient Education © 2017 [employer]

## 2019-10-26 NOTE — ED NOTES
All lines and monitors disconnected.  Discharge instructions reviewed, questions answered.  Pt transported to the lobby via wheelchair, escorted by ED tech.  Pt states all belongings in possession.

## 2019-10-30 NOTE — PROGRESS NOTES
LM with patient in regards to current dosing of warfarin as INR taken at ED visit last week was quite low.  He also has cipro and bactrim listed as current medication.  Ask that he call back to clinic to discuss current dosing and get him set up with appt in clinic this week.  Jefferson Brian, PharmD, BCACP    Pt is unable to come to the clinic to obtain an INR.  Reports he does not have HH.  Refuses DOAC's.  Requested pt to hold warfarin today, given the DDI with Bactrim.  Denies s/s of bleeding.      Sent request for [facility] to obtain POC INR on 11/4/19.    Brett Coates, SeraD

## 2019-11-01 NOTE — PROGRESS NOTES
[facility]    Spoke with pt and unfortunately [facility] is unable to come to his house for INR testing.  We discussed DOAC at length and he checked with his pharmacy to see the cost of the medication.  Pt is unable to obtain another INR, therefore we discussed the risks/benefits of starting Xarelto without obtaining an INR.     Pt will discontinue warfarin for 2 days then begin Xarelto 20 mg daily.  Pt is unable to leave his home at this time, therefore we will reach out to the pt in one month to set up a 3 month follow up to monitor renal and DDI's.    Brett Coates, PharmD

## 2019-11-07 NOTE — PROGRESS NOTES
Review of Records:   From: [facility] [place]    [facility] PN dated 10/31/19: Per HPI - patient had Gonzalez catheter removed 10/25 in ED (confirmed via review of Epic notes). Reports he is able to urinate well without the catheter.     UDS 10/31/19:   - Cystometry: Normal bladder compliance, normal detrusor pressure during filling. Sensation of bladder filling at 459cc. Desire to void at 780cc. Strong desire at 836cc. Max capacity 840cc. No bladder contractions, no leaking observed during filling or with cough. Max voiding P of detrusor 57 cm H2O.   -EMG: Increase in sphincter activity with increased abd P, decreased during voiding.   -Uroflometry: PVR 850cc, flow rate and max flow rate unappreciable.   Impression: Low flow, overflow incontinence, complete retention, large capacity,     Cystoscopy planned 11/5/19 (Dr. Britt).     BUN/Cr 30/1.37 10/25/19    RBUS 10/7/19:  1. Thickened bladder wall which is nonspecific and could represent chronic outlet obstruction and/or cystitis.   2. Debris within urinary bladder which is suspicious for infection.   3. Significant PVR bladder volume.  4. Prostatomegaly.    Prolonged non-face-to-face time was spent on 11/7/2019  from 09:37 to 10:16 by this reviewer, for a total of 39 minutes.  An extensive review was performed of patient's past Urologic records and pertinent portions recorded above.     Dr. Gi Cota DO, MS  11/7/2019  10:19 AM

## 2019-11-08 NOTE — TELEPHONE ENCOUNTER
[facility]    Spoke with pt and confirmed he started Xarelto.  He is tolerating the medication well and has discontinued warfarin.  The medication is affordable for him at this time.     Brett Coates, PharmD

## 2019-11-11 NOTE — LETTER
11/17/2019               Joel Pradeep Anil  [address] [facility] [address]        Dear Joel (MR#[id number])    As we have been unable to contact you by phone, this letter is sent in regards to your recent visit to the [facility] Emergency Department on 11/11/2019.  During the visit, tests were performed to assist the physician in a medical diagnosis.  A review of those tests requires that we notify you of the following:    Your urine culture and sensitivity was POSITIVE for a bacteria called Acinetobacter, and further treatment may be necessary. IF YOU ARE NOT FEELING BETTER PLEASE CONTACT ME AS SOON AS POSSIBLE AT THE NUMBER BELOW.       Thank you for your cooperation in the matter.    Sincerely,  ED Culture Follow-Up Staff  Evie Glover, SeraD    [facility], Emergency Department  [address] [place] [address] [id number] [phone](ED Culture Line)  [phone]

## 2019-11-12 NOTE — DISCHARGE INSTRUCTIONS
You need to discuss with your doctor your diabetes medications. Return if you have a fever, abdominal pain, or no urine in the kingston after 6 hours.

## 2019-11-12 NOTE — ED NOTES
Traction came to bladder scan patient, reports having over 1L urine in bladder.  Awaiting ERP eval.

## 2019-11-12 NOTE — ED NOTES
"RN Asst: Provided pt with d/c instructions. Pt upset he is being discharged with a blood glucose reading \"it's 324, what am I supposed to do?\".  Pt was told his blood glucose was not a critical level, he has been a diabetic for 10 years, according to him, so explained he should be fairly aware of how to manage his glucose level.  Pt then became upset over not having ride home. Checked his insurance- he doesn't have Medicare, advised he will have to find a ride. Pt has a cell phone.   Pt then asked to have his catheter emptied- writer emptied catheter.   Pt then asked writer to pull up his pants. When writer asked why pt cannot pull up his own pants, pt yelled \"because I'm unstable on my feet\". Writer pointed to walker at bedside and advised pt to use walker to stabilize, otherwise writer would obtain male assistance for pt. Pt has been inappropriate numerous times this visit, when writer or assigned nurse answer call light, pt has his hand on his penis and would be touching it while speaking with staff. Writer left room and watched as pt used walker to stabilize himself as he stood up and pulled up his own pants.   Pt then asked for wheelchair to get to [facility]. Provided wheelchair and pt was wheeled to [facility] where he is charging cell phone to call for ride home. The landline for use in the [facility] is out of order.    "

## 2019-11-12 NOTE — ED NOTES
Irrigated bladder with 60 ml of NS per verbal order Dr Rocha, pt had urinary clots with light yellow urine removed.  Started draining appropriately.  400 ml output at this time, still draining.  Pt reports relief, will CTM.

## 2019-11-12 NOTE — ED NOTES
Pt discharged home. Explained discharge and medication instructions. Questions and comments addressed. Pt verbalized understanding of instructions. Pt advised to follow-up with PCP or return to ED for any new or worsening of symptoms. Pt is ambulating well and steady on feet. VS stable. PIV removed. Pt ambulating well with home walker.

## 2019-11-12 NOTE — ED NOTES
1600 ml of clear yellow urine cleared from leg bag at this time, will document in I&O.  Still draining.

## 2019-11-12 NOTE — ED NOTES
Pt in lobby, stating he would like to check in because he does not have a ride home. Pt discharge instructions from prior encounter tonight reiterated, urine flowing in Gonzalez.

## 2019-11-12 NOTE — TELEPHONE ENCOUNTER
Pt LVM stating he needed to cancel today's APPT, he had a blockage in his cathter.   He would like to speak to you.

## 2019-11-12 NOTE — ED NOTES
Pt writhing in pain, kingston has clear yellow urine output in leg bag, mild draining present for urology kingston. Awaiting ERP at bedside, Bladder scan paged.

## 2019-11-12 NOTE — ED PROVIDER NOTES
ED Provider Note    Scribed for Jefferson Rocha M.D. by Mitch Smith. 11/12/2019, 12:13 AM.    Primary care provider: Catrachito Sterling D.O.  Means of arrival: EMS  History obtained from: Patient  History limited by: None    CHIEF COMPLAINT  Chief Complaint   Patient presents with   • Dysuria   • Hyperglycemia       HPI  Joel Ma is a 69 y.o. male who presents to the Emergency Department with dysuria and hyperglycemia onset around 9pm. He complains of pain in the suprapubic region due to his kingston not putting out. After it was flushed the pain has mildly improved. He is also on correctional insulin and is unsure of why his blood sugar levels are so high. He denies any nausea, vomiting, hematuria, fever, or chills.     REVIEW OF SYSTEMS  Pertinent positives include dysuria, hyperglycemia, suprapubic pain. Pertinent negatives include nausea, vomiting, hematuria, fever, or chills. All other systems negative.    PAST MEDICAL HISTORY   has a past medical history of Anesthesia, Arrhythmia, arthritis (6/17/2011), Arthritis, Backpain, CAD (coronary artery disease), Cataract, Dental disorder, Diabetes, High cholesterol, Hypertension, MRSA (methicillin resistant Staphylococcus aureus), Pain (05-03-13), Pneumonia (2002), Renal disorder, Sleep apnea, Stroke (HCC), Thyroid mass (7/30/2009), and Ventricular ectopy (6/17/2011).    SURGICAL HISTORY   has a past surgical history that includes knee scope,single menisectomy; lumbar fusion posterior (1979); irrigation & debridement ortho (3/13/2012); irrigation & debridement ortho (11/2/2012); irrigation & debridement ortho (11/13/2012); cataract phaco with iol (5/8/2013); irrigation & debridement ortho (Left, 10/29/2012); cervical disk and fusion anterior (8/31/2016); other; toe amputation (7/22/2011); elbow arthrotomy (2008); foot surgery; foot surgery; knee arthroplasty total (1/11/2012); knee revision total (3/13/2012); tendon repair (3/13/2012); knee revision  total (2012); incision and drainage orthopedic (Right, 10/29/2012); other neurological surg; cervical fusion posterior (2018); cervical laminectomy posterior (2018); lumbar laminectomy diskectomy (2018); and zzz cardiac cath.    SOCIAL HISTORY  Social History     Tobacco Use   • Smoking status: Former Smoker     Packs/day: 4.00     Years: 0.50     Pack years: 2.00     Types: Cigarettes     Last attempt to quit: 1974     Years since quittin.8   • Smokeless tobacco: Never Used   Substance Use Topics   • Alcohol use: No   • Drug use: No      Social History     Substance and Sexual Activity   Drug Use No       FAMILY HISTORY  Family History   Problem Relation Age of Onset   • Diabetes Mother    • Cancer Father         lung cancer   • Heart Disease Father         triple bypass   • Diabetes Other    • Hypertension Other    • Cancer Other        CURRENT MEDICATIONS  No current facility-administered medications for this encounter.     Current Outpatient Medications:   •  rivaroxaban (XARELTO) 20 MG Tab tablet, Take 1 Tab by mouth with dinner., Disp: 90 Tab, Rfl: 1  •  Misc. Devices Misc, Please admit Joel Ma to Acute Rehab due to need for Strengthening and Conditioning.  [phone], Disp: 1 Each, Rfl: 0  •  sulfamethoxazole-trimethoprim (BACTRIM DS) 800-160 MG tablet, Bactrim  mg-160 mg tablet  Take 1 tablet every 12 hours by oral route for 5 days., Disp: , Rfl:   •  ketoconazole (NIZORAL) 2 % Cream, Apply 0.25 g to affected area(s) every day., Disp: 1 Tube, Rfl: 0  •  hydrALAZINE (APRESOLINE) 100 MG tablet, TAKE ONE TABLET BY MOUTH EVERY EIGHT HOURS, Disp: 90 Tab, Rfl: 0  •  metFORMIN (GLUCOPHAGE) 500 MG Tab, TAKE TWO TABLETS BY MOUTH TWICE DAILY WITH MEALS, Disp: 360 Tab, Rfl: 1  •  nitrofurantoin monohyd macro (MACROBID) 100 MG Cap, Take 1 Cap by mouth 2 times a day., Disp: 10 Cap, Rfl: 0  •  ezetimibe (ZETIA) 10 MG Tab, Take 1 Tab by mouth every day., Disp: 30 Tab, Rfl:  11  •  insulin glargine (LANTUS) 100 UNIT/ML Solution, Inject 40 Units as instructed every day., Disp: 10 mL, Rfl: 0  •  JARDIANCE 10 MG Tab, TAKE ONE TABLET BY MOUTH ONE TIME DAILY, Disp: 30 Tab, Rfl: 3  •  clindamycin (CLEOCIN) 300 MG Cap, TAKE 1 CAPSULE BY MOUTH TWICE DAILY, Disp: 180 Cap, Rfl: 0  •  amLODIPine (NORVASC) 10 MG Tab, Take 1 Tab by mouth every day., Disp: 90 Tab, Rfl: 3  •  lisinopril (PRINIVIL, ZESTRIL) 40 MG tablet, Take 1 Tab by mouth 2 times a day., Disp: 200 Tab, Rfl: 3  •  insulin lispro, Human, (HUMALOG KWIKPEN) 100 UNIT/ML Solution Pen-injector injection, Inject 2-10 Units as instructed 4 Times a Day,Before Meals and at Bedtime., Disp: 10 PEN, Rfl: 3  •  Misc. Devices Misc, Use tid and prn [id number] # 100, Disp: 100 Each, Rfl: 11  •  Misc. Devices Misc, Relion glucometer, Disp: 1 Each, Rfl: 0  •  Blood Glucose Test Strips, Test strips order: Test strips for Relion meter. Sig: use 3 to 4 x a day and prn ssx high or low sugar #450 RF x 3, Disp: 450 Each, Rfl: 3  •  glucose blood (CONTOUR NEXT TEST) strip, 1 Strip by Other route as needed. USE TO TEST BLOOD SUGAR 4 To 5 TIMES DAILY, Disp: 450 Strip, Rfl: 3  •  potassium chloride SA (KDUR) 20 MEQ Tab CR, Take 1 Tab by mouth every day., Disp: 30 Tab, Rfl: 11  •  metoprolol (TOPROL-XL) 200 MG XL tablet, Take 1 Tab by mouth every day., Disp: 30 Tab, Rfl: 11  •  torsemide (DEMADEX) 10 MG tablet, Take 1 Tab by mouth 2 times a day., Disp: 60 Tab, Rfl: 11  •  clopidogrel (PLAVIX) 75 MG Tab, Take 1 Tab by mouth every day., Disp: 30 Tab, Rfl: 11  •  gabapentin (NEURONTIN) 300 MG Cap, Take 1-2 Caps by mouth 3 times a day. 300mg in AM and noon 600mg in PM (Patient taking differently: Take 300-600 mg by mouth 4 times a day. 300mg in AM and noon 600mg in PM ), Disp: 90 Cap, Rfl: 3  •  Insulin Pen Needle (ADVOCATE INSULIN PEN NEEDLES) 31G X 5 MM Misc, Use tid and prn., Disp: 100 Each, Rfl: 3  •  NON SPECIFIED, Four wheeled walker with seat - [facility]., Disp: 1 Each, Rfl: 0  •  insulin lispro (HUMALOG) 100 UNIT/ML Solution, Inject 2-10 Units as instructed 4 Times a Day,Before Meals and at Bedtime. For -200 Give 2 units For -250 Give 4 units For -300 Give 6 units For -350 Give 8 units For -400 Give 10 units <60 or >400 Notify MD, Disp: , Rfl:       ALLERGIES  Allergies   Allergen Reactions   • Demerol      Makes me stop breathing.  Doesn't like because it makes him high   • Fentanyl      \"Sensitive\"   • Statins [Hmg-Coa-R Inhibitors] Myalgia     Rxn - ongoing         PHYSICAL EXAM  VITAL SIGNS: /92   Pulse (!) 103   Resp (!) 22   Ht 1.803 m (5' 11\")   Wt 107 kg (235 lb 14.3 oz)   SpO2 94%   BMI 32.90 kg/m²     Constitutional: Well developed, Well nourished, moderate distress.   HENT: Normocephalic, Atraumatic,  Eyes: Conjunctiva normal, No discharge.   Cardiovascular: Normal heart rate, Normal rhythm, No murmurs, equal pulses.   Pulmonary: Normal breath sounds, No respiratory distress, No wheezing, No rales, No rhonchi.  Chest: No chest wall tenderness or deformity.   Abdomen:Gonzalez catheter in place without any swelling or redness. No abdominal fullness. Soft, No tenderness, No masses, no rebound, no guarding.   Back: No CVA tenderness.   Musculoskeletal: No major deformities noted, No tenderness.   Skin: Warm, Dry, No erythema, No rash.   Neurologic: Alert & oriented x 3, Normal motor function,  No focal deficits noted.   Psychiatric: Affect normal, Judgment normal, Mood normal.     LABS  Results for orders placed or performed during the hospital encounter of 11/11/19   URINALYSIS CULTURE, IF INDICATED   Result Value Ref Range    Color Yellow     Character Clear     Specific Gravity 1.020 <1.035    Ph 6.5 5.0 - 8.0    Glucose >=1000 (A) Negative mg/dL    Ketones Negative Negative mg/dL    Protein Negative Negative mg/dL    Bilirubin Negative Negative    Urobilinogen, Urine 0.2 Negative    Nitrite Negative Negative    " Leukocyte Esterase Trace (A) Negative    Occult Blood Negative Negative    Micro Urine Req Microscopic    COMP METABOLIC PANEL   Result Value Ref Range    Sodium 129 (L) 135 - 145 mmol/L    Potassium 4.8 3.6 - 5.5 mmol/L    Chloride 93 (L) 96 - 112 mmol/L    Co2 24 20 - 33 mmol/L    Anion Gap 12.0 (H) 0.0 - 11.9    Glucose 518 (HH) 65 - 99 mg/dL    Bun 41 (H) 8 - 22 mg/dL    Creatinine 1.12 0.50 - 1.40 mg/dL    Calcium 9.1 8.5 - 10.5 mg/dL    AST(SGOT) 20 12 - 45 U/L    ALT(SGPT) 21 2 - 50 U/L    Alkaline Phosphatase 85 30 - 99 U/L    Total Bilirubin 0.6 0.1 - 1.5 mg/dL    Albumin 4.3 3.2 - 4.9 g/dL    Total Protein 6.9 6.0 - 8.2 g/dL    Globulin 2.6 1.9 - 3.5 g/dL    A-G Ratio 1.7 g/dL   URINE MICROSCOPIC (W/UA)   Result Value Ref Range    WBC 20-50 (A) /hpf    RBC 0-2 (A) /hpf    Bacteria Few (A) None /hpf    Epithelial Cells Negative /hpf    Hyaline Cast 0-2 /lpf    Budding Yeast Present (A) Absent /hpf    Hyphae Yeast Present (A) Absent /hpf   ESTIMATED GFR   Result Value Ref Range    GFR If African American >60 >60 mL/min/1.73 m 2    GFR If Non African American >60 >60 mL/min/1.73 m 2   ACCU-CHEK GLUCOSE   Result Value Ref Range    Glucose - Accu-Ck 491 (HH) 65 - 99 mg/dL   ACCU-CHEK GLUCOSE   Result Value Ref Range    Glucose - Accu-Ck 447 (HH) 65 - 99 mg/dL   ACCU-CHEK GLUCOSE   Result Value Ref Range    Glucose - Accu-Ck 344 (H) 65 - 99 mg/dL   ACCU-CHEK GLUCOSE   Result Value Ref Range    Glucose - Accu-Ck 307 (H) 65 - 99 mg/dL     *Note: Due to a large number of results and/or encounters for the requested time period, some results have not been displayed. A complete set of results can be found in Results Review.      All labs reviewed by me.  COURSE & MEDICAL DECISION MAKING  Pertinent Labs & Imaging studies reviewed. (See chart for details)Drugs none how much less work rehab we did have some of the drugsFlushing of his Gonzalez catheter to see if we can unclog it.  Patient will have labs drawn and treated  with IV fluids and insulin for his hyperglycemia. The patient will receive IV fluids for hyperglycemia ordered Accu-Chek Glucose, CMP, and UA to evaluate his symptoms. The differential diagnoses include but are not limited to: DKA, hyperglycemia, Gonzalez catheter obstruction, UTI    Patient's Gonzalez catheter was irrigated with saline by nursing staff with that it is started to drain.  His pain was relieved with this.  Patient was significantly hyperglycemic he was given IV fluids as well as insulin with that his come down to a safer range.  His electrolytes do not show any signs of DKA.    Patient's blood glucose improved with IV fluids    Medical Decision Making: At this point time and patient appears to have had hyperglycemia secondary to some noncompliance with his medications.  Patient does not appear to be in DKA.  His Gonzalez catheter is been flushed and with that is no longer obstructed and is now draining freely.     The patient will return for new or worsening symptoms and is stable at the time of discharge.    The patient is referred to a primary physician for blood pressure management, diabetic screening, and for all other preventative health concerns.        DISPOSITION:  Patient will be discharged home in stable condition.    FOLLOW UP:  Catrachito Sterling D.O.  [address] [address]  [phone]    Schedule an appointment as soon as possible for a visit in 2 days        OUTPATIENT MEDICATIONS:  New Prescriptions    No medications on file         FINAL IMPRESSION  1. Hyperglycemia    2. Problem with Gonzalez catheter, initial encounter (HCC)    3. Urinary retention          Mitch WALTER), am scribing for, and in the presence of, Jefferson Rocha M.D.    Electronically signed by: Mitch Castillo), 11/12/2019    Jefferson WALTER M.D. personally performed the services described in this documentation, as scribed by Mitch Smith in my presence, and it is  both accurate and complete. C    The note accurately reflects work and decisions made by me.  Jefferson Rocha  11/12/2019  4:22 AM

## 2019-11-13 NOTE — TELEPHONE ENCOUNTER
Tamara:  Telephone call returned answering machine only please call the patient tomorrow!  Regards, Catrachito Sterling, DO

## 2019-11-13 NOTE — PATIENT INSTRUCTIONS
Call [facility] to Schedule Evaluation  [facility]  Phone: [phone]    Call [facility] to schedule Occupational Therapy  [facility]  [address], [address]  [place]  [address]  Phone:  [phone]

## 2019-11-13 NOTE — PROGRESS NOTES
[facility], [place]  [facility]  [address] [place] [address]  Ph: [phone]    FOLLOW UP PATIENT EVALUATION    Patient Name: Joel Ma   Patient : 1950  Patient Age: 69 y.o.   PCP: Catrachito Sterling D.O.    Examining Physician: Dr. Gi Cota DO  Date of Service: 2019      SUBJECTIVE:   Patient Identification: Joel Ma is a 69 y.o. RHD male with PMH significant for hypertension, hyperlipidemia, diabetes (c/b diabetic macular edema), cataracts s/p resection, JANNIE (currently not using CPAP), chronic kidney disease stage III (seen last 2019 - Dr. Bonner), CAD (status post stent), paroxysmal atrial fibrillation, h/o embolic strokes (on Coumadin, recently on Xarelto as of 2019) and rehabilitation history significant for history of left temporal lobe ischemic CVA (2008), bilateral occipital and cerebellar ischemic stroke (2008), cervical myelopathy secondary cervical stenosis (status post C3-C7 ACDF 2016, posterior C2-6 laminectomy and foraminotomies 2018), lumbar radiculopathy (status post L2-5 laminectomy with bilateral foraminotomies 2018) and is presenting to PM&R clinic for a FOLLOW UP OUTPATIENT evaluation with the following chief complaint/s:    Chief Complaint: Continued weakness in both lower extremities.     Background Information From Initial Note Dated 10/23/19:  Pertinent Procedure History: Per ED summary 10/7/19  Patient has a past surgical history that includes knee scope,single menisectomy; lumbar fusion posterior (); irrigation & debridement ortho (3/13/2012); irrigation & debridement ortho (2012); irrigation & debridement ortho (2012); cataract phaco with iol (2013); irrigation & debridement ortho (Left, 10/29/2012); cervical disk and fusion anterior (2016); other; toe amputation (2011); elbow arthrotomy (); foot surgery; foot surgery; knee arthroplasty total  (1/11/2012); knee revision total (3/13/2012); tendon repair (3/13/2012); knee revision total (11/13/2012); incision and drainage orthopedic (Right, 10/29/2012); other neurological surg; cervical fusion posterior (7/13/2018); cervical laminectomy posterior (7/13/2018); lumbar laminectomy diskectomy (7/13/2018); and zzz cardiac cath.  Dates of Admission/Discharge to ARU:   -7/18/2018 to 8/14/2018: Under the care of Dr. Higinio Pagan after his cervical operation 7/13/18    Date of Last Clinic Visit: 10/23/19  Accompanied by Today: Self  Interval History:    -Patient presented today thinking he would be admitted to ARU.   -States that he now has a problem with his 33 yo son and his girlfriend who are basically living with him and does not hold a job. States his son has a terrible temper. Called the  the other day and got a restraining order against his son.    -Patient is driving.  -Patient is using FWW  -Has Gonzalez catheter now. Had clogged Gonzalez which was irrigated and obstruction removed in ED 11/11. Had cystoscopy 11/5/19.  -Patient on Xarelto now, not Warfarin. States he cannot afford it, however, discussed with vascular and insurance.   -Still with complaint of BLE weakness and inability to do what he needs to do.   -Able to perform ADLs. Unable to cook well due to LE weakness, but is able to feed himself at home.   -Has not had Home Health in while.   -Referred to OT for evaluation and per their notes, patient did not schedule because he did not need it.   -Referred to HH by Dr. Sterling, but patient has not scheduled.     Record Review:  [facility] [place] [facility] dated 10/31/19: Per HPI - patient had Gonzalez catheter removed 10/25 in ED (confirmed via review of Epic notes). Reports he is able to urinate well without the catheter.      UDS 10/31/19:   - Cystometry: Normal bladder compliance, normal detrusor pressure during filling. Sensation of bladder filling at 459cc. Desire to void at 780cc. Strong desire at 836cc. Max  capacity 840cc. No bladder contractions, no leaking observed during filling or with cough. Max voiding P of detrusor 57 cm H2O.   -EMG: Increase in sphincter activity with increased abd P, decreased during voiding.   -Uroflometry: PVR 850cc, flow rate and max flow rate unappreciable.   Impression: Low flow, overflow incontinence, complete retention, large capacity,      Cystoscopy planned 11/5/19 (Dr. Britt).      BUN/Cr 30/1.37 10/25/19     RBUS 10/7/19:  1. Thickened bladder wall which is nonspecific and could represent chronic outlet obstruction and/or cystitis.   2. Debris within urinary bladder which is suspicious for infection.   3. Significant PVR bladder volume.  4. Prostatomegaly.    At some point he had Gonzalez placed back in.   ED Notes 11/11 - Gonzalez catheter clogged, and after irrigated he had pain relief. Also had hyperglycemia.     Review of Systems:  Review of Systems   Constitutional: Negative for chills and fever.   HENT: Negative for hearing loss and tinnitus.    Eyes: Negative for blurred vision and double vision.   Respiratory: Negative for cough and shortness of breath.    Cardiovascular: Negative for chest pain and leg swelling.   Gastrointestinal:        No bowel incontinence or constipation.   Genitourinary:        + Gonzalez catheter.    Musculoskeletal: Negative for falls and joint pain.   Skin: Negative for itching and rash.   Neurological: Negative for sensory change, speech change and focal weakness.   Psychiatric/Behavioral: Negative for memory loss.      All other pertinent positive review of systems are noted above in HPI.     Past Medical History:  Past Medical History:   Diagnosis Date   • Pain 05-03-13    shoulders, hip, right knee, 7/10   • Ventricular ectopy 6/17/2011   • arthritis 6/17/2011    thumb, fingers   • Thyroid mass 7/30/2009    benign lump   • Pneumonia 2002   • Anesthesia     low O2 sat   • Arrhythmia     a-fib   • Arthritis     hip   • Backpain    • CAD (coronary artery  disease)     MIRELLA to distal RCA and prox LAD   • Cataract     left eye surgery   • Dental disorder     full dentures   • Diabetes     insulin, Dr Oconnor, his APN   • High cholesterol    • Hypertension    • MRSA (methicillin resistant Staphylococcus aureus)     history of, nothing current 2016, on clindamycin for rest of life per patient   • Renal disorder     stage 2   • Sleep apnea     bipap   • Stroke (HCC)     2/1/2007, 4/1/2007, right sided weakness; balance disturbance, memory problems      Past Surgical History:   Procedure Laterality Date   • CERVICAL FUSION POSTERIOR  7/13/2018    Procedure: CERVICAL FUSION POSTERIOR/ C2-4;  Surgeon: Boby Marsh M.D.;  Location: [facility];  Service: Neurosurgery   • CERVICAL LAMINECTOMY POSTERIOR  7/13/2018    Procedure: CERVICAL LAMINECTOMY POSTERIOR/ C2-3, C5-6;  Surgeon: Boby Marsh M.D.;  Location: [facility];  Service: Neurosurgery   • LUMBAR LAMINECTOMY DISKECTOMY  7/13/2018    Procedure: LUMBAR LAMINECTOMY DISKECTOMY/ L2-5 LAMI;  Surgeon: Boby Marhs M.D.;  Location: [facility];  Service: Neurosurgery   • CERVICAL DISK AND FUSION ANTERIOR  8/31/2016    Procedure: CERVICAL DISK AND FUSION ANTERIOR C3-7 ;  Surgeon: Alhaji Jaffe M.D.;  Location: [facility];  Service:    • CATARACT PHACO WITH IOL  5/8/2013    Performed by Mame Rehman M.D. at SURGERY SAME DAY [facility]   • IRRIGATION & DEBRIDEMENT ORTHO  11/13/2012    Performed by Gordon Cota M.D. at [facility]   • KNEE REVISION TOTAL  11/13/2012    Performed by Gordon Cota M.D. at [facility]   • IRRIGATION & DEBRIDEMENT ORTHO  11/2/2012    Performed by Gordon Cota M.D. at [facility]   • IRRIGATION & DEBRIDEMENT ORTHO Left 10/29/2012    Procedure: left shoulder, with drain;  Surgeon: Gordon Cota M.D.;  Location: [facility];  Service:    • INCISION AND DRAINAGE  ORTHOPEDIC Right 10/29/2012    Procedure: Right Total Knee I and D and Liner Exchange, with drain;  Surgeon: Gordon Cota M.D.;  Location: SURGERY [facility];  Service:    • IRRIGATION & DEBRIDEMENT ORTHO  3/13/2012    Performed by KAMALJIT SHI at [facility]   • KNEE REVISION TOTAL  3/13/2012    Performed by KAMALJIT SHI at [facility]   • TENDON REPAIR  3/13/2012    Performed by KAMALJIT SHI at [facility]   • KNEE ARTHROPLASTY TOTAL  1/11/2012    Right; Performed by KAMALJIT SHI at [facility]   • TOE AMPUTATION  7/22/2011    Performed by EVELYN JANE at SURGERY [facility]   • ELBOW ARTHROTOMY  2008    right   • LUMBAR FUSION POSTERIOR  1979   • FOOT SURGERY      partial amputation great toe   • FOOT SURGERY      toe surgery left foot   • OTHER      left eye cataract   • OTHER NEUROLOGICAL SURG      neck fusion   • PB KNEE SCOPE,SINGLE MENISECTOMY      right knee   • ZZZ CARDIAC CATH          Current Outpatient Medications:   •  rivaroxaban (XARELTO) 20 MG Tab tablet, Take 1 Tab by mouth with dinner., Disp: 90 Tab, Rfl: 1  •  sulfamethoxazole-trimethoprim (BACTRIM DS) 800-160 MG tablet, Bactrim  mg-160 mg tablet  Take 1 tablet every 12 hours by oral route for 5 days., Disp: , Rfl:   •  ketoconazole (NIZORAL) 2 % Cream, Apply 0.25 g to affected area(s) every day., Disp: 1 Tube, Rfl: 0  •  hydrALAZINE (APRESOLINE) 100 MG tablet, TAKE ONE TABLET BY MOUTH EVERY EIGHT HOURS, Disp: 90 Tab, Rfl: 0  •  metFORMIN (GLUCOPHAGE) 500 MG Tab, TAKE TWO TABLETS BY MOUTH TWICE DAILY WITH MEALS, Disp: 360 Tab, Rfl: 1  •  ezetimibe (ZETIA) 10 MG Tab, Take 1 Tab by mouth every day., Disp: 30 Tab, Rfl: 11  •  insulin glargine (LANTUS) 100 UNIT/ML Solution, Inject 40 Units as instructed every day., Disp: 10 mL, Rfl: 0  •  JARDIANCE 10 MG Tab, TAKE ONE TABLET BY MOUTH ONE TIME DAILY, Disp: 30 Tab, Rfl: 3  •  clindamycin (CLEOCIN) 300 MG Cap, TAKE 1 CAPSULE BY  MOUTH TWICE DAILY, Disp: 180 Cap, Rfl: 0  •  amLODIPine (NORVASC) 10 MG Tab, Take 1 Tab by mouth every day., Disp: 90 Tab, Rfl: 3  •  lisinopril (PRINIVIL, ZESTRIL) 40 MG tablet, Take 1 Tab by mouth 2 times a day., Disp: 200 Tab, Rfl: 3  •  insulin lispro, Human, (HUMALOG KWIKPEN) 100 UNIT/ML Solution Pen-injector injection, Inject 2-10 Units as instructed 4 Times a Day,Before Meals and at Bedtime., Disp: 10 PEN, Rfl: 3  •  Blood Glucose Test Strips, Test strips order: Test strips for Relion meter. Sig: use 3 to 4 x a day and prn ssx high or low sugar #450 RF x 3, Disp: 450 Each, Rfl: 3  •  glucose blood (CONTOUR NEXT TEST) strip, 1 Strip by Other route as needed. USE TO TEST BLOOD SUGAR 4 To 5 TIMES DAILY, Disp: 450 Strip, Rfl: 3  •  potassium chloride SA (KDUR) 20 MEQ Tab CR, Take 1 Tab by mouth every day., Disp: 30 Tab, Rfl: 11  •  metoprolol (TOPROL-XL) 200 MG XL tablet, Take 1 Tab by mouth every day., Disp: 30 Tab, Rfl: 11  •  torsemide (DEMADEX) 10 MG tablet, Take 1 Tab by mouth 2 times a day., Disp: 60 Tab, Rfl: 11  •  clopidogrel (PLAVIX) 75 MG Tab, Take 1 Tab by mouth every day., Disp: 30 Tab, Rfl: 11  •  gabapentin (NEURONTIN) 300 MG Cap, Take 1-2 Caps by mouth 3 times a day. 300mg in AM and noon 600mg in PM (Patient taking differently: Take 300-600 mg by mouth 4 times a day. 300mg in AM and noon 600mg in PM ), Disp: 90 Cap, Rfl: 3  •  Insulin Pen Needle (ADVOCATE INSULIN PEN NEEDLES) 31G X 5 MM Misc, Use tid and prn., Disp: 100 Each, Rfl: 3  •  insulin lispro (HUMALOG) 100 UNIT/ML Solution, Inject 2-10 Units as instructed 4 Times a Day,Before Meals and at Bedtime. For -200 Give 2 units For -250 Give 4 units For -300 Give 6 units For -350 Give 8 units For -400 Give 10 units <60 or >400 Notify MD, Disp: , Rfl:   •  Misc. Devices Misc, Please admit Joel Ma to Acute Rehab due to need for Strengthening and Conditioning.  [phone], Disp: 1 Each, Rfl: 0  •   "nitrofurantoin monohyd macro (MACROBID) 100 MG Cap, Take 1 Cap by mouth 2 times a day., Disp: 10 Cap, Rfl: 0  •  Misc. Devices Misc, Use tid and prn [id number] # 100, Disp: 100 Each, Rfl: 11  •  Misc. Devices Misc, Relion glucometer, Disp: 1 Each, Rfl: 0  •  NON SPECIFIED, Four wheeled walker with seat - [facility]., Disp: 1 Each, Rfl: 0  Allergies   Allergen Reactions   • Demerol      Makes me stop breathing.  Doesn't like because it makes him high   • Fentanyl      \"Sensitive\"   • Statins [Hmg-Coa-R Inhibitors] Myalgia     Rxn - ongoing          Past Social History:  Social History     Socioeconomic History   • Marital status: Single     Spouse name: Not on file   • Number of children: Not on file   • Years of education: Not on file   • Highest education level: Not on file   Occupational History   • Not on file   Social Needs   • Financial resource strain: Not on file   • Food insecurity:     Worry: Not on file     Inability: Not on file   • Transportation needs:     Medical: Not on file     Non-medical: Not on file   Tobacco Use   • Smoking status: Former Smoker     Packs/day: 4.00     Years: 0.50     Pack years: 2.00     Types: Cigarettes     Last attempt to quit: 1974     Years since quittin.8   • Smokeless tobacco: Never Used   Substance and Sexual Activity   • Alcohol use: No   • Drug use: No   • Sexual activity: Yes   Lifestyle   • Physical activity:     Days per week: Not on file     Minutes per session: Not on file   • Stress: Not on file   Relationships   • Social connections:     Talks on phone: Not on file     Gets together: Not on file     Attends Gnosticist service: Not on file     Active member of club or organization: Not on file     Attends meetings of clubs or organizations: Not on file     Relationship status: Not on file   • Intimate partner violence:     Fear of current or ex partner: Not on file     Emotionally abused: Not on file     Physically abused: Not on file     Forced " sexual activity: Not on file   Other Topics Concern   •  Service No   • Blood Transfusions Yes   • Caffeine Concern Yes   • Occupational Exposure No   • Hobby Hazards No   • Sleep Concern No   • Stress Concern No   • Weight Concern Yes   • Special Diet No   • Back Care No   • Exercise Yes   • Bike Helmet No   • Seat Belt Yes   • Self-Exams Yes   Social History Narrative    ** Merged History Encounter **           Family History:  Family History   Problem Relation Age of Onset   • Diabetes Mother    • Cancer Father         lung cancer   • Heart Disease Father         triple bypass   • Diabetes Other    • Hypertension Other    • Cancer Other      Depression and Opioid Screening  PHQ-9:  Depression Screen (PHQ-2/PHQ-9) 12/13/2018 1/2/2019 11/13/2019   PHQ-2 Total Score - - -   PHQ-2 Total Score - - -   PHQ-2 Total Score 0 0 2   PHQ-9 Total Score - - -   PHQ-9 Total Score - - 4     Interpretation of PHQ-9 Total Score   Score Severity   1-4 No Depression   5-9 Mild Depression   10-14 Moderate Depression   15-19 Moderately Severe Depression   20-27 Severe Depression     Opioid Risk Score: 0  Interpretation of Opioid Risk Score   Score 0-3 = Low risk of abuse. Do UDS at least once per year.  Score 4-7 = Moderate risk of abuse. Do UDS 1-4 times per year.  Score 8+ = High risk of abuse. Refer to specialist.      OBJECTIVE:   Vital Signs:  Vitals:    11/13/19 0905   BP: 112/74   Pulse: 61   Temp: 36.5 °C (97.7 °F)   SpO2: 90%        Pertinent Labs:  Lab Results   Component Value Date/Time    SODIUM 129 (L) 11/11/2019 11:08 PM    POTASSIUM 4.8 11/11/2019 11:08 PM    CHLORIDE 93 (L) 11/11/2019 11:08 PM    CO2 24 11/11/2019 11:08 PM    GLUCOSE 518 (HH) 11/11/2019 11:08 PM    BUN 41 (H) 11/11/2019 11:08 PM    CREATININE 1.12 11/11/2019 11:08 PM    CREATININE 1.21 02/17/2011 07:28 AM    BUNCREATRAT 16 02/17/2011 07:28 AM    GLOMRATE >59 02/17/2011 07:28 AM       Lab Results   Component Value Date/Time    HBA1C 11.0 (A)  08/09/2019 10:47 AM       Lab Results   Component Value Date/Time    WBC 5.8 10/25/2019 01:00 PM    WBC 5.2 03/25/2011 10:16 AM    RBC 6.24 (H) 10/25/2019 01:00 PM    RBC 3.82 (L) 03/25/2011 10:16 AM    HEMOGLOBIN 18.2 (H) 10/25/2019 01:00 PM    HEMATOCRIT 53.4 (H) 10/25/2019 01:00 PM    MCV 85.6 10/25/2019 01:00 PM    MCV 83 03/25/2011 10:16 AM    MCH 29.2 10/25/2019 01:00 PM    MCH 28.3 03/25/2011 10:16 AM    MCHC 34.1 10/25/2019 01:00 PM    MPV 11.3 10/25/2019 01:00 PM    NEUTSPOLYS 75.50 (H) 10/25/2019 01:00 PM    LYMPHOCYTES 13.70 (L) 10/25/2019 01:00 PM    MONOCYTES 9.10 10/25/2019 01:00 PM    EOSINOPHILS 0.90 10/25/2019 01:00 PM    BASOPHILS 0.30 10/25/2019 01:00 PM    HYPOCHROMIA 1+ 02/05/2014 06:27 AM       Lab Results   Component Value Date/Time    ASTSGOT 20 11/11/2019 11:08 PM    ALTSGPT 21 11/11/2019 11:08 PM        Physical Exam:   Physical Exam   Constitutional: He is oriented to person, place, and time and well-developed, well-nourished, and in no distress.   Unkempt.    HENT:   Head: Normocephalic and atraumatic.   Eyes: Conjunctivae are normal. No scleral icterus. Right eye exhibits no nystagmus. Left eye exhibits no nystagmus.   Neck:   Range of motion appears normal in all planes   Cardiovascular:   Lower extremities without peripheral edema.  Extremities warm and well-perfused.   Pulmonary/Chest: No accessory muscle usage. No respiratory distress.   Abdominal: Soft. Normal appearance. He exhibits no distension.   Musculoskeletal: Normal range of motion.         General: No edema.   Neurological: He is alert and oriented to person, place, and time. No cranial nerve deficit. He exhibits normal muscle tone. Coordination abnormal.   Skin: Skin is warm, dry and intact. He is not diaphoretic.   Psychiatric: Mood and affect normal.   Nursing note and vitals reviewed.     Motor Exam Upper Extremities   ? Myotome R L   Shoulder flexion C5 5 4-   Elbow flexion C5 5 4   Wrist extension C6 5 5   Elbow  extension C7 5 5   Finger flexion C8 5 5   Finger abduction T1 4- 5       Motor Exam Lower Extremities  ? Myotome R L   Hip flexion L2 5- 5-   Knee extension L3 5 5   Ankle dorsiflexion L4 1+ 1+   Toe extension L5 1+ 1+   Ankle plantarflexion S1 1+ 1+     Able to stand supported. Reports unable to perform toe walking or heel walking - not attempted.     Imaging:   Reviewed from [facility] [place] records  UDS 10/31/19:   - Cystometry: Normal bladder compliance, normal detrusor pressure during filling. Sensation of bladder filling at 459cc. Desire to void at 780cc. Strong desire at 836cc. Max capacity 840cc. No bladder contractions, no leaking observed during filling or with cough. Max voiding P of detrusor 57 cm H2O.   -EMG: Increase in sphincter activity with increased abd P, decreased during voiding.   -Uroflometry: PVR 850cc, flow rate and max flow rate unappreciable.   Impression: Low flow, overflow incontinence, complete retention, large capacity     RBUS 10/7/19:  1. Thickened bladder wall which is nonspecific and could represent chronic outlet obstruction and/or cystitis.   2. Debris within urinary bladder which is suspicious for infection.   3. Significant PVR bladder volume.  4. Prostatomegaly.    ASSESSMENT/PLAN: Joel Ma is a 69 y.o. RHD male with PMH significant for hypertension, hyperlipidemia, diabetes (c/b diabetic macular edema), cataracts s/p resection, JANNIE (currently not using CPAP), chronic kidney disease stage III (seen last 8/16/2019 - Dr. Bonner), CAD (status post stent), paroxysmal atrial fibrillation, h/o embolic strokes (on Coumadin, now Xarelto as of Nov 2019) and rehabilitation history significant for history of left temporal lobe ischemic CVA (2/11/2008), bilateral occipital and cerebellar ischemic stroke (4/20/2008), cervical myelopathy secondary cervical stenosis (status post C3-C7 ACDF 8/31/2016, posterior C2-6 laminectomy and foraminotomies 7/13/2018), lumbar radiculopathy  "(status post L2-5 laminectomy with bilateral foraminotomies 7/13/2018), here 10/23/2019 for outpatient evaluation. The following plan was discussed with the patient who is in agreement.     Visit Diagnoses     ICD-10-CM   1. Cerebrovascular accident (CVA), unspecified mechanism ([facility]) I63.9   2. Impaired mobility and ADLs Z74.09   3. Weakness of both lower extremities R29.898   4. Controlled type 2 diabetes mellitus with diabetic polyneuropathy, with long-term current use of insulin (HCC) E11.42    Z79.4   5. Lumbar stenosis with neurogenic claudication M48.062   6. Cervical myelopathy (HCC) G95.9   7. Benign prostatic hyperplasia with incomplete bladder emptying N40.1    R39.14        Rehab/Neuro: Established problem.   1. H/o left temporal lobe ischemic CVA (2/11/2008), bilateral occipital and cerebellar ischemic stroke (4/20/2008): Pt reports he did have residual right hemiparesis and ataxia, ultimately requiring DME for ambulation, but eventually able to be independent. Prior to these strokes, patient was totally independent, running up to 2-3 miles daily with his dogs.      2. Cervical myelopathy secondary cervical stenosis (status post C3-C7 ACDF 8/31/2016 - Leppla, posterior C2-6 laminectomy and foraminotomies 7/13/2018 - Salma), lumbar radiculopathy (status post L2-5 laminectomy with bilateral foraminotomies 7/13/2018 - Salma).     - He stayed in ARU after this surgery 7/18-8/14/2018 under the care of Dr. Higinio Pagan. Pt notes he did improve to some degree, but has completely plateaued.   - Continues to lack neuro-change since last rehab admission.  - Per patient he is able to perform ADLs at home and uses FWW at home. Has ramped home.   - Patient referred to OP occupational therapy at last visit to eval extent of his OT needs, patient did not schedule - per referral documentation \"Patient refuses to schedule as he is still confused as to why he needs OT, He will reach out to his referring provider to " "clarify, and call back to schedule if he changes his mind.\" Discussed with patient in detail today regarding need for therapy and multiple.  - Patient has open HH consult which he has not scheduled.    - On Plavix (CAD s/p stenting) and Coumadin (pAfib + h/o CVA on plavix and ASA)  - Continues to have no identifiable Speech needs for cognition or swallow.  - Patient does not have any spasticity, neuropathic pain, neurogenic bowel issues, mood/sleep issues, skin breakdown issues, no dysphagia, intact memory/cognition for age. Patient currently has Gonzalez catheter for urinary retention, which per Urology notes, is more likely secondary to BPH, not new neurogenic bladder.   - REQUEST Cystoscopy report from [facility] [place] ~11/5/19 (Dr. Britt).    Patient continues to lack requirements for admission to inpatient rehabilitation. He only requires physical therapy. Medically, though he has many co-morbidities, they are being well managed by the appropriate consultants. Patient reports he is independent with ADLs, living independently, and driving. Patient would greatly benefit from Home Health for home eval, DME eval, PT/OT eval and treatment. Discussed with patient in detail the requirements for ARU admission and that at this time he does not meet those requirements, despite what insurance is telling him. He must be able to make weekly progress and at his current level of function I believe he would be better served by Home Health or OP therapies. Ultimately he must be able to continue his exercises at home to continue making progress. Emphasized to patient the need to schedule Home Health and/or outpatient Occupational Therapy for evaluation, contact information was provided to him. Believe patient would be better suited for Home Health at this point in time, but if not approved, will suffice to have OP therapy.      :  1. Urinary Retention: Recently seen in ED for acute onset urinary retention end of 9/2019. Had Urology f/u " 10/31/19 and currently with indwelling catheter. RBUS from 10/7/19 with prostatomegaly. No other NEW neuro deficits to susupect underlying neuro-etiology of new onset urinary retention. Will await complete Urology eval prior to investigating other neurogenic cause of urinary retention.   - REQUEST Cystoscopy report from [facility] [place] ~11/5/19 (Dr. Britt).  - Will follow up Urology [facility] when patient sees them again 12/3/19.      Co-Morbidities:  1. Paroxysmal Atrial Fibrillation with h/o ischemic embolic strokes 2008: Initially on ASA + Plavix, but re-stroked 2 months later and placed on Coumadin. Followed closely by Cardiology last visit 9/11/2019. Now on Xarelto, not Coumadin, however, pt stating he cannot afford.   2. 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 Followed closely by PCP - last visit 10/3/19. Recent ED visit with hyperglycemia.   3. CHF/CAD/HLD/HTN: Last seen by Cardiology clinic 9/11/2019 and has follow up q6 months. Managing HLD with Zetia, will keep on Plavix and Coumadin, managing HTN with amlodipine/Lisinopril. Now on Xarelto, not Coumadin, however, pt stating he cannot afford.   4. 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   5. JANNIE - not recently followed by Pulm, has CPAP.     Follow up: 4-6 months    Total time spent face to face with patient was 52 minutes. Greater then 50% of my visit was spent on counseling and coordination of care regarding the primary medical diagnosis and secondary medical complications as aforementioned in the assessment and plan. Extensive discussion involved the patient.    Please note that this dictation was created using voice recognition software. I have made every reasonable attempt to correct obvious errors but there may be errors of grammar and content that I may have overlooked prior to finalization of this note.    Dr. Gi Cota DO, MS  Department of Physical Medicine & Rehabilitation  Neuro  Rehabilitation Clinic  [facility]  11/13/2019 9:13 AM

## 2019-11-17 NOTE — ED NOTES
ED Positive Culture Follow-up/Notification Note:    Date: 11/17/2019     Patient seen in the ED on 11/11/2019 for suprapubic pain after clogged Gonzalez, and hyperglycemia. H/o recent UTI, enlarged prostate. Gonzalez irrigated in ED and began to drain, urine culture then collected. No CVA tenderness on exam.   1. Hyperglycemia    2. Problem with Gonzalez catheter, initial encounter ([facility])    3. Urinary retention       Discharge Medication List as of 11/12/2019  3:42 AM          Allergies: Demerol; Fentanyl; and Statins [hmg-coa-r inhibitors]     Vitals:    11/12/19 0231 11/12/19 0301 11/12/19 0331 11/12/19 0346   BP: 114/62 102/56 107/54 123/70   Pulse: 78 80 70 76   Resp:    16   Temp:    36.9 °C (98.5 °F)   TempSrc:    Temporal   SpO2: 92% 93% 95% 96%   Weight:       Height:           Final cultures:   Results     Procedure Component Value Units Date/Time    URINE CULTURE(NEW) [[id number]]  (Abnormal)  (Susceptibility) Collected:  11/12/19 0000    Order Status:  Completed Specimen:  Urine Updated:  11/16/19 1102     Significant Indicator POS     Source UR     Site -     Culture Result Mixed skin elly 10-50,000 cfu/mL including yeast      Acinetobacter baumannii/haemolyticus  ,000 cfu/mL      Susceptibility     Acinetobacter baumannii/haemolyticus (1)     Antibiotic Interpretation Microscan Method Status    Amikacin Sensitive <=16 mcg/mL CYNTHIA Final    Ceftazidime Sensitive 4 mcg/mL CYNTHIA Final    Cefotaxime Sensitive 8 mcg/mL CYNTHIA Final    Ciprofloxacin Sensitive <=1 mcg/mL CYNTHIA Final    Ampicillin/sulbactam Sensitive <=8/4 mcg/mL CYNTHIA Final    Cefepime Sensitive 4 mcg/mL CYNTHIA Final    Tobramycin Sensitive <=4 mcg/mL CYNTHIA Final    Gentamicin Sensitive <=4 mcg/mL CYNTHIA Final    Levofloxacin Sensitive <=2 mcg/mL CYNTHIA Final    Trimeth/Sulfa Sensitive <=2/38 mcg/mL CYNTHIA Final                   URINALYSIS CULTURE, IF INDICATED [[id number]]  (Abnormal) Collected:  11/12/19 0000    Order Status:  Completed Specimen:  Urine Updated:   11/12/19 0054     Color Yellow     Character Clear     Specific Gravity 1.020     Ph 6.5     Glucose >=1000 mg/dL      Ketones Negative mg/dL      Protein Negative mg/dL      Bilirubin Negative     Urobilinogen, Urine 0.2     Nitrite Negative     Leukocyte Esterase Trace     Occult Blood Negative     Micro Urine Req Microscopic          Plan:   No therapy prescribed for isolated organism. Although patients with chronic catheter may be colonized with bacteria, patient has never cultured isolated organism in the past. Will treat as true UTI. Although micro results show susceptibility to ampicillin/sulbactam, beta-lactamase inhibitors vary in activity against Acinetobacter, and micro lab do not have susceptibilities for Augmentin.     Left message and sent letter to inform of culture results and initiate prescription for levofloxacin upon call back in light of potential interaction with lisinopril home medication with Bactrim:    Levofloxacin 750 mg tablets, 1 tablet by mouth daily x 7 days, #7, no refills. Per protocol - Dr. Sandra Glover, PharmD

## 2019-11-19 NOTE — TELEPHONE ENCOUNTER
Was the patient seen in the last year in this department? Yes lov 10/03/19    Does patient have an active prescription for medications requested? No     Received Request Via: Pharmacy

## 2019-11-22 NOTE — TELEPHONE ENCOUNTER
[facility]    Pt reports he tolerates Xarelto well.  No s/s of bleeding. Pt reports he can afford it at this time.  Follow up again in 1-2 months.    Brett Coates, PharmD

## 2019-12-11 NOTE — OP THERAPY EVALUATION
Outpatient Occupational Therapy  INITIAL NEUROLOGICAL EVALUATION    [facility] Occupational Therapy [address]  [address] [place] [id number]  Phone:  [phone]  Fax:  [phone]    Date of Evaluation: 2019    Patient: Joel Ma  YOB: 1950  MRN: [id number]     Referring Provider: Gi Cota D.O.  [address] [place] [address]   Referring Diagnosis Impaired mobility and ADLs [Z74.09];Weakness of both lower extremities [R29.898];Weakness of both upper extremities [R29.898]     Time Calculation  Start time: 0100  Stop time: 0200 Time Calculation (min): 60 minutes       Occupational Therapy Occurrence Codes    Date of onset of impairment:  18   Date of occupational therapy care plan established or reviewed:  19   Date of occupational therapy treatment started:  19          Chief Complaint: Extremity Weakness (LE weakness)    Visit Diagnoses     ICD-10-CM   1. Impaired mobility and ADLs Z74.09   2. Weakness of both lower extremities R29.898   3. Weakness of both upper extremities R29.898       Subjective:   History of Present Illness:     Date of onset:  2018    Mechanism of injury:  Pt s/p multiple cervical/lumbar surgeries and CVAs with chronic debility, BLE weakness, decreased balance, and decreased activity tolerance affecting his ability to walk greater than household distances.  Quality of life:  Poor  Prior level of function:  Independent ADLs/IADLs, funct mobility with FWW, and driving  Pain:     Current pain ratin  Social Support:     Patient lives at: mobile home, ramp.    Lives with:  Alone (2 dogs)  Hand dominance:  Right  Treatments:     Previous treatment:  Physical therapy and occupational therapy    Current treatment:  Physical therapy  Activities of Daily Living:     Patient reported ADL status: Mod I ADLs and kingston catheter management at home.  Mod I meal prep, light housekeeping, and laundry.  Mod I BR  "transfers and funct mobility with FWW.  Independent driving.  Pt has a shower chair, grab bars, and standard toilet where he uses his sink for support.  Pt trialed a RTS but reported it was uncomfortable.  Patient Goals:     Other patient goals:  \"to walk more and get my legs stronger\"      Past Medical History:   Diagnosis Date   • Anesthesia     low O2 sat   • Arrhythmia     a-fib   • arthritis 6/17/2011    thumb, fingers   • Arthritis     hip   • Backpain    • CAD (coronary artery disease)     MIRELLA to distal RCA and prox LAD   • Cataract     left eye surgery   • Dental disorder     full dentures   • Diabetes     insulin, Dr Oconnor, his APN   • High cholesterol    • Hypertension    • MRSA (methicillin resistant Staphylococcus aureus)     history of, nothing current 2016, on clindamycin for rest of life per patient   • Pain 05-03-13    shoulders, hip, right knee, 7/10   • Pneumonia 2002   • Renal disorder     stage 2   • Sleep apnea     bipap   • Stroke (HCC)     2/1/2007, 4/1/2007, right sided weakness; balance disturbance, memory problems   • Thyroid mass 7/30/2009    benign lump   • Ventricular ectopy 6/17/2011     Past Surgical History:   Procedure Laterality Date   • CERVICAL FUSION POSTERIOR  7/13/2018    Procedure: CERVICAL FUSION POSTERIOR/ C2-4;  Surgeon: Boby Marsh M.D.;  Location: [facility];  Service: Neurosurgery   • CERVICAL LAMINECTOMY POSTERIOR  7/13/2018    Procedure: CERVICAL LAMINECTOMY POSTERIOR/ C2-3, C5-6;  Surgeon: Boby Marsh M.D.;  Location: [facility];  Service: Neurosurgery   • LUMBAR LAMINECTOMY DISKECTOMY  7/13/2018    Procedure: LUMBAR LAMINECTOMY DISKECTOMY/ L2-5 LAMI;  Surgeon: Boby Marsh M.D.;  Location: [facility];  Service: Neurosurgery   • CERVICAL DISK AND FUSION ANTERIOR  8/31/2016    Procedure: CERVICAL DISK AND FUSION ANTERIOR C3-7 ;  Surgeon: Alhaji Jaffe M.D.;  Location: [facility];  " Service:    • CATARACT PHACO WITH IOL  2013    Performed by Mame Rehman M.D. at SURGERY SAME DAY [facility]   • IRRIGATION & DEBRIDEMENT ORTHO  2012    Performed by Gordon Cota M.D. at SURGERY [facility]   • KNEE REVISION TOTAL  2012    Performed by Gordon Cota M.D. at SURGERY [facility]   • IRRIGATION & DEBRIDEMENT ORTHO  2012    Performed by Gordon Cota M.D. at SURGERY [facility]   • IRRIGATION & DEBRIDEMENT ORTHO Left 10/29/2012    Procedure: left shoulder, with drain;  Surgeon: Gordon Cota M.D.;  Location: SURGERY [facility];  Service:    • INCISION AND DRAINAGE ORTHOPEDIC Right 10/29/2012    Procedure: Right Total Knee I and D and Liner Exchange, with drain;  Surgeon: Gordon Cota M.D.;  Location: SURGERY [facility];  Service:    • IRRIGATION & DEBRIDEMENT ORTHO  3/13/2012    Performed by KAMALJIT SHI at SURGERY [facility]   • KNEE REVISION TOTAL  3/13/2012    Performed by KAMALJIT SHI at [facility]   • TENDON REPAIR  3/13/2012    Performed by KAMALJIT SHI at [facility]   • KNEE ARTHROPLASTY TOTAL  2012    Right; Performed by KAMALJIT SHI at [facility]   • TOE AMPUTATION  2011    Performed by EVELYN JANE at SURGERY [facility]   • ELBOW ARTHROTOMY      right   • LUMBAR FUSION POSTERIOR     • FOOT SURGERY      partial amputation great toe   • FOOT SURGERY      toe surgery left foot   • OTHER      left eye cataract   • OTHER NEUROLOGICAL SURG      neck fusion   • PB KNEE SCOPE,SINGLE MENISECTOMY      right knee   • ZZZ CARDIAC CATH       Social History     Tobacco Use   • Smoking status: Former Smoker     Packs/day: 4.00     Years: 0.50     Pack years: 2.00     Types: Cigarettes     Last attempt to quit: 1974     Years since quittin.9   • Smokeless tobacco: Never Used   Substance Use Topics   • Alcohol use: No     Family and Occupational History     Patient  does not qualify to have social determinant information on file (likely too young).   Socioeconomic History   • Marital status: Single     Spouse name: Not on file   • Number of children: Not on file   • Years of education: Not on file   • Highest education level: Not on file   Occupational History   • Not on file       Objective:   Active Range of Motion:   Upper extremity (left):     All left upper extremity active range of motion: All within functional limits    AROM Left Shoulder Flexion: 90 degrees.    AROM Left Shoulder Abduction: 90 degrees.  Upper extremity (right):     All right upper extremity active range of motion: All within functional limits    AROM Right Shoulder Flexion: 90 degrees.    AROM Right Shoulder Abduction: 90 degrees.  Active range of motion comments: Right SF flexion contracture at PIP joint (old injury).      Passive Range of Motion:   Upper extremity (left):     All left upper extremity passive range of motion: All within functional limits  Upper extremity (right):     All right upper extremity passive range of motion: All within functional limits    Strength:   Upper extremity strength (left):     Shoulder flexion: 3-    Shoulder abduction: 3-    Shoulder external rotation:  4-    Shoulder internal rotation: 4-    Elbow flexion: 4    Elbow extension: 4    Forearm pronation: 4    Forearm supination: 4    Wrist flexion: 4    Wrist extension: 4    Fingers flexion: 4    Fingers extension: 4-    Fingers abduction: 4-    Fingers adduction: 4-  Upper extremity strength (right):    Shoulder flexion: 3-    Shoulder abduction: 3-    Shoulder external rotation: 4-    Shoulder internal rotation: 4-    Elbow flexion: 4    Elbow extension: 4    Forearm pronation: 4    Forearm supination: 4    Wrist flexion: 4    Wrist extension: 4    Fingers flexion: 4    Fingers extension: 4-    Fingers abduction: 4-    Fingers adduction: 4-    , Prehension, Pinch:  /Prehension (left):      strength:  Within functional limits (50lbs)    Opposition: Within functional limits  /Prehension (right):      strength: Within functional limits (40lbs)    Opposition: Within functional limits    Strength Comments:  Significant bilateral thenar wasting    Tone, Sensation and Coordination:   Tone:     Left upper extremity muscle tone: Normal    Right upper extremity muscle tone: Normal    Sensation   Upper extremity (left):     Light touch: Intact  Upper extremity (right):     Light touch: Intact    Coordination   Upper extremity (left):     Fine motor: Within functional limits    Gross motor: Within functional limits    Finger to finger: Within functional limits    Finger touch to nose: Within functional limits  Upper extremity (right):     Fine motor: Within functional limits    Gross motor: Within functional limits    Finger to finger: Within functional limits    Finger touch to nose: Within functional limits    Cognition:     Orientation: normal to time, normal to place and normal to person    Direction following: three step    Short term memory: impaired (able to recall 2/3 items after 2 minutes)    Long term memory: intact    Attention span: intact    Sequencing: intact    Problem solving: impaired    Judgement and safety awareness: impaired    Hearing: intact    Cognition Comments:  Decreased self-awareness    Vision/Perception:     Visual tracking: intact    Convergence: intact    Visual attentions: intact    Visual acuity: intact    Visual scanning: intact    R/L neglect: not present    Spatial relations: intact    Vision assistive device(s): none    Postural Control (Balance)     Sitting (static): Good    Sitting (dynamic): Good    Standing (static): Fair    Standing (dynamic): Fair    Ambulation: Level Surfaces   Ambulation with assistive device: independent (FWW)    Additional Level Surfaces Ambulation Details  Poor activity tolerance for functional mobility.  Max assist to stand from toilet when fatigued,  see next section.        Therapeutic Treatments and Modalities:    1. Self Care ADL Training (CPT 02585)    Therapeutic Treatments and Modalities Summary: Pt was unable to stand from toilet on 2 occasions.  Pt required max assist to stand from standard toilet prior to evaluation and pt required max assist to stand from higher toilet with grab bar during time of evaluation.  Pt had locked the door and was unable to reach the door from the toilet to unlock it for assistance.  Pt required full assist to manage kingston catheter including emptying bag and threading through his pants as he was fatigued from sitting on toilet for an extended period of time along with mod assist to manage his clothing.  Pt's kingston bag was leaking urine onto the floor and required full assist to place plastic bag over kingston bag and clean the floor.  Pt educated on and given hand-out to order table top nail clipper as he reported he had difficulty cutting his right fingernails.  Reviewed current UB HEP from previous OT.    Time-based treatments/modalities:  Therapeutic activity minutes (CPT 51736): 15 minutes        Assessment, Response and Plan:   Impairments: abnormal ADL function, abnormal muscle firing, abnormal or restricted ROM, activity intolerance, impaired functional mobility, impaired balance, impaired physical strength, limited mobility and safety issue    Assessment details:  Pt is a 69 y.o male s/p multiple cervical/lumbar surgeries and CVAs who presents to outpatient OT functioning below baseline secondary to chronic debility including BLE > UE weakness, decreased balance, and poor activity tolerance affecting his ability to walk greater than household distances and perform his ADLs/transfers when he is fatigued.  Pt was unable to transfer off of a toilet and manage his kingston catheter/clothing at this facility without max assist as he reported his legs were weak.  He was initially stuck in the bathroom as he was unable to stand to  unlock the door for assistance.  Pt reports he is able to care for himself in his home environment.  Pt is unsafe to travel to outpatient therapy as described above due to his chronic debility.  At this point, pt's needs are more appropriate for home health physical therapy.  Pt in agreement with plan.  No further OT recommendations at this time.  Evaluation only.      Barriers to therapy:  Comorbidities and poorly tolerated treatments  Goals:   Short Term Goals:   N/A  OT short term goals timespan: N/A.    Long Term Goals:   N/A  OT long term goal timespan: N/A.    Plan:   Therapy options:  No further treatment needed and referred back to MD  Frequency: N/A.  Discussed with:  Patient  Plan details:  Evaluation only.              Referring provider co-signature:  I have reviewed this plan of care and my co-signature certifies the need for services.  Certification Dates:   From 12/11/19    To 12/11/19    Physician Signature: ________________________________ Date: ______________

## 2020-01-01 ENCOUNTER — TELEPHONE (OUTPATIENT)
Dept: VASCULAR LAB | Facility: MEDICAL CENTER | Age: 70
End: 2020-01-01

## 2020-01-01 ENCOUNTER — HOSPITAL ENCOUNTER (EMERGENCY)
Facility: MEDICAL CENTER | Age: 70
DRG: 871 | End: 2020-05-01
Attending: EMERGENCY MEDICINE
Payer: MEDICARE

## 2020-01-01 ENCOUNTER — APPOINTMENT (OUTPATIENT)
Dept: RADIOLOGY | Facility: MEDICAL CENTER | Age: 70
DRG: 871 | End: 2020-01-01
Attending: INTERNAL MEDICINE
Payer: MEDICARE

## 2020-01-01 ENCOUNTER — APPOINTMENT (OUTPATIENT)
Dept: RADIOLOGY | Facility: MEDICAL CENTER | Age: 70
DRG: 871 | End: 2020-01-01
Attending: EMERGENCY MEDICINE
Payer: MEDICARE

## 2020-01-01 ENCOUNTER — TELEPHONE (OUTPATIENT)
Dept: MEDICAL GROUP | Facility: LAB | Age: 70
End: 2020-01-01

## 2020-01-01 ENCOUNTER — TELEPHONE (OUTPATIENT)
Dept: PHYSICAL THERAPY | Facility: REHABILITATION | Age: 70
End: 2020-01-01

## 2020-01-01 ENCOUNTER — APPOINTMENT (OUTPATIENT)
Dept: CARDIOLOGY | Facility: MEDICAL CENTER | Age: 70
DRG: 871 | End: 2020-01-01
Attending: INTERNAL MEDICINE
Payer: MEDICARE

## 2020-01-01 ENCOUNTER — HOME HEALTH ADMISSION (OUTPATIENT)
Dept: HOME HEALTH SERVICES | Facility: HOME HEALTHCARE | Age: 70
End: 2020-01-01
Payer: MEDICARE

## 2020-01-01 ENCOUNTER — HOSPITAL ENCOUNTER (OUTPATIENT)
Facility: MEDICAL CENTER | Age: 70
End: 2020-03-17
Attending: EMERGENCY MEDICINE | Admitting: HOSPITALIST
Payer: MEDICARE

## 2020-01-01 ENCOUNTER — APPOINTMENT (OUTPATIENT)
Dept: NEUROLOGY | Facility: MEDICAL CENTER | Age: 70
End: 2020-01-01
Payer: MEDICARE

## 2020-01-01 ENCOUNTER — PATIENT OUTREACH (OUTPATIENT)
Dept: HEALTH INFORMATION MANAGEMENT | Facility: OTHER | Age: 70
End: 2020-01-01

## 2020-01-01 ENCOUNTER — HOSPITAL ENCOUNTER (INPATIENT)
Facility: MEDICAL CENTER | Age: 70
LOS: 6 days | DRG: 871 | End: 2020-05-09
Attending: EMERGENCY MEDICINE | Admitting: INTERNAL MEDICINE
Payer: MEDICARE

## 2020-01-01 ENCOUNTER — PATIENT OUTREACH (OUTPATIENT)
Dept: MEDICAL GROUP | Facility: LAB | Age: 70
End: 2020-01-01

## 2020-01-01 ENCOUNTER — APPOINTMENT (OUTPATIENT)
Dept: PHYSICAL MEDICINE AND REHAB | Facility: REHABILITATION | Age: 70
End: 2020-01-01
Payer: MEDICARE

## 2020-01-01 VITALS
WEIGHT: 273.81 LBS | RESPIRATION RATE: 20 BRPM | SYSTOLIC BLOOD PRESSURE: 79 MMHG | TEMPERATURE: 98.8 F | BODY MASS INDEX: 34.05 KG/M2 | DIASTOLIC BLOOD PRESSURE: 56 MMHG | HEIGHT: 75 IN | OXYGEN SATURATION: 78 % | HEART RATE: 98 BPM

## 2020-01-01 VITALS
BODY MASS INDEX: 30.94 KG/M2 | HEIGHT: 77 IN | DIASTOLIC BLOOD PRESSURE: 80 MMHG | HEART RATE: 96 BPM | TEMPERATURE: 99.9 F | SYSTOLIC BLOOD PRESSURE: 139 MMHG | OXYGEN SATURATION: 96 % | RESPIRATION RATE: 20 BRPM | WEIGHT: 262 LBS

## 2020-01-01 VITALS
DIASTOLIC BLOOD PRESSURE: 71 MMHG | BODY MASS INDEX: 27.73 KG/M2 | OXYGEN SATURATION: 98 % | WEIGHT: 227.74 LBS | SYSTOLIC BLOOD PRESSURE: 93 MMHG | HEART RATE: 105 BPM | TEMPERATURE: 97.5 F | HEIGHT: 76 IN | RESPIRATION RATE: 18 BRPM

## 2020-01-01 DIAGNOSIS — N18.1 CKD (CHRONIC KIDNEY DISEASE), STAGE I: ICD-10-CM

## 2020-01-01 DIAGNOSIS — I50.20 ACC/AHA STAGE C SYSTOLIC CONGESTIVE HEART FAILURE (HCC): ICD-10-CM

## 2020-01-01 DIAGNOSIS — Z79.01 CHRONIC ANTICOAGULATION: ICD-10-CM

## 2020-01-01 DIAGNOSIS — Z79.4 CONTROLLED TYPE 2 DIABETES MELLITUS WITH DIABETIC POLYNEUROPATHY, WITH LONG-TERM CURRENT USE OF INSULIN (HCC): ICD-10-CM

## 2020-01-01 DIAGNOSIS — E11.42 CONTROLLED TYPE 2 DIABETES MELLITUS WITH DIABETIC POLYNEUROPATHY, WITH LONG-TERM CURRENT USE OF INSULIN (HCC): ICD-10-CM

## 2020-01-01 DIAGNOSIS — I48.91 ATRIAL FIBRILLATION WITH RAPID VENTRICULAR RESPONSE (HCC): ICD-10-CM

## 2020-01-01 DIAGNOSIS — M48.02 CERVICAL STENOSIS OF SPINE: ICD-10-CM

## 2020-01-01 DIAGNOSIS — Z91.81 RISK FOR FALLS: ICD-10-CM

## 2020-01-01 DIAGNOSIS — R05.9 COUGH: ICD-10-CM

## 2020-01-01 DIAGNOSIS — R09.02 HYPOXIA: ICD-10-CM

## 2020-01-01 DIAGNOSIS — N36.8 URETHRAL BLEEDING: ICD-10-CM

## 2020-01-01 DIAGNOSIS — R26.2 UNABLE TO WALK: ICD-10-CM

## 2020-01-01 DIAGNOSIS — G47.33 OSA TREATED WITH BIPAP: ICD-10-CM

## 2020-01-01 DIAGNOSIS — R53.81 DEBILITY: ICD-10-CM

## 2020-01-01 DIAGNOSIS — T83.89XA CALCIFICATION OF INDWELLING FOLEY CATHETER, INITIAL ENCOUNTER (HCC): ICD-10-CM

## 2020-01-01 DIAGNOSIS — S98.131A AMPUTATED TOE OF RIGHT FOOT (HCC): ICD-10-CM

## 2020-01-01 LAB
ACTION RANGE TRIGGERED IACRT: YES
ALBUMIN 24H MFR UR ELPH: 73.3 %
ALBUMIN SERPL BCP-MCNC: 2.9 G/DL (ref 3.2–4.9)
ALBUMIN SERPL BCP-MCNC: 3.3 G/DL (ref 3.2–4.9)
ALBUMIN SERPL BCP-MCNC: 3.4 G/DL (ref 3.2–4.9)
ALBUMIN SERPL ELPH-MCNC: 2.49 G/DL (ref 3.75–5.01)
ALBUMIN/GLOB SERPL: 1 G/DL
ALBUMIN/GLOB SERPL: 1 G/DL
ALBUMIN/GLOB SERPL: 1.2 G/DL
ALP SERPL-CCNC: 101 U/L (ref 30–99)
ALP SERPL-CCNC: 130 U/L (ref 30–99)
ALP SERPL-CCNC: 133 U/L (ref 30–99)
ALPHA1 GLOB 24H MFR UR ELPH: 7 %
ALPHA1 GLOB SERPL ELPH-MCNC: 0.49 G/DL (ref 0.19–0.46)
ALPHA2 GLOB 24H MFR UR ELPH: 3.6 %
ALPHA2 GLOB SERPL ELPH-MCNC: 0.76 G/DL (ref 0.48–1.05)
ALT SERPL-CCNC: 18 U/L (ref 2–50)
ALT SERPL-CCNC: 19 U/L (ref 2–50)
ALT SERPL-CCNC: 21 U/L (ref 2–50)
ANION GAP SERPL CALC-SCNC: 10 MMOL/L (ref 7–16)
ANION GAP SERPL CALC-SCNC: 10 MMOL/L (ref 7–16)
ANION GAP SERPL CALC-SCNC: 11 MMOL/L (ref 7–16)
ANION GAP SERPL CALC-SCNC: 12 MMOL/L (ref 7–16)
ANION GAP SERPL CALC-SCNC: 14 MMOL/L (ref 7–16)
ANION GAP SERPL CALC-SCNC: 15 MMOL/L (ref 7–16)
ANION GAP SERPL CALC-SCNC: 15 MMOL/L (ref 7–16)
ANION GAP SERPL CALC-SCNC: 16 MMOL/L (ref 7–16)
ANION GAP SERPL CALC-SCNC: 7 MMOL/L (ref 7–16)
ANISOCYTOSIS BLD QL SMEAR: ABNORMAL
APPEARANCE UR: ABNORMAL
APPEARANCE UR: CLEAR
APTT PPP: 31.3 SEC (ref 24.7–36)
AST SERPL-CCNC: 17 U/L (ref 12–45)
AST SERPL-CCNC: 24 U/L (ref 12–45)
AST SERPL-CCNC: 27 U/L (ref 12–45)
B-GLOBULIN 24H MFR UR ELPH: 13.4 %
B-GLOBULIN SERPL ELPH-MCNC: 0.61 G/DL (ref 0.48–1.1)
BACTERIA #/AREA URNS HPF: NEGATIVE /HPF
BACTERIA #/AREA URNS HPF: NEGATIVE /HPF
BACTERIA BLD CULT: ABNORMAL
BACTERIA UR CULT: NORMAL
BASE EXCESS BLDA CALC-SCNC: 9 MMOL/L (ref -4–3)
BASOPHILS # BLD AUTO: 0.1 % (ref 0–1.8)
BASOPHILS # BLD AUTO: 0.2 % (ref 0–1.8)
BASOPHILS # BLD AUTO: 0.3 % (ref 0–1.8)
BASOPHILS # BLD AUTO: 0.4 % (ref 0–1.8)
BASOPHILS # BLD AUTO: 0.7 % (ref 0–1.8)
BASOPHILS # BLD: 0.01 K/UL (ref 0–0.12)
BASOPHILS # BLD: 0.02 K/UL (ref 0–0.12)
BASOPHILS # BLD: 0.03 K/UL (ref 0–0.12)
BASOPHILS # BLD: 0.04 K/UL (ref 0–0.12)
BILIRUB SERPL-MCNC: 0.6 MG/DL (ref 0.1–1.5)
BILIRUB SERPL-MCNC: 0.7 MG/DL (ref 0.1–1.5)
BILIRUB SERPL-MCNC: 0.9 MG/DL (ref 0.1–1.5)
BILIRUB UR QL STRIP.AUTO: NEGATIVE
BILIRUB UR QL STRIP.AUTO: NEGATIVE
BLOOD CULTURE HOLD CXBCH: NORMAL
BODY TEMPERATURE: ABNORMAL DEGREES
BUN SERPL-MCNC: 31 MG/DL (ref 8–22)
BUN SERPL-MCNC: 32 MG/DL (ref 8–22)
BUN SERPL-MCNC: 32 MG/DL (ref 8–22)
BUN SERPL-MCNC: 33 MG/DL (ref 8–22)
BUN SERPL-MCNC: 34 MG/DL (ref 8–22)
BUN SERPL-MCNC: 39 MG/DL (ref 8–22)
BUN SERPL-MCNC: 39 MG/DL (ref 8–22)
BURR CELLS BLD QL SMEAR: NORMAL
C PNEUM DNA SPEC QL NAA+PROBE: NOT DETECTED
CALCIUM SERPL-MCNC: 8.1 MG/DL (ref 8.5–10.5)
CALCIUM SERPL-MCNC: 8.2 MG/DL (ref 8.5–10.5)
CALCIUM SERPL-MCNC: 8.4 MG/DL (ref 8.5–10.5)
CALCIUM SERPL-MCNC: 8.6 MG/DL (ref 8.5–10.5)
CALCIUM SERPL-MCNC: 8.6 MG/DL (ref 8.5–10.5)
CALCIUM SERPL-MCNC: 8.7 MG/DL (ref 8.5–10.5)
CALCIUM SERPL-MCNC: 8.7 MG/DL (ref 8.5–10.5)
CALCIUM SERPL-MCNC: 8.8 MG/DL (ref 8.5–10.5)
CALCIUM SERPL-MCNC: 9.2 MG/DL (ref 8.5–10.5)
CHLORIDE SERPL-SCNC: 101 MMOL/L (ref 96–112)
CHLORIDE SERPL-SCNC: 103 MMOL/L (ref 96–112)
CHLORIDE SERPL-SCNC: 104 MMOL/L (ref 96–112)
CHLORIDE SERPL-SCNC: 105 MMOL/L (ref 96–112)
CO2 BLDA-SCNC: 36 MMOL/L (ref 20–33)
CO2 SERPL-SCNC: 20 MMOL/L (ref 20–33)
CO2 SERPL-SCNC: 23 MMOL/L (ref 20–33)
CO2 SERPL-SCNC: 25 MMOL/L (ref 20–33)
CO2 SERPL-SCNC: 26 MMOL/L (ref 20–33)
CO2 SERPL-SCNC: 26 MMOL/L (ref 20–33)
CO2 SERPL-SCNC: 29 MMOL/L (ref 20–33)
CO2 SERPL-SCNC: 29 MMOL/L (ref 20–33)
CO2 SERPL-SCNC: 31 MMOL/L (ref 20–33)
CO2 SERPL-SCNC: 32 MMOL/L (ref 20–33)
COLLECT DURATION TIME SPEC: NORMAL HRS
COLOR UR: YELLOW
COLOR UR: YELLOW
COMMENT 1642: NORMAL
COVID ORDER STATUS COVID19: NORMAL
COVID ORDER STATUS COVID19: NORMAL
CREAT SERPL-MCNC: 0.89 MG/DL (ref 0.5–1.4)
CREAT SERPL-MCNC: 0.93 MG/DL (ref 0.5–1.4)
CREAT SERPL-MCNC: 0.99 MG/DL (ref 0.5–1.4)
CREAT SERPL-MCNC: 1.02 MG/DL (ref 0.5–1.4)
CREAT SERPL-MCNC: 1.04 MG/DL (ref 0.5–1.4)
CREAT SERPL-MCNC: 1.05 MG/DL (ref 0.5–1.4)
CREAT SERPL-MCNC: 1.08 MG/DL (ref 0.5–1.4)
CREAT SERPL-MCNC: 1.09 MG/DL (ref 0.5–1.4)
CREAT SERPL-MCNC: 1.12 MG/DL (ref 0.5–1.4)
CRP SERPL HS-MCNC: 13.99 MG/DL (ref 0–0.75)
CRP SERPL HS-MCNC: 9.06 MG/DL (ref 0–0.75)
EER MONOCLONAL PROTEIN STUDY, 24 HOUR U Q5964: NORMAL
EER PROT ELECT SER Q1092: ABNORMAL
EKG IMPRESSION: NORMAL
EOSINOPHIL # BLD AUTO: 0 K/UL (ref 0–0.51)
EOSINOPHIL # BLD AUTO: 0 K/UL (ref 0–0.51)
EOSINOPHIL # BLD AUTO: 0.01 K/UL (ref 0–0.51)
EOSINOPHIL # BLD AUTO: 0.02 K/UL (ref 0–0.51)
EOSINOPHIL # BLD AUTO: 0.06 K/UL (ref 0–0.51)
EOSINOPHIL # BLD AUTO: 0.11 K/UL (ref 0–0.51)
EOSINOPHIL # BLD AUTO: 0.11 K/UL (ref 0–0.51)
EOSINOPHIL # BLD AUTO: 0.12 K/UL (ref 0–0.51)
EOSINOPHIL # BLD AUTO: 0.22 K/UL (ref 0–0.51)
EOSINOPHIL NFR BLD: 0 % (ref 0–6.9)
EOSINOPHIL NFR BLD: 0 % (ref 0–6.9)
EOSINOPHIL NFR BLD: 0.1 % (ref 0–6.9)
EOSINOPHIL NFR BLD: 0.2 % (ref 0–6.9)
EOSINOPHIL NFR BLD: 0.5 % (ref 0–6.9)
EOSINOPHIL NFR BLD: 1.2 % (ref 0–6.9)
EOSINOPHIL NFR BLD: 1.3 % (ref 0–6.9)
EOSINOPHIL NFR BLD: 2 % (ref 0–6.9)
EOSINOPHIL NFR BLD: 3.8 % (ref 0–6.9)
EPI CELLS #/AREA URNS HPF: NEGATIVE /HPF
EPI CELLS #/AREA URNS HPF: NEGATIVE /HPF
ERYTHROCYTE [DISTWIDTH] IN BLOOD BY AUTOMATED COUNT: 48.3 FL (ref 35.9–50)
ERYTHROCYTE [DISTWIDTH] IN BLOOD BY AUTOMATED COUNT: 51.5 FL (ref 35.9–50)
ERYTHROCYTE [DISTWIDTH] IN BLOOD BY AUTOMATED COUNT: 51.7 FL (ref 35.9–50)
ERYTHROCYTE [DISTWIDTH] IN BLOOD BY AUTOMATED COUNT: 52 FL (ref 35.9–50)
ERYTHROCYTE [DISTWIDTH] IN BLOOD BY AUTOMATED COUNT: 52.9 FL (ref 35.9–50)
ERYTHROCYTE [DISTWIDTH] IN BLOOD BY AUTOMATED COUNT: 53.1 FL (ref 35.9–50)
ERYTHROCYTE [DISTWIDTH] IN BLOOD BY AUTOMATED COUNT: 53.4 FL (ref 35.9–50)
ERYTHROCYTE [DISTWIDTH] IN BLOOD BY AUTOMATED COUNT: 53.4 FL (ref 35.9–50)
ERYTHROCYTE [DISTWIDTH] IN BLOOD BY AUTOMATED COUNT: 54.1 FL (ref 35.9–50)
EXPOSED MRN EXMRN: NORMAL
FERRITIN SERPL-MCNC: 228 NG/ML (ref 22–322)
FLUAV H1 2009 PAND RNA SPEC QL NAA+PROBE: NOT DETECTED
FLUAV H1 RNA SPEC QL NAA+PROBE: NOT DETECTED
FLUAV H3 RNA SPEC QL NAA+PROBE: NOT DETECTED
FLUAV RNA SPEC QL NAA+PROBE: NEGATIVE
FLUAV RNA SPEC QL NAA+PROBE: NOT DETECTED
FLUBV RNA SPEC QL NAA+PROBE: NEGATIVE
FLUBV RNA SPEC QL NAA+PROBE: NOT DETECTED
GAMMA GLOB 24H MFR UR ELPH: 2.7 %
GAMMA GLOB SERPL ELPH-MCNC: 0.95 G/DL (ref 0.62–1.51)
GLOBULIN SER CALC-MCNC: 2.8 G/DL (ref 1.9–3.5)
GLOBULIN SER CALC-MCNC: 3 G/DL (ref 1.9–3.5)
GLOBULIN SER CALC-MCNC: 3.4 G/DL (ref 1.9–3.5)
GLUCOSE BLD-MCNC: 102 MG/DL (ref 65–99)
GLUCOSE BLD-MCNC: 103 MG/DL (ref 65–99)
GLUCOSE BLD-MCNC: 106 MG/DL (ref 65–99)
GLUCOSE BLD-MCNC: 108 MG/DL (ref 65–99)
GLUCOSE BLD-MCNC: 128 MG/DL (ref 65–99)
GLUCOSE BLD-MCNC: 131 MG/DL (ref 65–99)
GLUCOSE BLD-MCNC: 137 MG/DL (ref 65–99)
GLUCOSE BLD-MCNC: 150 MG/DL (ref 65–99)
GLUCOSE BLD-MCNC: 153 MG/DL (ref 65–99)
GLUCOSE BLD-MCNC: 178 MG/DL (ref 65–99)
GLUCOSE BLD-MCNC: 182 MG/DL (ref 65–99)
GLUCOSE BLD-MCNC: 189 MG/DL (ref 65–99)
GLUCOSE BLD-MCNC: 191 MG/DL (ref 65–99)
GLUCOSE BLD-MCNC: 192 MG/DL (ref 65–99)
GLUCOSE BLD-MCNC: 195 MG/DL (ref 65–99)
GLUCOSE BLD-MCNC: 197 MG/DL (ref 65–99)
GLUCOSE BLD-MCNC: 216 MG/DL (ref 65–99)
GLUCOSE BLD-MCNC: 219 MG/DL (ref 65–99)
GLUCOSE BLD-MCNC: 219 MG/DL (ref 65–99)
GLUCOSE BLD-MCNC: 223 MG/DL (ref 65–99)
GLUCOSE BLD-MCNC: 234 MG/DL (ref 65–99)
GLUCOSE BLD-MCNC: 234 MG/DL (ref 65–99)
GLUCOSE BLD-MCNC: 242 MG/DL (ref 65–99)
GLUCOSE BLD-MCNC: 249 MG/DL (ref 65–99)
GLUCOSE BLD-MCNC: 264 MG/DL (ref 65–99)
GLUCOSE BLD-MCNC: 264 MG/DL (ref 65–99)
GLUCOSE BLD-MCNC: 269 MG/DL (ref 65–99)
GLUCOSE BLD-MCNC: 272 MG/DL (ref 65–99)
GLUCOSE BLD-MCNC: 286 MG/DL (ref 65–99)
GLUCOSE BLD-MCNC: 329 MG/DL (ref 65–99)
GLUCOSE BLD-MCNC: 366 MG/DL (ref 65–99)
GLUCOSE BLD-MCNC: 394 MG/DL (ref 65–99)
GLUCOSE BLD-MCNC: 53 MG/DL (ref 65–99)
GLUCOSE BLD-MCNC: 67 MG/DL (ref 65–99)
GLUCOSE BLD-MCNC: 89 MG/DL (ref 65–99)
GLUCOSE BLD-MCNC: 93 MG/DL (ref 65–99)
GLUCOSE BLD-MCNC: 93 MG/DL (ref 65–99)
GLUCOSE SERPL-MCNC: 136 MG/DL (ref 65–99)
GLUCOSE SERPL-MCNC: 192 MG/DL (ref 65–99)
GLUCOSE SERPL-MCNC: 208 MG/DL (ref 65–99)
GLUCOSE SERPL-MCNC: 211 MG/DL (ref 65–99)
GLUCOSE SERPL-MCNC: 241 MG/DL (ref 65–99)
GLUCOSE SERPL-MCNC: 265 MG/DL (ref 65–99)
GLUCOSE SERPL-MCNC: 268 MG/DL (ref 65–99)
GLUCOSE SERPL-MCNC: 63 MG/DL (ref 65–99)
GLUCOSE SERPL-MCNC: 90 MG/DL (ref 65–99)
GLUCOSE UR STRIP.AUTO-MCNC: 500 MG/DL
GLUCOSE UR STRIP.AUTO-MCNC: >=1000 MG/DL
HADV DNA SPEC QL NAA+PROBE: NOT DETECTED
HBV SURFACE AG SER QL: NORMAL
HCO3 BLDA-SCNC: 34.8 MMOL/L (ref 17–25)
HCOV RNA SPEC QL NAA+PROBE: NOT DETECTED
HCT VFR BLD AUTO: 41 % (ref 42–52)
HCT VFR BLD AUTO: 43.6 % (ref 42–52)
HCT VFR BLD AUTO: 45.5 % (ref 42–52)
HCT VFR BLD AUTO: 46.8 % (ref 42–52)
HCT VFR BLD AUTO: 47.6 % (ref 42–52)
HCT VFR BLD AUTO: 47.6 % (ref 42–52)
HCT VFR BLD AUTO: 48.1 % (ref 42–52)
HCT VFR BLD AUTO: 49.6 % (ref 42–52)
HCT VFR BLD AUTO: 51.7 % (ref 42–52)
HCV AB SER QL: NORMAL
HGB BLD-MCNC: 12.5 G/DL (ref 14–18)
HGB BLD-MCNC: 13.5 G/DL (ref 14–18)
HGB BLD-MCNC: 14 G/DL (ref 14–18)
HGB BLD-MCNC: 14.2 G/DL (ref 14–18)
HGB BLD-MCNC: 14.5 G/DL (ref 14–18)
HGB BLD-MCNC: 14.6 G/DL (ref 14–18)
HGB BLD-MCNC: 14.8 G/DL (ref 14–18)
HGB BLD-MCNC: 14.9 G/DL (ref 14–18)
HGB BLD-MCNC: 15.3 G/DL (ref 14–18)
HIV 1+2 AB+HIV1 P24 AG SERPL QL IA: NORMAL
HIV 1+2 AB+HIV1 P24 AG SERPL QL IA: NORMAL
HMPV RNA SPEC QL NAA+PROBE: NOT DETECTED
HOROWITZ INDEX BLDA+IHG-RTO: 55 MM[HG]
HPIV1 RNA SPEC QL NAA+PROBE: NOT DETECTED
HPIV2 RNA SPEC QL NAA+PROBE: NOT DETECTED
HPIV3 RNA SPEC QL NAA+PROBE: NOT DETECTED
HPIV4 RNA SPEC QL NAA+PROBE: NOT DETECTED
HYALINE CASTS #/AREA URNS LPF: ABNORMAL /LPF
HYALINE CASTS #/AREA URNS LPF: ABNORMAL /LPF
IMM GRANULOCYTES # BLD AUTO: 0.02 K/UL (ref 0–0.11)
IMM GRANULOCYTES # BLD AUTO: 0.04 K/UL (ref 0–0.11)
IMM GRANULOCYTES # BLD AUTO: 0.05 K/UL (ref 0–0.11)
IMM GRANULOCYTES # BLD AUTO: 0.05 K/UL (ref 0–0.11)
IMM GRANULOCYTES # BLD AUTO: 0.07 K/UL (ref 0–0.11)
IMM GRANULOCYTES # BLD AUTO: 0.08 K/UL (ref 0–0.11)
IMM GRANULOCYTES # BLD AUTO: 0.08 K/UL (ref 0–0.11)
IMM GRANULOCYTES NFR BLD AUTO: 0.3 % (ref 0–0.9)
IMM GRANULOCYTES NFR BLD AUTO: 0.4 % (ref 0–0.9)
IMM GRANULOCYTES NFR BLD AUTO: 0.5 % (ref 0–0.9)
IMM GRANULOCYTES NFR BLD AUTO: 0.5 % (ref 0–0.9)
IMM GRANULOCYTES NFR BLD AUTO: 0.6 % (ref 0–0.9)
IMM GRANULOCYTES NFR BLD AUTO: 0.6 % (ref 0–0.9)
IMM GRANULOCYTES NFR BLD AUTO: 0.7 % (ref 0–0.9)
IMM GRANULOCYTES NFR BLD AUTO: 0.7 % (ref 0–0.9)
IMM GRANULOCYTES NFR BLD AUTO: 0.9 % (ref 0–0.9)
INR PPP: 1.36 (ref 0.87–1.13)
INST. QUALIFIED PATIENT IIQPT: YES
INTERPRETATION SERPL IFE-IMP: ABNORMAL
INTERPRETATION UR IFE-IMP: NORMAL
KETONES UR STRIP.AUTO-MCNC: 15 MG/DL
KETONES UR STRIP.AUTO-MCNC: ABNORMAL MG/DL
LACTATE BLD-SCNC: 1.4 MMOL/L (ref 0.5–2)
LACTATE BLD-SCNC: 1.9 MMOL/L (ref 0.5–2)
LDH SERPL L TO P-CCNC: 278 U/L (ref 107–266)
LEUKOCYTE ESTERASE UR QL STRIP.AUTO: ABNORMAL
LEUKOCYTE ESTERASE UR QL STRIP.AUTO: NEGATIVE
LV EJECT FRACT  99904: 40
LV EJECT FRACT MOD 2C 99903: 44.29
LV EJECT FRACT MOD 4C 99902: 55.31
LV EJECT FRACT MOD BP 99901: 50.54
LYMPHOCYTES # BLD AUTO: 0.25 K/UL (ref 1–4.8)
LYMPHOCYTES # BLD AUTO: 0.35 K/UL (ref 1–4.8)
LYMPHOCYTES # BLD AUTO: 0.4 K/UL (ref 1–4.8)
LYMPHOCYTES # BLD AUTO: 0.42 K/UL (ref 1–4.8)
LYMPHOCYTES # BLD AUTO: 0.49 K/UL (ref 1–4.8)
LYMPHOCYTES # BLD AUTO: 0.65 K/UL (ref 1–4.8)
LYMPHOCYTES # BLD AUTO: 0.65 K/UL (ref 1–4.8)
LYMPHOCYTES # BLD AUTO: 0.76 K/UL (ref 1–4.8)
LYMPHOCYTES # BLD AUTO: 1.34 K/UL (ref 1–4.8)
LYMPHOCYTES NFR BLD: 2.2 % (ref 22–41)
LYMPHOCYTES NFR BLD: 23.3 % (ref 22–41)
LYMPHOCYTES NFR BLD: 3.3 % (ref 22–41)
LYMPHOCYTES NFR BLD: 4.6 % (ref 22–41)
LYMPHOCYTES NFR BLD: 4.9 % (ref 22–41)
LYMPHOCYTES NFR BLD: 5.8 % (ref 22–41)
LYMPHOCYTES NFR BLD: 6.9 % (ref 22–41)
LYMPHOCYTES NFR BLD: 7.2 % (ref 22–41)
LYMPHOCYTES NFR BLD: 8.4 % (ref 22–41)
M PNEUMO DNA SPEC QL NAA+PROBE: NOT DETECTED
M PROTEIN 24H MFR UR ELPH: 0 %
M PROTEIN 24H UR ELPH-MRATE: NORMAL MG/24 HRS
MAGNESIUM SERPL-MCNC: 1.5 MG/DL (ref 1.5–2.5)
MAGNESIUM SERPL-MCNC: 1.7 MG/DL (ref 1.5–2.5)
MAGNESIUM SERPL-MCNC: 1.8 MG/DL (ref 1.5–2.5)
MAGNESIUM SERPL-MCNC: 1.9 MG/DL (ref 1.5–2.5)
MAGNESIUM SERPL-MCNC: 2 MG/DL (ref 1.5–2.5)
MCH RBC QN AUTO: 24.5 PG (ref 27–33)
MCH RBC QN AUTO: 24.8 PG (ref 27–33)
MCH RBC QN AUTO: 24.8 PG (ref 27–33)
MCH RBC QN AUTO: 25.3 PG (ref 27–33)
MCH RBC QN AUTO: 25.3 PG (ref 27–33)
MCH RBC QN AUTO: 25.4 PG (ref 27–33)
MCH RBC QN AUTO: 25.5 PG (ref 27–33)
MCH RBC QN AUTO: 25.7 PG (ref 27–33)
MCH RBC QN AUTO: 25.8 PG (ref 27–33)
MCHC RBC AUTO-ENTMCNC: 29.6 G/DL (ref 33.7–35.3)
MCHC RBC AUTO-ENTMCNC: 29.9 G/DL (ref 33.7–35.3)
MCHC RBC AUTO-ENTMCNC: 30 G/DL (ref 33.7–35.3)
MCHC RBC AUTO-ENTMCNC: 30.4 G/DL (ref 33.7–35.3)
MCHC RBC AUTO-ENTMCNC: 30.5 G/DL (ref 33.7–35.3)
MCHC RBC AUTO-ENTMCNC: 30.5 G/DL (ref 33.7–35.3)
MCHC RBC AUTO-ENTMCNC: 31 G/DL (ref 33.7–35.3)
MCHC RBC AUTO-ENTMCNC: 31.1 G/DL (ref 33.7–35.3)
MCHC RBC AUTO-ENTMCNC: 31.2 G/DL (ref 33.7–35.3)
MCV RBC AUTO: 81.2 FL (ref 81.4–97.8)
MCV RBC AUTO: 81.4 FL (ref 81.4–97.8)
MCV RBC AUTO: 81.7 FL (ref 81.4–97.8)
MCV RBC AUTO: 82.1 FL (ref 81.4–97.8)
MCV RBC AUTO: 82.5 FL (ref 81.4–97.8)
MCV RBC AUTO: 82.9 FL (ref 81.4–97.8)
MCV RBC AUTO: 84.4 FL (ref 81.4–97.8)
MCV RBC AUTO: 84.5 FL (ref 81.4–97.8)
MCV RBC AUTO: 85 FL (ref 81.4–97.8)
MICRO URNS: ABNORMAL
MICRO URNS: ABNORMAL
MICROCYTES BLD QL SMEAR: ABNORMAL
MONOCYTES # BLD AUTO: 0.66 K/UL (ref 0–0.85)
MONOCYTES # BLD AUTO: 0.79 K/UL (ref 0–0.85)
MONOCYTES # BLD AUTO: 0.8 K/UL (ref 0–0.85)
MONOCYTES # BLD AUTO: 0.89 K/UL (ref 0–0.85)
MONOCYTES # BLD AUTO: 0.98 K/UL (ref 0–0.85)
MONOCYTES # BLD AUTO: 1 K/UL (ref 0–0.85)
MONOCYTES # BLD AUTO: 1.09 K/UL (ref 0–0.85)
MONOCYTES # BLD AUTO: 1.24 K/UL (ref 0–0.85)
MONOCYTES # BLD AUTO: 1.24 K/UL (ref 0–0.85)
MONOCYTES NFR BLD AUTO: 11 % (ref 0–13.4)
MONOCYTES NFR BLD AUTO: 11.5 % (ref 0–13.4)
MONOCYTES NFR BLD AUTO: 11.5 % (ref 0–13.4)
MONOCYTES NFR BLD AUTO: 11.6 % (ref 0–13.4)
MONOCYTES NFR BLD AUTO: 11.6 % (ref 0–13.4)
MONOCYTES NFR BLD AUTO: 14.3 % (ref 0–13.4)
MONOCYTES NFR BLD AUTO: 7.2 % (ref 0–13.4)
MONOCYTES NFR BLD AUTO: 9.5 % (ref 0–13.4)
MONOCYTES NFR BLD AUTO: 9.8 % (ref 0–13.4)
MORPHOLOGY BLD-IMP: NORMAL
MORPHOLOGY BLD-IMP: NORMAL
NEUTROPHILS # BLD AUTO: 3.47 K/UL (ref 1.82–7.42)
NEUTROPHILS # BLD AUTO: 4.18 K/UL (ref 1.82–7.42)
NEUTROPHILS # BLD AUTO: 7.04 K/UL (ref 1.82–7.42)
NEUTROPHILS # BLD AUTO: 7.11 K/UL (ref 1.82–7.42)
NEUTROPHILS # BLD AUTO: 7.6 K/UL (ref 1.82–7.42)
NEUTROPHILS # BLD AUTO: 8.86 K/UL (ref 1.82–7.42)
NEUTROPHILS # BLD AUTO: 9.07 K/UL (ref 1.82–7.42)
NEUTROPHILS # BLD AUTO: 9.56 K/UL (ref 1.82–7.42)
NEUTROPHILS # BLD AUTO: 9.69 K/UL (ref 1.82–7.42)
NEUTROPHILS NFR BLD: 60.4 % (ref 44–72)
NEUTROPHILS NFR BLD: 75.4 % (ref 44–72)
NEUTROPHILS NFR BLD: 78.6 % (ref 44–72)
NEUTROPHILS NFR BLD: 81.2 % (ref 44–72)
NEUTROPHILS NFR BLD: 82.9 % (ref 44–72)
NEUTROPHILS NFR BLD: 82.9 % (ref 44–72)
NEUTROPHILS NFR BLD: 84.5 % (ref 44–72)
NEUTROPHILS NFR BLD: 85.5 % (ref 44–72)
NEUTROPHILS NFR BLD: 87.2 % (ref 44–72)
NITRITE UR QL STRIP.AUTO: NEGATIVE
NITRITE UR QL STRIP.AUTO: NEGATIVE
NRBC # BLD AUTO: 0 K/UL
NRBC BLD-RTO: 0 /100 WBC
NT-PROBNP SERPL IA-MCNC: ABNORMAL PG/ML (ref 0–125)
NT-PROBNP SERPL IA-MCNC: ABNORMAL PG/ML (ref 0–125)
O2/TOTAL GAS SETTING VFR VENT: 100 %
OVALOCYTES BLD QL SMEAR: NORMAL
PCO2 BLDA: 53.4 MMHG (ref 26–37)
PCO2 TEMP ADJ BLDA: 51.4 MMHG (ref 26–37)
PH BLDA: 7.42 [PH] (ref 7.4–7.5)
PH TEMP ADJ BLDA: 7.43 [PH] (ref 7.4–7.5)
PH UR STRIP.AUTO: 5 [PH] (ref 5–8)
PH UR STRIP.AUTO: 5 [PH] (ref 5–8)
PLATELET # BLD AUTO: 105 K/UL (ref 164–446)
PLATELET # BLD AUTO: 105 K/UL (ref 164–446)
PLATELET # BLD AUTO: 109 K/UL (ref 164–446)
PLATELET # BLD AUTO: 113 K/UL (ref 164–446)
PLATELET # BLD AUTO: 123 K/UL (ref 164–446)
PLATELET # BLD AUTO: 130 K/UL (ref 164–446)
PLATELET # BLD AUTO: 134 K/UL (ref 164–446)
PLATELET # BLD AUTO: 140 K/UL (ref 164–446)
PLATELET # BLD AUTO: 168 K/UL (ref 164–446)
PLATELET BLD QL SMEAR: NORMAL
PMV BLD AUTO: 10.6 FL (ref 9–12.9)
PMV BLD AUTO: 10.6 FL (ref 9–12.9)
PMV BLD AUTO: 11.2 FL (ref 9–12.9)
PMV BLD AUTO: 11.6 FL (ref 9–12.9)
PMV BLD AUTO: 11.6 FL (ref 9–12.9)
PMV BLD AUTO: 11.7 FL (ref 9–12.9)
PMV BLD AUTO: 11.9 FL (ref 9–12.9)
PO2 BLDA: 55 MMHG (ref 64–87)
PO2 TEMP ADJ BLDA: 52 MMHG (ref 64–87)
POIKILOCYTOSIS BLD QL SMEAR: NORMAL
POTASSIUM SERPL-SCNC: 2.8 MMOL/L (ref 3.6–5.5)
POTASSIUM SERPL-SCNC: 2.9 MMOL/L (ref 3.6–5.5)
POTASSIUM SERPL-SCNC: 3.2 MMOL/L (ref 3.6–5.5)
POTASSIUM SERPL-SCNC: 3.2 MMOL/L (ref 3.6–5.5)
POTASSIUM SERPL-SCNC: 3.6 MMOL/L (ref 3.6–5.5)
POTASSIUM SERPL-SCNC: 4 MMOL/L (ref 3.6–5.5)
POTASSIUM SERPL-SCNC: 4 MMOL/L (ref 3.6–5.5)
POTASSIUM SERPL-SCNC: 4.4 MMOL/L (ref 3.6–5.5)
POTASSIUM SERPL-SCNC: 4.9 MMOL/L (ref 3.6–5.5)
PREALB SERPL-MCNC: 5.3 MG/DL (ref 18–38)
PROCALCITONIN SERPL-MCNC: 2.5 NG/ML
PROCALCITONIN SERPL-MCNC: 4.71 NG/ML
PROT 24H UR-MRATE: NORMAL MG/D (ref 40–150)
PROT SERPL-MCNC: 5.3 G/DL (ref 6.3–8.2)
PROT SERPL-MCNC: 5.9 G/DL (ref 6–8.2)
PROT SERPL-MCNC: 6.1 G/DL (ref 6–8.2)
PROT SERPL-MCNC: 6.8 G/DL (ref 6–8.2)
PROT UR QL STRIP: 30 MG/DL
PROT UR QL STRIP: 300 MG/DL
PROT UR-MCNC: 54 MG/DL
PROTHROMBIN TIME: 17.1 SEC (ref 12–14.6)
RBC # BLD AUTO: 4.86 M/UL (ref 4.7–6.1)
RBC # BLD AUTO: 5.31 M/UL (ref 4.7–6.1)
RBC # BLD AUTO: 5.54 M/UL (ref 4.7–6.1)
RBC # BLD AUTO: 5.57 M/UL (ref 4.7–6.1)
RBC # BLD AUTO: 5.66 M/UL (ref 4.7–6.1)
RBC # BLD AUTO: 5.85 M/UL (ref 4.7–6.1)
RBC # BLD AUTO: 5.86 M/UL (ref 4.7–6.1)
RBC # BLD AUTO: 6.01 M/UL (ref 4.7–6.1)
RBC # BLD AUTO: 6.24 M/UL (ref 4.7–6.1)
RBC # URNS HPF: >150 /HPF
RBC # URNS HPF: ABNORMAL /HPF
RBC BLD AUTO: PRESENT
RBC UR QL AUTO: ABNORMAL
RBC UR QL AUTO: ABNORMAL
RSV A RNA SPEC QL NAA+PROBE: NOT DETECTED
RSV B RNA SPEC QL NAA+PROBE: NOT DETECTED
RV+EV RNA SPEC QL NAA+PROBE: NOT DETECTED
S PYO DNA SPEC NAA+PROBE: NOT DETECTED
SAO2 % BLDA: 88 % (ref 93–99)
SARS-COV-2 N GENE SPEC QL NAA N1: NOT DETECTED
SARS-COV-2 N GENE SPEC QL NAA N2: NOT DETECTED
SARS-COV-2 RNA RESP QL NAA+PROBE: NOT DETECTED
SARS-COV-2 RNA RESP QL NAA+PROBE: NOTDETECTED
SARS-COV-2 RNA RESP QL NAA+PROBE: NOTDETECTED
SCCMEC + MECA PNL NOSE NAA+PROBE: POSITIVE
SCCMEC + MECA PNL NOSE NAA+PROBE: POSITIVE
SCHISTOCYTES BLD QL SMEAR: NORMAL
SIGNIFICANT IND 70042: ABNORMAL
SIGNIFICANT IND 70042: NORMAL
SITE SITE: ABNORMAL
SITE SITE: NORMAL
SODIUM SERPL-SCNC: 136 MMOL/L (ref 135–145)
SODIUM SERPL-SCNC: 139 MMOL/L (ref 135–145)
SODIUM SERPL-SCNC: 140 MMOL/L (ref 135–145)
SODIUM SERPL-SCNC: 141 MMOL/L (ref 135–145)
SODIUM SERPL-SCNC: 141 MMOL/L (ref 135–145)
SODIUM SERPL-SCNC: 145 MMOL/L (ref 135–145)
SODIUM SERPL-SCNC: 146 MMOL/L (ref 135–145)
SODIUM SERPL-SCNC: 147 MMOL/L (ref 135–145)
SODIUM SERPL-SCNC: 150 MMOL/L (ref 135–145)
SOURCE SOURCE: ABNORMAL
SOURCE SOURCE: NORMAL
SP GR UR STRIP.AUTO: 1.02
SP GR UR STRIP.AUTO: 1.02
SPECIMEN DRAWN FROM PATIENT: ABNORMAL
SPECIMEN SOURCE: NORMAL
SPECIMEN VOL ?TM UR: NORMAL ML
T4 FREE SERPL-MCNC: 0.89 NG/DL (ref 0.93–1.7)
TROPONIN T SERPL-MCNC: 130 NG/L (ref 6–19)
TROPONIN T SERPL-MCNC: 160 NG/L (ref 6–19)
TROPONIN T SERPL-MCNC: 175 NG/L (ref 6–19)
TSH SERPL DL<=0.005 MIU/L-ACNC: 0.87 UIU/ML (ref 0.38–5.33)
UROBILINOGEN UR STRIP.AUTO-MCNC: 0.2 MG/DL
UROBILINOGEN UR STRIP.AUTO-MCNC: 0.2 MG/DL
VANCOMYCIN SERPL-MCNC: 18.7 UG/ML
VANCOMYCIN SERPL-MCNC: 21.1 UG/ML
VANCOMYCIN SERPL-MCNC: 21.7 UG/ML
VANCOMYCIN SERPL-MCNC: 26.7 UG/ML
VANCOMYCIN TROUGH SERPL-MCNC: 16.8 UG/ML (ref 10–20)
VANCOMYCIN TROUGH SERPL-MCNC: 28 UG/ML (ref 10–20)
WBC # BLD AUTO: 10.7 K/UL (ref 4.8–10.8)
WBC # BLD AUTO: 10.7 K/UL (ref 4.8–10.8)
WBC # BLD AUTO: 11.1 K/UL (ref 4.8–10.8)
WBC # BLD AUTO: 11.2 K/UL (ref 4.8–10.8)
WBC # BLD AUTO: 5.5 K/UL (ref 4.8–10.8)
WBC # BLD AUTO: 5.8 K/UL (ref 4.8–10.8)
WBC # BLD AUTO: 8.5 K/UL (ref 4.8–10.8)
WBC # BLD AUTO: 9.1 K/UL (ref 4.8–10.8)
WBC # BLD AUTO: 9.4 K/UL (ref 4.8–10.8)
WBC #/AREA URNS HPF: ABNORMAL /HPF
WBC #/AREA URNS HPF: ABNORMAL /HPF

## 2020-01-01 PROCEDURE — 70450 CT HEAD/BRAIN W/O DYE: CPT

## 2020-01-01 PROCEDURE — 700102 HCHG RX REV CODE 250 W/ 637 OVERRIDE(OP): Performed by: INTERNAL MEDICINE

## 2020-01-01 PROCEDURE — A6212 FOAM DRG <=16 SQ IN W/BORDER: HCPCS | Performed by: HOSPITALIST

## 2020-01-01 PROCEDURE — 700111 HCHG RX REV CODE 636 W/ 250 OVERRIDE (IP): Performed by: EMERGENCY MEDICINE

## 2020-01-01 PROCEDURE — 700111 HCHG RX REV CODE 636 W/ 250 OVERRIDE (IP): Performed by: INTERNAL MEDICINE

## 2020-01-01 PROCEDURE — U0004 COV-19 TEST NON-CDC HGH THRU: HCPCS

## 2020-01-01 PROCEDURE — 99292 CRITICAL CARE ADDL 30 MIN: CPT | Performed by: INTERNAL MEDICINE

## 2020-01-01 PROCEDURE — 99217 PR OBSERVATION CARE DISCHARGE: CPT | Performed by: INTERNAL MEDICINE

## 2020-01-01 PROCEDURE — 80048 BASIC METABOLIC PNL TOTAL CA: CPT

## 2020-01-01 PROCEDURE — 700102 HCHG RX REV CODE 250 W/ 637 OVERRIDE(OP): Performed by: HOSPITALIST

## 2020-01-01 PROCEDURE — 83735 ASSAY OF MAGNESIUM: CPT

## 2020-01-01 PROCEDURE — 94640 AIRWAY INHALATION TREATMENT: CPT

## 2020-01-01 PROCEDURE — 85025 COMPLETE CBC W/AUTO DIFF WBC: CPT

## 2020-01-01 PROCEDURE — 96365 THER/PROPH/DIAG IV INF INIT: CPT

## 2020-01-01 PROCEDURE — 96375 TX/PRO/DX INJ NEW DRUG ADDON: CPT

## 2020-01-01 PROCEDURE — A9270 NON-COVERED ITEM OR SERVICE: HCPCS | Performed by: INTERNAL MEDICINE

## 2020-01-01 PROCEDURE — 84153 ASSAY OF PSA TOTAL: CPT

## 2020-01-01 PROCEDURE — 96376 TX/PRO/DX INJ SAME DRUG ADON: CPT

## 2020-01-01 PROCEDURE — A9270 NON-COVERED ITEM OR SERVICE: HCPCS | Performed by: HOSPITALIST

## 2020-01-01 PROCEDURE — 84145 PROCALCITONIN (PCT): CPT

## 2020-01-01 PROCEDURE — 770022 HCHG ROOM/CARE - ICU (200)

## 2020-01-01 PROCEDURE — G0378 HOSPITAL OBSERVATION PER HR: HCPCS

## 2020-01-01 PROCEDURE — 93005 ELECTROCARDIOGRAM TRACING: CPT | Performed by: HOSPITALIST

## 2020-01-01 PROCEDURE — 84484 ASSAY OF TROPONIN QUANT: CPT

## 2020-01-01 PROCEDURE — 700105 HCHG RX REV CODE 258: Performed by: INTERNAL MEDICINE

## 2020-01-01 PROCEDURE — 700101 HCHG RX REV CODE 250: Performed by: INTERNAL MEDICINE

## 2020-01-01 PROCEDURE — 99225 PR SUBSEQUENT OBSERVATION CARE,LEVEL II: CPT | Performed by: HOSPITALIST

## 2020-01-01 PROCEDURE — 83880 ASSAY OF NATRIURETIC PEPTIDE: CPT

## 2020-01-01 PROCEDURE — 700101 HCHG RX REV CODE 250

## 2020-01-01 PROCEDURE — 80202 ASSAY OF VANCOMYCIN: CPT | Mod: 91

## 2020-01-01 PROCEDURE — 81001 URINALYSIS AUTO W/SCOPE: CPT

## 2020-01-01 PROCEDURE — 700101 HCHG RX REV CODE 250: Performed by: PSYCHIATRY & NEUROLOGY

## 2020-01-01 PROCEDURE — 84155 ASSAY OF PROTEIN SERUM: CPT

## 2020-01-01 PROCEDURE — 51702 INSERT TEMP BLADDER CATH: CPT

## 2020-01-01 PROCEDURE — 99291 CRITICAL CARE FIRST HOUR: CPT | Performed by: INTERNAL MEDICINE

## 2020-01-01 PROCEDURE — 700101 HCHG RX REV CODE 250: Performed by: EMERGENCY MEDICINE

## 2020-01-01 PROCEDURE — 87077 CULTURE AEROBIC IDENTIFY: CPT

## 2020-01-01 PROCEDURE — 99233 SBSQ HOSP IP/OBS HIGH 50: CPT | Performed by: INTERNAL MEDICINE

## 2020-01-01 PROCEDURE — 92610 EVALUATE SWALLOWING FUNCTION: CPT

## 2020-01-01 PROCEDURE — 93306 TTE W/DOPPLER COMPLETE: CPT | Mod: 26 | Performed by: INTERNAL MEDICINE

## 2020-01-01 PROCEDURE — 82962 GLUCOSE BLOOD TEST: CPT | Mod: 91

## 2020-01-01 PROCEDURE — 87147 CULTURE TYPE IMMUNOLOGIC: CPT

## 2020-01-01 PROCEDURE — 97162 PT EVAL MOD COMPLEX 30 MIN: CPT

## 2020-01-01 PROCEDURE — 84134 ASSAY OF PREALBUMIN: CPT

## 2020-01-01 PROCEDURE — G2023 SPECIMEN COLLECT COVID-19: HCPCS | Performed by: EMERGENCY MEDICINE

## 2020-01-01 PROCEDURE — 93005 ELECTROCARDIOGRAM TRACING: CPT | Performed by: EMERGENCY MEDICINE

## 2020-01-01 PROCEDURE — 87186 SC STD MICRODIL/AGAR DIL: CPT

## 2020-01-01 PROCEDURE — 99217 PR OBSERVATION CARE DISCHARGE: CPT | Performed by: HOSPITALIST

## 2020-01-01 PROCEDURE — 82962 GLUCOSE BLOOD TEST: CPT

## 2020-01-01 PROCEDURE — 31720 CLEARANCE OF AIRWAYS: CPT

## 2020-01-01 PROCEDURE — 99291 CRITICAL CARE FIRST HOUR: CPT

## 2020-01-01 PROCEDURE — 94760 N-INVAS EAR/PLS OXIMETRY 1: CPT

## 2020-01-01 PROCEDURE — 87641 MR-STAPH DNA AMP PROBE: CPT

## 2020-01-01 PROCEDURE — 96372 THER/PROPH/DIAG INJ SC/IM: CPT

## 2020-01-01 PROCEDURE — 93306 TTE W/DOPPLER COMPLETE: CPT

## 2020-01-01 PROCEDURE — 700117 HCHG RX CONTRAST REV CODE 255: Performed by: INTERNAL MEDICINE

## 2020-01-01 PROCEDURE — 700105 HCHG RX REV CODE 258: Performed by: EMERGENCY MEDICINE

## 2020-01-01 PROCEDURE — 85730 THROMBOPLASTIN TIME PARTIAL: CPT

## 2020-01-01 PROCEDURE — 99285 EMERGENCY DEPT VISIT HI MDM: CPT

## 2020-01-01 PROCEDURE — 80202 ASSAY OF VANCOMYCIN: CPT

## 2020-01-01 PROCEDURE — 84165 PROTEIN E-PHORESIS SERUM: CPT

## 2020-01-01 PROCEDURE — 700111 HCHG RX REV CODE 636 W/ 250 OVERRIDE (IP)

## 2020-01-01 PROCEDURE — 83615 LACTATE (LD) (LDH) ENZYME: CPT

## 2020-01-01 PROCEDURE — 82803 BLOOD GASES ANY COMBINATION: CPT

## 2020-01-01 PROCEDURE — 87502 INFLUENZA DNA AMP PROBE: CPT | Mod: XU

## 2020-01-01 PROCEDURE — 87086 URINE CULTURE/COLONY COUNT: CPT

## 2020-01-01 PROCEDURE — 97165 OT EVAL LOW COMPLEX 30 MIN: CPT

## 2020-01-01 PROCEDURE — 700117 HCHG RX CONTRAST REV CODE 255: Performed by: EMERGENCY MEDICINE

## 2020-01-01 PROCEDURE — 71045 X-RAY EXAM CHEST 1 VIEW: CPT

## 2020-01-01 PROCEDURE — 36415 COLL VENOUS BLD VENIPUNCTURE: CPT

## 2020-01-01 PROCEDURE — A9576 INJ PROHANCE MULTIPACK: HCPCS | Performed by: INTERNAL MEDICINE

## 2020-01-01 PROCEDURE — 36600 WITHDRAWAL OF ARTERIAL BLOOD: CPT

## 2020-01-01 PROCEDURE — 83605 ASSAY OF LACTIC ACID: CPT

## 2020-01-01 PROCEDURE — 99224 PR SUBSEQUENT OBSERVATION CARE,LEVEL I: CPT | Performed by: HOSPITALIST

## 2020-01-01 PROCEDURE — 87040 BLOOD CULTURE FOR BACTERIA: CPT

## 2020-01-01 PROCEDURE — 72158 MRI LUMBAR SPINE W/O & W/DYE: CPT

## 2020-01-01 PROCEDURE — 85610 PROTHROMBIN TIME: CPT

## 2020-01-01 PROCEDURE — 80053 COMPREHEN METABOLIC PANEL: CPT

## 2020-01-01 PROCEDURE — 84439 ASSAY OF FREE THYROXINE: CPT

## 2020-01-01 PROCEDURE — 87389 HIV-1 AG W/HIV-1&-2 AB AG IA: CPT

## 2020-01-01 PROCEDURE — 87486 CHLMYD PNEUM DNA AMP PROBE: CPT

## 2020-01-01 PROCEDURE — 87651 STREP A DNA AMP PROBE: CPT

## 2020-01-01 PROCEDURE — 87633 RESP VIRUS 12-25 TARGETS: CPT

## 2020-01-01 PROCEDURE — 93010 ELECTROCARDIOGRAM REPORT: CPT | Performed by: INTERNAL MEDICINE

## 2020-01-01 PROCEDURE — 700111 HCHG RX REV CODE 636 W/ 250 OVERRIDE (IP): Performed by: PSYCHIATRY & NEUROLOGY

## 2020-01-01 PROCEDURE — 92526 ORAL FUNCTION THERAPY: CPT

## 2020-01-01 PROCEDURE — 99220 PR INITIAL OBSERVATION CARE,LEVL III: CPT | Mod: AI | Performed by: HOSPITALIST

## 2020-01-01 PROCEDURE — 303105 HCHG CATHETER EXTRA

## 2020-01-01 PROCEDURE — 82728 ASSAY OF FERRITIN: CPT

## 2020-01-01 PROCEDURE — 700105 HCHG RX REV CODE 258

## 2020-01-01 PROCEDURE — 87040 BLOOD CULTURE FOR BACTERIA: CPT | Mod: 91

## 2020-01-01 PROCEDURE — 87150 DNA/RNA AMPLIFIED PROBE: CPT | Mod: 91

## 2020-01-01 PROCEDURE — 71275 CT ANGIOGRAPHY CHEST: CPT

## 2020-01-01 PROCEDURE — 72131 CT LUMBAR SPINE W/O DYE: CPT

## 2020-01-01 PROCEDURE — 87581 M.PNEUMON DNA AMP PROBE: CPT

## 2020-01-01 PROCEDURE — 87640 STAPH A DNA AMP PROBE: CPT

## 2020-01-01 PROCEDURE — 84443 ASSAY THYROID STIM HORMONE: CPT

## 2020-01-01 PROCEDURE — 86140 C-REACTIVE PROTEIN: CPT

## 2020-01-01 PROCEDURE — 72156 MRI NECK SPINE W/O & W/DYE: CPT

## 2020-01-01 PROCEDURE — 72157 MRI CHEST SPINE W/O & W/DYE: CPT

## 2020-01-01 PROCEDURE — 99223 1ST HOSP IP/OBS HIGH 75: CPT | Performed by: INTERNAL MEDICINE

## 2020-01-01 RX ORDER — POTASSIUM CHLORIDE 20 MEQ/1
40 TABLET, EXTENDED RELEASE ORAL EVERY 6 HOURS
Status: DISCONTINUED | OUTPATIENT
Start: 2020-01-01 | End: 2020-01-01

## 2020-01-01 RX ORDER — CLOPIDOGREL BISULFATE 75 MG/1
75 TABLET ORAL DAILY
Status: DISCONTINUED | OUTPATIENT
Start: 2020-01-01 | End: 2020-01-01 | Stop reason: HOSPADM

## 2020-01-01 RX ORDER — EZETIMIBE 10 MG/1
10 TABLET ORAL DAILY
Status: DISCONTINUED | OUTPATIENT
Start: 2020-01-01 | End: 2020-01-01

## 2020-01-01 RX ORDER — GABAPENTIN 300 MG/1
300-600 CAPSULE ORAL 3 TIMES DAILY
COMMUNITY

## 2020-01-01 RX ORDER — GABAPENTIN 300 MG/1
600 CAPSULE ORAL
Status: DISCONTINUED | OUTPATIENT
Start: 2020-01-01 | End: 2020-01-01 | Stop reason: HOSPADM

## 2020-01-01 RX ORDER — ALPRAZOLAM 0.25 MG/1
0.25 TABLET ORAL 4 TIMES DAILY PRN
Status: DISCONTINUED | OUTPATIENT
Start: 2020-01-01 | End: 2020-01-01

## 2020-01-01 RX ORDER — ONDANSETRON 2 MG/ML
4 INJECTION INTRAMUSCULAR; INTRAVENOUS ONCE
Status: COMPLETED | OUTPATIENT
Start: 2020-01-01 | End: 2020-01-01

## 2020-01-01 RX ORDER — BISACODYL 10 MG
10 SUPPOSITORY, RECTAL RECTAL
Status: DISCONTINUED | OUTPATIENT
Start: 2020-01-01 | End: 2020-01-01 | Stop reason: HOSPADM

## 2020-01-01 RX ORDER — MAGNESIUM SULFATE HEPTAHYDRATE 40 MG/ML
2 INJECTION, SOLUTION INTRAVENOUS ONCE
Status: COMPLETED | OUTPATIENT
Start: 2020-01-01 | End: 2020-01-01

## 2020-01-01 RX ORDER — TORSEMIDE 10 MG/1
10 TABLET ORAL 2 TIMES DAILY
Qty: 60 TAB | Refills: 11 | Status: SHIPPED
Start: 2020-01-01 | End: 2020-01-01

## 2020-01-01 RX ORDER — LISINOPRIL 20 MG/1
40 TABLET ORAL DAILY
Status: DISCONTINUED | OUTPATIENT
Start: 2020-01-01 | End: 2020-01-01 | Stop reason: HOSPADM

## 2020-01-01 RX ORDER — HYDRALAZINE HYDROCHLORIDE 50 MG/1
100 TABLET, FILM COATED ORAL EVERY 8 HOURS
Status: DISCONTINUED | OUTPATIENT
Start: 2020-01-01 | End: 2020-01-01

## 2020-01-01 RX ORDER — INSULIN GLARGINE 100 [IU]/ML
40 INJECTION, SOLUTION SUBCUTANEOUS EVERY EVENING
Status: DISCONTINUED | OUTPATIENT
Start: 2020-01-01 | End: 2020-01-01 | Stop reason: HOSPADM

## 2020-01-01 RX ORDER — LATANOPROST 50 UG/ML
1 SOLUTION/ DROPS OPHTHALMIC NIGHTLY
COMMUNITY

## 2020-01-01 RX ORDER — POTASSIUM CHLORIDE 20 MEQ/1
40 TABLET, EXTENDED RELEASE ORAL EVERY 6 HOURS
Status: ACTIVE | OUTPATIENT
Start: 2020-01-01 | End: 2020-01-01

## 2020-01-01 RX ORDER — INSULIN GLARGINE 100 [IU]/ML
15 INJECTION, SOLUTION SUBCUTANEOUS EVERY MORNING
Status: DISCONTINUED | OUTPATIENT
Start: 2020-01-01 | End: 2020-01-01 | Stop reason: HOSPADM

## 2020-01-01 RX ORDER — POTASSIUM CHLORIDE 20 MEQ/1
40 TABLET, EXTENDED RELEASE ORAL ONCE
Status: COMPLETED | OUTPATIENT
Start: 2020-01-01 | End: 2020-01-01

## 2020-01-01 RX ORDER — OXYCODONE HYDROCHLORIDE 5 MG/1
5 TABLET ORAL EVERY 4 HOURS PRN
Status: DISCONTINUED | OUTPATIENT
Start: 2020-01-01 | End: 2020-01-01 | Stop reason: HOSPADM

## 2020-01-01 RX ORDER — POLYETHYLENE GLYCOL 3350 17 G/17G
1 POWDER, FOR SOLUTION ORAL
Status: DISCONTINUED | OUTPATIENT
Start: 2020-01-01 | End: 2020-01-01 | Stop reason: HOSPADM

## 2020-01-01 RX ORDER — TIZANIDINE 2 MG/1
2 TABLET ORAL
COMMUNITY

## 2020-01-01 RX ORDER — AMOXICILLIN 250 MG
2 CAPSULE ORAL 2 TIMES DAILY
Status: DISCONTINUED | OUTPATIENT
Start: 2020-01-01 | End: 2020-01-01 | Stop reason: HOSPADM

## 2020-01-01 RX ORDER — AMLODIPINE BESYLATE 5 MG/1
10 TABLET ORAL DAILY
Status: DISCONTINUED | OUTPATIENT
Start: 2020-01-01 | End: 2020-01-01

## 2020-01-01 RX ORDER — METOPROLOL TARTRATE 1 MG/ML
5 INJECTION, SOLUTION INTRAVENOUS
Status: COMPLETED | OUTPATIENT
Start: 2020-01-01 | End: 2020-01-01

## 2020-01-01 RX ORDER — LISINOPRIL 20 MG/1
40 TABLET ORAL DAILY
Status: DISCONTINUED | OUTPATIENT
Start: 2020-01-01 | End: 2020-01-01

## 2020-01-01 RX ORDER — DEXTROSE MONOHYDRATE 50 MG/ML
INJECTION, SOLUTION INTRAVENOUS
Status: ACTIVE
Start: 2020-01-01 | End: 2020-01-01

## 2020-01-01 RX ORDER — BISACODYL 10 MG
10 SUPPOSITORY, RECTAL RECTAL
Status: DISCONTINUED | OUTPATIENT
Start: 2020-01-01 | End: 2020-01-01

## 2020-01-01 RX ORDER — AMLODIPINE BESYLATE 10 MG/1
10 TABLET ORAL DAILY
Status: DISCONTINUED | OUTPATIENT
Start: 2020-01-01 | End: 2020-01-01

## 2020-01-01 RX ORDER — POTASSIUM CHLORIDE 20 MEQ/1
20 TABLET, EXTENDED RELEASE ORAL 2 TIMES DAILY
COMMUNITY

## 2020-01-01 RX ORDER — EZETIMIBE 10 MG/1
10 TABLET ORAL DAILY
Status: DISCONTINUED | OUTPATIENT
Start: 2020-01-01 | End: 2020-01-01 | Stop reason: HOSPADM

## 2020-01-01 RX ORDER — POLYETHYLENE GLYCOL 3350 17 G/17G
1 POWDER, FOR SOLUTION ORAL
Status: DISCONTINUED | OUTPATIENT
Start: 2020-01-01 | End: 2020-01-01

## 2020-01-01 RX ORDER — GABAPENTIN 300 MG/1
300 CAPSULE ORAL 2 TIMES DAILY
Status: DISCONTINUED | OUTPATIENT
Start: 2020-01-01 | End: 2020-01-01

## 2020-01-01 RX ORDER — DEXTROSE MONOHYDRATE 25 G/50ML
50 INJECTION, SOLUTION INTRAVENOUS
Status: DISCONTINUED | OUTPATIENT
Start: 2020-01-01 | End: 2020-01-01

## 2020-01-01 RX ORDER — HYDRALAZINE HYDROCHLORIDE 50 MG/1
100 TABLET, FILM COATED ORAL 3 TIMES DAILY
Status: DISCONTINUED | OUTPATIENT
Start: 2020-01-01 | End: 2020-01-01 | Stop reason: HOSPADM

## 2020-01-01 RX ORDER — GABAPENTIN 300 MG/1
600 CAPSULE ORAL EVERY EVENING
Status: DISCONTINUED | OUTPATIENT
Start: 2020-01-01 | End: 2020-01-01 | Stop reason: HOSPADM

## 2020-01-01 RX ORDER — CLOPIDOGREL BISULFATE 75 MG/1
75 TABLET ORAL DAILY
Status: DISCONTINUED | OUTPATIENT
Start: 2020-01-01 | End: 2020-01-01

## 2020-01-01 RX ORDER — GABAPENTIN 300 MG/1
600 CAPSULE ORAL EVERY EVENING
Status: DISCONTINUED | OUTPATIENT
Start: 2020-01-01 | End: 2020-01-01

## 2020-01-01 RX ORDER — POTASSIUM CHLORIDE 7.45 MG/ML
10 INJECTION INTRAVENOUS
Status: COMPLETED | OUTPATIENT
Start: 2020-01-01 | End: 2020-01-01

## 2020-01-01 RX ORDER — SODIUM CHLORIDE, SODIUM LACTATE, POTASSIUM CHLORIDE, CALCIUM CHLORIDE 600; 310; 30; 20 MG/100ML; MG/100ML; MG/100ML; MG/100ML
INJECTION, SOLUTION INTRAVENOUS
Status: DISCONTINUED
Start: 2020-01-01 | End: 2020-01-01 | Stop reason: HOSPADM

## 2020-01-01 RX ORDER — INSULIN GLARGINE 100 [IU]/ML
5 INJECTION, SOLUTION SUBCUTANEOUS ONCE
Status: COMPLETED | OUTPATIENT
Start: 2020-01-01 | End: 2020-01-01

## 2020-01-01 RX ORDER — AMLODIPINE BESYLATE 10 MG/1
10 TABLET ORAL DAILY
Status: DISCONTINUED | OUTPATIENT
Start: 2020-01-01 | End: 2020-01-01 | Stop reason: HOSPADM

## 2020-01-01 RX ORDER — HYDRALAZINE HYDROCHLORIDE 25 MG/1
100 TABLET, FILM COATED ORAL EVERY 8 HOURS
Status: DISCONTINUED | OUTPATIENT
Start: 2020-01-01 | End: 2020-01-01

## 2020-01-01 RX ORDER — CLINDAMYCIN HYDROCHLORIDE 300 MG/1
300 CAPSULE ORAL 2 TIMES DAILY
COMMUNITY
Start: 2020-01-01

## 2020-01-01 RX ORDER — LANOLIN ALCOHOL/MO/W.PET/CERES
6 CREAM (GRAM) TOPICAL
COMMUNITY
End: 2020-01-01

## 2020-01-01 RX ORDER — METOPROLOL SUCCINATE 200 MG/1
200 TABLET, EXTENDED RELEASE ORAL DAILY
COMMUNITY

## 2020-01-01 RX ORDER — METOPROLOL SUCCINATE 100 MG/1
200 TABLET, EXTENDED RELEASE ORAL DAILY
Status: DISCONTINUED | OUTPATIENT
Start: 2020-01-01 | End: 2020-01-01

## 2020-01-01 RX ORDER — SODIUM CHLORIDE 9 MG/ML
INJECTION, SOLUTION INTRAVENOUS
Status: COMPLETED
Start: 2020-01-01 | End: 2020-01-01

## 2020-01-01 RX ORDER — TAMSULOSIN HYDROCHLORIDE 0.4 MG/1
0.4 CAPSULE ORAL
COMMUNITY

## 2020-01-01 RX ORDER — TORSEMIDE 5 MG/1
10 TABLET ORAL 2 TIMES DAILY
Status: DISCONTINUED | OUTPATIENT
Start: 2020-01-01 | End: 2020-01-01 | Stop reason: HOSPADM

## 2020-01-01 RX ORDER — METOPROLOL SUCCINATE 100 MG/1
200 TABLET, EXTENDED RELEASE ORAL DAILY
Status: DISCONTINUED | OUTPATIENT
Start: 2020-01-01 | End: 2020-01-01 | Stop reason: HOSPADM

## 2020-01-01 RX ORDER — FUROSEMIDE 10 MG/ML
20 INJECTION INTRAMUSCULAR; INTRAVENOUS ONCE
Status: COMPLETED | OUTPATIENT
Start: 2020-01-01 | End: 2020-01-01

## 2020-01-01 RX ORDER — POTASSIUM CHLORIDE 20 MEQ/1
20 TABLET, EXTENDED RELEASE ORAL DAILY
Status: DISCONTINUED | OUTPATIENT
Start: 2020-01-01 | End: 2020-01-01 | Stop reason: HOSPADM

## 2020-01-01 RX ORDER — LISINOPRIL 40 MG/1
40 TABLET ORAL DAILY
COMMUNITY

## 2020-01-01 RX ORDER — LIDOCAINE HYDROCHLORIDE 20 MG/ML
JELLY TOPICAL ONCE
Status: COMPLETED | OUTPATIENT
Start: 2020-01-01 | End: 2020-01-01

## 2020-01-01 RX ORDER — HYDRALAZINE HYDROCHLORIDE 100 MG/1
TABLET, FILM COATED ORAL
Qty: 90 TAB | Refills: 0 | Status: SHIPPED
Start: 2020-01-01 | End: 2020-01-01

## 2020-01-01 RX ORDER — FUROSEMIDE 10 MG/ML
40 INJECTION INTRAMUSCULAR; INTRAVENOUS
Status: DISCONTINUED | OUTPATIENT
Start: 2020-01-01 | End: 2020-01-01 | Stop reason: HOSPADM

## 2020-01-01 RX ORDER — HYDRALAZINE HYDROCHLORIDE 20 MG/ML
10-20 INJECTION INTRAMUSCULAR; INTRAVENOUS EVERY 4 HOURS PRN
Status: DISCONTINUED | OUTPATIENT
Start: 2020-01-01 | End: 2020-01-01 | Stop reason: HOSPADM

## 2020-01-01 RX ORDER — AMOXICILLIN 250 MG
2 CAPSULE ORAL 2 TIMES DAILY
Status: DISCONTINUED | OUTPATIENT
Start: 2020-01-01 | End: 2020-01-01

## 2020-01-01 RX ORDER — ALPRAZOLAM 0.25 MG/1
0.25 TABLET ORAL 3 TIMES DAILY PRN
COMMUNITY
End: 2020-01-01

## 2020-01-01 RX ORDER — LANOLIN ALCOHOL/MO/W.PET/CERES
6 CREAM (GRAM) TOPICAL
COMMUNITY

## 2020-01-01 RX ORDER — FINASTERIDE 5 MG/1
5 TABLET, FILM COATED ORAL DAILY
Status: DISCONTINUED | OUTPATIENT
Start: 2020-01-01 | End: 2020-01-01

## 2020-01-01 RX ORDER — EMPAGLIFLOZIN 10 MG/1
5 TABLET, FILM COATED ORAL DAILY
COMMUNITY

## 2020-01-01 RX ORDER — FUROSEMIDE 20 MG/1
20 TABLET ORAL 2 TIMES DAILY
Status: ON HOLD | COMMUNITY
End: 2020-01-01

## 2020-01-01 RX ORDER — LORAZEPAM 2 MG/ML
0.5 INJECTION INTRAMUSCULAR
Status: ACTIVE | OUTPATIENT
Start: 2020-01-01 | End: 2020-01-01

## 2020-01-01 RX ORDER — METOPROLOL TARTRATE 50 MG/1
100 TABLET, FILM COATED ORAL TWICE DAILY
Status: DISCONTINUED | OUTPATIENT
Start: 2020-01-01 | End: 2020-01-01

## 2020-01-01 RX ORDER — HYDROMORPHONE HYDROCHLORIDE 1 MG/ML
.5-1 INJECTION, SOLUTION INTRAMUSCULAR; INTRAVENOUS; SUBCUTANEOUS
Status: DISCONTINUED | OUTPATIENT
Start: 2020-01-01 | End: 2020-01-01 | Stop reason: HOSPADM

## 2020-01-01 RX ORDER — CLOPIDOGREL BISULFATE 75 MG/1
75 TABLET ORAL DAILY
COMMUNITY

## 2020-01-01 RX ORDER — ALPRAZOLAM 0.25 MG/1
0.25 TABLET ORAL 4 TIMES DAILY PRN
Status: DISCONTINUED | OUTPATIENT
Start: 2020-01-01 | End: 2020-01-01 | Stop reason: HOSPADM

## 2020-01-01 RX ORDER — ALPRAZOLAM 0.25 MG/1
0.25 TABLET ORAL 3 TIMES DAILY PRN
COMMUNITY

## 2020-01-01 RX ORDER — NITROFURANTOIN 25; 75 MG/1; MG/1
100 CAPSULE ORAL DAILY
COMMUNITY
Start: 2020-01-01

## 2020-01-01 RX ORDER — MORPHINE SULFATE 4 MG/ML
4 INJECTION, SOLUTION INTRAMUSCULAR; INTRAVENOUS ONCE
Status: COMPLETED | OUTPATIENT
Start: 2020-01-01 | End: 2020-01-01

## 2020-01-01 RX ORDER — LANCETS 30 GAUGE
EACH MISCELLANEOUS
Qty: 300 EACH | Refills: 3 | Status: SHIPPED
Start: 2020-01-01 | End: 2020-01-01

## 2020-01-01 RX ORDER — FUROSEMIDE 20 MG/1
20 TABLET ORAL 2 TIMES DAILY
COMMUNITY

## 2020-01-01 RX ORDER — CLOTRIMAZOLE AND BETAMETHASONE DIPROPIONATE 10; .64 MG/G; MG/G
CREAM TOPICAL 2 TIMES DAILY
Status: DISCONTINUED | OUTPATIENT
Start: 2020-01-01 | End: 2020-01-01 | Stop reason: HOSPADM

## 2020-01-01 RX ORDER — METHENAMINE HIPPURATE 1000 MG/1
1 TABLET ORAL 2 TIMES DAILY
COMMUNITY

## 2020-01-01 RX ORDER — GABAPENTIN 300 MG/1
300 CAPSULE ORAL 2 TIMES DAILY
Status: DISCONTINUED | OUTPATIENT
Start: 2020-01-01 | End: 2020-01-01 | Stop reason: HOSPADM

## 2020-01-01 RX ORDER — TAMSULOSIN HYDROCHLORIDE 0.4 MG/1
0.4 CAPSULE ORAL
Status: DISCONTINUED | OUTPATIENT
Start: 2020-01-01 | End: 2020-01-01

## 2020-01-01 RX ORDER — DEXTROSE MONOHYDRATE 50 MG/ML
INJECTION, SOLUTION INTRAVENOUS CONTINUOUS
Status: DISCONTINUED | OUTPATIENT
Start: 2020-01-01 | End: 2020-01-01

## 2020-01-01 RX ORDER — TIZANIDINE 4 MG/1
2 TABLET ORAL
Status: DISCONTINUED | OUTPATIENT
Start: 2020-01-01 | End: 2020-01-01 | Stop reason: HOSPADM

## 2020-01-01 RX ORDER — KETOCONAZOLE 20 MG/G
1 CREAM TOPICAL DAILY
COMMUNITY

## 2020-01-01 RX ORDER — ONDANSETRON 2 MG/ML
4 INJECTION INTRAMUSCULAR; INTRAVENOUS EVERY 4 HOURS PRN
Status: DISCONTINUED | OUTPATIENT
Start: 2020-01-01 | End: 2020-01-01 | Stop reason: HOSPADM

## 2020-01-01 RX ORDER — INSULIN ASPART 100 [IU]/ML
2-10 INJECTION, SOLUTION INTRAVENOUS; SUBCUTANEOUS
Qty: 5 PEN | Refills: 1 | Status: SHIPPED
Start: 2020-01-01 | End: 2020-01-01

## 2020-01-01 RX ORDER — KETOCONAZOLE 20 MG/G
0.25 CREAM TOPICAL DAILY
Status: DISCONTINUED | OUTPATIENT
Start: 2020-01-01 | End: 2020-01-01 | Stop reason: HOSPADM

## 2020-01-01 RX ORDER — SODIUM CHLORIDE, SODIUM LACTATE, POTASSIUM CHLORIDE, CALCIUM CHLORIDE 600; 310; 30; 20 MG/100ML; MG/100ML; MG/100ML; MG/100ML
500 INJECTION, SOLUTION INTRAVENOUS ONCE
Status: COMPLETED | OUTPATIENT
Start: 2020-01-01 | End: 2020-01-01

## 2020-01-01 RX ORDER — TIZANIDINE 4 MG/1
2 TABLET ORAL
Status: DISCONTINUED | OUTPATIENT
Start: 2020-01-01 | End: 2020-01-01

## 2020-01-01 RX ORDER — FINASTERIDE 5 MG/1
5 TABLET, FILM COATED ORAL DAILY
COMMUNITY

## 2020-01-01 RX ORDER — OXYCODONE HYDROCHLORIDE 5 MG/1
5 TABLET ORAL EVERY 4 HOURS PRN
Status: DISCONTINUED | OUTPATIENT
Start: 2020-01-01 | End: 2020-01-01

## 2020-01-01 RX ORDER — SODIUM CHLORIDE 9 MG/ML
500 INJECTION, SOLUTION INTRAVENOUS ONCE
Status: DISCONTINUED | OUTPATIENT
Start: 2020-01-01 | End: 2020-01-01

## 2020-01-01 RX ORDER — HYDRALAZINE HYDROCHLORIDE 100 MG/1
100 TABLET, FILM COATED ORAL EVERY 8 HOURS
COMMUNITY

## 2020-01-01 RX ORDER — NITROGLYCERIN 0.4 MG/1
0.4 TABLET SUBLINGUAL
COMMUNITY

## 2020-01-01 RX ORDER — DILTIAZEM HYDROCHLORIDE 5 MG/ML
10 INJECTION INTRAVENOUS ONCE
Status: COMPLETED | OUTPATIENT
Start: 2020-01-01 | End: 2020-01-01

## 2020-01-01 RX ORDER — LABETALOL HYDROCHLORIDE 5 MG/ML
10-20 INJECTION, SOLUTION INTRAVENOUS EVERY 4 HOURS PRN
Status: DISCONTINUED | OUTPATIENT
Start: 2020-01-01 | End: 2020-01-01 | Stop reason: HOSPADM

## 2020-01-01 RX ORDER — LATANOPROST 50 UG/ML
1 SOLUTION/ DROPS OPHTHALMIC NIGHTLY
COMMUNITY
End: 2020-01-01

## 2020-01-01 RX ORDER — HYDROMORPHONE HYDROCHLORIDE 1 MG/ML
.5-1 INJECTION, SOLUTION INTRAMUSCULAR; INTRAVENOUS; SUBCUTANEOUS
Status: DISCONTINUED | OUTPATIENT
Start: 2020-01-01 | End: 2020-01-01

## 2020-01-01 RX ORDER — ACETAMINOPHEN 325 MG/1
650 TABLET ORAL EVERY 6 HOURS PRN
Status: DISCONTINUED | OUTPATIENT
Start: 2020-01-01 | End: 2020-01-01 | Stop reason: HOSPADM

## 2020-01-01 RX ORDER — INSULIN LISPRO 100 [IU]/ML
2-10 INJECTION, SOLUTION INTRAVENOUS; SUBCUTANEOUS
Qty: 5 PEN | Refills: 11 | Status: SHIPPED
Start: 2020-01-01 | End: 2020-01-01

## 2020-01-01 RX ORDER — ONDANSETRON 4 MG/1
4 TABLET, ORALLY DISINTEGRATING ORAL EVERY 4 HOURS PRN
Status: DISCONTINUED | OUTPATIENT
Start: 2020-01-01 | End: 2020-01-01 | Stop reason: HOSPADM

## 2020-01-01 RX ORDER — BLOOD SUGAR DIAGNOSTIC
1 STRIP MISCELLANEOUS 4 TIMES DAILY PRN
Qty: 360 EACH | Refills: 3 | Status: SHIPPED
Start: 2020-01-01 | End: 2020-01-01

## 2020-01-01 RX ORDER — METOPROLOL TARTRATE 50 MG/1
100 TABLET, FILM COATED ORAL TWICE DAILY
Status: DISCONTINUED | OUTPATIENT
Start: 2020-01-01 | End: 2020-01-01 | Stop reason: HOSPADM

## 2020-01-01 RX ORDER — DEXTROSE MONOHYDRATE 25 G/50ML
50 INJECTION, SOLUTION INTRAVENOUS
Status: DISCONTINUED | OUTPATIENT
Start: 2020-01-01 | End: 2020-01-01 | Stop reason: HOSPADM

## 2020-01-01 RX ORDER — DEXMEDETOMIDINE HYDROCHLORIDE 4 UG/ML
.1-1.5 INJECTION INTRAVENOUS CONTINUOUS
Status: DISCONTINUED | OUTPATIENT
Start: 2020-01-01 | End: 2020-01-01 | Stop reason: HOSPADM

## 2020-01-01 RX ORDER — LATANOPROST 50 UG/ML
1 SOLUTION/ DROPS OPHTHALMIC EVERY EVENING
Status: DISCONTINUED | OUTPATIENT
Start: 2020-01-01 | End: 2020-01-01 | Stop reason: HOSPADM

## 2020-01-01 RX ORDER — METOPROLOL SUCCINATE 200 MG/1
200 TABLET, EXTENDED RELEASE ORAL DAILY
Qty: 30 TAB | Refills: 0 | Status: SHIPPED
Start: 2020-01-01 | End: 2020-01-01

## 2020-01-01 RX ORDER — INSULIN GLARGINE 100 [IU]/ML
10 INJECTION, SOLUTION SUBCUTANEOUS EVERY MORNING
Status: DISCONTINUED | OUTPATIENT
Start: 2020-01-01 | End: 2020-01-01

## 2020-01-01 RX ORDER — LORAZEPAM 2 MG/ML
INJECTION INTRAMUSCULAR
Status: COMPLETED
Start: 2020-01-01 | End: 2020-01-01

## 2020-01-01 RX ORDER — INSULIN GLARGINE 100 [IU]/ML
40 INJECTION, SOLUTION SUBCUTANEOUS EVERY EVENING
Qty: 5 PEN | Refills: 5 | Status: SHIPPED
Start: 2020-01-01 | End: 2020-01-01

## 2020-01-01 RX ADMIN — CLOTRIMAZOLE AND BETAMETHASONE DIPROPIONATE: 10; .5 CREAM TOPICAL at 05:28

## 2020-01-01 RX ADMIN — OXYCODONE HYDROCHLORIDE 5 MG: 5 TABLET ORAL at 15:20

## 2020-01-01 RX ADMIN — FUROSEMIDE 40 MG: 10 INJECTION, SOLUTION INTRAMUSCULAR; INTRAVENOUS at 15:59

## 2020-01-01 RX ADMIN — HYDROMORPHONE HYDROCHLORIDE 0.5 MG: 1 INJECTION, SOLUTION INTRAMUSCULAR; INTRAVENOUS; SUBCUTANEOUS at 14:21

## 2020-01-01 RX ADMIN — SENNOSIDES AND DOCUSATE SODIUM 2 TABLET: 8.6; 5 TABLET ORAL at 17:34

## 2020-01-01 RX ADMIN — AMLODIPINE BESYLATE 10 MG: 10 TABLET ORAL at 04:57

## 2020-01-01 RX ADMIN — LATANOPROST 1 DROP: 50 SOLUTION OPHTHALMIC at 19:37

## 2020-01-01 RX ADMIN — HYDROMORPHONE HYDROCHLORIDE 0.5 MG: 1 INJECTION, SOLUTION INTRAMUSCULAR; INTRAVENOUS; SUBCUTANEOUS at 20:13

## 2020-01-01 RX ADMIN — BACITRACIN ZINC NEOMYCIN SULFATE POLYMYXIN B SULFATE: 400; 3.5; 5 OINTMENT TOPICAL at 04:56

## 2020-01-01 RX ADMIN — KETOCONAZOLE 0.25 G: 20 CREAM TOPICAL at 04:29

## 2020-01-01 RX ADMIN — METFORMIN HYDROCHLORIDE 1000 MG: 500 TABLET ORAL at 09:12

## 2020-01-01 RX ADMIN — CLOTRIMAZOLE AND BETAMETHASONE DIPROPIONATE: 10; .5 CREAM TOPICAL at 07:09

## 2020-01-01 RX ADMIN — EZETIMIBE 10 MG: 10 TABLET ORAL at 09:04

## 2020-01-01 RX ADMIN — CLOPIDOGREL BISULFATE 75 MG: 75 TABLET ORAL at 04:57

## 2020-01-01 RX ADMIN — ALPRAZOLAM 0.25 MG: 0.25 TABLET ORAL at 03:52

## 2020-01-01 RX ADMIN — SENNOSIDES AND DOCUSATE SODIUM 2 TABLET: 8.6; 5 TABLET ORAL at 05:28

## 2020-01-01 RX ADMIN — TIZANIDINE 2 MG: 4 TABLET ORAL at 21:03

## 2020-01-01 RX ADMIN — METOPROLOL SUCCINATE 200 MG: 100 TABLET, EXTENDED RELEASE ORAL at 04:28

## 2020-01-01 RX ADMIN — VANCOMYCIN HYDROCHLORIDE 2900 MG: 500 INJECTION, POWDER, LYOPHILIZED, FOR SOLUTION INTRAVENOUS at 12:17

## 2020-01-01 RX ADMIN — CLOPIDOGREL BISULFATE 75 MG: 75 TABLET ORAL at 05:28

## 2020-01-01 RX ADMIN — HYDRALAZINE HYDROCHLORIDE 100 MG: 50 TABLET, FILM COATED ORAL at 21:44

## 2020-01-01 RX ADMIN — DEXMEDETOMIDINE HYDROCHLORIDE 0.5 MCG/KG/HR: 4 INJECTION INTRAVENOUS at 18:23

## 2020-01-01 RX ADMIN — DEXMEDETOMIDINE HYDROCHLORIDE 0.7 MCG/KG/HR: 4 INJECTION INTRAVENOUS at 05:04

## 2020-01-01 RX ADMIN — DEXMEDETOMIDINE HYDROCHLORIDE 0.3 MCG/KG/HR: 4 INJECTION INTRAVENOUS at 15:59

## 2020-01-01 RX ADMIN — LORAZEPAM 0.5 MG: 2 INJECTION INTRAMUSCULAR; INTRAVENOUS at 17:19

## 2020-01-01 RX ADMIN — DEXTROSE MONOHYDRATE: 50 INJECTION, SOLUTION INTRAVENOUS at 22:09

## 2020-01-01 RX ADMIN — INSULIN HUMAN 3 UNITS: 100 INJECTION, SOLUTION PARENTERAL at 17:26

## 2020-01-01 RX ADMIN — TIZANIDINE 2 MG: 4 TABLET ORAL at 21:41

## 2020-01-01 RX ADMIN — HYDRALAZINE HYDROCHLORIDE 100 MG: 50 TABLET, FILM COATED ORAL at 15:14

## 2020-01-01 RX ADMIN — INSULIN HUMAN 12 UNITS: 100 INJECTION, SOLUTION PARENTERAL at 23:20

## 2020-01-01 RX ADMIN — PIPERACILLIN AND TAZOBACTAM 4.5 G: 4; .5 INJECTION, POWDER, LYOPHILIZED, FOR SOLUTION INTRAVENOUS; PARENTERAL at 14:19

## 2020-01-01 RX ADMIN — INSULIN HUMAN 2 UNITS: 100 INJECTION, SOLUTION PARENTERAL at 20:57

## 2020-01-01 RX ADMIN — MAGNESIUM SULFATE 2 G: 2 INJECTION INTRAVENOUS at 08:43

## 2020-01-01 RX ADMIN — DEXMEDETOMIDINE HYDROCHLORIDE 0.3 MCG/KG/HR: 4 INJECTION INTRAVENOUS at 04:53

## 2020-01-01 RX ADMIN — GADOTERIDOL 25 ML: 279.3 INJECTION, SOLUTION INTRAVENOUS at 17:42

## 2020-01-01 RX ADMIN — METOPROLOL TARTRATE 5 MG: 5 INJECTION, SOLUTION INTRAVENOUS at 12:48

## 2020-01-01 RX ADMIN — METOPROLOL SUCCINATE 200 MG: 100 TABLET, EXTENDED RELEASE ORAL at 05:33

## 2020-01-01 RX ADMIN — RIVAROXABAN 20 MG: 20 TABLET, FILM COATED ORAL at 18:14

## 2020-01-01 RX ADMIN — FINASTERIDE 5 MG: 5 TABLET, FILM COATED ORAL at 05:28

## 2020-01-01 RX ADMIN — VANCOMYCIN HYDROCHLORIDE 1700 MG: 500 INJECTION, POWDER, LYOPHILIZED, FOR SOLUTION INTRAVENOUS at 11:08

## 2020-01-01 RX ADMIN — KETOCONAZOLE 0.25 G: 20 CREAM TOPICAL at 04:56

## 2020-01-01 RX ADMIN — GABAPENTIN 300 MG: 300 CAPSULE ORAL at 12:25

## 2020-01-01 RX ADMIN — SODIUM CHLORIDE, POTASSIUM CHLORIDE, SODIUM LACTATE AND CALCIUM CHLORIDE 500 ML: 600; 310; 30; 20 INJECTION, SOLUTION INTRAVENOUS at 09:02

## 2020-01-01 RX ADMIN — POTASSIUM CHLORIDE 10 MEQ: 7.46 INJECTION, SOLUTION INTRAVENOUS at 07:42

## 2020-01-01 RX ADMIN — CLOPIDOGREL BISULFATE 75 MG: 75 TABLET ORAL at 04:28

## 2020-01-01 RX ADMIN — HYDROMORPHONE HYDROCHLORIDE 1 MG: 1 INJECTION, SOLUTION INTRAMUSCULAR; INTRAVENOUS; SUBCUTANEOUS at 11:58

## 2020-01-01 RX ADMIN — ALPRAZOLAM 0.25 MG: 0.25 TABLET ORAL at 12:36

## 2020-01-01 RX ADMIN — TORSEMIDE 10 MG: 5 TABLET ORAL at 09:00

## 2020-01-01 RX ADMIN — FUROSEMIDE 40 MG: 10 INJECTION, SOLUTION INTRAMUSCULAR; INTRAVENOUS at 05:33

## 2020-01-01 RX ADMIN — HYDROMORPHONE HYDROCHLORIDE 1 MG: 1 INJECTION, SOLUTION INTRAMUSCULAR; INTRAVENOUS; SUBCUTANEOUS at 08:06

## 2020-01-01 RX ADMIN — POTASSIUM BICARBONATE 25 MEQ: 978 TABLET, EFFERVESCENT ORAL at 10:31

## 2020-01-01 RX ADMIN — METOPROLOL TARTRATE 100 MG: 50 TABLET, FILM COATED ORAL at 06:38

## 2020-01-01 RX ADMIN — FUROSEMIDE 20 MG: 10 INJECTION, SOLUTION INTRAMUSCULAR; INTRAVENOUS at 07:05

## 2020-01-01 RX ADMIN — GABAPENTIN 300 MG: 300 CAPSULE ORAL at 05:33

## 2020-01-01 RX ADMIN — HYDROMORPHONE HYDROCHLORIDE 1 MG: 1 INJECTION, SOLUTION INTRAMUSCULAR; INTRAVENOUS; SUBCUTANEOUS at 05:47

## 2020-01-01 RX ADMIN — LISINOPRIL 40 MG: 20 TABLET ORAL at 04:57

## 2020-01-01 RX ADMIN — GABAPENTIN 600 MG: 300 CAPSULE ORAL at 19:45

## 2020-01-01 RX ADMIN — DEXMEDETOMIDINE HYDROCHLORIDE 0.3 MCG/KG/HR: 4 INJECTION INTRAVENOUS at 20:10

## 2020-01-01 RX ADMIN — RIVAROXABAN 20 MG: 20 TABLET, FILM COATED ORAL at 18:30

## 2020-01-01 RX ADMIN — INSULIN GLARGINE 10 UNITS: 100 INJECTION, SOLUTION SUBCUTANEOUS at 05:10

## 2020-01-01 RX ADMIN — AMLODIPINE BESYLATE 10 MG: 10 TABLET ORAL at 05:52

## 2020-01-01 RX ADMIN — HYDRALAZINE HYDROCHLORIDE 100 MG: 50 TABLET, FILM COATED ORAL at 05:53

## 2020-01-01 RX ADMIN — HYDROMORPHONE HYDROCHLORIDE 0.5 MG: 1 INJECTION, SOLUTION INTRAMUSCULAR; INTRAVENOUS; SUBCUTANEOUS at 04:04

## 2020-01-01 RX ADMIN — CLOTRIMAZOLE AND BETAMETHASONE DIPROPIONATE: 10; .5 CREAM TOPICAL at 05:05

## 2020-01-01 RX ADMIN — DEXMEDETOMIDINE HYDROCHLORIDE 0.3 MCG/KG/HR: 4 INJECTION INTRAVENOUS at 19:29

## 2020-01-01 RX ADMIN — INSULIN HUMAN 3 UNITS: 100 INJECTION, SOLUTION PARENTERAL at 23:53

## 2020-01-01 RX ADMIN — OXYCODONE HYDROCHLORIDE 5 MG: 5 TABLET ORAL at 02:57

## 2020-01-01 RX ADMIN — KETOCONAZOLE 0.25 G: 20 CREAM TOPICAL at 09:06

## 2020-01-01 RX ADMIN — INSULIN HUMAN 3 UNITS: 100 INJECTION, SOLUTION PARENTERAL at 12:06

## 2020-01-01 RX ADMIN — VANCOMYCIN HYDROCHLORIDE 1700 MG: 500 INJECTION, POWDER, LYOPHILIZED, FOR SOLUTION INTRAVENOUS at 23:45

## 2020-01-01 RX ADMIN — KETOCONAZOLE 0.25 G: 20 CREAM TOPICAL at 06:49

## 2020-01-01 RX ADMIN — INSULIN HUMAN 7 UNITS: 100 INJECTION, SOLUTION PARENTERAL at 20:35

## 2020-01-01 RX ADMIN — DEXMEDETOMIDINE HYDROCHLORIDE 0.5 MCG/KG/HR: 4 INJECTION INTRAVENOUS at 23:55

## 2020-01-01 RX ADMIN — HYDRALAZINE HYDROCHLORIDE 100 MG: 50 TABLET, FILM COATED ORAL at 17:06

## 2020-01-01 RX ADMIN — FUROSEMIDE 40 MG: 10 INJECTION, SOLUTION INTRAMUSCULAR; INTRAVENOUS at 06:38

## 2020-01-01 RX ADMIN — HYDRALAZINE HYDROCHLORIDE 100 MG: 50 TABLET, FILM COATED ORAL at 21:03

## 2020-01-01 RX ADMIN — SODIUM CHLORIDE 500 ML: 9 INJECTION, SOLUTION INTRAVENOUS at 08:08

## 2020-01-01 RX ADMIN — METOPROLOL TARTRATE 5 MG: 5 INJECTION, SOLUTION INTRAVENOUS at 17:07

## 2020-01-01 RX ADMIN — METOPROLOL TARTRATE 5 MG: 5 INJECTION, SOLUTION INTRAVENOUS at 17:32

## 2020-01-01 RX ADMIN — EZETIMIBE 10 MG: 10 TABLET ORAL at 07:17

## 2020-01-01 RX ADMIN — DEXTROSE MONOHYDRATE 50 ML: 25 INJECTION, SOLUTION INTRAVENOUS at 10:11

## 2020-01-01 RX ADMIN — INSULIN GLARGINE 10 UNITS: 100 INJECTION, SOLUTION SUBCUTANEOUS at 05:07

## 2020-01-01 RX ADMIN — FUROSEMIDE 40 MG: 10 INJECTION, SOLUTION INTRAMUSCULAR; INTRAVENOUS at 16:34

## 2020-01-01 RX ADMIN — CLOTRIMAZOLE AND BETAMETHASONE DIPROPIONATE: 10; .5 CREAM TOPICAL at 05:04

## 2020-01-01 RX ADMIN — METOPROLOL SUCCINATE 200 MG: 100 TABLET, EXTENDED RELEASE ORAL at 09:07

## 2020-01-01 RX ADMIN — DEXTROSE MONOHYDRATE: 50 INJECTION, SOLUTION INTRAVENOUS at 12:13

## 2020-01-01 RX ADMIN — CLOPIDOGREL BISULFATE 75 MG: 75 TABLET ORAL at 06:45

## 2020-01-01 RX ADMIN — LATANOPROST 1 DROP: 50 SOLUTION OPHTHALMIC at 17:43

## 2020-01-01 RX ADMIN — INSULIN HUMAN 5 UNITS: 100 INJECTION, SOLUTION PARENTERAL at 08:09

## 2020-01-01 RX ADMIN — HYDROMORPHONE HYDROCHLORIDE 1 MG: 1 INJECTION, SOLUTION INTRAMUSCULAR; INTRAVENOUS; SUBCUTANEOUS at 07:06

## 2020-01-01 RX ADMIN — BACITRACIN ZINC NEOMYCIN SULFATE POLYMYXIN B SULFATE: 400; 3.5; 5 OINTMENT TOPICAL at 05:11

## 2020-01-01 RX ADMIN — BACITRACIN ZINC NEOMYCIN SULFATE POLYMYXIN B SULFATE: 400; 3.5; 5 OINTMENT TOPICAL at 20:39

## 2020-01-01 RX ADMIN — METOPROLOL TARTRATE 100 MG: 50 TABLET, FILM COATED ORAL at 19:44

## 2020-01-01 RX ADMIN — TORSEMIDE 10 MG: 5 TABLET ORAL at 20:31

## 2020-01-01 RX ADMIN — INSULIN HUMAN 3 UNITS: 100 INJECTION, SOLUTION PARENTERAL at 06:38

## 2020-01-01 RX ADMIN — GABAPENTIN 600 MG: 300 CAPSULE ORAL at 18:29

## 2020-01-01 RX ADMIN — INSULIN HUMAN 5 UNITS: 100 INJECTION, SOLUTION PARENTERAL at 17:41

## 2020-01-01 RX ADMIN — HYDRALAZINE HYDROCHLORIDE 100 MG: 50 TABLET, FILM COATED ORAL at 11:55

## 2020-01-01 RX ADMIN — POTASSIUM BICARBONATE 25 MEQ: 978 TABLET, EFFERVESCENT ORAL at 09:03

## 2020-01-01 RX ADMIN — HYDROMORPHONE HYDROCHLORIDE 1 MG: 1 INJECTION, SOLUTION INTRAMUSCULAR; INTRAVENOUS; SUBCUTANEOUS at 18:55

## 2020-01-01 RX ADMIN — POTASSIUM CHLORIDE 10 MEQ: 7.46 INJECTION, SOLUTION INTRAVENOUS at 09:42

## 2020-01-01 RX ADMIN — RIVAROXABAN 20 MG: 20 TABLET, FILM COATED ORAL at 19:44

## 2020-01-01 RX ADMIN — TORSEMIDE 10 MG: 5 TABLET ORAL at 09:08

## 2020-01-01 RX ADMIN — MAGNESIUM SULFATE 2 G: 2 INJECTION INTRAVENOUS at 08:18

## 2020-01-01 RX ADMIN — DEXMEDETOMIDINE HYDROCHLORIDE 0.2 MCG/KG/HR: 4 INJECTION INTRAVENOUS at 11:10

## 2020-01-01 RX ADMIN — FUROSEMIDE 40 MG: 10 INJECTION, SOLUTION INTRAMUSCULAR; INTRAVENOUS at 05:05

## 2020-01-01 RX ADMIN — CLOTRIMAZOLE AND BETAMETHASONE DIPROPIONATE: 10; .5 CREAM TOPICAL at 19:37

## 2020-01-01 RX ADMIN — EZETIMIBE 10 MG: 10 TABLET ORAL at 05:28

## 2020-01-01 RX ADMIN — OXYCODONE HYDROCHLORIDE 5 MG: 5 TABLET ORAL at 08:42

## 2020-01-01 RX ADMIN — POTASSIUM CHLORIDE 10 MEQ: 7.46 INJECTION, SOLUTION INTRAVENOUS at 08:21

## 2020-01-01 RX ADMIN — FUROSEMIDE 40 MG: 10 INJECTION, SOLUTION INTRAMUSCULAR; INTRAVENOUS at 18:24

## 2020-01-01 RX ADMIN — DILTIAZEM HYDROCHLORIDE 10 MG: 5 INJECTION INTRAVENOUS at 07:45

## 2020-01-01 RX ADMIN — AMLODIPINE BESYLATE 10 MG: 10 TABLET ORAL at 06:34

## 2020-01-01 RX ADMIN — LATANOPROST 1 DROP: 50 SOLUTION OPHTHALMIC at 17:38

## 2020-01-01 RX ADMIN — INSULIN HUMAN 3 UNITS: 100 INJECTION, SOLUTION PARENTERAL at 21:59

## 2020-01-01 RX ADMIN — INSULIN HUMAN 3 UNITS: 100 INJECTION, SOLUTION PARENTERAL at 13:31

## 2020-01-01 RX ADMIN — CLOTRIMAZOLE AND BETAMETHASONE DIPROPIONATE: 10; .5 CREAM TOPICAL at 17:38

## 2020-01-01 RX ADMIN — GABAPENTIN 300 MG: 300 CAPSULE ORAL at 04:28

## 2020-01-01 RX ADMIN — SENNOSIDES AND DOCUSATE SODIUM 2 TABLET: 8.6; 5 TABLET ORAL at 17:41

## 2020-01-01 RX ADMIN — INSULIN HUMAN 2 UNITS: 100 INJECTION, SOLUTION PARENTERAL at 05:07

## 2020-01-01 RX ADMIN — LISINOPRIL 40 MG: 20 TABLET ORAL at 04:28

## 2020-01-01 RX ADMIN — TORSEMIDE 10 MG: 5 TABLET ORAL at 08:32

## 2020-01-01 RX ADMIN — INSULIN GLARGINE 10 UNITS: 100 INJECTION, SOLUTION SUBCUTANEOUS at 10:34

## 2020-01-01 RX ADMIN — HYDROMORPHONE HYDROCHLORIDE 1 MG: 1 INJECTION, SOLUTION INTRAMUSCULAR; INTRAVENOUS; SUBCUTANEOUS at 16:50

## 2020-01-01 RX ADMIN — HYDROMORPHONE HYDROCHLORIDE 1 MG: 1 INJECTION, SOLUTION INTRAMUSCULAR; INTRAVENOUS; SUBCUTANEOUS at 15:18

## 2020-01-01 RX ADMIN — TORSEMIDE 10 MG: 5 TABLET ORAL at 22:22

## 2020-01-01 RX ADMIN — AMLODIPINE BESYLATE 10 MG: 10 TABLET ORAL at 05:33

## 2020-01-01 RX ADMIN — SENNOSIDES AND DOCUSATE SODIUM 2 TABLET: 8.6; 5 TABLET ORAL at 05:32

## 2020-01-01 RX ADMIN — VANCOMYCIN HYDROCHLORIDE 1700 MG: 500 INJECTION, POWDER, LYOPHILIZED, FOR SOLUTION INTRAVENOUS at 23:56

## 2020-01-01 RX ADMIN — DEXTROSE MONOHYDRATE: 50 INJECTION, SOLUTION INTRAVENOUS at 08:15

## 2020-01-01 RX ADMIN — POTASSIUM BICARBONATE 50 MEQ: 978 TABLET, EFFERVESCENT ORAL at 17:42

## 2020-01-01 RX ADMIN — VANCOMYCIN HYDROCHLORIDE 1700 MG: 500 INJECTION, POWDER, LYOPHILIZED, FOR SOLUTION INTRAVENOUS at 13:23

## 2020-01-01 RX ADMIN — VANCOMYCIN HYDROCHLORIDE 1700 MG: 500 INJECTION, POWDER, LYOPHILIZED, FOR SOLUTION INTRAVENOUS at 23:48

## 2020-01-01 RX ADMIN — CEFTRIAXONE SODIUM 2 G: 2 INJECTION, POWDER, FOR SOLUTION INTRAMUSCULAR; INTRAVENOUS at 06:10

## 2020-01-01 RX ADMIN — IOHEXOL 90 ML: 350 INJECTION, SOLUTION INTRAVENOUS at 07:11

## 2020-01-01 RX ADMIN — HYDRALAZINE HYDROCHLORIDE 100 MG: 50 TABLET, FILM COATED ORAL at 12:37

## 2020-01-01 RX ADMIN — INSULIN HUMAN 3 UNITS: 100 INJECTION, SOLUTION PARENTERAL at 12:07

## 2020-01-01 RX ADMIN — TAMSULOSIN HYDROCHLORIDE 0.4 MG: 0.4 CAPSULE ORAL at 11:59

## 2020-01-01 RX ADMIN — HYDROMORPHONE HYDROCHLORIDE 0.5 MG: 1 INJECTION, SOLUTION INTRAMUSCULAR; INTRAVENOUS; SUBCUTANEOUS at 23:30

## 2020-01-01 RX ADMIN — GABAPENTIN 300 MG: 300 CAPSULE ORAL at 11:11

## 2020-01-01 RX ADMIN — GABAPENTIN 300 MG: 300 CAPSULE ORAL at 06:38

## 2020-01-01 RX ADMIN — LISINOPRIL 40 MG: 20 TABLET ORAL at 05:33

## 2020-01-01 RX ADMIN — HYDRALAZINE HYDROCHLORIDE 100 MG: 50 TABLET, FILM COATED ORAL at 21:33

## 2020-01-01 RX ADMIN — INSULIN HUMAN 3 UNITS: 100 INJECTION, SOLUTION PARENTERAL at 16:11

## 2020-01-01 RX ADMIN — PIPERACILLIN AND TAZOBACTAM 4.5 G: 4; .5 INJECTION, POWDER, LYOPHILIZED, FOR SOLUTION INTRAVENOUS; PARENTERAL at 20:41

## 2020-01-01 RX ADMIN — VANCOMYCIN HYDROCHLORIDE 1700 MG: 500 INJECTION, POWDER, LYOPHILIZED, FOR SOLUTION INTRAVENOUS at 13:02

## 2020-01-01 RX ADMIN — OXYCODONE HYDROCHLORIDE 5 MG: 5 TABLET ORAL at 21:44

## 2020-01-01 RX ADMIN — HYDRALAZINE HYDROCHLORIDE 100 MG: 50 TABLET, FILM COATED ORAL at 06:37

## 2020-01-01 RX ADMIN — DEXMEDETOMIDINE HYDROCHLORIDE 0.7 MCG/KG/HR: 4 INJECTION INTRAVENOUS at 02:54

## 2020-01-01 RX ADMIN — POTASSIUM CHLORIDE 10 MEQ: 7.46 INJECTION, SOLUTION INTRAVENOUS at 06:07

## 2020-01-01 RX ADMIN — SENNOSIDES AND DOCUSATE SODIUM 2 TABLET: 8.6; 5 TABLET ORAL at 19:44

## 2020-01-01 RX ADMIN — POTASSIUM BICARBONATE 25 MEQ: 978 TABLET, EFFERVESCENT ORAL at 12:11

## 2020-01-01 RX ADMIN — GABAPENTIN 600 MG: 300 CAPSULE ORAL at 17:43

## 2020-01-01 RX ADMIN — POTASSIUM CHLORIDE 10 MEQ: 7.46 INJECTION, SOLUTION INTRAVENOUS at 04:20

## 2020-01-01 RX ADMIN — RIVAROXABAN 20 MG: 20 TABLET, FILM COATED ORAL at 17:41

## 2020-01-01 RX ADMIN — METFORMIN HYDROCHLORIDE 1000 MG: 500 TABLET ORAL at 08:32

## 2020-01-01 RX ADMIN — HYDROMORPHONE HYDROCHLORIDE 1 MG: 1 INJECTION, SOLUTION INTRAMUSCULAR; INTRAVENOUS; SUBCUTANEOUS at 14:14

## 2020-01-01 RX ADMIN — RIVAROXABAN 20 MG: 20 TABLET, FILM COATED ORAL at 17:34

## 2020-01-01 RX ADMIN — FUROSEMIDE 40 MG: 10 INJECTION, SOLUTION INTRAMUSCULAR; INTRAVENOUS at 04:57

## 2020-01-01 RX ADMIN — LISINOPRIL 40 MG: 20 TABLET ORAL at 05:52

## 2020-01-01 RX ADMIN — METFORMIN HYDROCHLORIDE 1000 MG: 500 TABLET ORAL at 08:59

## 2020-01-01 RX ADMIN — CLOPIDOGREL BISULFATE 75 MG: 75 TABLET ORAL at 05:53

## 2020-01-01 RX ADMIN — INSULIN HUMAN 2 UNITS: 100 INJECTION, SOLUTION PARENTERAL at 11:15

## 2020-01-01 RX ADMIN — EZETIMIBE 10 MG: 10 TABLET ORAL at 05:32

## 2020-01-01 RX ADMIN — ALBUTEROL SULFATE 2.5 MG: 2.5 SOLUTION RESPIRATORY (INHALATION) at 22:57

## 2020-01-01 RX ADMIN — EZETIMIBE 10 MG: 10 TABLET ORAL at 04:57

## 2020-01-01 RX ADMIN — GABAPENTIN 300 MG: 300 CAPSULE ORAL at 05:53

## 2020-01-01 RX ADMIN — GABAPENTIN 300 MG: 300 CAPSULE ORAL at 12:38

## 2020-01-01 RX ADMIN — MAGNESIUM SULFATE 2 G: 2 INJECTION INTRAVENOUS at 08:07

## 2020-01-01 RX ADMIN — POTASSIUM CHLORIDE 20 MEQ: 1500 TABLET, EXTENDED RELEASE ORAL at 06:46

## 2020-01-01 RX ADMIN — HYDROMORPHONE HYDROCHLORIDE 0.5 MG: 1 INJECTION, SOLUTION INTRAMUSCULAR; INTRAVENOUS; SUBCUTANEOUS at 05:34

## 2020-01-01 RX ADMIN — LATANOPROST 1 DROP: 50 SOLUTION OPHTHALMIC at 17:29

## 2020-01-01 RX ADMIN — HYDROMORPHONE HYDROCHLORIDE 1 MG: 1 INJECTION, SOLUTION INTRAMUSCULAR; INTRAVENOUS; SUBCUTANEOUS at 21:57

## 2020-01-01 RX ADMIN — FUROSEMIDE 40 MG: 10 INJECTION, SOLUTION INTRAMUSCULAR; INTRAVENOUS at 17:24

## 2020-01-01 RX ADMIN — POTASSIUM BICARBONATE 50 MEQ: 978 TABLET, EFFERVESCENT ORAL at 15:13

## 2020-01-01 RX ADMIN — POTASSIUM CHLORIDE 20 MEQ: 1500 TABLET, EXTENDED RELEASE ORAL at 04:28

## 2020-01-01 RX ADMIN — VANCOMYCIN HYDROCHLORIDE 1700 MG: 500 INJECTION, POWDER, LYOPHILIZED, FOR SOLUTION INTRAVENOUS at 12:41

## 2020-01-01 RX ADMIN — OXYCODONE HYDROCHLORIDE 5 MG: 5 TABLET ORAL at 01:09

## 2020-01-01 RX ADMIN — GABAPENTIN 300 MG: 300 CAPSULE ORAL at 12:36

## 2020-01-01 RX ADMIN — SENNOSIDES AND DOCUSATE SODIUM 2 TABLET: 8.6; 5 TABLET ORAL at 06:46

## 2020-01-01 RX ADMIN — EZETIMIBE 10 MG: 10 TABLET ORAL at 04:28

## 2020-01-01 RX ADMIN — DEXMEDETOMIDINE HYDROCHLORIDE 0.5 MCG/KG/HR: 4 INJECTION INTRAVENOUS at 08:07

## 2020-01-01 RX ADMIN — GABAPENTIN 600 MG: 300 CAPSULE ORAL at 17:04

## 2020-01-01 RX ADMIN — POTASSIUM CHLORIDE 10 MEQ: 7.46 INJECTION, SOLUTION INTRAVENOUS at 05:12

## 2020-01-01 RX ADMIN — POTASSIUM CHLORIDE 40 MEQ: 1500 TABLET, EXTENDED RELEASE ORAL at 09:17

## 2020-01-01 RX ADMIN — CLOTRIMAZOLE AND BETAMETHASONE DIPROPIONATE: 10; .5 CREAM TOPICAL at 17:43

## 2020-01-01 RX ADMIN — METOPROLOL TARTRATE 5 MG: 5 INJECTION, SOLUTION INTRAVENOUS at 06:39

## 2020-01-01 RX ADMIN — HYDRALAZINE HYDROCHLORIDE 100 MG: 50 TABLET, FILM COATED ORAL at 06:48

## 2020-01-01 RX ADMIN — POTASSIUM CHLORIDE 20 MEQ: 1500 TABLET, EXTENDED RELEASE ORAL at 05:53

## 2020-01-01 RX ADMIN — GABAPENTIN 600 MG: 300 CAPSULE ORAL at 21:36

## 2020-01-01 RX ADMIN — EZETIMIBE 10 MG: 10 TABLET ORAL at 06:47

## 2020-01-01 RX ADMIN — PIPERACILLIN AND TAZOBACTAM 4.5 G: 4; .5 INJECTION, POWDER, LYOPHILIZED, FOR SOLUTION INTRAVENOUS; PARENTERAL at 10:49

## 2020-01-01 RX ADMIN — DEXMEDETOMIDINE HYDROCHLORIDE 0.5 MCG/KG/HR: 4 INJECTION INTRAVENOUS at 22:10

## 2020-01-01 RX ADMIN — TORSEMIDE 10 MG: 5 TABLET ORAL at 21:32

## 2020-01-01 RX ADMIN — INSULIN HUMAN 3 UNITS: 100 INJECTION, SOLUTION PARENTERAL at 16:57

## 2020-01-01 RX ADMIN — HYDRALAZINE HYDROCHLORIDE 100 MG: 50 TABLET, FILM COATED ORAL at 04:28

## 2020-01-01 RX ADMIN — METFORMIN HYDROCHLORIDE 1000 MG: 500 TABLET ORAL at 17:03

## 2020-01-01 RX ADMIN — CLOTRIMAZOLE AND BETAMETHASONE DIPROPIONATE: 10; .5 CREAM TOPICAL at 12:11

## 2020-01-01 RX ADMIN — CLOPIDOGREL BISULFATE 75 MG: 75 TABLET ORAL at 05:32

## 2020-01-01 RX ADMIN — METFORMIN HYDROCHLORIDE 1000 MG: 500 TABLET ORAL at 09:06

## 2020-01-01 RX ADMIN — FUROSEMIDE 40 MG: 10 INJECTION, SOLUTION INTRAMUSCULAR; INTRAVENOUS at 05:19

## 2020-01-01 RX ADMIN — GABAPENTIN 600 MG: 300 CAPSULE ORAL at 18:14

## 2020-01-01 RX ADMIN — DEXMEDETOMIDINE HYDROCHLORIDE 0.3 MCG/KG/HR: 4 INJECTION INTRAVENOUS at 03:26

## 2020-01-01 RX ADMIN — CLOPIDOGREL BISULFATE 75 MG: 75 TABLET ORAL at 06:38

## 2020-01-01 RX ADMIN — HYDRALAZINE HYDROCHLORIDE 100 MG: 50 TABLET, FILM COATED ORAL at 04:56

## 2020-01-01 RX ADMIN — HYDRALAZINE HYDROCHLORIDE 100 MG: 50 TABLET, FILM COATED ORAL at 04:57

## 2020-01-01 RX ADMIN — HYDRALAZINE HYDROCHLORIDE 100 MG: 50 TABLET, FILM COATED ORAL at 12:25

## 2020-01-01 RX ADMIN — OXYCODONE HYDROCHLORIDE 5 MG: 5 TABLET ORAL at 08:20

## 2020-01-01 RX ADMIN — LISINOPRIL 40 MG: 20 TABLET ORAL at 06:46

## 2020-01-01 RX ADMIN — INSULIN GLARGINE 40 UNITS: 100 INJECTION, SOLUTION SUBCUTANEOUS at 18:30

## 2020-01-01 RX ADMIN — LISINOPRIL 40 MG: 20 TABLET ORAL at 04:56

## 2020-01-01 RX ADMIN — HYDROMORPHONE HYDROCHLORIDE 1 MG: 1 INJECTION, SOLUTION INTRAMUSCULAR; INTRAVENOUS; SUBCUTANEOUS at 11:11

## 2020-01-01 RX ADMIN — ONDANSETRON HYDROCHLORIDE 4 MG: 2 INJECTION, SOLUTION INTRAMUSCULAR; INTRAVENOUS at 06:11

## 2020-01-01 RX ADMIN — CLOTRIMAZOLE AND BETAMETHASONE DIPROPIONATE: 10; .5 CREAM TOPICAL at 20:14

## 2020-01-01 RX ADMIN — GABAPENTIN 300 MG: 300 CAPSULE ORAL at 04:58

## 2020-01-01 RX ADMIN — OXYCODONE HYDROCHLORIDE 5 MG: 5 TABLET ORAL at 15:14

## 2020-01-01 RX ADMIN — ALPRAZOLAM 0.25 MG: 0.25 TABLET ORAL at 05:33

## 2020-01-01 RX ADMIN — DEXMEDETOMIDINE HYDROCHLORIDE 0.5 MCG/KG/HR: 4 INJECTION INTRAVENOUS at 14:12

## 2020-01-01 RX ADMIN — POTASSIUM CHLORIDE 10 MEQ: 7.46 INJECTION, SOLUTION INTRAVENOUS at 07:09

## 2020-01-01 RX ADMIN — LISINOPRIL 40 MG: 20 TABLET ORAL at 06:37

## 2020-01-01 RX ADMIN — GABAPENTIN 300 MG: 300 CAPSULE ORAL at 06:46

## 2020-01-01 RX ADMIN — POTASSIUM CHLORIDE 20 MEQ: 1500 TABLET, EXTENDED RELEASE ORAL at 04:57

## 2020-01-01 RX ADMIN — DEXMEDETOMIDINE HYDROCHLORIDE 0.5 MCG/KG/HR: 4 INJECTION INTRAVENOUS at 23:47

## 2020-01-01 RX ADMIN — DEXMEDETOMIDINE HYDROCHLORIDE 0.3 MCG/KG/HR: 4 INJECTION INTRAVENOUS at 04:12

## 2020-01-01 RX ADMIN — LORAZEPAM: 2 INJECTION INTRAMUSCULAR; INTRAVENOUS at 17:15

## 2020-01-01 RX ADMIN — DEXMEDETOMIDINE HYDROCHLORIDE 0.2 MCG/KG/HR: 4 INJECTION INTRAVENOUS at 11:22

## 2020-01-01 RX ADMIN — CLOTRIMAZOLE AND BETAMETHASONE DIPROPIONATE: 10; .5 CREAM TOPICAL at 05:32

## 2020-01-01 RX ADMIN — DEXMEDETOMIDINE HYDROCHLORIDE 0.3 MCG/KG/HR: 4 INJECTION INTRAVENOUS at 10:04

## 2020-01-01 RX ADMIN — AMLODIPINE BESYLATE 10 MG: 10 TABLET ORAL at 06:45

## 2020-01-01 RX ADMIN — POTASSIUM CHLORIDE 40 MEQ: 1500 TABLET, EXTENDED RELEASE ORAL at 09:46

## 2020-01-01 RX ADMIN — DEXMEDETOMIDINE HYDROCHLORIDE 0.9 MCG/KG/HR: 4 INJECTION INTRAVENOUS at 16:00

## 2020-01-01 RX ADMIN — POTASSIUM CHLORIDE 10 MEQ: 7.46 INJECTION, SOLUTION INTRAVENOUS at 05:51

## 2020-01-01 RX ADMIN — METOPROLOL TARTRATE 100 MG: 50 TABLET, FILM COATED ORAL at 04:56

## 2020-01-01 RX ADMIN — INSULIN GLARGINE 5 UNITS: 100 INJECTION, SOLUTION SUBCUTANEOUS at 11:15

## 2020-01-01 RX ADMIN — FUROSEMIDE 40 MG: 10 INJECTION, SOLUTION INTRAMUSCULAR; INTRAVENOUS at 05:09

## 2020-01-01 RX ADMIN — DEXMEDETOMIDINE HYDROCHLORIDE 0.3 MCG/KG/HR: 4 INJECTION INTRAVENOUS at 01:04

## 2020-01-01 RX ADMIN — HYDRALAZINE HYDROCHLORIDE 100 MG: 50 TABLET, FILM COATED ORAL at 05:32

## 2020-01-01 RX ADMIN — INSULIN HUMAN 5 UNITS: 100 INJECTION, SOLUTION PARENTERAL at 05:20

## 2020-01-01 RX ADMIN — METOPROLOL TARTRATE 5 MG: 5 INJECTION, SOLUTION INTRAVENOUS at 23:47

## 2020-01-01 RX ADMIN — INSULIN GLARGINE 40 UNITS: 100 INJECTION, SOLUTION SUBCUTANEOUS at 16:58

## 2020-01-01 RX ADMIN — GABAPENTIN 300 MG: 300 CAPSULE ORAL at 11:55

## 2020-01-01 RX ADMIN — POTASSIUM BICARBONATE 50 MEQ: 978 TABLET, EFFERVESCENT ORAL at 10:08

## 2020-01-01 RX ADMIN — MAGNESIUM SULFATE IN WATER 2 G: 40 INJECTION, SOLUTION INTRAVENOUS at 08:16

## 2020-01-01 RX ADMIN — POTASSIUM BICARBONATE 25 MEQ: 978 TABLET, EFFERVESCENT ORAL at 15:37

## 2020-01-01 RX ADMIN — AMLODIPINE BESYLATE 10 MG: 10 TABLET ORAL at 04:28

## 2020-01-01 RX ADMIN — FUROSEMIDE 40 MG: 10 INJECTION, SOLUTION INTRAMUSCULAR; INTRAVENOUS at 15:38

## 2020-01-01 RX ADMIN — METOPROLOL SUCCINATE 200 MG: 100 TABLET, EXTENDED RELEASE ORAL at 06:46

## 2020-01-01 RX ADMIN — LIDOCAINE HYDROCHLORIDE 5 ML: 20 JELLY TOPICAL at 13:15

## 2020-01-01 RX ADMIN — METFORMIN HYDROCHLORIDE 1000 MG: 500 TABLET ORAL at 18:29

## 2020-01-01 RX ADMIN — INSULIN GLARGINE 15 UNITS: 100 INJECTION, SOLUTION SUBCUTANEOUS at 07:04

## 2020-01-01 RX ADMIN — MAGNESIUM SULFATE HEPTAHYDRATE 2 G: 40 INJECTION, SOLUTION INTRAVENOUS at 08:22

## 2020-01-01 RX ADMIN — GABAPENTIN 300 MG: 300 CAPSULE ORAL at 04:57

## 2020-01-01 RX ADMIN — SENNOSIDES AND DOCUSATE SODIUM 2 TABLET: 8.6; 5 TABLET ORAL at 04:57

## 2020-01-01 RX ADMIN — DEXMEDETOMIDINE HYDROCHLORIDE 0.9 MCG/KG/HR: 4 INJECTION INTRAVENOUS at 21:54

## 2020-01-01 RX ADMIN — CLOTRIMAZOLE AND BETAMETHASONE DIPROPIONATE: 10; .5 CREAM TOPICAL at 17:29

## 2020-01-01 RX ADMIN — INSULIN HUMAN 2 UNITS: 100 INJECTION, SOLUTION PARENTERAL at 19:54

## 2020-01-01 RX ADMIN — DEXMEDETOMIDINE HYDROCHLORIDE 0.5 MCG/KG/HR: 4 INJECTION INTRAVENOUS at 14:42

## 2020-01-01 RX ADMIN — FUROSEMIDE 20 MG: 10 INJECTION, SOLUTION INTRAMUSCULAR; INTRAVENOUS at 08:55

## 2020-01-01 RX ADMIN — OXYCODONE HYDROCHLORIDE 5 MG: 5 TABLET ORAL at 17:35

## 2020-01-01 RX ADMIN — RIVAROXABAN 20 MG: 20 TABLET, FILM COATED ORAL at 17:05

## 2020-01-01 RX ADMIN — MORPHINE SULFATE 4 MG: 4 INJECTION INTRAVENOUS at 06:11

## 2020-01-01 RX ADMIN — INSULIN HUMAN 2 UNITS: 100 INJECTION, SOLUTION PARENTERAL at 23:23

## 2020-01-01 RX ADMIN — SENNOSIDES AND DOCUSATE SODIUM 2 TABLET: 8.6; 5 TABLET ORAL at 06:34

## 2020-01-01 RX ADMIN — PIPERACILLIN AND TAZOBACTAM 4.5 G: 4; .5 INJECTION, POWDER, LYOPHILIZED, FOR SOLUTION INTRAVENOUS; PARENTERAL at 05:19

## 2020-01-01 RX ADMIN — METOPROLOL SUCCINATE 200 MG: 100 TABLET, EXTENDED RELEASE ORAL at 04:57

## 2020-01-01 RX ADMIN — METFORMIN HYDROCHLORIDE 1000 MG: 500 TABLET ORAL at 18:13

## 2020-01-01 RX ADMIN — RIVAROXABAN 20 MG: 20 TABLET, FILM COATED ORAL at 21:36

## 2020-01-01 RX ADMIN — INSULIN GLARGINE 40 UNITS: 100 INJECTION, SOLUTION SUBCUTANEOUS at 16:11

## 2020-01-01 RX ADMIN — AMLODIPINE BESYLATE 10 MG: 10 TABLET ORAL at 04:55

## 2020-01-01 RX ADMIN — LATANOPROST 1 DROP: 50 SOLUTION OPHTHALMIC at 18:41

## 2020-01-01 RX ADMIN — LATANOPROST 1 DROP: 50 SOLUTION OPHTHALMIC at 17:13

## 2020-01-01 RX ADMIN — GABAPENTIN 300 MG: 300 CAPSULE ORAL at 11:59

## 2020-01-01 RX ADMIN — DEXMEDETOMIDINE HYDROCHLORIDE 0.5 MCG/KG/HR: 4 INJECTION INTRAVENOUS at 07:09

## 2020-01-01 RX ADMIN — TIZANIDINE 2 MG: 4 TABLET ORAL at 21:44

## 2020-01-01 RX ADMIN — METOPROLOL TARTRATE 5 MG: 5 INJECTION, SOLUTION INTRAVENOUS at 10:31

## 2020-01-01 ASSESSMENT — FIBROSIS 4 INDEX
FIB4 SCORE: 4.13
FIB4 SCORE: 1.55
FIB4 SCORE: 3.34
FIB4 SCORE: 2.96
FIB4 SCORE: 3.1
FIB4 SCORE: 1.47
FIB4 SCORE: 1.47
FIB4 SCORE: 3.53
FIB4 SCORE: 3.84
FIB4 SCORE: 3.98

## 2020-01-01 ASSESSMENT — ENCOUNTER SYMPTOMS
SHORTNESS OF BREATH: 1
SORE THROAT: 0
PALPITATIONS: 0
HEMOPTYSIS: 0
SPUTUM PRODUCTION: 0
DIARRHEA: 0
BACK PAIN: 1
HEMOPTYSIS: 0
SHORTNESS OF BREATH: 1
COUGH: 1
FEVER: 0
EYES NEGATIVE: 1
COUGH: 1
DIZZINESS: 0
CHILLS: 0
NEUROLOGICAL NEGATIVE: 1
SHORTNESS OF BREATH: 0
FEVER: 0
HEMOPTYSIS: 0
FEVER: 0
CARDIOVASCULAR NEGATIVE: 1
SPUTUM PRODUCTION: 0
PALPITATIONS: 0
VOMITING: 0
SPUTUM PRODUCTION: 0
DIAPHORESIS: 0
HEADACHES: 0
MUSCULOSKELETAL NEGATIVE: 1
RESPIRATORY NEGATIVE: 1
BLOOD IN STOOL: 0
NAUSEA: 0
COUGH: 0
NAUSEA: 0
NAUSEA: 0
SHORTNESS OF BREATH: 1
DIAPHORESIS: 0
HEMOPTYSIS: 0
EYES NEGATIVE: 1
BACK PAIN: 1
FEVER: 0
NERVOUS/ANXIOUS: 0
LOSS OF CONSCIOUSNESS: 0
VOMITING: 0
SHORTNESS OF BREATH: 0
COUGH: 0
DOUBLE VISION: 0
CONSTITUTIONAL NEGATIVE: 1
ABDOMINAL PAIN: 0
BACK PAIN: 1
COUGH: 1
NAUSEA: 0
DIARRHEA: 0
FEVER: 0
PALPITATIONS: 0
SEIZURES: 0
NECK PAIN: 0
CHILLS: 0
DIAPHORESIS: 0
CHILLS: 0
DIARRHEA: 0
DIARRHEA: 0
SORE THROAT: 0
COUGH: 1
PSYCHIATRIC NEGATIVE: 1
SPUTUM PRODUCTION: 0
VOMITING: 0
COUGH: 0
FOCAL WEAKNESS: 0
ABDOMINAL PAIN: 0
ROS GI COMMENTS: TOLERATING A DIET
WHEEZING: 0
ABDOMINAL PAIN: 0
HEARTBURN: 0
BRUISES/BLEEDS EASILY: 0
NAUSEA: 0
SORE THROAT: 0
GASTROINTESTINAL NEGATIVE: 1
CHILLS: 0
ABDOMINAL PAIN: 0
CHILLS: 0
EYES NEGATIVE: 1
VOMITING: 0
PALPITATIONS: 0
CONSTIPATION: 0
DIARRHEA: 0
CHILLS: 0
VOMITING: 0
EYES NEGATIVE: 1
DIAPHORESIS: 0
NECK PAIN: 0
FEVER: 0
NECK PAIN: 0
BACK PAIN: 1
CHILLS: 0
ABDOMINAL PAIN: 0
FEVER: 0

## 2020-01-01 ASSESSMENT — CHA2DS2 SCORE
VASCULAR DISEASE: YES
VASCULAR DISEASE: NO
DIABETES: YES
HYPERTENSION: YES
AGE 75 OR GREATER: NO
CHA2DS2 VASC SCORE: 7
AGE 65 TO 74: YES
AGE 65 TO 74: YES
HYPERTENSION: YES
PRIOR STROKE OR TIA OR THROMBOEMBOLISM: YES
SEX: MALE
CHF OR LEFT VENTRICULAR DYSFUNCTION: NO
PRIOR STROKE OR TIA OR THROMBOEMBOLISM: YES
SEX: MALE
AGE 75 OR GREATER: NO
DIABETES: YES
CHA2DS2 VASC SCORE: 5
CHF OR LEFT VENTRICULAR DYSFUNCTION: YES

## 2020-01-01 ASSESSMENT — COGNITIVE AND FUNCTIONAL STATUS - GENERAL
WALKING IN HOSPITAL ROOM: TOTAL
DRESSING REGULAR UPPER BODY CLOTHING: A LITTLE
DRESSING REGULAR LOWER BODY CLOTHING: TOTAL
HELP NEEDED FOR BATHING: A LITTLE
MOBILITY SCORE: 6
SUGGESTED CMS G CODE MODIFIER DAILY ACTIVITY: CJ
SUGGESTED CMS G CODE MODIFIER MOBILITY: CL
SUGGESTED CMS G CODE MODIFIER MOBILITY: CL
TOILETING: A LITTLE
CLIMB 3 TO 5 STEPS WITH RAILING: TOTAL
DAILY ACTIVITIY SCORE: 21
MOBILITY SCORE: 10
MOVING FROM LYING ON BACK TO SITTING ON SIDE OF FLAT BED: UNABLE
DRESSING REGULAR LOWER BODY CLOTHING: A LITTLE
MOVING TO AND FROM BED TO CHAIR: A LOT
SUGGESTED CMS G CODE MODIFIER DAILY ACTIVITY: CK
STANDING UP FROM CHAIR USING ARMS: A LOT
TURNING FROM BACK TO SIDE WHILE IN FLAT BAD: A LITTLE
SUGGESTED CMS G CODE MODIFIER DAILY ACTIVITY: CN
DAILY ACTIVITIY SCORE: 6
HELP NEEDED FOR BATHING: A LITTLE
DRESSING REGULAR LOWER BODY CLOTHING: A LOT
TURNING FROM BACK TO SIDE WHILE IN FLAT BAD: UNABLE
TOILETING: A LITTLE
TURNING FROM BACK TO SIDE WHILE IN FLAT BAD: A LOT
EATING MEALS: TOTAL
DRESSING REGULAR UPPER BODY CLOTHING: TOTAL
CLIMB 3 TO 5 STEPS WITH RAILING: TOTAL
MOVING TO AND FROM BED TO CHAIR: A LOT
MOVING FROM LYING ON BACK TO SITTING ON SIDE OF FLAT BED: UNABLE
HELP NEEDED FOR BATHING: TOTAL
MOVING TO AND FROM BED TO CHAIR: UNABLE
TOILETING: TOTAL
MOVING FROM LYING ON BACK TO SITTING ON SIDE OF FLAT BED: UNABLE
PERSONAL GROOMING: TOTAL
WALKING IN HOSPITAL ROOM: TOTAL
SUGGESTED CMS G CODE MODIFIER MOBILITY: CN
WALKING IN HOSPITAL ROOM: A LOT
STANDING UP FROM CHAIR USING ARMS: A LOT
DAILY ACTIVITIY SCORE: 19
CLIMB 3 TO 5 STEPS WITH RAILING: TOTAL
STANDING UP FROM CHAIR USING ARMS: TOTAL
MOBILITY SCORE: 10

## 2020-01-01 ASSESSMENT — LIFESTYLE VARIABLES
HOW MANY TIMES IN THE PAST YEAR HAVE YOU HAD 5 OR MORE DRINKS IN A DAY: 0
ALCOHOL_USE: NO
TOTAL SCORE: 0
TOTAL SCORE: 0
CONSUMPTION TOTAL: NEGATIVE
EVER FELT BAD OR GUILTY ABOUT YOUR DRINKING: NO
EVER HAD A DRINK FIRST THING IN THE MORNING TO STEADY YOUR NERVES TO GET RID OF A HANGOVER: NO
HAVE YOU EVER FELT YOU SHOULD CUT DOWN ON YOUR DRINKING: NO
HAVE PEOPLE ANNOYED YOU BY CRITICIZING YOUR DRINKING: NO
TOTAL SCORE: 0
AVERAGE NUMBER OF DAYS PER WEEK YOU HAVE A DRINK CONTAINING ALCOHOL: 0
EVER_SMOKED: YES
DOES PATIENT WANT TO STOP DRINKING: NO
ON A TYPICAL DAY WHEN YOU DRINK ALCOHOL HOW MANY DRINKS DO YOU HAVE: 0
EVER_SMOKED: YES

## 2020-01-01 ASSESSMENT — GAIT ASSESSMENTS
GAIT LEVEL OF ASSIST: MODERATE ASSIST
ASSISTIVE DEVICE: FRONT WHEEL WALKER
DEVIATION: ATAXIC;OTHER (COMMENT)
DISTANCE (FEET): 5

## 2020-01-01 ASSESSMENT — COPD QUESTIONNAIRES
COPD SCREENING SCORE: 4
DO YOU EVER COUGH UP ANY MUCUS OR PHLEGM?: NO/ONLY WITH OCCASIONAL COLDS OR INFECTIONS
HAVE YOU SMOKED AT LEAST 100 CIGARETTES IN YOUR ENTIRE LIFE: YES
DURING THE PAST 4 WEEKS HOW MUCH DID YOU FEEL SHORT OF BREATH: NONE/LITTLE OF THE TIME

## 2020-01-01 ASSESSMENT — ACTIVITIES OF DAILY LIVING (ADL): TOILETING: INDEPENDENT

## 2020-01-08 NOTE — PROGRESS NOTES
[facility]    Spoke with pt and he is willing to obtain CMP and CBC for Xarelto follow up.  Pt denies any unusual s/s of bleeding, bruising, clotting.     Follow up in 4 weeks.     Brett Coates, PharmD

## 2020-01-09 NOTE — TELEPHONE ENCOUNTER
Jenni:  Please call Joel, I do not have any recommendations for physicians but [facility] is a very good healthcare [facility]   Regards, Catrachito Sterling, DO

## 2020-01-09 NOTE — TELEPHONE ENCOUNTER
1. Caller Name: Joel                                          Call Back Number: [phone] (home)        Patient approves a detailed voicemail message: yes    Pt is planning to move to [place], [place].  He is asking if you have any recommendations for doctors there.  He's looking to move in about a month.

## 2020-01-27 NOTE — TELEPHONE ENCOUNTER
1. Caller Name: Joel                                          Call Back Number: [phone] (home)        Patient approves a detailed voicemail message: yes    Joel thinks that his walker was stolen.  Can you please send a new Rx for the four wheeled walker with seat.  This goes to [facility].

## 2020-01-30 NOTE — TELEPHONE ENCOUNTER
[facility]     for pt to obtain labs for OAC monitoring.  Possible the pt has moved to [place].    Brett Coates, PharmD

## 2020-01-30 NOTE — TELEPHONE ENCOUNTER
Pt LVM stating insurance will only pay for a regular walker. His walker had 4 wheels and a seat. He is asking if you can fill a form out so he can get this walker

## 2020-01-31 NOTE — TELEPHONE ENCOUNTER
Tamara:  Please call Joel, give him a fax number or what ever so I can go ahead and complete whatever paperwork is required.  Regards, Catrachito Sterling, DO

## 2020-02-05 NOTE — TELEPHONE ENCOUNTER
The walker order that was sent was not specific enough for the 4-wheeled walker with seat.  Can you please re write this and I'll get it sent?

## 2020-02-12 NOTE — TELEPHONE ENCOUNTER
[facility]     for pt to call clinic and obtain monitoring labs.  Sent letter of compliance.    Brett Coates, PharmD

## 2020-02-14 NOTE — PROGRESS NOTES
No chief complaint on file.      Problem List Items Addressed This Visit     None          History of present illness:  Joel Ma 69 y.o. male presents today for ***    Past medical history:   Past Medical History:   Diagnosis Date   • Anesthesia     low O2 sat   • Arrhythmia     a-fib   • arthritis 6/17/2011    thumb, fingers   • Arthritis     hip   • Backpain    • CAD (coronary artery disease)     MIRELLA to distal RCA and prox LAD   • Cataract     left eye surgery   • Dental disorder     full dentures   • Diabetes     insulin, Dr Oconnor, his APN   • High cholesterol    • Hypertension    • MRSA (methicillin resistant Staphylococcus aureus)     history of, nothing current 2016, on clindamycin for rest of life per patient   • Pain 05-03-13    shoulders, hip, right knee, 7/10   • Pneumonia 2002   • Renal disorder     stage 2   • Sleep apnea     bipap   • Stroke (HCC)     2/1/2007, 4/1/2007, right sided weakness; balance disturbance, memory problems   • Thyroid mass 7/30/2009    benign lump   • Ventricular ectopy 6/17/2011       Past surgical history:   Past Surgical History:   Procedure Laterality Date   • CERVICAL FUSION POSTERIOR  7/13/2018    Procedure: CERVICAL FUSION POSTERIOR/ C2-4;  Surgeon: Boby Marsh M.D.;  Location: [facility];  Service: Neurosurgery   • CERVICAL LAMINECTOMY POSTERIOR  7/13/2018    Procedure: CERVICAL LAMINECTOMY POSTERIOR/ C2-3, C5-6;  Surgeon: Boby Marsh M.D.;  Location: [facility];  Service: Neurosurgery   • LUMBAR LAMINECTOMY DISKECTOMY  7/13/2018    Procedure: LUMBAR LAMINECTOMY DISKECTOMY/ L2-5 LAMI;  Surgeon: Boby Marsh M.D.;  Location: [facility];  Service: Neurosurgery   • CERVICAL DISK AND FUSION ANTERIOR  8/31/2016    Procedure: CERVICAL DISK AND FUSION ANTERIOR C3-7 ;  Surgeon: Alhaji Jaffe M.D.;  Location: [facility];  Service:    • CATARACT PHACO WITH IOL  5/8/2013    Performed by  Mame Rehman M.D. at SURGERY SAME DAY [facility]   • IRRIGATION & DEBRIDEMENT ORTHO  11/13/2012    Performed by Gordon Cota M.D. at SURGERY [facility]   • KNEE REVISION TOTAL  11/13/2012    Performed by Gordon Cota M.D. at [facility]   • IRRIGATION & DEBRIDEMENT ORTHO  11/2/2012    Performed by Gordon Cota M.D. at SURGERY [facility]   • IRRIGATION & DEBRIDEMENT ORTHO Left 10/29/2012    Procedure: left shoulder, with drain;  Surgeon: Gordon Cota M.D.;  Location: SURGERY [facility];  Service:    • INCISION AND DRAINAGE ORTHOPEDIC Right 10/29/2012    Procedure: Right Total Knee I and D and Liner Exchange, with drain;  Surgeon: Gordon Cota M.D.;  Location: SURGERY [facility];  Service:    • IRRIGATION & DEBRIDEMENT ORTHO  3/13/2012    Performed by KAMALJIT SHI at [facility]   • KNEE REVISION TOTAL  3/13/2012    Performed by KAMALJIT SHI at [facility]   • TENDON REPAIR  3/13/2012    Performed by KAMALJIT SHI at [facility]   • KNEE ARTHROPLASTY TOTAL  1/11/2012    Right; Performed by KAMALJIT SHI at [facility]   • TOE AMPUTATION  7/22/2011    Performed by EVELYN JANE at [facility]   • ELBOW ARTHROTOMY  2008    right   • LUMBAR FUSION POSTERIOR  1979   • FOOT SURGERY      partial amputation great toe   • FOOT SURGERY      toe surgery left foot   • OTHER      left eye cataract   • OTHER NEUROLOGICAL SURG      neck fusion   • PB KNEE SCOPE,SINGLE MENISECTOMY      right knee   • ZZZ CARDIAC CATH         Family history:   Family History   Problem Relation Age of Onset   • Diabetes Mother    • Cancer Father         lung cancer   • Heart Disease Father         triple bypass   • Diabetes Other    • Hypertension Other    • Cancer Other        Social history:   Social History     Socioeconomic History   • Marital status: Single     Spouse name: Not on file   • Number of children: Not on file   •  Years of education: Not on file   • Highest education level: Not on file   Occupational History   • Not on file   Social Needs   • Financial resource strain: Not on file   • Food insecurity     Worry: Not on file     Inability: Not on file   • Transportation needs     Medical: Not on file     Non-medical: Not on file   Tobacco Use   • Smoking status: Former Smoker     Packs/day: 4.00     Years: 0.50     Pack years: 2.00     Types: Cigarettes     Last attempt to quit: 1974     Years since quittin.1   • Smokeless tobacco: Never Used   Substance and Sexual Activity   • Alcohol use: No   • Drug use: No   • Sexual activity: Yes   Lifestyle   • Physical activity     Days per week: Not on file     Minutes per session: Not on file   • Stress: Not on file   Relationships   • Social connections     Talks on phone: Not on file     Gets together: Not on file     Attends Church service: Not on file     Active member of club or organization: Not on file     Attends meetings of clubs or organizations: Not on file     Relationship status: Not on file   • Intimate partner violence     Fear of current or ex partner: Not on file     Emotionally abused: Not on file     Physically abused: Not on file     Forced sexual activity: Not on file   Other Topics Concern   •  Service No   • Blood Transfusions Yes   • Caffeine Concern Yes   • Occupational Exposure No   • Hobby Hazards No   • Sleep Concern No   • Stress Concern No   • Weight Concern Yes   • Special Diet No   • Back Care No   • Exercise Yes   • Bike Helmet No   • Seat Belt Yes   • Self-Exams Yes   Social History Narrative    ** Merged History Encounter **            Current medications:   Current Outpatient Medications   Medication   • NON SPECIFIED   • gabapentin (NEURONTIN) 300 MG Cap   • clopidogrel (PLAVIX) 75 MG Tab   • lisinopril (PRINIVIL) 40 MG tablet   • rivaroxaban (XARELTO) 20 MG Tab tablet   • Misc. Devices Misc   • sulfamethoxazole-trimethoprim  "(BACTRIM DS) 800-160 MG tablet   • ketoconazole (NIZORAL) 2 % Cream   • hydrALAZINE (APRESOLINE) 100 MG tablet   • metFORMIN (GLUCOPHAGE) 500 MG Tab   • nitrofurantoin monohyd macro (MACROBID) 100 MG Cap   • ezetimibe (ZETIA) 10 MG Tab   • insulin glargine (LANTUS) 100 UNIT/ML Solution   • JARDIANCE 10 MG Tab   • clindamycin (CLEOCIN) 300 MG Cap   • amLODIPine (NORVASC) 10 MG Tab   • insulin lispro, Human, (HUMALOG KWIKPEN) 100 UNIT/ML Solution Pen-injector injection   • Misc. Devices Misc   • Misc. Devices Misc   • Blood Glucose Test Strips   • glucose blood (CONTOUR NEXT TEST) strip   • potassium chloride SA (KDUR) 20 MEQ Tab CR   • metoprolol (TOPROL-XL) 200 MG XL tablet   • torsemide (DEMADEX) 10 MG tablet   • Insulin Pen Needle (ADVOCATE INSULIN PEN NEEDLES) 31G X 5 MM Misc   • NON SPECIFIED   • insulin lispro (HUMALOG) 100 UNIT/ML Solution     No current facility-administered medications for this visit.        Medication Allergy:  Allergies   Allergen Reactions   • Demerol      Makes me stop breathing.  Doesn't like because it makes him high   • Fentanyl      \"Sensitive\"   • Statins [Hmg-Coa-R Inhibitors] Myalgia     Rxn - ongoing         Review of systems:     General: Denies fevers or chills, or nightsweats, or generalized fatigue.    Head: Denies headaches or dizziness or lightheadedness  EENT: Denies vision changes, vision loss or pain, nasal secretion, nasal bleeding, difficulty swallowing, hearing loss, tinnitus, vertigo, ear pain  Endocdrinologic: Denies sweating, cold or heat intolerance. No polyuria or polydipsia.   Respiratory: Denies shortness of breath, cough, sputum, or wheezing  Cardiac: Denies chest pain, palpitations, edema or syncope  Gastrointestinal: Denies nausea, vomiting, no abdominal pain or change in bowel habits, no melena or hematochezia  Urinary: Denies dysuria, frequency, hesitancy, or incontinence.  Dermatologic:  Denies new rash  Musculoskeletal: Denies muscle pain or swelling, no " atrophy, no neck and back pain or stiffness.   Neurologic: Denies facial droopiness, muscle weakness (focal or generalized), paresthesias, ataxia, change in speech or language, memory loss, abnormal movements, seizures, loss of consciousness, or episodes of confusion.   Psychiatric: Denies anxiety, depression, mood swings, suicidal or homicidal thoughts       Physical examination:   There were no vitals filed for this visit.  General: Patient in no acute distress, pleasant and cooperative.  HEENT: Normocephalic, no signs of acute trauma.   moist conjunctivae. Nares are patent. Oropharynx clear without lesions and normal  hard and soft palates.   Neck: Supple, No carotid bruits. There is normal range of motion. No lymphadenopathy  Resp: clear to auscultation bilaterally. No wheezes or crackles.   CV: RRR, no murmurs.   Abdomen: normoactive bowel sounds, soft, non distended or tender.   Skin: no signs of acute rashes or trauma.   Musculoskeletal: joints exhibit full range of motion, without any pain to palpation. There are no signs of joint or muscle swelling. There is no tenderness to deep palpation of muscles.   Psychiatric: No hallucinatory behavior. No symptoms of depression or suicidal ideation. Mood and affect appear normal on exam.     NEUROLOGICAL EXAM:   Mental status, orientation: Awake, alert and fully oriented.   Speech and language: speech is clear and fluent. The patient is able to name, repeat and comprehend.   Memory: There is intact recollection of recent and remote events.   Cranial nerve exam:   CN I: Not examined   CN II: PERRL. Fundoscopic exam was unremarkable.  CN III, IV, VI: EOMI; no nystagmus   CN V: Facial sensation intact bilaterally   CN VII: face symmetric   CN VIII: hearing intact to finger rub bilaterally   CN IX, X: palate elevates symmetrically   CN XI: Symmetric shoulder shrug  CN XII: tongue midline. No signs of tongue biting or fasciculations   Motor exam: Strength is 5/5 in all  extremities. Tone is normal. No abnormal movements were seen on exam.   Sensory exam reveals normal sense of light touch, proprioception, vibration and pinprick in all extremities.   Deep tendon reflexes:  2+ throughout. Plantar responses are flexor. There is no clonus.   Coordination: shows a normal finger-nose-finger. Normal rapidly alternating movements.   Gait: The patient was able to get up from seated position on first attempt without requiring assistance. Found to be steady when walking. Movements were fluid with normal arm swing. The patient was able to turn without difficulties or tendency to fall. Romberg exam ***    ANCILLARY DATA REVIEWED:     Lab Data Review:  Reviewed in chart. No results found for this or any previous visit (from the past 24 hour(s)).    Records reviewed:   Reviewed in chart.     Imaging:           ASSESSMENT AND PLAN:    There are no diagnoses linked to this encounter.      CLINICAL DISCUSSION:            FOLLOW-UP:   No follow-ups on file.        Pt aware that if he does have polyneuropathy, this is a progressive disease and there is no cure at this time. We can only mask the symptoms and attempt to identify cause to halt/ slow down the progression. Pt verbalized understanding.       EDUCATION AND COUNSELING:  -Discussed regular exercise program and prevention of cardiovascular disease, including stroke.   -Discussed healthy lifestyle, including: healthy diet (rich in fruits, vegetables, nuts and healthy oils); proper hydration, and adequate sleep hygiene (allowing 7-8 hrs of overnight sleep).    The patient understands and agrees that due to the complexity of his/her diagnosis, results of any testing and further recommendations will typically be discussed/made during a face to face encounter in my office. The patient and/or family further understands it is their responsibility to keep proper follow up.

## 2020-03-03 NOTE — TELEPHONE ENCOUNTER
Pt needs Rx's sent to [place].    -Humalog Kwik pen 100 units/ml using 2-10 units QID before meals and nightly.  -Lantus Solostar pen 100 units/ml using 40 units once daily  -Contour NEXT one lancets testing TID   -pen needles

## 2020-03-04 NOTE — TELEPHONE ENCOUNTER
Pt's ins will not cover the Humalog pen.  They are wanting Novolog Flexpen please.  He uses 2-10 units TID before meals.

## 2020-03-09 NOTE — TELEPHONE ENCOUNTER
1. Caller Name: Joel (son)                        Call Back Number: [phone]      How would the patient prefer to be contacted with a response: Phone call OK to leave a detailed message    Joel is needing a referral for home health up in [place], [place].  he is there temporarily with his son while waiting for another son to build him somewhere to live in [place].  [facility]; [facility], [place] are the 2 main medical organizations up there per Joel (son).

## 2020-03-09 NOTE — TELEPHONE ENCOUNTER
Jenni:  Referral written to [facility] Kaiden Colon!  Regards, Catrachito Sterling, DO

## 2020-03-10 NOTE — TELEPHONE ENCOUNTER
1. Caller Name: Chris                        Call Back Number: [phone]      How would the patient prefer to be contacted with a response: Phone call OK to leave a detailed message    Joel is moving back to [place].  His ins [place] not cover anything out of state.  His health is declining so much.  Chris needs help with his dad.  He is asking if you can admit him to [facility].  There are 40 more days of coverage for skilled nursing through his ins. Joel is unable to get off of the toilet himself, he cannot dress himself...  I told Chris that we could get a  involved.  Frustration is high.

## 2020-03-11 NOTE — TELEPHONE ENCOUNTER
Received request via: Pharmacy    Was the patient seen in the last year in this department? Yes  10/3/19  Does the patient have an active prescription (recently filled or refills available) for medication(s) requested? No

## 2020-03-11 NOTE — PROGRESS NOTES
"Referral received from PCP-MD, specifying social need of System Navigation (moving back to [place] from [place]. TC to pt to engage, assess and establish care plan. Pt educated on role of SW and reason for referral. Gave verbal approval to speak with son who resides in [place] (name not specified during TC). SW on speaker phone with pt and son. Conversation largely about medical level services in the [place] area. Educated pt on Home Health, Skilled Nursing Facility and Acute Care Rehabilitation services. Pt educated he will need to schedule an in-person visit with PCP to pursue medical level interventions in home and/or facility admission. Pt could not clarify what level intervention he is anticipating. Pt reports utilizing walker and that he \"can't walk.\" Per chart review pt has history of being unable to meet ADL/IADLs and may benefit from referral to [facility].     Pt educated on [facility] \"Advantage Plan vs Medicare A/B/D in traveling and moving residences/states. Pt anticipate returning to the [facility] and does not want to transfer to a different insurance provider as he has been with [facility] for \"50 years.\" Pt utilized humor throughout conversation creating barriers in SW educating pt on various resources and roles.     Plan:  At this time, SW will not actively follow pt  Social need met at time of contact  Pt educated on ability to self refer back to this SW if social needs arise once pt returns to the [place] area, SW contact information provided.   "

## 2020-03-11 NOTE — TELEPHONE ENCOUNTER
Jenni:  Please call patients son, referral written to social work!  Regards, Catrachito Sterling, DO

## 2020-03-12 NOTE — TELEPHONE ENCOUNTER
Pt will call when he has his schedule in front of him to schedule Anticoag appt. Pt has our number

## 2020-03-13 PROBLEM — R26.2 UNABLE TO WALK: Status: ACTIVE | Noted: 2020-01-01

## 2020-03-13 NOTE — ED TRIAGE NOTES
"Chief Complaint   Patient presents with   • Failure to Thrive   Pt to triage in NAD with adult son who states he is here for a \"social admit\".  Per Son, Chris pt is unable to care for self and needs to be admitted until he can get back into SNF.  Pt educated on triage process and instructed to notify triage RN of any change in status.          "

## 2020-03-14 NOTE — ED NOTES
Received report from Rose, Patient temp increased since presentation, possible COVID-19 rule out. Provider at bedside. Patient moved over to [place] [id number]  And report given to Leonidas RN and Ori RN, patient taken to room with mask on.

## 2020-03-14 NOTE — ASSESSMENT & PLAN NOTE
Continue home meds of:  Norvasc 10 mg po daily  Hydralazine 100 mg TID  Lisinopril 40 mg daily  Metoprolol  mg daily  Demadex 10 mg BID

## 2020-03-14 NOTE — PROGRESS NOTES
2 RN skin check completed with this RN and TERRI Ballard. Wound consult ordered by ED RN. Photos taken of penis, back, R toe, and L heel. Appropriate interventions in place.
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
## 2020-03-14 NOTE — ED NOTES
Telephone report called to TERRI Allan. Patient being transported by RN and ED Tech via [facility].

## 2020-03-14 NOTE — DISCHARGE PLANNING
"Medical Social Work    Notified by MD pt just arrived from [place], [place] and was dropped off in the ER by his son. Son no longer at bedside. Called pt's son Chris [phone] to discuss plan and notify him that per MD, pt doesn't require inpatient admission and can d/c back. Chris states that pt was living in [place], [place] with his son who was providing assistance with all ADL's and IADL's however son could no longer care for him and they couldn't find any services for pt as pt has [facility], which was out of network for any services in [place]. He states pt is unable to care for himself and has been having difficulty walking due to weakness. He states that he spoke with a [facility] advocate named \"ramón\" who told him that he needed to bring his father to the ER to be socially admitted and get placed into a SNF facility. Again, notified son that pt didn't meet medical criteria for admission, however Chris states \"If he's not admitted then I don't know where you guys will send him because he has nowhere to go\". Chris states he is returning to [place] tomorrow and he is not willing to take pt with him because he can't care for him right now. Discussed that SNF is not a long term solution as pt doesn't have coverage for long term placement at SNF. Chris states that SNF is not their long term plan and that pt has \"40-60 SNF day's left\". Chris states he and his family are moving back to [place] at the end of the month, and once pt is stronger they will be able to take pt in. He states right now pt is too weak and unable to do anything for himself.   Discussed with ER MD, pt will be admitted and pursue SNF placement. Chris is aware that pt will be assessed for skilled need and insurance will have to authorize SNF. Chris stated understanding.   "

## 2020-03-14 NOTE — ED NOTES
Pt brought back to ER room via personal wheelchair. Transferred form wheelchair to stretcher x1 assist.   Pt recently off aircraft from [place] where is was visiting, mask applied. Chart up for ERP.

## 2020-03-14 NOTE — ED NOTES
Received report from TERRI Granados. Patient moved to negative pressure room. Patient temp 99.3 orally. Patient alert and oriented. Notice patient has kingston catheter prior to arrival to ED.

## 2020-03-14 NOTE — ASSESSMENT & PLAN NOTE
Patient has been unable to walk for the last 8 years and the family has brought him to the ER for placement. They would like him to go to rehab so he will need PT/OT eval.    has been consulted for skilled nursing facility versus a group home

## 2020-03-14 NOTE — ED NOTES
RN rounded on patient. Patient is reporting generalized discomfort. The patient was repositioned and apologized for the wait.

## 2020-03-14 NOTE — ED NOTES
RN at bedside to answer call light. Patient is restless and asking for updates. Patient updated on POC.

## 2020-03-14 NOTE — ED TRIAGE NOTES
Patient vital signs rechecked and documented per [facility]. Patient denies any new needs at this time.  Patient updated on wait times, thanked for patience. Pt informed to alert triage tech or triage RN with any needs and/or changes in condition; patient verbalized understanding.

## 2020-03-14 NOTE — ED NOTES
Patient rounded on and kingston bag emptied. The patient is on the monitor. No needs at this time.

## 2020-03-14 NOTE — ED PROVIDER NOTES
ED Provider Note    Scribed for Isauro Fontana M.D. by Lindsay Keita. 3/13/2020,  5:51 PM.    CHIEF COMPLAINT  Chief Complaint   Patient presents with   • Failure to Thrive       HPI  Joel Ma is a 69 y.o. male who presents to the Emergency Department for possible failure to thrive. The patient reports that he was in Rehab in 2019 and was recently discharged. Since his discharge, the patient has been visiting family in [place] for about thirty days and wanted to continue Rehab in [place], however he states that he has been having insurance issues and was not able to be admitted to Rehab in [place] secondary to complications with his insurance. Today, the patient was on a flight from [place] back to [place]. He notes that since landing back in [place], he took transportation services to this facility and is now requesting to be placed back in Rehab. Per nursing note, the patient's son informed staff that the patient is unable to care for himself and needs to be admitted until he can back into Rehab.     REVIEW OF SYSTEMS  See HPI for further details. All other systems negative.     PAST MEDICAL HISTORY   has a past medical history of Anesthesia, Arrhythmia, arthritis (2011), Arthritis, Backpain, CAD (coronary artery disease), Cataract, Dental disorder, Diabetes, High cholesterol, Hypertension, MRSA (methicillin resistant Staphylococcus aureus), Pain (13), Pneumonia (), Renal disorder, Sleep apnea, Stroke ([facility]), Thyroid mass (2009), and Ventricular ectopy (2011).    SOCIAL HISTORY  Social History     Tobacco Use   • Smoking status: Former Smoker     Packs/day: 4.00     Years: 0.50     Pack years: 2.00     Types: Cigarettes     Last attempt to quit: 1974     Years since quittin.2   • Smokeless tobacco: Never Used   Substance and Sexual Activity   • Alcohol use: No   • Drug use: No   • Sexual activity: Yes     Social History     Substance and Sexual Activity   Drug Use  No       SURGICAL HISTORY   has a past surgical history that includes knee scope,single menisectomy; lumbar fusion posterior (1979); irrigation & debridement ortho (3/13/2012); irrigation & debridement ortho (11/2/2012); irrigation & debridement ortho (11/13/2012); cataract phaco with iol (5/8/2013); irrigation & debridement ortho (Left, 10/29/2012); cervical disk and fusion anterior (8/31/2016); other; toe amputation (7/22/2011); elbow arthrotomy (2008); foot surgery; foot surgery; knee arthroplasty total (1/11/2012); knee revision total (3/13/2012); tendon repair (3/13/2012); knee revision total (11/13/2012); incision and drainage orthopedic (Right, 10/29/2012); other neurological surg; cervical fusion posterior (7/13/2018); cervical laminectomy posterior (7/13/2018); lumbar laminectomy diskectomy (7/13/2018); and z cardiac cath.    CURRENT MEDICATIONS  Home Medications     Reviewed by Ori Rodas R.N. (Registered Nurse) on 03/13/20 at 2102  Med List Status: Complete   Medication Last Dose Status   amLODIPine (NORVASC) 10 MG Tab 3/13/2020 Active   Blood Glucose Test Strips  Active   clindamycin (CLEOCIN) 300 MG Cap 3/13/2020 Active   clopidogrel (PLAVIX) 75 MG Tab 3/13/2020 Active   ezetimibe (ZETIA) 10 MG Tab 3/13/2020 Active   furosemide (LASIX) 20 MG Tab 3/13/2020 Active   gabapentin (NEURONTIN) 300 MG Cap 3/13/2020 Active   glucose blood (CONTOUR NEXT TEST) strip  Active   hydrALAZINE (APRESOLINE) 100 MG tablet 3/13/2020 Active   insulin glargine (LANTUS SOLOSTAR) 100 UNIT/ML Solution Pen-injector injection 3/12/2020 Active   Insulin Pen Needle (ADVOCATE INSULIN PEN NEEDLES) 31G X 5 MM Misc  Active   Insulin Pen Needle (PEN NEEDLES) 32G X 5 MM Misc  Active   JARDIANCE 10 MG Tab  Active   ketoconazole (NIZORAL) 2 % Cream 3/12/2020 Active   Lancets  Active   lisinopril (PRINIVIL) 40 MG tablet 3/13/2020 Active   metFORMIN (GLUCOPHAGE) 500 MG Tab 3/13/2020 Active   metoprolol (TOPROL-XL) 200 MG XL tablet  "3/13/2020 Active   Misc. Devices Misc  Active   Misc. Devices Misc  Active   Misc. Devices Misc  Active   nitrofurantoin monohyd macro (MACROBID) 100 MG Cap 3/13/2020 Active   NON SPECIFIED  Active   NON SPECIFIED  Active   NOVOLOG, insulin aspart, (NOVOLOG FLEXPEN) 100 UNIT/ML injection PEN 3/13/2020 Active   potassium chloride SA (KDUR) 20 MEQ Tab CR 3/12/2020 Active   rivaroxaban (XARELTO) 20 MG Tab tablet 3/12/2020 Active   sulfamethoxazole-trimethoprim (BACTRIM DS) 800-160 MG tablet 3/13/2020 Active                ALLERGIES  Allergies   Allergen Reactions   • Demerol      Makes me stop breathing.  Doesn't like because it makes him high   • Fentanyl      \"Sensitive\"   • Statins [Hmg-Coa-R Inhibitors] Myalgia     Rxn - ongoing         PHYSICAL EXAM  VITAL SIGNS: /89   Pulse (!) 105   Temp 37.3 °C (99.1 °F) (Temporal)   Resp 14   Ht 1.93 m (6' 4\")   Wt 118.8 kg (262 lb)   SpO2 95%   BMI 31.89 kg/m²   Pulse ox interpretation: I interpret this pulse ox as normal.  Constitutional: Alert in no apparent distress.  HENT: No signs of trauma, Bilateral external ears normal, Nose normal.   Eyes: Conjunctiva normal, Non-icteric.   Neck: Normal range of motion, Supple, No stridor.   Cardiovascular: Regular rate and rhythm, no murmurs.   Thorax & Lungs: Normal breath sounds, No respiratory distress, No wheezing, No chest tenderness.   Abdomen: Bowel sounds normal, Soft, No tenderness, No masses, No pulsatile masses. No peritoneal signs.  Skin: Warm, Dry, No erythema, No rash.   Extremities: Intact distal pulses, No edema, No cyanosis.  Musculoskeletal: Good range of motion in all major joints. No or major deformities noted.   Neurologic: Alert , Normal motor function, Normal sensory function, No focal deficits noted.   Psychiatric: Affect normal, Judgment normal, Mood normal.     COURSE & MEDICAL DECISION MAKING  Nursing notes, VS, PMSFHx reviewed in chart.     5:51 PM Patient seen and examined at bedside. " Discussed plan of care with patient which includes communicating with social work about the patient's request. Patient is agreeable to the plan of care.    6:09 PM I spoke with social work who said that they read the patient's cart and contact the patient's son to determine the plan of care.    6:36 PM I spoke with social work who said that they contact the patient's son who said that he is not the legal guardian of the patient and does not want to assume care of the patient. The patient's son states that he believes the patient is no longer able to care for him self which prompted him to call the patient's insurance company, [employer], who instructed the patient's son to bring the patient to the ED. According to social work, the patient is not an EPS case because patient's son is not the patient's legal guardian. If the patient medically stable with no major concerns and is able to ambulate, he can be stable for discharge. However, per ER nurse, the patient is not able to ambulate. Given his history and current situation, the patient will need to be hospitalized for observation. At this time, Dr. Wilson (Hospitalist) was paged.    6:58 PM I discussed the patient's case and the above findings with Dr. Wilson (Hospitalist) who agrees to evaluate the patient for hospitalization.       DISPOSITION:  Patient will be hospitalized by Dr. Wilson (Hospitalist)  in stable condition.    FINAL IMPRESSION  1.  Inability to ambulate  2.  Adult failure to thrive     Lindsay WALTER (Nayeli), am scribing for, and in the presence of, Isauro Fontana M.D..    Electronically signed by: Lindsay Keita (Nayeli), 3/13/2020    Isauro WALTER M.D. personally performed the services described in this documentation, as scribed by Lindsay Keita in my presence, and it is both accurate and complete.    C    The note accurately reflects work and decisions made by me.  Isauro Fontana M.D.  3/14/2020  12:06 AM

## 2020-03-14 NOTE — PROGRESS NOTES
Hospital Medicine Daily Progress Note    Date of Service  3/14/2020    Chief Complaint  69 y.o. male admitted 3/13/2020 with weakness    Hospital Course    Mr. Ma past medical history of trauma 8 years ago for which he is chair bound as well as insulin-dependent diabetes mellitus, atrial fibrillation on Xarelto therapy, and hypertension that poorly went up to [place] to live with his son but his insurance was not covered up there so was brought back to [facility] and was brought to the emergency room by his son due to weakness.  Due to his risk factors of being in [place], he was placed under isolation for COVID-19 rule out.      Interval Problem Update  3/14/2020: Patient seen and evaluated in the intensive care unit. He remains under COVID-19 isolation.  I had a long discussion with Mr. Ma and he states he has been scheduled to go to some sort of a rehab facility up by the [facility] in [place].  He does not seem to have the details.  He states his legs look the way they do chronically there is no changes in the color. His glucose levels are quite high.     Consultants/Specialty  .    Code Status  full    Disposition  medical    Review of Systems  Review of Systems   Constitutional: Negative for chills and fever.   Respiratory: Negative for cough and shortness of breath.    Cardiovascular: Positive for leg swelling.   Gastrointestinal:        Tolerating a diet   All other systems reviewed and are negative.       Physical Exam  Temp:  [36.4 °C (97.5 °F)-37.3 °C (99.1 °F)] 36.4 °C (97.5 °F)  Pulse:  [] 108  Resp:  [14-21] 18  BP: (103-155)/() 146/117  SpO2:  [89 %-98 %] 93 %    Physical Exam  Vitals signs and nursing note reviewed.   HENT:      Head: Normocephalic and atraumatic.   Cardiovascular:      Rate and Rhythm: Normal rate and regular rhythm.   Pulmonary:      Effort: Pulmonary effort is normal.      Breath sounds: Normal breath sounds.   Abdominal:      General: There is no  distension.      Tenderness: There is no abdominal tenderness.   Musculoskeletal:      Comments: Feet are swollen and purple/red though not tender to touch   Neurological:      Mental Status: He is alert.   Psychiatric:      Comments: He is alert and interactive and generally oriented though not an excellent historian         Fluids    Intake/Output Summary (Last 24 hours) at 3/14/2020 0712  Last data filed at 3/14/2020 0600  Gross per 24 hour   Intake 550 ml   Output 2900 ml   Net -2350 ml       Laboratory                        Imaging  No orders to display        Assessment/Plan  Unable to walk  Assessment & Plan  Patient has been unable to walk for the last 8 years and the family has brought him to the ER for placement. They would like him to go to rehab so he will need PT/OT eval.    has been consulted for skilled nursing facility versus a group home    Atrial fibrillation [427.31]- (present on admission)  Assessment & Plan  Xarelto and rate control with metoprolol    Diabetes mellitus with neurological manifestations, controlled (HCC)- (present on admission)  Assessment & Plan  Lantus insulin 40 units daily and metformin 1000 mg twice a day  He had been on Jardiance as an outpatient  -diabetic diet  glycohemoglobin is pending  -monitor for hypoglycemic episodes and adjust control if he should get low  He had been on Novolog sliding scale as an outpatient  Has his glucose levels are high, high sliding scale has been initiated.     HTN (hypertension)- (present on admission)  Assessment & Plan  Optimize BP management to keep SBP less than 140 and DBP less than 95  Norvasc 10 mg po daily  Hydralazine 100 mg TID  Lisinopril 40 mg daily  Metoprolol  mg daily  Demadex 10 mg BID      CVA (cerebral vascular accident) (HCC)- (present on admission)  Assessment & Plan  History of CVA and he is wheelchair bound.     BMI 35.0-35.9,adult- (present on admission)  Assessment & Plan  Body mass index is  31.89 kg/m².  Needs outpatient weight loss management program.     JANNIE treated with BiPAP- (present on admission)  Assessment & Plan  BIPAP at night if he has it.     Diabetic polyneuropathy (HCC)- (present on admission)  Assessment & Plan  Gabapentin    Dyslipidemia- (present on admission)  Assessment & Plan  Low fat low cholesterol diet  Statin  Fasting lipid panel       VTE prophylaxis: Xarelto

## 2020-03-14 NOTE — ED NOTES
RN at bedside to answer call light. Patient required repositioning. No other needs and is on cardiac monitor.

## 2020-03-14 NOTE — ED NOTES
[facility] called regarding admit of patient to their unit. Due to improper lab ordering, the patient has not had his complete respiratory panel resulted for admission. Lab called and orders clarified for the COVID-19 respiratory panel to be continued.    Charge for the ER and [facility] both made aware.    Waffle cushion ordered for patient due to prolonged stay in the ER. Patient updated on POC.

## 2020-03-14 NOTE — ED NOTES
Patient placed on waffle cushion by two RN's. IV placed. And STAT lock placed on kingston catheter.    Wound Consult placed for pressure injury at the meatus of the urethra.

## 2020-03-14 NOTE — ASSESSMENT & PLAN NOTE
Lantus insulin 40 units daily and metformin 1000 mg twice a day  He had been on Jardiance as an outpatient  -diabetic diet  glycohemoglobin is pending  -monitor for hypoglycemic episodes and adjust control if he should get low  He had been on Novolog sliding scale as an outpatient  High sliding scale has been initiated.

## 2020-03-15 NOTE — PROGRESS NOTES
Hospital Medicine Daily Progress Note    Date of Service  3/15/2020    Chief Complaint  69 y.o. male admitted 3/13/2020 with weakness    Hospital Course    Mr. Ma past medical history of trauma 8 years ago for which he is chair bound as well as insulin-dependent diabetes mellitus, atrial fibrillation on Xarelto therapy, and hypertension that poorly went up to [place] to live with his son but his insurance was not covered up there so was brought back to [facility] and was brought to the emergency room by his son due to weakness.  Due to his risk factors of being in [place], he was placed under isolation for COVID-19 rule out.      Interval Problem Update  3/14/2020: Patient seen and evaluated in the intensive care unit. He remains under COVID-19 isolation.  I had a long discussion with Mr. Ma and he states he has been scheduled to go to some sort of a rehab facility up by the [facility] in [place].  He does not seem to have the details.  He states his legs look the way they do chronically there is no changes in the color. His glucose levels are quite high.   3/15/2020: Patient seen and evaluated in the intensive care unit. His COVID-19 is negative.   Consultants/Specialty   for placement options.    Code Status  full    Disposition  medical    Review of Systems  Review of Systems   Constitutional: Negative for chills and fever.   Respiratory: Negative for cough and shortness of breath.    Gastrointestinal:        Tolerating a diabetic diet   All other systems reviewed and are negative.       Physical Exam  Temp:  [36.6 °C (97.8 °F)-36.9 °C (98.4 °F)] 36.9 °C (98.4 °F)  Pulse:  [] 68  Resp:  [18] 18  BP: ()/(74-93) 141/93  SpO2:  [81 %-98 %] 95 %    Physical Exam  Vitals signs and nursing note reviewed.   HENT:      Head: Normocephalic and atraumatic.   Cardiovascular:      Rate and Rhythm: Normal rate and regular rhythm.   Pulmonary:      Effort: Pulmonary effort is normal. No respiratory  distress.      Breath sounds: Normal breath sounds.   Abdominal:      General: There is no distension.   Musculoskeletal:         General: Swelling present.      Comments: Feet are swollen and purple/red though not tender to touch   Neurological:      Mental Status: He is alert.   Psychiatric:      Comments: He is in generally good spirits         Fluids    Intake/Output Summary (Last 24 hours) at 3/15/2020 0717  Last data filed at 3/15/2020 0600  Gross per 24 hour   Intake 350 ml   Output 3350 ml   Net -3000 ml       Laboratory                        Imaging  No orders to display        Assessment/Plan  Unable to walk  Assessment & Plan  Patient has been unable to walk for the last 8 years and the family has brought him to the ER for placement. They would like him to go to rehab so he will need PT/OT eval.    has been consulted for skilled nursing facility versus a group home    Atrial fibrillation [427.31]- (present on admission)  Assessment & Plan  Xarelto and rate control with metoprolol    Diabetes mellitus with neurological manifestations, controlled (HCC)- (present on admission)  Assessment & Plan  Lantus insulin 40 units daily and metformin 1000 mg twice a day  He had been on Jardiance as an outpatient  -diabetic diet  glycohemoglobin is pending  -monitor for hypoglycemic episodes and adjust control if he should get low  He had been on Novolog sliding scale as an outpatient  High sliding scale has been initiated.     HTN (hypertension)- (present on admission)  Assessment & Plan  Continue home meds of:  Norvasc 10 mg po daily  Hydralazine 100 mg TID  Lisinopril 40 mg daily  Metoprolol  mg daily  Demadex 10 mg BID      CVA (cerebral vascular accident) (HCC)- (present on admission)  Assessment & Plan  History of CVA and he is wheelchair bound.     BMI 35.0-35.9,adult- (present on admission)  Assessment & Plan  Body mass index is 31.89 kg/m².  Needs outpatient weight loss management program.      JANNIE treated with BiPAP- (present on admission)  Assessment & Plan  BIPAP at night if he has it.     Diabetic polyneuropathy (HCC)- (present on admission)  Assessment & Plan  Gabapentin    Dyslipidemia- (present on admission)  Assessment & Plan  Low fat low cholesterol diet  Statin  Fasting lipid panel       VTE prophylaxis: Xarelto

## 2020-03-15 NOTE — CARE PLAN
Problem: Urinary Elimination:  Goal: Ability to reestablish a normal urinary elimination pattern will improve  Outcome: PROGRESSING AS EXPECTED  Note: Patient has chronic kingston for spastic bladder.  Kingston replaced March 10th per patient report.     Problem: Discharge Barriers/Planning  Goal: Patient's continuum of care needs will be met  Outcome: PROGRESSING SLOWER THAN EXPECTED  Note: Patient's son can no longer take care of patient.  Once medically clear, patient will require SNF placement.     Problem: Mobility  Goal: Risk for activity intolerance will decrease  Outcome: MET  Note: Patient is wheelchair bound at home.  Able to use upper extremities without assistance.  Weak lower extremities.

## 2020-03-15 NOTE — CARE PLAN
Problem: Communication  Goal: The ability to communicate needs accurately and effectively will improve  Outcome: PROGRESSING AS EXPECTED     Problem: Safety  Goal: Will remain free from injury  Outcome: PROGRESSING AS EXPECTED  Goal: Will remain free from falls  Outcome: PROGRESSING AS EXPECTED     Problem: Infection  Goal: Will remain free from infection  Outcome: PROGRESSING AS EXPECTED     Problem: Venous Thromboembolism (VTW)/Deep Vein Thrombosis (DVT) Prevention:  Goal: Patient will participate in Venous Thrombosis (VTE)/Deep Vein Thrombosis (DVT)Prevention Measures  Outcome: PROGRESSING AS EXPECTED     Problem: Bowel/Gastric:  Goal: Normal bowel function is maintained or improved  Outcome: PROGRESSING AS EXPECTED  Goal: Will not experience complications related to bowel motility  Outcome: PROGRESSING AS EXPECTED     Problem: Knowledge Deficit  Goal: Knowledge of disease process/condition, treatment plan, diagnostic tests, and medications will improve  Outcome: PROGRESSING AS EXPECTED  Goal: Knowledge of the prescribed therapeutic regimen will improve  Outcome: PROGRESSING AS EXPECTED     Problem: Discharge Barriers/Planning  Goal: Patient's continuum of care needs will be met  Outcome: PROGRESSING AS EXPECTED     Problem: Fluid Volume:  Goal: Will maintain balanced intake and output  Outcome: PROGRESSING AS EXPECTED     Problem: Urinary Elimination:  Goal: Ability to reestablish a normal urinary elimination pattern will improve  Outcome: PROGRESSING AS EXPECTED     Problem: Mobility  Goal: Risk for activity intolerance will decrease  Outcome: PROGRESSING AS EXPECTED     Problem: Skin Integrity  Goal: Risk for impaired skin integrity will decrease  Outcome: PROGRESSING AS EXPECTED

## 2020-03-15 NOTE — PROGRESS NOTES
Received bedside report from night shift RN. Assumed care of patient. Pt assessed and stable. VSS. Patient reports 0/10 pain at this time. Discussed plan of care for day with patient and received verbal understanding. Call light within reach, bed in low position.  Educated pt re fall precautions and received verbal understanding.  However, pt continues to refuse bed alarms.  Pt is alert, oriented, and calls appropriately.  Patient is wheelchair bound at baseline and does not try to get out of bed.  Gonzalez in place and draining.

## 2020-03-16 NOTE — PROGRESS NOTES
2 RN skin check completed with TERRI Reid.     Sacrum red, blanching, skin peeling  Scab on left heel.  2nd digit amputated on left foot.  2nd and 3rd digit amputated on right foot.   Bruising to abdomen.   Dusky and red heels.   Meatus of penis is red.

## 2020-03-16 NOTE — DISCHARGE SUMMARY
Discharge Summary    CHIEF COMPLAINT ON ADMISSION  Chief Complaint   Patient presents with   • Failure to Thrive       Reason for Admission  other     CODE STATUS  Full Code    HPI & HOSPITAL COURSE  This is a 69 y.o. male here with above medical issues.  Patient came in with failure to thrive and inability to walk for the last 8 years secondary to prior stroke.  Patient had no new acute issues and is blood sugars are better controlled now.  He is resumed on all his outpatient medications and PT OT recommended ongoing rehabilitation for which he has been accepted to now.       Therefore, he is discharged in fair and stable condition to SNF.        FOLLOW UP ITEMS POST DISCHARGE  Follow-up with primary care physician 1 to 2 weeks    DISCHARGE DIAGNOSES  Active Problems:    Unable to walk POA: Unknown    HTN (hypertension) POA: Yes    Diabetes mellitus with neurological manifestations, controlled (HCC) POA: Yes    Atrial fibrillation [427.31] POA: Yes    Dyslipidemia POA: Yes    Diabetic polyneuropathy (HCC) POA: Yes    JANNIE treated with BiPAP POA: Yes    BMI 35.0-35.9,adult POA: Yes  Resolved Problems:    CVA (cerebral vascular accident) ([facility]) POA: Yes      Overview: History of embolic strokes, on coumadin      FOLLOW UP  No future appointments.  No follow-up provider specified.    MEDICATIONS ON DISCHARGE     Medication List      CHANGE how you take these medications      Instructions   gabapentin 300 MG Caps  What changed:  when to take this  Commonly known as:  NEURONTIN   Take 1-2 Caps by mouth 3 times a day. 300mg in AM and noon 600mg in PM   Indications: Neuropathic Pain  Dose:  300-600 mg     Lantus SoloStar 100 UNIT/ML Sopn injection  What changed:  how much to take  Generic drug:  insulin glargine   Inject 40 Units as instructed every evening.  Dose:  40 Units        CONTINUE taking these medications      Instructions   amLODIPine 10 MG Tabs  Commonly known as:  NORVASC   Take 1 Tab by mouth every day.  Dose:   10 mg     Blood Glucose Test Strips   Test strips order: Test strips for Relion meter. Sig: use 3 to 4 x a day and prn ssx high or low sugar #450 RF x 3     clopidogrel 75 MG Tabs  Commonly known as:  PLAVIX   TAKE ONE TABLET BY MOUTH ONE TIME DAILY     ezetimibe 10 MG Tabs  Commonly known as:  ZETIA   Take 1 Tab by mouth every day.  Dose:  10 mg     glucose blood strip  Commonly known as:  Contour Next Test   1 Strip by Other route as needed. USE TO TEST BLOOD SUGAR 4 To 5 TIMES DAILY  Dose:  1 Each     hydrALAZINE 100 MG tablet  Commonly known as:  APRESOLINE   TAKE ONE TABLET BY MOUTH EVERY EIGHT HOURS     * Insulin Pen Needle 31G X 5 MM Misc  Commonly known as:  Advocate Insulin Pen Needles   Use tid and prn.     * Pen Needles 32G X 5 MM Misc   1 Each by Does not apply route 4 times a day as needed.  Dose:  1 Each     Jardiance 10 MG Tabs  Generic drug:  Empagliflozin   TAKE ONE TABLET BY MOUTH ONE TIME DAILY     ketoconazole 2 % Crea  Commonly known as:  NIZORAL   Apply 0.25 g to affected area(s) every day.  Dose:  0.25 g     Lancets   Lancets order: Lancets for Dominic Contour Next meter. Sig: use tid and prn ssx high or low sugar. #300 RF x 3     lisinopril 40 MG tablet  Commonly known as:  PRINIVIL   TAKE ONE TABLET BY MOUTH ONE TIME DAILY     metFORMIN 500 MG Tabs  Commonly known as:  GLUCOPHAGE   TAKE TWO TABLETS BY MOUTH TWICE DAILY WITH MEALS     metoprolol 200 MG XL tablet  Commonly known as:  TOPROL-XL   Doctor's comments:  Needs to be seen for further refills. Thank you.  Take 1 Tab by mouth every day.  Dose:  200 mg     * Misc. Devices Misc   Relion glucometer     * Misc. Devices Misc   Use tid and prn [id number] # 100     * Misc. Devices Misc   Please admit Joel Ma to Acute Rehab due to need for Strengthening and Conditioning.   [phone]     NON SPECIFIED   Four wheeled walker with seat - [facility].     NON SPECIFIED   CPAP SUPPLIES FAX TO [facility].     NovoLOG FlexPen  "100 UNIT/ML injection PEN  Generic drug:  NOVOLOG (insulin aspart)   Inject 2-10 Units as instructed 3 times a day before meals.  Dose:  2-10 Units     potassium chloride SA 20 MEQ Tbcr  Commonly known as:  Kdur   Take 1 Tab by mouth every day.  Dose:  20 mEq     rivaroxaban 20 MG Tabs tablet  Commonly known as:  Xarelto   Doctor's comments:  This Rx has been ordered by a pharmacist working under a collaborative practice agreement. Pt no longer on warfarin.  Take 1 Tab by mouth with dinner.  Dose:  20 mg     torsemide 10 MG tablet  Commonly known as:  DEMADEX   Take 1 Tab by mouth 2 times a day. Indications: Edema  Dose:  10 mg         * This list has 5 medication(s) that are the same as other medications prescribed for you. Read the directions carefully, and ask your doctor or other care provider to review them with you.            STOP taking these medications    Bactrim -160 MG tablet  Generic drug:  sulfamethoxazole-trimethoprim     clindamycin 300 MG Caps  Commonly known as:  CLEOCIN     furosemide 20 MG Tabs  Commonly known as:  LASIX     nitrofurantoin 100 MG Caps  Commonly known as:  MACROBID            Allergies  Allergies   Allergen Reactions   • Demerol      Makes me stop breathing.  Doesn't like because it makes him high   • Fentanyl      \"Sensitive\"   • Latex Itching   • Statins [Hmg-Coa-R Inhibitors] Myalgia     Rxn - ongoing         DIET  Orders Placed This Encounter   Procedures   • Diet Order Diabetic     Standing Status:   Standing     Number of Occurrences:   1     Order Specific Question:   Diet:     Answer:   Diabetic [3]       ACTIVITY  As tolerated and directed by skilled nursing.  Weight bearing as tolerated    LINES, DRAINS, AND WOUNDS  This is an automated list. Peripheral IVs will be removed prior to discharge.  Peripheral IV 03/13/20 20 G Left Forearm (Active)   Site Assessment Clean;Dry;Intact 3/16/2020  8:32 AM   Dressing Type Transparent;Occlusive 3/16/2020  8:32 AM   Line Status " No blood return;Flushed;Scrubbed the hub prior to access;Saline locked 3/16/2020  8:32 AM   Dressing Status Clean;Dry;Intact 3/16/2020  8:32 AM   Dressing Intervention N/A 3/16/2020  8:32 AM   Infiltration Grading ([facility]) 0 3/16/2020  8:32 AM   Phlebitis Scale ([facility] Only) 0 3/16/2020  8:32 AM     Urethral Catheter Non-latex 16 Fr. (Active)   Site Assessment Edema;Red 3/14/2020  8:00 PM   Collection Container Standard drainage bag 3/14/2020  8:00 PM   Urinary Catheter Care Tamper Evident Seal in Place;Drainage Tube Extended;Drainage Tubing Properly Secured;Drainage Bag Not Overfilled;Drainage Bag Below Bladder Level and Not on Floor;All Wound Pts have Wound Consult 3/14/2020  8:00 PM   Securement Method Securing device (Describe) 3/14/2020  8:00 PM   Output (mL) 1700 mL 3/16/2020  5:00 AM      Wound 03/13/20 Partial Thickness Wound Penis Pressure injury at tip of penis at the meatus from pre-existing kingston catheter (Active)   Wound Image   3/15/2020  5:00 PM   Site Assessment [place] 3/16/2020  8:32 AM   Periwound Assessment Red 3/16/2020  8:32 AM   Margins Attached edges 3/16/2020  8:32 AM   Closure Open to air 3/16/2020  8:32 AM   Drainage Amount None 3/16/2020  8:32 AM   Drainage Description ANALILIA 3/15/2020  9:35 PM   Treatments Cleansed 3/15/2020  9:35 PM   Wound Cleansing Soap and Water 3/16/2020  8:32 AM   Periwound Protectant Not Applicable 3/16/2020  8:32 AM   Dressing Cleansing/Solutions Antibiotic Ointment 3/16/2020  8:32 AM   Dressing Options Open to Air 3/16/2020  8:32 AM   Dressing Status Open to Air 3/16/2020  8:32 AM   Wound Length (cm) 0.2 cm 3/15/2020  5:00 PM   Wound Width (cm) 0.3 cm 3/15/2020  5:00 PM   Wound Depth (cm) 0 cm 3/15/2020  5:00 PM   Wound Surface Area (cm^2) 0.06 cm^2 3/15/2020  5:00 PM   Wound Volume (cm^3) 0 cm^3 3/15/2020  5:00 PM   Tunneling (cm) 0 cm 3/15/2020  5:00 PM   Undermining (cm) 0 cm 3/15/2020  5:00 PM   Shape half moon 3/15/2020  5:00 PM   Wound Odor None 3/15/2020   5:00 PM   Exposed Structures None 3/15/2020  5:00 PM       Wound 03/14/20 Pressure Injury Sacrum Mid Redness on sacrum  (Active)   Site Assessment Red;Excoriated 3/16/2020  8:32 AM   Periwound Assessment Intact 3/16/2020  8:32 AM   Drainage Amount None 3/16/2020  8:32 AM   Dressing Status Open to Air 3/16/2020  8:32 AM       Wound 03/13/20 Full Thickness Wound Heel Posterior Left with adherent scab (Active)   Wound Image   3/15/2020  5:00 PM   Site Assessment Red;Brown 3/16/2020  8:32 AM   Periwound Assessment Intact 3/16/2020  8:32 AM   Margins Defined edges;Attached edges 3/16/2020  8:32 AM   Drainage Amount None 3/16/2020  8:32 AM   Treatments Cleansed 3/15/2020  5:00 PM   Wound Cleansing Normal Saline Irrigation 3/15/2020  5:00 PM   Periwound Protectant Hydrocolloid 3/15/2020  5:00 PM   Dressing Cleansing/Solutions Not Applicable 3/15/2020  5:00 PM   Dressing Options Hydrocolloid Thin;Hypafix Tape 3/15/2020  5:00 PM   Dressing Changed New 3/15/2020  5:00 PM   Dressing Status Intact 3/15/2020  5:00 PM   Dressing Change/Treatment Frequency Every 72 hrs, and As Needed 3/15/2020  5:00 PM   NEXT Dressing Change/Treatment Date 03/18/20 3/15/2020  5:00 PM   NEXT Weekly Photo (Inpatient Only) 03/20/20 3/15/2020  5:00 PM   Non-staged Wound Description Full thickness 3/15/2020  5:00 PM   Wound Length (cm) 0.6 cm 3/15/2020  5:00 PM   Wound Width (cm) 0.5 cm 3/15/2020  5:00 PM   Wound Depth (cm) 0 cm 3/15/2020  5:00 PM   Wound Surface Area (cm^2) 0.3 cm^2 3/15/2020  5:00 PM   Wound Volume (cm^3) 0 cm^3 3/15/2020  5:00 PM   Tunneling (cm) 0 cm 3/15/2020  5:00 PM   Undermining (cm) 0 cm 3/15/2020  5:00 PM   Shape round 3/15/2020  5:00 PM   Wound Odor None 3/15/2020  5:00 PM   Pulses 2+;DP 3/15/2020  5:00 PM   Exposed Structures None 3/15/2020  5:00 PM   WOUND NURSE ONLY - Time Spent with Patient (mins) 45 3/15/2020  5:00 PM       Peripheral IV 03/13/20 20 G Left Forearm (Active)   Site Assessment Clean;Dry;Intact 3/16/2020   8:32 AM   Dressing Type Transparent;Occlusive 3/16/2020  8:32 AM   Line Status No blood return;Flushed;Scrubbed the hub prior to access;Saline locked 3/16/2020  8:32 AM   Dressing Status Clean;Dry;Intact 3/16/2020  8:32 AM   Dressing Intervention N/A 3/16/2020  8:32 AM   Infiltration Grading ([facility]) 0 3/16/2020  8:32 AM   Phlebitis Scale ([facility] Only) 0 3/16/2020  8:32 AM           Urethral Catheter Non-latex 16 Fr. (Active)   Site Assessment Edema;Red 3/14/2020  8:00 PM   Collection Container Standard drainage bag 3/14/2020  8:00 PM   Urinary Catheter Care Tamper Evident Seal in Place;Drainage Tube Extended;Drainage Tubing Properly Secured;Drainage Bag Not Overfilled;Drainage Bag Below Bladder Level and Not on Floor;All Wound Pts have Wound Consult 3/14/2020  8:00 PM   Securement Method Securing device (Describe) 3/14/2020  8:00 PM   Output (mL) 1700 mL 3/16/2020  5:00 AM        MENTAL STATUS ON TRANSFER  Level of Consciousness: Alert  Orientation : Oriented x 4  Speech: Speech Clear    CONSULTATIONS  NONE    PROCEDURES  No orders to display         LABORATORY  Lab Results   Component Value Date    SODIUM 129 (L) 11/11/2019    POTASSIUM 4.8 11/11/2019    CHLORIDE 93 (L) 11/11/2019    CO2 24 11/11/2019    GLUCOSE 518 (HH) 11/11/2019    BUN 41 (H) 11/11/2019    CREATININE 1.12 11/11/2019    CREATININE 1.21 02/17/2011    GLOMRATE >59 02/17/2011        Lab Results   Component Value Date    WBC 5.8 10/25/2019    WBC 5.2 03/25/2011    HEMOGLOBIN 18.2 (H) 10/25/2019    HEMATOCRIT 53.4 (H) 10/25/2019    PLATELETCT 208 10/25/2019        Total time of the discharge process exceeds 32 minutes.

## 2020-03-16 NOTE — PROGRESS NOTES
A&Ox4, no reports of pain, kingston in place.  Pt one assist to stand with FWW.  at bed time and 68 this morning. Provided pt with snack and blood sugar went up to 106, held morning insulin.

## 2020-03-16 NOTE — CARE PLAN
Problem: Safety  Goal: Will remain free from falls  Outcome: PROGRESSING AS EXPECTED    Bed is locked and in lowest position, call light and bedside table are within reach, treaded slipper socks are on, and hourly rounding is in place.       Problem: Skin Integrity  Goal: Risk for impaired skin integrity will decrease  Outcome: PROGRESSING AS EXPECTED    Preventative dressings in use, barrier cream applied, ointment on board for kingston site

## 2020-03-16 NOTE — PROGRESS NOTES
Pt is A&Ox4. Pt is resting in bed, no signs of labored breathing or pain. Pt on RA. Call light & personal belongings within reach, bed in lowest position & locked, and bed alarm is on. Fall precautions in place and education provided on how to use call light. Pt updated on plan of care for the day. Pt declines any additional needs at this time. Will continue to monitor.

## 2020-03-16 NOTE — DISCHARGE PLANNING
Received Transport Form @ 0048  Spoke to Raymon @ [facility]    Transport is scheduled for 3/17 @1230 going to [facility].

## 2020-03-16 NOTE — THERAPY
"Occupational Therapy Evaluation completed.   Functional Status:  Pt supine in bed on arrival.  Pt able to feed with set up.  Pt was supervised for supine to sit EOB with extra time.  Pt was Min A for LB dressing in his own clothes with extra time.  Pt has weakness in BUE from previous injury to his left shoulder & CVA that impacted his RUE.  Pt stood with Min A & FWW & transferred to bedside chair with Min A & extra time.  Pt does have impaired ST memory & is easily distracted during ADL's.  Pt encouraged to be OOB for all meals.  Plan of Care: Will benefit from Occupational Therapy 3 times per week  Discharge Recommendations:  Equipment: Will Continue to Assess for Equipment Needs. Post-acute therapy Recommend post-acute placement for additional occupational therapy services prior to discharge home.        See \"Rehab Therapy-Acute\" Patient Summary Report for complete documentation.    "

## 2020-03-16 NOTE — DISCHARGE PLANNING
Anticipated Discharge Disposition: SNF     Action: LSW placed call to son to discuss SNF placement. LSW got choice from son. LSW faxed choice form to [facility] ex [phone].     Barriers to Discharge: placement     Plan: LSW will continue to assess for any discharge needs.

## 2020-03-16 NOTE — WOUND TEAM
[facility]  Inpatient Services  Initial Wound and Skin Care Evaluation    Admission Date: 3/13/2020     Last order of IP CONSULT TO WOUND CARE was found on 3/13/2020 from Hospital Encounter on 3/13/2020       HPI, PMH, SH: Reviewed    Unit where seen by Wound Team: [id number]     WOUND CONSULT RELATED TO: penis, heel, toe and back wounds    Self Report / Pain Level: no c/o pain      OBJECTIVE: on pressure redistribution mattress, moving legs purposefully, able to sit up for back to be assessed    WOUND TYPE, LOCATION, CHARACTERISTICS (Pressure Injuries: location, stage, POA or date identified)  Wound 03/13/20 Partial Thickness Wound Penis Pressure injury at tip of penis at the meatus from pre-existing kingston catheter (Active)      3/13/2020     Site Assessment Pink    Periwound Assessment Red    Margins Attached edges    Closure Open to air    Drainage Amount None    Drainage Description Purulent    Treatments Cleansed    Wound Cleansing Soap and Water    Periwound Protectant Not Applicable    Dressing Cleansing/Solutions Antibiotic Ointment    Dressing Options Open to Air    Dressing Status Open to Air    Wound Length (cm) 0.2 cm 3/15/2020  5:00 PM   Wound Width (cm) 0.3 cm 3/15/2020  5:00 PM   Wound Depth (cm) 0 cm 3/15/2020  5:00 PM   Wound Surface Area (cm^2) 0.06 cm^2 3/15/2020  5:00 PM   Wound Volume (cm^3) 0 cm^3 3/15/2020  5:00 PM   Tunneling (cm) 0 cm 3/15/2020  5:00 PM   Undermining (cm) 0 cm 3/15/2020  5:00 PM   Shape half stone    Wound Odor None    Exposed Structures None    Number of days: 2   Wound 03/13/20 Full Thickness Wound Heel Posterior Left with adherent scab (Active)      3/13/2020  5:00 PM   Site Assessment Red;Brown    Periwound Assessment Intact    Margins Defined edges;Attached edges    Drainage Amount None    Treatments Cleansed    Wound Cleansing Normal Saline Irrigation    Periwound Protectant Hydrocolloid    Dressing Cleansing/Solutions Not Applicable    Dressing Options  Hydrocolloid Thin;Hypafix Tape    Dressing Changed New    Dressing Status Intact    Dressing Change/Treatment Frequency Every 72 hrs, and As Needed    NEXT Dressing Change/Treatment Date 03/18/20    NEXT Weekly Photo (Inpatient Only) 03/20/20    Non-staged Wound Description Full thickness    Wound Length (cm) 0.6 cm 3/15/2020  5:00 PM   Wound Width (cm) 0.5 cm 3/15/2020  5:00 PM   Wound Depth (cm) 0 cm 3/15/2020  5:00 PM   Wound Surface Area (cm^2) 0.3 cm^2 3/15/2020  5:00 PM   Wound Volume (cm^3) 0 cm^3 3/15/2020  5:00 PM   Tunneling (cm) 0 cm 3/15/2020  5:00 PM   Undermining (cm) 0 cm 3/15/2020  5:00 PM   Shape round    Wound Odor None    Pulses 2+;DP    Exposed Structures None    Number of days: 2          Vascular:    DOYLE:   No results found.      Lab Values:    Lab Results   Component Value Date/Time    WBC 5.8 10/25/2019 01:00 PM    WBC 5.2 03/25/2011 10:16 AM    RBC 6.24 (H) 10/25/2019 01:00 PM    RBC 3.82 (L) 03/25/2011 10:16 AM    HEMOGLOBIN 18.2 (H) 10/25/2019 01:00 PM    HEMATOCRIT 53.4 (H) 10/25/2019 01:00 PM    CREACTPROT 2.22 (H) 09/05/2016 02:38 AM    SEDRATEWES 7 03/03/2016 01:59 PM    HBA1C 11.0 (A) 08/09/2019 10:47 AM          Culture: na  Culture Results show:  No results found for this or any previous visit (from the past 720 hour(s)).      INTERVENTIONS BY WOUND TEAM:  Cleaned wounds with NS, dried. Applied p[ieces of hydrocollioid to L heel and R 4th toe. An adhesive foam drsg to upper back,. Penis cleaned with warm moist water and soap on gauze.     Interdisciplinary consultation: Patient, Bedside RN    EVALUATION: pt has full thickness wounds with scabs to L heel, R knee and dorsal medial R 4th toe. There is a partial thickness wound to upper back (0.5 x 0.5 x <0.1 cm). He has small partial thickes wound to meatus of penis @ urethral opening from irritation from kingston being pulled. RN Gloria to replace Stat lock to decrease pulling and polysporin has beeen ordered for lubrication and  antimicrobial. Hydrocolloids to toe and heel for autolytic debridement.     Goals: Slow steady decrease in wound area and depth weekly.    NURSING PLAN OF CARE ORDERS (X):    Dressing changes: See Dressing Care orders: x  Skin care: See Skin Care orders: x  Rectal tube care: See Rectal Tube Care orders:   Other orders:    RSKIN:   CURRENTLY IN PLACE (X), APPLIED THIS VISIT (A), ORDERED (O):   Q shift Master:  x  Q shift pressure point assessments:  o  Pressure redistribution mattress  x          Low Airloss          Bariatric FLACO         Bariatric foam           Heel float boots     Heel Silicone dressing        Float Heels off Bed with Pillows  o            Barrier wipes         Barrier Cream         Barrier paste          Sacral silicone dressing  x       Silicone O2 tubing         Anchorfast         Cannula fixation Device (Tender )          Gray Foam Ear protectors           Trach with Optifoam split foam                 Waffle cushion        Waffle Overlay         Rectal tube or BMS    Purwick/Condom Cath          Antifungal tx      Interdry          Reposition q 2 hours   Remind pt     Up to chair        Ambulate      PT/OT        Dietician        Diabetes Education      PO  x   TF     TPN     NPO   # days   Other        WOUND TEAM PLAN OF CARE   Dressing changes by wound team:          Follow up 1-2 times weekly:               Follow up 3 times weekly:                NPWT change 3 times weekly:     Follow up as needed:  If wound worsens, Nsg to notify     Other (explain):     Anticipated discharge plans:  LTACH:        SNF/Rehab:                  Home Care:           Outpatient Wound Center:            Self Care:            Other: Unsure of needs @ this time

## 2020-03-16 NOTE — DISCHARGE PLANNING
Anticipated Discharge Disposition: SNF     Action: LSW notified pt's son about discharge plans.     Barriers to Discharge: none     Plan: LSW will continue to assess for any discharge needs.

## 2020-03-16 NOTE — DISCHARGE PLANNING
Agency/Facility Name: [facility]  Spoke To: Alix  Outcome: Needs PT/OT notes, what is the solid discharge plan?  Is patient going to be LTC?    LSW Estephania notified.

## 2020-03-16 NOTE — THERAPY
"Physical Therapy Evaluation completed.   Bed Mobility:  Supine to Sit: NT, up EOB upon arrival  Transfers: Sit to Stand: Moderate Assist  Gait: Level Of Assist: 5ft with Moderate Assist with Front-Wheel Walker       Plan of Care: Will benefit from Physical Therapy 4 times per week  Discharge Recommendations: Equipment: Will Continue to Assess for Equipment Needs. Recommend post-acute placement for continued physical therapy services prior to discharge home.       See \"Rehab Therapy-Acute\" Patient Summary Report for complete documentation.     Pt is a 68yo male who was brought to the ER by family due to inability to further care for patient. Pt seen for PT evaluation. Pt may be unreliable historian as he reports he was living alone and was independent in all mobility. Reports uses FWW in the home and WC in the community but d/t \"shoulder problems\" required assist to push WC. Reports was bathing and dressing on his own. Pt indicates his goal is to \"walk 500ft.\" Pt received at EOB, performed STS to FWW with mod A. Pt demo'd posterior lean, fwd trunk flexion, and ataxic-like large/marching steps with FWW to move ~5ft. Required mod A during trialed ambulation for safety, balance, and VCs for sequencing. Pt will benefit from continued acute PT to progress mobility. Pt will benefit from postacute placement for continued therapy prior to return home to maximize safety and functional independence.   "

## 2020-03-17 NOTE — DISCHARGE SUMMARY
Discharge Summary    CHIEF COMPLAINT ON ADMISSION  Chief Complaint   Patient presents with   • Failure to Thrive       Reason for Admission  other     CODE STATUS  Full Code    HPI & HOSPITAL COURSE  This is a 69 y.o. male with his ot prior CVA with residual weakness and recent travel admitted for inability to walk. Patient came in with failure to thrive and inability to walk for the last 8 years secondary to prior stroke.    The patient in the meantime was evaluated for influenza as he is never had his influenza shot in the past 10 years as he says he had it once and he got the flu from it.  He was also  evaluated for [place] at 19 as the patient at this point is slowly elevating and his temperature and was in an area where there is a high contamination risk.he was placed under isolation.    COVID -19 came back neg       Unable to walk  Assessment & Plan  Patient has been unable to walk for the last 8 years and the family has brought him to the ER for placement. They would like him to go to rehab so he will need PT/OT eval.    consulted, patient is accepted to SNF     Atrial fibrillation [427.31]- (present on admission)  Assessment & Plan  Xarelto and rate control with metoprolol     Diabetes mellitus with neurological manifestations, controlled (HCC)- (present on admission)  Assessment & Plan  Lantus insulin 40 units daily and metformin 1000 mg twice a day  He had been on Jardiance as an outpatient  -diabetic diet  glycohemoglobin is pending  -monitor for hypoglycemic episodes and adjust control if he should get low  He had been on Novolog sliding scale as an outpatient  High sliding scale has been initiated.      HTN (hypertension)- (present on admission)  Assessment & Plan  Continue home meds of:  Norvasc 10 mg po daily  Hydralazine 100 mg TID  Lisinopril 40 mg daily  Metoprolol  mg daily  Demadex 10 mg BID        CVA (cerebral vascular accident) (HCC)- (present on admission)  Assessment &  Plan  History of CVA and he is wheelchair bound.      BMI 35.0-35.9,adult- (present on admission)  Assessment & Plan  Body mass index is 31.89 kg/m².  Needs outpatient weight loss management program.      JANNIE treated with BiPAP- (present on admission)  Assessment & Plan  BIPAP at night if he has it.      Diabetic polyneuropathy (HCC)- (present on admission)  Assessment & Plan  Gabapentin     Dyslipidemia- (present on admission)  Assessment & Plan  Low fat low cholesterol diet  Statin  Fasting lipid panel    He is resumed on all his outpatient medications and PT OT recommended ongoing rehabilitation for which he has been accepted to now.will be discharged in stable medical conditions       Therefore, he is discharged in fair and stable condition to SNF.        FOLLOW UP ITEMS POST DISCHARGE  Follow-up with primary care physician 1 to 2 weeks    DISCHARGE DIAGNOSES  Active Problems:    Unable to walk POA: Unknown    HTN (hypertension) POA: Yes    Diabetes mellitus with neurological manifestations, controlled (HCC) POA: Yes    Atrial fibrillation [427.31] POA: Yes    Dyslipidemia POA: Yes    Diabetic polyneuropathy (HCC) POA: Yes    JANNIE treated with BiPAP POA: Yes    BMI 35.0-35.9,adult POA: Yes  Resolved Problems:    CVA (cerebral vascular accident) (HCC) POA: Yes      Overview: History of embolic strokes, on coumadin      FOLLOW UP  No future appointments.  No follow-up provider specified.    MEDICATIONS ON DISCHARGE     Medication List      CHANGE how you take these medications      Instructions   gabapentin 300 MG Caps  What changed:  when to take this  Commonly known as:  NEURONTIN   Take 1-2 Caps by mouth 3 times a day. 300mg in AM and noon 600mg in PM   Indications: Neuropathic Pain  Dose:  300-600 mg     Lantus SoloStar 100 UNIT/ML Sopn injection  What changed:  how much to take  Generic drug:  insulin glargine   Inject 40 Units as instructed every evening.  Dose:  40 Units        CONTINUE taking these  medications      Instructions   amLODIPine 10 MG Tabs  Commonly known as:  NORVASC   Take 1 Tab by mouth every day.  Dose:  10 mg     Blood Glucose Test Strips   Test strips order: Test strips for Relion meter. Sig: use 3 to 4 x a day and prn ssx high or low sugar #450 RF x 3     clopidogrel 75 MG Tabs  Commonly known as:  PLAVIX   TAKE ONE TABLET BY MOUTH ONE TIME DAILY     ezetimibe 10 MG Tabs  Commonly known as:  ZETIA   Take 1 Tab by mouth every day.  Dose:  10 mg     glucose blood strip  Commonly known as:  Contour Next Test   1 Strip by Other route as needed. USE TO TEST BLOOD SUGAR 4 To 5 TIMES DAILY  Dose:  1 Each     hydrALAZINE 100 MG tablet  Commonly known as:  APRESOLINE   TAKE ONE TABLET BY MOUTH EVERY EIGHT HOURS     * Insulin Pen Needle 31G X 5 MM Misc  Commonly known as:  Advocate Insulin Pen Needles   Use tid and prn.     * Pen Needles 32G X 5 MM Misc   1 Each by Does not apply route 4 times a day as needed.  Dose:  1 Each     Jardiance 10 MG Tabs  Generic drug:  Empagliflozin   TAKE ONE TABLET BY MOUTH ONE TIME DAILY     ketoconazole 2 % Crea  Commonly known as:  NIZORAL   Apply 0.25 g to affected area(s) every day.  Dose:  0.25 g     Lancets   Lancets order: Lancets for Dominic Contour Next meter. Sig: use tid and prn ssx high or low sugar. #300 RF x 3     lisinopril 40 MG tablet  Commonly known as:  PRINIVIL   TAKE ONE TABLET BY MOUTH ONE TIME DAILY     metFORMIN 500 MG Tabs  Commonly known as:  GLUCOPHAGE   TAKE TWO TABLETS BY MOUTH TWICE DAILY WITH MEALS     metoprolol 200 MG XL tablet  Commonly known as:  TOPROL-XL   Doctor's comments:  Needs to be seen for further refills. Thank you.  Take 1 Tab by mouth every day.  Dose:  200 mg     * Misc. Devices Misc   Relion glucometer     * Misc. Devices Misc   Use tid and prn [id number] # 100     * Misc. Devices Misc   Please admit Joel Ma to Acute Rehab due to need for Strengthening and Conditioning.   [phone]     NON SPECIFIED   Four wheeled  "walker with seat - [facility].     NON SPECIFIED   CPAP SUPPLIES FAX TO [facility].     NovoLOG FlexPen 100 UNIT/ML injection PEN  Generic drug:  NOVOLOG (insulin aspart)   Inject 2-10 Units as instructed 3 times a day before meals.  Dose:  2-10 Units     potassium chloride SA 20 MEQ Tbcr  Commonly known as:  Kdur   Take 1 Tab by mouth every day.  Dose:  20 mEq     rivaroxaban 20 MG Tabs tablet  Commonly known as:  Xarelto   Doctor's comments:  This Rx has been ordered by a pharmacist working under a collaborative practice agreement. Pt no longer on warfarin.  Take 1 Tab by mouth with dinner.  Dose:  20 mg     torsemide 10 MG tablet  Commonly known as:  DEMADEX   Take 1 Tab by mouth 2 times a day. Indications: Edema  Dose:  10 mg         * This list has 5 medication(s) that are the same as other medications prescribed for you. Read the directions carefully, and ask your doctor or other care provider to review them with you.            STOP taking these medications    Bactrim -160 MG tablet  Generic drug:  sulfamethoxazole-trimethoprim     clindamycin 300 MG Caps  Commonly known as:  CLEOCIN     furosemide 20 MG Tabs  Commonly known as:  LASIX     nitrofurantoin 100 MG Caps  Commonly known as:  MACROBID            Allergies  Allergies   Allergen Reactions   • Demerol      Makes me stop breathing.  Doesn't like because it makes him high   • Fentanyl      \"Sensitive\"   • Latex Itching   • Statins [Hmg-Coa-R Inhibitors] Myalgia     Rxn - ongoing         DIET  Orders Placed This Encounter   Procedures   • Diet Order Diabetic     Standing Status:   Standing     Number of Occurrences:   1     Order Specific Question:   Diet:     Answer:   Diabetic [3]       ACTIVITY  As tolerated and directed by skilled nursing.  Weight bearing as tolerated    LINES, DRAINS, AND WOUNDS  This is an automated list. Peripheral IVs will be removed prior to discharge.  Peripheral IV 03/13/20 20 G Left Forearm (Active) "   Site Assessment Clean;Dry;Intact 3/16/2020  8:32 AM   Dressing Type Transparent;Occlusive 3/16/2020  8:32 AM   Line Status No blood return;Flushed;Scrubbed the hub prior to access;Saline locked 3/16/2020  8:32 AM   Dressing Status Clean;Dry;Intact 3/16/2020  8:32 AM   Dressing Intervention N/A 3/16/2020  8:32 AM   Infiltration Grading ([facility]) 0 3/16/2020  8:32 AM   Phlebitis Scale ([facility] Only) 0 3/16/2020  8:32 AM     Urethral Catheter Non-latex 16 Fr. (Active)   Site Assessment Edema;Red 3/14/2020  8:00 PM   Collection Container Standard drainage bag 3/14/2020  8:00 PM   Urinary Catheter Care Tamper Evident Seal in Place;Drainage Tube Extended;Drainage Tubing Properly Secured;Drainage Bag Not Overfilled;Drainage Bag Below Bladder Level and Not on Floor;All Wound Pts have Wound Consult 3/14/2020  8:00 PM   Securement Method Securing device (Describe) 3/14/2020  8:00 PM   Output (mL) 1700 mL 3/16/2020  5:00 AM      Wound 03/13/20 Partial Thickness Wound Penis Pressure injury at tip of penis at the meatus from pre-existing kingston catheter (Active)   Wound Image   3/15/2020  5:00 PM   Site Assessment [place] 3/16/2020  8:32 AM   Periwound Assessment Red 3/16/2020  8:32 AM   Margins Attached edges 3/16/2020  8:32 AM   Closure Open to air 3/16/2020  8:32 AM   Drainage Amount None 3/16/2020  8:32 AM   Drainage Description ANALILIA 3/15/2020  9:35 PM   Treatments Cleansed 3/15/2020  9:35 PM   Wound Cleansing Soap and Water 3/16/2020  8:32 AM   Periwound Protectant Not Applicable 3/16/2020  8:32 AM   Dressing Cleansing/Solutions Antibiotic Ointment 3/16/2020  8:32 AM   Dressing Options Open to Air 3/16/2020  8:32 AM   Dressing Status Open to Air 3/16/2020  8:32 AM   Wound Length (cm) 0.2 cm 3/15/2020  5:00 PM   Wound Width (cm) 0.3 cm 3/15/2020  5:00 PM   Wound Depth (cm) 0 cm 3/15/2020  5:00 PM   Wound Surface Area (cm^2) 0.06 cm^2 3/15/2020  5:00 PM   Wound Volume (cm^3) 0 cm^3 3/15/2020  5:00 PM   Tunneling (cm) 0 cm  3/15/2020  5:00 PM   Undermining (cm) 0 cm 3/15/2020  5:00 PM   Shape half moon 3/15/2020  5:00 PM   Wound Odor None 3/15/2020  5:00 PM   Exposed Structures None 3/15/2020  5:00 PM       Wound 03/14/20 Pressure Injury Sacrum Mid Redness on sacrum  (Active)   Site Assessment Red;Excoriated 3/16/2020  8:32 AM   Periwound Assessment Intact 3/16/2020  8:32 AM   Drainage Amount None 3/16/2020  8:32 AM   Dressing Status Open to Air 3/16/2020  8:32 AM       Wound 03/13/20 Full Thickness Wound Heel Posterior Left with adherent scab (Active)   Wound Image   3/15/2020  5:00 PM   Site Assessment Red;Brown 3/16/2020  8:32 AM   Periwound Assessment Intact 3/16/2020  8:32 AM   Margins Defined edges;Attached edges 3/16/2020  8:32 AM   Drainage Amount None 3/16/2020  8:32 AM   Treatments Cleansed 3/15/2020  5:00 PM   Wound Cleansing Normal Saline Irrigation 3/15/2020  5:00 PM   Periwound Protectant Hydrocolloid 3/15/2020  5:00 PM   Dressing Cleansing/Solutions Not Applicable 3/15/2020  5:00 PM   Dressing Options Hydrocolloid Thin;Hypafix Tape 3/15/2020  5:00 PM   Dressing Changed New 3/15/2020  5:00 PM   Dressing Status Intact 3/15/2020  5:00 PM   Dressing Change/Treatment Frequency Every 72 hrs, and As Needed 3/15/2020  5:00 PM   NEXT Dressing Change/Treatment Date 03/18/20 3/15/2020  5:00 PM   NEXT Weekly Photo (Inpatient Only) 03/20/20 3/15/2020  5:00 PM   Non-staged Wound Description Full thickness 3/15/2020  5:00 PM   Wound Length (cm) 0.6 cm 3/15/2020  5:00 PM   Wound Width (cm) 0.5 cm 3/15/2020  5:00 PM   Wound Depth (cm) 0 cm 3/15/2020  5:00 PM   Wound Surface Area (cm^2) 0.3 cm^2 3/15/2020  5:00 PM   Wound Volume (cm^3) 0 cm^3 3/15/2020  5:00 PM   Tunneling (cm) 0 cm 3/15/2020  5:00 PM   Undermining (cm) 0 cm 3/15/2020  5:00 PM   Shape round 3/15/2020  5:00 PM   Wound Odor None 3/15/2020  5:00 PM   Pulses 2+;DP 3/15/2020  5:00 PM   Exposed Structures None 3/15/2020  5:00 PM   WOUND NURSE ONLY - Time Spent with Patient  (mins) 45 3/15/2020  5:00 PM       Peripheral IV 03/13/20 20 G Left Forearm (Active)   Site Assessment Clean;Dry;Intact 3/16/2020  8:32 AM   Dressing Type Transparent;Occlusive 3/16/2020  8:32 AM   Line Status No blood return;Flushed;Scrubbed the hub prior to access;Saline locked 3/16/2020  8:32 AM   Dressing Status Clean;Dry;Intact 3/16/2020  8:32 AM   Dressing Intervention N/A 3/16/2020  8:32 AM   Infil[place] Grading [place] 0 3/16/2020  8:32 AM   Phlebitis Scale ([facility] Only) 0 3/16/2020  8:32 AM           Urethral Catheter Non-latex 16 Fr. (Active)   Site Assessment Edema;Red 3/14/2020  8:00 PM   Collection Container Standard drainage bag 3/14/2020  8:00 PM   Urinary Catheter Care Tamper Evident Seal in Place;Drainage Tube Extended;Drainage Tubing Properly Secured;Drainage Bag Not Overfilled;Drainage Bag Below Bladder Level and Not on Floor;All Wound Pts have Wound Consult 3/14/2020  8:00 PM   Securement Method Securing device (Describe) 3/14/2020  8:00 PM   Output (mL) 1700 mL 3/16/2020  5:00 AM        MENTAL STATUS ON TRANSFER  Level of Consciousness: Alert  Orientation : Oriented x 4  Speech: Speech Clear    CONSULTATIONS  NONE    PROCEDURES  No orders to display         LABORATORY  Lab Results   Component Value Date    SODIUM 136 03/17/2020    POTASSIUM 3.6 03/17/2020    CHLORIDE 103 03/17/2020    CO2 26 03/17/2020    GLUCOSE 90 03/17/2020    BUN 31 (H) 03/17/2020    CREATININE 0.93 03/17/2020    CREATININE 1.21 02/17/2011    GLOMRATE >59 02/17/2011        Lab Results   Component Value Date    WBC 5.8 03/17/2020    WBC 5.2 03/25/2011    HEMOGLOBIN 14.6 03/17/2020    HEMATOCRIT 48.1 03/17/2020    PLATELETCT 168 03/17/2020        Total time of the discharge process exceeds 45 minutes.

## 2020-03-17 NOTE — PROGRESS NOTES
Pt is now A&Ox4 again this morning, stating that he was in a dream like state earlier.  Pt refusing urine culture to be collected.

## 2020-03-17 NOTE — DISCHARGE INSTRUCTIONS
Discharge Instructions    Discharged to other by medical transportation with self. Discharged via wheelchair, hospital escort: Yes.  Special equipment needed: Not Applicable    Be sure to schedule a follow-up appointment with your primary care doctor or any specialists as instructed.     Discharge Plan:   Diet Plan: Discussed  Activity Level: Discussed  Confirmed Follow up Appointment: Patient to Call and Schedule Appointment  Confirmed Symptoms Management: Discussed  Medication Reconciliation Updated: Yes  Influenza Vaccine Indication: Patient Refuses    I understand that a diet low in cholesterol, fat, and sodium is recommended for good health. Unless I have been given specific instructions below for another diet, I accept this instruction as my diet prescription.   Other diet: diabetic    Special Instructions: None    · Is patient discharged on Warfarin / Coumadin?   No     Depression / Suicide Risk    As you are discharged from this [facility] facility, it is important to learn how to keep safe from harming yourself.    Recognize the warning signs:  · Abrupt changes in personality, positive or negative- including increase in energy   · Giving away possessions  · Change in eating patterns- significant weight changes-  positive or negative  · Change in sleeping patterns- unable to sleep or sleeping all the time   · Unwillingness or inability to communicate  · Depression  · Unusual sadness, discouragement and loneliness  · Talk of wanting to die  · Neglect of personal appearance   · Rebelliousness- reckless behavior  · Withdrawal from people/activities they love  · Confusion- inability to concentrate     If you or a loved one observes any of these behaviors or has concerns about self-harm, here's what you can do:  · Talk about it- your feelings and reasons for harming yourself  · Remove any means that you might use to hurt yourself (examples: pills, rope, extension cords, firearm)  · Get professional help from  the community (Mental Health, Substance Abuse, psychological counseling)  · Do not be alone:Call your Safe Contact- someone whom you trust who will be there for you.  · Call your local CRISIS HOTLINE 229-2326 or 125-319-6442  · Call your local Children's Mobile Crisis Response Team [place] (585) 114-3388 or www.[employer] [employer] [facility]  · Call the toll free National Suicide Prevention Hotlines   · National Suicide Prevention Lifeline 886-815-RYUE (9307)  · National Hope Line Network 800-SUICIDE (837-3209)

## 2020-03-17 NOTE — PROGRESS NOTES
Pt with generalized weakness. Gonzalez care done and antibiotics applied to meatus as ordered. Gonzalez to dd with qs output. Pt is forgetful, reorients, repetitive at times. Anxious to transfer to SNF in am.

## 2020-03-17 NOTE — DISCHARGE PLANNING
Anticipated Discharge Disposition: SNF    Action: LSW called son to get verbal for the cobra.     Barriers to Discharge: none     Plan: LSW will continue to assess for any discharge needs.

## 2020-03-17 NOTE — PROGRESS NOTES
Pt woke up in the middle of the night confused, wondering where he is at. Patient oriented to self and knows that the year is 2020. Pt continuously needing to be reoriented. Vitals stable. Hospitalist paged and orders put in for CBC, CMP, and urinalysis.

## 2020-03-17 NOTE — PROGRESS NOTES
Patient was admitted on 3/13/20 and has for some reason, appeared on the heart failure report today.    Heart failure is on his non hospital problems list and patient's last echocardiogram says normal transthoracic echocardiogram. A normal echocardiogram does not indicate HF as a diagnosis. Also, providers are not diagnosing it in their notes.    Patient is discharging to SNF. Heart failure measures are not indicated.    Many thanks, Meena, Cardiovascular Nurse Navigator, RN, [facility] [phone], & [employer] M-F (excluding holidays).

## 2020-03-30 NOTE — TELEPHONE ENCOUNTER
Called and spoke to patient to schedule ac appt patient advised he was at [facility] and was on quarantine. Sent in-basket to Sammie

## 2020-04-01 NOTE — OP THERAPY DISCHARGE SUMMARY
Outpatient Physical Therapy  DISCHARGE SUMMARY NOTE      [facility] [address].  [address]  [place] [address]  Phone:  [phone]  Fax:  [phone]    Date of Visit: 04/01/2020    Patient: Joel Ma  YOB: 1950  MRN: [id number]     Referring Provider: No referring provider defined for this encounter.   Referring Diagnosis No admission diagnoses are documented for this encounter.     Physical Therapy Occurrence Codes    Date physical therapy care plan established or reviewed:  9/18/19   Date physical therapy treatment started:  9/18/19          Functional Assessment Used        Your patient is being discharged from Physical Therapy with the following comments:   · other    Comments:  Pt was seen for 5 PT sessions treating his general weakness and debility.  Due to poor overall tolerance and safety, his outpatient care was discontinued.  Has not been in clinic since 10/16/19, but formal d/c was overlooked at that time.  Will officially d/c episode due to lapse in care.        Yessenia Gamez, PT, DPT    Date: 4/1/2020

## 2020-04-07 NOTE — PROGRESS NOTES
Called patient to see how he is doing and see if I can do anything for him. He said he is in a long term care facility at the moment and said he knows he is responsible for the cost. He did not have any questions at this time but took my number down and will call me if he dose have some.

## 2020-05-01 NOTE — ED TRIAGE NOTES
Pt bib ems from [facility]. Pt here for bleeding at the urinary catheter site. Per staff pt was ams w chest pain and sob and fever. Per ems pt is alert and oriented x 4 c/o groin pain in relation to catheter. Pt wears 2l n/c occasionally.On 2 l at this time.  Denies sob or chest pain at this time. Pt has had catheters since January, changed every month on the 10th. bgl ~130.

## 2020-05-01 NOTE — ED PROVIDER NOTES
ED Provider Note    Scribed for Amarjit Seals M.D. by Campbell Zuleta. 5/1/2020  12:08 PM    Primary care provider: Catrachito Sterling D.O.  Means of arrival: Ambulance  History obtained from: Patient  History limited by: None    CHIEF COMPLAINT  Chief Complaint   Patient presents with   • Bleeding/Bruising     Bleeding from groin (cath replaced this morning per ems)       HPI  Joel Ma is a 69 y.o. male who presents to the Emergency Department for evaluation of bleeding from his urinary catheter site onset 2 days. He says he gets a very strong urge to push to urinate, and blood comes out of his catheter. He admits to additional symptoms of pain in his penis, chest pain after he choked on an a piece of food this morning (alleviated after 2 seconds), and Chills (2 days ago, for about 1 hour), but denies shortness of breath, fever or cough. EMS reported the patient's heart rate millie to 115-120 while trying to change catheter this morning. He has had a urinary catheter since January, 2020, and it is changed every 10th of the month. He states he is on Xarelto and Plavix.     PPE Note: I personally donned full PPE for all patient encounters during this visit, including being clean-shaven with an N95 respirator mask, gloves, and eye protection.     Scribe remained outside the patient's room and did not have any contact with the patient for the duration of patient encounter.      REVIEW OF SYSTEMS  Pertinent positives include bleeding from his urinary catheter site, pain in his penis, chest pain after he choked on an a piece of food this morning (alleviated after 2 seconds), and Chills (2 days ago, for about 1 hour). Pertinent negatives include no shortness of breath, fever or cough.  All other systems reviewed and negative.    PAST MEDICAL HISTORY   has a past medical history of Anesthesia, Arrhythmia, arthritis (6/17/2011), Arthritis, Backpain, CAD (coronary artery disease), Cataract, Dental disorder,  Diabetes, High cholesterol, Hypertension, MRSA (methicillin resistant Staphylococcus aureus), Pain (13), Pneumonia (), Renal disorder, Sleep apnea, Stroke (), Thyroid mass (2009), and Ventricular ectopy (2011).    SURGICAL HISTORY   has a past surgical history that includes knee scope,single menisectomy; lumbar fusion posterior (); irrigation & debridement ortho (3/13/2012); irrigation & debridement ortho (2012); irrigation & debridement ortho (2012); cataract phaco with iol (2013); irrigation & debridement ortho (Left, 10/29/2012); cervical disk and fusion anterior (2016); other; toe amputation (2011); elbow arthrotomy (); foot surgery; foot surgery; knee arthroplasty total (2012); knee revision total (3/13/2012); tendon repair (3/13/2012); knee revision total (2012); incision and drainage orthopedic (Right, 10/29/2012); other neurological surg; cervical fusion posterior (2018); cervical laminectomy posterior (2018); lumbar laminectomy diskectomy (2018); and zzz cardiac cath.    SOCIAL HISTORY  Social History     Tobacco Use   • Smoking status: Former Smoker     Packs/day: 4.00     Years: 0.50     Pack years: 2.00     Types: Cigarettes     Last attempt to quit: 1974     Years since quittin.3   • Smokeless tobacco: Never Used   Substance Use Topics   • Alcohol use: No   • Drug use: No      Social History     Substance and Sexual Activity   Drug Use No       FAMILY HISTORY  Family History   Problem Relation Age of Onset   • Diabetes Mother    • Cancer Father         lung cancer   • Heart Disease Father         triple bypass   • Diabetes Other    • Hypertension Other    • Cancer Other        CURRENT MEDICATIONS  Home Medications     Reviewed by Judah Hoff (Pharmacy Tech) on 20 at 1201  Med List Status: Complete   Medication Last Dose Status   ALPRAZolam (XANAX) 0.25 MG Tab 2020 Active   amLODIPine (NORVASC) 10  "MG Tab 4/29/2020 Active   clindamycin (CLEOCIN) 300 MG Cap 4/30/2020 Active   clopidogrel (PLAVIX) 75 MG Tab 4/29/2020 Active   Empagliflozin (JARDIANCE) 10 MG Tab 4/29/2020 Active   ezetimibe (ZETIA) 10 MG Tab 4/29/2020 Active   finasteride (PROSCAR) 5 MG Tab 4/29/2020 Active   furosemide (LASIX) 20 MG Tab 4/30/2020 Active   gabapentin (NEURONTIN) 300 MG Cap 4/30/2020 Active   hydrALAZINE (APRESOLINE) 100 MG tablet 5/1/2020 Active   insulin aspart (NOVOLOG) 100 UNIT/ML Solution 5/1/2020 Active   insulin detemir (LEVEMIR) 100 UNIT/ML Solution 4/30/2020 Active   ketoconazole (NIZORAL) 2 % Cream 4/30/2020 Active   LACTOBACILLUS PO 4/30/2020 Active   latanoprost (XALATAN) 0.005 % Solution 4/30/2020 Active   lisinopril (PRINIVIL) 40 MG tablet 4/29/2020 Active   melatonin 3 MG Tab 4/30/2020 Active   metFORMIN (GLUCOPHAGE) 500 MG Tab 4/30/2020 Active   methenamine hip (HIPPREX) 1 GM Tab 4/30/2020 Active   metoprolol (TOPROL-XL) 200 MG XL tablet 4/29/2020 Active   nitrofurantoin (MACROBID) 100 MG Cap 4/29/2020 Active   potassium chloride SA (KDUR) 20 MEQ Tab CR 4/30/2020 Active   rivaroxaban (XARELTO) 20 MG Tab tablet 4/30/2020 Active   tamsulosin (FLOMAX) 0.4 MG capsule 4/29/2020 Active   tizanidine (ZANAFLEX) 2 MG tablet 4/30/2020 Active                ALLERGIES  Allergies   Allergen Reactions   • Demerol      Makes me stop breathing.  Doesn't like because it makes him high   • Fentanyl      \"Sensitive\"   • Latex Itching   • Statins [Hmg-Coa-R Inhibitors] Myalgia     Rxn - ongoing         PHYSICAL EXAM  VITAL SIGNS: /77   Pulse 100   Temp 37.7 °C (99.9 °F) (Oral)   Resp 20   Ht 1.956 m (6' 5\")   Wt 118.8 kg (262 lb)   SpO2 96%   BMI 31.07 kg/m²     Constitutional: Morbidly obese. Well developed, mild distress, Non-toxic appearance.   HENT: Normocephalic, Atraumatic, Bilateral external ears normal, Oropharynx moist, No oral exudates.   Eyes: PERRLA, EOMI, Conjunctiva normal, No discharge.   Neck: No " tenderness, Supple, No stridor.   Lymphatic: No lymphadenopathy noted.   Cardiovascular: Normal heart rate, Normal rhythm.   Thorax & Lungs: Clear to auscultation bilaterally, No respiratory distress, No wheezing, No crackles.   Abdomen: Soft, No tenderness, No masses, No pulsatile masses.   Skin: Warm, Dry, No erythema, No rash.   Extremities:, Diffuse lower extremity edema. No cyanosis.   Musculoskeletal: No tenderness to palpation or major deformities noted.  Intact distal pulses  :Dark blood coming from the urethral meatus, with catheter in place. There is slight ulceration on the left lateral urethral meatus, no active bleeding seen.  Neurologic: Awake, alert. Moves all extremities spontaneously.  Psychiatric: Affect normal, Judgment normal, Mood normal.     LABS  Results for orders placed or performed during the hospital encounter of 05/01/20   CBC with Differential   Result Value Ref Range    WBC 8.5 4.8 - 10.8 K/uL    RBC 4.86 4.70 - 6.10 M/uL    Hemoglobin 12.5 (L) 14.0 - 18.0 g/dL    Hematocrit 41.0 (L) 42.0 - 52.0 %    MCV 84.4 81.4 - 97.8 fL    MCH 25.7 (L) 27.0 - 33.0 pg    MCHC 30.5 (L) 33.7 - 35.3 g/dL    RDW 53.4 (H) 35.9 - 50.0 fL    Platelet Count 134 (L) 164 - 446 K/uL    MPV 11.7 9.0 - 12.9 fL    Neutrophils-Polys 82.90 (H) 44.00 - 72.00 %    Lymphocytes 4.90 (L) 22.00 - 41.00 %    Monocytes 11.50 0.00 - 13.40 %    Eosinophils 0.10 0.00 - 6.90 %    Basophils 0.10 0.00 - 1.80 %    Immature Granulocytes 0.50 0.00 - 0.90 %    Nucleated RBC 0.00 /100 WBC    Neutrophils (Absolute) 7.04 1.82 - 7.42 K/uL    Lymphs (Absolute) 0.42 (L) 1.00 - 4.80 K/uL    Monos (Absolute) 0.98 (H) 0.00 - 0.85 K/uL    Eos (Absolute) 0.01 0.00 - 0.51 K/uL    Baso (Absolute) 0.01 0.00 - 0.12 K/uL    Immature Granulocytes (abs) 0.04 0.00 - 0.11 K/uL    NRBC (Absolute) 0.00 K/uL   Comp Metabolic Panel   Result Value Ref Range    Sodium 139 135 - 145 mmol/L    Potassium 4.9 3.6 - 5.5 mmol/L    Chloride 103 96 - 112 mmol/L     Co2 20 20 - 33 mmol/L    Anion Gap 16.0 7.0 - 16.0    Glucose 192 (H) 65 - 99 mg/dL    Bun 39 (H) 8 - 22 mg/dL    Creatinine 1.08 0.50 - 1.40 mg/dL    Calcium 8.6 8.5 - 10.5 mg/dL    AST(SGOT) 24 12 - 45 U/L    ALT(SGPT) 18 2 - 50 U/L    Alkaline Phosphatase 133 (H) 30 - 99 U/L    Total Bilirubin 0.7 0.1 - 1.5 mg/dL    Albumin 2.9 (L) 3.2 - 4.9 g/dL    Total Protein 5.9 (L) 6.0 - 8.2 g/dL    Globulin 3.0 1.9 - 3.5 g/dL    A-G Ratio 1.0 g/dL   Procalcitonin   Result Value Ref Range    Procalcitonin 2.50 (H) <0.25 ng/mL   COVID/SARS CoV-2   Result Value Ref Range    COVID Order Status PREAD-Recvd    URINALYSIS,CULTURE IF INDICATED   Result Value Ref Range    Color Yellow     Character Clear     Specific Gravity 1.016 <1.035    Ph 5.0 5.0 - 8.0    Glucose >=1000 (A) Negative mg/dL    Ketones 15 (A) Negative mg/dL    Protein 30 (A) Negative mg/dL    Bilirubin Negative Negative    Urobilinogen, Urine 0.2 Negative    Nitrite Negative Negative    Leukocyte Esterase Trace (A) Negative    Occult Blood Large (A) Negative    Micro Urine Req Microscopic    SARS-CoV-2, PCR (In-House)   Result Value Ref Range    SARS-CoV-2 Source NP Swab     SARS-CoV-2 by PCR NotDetected NotDetected   URINE MICROSCOPIC (W/UA)   Result Value Ref Range    WBC 0-2 (A) /hpf    RBC >150 (A) /hpf    Bacteria Negative None /hpf    Epithelial Cells Negative /hpf    Hyaline Cast 0-2 /lpf   Blood Culture,Hold   Result Value Ref Range    Blood Culture Hold Collected    ESTIMATED GFR   Result Value Ref Range    GFR If African American >60 >60 mL/min/1.73 m 2    GFR If Non African American >60 >60 mL/min/1.73 m 2   EKG   Result Value Ref Range    Report       [facility] Emergency Dept.    Test Date:  2020  Pt Name:    TREY THOMAS                 Department: ER  MRN:        [id number]                      Room:       [facility]  Gender:     Male                         Technician: [id number]  :        [id number]                   Requested  By:PATRICIA CORRALES  Order #:    [id number]                    Reading MD: PATRICIA CORRALES MD    Measurements  Intervals                                Axis  Rate:       87                           P:  AZ:                                      QRS:        -64  QRSD:       88                           T:          129  QT:         368  QTc:        443    Interpretive Statements  ATRIAL FIBRILLATION  VENTRICULAR PREMATURE COMPLEX  PROBABLE INFERIOR INFARCT, AGE INDETERMINATE  ANTERIOR INFARCT, OLD  BASELINE WANDER IN LEAD(S) I,V2  Compared to ECG 03/17/2020 08:30:29  Ventricular premature complex(es) now present  T-wave abnormality no longer present  Myocardial infarct finding still pres ent  Electronically Signed On 5-1-2020 14:28:47 PDT by PATRICIA CORRALES MD       *Note: Due to a large number of results and/or encounters for the requested time period, some results have not been displayed. A complete set of results can be found in Results Review.         RADIOLOGY  DX-CHEST-PORTABLE (1 VIEW)   Final Result      1.  Prominent cardiac silhouette and increased interstitial markings. Findings are likely related to low lung volumes and portable AP technique        The radiologist's interpretation of all radiological studies have been reviewed by me.      COURSE & MEDICAL DECISION MAKING  Pertinent Labs & Imaging studies reviewed. (See chart for details)    I reviewed the patient's medical records which showed the patient was admitted in June, 2019 for failure to thrive. Has history of previous stroke. He had a negative COVID test on 4/13/20.     12:08 PM - Patient seen and examined at bedside. I informed the patient of my plan to run diagnostic studies to evaluate their symptoms including labs and imaging. Patient verbalizes understanding and support with my plan of care.  Ordered DX-Chest, EKG, SARS-CoV-2, PCR, CBC with diff, CMP, Procalcitonin, COVID/SARS CoV-2, UA, Culture if indicated to evaluate his symptoms.  The differential diagnoses include but are not limited to: Pneumonia, COVID, bleeding urethra.      12:33 PM - Paged Urology.     12:40 PM - I discussed the patient's case and the above findings with Dr. Slater (Urology) who recommended I insert an 18 Bulgarian catheter and halt anticoagulation. Dr. Slater will do a Tele-health consult with the patient next week.      3:20 PM - I reevaluated the patient at bedside. I discussed the patient's diagnostic study results as noted above. I discussed plan for discharge and follow up as outlined below. He will follow up with Dr. Slater via tele-health. The patient verbalizes they feel comfortable going home. The patient is stable for discharge at this time and will return for any new or worsening symptoms. Patient verbalizes understanding and support with my plan for discharge. Patient will be transferred back to [facility].     Decision Making:  Patient is coming in secondary to bleeding around the Gonzalez catheter, the patient is on blood thinners, discussed the case with Dr. Arellano, he recommends changing out the Gonzalez to a larger Gonzalez, stopping the patient's anticoagulation.  The patient was having some shortness of breath and questionable cough and fever, COVID-19 was negative, chest x-ray is negative.  Patient has a baseline altered mental status, was slightly more sleepy but but was awake.  I believe this is likely his baseline.  The patient will be discharged home, instructed not to take his anticoagulation and to follow-up with Dr. Arellano as an outpatient.    The patient will return for new or worsening symptoms and is stable at the time of discharge.    DISPOSITION:  Patient will be discharged home in stable condition.    FOLLOW UP:  [facility], Emergency Dept  [address] [facility] [address] [phone]    If symptoms worsen    Johnnie Slater M.D.  [address]  [facility] [address]  [phone]          FINAL  IMPRESSION  1. Urethral bleeding    2. Calcification of indwelling Gonzalez catheter, initial encounter ([facility])    3. Cough          I, Campbell Zuleta (Scribe), am scribing for, and in the presence of, Amarjit Seals M.D..    Electronically signed by: Campbell Zuleta (Scribe), 5/1/2020    IAmarjit M.D. personally performed the services described in this documentation, as scribed by Campbell Zuleta in my presence, and it is both accurate and complete.    The note accurately reflects work and decisions made by me.  Amarjit Seals M.D.  5/1/2020  5:56 PM

## 2020-05-01 NOTE — DISCHARGE PLANNING
Received Transport Form @ 1510  [facility] dedicated [employer] [facility] Van booked for the afternoon.     Spoke to Cynthia @ [facility], she arranged transport for today @ 1700 via wheelchair van.     EMEKA Casas notified.

## 2020-05-01 NOTE — ED NOTES
Pt has bilateral legs wrapped in ace bandage, pt states he gets the dressings changed twice a week.

## 2020-05-01 NOTE — DISCHARGE INSTRUCTIONS
Stop your blood thinners, Xarelto and Plavix for the next 5 days.  Follow-up with Dr. Arellano's, call his office, they will do a teleconference with you next week.  Any further bleeding or worsening symptoms please call Dr. Arellano's office.

## 2020-05-01 NOTE — ED NOTES
Spoke to staff at [facility], they stated they noticed blood around catheter site, and replaced it this am at ~10am. Gonzalez is a new bag and catheter per staff. Staff also stated pts HR went up and wants him evaluated for HR and for bleeding at cath site.

## 2020-05-01 NOTE — DISCHARGE PLANNING
MSW received call from bedside RN. Pt is confirmed from [facility] and [facility]. Pt needs transport back to [facility]. MSW faxed transport form to [facility]. [facility] set transport up for 1700. MSW updated bedside RN. Bedside RN to update pt on transport time as he is on isolation precautions. COBRA and transfer packet on chart.

## 2020-05-02 NOTE — ED NOTES
Pt transferred to wheelchair with assistance from 6 people. Pt adjusted with multiple blankets and pillows. Transport able to take patient via wheelchair, pt not bleeding, paperwork with pt.

## 2020-05-03 PROBLEM — J96.21 ACUTE ON CHRONIC RESPIRATORY FAILURE WITH HYPOXEMIA (HCC): Status: ACTIVE | Noted: 2020-01-01

## 2020-05-03 PROBLEM — R93.7 ABNORMAL CT OF SPINE: Status: ACTIVE | Noted: 2020-01-01

## 2020-05-03 PROBLEM — J18.9 PNEUMONIA DUE TO INFECTIOUS ORGANISM: Status: ACTIVE | Noted: 2020-01-01

## 2020-05-03 PROBLEM — Z66 DNR (DO NOT RESUSCITATE): Status: ACTIVE | Noted: 2020-01-01

## 2020-05-03 PROBLEM — A41.9 SEPSIS (HCC): Status: ACTIVE | Noted: 2020-01-01

## 2020-05-03 PROBLEM — I50.33 ACUTE ON CHRONIC DIASTOLIC (CONGESTIVE) HEART FAILURE (HCC): Status: ACTIVE | Noted: 2020-01-01

## 2020-05-03 NOTE — ED NOTES
Med rec updated and complete  Allergies reviewed  Recevied MAR from [facility] ([phone])  Pt is on XARELTO 20MG and PLAVIX 75MG

## 2020-05-03 NOTE — ASSESSMENT & PLAN NOTE
This is Sepsis Present on admission  SIRS criteria identified on my evaluation include: Tachycardia, with heart rate greater than 90 BPM and Tachypnea, with respirations greater than 20 per minute  Source is pulmonary with MRSA positive blood cultures now, query source is osteomyelitis versus bone secondarily infected from MRSA pneumonia, ID evaluated  Repeat positive blood cultures for MRSA now x6 total suggest possibility of deep-seated infection and hardware or endovascularly, ID consult ongoing, repeating blood cultures until negative  Sepsis protocol initiated  IV antibiotics as appropriate for source of sepsis  Patient with severe sepsis with significant organ involvement including acute respiratory failure

## 2020-05-03 NOTE — PROGRESS NOTES
I called Chris Ma, the patient's son at [phone], and updated him on this gentleman's condition and my treatment plan.    Alhaji Bautista MD  Pulmonary and Critical Care Medicine

## 2020-05-03 NOTE — ED NOTES
PT unable to remain still in bed complaints of back pain, PT will follow directions but continually pulls O2 mask off, pulls at lines and tape.

## 2020-05-03 NOTE — ED NOTES
Waiting for MAR to be faxed over from [facility] ([phone])

## 2020-05-03 NOTE — ED NOTES
PT AA&O x 4, normal on 2L O2, today PT reports falling from bed and unable to get up due to lack of strength, PT denies any LOC. PT found by staff next to bed, O2 sat in the 80's, PT placed on 10L O2 per NC and remained at 89%. EMS called, PT placed on 15L O2 per non-re breather mask and maintained O2 of 96%.

## 2020-05-03 NOTE — PROGRESS NOTES
"Pharmacy Kinetics 69 y.o. male on vancomycin day # 1 5/3/2020    Currently on Vancomycin 2900 mg IV (load) followed by vancomycin 1700 mg IV q12 hrs  Provider specified end date: TBD, 72 hr empiric stop placed per protocol.    Indication for Treatment: Pneumonia     Pertinent history per medical record: Admitted on 5/3/2020 for respiratory failure, CHF, pneumonia.  Patient was recently seen in ER  for bleeding from urinary catheter.  Patient was tested for COVID on  and was negative.  Today patient was found down next to bed and brought to ER for evaluation.  Patient found to have afib w/ RVR, CHF, acute respiratory failure, and PNA.  Patient has Hx of MRSA per chart.        Other antibiotics: Zosyn 4.5g IV q8 hrs    Allergies: Demerol; Fentanyl; Latex; and Statins [hmg-coa-r inhibitors]     List concerns for renal function: age, zosyn, DM, CHF, CKD III, BUN/SCr ratio > 20:1, BMI 31.5.    Pertinent cultures to date:   5/3/20 Blood cultures: In process  5/3/20 Urine culture: In process    5/3/20 MRSA nares swab if pneumonia is a concern (ordered/positive/negative/n-a): In process    Recent Labs     20  1130 20  0507   WBC 8.5 11.1*   NEUTSPOLYS 82.90* 87.20*     Recent Labs     20  1130 20  0507   BUN 39* 31*   CREATININE 1.08 1.02   ALBUMIN 2.9* 3.4     No results for input(s): VANCOTROUGH, VANCOPEAK, VANCORANDOM in the last 72 hours.    Intake/Output Summary (Last 24 hours) at 5/3/2020 1014  Last data filed at 5/3/2020 0648  Gross per 24 hour   Intake 100 ml   Output --   Net 100 ml      /84   Pulse (!) 125   Temp 36.8 °C (98.3 °F) (Temporal)   Resp 15   Ht 1.905 m (6' 3\")   Wt 114.3 kg (252 lb)   SpO2 88%  Temp (24hrs), Av.8 °C (98.3 °F), Min:36.8 °C (98.3 °F), Max:36.8 °C (98.3 °F)      A/P   1. Vancomycin dose change: New start, no changes  2. Next vancomycin level: trough at 1130 on 2020  3. Goal trough: 16-20 mcg/mL  4. Comments: Vancomycin started per protocol " dosing.  Patient has multiple risk factors for vancomycin accumulation.  Trough scheduled for 5/4 prior to 3rd total dose.  Pharmacy will continue to monitor dosing and follow culture results per protocol.     Mohit ZamoraD

## 2020-05-03 NOTE — ASSESSMENT & PLAN NOTE
DNR/DNI status per POL on the chart  Reaffirmed with son after extensive phone call 5/6, son stated he was the POA   Palliative care RN confirmed POLST speaking to physician who completed the form at skilled nursing facility  Obtained power of  but apparently is only for physical circumstances not medical, will review decisions with family as a group

## 2020-05-03 NOTE — ED NOTES
Pt resting on gurney.  Visible chest rise and fall noted.  Pt remains on HFNC.  Oxygen saturation 92%.

## 2020-05-03 NOTE — PROGRESS NOTES
Dr. Espana is aware the patient does not have a cortrak at this time. Per MD no cortrak to be placed today.

## 2020-05-03 NOTE — ED NOTES
Report received from Reg RN, assumed care of patient.  White board updated.  Pt resting comfortably on rAquilla.  Bed in lowest position.

## 2020-05-03 NOTE — ASSESSMENT & PLAN NOTE
Chronic Gonzalez catheter in place  Recently seen in ER approximately 5/1  Catheter changed out per RN while in recent ER visit, no change until approximately 30 days or sooner if infection develops  Urine culture from admit negative so far  Monitor for UTI

## 2020-05-03 NOTE — ASSESSMENT & PLAN NOTE
History of 2 L of domiciliary oxygen  Remains very hypoxic on high flow nasal cannula with borderline saturations, support wean to 50 L @ 60%  CTA negative for acute pulmonary embolism, bilateral opacities noted  Differential diagnosis of etiology includes pneumonia and CHF, EF worse with 40% down from 65, probably combination  Blood cultures positive for MRSA, sepsis component contributing, may develop ARDS, IV vancomycin continuing  Confirmed CODE STATUS, reviewed POLST with RN and palliative care RN, palliative care RN confirmed POLST by calling skilled nursing facility and speaking with physician involved.  Continue to monitor for need for AVAPS which he might not accept an LOC is acceptable now  Tinea forced diuresis as tolerated with IV Lasix, BMP still significant elevated  Aggressive pulmonary toilet  Mobilize when able, limited since he is unable to ambulate and has severe low back pain  Incentive spirometry if he can coordinate, not doing well with this or with encouragement to cough regularly    Worsening hypoxemia a.m. 5/9, holding sedation and adjusting oxygen.  Patient too weak to pull up AVAPS mask at this point.  Will encourage coughing and will suction as needed.  Prognosis poor.

## 2020-05-03 NOTE — ASSESSMENT & PLAN NOTE
Unknown if he is still using BiPAP, monitor for JANNIE and sleep related hypoxemia  Tolerating high flow nasal cannula without obvious significant obstructive apneas resulting in desaturation

## 2020-05-03 NOTE — ASSESSMENT & PLAN NOTE
Continue medium sliding scale insulin, daily review with clinical pharmacist artery dosing and glycemic control  Hypoglycemia protocol s  Hold metformin, Levemir and Jardiance for now and when clinically appropriate  Increase Lantus to 15 units/day, prefer tighter control blood sugar given his bacteremia and sepsis-monitor for the need for insulin infusion protocols, pharmacy substitute for Levemir in our institution

## 2020-05-03 NOTE — ASSESSMENT & PLAN NOTE
Goal SBP less than 160  Continue amlodipine, 10 mg daily  Continue hydralazine, 100 mg every 8 hours  Continue lisinopril, 40 mg daily  Continue metoprolol succinate, 200 mg daily  IV substitutes as needed when not taking p.o. including metoprolol, hydralazine and enalapril

## 2020-05-03 NOTE — ED PROVIDER NOTES
ED Provider Note    CHIEF COMPLAINT  Chief Complaint   Patient presents with   • GLF     rolled out of bed, lying on floor until staff could get to him, PT denies LOC   • Shortness of Breath     PT on 2L O2 staff put on 10L per NC, O2 sat89% EMS had on non rebreather at 15L       HPI  Joel Ma is a 69 y.o. male who presents to the emergency department after a ground-level fall.  Patient rolled out of bed.  Patient was found lying on the ground.  Patient could not get himself up.  He is usually not ambulatory from a previous stroke.  He was lifted up by staff at his care facility.  He was not on his normal oxygen.  He was placed on nasal cannula oxygen.  His oxygen saturation did not improve.  Ultimately he was turned up to 10 L per nasal cannula.  Oxygen saturation was 89% per EMS.  Patient tells me he does not remember falling out of bed or what happened.  He complains of pain in his low back which is typical for him.  He normally takes a number of pain medications he denies having any chest pain.  Denies shortness of breath.  Denies fevers or recent illness however on review of the chart he was in the hospital just 2 days ago with bleeding from a urinary catheter.  He also complained of cough.  He had a COVID screen sent which was negative.    REVIEW OF SYSTEMS  As per [facility]  All other systems reviewed and negative  PAST MEDICAL HISTORY  Past Medical History:   Diagnosis Date   • Anesthesia     low O2 sat   • Arrhythmia     a-fib   • arthritis 6/17/2011    thumb, fingers   • Arthritis     hip   • Backpain    • CAD (coronary artery disease)     MIRELLA to distal RCA and prox LAD   • Cataract     left eye surgery   • Dental disorder     full dentures   • Diabetes     insulin, Dr Oconnor, his APN   • High cholesterol    • Hypertension    • MRSA (methicillin resistant Staphylococcus aureus)     history of, nothing current 2016, on clindamycin for rest of life per patient   • Pain 05-03-13    shoulders, hip, right  knee, 7/10   • Pneumonia    • Renal disorder     stage 2   • Sleep apnea     bipap   • Stroke (HCC)     2007, 2007, right sided weakness; balance disturbance, memory problems   • Thyroid mass 2009    benign lump   • Ventricular ectopy 2011       SOCIAL HISTORY  Social History     Tobacco Use   • Smoking status: Former Smoker     Packs/day: 4.00     Years: 0.50     Pack years: 2.00     Types: Cigarettes     Last attempt to quit: 1974     Years since quittin.3   • Smokeless tobacco: Never Used   Substance Use Topics   • Alcohol use: No   • Drug use: No       SURGICAL HISTORY  Past Surgical History:   Procedure Laterality Date   • CERVICAL FUSION POSTERIOR  2018    Procedure: CERVICAL FUSION POSTERIOR/ C2-4;  Surgeon: Boby Masrh M.D.;  Location: [facility];  Service: Neurosurgery   • CERVICAL LAMINECTOMY POSTERIOR  2018    Procedure: CERVICAL LAMINECTOMY POSTERIOR/ C2-3, C5-6;  Surgeon: Boby Marsh M.D.;  Location: [facility];  Service: Neurosurgery   • LUMBAR LAMINECTOMY DISKECTOMY  2018    Procedure: LUMBAR LAMINECTOMY DISKECTOMY/ L2-5 LAMI;  Surgeon: Boby Marsh M.D.;  Location: [facility];  Service: Neurosurgery   • CERVICAL DISK AND FUSION ANTERIOR  2016    Procedure: CERVICAL DISK AND FUSION ANTERIOR C3-7 ;  Surgeon: Alhaji Jaffe M.D.;  Location: [facility];  Service:    • CATARACT PHACO WITH IOL  2013    Performed by Mame Rehman M.D. at SURGERY SAME DAY [facility]   • IRRIGATION & DEBRIDEMENT ORTHO  2012    Performed by Gordon Cota M.D. at SURGERY [facility]   • KNEE REVISION TOTAL  2012    Performed by Godron Cota M.D. at SURGERY [facility]   • IRRIGATION & DEBRIDEMENT ORTHO  2012    Performed by Gordon Cota M.D. at SURGERY [facility]   • IRRIGATION & DEBRIDEMENT ORTHO Left 10/29/2012    Procedure: left shoulder, with  drain;  Surgeon: Gordon Cota M.D.;  Location: SURGERY [facility];  Service:    • INCISION AND DRAINAGE ORTHOPEDIC Right 10/29/2012    Procedure: Right Total Knee I and D and [place], with drain;  Surgeon: Gordon Cota M.D.;  Location: SURGERY [facility];  Service:    • IRRIGATION & DEBRIDEMENT ORTHO  3/13/2012    Performed by KAMALJIT SHI at [facility]   • KNEE REVISION TOTAL  3/13/2012    Performed by KAMALJIT SHI at [facility]   • TENDON REPAIR  3/13/2012    Performed by KAMALJIT SIH at [facility]   • KNEE ARTHROPLASTY TOTAL  1/11/2012    Right; Performed by KAMALJIT SHI at [facility]   • TOE AMPUTATION  7/22/2011    Performed by EVELYN JANE at [facility]   • ELBOW ARTHROTOMY  2008    right   • LUMBAR FUSION POSTERIOR  1979   • FOOT SURGERY      partial amputation great toe   • FOOT SURGERY      toe surgery left foot   • OTHER      left eye cataract   • OTHER NEUROLOGICAL SURG      neck fusion   • PB KNEE SCOPE,SINGLE MENISECTOMY      right knee   • ZZZ CARDIAC CATH         CURRENT MEDICATIONS  Home Medications     Reviewed by Reg Correa R.N. (Registered Nurse) on 05/03/20 at 0511  Med List Status: Partial   Medication Last Dose Status   ALPRAZolam (XANAX) 0.25 MG Tab  Active   amLODIPine (NORVASC) 10 MG Tab  Active   clindamycin (CLEOCIN) 300 MG Cap  Active   clopidogrel (PLAVIX) 75 MG Tab  Active   Empagliflozin (JARDIANCE) 10 MG Tab  Active   ezetimibe (ZETIA) 10 MG Tab  Active   finasteride (PROSCAR) 5 MG Tab  Active   furosemide (LASIX) 20 MG Tab  Active   gabapentin (NEURONTIN) 300 MG Cap  Active   hydrALAZINE (APRESOLINE) 100 MG tablet  Active   insulin aspart (NOVOLOG) 100 UNIT/ML Solution  Active   insulin detemir (LEVEMIR) 100 UNIT/ML Solution  Active   ketoconazole (NIZORAL) 2 % Cream  Active   LACTOBACILLUS PO  Active   latanoprost (XALATAN) 0.005 % Solution  Active   lisinopril (PRINIVIL) 40 MG  "tablet  Active   melatonin 3 MG Tab  Active   metFORMIN (GLUCOPHAGE) 500 MG Tab  Active   methenamine hip (HIPPREX) 1 GM Tab  Active   metoprolol (TOPROL-XL) 200 MG XL tablet  Active   nitrofurantoin (MACROBID) 100 MG Cap  Active   potassium chloride SA (KDUR) 20 MEQ Tab CR  Active   rivaroxaban (XARELTO) 20 MG Tab tablet  Active   tamsulosin (FLOMAX) 0.4 MG capsule  Active   tizanidine (ZANAFLEX) 2 MG tablet  Active                ALLERGIES  Allergies   Allergen Reactions   • Demerol      Makes me stop breathing.  Doesn't like because it makes him high   • Fentanyl      \"Sensitive\"   • Latex Itching   • Statins [Hmg-Coa-R Inhibitors] Myalgia     Rxn - ongoing         PHYSICAL EXAM  VITAL SIGNS: /72   Pulse (!) 120   Temp 36.8 °C (98.3 °F) (Temporal)   Resp 18   Ht 1.905 m (6' 3\")   Wt 114.3 kg (252 lb)   BMI 31.50 kg/m²    Constitutional: Awake and alert.  Acute on chronically ill-appearing  HENT:  Atraumatic, Normocephalic.Oropharynx dry mucus membranes, Nose normal inspection.   Eyes: Subconjunctival hemorrhage medial left eye  Neck: Supple, no JVD  Cardiovascular: Elevated heart rate Symmetric peripheral pulses.   Thorax & Lungs: Increased work of breathing with accessory muscle use.  Crackles at both bases.  Abdomen: Bowel sounds normal, soft, non-distended, nontender, no mass  Skin: Venous stasis changes both lower extremities  Back: Tenderness lumbar spine.  No deformity  Extremities: Lower extremity edema bilaterally  Neurologic: He keeps his eyes closed unless prompted.  When awakened he is alert oriented to person and years.  Disoriented to place.  Can move his extremities.      RADIOLOGY/PROCEDURES  CT-LSPINE W/O PLUS RECONS   Final Result         1. Questionable nondisplaced fracture through the T12 vertebral bodies with moderate vertebral height loss. Questionable nondisplaced fracture through the L2 vertebral body as well. MR could be helpful for further evaluation.      2. Numerous " lucencies throughout the lumbar spine concerning for lytic lesions/multiple myeloma.         CT-CTA CHEST PULMONARY ARTERY W/ RECONS   Final Result         1. No CT evidence of pulmonary embolism.      2. Diffuse interlobular septal thickening could relate to mild pulmonary edema.      3. Low lung volume. Airspace opacity in the bilateral lung bases, right more than left, could be atelectasis or consolidation.      4. Small bilateral pleural effusions. Diffuse anasarca.      5. The ascending aorta measures 4.5 cm., Similar to prior      CT-HEAD W/O   Final Result         1. No acute intracranial abnormality. No evidence of acute intracranial hemorrhage or mass lesion.               DX-CHEST-PORTABLE (1 VIEW)   Final Result         1. Low lung volume with hypoventilatory change.      2. Hazy right basilar and left hilar opacities could be atelectasis or infection.           Imaging is interpreted by radiologist    Labs:  Results for orders placed or performed during the hospital encounter of 05/03/20   CBC WITH DIFFERENTIAL   Result Value Ref Range    WBC 11.1 (H) 4.8 - 10.8 K/uL    RBC 5.57 4.70 - 6.10 M/uL    Hemoglobin 14.2 14.0 - 18.0 g/dL    Hematocrit 45.5 42.0 - 52.0 %    MCV 81.7 81.4 - 97.8 fL    MCH 25.5 (L) 27.0 - 33.0 pg    MCHC 31.2 (L) 33.7 - 35.3 g/dL    RDW 51.5 (H) 35.9 - 50.0 fL    Platelet Count 140 (L) 164 - 446 K/uL    MPV 10.6 9.0 - 12.9 fL    Neutrophils-Polys 87.20 (H) 44.00 - 72.00 %    Lymphocytes 2.20 (L) 22.00 - 41.00 %    Monocytes 9.80 0.00 - 13.40 %    Eosinophils 0.00 0.00 - 6.90 %    Basophils 0.20 0.00 - 1.80 %    Immature Granulocytes 0.60 0.00 - 0.90 %    Nucleated RBC 0.00 /100 WBC    Neutrophils (Absolute) 9.69 (H) 1.82 - 7.42 K/uL    Lymphs (Absolute) 0.25 (L) 1.00 - 4.80 K/uL    Monos (Absolute) 1.09 (H) 0.00 - 0.85 K/uL    Eos (Absolute) 0.00 0.00 - 0.51 K/uL    Baso (Absolute) 0.02 0.00 - 0.12 K/uL    Immature Granulocytes (abs) 0.07 0.00 - 0.11 K/uL    NRBC (Absolute) 0.00  K/uL   COMP METABOLIC PANEL   Result Value Ref Range    Sodium 141 135 - 145 mmol/L    Potassium 4.0 3.6 - 5.5 mmol/L    Chloride 101 96 - 112 mmol/L    Co2 25 20 - 33 mmol/L    Anion Gap 15.0 7.0 - 16.0    Glucose 63 (L) 65 - 99 mg/dL    Bun 31 (H) 8 - 22 mg/dL    Creatinine 1.02 0.50 - 1.40 mg/dL    Calcium 9.2 8.5 - 10.5 mg/dL    AST(SGOT) 27 12 - 45 U/L    ALT(SGPT) 19 2 - 50 U/L    Alkaline Phosphatase 130 (H) 30 - 99 U/L    Total Bilirubin 0.9 0.1 - 1.5 mg/dL    Albumin 3.4 3.2 - 4.9 g/dL    Total Protein 6.8 6.0 - 8.2 g/dL    Globulin 3.4 1.9 - 3.5 g/dL    A-G Ratio 1.0 g/dL   URINALYSIS   Result Value Ref Range    Color Yellow     Character Cloudy (A)     Specific Gravity 1.025 <1.035    Ph 5.0 5.0 - 8.0    Glucose 500 (A) Negative mg/dL    Ketones Trace (A) Negative mg/dL    Protein 300 (A) Negative mg/dL    Bilirubin Negative Negative    Urobilinogen, Urine 0.2 Negative    Nitrite Negative Negative    Leukocyte Esterase Negative Negative    Occult Blood Small (A) Negative    Micro Urine Req Microscopic    TROPONIN   Result Value Ref Range    Troponin T 175 (H) 6 - 19 ng/L   proBrain Natriuretic Peptide, NT   Result Value Ref Range    NT-proBNP >29981 (H) 0 - 125 pg/mL   FERRITIN   Result Value Ref Range    Ferritin 228.0 22.0 - 322.0 ng/mL   CRP QUANTITIVE (NON-CARDIAC)   Result Value Ref Range    Stat C-Reactive Protein 13.99 (H) 0.00 - 0.75 mg/dL   PT/INR   Result Value Ref Range    PT 17.1 (H) 12.0 - 14.6 sec    INR 1.36 (H) 0.87 - 1.13   PTT   Result Value Ref Range    APTT 31.3 24.7 - 36.0 sec   LDH   Result Value Ref Range    LDH Total 278 (H) 107 - 266 U/L   PROCALCITONIN   Result Value Ref Range    Procalcitonin 4.71 (H) <0.25 ng/mL   LACTIC ACID   Result Value Ref Range    Lactic Acid 1.9 0.5 - 2.0 mmol/L   URINE MICROSCOPIC (W/UA)   Result Value Ref Range    WBC 0-2 (A) /hpf    RBC 20-50 (A) /hpf    Bacteria Negative None /hpf    Epithelial Cells Negative /hpf    Hyaline Cast 6-10 (A) /lpf    ESTIMATED GFR   Result Value Ref Range    GFR If African American >60 >60 mL/min/1.73 m 2    GFR If Non African American >60 >60 mL/min/1.73 m 2   EKG   Result Value Ref Range    Report       [facility] Emergency Dept.    Test Date:  2020  Pt Name:    TREY THOMAS                 Department: ER  MRN:        [id number]                      Room:       [facility]  Gender:     Male                         Technician: [id number]  :        1950                   Requested By:ABIOLA SOUZA  Order #:    [id number]                    Reading MD: ABIOLA SOUZA MD    Measurements  Intervals                                Axis  Rate:       127                          P:  [place]:                                      QRS:        -48  QRSD:       94                           T:          100  QT:         344  QTc:        501    Interpretive Statements  ATRIAL FIBRILLATION  VENTRICULAR PREMATURE COMPLEX  CONSIDER INFERIOR INFARCT  ANTERIOR INFARCT, OLD  BORDERLINE PROLONGED QT INTERVAL  BASELINE WANDER IN LEAD(S) V1  Compared to ECG 2020 12:46:07  No significant changes  Electronically Signed On 5-3-2020 7:10:37 PDT by ABIOLA SOUZA MD       *Note: Due to a large number of results and/or encounters for the requested time period, some results have not been displayed. A complete set of results can be found in Results Review.       Medications   NS infusion 500 mL (0 mL Intravenous Held 5/3/20 0600)   DILTIAZem (CARDIZEM) injection 10 mg (has no administration in time range)   furosemide (LASIX) injection 20 mg (has no administration in time range)   cefTRIAXone (ROCEPHIN) 2 g in  mL IVPB (0 g Intravenous Stopped 5/3/20 0648)   morphine (pf) 4 MG/ML injection 4 mg (4 mg Intravenous Given 5/3/20 0611)   ondansetron (ZOFRAN) syringe/vial injection 4 mg (4 mg Intravenous Given 5/3/20 0611)   furosemide (LASIX) injection 20 mg (20 mg Intravenous Given 5/3/20 0705)   iohexol (OMNIPAQUE) 350 mg/mL (90  mL Intravenous Given 5/3/20 0711)       COURSE & MEDICAL DECISION MAKING  Patient presents with hypoxia.  He is altered.  His chart was reviewed noting previous history of altered mental status.  He has had a previous stroke.  He was placed on supplemental oxygen.  He required a nonrebreather.  He complained of back pain after being found on the ground.  There is question of head injury.  He has unexplained hypoxia.  CT scans of the head, chest and low back were ordered.  Laboratory data was ordered.      Patient has an elevated CRP and white count.  He was given ceftriaxone empirically after blood cultures.  He is not acidotic.    Chest x-ray shows hazy infiltrate.  This could be pneumonia or CHF.    BNP returns remarkably elevated.  He also has an elevated troponin.  This may be rate related with his atrial fibrillation.  He does not have acute ischemia.  This will need to be trended.  I ordered Lasix intravenously.    Patient complained of continued pain in his low back.  He was given morphine and Zofran.    He was able to relax a bit.  His oxygen saturations were worsened after this.  He is still ventilating.  I ordered high flow nasal cannula    Imaging returned as noted above.    Persistent atrial fibrillation with rapid response that is anywhere between 120s and 140s.  I ordered 10 mg of Cardizem IV.     Patient will need to be admitted for further work-up and treatment.  I ordered a repeat COVID screen.  Discussed case with Dr. Marino who referred me to the intensivist.    Discussed case with Dr. Swift    FINAL IMPRESSION  1.  Acute hypoxic respiratory failure  2.  Congestive heart failure  3.  Pneumonia  4.  Atrial fibrillation with rapid ventricular response  5.  Abnormal lumbar spine CT scan  6.  DNR/DNI    CRITICAL CARE TIME 40 minutes  There was a very real possibility of deterioration of the patient's condition.  This patient required the highest level of care.  I provided critical care services  which included: review of the medical record, treatment orders, ordering and reviewing test results, frequent reevaluation of the patient's condition and response to treatment, as well as discussing the case with appropriate personnel and various consultants. The critical care time associated with the care of this patient is exclusive of any procedures or specific interventions.    This dictation was created using voice recognition software. The accuracy of the dictation is limited to the abilities of the software.  The nursing notes were reviewed and certain aspects of this information were incorporated into this note.      Electronically signed by: Mart Ferrera M.D., 5/3/2020 6:02 AM

## 2020-05-03 NOTE — ASSESSMENT & PLAN NOTE
Monitor renal function and urine output  Avoid nephrotoxins  Renally dose medications were appropriate

## 2020-05-03 NOTE — ASSESSMENT & PLAN NOTE
Rate control acceptable  Continue metoprolol succinate, 200 mg daily when we have enteral access  Continue anticoagulation with rivaroxaban, monitor for need for heparin/Lovenox therapy and not taking p.o.  IV metoprolol for rate control as needed when we do not have enteral axis or rate accelerated  Add digoxin if necessary if blood pressure soft, additional IV CCB or beta blocker appropriate if BP acceptable  Continue to optimize electrolytes

## 2020-05-03 NOTE — PROGRESS NOTES
Spoke with pt son Esteban to updated him on the pt arrival to the ICU. All questions answered.      Esteban Venegas [phone]

## 2020-05-03 NOTE — CONSULTS
PULMONARY AND CRITICAL CARE MEDICINE CONSULTATION    Date of Consultation:  5/3/2020    Requesting Physician:  Mart Ferrera MD    Consulting Physician:  Alhaji Bautista MD    Reason for Consultation: Critical care management in gentleman with respiratory failure, evidence of CHF and pneumonia    Chief Complaint: Respiratory failure, CHF, pneumonia    History of Present Illness:    I was kindly asked to see and evaluate Joel Ma, a 69 y.o. male for evaluation and management of the above problem.    The entire history is obtained from healthcare providers and the medical record as this gentleman cannot provide me with any history.  This gentleman has a history of prior embolic strokes, essential hypertension, type 2 diabetes mellitus, dyslipidemia, chronic atrial fibrillation, chronic indwelling Gonzalez catheter, CAD, JANNIE, stage III CKD, grade 1 diastolic heart failure and chronic cervical spine disease.  He was seen in the emergency room on or about 5/1/2020 with bleeding from his Gonzalez catheter.  A test for SARS-CoV-2 was negative at that time.  He was sent back to his living facility after being treated in the emergency department.  He resides at [facility].    Today he was found down next to his bed.  He was weak and was unable to get up.  He is apparently normally on 2 L of domiciliary oxygen and his saturations were in the 80s.  He was placed on supplemental oxygen and brought to the emergency department.  He apparently reported that he fell from bed and was unable to get up due to lack of strength.  He was complaining of back pain in the emergency room and was administered IV narcotics.  At the time of my evaluation he is sedated from his pain medications and cannot provide me with any history.  A POLST is present on his chart and confirms that he is DNR/DNI status.  In the emergency department he had blood cultures obtained and he received a dose of Rocephin.  His  BNP was elevated and he received IV furosemide.    Medications Prior to Admission:    No current facility-administered medications on file prior to encounter.      Current Outpatient Medications on File Prior to Encounter   Medication Sig Dispense Refill   • hydrALAZINE (APRESOLINE) 100 MG tablet Take 100 mg by mouth every 8 hours.     • ketoconazole (NIZORAL) 2 % Cream Apply 1 Application to affected area(s) every day. Apply to affected area every day with each catheter cleaning     • latanoprost (XALATAN) 0.005 % Solution Place 1 Drop in both eyes every evening.     • insulin detemir (LEVEMIR) 100 UNIT/ML Solution Inject 40 Units as instructed every evening.     • lisinopril (PRINIVIL) 40 MG tablet Take 40 mg by mouth every day. Hold if SBP > 100, per MAR     • melatonin 3 MG Tab Take 6 mg by mouth every bedtime.     • metFORMIN (GLUCOPHAGE) 500 MG Tab Take 1,000 mg by mouth 2 times a day, with meals.     • metoprolol (TOPROL-XL) 200 MG XL tablet Take 200 mg by mouth every day. Hold for sbp> 110 and HR >60     • nitroglycerin (NITROSTAT) 0.4 MG SL Tab Place 0.4 mg under tongue every 5 minutes as needed for Chest Pain.     • ALPRAZolam (XANAX) 0.25 MG Tab Take 0.25 mg by mouth 3 times a day as needed for Sleep or Anxiety.     • clopidogrel (PLAVIX) 75 MG Tab Take 75 mg by mouth every day.     • rivaroxaban (XARELTO) 20 MG Tab tablet Take 20 mg by mouth with dinner.     • clindamycin (CLEOCIN) 300 MG Cap Take 300 mg by mouth 2 Times a Day. Pt started on 4/6/2020 until 6/6/2020     • finasteride (PROSCAR) 5 MG Tab Take 5 mg by mouth every day.     • furosemide (LASIX) 20 MG Tab Take 20 mg by mouth 2 times a day.     • gabapentin (NEURONTIN) 300 MG Cap Take 300-600 mg by mouth 3 times a day. 300 mg in the AM  300 mg in the Afternoon  600 mg in the PM     • Empagliflozin (JARDIANCE) 10 MG Tab Take 5 mg by mouth every day.     • nitrofurantoin (MACROBID) 100 MG Cap Take 100 mg by mouth every day. Pt started 4/29/2020  for 3 day course     • methenamine hip (HIPPREX) 1 GM Tab Take 1 g by mouth 2 times a day.     • insulin aspart (NOVOLOG) 100 UNIT/ML Solution Inject 2-10 Units as instructed 3 times a day before meals. Sliding Scale  151-200= 2 units  201-250= 4 units  251-300= 6 units  301-350= 8 units  351-400= 10 units   If blood sugar is > 400 call MD     • potassium chloride SA (KDUR) 20 MEQ Tab CR Take 20 mEq by mouth 2 times a day.     • LACTOBACILLUS PO Take 1 Tab by mouth 2 Times a Day.     • tamsulosin (FLOMAX) 0.4 MG capsule Take 0.4 mg by mouth ONE-HALF HOUR AFTER BREAKFAST.     • tizanidine (ZANAFLEX) 2 MG tablet Take 2 mg by mouth every bedtime.     • ezetimibe (ZETIA) 10 MG Tab Take 1 Tab by mouth every day. 30 Tab 11   • amLODIPine (NORVASC) 10 MG Tab Take 1 Tab by mouth every day. 90 Tab 3       Current Medications:      Current Facility-Administered Medications:   •  senna-docusate (PERICOLACE or SENOKOT S) 8.6-50 MG per tablet 2 Tab, 2 Tab, Oral, BID **AND** polyethylene glycol/lytes (MIRALAX) PACKET 1 Packet, 1 Packet, Oral, QDAY PRN **AND** magnesium hydroxide (MILK OF MAGNESIA) suspension 30 mL, 30 mL, Oral, QDAY PRN **AND** bisacodyl (DULCOLAX) suppository 10 mg, 10 mg, Rectal, QDAY PRN, Alhaji Bautista M.D.  •  insulin regular (HUMULIN R) injection 2-9 Units, 2-9 Units, Subcutaneous, Q6HRS **AND** POC Blood Glucose, , , Q6H **AND** NOTIFY MD and PharmD, , , Once **AND** glucose 4 g chewable tablet 16 g, 16 g, Oral, Q15 MIN PRN **AND** dextrose 50% (D50W) injection 50 mL, 50 mL, Intravenous, Q15 MIN PRN, Alhaji Bautista M.D., 50 mL at 05/03/20 1011  •  furosemide (LASIX) injection 40 mg, 40 mg, Intravenous, BID DIURETIC, Alhaji Bautista M.D.  •  piperacillin-tazobactam (ZOSYN) 4.5 g in  mL IVPB, 4.5 g, Intravenous, Once **AND** piperacillin-tazobactam (ZOSYN) 4.5 g in  mL IVPB, 4.5 g, Intravenous, Q8HRS, Alhaji Bautista M.D.  •  MD Alert...Vancomycin per Pharmacy, ,  Other, PHARMACY TO DOSE, Alhaji Bautista M.D.  •  D5W infusion, , Intravenous, Continuous, Alhaji Bautista M.D.  •  Metoprolol Tartrate (LOPRESSOR) injection 5 mg, 5 mg, Intravenous, Q5 MIN PRN, Alhaji Bautista M.D.  •  hydrALAZINE (APRESOLINE) injection 10-20 mg, 10-20 mg, Intravenous, Q4HRS PRN, Alhaji Bautista M.D.  •  labetalol (NORMODYNE/TRANDATE) injection 10-20 mg, 10-20 mg, Intravenous, Q4HRS PRN, Alhaji Bautista M.D.  •  latanoprost (XALATAN) 0.005 % ophthalmic solution 1 Drop, 1 Drop, Both Eyes, Q EVENING, Alhaji Bautista M.D.  •  HYDROmorphone pf (DILAUDID) injection 0.5-1 mg, 0.5-1 mg, Intravenous, Q3HRS PRN, Alhaji Bautista M.D.  •  clopidogrel (PLAVIX) tablet 75 mg, 75 mg, Oral, DAILY, Alhaji Bautista M.D.  •  ezetimibe (ZETIA) tablet 10 mg, 10 mg, Oral, DAILY, Alhaji Bautista M.D.  •  finasteride (PROSCAR) tablet 5 mg, 5 mg, Oral, DAILY, Alhaji Bautista M.D.  •  metoprolol SR (TOPROL XL) tablet 200 mg, 200 mg, Oral, DAILY, Alhaji Bautista M.D.  •  rivaroxaban (XARELTO) tablet 20 mg, 20 mg, Oral, PM MEAL, Alhaji Bautista M.D.  •  tamsulosin (FLOMAX) capsule 0.4 mg, 0.4 mg, Oral, AFTER BREAKFAST, Alhaji Bautista M.D.  •  tizanidine (ZANAFLEX) tablet 2 mg, 2 mg, Oral, QHS, Alhaji Bautista M.D.  •  [START ON 5/4/2020] lisinopril (PRINIVIL) tablet 40 mg, 40 mg, Oral, DAILY, Alhaji Bauitsta M.D.  •  [START ON 5/4/2020] amLODIPine (NORVASC) tablet 10 mg, 10 mg, Oral, DAILY, Alhaji Bautista M.D.  •  [START ON 5/4/2020] hydrALAZINE (APRESOLINE) tablet 100 mg, 100 mg, Oral, Q8HRS, Alhaji Bautista M.D.  •  vancomycin (VANCOCIN) 2,900 mg in  mL IVPB, 25 mg/kg, Intravenous, Once, Alhaji Bautista M.D.    Current Outpatient Medications:   •  hydrALAZINE (APRESOLINE) 100 MG tablet, Take 100 mg by mouth every 8 hours., Disp: , Rfl:   •  ketoconazole (NIZORAL) 2 % Cream, Apply 1  Application to affected area(s) every day. Apply to affected area every day with each catheter cleaning, Disp: , Rfl:   •  latanoprost (XALATAN) 0.005 % Solution, Place 1 Drop in both eyes every evening., Disp: , Rfl:   •  insulin detemir (LEVEMIR) 100 UNIT/ML Solution, Inject 40 Units as instructed every evening., Disp: , Rfl:   •  lisinopril (PRINIVIL) 40 MG tablet, Take 40 mg by mouth every day. Hold if SBP > 100, per MAR, Disp: , Rfl:   •  melatonin 3 MG Tab, Take 6 mg by mouth every bedtime., Disp: , Rfl:   •  metFORMIN (GLUCOPHAGE) 500 MG Tab, Take 1,000 mg by mouth 2 times a day, with meals., Disp: , Rfl:   •  metoprolol (TOPROL-XL) 200 MG XL tablet, Take 200 mg by mouth every day. Hold for sbp> 110 and HR >60, Disp: , Rfl:   •  nitroglycerin (NITROSTAT) 0.4 MG SL Tab, Place 0.4 mg under tongue every 5 minutes as needed for Chest Pain., Disp: , Rfl:   •  ALPRAZolam (XANAX) 0.25 MG Tab, Take 0.25 mg by mouth 3 times a day as needed for Sleep or Anxiety., Disp: , Rfl:   •  clopidogrel (PLAVIX) 75 MG Tab, Take 75 mg by mouth every day., Disp: , Rfl:   •  rivaroxaban (XARELTO) 20 MG Tab tablet, Take 20 mg by mouth with dinner., Disp: , Rfl:   •  clindamycin (CLEOCIN) 300 MG Cap, Take 300 mg by mouth 2 Times a Day. Pt started on 4/6/2020 until 6/6/2020, Disp: , Rfl:   •  finasteride (PROSCAR) 5 MG Tab, Take 5 mg by mouth every day., Disp: , Rfl:   •  furosemide (LASIX) 20 MG Tab, Take 20 mg by mouth 2 times a day., Disp: , Rfl:   •  gabapentin (NEURONTIN) 300 MG Cap, Take 300-600 mg by mouth 3 times a day. 300 mg in the  mg in the Afternoon 600 mg in the PM, Disp: , Rfl:   •  Empagliflozin (JARDIANCE) 10 MG Tab, Take 5 mg by mouth every day., Disp: , Rfl:   •  nitrofurantoin (MACROBID) 100 MG Cap, Take 100 mg by mouth every day. Pt started 4/29/2020 for 3 day course, Disp: , Rfl:   •  methenamine hip (HIPPREX) 1 GM Tab, Take 1 g by mouth 2 times a day., Disp: , Rfl:   •  insulin aspart (NOVOLOG) 100 UNIT/ML  Solution, Inject 2-10 Units as instructed 3 times a day before meals. Sliding Scale 151-200= 2 units 201-250= 4 units 251-300= 6 units 301-350= 8 units 351-400= 10 units  If blood sugar is > 400 call MD, Disp: , Rfl:   •  potassium chloride SA (KDUR) 20 MEQ Tab CR, Take 20 mEq by mouth 2 times a day., Disp: , Rfl:   •  LACTOBACILLUS PO, Take 1 Tab by mouth 2 Times a Day., Disp: , Rfl:   •  tamsulosin (FLOMAX) 0.4 MG capsule, Take 0.4 mg by mouth ONE-HALF HOUR AFTER BREAKFAST., Disp: , Rfl:   •  tizanidine (ZANAFLEX) 2 MG tablet, Take 2 mg by mouth every bedtime., Disp: , Rfl:   •  ezetimibe (ZETIA) 10 MG Tab, Take 1 Tab by mouth every day., Disp: 30 Tab, Rfl: 11  •  amLODIPine (NORVASC) 10 MG Tab, Take 1 Tab by mouth every day., Disp: 90 Tab, Rfl: 3    Allergies:    Demerol; Fentanyl; Latex; and Statins [hmg-coa-r inhibitors]    Past Surgical History:    Past Surgical History:   Procedure Laterality Date   • CERVICAL FUSION POSTERIOR  7/13/2018    Procedure: CERVICAL FUSION POSTERIOR/ C2-4;  Surgeon: Boby Marsh M.D.;  Location: [facility];  Service: Neurosurgery   • CERVICAL LAMINECTOMY POSTERIOR  7/13/2018    Procedure: CERVICAL LAMINECTOMY POSTERIOR/ C2-3, C5-6;  Surgeon: Boby Marsh M.D.;  Location: SURGERY [facility];  Service: Neurosurgery   • LUMBAR LAMINECTOMY DISKECTOMY  7/13/2018    Procedure: LUMBAR LAMINECTOMY DISKECTOMY/ L2-5 LAMI;  Surgeon: Boby Marsh M.D.;  Location: SURGERY [facility];  Service: Neurosurgery   • CERVICAL DISK AND FUSION ANTERIOR  8/31/2016    Procedure: CERVICAL DISK AND FUSION ANTERIOR C3-7 ;  Surgeon: Alhaji Jaffe M.D.;  Location: SURGERY [facility];  Service:    • CATARACT PHACO WITH IOL  5/8/2013    Performed by Mame Rehman M.D. at SURGERY SAME DAY [facility]   • IRRIGATION & DEBRIDEMENT ORTHO  11/13/2012    Performed by Gordon Cota M.D. at SURGERY [facility]   • KNEE REVISION TOTAL  11/13/2012     Performed by Gordon Cota M.D. at [facility]   • IRRIGATION & DEBRIDEMENT ORTHO  11/2/2012    Performed by Gordon Cota M.D. at [facility]   • IRRIGATION & DEBRIDEMENT ORTHO Left 10/29/2012    Procedure: left shoulder, with drain;  Surgeon: Gordon Cota M.D.;  Location: [facility];  Service:    • INCISION AND DRAINAGE ORTHOPEDIC Right 10/29/2012    Procedure: Right Total Knee I and D and [place], with drain;  Surgeon: Gordon Cota M.D.;  Location: [facility];  Service:    • IRRIGATION & DEBRIDEMENT ORTHO  3/13/2012    Performed by KAMALJIT SHI at [facility]   • KNEE REVISION TOTAL  3/13/2012    Performed by KAMALJIT SHI at [facility]   • TENDON REPAIR  3/13/2012    Performed by KAMALJIT SHI at [facility]   • KNEE ARTHROPLASTY TOTAL  1/11/2012    Right; Performed by KAMALJIT SHI at [facility]   • TOE AMPUTATION  7/22/2011    Performed by EVELYN JANE at [facility]   • ELBOW ARTHROTOMY  2008    right   • LUMBAR FUSION POSTERIOR  1979   • FOOT SURGERY      partial amputation great toe   • FOOT SURGERY      toe surgery left foot   • OTHER      left eye cataract   • OTHER NEUROLOGICAL SURG      neck fusion   • PB KNEE SCOPE,SINGLE MENISECTOMY      right knee   • ZZZ CARDIAC CATH         Past Medical History:    Past Medical History:   Diagnosis Date   • Anesthesia     low O2 sat   • Arrhythmia     a-fib   • arthritis 6/17/2011    thumb, fingers   • Arthritis     hip   • Backpain    • CAD (coronary artery disease)     MIRELLA to distal RCA and prox LAD   • Cataract     left eye surgery   • Dental disorder     full dentures   • Diabetes     insulin, Dr Oconnor, his APN   • High cholesterol    • Hypertension    • MRSA (methicillin resistant Staphylococcus aureus)     history of, nothing current 2016, on clindamycin for rest of life per patient   • Pain 05-03-13    shoulders, hip,  right knee, 7/10   • Pneumonia    • Renal disorder     stage 2   • Sleep apnea     bipap   • Stroke (HCC)     2007, 2007, right sided weakness; balance disturbance, memory problems   • Thyroid mass 2009    benign lump   • Ventricular ectopy 2011       Social History:    Social History     Socioeconomic History   • Marital status: Single     Spouse name: Not on file   • Number of children: Not on file   • Years of education: Not on file   • Highest education level: Not on file   Occupational History   • Not on file   Social Needs   • Financial resource strain: Not on file   • Food insecurity     Worry: Not on file     Inability: Not on file   • Transportation needs     Medical: Not on file     Non-medical: Not on file   Tobacco Use   • Smoking status: Former Smoker     Packs/day: 4.00     Years: 0.50     Pack years: 2.00     Types: Cigarettes     Last attempt to quit: 1974     Years since quittin.3   • Smokeless tobacco: Never Used   Substance and Sexual Activity   • Alcohol use: No   • Drug use: No   • Sexual activity: Yes   Lifestyle   • Physical activity     Days per week: Not on file     Minutes per session: Not on file   • Stress: Not on file   Relationships   • Social connections     Talks on phone: Not on file     Gets together: Not on file     Attends Mosque service: Not on file     Active member of club or organization: Not on file     Attends meetings of clubs or organizations: Not on file     Relationship status: Not on file   • Intimate partner violence     Fear of current or ex partner: Not on file     Emotionally abused: Not on file     Physically abused: Not on file     Forced sexual activity: Not on file   Other Topics Concern   •  Service No   • Blood Transfusions Yes   • Caffeine Concern Yes   • Occupational Exposure No   • Hobby Hazards No   • Sleep Concern No   • Stress Concern No   • Weight Concern Yes   • Special Diet No   • Back Care No   • Exercise Yes  "  • Bike Helmet No   • Seat Belt Yes   • Self-Exams Yes   Social History Narrative    ** Merged History Encounter **            Family History:    Family History   Problem Relation Age of Onset   • Diabetes Mother    • Cancer Father         lung cancer   • Heart Disease Father         triple bypass   • Diabetes Other    • Hypertension Other    • Cancer Other        Review of System:    Review of Systems   Unable to perform ROS: Acuity of condition       Physical Examination:    /84   Pulse (!) 125   Temp 36.8 °C (98.3 °F) (Temporal)   Resp 15   Ht 1.905 m (6' 3\")   Wt 114.3 kg (252 lb)   SpO2 88%   BMI 31.50 kg/m²   Physical Exam   HENT:   Head: Normocephalic and atraumatic.   Right Ear: External ear normal.   Left Ear: External ear normal.   Nose: Nose normal.   Eyes: Pupils are equal, round, and reactive to light. Conjunctivae are normal. Right eye exhibits no discharge. Left eye exhibits no discharge.   Neck: Normal range of motion. Neck supple. No tracheal deviation present.   Cardiovascular: Intact distal pulses. Exam reveals no gallop.   Atrial fibrillation   Pulmonary/Chest: He has no wheezes. He has rales (Scattered crackles bilaterally).   Abdominal: Soft. Bowel sounds are normal. He exhibits no distension. There is no abdominal tenderness. There is no rebound.   Musculoskeletal:         General: Edema (2-3+ edema in both lower extremities) present.      Comments: No clubbing or cyanosis   Neurological:   He is sedated.  He will arouse and answer a couple of questions but then falls back asleep.   Skin: Skin is warm and dry. He is not diaphoretic.       Laboratory Data:        Recent Labs     05/01/20  1130 05/03/20  0507   WBC 8.5 11.1*   RBC 4.86 5.57   HEMOGLOBIN 12.5* 14.2   HEMATOCRIT 41.0* 45.5   MCV 84.4 81.7   MCH 25.7* 25.5*   MCHC 30.5* 31.2*   RDW 53.4* 51.5*   PLATELETCT 134* 140*   MPV 11.7 10.6     Recent Labs     05/01/20  1130 05/03/20  0507   SODIUM 139 141   POTASSIUM 4.9 4.0 "   CHLORIDE 103 101   CO2 20 25   GLUCOSE 192* 63*   BUN 39* 31*   CREATININE 1.08 1.02   CALCIUM 8.6 9.2                   Imaging:    I personally viewed all the available CXR and CT scan images as well as reviewed the radiology interpretation reports.    CT-LSPINE W/O PLUS RECONS   Final Result         1. Questionable nondisplaced fracture through the T12 vertebral bodies with moderate vertebral height loss. Questionable nondisplaced fracture through the L2 vertebral body as well. MR could be helpful for further evaluation.      2. Numerous lucencies throughout the lumbar spine concerning for lytic lesions/multiple myeloma.         CT-CTA CHEST PULMONARY ARTERY W/ RECONS   Final Result         1. No CT evidence of pulmonary embolism.      2. Diffuse interlobular septal thickening could relate to mild pulmonary edema.      3. Low lung volume. Airspace opacity in the bilateral lung bases, right more than left, could be atelectasis or consolidation.      4. Small bilateral pleural effusions. Diffuse anasarca.      5. The ascending aorta measures 4.5 cm., Similar to prior      CT-HEAD W/O   Final Result         1. No acute intracranial abnormality. No evidence of acute intracranial hemorrhage or mass lesion.               DX-CHEST-PORTABLE (1 VIEW)   Final Result         1. Low lung volume with hypoventilatory change.      2. Hazy right basilar and left hilar opacities could be atelectasis or infection.      EC-ECHOCARDIOGRAM COMPLETE W/O CONT    (Results Pending)       Assessment and Plan:    * Acute on chronic respiratory failure with hypoxemia (HCC)  Assessment & Plan  On 2 L of domiciliary oxygen  Requires very high supplemental oxygen on a HFNC  CTA negative for acute pulmonary embolism  Differential diagnosis of etiology includes pneumonia and CHF    Acute on chronic diastolic (congestive) heart failure (HCC)  Assessment & Plan  Prior echo revealed grade 1 diastolic dysfunction  Repeat echo  Force diuresis with  furosemide    Pneumonia due to infectious organism  Assessment & Plan  Culture blood and sputum  Check nasal MRSA PCR  Start empiric Zosyn and vancomycin    Abnormal CT of spine  Assessment & Plan  Imaging of his lumbar spine reveals a questionable nondisplaced fracture through the T12 vertebral body with moderate vertebral height loss and a questionable nondisplaced fracture of the L2 vertebral body.  Numerous lucencies throughout the lumbar spine concerning for lytic lesions are present.  Pain control for now  Obtain MRI of spine when clinically able and improved from a respiratory standpoint    Sepsis ([facility])  Assessment & Plan  This is Sepsis Present on admission  SIRS criteria identified on my evaluation include: Tachycardia, with heart rate greater than 90 BPM and Tachypnea, with respirations greater than 20 per minute  Source is pulmonary  Sepsis protocol initiated  IV antibiotics as appropriate for source of sepsis  While organ dysfunction may be noted elsewhere in this problem list or in the chart, degree of organ dysfunction does not meet CMS criteria for severe sepsis    Stage 3 chronic kidney disease (HCC)- (present on admission)  Assessment & Plan  Monitor renal function and urine output  Avoid nephrotoxins    Chronic atrial fibrillation (HCC)- (present on admission)  Assessment & Plan  Rate control  Continue metoprolol succinate, 200 mg daily  Continue anticoagulation with rivaroxaban    Diabetes mellitus, type 2 (HCC)- (present on admission)  Assessment & Plan  Sliding scale insulin  Hold metformin, Levemir and Jardiance for now    CAD (coronary artery disease)- (present on admission)  Assessment & Plan  Continue Zetia  Continue Plavix  Continue metoprolol succinate, 200 mg daily    Essential hypertension- (present on admission)  Assessment & Plan  Goal SBP less than 160  Continue amlodipine, 10 mg daily  Continue hydralazine, 100 mg every 8 hours  Continue lisinopril, 40 mg daily  Continue metoprolol  succinate, 200 mg daily    DNR (do not resuscitate)  Assessment & Plan  DNR/DNI status per POLST on the chart    JANNIE treated with BiPAP- (present on admission)  Assessment & Plan  Unknown if he is still using BiPAP    BPH (benign prostatic hyperplasia)- (present on admission)  Assessment & Plan  Chronic Gonzalez catheter in place    Dyslipidemia- (present on admission)  Assessment & Plan  Continue Zetia    History of stroke- (present on admission)  Assessment & Plan  History of embolic strokes  Continue rivaroxaban and Plavix    I have assessed and reassessed his respiratory status, blood pressure, hemodynamics, cardiovascular status, urine output, neurologic status and response to forced diuresis.  He is at high risk for worsening respiratory and cardiovascular system dysfunction.    High risk of deterioration and worsening vital organ dysfunction and death without the above critical care interventions.    Thank you for allowing me to participate in the care of this gentleman.  I will continue to follow him with great interest.    Critical Care Time: 35 minutes  [id number]  No time overlap  Time excludes procedures  Discussed with RN, RT    Alhaji Bautista MD  Pulmonary and Critical Care Medicine

## 2020-05-03 NOTE — ASSESSMENT & PLAN NOTE
Blood culture positive x6 for for MRSA  nasal MRSA PCR positive  H/o of prior MRSA infection in toe, knee and shoulder per son.  Patient required 9 weeks of antibiotics mostly IV.  He was subsequently treated with an extended period with clindamycin.  Continue vancomycin, Zosyn discontinued when blood cultures positive for MRSA  HIV screen is negative  Repeat blood cultures x2 every 48 hours until culture negative, 3 sets positive so far  May need JULIANA versus empiric therapy and this DNR/DNI patient, I prefer the latter-oxygenation and LOC would make safety for elective JULIANA marginal  Rule out multiple myeloma, suspect osteomyelitis over MM, SPEP and UPEP still pending  Rule out hardware infection/spine involvement, MRI reviewed-no epidural abscess query osteo-versus DJD, no change neurologically on review of old records and review with patient, no other obvious source on exam, reviewed with ID
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
## 2020-05-03 NOTE — ASSESSMENT & PLAN NOTE
Prior ECHO revealed grade 1 DD  Repeat ECHO noted, EF significantly down from 65 to 40%, global hypokinesis  Query related to sepsis and bacteremia from MRSA  Follow-up echocardiogram at some point would be prudent  May benefit from follow-up cardiology consultation, monitoring hemodynamics and BP acceptable so far  Force diuresis with furosemide as tolerated, desire negative fluid balance daily for a while to see if that helps with oxygenation if possible, continue IV Lasix and gently pull fluid

## 2020-05-03 NOTE — CARE PLAN
Problem: Communication  Goal: The ability to communicate needs accurately and effectively will improve  Outcome: NOT MET

## 2020-05-03 NOTE — PROGRESS NOTES
Pt up from the ER. Pt is confused and pulling on his lines. Pt educated about safety. POC dicussed and unit routine. Bed alarm on. Call light within reach.            Pt skin noted to have a non blanching bruises on his coccyx, redness under his panus and his tongue has black spots.

## 2020-05-04 NOTE — THERAPY
"Speech Language Therapy Clinical Swallow Evaluation completed.    Functional Status: Patient was seen on this date for a clinical swallow evaluation. Per EMR, patient coughing with water during bedside screener. Patient AAOx3 with confusion regarding day. Right lean and required intermittent repositioning and cueing to maintain upright position. Patient on 60L and 100% FiO2 via HFNC. Vocal quality clear and cough strength is productive. Oral motor examination WNL. PO trials were administered and consisted of 6 oz MTL, 4 oz thinner purees, 4 oz pudding, and 2 bites soft solids. Onset of swallow trigger was minimally delayed and laryngeal elevation reduced to palpation. No overt s/sx of aspiration with MTL and applesauce (thinner purees). Cough x1 and throat clear x1 following pudding which may be concerning for penetration/aspiration. Immediate coughing occurred in 2/2 trials of soft solids. Patient unable to feed himself due to bilateral UE weakness. Education provided to patient regarding current status, risk of aspiration and SLP recs.    Recommendations - Diet: IDT updated after evaluation - patient appears at the level for a Level 3 (liquidized), Level 2 (mildly thick liquid) given 1:1 feeding. However, remains at high risk for aspiration given compromised respiratory status and may require cortrak placement if not tolerating current modified diet. Please STOP PO with ANY concerns for aspiration and page SLP.                             Strategies: Strict 1:1 feeding , No Straws and Head of Bed at 90 Degrees                            Medication Administration: CRUSH meds in puree    Plan of Care: Will benefit from Speech Therapy 3 times per week    Post-Acute Therapy: Recommend inpatient transitional care services for continued speech therapy services.      See \"Rehab Therapy-Acute\" Patient Summary Report for complete documentation. Thank you for the consult.       "

## 2020-05-04 NOTE — PROGRESS NOTES
"Pharmacy Kinetics 69 y.o. male on vancomycin day # 2 2020    Currently on Vancomycin 1700 mg iv q12hr  Other antibiotics: none    Indication for Treatment: MRSA bacteremia    Pertinent history per medical record: Admitted on 5/3/2020 for respiratory failure, CHF, pneumonia.  Patient was recently seen in ER  for bleeding from urinary catheter.  Patient was tested for COVID on  and was negative.  Today patient was found down next to bed and brought to ER for evaluation.  Patient found to have afib w/ RVR, CHF, acute respiratory failure, and PNA.  Patient has Hx of MRSA per chart.    .    Allergies: Demerol; Fentanyl; Latex; and Statins [hmg-coa-r inhibitors]     List concerns for renal function: age, BMI, elevated BUN/SCr ratio    Pertinent cultures to date:   5/3/20 PBC x 2: Staph aureus (MRSA detected by PCR)    Recent Labs     20  0507 20  0420   WBC 11.1* 10.7   NEUTSPOLYS 87.20* 84.50*     Recent Labs     20  0507 20  0420   BUN 31* 31*   CREATININE 1.02 1.09   ALBUMIN 3.4  --      Recent Labs     20  1227   VANCOTROUGH 16.8       Intake/Output Summary (Last 24 hours) at 2020 1549  Last data filed at 2020 1200  Gross per 24 hour   Intake 1056.01 ml   Output 2775 ml   Net -1718.99 ml      /67   Pulse (!) 109   Temp 36.2 °C (97.2 °F)   Resp 13   Ht 1.905 m (6' 3\")   Wt (!) 131.8 kg (290 lb 9.1 oz)   SpO2 94%  Temp (24hrs), Av.3 °C (97.4 °F), Min:36.1 °C (97 °F), Max:36.7 °C (98 °F)      A/P   1. Vancomycin dose change: no change  2. Next vancomycin level: a few days  3. Goal trough: 16-20 for now  4. Comments: Trough is therapeutic prior to third total dose.  Likely to accumulate further, but will leave dosing as is for now.  UOP is adequate.  ID is now following.  TTE negative for IE.  Planning MRI of spine.  Pharmacy will continue to follow      Pradeep Roberts, PharmD, BCPS, BCCP, BCCCP      "
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
## 2020-05-04 NOTE — CARE PLAN
Problem: Safety  Goal: Will remain free from injury  Outcome: PROGRESSING AS EXPECTED  Note: Bed alarm on. Free from falls at this time.      Problem: Knowledge Deficit  Goal: Knowledge of disease process/condition, treatment plan, diagnostic tests, and medications will improve  Outcome: PROGRESSING AS EXPECTED  Intervention: Assess knowledge level of disease process/condition, treatment plan, diagnostic tests, and medications  Note: Per family, some baseline dementia. Pt confused to place and time. Oriented frequently. Frequent agitation over not having water.

## 2020-05-04 NOTE — PROGRESS NOTES
Pt more awake today. Offered to help patient call his son on the phone. The patient stated that he will call his son later.

## 2020-05-04 NOTE — PROGRESS NOTES
Pt agitated and pulling on IV and constantly pulling off oxygen.. RN educated the patient but the pt continue to pull it off.        Restraints ordered per MD,

## 2020-05-04 NOTE — PROGRESS NOTES
Pt more alert/awake this AM. Asking for water. RN bedside swallow eval done and pt coughing with 5mL sip of water. Kept NPO.   Pt's R arm more swollen/edematous then L arm. IV flushing however, no blood return. Arm warm with good pulses. IV removed and Dr. Barker notified. Per Dr. Barker, continue to monitor at this time and reassess with dayshift.

## 2020-05-04 NOTE — PROGRESS NOTES
Pt having pain and unable to lay flat at this time. Dr Nguyen notifed and a order for additional pain medication,    WIll get MRI completed once pain is tolerable.

## 2020-05-04 NOTE — RESPIRATORY CARE
COPD EDUCATION by COPD CLINICAL EDUCATOR  5/4/2020 at 8:25 AM by Roma Beltran, KERWIN     Patient reviewed by COPD education team. Patient does not have a history or diagnosis of COPD and is a non-smoker, therefore does not qualify for the COPD program.

## 2020-05-04 NOTE — DISCHARGE PLANNING
Care Transition Team Assessment  NOK and 1st emergency contact son, Chris, @ [phone].   Lsw conducted chart review noting pt has some memory loss and confusion which causes agitation.    Lsw called pt's son Chris who has been contacted by medical team for updates.    Son reports he would like pt to return to SNF for rehab then move into son's home w/ son's wife in [place]. Son's address:  [address] [place], [place]    Son indicates he has called medical team to set up an appointment for pt to sign a will. Son does not have time to answer d/c planning assessment questions, and believes all of this information can be found in the files. Lsw advised if pt is not A/O, pt will not be able to sign a legal document. Son states he knows this, and his  is requiring an MD assessment. Lsw reports no one is able to visit due to the pandemic. Son indicates he has no problems w/ Covid, and can come into the hospital. Lsw states she will update BSRN accordingly.    Lsw spoke to BSRN who indicates she spoke to son yesterday, and he said he was immune to Covid19. Also, pt is more A/O today, x3.    Lsw looked at Epic hx, and pt was most recently admitted on 3/13/20. Chris p/u pt in [place] from another son's home, and brought him to [facility] where he left pt in the ER. ER LSW called son, and indicated there was no medical reason for pt to be admitted per ERP. Chris indicated the hospital would have to place pt as Chris had to get back to [place] to move to [place] by the end of the month. Once pt is strengthened and able to complete I/ADLs himself, then they would move pt into their home.        Information Source  Orientation : Disoriented to Time  Information Given By: Relative  Informant's Name: Chris, pt's son  Who is responsible for making decisions for patient? : Patient(A/O prior to admit? )    Readmission Evaluation  Is this a readmission?: Yes - unplanned readmission(d/c from med neph to SNF [facility]  3/17)    Elopement Risk  Legal Hold: No  Ambulatory or Self Mobile in Wheelchair: No-Not an Elopement Risk  Elopement Risk: Not at Risk for Elopement    Interdisciplinary Discharge Planning  Primary Care Physician: Catrachito Sterling DO  Lives with - Patient's Self Care Capacity: Alone and Unable to Care For Self(son indicates pt's trailer inhabitable, pt move w/ son d/c)  Support Systems: Family Member(s)(son Chris and Dtr in Law)  Housing / Facility: Mobile Home  Prior Services: (d/c and admit from SNF [facility])  Patient Expects to be Discharged to:: (u/k)  Assistance Needed: (need PT/OT/SLP evals for d/c planning)    Discharge Preparedness  What is your plan after discharge?: (son wants pt to go back to SNF then to son's home )  What are your discharge supports?: (son and dtr in law)         Finances  Prescription Coverage: Yes(SCP, pharmacy?)                   Domestic Abuse  Have you ever been the victim of abuse or violence?: No    Psychological Assessment  History of Substance Abuse: None  History of Psychiatric Problems: No    Discharge Risks or Barriers  Discharge risks or barriers?: (pt was brought back from  [place] by Chris a couple months ago)  Patient risk factors: (has 4 sons, lives alone, hx of poor care, falls, snf admits)    Anticipated Discharge Information  Anticipated discharge disposition: (back to snf for rehab, then to son's home?)  Discharge Address: pt's home is inhabitable per son address [address] [place] [address]

## 2020-05-04 NOTE — PROGRESS NOTES
Rafaela from Lab called with critical result of positive blood cultures x4; gram positive cocci, positive for staph and MRSA at 2140. Critical lab result read back to Rafaela.   Dr. Plascencia notified of critical lab result at 2150.  Critical lab result read back by Dr. Plascencia.

## 2020-05-04 NOTE — CONSULTS
[facility] INFECTIOUS DISEASES INPATIENT CONSULT NOTE     Date of Service: 5/4/2020    Consult Requested By: Alhaji Bautista M.D.    Reason for Consultation: MRSA bacteremia    Chief Complaint: Weakness, hypoxia    History of Present Illness:     Joel Ma is a 69 y.o. male admitted 5/3/2020.  Patient has history of prior embolic strokes, essential hypertension, type 2 diabetes, dyslipidemia, chronic A. fib, CKD stage II, chronic indwelling Gonzalez catheter (however per patient it has only been in place since about a week), CAD, JANNIE, grade 1 diastolic heart failure, chronic cervical spine disease, chronic hypoxemic respiratory failure on 2 L oxygen at baseline.  Patient is from a living facility, was seen in the ER on 5/1 due to bleeding from his Gonzalez catheter.  A test for SARS-CoV-2 was negative at that time and he was returned to the living facility.  He was reportedly later found down next to his bed, was weak and unable to get up.  Patient is normally on 2 L of baseline oxygen but his saturations were noted to be in the 80s.  He was thus brought back to the ER.  He was afebrile, had a mild leukocytosis of 11.1.  Blood cultures were obtained and he was started on vancomycin and Zosyn.  Blood cultures hacenow returned positive for MRSA.  Patient has hardware in his spine (cervical, placed July 2018).    Patient was admitted to the ICU, is on high flow nasal cannula oxygen, SARS-CoV-2 PCR negative on 5/1 and repeat also negative on 5/3.  TTE obtained 5/3 with no evidence of infective endocarditis.  CT of the L-spine with numerous lucencies throughout the lumbar spine concerning for lytic lesions or myeloma.  Also noted were questionable nondisplaced fractures to T12, L2.    Patient notes feeling better compared to admission, notes back pain is his most concerning symptom.  He is on high flow nasal cannula oxygen but states he has no cough or shortness of breath.    Review of Systems:  All other systems  reviewed and are negative expect as noted in HPI    Past Medical History:   Diagnosis Date   • Anesthesia     low O2 sat   • Arrhythmia     a-fib   • arthritis 6/17/2011    thumb, fingers   • Arthritis     hip   • Backpain    • CAD (coronary artery disease)     MIRELLA to distal RCA and prox LAD   • Cataract     left eye surgery   • Dental disorder     full dentures   • Diabetes     insulin, Dr Oconnor, his APN   • High cholesterol    • Hypertension    • MRSA (methicillin resistant Staphylococcus aureus)     history of, nothing current 2016, on clindamycin for rest of life per patient   • Pain 05-03-13    shoulders, hip, right knee, 7/10   • Pneumonia 2002   • Renal disorder     stage 2   • Sleep apnea     bipap   • Stroke (HCC)     2/1/2007, 4/1/2007, right sided weakness; balance disturbance, memory problems   • Thyroid mass 7/30/2009    benign lump   • Ventricular ectopy 6/17/2011       Past Surgical History:   Procedure Laterality Date   • CERVICAL FUSION POSTERIOR  7/13/2018    Procedure: CERVICAL FUSION POSTERIOR/ C2-4;  Surgeon: Boby Marsh M.D.;  Location: SURGERY [facility];  Service: Neurosurgery   • CERVICAL LAMINECTOMY POSTERIOR  7/13/2018    Procedure: CERVICAL LAMINECTOMY POSTERIOR/ C2-3, C5-6;  Surgeon: Boby Marsh M.D.;  Location: SURGERY [facility];  Service: Neurosurgery   • LUMBAR LAMINECTOMY DISKECTOMY  7/13/2018    Procedure: LUMBAR LAMINECTOMY DISKECTOMY/ L2-5 LAMI;  Surgeon: Boby Marsh M.D.;  Location: SURGERY [facility];  Service: Neurosurgery   • CERVICAL DISK AND FUSION ANTERIOR  8/31/2016    Procedure: CERVICAL DISK AND FUSION ANTERIOR C3-7 ;  Surgeon: Alhaji Jaffe M.D.;  Location: [facility];  Service:    • CATARACT PHACO WITH IOL  5/8/2013    Performed by Mame Rehman M.D. at SURGERY SAME DAY [facility]   • IRRIGATION & DEBRIDEMENT ORTHO  11/13/2012    Performed by Gordon Cota M.D. at SURGERY [facility]   • KNEE REVISION  TOTAL  11/13/2012    Performed by Gordon Cota M.D. at SURGERY [facility]   • IRRIGATION & DEBRIDEMENT ORTHO  11/2/2012    Performed by Gordon Cota M.D. at [facility]   • IRRIGATION & DEBRIDEMENT ORTHO Left 10/29/2012    Procedure: left shoulder, with drain;  Surgeon: Gordon Cota M.D.;  Location: SURGERY [facility];  Service:    • INCISION AND DRAINAGE ORTHOPEDIC Right 10/29/2012    Procedure: Right Total Knee I and D and Liner Exchange, with drain;  Surgeon: Gordon Cota M.D.;  Location: [facility];  Service:    • IRRIGATION & DEBRIDEMENT ORTHO  3/13/2012    Performed by KAMALJIT SHI at [facility]   • KNEE REVISION TOTAL  3/13/2012    Performed by KAMALJIT SHI at [facility]   • TENDON REPAIR  3/13/2012    Performed by KAMALJIT SHI at [facility]   • KNEE ARTHROPLASTY TOTAL  1/11/2012    Right; Performed by KAMALJIT SHI at [facility]   • TOE AMPUTATION  7/22/2011    Performed by EVELYN JANE at [facility]   • ELBOW ARTHROTOMY  2008    right   • LUMBAR FUSION POSTERIOR  1979   • FOOT SURGERY      partial amputation great toe   • FOOT SURGERY      toe surgery left foot   • OTHER      left eye cataract   • OTHER NEUROLOGICAL SURG      neck fusion   • PB KNEE SCOPE,SINGLE MENISECTOMY      right knee   • ZZZ CARDIAC CATH         Family History   Problem Relation Age of Onset   • Diabetes Mother    • Cancer Father         lung cancer   • Heart Disease Father         triple bypass   • Diabetes Other    • Hypertension Other    • Cancer Other        Social History     Socioeconomic History   • Marital status: Single     Spouse name: Not on file   • Number of children: Not on file   • Years of education: Not on file   • Highest education level: Not on file   Occupational History   • Not on file   Social Needs   • Financial resource strain: Not on file   • Food insecurity     Worry: Not on file      "Inability: Not on file   • Transportation needs     Medical: Not on file     Non-medical: Not on file   Tobacco Use   • Smoking status: Former Smoker     Packs/day: 4.00     Years: 0.50     Pack years: 2.00     Types: Cigarettes     Last attempt to quit: 1974     Years since quittin.3   • Smokeless tobacco: Never Used   Substance and Sexual Activity   • Alcohol use: No   • Drug use: No   • Sexual activity: Yes   Lifestyle   • Physical activity     Days per week: Not on file     Minutes per session: Not on file   • Stress: Not on file   Relationships   • Social connections     Talks on phone: Not on file     Gets together: Not on file     Attends Orthodoxy service: Not on file     Active member of club or organization: Not on file     Attends meetings of clubs or organizations: Not on file     Relationship status: Not on file   • Intimate partner violence     Fear of current or ex partner: Not on file     Emotionally abused: Not on file     Physically abused: Not on file     Forced sexual activity: Not on file   Other Topics Concern   •  Service No   • Blood Transfusions Yes   • Caffeine Concern Yes   • Occupational Exposure No   • Hobby Hazards No   • Sleep Concern No   • Stress Concern No   • Weight Concern Yes   • Special Diet No   • Back Care No   • Exercise Yes   • Bike Helmet No   • Seat Belt Yes   • Self-Exams Yes   Social History Narrative    ** Merged History Encounter **            Allergies   Allergen Reactions   • Demerol      Makes me stop breathing.  Doesn't like because it makes him high   • Fentanyl      \"Sensitive\"   • Latex Itching   • Statins [Hmg-Coa-R Inhibitors] Myalgia     Rxn - ongoing         Medications:    Current Facility-Administered Medications:   •  MD Alert...ICU Electrolyte Replacement per Pharmacy, , Other, PHARMACY TO DOSE, Ktaia Eid M.D.  •  Metoprolol Tartrate (LOPRESSOR) injection 5 mg, 5 mg, Intravenous, Q5 MIN PRN, Katia Eid M.D., 5 mg at 20 " 1031  •  potassium bicarbonate (KLYTE) effervescent tablet 25 mEq, 25 mEq, Enteral Tube, Q2HRS, Ricco Nguyen M.D., 25 mEq at 05/04/20 1031  •  clotrimazole-betamethasone (LOTRISONE) 1-0.05 % cream, , Topical, BID, Ricco Nguyen M.D.  •  senna-docusate (PERICOLACE or SENOKOT S) 8.6-50 MG per tablet 2 Tab, 2 Tab, Oral, BID, Stopped at 05/04/20 0600 **AND** polyethylene glycol/lytes (MIRALAX) PACKET 1 Packet, 1 Packet, Oral, QDAY PRN **AND** magnesium hydroxide (MILK OF MAGNESIA) suspension 30 mL, 30 mL, Oral, QDAY PRN **AND** bisacodyl (DULCOLAX) suppository 10 mg, 10 mg, Rectal, QDAY PRN, Alhaji Bautista M.D.  •  insulin regular (HUMULIN R) injection 2-9 Units, 2-9 Units, Subcutaneous, Q6HRS, Stopped at 05/03/20 1100 **AND** POC Blood Glucose, , , Q6H **AND** NOTIFY MD and PharmD, , , Once **AND** glucose 4 g chewable tablet 16 g, 16 g, Oral, Q15 MIN PRN **AND** dextrose 50% (D50W) injection 50 mL, 50 mL, Intravenous, Q15 MIN PRN, Alhaji Bautista M.D., Last Rate: 50 mL/hr at 05/04/20 0800  •  furosemide (LASIX) injection 40 mg, 40 mg, Intravenous, BID DIURETIC, Alhaji Bautista M.D., 40 mg at 05/04/20 0519  •  MD Alert...Vancomycin per Pharmacy, , Other, PHARMACY TO DOSE, Alhaji Bautista M.D.  •  D5W infusion, , Intravenous, Continuous, Alhaji Bautista M.D., Last Rate: 50 mL/hr at 05/04/20 0815  •  hydrALAZINE (APRESOLINE) injection 10-20 mg, 10-20 mg, Intravenous, Q4HRS PRN, Alhaji Bautista M.D.  •  labetalol (NORMODYNE/TRANDATE) injection 10-20 mg, 10-20 mg, Intravenous, Q4HRS PRN, Alhaji Bautista M.D.  •  latanoprost (XALATAN) 0.005 % ophthalmic solution 1 Drop, 1 Drop, Both Eyes, Q EVENING, Alhaji Bautista M.D., 1 Drop at 05/03/20 1713  •  HYDROmorphone pf (DILAUDID) injection 0.5-1 mg, 0.5-1 mg, Intravenous, Q3HRS PRN, Alhaji Bautista M.D., 0.5 mg at 05/03/20 2330  •  clopidogrel (PLAVIX) tablet 75 mg, 75 mg, Oral, DAILY, Alhaji Bruno  "PRAMOD Silva, Stopped at 20 1030  •  ezetimibe (ZETIA) tablet 10 mg, 10 mg, Oral, DAILY, Alhaji Bautista M.D., Stopped at 20 0600  •  finasteride (PROSCAR) tablet 5 mg, 5 mg, Oral, DAILY, Alhaji Bautista M.D., Stopped at 20 0600  •  metoprolol SR (TOPROL XL) tablet 200 mg, 200 mg, Oral, DAILY, Alhaji Bautista M.D., Stopped at 20 1030  •  rivaroxaban (XARELTO) tablet 20 mg, 20 mg, Oral, PM MEAL, Alhaji Bautista M.D.  •  tamsulosin (FLOMAX) capsule 0.4 mg, 0.4 mg, Oral, AFTER BREAKFAST, Alhaji Bautista M.D.  •  tizanidine (ZANAFLEX) tablet 2 mg, 2 mg, Oral, QHS, Alhaji Bautista M.D., Stopped at 20 2100  •  lisinopril (PRINIVIL) tablet 40 mg, 40 mg, Oral, DAILY, Alhaji Bautista M.D., Stopped at 20 06  •  amLODIPine (NORVASC) tablet 10 mg, 10 mg, Oral, DAILY, Alhaji Bautista M.D., Stopped at 20 0600  •  hydrALAZINE (APRESOLINE) tablet 100 mg, 100 mg, Oral, Q8HRS, Alhaji Bautista M.D., Stopped at 20 06  •  vancomycin (VANCOCIN) 1,700 mg in  mL IVPB, 15 mg/kg, Intravenous, Q12HR, Alhaji Bautista M.D., Stopped at 20 0148    Physical Exam:   Vital Signs: /82   Pulse (!) 114   Temp 36.2 °C (97.2 °F)   Resp (!) 11   Ht 1.905 m (6' 3\")   Wt (!) 131.8 kg (290 lb 9.1 oz)   SpO2 93%   BMI 36.32 kg/m²   Temp  Av.3 °C (97.4 °F)  Min: 36 °C (96.8 °F)  Max: 36.8 °C (98.3 °F)  Vital signs reviewed  Constitutional: Patient is oriented to person, place, and time. Appears ill, no acute distress  Head: Atraumatic, normocephalic  Eyes: Left medial subconjunctival hemorrhage noted, EOM intact. Pupils are equal, round, and reactive to light.   Mouth/Throat: Lips without lesions, oropharynx is clear and moist.  On high flow nasal cannula oxygen  Neck: Neck supple. No masses/lymphadenopathy  Cardiovascular: Normal rate, regular rhythm, normal S1S2 and intact distal pulses. No murmur, gallop, or " friction rub. No pedal edema.  Pulmonary/Chest: No respiratory distress. Unlabored respiratory effort, decreased breath sounds bilateral bases  Abdominal: Soft, non tender, protuberant. BS + x 4. No masses or hepatosplenomegaly.  Gonzalez catheter in place  Musculoskeletal: Right knee with well-healed scar, underlying TKA.  Status post amputation of right first and second toes, left second toe, well-healed.  Left upper extremity central line noted  Neurological: Alert and oriented to person, place, and time. No gross cranial nerve deficit.   Skin: Skin is warm and dry. No rashes or embolic phenomena noted on exposed skin  Psychiatric: Normal mood and affect. Behavior is normal.     LABS:  Recent Labs     05/01/20 1130 05/03/20  0507 05/04/20  0420   WBC 8.5 11.1* 10.7      Recent Labs     05/01/20  1130 05/03/20  0507 05/04/20  0420   HEMOGLOBIN 12.5* 14.2 14.0   HEMATOCRIT 41.0* 45.5 46.8   MCV 84.4 81.7 84.5   MCH 25.7* 25.5* 25.3*   ANISOCYTOSIS  --   --  1+   PLATELETCT 134* 140* 113*       Recent Labs     05/01/20  1130 05/03/20  0507 05/04/20  0420   SODIUM 139 141 141   POTASSIUM 4.9 4.0 3.2*   CHLORIDE 103 101 103   CO2 20 25 23   CREATININE 1.08 1.02 1.09        Recent Labs     05/01/20  1130 05/03/20  0507   ALBUMIN 2.9* 3.4        MICRO:  Blood Culture   Date Value Ref Range Status   12/14/2012 No growth after 5 days of incubation.  Final   03/18/2011 Final report  Final     Blood Culture Hold   Date Value Ref Range Status   05/01/2020 Collected  Final        Latest pertinent labs were reviewed    IMAGING STUDIES:  As above    Hospital Course/Assessment:   Joel Ma is a 69 y.o. male with a history of prior embolic strokes, essential hypertension, type 2 diabetes, dyslipidemia, chronic A. fib, CKD stage II, chronic indwelling Gonzalez catheter, CAD, JANNIE, grade 1 diastolic heart failure, chronic cervical spine disease, chronic hypoxemic respiratory failure on 2 L oxygen at baseline, initially brought  to the ER on 5/1 with bleeding from Gonzalez catheter, had negative SARS-CoV-2 test at the time, but back to the ER on 5/3 with weakness, and repeat SARS-CoV-2 test negative, admitted for acute on chronic hypoxemic respiratory failure and weakness, found to have MRSA bacteremia.  Patient has hardware in the spine, and was also noted to have multiple lytic lesions in lumbar spine.  Potential sources at this time include his Gonzalez, possible developing pneumonia, spinal infection.    Pertinent Diagnoses:  MRSA bacteremia  Acute on chronic hypoxemic respiratory failure  Multiple lytic lesions in spine  CKD stage II  Chronic Gonzalez catheter in place    Plan:   -Agree with IV vancomycin.  Monitor levels and renal function.  Discontinue Zosyn  -Will repeat blood cultures x2  -Will check HIV antibody  -TTE with no evidence of infective endocarditis.  Will consider JULIANA if further work-up negative and if persistently positive blood cultures  -CT of the spine with multiple possible lytic lesions of the spine, possible T12 and L2 nondisplaced fractures.  Recommend MRI of the whole spine for better characterization  -Recommend serum total protein, SPEP, UPEP  -Obtain outside records regarding his chronic indwelling Gonzalez catheter.  Per patient, it has only been in place for the past week    Plan was discussed with the primary team, Dr. Nguyen    ID will follow. Please feel free to call with questions.    Sameer Hinkle M.D.    Please note that this dictation was created using voice recognition software. I have worked with technical experts from [facility] to optimize the interface.  I have made every reasonable attempt to correct obvious errors, but there may be errors of grammar and possibly content that I did not discover before finalizing the note.

## 2020-05-04 NOTE — CARE PLAN
Problem: Oxygenation:  Goal: Maintain adequate oxygenation dependent on patient condition  Outcome: PROGRESSING SLOWER THAN EXPECTED     Heated high flow 60L 100%

## 2020-05-05 NOTE — PROGRESS NOTES
Critical Care Progress Note    Date of admission  5/3/2020    Chief Complaint  69 y.o. male admitted 5/3/2020 after being found down and in RF    Hospital Course    The entire history is obtained from healthcare providers and the medical record as this gentleman cannot provide me with any history.  This gentleman has a history of prior embolic strokes, essential hypertension, type 2 diabetes mellitus, dyslipidemia, chronic atrial fibrillation, chronic indwelling Gonzalez catheter, CAD, JANNIE, stage III CKD, grade 1 diastolic heart failure and chronic cervical spine disease.  He was seen in the emergency room on or about 5/1/2020 with bleeding from his Gonzalez catheter.  A test for SARS-CoV-2 was negative at that time.  He was sent back to his living facility after being treated in the emergency department.  He resides at [facility].     Today he was found down next to his bed.  He was weak and was unable to get up.  He is apparently normally on 2 L of domiciliary oxygen and his saturations were in the 80s.  He was placed on supplemental oxygen and brought to the emergency department.  He apparently reported that he fell from bed and was unable to get up due to lack of strength.  He was complaining of back pain in the emergency room and was administered IV narcotics.  At the time of my evaluation he is sedated from his pain medications and cannot provide me with any history.  A POLST is present on his chart and confirms that he is DNR/DNI status.  In the emergency department he had blood cultures obtained and he received a dose of Rocephin.  His BNP was elevated and he received IV furosemide.      Interval Problem Update  Reviewed last 24 hour events:    A&O x3, varies at times  DEX 0.5  Back pain persists  Could not hold still for MRI  AF w/ RVR stil at times  SBp 110-130s  I/Os pos 1316 cc  HFNC O2 60L 100%  CXR worse bilat R>L opacities  Nectar thick DIII diet  Tm 98.3  WBC 10.7, no delta  HIV  screen neg  Urine culture negative at 48 hours  Vanco IV  Xarelto  Plt better  Glucose 268  BUN trending up  Mg 1.8  D5W stopped  Med SSI - starting use    Keep [place], recently changed in the ER approximately 5/1  MRI trial again w/ dexmedetomidine infusion, as needed Xanax and Norco-pending  Repeat BC x2 every 48 hours until culture negative  Mg repletion  F/u on SPEP/UPEP, still pending  Wound following  Resume PRN xanax and JOSE ALBERTO         YESTERDAY  Called by RN to bedside for A. fib RVR  Additional dose of IV metoprolol administered and will continue as needed  Patient with persistent A. fib RVR that is been responsive to IV metoprolol    N.p.o. for swallowing dysfunction, not taking his normal p.o. metoprolol  Blood cultures positive for MRSA x2 from 5/30 early a.m.  Nasal PCR for MRSA positive with history of MRSA as well  Remains on high flow nasal cannula 100%, 70 L  COVID-19 PCR negative on 5/1 and 5/3  Alert and following better  AF   O x 2  NPO for now  Weak, needs assist to sit up  HF NC 60/100%  Xarelto  Vanco/Zosyn    Limited ice chips?  Formal swallow eval pending  May need cortrak  Okay to transfer to medical ICU  PT/OT  D/c Zosyn  Repeat blood cultures tomorrow at 48 hours out at antibiotic phu and repeat every 48 hours until culture negative times at least 1 set  ID eval - source and treatment plan  May need JULIANA if no obvious source identified  Image spine, low back pain and cervical hardware noted clinically  Lotrizone topical for groin rash, reviewed with RN and clinical pharmacist    Case reviewed with speech therapy, nectar thick monitored diet to be recommended    Case reviewed at length with ID including recent imaging  Will obtain MR spine with and without contrast  Screening studies for multiple myeloma be sent including UPEP and SPEP  HIV antibody screen sent as well    Review of Systems  Review of Systems   Constitutional: Positive for malaise/fatigue. Negative for chills,  diaphoresis and fever.   HENT: Negative for congestion and sore throat.    Eyes: Negative.    Respiratory: Positive for cough and shortness of breath (Mild- better). Negative for hemoptysis and sputum production.    Cardiovascular: Negative for chest pain and palpitations.   Gastrointestinal: Negative for abdominal pain, diarrhea, nausea and vomiting.   Genitourinary: Negative.    Musculoskeletal: Positive for back pain (persisting - chronic). Negative for neck pain.   Skin: Positive for rash.        Vital Signs for last 24 hours   Temp:  [36.8 °C (98.2 °F)-37 °C (98.6 °F)] 36.8 °C (98.2 °F)  Pulse:  [] 83  Resp:  [10-25] 12  BP: ()/() 137/92  SpO2:  [86 %-96 %] 93 %    Hemodynamic parameters for last 24 hours       Respiratory Information for the last 24 hours       Physical Exam   Physical Exam  Constitutional:       Appearance: He is obese. He is ill-appearing (acute on chronic). He is not toxic-appearing.      Comments: High flow nasal cannula   HENT:      Head: Normocephalic and atraumatic.      Mouth/Throat:      Mouth: Mucous membranes are moist.   Eyes:      General: No scleral icterus.     Extraocular Movements: Extraocular movements intact.      Pupils: Pupils are equal, round, and reactive to light.   Neck:      Trachea: Phonation normal.   Cardiovascular:      Rate and Rhythm: Tachycardia present. Rhythm irregularly irregular. Occasional extrasystoles are present.     Heart sounds: No murmur. No gallop.       Comments: Remains in atrial fibrillation with RVR at times  Pulmonary:      Effort: No accessory muscle usage or respiratory distress.      Breath sounds: Rales present. No wheezing.   Abdominal:      General: Bowel sounds are normal. There is no distension.      Palpations: Abdomen is soft.      Tenderness: There is no abdominal tenderness. There is no right CVA tenderness or left CVA tenderness.   Musculoskeletal:      Right lower le+ Edema present.      Left lower le+  Edema present.   Lymphadenopathy:      Cervical: No cervical adenopathy.   Skin:     Capillary Refill: Capillary refill takes less than 2 seconds.      Coloration: Skin is not cyanotic.      Nails: There is no clubbing.        Comments: Chronic venous changes   Neurological:      Mental Status: He is alert.      Comments: Alert and follows, oriented x2-3, cranial nerves intact, bilateral lower extremity weakness and some sensory loss as well, chronic dating back 8 years to trauma from a backhoe injury (he was struck by one) and had a stroke, per patient he has not walked for 8 years   Psychiatric:         Behavior: Behavior is cooperative.         Medications  Current Facility-Administered Medications   Medication Dose Route Frequency Provider Last Rate Last Dose   • Dexmedetomidine HCl-Dextrose 200MCG/50ML -5% SOLN  0.1-1.5 mcg/kg/hr Intravenous Continuous Adrian Nowak M.D. 16.5 mL/hr at 05/05/20 1442 0.5 mcg/kg/hr at 05/05/20 1442   • ALPRAZolam (XANAX) tablet 0.25 mg  0.25 mg Oral 4X/DAY PRN Ricco Nguyen M.D.       • gabapentin (NEURONTIN) capsule 300 mg  300 mg Oral BID Ricco Nguyen M.D.   300 mg at 05/05/20 1159   • gabapentin (NEURONTIN) capsule 600 mg  600 mg Oral Q EVENING Ricco Nguyen M.D.       • [START ON 5/6/2020] vancomycin (VANCOCIN) 1,700 mg in  mL IVPB  15 mg/kg Intravenous Q12HR Ricco Nguyen M.D.       • MD Alert...ICU Electrolyte Replacement per Pharmacy   Other PHARMACY TO DOSE Katia Eid M.D.       • clotrimazole-betamethasone (LOTRISONE) 1-0.05 % cream   Topical BID Ricco Nguyen M.D.       • oxyCODONE immediate-release (ROXICODONE) tablet 5 mg  5 mg Oral Q4HRS PRN Ricco Nguyen M.D.   5 mg at 05/04/20 1735   • senna-docusate (PERICOLACE or SENOKOT S) 8.6-50 MG per tablet 2 Tab  2 Tab Oral BID Alhaji Bautista M.D.   2 Tab at 05/05/20 0528    And   • polyethylene glycol/lytes (MIRALAX) PACKET 1 Packet  1 Packet Oral QDAY PRN Alhaji Bruno  PRAMOD Silva        And   • magnesium hydroxide (MILK OF MAGNESIA) suspension 30 mL  30 mL Oral QDAY PRN Alhaji Bautista M.D.        And   • bisacodyl (DULCOLAX) suppository 10 mg  10 mg Rectal QDAY PRN Alhaji Bautista M.D.       • insulin regular (HUMULIN R) injection 2-9 Units  2-9 Units Subcutaneous Q6HRS Alhaji Bautista M.D.   3 Units at 05/05/20 1207    And   • glucose 4 g chewable tablet 16 g  16 g Oral Q15 MIN PRN Alhaji Bautista M.D.        And   • dextrose 50% (D50W) injection 50 mL  50 mL Intravenous Q15 MIN PRN Alhaji Bautista M.D. 50 mL/hr at 05/04/20 0800     • furosemide (LASIX) injection 40 mg  40 mg Intravenous BID DIURETIC Alhaji Bautista M.D.   40 mg at 05/05/20 0509   • MD Alert...Vancomycin per Pharmacy   Other PHARMACY TO DOSE Alhaji Bautista M.D.       • hydrALAZINE (APRESOLINE) injection 10-20 mg  10-20 mg Intravenous Q4HRS PRN Alhaji Bautista M.D.       • labetalol (NORMODYNE/TRANDATE) injection 10-20 mg  10-20 mg Intravenous Q4HRS PRN Alhaji Bautista M.D.       • latanoprost (XALATAN) 0.005 % ophthalmic solution 1 Drop  1 Drop Both Eyes Q EVENING Alhaji Bautista M.D.   1 Drop at 05/04/20 1738   • HYDROmorphone pf (DILAUDID) injection 0.5-1 mg  0.5-1 mg Intravenous Q3HRS PRN Alhaji Bautista M.D.   1 mg at 05/05/20 1158   • clopidogrel (PLAVIX) tablet 75 mg  75 mg Oral DAILY Alhaji Bautista M.D.   75 mg at 05/05/20 0528   • ezetimibe (ZETIA) tablet 10 mg  10 mg Oral DAILY Alhaji Bautista M.D.   10 mg at 05/05/20 0528   • finasteride (PROSCAR) tablet 5 mg  5 mg Oral DAILY Alhaji Bautista M.D.   5 mg at 05/05/20 0528   • metoprolol SR (TOPROL XL) tablet 200 mg  200 mg Oral DAILY Alhaji Bautista M.D.   Stopped at 05/03/20 1030   • rivaroxaban (XARELTO) tablet 20 mg  20 mg Oral PM MEAL Alhaji Bautista M.D.   20 mg at 05/04/20 1243   • tamsulosin (FLOMAX) capsule 0.4 mg  0.4 mg Oral AFTER  BREAKFAST Alhaji Bautista M.D.   0.4 mg at 05/05/20 1159   • tizanidine (ZANAFLEX) tablet 2 mg  2 mg Oral QHS Alhaji Bautista M.D.   Stopped at 05/03/20 2100   • lisinopril (PRINIVIL) tablet 40 mg  40 mg Oral DAILY Alhaji Bautista M.D.   Stopped at 05/04/20 0600   • amLODIPine (NORVASC) tablet 10 mg  10 mg Oral DAILY Alhaji Bautista M.D.   Stopped at 05/04/20 0600   • hydrALAZINE (APRESOLINE) tablet 100 mg  100 mg Oral Q8HRS Alhaji Bautista M.D.   Stopped at 05/04/20 0600       Fluids    Intake/Output Summary (Last 24 hours) at 5/5/2020 1650  Last data filed at 5/5/2020 1600  Gross per 24 hour   Intake 2866.64 ml   Output 2370 ml   Net 496.64 ml       Laboratory          Recent Labs     05/03/20  0507 05/04/20  0420 05/05/20  0345   SODIUM 141 141 140   POTASSIUM 4.0 3.2* 4.4   CHLORIDE 101 103 104   CO2 25 23 26   BUN 31* 31* 39*   CREATININE 1.02 1.09 1.12   MAGNESIUM  --  1.7 1.8   CALCIUM 9.2 8.4* 8.1*     Recent Labs     05/03/20  0507 05/04/20  0420 05/05/20  0345   ALTSGPT 19  --   --    ASTSGOT 27  --   --    ALKPHOSPHAT 130*  --   --    TBILIRUBIN 0.9  --   --    GLUCOSE 63* 136* 268*     Recent Labs     05/03/20  0507 05/04/20  0420 05/05/20  0345   WBC 11.1* 10.7 10.7   NEUTSPOLYS 87.20* 84.50* 82.90*   LYMPHOCYTES 2.20* 3.30* 4.60*   MONOCYTES 9.80 11.60 11.60   EOSINOPHILS 0.00 0.00 0.20   BASOPHILS 0.20 0.20 0.20   ASTSGOT 27  --   --    ALTSGPT 19  --   --    ALKPHOSPHAT 130*  --   --    TBILIRUBIN 0.9  --   --      Recent Labs     05/03/20  0507 05/04/20  0420 05/05/20  0345   RBC 5.57 5.54 5.31   HEMOGLOBIN 14.2 14.0 13.5*   HEMATOCRIT 45.5 46.8 43.6   PLATELETCT 140* 113* 130*   PROTHROMBTM 17.1*  --   --    APTT 31.3  --   --    INR 1.36*  --   --    FERRITIN 228.0  --   --        Imaging  X-Ray:  I have personally reviewed the images and compared with prior images.  EKG:  I have personally reviewed the images and compared with prior images.  CT:     Reviewed  Echo:   Reviewed    Assessment/Plan  * Acute on chronic respiratory failure with hypoxemia (HCC)  Assessment & Plan  History of 2 L of domiciliary oxygen  Requires very high supplemental oxygen on a HFNC  CTA negative for acute pulmonary embolism  Differential diagnosis of etiology includes pneumonia and CHF, EF worse with 40% down from 65  Blood cultures positive for MRSA, sepsis component contributing, may develop ARDS, IV vancomycin continuing  Confirmed CODE STATUS  Monitor for need for AVAPS which he would accept an LOC is acceptable now  Forced diuresis as tolerated with IV Lasix    Acute on chronic diastolic (congestive) heart failure (HCC)  Assessment & Plan  Prior echo revealed grade 1 diastolic dysfunction  Repeat echo noted, EF significantly down from 65 to 40%, global hypokinesis  May benefit from follow-up cardiology consultation, monitoring hemodynamics  Force diuresis with furosemide as tolerated, desire negative fluid balance daily for a while to see if that helps with oxygenation if possible, continue IV Lasix    Pneumonia due to infectious organism  Assessment & Plan  Blood culture positive x2 for MRSA  Sputum culture pending  nasal MRSA PCR positive  Continue vancomycin, stop Zosyn  Check HIV screen  Repeat blood cultures x2 every 48 hours until culture negative, 5/5 set pending  May need JULIANA versus empiric therapy and this DNR/DNI patient, I prefer the latter  Rule out multiple myeloma, suspect osteomyelitis over MM  Rule out hardware infection/spine involvement, MRI pending, no change neurologically on review of old records and review with patient  ID consultation ongoing    Abnormal CT of spine  Assessment & Plan  Initial imaging of his lumbar spine reveals a questionable nondisplaced fracture through the T12 vertebral body with moderate vertebral height loss and a questionable nondisplaced fracture of the L2 vertebral body.  Numerous lucencies throughout the lumbar spine concerning  for lytic lesions are present.  Pain control for now  MRI of spine with and without contrast ordered-pending, patient had difficulty with holding still 5/4  Best I can tell in reviewing the case with the patient and reviewing old records he is unchanged neurologically, he has not walked in 8 years since a backhoe injury and a stroke  UPEP and SPEP ordered at a completeness  Neurosurgical consultation if MRI suggest the need    Sepsis (HCC)  Assessment & Plan  This is Sepsis Present on admission  SIRS criteria identified on my evaluation include: Tachycardia, with heart rate greater than 90 BPM and Tachypnea, with respirations greater than 20 per minute  Source is pulmonary with MRSA positive blood cultures now  Sepsis protocol initiated  IV antibiotics as appropriate for source of sepsis  While organ dysfunction may be noted elsewhere in this problem list or in the chart, degree of organ dysfunction does not meet CMS criteria for severe sepsis    Stage 3 chronic kidney disease (HCC)- (present on admission)  Assessment & Plan  Monitor renal function and urine output  Avoid nephrotoxins  Renally dose medications were appropriate    Chronic atrial fibrillation (HCC)- (present on admission)  Assessment & Plan  Rate control  Resume metoprolol succinate, 200 mg daily when he can take p.o.  Continue anticoagulation with rivaroxaban, monitor for need for heparin/Lovenox therapy and not taking p.o.  IV metoprolol for now to control rate  Add digoxin if necessary if blood pressure soft, Ruano blocker appropriate if BP acceptable  Optimize electrolytes    Diabetes mellitus, type 2 (HCC)- (present on admission)  Assessment & Plan  Sliding scale insulin  Hypoglycemia protocol   Hold metformin, Levemir and Jardiance for now and when clinically appropriate    CAD (coronary artery disease)- (present on admission)  Assessment & Plan  Continue Zetia  Continue Plavix  Continue metoprolol succinate, 200 mg daily    Essential  hypertension- (present on admission)  Assessment & Plan  Goal SBP less than 160  Continue amlodipine, 10 mg daily  Continue hydralazine, 100 mg every 8 hours  Continue lisinopril, 40 mg daily  Continue metoprolol succinate, 200 mg daily  IV substitutes as needed when not taking p.o. including metoprolol, hydralazine and enalapril    DNR (do not resuscitate)  Assessment & Plan  DNR/DNI status per POLST on the chart    JANNIE treated with BiPAP- (present on admission)  Assessment & Plan  Unknown if he is still using BiPAP, monitor for JANNIE and sleep related hypoxemia    BPH (benign prostatic hyperplasia)- (present on admission)  Assessment & Plan  Chronic Gonzalez catheter in place  Recently seen in ER approximately 5/1  Catheter changed out per RN while in recent ER visit, no change until approximately 30 days or sooner if infection develops  Urine culture from admit negative so far  Monitor for UTI    Dyslipidemia- (present on admission)  Assessment & Plan  Continue Zetia    History of stroke- (present on admission)  Assessment & Plan  History of embolic strokes  Continue rivaroxaban and Plavix, monitor for bleeding complication       VTE:  NOAC  Ulcer: H2 Antagonist  Lines: None    I have performed a physical exam and reviewed and updated ROS and Plan today (5/5/2020). In review of yesterday's note (5/4/2020), there are no changes except as documented above.     Discussed patient condition and risk of morbidity and/or mortality with RN, RT, Pharmacy, Charge nurse / hot rounds, Patient and infectious disease     Patient pia critically ill.  Patient pia on very high O2 requirements with HF NC, monitoring for need for AVAPS.  Vanco continues and repeat blood cultures are obtained today.  Patient had difficulty tolerating MRI yesterday, will try again with dexmedetomidine and enteral analgesia.  Also resume home as needed Xanax but avoid oversedation.  No new neurologic changes.  Blood pressure overall improved, just  occasionally soft yesterday, so far has not needed IV pressors.  Remains in atrial fibrillation back on oral agents for rate control but still requiring intravenous metoprolol at times.  Patient's prognosis remains poor and he has a high risk for increased morbidity mortality without above critical care interventions.    The patient remains critically ill.  Critical care time = 45 minutes in directly providing and coordinating critical care and extensive data review.  No time overlap and excludes procedures.

## 2020-05-05 NOTE — PROGRESS NOTES
Notified MD Nowak of increased HR with sustained above 130 with agitation. Orders for Dexmedetomidine.

## 2020-05-05 NOTE — HEART FAILURE PROGRAM
Although MRSA bacteremia causing sepsis is patient's principal problem for this admission, he is also in acutely decompensated HFrEF per intensivists.     Patient resides at a local skilled nursing facility and he is a patient of Dr. Pulliam at the [facility]. On his last visit there, in Sept 2019, it was noted that his EF had improved to 65%.    Unfortunately, current echo shows EF down to 40%, this was in the setting of heart rates from 105-134 that day. Monitor summary from last night indicates heart rate of 90's-160's.    Patient fell out of his bed at his facility and was too weak to get back in which prompted the call to EMS who found him to be hypoxic, he did not pass a bedside swallow evaluation and has been noted to possibly have PNA.     Per RT note yesterday, pt is on HFNC at 60/100%. Because of this, speech has very cautiously advised for a modified diet with strict 1:1 feeding. Swallow trigger is delayed and laryngeal elevation is reduced. Immediate coughing with trials of soft solids and pt was unable to feed himself d/t UE weakness.    Patient has ACP docs on file and is a DNAR/DNI. However, he has not had a palliative care consult in our facility.    Depending on the clinical course this admission takes, it would be helpful to have palliative on board for a quality of life discussion. This is recommended by the American College of Card[employer] to take place throughout the entire clinical course of HF.     Please see below for HF measures that must be addressed prior to discharge.     Daily Nurse: please begin to fill out the HF checklist (pink sheet in hard chart) and use it to guide your daily care.    Discharge Nurse: please ensure completeness of the HF checklist (pink sheet in hard chart) and have it co-signed by the charge RN before the patient leaves the hospital.    Many thanks, Meena, Cardiovascular Nurse Navigator, RN, [facility] [phone], & [employer] M-F (excluding holidays).      HF  Measures:  1. Documentation of LV systolic function (echo or cath) PTA, during this hospitalization, or plan to assess post discharge or reason for not assessing documented  2. Documentation of fluid intake and urine output every nursing shift  3. 2 hour post diuretic assessment documented 2 hours after diuretic given  4. HF Patient Education using the Living Well With Heart Failure Booklet and Symptom Tracker documented every nursing shift  5. Nutrition consult for diet education  6. Daily weights (one weight documented every 24 hours) on a standing scale unless standing is contraindicated in which case bed scale can be used - have patient write weight on symptom tracker  7. For LVEF less than or equal to 40%, ACE-I, ARNI or ARB prescribed at discharge   8. For LVEF less than or equal to 40%, an Evidence Based Beta Blocker (bisoprolol, carvedilol, toprol xl) must be prescribed at discharge  9. For LVEF less than or equal to 35% aldosterone blockade prescribed at discharge  10. The combination of hydralazine and isosorbide dinitrate is recommended to reduce morbidity and mortality for patients self-described  Americans with NYHA class III-IV HFrEF (EF 40% or less), receiving optimal therapy with ACE inhibitors and beta blockers, unless contraindicated (Class I, TOM: A).  11. If a HF patient is diabetic or is newly diagnosed with DM: prescribed diabetes treatment at discharge in the form of glycemic control (diet or anti-hyperglycemic medication) or f/u appointment for diabetes management scheduled at discharge.  12. If a HF patient has diabetes: prescribed lipid lowering medication at discharge  13. Documented smoking cessation advice or counseling  14. If a HF patient has a-fib: anticoagulation is prescribed upon discharge or contraindication is documented  15. Screening for and administering immunizations as long as no contraindications: Pneumonia (regardless of age) and Influenza  16. Written discharge  instructions include:  ? Daily weights  ? Record weight on tracker  ? Bring tracker to appointments  ? Call MD for weight gain of 3lb /day or 5lb/week  ? HF medication teaching  ? Low sodium diet  ? Follow up appointment within seven calendar days of d/c must include: date, time and location  ? Activity  ? Worsening symptoms    What if any of the above HF measures are contraindicated?  ? Request that the discharging provider document the medication/intervention and the contraindication specifically in a progress note  ? For example: “no CHF meds due to hypotension” is not enough. It needs to say: “No ACE-I, ARNI, ARB due to hypotension”; “No Beta Blockade due to bradycardia”…

## 2020-05-05 NOTE — CARE PLAN
Problem: Communication  Goal: The ability to communicate needs accurately and effectively will improve  5/4/2020 1702 by Emi Olson, R.N.  Outcome: PROGRESSING AS EXPECTED  5/4/2020 1702 by Emi Olson, R.N.  Outcome: PROGRESSING AS EXPECTED     Problem: Safety  Goal: Will remain free from injury  5/4/2020 1702 by Emi Olson, R.N.  Outcome: PROGRESSING AS EXPECTED  5/4/2020 1702 by Emi Olson, R.N.  Outcome: PROGRESSING AS EXPECTED  Goal: Will remain free from falls  Outcome: PROGRESSING AS EXPECTED

## 2020-05-05 NOTE — PROGRESS NOTES
"Pharmacy Kinetics 69 y.o. male on vancomycin day # 3 2020    Currently on Vancomycin 1700 mg iv q12hr  Other antibiotics: none    Indication for Treatment: MRSA bacteremia    Pertinent history per medical record: Admitted on 5/3/2020 for respiratory failure, CHF, pneumonia.  Patient was recently seen in ER  for bleeding from urinary catheter.  Patient was tested for COVID on  and was negative.  Today patient was found down next to bed and brought to ER for evaluation.  Patient found to have afib w/ RVR, CHF, acute respiratory failure, and PNA.  Patient has Hx of MRSA per chart.    .    Allergies: Demerol; Fentanyl; Latex; and Statins [hmg-coa-r inhibitors]     List concerns for renal function: age, BMI, elevated BUN/SCr ratio    Pertinent cultures to date:   5/3/20 PBC x 2: Staph aureus (MRSA detected by PCR)    Recent Labs     20  0507 20  0420 20  0345   WBC 11.1* 10.7 10.7   NEUTSPOLYS 87.20* 84.50* 82.90*     Recent Labs     20  0507 20  0420 20  0345   BUN 31* 31* 39*   CREATININE 1.02 1.09 1.12   ALBUMIN 3.4  --   --      Recent Labs     20  1227   VANCOTROUGH 16.8       Intake/Output Summary (Last 24 hours) at 2020 1534  Last data filed at 2020 1200  Gross per 24 hour   Intake 2668.34 ml   Output 2570 ml   Net 98.34 ml      /71   Pulse (!) 114   Temp 37 °C (98.6 °F) (Temporal)   Resp 16   Ht 1.905 m (6' 3\")   Wt (!) 130 kg (286 lb 9.6 oz)   SpO2 94%  Temp (24hrs), Av.8 °C (98.3 °F), Min:36.7 °C (98.1 °F), Max:37 °C (98.6 °F)      A/P   1. Vancomycin dose change: no change  2. Next vancomycin level: tomorrow at 1130  3. Goal trough: 16-20 for now   4. Comments: ID is following.  Planning MRI spine to evaluate further.  UOP remains adequate.  Due to weight and age will check a follow up trough tomorrow to ensure he is not accumulating.      Pradeep Roberts, PharmD, BCPS, BCCP, BCCCP      "

## 2020-05-05 NOTE — CARE PLAN
Problem: Oxygenation:  Goal: Maintain adequate oxygenation dependent on patient condition  Intervention: Manage oxygen therapy by monitoring pulse oximetry and/or ABG values  Note: High Flow Nasal Cannula 60 LPM and 100% FiO2

## 2020-05-05 NOTE — CARE PLAN
Problem: Safety  Goal: Will remain free from falls  Outcome: PROGRESSING AS EXPECTED  Intervention: Implement fall precautions  Flowsheets (Taken 5/5/2020 3196)  Bed Alarm: Yes - Alarm On  Note: Pt encouraged to use call light for assistance.      Problem: Skin Integrity  Goal: Risk for impaired skin integrity will decrease  Intervention: Implement precautions to protect skin integrity in collaboration with the interdisciplinary team  Note: Pt being turned q2. Mepilex dressing in place over sacrum.

## 2020-05-05 NOTE — CARE PLAN
Problem: Communication  Goal: The ability to communicate needs accurately and effectively will improve  Outcome: PROGRESSING SLOWER THAN EXPECTED     Problem: Safety  Goal: Will remain free from injury  Outcome: PROGRESSING SLOWER THAN EXPECTED  Goal: Will remain free from falls  Outcome: PROGRESSING SLOWER THAN EXPECTED     Problem: Infection  Goal: Will remain free from infection  Outcome: PROGRESSING SLOWER THAN EXPECTED     Problem: Venous Thromboembolism (VTW)/Deep Vein Thrombosis (DVT) Prevention:  Goal: Patient will participate in Venous Thrombosis (VTE)/Deep Vein Thrombosis (DVT)Prevention Measures  Outcome: PROGRESSING SLOWER THAN EXPECTED     Problem: Bowel/Gastric:  Goal: Normal bowel function is maintained or improved  Outcome: PROGRESSING SLOWER THAN EXPECTED  Goal: Will not experience complications related to bowel motility  Outcome: PROGRESSING SLOWER THAN EXPECTED     Problem: Knowledge Deficit  Goal: Knowledge of disease process/condition, treatment plan, diagnostic tests, and medications will improve  Outcome: PROGRESSING SLOWER THAN EXPECTED  Goal: Knowledge of the prescribed therapeutic regimen will improve  Outcome: PROGRESSING SLOWER THAN EXPECTED

## 2020-05-05 NOTE — PROGRESS NOTES
Called the patients son to help complete the MRI questioner. No answer at this time. RN will attempt to contact again.

## 2020-05-06 NOTE — PROGRESS NOTES
Critical Care Progress Note    Date of admission  5/3/2020    Chief Complaint  69 y.o. male admitted 5/3/2020 after being found down and in RF    Hospital Course    The entire history is obtained from healthcare providers and the medical record as this gentleman cannot provide me with any history.  This gentleman has a history of prior embolic strokes, essential hypertension, type 2 diabetes mellitus, dyslipidemia, chronic atrial fibrillation, chronic indwelling Gonzalez catheter, CAD, JANNIE, stage III CKD, grade 1 diastolic heart failure and chronic cervical spine disease.  He was seen in the emergency room on or about 5/1/2020 with bleeding from his Gonzalez catheter.  A test for SARS-CoV-2 was negative at that time.  He was sent back to his living facility after being treated in the emergency department.  He resides at [facility].     Today he was found down next to his bed.  He was weak and was unable to get up.  He is apparently normally on 2 L of domiciliary oxygen and his saturations were in the 80s.  He was placed on supplemental oxygen and brought to the emergency department.  He apparently reported that he fell from bed and was unable to get up due to lack of strength.  He was complaining of back pain in the emergency room and was administered IV narcotics.  At the time of my evaluation he is sedated from his pain medications and cannot provide me with any history.  A POLST is present on his chart and confirms that he is DNR/DNI status.  In the emergency department he had blood cultures obtained and he received a dose of Rocephin.  His BNP was elevated and he received IV furosemide.      Interval Problem Update  Reviewed last 24 hour events:    RN asked me to call family early between 7 and 8 AM, will do so  Remains agitated times, yelling out as well as occasionally taking a swing at the nurse  Patient on dexmedetomidine infusion for agitation, with severe hypoxemia decision was elected  to help facilitate MRI along with oral narcotics or oral Xanax as needed, takes both of the latter at home    MRI was planned for last night on dexmedetomidine but apparently the son contacted nursing staff and Dr. Hamlin was contacted as well and family did not want the patient sedated with dexmedetomidine or with Dilaudid and MRI could not be performed    Oriented x1-2 this a.m., orientation continues to wax and wane  Back pain still giving him trouble, it is about the same, he does want to talk about it  Shortness of breath mild but persisting and remains out of proportion to his hypoxemia    Patient and nursing safety the patient was on dexmedetomidine overnight at 0.3-0.5 range, low-dose treatment and it seemed to help without over sedating him    HF NC O2 90%, 60 L  AF 70s-90s  -1 20s  I/Os -  Neg 3.2 L, great UO  T-max 98.6  WBC 5.5  Vancomycin  Repeat blood cultures X2 from yesterday positive for staph  Echocardiogram reviewed again, moderate LVH, global hypokinesis and EF 40% with normal RV pressures but with mildly dilated RV and reduced RV systolic function along with and without comment RA vegetations, dilation of both right and left atrium with the left severe  Nectar thick diet  Chronic kingston  Rivaroxaban  Lasix  Amlodipine/lisinopril  Gabapentin/tizanidine  Platelet count down to 123  Potassium 2.8, mag 1.8  Glucose 200s  SSI 14 units    Contact family  Replete magnesium and potassium  MRI pending hopefully will still be will obtain it  Continue vancomycin  Repeat blood culture x2 tomorrow, 1/2-hour apart, ideally at antibiotic phu  Replete K  Start Lantus 10  Adjust PO meds    Discussed with son and her at length.  He is the POA and lives locally.  His wife Ana is probably the second person to call in regards to issues.  He is okay with medications including dexmedetomidine as needed but we both agreed to try to limit sedation.  Informed him about his repeat positive blood cultures for MRSA  and he said the second time he is had a problem like this it started in his toe, knee and shoulder in the past.  He is aware of hardware from cardiac and orthopedic surgeries.  Reviewed with son his cardiac and respiratory status which may be severe enough to limit his ability to tolerate any surgery at this time.  Reaffirmed DNR/DNI and if he deteriorates he like to be called so he could come in for comfort care.  We talked about hospice.  He wants to continue with therapy for now and see how he does first.    Serial follow-up  Neurologically no change, still agitated and confused at times  MRI pending this afternoon, elaborate sedation plans reviewed at length with nursing times several  Oxygenation unchanged remains on very high level high flow nasal cannula     YESTERDAY  A&O x3, varies at times  DEX 0.5  Back pain persists  Could not hold still for MRI  AF w/ RVR stil at times  SBp 110-130s  I/Os pos 1316 cc  HFNC O2 60L 100%  CXR worse bilat R>L opacities  Nectar thick DIII diet  Tm 98.3  WBC 10.7, no delta  HIV screen neg  Urine culture negative at 48 hours  Vanco IV  Xarelto  Plt better  Glucose 268  BUN trending up  Mg 1.8  D5W stopped  Med SSI - starting use    Keep kingston, recently changed in the ER approximately 5/1  MRI trial again w/ dexmedetomidine infusion, as needed Xanax and Norco-pending  Repeat BC x2 every 48 hours until culture negative  Mg repletion  F/u on SPEP/UPEP, still pending  Wound following  Resume PRN xanax and JOSE LABERTO    Review of Systems  Review of Systems   Constitutional: Positive for malaise/fatigue. Negative for chills, diaphoresis and fever.   HENT: Negative for congestion and sore throat.    Eyes: Negative.    Respiratory: Positive for cough and shortness of breath (Mild- better). Negative for hemoptysis and sputum production.    Cardiovascular: Negative for chest pain and palpitations.   Gastrointestinal: Negative for abdominal pain, diarrhea, nausea and vomiting.   Genitourinary:  Negative.    Musculoskeletal: Positive for back pain (persisting - chronic). Negative for neck pain.   Skin: Positive for rash.        Vital Signs for last 24 hours   Temp:  [35.8 °C (96.5 °F)-36.1 °C (97 °F)] 36.1 °C (96.9 °F)  Pulse:  [] 72  Resp:  [12-24] 19  BP: ()/(60-99) 82/60  SpO2:  [88 %-98 %] 91 %    Hemodynamic parameters for last 24 hours       Respiratory Information for the last 24 hours       Physical Exam   Physical Exam  Constitutional:       General: He is not in acute distress.     Appearance: He is obese. He is ill-appearing (acute on chronic). He is not toxic-appearing.      Comments: High flow nasal cannula   HENT:      Head: Normocephalic and atraumatic.      Mouth/Throat:      Mouth: Mucous membranes are moist.   Eyes:      General: No scleral icterus.     Extraocular Movements: Extraocular movements intact.      Pupils: Pupils are equal, round, and reactive to light.   Neck:      Trachea: Phonation normal.   Cardiovascular:      Rate and Rhythm: Tachycardia present. Rhythm irregularly irregular. Occasional extrasystoles are present.     Pulses: Normal pulses.      Heart sounds: No murmur. No friction rub. No gallop.       Comments: Remains in atrial fibrillation with RVR at times  Pulmonary:      Effort: No accessory muscle usage or respiratory distress.      Breath sounds: Rhonchi and rales (Persisting) present. No wheezing.   Abdominal:      General: Bowel sounds are normal. There is no distension.      Palpations: Abdomen is soft.      Tenderness: There is no abdominal tenderness. There is no right CVA tenderness or left CVA tenderness.   Musculoskeletal:      Right lower le+ Edema present.      Left lower le+ Edema present.   Lymphadenopathy:      Cervical: No cervical adenopathy.   Skin:     Capillary Refill: Capillary refill takes less than 2 seconds.      Coloration: Skin is not cyanotic.      Nails: There is no clubbing.        Comments: Chronic venous changes    Neurological:      Mental Status: He is alert.      Comments: Awake and follows but only oriented 1-2 for me today, no change lower extremity neuro exam, brainstem reflexes intact   Psychiatric:         Mood and Affect: Affect is labile.         Speech: Speech normal.         Behavior: Behavior is agitated (At times). Behavior is cooperative.         Medications  Current Facility-Administered Medications   Medication Dose Route Frequency Provider Last Rate Last Dose   • MD Alert...ICU Electrolyte Replacement per Pharmacy   Other PHARMACY TO DOSE Adrian Nowak M.D.       • insulin glargine (LANTUS) injection 10 Units  10 Units Subcutaneous QAM Ricco Nguyen M.D.   10 Units at 05/06/20 1034   • [START ON 5/7/2020] metoprolol (LOPRESSOR) tablet 100 mg  100 mg Oral TWICE DAILY Ricco Nguyen M.D.       • Dexmedetomidine HCl-Dextrose 200MCG/50ML -5% SOLN  0.1-1.5 mcg/kg/hr Intravenous Continuous Adrian Nowak M.D. 36.2 mL/hr at 05/06/20 1630 1.1 mcg/kg/hr at 05/06/20 1630   • ALPRAZolam (XANAX) tablet 0.25 mg  0.25 mg Oral 4X/DAY PRN Ricco Nguyen M.D.   0.25 mg at 05/06/20 1236   • gabapentin (NEURONTIN) capsule 300 mg  300 mg Oral BID Ricco Nguyen M.D.   300 mg at 05/06/20 1236   • gabapentin (NEURONTIN) capsule 600 mg  600 mg Oral Q EVENING Ricco Nguyen M.D.   Stopped at 05/06/20 2000   • insulin regular (HUMULIN R) injection 2-9 Units  2-9 Units Subcutaneous 4X/DAY FIDENCIO Bautista M.D.   Stopped at 05/06/20 1700    And   • glucose 4 g chewable tablet 16 g  16 g Oral Q15 MIN PRN Alhaji Bautista M.D.        And   • dextrose 50% (D50W) injection 50 mL  50 mL Intravenous Q15 MIN PRN Alhaji Bautista M.D.       • clotrimazole-betamethasone (LOTRISONE) 1-0.05 % cream   Topical BID Ricco Nguyen M.D.   Stopped at 05/06/20 2000   • oxyCODONE immediate-release (ROXICODONE) tablet 5 mg  5 mg Oral Q4HRS PRN Ricco Nguyen M.D.   5 mg at 05/06/20 1520   •  senna-docusate (PERICOLACE or SENOKOT S) 8.6-50 MG per tablet 2 Tab  2 Tab Oral BID Alhaji Bautista M.D.   Stopped at 05/06/20 2000    And   • polyethylene glycol/lytes (MIRALAX) PACKET 1 Packet  1 Packet Oral QDAY PRN Alhaji Bautista M.D.        And   • magnesium hydroxide (MILK OF MAGNESIA) suspension 30 mL  30 mL Oral QDAY PRN Alhaji Bautista M.D.        And   • bisacodyl (DULCOLAX) suppository 10 mg  10 mg Rectal QDAY PRN Alhaji Bautista M.D.       • furosemide (LASIX) injection 40 mg  40 mg Intravenous BID DIURETIC Alhaji Bautista M.D.   Stopped at 05/06/20 1836   • MD Alert...Vancomycin per Pharmacy   Other PHARMACY TO DOSE Alhaji Bautista M.D.       • hydrALAZINE (APRESOLINE) injection 10-20 mg  10-20 mg Intravenous Q4HRS PRN Alhaji Bautista M.D.       • labetalol (NORMODYNE/TRANDATE) injection 10-20 mg  10-20 mg Intravenous Q4HRS PRN Alhaji Bautista M.D.       • latanoprost (XALATAN) 0.005 % ophthalmic solution 1 Drop  1 Drop Both Eyes Q EVENING Alhaji Bautista M.D.   1 Drop at 05/06/20 1841   • clopidogrel (PLAVIX) tablet 75 mg  75 mg Oral DAILY Alhaji Bautista M.D.   75 mg at 05/06/20 0532   • ezetimibe (ZETIA) tablet 10 mg  10 mg Oral DAILY Alhaji Bautista M.D.   10 mg at 05/06/20 0532   • rivaroxaban (XARELTO) tablet 20 mg  20 mg Oral PM MEAL Alhaji Bautista M.D.   Stopped at 05/06/20 2000   • tizanidine (ZANAFLEX) tablet 2 mg  2 mg Oral QHS Alhaji Bautista M.D.   2 mg at 05/05/20 2144   • lisinopril (PRINIVIL) tablet 40 mg  40 mg Oral DAILY Alhaji Bautista M.D.   40 mg at 05/06/20 0533   • amLODIPine (NORVASC) tablet 10 mg  10 mg Oral DAILY Alhaji Bautista M.D.   10 mg at 05/06/20 0533   • hydrALAZINE (APRESOLINE) tablet 100 mg  100 mg Oral Q8HRS Alhaji Bautista M.D.   Stopped at 05/06/20 1400       Fluids    Intake/Output Summary (Last 24 hours) at 5/6/2020 1918  Last data filed at 5/6/2020  1800  Gross per 24 hour   Intake 1865.51 ml   Output 3500 ml   Net -1634.49 ml       Laboratory          Recent Labs     05/04/20 0420 05/05/20 0345 05/06/20  0305   SODIUM 141 140 145   POTASSIUM 3.2* 4.4 2.8*   CHLORIDE 103 104 105   CO2 23 26 29   BUN 31* 39* 34*   CREATININE 1.09 1.12 0.89   MAGNESIUM 1.7 1.8 1.8   CALCIUM 8.4* 8.1* 8.2*     Recent Labs     05/04/20 0420 05/05/20 0345 05/06/20  0305   GLUCOSE 136* 268* 265*     Recent Labs     05/04/20 0420 05/05/20 0345 05/06/20  0305   WBC 10.7 10.7 5.5   NEUTSPOLYS 84.50* 82.90* 75.40*   LYMPHOCYTES 3.30* 4.60* 7.20*   MONOCYTES 11.60 11.60 14.30*   EOSINOPHILS 0.00 0.20 2.00   BASOPHILS 0.20 0.20 0.40     Recent Labs     05/04/20 0420 05/05/20 0345 05/06/20  0305   RBC 5.54 5.31 5.85   HEMOGLOBIN 14.0 13.5* 14.5   HEMATOCRIT 46.8 43.6 47.6   PLATELETCT 113* 130* 123*       Imaging  X-Ray:  I have personally reviewed the images and compared with prior images.  EKG:  I have personally reviewed the images and compared with prior images.  CT:    Reviewed  Echo:   Reviewed    Assessment/Plan  * Acute on chronic respiratory failure with hypoxemia (HCC)  Assessment & Plan  History of 2 L of domiciliary oxygen  Remains very hypoxic on high flow nasal cannula with borderline saturations  CTA negative for acute pulmonary embolism  Differential diagnosis of etiology includes pneumonia and CHF, EF worse with 40% down from 65, probably combination  Blood cultures positive for MRSA, sepsis component contributing, may develop ARDS, IV vancomycin continuing  Confirmed CODE STATUS  Monitor for need for AVAPS which he would accept an LOC is acceptable now  Forced diuresis as tolerated with IV Lasix  Aggressive pulmonary toilet  Mobilize when able, limited since he is unable to ambulate and has severe low back pain  Incentive spirometry if he can coordinate    Acute on chronic diastolic (congestive) heart failure (HCC)  Assessment & Plan  Prior echo revealed grade 1  diastolic dysfunction  Repeat echo noted, EF significantly down from 65 to 40%, global hypokinesis  Query related to sepsis and bacteremia from MRSA  Follow-up echocardiogram at some point would be prudent  May benefit from follow-up cardiology consultation, monitoring hemodynamics  Force diuresis with furosemide as tolerated, desire negative fluid balance daily for a while to see if that helps with oxygenation if possible, continue IV Lasix    Pneumonia due to infectious organism  Assessment & Plan  Blood culture positive x for for MRSA  Sputum culture pending  nasal MRSA PCR positive  History of prior MRSA infection in toe, knee and shoulder per son.  Patient required 9 weeks of antibiotics mostly IV.  Continue vancomycin, Zosyn discontinued when blood cultures positive for MRSA  Check HIV screen is negative  Repeat blood cultures x2 every 48 hours until culture negative, 5/7 set pending  May need JULIANA versus empiric therapy and this DNR/DNI patient, I prefer the latter-oxygenation and LOC would make safety for elective JULIANA marginal  Rule out multiple myeloma, suspect osteomyelitis over MM  Rule out hardware infection/spine involvement, MRI pending, no change neurologically on review of old records and review with patient  ID consultation ongoing    Abnormal CT of spine  Assessment & Plan  Initial imaging of his lumbar spine reveals a questionable nondisplaced fracture through the T12 vertebral body with moderate vertebral height loss and a questionable nondisplaced fracture of the L2 vertebral body.  Numerous lucencies throughout the lumbar spine concerning for lytic lesions are present.  Pain control for now  MRI of spine with and without contrast ordered-pending, hopefully to be completed p.m. 5/6, sedation plan reviewed with TERRI Ramirez I can tell in reviewing the case with the patient and reviewing old records he is unchanged neurologically, he has not walked in 8 years since a backhoe injury and a stroke  No  change neurologically on follow-up  UPEP and SPEP ordered at a completeness  Neurosurgical consultation if MRI suggest the need    Sepsis (HCC)  Assessment & Plan  This is Sepsis Present on admission  SIRS criteria identified on my evaluation include: Tachycardia, with heart rate greater than 90 BPM and Tachypnea, with respirations greater than 20 per minute  Source is pulmonary with MRSA positive blood cultures now  Repeat positive blood cultures for MRSA now x4 total suggest possibility of deep-seated infection and hardware or endovascularly, ID consult ongoing, repeating blood cultures until negative  Sepsis protocol initiated  IV antibiotics as appropriate for source of sepsis  While organ dysfunction may be noted elsewhere in this problem list or in the chart, degree of organ dysfunction does not meet CMS criteria for severe sepsis    Stage 3 chronic kidney disease (HCC)- (present on admission)  Assessment & Plan  Monitor renal function and urine output  Avoid nephrotoxins  Renally dose medications were appropriate    Chronic atrial fibrillation (HCC)- (present on admission)  Assessment & Plan  Rate control acceptable  Continue metoprolol succinate, 200 mg daily  Continue anticoagulation with rivaroxaban, monitor for need for heparin/Lovenox therapy and not taking p.o.  IV metoprolol for rate control as needed  Add digoxin if necessary if blood pressure soft, additional IV or enteral beta blocker appropriate if BP acceptable  Continue to optimize electrolytes    Diabetes mellitus, type 2 (HCC)- (present on admission)  Assessment & Plan  Sliding scale insulin, daily review with clinical pharmacist artery dosing and glycemic control  Hypoglycemia protocol   Hold metformin, Levemir and Jardiance for now and when clinically appropriate  Resume Lantus 10 units/day, pharmacy substitute for Levemir in our institution    CAD (coronary artery disease)- (present on admission)  Assessment & Plan  Continue Zetia  Continue  Plavix  Continue metoprolol succinate, 200 mg daily    Essential hypertension- (present on admission)  Assessment & Plan  Goal SBP less than 160  Continue amlodipine, 10 mg daily  Continue hydralazine, 100 mg every 8 hours  Continue lisinopril, 40 mg daily  Continue metoprolol succinate, 200 mg daily  IV substitutes as needed when not taking p.o. including metoprolol, hydralazine and enalapril    DNR (do not resuscitate)  Assessment & Plan  DNR/DNI status per POLST on the chart  Reaffirmed with son after extensive phone call 5/6, son stated he was the POA    JANNIE treated with BiPAP- (present on admission)  Assessment & Plan  Unknown if he is still using BiPAP, monitor for JANNIE and sleep related hypoxemia    BPH (benign prostatic hyperplasia)- (present on admission)  Assessment & Plan  Chronic Gonzalez catheter in place  Recently seen in ER approximately 5/1  Catheter changed out per RN while in recent ER visit, no change until approximately 30 days or sooner if infection develops  Urine culture from admit negative so far  Monitor for UTI    Dyslipidemia- (present on admission)  Assessment & Plan  Continue Zetia    History of stroke- (present on admission)  Assessment & Plan  History of embolic strokes  Continue rivaroxaban and Plavix, monitor for bleeding complication       VTE:  NOAC  Ulcer: H2 Antagonist  Lines: None    I have performed a physical exam and reviewed and updated ROS and Plan today (5/6/2020). In review of yesterday's note (5/5/2020), there are no changes except as documented above.     Discussed patient condition and risk of morbidity and/or mortality with Family, RN, RT, Pharmacy, Charge nurse / hot rounds, Patient and infectious disease     Patient remains critically ill.  Oxygen requirements and still extremely high on HF NC.  Monitoring closely for the need for AVAPS.  Confusion waxing and waning and is required dexmedetomidine for severe agitation and will required for his MRI spine to rule out  osteomyelitis.  Hemodynamics acceptable, has not required pressors.  May need neurosurgical consultation or orthopedic consultation depending on findings of MRI.  Prior deep-seated staph infection reviewed with son today at length.  Remains in atrial fibrillation intermittently has RVR which is controlled with oral or as needed IV doses of metoprolol.  Patient continues to be anticoagulated.  Patient's prognosis remains poor.  Patient remains at high risk for increased morbidity and mortality without above critical care interventions.    The patient remains critically ill.  Critical care time = 85 minutes in directly providing and coordinating critical care and extensive data review.  No time overlap and excludes procedures.

## 2020-05-06 NOTE — PROGRESS NOTES
Infectious Disease Progress Note    Author: Sameer Hinkle M.D. Date & Time of service: 2020  5:06 PM    Chief Complaint:  Follow up of MRSA bacteremia    Interval History:   afebrile, white count 10.7, tolerating vancomycin.  Resting comfortably this morning, no new issues, MRI is pending.  Was unable to hold still.  Episodes of A. fib with RVR.    Labs Reviewed and Medications Reviewed.    Review of Systems:  Review of Systems   Constitutional: Positive for malaise/fatigue. Negative for chills and fever.   Respiratory: Positive for cough. Negative for sputum production.    Gastrointestinal: Negative for abdominal pain, diarrhea, nausea and vomiting.   Genitourinary: Negative for dysuria.   Musculoskeletal: Positive for back pain.   Skin: Negative for itching and rash.   All other systems reviewed and are negative.      Hemodynamics:  Temp (24hrs), Av.8 °C (98.3 °F), Min:36.8 °C (98.2 °F), Max:37 °C (98.6 °F)  Temperature: 36.8 °C (98.2 °F)  Pulse  Av.5  Min: 75  Max: 147   Blood Pressure : 137/92       Physical Exam:  Physical Exam  Vitals signs and nursing note reviewed.   Constitutional:       Appearance: He is ill-appearing. He is not diaphoretic.   HENT:      Nose:      Comments: On high flow nasal cannula oxygen, but appears to be primarily mouth breathing  Eyes:      General:         Right eye: No discharge.         Left eye: No discharge.      Pupils: Pupils are equal, round, and reactive to light.   Cardiovascular:      Rate and Rhythm: Normal rate. Rhythm irregular.   Pulmonary:      Effort: Pulmonary effort is normal. No respiratory distress.      Breath sounds: Rales present.   Abdominal:      General: Abdomen is flat.      Tenderness: There is no abdominal tenderness.      Comments: Protuberant   Musculoskeletal:         General: No tenderness.      Right lower leg: Edema present.      Left lower leg: Edema present.   Skin:     Coloration: Skin is not pale.      Findings: No  erythema.   Neurological:      Comments: Bilateral lower extremity weakness (chronic)         Meds:    Current Facility-Administered Medications:   •  dexmedetomidine (PRECEDEX) infusion  •  ALPRAZolam  •  gabapentin  •  gabapentin  •  [START ON 5/6/2020] vancomycin  •  MD Alert...Adult ICU Electrolyte Replacement per Pharmacy  •  clotrimazole-betamethasone  •  oxyCODONE immediate-release  •  senna-docusate **AND** polyethylene glycol/lytes **AND** magnesium hydroxide **AND** bisacodyl  •  insulin regular **AND** POC Blood Glucose **AND** NOTIFY MD and PharmD **AND** glucose **AND** dextrose 50%  •  furosemide  •  MD Alert...Vancomycin per Pharmacy  •  hydrALAZINE  •  labetalol  •  latanoprost  •  HYDROmorphone  •  clopidogrel  •  ezetimibe  •  finasteride  •  metoprolol SR  •  rivaroxaban  •  tamsulosin  •  tizanidine  •  lisinopril  •  amLODIPine  •  hydrALAZINE    Labs:  Recent Labs     05/03/20 0507 05/04/20 0420 05/05/20 0345   WBC 11.1* 10.7 10.7   RBC 5.57 5.54 5.31   HEMOGLOBIN 14.2 14.0 13.5*   HEMATOCRIT 45.5 46.8 43.6   MCV 81.7 84.5 82.1   MCH 25.5* 25.3* 25.4*   RDW 51.5* 54.1* 53.1*   PLATELETCT 140* 113* 130*   MPV 10.6 11.2 11.6   NEUTSPOLYS 87.20* 84.50* 82.90*   LYMPHOCYTES 2.20* 3.30* 4.60*   MONOCYTES 9.80 11.60 11.60   EOSINOPHILS 0.00 0.00 0.20   BASOPHILS 0.20 0.20 0.20   RBCMORPHOLO  --  Present  --      Recent Labs     05/03/20 0507 05/04/20 0420 05/05/20 0345   SODIUM 141 141 140   POTASSIUM 4.0 3.2* 4.4   CHLORIDE 101 103 104   CO2 25 23 26   GLUCOSE 63* 136* 268*   BUN 31* 31* 39*     Recent Labs     05/03/20 0507 05/04/20 0420 05/05/20  0345   ALBUMIN 3.4  --   --    TBILIRUBIN 0.9  --   --    ALKPHOSPHAT 130*  --   --    TOTPROTEIN 6.8  --   --    ALTSGPT 19  --   --    ASTSGOT 27  --   --    CREATININE 1.02 1.09 1.12       Imaging:  Ct-head W/o    Result Date: 5/3/2020  5/3/2020 6:45 AM HISTORY/REASON FOR EXAM:  Altered mental status Fall. TECHNIQUE/EXAM DESCRIPTION AND NUMBER  OF VIEWS: CT of the head without contrast. The study was performed on a helical multidetector CT scanner. Contiguous 2.5 mm axial sections were obtained from the skull base through the vertex. Up to date radiation dose reduction adjustments have been utilized to meet ALARA standards for radiation dose reduction. COMPARISON:  1/7/2013 FINDINGS: There is no evidence of acute intracranial hemorrhage, mass, mass-effect or shift of midline structures. Gray-white matter differentiation is grossly preserved. Mild diffuse cerebral atrophy. The basal cisterns are patent. There are no abnormal extra-axial fluid collections. No depressed or widely  calvarial fracture is seen. The visualized paranasal sinuses and temporal bone structures are aerated. ___________________________________     1. No acute intracranial abnormality. No evidence of acute intracranial hemorrhage or mass lesion.     Ct-lspine W/o Plus Recons    Result Date: 5/3/2020  5/3/2020 6:46 AM HISTORY/REASON FOR EXAM:  Back pain or radiculopathy, trauma Fall. Back pain. TECHNIQUE/EXAM DESCRIPTION AND NUMBER OF VIEWS: CT lumbar spine without contrast, with reconstructions. The study was performed on a helical multidetector CT scanner. Thin-section helical scanning was performed from T12-L1 to the sacrum. Sagittal and coronal multiplanar reconstructions were generated from the axial images. Low dose optimization technique was utilized for this CT exam including automated exposure control and adjustment of the mA and/or kV according to patient size. COMPARISON: None. FINDINGS: Diffuse osseous demineralization. Questionable nondisplaced fracture through the T12 vertebral bodies with moderate vertebral height loss (image 105 series 2). Questionable nondisplaced fracture through the L2 vertebral body as well (image 82 series 2). No retropulsion seen. Very severe degenerative change of the lumbar spine with extensive endplate productive change and facet  arthropathy. Postsurgical change from posterior decompression from L3-L5     1. Questionable nondisplaced fracture through the T12 vertebral bodies with moderate vertebral height loss. Questionable nondisplaced fracture through the L2 vertebral body as well. MR could be helpful for further evaluation. 2. Numerous lucencies throughout the lumbar spine concerning for lytic lesions/multiple myeloma.     Dx-chest-portable (1 View)    Result Date: 5/5/2020 5/5/2020 2:31 AM HISTORY/REASON FOR EXAM:  Shortness of Breath. TECHNIQUE/EXAM DESCRIPTION AND NUMBER OF VIEWS: Single portable view of the chest. COMPARISON: 5/3/2020 FINDINGS: Cardiac silhouette is enlarged. Diffuse interstitial and hazy airspace opacities may represent pulmonary edema and/or infection. Right greater than left pleural effusions and nonspecific elevation of the right hemidiaphragm. Right basilar opacity may represent compressive atelectasis and/or pneumonia. Postsurgical changes ACDF.     1.  Right greater left pleural effusions. 2.  Bilateral pulmonary opacities which could represent pulmonary edema. Correlate clinically for infection.    Dx-chest-portable (1 View)    Result Date: 5/3/2020  5/3/2020 5:09 AM HISTORY/REASON FOR EXAM:  Sepsis; sepsis Back pain. TECHNIQUE/EXAM DESCRIPTION AND NUMBER OF VIEWS: Single portable view of the chest. COMPARISON: 5/1/2020 FINDINGS: Low lung volume. Elevation of the right hemidiaphragm. Diffuse interstitial prominence. Hazy right basilar and left hilar opacities. No pleural effusion. No pneumothorax. Stable cardiopericardial silhouette.     1. Low lung volume with hypoventilatory change. 2. Hazy right basilar and left hilar opacities could be atelectasis or infection.    Dx-chest-portable (1 View)    Result Date: 5/1/2020 5/1/2020 12:26 PM HISTORY/REASON FOR EXAM:  Shortness of Breath. TECHNIQUE/EXAM DESCRIPTION AND NUMBER OF VIEWS: Single portable view of the chest. COMPARISON: 9/6/2018 FINDINGS: Single  portable view of the chest demonstrates a prominent cardiac silhouette and mediastinal contours. The left costophrenic angle is excluded from the image. There is mild interstitial prominence. No significant pleural fluid or pneumothorax. Prior cervical fusion is partially visualized.     1.  Prominent cardiac silhouette and increased interstitial markings. Findings are likely related to low lung volumes and portable AP technique    Ct-cta Chest Pulmonary Artery W/ Recons    Result Date: 5/3/2020  5/3/2020 6:45 AM HISTORY/REASON FOR EXAM:  PE suspected, high pretest prob Fall. Chest pain. TECHNIQUE/EXAM DESCRIPTION: CT angiogram scan for pulmonary embolism with contrast, with reconstructions. 1.25 mm helical sections were obtained from the lung apices through the lung bases following the rapid bolus administration of 100 mL of Omnipaque 350 nonionic contrast. Thin-section overlapping reconstruction interval was utilized. Coronal reconstructions were generated from the axial data. MIP post processing was performed and utilized for the interpretation. Low dose optimization technique was utilized for this CT exam including automated exposure control and adjustment of the mA and/or kV according to patient size. COMPARISON: 6/22/2018 FINDINGS: Pulmonary Embolism: No. Main Pulmonary Arteries: No. Segmental branches: No. Subsegmental branches: No. Additional Comments: None. Lungs: Low lung volume. Diffuse interlobular septal thickening. Airspace opacity in the bilateral lung bases, right more than left. Scattered calcified granulomas. Airway: Patent. Pleura: Small bilateral pleural effusions. Nodes: No enlarged mediastinal or hilar lymph nodes. Additional findings: Thoracic aorta and great vessels: The ascending aorta measures 4.5 cm. Heart and pericardium: Severe coronary artery calcification. No pericardial effusion. Thoracic spine: Moderate degenerative change of the thoracic spine. No compression deformity. Chest wall:  Diffuse anasarca Grossly unchanged left thyroid nodule. Visualized upper abdomen: No acute abnormality.     1. No CT evidence of pulmonary embolism. 2. Diffuse interlobular septal thickening could relate to mild pulmonary edema. 3. Low lung volume. Airspace opacity in the bilateral lung bases, right more than left, could be atelectasis or consolidation. 4. Small bilateral pleural effusions. Diffuse anasarca. 5. The ascending aorta measures 4.5 cm., Similar to prior    Ec-echocardiogram Complete W/o Cont    Result Date: 5/3/2020  Transthoracic Echo Report Echocardiography Laboratory CONCLUSIONS Prior echocardiogram 2018. Moderate concentric left ventricular hypertrophy. Moderately reduced left ventricular systolic function. Left ventricular ejection fraction is visually estimated to be 40%. Severely dilated left atrium. Estimated right ventricular systolic pressure  is 25 mmHg. Compared to the images of the study done - there has been reduction in left ventricular systolic function now at 40%. TREY THOMAS Exam Date:         2020                    12:52 Exam Location:     Inpatient Priority:          Routine Ordering Physician:        BIB RITTER Referring Physician:       OSIEL Sawyer Sonographer:               Minerva Morocho RDCS Age:    69     Gender:    M MRN:    [id number] :    1950 BSA:    2.42   Ht (in):    75     Wt (lb):    252 Exam Type:     Complete Indications:     Edema ICD Codes:       782.3 CPT Codes:       38161 BP:   122    /   84     HR:   115 Technical Quality:       Fair MEASUREMENTS  (Male / Female) Normal Values 2D ECHO LV Diastolic Diameter PLAX        4.4 cm                4.2 - 5.9 / 3.9 - 5.3 cm LV Systolic Diameter PLAX         3.7 cm                2.1 - 4.0 cm IVS Diastolic Thickness           1.6 cm                LVPW Diastolic Thickness          1.5 cm                LVOT Diameter                      2.2 cm                Estimated LV Ejection Fraction    40 %                  LV Ejection Fraction MOD BP       50.5 %                >= 55  % LV Ejection Fraction MOD 4C       55.3 %                LV Ejection Fraction MOD 2C       44.3 %                DOPPLER AV Peak Velocity                  0.95 m/s              AV Peak Gradient                  3.6 mmHg              AV Mean Gradient                  2.2 mmHg              LVOT Peak Velocity                0.82 m/s              AV Area Cont Eq vti               3.6 cm2               TV Peak E Velocity                0.33 m/s              TR Peak Velocity                  237 cm/s              * Indicates values subject to auto-interpretation LV EF:  40    % FINDINGS Left Ventricle Normal left ventricular chamber size. Moderate concentric left ventricular hypertrophy. Moderately reduced left ventricular systolic function. Left ventricular ejection fraction is visually estimated to be 40%. Global hypokinesis with regional variation. Diastolic function is difficult to assess with tachycardia. Right Ventricle Moderately dilated right ventricle. Reduced right ventricular systolic function. Right Atrium Enlarged right atrium. Inferior vena cava is not well visualized. Left Atrium Severely dilated left atrium. Left atrial volume index is 48 mL/sq m. Mitral Valve Mitral annular calcification. No mitral stenosis. No mitral regurgitation. Aortic Valve Structurally normal aortic valve without significant stenosis or regurgitation. Tricuspid Valve No tricuspid stenosis. Trace tricuspid regurgitation. Estimated right ventricular systolic pressure  is 25 mmHg. Pulmonic Valve Structurally normal pulmonic valve without significant stenosis or regurgitation. Pericardium Normal pericardium without effusion. Aorta The aortic root is normal. Ascending aorta is dilated with a diameter of 4.4 cm. Rivas GABRIEL To (Electronically Signed) Final Date:     03 May 2020  "15:20      Micro:  Results     Procedure Component Value Units Date/Time    BLOOD CULTURE [[id number]]  (Abnormal) Collected:  05/03/20 0530    Order Status:  Completed Specimen:  Blood from Peripheral Updated:  05/05/20 0932     Significant Indicator POS     Source BLD     Site PERIPHERAL     Culture Result Growth detected by Bactec instrument. 05/03/2020  21:44      Methicillin Resistant Staphylococcus aureus  See previous culture for sensitivity report.      Narrative:       CALL  Loyd  161 tel. [phone],  CALLED  161 tel. [phone] 05/03/2020, 21:46, RB PERF. RESULTS CALLED TO:  PHARMACY  Per Hospital Policy: Only change Specimen Src: to \"Line\" if  specified by physician order.  Right Forearm/Arm    BLOOD CULTURE [[id number]]  (Abnormal)  (Susceptibility) Collected:  05/03/20 0507    Order Status:  Completed Specimen:  Blood from Peripheral Updated:  05/05/20 0930     Significant Indicator POS     Source BLD     Site PERIPHERAL     Culture Result Growth detected by Bactec instrument. 05/03/2020  20:22  Methicillin Resistant Staphylococcus aureus (MRSA)  detected by PCR.        Methicillin Resistant Staphylococcus aureus    Narrative:       CALL  Loyd  161 tel. [phone],  CALLED  161 tel. [phone] 05/03/2020, 21:43, RB PERF. RESULTS CALLED TO:  PHARMACY  Per Hospital Policy: Only change Specimen Src: to \"Line\" if  specified by physician order.  No site indicated    Susceptibility     Methicillin resistant staphylococcus aureus (1)     Antibiotic Interpretation Microscan Method Status    Azithromycin Resistant >4 mcg/mL CYNTHIA Final    Clindamycin Resistant >4 mcg/mL CYNTHIA Final    Cefazolin Resistant <=8 mcg/mL CYNTHIA Final    Ceftaroline Sensitive <=0.5 mcg/mL CYNTHIA Final    Daptomycin Sensitive <=1 mcg/mL CYNTHIA Final    Ampicillin/sulbactam Resistant <=8/4 mcg/mL CYNTHIA Final    Erythromycin Resistant >4 mcg/mL CYNTHIA Final    Vancomycin Sensitive <=0.5 mcg/mL CYNTHIA Final    Oxacillin Resistant >2 mcg/mL CYNTHIA Final    " "Penicillin Resistant >8 mcg/mL CYNTHIA Final    Trimeth/Sulfa Sensitive <=0.5/9.5 mcg/mL CYNTHIA Final    Tetracycline Sensitive <=4 mcg/mL CYNTHIA Final                   URINE CULTURE(NEW) [[id number]] Collected:  05/03/20 0530    Order Status:  Completed Specimen:  Urine Updated:  05/05/20 0731     Significant Indicator NEG     Source UR     Site -     Culture Result No growth at 48 hours.    Narrative:       Indication for culture:->Emergency Room Patient  Indication for culture:->Emergency Room Patient    BLOOD CULTURE [[id number]] Collected:  05/05/20 0330    Order Status:  Completed Specimen:  Blood from Peripheral Updated:  05/05/20 0543    Narrative:       Collected By:[id number] SOLITARIO STAPLES  Per Hospital Policy: Only change Specimen Src: to \"Line\" if  specified by physician order.    BLOOD CULTURE [[id number]] Collected:  05/05/20 0430    Order Status:  Completed Specimen:  Blood from Peripheral Updated:  05/05/20 0442    Narrative:       Collected By:[id number] SOLITARIO STAPLES  Per Hospital Policy: Only change Specimen Src: to \"Line\" if  specified by physician order.    S. Aureus By PCR, Nasal Complete [[id number]]  (Abnormal) Collected:  05/03/20 1054    Order Status:  Completed Specimen:  Respirate from Respiratory Updated:  05/03/20 1543     Staph aureus by PCR POSITIVE     MRSA by PCR POSITIVE    SARS-CoV-2, PCR (In-House) [[id number]] Collected:  05/03/20 0800    Order Status:  Completed Updated:  05/03/20 0909     SARS-CoV-2 Source NP Swab     SARS-CoV-2 by PCR NotDetected     Comment: [facility] providers: PLEASE REFER TO DE-ESCALATION AND RETESTING PROTOCOL  on [email or web address]  **The [employer] GeneXpert Xpress SARS-CoV-2 Test has been made available for  use under the Emergency Use Authorization (EUA) only.         COVID/SARS CoV-2 [[id number]] Collected:  05/03/20 0800    Order Status:  Completed Specimen:  Respirate from Nasopharyngeal Updated:  05/03/20 0814     COVID Order Status Received    URINALYSIS [[id number]]  (Abnormal) " Collected:  05/03/20 0530    Order Status:  Completed Specimen:  Urine Updated:  05/03/20 0559     Color Yellow     Character Cloudy     Specific Gravity 1.025     Ph 5.0     Glucose 500 mg/dL      Ketones Trace mg/dL      Protein 300 mg/dL      Bilirubin Negative     Urobilinogen, Urine 0.2     Nitrite Negative     Leukocyte Esterase Negative     Occult Blood Small     Micro Urine Req Microscopic    Narrative:       Indication for culture:->Emergency Room Patient          Hospital Course/Assessment:   Joel Ma is a 69 y.o. male with a history of prior embolic strokes, essential hypertension, type 2 diabetes, dyslipidemia, chronic A. fib, CKD stage II, chronic indwelling Gonzalez catheter, CAD, JANNIE, grade 1 diastolic heart failure, chronic cervical spine disease, chronic hypoxemic respiratory failure on 2 L oxygen at baseline, initially brought to the ER on 5/1 with bleeding from Gonzalez catheter, had negative SARS-CoV-2 test at the time, but back to the ER on 5/3 with weakness, and repeat SARS-CoV-2 test negative, admitted for acute on chronic hypoxemic respiratory failure and weakness, found to have MRSA bacteremia.  Patient has hardware in the spine, and was also noted to have multiple lytic lesions in lumbar spine.  Potential sources at this time include his Gonzalez, possible developing pneumonia, spinal infection.     Pertinent Diagnoses:  MRSA bacteremia  Acute on chronic hypoxemic respiratory failure  Multiple lytic lesions in spine  CKD stage II  Chronic Gonzalez catheter in place     Plan:   -Agree with IV vancomycin.  Monitor levels and renal function.  Vanco trough of 16.8 on 5/4  -Repeat blood cultures x2 obtained, pending  -HIV antibody negative  -TTE with no evidence of infective endocarditis.  Will consider JULIANA if further work-up negative and if persistently positive blood cultures  -CT of the spine with multiple possible lytic lesions of the spine, possible T12 and L2 nondisplaced fractures.   Recommend MRI of the whole spine for better characterization -patient unable to hold still, repeat attempt with Precedex planned  -SPEP, UPEP pending     Plan was discussed with the primary team, Dr. Nguyen     ID will follow. Please feel free to call with questions.     Sameer Hinkle M.D.

## 2020-05-06 NOTE — PROGRESS NOTES
Charge RN Note    Spoke to the patient's son Chris Ma at the request of his primary nurse TIFFANY Espinoza RN. Mr. BRIT Ma was very upset regarding the use of sedatives, and believes that they are causing the patient's current delirium, and wanted them stopped immediately. When I presented the rational for providing this medication, and that we were concerned that if we discontinued their use the patient would require restraints further exacerbating his apparent back injury, and threatening his oxygenation he stated that he also would not allow us to restrain the patient. As the patient has been striking at nursing staff I informed him that this was an unacceptable request that I would be required to deny as his role as a POA does not provide him authority to authorize assault on healthcare staff; thus if determined clinically necessary his father would be restrained in accordance with [facility] protocol, and [facility] Law. At this point he asked multiple questions regarding imaging studies and antibiotics that had been ordered, I have provided answers within my scope of practice and clinical role for this patient and promised to have the patient's attending physician call first thing in the morning. He agreed and asked to be called at the number in the Kardex between 0700 and 0800 on 5/6/2020, and asked that his father remain on precedex until the conclusion of that phone conversation. I have updated TIFFANY Espinoza RN to speak to the patient's attending Physician at change of shift, and have updated our Clinical Nursing Supervisor ALOK Barriga RN regarding these events for possible leadership follow up.

## 2020-05-06 NOTE — PROGRESS NOTES
Discussed with Dr. Gillis family's concerns for use of Dilaudid. Provider feels that Dilaudid is the best option for the patient for pain management at this time. PRN dilaudid given, patient now relaxed, and not in pain. No adverse side effects noted. Will continue to monitor.

## 2020-05-06 NOTE — PROGRESS NOTES
Pt's gold ring on R index finger removed and placed in safe keeping.   Safekeeping slip placed in pt chart.

## 2020-05-06 NOTE — THERAPY
"Speech Language Therapy dysphagia treatment completed.   Functional Status:  The patient was seen for dysphagia therapy this date. The patient was awake, alert and oriented to self only with periods of crying out in pain and repeatedly stating \"sit me up\" despite patient sitting up already. The patient remains on HHFNC 60L. Patient was assisted with LQ3/MT2 meal tray. The patient consumed liquidised meal items with no overt s/s of aspiration or difficulty. During trials of cup sips of MTL patient exhibited difficulty coordinating cup sip and increased s/s concerning for penetration/aspiration. When all liquids were offered via spoon no further s/s concerning for penetration/aspiration were noted. At this time, recommend patient remain on LQ3/MT2 meal items with close monitoring of respiratory status and tolerate.     Recommendations: 1) Continue LQ3/MT2 with ALL liquids via SPOON. 2) Patient remains low threshold for needing to be placed NPO.     Plan of Care: Will benefit from Speech Therapy 3 times per week.    Post-Acute Therapy: Recommend inpatient transitional care services for continued speech therapy services.        See \"Rehab Therapy-Acute\" Patient Summary Report for complete documentation.     "

## 2020-05-06 NOTE — PROGRESS NOTES
"Pharmacy Kinetics 69 y.o. male on vancomycin day # 4 2020    Currently on Vancomycin 1700 mg iv q12hr  Other antibiotics: none    Indication for Treatment: MRSA bacteremia    Pertinent history per medical record: Admitted on 5/3/2020 for respiratory failure, CHF, pneumonia.  Patient was recently seen in ER  for bleeding from urinary catheter.  Patient was tested for COVID on  and was negative.  Today patient was found down next to bed and brought to ER for evaluation.  Patient found to have afib w/ RVR, CHF, acute respiratory failure, and PNA.  Patient has Hx of MRSA per chart.     Allergies: Demerol; Fentanyl; Latex; and Statins [hmg-coa-r inhibitors]     List concerns for renal function: Age, BMI, elevated BUN/SCr ratio    Pertinent cultures to date:   5/3/20 urine: negative  5/3/20 PBC x 2: MRSA  20 PBC x 2:  Possible Staph species    Recent Labs     20  0420 20  0345 20  0305   WBC 10.7 10.7 5.5   NEUTSPOLYS 84.50* 82.90* 75.40*     Recent Labs     20  0420 20  0345 20  0305   BUN 31* 39* 34*   CREATININE 1.09 1.12 0.89     Recent Labs     20  1227 20  1144   VANCOTROUGH 16.8 28.0*       Intake/Output Summary (Last 24 hours) at 2020 1522  Last data filed at 2020 0800  Gross per 24 hour   Intake 1072.78 ml   Output 3250 ml   Net -2177.22 ml      /70   Pulse (!) 105   Temp 36.1 °C (97 °F) (Temporal)   Resp 16   Ht 1.905 m (6' 3\")   Wt (!) 127.9 kg (281 lb 15.5 oz)   SpO2 91%  Temp (24hrs), Av.1 °C (97 °F), Min:35.8 °C (96.5 °F), Max:37 °C (98.6 °F)      A/P   1. Vancomycin dose change: hold scheduled dosing  2. Next vancomycin level: tomorrow at 1230  3. Goal trough: 16-20 for now   4. Comments: Vanco trough today is elevated.  Will hold scheduled dosing and check a level 24 hours after the last dose was administered.  Still planning MRI to better characterize the spine.  Repeat blood cultures from 5/5 remain positive.  Pharmacy " will continue to follow.      Pradeep Roberts, PharmD, BCPS, BCCP, BCCCP

## 2020-05-06 NOTE — PROGRESS NOTES
Infectious Disease Progress Note    Author: Sameer Hinkle M.D. Date & Time of service: 2020  9:17 AM    Chief Complaint:  Follow up of MRSA bacteremia    Interval History:  5/5 afebrile, white count 10.7, tolerating vancomycin.  Resting comfortably this morning, no new issues, MRI is pending.  Was unable to hold still.  Episodes of A. fib with RVR.  5/6 afebrile, white count 5.5.  Repeat blood cultures also positive.  Repeat MRI attempt unsuccessful due to agitation, family requests limiting medications.  Reportedly per family, patient had a prior staph infection in toe, spread to his knee and shoulder for which she was on oral medications for prolonged period of time.  He is also not a candidate for JULIANA at this time.  Resting comfortably this morning    Labs Reviewed and Medications Reviewed.    Review of Systems:  Review of Systems   Constitutional: Positive for malaise/fatigue. Negative for chills and fever.   Respiratory: Positive for cough. Negative for sputum production.    Gastrointestinal: Negative for abdominal pain, diarrhea, nausea and vomiting.   Genitourinary: Negative for dysuria.   Musculoskeletal: Positive for back pain. Negative for joint pain.   Skin: Negative for itching and rash.   All other systems reviewed and are negative.      Hemodynamics:  Temp (24hrs), Av.2 °C (97.2 °F), Min:35.8 °C (96.5 °F), Max:37 °C (98.6 °F)  Temperature: 36.1 °C (97 °F)  Pulse  Av.2  Min: 68  Max: 147   Blood Pressure : 118/70       Physical Exam:  Physical Exam  Vitals signs and nursing note reviewed.   Constitutional:       Appearance: He is ill-appearing. He is not diaphoretic.   HENT:      Nose:      Comments: On high flow nasal cannula oxygen, but appears to be primarily mouth breathing  Eyes:      General:         Right eye: No discharge.         Left eye: No discharge.      Pupils: Pupils are equal, round, and reactive to light.   Cardiovascular:      Rate and Rhythm: Normal rate. Rhythm  irregular.   Pulmonary:      Effort: Pulmonary effort is normal. No respiratory distress.      Breath sounds: Rales present.   Abdominal:      General: Abdomen is flat.      Tenderness: There is no abdominal tenderness.      Comments: Protuberant   Musculoskeletal:         General: No tenderness.      Right lower leg: Edema present.      Left lower leg: Edema present.      Comments: Well-healed scar right knee, with no swelling or erythema, no increased warmth, no discharge.  Multiple toe amputations, all sites well-healed.   Skin:     Coloration: Skin is not pale.      Findings: No erythema.   Neurological:      Comments: Bilateral lower extremity weakness (chronic)         Meds:    Current Facility-Administered Medications:   •  MD Alert...Adult ICU Electrolyte Replacement per Pharmacy  •  PEDS potassium chloride (KCL-PERIPHERAL) IV  •  magnesium sulfate  •  dexmedetomidine (PRECEDEX) infusion  •  ALPRAZolam  •  gabapentin  •  gabapentin  •  vancomycin  •  insulin regular **AND** POC Blood Glucose **AND** NOTIFY MD and PharmD **AND** glucose **AND** dextrose 50%  •  clotrimazole-betamethasone  •  oxyCODONE immediate-release  •  senna-docusate **AND** polyethylene glycol/lytes **AND** magnesium hydroxide **AND** bisacodyl  •  furosemide  •  MD Alert...Vancomycin per Pharmacy  •  hydrALAZINE  •  labetalol  •  latanoprost  •  HYDROmorphone  •  clopidogrel  •  ezetimibe  •  finasteride  •  metoprolol SR  •  rivaroxaban  •  tamsulosin  •  tizanidine  •  lisinopril  •  amLODIPine  •  hydrALAZINE    Labs:  Recent Labs     05/04/20  0420 05/05/20  0345 05/06/20  0305   WBC 10.7 10.7 5.5   RBC 5.54 5.31 5.85   HEMOGLOBIN 14.0 13.5* 14.5   HEMATOCRIT 46.8 43.6 47.6   MCV 84.5 82.1 81.4   MCH 25.3* 25.4* 24.8*   RDW 54.1* 53.1* 51.7*   PLATELETCT 113* 130* 123*   MPV 11.2 11.6 11.6   NEUTSPOLYS 84.50* 82.90* 75.40*   LYMPHOCYTES 3.30* 4.60* 7.20*   MONOCYTES 11.60 11.60 14.30*   EOSINOPHILS 0.00 0.20 2.00   BASOPHILS 0.20  0.20 0.40   RBCMORPHOLO Present  --   --      Recent Labs     05/04/20  0420 05/05/20  0345 05/06/20  0305   SODIUM 141 140 145   POTASSIUM 3.2* 4.4 2.8*   CHLORIDE 103 104 105   CO2 23 26 29   GLUCOSE 136* 268* 265*   BUN 31* 39* 34*     Recent Labs     05/04/20  0420 05/05/20  0345 05/06/20  0305   CREATININE 1.09 1.12 0.89       Imaging:  Ct-head W/o    Result Date: 5/3/2020  5/3/2020 6:45 AM HISTORY/REASON FOR EXAM:  Altered mental status Fall. TECHNIQUE/EXAM DESCRIPTION AND NUMBER OF VIEWS: CT of the head without contrast. The study was performed on a helical multidetector CT scanner. Contiguous 2.5 mm axial sections were obtained from the skull base through the vertex. Up to date radiation dose reduction adjustments have been utilized to meet ALARA standards for radiation dose reduction. COMPARISON:  1/7/2013 FINDINGS: There is no evidence of acute intracranial hemorrhage, mass, mass-effect or shift of midline structures. Gray-white matter differentiation is grossly preserved. Mild diffuse cerebral atrophy. The basal cisterns are patent. There are no abnormal extra-axial fluid collections. No depressed or widely  calvarial fracture is seen. The visualized paranasal sinuses and temporal bone structures are aerated. ___________________________________     1. No acute intracranial abnormality. No evidence of acute intracranial hemorrhage or mass lesion.     Ct-lspine W/o Plus Recons    Result Date: 5/3/2020  5/3/2020 6:46 AM HISTORY/REASON FOR EXAM:  Back pain or radiculopathy, trauma Fall. Back pain. TECHNIQUE/EXAM DESCRIPTION AND NUMBER OF VIEWS: CT lumbar spine without contrast, with reconstructions. The study was performed on a helical multidetector CT scanner. Thin-section helical scanning was performed from T12-L1 to the sacrum. Sagittal and coronal multiplanar reconstructions were generated from the axial images. Low dose optimization technique was utilized for this CT exam including automated  exposure control and adjustment of the mA and/or kV according to patient size. COMPARISON: None. FINDINGS: Diffuse osseous demineralization. Questionable nondisplaced fracture through the T12 vertebral bodies with moderate vertebral height loss (image 105 series 2). Questionable nondisplaced fracture through the L2 vertebral body as well (image 82 series 2). No retropulsion seen. Very severe degenerative change of the lumbar spine with extensive endplate productive change and facet arthropathy. Postsurgical change from posterior decompression from L3-L5     1. Questionable nondisplaced fracture through the T12 vertebral bodies with moderate vertebral height loss. Questionable nondisplaced fracture through the L2 vertebral body as well. MR could be helpful for further evaluation. 2. Numerous lucencies throughout the lumbar spine concerning for lytic lesions/multiple myeloma.     Dx-chest-portable (1 View)    Result Date: 5/5/2020 5/5/2020 2:31 AM HISTORY/REASON FOR EXAM:  Shortness of Breath. TECHNIQUE/EXAM DESCRIPTION AND NUMBER OF VIEWS: Single portable view of the chest. COMPARISON: 5/3/2020 FINDINGS: Cardiac silhouette is enlarged. Diffuse interstitial and hazy airspace opacities may represent pulmonary edema and/or infection. Right greater than left pleural effusions and nonspecific elevation of the right hemidiaphragm. Right basilar opacity may represent compressive atelectasis and/or pneumonia. Postsurgical changes ACDF.     1.  Right greater left pleural effusions. 2.  Bilateral pulmonary opacities which could represent pulmonary edema. Correlate clinically for infection.    Dx-chest-portable (1 View)    Result Date: 5/3/2020  5/3/2020 5:09 AM HISTORY/REASON FOR EXAM:  Sepsis; sepsis Back pain. TECHNIQUE/EXAM DESCRIPTION AND NUMBER OF VIEWS: Single portable view of the chest. COMPARISON: 5/1/2020 FINDINGS: Low lung volume. Elevation of the right hemidiaphragm. Diffuse interstitial prominence. Hazy right  basilar and left hilar opacities. No pleural effusion. No pneumothorax. Stable cardiopericardial silhouette.     1. Low lung volume with hypoventilatory change. 2. Hazy right basilar and left hilar opacities could be atelectasis or infection.    Dx-chest-portable (1 View)    Result Date: 5/1/2020 5/1/2020 12:26 PM HISTORY/REASON FOR EXAM:  Shortness of Breath. TECHNIQUE/EXAM DESCRIPTION AND NUMBER OF VIEWS: Single portable view of the chest. COMPARISON: 9/6/2018 FINDINGS: Single portable view of the chest demonstrates a prominent cardiac silhouette and mediastinal contours. The left costophrenic angle is excluded from the image. There is mild interstitial prominence. No significant pleural fluid or pneumothorax. Prior cervical fusion is partially visualized.     1.  Prominent cardiac silhouette and increased interstitial markings. Findings are likely related to low lung volumes and portable AP technique    Ct-cta Chest Pulmonary Artery W/ Recons    Result Date: 5/3/2020  5/3/2020 6:45 AM HISTORY/REASON FOR EXAM:  PE suspected, high pretest prob Fall. Chest pain. TECHNIQUE/EXAM DESCRIPTION: CT angiogram scan for pulmonary embolism with contrast, with reconstructions. 1.25 mm helical sections were obtained from the lung apices through the lung bases following the rapid bolus administration of 100 mL of Omnipaque 350 nonionic contrast. Thin-section overlapping reconstruction interval was utilized. Coronal reconstructions were generated from the axial data. MIP post processing was performed and utilized for the interpretation. Low dose optimization technique was utilized for this CT exam including automated exposure control and adjustment of the mA and/or kV according to patient size. COMPARISON: 6/22/2018 FINDINGS: Pulmonary Embolism: No. Main Pulmonary Arteries: No. Segmental branches: No. Subsegmental branches: No. Additional Comments: None. Lungs: Low lung volume. Diffuse interlobular septal thickening. Airspace  opacity in the bilateral lung bases, right more than left. Scattered calcified granulomas. Airway: Patent. Pleura: Small bilateral pleural effusions. Nodes: No enlarged mediastinal or hilar lymph nodes. Additional findings: Thoracic aorta and great vessels: The ascending aorta measures 4.5 cm. Heart and pericardium: Severe coronary artery calcification. No pericardial effusion. Thoracic spine: Moderate degenerative change of the thoracic spine. No compression deformity. Chest wall: Diffuse anasarca Grossly unchanged left thyroid nodule. Visualized upper abdomen: No acute abnormality.     1. No CT evidence of pulmonary embolism. 2. Diffuse interlobular septal thickening could relate to mild pulmonary edema. 3. Low lung volume. Airspace opacity in the bilateral lung bases, right more than left, could be atelectasis or consolidation. 4. Small bilateral pleural effusions. Diffuse anasarca. 5. The ascending aorta measures 4.5 cm., Similar to prior    Ec-echocardiogram Complete W/o Cont    Result Date: 5/3/2020  Transthoracic Echo Report Echocardiography Laboratory CONCLUSIONS Prior echocardiogram 2018. Moderate concentric left ventricular hypertrophy. Moderately reduced left ventricular systolic function. Left ventricular ejection fraction is visually estimated to be 40%. Severely dilated left atrium. Estimated right ventricular systolic pressure  is 25 mmHg. Compared to the images of the study done - there has been reduction in left ventricular systolic function now at 40%. TREY THOMAS Exam Date:         2020                    12:52 Exam Location:     Inpatient Priority:          Routine Ordering Physician:        BIB RITTER Referring Physician:       [id number] Hays Sonographer:               Minerva Morocho RDCS Age:    69     Gender:    M MRN:    [id number] :    1950 BSA:    2.42   Ht (in):    75     Wt (lb):    252 Exam  Type:     Complete Indications:     Edema ICD Codes:       782.3 CPT Codes:       64659 BP:   122    /   84     HR:   115 Technical Quality:       Fair MEASUREMENTS  (Male / Female) Normal Values 2D ECHO LV Diastolic Diameter PLAX        4.4 cm                4.2 - 5.9 / 3.9 - 5.3 cm LV Systolic Diameter PLAX         3.7 cm                2.1 - 4.0 cm IVS Diastolic Thickness           1.6 cm                LVPW Diastolic Thickness          1.5 cm                LVOT Diameter                     2.2 cm                Estimated LV Ejection Fraction    40 %                  LV Ejection Fraction MOD BP       50.5 %                >= 55  % LV Ejection Fraction MOD 4C       55.3 %                LV Ejection Fraction MOD 2C       44.3 %                DOPPLER AV Peak Velocity                  0.95 m/s              AV Peak Gradient                  3.6 mmHg              AV Mean Gradient                  2.2 mmHg              LVOT Peak Velocity                0.82 m/s              AV Area Cont Eq vti               3.6 cm2               TV Peak E Velocity                0.33 m/s              TR Peak Velocity                  237 cm/s              * Indicates values subject to auto-interpretation LV EF:  40    % FINDINGS Left Ventricle Normal left ventricular chamber size. Moderate concentric left ventricular hypertrophy. Moderately reduced left ventricular systolic function. Left ventricular ejection fraction is visually estimated to be 40%. Global hypokinesis with regional variation. Diastolic function is difficult to assess with tachycardia. Right Ventricle Moderately dilated right ventricle. Reduced right ventricular systolic function. Right Atrium Enlarged right atrium. Inferior vena cava is not well visualized. Left Atrium Severely dilated left atrium. Left atrial volume index is 48 mL/sq m. Mitral Valve Mitral annular calcification. No mitral stenosis. No mitral regurgitation. Aortic Valve Structurally normal aortic  "valve without significant stenosis or regurgitation. Tricuspid Valve No tricuspid stenosis. Trace tricuspid regurgitation. Estimated right ventricular systolic pressure  is 25 mmHg. Pulmonic Valve Structurally normal pulmonic valve without significant stenosis or regurgitation. Pericardium Normal pericardium without effusion. Aorta The aortic root is normal. Ascending aorta is dilated with a diameter of 4.4 cm. Rivas GABRIEL To (Electronically Signed) Final Date:     03 May 2020 15:20      Micro:  Results     Procedure Component Value Units Date/Time    BLOOD CULTURE [[id number]]  (Abnormal) Collected:  05/05/20 0330    Order Status:  Completed Specimen:  Blood from Peripheral Updated:  05/06/20 0028     Significant Indicator POS     Source BLD     Site PERIPHERAL     Culture Result Growth detected by Bactec instrument. 05/06/2020  00:27  Gram Stain: Gram positive cocci: Possible Staphylococcus sp.      Narrative:       Collected By:Malik STAPLES  Per Hospital Policy: Only change Specimen Src: to \"Line\" if  specified by physician order.  Right Forearm/Arm    BLOOD CULTURE [[id number]]  (Abnormal) Collected:  05/05/20 0430    Order Status:  Completed Specimen:  Blood from Peripheral Updated:  05/05/20 2339     Significant Indicator POS     Source BLD     Site PERIPHERAL     Culture Result Growth detected by Bactec instrument. 05/05/2020  23:38  Gram Stain: Gram positive cocci: Possible Staphylococcus sp.      Narrative:       Collected By:[id number] STAPLES  Per Hospital Policy: Only change Specimen Src: to \"Line\" if  specified by physician order.  Left Forearm/Arm    BLOOD CULTURE [[id number]]  (Abnormal) Collected:  05/03/20 0530    Order Status:  Completed Specimen:  Blood from Peripheral Updated:  05/05/20 0932     Significant Indicator POS     Source BLD     Site PERIPHERAL     Culture Result Growth detected by Bactec instrument. 05/03/2020  21:44      Methicillin Resistant Staphylococcus aureus  See previous " "culture for sensitivity report.      Narrative:       CALL  Loyd  161 tel. [phone],  CALLED  161 tel. [phone] 05/03/2020, 21:46, RB PERF. RESULTS CALLED TO:  PHARMACY  Per Hospital Policy: Only change Specimen Src: to \"Line\" if  specified by physician order.  Right Forearm/Arm    BLOOD CULTURE [[id number]]  (Abnormal)  (Susceptibility) Collected:  05/03/20 0507    Order Status:  Completed Specimen:  Blood from Peripheral Updated:  05/05/20 0930     Significant Indicator POS     Source BLD     Site PERIPHERAL     Culture Result Growth detected by Bactec instrument. 05/03/2020  20:22  Methicillin Resistant Staphylococcus aureus (MRSA)  detected by PCR.        Methicillin Resistant Staphylococcus aureus    Narrative:       CALL  Loyd  161 tel. [phone],  CALLED  161 tel. [phone] 05/03/2020, 21:43, RB PERF. RESULTS CALLED TO:  PHARMACY  Per Hospital Policy: Only change Specimen Src: to \"Line\" if  specified by physician order.  No site indicated    Susceptibility     Methicillin resistant staphylococcus aureus (1)     Antibiotic Interpretation Microscan Method Status    Azithromycin Resistant >4 mcg/mL CYNTHIA Final    Clindamycin Resistant >4 mcg/mL CYNTHIA Final    Cefazolin Resistant <=8 mcg/mL CYNTHIA Final    Ceftaroline Sensitive <=0.5 mcg/mL CYNTHIA Final    Daptomycin Sensitive <=1 mcg/mL CYNTHIA Final    Ampicillin/sulbactam Resistant <=8/4 mcg/mL CYNTHIA Final    Erythromycin Resistant >4 mcg/mL CYNTHIA Final    Vancomycin Sensitive <=0.5 mcg/mL CYNTHIA Final    Oxacillin Resistant >2 mcg/mL CYNTHIA Final    Penicillin Resistant >8 mcg/mL CYNTHIA Final    Trimeth/Sulfa Sensitive <=0.5/9.5 mcg/mL CYNTHIA Final    Tetracycline Sensitive <=4 mcg/mL CYNTHIA Final                   URINE CULTURE(NEW) [[id number]] Collected:  05/03/20 0530    Order Status:  Completed Specimen:  Urine Updated:  05/05/20 0731     Significant Indicator NEG     Source UR     Site -     Culture Result No growth at 48 hours.    Narrative:       Indication for " culture:->Emergency Room Patient  Indication for culture:->Emergency Room Patient    S. Aureus By PCR, Nasal Complete [[id number]]  (Abnormal) Collected:  05/03/20 1054    Order Status:  Completed Specimen:  Respirate from Respiratory Updated:  05/03/20 1543     Staph aureus by PCR POSITIVE     MRSA by PCR POSITIVE    SARS-CoV-2, PCR (In-House) [[id number]] Collected:  05/03/20 0800    Order Status:  Completed Updated:  05/03/20 0909     SARS-CoV-2 Source NP Swab     SARS-CoV-2 by PCR NotDetected     Comment: [facility] providers: PLEASE REFER TO DE-ESCALATION AND RETESTING PROTOCOL  on [email or web address]  **The [employer] GeneXpert Xpress SARS-CoV-2 Test has been made available for  use under the Emergency Use Authorization (EUA) only.         COVID/SARS CoV-2 [[id number]] Collected:  05/03/20 0800    Order Status:  Completed Specimen:  Respirate from Nasopharyngeal Updated:  05/03/20 0814     COVID Order Status Received    URINALYSIS [[id number]]  (Abnormal) Collected:  05/03/20 0530    Order Status:  Completed Specimen:  Urine Updated:  05/03/20 0559     Color Yellow     Character Cloudy     Specific Gravity 1.025     Ph 5.0     Glucose 500 mg/dL      Ketones Trace mg/dL      Protein 300 mg/dL      Bilirubin Negative     Urobilinogen, Urine 0.2     Nitrite Negative     Leukocyte Esterase Negative     Occult Blood Small     Micro Urine Req Microscopic    Narrative:       Indication for culture:->Emergency Room Patient          Hospital Course/Assessment:   Joel Ma is a 69 y.o. male with a history of prior embolic strokes, essential hypertension, type 2 diabetes, dyslipidemia, chronic A. fib, CKD stage II, chronic indwelling Gonzalez catheter, CAD, JANNIE, grade 1 diastolic heart failure, chronic cervical spine disease, chronic hypoxemic respiratory failure on 2 L oxygen at baseline, initially brought to the ER on 5/1 with bleeding from Gonzalez catheter, had negative SARS-CoV-2 test at the time, but back to the ER on 5/3  with weakness, and repeat SARS-CoV-2 test negative, admitted for acute on chronic hypoxemic respiratory failure and weakness, found to have MRSA bacteremia.  Patient has hardware in the spine, and was also noted to have multiple lytic lesions in lumbar spine.  Potential sources at this time include his Gonzalez, possible developing pneumonia, spinal infection.     Pertinent Diagnoses:  MRSA bacteremia  Acute on chronic hypoxemic respiratory failure  Multiple lytic lesions in spine  CKD stage II  Chronic Gonzalez catheter in place     Plan:   -Agree with IV vancomycin.  Monitor levels and renal function.  Vanco trough of 16.8 on 5/4  -Repeat blood cultures x2 from 5/5 also positive  -HIV antibody negative  -TTE with no evidence of infective endocarditis.  Will consider JULIANA if further work-up negative and if persistently positive blood cultures -currently not a candidate for JULIANA  -CT of the spine with multiple possible lytic lesions of the spine, possible T12 and L2 nondisplaced fractures.  Recommend MRI of the whole spine for better characterization -patient unable to hold still, repeat attempt with Precedex also failed due to agitation.  Family requests limiting medications.  -SPEP, UPEP pending  -Given at this time further investigative procedures are not feasible, will continue IV antibiotics with close monitoring and daily reassessment.  Repeat blood cultures x2 in a.m.     Plan was discussed with the primary team, Dr. Nguyen     ID will follow. Please feel free to call with questions.     Sameer Hinkle M.D.

## 2020-05-06 NOTE — PROGRESS NOTES
Family spoke to nurse/Charge nurse. MRI will be retried tomorrow during day shift after conversation with attending  day shift provider. Krista from MRI, updated on situation. Updated family on patient condition.

## 2020-05-06 NOTE — CARE PLAN
End of Shift: Patient rested in bed overnight on precedex. Plan is to try to take patient down to MRI this am after provider speaks with family members. Patient remains confused with a orientation of 2/3 out of 4. VSS. Will continue to monitor.    Monitor Check:    Afib 50's-100's.  -120's systolic.

## 2020-05-07 PROBLEM — A41.9 SEVERE SEPSIS (HCC): Status: RESOLVED | Noted: 2020-01-01 | Resolved: 2020-01-01

## 2020-05-07 PROBLEM — R65.20 SEVERE SEPSIS (HCC): Status: ACTIVE | Noted: 2020-01-01

## 2020-05-07 PROBLEM — A41.9 SEVERE SEPSIS (HCC): Status: ACTIVE | Noted: 2020-01-01

## 2020-05-07 PROBLEM — R65.20 SEVERE SEPSIS (HCC): Status: RESOLVED | Noted: 2020-01-01 | Resolved: 2020-01-01

## 2020-05-07 NOTE — CARE PLAN
Problem: Safety  Goal: Will remain free from injury  Outcome: PROGRESSING AS EXPECTED  Goal: Will remain free from falls  Outcome: PROGRESSING AS EXPECTED     Problem: Venous Thromboembolism (VTW)/Deep Vein Thrombosis (DVT) Prevention:  Goal: Patient will participate in Venous Thrombosis (VTE)/Deep Vein Thrombosis (DVT)Prevention Measures  Outcome: PROGRESSING AS EXPECTED     Problem: Communication  Goal: The ability to communicate needs accurately and effectively will improve  Outcome: PROGRESSING SLOWER THAN EXPECTED     Problem: Infection  Goal: Will remain free from infection  Outcome: PROGRESSING SLOWER THAN EXPECTED     Problem: Bowel/Gastric:  Goal: Normal bowel function is maintained or improved  Outcome: PROGRESSING SLOWER THAN EXPECTED  Goal: Will not experience complications related to bowel motility  Outcome: PROGRESSING SLOWER THAN EXPECTED     Problem: Knowledge Deficit  Goal: Knowledge of disease process/condition, treatment plan, diagnostic tests, and medications will improve  Outcome: PROGRESSING SLOWER THAN EXPECTED  Goal: Knowledge of the prescribed therapeutic regimen will improve  Outcome: PROGRESSING SLOWER THAN EXPECTED
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
## 2020-05-07 NOTE — PROGRESS NOTES
Infectious Disease Progress Note    Author: Sameer Hinkle M.D. Date & Time of service: 2020  9:02 AM    Chief Complaint:  Follow up of MRSA bacteremia    Interval History:  5/5 afebrile, white count 10.7, tolerating vancomycin.  Resting comfortably this morning, no new issues, MRI is pending.  Was unable to hold still.  Episodes of A. fib with RVR.  5/6 afebrile, white count 5.5.  Repeat blood cultures also positive.  Repeat MRI attempt unsuccessful due to agitation, family requests limiting medications.  Reportedly per family, patient had a prior staph infection in toe, spread to his knee and shoulder for which she was on oral medications for prolonged period of time.  He is also not a candidate for JULIANA at this time.  Resting comfortably this morning  5/7 afebrile, white count 9.1. Tolerating vancomycin.  MRI of the spine with multiple changes.  Changes at C5 C6 which could be either degenerative or related to discitis osteomyelitis.  Multiple changes from T12-S1 that are moderately suspicious for discitis osteomyelitis, but could also be degenerative changes.  Patient resting comfortably this morning.    Labs Reviewed and Medications Reviewed.    Review of Systems:  Review of Systems   Constitutional: Positive for malaise/fatigue. Negative for chills and fever.   Respiratory: Positive for cough. Negative for sputum production.    Gastrointestinal: Negative for abdominal pain, diarrhea, nausea and vomiting.   Genitourinary: Negative for dysuria.   Musculoskeletal: Positive for back pain. Negative for joint pain.   Skin: Negative for itching and rash.   All other systems reviewed and are negative.    Hemodynamics:  Temp (24hrs), Av.2 °C (97.2 °F), Min:35.9 °C (96.7 °F), Max:37 °C (98.6 °F)  Temperature: 37 °C (98.6 °F)  Pulse  Av  Min: 62  Max: 147   Blood Pressure : 125/87       Physical Exam:  Physical Exam  Vitals signs and nursing note reviewed.   Constitutional:       Appearance: He is  ill-appearing. He is not diaphoretic.   HENT:      Nose:      Comments: On high flow nasal cannula oxygen, but appears to be primarily mouth breathing  Eyes:      General:         Right eye: No discharge.         Left eye: No discharge.      Pupils: Pupils are equal, round, and reactive to light.   Cardiovascular:      Rate and Rhythm: Normal rate. Rhythm irregular.   Pulmonary:      Effort: Pulmonary effort is normal. No respiratory distress.      Breath sounds: Rales present.   Abdominal:      General: Abdomen is flat.      Tenderness: There is no abdominal tenderness.      Comments: Protuberant   Musculoskeletal:         General: No tenderness.      Right lower leg: Edema present.      Left lower leg: Edema present.      Comments: Well-healed scar right knee, with no swelling or erythema, no increased warmth, no discharge.  Multiple toe amputations, all sites well-healed.   Skin:     Coloration: Skin is not pale.      Findings: No erythema.   Neurological:      Comments: Bilateral lower extremity weakness (chronic)       Meds:    Current Facility-Administered Medications:   •  HYDROmorphone  •  potassium chloride SA  •  MD Alert...Adult ICU Electrolyte Replacement per Pharmacy  •  insulin glargine  •  metoprolol  •  dexmedetomidine (PRECEDEX) infusion  •  ALPRAZolam  •  gabapentin  •  gabapentin  •  insulin regular **AND** POC Blood Glucose **AND** NOTIFY MD and PharmD **AND** glucose **AND** dextrose 50%  •  clotrimazole-betamethasone  •  oxyCODONE immediate-release  •  senna-docusate **AND** polyethylene glycol/lytes **AND** magnesium hydroxide **AND** bisacodyl  •  furosemide  •  MD Alert...Vancomycin per Pharmacy  •  hydrALAZINE  •  labetalol  •  latanoprost  •  clopidogrel  •  ezetimibe  •  rivaroxaban  •  tizanidine  •  lisinopril  •  amLODIPine  •  hydrALAZINE    Labs:  Recent Labs     05/05/20  0345 05/06/20  0305 05/07/20  0305   WBC 10.7 5.5 9.1   RBC 5.31 5.85 5.86   HEMOGLOBIN 13.5* 14.5 14.8    HEMATOCRIT 43.6 47.6 47.6   MCV 82.1 81.4 81.2*   MCH 25.4* 24.8* 25.3*   RDW 53.1* 51.7* 52.0*   PLATELETCT 130* 123* 105*   MPV 11.6 11.6 10.6   NEUTSPOLYS 82.90* 75.40* 78.60*   LYMPHOCYTES 4.60* 7.20* 8.40*   MONOCYTES 11.60 14.30* 11.00   EOSINOPHILS 0.20 2.00 1.20   BASOPHILS 0.20 0.40 0.20     Recent Labs     05/05/20  0345 05/06/20  0305 05/07/20  0305   SODIUM 140 145 146*   POTASSIUM 4.4 2.8* 3.2*   CHLORIDE 104 105 105   CO2 26 29 29   GLUCOSE 268* 265* 241*   BUN 39* 34* 33*     Recent Labs     05/05/20  0345 05/06/20  0305 05/07/20  0305   CREATININE 1.12 0.89 0.99       Imaging:  Ct-head W/o    Result Date: 5/3/2020  5/3/2020 6:45 AM HISTORY/REASON FOR EXAM:  Altered mental status Fall. TECHNIQUE/EXAM DESCRIPTION AND NUMBER OF VIEWS: CT of the head without contrast. The study was performed on a helical multidetector CT scanner. Contiguous 2.5 mm axial sections were obtained from the skull base through the vertex. Up to date radiation dose reduction adjustments have been utilized to meet ALARA standards for radiation dose reduction. COMPARISON:  1/7/2013 FINDINGS: There is no evidence of acute intracranial hemorrhage, mass, mass-effect or shift of midline structures. Gray-white matter differentiation is grossly preserved. Mild diffuse cerebral atrophy. The basal cisterns are patent. There are no abnormal extra-axial fluid collections. No depressed or widely  calvarial fracture is seen. The visualized paranasal sinuses and temporal bone structures are aerated. ___________________________________     1. No acute intracranial abnormality. No evidence of acute intracranial hemorrhage or mass lesion.     Ct-lspine W/o Plus Recons    Result Date: 5/3/2020  5/3/2020 6:46 AM HISTORY/REASON FOR EXAM:  Back pain or radiculopathy, trauma Fall. Back pain. TECHNIQUE/EXAM DESCRIPTION AND NUMBER OF VIEWS: CT lumbar spine without contrast, with reconstructions. The study was performed on a helical  multidetector CT scanner. Thin-section helical scanning was performed from T12-L1 to the sacrum. Sagittal and coronal multiplanar reconstructions were generated from the axial images. Low dose optimization technique was utilized for this CT exam including automated exposure control and adjustment of the mA and/or kV according to patient size. COMPARISON: None. FINDINGS: Diffuse osseous demineralization. Questionable nondisplaced fracture through the T12 vertebral bodies with moderate vertebral height loss (image 105 series 2). Questionable nondisplaced fracture through the L2 vertebral body as well (image 82 series 2). No retropulsion seen. Very severe degenerative change of the lumbar spine with extensive endplate productive change and facet arthropathy. Postsurgical change from posterior decompression from L3-L5     1. Questionable nondisplaced fracture through the T12 vertebral bodies with moderate vertebral height loss. Questionable nondisplaced fracture through the L2 vertebral body as well. MR could be helpful for further evaluation. 2. Numerous lucencies throughout the lumbar spine concerning for lytic lesions/multiple myeloma.     Dx-chest-portable (1 View)    Result Date: 5/5/2020 5/5/2020 2:31 AM HISTORY/REASON FOR EXAM:  Shortness of Breath. TECHNIQUE/EXAM DESCRIPTION AND NUMBER OF VIEWS: Single portable view of the chest. COMPARISON: 5/3/2020 FINDINGS: Cardiac silhouette is enlarged. Diffuse interstitial and hazy airspace opacities may represent pulmonary edema and/or infection. Right greater than left pleural effusions and nonspecific elevation of the right hemidiaphragm. Right basilar opacity may represent compressive atelectasis and/or pneumonia. Postsurgical changes ACDF.     1.  Right greater left pleural effusions. 2.  Bilateral pulmonary opacities which could represent pulmonary edema. Correlate clinically for infection.    Dx-chest-portable (1 View)    Result Date: 5/3/2020  5/3/2020 5:09 AM  HISTORY/REASON FOR EXAM:  Sepsis; sepsis Back pain. TECHNIQUE/EXAM DESCRIPTION AND NUMBER OF VIEWS: Single portable view of the chest. COMPARISON: 5/1/2020 FINDINGS: Low lung volume. Elevation of the right hemidiaphragm. Diffuse interstitial prominence. Hazy right basilar and left hilar opacities. No pleural effusion. No pneumothorax. Stable cardiopericardial silhouette.     1. Low lung volume with hypoventilatory change. 2. Hazy right basilar and left hilar opacities could be atelectasis or infection.    Dx-chest-portable (1 View)    Result Date: 5/1/2020 5/1/2020 12:26 PM HISTORY/REASON FOR EXAM:  Shortness of Breath. TECHNIQUE/EXAM DESCRIPTION AND NUMBER OF VIEWS: Single portable view of the chest. COMPARISON: 9/6/2018 FINDINGS: Single portable view of the chest demonstrates a prominent cardiac silhouette and mediastinal contours. The left costophrenic angle is excluded from the image. There is mild interstitial prominence. No significant pleural fluid or pneumothorax. Prior cervical fusion is partially visualized.     1.  Prominent cardiac silhouette and increased interstitial markings. Findings are likely related to low lung volumes and portable AP technique    Ct-cta Chest Pulmonary Artery W/ Recons    Result Date: 5/3/2020  5/3/2020 6:45 AM HISTORY/REASON FOR EXAM:  PE suspected, high pretest prob Fall. Chest pain. TECHNIQUE/EXAM DESCRIPTION: CT angiogram scan for pulmonary embolism with contrast, with reconstructions. 1.25 mm helical sections were obtained from the lung apices through the lung bases following the rapid bolus administration of 100 mL of Omnipaque 350 nonionic contrast. Thin-section overlapping reconstruction interval was utilized. Coronal reconstructions were generated from the axial data. MIP post processing was performed and utilized for the interpretation. Low dose optimization technique was utilized for this CT exam including automated exposure control and adjustment of the mA and/or kV  according to patient size. COMPARISON: 6/22/2018 FINDINGS: Pulmonary Embolism: No. Main Pulmonary Arteries: No. Segmental branches: No. Subsegmental branches: No. Additional Comments: None. Lungs: Low lung volume. Diffuse interlobular septal thickening. Airspace opacity in the bilateral lung bases, right more than left. Scattered calcified granulomas. Airway: Patent. Pleura: Small bilateral pleural effusions. Nodes: No enlarged mediastinal or hilar lymph nodes. Additional findings: Thoracic aorta and great vessels: The ascending aorta measures 4.5 cm. Heart and pericardium: Severe coronary artery calcification. No pericardial effusion. Thoracic spine: Moderate degenerative change of the thoracic spine. No compression deformity. Chest wall: Diffuse anasarca Grossly unchanged left thyroid nodule. Visualized upper abdomen: No acute abnormality.     1. No CT evidence of pulmonary embolism. 2. Diffuse interlobular septal thickening could relate to mild pulmonary edema. 3. Low lung volume. Airspace opacity in the bilateral lung bases, right more than left, could be atelectasis or consolidation. 4. Small bilateral pleural effusions. Diffuse anasarca. 5. The ascending aorta measures 4.5 cm., Similar to prior    Ec-echocardiogram Complete W/o Cont    Result Date: 5/3/2020  Transthoracic Echo Report Echocardiography Laboratory CONCLUSIONS Prior echocardiogram 12/12/2018. Moderate concentric left ventricular hypertrophy. Moderately reduced left ventricular systolic function. Left ventricular ejection fraction is visually estimated to be 40%. Severely dilated left atrium. Estimated right ventricular systolic pressure  is 25 mmHg. Compared to the images of the study done - there has been reduction in left ventricular systolic function now at 40%. TREY THOMAS Exam Date:         05/03/2020                    12:52 Exam Location:     Inpatient Priority:          Routine Ordering Physician:        BIB RITTER                             P Referring Physician:       [id number] Hays Sonographer:               Minerva Morocho RDCS Age:    69     Gender:    M MRN:    [id number] :    1950 BSA:    2.42   Ht (in):    75     Wt (lb):    252 Exam Type:     Complete Indications:     Edema ICD Codes:       782.3 CPT Codes:       04947 BP:   122    /   84     HR:   115 Technical Quality:       Fair MEASUREMENTS  (Male / Female) Normal Values 2D ECHO LV Diastolic Diameter PLAX        4.4 cm                4.2 - 5.9 / 3.9 - 5.3 cm LV Systolic Diameter PLAX         3.7 cm                2.1 - 4.0 cm IVS Diastolic Thickness           1.6 cm                LVPW Diastolic Thickness          1.5 cm                LVOT Diameter                     2.2 cm                Estimated LV Ejection Fraction    40 %                  LV Ejection Fraction MOD BP       50.5 %                >= 55  % LV Ejection Fraction MOD 4C       55.3 %                LV Ejection Fraction MOD 2C       44.3 %                DOPPLER AV Peak Velocity                  0.95 m/s              AV Peak Gradient                  3.6 mmHg              AV Mean Gradient                  2.2 mmHg              LVOT Peak Velocity                0.82 m/s              AV Area Cont Eq vti               3.6 cm2               TV Peak E Velocity                0.33 m/s              TR Peak Velocity                  237 cm/s              * Indicates values subject to auto-interpretation LV EF:  40    % FINDINGS Left Ventricle Normal left ventricular chamber size. Moderate concentric left ventricular hypertrophy. Moderately reduced left ventricular systolic function. Left ventricular ejection fraction is visually estimated to be 40%. Global hypokinesis with regional variation. Diastolic function is difficult to assess with tachycardia. Right Ventricle Moderately dilated right ventricle. Reduced right ventricular systolic function. Right Atrium Enlarged right  "atrium. Inferior vena cava is not well visualized. Left Atrium Severely dilated left atrium. Left atrial volume index is 48 mL/sq m. Mitral Valve Mitral annular calcification. No mitral stenosis. No mitral regurgitation. Aortic Valve Structurally normal aortic valve without significant stenosis or regurgitation. Tricuspid Valve No tricuspid stenosis. Trace tricuspid regurgitation. Estimated right ventricular systolic pressure  is 25 mmHg. Pulmonic Valve Structurally normal pulmonic valve without significant stenosis or regurgitation. Pericardium Normal pericardium without effusion. Aorta The aortic root is normal. Ascending aorta is dilated with a diameter of 4.4 cm. Rivas GABRIEL To (Electronically Signed) Final Date:     03 May 2020 15:20      Micro:  Results     Procedure Component Value Units Date/Time    BLOOD CULTURE [[id number]] Collected:  05/06/20 1959    Order Status:  Completed Specimen:  Blood from Peripheral Updated:  05/07/20 0850     Significant Indicator NEG     Source BLD     Site PERIPHERAL     Culture Result No Growth  Note: Blood cultures are incubated for 5 days and  are monitored continuously.Positive blood cultures  are called to the RN and reported as soon as  they are identified.      Narrative:       Collected By:[id number] KUSUM CARLISLE  Per Hospital Policy: Only change Specimen Src: to \"Line\" if  specified by physician order.  No site indicated    BLOOD CULTURE [[id number]] Collected:  05/06/20 1959    Order Status:  Completed Specimen:  Blood from Peripheral Updated:  05/07/20 0850     Significant Indicator NEG     Source BLD     Site PERIPHERAL     Culture Result No Growth  Note: Blood cultures are incubated for 5 days and  are monitored continuously.Positive blood cultures  are called to the RN and reported as soon as  they are identified.      Narrative:       Collected By:[id number] KUSUM CARLISLE  Per Hospital Policy: Only change Specimen Src: to \"Line\" if  specified by physician " "order.  No site indicated    BLOOD CULTURE [[id number]] Collected:  05/06/20 1959    Order Status:  Sent Specimen:  Blood from Peripheral     BLOOD CULTURE [[id number]] Collected:  05/06/20 1959    Order Status:  Sent Specimen:  Blood from Peripheral     HIV Rapid Screen (Exposure) [[id number]] Collected:  05/06/20 1332    Order Status:  Completed Updated:  05/06/20 1502     HIV Ag/Ab Combo Assay Non-Reactive     Exposed MRN [id number]    Narrative:       Enter exposed person's MRN only (do not enter employee's  name):->[id number]  Exposed person's employer?->[employer]    BLOOD CULTURE [[id number]]  (Abnormal) Collected:  05/05/20 0330    Order Status:  Completed Specimen:  Blood from Peripheral Updated:  05/06/20 0028     Significant Indicator POS     Source BLD     Site PERIPHERAL     Culture Result Growth detected by Bactec instrument. 05/06/2020  00:27  Gram Stain: Gram positive cocci: Possible Staphylococcus sp.      Narrative:       Collected By:[id number] STAPLES  Per Hospital Policy: Only change Specimen Src: to \"Line\" if  specified by physician order.  Right Forearm/Arm    BLOOD CULTURE [[id number]]  (Abnormal) Collected:  05/05/20 0430    Order Status:  Completed Specimen:  Blood from Peripheral Updated:  05/05/20 2339     Significant Indicator POS     Source BLD     Site PERIPHERAL     Culture Result Growth detected by Bactec instrument. 05/05/2020  23:38  Gram Stain: Gram positive cocci: Possible Staphylococcus sp.      Narrative:       Collected By:[id number] STAPLES  Per Hospital Policy: Only change Specimen Src: to \"Line\" if  specified by physician order.  Left Forearm/Arm    BLOOD CULTURE [[id number]]  (Abnormal) Collected:  05/03/20 0530    Order Status:  Completed Specimen:  Blood from Peripheral Updated:  05/05/20 0932     Significant Indicator POS     Source BLD     Site PERIPHERAL     Culture Result Growth detected by Bactec instrument. 05/03/2020  21:44      Methicillin Resistant Staphylococcus aureus  See " "previous culture for sensitivity report.      Narrative:       CALL  Loyd  161 tel. [phone],  CALLED  161 tel. [phone] 05/03/2020, 21:46, RB PERF. RESULTS CALLED TO:  PHARMACY  Per Hospital Policy: Only change Specimen Src: to \"Line\" if  specified by physician order.  Right Forearm/Arm    BLOOD CULTURE [[id number]]  (Abnormal)  (Susceptibility) Collected:  05/03/20 0507    Order Status:  Completed Specimen:  Blood from Peripheral Updated:  05/05/20 0930     Significant Indicator POS     Source BLD     Site PERIPHERAL     Culture Result Growth detected by Bactec instrument. 05/03/2020  20:22  Methicillin Resistant Staphylococcus aureus (MRSA)  detected by PCR.        Methicillin Resistant Staphylococcus aureus    Narrative:       CALL  Loyd  161 tel. [phone],  CALLED  161 tel. [phone] 05/03/2020, 21:43, RB PERF. RESULTS CALLED TO:  PHARMACY  Per Hospital Policy: Only change Specimen Src: to \"Line\" if  specified by physician order.  No site indicated    Susceptibility     Methicillin resistant staphylococcus aureus (1)     Antibiotic Interpretation Microscan Method Status    Azithromycin Resistant >4 mcg/mL CYNTHIA Final    Clindamycin Resistant >4 mcg/mL CYNTHIA Final    Cefazolin Resistant <=8 mcg/mL CYNTHIA Final    Ceftaroline Sensitive <=0.5 mcg/mL CYNTHIA Final    Daptomycin Sensitive <=1 mcg/mL CYNTHIA Final    Ampicillin/sulbactam Resistant <=8/4 mcg/mL CYNTHIA Final    Erythromycin Resistant >4 mcg/mL CYNTHIA Final    Vancomycin Sensitive <=0.5 mcg/mL CYNTHIA Final    Oxacillin Resistant >2 mcg/mL CYNTHIA Final    Penicillin Resistant >8 mcg/mL CYNTHIA Final    Trimeth/Sulfa Sensitive <=0.5/9.5 mcg/mL CYNTHIA Final    Tetracycline Sensitive <=4 mcg/mL CYNTHIA Final                   URINE CULTURE(NEW) [[id number]] Collected:  05/03/20 0530    Order Status:  Completed Specimen:  Urine Updated:  05/05/20 0731     Significant Indicator NEG     Source UR     Site -     Culture Result No growth at 48 hours.    Narrative:       Indication for " culture:->Emergency Room Patient  Indication for culture:->Emergency Room Patient    S. Aureus By PCR, Nasal Complete [[id number]]  (Abnormal) Collected:  05/03/20 1054    Order Status:  Completed Specimen:  Respirate from Respiratory Updated:  05/03/20 1543     Staph aureus by PCR POSITIVE     MRSA by PCR POSITIVE    SARS-CoV-2, PCR (In-House) [[id number]] Collected:  05/03/20 0800    Order Status:  Completed Updated:  05/03/20 0909     SARS-CoV-2 Source NP Swab     SARS-CoV-2 by PCR NotDetected     Comment: [facility] providers: PLEASE REFER TO DE-ESCALATION AND RETESTING PROTOCOL  on [email or web address]  **The [employer] GeneXpert Xpress SARS-CoV-2 Test has been made available for  use under the Emergency Use Authorization (EUA) only.         COVID/SARS CoV-2 [[id number]] Collected:  05/03/20 0800    Order Status:  Completed Specimen:  Respirate from Nasopharyngeal Updated:  05/03/20 0814     COVID Order Status Received    URINALYSIS [[id number]]  (Abnormal) Collected:  05/03/20 0530    Order Status:  Completed Specimen:  Urine Updated:  05/03/20 0559     Color Yellow     Character Cloudy     Specific Gravity 1.025     Ph 5.0     Glucose 500 mg/dL      Ketones Trace mg/dL      Protein 300 mg/dL      Bilirubin Negative     Urobilinogen, Urine 0.2     Nitrite Negative     Leukocyte Esterase Negative     Occult Blood Small     Micro Urine Req Microscopic    Narrative:       Indication for culture:->Emergency Room Patient          Hospital Course/Assessment:   Joel Ma is a 69 y.o. male with a history of prior embolic strokes, essential hypertension, type 2 diabetes, dyslipidemia, chronic A. fib, CKD stage II, chronic indwelling Gonzalez catheter, CAD, JANNIE, grade 1 diastolic heart failure, chronic cervical spine disease, chronic hypoxemic respiratory failure on 2 L oxygen at baseline, initially brought to the ER on 5/1 with bleeding from Gonzalez catheter, had negative SARS-CoV-2 test at the time, but back to the ER on 5/3  with weakness, and repeat SARS-CoV-2 test negative, admitted for acute on chronic hypoxemic respiratory failure and weakness, found to have MRSA bacteremia.  Patient has hardware in the spine, and was also noted to have multiple lytic lesions in lumbar spine.  Potential sources at this time include his Gonzalez, possible developing pneumonia, spinal infection.     Pertinent Diagnoses:  MRSA bacteremia  Acute on chronic hypoxemic respiratory failure  Multiple lytic lesions in spine  CKD stage II  Chronic Gonzalez catheter in place     Plan:   -Agree with IV vancomycin.  Monitor levels and renal function.  Vanco trough of 28 on 5/6  -Repeat blood cultures x2 from 5/5 also positive.  Follow repeat blood cultures from the night of 5/6.  -HIV antibody negative  -TTE with no evidence of infective endocarditis.  Will consider JULIANA if further work-up negative and if persistently positive blood cultures -currently not a candidate for JULIANA  -CT of the spine with multiple possible lytic lesions of the spine, possible T12 and L2 nondisplaced fractures. MRI of the spine with multiple changes. Changes at C5 C6 which could be either degenerative or related to discitis osteomyelitis.  Multiple changes from T12-S1 that are moderately suspicious for discitis osteomyelitis, but could also be degenerative changes.    -SPEP, UPEP pending.  Will also check PSA  -Consider biopsy with pathology and cultures to rule out osteomyelitis, if in line with goals of care  -Agree with goals of care discussions     Plan was discussed with the primary team, Dr. Nguyen     ID will follow. Please feel free to call with questions.     Sameer Hinkle M.D.

## 2020-05-07 NOTE — PROGRESS NOTES
"Pharmacy Kinetics 69 y.o. male on vancomycin day # 5 2020    Currently on Vancomycin 1700 mg iv q12hr - last dose  at 1241  Other antibiotics: none     Indication for Treatment: MRSA bacteremia     Pertinent history per medical record: Admitted on 5/3/2020 for respiratory failure, CHF, pneumonia.  Patient was recently seen in ER  for bleeding from urinary catheter.  Patient was tested for COVID on  and was negative.  Today patient was found down next to bed and brought to ER for evaluation.  Patient found to have afib w/ RVR, CHF, acute respiratory failure, and PNA.  Patient has Hx of MRSA per chart.  MRI of spine negative for epidural abscess per physician notes, concern for osteomyelitis - potential IR biopsy per notes.       Allergies: Demerol; Fentanyl; Latex; and Statins [hmg-coa-r inhibitors]      List concerns for renal function: Age, BMI, elevated BUN/SCr ratio     Pertinent cultures to date:   20 - Peripheral BC x 2: NGTD   20 - Peripheral BC x 2: MRSA  5/3/20 - Peripheral BC x 2: MRSA  5/3/20 - Urine cx: negative      Recent Labs     20  0345 20  0305 20  0305   WBC 10.7 5.5 9.1   NEUTSPOLYS 82.90* 75.40* 78.60*     Recent Labs     20  0345 20  0305 20  0305   BUN 39* 34* 33*   CREATININE 1.12 0.89 0.99     Recent Labs     20  1144 20  1219   [facility] 28.0*  --    VANCORANDOM  --  26.7       Intake/Output Summary (Last 24 hours) at 2020 1331  Last data filed at 2020 0800  Gross per 24 hour   Intake 1651.07 ml   Output 2150 ml   Net -498.93 ml      Blood Pressure 114/75   Pulse 72   Temperature 37 °C (98.6 °F) (Temporal)   Respiration 18   Height 1.905 m (6' 3\")   Weight 124.2 kg (273 lb 13 oz)   Oxygen Saturation 94%  Temp (24hrs), Av.2 °C (97.2 °F), Min:36 °C (96.8 °F), Max:37 °C (98.6 °F)      A/P   1. Vancomycin dose change: on hold pending repeat level   2. Next vancomycin level: 5/8 at 0000, 12 hours from last " "level   3. Goal trough: 16-20 mcg/mL     4. Comments: Supra-therapeutic random level drawn ~24 hours after last dose of vancomycin.  UOP today more than adequate per discussion with RN.  SCr up by 0.1 but close to baseline.  Read of MRI spine yesterday evening back today - \"no significant epidural abscess, multiple areas of degenerative disease changes versus discitis/osteomyelitis\" per physician note.  Repeat blood cultures from 5/6 are NGTD.  Plan for repeat random level in 12 hours.  Will continue to follow.     Kallie Cordova, PharmD, BCPS, BCCCP        "

## 2020-05-07 NOTE — PROGRESS NOTES
Pt agitated in MRI despite titration of dex gtt.  Dr. Nguyen paged and updated.   Orders entered into Epic.

## 2020-05-07 NOTE — PROGRESS NOTES
Critical Care Progress Note    Date of admission  5/3/2020    Chief Complaint  69 y.o. male admitted 5/3/2020 after being found down and in RF    Hospital Course    The entire history is obtained from healthcare providers and the medical record as this gentleman cannot provide me with any history.  This gentleman has a history of prior embolic strokes, essential hypertension, type 2 diabetes mellitus, dyslipidemia, chronic atrial fibrillation, chronic indwelling Gonzalez catheter, CAD, JANNIE, stage III CKD, grade 1 diastolic heart failure and chronic cervical spine disease.  He was seen in the emergency room on or about 5/1/2020 with bleeding from his Gonzalez catheter.  A test for SARS-CoV-2 was negative at that time.  He was sent back to his living facility after being treated in the emergency department.  He resides at [facility].     Today he was found down next to his bed.  He was weak and was unable to get up.  He is apparently normally on 2 L of domiciliary oxygen and his saturations were in the 80s.  He was placed on supplemental oxygen and brought to the emergency department.  He apparently reported that he fell from bed and was unable to get up due to lack of strength.  He was complaining of back pain in the emergency room and was administered IV narcotics.  At the time of my evaluation he is sedated from his pain medications and cannot provide me with any history.  A POLST is present on his chart and confirms that he is DNR/DNI status.  In the emergency department he had blood cultures obtained and he received a dose of Rocephin.  His BNP was elevated and he received IV furosemide.      Interval Problem Update  Reviewed last 24 hour events:    Lethargic or agitated  O x 1-3 today  DEX titration ongoing  Not taking PO meds or nutrition well  AF rate controlled  SBp 100-120s  Urine output good  I/Os neg 381cc/24hrs  Tm 98.6  WBC 9.1  Repeat pair BCs sent, BC + for MRSA x 4  Vancomycin  MRI  spine with many areas of osteo vs degenerative disease - no epidural abcesses  (see full reports)  HF NC O2 60 lpm/90%  WOB ok - denies SOB  CXR no change or slt worse bilat diffuse opacities/ATX  Plt lower at 105  Na 146, K 3.2  Glucose levels noted    Replete potassium  Secondary to pain issues and poor p.o. intake reorder as needed narcotics IV  Titrate dexmedetomidine, difficult to find the sweet spot between over and under sedation  Review MRI with ID and family, no neurosurgical consultation at this time, no abscess or new neurological changes  Respiratory status fairly severe, no JULIANA  Continue IV antibiotic regimen  Repeat blood culture sent  SPEP and UPEP still pending  Contact family     Case reviewed at great length with infectious disease including imaging studies, possible plans for IR biopsy to rule out osteomyelitis.  We both agreed no JULIANA at this time and no neurosurgeon.    I spoke with son and he a great length and outlined above.  He agrees to speak with the palliative care RN and review goals of care.  We will hold off on IR biopsy to rule out osteomyelitis and assume its positive and treat accordingly which we are already doing.  We will wait for the next set of blood cultures to come back and see if he clinically improves or worsens.     YESTERDAY  RN asked me to call family early between 7 and 8 AM, will do so  Remains agitated times, yelling out as well as occasionally taking a swing at the nurse  Patient on dexmedetomidine infusion for agitation, with severe hypoxemia decision was elected to help facilitate MRI along with oral narcotics or oral Xanax as needed, takes both of the latter at home    MRI was planned for last night on dexmedetomidine but apparently the son contacted nursing staff and Dr. Hamlin was contacted as well and family did not want the patient sedated with dexmedetomidine or with Dilaudid and MRI could not be performed    Oriented x1-2 this a.m., orientation continues to  wax and wane  Back pain still giving him trouble, it is about the same, he does want to talk about it  Shortness of breath mild but persisting and remains out of proportion to his hypoxemia    Patient and nursing safety the patient was on dexmedetomidine overnight at 0.3-0.5 range, low-dose treatment and it seemed to help without over sedating him    HF NC O2 90%, 60 L  AF 70s-90s  -1 20s  I/Os -  Neg 3.2 L, great UO  T-max 98.6  WBC 5.5  Vancomycin  Repeat blood cultures X2 from yesterday positive for staph  Echocardiogram reviewed again, moderate LVH, global hypokinesis and EF 40% with normal RV pressures but with mildly dilated RV and reduced RV systolic function along with and without comment RA vegetations, dilation of both right and left atrium with the left severe  Nectar thick diet  Chronic kingston  Rivaroxaban  Lasix  Amlodipine/lisinopril  Gabapentin/tizanidine  Platelet count down to 123  Potassium 2.8, mag 1.8  Glucose 200s  SSI 14 units    Contact family  Replete magnesium and potassium  MRI pending hopefully will still be will obtain it  Continue vancomycin  Repeat blood culture x2 tomorrow, 1/2-hour apart, ideally at antibiotic phu  Replete K  Start Lantus 10  Adjust PO meds    Discussed with son and her at length.  He is the POA and lives locally.  His wife Ana is probably the second person to call in regards to issues.  He is okay with medications including dexmedetomidine as needed but we both agreed to try to limit sedation.  Informed him about his repeat positive blood cultures for MRSA and he said the second time he is had a problem like this it started in his toe, knee and shoulder in the past.  He is aware of hardware from cardiac and orthopedic surgeries.  Reviewed with son his cardiac and respiratory status which may be severe enough to limit his ability to tolerate any surgery at this time.  Reaffirmed DNR/DNI and if he deteriorates he like to be called so he could come in for  comfort care.  We talked about hospice.  He wants to continue with therapy for now and see how he does first.    Serial follow-up  Neurologically no change, still agitated and confused at times  MRI pending this afternoon, elaborate sedation plans reviewed at length with nursing times several  Oxygenation unchanged remains on very high level high flow nasal cannula    Review of Systems  Review of Systems   Unable to perform ROS: Acuity of condition   Sedated with dexmedetomidine this a.m. after a bad night    Vital Signs for last 24 hours   Temp:  [36 °C (96.8 °F)-37 °C (98.6 °F)] 37 °C (98.6 °F)  Pulse:  [62-93] 72  Resp:  [14-20] 18  BP: ()/(60-94) 114/75  SpO2:  [91 %-97 %] 94 %    Hemodynamic parameters for last 24 hours       Respiratory Information for the last 24 hours       Physical Exam   Physical Exam  Constitutional:       General: He is not in acute distress.     Appearance: He is obese. He is ill-appearing (acute on chronic, no change). He is not toxic-appearing.      Comments: High flow nasal cannula   HENT:      Head: Normocephalic and atraumatic.      Mouth/Throat:      Mouth: Mucous membranes are moist.   Eyes:      General: No scleral icterus.     Extraocular Movements: Extraocular movements intact.      Pupils: Pupils are equal, round, and reactive to light.   Neck:      Trachea: Phonation normal.   Cardiovascular:      Rate and Rhythm: Tachycardia present. Rhythm irregularly irregular. Occasional extrasystoles are present.     Pulses: Normal pulses.      Heart sounds: No murmur. No friction rub. No gallop.       Comments: Remains in atrial fibrillation with RVR at times  Pulmonary:      Effort: No accessory muscle usage or respiratory distress.      Breath sounds: Rhonchi and rales (Persisting) present. No wheezing.   Abdominal:      General: Bowel sounds are normal. There is no distension.      Palpations: Abdomen is soft.      Tenderness: There is no abdominal tenderness. There is no  right CVA tenderness or left CVA tenderness.   Musculoskeletal:      Right lower le+ Edema present.      Left lower le+ Edema present.   Lymphadenopathy:      Cervical: No cervical adenopathy.   Skin:     Capillary Refill: Capillary refill takes less than 2 seconds.      Coloration: Skin is not cyanotic.      Nails: There is no clubbing.        Comments: Chronic venous changes   Neurological:      Mental Status: He is lethargic and confused.      Cranial Nerves: Cranial nerves are intact.      Motor: Weakness present.      Comments: Lethargic and not following this a.m., brainstem reflexes intact, unchanged lower extremity exam, later in a.m. off dexmedetomidine oriented x2-3 and following simple commands in upper extremities but refusing much of his care and complaining of low back pain   Psychiatric:         Mood and Affect: Affect is labile.         Behavior: Behavior is agitated (At times).      Comments: Unable to assess         Medications  Current Facility-Administered Medications   Medication Dose Route Frequency Provider Last Rate Last Dose   • HYDROmorphone pf (DILAUDID) injection 0.5-1 mg  0.5-1 mg Intravenous Q2HRS PRN Ricco Nguyen M.D.   1 mg at 20 0806   • potassium chloride SA (Kdur) tablet 40 mEq  40 mEq Oral Q6HR Ricco Nguyen M.D.   Stopped at 20 0900   • MD Alert...ICU Electrolyte Replacement per Pharmacy   Other PHARMACY TO DOSE Adrian Nowak M.D.       • insulin glargine (LANTUS) injection 10 Units  10 Units Subcutaneous QAM Ricco Nguyen M.D.   10 Units at 20 0510   • metoprolol (LOPRESSOR) tablet 100 mg  100 mg Oral TWICE DAILY Ricco Nguyen M.D.   Stopped at 20 0600   • Dexmedetomidine HCl-Dextrose 200MCG/50ML -5% SOLN  0.1-1.5 mcg/kg/hr Intravenous Continuous Adrian Nowak M.D. 6.6 mL/hr at 20 1110 0.2 mcg/kg/hr at 20 1110   • ALPRAZolam (XANAX) tablet 0.25 mg  0.25 mg Oral 4X/DAY PRN Ricco Nguyen M.D.   0.25 mg at  05/06/20 1236   • gabapentin (NEURONTIN) capsule 300 mg  300 mg Oral BID Ricco Nguyen M.D.   Stopped at 05/07/20 0600   • gabapentin (NEURONTIN) capsule 600 mg  600 mg Oral Q EVENING Ricco Nguyen M.D.   Stopped at 05/06/20 1940   • insulin regular (HUMULIN R) injection 2-9 Units  2-9 Units Subcutaneous 4X/DAY ACHS Alhaji Bautista M.D.   3 Units at 05/07/20 1206    And   • glucose 4 g chewable tablet 16 g  16 g Oral Q15 MIN PRN Alhaji Bautista M.D.        And   • dextrose 50% (D50W) injection 50 mL  50 mL Intravenous Q15 MIN PRN Alhaji Bautista M.D.       • clotrimazole-betamethasone (LOTRISONE) 1-0.05 % cream   Topical BID Ricco Nguyen M.D.       • oxyCODONE immediate-release (ROXICODONE) tablet 5 mg  5 mg Oral Q4HRS PRN Ricco Nguyen M.D.   5 mg at 05/06/20 1520   • senna-docusate (PERICOLACE or SENOKOT S) 8.6-50 MG per tablet 2 Tab  2 Tab Oral BID Alhaji Bautista M.D.   Stopped at 05/06/20 1940    And   • polyethylene glycol/lytes (MIRALAX) PACKET 1 Packet  1 Packet Oral QDAY PRN Alhaji Bautista M.D.        And   • magnesium hydroxide (MILK OF MAGNESIA) suspension 30 mL  30 mL Oral QDAY PRN Alhaji Bautista M.D.        And   • bisacodyl (DULCOLAX) suppository 10 mg  10 mg Rectal QDAY PRN Alhaji Bautista M.D.       • furosemide (LASIX) injection 40 mg  40 mg Intravenous BID DIURETIC Alhaji Bautista M.D.   40 mg at 05/07/20 0505   • MD Alert...Vancomycin per Pharmacy   Other PHARMACY TO DOSE Alhaji Bautista M.D.       • hydrALAZINE (APRESOLINE) injection 10-20 mg  10-20 mg Intravenous Q4HRS PRN Alhaji Bautista M.D.       • labetalol (NORMODYNE/TRANDATE) injection 10-20 mg  10-20 mg Intravenous Q4HRS PRN Alhaji Bautista M.D.       • latanoprost (XALATAN) 0.005 % ophthalmic solution 1 Drop  1 Drop Both Eyes Q EVENING Alhaji Bautista M.D.   1 Drop at 05/06/20 1844   • clopidogrel (PLAVIX) tablet 75 mg  75 mg Oral  DAILY Alhaji Bautista M.D.   Stopped at 05/07/20 0600   • ezetimibe (ZETIA) tablet 10 mg  10 mg Oral DAILY Alhaji Bautista M.D.   Stopped at 05/07/20 0600   • rivaroxaban (XARELTO) tablet 20 mg  20 mg Oral PM MEAL Alhaji Bautista M.D.   Stopped at 05/06/20 1940   • tizanidine (ZANAFLEX) tablet 2 mg  2 mg Oral QHS Alhaji Bautista M.D.   Stopped at 05/06/20 2100   • lisinopril (PRINIVIL) tablet 40 mg  40 mg Oral DAILY Alhaji Bautista M.D.   Stopped at 05/07/20 0600   • amLODIPine (NORVASC) tablet 10 mg  10 mg Oral DAILY Alhaji Bautista M.D.   Stopped at 05/07/20 0600   • hydrALAZINE (APRESOLINE) tablet 100 mg  100 mg Oral Q8HRS Alhaji Bautista M.D.   Stopped at 05/06/20 1400       Fluids    Intake/Output Summary (Last 24 hours) at 5/7/2020 1327  Last data filed at 5/7/2020 0800  Gross per 24 hour   Intake 1651.07 ml   Output 2150 ml   Net -498.93 ml       Laboratory          Recent Labs     05/05/20 0345 05/06/20  0305 05/07/20  0305   SODIUM 140 145 146*   POTASSIUM 4.4 2.8* 3.2*   CHLORIDE 104 105 105   CO2 26 29 29   BUN 39* 34* 33*   CREATININE 1.12 0.89 0.99   MAGNESIUM 1.8 1.8 2.0   CALCIUM 8.1* 8.2* 8.7     Recent Labs     05/05/20 0345 05/06/20  0305 05/07/20  0305   GLUCOSE 268* 265* 241*     Recent Labs     05/05/20 0345 05/06/20  0305 05/07/20  0305   WBC 10.7 5.5 9.1   NEUTSPOLYS 82.90* 75.40* 78.60*   LYMPHOCYTES 4.60* 7.20* 8.40*   MONOCYTES 11.60 14.30* 11.00   EOSINOPHILS 0.20 2.00 1.20   BASOPHILS 0.20 0.40 0.20     Recent Labs     05/05/20  0345 05/06/20  0305 05/07/20  0305   RBC 5.31 5.85 5.86   HEMOGLOBIN 13.5* 14.5 14.8   HEMATOCRIT 43.6 47.6 47.6   PLATELETCT 130* 123* 105*       Imaging  X-Ray:  I have personally reviewed the images and compared with prior images.  EKG:  I have personally reviewed the images and compared with prior images.  CT:    Reviewed  Echo:   Reviewed    Assessment/Plan  * Acute on chronic respiratory failure with  hypoxemia (HCC)  Assessment & Plan  History of 2 L of domiciliary oxygen  Remains very hypoxic on high flow nasal cannula with borderline saturations, support wean to 60 L @ 90%  CTA negative for acute pulmonary embolism  Differential diagnosis of etiology includes pneumonia and CHF, EF worse with 40% down from 65, probably combination  Blood cultures positive for MRSA, sepsis component contributing, may develop ARDS, IV vancomycin continuing  Confirmed CODE STATUS  Monitor for need for AVAPS which he might not accept an LOC is acceptable now  Forced diuresis as tolerated with IV Lasix  Aggressive pulmonary toilet  Mobilize when able, limited since he is unable to ambulate and has severe low back pain  Incentive spirometry if he can coordinate    Acute on chronic diastolic (congestive) heart failure (HCC)  Assessment & Plan  Prior echo revealed grade 1 diastolic dysfunction  Repeat echo noted, EF significantly down from 65 to 40%, global hypokinesis  Query related to sepsis and bacteremia from MRSA  Follow-up echocardiogram at some point would be prudent  May benefit from follow-up cardiology consultation, monitoring hemodynamics and BP acceptable so far  Force diuresis with furosemide as tolerated, desire negative fluid balance daily for a while to see if that helps with oxygenation if possible, continue IV Lasix and gently pull fluid    Pneumonia due to infectious organism  Assessment & Plan  Blood culture positive x for for MRSA  Sputum culture pending  nasal MRSA PCR positive  History of prior MRSA infection in toe, knee and shoulder per son.  Patient required 9 weeks of antibiotics mostly IV.  Continue vancomycin, Zosyn discontinued when blood cultures positive for MRSA  Check HIV screen is negative  Repeat blood cultures x2 every 48 hours until culture negative, 5/7 set pending  May need JULIANA versus empiric therapy and this DNR/DNI patient, I prefer the latter-oxygenation and LOC would make safety for elective  JULIANA marginal  Rule out multiple myeloma, suspect osteomyelitis over MM  Rule out hardware infection/spine involvement, MRI pending, no change neurologically on review of old records and review with patient  ID consultation ongoing    Abnormal CT of spine  Assessment & Plan  Initial imaging of his lumbar spine reveals a questionable nondisplaced fracture through the T12 vertebral body with moderate vertebral height loss and a questionable nondisplaced fracture of the L2 vertebral body.  Numerous lucencies throughout the lumbar spine concerning for lytic lesions are present.  Pain control for now  MRI of spine performed late 5/6 and interpreted a.m. 5/7-no significant epidural abscess, multiple areas of degenerative disease changes versus discitis/osteomyelitis.  See full report.  Could consider IR biopsy to confirm osteomyelitis, reviewed with ID and patient son, will hold off for day or 2 and see if he clinically improves  Best I can tell in reviewing the case with the son, patient and reviewing old records he is unchanged neurologically, he has not walked in 8 years since a backhoe injury and a stroke  No change neurologically on follow-up exam today, continues to primarily just be confused and agitated and in pain at times  UPEP and SPEP still pending  Hold on neurosurgical consultation for now, no significant epidural abscess or other new structural abnormality suggesting need for neurosurgery    Sepsis (HCC)  Assessment & Plan  This is Sepsis Present on admission  SIRS criteria identified on my evaluation include: Tachycardia, with heart rate greater than 90 BPM and Tachypnea, with respirations greater than 20 per minute  Source is pulmonary with MRSA positive blood cultures now  Repeat positive blood cultures for MRSA now x4 total suggest possibility of deep-seated infection and hardware or endovascularly, ID consult ongoing, repeating blood cultures until negative  Sepsis protocol initiated  IV antibiotics as  appropriate for source of sepsis  While organ dysfunction may be noted elsewhere in this problem list or in the chart, degree of organ dysfunction does not meet CMS criteria for severe sepsis    Stage 3 chronic kidney disease (HCC)- (present on admission)  Assessment & Plan  Monitor renal function and urine output  Avoid nephrotoxins  Renally dose medications were appropriate    Chronic atrial fibrillation (HCC)- (present on admission)  Assessment & Plan  Rate control acceptable  Continue metoprolol succinate, 200 mg daily  Continue anticoagulation with rivaroxaban, monitor for need for heparin/Lovenox therapy and not taking p.o.  IV metoprolol for rate control as needed  Add digoxin if necessary if blood pressure soft, additional IV or enteral beta blocker appropriate if BP acceptable  Continue to optimize electrolytes    Diabetes mellitus, type 2 (HCC)- (present on admission)  Assessment & Plan  Sliding scale insulin, daily review with clinical pharmacist artery dosing and glycemic control  Hypoglycemia protocol   Hold metformin, Levemir and Jardiance for now and when clinically appropriate  Resume Lantus 10 units/day, pharmacy substitute for Levemir in our institution    CAD (coronary artery disease)- (present on admission)  Assessment & Plan  Continue Zetia  Continue Plavix  Continue metoprolol succinate, 200 mg daily    Essential hypertension- (present on admission)  Assessment & Plan  Goal SBP less than 160  Continue amlodipine, 10 mg daily  Continue hydralazine, 100 mg every 8 hours  Continue lisinopril, 40 mg daily  Continue metoprolol succinate, 200 mg daily  IV substitutes as needed when not taking p.o. including metoprolol, hydralazine and enalapril    DNR (do not resuscitate)  Assessment & Plan  DNR/DNI status per POLST on the chart  Reaffirmed with son after extensive phone call 5/6, son stated he was the POA    JANNIE treated with BiPAP- (present on admission)  Assessment & Plan  Unknown if he is still  using BiPAP, monitor for JANNIE and sleep related hypoxemia    BPH (benign prostatic hyperplasia)- (present on admission)  Assessment & Plan  Chronic Gonzalez catheter in place  Recently seen in ER approximately 5/1  Catheter changed out per RN while in recent ER visit, no change until approximately 30 days or sooner if infection develops  Urine culture from admit negative so far  Monitor for UTI    Dyslipidemia- (present on admission)  Assessment & Plan  Continue Zetia    History of stroke- (present on admission)  Assessment & Plan  History of embolic strokes  Continue rivaroxaban and Plavix, monitor for bleeding complication       VTE:  NOAC  Ulcer: H2 Antagonist  Lines: None    I have performed a physical exam and reviewed and updated ROS and Plan today (5/7/2020). In review of yesterday's note (5/6/2020), there are no changes except as documented above.     Discussed patient condition and risk of morbidity and/or mortality with Family, RN, RT, Pharmacy, Charge nurse / hot rounds, Patient and infectious disease     Patient remains critically ill on very high levels of oxygen support.  Monitoring for the need to initiate AVAPS therapy.  Blood Cultures x4 are positive for MRSA and he still has sepsis from this organism.  Hemodynamics are acceptable and A. fib rate controlled, continue as needed IV metoprolol for rate control.  MRI did not reveal abscess but did reveal multiple areas of degenerative changes versus discitis/osteomyelitis, reviewed with ID and we both agree no need for neurosurgery at this time with an unchanged chronic neuro exam that is abnormal and no imaging findings suggest the need for invasive surgical therapy.  Consider IR needle aspiration biopsy to evaluate for osteomyelitis.  No JULIANA at this time, risk from respiratory standpoint fairly high and I do not believe the patient is an operative candidate.  Continue forced diuresis gently as tolerated.  Repeat blood cultures pending.  Palliative care  consultation requested.  Patient continues to be anticoagulated.  Patient's prognosis remains poor.  Patient remains at high risk for increased morbidity and mortality without above critical care interventions.    The patient remains critically ill.  Critical care time = 75 minutes in directly providing and coordinating critical care and extensive data review.  No time overlap and excludes procedures.

## 2020-05-07 NOTE — DOCUMENTATION QUERY
"                                                                         [facility]                                                                       Query Response Note      PATIENT:               TREY THOMAS  ACCT #:                  [id number]  MRN:                     [id number]  :                      1950  ADMIT DATE:       5/3/2020 4:56 AM  DISCH DATE:          RESPONDING  PROVIDER #:        [id number]           QUERY TEXT:    There is conflicting documentation in the medical record.     \"Acute respiratory failure, sepsis component contributing\" is documented in the Pulmonary Notes.      \"Does not meet CMS criteria for severe sepsis\" is documented in the Pulmonary Notes.    Based on treatment, clinical findings and risk factors, can this documentation be further clarified?    The patient's Clinical Indicators include:   Pulmonary Note: Acute on chronic respiratory failure with hypoxemia, sepsis component contributing. Degree of organ dysfunction does not meet CMS criteria for severe sepsis.  Treatment: Antibiotics; oxygen; lab/imaging  Risk Factors: Sepsis; pneumonia; acute respiratory failure  Options provided:   -- Acute respiratory failure, sepsis component contributing, severe sepsis   -- Does not meet CMS criteria for severe sepsis   -- Unable to determine      Query created by: Liat Bliss on 2020 2:43 PM    RESPONSE TEXT:    Acute respiratory failure, sepsis component contributing, severe sepsis          Electronically signed by:  ROYAL RODRIGUEZ MD 2020 4:45 PM              "

## 2020-05-07 NOTE — PROGRESS NOTES
Pt transported back to T629 on transport monitor with ACLS RN and [facility] tech.

## 2020-05-07 NOTE — CARE PLAN
Problem: Safety  Goal: Will remain free from injury  Outcome: PROGRESSING AS EXPECTED  Goal: Will remain free from falls  Outcome: PROGRESSING AS EXPECTED     Problem: Skin Integrity  Goal: Risk for impaired skin integrity will decrease  Outcome: PROGRESSING AS EXPECTED

## 2020-05-08 PROBLEM — D69.6 THROMBOCYTOPENIA (HCC): Status: ACTIVE | Noted: 2020-01-01

## 2020-05-08 PROBLEM — E87.6 HYPOKALEMIA: Status: ACTIVE | Noted: 2020-01-01

## 2020-05-08 NOTE — CARE PLAN
Problem: Nutritional:  Goal: Nutrition support tolerated and meeting greater than 85% of estimated needs  Outcome: NOT MET     Trickle feed started.

## 2020-05-08 NOTE — ASSESSMENT & PLAN NOTE
Replete, goal greater than 4.0, ERP  Optimize other electrolytes especially with his atrial fibrillation  Serial BMP

## 2020-05-08 NOTE — PROGRESS NOTES
Critical Care Progress Note    Date of admission  5/3/2020    Chief Complaint  69 y.o. male admitted 5/3/2020 after being found down and in RF    Hospital Course    The entire history is obtained from healthcare providers and the medical record as this gentleman cannot provide me with any history.  This gentleman has a history of prior embolic strokes, essential hypertension, type 2 diabetes mellitus, dyslipidemia, chronic atrial fibrillation, chronic indwelling Gonzalez catheter, CAD, JANNIE, stage III CKD, grade 1 diastolic heart failure and chronic cervical spine disease.  He was seen in the emergency room on or about 5/1/2020 with bleeding from his Gonzalez catheter.  A test for SARS-CoV-2 was negative at that time.  He was sent back to his living facility after being treated in the emergency department.  He resides at [facility].     Today he was found down next to his bed.  He was weak and was unable to get up.  He is apparently normally on 2 L of domiciliary oxygen and his saturations were in the 80s.  He was placed on supplemental oxygen and brought to the emergency department.  He apparently reported that he fell from bed and was unable to get up due to lack of strength.  He was complaining of back pain in the emergency room and was administered IV narcotics.  At the time of my evaluation he is sedated from his pain medications and cannot provide me with any history.  A POLST is present on his chart and confirms that he is DNR/DNI status.  In the emergency department he had blood cultures obtained and he received a dose of Rocephin.  His BNP was elevated and he received IV furosemide.      Interval Problem Update  Reviewed last 24 hour events:    Reviewed case at length with nurse at bedside in regards to family coming in last night with power of  paperwork.  Son apparently demanding to come into the unit and was inappropriate per nursing staff.  Similar conversation with palliative  care RN this morning and power of  noted to not be a medical power of .  We will need to talk to the entire family in regards to care and case management and palliative care RN team are arranging this for us.  Requested that ID as well as bedside nurse to be at that meeting along with palliative care RN.    More alert today - O x1-2? At times - zero yesterday  Follows and cooperative this AM but confused and still yelling out at times, better w/ DEX/pain meds  No change neuro exam  DEX 0.3  AF 70-100s  SBP 90-110s  Edema no change  UO aequate  I/Os neg 2.56L  Cortrak - TF  HF NC O2 50 L at 60%  Decent cough strength but poor timing  Lantus 10 - SSI 9  BNP 08190, improved  Plt 109  BCx2 again + MRSA - 3rd set  K 2.9    Mg/K repletion, schedule more maintenance potassium  Up free h2O - 200 Q6, monitor fluid balance and BMP  Up Lantus to 15, continue SSI  Add RIF/Dapto vs other  Bowel care protocols  Care Conference being arranged by palliative care team with family and physicians  Call ID re: antibiotic regimen and repeated positive blood cultures  Repeat blood cultures tonight, continue every 48 hours until negative  Advance TF to goal as tolerated  Mobilize as able    Reviewed case with infectious disease in regards to antibiotic selection especially in face of to repeat pairs of blood cultures being positive, now 6 in total.    Contacted laboratory in regards to SPEP and UPEP studies.  They were able to confirm they have both been collected and sent.  SPEP normally takes 1 to 2 days and they were not sure why it was not back and they are going to call the reference lab.  UPEP takes 1 to 5 days and they do not have the results on that as of yet they will let me know.  I gave my cell number to the laboratory technician.    I updated son and her at length occluding informing him that repeat blood cultures were positive again.  He informed me that family conference set up for 1 PM tomorrow.  We  discussed antibiotic options.  He wondered if he should be on clindamycin and I told him his organism was resistant to it by our laboratory testing.  He was wondering if he should have a bone marrow transplant for this problem and a describe that be inappropriate for the infection although I thought the infection was in the bone i.e. osteomyelitis.  We talked about his power of  which is a DURABLE POWER OF  but not a medical power of  and will work with him and his siblings for a consensus terms of plan and will review goals of care tomorrow at the family conference and hopefully infectious disease will be able to participate in the conference.    Lab contacted me, reference lab is running behind on multiple tests apparently, SPEP should be out tomorrow and UPEP tomorrow or the beginning of next week.    Reviewed case with palliative care, care conference set up for 1 PM tomorrow with family       YESTERDAY  Lethargic or agitated  O x 1-3 today  DEX titration ongoing  Not taking PO meds or nutrition well  AF rate controlled  SBp 100-120s  Urine output good  I/Os neg 381cc/24hrs  Tm 98.6  WBC 9.1  Repeat pair BCs sent, BC + for MRSA x 4  Vancomycin  MRI spine with many areas of osteo vs degenerative disease - no epidural abcesses  (see full reports)  HF NC O2 60 lpm/90%  WOB ok - denies SOB  CXR no change or slt worse bilat diffuse opacities/ATX  Plt lower at 105  Na 146, K 3.2  Glucose levels noted    Replete potassium  Secondary to pain issues and poor p.o. intake reorder as needed narcotics IV  Titrate dexmedetomidine, difficult to find the sweet spot between over and under sedation  Review MRI with ID and family, no neurosurgical consultation at this time, no abscess or new neurological changes  Respiratory status fairly severe, no JULIANA  Continue IV antibiotic regimen  Repeat blood culture sent  SPEP and UPEP still pending  Contact family     Case reviewed at great length with infectious  disease including imaging studies, possible plans for IR biopsy to rule out osteomyelitis.  We both agreed no JULIANA at this time and no neurosurgeon.    I spoke with son and he a great length and outlined above.  He agrees to speak with the palliative care RN and review goals of care.  We will hold off on IR biopsy to rule out osteomyelitis and assume its positive and treat accordingly which we are already doing.  We will wait for the next set of blood cultures to come back and see if he clinically improves or worsens.    Review of Systems  Review of Systems   Unable to perform ROS: Acuity of condition   Remains confused but denies shortness of breath, main concern is ongoing back pain otherwise limited history    Vital Signs for last 24 hours   Temp:  [36.5 °C (97.7 °F)-37.2 °C (99 °F)] 37.2 °C (99 °F)  Pulse:  [] 88  Resp:  [12-28] 28  BP: ()/(56-85) 118/74  SpO2:  [84 %-100 %] 94 %    Hemodynamic parameters for last 24 hours       Respiratory Information for the last 24 hours       Physical Exam   Physical Exam  Constitutional:       General: He is not in acute distress.     Appearance: He is obese. He is ill-appearing (acute on chronic, no delta). He is not toxic-appearing.      Comments: High flow nasal cannula   HENT:      Head: Normocephalic and atraumatic.      Mouth/Throat:      Mouth: Mucous membranes are moist.   Eyes:      General: No scleral icterus.     Extraocular Movements: Extraocular movements intact.      Pupils: Pupils are equal, round, and reactive to light.   Neck:      Trachea: Phonation normal.   Cardiovascular:      Rate and Rhythm: Tachycardia present. Rhythm irregularly irregular. Occasional extrasystoles are present.     Pulses: Normal pulses.      Heart sounds: No murmur. No friction rub. No gallop.       Comments: Remains in atrial fibrillation with RVR at times  Pulmonary:      Effort: No accessory muscle usage or respiratory distress.      Breath sounds: Rhonchi (Persisting)  and rales (Unchanged) present. No wheezing.   Abdominal:      General: Bowel sounds are normal. There is no distension.      Palpations: Abdomen is soft.      Tenderness: There is no abdominal tenderness. There is no right CVA tenderness or left CVA tenderness.   Musculoskeletal:      Right lower le+ Edema present.      Left lower le+ Edema present.   Lymphadenopathy:      Cervical: No cervical adenopathy.   Skin:     Capillary Refill: Capillary refill takes less than 2 seconds.      Coloration: Skin is not cyanotic.      Nails: There is no clubbing.        Comments: Chronic venous changes   Neurological:      Mental Status: He is lethargic and confused.      Cranial Nerves: Cranial nerves are intact.      Motor: Weakness (More chronic than acute) present.      Comments: More alert - less lethargic, following and oriented x1-2 but remains confused on low-dose dexmedetomidine, brainstem reflexes intact, unchanged lower extremity exam   Psychiatric:         Mood and Affect: Affect is labile.         Behavior: Behavior is agitated (At times).      Comments: Unable to assess       Medications  Current Facility-Administered Medications   Medication Dose Route Frequency Provider Last Rate Last Dose   • [START ON 2020] insulin glargine (LANTUS) injection 15 Units  15 Units Subcutaneous QAM Ricco Nguyen M.D.       • potassium bicarbonate (KLYTE) effervescent tablet 50 mEq  50 mEq Enteral Tube Q4HRS Ricco Nguyen M.D.   50 mEq at 20 1008   • [START ON 2020] potassium bicarbonate (KLYTE) effervescent tablet 25 mEq  25 mEq Enteral Tube BID Ricco Nguyen M.D.       • HYDROmorphone pf (DILAUDID) injection 0.5-1 mg  0.5-1 mg Intravenous Q2HRS PRN Ricco Nguyen M.D.   1 mg at 20 1111   • Pharmacy Consult: Enteral tube insertion - review meds/change route/product selection  1 Each Other PHARMACY TO DOSE Ricco Nguyen M.D.       • amLODIPine (NORVASC) tablet 10 mg  10 mg Enteral  Tube DAILY Ricco Nguyen M.D.   10 mg at 05/08/20 0455   • clopidogrel (PLAVIX) tablet 75 mg  75 mg Enteral Tube DAILY Ricco Nguyen M.D.   75 mg at 05/08/20 0457   • ezetimibe (ZETIA) tablet 10 mg  10 mg Enteral Tube DAILY Ricco Nguyen M.D.   10 mg at 05/08/20 0457   • gabapentin (NEURONTIN) capsule 300 mg  300 mg Enteral Tube BID Ricco Nguyen M.D.   300 mg at 05/08/20 1111   • gabapentin (NEURONTIN) capsule 600 mg  600 mg Enteral Tube Q EVENING Ricco Nguyen M.D.   600 mg at 05/07/20 1945   • hydrALAZINE (APRESOLINE) tablet 100 mg  100 mg Per NG Tube Q8HRS Ricco Nguyen M.D.   100 mg at 05/08/20 0456   • lisinopril (PRINIVIL) tablet 40 mg  40 mg Enteral Tube DAILY Ricco Nguyen M.D.   40 mg at 05/08/20 0456   • rivaroxaban (XARELTO) tablet 20 mg  20 mg Enteral Tube PM MEAL Ricco Nguyen M.D.   20 mg at 05/07/20 1944   • metoprolol (LOPRESSOR) tablet 100 mg  100 mg Enteral Tube TWICE DAILY Ricco Nguyen M.D.   100 mg at 05/08/20 0456   • senna-docusate (PERICOLACE or SENOKOT S) 8.6-50 MG per tablet 2 Tab  2 Tab Enteral Tube BID Ricco Nguyen M.D.   2 Tab at 05/08/20 0457    And   • polyethylene glycol/lytes (MIRALAX) PACKET 1 Packet  1 Packet Enteral Tube QDAY PRN Ricco Nguyen M.D.        And   • magnesium hydroxide (MILK OF MAGNESIA) suspension 30 mL  30 mL Enteral Tube QDAY PRN Ricco Nguyen M.D.        And   • bisacodyl (DULCOLAX) suppository 10 mg  10 mg Rectal QDAY PRN Ricco Nguyen M.D.       • tizanidine (ZANAFLEX) tablet 2 mg  2 mg Per NG Tube QHS Ricco Nguyen M.D.   2 mg at 05/07/20 2103   • oxyCODONE immediate-release (ROXICODONE) tablet 5 mg  5 mg Enteral Tube Q4HRS PRN Ricco Nguyen M.D.   5 mg at 05/08/20 0842   • ALPRAZolam (XANAX) tablet 0.25 mg  0.25 mg Enteral Tube 4X/DAY PRN Ricco Nguyen M.D.       • insulin regular (HUMULIN R) injection 2-9 Units  2-9 Units Subcutaneous Q6HRS Ricco Nguyen M.D.   2 Units at  05/08/20 1115    And   • dextrose 50% (D50W) injection 50 mL  50 mL Intravenous Q15 MIN PRN Ricco Nguyen M.D.       • albuterol (PROVENTIL) 2.5mg/0.5ml nebulizer solution 2.5 mg  2.5 mg Nebulization Q2HRS PRN (RT) Alhaji Bautista M.D.       • MD Alert...ICU Electrolyte Replacement per Pharmacy   Other PHARMACY TO DOSE Adrian Nowak M.D.       • Dexmedetomidine HCl-Dextrose 200MCG/50ML -5% SOLN  0.1-1.5 mcg/kg/hr Intravenous Continuous Adrian Nowak M.D. 9.9 mL/hr at 05/08/20 1004 0.3 mcg/kg/hr at 05/08/20 1004   • clotrimazole-betamethasone (LOTRISONE) 1-0.05 % cream   Topical BID Ricco Nguyen M.D.       • furosemide (LASIX) injection 40 mg  40 mg Intravenous BID DIURETIC Alhaji Bautista M.D.   40 mg at 05/08/20 0457   • MD Alert...Vancomycin per Pharmacy   Other PHARMACY TO DOSE Alhaji Bautista M.D.       • hydrALAZINE (APRESOLINE) injection 10-20 mg  10-20 mg Intravenous Q4HRS PRN Alhaji Bautista M.D.       • labetalol (NORMODYNE/TRANDATE) injection 10-20 mg  10-20 mg Intravenous Q4HRS PRN Alhaji Bautista M.D.       • latanoprost (XALATAN) 0.005 % ophthalmic solution 1 Drop  1 Drop Both Eyes Q EVENING Alhaji Bautista M.D.   1 Drop at 05/07/20 1937       Fluids    Intake/Output Summary (Last 24 hours) at 5/8/2020 1351  Last data filed at 5/8/2020 1004  Gross per 24 hour   Intake 858.08 ml   Output 2550 ml   Net -1691.92 ml       Laboratory  Recent Labs     05/08/20  0123   ISTATAPH 7.422   ISTATAPCO2 53.4*   ISTATAPO2 55*   ISTATATCO2 36*   [id number] 88*   ISTATARTHCO3 34.8*   ISTATARTBE 9*   ISTATTEMP 97.0 F   ISTATFIO2 100   ISTATSPEC Arterial   ISTATAPHTC 7.435   [id number] 52*         Recent Labs     05/06/20  0305 05/07/20  0305 05/08/20  0323   SODIUM 145 146* 147*   POTASSIUM 2.8* 3.2* 2.9*   CHLORIDE 105 105 105   CO2 29 29 32   BUN 34* 33* 32*   CREATININE 0.89 0.99 1.04   MAGNESIUM 1.8 2.0 1.8   CALCIUM 8.2* 8.7 8.6     Recent Labs      05/06/20 0305 05/07/20 0305 05/08/20  0323   PREALBUMIN  --   --  5.3*   GLUCOSE 265* 241* 211*     Recent Labs     05/06/20 0305 05/07/20 0305 05/08/20  0323   WBC 5.5 9.1 9.4   NEUTSPOLYS 75.40* 78.60* 81.20*   LYMPHOCYTES 7.20* 8.40* 6.90*   MONOCYTES 14.30* 11.00 9.50   EOSINOPHILS 2.00 1.20 1.30   BASOPHILS 0.40 0.20 0.20     Recent Labs     05/06/20 0305 05/07/20 0305 05/08/20  0323   RBC 5.85 5.86 6.01   HEMOGLOBIN 14.5 14.8 14.9   HEMATOCRIT 47.6 47.6 49.6   PLATELETCT 123* 105* 109*       Imaging  X-Ray:  I have personally reviewed the images and compared with prior images.  EKG:  I have personally reviewed the images and compared with prior images.  CT:    Reviewed  Echo:   Reviewed    Assessment/Plan  * Acute on chronic respiratory failure with hypoxemia (HCC)  Assessment & Plan  History of 2 L of domiciliary oxygen  Remains very hypoxic on high flow nasal cannula with borderline saturations, support wean to 50 L @ 60%  CTA negative for acute pulmonary embolism, bilateral opacities noted  Differential diagnosis of etiology includes pneumonia and CHF, EF worse with 40% down from 65, probably combination  Blood cultures positive for MRSA, sepsis component contributing, may develop ARDS, IV vancomycin continuing  Confirmed CODE STATUS, reviewed POLST with RN and palliative care RN, palliative care RN confirmed POLST by calling skilled nursing facility and speaking with physician involved.  Continue to monitor for need for AVAPS which he might not accept an LOC is acceptable now  Tinea forced diuresis as tolerated with IV Lasix, BMP still significant elevated  Aggressive pulmonary toilet  Mobilize when able, limited since he is unable to ambulate and has severe low back pain  Incentive spirometry if he can coordinate, not doing well with this or with encouragement to cough regularly    Severe Sepsis (HCC)  Assessment & Plan  This is Sepsis Present on admission  SIRS criteria identified on my evaluation  include: Tachycardia, with heart rate greater than 90 BPM and Tachypnea, with respirations greater than 20 per minute  Source is pulmonary with MRSA positive blood cultures now, query source is osteomyelitis versus bone secondarily infected from MRSA pneumonia, ID evaluated  Repeat positive blood cultures for MRSA now x6 total suggest possibility of deep-seated infection and hardware or endovascularly, ID consult ongoing, repeating blood cultures until negative  Sepsis protocol initiated  IV antibiotics as appropriate for source of sepsis  Patient with severe sepsis with significant organ involvement including acute respiratory failure    Acute on chronic diastolic (congestive) heart failure (HCC)  Assessment & Plan  Prior ECHO revealed grade 1 DD  Repeat ECHO noted, EF significantly down from 65 to 40%, global hypokinesis  Query related to sepsis and bacteremia from MRSA  Follow-up echocardiogram at some point would be prudent  May benefit from follow-up cardiology consultation, monitoring hemodynamics and BP acceptable so far  Force diuresis with furosemide as tolerated, desire negative fluid balance daily for a while to see if that helps with oxygenation if possible, continue IV Lasix and gently pull fluid    Pneumonia due to infectious organism  Assessment & Plan  Blood culture positive x6 for for MRSA  nasal MRSA PCR positive  H/o of prior MRSA infection in toe, knee and shoulder per son.  Patient required 9 weeks of antibiotics mostly IV.  He was subsequently treated with an extended period with clindamycin.  Continue vancomycin, Zosyn discontinued when blood cultures positive for MRSA  HIV screen is negative  Repeat blood cultures x2 every 48 hours until culture negative, 3 sets positive so far  May need JULIANA versus empiric therapy and this DNR/DNI patient, I prefer the latter-oxygenation and LOC would make safety for elective JULIANA marginal  Rule out multiple myeloma, suspect osteomyelitis over MM, SPEP and  UPEP still pending  Rule out hardware infection/spine involvement, MRI reviewed-no epidural abscess query osteo-versus DJD, no change neurologically on review of old records and review with patient, no other obvious source on exam, reviewed with ID    Hypokalemia  Assessment & Plan  Replete, goal greater than 4.0, ERP  Optimize other electrolytes especially with his atrial fibrillation  Serial BMP    Thrombocytopenia (HCC)  Assessment & Plan  Mild to moderate reduction platelet count,   No evidence of bleeding but patient anticoagulated for A. fib, monitoring closely  Multifactorial etiology for low platelet count, presumed primarily sepsis  Serial CBCs  Transfuse per protocols    Abnormal CT of spine  Assessment & Plan  Initial imaging of his lumbar spine reveals a questionable nondisplaced fracture through the T12 vertebral body with moderate vertebral height loss and a questionable nondisplaced fracture of the L2 vertebral body.  Numerous lucencies throughout the lumbar spine concerning for lytic lesions are present.    MRI of spine performed late 5/6 and interpreted a.m. 5/7-no significant epidural abscess, multiple areas of degenerative disease changes versus discitis/osteomyelitis.  See full report.    Could consider IR biopsy to confirm osteomyelitis, reviewed with ID and patient son, will hold off for day or 2 and see if he clinically improves.    Best I can tell in reviewing the case with the son, patient and reviewing old records he is unchanged neurologically, he has not walked in 8 years since a backhoe injury and a stroke.  No change neurologically on follow-up exam again today, continues to primarily just be confused and agitated and in pain at times and have his persistent lower extremity weakness with diminished sensation.    UPEP and SPEP still pending, I spoke with laboratory 5/8, both studies are send out and they are calling reference lab to track down results    Hold on neurosurgical  consultation for now, no significant epidural abscess or other new structural abnormality suggesting need for neurosurgery    Chronic atrial fibrillation (HCC)- (present on admission)  Assessment & Plan  Rate control acceptable  Continue metoprolol succinate, 200 mg daily when we have enteral access  Continue anticoagulation with rivaroxaban, monitor for need for heparin/Lovenox therapy and not taking p.o.  IV metoprolol for rate control as needed when we do not have enteral axis or rate accelerated  Add digoxin if necessary if blood pressure soft, additional IV CCB or beta blocker appropriate if BP acceptable  Continue to optimize electrolytes    Diabetes mellitus, type 2 (HCC)- (present on admission)  Assessment & Plan  Continue medium sliding scale insulin, daily review with clinical pharmacist artery dosing and glycemic control  Hypoglycemia protocol s  Hold metformin, Levemir and Jardiance for now and when clinically appropriate  Increase Lantus to 15 units/day, prefer tighter control blood sugar given his bacteremia and sepsis-monitor for the need for insulin infusion protocols, pharmacy substitute for Levemir in our institution    CAD (coronary artery disease)- (present on admission)  Assessment & Plan  Continue Zetia  Continue Plavix  Continue metoprolol succinate, 200 mg daily    DNR (do not resuscitate)  Assessment & Plan  DNR/DNI status per MATTHEW on the chart  Reaffirmed with son after extensive phone call 5/6, son stated he was the POA   Palliative care RN confirmed POLST speaking to physician who completed the form at skilled nursing facility  Obtained power of  but apparently is only for physical circumstances not medical, will review decisions with family as a group    Stage 3 chronic kidney disease (HCC)- (present on admission)  Assessment & Plan  Monitor renal function and urine output  Avoid nephrotoxins  Renally dose medications were appropriate    JANNIE treated with BiPAP- (present on  admission)  Assessment & Plan  Unknown if he is still using BiPAP, monitor for JANNIE and sleep related hypoxemia  Tolerating high flow nasal cannula without obvious significant obstructive apneas resulting in desaturation    BPH (benign prostatic hyperplasia)- (present on admission)  Assessment & Plan  Chronic Gonzalez catheter in place  Recently seen in ER approximately 5/1  Catheter changed out per RN while in recent ER visit, no change until approximately 30 days or sooner if infection develops  Urine culture from admit negative so far  Monitor for UTI    Essential hypertension- (present on admission)  Assessment & Plan  Goal SBP less than 160  Continue amlodipine, 10 mg daily  Continue hydralazine, 100 mg every 8 hours  Continue lisinopril, 40 mg daily  Continue metoprolol succinate, 200 mg daily  IV substitutes as needed when not taking p.o. including metoprolol, hydralazine and enalapril    Dyslipidemia- (present on admission)  Assessment & Plan  Continue Zetia    History of stroke- (present on admission)  Assessment & Plan  History of embolic strokes  Continue rivaroxaban and Plavix, monitor for bleeding complication       VTE:  NOAC  Ulcer: H2 Antagonist  Lines: None    I have performed a physical exam and reviewed and updated ROS and Plan today (5/8/2020). In review of yesterday's note (5/7/2020), there are no changes except as documented above.     Discussed patient condition and risk of morbidity and/or mortality with Family, RN, RT, Pharmacy, Charge nurse / hot rounds, Patient and infectious disease     Patient pia critically ill with recurrent positive blood cultures for MRSA now 6 in total.  Presumed etiology of osteomyelitis which is a recurrent problem and appears to be involving the spine?  No obvious abscess on MRI or obvious drainable fluid collection on exam.  Query IR biopsy of osteomyelitis?  May be more risk than benefit, reviewed with ID.  Discussed antibiotic regimen with ID, continue Vanco and  consider if repeat cultures positive changing to two anti-MRSA antibiotics IV.  Monitoring respiratory status for the need to employ auto BiPAP/AVAPS.  Currently titrating high flow nasal cannula and diuresing with Lasix.  Pain control and agitation still extremely difficult issue and patient remains confused and disoriented and lacks capacity for decision-making.  Palliative care now following along.  Patient pia in A. fib and using intravenous beta-blockers to help control rate.  He remains anticoagulated.  Patient's prognosis remains poor.  Patient remains at high risk for increased morbidity and mortality without above critical care interventions.    The patient remains critically ill.  Critical care time = 80 minutes in directly providing and coordinating critical care and extensive data review.  No time overlap and excludes procedures.

## 2020-05-08 NOTE — DIETARY
"Nutrition Support Assessment:  Day 5 of admit.  Joel Ma is a 69 y.o. male with admitting DX of Acute on chronic respiratory failure with hypoxia and hypercapnia (HCC)     Current problem list:  Pneumonia due to infectious organism  Acute on chronic diastolic (congestive) heart failure (HCC)  H/o DM, Stage 3 chronic kidney disease (HCC), stroke    Assessment:  Estimated Nutritional Needs based on:   Height: 190.5 cm (6' 3\")  Weight: 124.2 kg (273 lb 13 oz)  Ideal Body Weight: 88.9 kg (196 lb)  Percent Ideal Body Weight: 139.7  Body mass index is 34.22 kg/m²., BMI classification: Class I obesity    Calculation/Equation: REE per MSJ = 2090 kcal/day (x1.1 = 2299 kcal/day)  Total Calories / day: 2100 - 2300 (Calories / k - 18.5)  Total Grams Protein / day: 124 - 149 (Grams Protein / k - 1.2) (2.0 grams/kg IBW: 178 grams/day)     Evaluation:   1. Pt NPO per most recent SLP evaluation . Pt said to be lethargic and agitated. Also, very poor PO intake when on diet -.  2. Consult for trickle TF to start. Gastric Cortrak in place.   3. Pt on HFNC.  4. Estimated needs adjusted due to obesity. Of note, pt weighed ~114 kg on admit, up to ~132 kg, and now ~124 kg. -6.7 L fluid per I/O, on lasix BID.   5. Labs and meds reviewed. Low-CHO TF formula is indicated. Potassium repletion noted.  6. Last BM PTA. Senokot given this morning per MAR.   7. Will utilize low-CHO, high-protein TF formula to best meet estimated needs.    8. Current fluids: 200 ml free water Q 4 hours.      Malnutrition Risk: 5 days of inadequate nutrition during admission.      Recommendations/Plan:  1. Change TF to Impact Peptide 1.5 @ 10 ml/hr. Advance per MD. Suggested goal rate per RD will be 60 ml/hr to provide 2160 kcal, 135 grams protein, and 1109 ml free water per day.  2. Fluids per MD.   3. PO diet per SLP.  4. Bowel regimen.    RD following.           "

## 2020-05-08 NOTE — ASSESSMENT & PLAN NOTE
Mild to moderate reduction platelet count,   No evidence of bleeding but patient anticoagulated for A. fib, monitoring closely  Multifactorial etiology for low platelet count, presumed primarily sepsis  Serial CBCs  Transfuse per protocols

## 2020-05-08 NOTE — DISCHARGE PLANNING
Care Transition Team Discharge Planning    Anticipated Discharge Disposition: TBD    Action: Shaheen spoke to RN Yolanda Arteaga w/ palliative.     Jimiw called pt's son Chris. He is able to be part of the care conference for pt tomorrow, Saturday, @ 1PM. He will call his brother Esteban to advise of the time of the meeting tomorrow. Chris has the number for the unit clerk on [facility], but mentioned he will have his cell phone w/ him if someone is calling him as well. Shaheen explained the meeting will include the PMA and ID doctors. It will include medical updates about pt and discussion of pt's care plan.     Jimiw updated Yolanda Arteaga as above.          Barriers to Discharge: TBD    Plan: family and medical team to have care plan meeting tomorrow at one via phone

## 2020-05-08 NOTE — THERAPY
"Speech Language Therapy dysphagia treatment completed.     Functional Status: Patient remains on HFNC. Per RN, patient with minimal PO intake and increased agitation with periods of lethargy. Upon entry, patient yelling persistently and very difficult to redirect. PO trials were given but were minimal due to patient declining and with ongoing agitation but consisted of 5 tsp MTL, 2 tsp puree, and 3 tsp thin liquids. Patient yelling with bolus in mouth which significantly increases likelihood of aspiration. During one instance, patient squirting juice from mouth. Again, patient continued to decline PO and yelling, \"you're drowning me!\" Therefore, PO trials were discontinued.      Recommendations: Patient with decline in status and is not at the level for a PO diet given agitation and inadequate attention to task for safe PO. He also would likely not meet nutritional needs or consistently take PO meds as needed. Recommend NPO/cortrak. Verified with RN - cortrak was placed and RN to switch to NPO status.     Plan of Care: Will benefit from Speech Therapy 3 times per week    Post-Acute Therapy: Recommend inpatient transitional care services for continued speech therapy services.      See \"Rehab Therapy-Acute\" Patient Summary Report for complete documentation.     "

## 2020-05-08 NOTE — CARE PLAN
Problem: Oxygenation:  Goal: Maintain adequate oxygenation dependent on patient condition  5/8/2020 0553 by Henri Echols, RRT  Outcome: PROGRESSING AS EXPECTED  5/8/2020 0125 by Henri Echols, RRT  Outcome: NOT MET   High flow 50 LPM, 60% FIO2

## 2020-05-08 NOTE — CARE PLAN
Problem: Safety  Goal: Will remain free from injury  Outcome: PROGRESSING AS EXPECTED  Goal: Will remain free from falls  Outcome: PROGRESSING AS EXPECTED     Problem: Infection  Goal: Will remain free from infection  Outcome: PROGRESSING AS EXPECTED     Problem: Venous Thromboembolism (VTW)/Deep Vein Thrombosis (DVT) Prevention:  Goal: Patient will participate in Venous Thrombosis (VTE)/Deep Vein Thrombosis (DVT)Prevention Measures  Outcome: PROGRESSING AS EXPECTED     Problem: Safety  Goal: Will remain free from injury  Outcome: PROGRESSING AS EXPECTED  Goal: Will remain free from falls  Outcome: PROGRESSING AS EXPECTED     Problem: Infection  Goal: Will remain free from infection  Outcome: PROGRESSING AS EXPECTED     Problem: Venous Thromboembolism (VTW)/Deep Vein Thrombosis (DVT) Prevention:  Goal: Patient will participate in Venous Thrombosis (VTE)/Deep Vein Thrombosis (DVT)Prevention Measures  Outcome: PROGRESSING AS EXPECTED     Problem: Communication  Goal: The ability to communicate needs accurately and effectively will improve  Outcome: PROGRESSING SLOWER THAN EXPECTED     Problem: Bowel/Gastric:  Goal: Normal bowel function is maintained or improved  Outcome: PROGRESSING SLOWER THAN EXPECTED  Goal: Will not experience complications related to bowel motility  Outcome: PROGRESSING SLOWER THAN EXPECTED     Problem: Knowledge Deficit  Goal: Knowledge of disease process/condition, treatment plan, diagnostic tests, and medications will improve  Outcome: PROGRESSING SLOWER THAN EXPECTED  Goal: Knowledge of the prescribed therapeutic regimen will improve  Outcome: PROGRESSING SLOWER THAN EXPECTED     Problem: Discharge Barriers/Planning  Goal: Patient's continuum of care needs will be met  Outcome: PROGRESSING SLOWER THAN EXPECTED     Problem: Pain Management  Goal: Pain level will decrease to patient's comfort goal  Outcome: PROGRESSING SLOWER THAN EXPECTED     Problem: Respiratory:  Goal: Respiratory status  will improve  Outcome: PROGRESSING SLOWER THAN EXPECTED

## 2020-05-08 NOTE — CARE PLAN
Problem: Oxygenation:  Goal: Maintain adequate oxygenation dependent on patient condition  Outcome: NOT MET   High flow 60 LPM, 100% FIO2.   Desaturation to mid 80s noted X2

## 2020-05-08 NOTE — PROGRESS NOTES
"Pharmacy Kinetics 69 y.o. male on vancomycin day # 6 2020    Currently on Vancomycin Pulse Dosing  Dosing history:              1700 mg IV q12h - last dose on  at 1241     Indication for Treatment: MRSA bacteremia     Pertinent history per medical record: Admitted on 5/3/2020 for respiratory failure, CHF, pneumonia.  Patient was recently seen in ER  for bleeding from urinary catheter.  Patient was tested for COVID on  and was negative.  Today patient was found down next to bed and brought to ER for evaluation.  Patient found to have afib w/ RVR, CHF, acute respiratory failure, and PNA.  Patient has Hx of MRSA per chart.  MRI of spine negative for epidural abscess per physician notes, concern for osteomyelitis - potential IR biopsy per notes.       Allergies: Demerol; Fentanyl; Latex; and Statins [hmg-coa-r inhibitors]      List concerns for renal function: Age, BMI, elevated BUN/SCr ratio     Pertinent cultures to date:   20 - Peripheral BC x 2: Possible Staph species   20 - Peripheral BC x 2: MRSA  5/3/20 - Peripheral BC x 2: MRSA  5/3/20 - Urine cx: negative     Recent Labs     20  0305 20  0305 20  0323   WBC 5.5 9.1 9.4   NEUTSPOLYS 75.40* 78.60* 81.20*     Recent Labs     20  0305 20  0305 20  0323   BUN 34* 33* 32*   CREATININE 0.89 0.99 1.04     Recent Labs     20  1144 20  1219 20  2330 20  0840   VANCOTROUGH 28.0*  --   --   --    VANCORANDOM  --  26.7 21.7 18.7       Intake/Output Summary (Last 24 hours) at 2020 1101  Last data filed at 2020 1004  Gross per 24 hour   Intake 783.15 ml   Output 2550 ml   Net -1766.85 ml      Blood Pressure 101/70   Pulse 76   Temperature 37.2 °C (99 °F) (Temporal)   Respiration 16   Height 1.905 m (6' 3\")   Weight 124.2 kg (273 lb 13 oz)   Oxygen Saturation 95%  Temp (24hrs), Av.9 °C (98.5 °F), Min:36.2 °C (97.2 °F), Max:37.2 °C (99 °F)      A/P   1. Vancomycin dose change: " 1700 mg IV x 1   2. Next vancomycin level: 5/9 at 1100, ~24 hour level (ordered)   3. Goal trough: 16-20 mcg/mL   4. Comments: Therapeutic random level drawn ~44 hours from last dose of vancomycin.  SCr slowly trending up today but continues to have adequate UOP.  Blood cultures from 5/6 are now positive for Staph species.  Give vancomycin 1700 mg IV x 1.  Will follow-up with ~24 hour random level to assess if patient appropriate for scheduled dosing.     Kallie Cordova, PharmD, BCPS, BCCCP

## 2020-05-08 NOTE — PROGRESS NOTES
"This RN received call from [facility] desk, Patients Son Chris is at the desk, and states that he is coming up to see his father and get his signature on the will. Chris also states that he has his fathers power of  but he will not give it to security or leave it with screening RN unless the Doctor comes down to speak with him. This RN informed Chris that the Day MD had just left but our swing MD could come down and speak with him but it would be a minute. Chris stated \"this is bullshit, you come down here, I am not leaving until you come down here.\"  This RN then went down to [facility] screening desk where son Chris was, he states \"I need to go up there and see my father, If he is off sedation I need him to sign his will.\" This RN explained [facility]'s visitation policy in regards to patient being in a Isolation unit and that at this time legally his father would not be able to sign any legal documents as he is not oriented and there is not a notary available. Chris then began questioning the patients medical condition and then began blaming the patients skilled nursing facility of neglect and if the patient dies \"this is a millions and millions worth law suit\" \"so he better not die.\" Again this RN educated the son on the patients current medical condition and that he has multiple medical co-morbidities that at this time are critically exacerbated. Chris then asked this RN to explain what hospice is. This RN explained that hospice main goal is comfort in a patients end of life,  geared toward the final 6 months. Chris then stated that him and his family have no intention on his fathers life ending in the next 6 months. This RN again educated Chris to the patients critical condition and the amount of oxygen he is currently on and Chris then stated \"well we will just have to change his code status then because I have no intention of my father dying until he signs this will. At this time this RN ended " the conversation about medical status, requested Chris allow this RN to make copy of power of  to place in chart and offered to set up Zoom phone call so that Chris can speak to his father and see his condition. Chris was agreeable to this. POA was copied and physical copy placed in patients chart, and original returned to Chris. Updated Palliative RN that POA was in chart and updated MD to this conversation with Patients son. Charge RN and NOC RN aware of situation and agreeable to set up Zoom call with Chris if he would like to  Proceed with call on NOC shift.

## 2020-05-08 NOTE — PROGRESS NOTES
Palliative Care follow-up  Care conference set up with sons and clinical team for tomorrow Saturday 5/9/2020 at 1:00pm.

## 2020-05-08 NOTE — PROGRESS NOTES
Pharmacy Kinetics Addendum:    69 y.o. male on vancomycin day # 6 5/8/2020    Currently on Vancomycin 1700 mg iv q12hr - last dose 5/6 at 1241  Provider specified end date: TBD    Indication for Treatment: MRSA bacteremia    Recent Labs     05/05/20  0345 05/06/20  0305 05/07/20  0305   BUN 39* 34* 33*   CREATININE 1.12 0.89 0.99     Recent Labs     05/06/20  1144 05/07/20  1219 05/07/20  2330   VANCOTROUGH 28.0*  --   --    VANCORANDOM  --  26.7 21.7       A/P   1. Vancomycin dose change: no change- continue to follow levels and dose as appropriate based on therapeutic trough  2. Next vancomycin level: 5/8 @ 0800 (8h post last level)  3. Goal trough: 16-20 mcg/ml  4. Comments: Supra-therapeutic random level drawn ~36h after last dose of vancomycin. Level decreasing by ~5 in 12h. Another 8h follow up level scheduled to assess clearance and to determine next dose.     Latanya Tse, SeraD

## 2020-05-08 NOTE — PROGRESS NOTES
Pt.'s K noted to be 2.9.  Dr. Nowak notified with new orders to replace K.  See MAR.  Will continue to monitor.

## 2020-05-08 NOTE — PROGRESS NOTES
Cortrak Placement    Tube Team verified patient name and medical record number prior to tube placement.  Cortrak tube (43 inches, 10 Eritrean) placed at 79 cm in right nare.  Per Cortrak picture, tube appears to be in the small bowel.  Nursing Instructions: Awaiting KUB to confirm placement before use for medications or feeding. Once placement confirmed, flush tube with 30 ml of water, and then remove and save stylet, in patient medication drawer.

## 2020-05-08 NOTE — PROGRESS NOTES
Palliative Care follow-up  Pt yelling, attempted to talk to pt he states he's yelling because people are yelling at him.  Doesn't answer questions.   Reviewed documents brought in by son last night.  The document is a general power of  there is no designation of a durable power of  for debbie care.    Case discussed with clinical team.  Care conference to be held with sons and team. Calls placed to both sons message left regarding need to set up a care conference.    Updated: Dr. Nguyen, Dr. Hinkle, Beth STACY, & Alecia MURRAY    Plan: Care conference with sons and medical team  to determine goals of care and the plan of care.     Thank you for allowing Palliative Care to support this patient and family. Contact [phone] for additional assistance, change in patient status, or with any questions/concerns.

## 2020-05-08 NOTE — CARE PLAN
Problem: Skin Integrity  Goal: Risk for impaired skin integrity will decrease  Outcome: PROGRESSING AS EXPECTED  Note: Pt. turned q2, pillows utilized for support and positioning.       Problem: Respiratory:  Goal: Respiratory status will improve  Outcome: PROGRESSING SLOWER THAN EXPECTED  Note: Pt. remains on high flow.

## 2020-05-09 NOTE — PALLIATIVE CARE
Palliative Care follow-up  0745- appreciate updates from BS RN noting pt not doing well over night and increased rate on HF. RN plans to call family to update. Plan remains for family meeting today.  1115: update from BS RN noting pt continues to have poor saturations on HFNC. TT to MD to determine if meeting with MD, PC and family should happen sooner.  1230: update from RN that pt passed; family updated and making arrangements for one son to come see him. RN will update PC if a visit occurs so this RN can provide support and answer any questions the family may have at this time.       Plan: family support- either at BS or by phone depending on visitation    Thank you for allowing Palliative Care to support this patient and family. Contact [phone] for additional assistance, change in patient status, or with any questions/concerns.

## 2020-05-09 NOTE — PROGRESS NOTES
Zoom call complete with family, patient was unable to participate fully but family states thanks for being able to see his face.

## 2020-05-09 NOTE — CARE PLAN
Pt alert and oriented to self only.  Restraints in place; care provided according to restraint protocol. Patient was repositioned frequently.  HFNC was increased to 50L, 80% to keep oxygen saturation >90%. Pt was tachycardic and tachypnic over night. Urinary catheter in place. Care provided according to urinary catheter protocol. Family did not contact RN for updates over night. Refer to EMR for further information.     Problem: Safety  Goal: Will remain free from injury  Outcome: PROGRESSING AS EXPECTED  Intervention: Provide assistance with mobility  Flowsheets (Taken 5/9/2020 0739)  Assistance: Assistance of Two or More  Ambulation Tolerance: Tolerates Poorly  Goal: Will remain free from falls  Outcome: PROGRESSING AS EXPECTED  Note: Call light within reach, clear pathway, fall precautions in place

## 2020-05-09 NOTE — PROGRESS NOTES
Infectious Disease Progress Note    Author: Sameer Hinkle M.D. Date & Time of service: 2020  5:20 PM    Chief Complaint:  Follow up of MRSA bacteremia    Interval History:   afebrile, white count 10.7, tolerating vancomycin.  Resting comfortably this morning, no new issues, MRI is pending.  Was unable to hold still.  Episodes of A. fib with RVR.  5/6 afebrile, white count 5.5.  Repeat blood cultures also positive.  Repeat MRI attempt unsuccessful due to agitation, family requests limiting medications.  Reportedly per family, patient had a prior staph infection in toe, spread to his knee and shoulder for which she was on oral medications for prolonged period of time.  He is also not a candidate for JULIANA at this time.  Resting comfortably this morning  5 afebrile, white count 9.1. Tolerating vancomycin.  MRI of the spine with multiple changes.  Changes at C5 C6 which could be either degenerative or related to discitis osteomyelitis.  Multiple changes from T12-S1 that are moderately suspicious for discitis osteomyelitis, but could also be degenerative changes.  Patient resting comfortably this morning.   afebrile, white count 9.4, repeat blood cultures again positive.  Plan is for family meeting tomorrow.  Patient with waxing and waning mental status, currently on Precedex drip.    Labs Reviewed and Medications Reviewed.    Review of Systems:  Review of Systems   Unable to perform ROS: Mental status change     Hemodynamics:  Temp (24hrs), Av.9 °C (98.4 °F), Min:35.6 °C (96.1 °F), Max:37.2 °C (99 °F)  Temperature: (!) 35.6 °C (96.1 °F)  Pulse  Av.4  Min: 62  Max: 147   Blood Pressure : (!) 92/61       Physical Exam:  Physical Exam  Vitals signs and nursing note reviewed.   Constitutional:       Appearance: He is ill-appearing. He is not diaphoretic.   HENT:      Nose:      Comments: On high flow nasal cannula oxygen  Eyes:      General:         Right eye: No discharge.         Left eye: No  discharge.      Pupils: Pupils are equal, round, and reactive to light.   Cardiovascular:      Rate and Rhythm: Normal rate. Rhythm irregular.   Pulmonary:      Effort: Pulmonary effort is normal. No respiratory distress.      Breath sounds: Rales present.   Abdominal:      General: Abdomen is flat.      Tenderness: There is no abdominal tenderness.      Comments: Protuberant   Musculoskeletal:         General: No tenderness.      Right lower leg: Edema present.      Left lower leg: Edema present.      Comments: Well-healed scar right knee, with no swelling or erythema, no increased warmth, no discharge.  Multiple toe amputations, all sites well-healed.   Skin:     Coloration: Skin is not pale.      Findings: No erythema.   Neurological:      Comments: Bilateral lower extremity weakness (chronic)   Psychiatric:      Comments: Waxing and waning mental status, currently on Precedex       Meds:    Current Facility-Administered Medications:   •  [START ON 5/9/2020] insulin glargine  •  potassium bicarbonate  •  [START ON 5/9/2020] potassium bicarbonate  •  HYDROmorphone  •  Pharmacy  •  amLODIPine  •  clopidogrel  •  ezetimibe  •  gabapentin  •  gabapentin  •  hydrALAZINE  •  lisinopril  •  rivaroxaban  •  metoprolol  •  senna-docusate **AND** polyethylene glycol/lytes **AND** magnesium hydroxide **AND** bisacodyl  •  tizanidine  •  oxyCODONE immediate-release  •  ALPRAZolam  •  insulin regular **AND** POC Blood Glucose **AND** NOTIFY MD and PharmD **AND** dextrose 50%  •  albuterol  •  MD Alert...Adult ICU Electrolyte Replacement per Pharmacy  •  dexmedetomidine (PRECEDEX) infusion  •  clotrimazole-betamethasone  •  furosemide  •  MD Alert...Vancomycin per Pharmacy  •  hydrALAZINE  •  labetalol  •  latanoprost    Labs:  Recent Labs     05/06/20  0305 05/07/20  0305 05/08/20  0323   WBC 5.5 9.1 9.4   RBC 5.85 5.86 6.01   HEMOGLOBIN 14.5 14.8 14.9   HEMATOCRIT 47.6 47.6 49.6   MCV 81.4 81.2* 82.5   MCH 24.8* 25.3* 24.8*    RDW 51.7* 52.0* 53.4*   PLATELETCT 123* 105* 109*   MPV 11.6 10.6  --    NEUTSPOLYS 75.40* 78.60* 81.20*   LYMPHOCYTES 7.20* 8.40* 6.90*   MONOCYTES 14.30* 11.00 9.50   EOSINOPHILS 2.00 1.20 1.30   BASOPHILS 0.40 0.20 0.20     Recent Labs     05/06/20  0305 05/07/20  0305 05/08/20  0323   SODIUM 145 146* 147*   POTASSIUM 2.8* 3.2* 2.9*   CHLORIDE 105 105 105   CO2 29 29 32   GLUCOSE 265* 241* 211*   BUN 34* 33* 32*     Recent Labs     05/06/20 0305 05/07/20 0305 05/08/20  0323   CREATININE 0.89 0.99 1.04       Imaging:  Ct-head W/o    Result Date: 5/3/2020  5/3/2020 6:45 AM HISTORY/REASON FOR EXAM:  Altered mental status Fall. TECHNIQUE/EXAM DESCRIPTION AND NUMBER OF VIEWS: CT of the head without contrast. The study was performed on a helical multidetector CT scanner. Contiguous 2.5 mm axial sections were obtained from the skull base through the vertex. Up to date radiation dose reduction adjustments have been utilized to meet ALARA standards for radiation dose reduction. COMPARISON:  1/7/2013 FINDINGS: There is no evidence of acute intracranial hemorrhage, mass, mass-effect or shift of midline structures. Gray-white matter differentiation is grossly preserved. Mild diffuse cerebral atrophy. The basal cisterns are patent. There are no abnormal extra-axial fluid collections. No depressed or widely  calvarial fracture is seen. The visualized paranasal sinuses and temporal bone structures are aerated. ___________________________________     1. No acute intracranial abnormality. No evidence of acute intracranial hemorrhage or mass lesion.     Ct-lspine W/o Plus Recons    Result Date: 5/3/2020  5/3/2020 6:46 AM HISTORY/REASON FOR EXAM:  Back pain or radiculopathy, trauma Fall. Back pain. TECHNIQUE/EXAM DESCRIPTION AND NUMBER OF VIEWS: CT lumbar spine without contrast, with reconstructions. The study was performed on a helical multidetector CT scanner. Thin-section helical scanning was performed from T12-L1  to the sacrum. Sagittal and coronal multiplanar reconstructions were generated from the axial images. Low dose optimization technique was utilized for this CT exam including automated exposure control and adjustment of the mA and/or kV according to patient size. COMPARISON: None. FINDINGS: Diffuse osseous demineralization. Questionable nondisplaced fracture through the T12 vertebral bodies with moderate vertebral height loss (image 105 series 2). Questionable nondisplaced fracture through the L2 vertebral body as well (image 82 series 2). No retropulsion seen. Very severe degenerative change of the lumbar spine with extensive endplate productive change and facet arthropathy. Postsurgical change from posterior decompression from L3-L5     1. Questionable nondisplaced fracture through the T12 vertebral bodies with moderate vertebral height loss. Questionable nondisplaced fracture through the L2 vertebral body as well. MR could be helpful for further evaluation. 2. Numerous lucencies throughout the lumbar spine concerning for lytic lesions/multiple myeloma.     Dx-chest-portable (1 View)    Result Date: 5/5/2020 5/5/2020 2:31 AM HISTORY/REASON FOR EXAM:  Shortness of Breath. TECHNIQUE/EXAM DESCRIPTION AND NUMBER OF VIEWS: Single portable view of the chest. COMPARISON: 5/3/2020 FINDINGS: Cardiac silhouette is enlarged. Diffuse interstitial and hazy airspace opacities may represent pulmonary edema and/or infection. Right greater than left pleural effusions and nonspecific elevation of the right hemidiaphragm. Right basilar opacity may represent compressive atelectasis and/or pneumonia. Postsurgical changes ACDF.     1.  Right greater left pleural effusions. 2.  Bilateral pulmonary opacities which could represent pulmonary edema. Correlate clinically for infection.    Dx-chest-portable (1 View)    Result Date: 5/3/2020  5/3/2020 5:09 AM HISTORY/REASON FOR EXAM:  Sepsis; sepsis Back pain. TECHNIQUE/EXAM DESCRIPTION AND  NUMBER OF VIEWS: Single portable view of the chest. COMPARISON: 5/1/2020 FINDINGS: Low lung volume. Elevation of the right hemidiaphragm. Diffuse interstitial prominence. Hazy right basilar and left hilar opacities. No pleural effusion. No pneumothorax. Stable cardiopericardial silhouette.     1. Low lung volume with hypoventilatory change. 2. Hazy right basilar and left hilar opacities could be atelectasis or infection.    Dx-chest-portable (1 View)    Result Date: 5/1/2020 5/1/2020 12:26 PM HISTORY/REASON FOR EXAM:  Shortness of Breath. TECHNIQUE/EXAM DESCRIPTION AND NUMBER OF VIEWS: Single portable view of the chest. COMPARISON: 9/6/2018 FINDINGS: Single portable view of the chest demonstrates a prominent cardiac silhouette and mediastinal contours. The left costophrenic angle is excluded from the image. There is mild interstitial prominence. No significant pleural fluid or pneumothorax. Prior cervical fusion is partially visualized.     1.  Prominent cardiac silhouette and increased interstitial markings. Findings are likely related to low lung volumes and portable AP technique    Ct-cta Chest Pulmonary Artery W/ Recons    Result Date: 5/3/2020  5/3/2020 6:45 AM HISTORY/REASON FOR EXAM:  PE suspected, high pretest prob Fall. Chest pain. TECHNIQUE/EXAM DESCRIPTION: CT angiogram scan for pulmonary embolism with contrast, with reconstructions. 1.25 mm helical sections were obtained from the lung apices through the lung bases following the rapid bolus administration of 100 mL of Omnipaque 350 nonionic contrast. Thin-section overlapping reconstruction interval was utilized. Coronal reconstructions were generated from the axial data. MIP post processing was performed and utilized for the interpretation. Low dose optimization technique was utilized for this CT exam including automated exposure control and adjustment of the mA and/or kV according to patient size. COMPARISON: 6/22/2018 FINDINGS: Pulmonary Embolism: No.  Main Pulmonary Arteries: No. Segmental branches: No. Subsegmental branches: No. Additional Comments: None. Lungs: Low lung volume. Diffuse interlobular septal thickening. Airspace opacity in the bilateral lung bases, right more than left. Scattered calcified granulomas. Airway: Patent. Pleura: Small bilateral pleural effusions. Nodes: No enlarged mediastinal or hilar lymph nodes. Additional findings: Thoracic aorta and great vessels: The ascending aorta measures 4.5 cm. Heart and pericardium: Severe coronary artery calcification. No pericardial effusion. Thoracic spine: Moderate degenerative change of the thoracic spine. No compression deformity. Chest wall: Diffuse anasarca Grossly unchanged left thyroid nodule. Visualized upper abdomen: No acute abnormality.     1. No CT evidence of pulmonary embolism. 2. Diffuse interlobular septal thickening could relate to mild pulmonary edema. 3. Low lung volume. Airspace opacity in the bilateral lung bases, right more than left, could be atelectasis or consolidation. 4. Small bilateral pleural effusions. Diffuse anasarca. 5. The ascending aorta measures 4.5 cm., Similar to prior    Ec-echocardiogram Complete W/o Cont    Result Date: 5/3/2020  Transthoracic Echo Report Echocardiography Laboratory CONCLUSIONS Prior echocardiogram 12/12/2018. Moderate concentric left ventricular hypertrophy. Moderately reduced left ventricular systolic function. Left ventricular ejection fraction is visually estimated to be 40%. Severely dilated left atrium. Estimated right ventricular systolic pressure  is 25 mmHg. Compared to the images of the study done - there has been reduction in left ventricular systolic function now at 40%. TREY THOMAS Exam Date:         05/03/2020                    12:52 Exam Location:     Inpatient Priority:          Routine Ordering Physician:        BIB RITTER Referring Physician:       [id number], OSIEL SUAREZ                             CARLOS Sonographer:               Minerva Morocho RDCS Age:    69     Gender:    M MRN:    [id number] :    1950 BSA:    2.42   Ht (in):    75     Wt (lb):    252 Exam Type:     Complete Indications:     Edema ICD Codes:       782.3 CPT Codes:       65152 BP:   122    /   84     HR:   115 Technical Quality:       Fair MEASUREMENTS  (Male / Female) Normal Values 2D ECHO LV Diastolic Diameter PLAX        4.4 cm                4.2 - 5.9 / 3.9 - 5.3 cm LV Systolic Diameter PLAX         3.7 cm                2.1 - 4.0 cm IVS Diastolic Thickness           1.6 cm                LVPW Diastolic Thickness          1.5 cm                LVOT Diameter                     2.2 cm                Estimated LV Ejection Fraction    40 %                  LV Ejection Fraction MOD BP       50.5 %                >= 55  % LV Ejection Fraction MOD 4C       55.3 %                LV Ejection Fraction MOD 2C       44.3 %                DOPPLER AV Peak Velocity                  0.95 m/s              AV Peak Gradient                  3.6 mmHg              AV Mean Gradient                  2.2 mmHg              LVOT Peak Velocity                0.82 m/s              AV Area Cont Eq vti               3.6 cm2               TV Peak E Velocity                0.33 m/s              TR Peak Velocity                  237 cm/s              * Indicates values subject to auto-interpretation LV EF:  40    % FINDINGS Left Ventricle Normal left ventricular chamber size. Moderate concentric left ventricular hypertrophy. Moderately reduced left ventricular systolic function. Left ventricular ejection fraction is visually estimated to be 40%. Global hypokinesis with regional variation. Diastolic function is difficult to assess with tachycardia. Right Ventricle Moderately dilated right ventricle. Reduced right ventricular systolic function. Right Atrium Enlarged right atrium. Inferior vena cava is not well visualized. Left Atrium Severely dilated left  "atrium. Left atrial volume index is 48 mL/sq m. Mitral Valve Mitral annular calcification. No mitral stenosis. No mitral regurgitation. Aortic Valve Structurally normal aortic valve without significant stenosis or regurgitation. Tricuspid Valve No tricuspid stenosis. Trace tricuspid regurgitation. Estimated right ventricular systolic pressure  is 25 mmHg. Pulmonic Valve Structurally normal pulmonic valve without significant stenosis or regurgitation. Pericardium Normal pericardium without effusion. Aorta The aortic root is normal. Ascending aorta is dilated with a diameter of 4.4 cm. Rivas GABRIEL To (Electronically Signed) Final Date:     03 May 2020 15:20      Micro:  Results     Procedure Component Value Units Date/Time    BLOOD CULTURE [[id number]]     Order Status:  No result Specimen:  Blood from Peripheral     BLOOD CULTURE [[id number]]     Order Status:  No result Specimen:  Blood from Peripheral     BLOOD CULTURE [[id number]]     Order Status:  Canceled Specimen:  Blood from Peripheral     BLOOD CULTURE [[id number]]     Order Status:  Canceled Specimen:  Blood from Peripheral     BLOOD CULTURE [[id number]]  (Abnormal) Collected:  05/06/20 1959    Order Status:  Completed Specimen:  Blood from Peripheral Updated:  05/08/20 1147     Significant Indicator POS     Source BLD     Site PERIPHERAL     Culture Result Growth detected by Bactec instrument. 05/07/2020  15:48      Methicillin Resistant Staphylococcus aureus  See previous culture for sensitivity report.      Narrative:       CALL  Loyd  161 tel. [phone],  CALLED  161 tel. [phone] 05/07/2020, 15:49, RB PERF. RESULTS CALLED TO:RN  [id number]  Collected By:[id number] KUSUM CARLISLE  Per Hospital Policy: Only change Specimen Src: to \"Line\" if  specified by physician order.  No site indicated    BLOOD CULTURE [[id number]]  (Abnormal) Collected:  05/06/20 1959    Order Status:  Completed Specimen:  Blood from Peripheral Updated:  05/08/20 1143     Significant " "Indicator POS     Source BLD     Site PERIPHERAL     Culture Result Growth detected by Bactec instrument. 05/07/2020  13:19      Methicillin Resistant Staphylococcus aureus  See previous culture for sensitivity report.      Narrative:       CALL  Loyd  161 tel. [phone],  CALLED  161 tel. [phone] 05/07/2020, 13:22, RB PERF. RESULTS CALLED TO:  [id number] RN  Collected By:[id number] KUSUM CARLISLE  Per Hospital Policy: Only change Specimen Src: to \"Line\" if  specified by physician order.  No site indicated    BLOOD CULTURE [[id number]]  (Abnormal) Collected:  05/05/20 0330    Order Status:  Completed Specimen:  Blood from Peripheral Updated:  05/07/20 1221     Significant Indicator POS     Source BLD     Site PERIPHERAL     Culture Result Growth detected by Bactec instrument. 05/06/2020  00:27      Methicillin Resistant Staphylococcus aureus  See previous culture for sensitivity report.      Narrative:       CALL  Loyd  161 tel. [phone],  Collected By:[id number] SOLITARIO STAPLES  Per Hospital Policy: Only change Specimen Src: to \"Line\" if  specified by physician order.  Right Forearm/Arm    BLOOD CULTURE [[id number]]  (Abnormal) Collected:  05/05/20 0430    Order Status:  Completed Specimen:  Blood from Peripheral Updated:  05/07/20 1221     Significant Indicator POS     Source BLD     Site PERIPHERAL     Culture Result Growth detected by Bactec instrument. 05/05/2020  23:38      Methicillin Resistant Staphylococcus aureus  See previous culture for sensitivity report.      Narrative:       CALL  Loyd  161 tel. [phone],  CALLED  161 tel. [phone] 05/05/2020, 23:42, RB PERF. RESULTS CALLED TO: RN  Collected By:[id number] SOLITARIO STAPLES  Per Hospital Policy: Only change Specimen Src: to \"Line\" if  specified by physician order.  Left Forearm/Arm    BLOOD CULTURE [[id number]] Collected:  05/06/20 1959    Order Status:  Canceled Specimen:  Blood from Peripheral     BLOOD CULTURE [[id number]] Collected:  05/06/20 1959    Order Status:  " "Canceled Specimen:  Blood from Peripheral     HIV Rapid Screen (Exposure) [[id number]] Collected:  05/06/20 1332    Order Status:  Completed Updated:  05/06/20 1502     HIV Ag/Ab Combo Assay Non-Reactive     Exposed MRN [id number]    Narrative:       Enter exposed person's MRN only (do not enter employee's  name):->[id number]  Exposed person's employer?->[facility]    BLOOD CULTURE [[id number]]  (Abnormal) Collected:  05/03/20 0530    Order Status:  Completed Specimen:  Blood from Peripheral Updated:  05/05/20 0932     Significant Indicator POS     Source BLD     Site PERIPHERAL     Culture Result Growth detected by Bactec instrument. 05/03/2020  21:44      Methicillin Resistant Staphylococcus aureus  See previous culture for sensitivity report.      Narrative:       CALL  Loyd  161 tel. [phone],  CALLED  161 tel. [phone] 05/03/2020, 21:46, RB PERF. RESULTS CALLED TO:  PHARMACY  Per Hospital Policy: Only change Specimen Src: to \"Line\" if  specified by physician order.  Right Forearm/Arm    BLOOD CULTURE [[id number]]  (Abnormal)  (Susceptibility) Collected:  05/03/20 0507    Order Status:  Completed Specimen:  Blood from Peripheral Updated:  05/05/20 0930     Significant Indicator POS     Source BLD     Site PERIPHERAL     Culture Result Growth detected by Bactec instrument. 05/03/2020  20:22  Methicillin Resistant Staphylococcus aureus (MRSA)  detected by PCR.        Methicillin Resistant Staphylococcus aureus    Narrative:       CALL  Loyd  161 tel. [phone],  CALLED  161 tel. [phone] 05/03/2020, 21:43, RB PERF. RESULTS CALLED TO:  PHARMACY  Per Hospital Policy: Only change Specimen Src: to \"Line\" if  specified by physician order.  No site indicated    Susceptibility     Methicillin resistant staphylococcus aureus (1)     Antibiotic Interpretation Microscan Method Status    Azithromycin Resistant >4 mcg/mL CYNTHIA Final    Clindamycin Resistant >4 mcg/mL CYNTHIA Final    Cefazolin Resistant <=8 mcg/mL CYNTHIA Final    " Ceftaroline Sensitive <=0.5 mcg/mL CYNTHIA Final    Daptomycin Sensitive <=1 mcg/mL CYNTHIA Final    Ampicillin/sulbactam Resistant <=8/4 mcg/mL CYNTHIA Final    Erythromycin Resistant >4 mcg/mL CYNTHIA Final    Vancomycin Sensitive <=0.5 mcg/mL CYNTHIA Final    Oxacillin Resistant >2 mcg/mL CYNTHIA Final    Penicillin Resistant >8 mcg/mL CYNTHIA Final    Trimeth/Sulfa Sensitive <=0.5/9.5 mcg/mL CYNTHIA Final    Tetracycline Sensitive <=4 mcg/mL CYNTHIA Final                   URINE CULTURE(NEW) [[id number]] Collected:  05/03/20 0530    Order Status:  Completed Specimen:  Urine Updated:  05/05/20 0731     Significant Indicator NEG     Source UR     Site -     Culture Result No growth at 48 hours.    Narrative:       Indication for culture:->Emergency Room Patient  Indication for culture:->Emergency Room Patient    S. Aureus By PCR, Nasal Complete [[id number]]  (Abnormal) Collected:  05/03/20 1054    Order Status:  Completed Specimen:  Respirate from Respiratory Updated:  05/03/20 1543     Staph aureus by PCR POSITIVE     MRSA by PCR POSITIVE    SARS-CoV-2, PCR (In-House) [[id number]] Collected:  05/03/20 0800    Order Status:  Completed Updated:  05/03/20 0909     SARS-CoV-2 Source NP Swab     SARS-CoV-2 by PCR NotDetected     Comment: [facility] providers: PLEASE REFER TO DE-ESCALATION AND RETESTING PROTOCOL  on [email or web address]  **The [facility] GeneXpert Xpress SARS-CoV-2 Test has been made available for  use under the Emergency Use Authorization (EUA) only.         COVID/SARS CoV-2 [[id number]] Collected:  05/03/20 0800    Order Status:  Completed Specimen:  Respirate from Nasopharyngeal Updated:  05/03/20 0814     COVID Order Status Received    URINALYSIS [[id number]]  (Abnormal) Collected:  05/03/20 0530    Order Status:  Completed Specimen:  Urine Updated:  05/03/20 0559     Color Yellow     Character Cloudy     Specific Gravity 1.025     Ph 5.0     Glucose 500 mg/dL      Ketones Trace mg/dL      Protein 300 mg/dL      Bilirubin Negative     Urobilinogen,  Urine 0.2     Nitrite Negative     Leukocyte Esterase Negative     Occult Blood Small     Micro Urine Req Microscopic    Narrative:       Indication for culture:->Emergency Room Patient          Hospital Course/Assessment:   Joel Ma is a 69 y.o. male with a history of prior embolic strokes, essential hypertension, type 2 diabetes, dyslipidemia, chronic A. fib, CKD stage II, chronic indwelling Gonzalez catheter, CAD, JANNIE, grade 1 diastolic heart failure, chronic cervical spine disease, chronic hypoxemic respiratory failure on 2 L oxygen at baseline, initially brought to the ER on 5/1 with bleeding from Gonzalez catheter, had negative SARS-CoV-2 test at the time, but back to the ER on 5/3 with weakness, and repeat SARS-CoV-2 test negative, admitted for acute on chronic hypoxemic respiratory failure and weakness, found to have MRSA bacteremia.  Patient has hardware in the spine, and was also noted to have multiple lytic lesions in lumbar spine.  Potential sources at this time include his Gonzalez, possible developing pneumonia, spinal infection.     Pertinent Diagnoses:  MRSA bacteremia, persistent  Acute on chronic hypoxemic respiratory failure, likely pneumonia  Vertebral osteomyelitis, multiple sites  CKD stage II  Chronic Gonzalez catheter in place     Plan:   -Agree with IV vancomycin. Monitor levels and renal function. Vanco trough of 28 on 5/6  -Repeat blood cultures x2 from 5/5 and 5/6 also positive  -Repeat blood cultures in a.m.  If persistently positive, will consider salvage therapy with 2 antibiotics  -HIV antibody negative  -TTE with no evidence of infective endocarditis. JULIANA indicated but currently not a candidate given high risk  -CT of the spine with multiple possible lytic lesions of the spine, possible T12 and L2 nondisplaced fractures. MRI of the spine with multiple changes. Changes at C5 C6 which could be either degenerative or related to discitis osteomyelitis.  Multiple changes from T12-S1 that  are moderately suspicious for discitis osteomyelitis, but could also be degenerative changes.    -SPEP, UPEP pending.  PSA pending  -Consider biopsy with pathology and cultures to rule out osteomyelitis, if in line with goals of care.  At this point, will likely not  since this will be assumed to be osteomyelitis in setting of persistently positive blood cultures  -Agree with goals of care discussions     Plan was discussed with the primary team, Dr. Nguyen     ID will follow. Please feel free to call with questions.     Sameer Hinkle M.D.

## 2020-05-09 NOTE — PALLIATIVE CARE
"Palliative Care follow-up  Appreciate call from RN noting pt's son Esteban at . PC RN visited  to provide support. Esteban states they've \"had enough experience in the past 6 months\" and talked about his grandma and dog dying recently. PC provided him with the Difficult Times packet, mortuary list and list of community support groups should he or anyone in the family need assistance. He was provided with this RN's number for any questions or concerns. He Denied any needs and was provided with privacy to spend time with his dad.     Updated: MD/RN/JUAN    Plan: support    Thank you for allowing Palliative Care to support this patient and family. Contact [phone] for additional assistance, change in patient status, or with any questions/concerns.   "

## 2020-05-09 NOTE — PROGRESS NOTES
Pharmacy Kinetics 69 y.o. male on vancomycin day # 7 5/9/2020    Currently on Vancomycin Pulse Dosing  Dosing history:              2900 mg loading dose x 1 followed by 1700 mg IV q12h - last dose on 5/6 at 1241      5/7 1219 - VR 26.7 mcg/mL (~24 hour level)    5/7 2330 - VR 21.7 mcg/mL (~36 hour level)      5/8 0840 - VR 18.7 mcg/mL (~44 hour level)               5/8/20 (1108):  1700 mg x 1    Indication for Treatment: MRSA bacteremia     Pertinent history per medical record: Admitted on 5/3/2020 for respiratory failure, CHF, pneumonia.  Patient was recently seen in ER 5/1 for bleeding from urinary catheter.  Patient was tested for COVID on 5/1 and was negative.  Today patient was found down next to bed and brought to ER for evaluation.  Patient found to have afib w/ RVR, CHF, acute respiratory failure, and PNA.  Patient has Hx of MRSA per chart.  MRI of spine negative for epidural abscess per physician notes, concern for osteomyelitis - potential IR biopsy per notes.       Allergies: Demerol; Fentanyl; Latex; and Statins [hmg-coa-r inhibitors]      List concerns for renal function: Age, BMI, elevated BUN/SCr ratio     Pertinent cultures to date:   5/8/20 - Peripheral BC x 2: NGTD  5/6/20 - Peripheral BC x 2: MRSA  5/5/20 - Peripheral BC x 2: MRSA  5/3/20 - Peripheral BC x 2: MRSA  5/3/20 - Urine cx: negative     Recent Labs     05/07/20  0305 05/08/20  0323 05/09/20  0400   WBC 9.1 9.4 11.2*   NEUTSPOLYS 78.60* 81.20* 85.50*     Recent Labs     05/07/20  0305 05/08/20  0323 05/09/20  0400   BUN 33* 32* 32*   CREATININE 0.99 1.04 1.05     Recent Labs     05/07/20  2330 05/08/20  0840 05/09/20  1115   VANCORANDOM 21.7 18.7 21.1       Intake/Output Summary (Last 24 hours) at 5/9/2020 1228  Last data filed at 5/9/2020 0800  Gross per 24 hour   Intake 2174.44 ml   Output 1925 ml   Net 249.44 ml      Blood Pressure (Abnormal) 79/56   Pulse 98   Temperature 37.1 °C (98.8 °F) (Temporal)   Respiration 20   Height  "1.905 m (6' 3\")   Weight 124.2 kg (273 lb 13 oz)   Oxygen Saturation (Abnormal) 78%  Temp (24hrs), Av.9 °C (98.5 °F), Min:35.6 °C (96.1 °F), Max:37.8 °C (100 °F)      A/P   1. Vancomycin dose change: 1500 mg IV x 1   2. Next vancomycin level: 5/10 at 1400, ~24 hour random level (ordered)   3. Goal trough: 16-20 mcg/mL   4. Comments: Slightly supra-therapeutic random level drawn ~24 hours after last pulse dose (1700 mg) of vancomycin.  Renal indices stable per BUN/SCr and UOP has been adequate so far today.  Give 1500 mg IV x 1 (12 mg/kg) and plan for follow-up level tomorrow to guide subsequent dosing.      Kallie Cordova, PharmD, BCPS, BCCCP        "

## 2020-05-09 NOTE — PROGRESS NOTES
TOD 1207.   Patient's sons Chris and Esteban have been updated of patient's condition throughout the day. Both children have been informed of patient's death.   Arrangements are being made to have one family member see the patient.

## 2020-05-09 NOTE — PROGRESS NOTES
Upon report pt appears to be restless and agitated. Dex running at 0.5mcg/kg/hr. Pt's saturation is in the high 80s. RT made aware and high flow was increased to 90% and then 100%. Oxymask added at 10L due to patient appearing to be mouth breathing.  Pt's O2 sat is currently in the 70s. Dr. Nguyen is aware. Palliative updated.  Pt's son Esteban informed of patient's condition via phone call. Esteban had no further questions.

## 2020-05-09 NOTE — PROGRESS NOTES
Critical Care Progress Note    Date of admission  5/3/2020    Chief Complaint  69 y.o. male admitted 5/3/2020 after being found down and in RF    Hospital Course    The entire history is obtained from healthcare providers and the medical record as this gentleman cannot provide me with any history.  This gentleman has a history of prior embolic strokes, essential hypertension, type 2 diabetes mellitus, dyslipidemia, chronic atrial fibrillation, chronic indwelling Gonzalez catheter, CAD, JANNIE, stage III CKD, grade 1 diastolic heart failure and chronic cervical spine disease.  He was seen in the emergency room on or about 5/1/2020 with bleeding from his Gonzalez catheter.  A test for SARS-CoV-2 was negative at that time.  He was sent back to his living facility after being treated in the emergency department.  He resides at [facility].     Today he was found down next to his bed.  He was weak and was unable to get up.  He is apparently normally on 2 L of domiciliary oxygen and his saturations were in the 80s.  He was placed on supplemental oxygen and brought to the emergency department.  He apparently reported that he fell from bed and was unable to get up due to lack of strength.  He was complaining of back pain in the emergency room and was administered IV narcotics.  At the time of my evaluation he is sedated from his pain medications and cannot provide me with any history.  A POLST is present on his chart and confirms that he is DNR/DNI status.  In the emergency department he had blood cultures obtained and he received a dose of Rocephin.  His BNP was elevated and he received IV furosemide.      Interval Problem Update  Reviewed last 24 hour events:    Awake and O x 2-3 this AM, following simple commands  Later AM desat/drop Bp - RN updated Palliative Care RN and Brother  Stopped sedation and added OxyMask on HF NC - sats better  Fluid bolus 500 cc and sedation held    Somnolent with the sats  improved now in the high 80s  [id number]  SBp 90s  UO adequate  I/Os +642 cc  Tm 100.0  WBC 11.2  Repeat BC are positive for MRSA, fourth set now positive  Xarelto  Lisinopril, amlodipine, metoprolol, hydralazine  Lasix IV  Vancomycin IV  Na 150  Tube feeds at goal  Platelet count remains low around 100 K no bleeding clinically    SPEP still pending, UPEP negative for monoclonal spike    Stop DEX  D/c hydralazine & amlodipine  May continue beta-blocker and ACE inhibitor to help with rate control and afterload reduction, reassess frequently  Care conf 1PM planned with nurse, case management and palliative care RN along with children via phone conferencing  Patient appears to be appropriate for GIP at this time    Patient initially responded to fluids and adjustment of sedation and increased oxygen with improved blood pressure and O2 saturations.  Family notified by nursing staff times several.     YESTERDAY  Reviewed case at length with nurse at bedside in regards to family coming in last night with power of  paperwork.  Son apparently demanding to come into the unit and was inappropriate per nursing staff.  Similar conversation with palliative care RN this morning and power of  noted to not be a medical power of .  We will need to talk to the entire family in regards to care and case management and palliative care RN team are arranging this for us.  Requested that ID as well as bedside nurse to be at that meeting along with palliative care RN.    More alert today - O x1-2? At times - zero yesterday  Follows and cooperative this AM but confused and still yelling out at times, better w/ DEX/pain meds  No change neuro exam  DEX 0.3  AF 70-100s  SBP 90-110s  Edema no change  UO aequate  I/Os neg 2.56L  Cortrak - TF  HF NC O2 50 L at 60%  Decent cough strength but poor timing  Lantus 10 - SSI 9  BNP 03882, improved  Plt 109  BCx2 again + MRSA - 3rd set  K 2.9    Mg/K repletion, schedule more  maintenance potassium  Up free h2O - 200 Q6, monitor fluid balance and BMP  Up Lantus to 15, continue SSI  Add RIF/Dapto vs other  Bowel care protocols  Care Conference being arranged by palliative care team with family and physicians  Call ID re: antibiotic regimen and repeated positive blood cultures  Repeat blood cultures tonight, continue every 48 hours until negative  Advance TF to goal as tolerated  Mobilize as able    Reviewed case with infectious disease in regards to antibiotic selection especially in face of to repeat pairs of blood cultures being positive, now 6 in total.    Contacted laboratory in regards to SPEP and UPEP studies.  They were able to confirm they have both been collected and sent.  SPEP normally takes 1 to 2 days and they were not sure why it was not back and they are going to call the reference lab.  UPEP takes 1 to 5 days and they do not have the results on that as of yet they will let me know.  I gave my cell number to the laboratory technician.    I updated son and her at length occluding informing him that repeat blood cultures were positive again.  He informed me that family conference set up for 1 PM tomorrow.  We discussed antibiotic options.  He wondered if he should be on clindamycin and I told him his organism was resistant to it by our laboratory testing.  He was wondering if he should have a bone marrow transplant for this problem and a describe that be inappropriate for the infection although I thought the infection was in the bone i.e. osteomyelitis.  We talked about his power of  which is a DURABLE POWER OF  but not a medical power of  and will work with him and his siblings for a consensus terms of plan and will review goals of care tomorrow at the family conference and hopefully infectious disease will be able to participate in the conference.    Lab contacted me, reference lab is running behind on multiple tests apparently, SPEP should be out  tomorrow and UPEP tomorrow or the beginning of next week.    Reviewed case with palliative care, care conference set up for 1 PM tomorrow with family    Review of Systems  Review of Systems   Unable to perform ROS: Acuity of condition   Awake and following today, back pain still an issue, later more somnolent and unresponsive when he became hypoxic    Vital Signs for last 24 hours   Temp:  [37.1 °C (98.8 °F)-37.8 °C (100 °F)] 37.1 °C (98.8 °F)  Pulse:  [] 98  Resp:  [16-28] 20  BP: ()/(56-83) 79/56  SpO2:  [72 %-94 %] 78 %    Hemodynamic parameters for last 24 hours       Respiratory Information for the last 24 hours       Physical Exam   Physical Exam  Constitutional:       General: He is in acute distress.      Appearance: He is obese. He is ill-appearing (acute on chronic, no delta) and diaphoretic. He is not toxic-appearing.      Comments: High flow nasal cannula, oxygen mask added on top of it   HENT:      Head: Normocephalic and atraumatic.      Mouth/Throat:      Mouth: Mucous membranes are moist.   Eyes:      General: No scleral icterus.     Extraocular Movements: Extraocular movements intact.      Pupils: Pupils are equal, round, and reactive to light.   Neck:      Trachea: Phonation normal.   Cardiovascular:      Rate and Rhythm: Tachycardia present. Rhythm irregularly irregular. Occasional extrasystoles are present.     Pulses: Normal pulses.      Heart sounds: No murmur. No friction rub. No gallop.       Comments: Remains in atrial fibrillation with RVR at times  Pulmonary:      Effort: Tachypnea present. No accessory muscle usage or respiratory distress.      Breath sounds: Rhonchi (Persisting) and rales (Unchanged) present. No wheezing.   Abdominal:      General: Bowel sounds are normal. There is no distension.      Palpations: Abdomen is soft.      Tenderness: There is no abdominal tenderness. There is no right CVA tenderness or left CVA tenderness.   Musculoskeletal:      Right lower leg:  2+ Edema present.      Left lower le+ Edema present.   Lymphadenopathy:      Cervical: No cervical adenopathy.   Skin:     Capillary Refill: Capillary refill takes less than 2 seconds.      Coloration: Skin is not cyanotic.      Nails: There is no clubbing.        Comments: Chronic venous changes   Neurological:      Mental Status: He is lethargic and confused.      Cranial Nerves: Cranial nerves are intact.      Motor: Weakness (More chronic than acute) present.      Comments: Initially following and oriented x2 at least subsequently somnolent and in distress from hypoxemia and hypotension, no change in neuro exam otherwise of the last several days   Psychiatric:         Mood and Affect: Affect is labile.         Behavior: Behavior is agitated (At times).      Comments: Unable to assess       Medications  No current facility-administered medications for this encounter.      Current Outpatient Medications   Medication Sig Dispense Refill   • hydrALAZINE (APRESOLINE) 100 MG tablet Take 100 mg by mouth every 8 hours.     • ketoconazole (NIZORAL) 2 % Cream Apply 1 Application to affected area(s) every day. Apply to affected area every day with each catheter cleaning     • latanoprost (XALATAN) 0.005 % Solution Place 1 Drop in both eyes every evening.     • insulin detemir (LEVEMIR) 100 UNIT/ML Solution Inject 40 Units as instructed every evening.     • lisinopril (PRINIVIL) 40 MG tablet Take 40 mg by mouth every day. Hold if SBP > 100, per MAR     • melatonin 3 MG Tab Take 6 mg by mouth every bedtime.     • metFORMIN (GLUCOPHAGE) 500 MG Tab Take 1,000 mg by mouth 2 times a day, with meals.     • metoprolol (TOPROL-XL) 200 MG XL tablet Take 200 mg by mouth every day. Hold for sbp> 110 and HR >60     • nitroglycerin (NITROSTAT) 0.4 MG SL Tab Place 0.4 mg under tongue every 5 minutes as needed for Chest Pain.     • ALPRAZolam (XANAX) 0.25 MG Tab Take 0.25 mg by mouth 3 times a day as needed for Sleep or Anxiety.     •  clopidogrel (PLAVIX) 75 MG Tab Take 75 mg by mouth every day.     • rivaroxaban (XARELTO) 20 MG Tab tablet Take 20 mg by mouth with dinner.     • clindamycin (CLEOCIN) 300 MG Cap Take 300 mg by mouth 2 Times a Day. Pt started on 4/6/2020 until 6/6/2020     • finasteride (PROSCAR) 5 MG Tab Take 5 mg by mouth every day.     • furosemide (LASIX) 20 MG Tab Take 20 mg by mouth 2 times a day.     • gabapentin (NEURONTIN) 300 MG Cap Take 300-600 mg by mouth 3 times a day. 300 mg in the AM  300 mg in the Afternoon  600 mg in the PM     • Empagliflozin (JARDIANCE) 10 MG Tab Take 5 mg by mouth every day.     • nitrofurantoin (MACROBID) 100 MG Cap Take 100 mg by mouth every day. Pt started 4/29/2020 for 3 day course     • methenamine hip (HIPPREX) 1 GM Tab Take 1 g by mouth 2 times a day.     • insulin aspart (NOVOLOG) 100 UNIT/ML Solution Inject 2-10 Units as instructed 3 times a day before meals. Sliding Scale  151-200= 2 units  201-250= 4 units  251-300= 6 units  301-350= 8 units  351-400= 10 units   If blood sugar is > 400 call MD     • potassium chloride SA (KDUR) 20 MEQ Tab CR Take 20 mEq by mouth 2 times a day.     • LACTOBACILLUS PO Take 1 Tab by mouth 2 Times a Day.     • tamsulosin (FLOMAX) 0.4 MG capsule Take 0.4 mg by mouth ONE-HALF HOUR AFTER BREAKFAST.     • tizanidine (ZANAFLEX) 2 MG tablet Take 2 mg by mouth every bedtime.     • ezetimibe (ZETIA) 10 MG Tab Take 1 Tab by mouth every day. 30 Tab 11   • amLODIPine (NORVASC) 10 MG Tab Take 1 Tab by mouth every day. 90 Tab 3       Fluids    Intake/Output Summary (Last 24 hours) at 5/9/2020 1939  Last data filed at 5/9/2020 0800  Gross per 24 hour   Intake 850.54 ml   Output 925 ml   Net -74.46 ml       Laboratory  Recent Labs     05/08/20  0123   ISTATAPH 7.422   ISTATAPCO2 53.4*   ISTATAPO2 55*   ISTATATCO2 36*   [id number] 88*   ISTATARTHCO3 34.8*   ISTATARTBE 9*   ISTATTEMP 97.0 F   ISTATFIO2 100   ISTATSPEC Arterial   ISTATAPHTC 7.435   [id number] 52*          Recent Labs     05/07/20 0305 05/08/20 0323 05/09/20  0400   SODIUM 146* 147* 150*   POTASSIUM 3.2* 2.9* 4.0   CHLORIDE 105 105 105   CO2 29 32 31   BUN 33* 32* 32*   CREATININE 0.99 1.04 1.05   MAGNESIUM 2.0 1.8 1.9   CALCIUM 8.7 8.6 8.7     Recent Labs     05/07/20 0305 05/08/20 0323 05/09/20  0400   PREALBUMIN  --  5.3*  --    GLUCOSE 241* 211* 208*     Recent Labs     05/07/20 0305 05/08/20 0323 05/09/20  0400   WBC 9.1 9.4 11.2*   NEUTSPOLYS 78.60* 81.20* 85.50*   LYMPHOCYTES 8.40* 6.90* 5.80*   MONOCYTES 11.00 9.50 7.20   EOSINOPHILS 1.20 1.30 0.50   BASOPHILS 0.20 0.20 0.30     Recent Labs     05/07/20 0305 05/08/20 0323 05/09/20  0400   RBC 5.86 6.01 6.24*   HEMOGLOBIN 14.8 14.9 15.3   HEMATOCRIT 47.6 49.6 51.7   PLATELETCT 105* 109* 105*       Imaging  X-Ray:  I have personally reviewed the images and compared with prior images.  EKG:  I have personally reviewed the images and compared with prior images.  CT:    Reviewed  Echo:   Reviewed    Assessment/Plan  * Acute on chronic respiratory failure with hypoxemia (HCC)  Assessment & Plan  History of 2 L of domiciliary oxygen  Remains very hypoxic on high flow nasal cannula with borderline saturations, support wean to 50 L @ 60%  CTA negative for acute pulmonary embolism, bilateral opacities noted  Differential diagnosis of etiology includes pneumonia and CHF, EF worse with 40% down from 65, probably combination  Blood cultures positive for MRSA, sepsis component contributing, may develop ARDS, IV vancomycin continuing  Confirmed CODE STATUS, reviewed POLST with RN and palliative care RN, palliative care RN confirmed POLST by calling skilled nursing facility and speaking with physician involved.  Continue to monitor for need for AVAPS which he might not accept an LOC is acceptable now  Tinea forced diuresis as tolerated with IV Lasix, BMP still significant elevated  Aggressive pulmonary toilet  Mobilize when able, limited since he is unable to  ambulate and has severe low back pain  Incentive spirometry if he can coordinate, not doing well with this or with encouragement to cough regularly    Worsening hypoxemia a.m. 5/9, holding sedation and adjusting oxygen.  Patient too weak to pull up AVAPS mask at this point.  Will encourage coughing and will suction as needed.  Prognosis poor.    Severe Sepsis (HCC)  Assessment & Plan  This is Sepsis Present on admission  SIRS criteria identified on my evaluation include: Tachycardia, with heart rate greater than 90 BPM and Tachypnea, with respirations greater than 20 per minute  Source is pulmonary with MRSA positive blood cultures now, query source is osteomyelitis versus bone secondarily infected from MRSA pneumonia, ID evaluated  Repeat positive blood cultures for MRSA now x6 total suggest possibility of deep-seated infection and hardware or endovascularly, ID consult ongoing, repeating blood cultures until negative  Sepsis protocol initiated  IV antibiotics as appropriate for source of sepsis  Patient with severe sepsis with significant organ involvement including acute respiratory failure    Acute on chronic diastolic (congestive) heart failure (HCC)  Assessment & Plan  Prior ECHO revealed grade 1 DD  Repeat ECHO noted, EF significantly down from 65 to 40%, global hypokinesis  Query related to sepsis and bacteremia from MRSA  Follow-up echocardiogram at some point would be prudent  May benefit from follow-up cardiology consultation, monitoring hemodynamics and BP acceptable so far  Force diuresis with furosemide as tolerated, desire negative fluid balance daily for a while to see if that helps with oxygenation if possible, continue IV Lasix and gently pull fluid    Pneumonia due to infectious organism  Assessment & Plan  Blood culture positive x6 for for MRSA  nasal MRSA PCR positive  H/o of prior MRSA infection in toe, knee and shoulder per son.  Patient required 9 weeks of antibiotics mostly IV.  He was  subsequently treated with an extended period with clindamycin.  Continue vancomycin, Zosyn discontinued when blood cultures positive for MRSA  HIV screen is negative  Repeat blood cultures x2 every 48 hours until culture negative, 3 sets positive so far  May need JULIANA versus empiric therapy and this DNR/DNI patient, I prefer the latter-oxygenation and LOC would make safety for elective JULIANA marginal  Rule out multiple myeloma, suspect osteomyelitis over MM, SPEP and UPEP still pending  Rule out hardware infection/spine involvement, MRI reviewed-no epidural abscess query osteo-versus DJD, no change neurologically on review of old records and review with patient, no other obvious source on exam, reviewed with ID    Hypokalemia  Assessment & Plan  Replete, goal greater than 4.0, ERP  Optimize other electrolytes especially with his atrial fibrillation  Serial BMP    Thrombocytopenia (HCC)  Assessment & Plan  Mild to moderate reduction platelet count,   No evidence of bleeding but patient anticoagulated for A. fib, monitoring closely  Multifactorial etiology for low platelet count, presumed primarily sepsis  Serial CBCs  Transfuse per protocols    Abnormal CT of spine  Assessment & Plan  Initial imaging of his lumbar spine reveals a questionable nondisplaced fracture through the T12 vertebral body with moderate vertebral height loss and a questionable nondisplaced fracture of the L2 vertebral body.  Numerous lucencies throughout the lumbar spine concerning for lytic lesions are present.    MRI of spine performed late 5/6 and interpreted a.m. 5/7-no significant epidural abscess, multiple areas of degenerative disease changes versus discitis/osteomyelitis.  See full report.    Could consider IR biopsy to confirm osteomyelitis, reviewed with ID and patient son, will hold off for day or 2 and see if he clinically improves.    Best I can tell in reviewing the case with the son, patient and reviewing old records he is  unchanged neurologically, he has not walked in 8 years since a backhoe injury and a stroke.  No change neurologically on follow-up exam again today, continues to primarily just be confused and agitated and in pain at times and have his persistent lower extremity weakness with diminished sensation.    UPEP and SPEP still pending, I spoke with laboratory 5/8, both studies are send out and they are calling reference lab to track down results    Hold on neurosurgical consultation for now, no significant epidural abscess or other new structural abnormality suggesting need for neurosurgery    Chronic atrial fibrillation (HCC)- (present on admission)  Assessment & Plan  Rate control acceptable  Continue metoprolol succinate, 200 mg daily when we have enteral access  Continue anticoagulation with rivaroxaban, monitor for need for heparin/Lovenox therapy and not taking p.o.  IV metoprolol for rate control as needed when we do not have enteral axis or rate accelerated  Add digoxin if necessary if blood pressure soft, additional IV CCB or beta blocker appropriate if BP acceptable  Continue to optimize electrolytes    Diabetes mellitus, type 2 (HCC)- (present on admission)  Assessment & Plan  Continue medium sliding scale insulin, daily review with clinical pharmacist artery dosing and glycemic control  Hypoglycemia protocol s  Hold metformin, Levemir and Jardiance for now and when clinically appropriate  Increase Lantus to 15 units/day, prefer tighter control blood sugar given his bacteremia and sepsis-monitor for the need for insulin infusion protocols, pharmacy substitute for Levemir in our institution    CAD (coronary artery disease)- (present on admission)  Assessment & Plan  Continue Zetia  Continue Plavix  Continue metoprolol succinate, 200 mg daily    DNR (do not resuscitate)  Assessment & Plan  DNR/DNI status per MATTHEW on the chart  Reaffirmed with son after extensive phone call 5/6, son stated he was the POA    Palliative care RN confirmed POLST speaking to physician who completed the form at [facility]  Obtained power of  but apparently is only for physical circumstances not medical, will review decisions with family as a group    Stage 3 chronic kidney disease (HCC)- (present on admission)  Assessment & Plan  Monitor renal function and urine output  Avoid nephrotoxins  Renally dose medications were appropriate    JANNIE treated with BiPAP- (present on admission)  Assessment & Plan  Unknown if he is still using BiPAP, monitor for JANNIE and sleep related hypoxemia  Tolerating high flow nasal cannula without obvious significant obstructive apneas resulting in desaturation    BPH (benign prostatic hyperplasia)- (present on admission)  Assessment & Plan  Chronic Gonzalez catheter in place  Recently seen in ER approximately 5/1  Catheter changed out per RN while in recent ER visit, no change until approximately 30 days or sooner if infection develops  Urine culture from admit negative so far  Monitor for UTI    Essential hypertension- (present on admission)  Assessment & Plan  Goal SBP less than 160  Continue amlodipine, 10 mg daily  Continue hydralazine, 100 mg every 8 hours  Continue lisinopril, 40 mg daily  Continue metoprolol succinate, 200 mg daily  IV substitutes as needed when not taking p.o. including metoprolol, hydralazine and enalapril    Dyslipidemia- (present on admission)  Assessment & Plan  Continue Zetia    History of stroke- (present on admission)  Assessment & Plan  History of embolic strokes  Continue rivaroxaban and Plavix, monitor for bleeding complication       VTE:  NOAC  Ulcer: H2 Antagonist  Lines: None    I have performed a physical exam and reviewed and updated ROS and Plan today (5/9/2020). In review of yesterday's note (5/8/2020), there are no changes except as documented above.     Discussed patient condition and risk of morbidity and/or mortality with Family, RN, RT, Pharmacy,  Charge nurse / hot rounds, Patient and infectious disease     Patient remains critically ill.  Blood cultures are repeatedly positive unfortunately.  May need to change IV antibiotics.  Oxygenation worse this a.m. and nonrebreather mask placed on top of high flow.  Family conference today at 1 if he survives till then should probably focus on comfort and possible GIP consultation for inpatient hospice.  Fluid bolus administered.  Oxygen adjusted and sedation discontinued.  Patient initially rallying however I suspect he is going to fail.  CODE STATUS reaffirmed times several.  Prognosis poor despite ongoing critical care interventions within the limitations defined by this patient.    The patient remains critically ill.  Critical care time = 50 minutes in directly providing and coordinating critical care and extensive data review.  No time overlap and excludes procedures.    Addendum:  Patient developed worsening hypoxemia and then went into PEA and subsequently , family was notified and later son Esteban present the bedside and I reviewed the case at length and answered all his questions and see visited his father on his last day.  Palliative care RN also spoke with him at length at the bedside and we both offered phone numbers so he could call us in the next few days if further questions arose from him or his family.  Esteban described not being particularly surprised since he been really sick since his injury which dated back actually 15 and not 8 years ago.  Also note that repeat blood cultures from last night have also popped up positive for MRSA making 8 blood cultures in total since admission all positive for methicillin-resistant staph aureus.

## 2020-05-09 NOTE — CARE PLAN
Problem: Oxygenation:  Goal: Maintain adequate oxygenation dependent on patient condition  Outcome: PROGRESSING AS EXPECTED  Note: HHFNC-50L/60%

## 2020-05-10 LAB
BACTERIA BLD CULT: ABNORMAL
PSA FREE MFR SERPL: NORMAL %
PSA FREE SERPL-MCNC: NORMAL NG/ML
PSA SERPL-MCNC: 0.4 NG/ML (ref 0–4)
SIGNIFICANT IND 70042: ABNORMAL
SIGNIFICANT IND 70042: ABNORMAL
SITE SITE: ABNORMAL
SITE SITE: ABNORMAL
SOURCE SOURCE: ABNORMAL
SOURCE SOURCE: ABNORMAL

## 2020-05-10 NOTE — DISCHARGE SUMMARY
Death Summary    Cause of Death  PEA arrest due to acute hypoxic respiratory failure due to severe sepsis syndrome due to MRSA bacteremia    Comorbid Conditions at the Time of Death  Principal Problem:    Acute on chronic respiratory failure with hypoxemia (HCC) POA: Unknown  Active Problems:    Pneumonia due to infectious organism POA: Unknown    Acute on chronic diastolic (congestive) heart failure (HCC) POA: Unknown    Severe Sepsis (HCC) POA: Unknown    CAD (coronary artery disease) POA: Yes      Overview: [facility]      Cardiac cath 2006: LAD and RCA plaque    Diabetes mellitus, type 2 (HCC) POA: Yes    Chronic atrial fibrillation (HCC) POA: Yes    Abnormal CT of spine POA: Unknown    Thrombocytopenia (HCC) POA: Unknown    Hypokalemia POA: Unknown    History of stroke POA: Yes      Overview: History of embolic strokes, on coumadin    Dyslipidemia POA: Yes    Essential hypertension POA: Yes    BPH (benign prostatic hyperplasia) POA: Yes    JANNIE treated with BiPAP POA: Yes    Stage 3 chronic kidney disease (HCC) POA: Yes    DNR (do not resuscitate) POA: Unknown  Resolved Problems:    * No resolved hospital problems. *    Consultations: Infectious disease, palliative care team    History of Presenting Illness and Hospital Course  The entire history is obtained from healthcare providers and the medical record as this gentleman cannot provide me with any history.  This gentleman has a history of prior embolic strokes, essential hypertension, type 2 diabetes mellitus, dyslipidemia, chronic atrial fibrillation, chronic indwelling Gonzalez catheter, CAD, JANNIE, stage III CKD, grade 1 diastolic heart failure and chronic cervical spine disease.  He was seen in the emergency room on or about 5/1/2020 with bleeding from his Gonzalez catheter.  A test for SARS-CoV-2 was negative at that time.  He was sent back to his living facility after being treated in the emergency department. He resides at [facility].  5/3/2020 he was found down next to his bed.  He was weak and was unable to get up.  He is apparently normally on 2 L of domiciliary oxygen and his saturations were in the 80s.  He was placed on supplemental oxygen and brought to the emergency department.  He apparently reported that he fell from bed and was unable to get up due to lack of strength.  He was complaining of back pain in the emergency room and was administered IV narcotics.  At the time of my evaluation he is sedated from his pain medications and cannot provide me with any history.  A POLST is present on his chart and confirms that he is DNR/DNI status.  In the emergency department he had blood cultures obtained and he received a dose of Rocephin.  His BNP was elevated and he received IV furosemide.    Patient was admitted to the ICU and started on broad-spectrum antibiotics for presumed sepsis.  Antibiotics including vancomycin and Zosyn.  He had a history of prior MRSA infection.  Echocardiogram suggested significant worsening of LV function with EF decreased to 40% down from 65%.  Forced diuresis was initiated with Lasix as well.  He required high flow nasal cannula at max settings to have acceptable oxygenation.  Pulmonary toilet was performed to help with secretions.  Blood cultures came back with MRSA and patient was de-escalated to Zosyn.  Infectious disease was consulted.  Neurologic exam was unchanged from records and from conversations I had with his children.  Prior injury dates back many years and the patient had been residing in a rehab/skilled facility.  Patient had received prior antibiotics for an extended period of time for MRSA intravenously and subsequently was on oral clindamycin per 1 of his sons.  Repeated cultures here were positive for MRSA x3 additional sets.  MRI of his spine after 2 COVID studies were negative revealed changes that were concerning for discitis and osteomyelitis but no epidural abscess was  identified.  Serial neurological exams did not reveal any change in his chronic neuro deficits.  His LOC waxed and waned and his primary issue was persisting acute on chronic back pain primarily low back.  He was actually quite miserable and intermittent oral and intravenous narcotics were required to control pain and low-dose dexmedetomidine was employed for gentle sedation.  A POLST was received from the transferring facility and it was confirmed by our palliative care team when they spoke with the caring physician at the skilled nursing facility.  I confirmed it with the patient's son by phone times several.  Unfortunately the patient clinically was not improving despite appropriate antibiotics and developed worsening respiratory failure and suffered a PEA arrest midday 5/9.  Family had been notified times several by RNs.  I spoke with family, son Chris, at length at the bedside when he arrived to visit his father on his final day.  Palliative care RN also met with the patient's son at the bedside and we both offered our services to answer questions if they arose in the next few days.    Death Date: 05/09/20   Death Time: 1207

## 2020-05-11 ENCOUNTER — TELEPHONE (OUTPATIENT)
Dept: MEDICAL GROUP | Facility: LAB | Age: 70
End: 2020-05-11

## 2020-05-12 NOTE — TELEPHONE ENCOUNTER
Telephone call to Chris, son of Joel.  Discussed that his father was a good man.  Expressed condolences.  Son is very upset states that he was a known fall risk, was down for suspected 1-1/2 hours at the nursing care facility.  And they took no action to prevent his falling out of bed.  (Possible lawsuit per patient's son)

## 2020-05-14 ENCOUNTER — ANTICOAGULATION MONITORING (OUTPATIENT)
Dept: VASCULAR LAB | Facility: MEDICAL CENTER | Age: 70
End: 2020-05-14

## 2020-05-14 DIAGNOSIS — Z86.73 HISTORY OF STROKE: ICD-10-CM

## 2021-06-14 NOTE — DISCHARGE PLANNING
Anticipated Discharge Disposition: SNF     Action: LSW placed call to [facility] to check on who will be setting up transport. Dayna stated that she would like us to setup transport around noon or 1300 for tomorrow.     LSW faxed over transfer form to [facility] [id number].     Barriers to Discharge: none     Plan: LSW will continue to assess for any discharge needs.      Peripheral Block    Patient location during procedure: pre-op  Start time: 12/12/2017 7:18 AM  End time: 12/12/2017 7:23 AM  post-op analgesia per surgeon order as noted in medical record  Staffing:  Performing  Anesthesiologist: DELANO MUIR  Preanesthetic Checklist  Completed: patient identified, site marked, risks, benefits, and alternatives discussed, timeout performed, consent obtained, airway assessed, oxygen available, suction available, emergency drugs available and hand hygiene performed  Peripheral Block  Block type: brachial plexus, axillary  Prep: ChloraPrep  Patient position: supine  Patient monitoring: cardiac monitor, continuous pulse oximetry, heart rate and blood pressure  Laterality: right  Injection technique: ultrasound guided          Needle  Needle type: Stimuplex   Needle gauge: 21 G  Needle length: 4 in  no peripheral nerve catheter placed  Assessment  Injection assessment: no difficulty with injection, negative aspiration for heme, no paresthesia on injection and incremental injection

## 2021-07-12 NOTE — PROGRESS NOTES
Received shift report and assumed care of patient.  Patient awake, calm and stable, currently positioned in wheel chair; call light within reach.  Denies pain or discomfort at this time.  Will continue to monitor.   no

## 2022-09-27 NOTE — CARE PLAN
Problem: GLYCEMIA IMBALANCE  Goal: Clinical indication of glycemia balance is achieved    Intervention: MONITOR BLOOD GLUCOSE LEVELS AS ORDERED  Patient's FSBS at HS= 211. Patient given 35 units of scheduled lantus. HS snack provided. No s/s of hyperglycemia noted at this time. Will continue to assess for s/s of hyper/hypoglycemia.          show

## 2022-09-30 NOTE — DISCHARGE PLANNING
Received Choice form at 8534  Agency/Facility Name: [facility]  Referral sent per Choice form @ [phone]      No

## 2023-10-13 NOTE — Clinical Note
April 21, 2017        Patient: Joel Ma   YOB: 1950   Date of Visit: 4/21/2017           To Whom It May Concern:    It is my medical opinion that Joel Ma is improving after neurosurgery likely due to an industrial injury sustained in 2005.    If you have any questions or concerns, please don't hesitate to call.        Sincerely,          Catrachito Sterling D.O.      [address]  [facility] [id number] [phone] (Phone)  [phone] (Fax)     36.5

## 2024-10-20 NOTE — ED TRIAGE NOTES
C/o pain and pressure in rectum and bladder after kingston catheter stopping draining correctly since 2100.  Reports having decreased flow in draining x1 month.Recent of UTI, tachycardic.  Admits enlarged prostate.  Has been on zpack for infection.  
EMS reports glucose 524 prior to arrival.  
Yes

## 2025-05-11 NOTE — ED NOTES
AVS reviewed virtually with patient. Patient verbalized understanding of same, including new prescribed medications and side effects, recommended follow-up appointments, and reasons to seek medical assistance. All questions answered. Message sent to Shirley CASH RN to clarify Keppra dose.   ERP contacted regarding Pt's oxygen saturation.  85-89 % on 15 liters oxygen NRM.     Patient discharged to home, accompanied by transport. AVS reviewed with the patient by virtual nurse. No further questions at this time.    yes

## (undated) DEVICE — STAPLER SKIN DISP - (6/BX 10BX/CA) VISISTAT

## (undated) DEVICE — SUTURE 3-0 VICRYL PLUS RB-1 - 8 X 18 INCH (12/BX)

## (undated) DEVICE — TRAY CATHETER FOLEY URINE METER W/STATLOCK 350ML (10EA/CA)

## (undated) DEVICE — CELLSAVER STAT

## (undated) DEVICE — COVER MAYO STAND X-LG - (22EA/CA)

## (undated) DEVICE — GOWN WARMING STANDARD FLEX - (30/CA)

## (undated) DEVICE — SUTURE GENERAL

## (undated) DEVICE — GOWN SURGICAL XX-LARGE - (28EA/CA) SIRUS NON REINFORCED

## (undated) DEVICE — GLOVE BIOGEL INDICATOR SZ 6.5 SURGICAL PF LTX - (50PR/BX 4BX/CA)

## (undated) DEVICE — GLOVE BIOGEL SZ 8.5 SURGICAL PF LTX - (50PR/BX 4BX/CA)

## (undated) DEVICE — ELECTRODE DUAL RETURN W/ CORD - (50/PK)

## (undated) DEVICE — SLEEVE, VASO, THIGH, MED

## (undated) DEVICE — DRAPE STRLE REG TOWEL 18X24 - (10/BX 4BX/CA)"

## (undated) DEVICE — SYRINGE ASEPTO - (50EA/CA

## (undated) DEVICE — KIT SURGIFLO W/OUT THROMBIN - (6EA/CA)

## (undated) DEVICE — DRESSING TRANSPARENT FILM TEGADERM 4 X 4.75" (50EA/BX)"

## (undated) DEVICE — SET EXTENSION WITH 2 PORTS (48EA/CA) ***PART #2C8610 IS A SUBSTITUTE*****

## (undated) DEVICE — GLOVE BIOGEL INDICATOR SZ 7SURGICAL PF LTX - (50/BX 4BX/CA)

## (undated) DEVICE — DRAPE LARGE 3 QUARTER - (20/CA)

## (undated) DEVICE — TUBE CONNECT SUCTION CLEAR 120 X 1/4" (50EA/CA)"

## (undated) DEVICE — CELLSAVER PACK

## (undated) DEVICE — KIT ROOM DECONTAMINATION

## (undated) DEVICE — KIT ANESTHESIA W/CIRCUIT & 3/LT BAG W/FILTER (20EA/CA)

## (undated) DEVICE — KIT EVACUATER 3 SPRING PVC LF 1/8 DRAIN SIZE (10EA/CA)"

## (undated) DEVICE — NEEDLE NON SAFETY HYPO 22 GA X 1 1/2 IN (100/BX)

## (undated) DEVICE — CHLORAPREP 26 ML APPLICATOR - ORANGE TINT(25/CA)

## (undated) DEVICE — APPLICATOR COTTON TIP 6 IN - STERILE (2EA/PK 100PK/BX)

## (undated) DEVICE — PENCIL ELECTSURG 10FT BTN SWH - (50/CA)

## (undated) DEVICE — MASK ANESTHESIA ADULT  - (100/CA)

## (undated) DEVICE — ELECTRODE 850 FOAM ADHESIVE - HYDROGEL RADIOTRNSPRNT (50/PK)

## (undated) DEVICE — INTRAOP NEURO IN OR 1:1 PER 15 MIN

## (undated) DEVICE — PACK NEURO - (2EA/CA)

## (undated) DEVICE — ARMREST CRADLE FOAM - (2PR/PK 12PR/CA)

## (undated) DEVICE — BLANKET WARMING LOWER BODY - (10/CA) INACTIVE USE #8585

## (undated) DEVICE — PACK JACKSON TABLE KIT W/OUT - HR (6EA/CA)

## (undated) DEVICE — SUCTION INSTRUMENT YANKAUER BULBOUS TIP W/O VENT (50EA/CA)

## (undated) DEVICE — SYRINGE SAFETY 10 ML 18 GA X 1 1/2 BLUNT LL (100/BX 4BX/CA)

## (undated) DEVICE — TOWELS CLOTH SURGICAL - (4/PK 20PK/CA)

## (undated) DEVICE — BONE MILL BM210

## (undated) DEVICE — TOOL DISSECT MATCH HEAD

## (undated) DEVICE — SET LEADWIRE 5 LEAD BEDSIDE DISPOSABLE ECG (1SET OF 5/EA)

## (undated) DEVICE — GLOVE BIOGEL SZ 7 SURGICAL PF LTX - (50PR/BX 4BX/CA)

## (undated) DEVICE — SUTURE 0 VICRYL PLUS CT-2 - 8 X 18 INCH (12/BX)

## (undated) DEVICE — GOWN SURGEONS LARGE - (32/CA)

## (undated) DEVICE — PIN HEAD MAYFIELD DISP. (3EA/PK 12PK/BX)

## (undated) DEVICE — BOVIE BLADE COATED &INSULATED - 25/PK

## (undated) DEVICE — GLOVE BIOGEL SZ 6.5 SURGICAL PF LTX (50PR/BX 4BX/CA)

## (undated) DEVICE — SYRINGE SAFETY 5 ML 18 GA X 1-1/2 BLUNT LL (100/BX 4BX/CA)

## (undated) DEVICE — HEADREST PRONEVIEW LARGE - (10/CA)

## (undated) DEVICE — SUTURE 1 VICRYL PLUS CTX - 8 X 18 INCH (12/BX)

## (undated) DEVICE — LACTATED RINGERS INJ 1000 ML - (14EA/CA 60CA/PF)

## (undated) DEVICE — BIT DRILL 2.4MM (1TCONX3=3)

## (undated) DEVICE — GLOVE BIOGEL SZ 8 SURGICAL PF LTX - (50PR/BX 4BX/CA)

## (undated) DEVICE — SHEET PEDIATRIC LAPAROTOMY - (10/CA)

## (undated) DEVICE — BLADE SURGICAL CLIPPER - (50EA/CA)

## (undated) DEVICE — SUTURE 4-0 NUROLON CR/8 TF - (12/BX) ETHICON

## (undated) DEVICE — PROTECTOR ULNA NERVE - (36PR/CA)

## (undated) DEVICE — DECANTER FLD BLS - (50/CA)

## (undated) DEVICE — TUBE E-T HI-LO CUFF 8.0MM (10EA/PK)

## (undated) DEVICE — MIDAS LUBRICATOR DIFFUSER PACK (4EA/CA)

## (undated) DEVICE — FORCEP BIPOLAR ISOCOOL 8.5 1.0MM TIP"

## (undated) DEVICE — SENSOR SPO2 NEO LNCS ADHESIVE (20/BX) SEE USER NOTES

## (undated) DEVICE — NEPTUNE 4 PORT MANIFOLD - (20/PK)

## (undated) DEVICE — SUTURE 4-0 MONOCRYL PLUS PS-2 - 27 INCH (36/BX)

## (undated) DEVICE — CANISTER SUCTION 3000ML MECHANICAL FILTER AUTO SHUTOFF MEDI-VAC NONSTERILE LF DISP  (40EA/CA)

## (undated) DEVICE — TUBING CLEARLINK DUO-VENT - C-FLO (48EA/CA)

## (undated) DEVICE — TUBING C&T SET FLYING LEADS DRAIN TUBING (10EA/BX)

## (undated) DEVICE — SPONGE XRAY 8X4 STERL. 12PL - (10EA/TY 80TY/CA)

## (undated) DEVICE — GOWN SURGEONS X-LARGE - DISP. (30/CA)

## (undated) DEVICE — SOD. CHL. INJ. 0.9% 1000 ML - (14EA/CA 60CA/PF)